# Patient Record
Sex: MALE | Race: WHITE | NOT HISPANIC OR LATINO | Employment: OTHER | URBAN - METROPOLITAN AREA
[De-identification: names, ages, dates, MRNs, and addresses within clinical notes are randomized per-mention and may not be internally consistent; named-entity substitution may affect disease eponyms.]

---

## 2017-02-01 ENCOUNTER — APPOINTMENT (OUTPATIENT)
Dept: GENERAL RADIOLOGY | Facility: HOSPITAL | Age: 72
End: 2017-02-01

## 2017-02-01 ENCOUNTER — HOSPITAL ENCOUNTER (EMERGENCY)
Facility: HOSPITAL | Age: 72
Discharge: HOME | End: 2017-02-01
Attending: EMERGENCY MEDICINE | Admitting: EMERGENCY MEDICINE

## 2017-02-01 VITALS
SYSTOLIC BLOOD PRESSURE: 117 MMHG | BODY MASS INDEX: 33.65 KG/M2 | RESPIRATION RATE: 18 BRPM | WEIGHT: 222 LBS | TEMPERATURE: 97.7 F | OXYGEN SATURATION: 94 % | DIASTOLIC BLOOD PRESSURE: 56 MMHG | HEART RATE: 82 BPM | HEIGHT: 68 IN

## 2017-02-01 DIAGNOSIS — R11.2 NAUSEA VOMITING AND DIARRHEA: ICD-10-CM

## 2017-02-01 DIAGNOSIS — R07.89 ATYPICAL CHEST PAIN: ICD-10-CM

## 2017-02-01 DIAGNOSIS — R19.7 NAUSEA VOMITING AND DIARRHEA: ICD-10-CM

## 2017-02-01 DIAGNOSIS — R55 SYNCOPE, UNSPECIFIED SYNCOPE TYPE: Primary | ICD-10-CM

## 2017-02-01 DIAGNOSIS — R19.7 DIARRHEA, UNSPECIFIED TYPE: ICD-10-CM

## 2017-02-01 LAB
ALBUMIN SERPL-MCNC: 4.2 G/DL (ref 3.2–4.8)
ALBUMIN/GLOB SERPL: 1.4 G/DL (ref 1.5–2.5)
ALP SERPL-CCNC: 76 U/L (ref 25–100)
ALT SERPL W P-5'-P-CCNC: 21 U/L (ref 7–40)
ANION GAP SERPL CALCULATED.3IONS-SCNC: 3 MMOL/L (ref 3–11)
AST SERPL-CCNC: 25 U/L (ref 0–33)
BASOPHILS # BLD AUTO: 0.01 10*3/MM3 (ref 0–0.2)
BASOPHILS NFR BLD AUTO: 0.1 % (ref 0–1)
BILIRUB SERPL-MCNC: 0.6 MG/DL (ref 0.3–1.2)
BNP SERPL-MCNC: 37 PG/ML (ref 0–100)
BUN BLD-MCNC: 14 MG/DL (ref 9–23)
BUN/CREAT SERPL: 14 (ref 7–25)
C DIFF TOX GENS STL QL NAA+PROBE: NOT DETECTED
CALCIUM SPEC-SCNC: 9.4 MG/DL (ref 8.7–10.4)
CHLORIDE SERPL-SCNC: 103 MMOL/L (ref 99–109)
CO2 SERPL-SCNC: 33 MMOL/L (ref 20–31)
CREAT BLD-MCNC: 1 MG/DL (ref 0.6–1.3)
DEPRECATED RDW RBC AUTO: 44.6 FL (ref 37–54)
EOSINOPHIL # BLD AUTO: 0.03 10*3/MM3 (ref 0.1–0.3)
EOSINOPHIL NFR BLD AUTO: 0.2 % (ref 0–3)
ERYTHROCYTE [DISTWIDTH] IN BLOOD BY AUTOMATED COUNT: 12.8 % (ref 11.3–14.5)
GFR SERPL CREATININE-BSD FRML MDRD: 73 ML/MIN/1.73
GLOBULIN UR ELPH-MCNC: 3 GM/DL
GLUCOSE BLD-MCNC: 148 MG/DL (ref 70–100)
HCT VFR BLD AUTO: 44.6 % (ref 38.9–50.9)
HGB BLD-MCNC: 15.2 G/DL (ref 13.1–17.5)
HOLD SPECIMEN: NORMAL
HOLD SPECIMEN: NORMAL
IMM GRANULOCYTES # BLD: 0.07 10*3/MM3 (ref 0–0.03)
IMM GRANULOCYTES NFR BLD: 0.5 % (ref 0–0.6)
LIPASE SERPL-CCNC: 29 U/L (ref 6–51)
LYMPHOCYTES # BLD AUTO: 0.75 10*3/MM3 (ref 0.6–4.8)
LYMPHOCYTES NFR BLD AUTO: 5.4 % (ref 24–44)
MCH RBC QN AUTO: 32.6 PG (ref 27–31)
MCHC RBC AUTO-ENTMCNC: 34.1 G/DL (ref 32–36)
MCV RBC AUTO: 95.7 FL (ref 80–99)
MONOCYTES # BLD AUTO: 0.94 10*3/MM3 (ref 0–1)
MONOCYTES NFR BLD AUTO: 6.8 % (ref 0–12)
NEUTROPHILS # BLD AUTO: 12.09 10*3/MM3 (ref 1.5–8.3)
NEUTROPHILS NFR BLD AUTO: 87 % (ref 41–71)
PLATELET # BLD AUTO: 204 10*3/MM3 (ref 150–450)
PMV BLD AUTO: 9.1 FL (ref 6–12)
POTASSIUM BLD-SCNC: 4.2 MMOL/L (ref 3.5–5.5)
PROT SERPL-MCNC: 7.2 G/DL (ref 5.7–8.2)
RBC # BLD AUTO: 4.66 10*6/MM3 (ref 4.2–5.76)
SODIUM BLD-SCNC: 139 MMOL/L (ref 132–146)
T4 FREE SERPL-MCNC: 0.95 NG/DL (ref 0.89–1.76)
TROPONIN I SERPL-MCNC: 0 NG/ML (ref 0–0.07)
TROPONIN I SERPL-MCNC: 0.01 NG/ML (ref 0–0.07)
TSH SERPL DL<=0.05 MIU/L-ACNC: 3.7 MIU/ML (ref 0.35–5.35)
WBC NRBC COR # BLD: 13.89 10*3/MM3 (ref 3.5–10.8)
WHOLE BLOOD HOLD SPECIMEN: NORMAL
WHOLE BLOOD HOLD SPECIMEN: NORMAL

## 2017-02-01 PROCEDURE — 87045 FECES CULTURE AEROBIC BACT: CPT | Performed by: EMERGENCY MEDICINE

## 2017-02-01 PROCEDURE — 87046 STOOL CULTR AEROBIC BACT EA: CPT | Performed by: EMERGENCY MEDICINE

## 2017-02-01 PROCEDURE — 84484 ASSAY OF TROPONIN QUANT: CPT

## 2017-02-01 PROCEDURE — 71010 HC CHEST PA OR AP: CPT

## 2017-02-01 PROCEDURE — 84443 ASSAY THYROID STIM HORMONE: CPT | Performed by: EMERGENCY MEDICINE

## 2017-02-01 PROCEDURE — 36415 COLL VENOUS BLD VENIPUNCTURE: CPT

## 2017-02-01 PROCEDURE — 80053 COMPREHEN METABOLIC PANEL: CPT | Performed by: EMERGENCY MEDICINE

## 2017-02-01 PROCEDURE — 85025 COMPLETE CBC W/AUTO DIFF WBC: CPT | Performed by: EMERGENCY MEDICINE

## 2017-02-01 PROCEDURE — 83690 ASSAY OF LIPASE: CPT | Performed by: EMERGENCY MEDICINE

## 2017-02-01 PROCEDURE — 83880 ASSAY OF NATRIURETIC PEPTIDE: CPT | Performed by: EMERGENCY MEDICINE

## 2017-02-01 PROCEDURE — 96374 THER/PROPH/DIAG INJ IV PUSH: CPT

## 2017-02-01 PROCEDURE — 93005 ELECTROCARDIOGRAM TRACING: CPT

## 2017-02-01 PROCEDURE — 87493 C DIFF AMPLIFIED PROBE: CPT | Performed by: EMERGENCY MEDICINE

## 2017-02-01 PROCEDURE — 25010000002 ONDANSETRON PER 1 MG: Performed by: EMERGENCY MEDICINE

## 2017-02-01 PROCEDURE — 93005 ELECTROCARDIOGRAM TRACING: CPT | Performed by: EMERGENCY MEDICINE

## 2017-02-01 PROCEDURE — 96361 HYDRATE IV INFUSION ADD-ON: CPT

## 2017-02-01 PROCEDURE — 99284 EMERGENCY DEPT VISIT MOD MDM: CPT

## 2017-02-01 PROCEDURE — 84439 ASSAY OF FREE THYROXINE: CPT | Performed by: EMERGENCY MEDICINE

## 2017-02-01 RX ORDER — OMEPRAZOLE 20 MG/1
20 CAPSULE, DELAYED RELEASE ORAL DAILY
COMMUNITY
End: 2022-04-27

## 2017-02-01 RX ORDER — ONDANSETRON 8 MG/1
8 TABLET, ORALLY DISINTEGRATING ORAL EVERY 8 HOURS PRN
Qty: 15 TABLET | Refills: 0 | Status: ON HOLD | OUTPATIENT
Start: 2017-02-01 | End: 2018-09-21

## 2017-02-01 RX ORDER — ASPIRIN 81 MG/1
324 TABLET, CHEWABLE ORAL ONCE
Status: DISCONTINUED | OUTPATIENT
Start: 2017-02-01 | End: 2017-02-01 | Stop reason: HOSPADM

## 2017-02-01 RX ORDER — SODIUM CHLORIDE 0.9 % (FLUSH) 0.9 %
10 SYRINGE (ML) INJECTION AS NEEDED
Status: DISCONTINUED | OUTPATIENT
Start: 2017-02-01 | End: 2017-02-01 | Stop reason: HOSPADM

## 2017-02-01 RX ORDER — SODIUM CHLORIDE 9 MG/ML
125 INJECTION, SOLUTION INTRAVENOUS CONTINUOUS
Status: DISCONTINUED | OUTPATIENT
Start: 2017-02-01 | End: 2017-02-01 | Stop reason: HOSPADM

## 2017-02-01 RX ORDER — ONDANSETRON 2 MG/ML
4 INJECTION INTRAMUSCULAR; INTRAVENOUS ONCE
Status: COMPLETED | OUTPATIENT
Start: 2017-02-01 | End: 2017-02-01

## 2017-02-01 RX ADMIN — SODIUM CHLORIDE 125 ML/HR: 9 INJECTION, SOLUTION INTRAVENOUS at 12:00

## 2017-02-01 RX ADMIN — SODIUM CHLORIDE 500 ML: 9 INJECTION, SOLUTION INTRAVENOUS at 11:59

## 2017-02-01 RX ADMIN — ONDANSETRON 4 MG: 2 INJECTION INTRAMUSCULAR; INTRAVENOUS at 11:59

## 2017-02-01 NOTE — DISCHARGE INSTRUCTIONS
Follow up with Dr. Beth at his next available, call tomorrow for appointment.     Follow upw with Dr. Bishop for colorectal reevaluation within the next week, more promptly if you have any recurrent symptoms, call tomorrow for an appointment.     If you have recurrent episodes of emesis, worsening symptoms, significant chest pain, or any progressive symptoms , return to the ED for re-evaluation    Drink plenty of fluids.     Your Zofran as needed for nausea    No vigorous physical activity, rest, and easy to digest foods for the next several days    Follow-up with your primary care provider for interim reevaluation

## 2017-02-01 NOTE — ED PROVIDER NOTES
Subjective   HPI Comments: Lea Rivers is a 71 y.o.male who presents to the ED with c/o CP and a syncopal episode. This morning he woke up around 0500 with CP/upper abd pain and took an  NTG with no relief. He continued to have pain after taking his grandson to school at 0730 and he took some tums with no relief. He ate a small amount of breakfast and several minutes later began having nausea and vomiting. He began having abdominal cramping and attempted to get to the restroom and had diarrhea before making it there and became diaphoretic and his daughter in law returned to find him passed out on the toilet and called EMS. In the ED he states that he had roughly 5 instances of foul watery stool this AM and current abdominal cramping and he denies any blood in his stool, sore throat, leg pain, SoA, fevers or other acute complaints.    His daughter in law says that he has been more fatigued over the past several months.     Hx of nobobstructive CAD with last cath in  showing 30-40% stenosis of mid LAD and nml EF, HTN, HLD, GERD treated with prilosec.     Patient is a 71 y.o. male presenting with chest pain.   History provided by:  Patient  Chest Pain   Pain location:  Epigastric  Pain radiates to:  Does not radiate  Pain severity:  Mild  Onset quality:  Sudden  Duration: 0530 this AM   Timing:  Constant  Progression:  Worsening  Chronicity:  New  Relieved by:  Nothing  Worsened by:  Nothing  Ineffective treatments: tums, NTG.  Associated symptoms: abdominal pain, diaphoresis, nausea, syncope and vomiting    Associated symptoms: no fever and no shortness of breath        Review of Systems   Constitutional: Positive for diaphoresis. Negative for fever.   Respiratory: Negative for shortness of breath.    Cardiovascular: Positive for chest pain and syncope.   Gastrointestinal: Positive for abdominal pain, diarrhea, nausea and vomiting.   Neurological: Positive for syncope.   All other systems reviewed and  are negative.      Past Medical History   Diagnosis Date   • BPH (benign prostatic hypertrophy)    • Colon cancer 1990     Status post partial colectomy in 1990. No chemotherapy or radiation therapy.   • Coronary artery disease      Nonobstructive coronary artery disease.   • Dyslipidemia    • GERD (gastroesophageal reflux disease)      Hx of.   • H/O cardiac catheterization    • Hypertension    • Hypothyroidism    • Seizure disorder        Allergies   Allergen Reactions   • Sulfa Antibiotics Rash       Past Surgical History   Procedure Laterality Date   • Colectomy partial / total  1990     Partial colectomy   • Cholecystectomy     • Appendectomy     • Other surgical history       Contraction surgery on bilateral hands and wrists.       History reviewed. No pertinent family history.    Social History     Social History   • Marital status:      Spouse name: N/A   • Number of children: N/A   • Years of education: N/A     Social History Main Topics   • Smoking status: Never Smoker   • Smokeless tobacco: None   • Alcohol use No   • Drug use: No   • Sexual activity: Defer     Other Topics Concern   • None     Social History Narrative   • None         Objective   Physical Exam   Constitutional: He is oriented to person, place, and time. He appears well-developed and well-nourished. No distress.   HENT:   Head: Normocephalic and atraumatic.   Eyes: No scleral icterus.   Conjunctival pallor.    Neck: Normal range of motion. Neck supple.   Cardiovascular: Normal rate, regular rhythm and normal heart sounds.    Pulmonary/Chest: Effort normal and breath sounds normal. No respiratory distress.   Abdominal: Soft. There is no tenderness.   Musculoskeletal: Normal range of motion. He exhibits no edema.   Lymphadenopathy:     He has no cervical adenopathy.   Neurological: He is alert and oriented to person, place, and time.   Skin: Skin is warm and dry. No rash noted. He is not diaphoretic.   Psychiatric: He has a normal  mood and affect. His behavior is normal.   Nursing note and vitals reviewed.      Procedures         ED Course  ED Course   Comment By Time   Discussed findings at length with the patient with 2 negative troponins, 2 acute EKGs.  The patient had an episode of emesis and diarrhea in the ED.  Stools obtained which is negative for C. difficile.  A culture is pending.  The patient now reports feeling markedly improved.  His nausea is resolved.  He has no abdominal pain.  His chest pain had resolved previously, and was all in relation to his GI symptoms earlierI discussed the evaluation here in the ED today.  His family reports 2 previous identical episodes of nausea vomiting and diarrhea since his treatment of prostatitis.  This is the third bout.  All resolved spontaneously.  He has not seen urologist and they have requested follow-up with Dr. Daniel Beth.  I discussed urologic follow-up and will be happy to refer him at their requestHe was supposed to have repeat colonoscopy this month by Dr. Bishop.  I think this is quite opportune and review of his recurrent GI symptoms now, as he'll certainly need colorectal evaluation, and should have a colonoscopy this month.  I'll last them to call today or tomorrowWe'll sure that he can keep down fluids well currently and he is taking Gatorade at the current time.  I discussed hydrationDiscussed indications for immediate return to the ED.  Very responsible and pleasant patient and family verbalized understanding and agreement with the plan of care. Jose Golden MD 02/01 4307   Encourage fluids well without further nausea in the ED.  He much better.  I discussed outpatient evaluation with patient and family.  He is asymptomatic now.  Will ambulate in the in the ED prior to discharge.  All are in agreement with the plan and feels the patient is ready for discharge at the current time. Jose Golden MD 02/01 3264     Recent Results (from the past 24 hour(s))   Comprehensive  Metabolic Panel    Collection Time: 02/01/17 11:17 AM   Result Value Ref Range    Glucose 148 (H) 70 - 100 mg/dL    BUN 14 9 - 23 mg/dL    Creatinine 1.00 0.60 - 1.30 mg/dL    Sodium 139 132 - 146 mmol/L    Potassium 4.2 3.5 - 5.5 mmol/L    Chloride 103 99 - 109 mmol/L    CO2 33.0 (H) 20.0 - 31.0 mmol/L    Calcium 9.4 8.7 - 10.4 mg/dL    Total Protein 7.2 5.7 - 8.2 g/dL    Albumin 4.20 3.20 - 4.80 g/dL    ALT (SGPT) 21 7 - 40 U/L    AST (SGOT) 25 0 - 33 U/L    Alkaline Phosphatase 76 25 - 100 U/L    Total Bilirubin 0.6 0.3 - 1.2 mg/dL    eGFR Non African Amer 73 >60 mL/min/1.73    Globulin 3.0 gm/dL    A/G Ratio 1.4 (L) 1.5 - 2.5 g/dL    BUN/Creatinine Ratio 14.0 7.0 - 25.0    Anion Gap 3.0 3.0 - 11.0 mmol/L   Lipase    Collection Time: 02/01/17 11:17 AM   Result Value Ref Range    Lipase 29 6 - 51 U/L   BNP    Collection Time: 02/01/17 11:17 AM   Result Value Ref Range    BNP 37.0 0.0 - 100.0 pg/mL   CBC Auto Differential    Collection Time: 02/01/17 11:17 AM   Result Value Ref Range    WBC 13.89 (H) 3.50 - 10.80 10*3/mm3    RBC 4.66 4.20 - 5.76 10*6/mm3    Hemoglobin 15.2 13.1 - 17.5 g/dL    Hematocrit 44.6 38.9 - 50.9 %    MCV 95.7 80.0 - 99.0 fL    MCH 32.6 (H) 27.0 - 31.0 pg    MCHC 34.1 32.0 - 36.0 g/dL    RDW 12.8 11.3 - 14.5 %    RDW-SD 44.6 37.0 - 54.0 fl    MPV 9.1 6.0 - 12.0 fL    Platelets 204 150 - 450 10*3/mm3    Neutrophil % 87.0 (H) 41.0 - 71.0 %    Lymphocyte % 5.4 (L) 24.0 - 44.0 %    Monocyte % 6.8 0.0 - 12.0 %    Eosinophil % 0.2 0.0 - 3.0 %    Basophil % 0.1 0.0 - 1.0 %    Immature Grans % 0.5 0.0 - 0.6 %    Neutrophils, Absolute 12.09 (H) 1.50 - 8.30 10*3/mm3    Lymphocytes, Absolute 0.75 0.60 - 4.80 10*3/mm3    Monocytes, Absolute 0.94 0.00 - 1.00 10*3/mm3    Eosinophils, Absolute 0.03 (L) 0.10 - 0.30 10*3/mm3    Basophils, Absolute 0.01 0.00 - 0.20 10*3/mm3    Immature Grans, Absolute 0.07 (H) 0.00 - 0.03 10*3/mm3   T4, Free    Collection Time: 02/01/17 11:17 AM   Result Value Ref Range     Free T4 0.95 0.89 - 1.76 ng/dL   TSH    Collection Time: 02/01/17 11:17 AM   Result Value Ref Range    TSH 3.699 0.350 - 5.350 mIU/mL   POC Troponin, Rapid    Collection Time: 02/01/17 11:21 AM   Result Value Ref Range    Troponin I 0.00 0.00 - 0.07 ng/mL   Clostridium Difficile Toxin, PCR    Collection Time: 02/01/17 12:28 PM   Result Value Ref Range    C. Difficile Toxins by PCR Not Detected Negative   POC Troponin, Rapid    Collection Time: 02/01/17  1:23 PM   Result Value Ref Range    Troponin I 0.01 0.00 - 0.07 ng/mL     Note: In addition to lab results from this visit, the labs listed above may include labs taken at another facility or during a different encounter within the last 24 hours. Please correlate lab times with ED admission and discharge times for further clarification of the services performed during this visit.    XR Chest 1 View   Final Result   No active disease.       D:  02/01/2017   E:  02/01/2017       This report was finalized on 2/1/2017 11:52 AM by Dr. Augustin Lewis MD.            Vitals:    02/01/17 1436 02/01/17 1437 02/01/17 1500 02/01/17 1514   BP: 141/78 141/78 117/56    BP Location:       Patient Position:       Pulse: 84 81 82    Resp:       Temp:       TempSrc:       SpO2: 94% 94%  94%   Weight:       Height:         Medications   sodium chloride 0.9 % flush 10 mL (not administered)   aspirin chewable tablet 324 mg (324 mg Oral Not Given 2/1/17 1116)   sodium chloride 0.9 % infusion (125 mL/hr Intravenous Not Given 2/1/17 1442)   sodium chloride 0.9 % bolus 500 mL (0 mL Intravenous Stopped 2/1/17 1233)   ondansetron (ZOFRAN) injection 4 mg (4 mg Intravenous Given 2/1/17 1159)     ECG/EMG Results (last 24 hours)     Procedure Component Value Units Date/Time    ECG 12 Lead [[id number]] Collected:  02/01/17 1054     Updated:  02/01/17 1141    Narrative:       Test Reason : chest pain  Blood Pressure : **/** mmHG  Vent. Rate : 092 BPM     Atrial Rate : 092 BPM     P-R Int : 156 ms           QRS Dur : 084 ms      QT Int : 342 ms       P-R-T Axes : 045 059 049 degrees     QTc Int : 422 ms    Normal sinus rhythm  Nonspecific ST abnormality  Abnormal ECG  When compared with ECG of 13-MAR-2012 15:54,  Vent. rate has increased BY  35 BPM  Non-specific change in ST segment in Anterior leads  Confirmed by KERI GOLDEN MD ([id number]) on 2/1/2017 11:41:48 AM    Referred By:  EDMD           Confirmed By:KERI GOLDEN MD    ECG 12 Lead [[id number]] Collected:  02/01/17 1321     Updated:  02/01/17 1343    Narrative:       Test Reason : chest pain  Blood Pressure : **/** mmHG  Vent. Rate : 086 BPM     Atrial Rate : 086 BPM     P-R Int : 170 ms          QRS Dur : 082 ms      QT Int : 360 ms       P-R-T Axes : 052 060 071 degrees     QTc Int : 430 ms    Normal sinus rhythm  Nonspecific T wave abnormality  Abnormal ECG  When compared with ECG of 01-FEB-2017 10:54,  Nonspecific T wave abnormality now evident in Lateral leads  Confirmed by KERI GOLDEN MD ([id number]) on 2/1/2017 1:43:07 PM    Referred By:  DARNELL           Confirmed By:KERI GOLDEN MD                    [facility]    Final diagnoses:   Diarrhea, unspecified type   Syncope, unspecified syncope type   Atypical chest pain   Nausea vomiting and diarrhea       Documentation assistance provided by brunilda Layne.  Information recorded by the scribe was done at my direction and has been verified and validated by me.     Dangelo Layne  02/01/17 1140       Dangelo Layne  02/01/17 1448       Jose Golden MD  02/01/17 1537

## 2017-02-03 LAB — BACTERIA SPEC AEROBE CULT: NORMAL

## 2017-11-13 ENCOUNTER — OFFICE VISIT (OUTPATIENT)
Dept: ORTHOPEDIC SURGERY | Facility: CLINIC | Age: 72
End: 2017-11-13

## 2017-11-13 VITALS — WEIGHT: 222 LBS | BODY MASS INDEX: 33.65 KG/M2 | HEIGHT: 68 IN

## 2017-11-13 DIAGNOSIS — M17.0 BILATERAL PRIMARY OSTEOARTHRITIS OF KNEE: Primary | ICD-10-CM

## 2017-11-13 PROCEDURE — 99214 OFFICE O/P EST MOD 30 MIN: CPT | Performed by: ORTHOPAEDIC SURGERY

## 2017-11-13 PROCEDURE — 20610 DRAIN/INJ JOINT/BURSA W/O US: CPT | Performed by: ORTHOPAEDIC SURGERY

## 2017-11-13 RX ORDER — DIVALPROEX SODIUM 500 MG/1
TABLET, EXTENDED RELEASE ORAL
COMMUNITY
Start: 2017-10-11 | End: 2017-11-13 | Stop reason: SDUPTHER

## 2017-11-13 RX ORDER — ISOSORBIDE MONONITRATE 30 MG/1
TABLET, EXTENDED RELEASE ORAL
COMMUNITY
Start: 2017-10-11 | End: 2017-11-13 | Stop reason: SDUPTHER

## 2017-11-13 RX ORDER — ZOSTER VACCINE LIVE 19400 [PFU]/.65ML
INJECTION, POWDER, LYOPHILIZED, FOR SUSPENSION SUBCUTANEOUS
Refills: 0 | Status: ON HOLD | COMMUNITY
Start: 2017-10-20 | End: 2018-09-21

## 2017-11-13 RX ORDER — METFORMIN HYDROCHLORIDE 500 MG/1
500 TABLET, EXTENDED RELEASE ORAL 2 TIMES DAILY
COMMUNITY
Start: 2017-10-26

## 2017-11-13 NOTE — PROGRESS NOTES
[facility] Note    Subjective     Chief Complaint   Patient presents with   • Follow-up     6 month follow up: Bilateral knee osteoarthritits        HPI    Lea Rivers is a 72 y.o. male. He presents today for evaluation of bilateral knee pain.  Ms. Jensen known history of osteoarthritis in both knees, with persistent pain, no history of injury, moderate to severe pain, burning and sharp in quality, associated with popping, grinding and stiffness.  It worsens with climbing stairs.  Overall the pain is worsening.  He is not interested in surgical management at current time.  He has responded well to Visco supplementation injections in the past.      Patient Active Problem List   Diagnosis   • Coronary artery disease   • Dyslipidemia   • Hypothyroidism   • GERD (gastroesophageal reflux disease)   • Seizure disorder   • BPH (benign prostatic hypertrophy)   • Hypertension     Past Medical History:   Diagnosis Date   • BPH (benign prostatic hypertrophy)    • Chest pain    • Colon cancer 1990    Status post partial colectomy in 1990. No chemotherapy or radiation therapy.   • Coronary artery disease     Nonobstructive coronary artery disease.   • Diabetes    • Dyslipidemia    • GERD (gastroesophageal reflux disease)     Hx of.   • H/O cardiac catheterization    • Hypertension    • Hypothyroidism    • Left shoulder pain    • Primary osteoarthritis of both knees    • Seizure disorder       Past Surgical History:   Procedure Laterality Date   • APPENDECTOMY     • CARPAL TUNNEL RELEASE Bilateral    • CHOLECYSTECTOMY     • COLECTOMY PARTIAL / TOTAL  1990    Partial colectomy   • OTHER SURGICAL HISTORY      Contraction surgery on bilateral hands and wrists.   • OTHER SURGICAL HISTORY      left and right hands   • SINUS SURGERY     • TONSILLECTOMY     • VASECTOMY        Family History   Problem Relation Age of Onset   • Cancer Mother    • Hypertension Father    • Stroke Father    • Stroke Other    •  Arthritis Other    • Cancer Other      Social History     Social History   • Marital status:      Spouse name: N/A   • Number of children: N/A   • Years of education: N/A     Occupational History   • Not on file.     Social History Main Topics   • Smoking status: Never Smoker   • Smokeless tobacco: Never Used   • Alcohol use No   • Drug use: No   • Sexual activity: Defer     Other Topics Concern   • Not on file     Social History Narrative      Current Outpatient Prescriptions on File Prior to Visit   Medication Sig Dispense Refill   • aspirin 81 MG tablet Take 81 mg by mouth daily.     • coenzyme Q10 100 MG capsule Take 100 mg by mouth daily.     • divalproex (DEPAKOTE) 500 MG DR tablet Take 500 mg by mouth 3 (three) times a day.     • fluticasone (FLONASE) 50 MCG/ACT nasal spray 2 sprays into each nostril as needed for rhinitis. Administer 2 sprays in each nostril for each dose.     • isosorbide dinitrate (ISORDIL) 30 MG tablet Take 30 mg by mouth daily.     • levETIRAcetam (KEPPRA) 500 MG tablet Take 500 mg by mouth 2 (two) times a day.     • levothyroxine (SYNTHROID, LEVOTHROID) 88 MCG tablet Take 88 mcg by mouth daily.     • lisinopril (PRINIVIL,ZESTRIL) 20 MG tablet Take 20 mg by mouth 2 (two) times a day.     • omeprazole (priLOSEC) 20 MG capsule Take 20 mg by mouth Daily.     • simvastatin (ZOCOR) 20 MG tablet Take 20 mg by mouth every night.     • tamsulosin (FLOMAX) 0.4 MG capsule 24 hr capsule Take  by mouth Take As Directed.     • verapamil SR (CALAN-SR) 240 MG CR tablet Take 240 mg by mouth 2 (two) times a day.     • vitamin B-12 (CYANOCOBALAMIN) 100 MCG tablet Take 50 mcg by mouth daily.     • doxazosin (CARDURA) 4 MG tablet Take 4 mg by mouth every night.     • finasteride (PROSCAR) 5 MG tablet Take 5 mg by mouth daily.     • nitroglycerin (NITROSTAT) 0.4 MG SL tablet Place  under the tongue Take As Directed.     • ondansetron ODT (ZOFRAN-ODT) 8 MG disintegrating tablet Take 1 tablet by mouth  Every 8 (Eight) Hours As Needed for nausea or vomiting. 15 tablet 0     No current facility-administered medications on file prior to visit.       Allergies   Allergen Reactions   • Sulfa Antibiotics Rash        Review of Systems   Constitutional: Negative for activity change, appetite change, chills, diaphoresis, fatigue, fever and unexpected weight change.   HENT: Positive for sinus pressure. Negative for congestion, dental problem, drooling, ear discharge, ear pain, facial swelling, hearing loss, mouth sores, nosebleeds, postnasal drip, rhinorrhea, sneezing, sore throat, tinnitus, trouble swallowing and voice change.    Eyes: Negative for photophobia, pain, discharge, redness, itching and visual disturbance.   Respiratory: Negative for apnea, cough, choking, chest tightness, shortness of breath, wheezing and stridor.    Cardiovascular: Negative for chest pain, palpitations and leg swelling.   Gastrointestinal: Negative for abdominal distention, abdominal pain, anal bleeding, blood in stool, constipation, diarrhea, nausea, rectal pain and vomiting.   Endocrine: Negative for cold intolerance, heat intolerance, polydipsia, polyphagia and polyuria.   Genitourinary: Negative for decreased urine volume, difficulty urinating, dysuria, enuresis, flank pain, frequency, genital sores, hematuria and urgency.   Musculoskeletal: Positive for arthralgias. Negative for back pain, gait problem, joint swelling, myalgias, neck pain and neck stiffness.   Skin: Negative for color change, pallor, rash and wound.   Allergic/Immunologic: Negative for environmental allergies, food allergies and immunocompromised state.   Neurological: Positive for seizures. Negative for dizziness, tremors, syncope, facial asymmetry, speech difficulty, weakness, light-headedness, numbness and headaches.   Hematological: Negative for adenopathy. Does not bruise/bleed easily.   Psychiatric/Behavioral: Negative for agitation, behavioral problems, confusion,  "decreased concentration, dysphoric mood, hallucinations, self-injury, sleep disturbance and suicidal ideas. The patient is not nervous/anxious and is not hyperactive.         Objective      Physical Exam  Ht 68\" (172.7 cm)  Wt 222 lb (101 kg)  BMI 33.75 kg/m2    Body mass index is 33.75 kg/(m^2).    General:   Mental Status:  Alert   Appearance: Cooperative, in no acute distress   Build and Nutrition: Well-nourished and well developed male   Orientation: Alert and oriented to person, place and time   Posture: Normal   Gait: Normal    Integument:   Right knee: no skin lesions, no rash, no ecchymosis   Left knee: no skin lesions, no rash, no ecchymosis    Neurologic:   Sensation:    Right foot: intact to light touch on the dorsal and plantar aspect    Left foot: intact to light touch on the dorsal and plantar aspect   Motor:  Right lower extremity: 5/5 quadriceps, hamstrings, ankle dorsiflexors, and ankle plantar flexors  Left lower extremity: 5/5 quadriceps, hamstrings, ankle dorsiflexors, and ankle plantar flexors    Lower Extremities:   Right Knee:    Tenderness:  Medial joint line tenderness    Effusion:  None    Swelling:  None    Crepitus:  Positive    Atrophy:  None    Range of motion:  Extension: 0°       Flexion: 120°  Instability:  No varus laxity, no valgus laxity, negative anterior drawer  Deformities:  Varus   Left Knee:    Tenderness:  Medial joint line tenderness    Effusion:  None    Swelling:  None    Crepitus: Positive    Atrophy: None    Range of motion:  Extension: 0°       Flexion: 120°  Instability:  No varus laxity, no valgus laxity, negative anterior drawer  Deformities:  Varus        Imaging/Studies  Imaging Results (last 24 hours)     Procedure Component Value Units Date/Time    XR Knee 4+ View Bilateral [[id number]] Resulted:  11/13/17 0908     Updated:  11/13/17 0909    Narrative:       RIght Knee Radiographs  Indication: right knee pain  Views: Standing AP's and skiers of both knees, with " lateral and sunrise   views of the right knee    Comparison: no prior studies available    Findings:   Advanced arthritic changes are seen, with varus alignment, and   bone-on-bone contact in medial compartment, with tricompartmental   osteophytes.    Left Knee Radiographs  Indication: left knee pain  Views: Standing AP's and skiers of both knees, with lateral and sunrise   views of the left knee    Comparison: no prior studies available    Findings:   Advanced arthritic changes are seen, with varus alignment, and   bone-on-bone contact in medial compartment, with tricompartmental   osteophytes.            Assessment and Plan     Lea was seen today for follow-up.    Diagnoses and all orders for this visit:    Bilateral primary osteoarthritis of knee  -     XR Knee 4+ View Bilateral  -     Large Joint Arthrocentesis  -     Large Joint Arthrocentesis        I reviewed my findings with patient today.  He has advanced bilateral knee arthritis, and has responded well to Visco supplementation injections in the past.  He would like another round of injections today, and these were started.  Please see my procedure notes for details.    Return in about 1 week (around 11/20/2017) for Injection.      Medical Decision Making  Management Options : prescription/IM medicine  Data/Risk: radiology tests and independent visualization of imaging, lab tests, or EMG/NCV      Louis Malcolm MD  11/13/17  9:29 AM

## 2017-11-13 NOTE — PROGRESS NOTES
Procedure   Large Joint Arthrocentesis  Date/Time: 11/13/2017 9:23 AM  Consent given by: patient  Site marked: site marked  Timeout: Immediately prior to procedure a time out was called to verify the correct patient, procedure, equipment, support staff and site/side marked as required   Supporting Documentation  Indications: pain   Procedure Details  Location: knee - R knee  Preparation: Patient was prepped and draped in the usual sterile fashion  Needle size: 22 G  Approach: anterolateral  Medications administered: 30 mg Hyaluronan 30 MG/2ML  Patient tolerance: patient tolerated the procedure well with no immediate complications    Large Joint Arthrocentesis  Date/Time: 11/13/2017 9:24 AM  Consent given by: patient  Site marked: site marked  Timeout: Immediately prior to procedure a time out was called to verify the correct patient, procedure, equipment, support staff and site/side marked as required   Supporting Documentation  Indications: pain   Procedure Details  Location: knee - L knee  Preparation: Patient was prepped and draped in the usual sterile fashion  Needle size: 22 G  Approach: anterolateral  Medications administered: 30 mg Hyaluronan 30 MG/2ML  Patient tolerance: patient tolerated the procedure well with no immediate complications

## 2017-11-22 ENCOUNTER — CLINICAL SUPPORT (OUTPATIENT)
Dept: ORTHOPEDIC SURGERY | Facility: CLINIC | Age: 72
End: 2017-11-22

## 2017-11-22 DIAGNOSIS — M17.0 PRIMARY OSTEOARTHRITIS OF KNEES, BILATERAL: Primary | ICD-10-CM

## 2017-11-22 PROCEDURE — 20610 DRAIN/INJ JOINT/BURSA W/O US: CPT | Performed by: ORTHOPAEDIC SURGERY

## 2017-11-22 NOTE — PROGRESS NOTES
Procedure   Large Joint Arthrocentesis  Date/Time: 11/22/2017 3:33 PM  Consent given by: patient  Site marked: site marked  Timeout: Immediately prior to procedure a time out was called to verify the correct patient, procedure, equipment, support staff and site/side marked as required   Supporting Documentation  Indications: pain   Procedure Details  Location: knee - L knee  Preparation: Patient was prepped and draped in the usual sterile fashion  Needle size: 22 G  Approach: anterolateral  Medications administered: 30 mg Hyaluronan 30 MG/2ML  Patient tolerance: patient tolerated the procedure well with no immediate complications

## 2017-11-22 NOTE — PROGRESS NOTES
[facility] – [place] Orthopaedic Surgery Clinic Note    Subjective     Chief Complaint   Patient presents with   • Injections     Bilateral knee Orthovisc Injections #2        HPI    Lea Rivers is a 72 y.o. male who presents for the second Orthovisc injection into both knee joints.  No improvement so far with the injections.    Patient Active Problem List   Diagnosis   • Coronary artery disease   • Dyslipidemia   • Hypothyroidism   • GERD (gastroesophageal reflux disease)   • Seizure disorder   • BPH (benign prostatic hypertrophy)   • Hypertension     Past Medical History:   Diagnosis Date   • BPH (benign prostatic hypertrophy)    • Chest pain    • Colon cancer 1990    Status post partial colectomy in 1990. No chemotherapy or radiation therapy.   • Coronary artery disease     Nonobstructive coronary artery disease.   • Diabetes    • Dyslipidemia    • GERD (gastroesophageal reflux disease)     Hx of.   • H/O cardiac catheterization    • Hypertension    • Hypothyroidism    • Left shoulder pain    • Primary osteoarthritis of both knees    • Seizure disorder       Past Surgical History:   Procedure Laterality Date   • APPENDECTOMY     • CARPAL TUNNEL RELEASE Bilateral    • CHOLECYSTECTOMY     • COLECTOMY PARTIAL / TOTAL  1990    Partial colectomy   • OTHER SURGICAL HISTORY      Contraction surgery on bilateral hands and wrists.   • OTHER SURGICAL HISTORY      left and right hands   • SINUS SURGERY     • TONSILLECTOMY     • VASECTOMY        Family History   Problem Relation Age of Onset   • Cancer Mother    • Hypertension Father    • Stroke Father    • Stroke Other    • Arthritis Other    • Cancer Other      Social History     Social History   • Marital status:      Spouse name: N/A   • Number of children: N/A   • Years of education: N/A     Occupational History   • Not on file.     Social History Main Topics   • Smoking status: Never Smoker   • Smokeless tobacco: Never Used   • Alcohol use No   • Drug use: No   •  Sexual activity: Defer     Other Topics Concern   • Not on file     Social History Narrative      Current Outpatient Prescriptions on File Prior to Visit   Medication Sig Dispense Refill   • aspirin 81 MG tablet Take 81 mg by mouth daily.     • coenzyme Q10 100 MG capsule Take 100 mg by mouth daily.     • divalproex (DEPAKOTE) 500 MG DR tablet Take 500 mg by mouth 3 (three) times a day.     • doxazosin (CARDURA) 4 MG tablet Take 4 mg by mouth every night.     • finasteride (PROSCAR) 5 MG tablet Take 5 mg by mouth daily.     • fluticasone (FLONASE) 50 MCG/ACT nasal spray 2 sprays into each nostril as needed for rhinitis. Administer 2 sprays in each nostril for each dose.     • isosorbide dinitrate (ISORDIL) 30 MG tablet Take 30 mg by mouth daily.     • levETIRAcetam (KEPPRA) 500 MG tablet Take 500 mg by mouth 2 (two) times a day.     • levothyroxine (SYNTHROID, LEVOTHROID) 88 MCG tablet Take 88 mcg by mouth daily.     • lisinopril (PRINIVIL,ZESTRIL) 20 MG tablet Take 20 mg by mouth 2 (two) times a day.     • metFORMIN ER (GLUCOPHAGE-XR) 500 MG 24 hr tablet      • nitroglycerin (NITROSTAT) 0.4 MG SL tablet Place  under the tongue Take As Directed.     • omeprazole (priLOSEC) 20 MG capsule Take 20 mg by mouth Daily.     • ondansetron ODT (ZOFRAN-ODT) 8 MG disintegrating tablet Take 1 tablet by mouth Every 8 (Eight) Hours As Needed for nausea or vomiting. 15 tablet 0   • simvastatin (ZOCOR) 20 MG tablet Take 20 mg by mouth every night.     • tamsulosin (FLOMAX) 0.4 MG capsule 24 hr capsule Take  by mouth Take As Directed.     • verapamil SR (CALAN-SR) 240 MG CR tablet Take 240 mg by mouth 2 (two) times a day.     • vitamin B-12 (CYANOCOBALAMIN) 100 MCG tablet Take 50 mcg by mouth daily.     • ZOSTAVAX 99764 UNT/0.65ML reconstituted suspension inject contents of 1 vial subcutaneously  0     No current facility-administered medications on file prior to visit.       Allergies   Allergen Reactions   • Sulfa Antibiotics Rash         Review of Systems     Objective      Physical Exam  There were no vitals taken for this visit.    There is no height or weight on file to calculate BMI.    General:   Mental Status:  Alert   Appearance: Cooperative, in no acute distress   Posture: Normal    Assessment and Plan     Lea was seen today for injections.    Diagnoses and all orders for this visit:    Primary osteoarthritis of knees, bilateral  -     Large Joint Arthrocentesis  -     Large Joint Arthrocentesis        Administration of viscosupplementation today.  Please see my procedure note for details.    Return in about 1 week (around 11/29/2017) for Injection.    Louis Malcolm MD  11/22/17  3:54 PM

## 2017-11-22 NOTE — PROGRESS NOTES
Procedure   Large Joint Arthrocentesis  Date/Time: 11/22/2017 3:32 PM  Consent given by: patient  Site marked: site marked  Timeout: Immediately prior to procedure a time out was called to verify the correct patient, procedure, equipment, support staff and site/side marked as required   Supporting Documentation  Indications: pain   Procedure Details  Location: knee - R knee  Preparation: Patient was prepped and draped in the usual sterile fashion  Needle size: 22 G  Approach: anterolateral  Medications administered: 30 mg Hyaluronan 30 MG/2ML  Patient tolerance: patient tolerated the procedure well with no immediate complications

## 2017-11-29 ENCOUNTER — CLINICAL SUPPORT (OUTPATIENT)
Dept: ORTHOPEDIC SURGERY | Facility: CLINIC | Age: 72
End: 2017-11-29

## 2017-11-29 DIAGNOSIS — M17.0 PRIMARY OSTEOARTHRITIS OF BOTH KNEES: Primary | ICD-10-CM

## 2017-11-29 PROCEDURE — 20610 DRAIN/INJ JOINT/BURSA W/O US: CPT | Performed by: ORTHOPAEDIC SURGERY

## 2017-11-29 NOTE — PROGRESS NOTES
Procedure   Large Joint Arthrocentesis  Date/Time: 11/29/2017 8:51 AM  Consent given by: patient  Site marked: site marked  Timeout: Immediately prior to procedure a time out was called to verify the correct patient, procedure, equipment, support staff and site/side marked as required   Supporting Documentation  Indications: pain   Procedure Details  Location: knee - R knee  Preparation: Patient was prepped and draped in the usual sterile fashion  Needle size: 22 G  Approach: anterolateral  Medications administered: 30 mg Hyaluronan 30 MG/2ML  Patient tolerance: patient tolerated the procedure well with no immediate complications

## 2017-11-29 NOTE — PROGRESS NOTES
[facility] [place] Orthopaedic Surgery Clinic Note    Subjective     Chief Complaint   Patient presents with   • Follow-up     Bilateral knee Orthovisc injection # 3        HPI    Lea Rivers is a 72 y.o. male who presents for the third Orthovisc injections into both knee joints. He has noted some improvement with the injections so far.    Patient Active Problem List   Diagnosis   • Coronary artery disease   • Dyslipidemia   • Hypothyroidism   • GERD (gastroesophageal reflux disease)   • Seizure disorder   • BPH (benign prostatic hypertrophy)   • Hypertension     Past Medical History:   Diagnosis Date   • BPH (benign prostatic hypertrophy)    • Chest pain    • Colon cancer 1990    Status post partial colectomy in 1990. No chemotherapy or radiation therapy.   • Coronary artery disease     Nonobstructive coronary artery disease.   • Diabetes    • Dyslipidemia    • GERD (gastroesophageal reflux disease)     Hx of.   • H/O cardiac catheterization    • Hypertension    • Hypothyroidism    • Left shoulder pain    • Primary osteoarthritis of both knees    • Seizure disorder       Past Surgical History:   Procedure Laterality Date   • APPENDECTOMY     • CARPAL TUNNEL RELEASE Bilateral    • CHOLECYSTECTOMY     • COLECTOMY PARTIAL / TOTAL  1990    Partial colectomy   • OTHER SURGICAL HISTORY      Contraction surgery on bilateral hands and wrists.   • OTHER SURGICAL HISTORY      left and right hands   • SINUS SURGERY     • TONSILLECTOMY     • VASECTOMY        Family History   Problem Relation Age of Onset   • Cancer Mother    • Hypertension Father    • Stroke Father    • Stroke Other    • Arthritis Other    • Cancer Other      Social History     Social History   • Marital status:      Spouse name: N/A   • Number of children: N/A   • Years of education: N/A     Occupational History   • Not on file.     Social History Main Topics   • Smoking status: Never Smoker   • Smokeless tobacco: Never Used   • Alcohol use No   • Drug  use: No   • Sexual activity: Defer     Other Topics Concern   • Not on file     Social History Narrative      Current Outpatient Prescriptions on File Prior to Visit   Medication Sig Dispense Refill   • aspirin 81 MG tablet Take 81 mg by mouth daily.     • coenzyme Q10 100 MG capsule Take 100 mg by mouth daily.     • divalproex (DEPAKOTE) 500 MG DR tablet Take 500 mg by mouth 3 (three) times a day.     • doxazosin (CARDURA) 4 MG tablet Take 4 mg by mouth every night.     • finasteride (PROSCAR) 5 MG tablet Take 5 mg by mouth daily.     • fluticasone (FLONASE) 50 MCG/ACT nasal spray 2 sprays into each nostril as needed for rhinitis. Administer 2 sprays in each nostril for each dose.     • isosorbide dinitrate (ISORDIL) 30 MG tablet Take 30 mg by mouth daily.     • levETIRAcetam (KEPPRA) 500 MG tablet Take 500 mg by mouth 2 (two) times a day.     • levothyroxine (SYNTHROID, LEVOTHROID) 88 MCG tablet Take 88 mcg by mouth daily.     • lisinopril (PRINIVIL,ZESTRIL) 20 MG tablet Take 20 mg by mouth 2 (two) times a day.     • metFORMIN ER (GLUCOPHAGE-XR) 500 MG 24 hr tablet      • nitroglycerin (NITROSTAT) 0.4 MG SL tablet Place  under the tongue Take As Directed.     • omeprazole (priLOSEC) 20 MG capsule Take 20 mg by mouth Daily.     • ondansetron ODT (ZOFRAN-ODT) 8 MG disintegrating tablet Take 1 tablet by mouth Every 8 (Eight) Hours As Needed for nausea or vomiting. 15 tablet 0   • simvastatin (ZOCOR) 20 MG tablet Take 20 mg by mouth every night.     • tamsulosin (FLOMAX) 0.4 MG capsule 24 hr capsule Take  by mouth Take As Directed.     • verapamil SR (CALAN-SR) 240 MG CR tablet Take 240 mg by mouth 2 (two) times a day.     • vitamin B-12 (CYANOCOBALAMIN) 100 MCG tablet Take 50 mcg by mouth daily.     • ZOSTAVAX 55001 UNT/0.65ML reconstituted suspension inject contents of 1 vial subcutaneously  0     No current facility-administered medications on file prior to visit.       Allergies   Allergen Reactions   • Sulfa  Antibiotics Rash        Review of Systems   Constitutional: Negative.    HENT: Negative.    Eyes: Negative.    Respiratory: Negative.    Cardiovascular: Negative.    Gastrointestinal: Negative.    Endocrine: Negative.    Genitourinary: Negative.    Musculoskeletal: Positive for arthralgias.   Skin: Negative.    Allergic/Immunologic: Negative.    Neurological: Negative.    Hematological: Negative.    Psychiatric/Behavioral: Negative.         Objective      Physical Exam  There were no vitals taken for this visit.    There is no height or weight on file to calculate BMI.    General:   Mental Status:  Alert   Appearance: Cooperative, in no acute distress   Posture: Normal    Assessment and Plan     Lea was seen today for follow-up.    Diagnoses and all orders for this visit:    Primary osteoarthritis of both knees  -     Large Joint Arthrocentesis  -     Large Joint Arthrocentesis        Administration of viscosupplementation today.  Please see my procedure note for details.    Return in about 6 months (around 5/29/2018).    Louis Malcolm MD  11/29/17  9:01 AM

## 2017-11-29 NOTE — PROGRESS NOTES
Procedure   Large Joint Arthrocentesis  Date/Time: 11/29/2017 8:52 AM  Consent given by: patient  Site marked: site marked  Timeout: Immediately prior to procedure a time out was called to verify the correct patient, procedure, equipment, support staff and site/side marked as required   Supporting Documentation  Indications: pain   Procedure Details  Location: knee - L knee  Preparation: Patient was prepped and draped in the usual sterile fashion  Needle size: 22 G  Approach: anterolateral  Medications administered: 30 mg Hyaluronan 30 MG/2ML  Patient tolerance: patient tolerated the procedure well with no immediate complications

## 2018-01-03 ENCOUNTER — TELEPHONE (OUTPATIENT)
Dept: ORTHOPEDIC SURGERY | Facility: CLINIC | Age: 73
End: 2018-01-03

## 2018-01-03 DIAGNOSIS — M72.0 DUPUYTREN'S CONTRACTURE OF LEFT HAND: Primary | ICD-10-CM

## 2018-05-30 ENCOUNTER — OFFICE VISIT (OUTPATIENT)
Dept: ORTHOPEDIC SURGERY | Facility: CLINIC | Age: 73
End: 2018-05-30

## 2018-05-30 VITALS — WEIGHT: 218.26 LBS | BODY MASS INDEX: 33.08 KG/M2 | HEART RATE: 85 BPM | OXYGEN SATURATION: 99 % | HEIGHT: 68 IN

## 2018-05-30 DIAGNOSIS — M17.0 PRIMARY OSTEOARTHRITIS OF BOTH KNEES: Primary | ICD-10-CM

## 2018-05-30 PROCEDURE — 99213 OFFICE O/P EST LOW 20 MIN: CPT | Performed by: ORTHOPAEDIC SURGERY

## 2018-05-30 NOTE — PROGRESS NOTES
[facility] – [place] Orthopaedic Surgery Clinic Note    Subjective     Chief Complaint   Patient presents with   • Left Knee - Follow-up     6 month follow up: Primary osteoarthritis of both knees   • Right Knee - Follow-up     6 month follow up: Primary osteoarthritis of both knees        HPI    Lea Rivers is a 72 y.o. male. He presents today for evaluation of bilateral knee pain.  He's had pain now for at least 5 years, following no injury.  The pain is moderate to severe, aching in quality, so she with grinding and stiffness.  It worsens with climbing stairs.  He has responded well to Visco supplementation injections in the past.  He is not interested in surgical intervention at this time.      Patient Active Problem List   Diagnosis   • Coronary artery disease   • Dyslipidemia   • Hypothyroidism   • GERD (gastroesophageal reflux disease)   • Seizure disorder   • BPH (benign prostatic hypertrophy)   • Hypertension   • Primary osteoarthritis of both knees     Past Medical History:   Diagnosis Date   • BPH (benign prostatic hypertrophy)    • Chest pain    • Colon cancer 1990    Status post partial colectomy in 1990. No chemotherapy or radiation therapy.   • Coronary artery disease     Nonobstructive coronary artery disease.   • Diabetes    • Dyslipidemia    • GERD (gastroesophageal reflux disease)     Hx of.   • H/O cardiac catheterization    • Hypertension    • Hypothyroidism    • Left shoulder pain    • Primary osteoarthritis of both knees    • Seizure disorder       Past Surgical History:   Procedure Laterality Date   • APPENDECTOMY     • CARPAL TUNNEL RELEASE Bilateral    • CHOLECYSTECTOMY     • COLECTOMY PARTIAL / TOTAL  1990    Partial colectomy   • OTHER SURGICAL HISTORY      Contraction surgery on bilateral hands and wrists.   • OTHER SURGICAL HISTORY      left and right hands   • SINUS SURGERY     • TONSILLECTOMY     • VASECTOMY        Family History   Problem Relation Age of Onset   • Cancer Mother    •  Hypertension Father    • Stroke Father    • Stroke Other    • Arthritis Other    • Cancer Other      Social History     Social History   • Marital status:      Spouse name: N/A   • Number of children: N/A   • Years of education: N/A     Occupational History   • Not on file.     Social History Main Topics   • Smoking status: Never Smoker   • Smokeless tobacco: Never Used   • Alcohol use No   • Drug use: No   • Sexual activity: Defer     Other Topics Concern   • Not on file     Social History Narrative   • No narrative on file      Current Outpatient Prescriptions on File Prior to Visit   Medication Sig Dispense Refill   • aspirin 81 MG tablet Take 81 mg by mouth daily.     • coenzyme Q10 100 MG capsule Take 100 mg by mouth daily.     • divalproex (DEPAKOTE) 500 MG DR tablet Take 500 mg by mouth 3 (three) times a day.     • doxazosin (CARDURA) 4 MG tablet Take 4 mg by mouth every night.     • finasteride (PROSCAR) 5 MG tablet Take 5 mg by mouth daily.     • fluticasone (FLONASE) 50 MCG/ACT nasal spray 2 sprays into each nostril as needed for rhinitis. Administer 2 sprays in each nostril for each dose.     • isosorbide dinitrate (ISORDIL) 30 MG tablet Take 30 mg by mouth daily.     • levETIRAcetam (KEPPRA) 500 MG tablet Take 500 mg by mouth 2 (two) times a day.     • levothyroxine (SYNTHROID, LEVOTHROID) 88 MCG tablet Take 88 mcg by mouth daily.     • lisinopril (PRINIVIL,ZESTRIL) 20 MG tablet Take 20 mg by mouth 2 (two) times a day.     • metFORMIN ER (GLUCOPHAGE-XR) 500 MG 24 hr tablet      • nitroglycerin (NITROSTAT) 0.4 MG SL tablet Place  under the tongue Take As Directed.     • omeprazole (priLOSEC) 20 MG capsule Take 20 mg by mouth Daily.     • ondansetron ODT (ZOFRAN-ODT) 8 MG disintegrating tablet Take 1 tablet by mouth Every 8 (Eight) Hours As Needed for nausea or vomiting. 15 tablet 0   • simvastatin (ZOCOR) 20 MG tablet Take 20 mg by mouth every night.     • tamsulosin (FLOMAX) 0.4 MG capsule 24 hr  "capsule Take  by mouth Take As Directed.     • verapamil SR (CALAN-SR) 240 MG CR tablet Take 240 mg by mouth 2 (two) times a day.     • vitamin B-12 (CYANOCOBALAMIN) 100 MCG tablet Take 50 mcg by mouth daily.     • ZOSTAVAX 10402 UNT/0.65ML reconstituted suspension inject contents of 1 vial subcutaneously  0     No current facility-administered medications on file prior to visit.       Allergies   Allergen Reactions   • Sulfa Antibiotics Rash        Review of Systems   Constitutional: Positive for activity change.   HENT: Negative.    Eyes: Negative.    Respiratory: Negative.    Cardiovascular: Negative.    Gastrointestinal: Negative.    Endocrine: Negative.    Genitourinary: Negative.    Musculoskeletal: Positive for arthralgias.   Skin: Negative.    Allergic/Immunologic: Negative.    Neurological: Negative.    Hematological: Negative.    Psychiatric/Behavioral: Negative.         Objective      Physical Exam  Pulse 85   Ht 172.7 cm (67.99\")   Wt 99 kg (218 lb 4.1 oz)   SpO2 99%   BMI 33.19 kg/m²     Body mass index is 33.19 kg/m².    General:   Mental Status:  Alert   Appearance: Cooperative, in no acute distress   Build and Nutrition: Overweight male   Orientation: Alert and oriented to person, place and time   Posture: Normal   Gait: Normal    Integument:   Right knee: no skin lesions, no rash, no ecchymosis   Left knee: no skin lesions, no rash, no ecchymosis    Lower Extremities:   Right Knee:    Tenderness:  Medial and lateral joint line tenderness    Effusion:  None    Swelling:  None    Crepitus:  Positive    Atrophy:  None    Range of motion:  Extension: 0°       Flexion: 120°  Instability:  No varus laxity, no valgus laxity, negative anterior drawer  Deformities:  Varus   Left Knee:    Tenderness:  Medial and lateral joint line tenderness    Effusion:  None    Swelling:  None    Crepitus: Positive    Atrophy:  None    Range of motion:  Extension: 0°       Flexion: 120°  Instability:  No varus laxity, " no valgus laxity, negative anterior drawer  Deformities:  Varus      Imaging/Studies  Imaging Results (last 24 hours)     Procedure Component Value Units Date/Time    XR Knee 4+ View Bilateral [[id number]] Resulted:  05/30/18 0945     Updated:  05/30/18 0946    Narrative:       Right Knee Radiographs  Indication: right knee pain  Views: Standing AP's and skiers of both knees, with lateral and sunrise   views of the right knee    Comparison: no prior studies available    Findings:   Advanced arthritic changes are seen, with bone-on-bone contact in medial   and patellofemoral compartments, with varus alignment, and   tricompartmental osteophytes.    Left Knee Radiographs  Indication: left knee pain  Views: Standing AP's and skiers of both knees, with lateral and sunrise   views of the left knee    Comparison: no prior studies available    Findings:   Advanced arthritic changes are seen, with bone-on-bone contact in medial   and patellofemoral compartments, with varus alignment, and   tricompartmental osteophytes.            Assessment and Plan     Lea was seen today for follow-up and follow-up.    Diagnoses and all orders for this visit:    Primary osteoarthritis of both knees  -     XR Knee 4+ View Bilateral  -     Large Joint Arthrocentesis        I reviewed my findings with patient today.  He has bilateral knee arthritis.  He is not interested in surgical intervention at this time.  He has responded well to Visco supplementation injections in the past.  We will submit for Orthovisc approval, and I will see him back once that is ready for administration.    Return for After approval of the visco injection.      Medical Decision Making  Management Options : prescription/IM medicine  Data/Risk: radiology tests and independent visualization of imaging, lab tests, or EMG/NCV      Louis Malcolm MD  05/30/18  9:56 AM

## 2018-07-16 ENCOUNTER — CLINICAL SUPPORT (OUTPATIENT)
Dept: ORTHOPEDIC SURGERY | Facility: CLINIC | Age: 73
End: 2018-07-16

## 2018-07-16 VITALS — HEART RATE: 81 BPM | HEIGHT: 69 IN | WEIGHT: 222.66 LBS | OXYGEN SATURATION: 99 % | BODY MASS INDEX: 32.98 KG/M2

## 2018-07-16 DIAGNOSIS — M17.0 PRIMARY OSTEOARTHRITIS OF BOTH KNEES: Primary | ICD-10-CM

## 2018-07-16 PROCEDURE — 20610 DRAIN/INJ JOINT/BURSA W/O US: CPT | Performed by: ORTHOPAEDIC SURGERY

## 2018-07-16 NOTE — PROGRESS NOTES
[facility] [place] [facility] Clinic Note    Subjective     Chief Complaint   Patient presents with   • Left Knee - Follow-up     Primary osteoarthritis of both knees; Bilateral Knee Orthovisc Injection #1   • Right Knee - Follow-up     Primary osteoarthritis of both knees; Bilateral Knee Orthovisc Injection #1        HPI    Lea Rivers is a 72 y.o. male who presents for the bilateral Orthovisc injections into both knee joints.  These will be his first injections of the series.    Patient Active Problem List   Diagnosis   • Coronary artery disease   • Dyslipidemia   • Hypothyroidism   • GERD (gastroesophageal reflux disease)   • Seizure disorder (CMS/HCC)   • BPH (benign prostatic hypertrophy)   • Hypertension   • Primary osteoarthritis of both knees     Past Medical History:   Diagnosis Date   • BPH (benign prostatic hypertrophy)    • Chest pain    • Colon cancer (CMS/HCC) 1990    Status post partial colectomy in 1990. No chemotherapy or radiation therapy.   • Coronary artery disease     Nonobstructive coronary artery disease.   • Diabetes (CMS/HCC)    • Dyslipidemia    • GERD (gastroesophageal reflux disease)     Hx of.   • H/O cardiac catheterization    • Hypertension    • Hypothyroidism    • Left shoulder pain    • Primary osteoarthritis of both knees    • Seizure disorder (CMS/HCC)       Past Surgical History:   Procedure Laterality Date   • APPENDECTOMY     • CARPAL TUNNEL RELEASE Bilateral    • CHOLECYSTECTOMY     • COLECTOMY PARTIAL / TOTAL  1990    Partial colectomy   • OTHER SURGICAL HISTORY      Contraction surgery on bilateral hands and wrists.   • OTHER SURGICAL HISTORY      left and right hands   • SINUS SURGERY     • TONSILLECTOMY     • VASECTOMY        Family History   Problem Relation Age of Onset   • Cancer Mother    • Hypertension Father    • Stroke Father    • Stroke Other    • Arthritis Other    • Cancer Other      Social History     Social History   • Marital status:      Spouse  name: N/A   • Number of children: N/A   • Years of education: N/A     Occupational History   • Not on file.     Social History Main Topics   • Smoking status: Never Smoker   • Smokeless tobacco: Never Used   • Alcohol use No   • Drug use: No   • Sexual activity: Defer     Other Topics Concern   • Not on file     Social History Narrative   • No narrative on file      Current Outpatient Prescriptions on File Prior to Visit   Medication Sig Dispense Refill   • aspirin 81 MG tablet Take 81 mg by mouth daily.     • coenzyme Q10 100 MG capsule Take 100 mg by mouth daily.     • divalproex (DEPAKOTE) 500 MG DR tablet Take 500 mg by mouth 3 (three) times a day.     • doxazosin (CARDURA) 4 MG tablet Take 4 mg by mouth every night.     • finasteride (PROSCAR) 5 MG tablet Take 5 mg by mouth daily.     • fluticasone (FLONASE) 50 MCG/ACT nasal spray 2 sprays into each nostril as needed for rhinitis. Administer 2 sprays in each nostril for each dose.     • isosorbide dinitrate (ISORDIL) 30 MG tablet Take 30 mg by mouth daily.     • levETIRAcetam (KEPPRA) 500 MG tablet Take 500 mg by mouth 2 (two) times a day.     • levothyroxine (SYNTHROID, LEVOTHROID) 88 MCG tablet Take 88 mcg by mouth daily.     • lisinopril (PRINIVIL,ZESTRIL) 20 MG tablet Take 20 mg by mouth 2 (two) times a day.     • nitroglycerin (NITROSTAT) 0.4 MG SL tablet Place  under the tongue Take As Directed.     • omeprazole (priLOSEC) 20 MG capsule Take 20 mg by mouth Daily.     • ondansetron ODT (ZOFRAN-ODT) 8 MG disintegrating tablet Take 1 tablet by mouth Every 8 (Eight) Hours As Needed for nausea or vomiting. 15 tablet 0   • simvastatin (ZOCOR) 20 MG tablet Take 20 mg by mouth every night.     • verapamil SR (CALAN-SR) 240 MG CR tablet Take 240 mg by mouth 2 (two) times a day.     • vitamin B-12 (CYANOCOBALAMIN) 100 MCG tablet Take 50 mcg by mouth daily.     • ZOSTAVAX 89392 UNT/0.65ML reconstituted suspension inject contents of 1 vial subcutaneously  0   •  metFORMIN ER (GLUCOPHAGE-XR) 500 MG 24 hr tablet      • tamsulosin (FLOMAX) 0.4 MG capsule 24 hr capsule Take  by mouth Take As Directed.       No current facility-administered medications on file prior to visit.       Allergies   Allergen Reactions   • Sulfa Antibiotics Rash        Review of Systems   Constitutional: Negative for activity change, appetite change, chills, diaphoresis, fatigue, fever and unexpected weight change.   HENT: Negative for congestion, dental problem, drooling, ear discharge, ear pain, facial swelling, hearing loss, mouth sores, nosebleeds, postnasal drip, rhinorrhea, sinus pressure, sneezing, sore throat, tinnitus, trouble swallowing and voice change.    Eyes: Negative for photophobia, pain, discharge, redness, itching and visual disturbance.   Respiratory: Negative for apnea, cough, choking, chest tightness, shortness of breath, wheezing and stridor.    Cardiovascular: Negative for chest pain, palpitations and leg swelling.   Gastrointestinal: Negative for abdominal distention, abdominal pain, anal bleeding, blood in stool, constipation, diarrhea, nausea, rectal pain and vomiting.   Endocrine: Negative for cold intolerance, heat intolerance, polydipsia, polyphagia and polyuria.   Genitourinary: Negative for decreased urine volume, difficulty urinating, dysuria, enuresis, flank pain, frequency, genital sores, hematuria and urgency.   Musculoskeletal: Positive for joint swelling. Negative for arthralgias, back pain, gait problem, myalgias, neck pain and neck stiffness.        Joint Pain    Skin: Negative for color change, pallor, rash and wound.   Allergic/Immunologic: Negative for environmental allergies, food allergies and immunocompromised state.   Neurological: Negative for dizziness, tremors, seizures, syncope, facial asymmetry, speech difficulty, weakness, light-headedness, numbness and headaches.   Hematological: Negative for adenopathy. Does not bruise/bleed easily.  "  Psychiatric/Behavioral: Negative for agitation, behavioral problems, confusion, decreased concentration, dysphoric mood, hallucinations, self-injury, sleep disturbance and suicidal ideas. The patient is not nervous/anxious and is not hyperactive.         Objective      Physical Exam  Pulse 81   Ht 174.2 cm (68.58\")   Wt 101 kg (222 lb 10.6 oz)   SpO2 99%   BMI 33.29 kg/m²     Body mass index is 33.29 kg/m².    General:   Mental Status:  Alert   Appearance: Cooperative, in no acute distress   Posture: Normal    Assessment and Plan     Lea was seen today for follow-up and follow-up.    Diagnoses and all orders for this visit:    Primary osteoarthritis of both knees  -     Large Joint Arthrocentesis  -     Large Joint Arthrocentesis        Administration of viscosupplementation today.  Please see my procedure note for details.    Return in about 1 week (around 7/23/2018) for Injection.    Louis Malcolm MD  07/16/18  9:56 AM  "

## 2018-07-23 ENCOUNTER — CLINICAL SUPPORT (OUTPATIENT)
Dept: ORTHOPEDIC SURGERY | Facility: CLINIC | Age: 73
End: 2018-07-23

## 2018-07-23 DIAGNOSIS — M17.0 PRIMARY OSTEOARTHRITIS OF BOTH KNEES: Primary | ICD-10-CM

## 2018-07-23 PROCEDURE — 20610 DRAIN/INJ JOINT/BURSA W/O US: CPT | Performed by: ORTHOPAEDIC SURGERY

## 2018-07-23 NOTE — PROGRESS NOTES
[facility] [place] [facility] Note    Subjective     Chief Complaint   Patient presents with   • Left Knee - Follow-up     Primary Osteoarthritis of Both Knees; Bilateral Knee Orthovisc Injection #2   • Right Knee - Follow-up     Primary Osteoarthritis of Both Knees; Bilateral Knee Orthovisc Injection #2        HPI    Lea Rivers is a 73 y.o. male who presents for the second Orthovisc injections into both knee joints.  Minimal relief up to this point.    Patient Active Problem List   Diagnosis   • Coronary artery disease   • Dyslipidemia   • Hypothyroidism   • GERD (gastroesophageal reflux disease)   • Seizure disorder (CMS/HCC)   • BPH (benign prostatic hypertrophy)   • Hypertension   • Primary osteoarthritis of both knees     Past Medical History:   Diagnosis Date   • BPH (benign prostatic hypertrophy)    • Chest pain    • Colon cancer (CMS/HCC) 1990    Status post partial colectomy in 1990. No chemotherapy or radiation therapy.   • Coronary artery disease     Nonobstructive coronary artery disease.   • Diabetes (CMS/HCC)    • Dyslipidemia    • GERD (gastroesophageal reflux disease)     Hx of.   • H/O cardiac catheterization    • Hypertension    • Hypothyroidism    • Left shoulder pain    • Primary osteoarthritis of both knees    • Seizure disorder (CMS/HCC)       Past Surgical History:   Procedure Laterality Date   • APPENDECTOMY     • CARPAL TUNNEL RELEASE Bilateral    • CHOLECYSTECTOMY     • COLECTOMY PARTIAL / TOTAL  1990    Partial colectomy   • OTHER SURGICAL HISTORY      Contraction surgery on bilateral hands and wrists.   • OTHER SURGICAL HISTORY      left and right hands   • SINUS SURGERY     • TONSILLECTOMY     • VASECTOMY        Family History   Problem Relation Age of Onset   • Cancer Mother    • Hypertension Father    • Stroke Father    • Stroke Other    • Arthritis Other    • Cancer Other      Social History     Social History   • Marital status:      Spouse name: N/A   • Number of  children: N/A   • Years of education: N/A     Occupational History   • Not on file.     Social History Main Topics   • Smoking status: Never Smoker   • Smokeless tobacco: Never Used   • Alcohol use No   • Drug use: No   • Sexual activity: Defer     Other Topics Concern   • Not on file     Social History Narrative   • No narrative on file      Current Outpatient Prescriptions on File Prior to Visit   Medication Sig Dispense Refill   • aspirin 81 MG tablet Take 81 mg by mouth daily.     • coenzyme Q10 100 MG capsule Take 100 mg by mouth daily.     • divalproex (DEPAKOTE) 500 MG DR tablet Take 500 mg by mouth 3 (three) times a day.     • doxazosin (CARDURA) 4 MG tablet Take 4 mg by mouth every night.     • finasteride (PROSCAR) 5 MG tablet Take 5 mg by mouth daily.     • fluticasone (FLONASE) 50 MCG/ACT nasal spray 2 sprays into each nostril as needed for rhinitis. Administer 2 sprays in each nostril for each dose.     • isosorbide dinitrate (ISORDIL) 30 MG tablet Take 30 mg by mouth daily.     • levETIRAcetam (KEPPRA) 500 MG tablet Take 500 mg by mouth 2 (two) times a day.     • levothyroxine (SYNTHROID, LEVOTHROID) 88 MCG tablet Take 88 mcg by mouth daily.     • lisinopril (PRINIVIL,ZESTRIL) 20 MG tablet Take 20 mg by mouth 2 (two) times a day.     • metFORMIN ER (GLUCOPHAGE-XR) 500 MG 24 hr tablet      • nitroglycerin (NITROSTAT) 0.4 MG SL tablet Place  under the tongue Take As Directed.     • omeprazole (priLOSEC) 20 MG capsule Take 20 mg by mouth Daily.     • ondansetron ODT (ZOFRAN-ODT) 8 MG disintegrating tablet Take 1 tablet by mouth Every 8 (Eight) Hours As Needed for nausea or vomiting. 15 tablet 0   • simvastatin (ZOCOR) 20 MG tablet Take 20 mg by mouth every night.     • tamsulosin (FLOMAX) 0.4 MG capsule 24 hr capsule Take  by mouth Take As Directed.     • verapamil SR (CALAN-SR) 240 MG CR tablet Take 240 mg by mouth 2 (two) times a day.     • vitamin B-12 (CYANOCOBALAMIN) 100 MCG tablet Take 50 mcg by  mouth daily.     • ZOSTAVAX 93426 UNT/0.65ML reconstituted suspension inject contents of 1 vial subcutaneously  0     No current facility-administered medications on file prior to visit.       Allergies   Allergen Reactions   • Sulfa Antibiotics Rash        Review of Systems   Constitutional: Negative for activity change, appetite change, chills, diaphoresis, fatigue, fever and unexpected weight change.   HENT: Negative for congestion, dental problem, drooling, ear discharge, ear pain, facial swelling, hearing loss, mouth sores, nosebleeds, postnasal drip, rhinorrhea, sinus pressure, sneezing, sore throat, tinnitus, trouble swallowing and voice change.    Eyes: Negative for photophobia, pain, discharge, redness, itching and visual disturbance.   Respiratory: Negative for apnea, cough, choking, chest tightness, shortness of breath, wheezing and stridor.    Cardiovascular: Negative for chest pain, palpitations and leg swelling.   Gastrointestinal: Negative for abdominal distention, abdominal pain, anal bleeding, blood in stool, constipation, diarrhea, nausea, rectal pain and vomiting.   Endocrine: Negative for cold intolerance, heat intolerance, polydipsia, polyphagia and polyuria.   Genitourinary: Negative for decreased urine volume, difficulty urinating, dysuria, enuresis, flank pain, frequency, genital sores, hematuria and urgency.   Musculoskeletal: Positive for joint swelling. Negative for arthralgias, back pain, gait problem, myalgias, neck pain and neck stiffness.   Skin: Negative for color change, pallor, rash and wound.   Allergic/Immunologic: Negative for environmental allergies, food allergies and immunocompromised state.   Neurological: Negative for dizziness, tremors, seizures, syncope, facial asymmetry, speech difficulty, weakness, light-headedness, numbness and headaches.   Hematological: Negative for adenopathy. Does not bruise/bleed easily.   Psychiatric/Behavioral: Negative for agitation, behavioral  problems, confusion, decreased concentration, dysphoric mood, hallucinations, self-injury, sleep disturbance and suicidal ideas. The patient is not nervous/anxious and is not hyperactive.         Objective      Physical Exam  There were no vitals taken for this visit.    There is no height or weight on file to calculate BMI.    General:   Mental Status:  Alert   Appearance: Cooperative, in no acute distress   Posture: Normal    Assessment and Plan     Lea was seen today for follow-up and follow-up.    Diagnoses and all orders for this visit:    Primary osteoarthritis of both knees  -     Large Joint Arthrocentesis  -     Large Joint Arthrocentesis        Administration of viscosupplementation today.  Please see my procedure note for details.    Return in about 1 week (around 7/30/2018) for Injection.    Louis Malcolm MD  07/23/18  12:44 PM

## 2018-07-23 NOTE — PROGRESS NOTES
Procedure   Large Joint Arthrocentesis  Date/Time: 7/23/2018 9:19 AM  Consent given by: patient  Site marked: site marked  Timeout: Immediately prior to procedure a time out was called to verify the correct patient, procedure, equipment, support staff and site/side marked as required   Supporting Documentation  Indications: pain   Procedure Details  Location: knee - R knee  Preparation: Patient was prepped and draped in the usual sterile fashion  Needle size: 22 G  Approach: anterolateral  Medications administered: 30 mg Hyaluronan 30 MG/2ML  Patient tolerance: patient tolerated the procedure well with no immediate complications    Large Joint Arthrocentesis  Date/Time: 7/23/2018 9:21 AM  Consent given by: patient  Site marked: site marked  Timeout: Immediately prior to procedure a time out was called to verify the correct patient, procedure, equipment, support staff and site/side marked as required   Supporting Documentation  Indications: pain   Procedure Details  Location: knee - L knee  Preparation: Patient was prepped and draped in the usual sterile fashion  Needle size: 22 G  Approach: anterolateral  Medications administered: 30 mg Hyaluronan 30 MG/2ML  Patient tolerance: patient tolerated the procedure well with no immediate complications

## 2018-07-30 ENCOUNTER — CLINICAL SUPPORT (OUTPATIENT)
Dept: ORTHOPEDIC SURGERY | Facility: CLINIC | Age: 73
End: 2018-07-30

## 2018-07-30 DIAGNOSIS — M17.0 PRIMARY OSTEOARTHRITIS OF BOTH KNEES: Primary | ICD-10-CM

## 2018-07-30 PROCEDURE — 20610 DRAIN/INJ JOINT/BURSA W/O US: CPT | Performed by: ORTHOPAEDIC SURGERY

## 2018-07-30 NOTE — PROGRESS NOTES
Procedure   Large Joint Arthrocentesis  Date/Time: 7/30/2018 9:01 AM  Consent given by: patient  Site marked: site marked  Supporting Documentation  Indications: pain   Procedure Details  Location: knee - R knee  Preparation: Patient was prepped and draped in the usual sterile fashion  Needle size: 22 G  Approach: anterolateral  Medications administered: 30 mg Hyaluronan 30 MG/2ML  Patient tolerance: patient tolerated the procedure well with no immediate complications    Large Joint Arthrocentesis  Date/Time: 7/30/2018 9:04 AM  Consent given by: patient  Site marked: site marked  Timeout: Immediately prior to procedure a time out was called to verify the correct patient, procedure, equipment, support staff and site/side marked as required   Supporting Documentation  Indications: pain   Procedure Details  Location: knee - L knee  Preparation: Patient was prepped and draped in the usual sterile fashion  Needle size: 22 G  Approach: anterolateral  Medications administered: 30 mg Hyaluronan 30 MG/2ML  Patient tolerance: patient tolerated the procedure well with no immediate complications

## 2018-07-30 NOTE — PROGRESS NOTES
[facility] [place] Orthopa[facility] Clinic Note    Subjective     Chief Complaint   Patient presents with   • Left Knee - Follow-up     Primary Osteoarthritis of Both Knees; Bilateral Knee Orthovisc Injection #3   • Right Knee - Follow-up     Primary Osteoarthritis of Both Knees; Bilateral Knee Orthovisc Injection #3        HPI    Lea Rivers is a 73 y.o. male who presents for the Orthovisc injections into both knee joints.  Some relief up to this point.    Patient Active Problem List   Diagnosis   • Coronary artery disease   • Dyslipidemia   • Hypothyroidism   • GERD (gastroesophageal reflux disease)   • Seizure disorder (CMS/HCC)   • BPH (benign prostatic hypertrophy)   • Hypertension   • Primary osteoarthritis of both knees     Past Medical History:   Diagnosis Date   • BPH (benign prostatic hypertrophy)    • Chest pain    • Colon cancer (CMS/HCC) 1990    Status post partial colectomy in 1990. No chemotherapy or radiation therapy.   • Coronary artery disease     Nonobstructive coronary artery disease.   • Diabetes (CMS/HCC)    • Dyslipidemia    • GERD (gastroesophageal reflux disease)     Hx of.   • H/O cardiac catheterization    • Hypertension    • Hypothyroidism    • Left shoulder pain    • Primary osteoarthritis of both knees    • Seizure disorder (CMS/HCC)       Past Surgical History:   Procedure Laterality Date   • APPENDECTOMY     • CARPAL TUNNEL RELEASE Bilateral    • CHOLECYSTECTOMY     • COLECTOMY PARTIAL / TOTAL  1990    Partial colectomy   • OTHER SURGICAL HISTORY      Contraction surgery on bilateral hands and wrists.   • OTHER SURGICAL HISTORY      left and right hands   • SINUS SURGERY     • TONSILLECTOMY     • VASECTOMY        Family History   Problem Relation Age of Onset   • Cancer Mother    • Hypertension Father    • Stroke Father    • Stroke Other    • Arthritis Other    • Cancer Other      Social History     Social History   • Marital status:      Spouse name: N/A   • Number of children:  N/A   • Years of education: N/A     Occupational History   • Not on file.     Social History Main Topics   • Smoking status: Never Smoker   • Smokeless tobacco: Never Used   • Alcohol use No   • Drug use: No   • Sexual activity: Defer     Other Topics Concern   • Not on file     Social History Narrative   • No narrative on file      Current Outpatient Prescriptions on File Prior to Visit   Medication Sig Dispense Refill   • aspirin 81 MG tablet Take 81 mg by mouth daily.     • coenzyme Q10 100 MG capsule Take 100 mg by mouth daily.     • divalproex (DEPAKOTE) 500 MG DR tablet Take 500 mg by mouth 3 (three) times a day.     • doxazosin (CARDURA) 4 MG tablet Take 4 mg by mouth every night.     • finasteride (PROSCAR) 5 MG tablet Take 5 mg by mouth daily.     • fluticasone (FLONASE) 50 MCG/ACT nasal spray 2 sprays into each nostril as needed for rhinitis. Administer 2 sprays in each nostril for each dose.     • isosorbide dinitrate (ISORDIL) 30 MG tablet Take 30 mg by mouth daily.     • levETIRAcetam (KEPPRA) 500 MG tablet Take 500 mg by mouth 2 (two) times a day.     • levothyroxine (SYNTHROID, LEVOTHROID) 88 MCG tablet Take 88 mcg by mouth daily.     • lisinopril (PRINIVIL,ZESTRIL) 20 MG tablet Take 20 mg by mouth 2 (two) times a day.     • metFORMIN ER (GLUCOPHAGE-XR) 500 MG 24 hr tablet      • nitroglycerin (NITROSTAT) 0.4 MG SL tablet Place  under the tongue Take As Directed.     • omeprazole (priLOSEC) 20 MG capsule Take 20 mg by mouth Daily.     • ondansetron ODT (ZOFRAN-ODT) 8 MG disintegrating tablet Take 1 tablet by mouth Every 8 (Eight) Hours As Needed for nausea or vomiting. 15 tablet 0   • simvastatin (ZOCOR) 20 MG tablet Take 20 mg by mouth every night.     • tamsulosin (FLOMAX) 0.4 MG capsule 24 hr capsule Take  by mouth Take As Directed.     • verapamil SR (CALAN-SR) 240 MG CR tablet Take 240 mg by mouth 2 (two) times a day.     • vitamin B-12 (CYANOCOBALAMIN) 100 MCG tablet Take 50 mcg by mouth daily.      • ZOSTAVAX 28845 UNT/0.65ML reconstituted suspension inject contents of 1 vial subcutaneously  0     No current facility-administered medications on file prior to visit.       Allergies   Allergen Reactions   • Sulfa Antibiotics Rash        Review of Systems   Constitutional: Negative for activity change, appetite change, chills, diaphoresis, fatigue, fever and unexpected weight change.   HENT: Negative for congestion, dental problem, drooling, ear discharge, ear pain, facial swelling, hearing loss, mouth sores, nosebleeds, postnasal drip, rhinorrhea, sinus pressure, sneezing, sore throat, tinnitus, trouble swallowing and voice change.    Eyes: Negative for photophobia, pain, discharge, redness, itching and visual disturbance.   Respiratory: Negative for apnea, cough, choking, chest tightness, shortness of breath, wheezing and stridor.    Cardiovascular: Negative for chest pain, palpitations and leg swelling.   Gastrointestinal: Negative for abdominal distention, abdominal pain, anal bleeding, blood in stool, constipation, diarrhea, nausea, rectal pain and vomiting.   Endocrine: Negative for cold intolerance, heat intolerance, polydipsia, polyphagia and polyuria.   Genitourinary: Negative for decreased urine volume, difficulty urinating, dysuria, enuresis, flank pain, frequency, genital sores, hematuria and urgency.   Musculoskeletal: Positive for joint swelling. Negative for arthralgias, back pain, gait problem, myalgias, neck pain and neck stiffness.   Skin: Negative for color change, pallor, rash and wound.   Allergic/Immunologic: Negative for environmental allergies, food allergies and immunocompromised state.   Neurological: Negative for dizziness, tremors, seizures, syncope, facial asymmetry, speech difficulty, weakness, light-headedness, numbness and headaches.   Hematological: Negative for adenopathy. Does not bruise/bleed easily.   Psychiatric/Behavioral: Negative for agitation, behavioral problems,  confusion, decreased concentration, dysphoric mood, hallucinations, self-injury, sleep disturbance and suicidal ideas. The patient is not nervous/anxious and is not hyperactive.         Objective      Physical Exam  There were no vitals taken for this visit.    There is no height or weight on file to calculate BMI.    General:   Mental Status:  Alert   Appearance: Cooperative, in no acute distress   Posture: Normal    Assessment and Plan     Lea was seen today for follow-up and follow-up.    Diagnoses and all orders for this visit:    Primary osteoarthritis of both knees  -     Large Joint Arthrocentesis  -     Large Joint Arthrocentesis        Administration of viscosupplementation today.  Please see my procedure note for details.    Return in about 6 months (around 1/30/2019).    Louis Malcolm MD  07/30/18  9:37 AM

## 2018-09-17 ENCOUNTER — APPOINTMENT (OUTPATIENT)
Dept: PREADMISSION TESTING | Facility: HOSPITAL | Age: 73
End: 2018-09-17

## 2018-09-17 VITALS — BODY MASS INDEX: 33.81 KG/M2 | WEIGHT: 223.11 LBS | HEIGHT: 68 IN

## 2018-09-17 DIAGNOSIS — N40.1 BENIGN PROSTATIC HYPERPLASIA WITH LOWER URINARY TRACT SYMPTOMS, SYMPTOM DETAILS UNSPECIFIED: Chronic | ICD-10-CM

## 2018-09-17 DIAGNOSIS — N41.3 PROSTATOCYSTITIS: ICD-10-CM

## 2018-09-17 LAB
ALBUMIN SERPL-MCNC: 4.01 G/DL (ref 3.2–4.8)
ALBUMIN/GLOB SERPL: 1.7 G/DL (ref 1.5–2.5)
ALP SERPL-CCNC: 63 U/L (ref 25–100)
ALT SERPL W P-5'-P-CCNC: 28 U/L (ref 7–40)
ANION GAP SERPL CALCULATED.3IONS-SCNC: 9 MMOL/L (ref 3–11)
AST SERPL-CCNC: 28 U/L (ref 0–33)
BILIRUB SERPL-MCNC: 0.5 MG/DL (ref 0.3–1.2)
BILIRUB UR QL STRIP: NEGATIVE
BUN BLD-MCNC: 11 MG/DL (ref 9–23)
BUN/CREAT SERPL: 13.1 (ref 7–25)
CALCIUM SPEC-SCNC: 8.9 MG/DL (ref 8.7–10.4)
CHLORIDE SERPL-SCNC: 102 MMOL/L (ref 99–109)
CLARITY UR: CLEAR
CO2 SERPL-SCNC: 27 MMOL/L (ref 20–31)
COLOR UR: YELLOW
CREAT BLD-MCNC: 0.84 MG/DL (ref 0.6–1.3)
DEPRECATED RDW RBC AUTO: 45.4 FL (ref 37–54)
ERYTHROCYTE [DISTWIDTH] IN BLOOD BY AUTOMATED COUNT: 13.1 % (ref 11.3–14.5)
GFR SERPL CREATININE-BSD FRML MDRD: 89 ML/MIN/1.73
GLOBULIN UR ELPH-MCNC: 2.4 GM/DL
GLUCOSE BLD-MCNC: 119 MG/DL (ref 70–100)
GLUCOSE UR STRIP-MCNC: NEGATIVE MG/DL
HCT VFR BLD AUTO: 39 % (ref 38.9–50.9)
HGB BLD-MCNC: 13.3 G/DL (ref 13.1–17.5)
HGB UR QL STRIP.AUTO: NEGATIVE
KETONES UR QL STRIP: ABNORMAL
LEUKOCYTE ESTERASE UR QL STRIP.AUTO: NEGATIVE
MCH RBC QN AUTO: 32.4 PG (ref 27–31)
MCHC RBC AUTO-ENTMCNC: 34.1 G/DL (ref 32–36)
MCV RBC AUTO: 94.9 FL (ref 80–99)
NITRITE UR QL STRIP: NEGATIVE
PH UR STRIP.AUTO: 5.5 [PH] (ref 5–8)
PLATELET # BLD AUTO: 169 10*3/MM3 (ref 150–450)
PMV BLD AUTO: 9 FL (ref 6–12)
POTASSIUM BLD-SCNC: 4 MMOL/L (ref 3.5–5.5)
PROT SERPL-MCNC: 6.4 G/DL (ref 5.7–8.2)
PROT UR QL STRIP: NEGATIVE
RBC # BLD AUTO: 4.11 10*6/MM3 (ref 4.2–5.76)
SODIUM BLD-SCNC: 138 MMOL/L (ref 132–146)
SP GR UR STRIP: 1.02 (ref 1–1.03)
UROBILINOGEN UR QL STRIP: ABNORMAL
WBC NRBC COR # BLD: 7.39 10*3/MM3 (ref 3.5–10.8)

## 2018-09-17 PROCEDURE — 81003 URINALYSIS AUTO W/O SCOPE: CPT | Performed by: UROLOGY

## 2018-09-17 PROCEDURE — 85027 COMPLETE CBC AUTOMATED: CPT | Performed by: UROLOGY

## 2018-09-17 PROCEDURE — 36415 COLL VENOUS BLD VENIPUNCTURE: CPT

## 2018-09-17 PROCEDURE — 93010 ELECTROCARDIOGRAM REPORT: CPT | Performed by: INTERNAL MEDICINE

## 2018-09-17 PROCEDURE — 93005 ELECTROCARDIOGRAM TRACING: CPT

## 2018-09-17 PROCEDURE — 80053 COMPREHEN METABOLIC PANEL: CPT | Performed by: UROLOGY

## 2018-09-17 RX ORDER — TERAZOSIN 5 MG/1
5 CAPSULE ORAL NIGHTLY
COMMUNITY
End: 2022-09-22

## 2018-09-17 RX ORDER — OXYBUTYNIN CHLORIDE 10 MG/1
10 TABLET, EXTENDED RELEASE ORAL DAILY
COMMUNITY
End: 2019-12-11 | Stop reason: ALTCHOICE

## 2018-09-17 RX ORDER — ISOSORBIDE MONONITRATE 30 MG/1
30 TABLET, EXTENDED RELEASE ORAL 2 TIMES DAILY
COMMUNITY

## 2018-09-17 NOTE — DISCHARGE INSTRUCTIONS

## 2018-09-21 ENCOUNTER — ANESTHESIA EVENT (OUTPATIENT)
Dept: PERIOP | Facility: HOSPITAL | Age: 73
End: 2018-09-21

## 2018-09-21 ENCOUNTER — HOSPITAL ENCOUNTER (OUTPATIENT)
Facility: HOSPITAL | Age: 73
Setting detail: HOSPITAL OUTPATIENT SURGERY
Discharge: HOME | End: 2018-09-21
Attending: UROLOGY | Admitting: UROLOGY

## 2018-09-21 ENCOUNTER — ANESTHESIA (OUTPATIENT)
Dept: PERIOP | Facility: HOSPITAL | Age: 73
End: 2018-09-21

## 2018-09-21 VITALS
OXYGEN SATURATION: 95 % | TEMPERATURE: 97.5 F | WEIGHT: 223 LBS | SYSTOLIC BLOOD PRESSURE: 156 MMHG | BODY MASS INDEX: 33.8 KG/M2 | HEART RATE: 63 BPM | RESPIRATION RATE: 19 BRPM | HEIGHT: 68 IN | DIASTOLIC BLOOD PRESSURE: 89 MMHG

## 2018-09-21 DIAGNOSIS — N41.3 PROSTATOCYSTITIS: ICD-10-CM

## 2018-09-21 DIAGNOSIS — N40.1 BENIGN PROSTATIC HYPERPLASIA WITH LOWER URINARY TRACT SYMPTOMS, SYMPTOM DETAILS UNSPECIFIED: Primary | Chronic | ICD-10-CM

## 2018-09-21 LAB
GLUCOSE BLDC GLUCOMTR-MCNC: 136 MG/DL (ref 70–130)
GLUCOSE BLDC GLUCOMTR-MCNC: 143 MG/DL (ref 70–130)
POTASSIUM BLDA-SCNC: 4.03 MMOL/L (ref 3.5–5.3)

## 2018-09-21 PROCEDURE — 84132 ASSAY OF SERUM POTASSIUM: CPT | Performed by: ANESTHESIOLOGY

## 2018-09-21 PROCEDURE — 25010000002 LEVOFLOXACIN PER 250 MG: Performed by: UROLOGY

## 2018-09-21 PROCEDURE — 25010000002 HYDRALAZINE PER 20 MG: Performed by: ANESTHESIOLOGY

## 2018-09-21 PROCEDURE — 25010000002 ONDANSETRON PER 1 MG: Performed by: NURSE ANESTHETIST, CERTIFIED REGISTERED

## 2018-09-21 PROCEDURE — 82962 GLUCOSE BLOOD TEST: CPT

## 2018-09-21 PROCEDURE — 25010000002 FENTANYL CITRATE (PF) 100 MCG/2ML SOLUTION: Performed by: NURSE ANESTHETIST, CERTIFIED REGISTERED

## 2018-09-21 PROCEDURE — 25010000002 DEXAMETHASONE PER 1 MG: Performed by: NURSE ANESTHETIST, CERTIFIED REGISTERED

## 2018-09-21 PROCEDURE — 25010000002 PROPOFOL 10 MG/ML EMULSION: Performed by: NURSE ANESTHETIST, CERTIFIED REGISTERED

## 2018-09-21 RX ORDER — FENTANYL CITRATE 50 UG/ML
50 INJECTION, SOLUTION INTRAMUSCULAR; INTRAVENOUS
Status: DISCONTINUED | OUTPATIENT
Start: 2018-09-21 | End: 2018-09-22 | Stop reason: HOSPADM

## 2018-09-21 RX ORDER — PROMETHAZINE HYDROCHLORIDE 25 MG/ML
6.25 INJECTION, SOLUTION INTRAMUSCULAR; INTRAVENOUS ONCE AS NEEDED
Status: DISCONTINUED | OUTPATIENT
Start: 2018-09-21 | End: 2018-09-22 | Stop reason: HOSPADM

## 2018-09-21 RX ORDER — LIDOCAINE HYDROCHLORIDE 10 MG/ML
INJECTION, SOLUTION EPIDURAL; INFILTRATION; INTRACAUDAL; PERINEURAL AS NEEDED
Status: DISCONTINUED | OUTPATIENT
Start: 2018-09-21 | End: 2018-09-21 | Stop reason: SURG

## 2018-09-21 RX ORDER — PROMETHAZINE HYDROCHLORIDE 25 MG/1
25 TABLET ORAL ONCE AS NEEDED
Status: DISCONTINUED | OUTPATIENT
Start: 2018-09-21 | End: 2018-09-22 | Stop reason: HOSPADM

## 2018-09-21 RX ORDER — LIDOCAINE HYDROCHLORIDE 10 MG/ML
0.5 INJECTION, SOLUTION EPIDURAL; INFILTRATION; INTRACAUDAL; PERINEURAL ONCE AS NEEDED
Status: COMPLETED | OUTPATIENT
Start: 2018-09-21 | End: 2018-09-21

## 2018-09-21 RX ORDER — SODIUM CHLORIDE 9 MG/ML
INJECTION, SOLUTION INTRAVENOUS AS NEEDED
Status: DISCONTINUED | OUTPATIENT
Start: 2018-09-21 | End: 2018-09-21 | Stop reason: HOSPADM

## 2018-09-21 RX ORDER — MAGNESIUM HYDROXIDE 1200 MG/15ML
LIQUID ORAL AS NEEDED
Status: DISCONTINUED | OUTPATIENT
Start: 2018-09-21 | End: 2018-09-21 | Stop reason: HOSPADM

## 2018-09-21 RX ORDER — FAMOTIDINE 10 MG/ML
20 INJECTION, SOLUTION INTRAVENOUS ONCE
Status: DISCONTINUED | OUTPATIENT
Start: 2018-09-21 | End: 2018-09-21

## 2018-09-21 RX ORDER — HYDROCODONE BITARTRATE AND ACETAMINOPHEN 7.5; 325 MG/1; MG/1
1-2 TABLET ORAL EVERY 4 HOURS PRN
Qty: 20 TABLET | Refills: 0 | Status: SHIPPED | OUTPATIENT
Start: 2018-09-21 | End: 2019-06-05

## 2018-09-21 RX ORDER — SODIUM CHLORIDE, SODIUM LACTATE, POTASSIUM CHLORIDE, CALCIUM CHLORIDE 600; 310; 30; 20 MG/100ML; MG/100ML; MG/100ML; MG/100ML
9 INJECTION, SOLUTION INTRAVENOUS CONTINUOUS
Status: DISCONTINUED | OUTPATIENT
Start: 2018-09-21 | End: 2018-09-22 | Stop reason: HOSPADM

## 2018-09-21 RX ORDER — HYDRALAZINE HYDROCHLORIDE 20 MG/ML
10 INJECTION INTRAMUSCULAR; INTRAVENOUS ONCE
Status: COMPLETED | OUTPATIENT
Start: 2018-09-21 | End: 2018-09-21

## 2018-09-21 RX ORDER — ONDANSETRON 2 MG/ML
INJECTION INTRAMUSCULAR; INTRAVENOUS AS NEEDED
Status: DISCONTINUED | OUTPATIENT
Start: 2018-09-21 | End: 2018-09-21 | Stop reason: SURG

## 2018-09-21 RX ORDER — PROPOFOL 10 MG/ML
VIAL (ML) INTRAVENOUS AS NEEDED
Status: DISCONTINUED | OUTPATIENT
Start: 2018-09-21 | End: 2018-09-21 | Stop reason: SURG

## 2018-09-21 RX ORDER — FAMOTIDINE 20 MG/1
20 TABLET, FILM COATED ORAL ONCE
Status: COMPLETED | OUTPATIENT
Start: 2018-09-21 | End: 2018-09-21

## 2018-09-21 RX ORDER — ONDANSETRON 2 MG/ML
4 INJECTION INTRAMUSCULAR; INTRAVENOUS ONCE AS NEEDED
Status: DISCONTINUED | OUTPATIENT
Start: 2018-09-21 | End: 2018-09-22 | Stop reason: HOSPADM

## 2018-09-21 RX ORDER — SODIUM CHLORIDE 0.9 % (FLUSH) 0.9 %
1-10 SYRINGE (ML) INJECTION AS NEEDED
Status: DISCONTINUED | OUTPATIENT
Start: 2018-09-21 | End: 2018-09-21 | Stop reason: HOSPADM

## 2018-09-21 RX ORDER — DEXAMETHASONE SODIUM PHOSPHATE 4 MG/ML
INJECTION, SOLUTION INTRA-ARTICULAR; INTRALESIONAL; INTRAMUSCULAR; INTRAVENOUS; SOFT TISSUE AS NEEDED
Status: DISCONTINUED | OUTPATIENT
Start: 2018-09-21 | End: 2018-09-21 | Stop reason: SURG

## 2018-09-21 RX ORDER — FENTANYL CITRATE 50 UG/ML
INJECTION, SOLUTION INTRAMUSCULAR; INTRAVENOUS AS NEEDED
Status: DISCONTINUED | OUTPATIENT
Start: 2018-09-21 | End: 2018-09-21 | Stop reason: SURG

## 2018-09-21 RX ORDER — HYDRALAZINE HYDROCHLORIDE 20 MG/ML
20 INJECTION INTRAMUSCULAR; INTRAVENOUS ONCE
Status: COMPLETED | OUTPATIENT
Start: 2018-09-21 | End: 2018-09-21

## 2018-09-21 RX ORDER — MEPERIDINE HYDROCHLORIDE 25 MG/ML
12.5 INJECTION INTRAMUSCULAR; INTRAVENOUS; SUBCUTANEOUS
Status: DISCONTINUED | OUTPATIENT
Start: 2018-09-21 | End: 2018-09-22 | Stop reason: HOSPADM

## 2018-09-21 RX ORDER — PROMETHAZINE HYDROCHLORIDE 25 MG/1
25 SUPPOSITORY RECTAL ONCE AS NEEDED
Status: DISCONTINUED | OUTPATIENT
Start: 2018-09-21 | End: 2018-09-22 | Stop reason: HOSPADM

## 2018-09-21 RX ORDER — HYDROMORPHONE HYDROCHLORIDE 1 MG/ML
0.5 INJECTION, SOLUTION INTRAMUSCULAR; INTRAVENOUS; SUBCUTANEOUS
Status: DISCONTINUED | OUTPATIENT
Start: 2018-09-21 | End: 2018-09-22 | Stop reason: HOSPADM

## 2018-09-21 RX ORDER — LEVOFLOXACIN 5 MG/ML
500 INJECTION, SOLUTION INTRAVENOUS ONCE
Status: COMPLETED | OUTPATIENT
Start: 2018-09-21 | End: 2018-09-21

## 2018-09-21 RX ADMIN — DEXAMETHASONE SODIUM PHOSPHATE 8 MG: 4 INJECTION, SOLUTION INTRAMUSCULAR; INTRAVENOUS at 13:10

## 2018-09-21 RX ADMIN — HYDRALAZINE HYDROCHLORIDE 20 MG: 20 INJECTION INTRAMUSCULAR; INTRAVENOUS at 15:28

## 2018-09-21 RX ADMIN — FAMOTIDINE 20 MG: 20 TABLET ORAL at 11:15

## 2018-09-21 RX ADMIN — ONDANSETRON 4 MG: 2 INJECTION INTRAMUSCULAR; INTRAVENOUS at 13:51

## 2018-09-21 RX ADMIN — FENTANYL CITRATE 50 MCG: 50 INJECTION, SOLUTION INTRAMUSCULAR; INTRAVENOUS at 13:43

## 2018-09-21 RX ADMIN — LIDOCAINE HYDROCHLORIDE 50 MG: 10 INJECTION, SOLUTION EPIDURAL; INFILTRATION; INTRACAUDAL; PERINEURAL at 13:05

## 2018-09-21 RX ADMIN — FENTANYL CITRATE 50 MCG: 50 INJECTION, SOLUTION INTRAMUSCULAR; INTRAVENOUS at 13:05

## 2018-09-21 RX ADMIN — LEVOFLOXACIN 500 MG: 5 INJECTION, SOLUTION INTRAVENOUS at 13:01

## 2018-09-21 RX ADMIN — SODIUM CHLORIDE, POTASSIUM CHLORIDE, SODIUM LACTATE AND CALCIUM CHLORIDE 9 ML/HR: 600; 310; 30; 20 INJECTION, SOLUTION INTRAVENOUS at 11:15

## 2018-09-21 RX ADMIN — PROPOFOL 150 MG: 10 INJECTION, EMULSION INTRAVENOUS at 13:05

## 2018-09-21 RX ADMIN — LIDOCAINE HYDROCHLORIDE 0.3 ML: 10 INJECTION, SOLUTION EPIDURAL; INFILTRATION; INTRACAUDAL; PERINEURAL at 11:15

## 2018-09-21 RX ADMIN — HYDRALAZINE HYDROCHLORIDE 10 MG: 20 INJECTION INTRAMUSCULAR; INTRAVENOUS at 14:39

## 2018-09-21 NOTE — ANESTHESIA PROCEDURE NOTES

## 2018-09-21 NOTE — ANESTHESIA PREPROCEDURE EVALUATION
Anesthesia Evaluation     Patient summary reviewed and Nursing notes reviewed                Airway   Mallampati: I  TM distance: >3 FB  Neck ROM: full  No difficulty expected  Dental - normal exam     Pulmonary - negative pulmonary ROS and normal exam   Cardiovascular - normal exam    (+) hypertension, CAD,       Neuro/Psych  (+) seizures,     GI/Hepatic/Renal/Endo    (+)  GERD,  diabetes mellitus, hypothyroidism,     Musculoskeletal (-) negative ROS    Abdominal  - normal exam    Bowel sounds: normal.   Substance History - negative use     OB/GYN negative ob/gyn ROS         Other      history of cancer (COLON)                  Anesthesia Plan    ASA 3     general     intravenous induction   Anesthetic plan, all risks, benefits, and alternatives have been provided, discussed and informed consent has been obtained with: patient.    Plan discussed with CRNA.

## 2018-09-21 NOTE — ANESTHESIA POSTPROCEDURE EVALUATION
Patient: Lea Rivers    Procedure Summary     Date:  09/21/18 Room / Location:   [facility]    Anesthesia Start:  1300 Anesthesia Stop:      Procedure:  CYSTOSCOPY TRANSURETHRAL RESECTION OF PROSTATE GREENLIGHT (N/A ) Diagnosis:      Surgeon:  Naseem Clayton MD Provider:  Jerman Lorenz MD    Anesthesia Type:  general ASA Status:  3          Anesthesia Type: general  Last vitals  BP   139/77 (09/21/18 1407)   Temp   97 °F (36.1 °C) (09/21/18 1407)   Pulse   75 (09/21/18 1407)   Resp   18 (09/21/18 1117)     SpO2   92 % (09/21/18 1407)     Post Anesthesia Care and Evaluation    Patient location during evaluation: PACU  Patient participation: complete - patient participated  Level of consciousness: awake and alert  Pain score: 0  Pain management: adequate  Airway patency: patent  Anesthetic complications: No anesthetic complications  PONV Status: none  Cardiovascular status: hemodynamically stable and acceptable  Respiratory status: nonlabored ventilation and acceptable  Hydration status: acceptable

## 2018-10-15 NOTE — OP NOTE
CYSTOSCOPY TRANSURETHRAL RESECTION OF PROSTATE GREENLIGHT  Progress Note    Lea ZENDEJAS Burton  9/21/2018    Pre-op Diagnosis:   BPH/LUTS       Post-Op Diagnosis Codes:   BPH/LUTS    Procedure/CPT® Codes:      Procedure(s):  CYSTOSCOPY TRANSURETHRAL RESECTION OF PROSTATE GREENLIGHT    Surgeon(s):  Naseem Clayton MD    Anesthesia: General    Staff:   Circulator: Daisy Storey RN  Scrub Person: Dulce Nava RN  Vendor Representative: Jennifer Cordero    Estimated Blood Loss: 10 ml    Urine Voided: * No values recorded between 9/21/2018  1:00 PM and 9/21/2018  2:02 PM *    Specimens:                None      Drains:   Urethral Catheter 18 Fr. (Active)       Findings: Open prostatic urethra at conclusion of procedure    Complications:  None    Procedure:  Patient was correctly identified in the preoperative holding area.  Informed consent was obtained.  He was taken to the operating room and positioned in supine position.  Anesthesia induced.  Once all appropriate lines and monitors were placed the patient was then repositioned to dorsal lithotomy position.  All pressure points padded.  Proper timeout procedure completed.  Preoperative antibiotics given.  Genitourinary area prepped and draped in normal sterile fashion.  A rigid cystoscope advanced through the urethra into the urinary bladder.  Pan cystoscopy performed.  No foreign bodies masses lesions were applied.  Prostate is noted to have prior TUR defect with some lateral lobe adenoma remaining.  The cystoscope was removed.  Resectoscope sheath was then passed into the urinary bladder.  Greenlight laser vaporization of the prostate was performed from the bladder neck to the verumontanum circumferentially to the initial setting around the bladder neck and verumontanum was approximately 80 W.  Throughout the procedure the wattage was increased to remove the lateral lobe adenoma.  At conclusion of procedure patient's prostate was open.  Hemostasis  confirmed.  Odell catheter was placed sterilely at the conclusion procedure.  Efflux was light pink.    Disposition:  To PACU, discharge home with catheter and outpatient voiding trial.      Naseem Clayton MD     Date: 10/15/2018  Time: 12:00 PM

## 2019-06-05 ENCOUNTER — OFFICE VISIT (OUTPATIENT)
Dept: ORTHOPEDIC SURGERY | Facility: CLINIC | Age: 74
End: 2019-06-05

## 2019-06-05 VITALS — WEIGHT: 211 LBS | BODY MASS INDEX: 31.98 KG/M2 | OXYGEN SATURATION: 98 % | HEART RATE: 85 BPM | HEIGHT: 68 IN

## 2019-06-05 DIAGNOSIS — M17.0 PRIMARY OSTEOARTHRITIS OF BOTH KNEES: Primary | ICD-10-CM

## 2019-06-05 PROCEDURE — 99213 OFFICE O/P EST LOW 20 MIN: CPT | Performed by: ORTHOPAEDIC SURGERY

## 2019-06-05 RX ORDER — LANCETS 33 GAUGE
EACH MISCELLANEOUS
Refills: 0 | COMMUNITY
Start: 2019-05-31 | End: 2023-01-04

## 2019-06-05 RX ORDER — DIVALPROEX SODIUM 500 MG/1
500 TABLET, EXTENDED RELEASE ORAL 2 TIMES DAILY
COMMUNITY
Start: 2019-04-09

## 2019-06-05 NOTE — PROGRESS NOTES
[facility] [place] [facility] Note    Subjective     Chief Complaint   Patient presents with   • Follow-up     11 month follow up after Visco injections - Primary osteoarthritis of both knees        HPI    Lea Rivers is a 73 y.o. male.  He follows up today for both his knees.  He is had pain for at least 5 years, following a particular injury.  The pain is 6 out of 10, aching and burning in quality, associate with popping.  He has responded well to Visco supplementation injections in the past.  He is not yet interested in surgical intervention.      Patient Active Problem List   Diagnosis   • Coronary artery disease   • Dyslipidemia   • Hypothyroidism   • GERD (gastroesophageal reflux disease)   • Seizure disorder (CMS/HCC)   • BPH (benign prostatic hypertrophy)   • Hypertension   • Primary osteoarthritis of both knees     Past Medical History:   Diagnosis Date   • BPH (benign prostatic hypertrophy)    • Chest pain    • Colon cancer (CMS/HCC) 1990    Status post partial colectomy in 1990. No chemotherapy or radiation therapy.   • Coronary artery disease     Nonobstructive coronary artery disease.   • Diabetes (CMS/HCC)    • Dyslipidemia    • GERD (gastroesophageal reflux disease)     Hx of.   • H/O cardiac catheterization    • Hypertension    • Hypothyroidism    • Left shoulder pain    • Primary osteoarthritis of both knees    • Seizure disorder (CMS/HCC)       Past Surgical History:   Procedure Laterality Date   • APPENDECTOMY     • CARPAL TUNNEL RELEASE Bilateral    • CHOLECYSTECTOMY     • COLECTOMY PARTIAL / TOTAL  1990    Partial colectomy   • COLONOSCOPY     • CYSTOSCOPY TRANSURETHRAL RESECTION OF PROSTATE N/A 9/21/2018    Procedure: CYSTOSCOPY TRANSURETHRAL RESECTION OF PROSTATE GREENLIGHT;  Surgeon: Naseem Clayton MD;  Location: [facility];  Service: Urology   • OTHER SURGICAL HISTORY      Contraction surgery on bilateral hands and wrists.   • OTHER SURGICAL HISTORY      left and right  hands   • SINUS SURGERY     • TONSILLECTOMY     • TURP / TRANSURETHRAL INCISION / DRAINAGE PROSTATE     • VASECTOMY        Family History   Problem Relation Age of Onset   • Cancer Mother    • Hypertension Father    • Stroke Father    • Stroke Other    • Arthritis Other    • Cancer Other      Social History     Socioeconomic History   • Marital status:      Spouse name: Not on file   • Number of children: Not on file   • Years of education: Not on file   • Highest education level: Not on file   Tobacco Use   • Smoking status: Never Smoker   • Smokeless tobacco: Never Used   Substance and Sexual Activity   • Alcohol use: No   • Drug use: No   • Sexual activity: Defer      Current Outpatient Medications on File Prior to Visit   Medication Sig Dispense Refill   • aspirin 81 MG tablet Take 81 mg by mouth daily.     • coenzyme Q10 100 MG capsule Take 100 mg by mouth Every Night.     • divalproex (DEPAKOTE) 500 MG 24 hr tablet      • doxazosin (CARDURA) 4 MG tablet Take 4 mg by mouth every night.     • finasteride (PROSCAR) 5 MG tablet Take 5 mg by mouth daily.     • fluticasone (FLONASE) 50 MCG/ACT nasal spray 2 sprays into each nostril as needed for rhinitis. Administer 2 sprays in each nostril for each dose.     • isosorbide mononitrate (IMDUR) 30 MG 24 hr tablet Take 30 mg by mouth Daily.     • levETIRAcetam (KEPPRA) 500 MG tablet Take 500 mg by mouth 2 (two) times a day.     • levothyroxine (SYNTHROID, LEVOTHROID) 88 MCG tablet Take 88 mcg by mouth daily.     • lisinopril (PRINIVIL,ZESTRIL) 20 MG tablet Take 20 mg by mouth 2 (two) times a day.     • metFORMIN ER (GLUCOPHAGE-XR) 500 MG 24 hr tablet      • nitroglycerin (NITROSTAT) 0.4 MG SL tablet Place  under the tongue Take As Directed.     • omeprazole (priLOSEC) 20 MG capsule Take 20 mg by mouth Daily.     • ONE TOUCH ULTRA TEST test strip Daily. for testing  0   • ONETOUCH DELICA LANCETS 33G misc USE 1 LANCET ONCE A DAY  0   • oxybutynin XL (DITROPAN-XL)  10 MG 24 hr tablet Take 10 mg by mouth Daily.     • simvastatin (ZOCOR) 20 MG tablet Take 20 mg by mouth every night.     • terazosin (HYTRIN) 5 MG capsule Take 5 mg by mouth Every Night.     • verapamil SR (CALAN-SR) 240 MG CR tablet Take 240 mg by mouth 2 (two) times a day.     • vitamin B-12 (CYANOCOBALAMIN) 1000 MCG tablet Take 1,000 mcg by mouth Daily.     • [DISCONTINUED] divalproex (DEPAKOTE) 500 MG DR tablet Take 500 mg by mouth Take As Directed. One tablet in the morning and 2 tablets in the evening     • [DISCONTINUED] HYDROcodone-acetaminophen (NORCO) 7.5-325 MG per tablet Take 1-2 tablets by mouth Every 4 (Four) Hours As Needed for Moderate Pain  (Pain). 20 tablet 0     No current facility-administered medications on file prior to visit.       Allergies   Allergen Reactions   • Sulfa Antibiotics Rash     Childhood reaction         Review of Systems   Constitutional: Positive for activity change. Negative for appetite change, chills, diaphoresis, fatigue, fever and unexpected weight change.   HENT: Negative for congestion, dental problem, drooling, ear discharge, ear pain, facial swelling, hearing loss, mouth sores, nosebleeds, postnasal drip, rhinorrhea, sinus pressure, sneezing, sore throat, tinnitus, trouble swallowing and voice change.    Eyes: Negative for photophobia, pain, discharge, redness, itching and visual disturbance.   Respiratory: Negative for apnea, cough, choking, chest tightness, shortness of breath, wheezing and stridor.    Cardiovascular: Negative for chest pain, palpitations and leg swelling.   Gastrointestinal: Negative for abdominal distention, abdominal pain, anal bleeding, blood in stool, constipation, diarrhea, nausea, rectal pain and vomiting.   Endocrine: Negative for cold intolerance, heat intolerance, polydipsia, polyphagia and polyuria.   Genitourinary: Negative for decreased urine volume, difficulty urinating, dysuria, enuresis, flank pain, frequency, genital sores,  "hematuria and urgency.   Musculoskeletal: Positive for joint swelling. Negative for arthralgias, back pain, gait problem, myalgias, neck pain and neck stiffness.   Skin: Negative for color change, pallor, rash and wound.   Allergic/Immunologic: Negative for environmental allergies, food allergies and immunocompromised state.   Neurological: Positive for seizures. Negative for dizziness, tremors, syncope, facial asymmetry, speech difficulty, weakness, light-headedness, numbness and headaches.   Hematological: Negative for adenopathy. Does not bruise/bleed easily.   Psychiatric/Behavioral: Negative for agitation, behavioral problems, confusion, decreased concentration, dysphoric mood, hallucinations, self-injury, sleep disturbance and suicidal ideas. The patient is not nervous/anxious and is not hyperactive.         Objective      Physical Exam  Pulse 85   Ht 172.7 cm (67.99\")   Wt 95.7 kg (211 lb)   SpO2 98%   BMI 32.09 kg/m²     Body mass index is 32.09 kg/m².    General:   Mental Status:  Alert   Appearance: Cooperative, in no acute distress   Build and Nutrition: Well-nourished well-developed male   Orientation: Alert and oriented to person, place and time   Posture: Normal   Gait: Normal    Integument:   Right knee: no skin lesions, no rash, no ecchymosis   Left knee: no skin lesions, no rash, no ecchymosis    Lower Extremities:   Right Knee:    Tenderness:  Medial and lateral joint line tenderness    Effusion:  None    Swelling:  None    Crepitus:  Positive    Atrophy:  None    Range of motion:  Extension: 0°       Flexion: 120°  Instability:  No varus laxity, no valgus laxity, negative anterior drawer  Deformities:  Varus   Left Knee:    Tenderness:  Medial and lateral joint line tenderness    Effusion:  None    Swelling:  None    Crepitus: Positive    Atrophy:  None    Range of motion:  Extension: 0°       Flexion: 120°  Instability:  No varus laxity, no valgus laxity, negative anterior " drawer  Deformities:  Varus      Imaging/Studies  Imaging Results (last 24 hours)     Procedure Component Value Units Date/Time    XR Knee 4+ View Bilateral [[id number]] Resulted:  06/05/19 0904     Updated:  06/05/19 0906    Narrative:       Right Knee Radiographs  Indication: right knee pain  Views: Standing AP's and skiers of both knees, with lateral and sunrise   views of the right knee    Comparison: 5/30/2018, with minimal advancement of degenerative changes    Findings:   Bone-on-bone contact medial compartment, tricompartmental osteophytes,   varus alignment, and advanced patellofemoral degeneration.    Impression: Advanced right knee arthritis.    Left Knee Radiographs  Indication: left knee pain  Views: Standing AP's and skiers of both knees, with lateral and sunrise   views of the left knee    Comparison: 5/30/2018, with minimal advancement of degenerative changes    Findings:   Bone-on-bone contact medial compartment, tricompartmental osteophytes,   varus alignment, and advanced patellofemoral degeneration.    Impression: Advanced left knee arthritis.            Assessment and Plan     Lea was seen today for follow-up.    Diagnoses and all orders for this visit:    Primary osteoarthritis of both knees  -     XR Knee 4+ View Bilateral  -     Large Joint Arthrocentesis        1. Primary osteoarthritis of both knees        I reviewed my findings with the patient today.  He does have advanced bilateral knee arthritis, and is not yet interested in surgical intervention.  He has responded well to Visco supplementation injections, and would like a repeat series at this point.  We will make arrangements once they have been approved, and I will see him back for administration.    Return for After approval of the visco injection.      Medical Decision Making  Management Options : prescription/IM medicine  Data/Risk: radiology tests and independent visualization of imaging, lab tests, or EMG/NCV      Louis SUN  MD Gold  06/05/19  9:21 AM

## 2019-06-26 ENCOUNTER — CLINICAL SUPPORT (OUTPATIENT)
Dept: ORTHOPEDIC SURGERY | Facility: CLINIC | Age: 74
End: 2019-06-26

## 2019-06-26 DIAGNOSIS — M17.0 PRIMARY OSTEOARTHRITIS OF BOTH KNEES: Primary | ICD-10-CM

## 2019-06-26 PROCEDURE — 20610 DRAIN/INJ JOINT/BURSA W/O US: CPT | Performed by: PHYSICIAN ASSISTANT

## 2019-06-26 RX ORDER — LIDOCAINE HYDROCHLORIDE 10 MG/ML
3 INJECTION, SOLUTION EPIDURAL; INFILTRATION; INTRACAUDAL; PERINEURAL
Status: COMPLETED | OUTPATIENT
Start: 2019-06-26 | End: 2019-06-26

## 2019-06-26 RX ADMIN — LIDOCAINE HYDROCHLORIDE 3 ML: 10 INJECTION, SOLUTION EPIDURAL; INFILTRATION; INTRACAUDAL; PERINEURAL at 08:55

## 2019-06-26 RX ADMIN — LIDOCAINE HYDROCHLORIDE 3 ML: 10 INJECTION, SOLUTION EPIDURAL; INFILTRATION; INTRACAUDAL; PERINEURAL at 08:56

## 2019-06-26 NOTE — PROGRESS NOTES
Subjective     Injections (Primary Osteoarthritis of Both Knees; Bilateral Knee Orthovisc Injection #1)      Lea Rivers is a 73 y.o. male.     History of Present Illness   Patient presents today for the first injection of Orthovisc in bilateral knees.  He has had multiple series in the past with good relief.  Allergies   Allergen Reactions   • Sulfa Antibiotics Rash     Childhood reaction      Current Outpatient Medications on File Prior to Visit   Medication Sig Dispense Refill   • aspirin 81 MG tablet Take 81 mg by mouth daily.     • coenzyme Q10 100 MG capsule Take 100 mg by mouth Every Night.     • divalproex (DEPAKOTE) 500 MG 24 hr tablet      • doxazosin (CARDURA) 4 MG tablet Take 4 mg by mouth every night.     • finasteride (PROSCAR) 5 MG tablet Take 5 mg by mouth daily.     • fluticasone (FLONASE) 50 MCG/ACT nasal spray 2 sprays into each nostril as needed for rhinitis. Administer 2 sprays in each nostril for each dose.     • isosorbide mononitrate (IMDUR) 30 MG 24 hr tablet Take 30 mg by mouth Daily.     • levETIRAcetam (KEPPRA) 500 MG tablet Take 500 mg by mouth 2 (two) times a day.     • levothyroxine (SYNTHROID, LEVOTHROID) 88 MCG tablet Take 88 mcg by mouth daily.     • lisinopril (PRINIVIL,ZESTRIL) 20 MG tablet Take 20 mg by mouth 2 (two) times a day.     • metFORMIN (GLUCOPHAGE) 500 MG tablet      • metFORMIN ER (GLUCOPHAGE-XR) 500 MG 24 hr tablet      • nitroglycerin (NITROSTAT) 0.4 MG SL tablet Place  under the tongue Take As Directed.     • omeprazole (priLOSEC) 20 MG capsule Take 20 mg by mouth Daily.     • ONE TOUCH ULTRA TEST test strip Daily. for testing  0   • ONETOUCH DELICA LANCETS 33G misc USE 1 LANCET ONCE A DAY  0   • oxybutynin XL (DITROPAN-XL) 10 MG 24 hr tablet Take 10 mg by mouth Daily.     • simvastatin (ZOCOR) 20 MG tablet Take 20 mg by mouth every night.     • terazosin (HYTRIN) 5 MG capsule Take 5 mg by mouth Every Night.     • verapamil SR (CALAN-SR) 240 MG CR tablet Take  240 mg by mouth 2 (two) times a day.     • vitamin B-12 (CYANOCOBALAMIN) 1000 MCG tablet Take 1,000 mcg by mouth Daily.       No current facility-administered medications on file prior to visit.      Social History     Socioeconomic History   • Marital status:      Spouse name: Not on file   • Number of children: Not on file   • Years of education: Not on file   • Highest education level: Not on file   Tobacco Use   • Smoking status: Never Smoker   • Smokeless tobacco: Never Used   Substance and Sexual Activity   • Alcohol use: No   • Drug use: No   • Sexual activity: Defer     Past Surgical History:   Procedure Laterality Date   • APPENDECTOMY     • CARPAL TUNNEL RELEASE Bilateral    • CHOLECYSTECTOMY     • COLECTOMY PARTIAL / TOTAL  1990    Partial colectomy   • COLONOSCOPY     • CYSTOSCOPY TRANSURETHRAL RESECTION OF PROSTATE N/A 9/21/2018    Procedure: CYSTOSCOPY TRANSURETHRAL RESECTION OF PROSTATE GREENLIGHT;  Surgeon: Naseem Clayton MD;  Location: [facility];  Service: Urology   • OTHER SURGICAL HISTORY      Contraction surgery on bilateral hands and wrists.   • OTHER SURGICAL HISTORY      left and right hands   • SINUS SURGERY     • TONSILLECTOMY     • TURP / TRANSURETHRAL INCISION / DRAINAGE PROSTATE     • VASECTOMY       Family History   Problem Relation Age of Onset   • Cancer Mother    • Hypertension Father    • Stroke Father    • Stroke Other    • Arthritis Other    • Cancer Other      Past Medical History:   Diagnosis Date   • BPH (benign prostatic hypertrophy)    • Chest pain    • Colon cancer (CMS/HCC) 1990    Status post partial colectomy in 1990. No chemotherapy or radiation therapy.   • Coronary artery disease     Nonobstructive coronary artery disease.   • Diabetes (CMS/HCC)    • Dyslipidemia    • GERD (gastroesophageal reflux disease)     Hx of.   • H/O cardiac catheterization    • Hypertension    • Hypothyroidism    • Left shoulder pain    • Primary osteoarthritis of both knees    •  Seizure disorder (CMS/[facility])          Review of Systems   Constitutional: Negative.    HENT: Negative.    Eyes: Negative.    Respiratory: Negative.    Cardiovascular: Negative.    Gastrointestinal: Negative.    Endocrine: Negative.    Genitourinary: Negative.    Musculoskeletal:        Primary osteoarthritis of both knees f/u   Skin: Negative.    Allergic/Immunologic: Negative.    Neurological: Negative.    Hematological: Negative.    Psychiatric/Behavioral: Negative.        The following portions of the patient's history were reviewed and updated as appropriate: allergies, current medications, past family history, past medical history, past social history, past surgical history and problem list.    Ortho Exam      Medical Decision Making    Assessment and Plan/ Diagnosis/Treatment options:   Bilateral knee arthritis here to begin series of Orthovisc.  Plan is to proceed with first injection in both knees today.  He will return in 1 week for the second injection or sooner if needed.    Using sterile technique, the left knee was sterilely prepped with Hibiclens.  Following time out, using a 22 gauge needle the left knee was aspirated and then injected with 2 ml Orthovisc. Approximately 0.5 mm of straw-colored fluid was obtained.  Patient tolerated the procedure well.  No complications.      Using sterile technique, the right knee was sterilely prepped with Hibiclens.  Following time out, using a 22 gauge needle the right knee was aspirated and then injected with 2 ml Orthovisc. Approximately 0.5 mm of straw-colored fluid was obtained.  Patient tolerated the procedure well.  No complications.

## 2019-07-03 ENCOUNTER — CLINICAL SUPPORT (OUTPATIENT)
Dept: ORTHOPEDIC SURGERY | Facility: CLINIC | Age: 74
End: 2019-07-03

## 2019-07-03 DIAGNOSIS — M17.0 PRIMARY OSTEOARTHRITIS OF BOTH KNEES: Primary | ICD-10-CM

## 2019-07-03 PROCEDURE — 20610 DRAIN/INJ JOINT/BURSA W/O US: CPT | Performed by: PHYSICIAN ASSISTANT

## 2019-07-03 RX ORDER — LIDOCAINE HYDROCHLORIDE 10 MG/ML
3 INJECTION, SOLUTION EPIDURAL; INFILTRATION; INTRACAUDAL; PERINEURAL
Status: COMPLETED | OUTPATIENT
Start: 2019-07-03 | End: 2019-07-03

## 2019-07-03 RX ADMIN — LIDOCAINE HYDROCHLORIDE 3 ML: 10 INJECTION, SOLUTION EPIDURAL; INFILTRATION; INTRACAUDAL; PERINEURAL at 08:27

## 2019-07-03 RX ADMIN — LIDOCAINE HYDROCHLORIDE 3 ML: 10 INJECTION, SOLUTION EPIDURAL; INFILTRATION; INTRACAUDAL; PERINEURAL at 08:28

## 2019-07-03 NOTE — PROGRESS NOTES
Procedure   Large Joint Arthrocentesis: R knee  Date/Time: 7/3/2019 8:27 AM  Consent given by: patient  Site marked: site marked  Timeout: Immediately prior to procedure a time out was called to verify the correct patient, procedure, equipment, support staff and site/side marked as required   Supporting Documentation  Indications: pain   Procedure Details  Location: knee - R knee  Preparation: Patient was prepped and draped in the usual sterile fashion  Needle size: 22 G  Medications administered: 30 mg Hyaluronan 30 MG/2ML; 3 mL lidocaine PF 1% 1 %  Patient tolerance: patient tolerated the procedure well with no immediate complications    Large Joint Arthrocentesis: L knee  Date/Time: 7/3/2019 8:28 AM  Consent given by: patient  Site marked: site marked  Timeout: Immediately prior to procedure a time out was called to verify the correct patient, procedure, equipment, support staff and site/side marked as required   Supporting Documentation  Indications: pain   Procedure Details  Location: knee - L knee  Preparation: Patient was prepped and draped in the usual sterile fashion  Needle size: 22 G  Medications administered: 3 mL lidocaine PF 1% 1 %; 30 mg Hyaluronan 30 MG/2ML  Patient tolerance: patient tolerated the procedure well with no immediate complications

## 2019-07-03 NOTE — PROGRESS NOTES
Subjective     Injections (Bilateral orthovisc injection #2 of series)      Lea Rivers is a 73 y.o. male.     History of Present Illness   Patient presents for the second injection of Orthovisc in bilateral knees.  Is tolerated previous injections well.  Allergies   Allergen Reactions   • Sulfa Antibiotics Rash     Childhood reaction      Current Outpatient Medications on File Prior to Visit   Medication Sig Dispense Refill   • aspirin 81 MG tablet Take 81 mg by mouth daily.     • coenzyme Q10 100 MG capsule Take 100 mg by mouth Every Night.     • divalproex (DEPAKOTE) 500 MG 24 hr tablet      • doxazosin (CARDURA) 4 MG tablet Take 4 mg by mouth every night.     • finasteride (PROSCAR) 5 MG tablet Take 5 mg by mouth daily.     • fluticasone (FLONASE) 50 MCG/ACT nasal spray 2 sprays into each nostril as needed for rhinitis. Administer 2 sprays in each nostril for each dose.     • isosorbide mononitrate (IMDUR) 30 MG 24 hr tablet Take 30 mg by mouth Daily.     • levETIRAcetam (KEPPRA) 500 MG tablet Take 500 mg by mouth 2 (two) times a day.     • levothyroxine (SYNTHROID, LEVOTHROID) 88 MCG tablet Take 88 mcg by mouth daily.     • lisinopril (PRINIVIL,ZESTRIL) 20 MG tablet Take 20 mg by mouth 2 (two) times a day.     • metFORMIN (GLUCOPHAGE) 500 MG tablet      • metFORMIN ER (GLUCOPHAGE-XR) 500 MG 24 hr tablet      • nitroglycerin (NITROSTAT) 0.4 MG SL tablet Place  under the tongue Take As Directed.     • omeprazole (priLOSEC) 20 MG capsule Take 20 mg by mouth Daily.     • ONE TOUCH ULTRA TEST test strip Daily. for testing  0   • ONETOUCH DELICA LANCETS 33G misc USE 1 LANCET ONCE A DAY  0   • oxybutynin XL (DITROPAN-XL) 10 MG 24 hr tablet Take 10 mg by mouth Daily.     • simvastatin (ZOCOR) 20 MG tablet Take 20 mg by mouth every night.     • terazosin (HYTRIN) 5 MG capsule Take 5 mg by mouth Every Night.     • verapamil SR (CALAN-SR) 240 MG CR tablet Take 240 mg by mouth 2 (two) times a day.     • vitamin B-12  (CYANOCOBALAMIN) 1000 MCG tablet Take 1,000 mcg by mouth Daily.       No current facility-administered medications on file prior to visit.      Social History     Socioeconomic History   • Marital status:      Spouse name: Not on file   • Number of children: Not on file   • Years of education: Not on file   • Highest education level: Not on file   Tobacco Use   • Smoking status: Never Smoker   • Smokeless tobacco: Never Used   Substance and Sexual Activity   • Alcohol use: No   • Drug use: No   • Sexual activity: Defer     Past Surgical History:   Procedure Laterality Date   • APPENDECTOMY     • CARPAL TUNNEL RELEASE Bilateral    • CHOLECYSTECTOMY     • COLECTOMY PARTIAL / TOTAL  1990    Partial colectomy   • COLONOSCOPY     • CYSTOSCOPY TRANSURETHRAL RESECTION OF PROSTATE N/A 9/21/2018    Procedure: CYSTOSCOPY TRANSURETHRAL RESECTION OF PROSTATE GREENLIGHT;  Surgeon: Naseem Clayton MD;  Location: On license of [facility];  Service: Urology   • OTHER SURGICAL HISTORY      Contraction surgery on bilateral hands and wrists.   • OTHER SURGICAL HISTORY      left and right hands   • SINUS SURGERY     • TONSILLECTOMY     • TURP / TRANSURETHRAL INCISION / DRAINAGE PROSTATE     • VASECTOMY       Family History   Problem Relation Age of Onset   • Cancer Mother    • Hypertension Father    • Stroke Father    • Stroke Other    • Arthritis Other    • Cancer Other      Past Medical History:   Diagnosis Date   • BPH (benign prostatic hypertrophy)    • Chest pain    • Colon cancer (CMS/HCC) 1990    Status post partial colectomy in 1990. No chemotherapy or radiation therapy.   • Coronary artery disease     Nonobstructive coronary artery disease.   • Diabetes (CMS/HCC)    • Dyslipidemia    • GERD (gastroesophageal reflux disease)     Hx of.   • H/O cardiac catheterization    • Hypertension    • Hypothyroidism    • Left shoulder pain    • Primary osteoarthritis of both knees    • Seizure disorder (CMS/HCC)          Review of  Systems    The following portions of the patient's history were reviewed and updated as appropriate: allergies, current medications, past family history, past medical history, past social history, past surgical history and problem list.    Ortho Exam      Medical Decision Making    Assessment and Plan/ Diagnosis/Treatment options:   Bilateral knee arthritis undergoing an Orthovisc series.  Plan is to proceed with a second injection in both knees today.  He will return in 1 week for the final injection or sooner if needed.    Using sterile technique, the left knee was sterilely prepped with Hibiclens.  Following time out, using a 22 gauge needle the left knee was aspirated and then injected with 2 ml Orthovisc. Approximately 0.5 mm of straw-colored fluid was obtained.  Patient tolerated the procedure well.  No complications.      Using sterile technique, the right knee was sterilely prepped with Hibiclens.  Following time out, using a 22 gauge needle the right knee was aspirated and then injected with 2 ml Orthovisc. Approximately 0.5 mm of straw-colored fluid was obtained.  Patient tolerated the procedure well.  No complications.             No

## 2019-07-10 ENCOUNTER — CLINICAL SUPPORT (OUTPATIENT)
Dept: ORTHOPEDIC SURGERY | Facility: CLINIC | Age: 74
End: 2019-07-10

## 2019-07-10 DIAGNOSIS — M17.0 PRIMARY OSTEOARTHRITIS OF BOTH KNEES: Primary | ICD-10-CM

## 2019-07-10 PROCEDURE — 20610 DRAIN/INJ JOINT/BURSA W/O US: CPT | Performed by: PHYSICIAN ASSISTANT

## 2019-07-10 RX ADMIN — LIDOCAINE HYDROCHLORIDE 3 ML: 10 INJECTION, SOLUTION EPIDURAL; INFILTRATION; INTRACAUDAL; PERINEURAL at 08:46

## 2019-07-10 RX ADMIN — LIDOCAINE HYDROCHLORIDE 3 ML: 10 INJECTION, SOLUTION EPIDURAL; INFILTRATION; INTRACAUDAL; PERINEURAL at 08:47

## 2019-07-10 NOTE — PROGRESS NOTES
Subjective     Follow-up of the Left Knee (Orthovisc Injection #3 ) and Follow-up of the Right Knee (Orthovisc Injection #3 )      Lea Rivers is a 73 y.o. male.     History of Present Illness   Patient presents today for the third injection of Orthovisc in bilateral knees.  He is tolerated previous injections well.  Allergies   Allergen Reactions   • Sulfa Antibiotics Rash     Childhood reaction      Current Outpatient Medications on File Prior to Visit   Medication Sig Dispense Refill   • aspirin 81 MG tablet Take 81 mg by mouth daily.     • coenzyme Q10 100 MG capsule Take 100 mg by mouth Every Night.     • divalproex (DEPAKOTE) 500 MG 24 hr tablet      • doxazosin (CARDURA) 4 MG tablet Take 4 mg by mouth every night.     • finasteride (PROSCAR) 5 MG tablet Take 5 mg by mouth daily.     • fluticasone (FLONASE) 50 MCG/ACT nasal spray 2 sprays into each nostril as needed for rhinitis. Administer 2 sprays in each nostril for each dose.     • isosorbide mononitrate (IMDUR) 30 MG 24 hr tablet Take 30 mg by mouth Daily.     • levETIRAcetam (KEPPRA) 500 MG tablet Take 500 mg by mouth 2 (two) times a day.     • levothyroxine (SYNTHROID, LEVOTHROID) 88 MCG tablet Take 88 mcg by mouth daily.     • lisinopril (PRINIVIL,ZESTRIL) 20 MG tablet Take 20 mg by mouth 2 (two) times a day.     • metFORMIN (GLUCOPHAGE) 500 MG tablet      • metFORMIN ER (GLUCOPHAGE-XR) 500 MG 24 hr tablet      • nitroglycerin (NITROSTAT) 0.4 MG SL tablet Place  under the tongue Take As Directed.     • omeprazole (priLOSEC) 20 MG capsule Take 20 mg by mouth Daily.     • ONE TOUCH ULTRA TEST test strip Daily. for testing  0   • ONETOUCH DELICA LANCETS 33G misc USE 1 LANCET ONCE A DAY  0   • oxybutynin XL (DITROPAN-XL) 10 MG 24 hr tablet Take 10 mg by mouth Daily.     • simvastatin (ZOCOR) 20 MG tablet Take 20 mg by mouth every night.     • terazosin (HYTRIN) 5 MG capsule Take 5 mg by mouth Every Night.     • verapamil SR (CALAN-SR) 240 MG CR  tablet Take 240 mg by mouth 2 (two) times a day.     • vitamin B-12 (CYANOCOBALAMIN) 1000 MCG tablet Take 1,000 mcg by mouth Daily.       No current facility-administered medications on file prior to visit.      Social History     Socioeconomic History   • Marital status:      Spouse name: Not on file   • Number of children: Not on file   • Years of education: Not on file   • Highest education level: Not on file   Tobacco Use   • Smoking status: Never Smoker   • Smokeless tobacco: Never Used   Substance and Sexual Activity   • Alcohol use: No   • Drug use: No   • Sexual activity: Defer     Past Surgical History:   Procedure Laterality Date   • APPENDECTOMY     • CARPAL TUNNEL RELEASE Bilateral    • CHOLECYSTECTOMY     • COLECTOMY PARTIAL / TOTAL  1990    Partial colectomy   • COLONOSCOPY     • CYSTOSCOPY TRANSURETHRAL RESECTION OF PROSTATE N/A 9/21/2018    Procedure: CYSTOSCOPY TRANSURETHRAL RESECTION OF PROSTATE GREENLIGHT;  Surgeon: Naseem Clayton MD;  Location: [facility];  Service: Urology   • OTHER SURGICAL HISTORY      Contraction surgery on bilateral hands and wrists.   • OTHER SURGICAL HISTORY      left and right hands   • SINUS SURGERY     • TONSILLECTOMY     • TURP / TRANSURETHRAL INCISION / DRAINAGE PROSTATE     • VASECTOMY       Family History   Problem Relation Age of Onset   • Cancer Mother    • Hypertension Father    • Stroke Father    • Stroke Other    • Arthritis Other    • Cancer Other      Past Medical History:   Diagnosis Date   • BPH (benign prostatic hypertrophy)    • Chest pain    • Colon cancer (CMS/HCC) 1990    Status post partial colectomy in 1990. No chemotherapy or radiation therapy.   • Coronary artery disease     Nonobstructive coronary artery disease.   • Diabetes (CMS/HCC)    • Dyslipidemia    • GERD (gastroesophageal reflux disease)     Hx of.   • H/O cardiac catheterization    • Hypertension    • Hypothyroidism    • Left shoulder pain    • Primary osteoarthritis of both  knees    • Seizure disorder (CMS/[facility])          Review of Systems    The following portions of the patient's history were reviewed and updated as appropriate: allergies, current medications, past family history, past medical history, past social history, past surgical history and problem list.    Ortho Exam      Medical Decision Making    Assessment and Plan/ Diagnosis/Treatment options:   Bilateral knee arthritis undergoing an Orthovisc series.  Plan is to finish the series in both knees today.  He will return in 5 to 6 months or sooner if needed.    Using sterile technique, the left knee was sterilely prepped with Hibiclens.  Following time out, using a 22 gauge needle the left knee was aspirated and then injected with 2 ml Orthovisc. Approximately 0.5 mm of straw-colored fluid was obtained.  Patient tolerated the procedure well.  No complications.      Using sterile technique, the right knee was sterilely prepped with Hibiclens.  Following time out, using a 22 gauge needle the right knee was aspirated and then injected with 2 ml Orthovisc. Approximately 0.5 mm of straw-colored fluid was obtained.  Patient tolerated the procedure well.  No complications.

## 2019-07-10 NOTE — PROGRESS NOTES
Procedure   Large Joint Arthrocentesis: R knee  Date/Time: 7/10/2019 8:46 AM  Consent given by: patient  Site marked: site marked  Timeout: Immediately prior to procedure a time out was called to verify the correct patient, procedure, equipment, support staff and site/side marked as required   Supporting Documentation  Indications: pain   Procedure Details  Location: knee - R knee  Preparation: Patient was prepped and draped in the usual sterile fashion  Needle size: 22 G  Approach: anterolateral  Medications administered: 30 mg Hyaluronan 30 MG/2ML; 3 mL lidocaine PF 1% 1 %  Patient tolerance: patient tolerated the procedure well with no immediate complications    Large Joint Arthrocentesis: L knee  Date/Time: 7/10/2019 8:47 AM  Consent given by: patient  Site marked: site marked  Timeout: Immediately prior to procedure a time out was called to verify the correct patient, procedure, equipment, support staff and site/side marked as required   Supporting Documentation  Indications: pain   Procedure Details  Location: knee - L knee  Preparation: Patient was prepped and draped in the usual sterile fashion  Needle size: 22 G  Approach: anterolateral  Medications administered: 30 mg Hyaluronan 30 MG/2ML; 3 mL lidocaine PF 1% 1 %  Patient tolerance: patient tolerated the procedure well with no immediate complications

## 2019-07-11 RX ORDER — LIDOCAINE HYDROCHLORIDE 10 MG/ML
3 INJECTION, SOLUTION EPIDURAL; INFILTRATION; INTRACAUDAL; PERINEURAL
Status: COMPLETED | OUTPATIENT
Start: 2019-07-10 | End: 2019-07-10

## 2019-12-11 ENCOUNTER — OFFICE VISIT (OUTPATIENT)
Dept: ORTHOPEDIC SURGERY | Facility: CLINIC | Age: 74
End: 2019-12-11

## 2019-12-11 VITALS — OXYGEN SATURATION: 98 % | BODY MASS INDEX: 32.84 KG/M2 | WEIGHT: 216.71 LBS | HEIGHT: 68 IN | HEART RATE: 84 BPM

## 2019-12-11 DIAGNOSIS — M17.0 PRIMARY OSTEOARTHRITIS OF BOTH KNEES: Primary | ICD-10-CM

## 2019-12-11 PROCEDURE — 20610 DRAIN/INJ JOINT/BURSA W/O US: CPT | Performed by: PHYSICIAN ASSISTANT

## 2019-12-11 NOTE — PROGRESS NOTES
Procedure   Large Joint Arthrocentesis: R knee  Date/Time: 12/11/2019 9:29 AM  Consent given by: patient  Site marked: site marked  Timeout: Immediately prior to procedure a time out was called to verify the correct patient, procedure, equipment, support staff and site/side marked as required   Supporting Documentation  Indications: pain   Procedure Details  Location: knee - R knee  Preparation: Patient was prepped and draped in the usual sterile fashion  Needle size: 22 G  Approach: anterolateral  Medications administered: 30 mg Hyaluronan 30 MG/2ML  Patient tolerance: patient tolerated the procedure well with no immediate complications    Large Joint Arthrocentesis: L knee  Date/Time: 12/11/2019 9:29 AM  Consent given by: patient  Site marked: site marked  Timeout: Immediately prior to procedure a time out was called to verify the correct patient, procedure, equipment, support staff and site/side marked as required   Supporting Documentation  Indications: pain   Procedure Details  Location: knee - L knee  Preparation: Patient was prepped and draped in the usual sterile fashion  Needle size: 22 G  Approach: anterolateral  Medications administered: 30 mg Hyaluronan 30 MG/2ML  Patient tolerance: patient tolerated the procedure well with no immediate complications

## 2019-12-11 NOTE — PROGRESS NOTES
[facility] Note    Subjective     Patient: Lea Rivers  : 1945    Primary Care Provider: Kingsley Soto MD    Requesting Provider: As above    Follow-up (5 month follow up - primary osteoarthrits of both knees - visco injections finished 07/10/19)      History    Chief Complaint: Bilateral knee pain    History of Present Illness: Patient returns today for his increasing bilateral knee pain.  He has been treated in the past with intermittent Orthovisc and is done very well.  He would like repeat Orthovisc today.    Current Outpatient Medications on File Prior to Visit   Medication Sig Dispense Refill   • aspirin 81 MG tablet Take 81 mg by mouth daily.     • coenzyme Q10 100 MG capsule Take 100 mg by mouth Every Night.     • divalproex (DEPAKOTE) 500 MG 24 hr tablet      • doxazosin (CARDURA) 4 MG tablet Take 4 mg by mouth every night.     • fluticasone (FLONASE) 50 MCG/ACT nasal spray 2 sprays into each nostril as needed for rhinitis. Administer 2 sprays in each nostril for each dose.     • isosorbide mononitrate (IMDUR) 30 MG 24 hr tablet Take 30 mg by mouth Daily.     • levETIRAcetam (KEPPRA) 500 MG tablet Take 500 mg by mouth 2 (two) times a day.     • levothyroxine (SYNTHROID, LEVOTHROID) 88 MCG tablet Take 88 mcg by mouth daily.     • lisinopril (PRINIVIL,ZESTRIL) 20 MG tablet Take 20 mg by mouth 2 (two) times a day.     • metFORMIN (GLUCOPHAGE) 500 MG tablet      • metFORMIN ER (GLUCOPHAGE-XR) 500 MG 24 hr tablet      • nitroglycerin (NITROSTAT) 0.4 MG SL tablet Place  under the tongue Take As Directed.     • omeprazole (priLOSEC) 20 MG capsule Take 20 mg by mouth Daily.     • ONE TOUCH ULTRA TEST test strip Daily. for testing  0   • ONETOUCH DELICA LANCETS 33G misc USE 1 LANCET ONCE A DAY  0   • simvastatin (ZOCOR) 20 MG tablet Take 20 mg by mouth every night.     • terazosin (HYTRIN) 5 MG capsule Take 5 mg by mouth Every Night.     • verapamil SR (CALAN-SR) 240 MG  CR tablet Take 240 mg by mouth 2 (two) times a day.     • vitamin B-12 (CYANOCOBALAMIN) 1000 MCG tablet Take 1,000 mcg by mouth Daily.     • finasteride (PROSCAR) 5 MG tablet Take 5 mg by mouth daily.     • [DISCONTINUED] oxybutynin XL (DITROPAN-XL) 10 MG 24 hr tablet Take 10 mg by mouth Daily.       No current facility-administered medications on file prior to visit.       Allergies   Allergen Reactions   • Sulfa Antibiotics Rash     Childhood reaction       Past Medical History:   Diagnosis Date   • BPH (benign prostatic hypertrophy)    • Chest pain    • Colon cancer (CMS/HCC) 1990    Status post partial colectomy in 1990. No chemotherapy or radiation therapy.   • Coronary artery disease     Nonobstructive coronary artery disease.   • Diabetes (CMS/HCC)    • Dyslipidemia    • GERD (gastroesophageal reflux disease)     Hx of.   • H/O cardiac catheterization    • Hypertension    • Hypothyroidism    • Left shoulder pain    • Primary osteoarthritis of both knees    • Seizure disorder (CMS/HCC)      Past Surgical History:   Procedure Laterality Date   • APPENDECTOMY     • CARPAL TUNNEL RELEASE Bilateral    • CHOLECYSTECTOMY     • COLECTOMY PARTIAL / TOTAL  1990    Partial colectomy   • COLONOSCOPY     • CYSTOSCOPY TRANSURETHRAL RESECTION OF PROSTATE N/A 9/21/2018    Procedure: CYSTOSCOPY TRANSURETHRAL RESECTION OF PROSTATE GREENLIGHT;  Surgeon: Naseem Clayton MD;  Location: [facility];  Service: Urology   • OTHER SURGICAL HISTORY      Contraction surgery on bilateral hands and wrists.   • OTHER SURGICAL HISTORY      left and right hands   • SINUS SURGERY     • TONSILLECTOMY     • TURP / TRANSURETHRAL INCISION / DRAINAGE PROSTATE     • VASECTOMY       Family History   Problem Relation Age of Onset   • Cancer Mother    • Hypertension Father    • Stroke Father    • Stroke Other    • Arthritis Other    • Cancer Other       Social History     Socioeconomic History   • Marital status:      Spouse name: Not on  "file   • Number of children: Not on file   • Years of education: Not on file   • Highest education level: Not on file   Tobacco Use   • Smoking status: Never Smoker   • Smokeless tobacco: Never Used   Substance and Sexual Activity   • Alcohol use: No   • Drug use: No   • Sexual activity: Defer        Review of Systems   Constitutional: Negative.    HENT: Negative.    Eyes: Negative.    Respiratory: Negative.    Cardiovascular: Negative.    Gastrointestinal: Negative.    Endocrine: Negative.    Genitourinary: Negative.    Musculoskeletal: Positive for joint swelling.   Skin: Negative.    Allergic/Immunologic: Negative.    Neurological: Negative.    Hematological: Negative.    Psychiatric/Behavioral: Negative.        The following portions of the patient's history were reviewed and updated as appropriate: allergies, current medications, past family history, past medical history, past social history, past surgical history and problem list.      Objective      Physical Exam  Pulse 84   Ht 172.7 cm (67.99\")   Wt 98.3 kg (216 lb 11.4 oz)   SpO2 98%   BMI 32.96 kg/m²     Body mass index is 32.96 kg/m².    Patient is well developed, well nourished and in no acute distress.  Alert and oriented x 3.      Medical Decision Making    Data Review:   none    Assessment:  1. Primary osteoarthritis of both knees        Plan:  Bilateral knee arthritis.  Patient is done well with intermittent Visco supplementation.  He would like repeat injection today.  Plan today is to begin the series in both knees today.  I will see him back in 1 week for the second injection or sooner if needed.    Using sterile technique, the left knee was sterilely prepped with Hibiclens.  Following time out, using a 22 gauge needle the left knee was aspirated and then injected with 2 ml Orthovisc. Approximately 0.5 mm of straw-colored fluid was obtained.  Patient tolerated the procedure well.  No complications.      Using sterile technique, the right knee " was sterilely prepped with Hibiclens.  Following time out, using a 22 gauge needle the right knee was aspirated and then injected with 2 ml Orthovisc. Approximately 0.5 mm of straw-colored fluid was obtained.  Patient tolerated the procedure well.  No complications.            Kiersten Carreon PA-C  12/11/19  3:31 PM

## 2019-12-18 ENCOUNTER — CLINICAL SUPPORT (OUTPATIENT)
Dept: ORTHOPEDIC SURGERY | Facility: CLINIC | Age: 74
End: 2019-12-18

## 2019-12-18 DIAGNOSIS — M17.0 PRIMARY OSTEOARTHRITIS OF BOTH KNEES: Primary | ICD-10-CM

## 2019-12-18 PROCEDURE — 20610 DRAIN/INJ JOINT/BURSA W/O US: CPT | Performed by: PHYSICIAN ASSISTANT

## 2019-12-18 RX ORDER — LIDOCAINE HYDROCHLORIDE 10 MG/ML
3 INJECTION, SOLUTION EPIDURAL; INFILTRATION; INTRACAUDAL; PERINEURAL
Status: COMPLETED | OUTPATIENT
Start: 2019-12-18 | End: 2019-12-18

## 2019-12-18 RX ADMIN — LIDOCAINE HYDROCHLORIDE 3 ML: 10 INJECTION, SOLUTION EPIDURAL; INFILTRATION; INTRACAUDAL; PERINEURAL at 08:36

## 2019-12-18 RX ADMIN — LIDOCAINE HYDROCHLORIDE 3 ML: 10 INJECTION, SOLUTION EPIDURAL; INFILTRATION; INTRACAUDAL; PERINEURAL at 08:35

## 2019-12-18 NOTE — PROGRESS NOTES
Procedure   Large Joint Arthrocentesis: R knee  Date/Time: 12/18/2019 8:35 AM  Consent given by: patient  Site marked: site marked  Timeout: Immediately prior to procedure a time out was called to verify the correct patient, procedure, equipment, support staff and site/side marked as required   Supporting Documentation  Indications: pain   Procedure Details  Location: knee - R knee  Preparation: Patient was prepped and draped in the usual sterile fashion  Needle size: 22 G  Approach: anterolateral  Medications administered: 3 mL lidocaine PF 1% 1 %; 30 mg Hyaluronan 30 MG/2ML  Patient tolerance: patient tolerated the procedure well with no immediate complications    Large Joint Arthrocentesis: L knee  Date/Time: 12/18/2019 8:36 AM  Consent given by: patient  Site marked: site marked  Timeout: Immediately prior to procedure a time out was called to verify the correct patient, procedure, equipment, support staff and site/side marked as required   Supporting Documentation  Indications: pain   Procedure Details  Location: knee - L knee  Preparation: Patient was prepped and draped in the usual sterile fashion  Needle size: 22 G  Approach: anterolateral  Medications administered: 30 mg Hyaluronan 30 MG/2ML; 3 mL lidocaine PF 1% 1 %  Patient tolerance: patient tolerated the procedure well with no immediate complications

## 2019-12-18 NOTE — PROGRESS NOTES
Subjective     Injections (Bilateral Knee Orthovisc injection #2)      Lea Rivers is a 74 y.o. male.     History of Present Illness   Patient presents for the second injection of Orthovisc in bilateral knees.  He has tolerated previous injections well.  Allergies   Allergen Reactions   • Sulfa Antibiotics Rash     Childhood reaction      Current Outpatient Medications on File Prior to Visit   Medication Sig Dispense Refill   • aspirin 81 MG tablet Take 81 mg by mouth daily.     • coenzyme Q10 100 MG capsule Take 100 mg by mouth Every Night.     • divalproex (DEPAKOTE) 500 MG 24 hr tablet      • doxazosin (CARDURA) 4 MG tablet Take 4 mg by mouth every night.     • finasteride (PROSCAR) 5 MG tablet Take 5 mg by mouth daily.     • fluticasone (FLONASE) 50 MCG/ACT nasal spray 2 sprays into each nostril as needed for rhinitis. Administer 2 sprays in each nostril for each dose.     • isosorbide mononitrate (IMDUR) 30 MG 24 hr tablet Take 30 mg by mouth Daily.     • levETIRAcetam (KEPPRA) 500 MG tablet Take 500 mg by mouth 2 (two) times a day.     • levothyroxine (SYNTHROID, LEVOTHROID) 88 MCG tablet Take 88 mcg by mouth daily.     • lisinopril (PRINIVIL,ZESTRIL) 20 MG tablet Take 20 mg by mouth 2 (two) times a day.     • metFORMIN (GLUCOPHAGE) 500 MG tablet      • metFORMIN ER (GLUCOPHAGE-XR) 500 MG 24 hr tablet      • nitroglycerin (NITROSTAT) 0.4 MG SL tablet Place  under the tongue Take As Directed.     • omeprazole (priLOSEC) 20 MG capsule Take 20 mg by mouth Daily.     • ONE TOUCH ULTRA TEST test strip Daily. for testing  0   • ONETOUCH DELICA LANCETS 33G misc USE 1 LANCET ONCE A DAY  0   • simvastatin (ZOCOR) 20 MG tablet Take 20 mg by mouth every night.     • terazosin (HYTRIN) 5 MG capsule Take 5 mg by mouth Every Night.     • verapamil SR (CALAN-SR) 240 MG CR tablet Take 240 mg by mouth 2 (two) times a day.     • vitamin B-12 (CYANOCOBALAMIN) 1000 MCG tablet Take 1,000 mcg by mouth Daily.       No current  facility-administered medications on file prior to visit.      Social History     Socioeconomic History   • Marital status:      Spouse name: Not on file   • Number of children: Not on file   • Years of education: Not on file   • Highest education level: Not on file   Tobacco Use   • Smoking status: Never Smoker   • Smokeless tobacco: Never Used   Substance and Sexual Activity   • Alcohol use: No   • Drug use: No   • Sexual activity: Defer     Past Surgical History:   Procedure Laterality Date   • APPENDECTOMY     • CARPAL TUNNEL RELEASE Bilateral    • CHOLECYSTECTOMY     • COLECTOMY PARTIAL / TOTAL  1990    Partial colectomy   • COLONOSCOPY     • CYSTOSCOPY TRANSURETHRAL RESECTION OF PROSTATE N/A 9/21/2018    Procedure: CYSTOSCOPY TRANSURETHRAL RESECTION OF PROSTATE GREENLIGHT;  Surgeon: Naseem Clayton MD;  Location: [facility];  Service: Urology   • OTHER SURGICAL HISTORY      Contraction surgery on bilateral hands and wrists.   • OTHER SURGICAL HISTORY      left and right hands   • SINUS SURGERY     • TONSILLECTOMY     • TURP / TRANSURETHRAL INCISION / DRAINAGE PROSTATE     • VASECTOMY       Family History   Problem Relation Age of Onset   • Cancer Mother    • Hypertension Father    • Stroke Father    • Stroke Other    • Arthritis Other    • Cancer Other      Past Medical History:   Diagnosis Date   • BPH (benign prostatic hypertrophy)    • Chest pain    • Colon cancer (CMS/HCC) 1990    Status post partial colectomy in 1990. No chemotherapy or radiation therapy.   • Coronary artery disease     Nonobstructive coronary artery disease.   • Diabetes (CMS/HCC)    • Dyslipidemia    • GERD (gastroesophageal reflux disease)     Hx of.   • H/O cardiac catheterization    • Hypertension    • Hypothyroidism    • Left shoulder pain    • Primary osteoarthritis of both knees    • Seizure disorder (CMS/HCC)          Review of Systems    The following portions of the patient's history were reviewed and updated as  appropriate: allergies, current medications, past family history, past medical history, past social history, past surgical history and problem list.    Ortho Exam      Medical Decision Making    Assessment and Plan/ Diagnosis/Treatment options:   Bilateral knee arthritis undergoing Orthovisc series.  We will proceed with the second injection in both knees today.  He will return in 1 week for the final injection or sooner if needed.    Using sterile technique, the left knee was sterilely prepped with Hibiclens.  Following time out, using a 22 gauge needle the left knee was aspirated and then injected with 2 ml Orthovisc. Approximately 0.5 mm of straw-colored fluid was obtained.  Patient tolerated the procedure well.  No complications.      Using sterile technique, the right knee was sterilely prepped with Hibiclens.  Following time out, using a 22 gauge needle the right knee was aspirated and then injected with 2 ml Orthovisc. Approximately 0.5 mm of straw-colored fluid was obtained.  Patient tolerated the procedure well.  No complications.

## 2019-12-24 ENCOUNTER — CLINICAL SUPPORT (OUTPATIENT)
Dept: ORTHOPEDIC SURGERY | Facility: CLINIC | Age: 74
End: 2019-12-24

## 2019-12-24 DIAGNOSIS — M17.0 PRIMARY OSTEOARTHRITIS OF BOTH KNEES: Primary | ICD-10-CM

## 2019-12-24 PROCEDURE — 20610 DRAIN/INJ JOINT/BURSA W/O US: CPT | Performed by: PHYSICIAN ASSISTANT

## 2019-12-24 NOTE — PROGRESS NOTES
Procedure   Large Joint Arthrocentesis: R knee  Date/Time: 12/24/2019 8:15 AM  Consent given by: patient  Site marked: site marked  Timeout: Immediately prior to procedure a time out was called to verify the correct patient, procedure, equipment, support staff and site/side marked as required   Supporting Documentation  Indications: pain   Procedure Details  Location: knee - R knee  Preparation: Patient was prepped and draped in the usual sterile fashion  Needle size: 22 G  Approach: anterolateral  Medications administered: 30 mg Hyaluronan 30 MG/2ML  Patient tolerance: patient tolerated the procedure well with no immediate complications    Large Joint Arthrocentesis: L knee  Date/Time: 12/24/2019 8:17 AM  Consent given by: patient  Site marked: site marked  Timeout: Immediately prior to procedure a time out was called to verify the correct patient, procedure, equipment, support staff and site/side marked as required   Supporting Documentation  Indications: pain   Procedure Details  Location: knee - L knee  Preparation: Patient was prepped and draped in the usual sterile fashion  Needle size: 22 G  Approach: anterolateral  Medications administered: 30 mg Hyaluronan 30 MG/2ML  Patient tolerance: patient tolerated the procedure well with no immediate complications

## 2019-12-27 NOTE — PROGRESS NOTES
Subjective     Follow-up (Bilateral orthovisc #3)      Lea Rivers is a 74 y.o. male.     History of Present Illness   Patient presents for bilateral Orthovisc No. 3.  He has tolerated previous injections well.  Allergies   Allergen Reactions   • Sulfa Antibiotics Rash     Childhood reaction      Current Outpatient Medications on File Prior to Visit   Medication Sig Dispense Refill   • aspirin 81 MG tablet Take 81 mg by mouth daily.     • coenzyme Q10 100 MG capsule Take 100 mg by mouth Every Night.     • divalproex (DEPAKOTE) 500 MG 24 hr tablet      • doxazosin (CARDURA) 4 MG tablet Take 4 mg by mouth every night.     • finasteride (PROSCAR) 5 MG tablet Take 5 mg by mouth daily.     • fluticasone (FLONASE) 50 MCG/ACT nasal spray 2 sprays into each nostril as needed for rhinitis. Administer 2 sprays in each nostril for each dose.     • isosorbide mononitrate (IMDUR) 30 MG 24 hr tablet Take 30 mg by mouth Daily.     • levETIRAcetam (KEPPRA) 500 MG tablet Take 500 mg by mouth 2 (two) times a day.     • levothyroxine (SYNTHROID, LEVOTHROID) 88 MCG tablet Take 88 mcg by mouth daily.     • lisinopril (PRINIVIL,ZESTRIL) 20 MG tablet Take 20 mg by mouth 2 (two) times a day.     • metFORMIN (GLUCOPHAGE) 500 MG tablet      • metFORMIN ER (GLUCOPHAGE-XR) 500 MG 24 hr tablet      • nitroglycerin (NITROSTAT) 0.4 MG SL tablet Place  under the tongue Take As Directed.     • omeprazole (priLOSEC) 20 MG capsule Take 20 mg by mouth Daily.     • ONE TOUCH ULTRA TEST test strip Daily. for testing  0   • ONETOUCH DELICA LANCETS 33G misc USE 1 LANCET ONCE A DAY  0   • simvastatin (ZOCOR) 20 MG tablet Take 20 mg by mouth every night.     • terazosin (HYTRIN) 5 MG capsule Take 5 mg by mouth Every Night.     • verapamil SR (CALAN-SR) 240 MG CR tablet Take 240 mg by mouth 2 (two) times a day.     • vitamin B-12 (CYANOCOBALAMIN) 1000 MCG tablet Take 1,000 mcg by mouth Daily.       No current facility-administered medications on file  prior to visit.      Social History     Socioeconomic History   • Marital status:      Spouse name: Not on file   • Number of children: Not on file   • Years of education: Not on file   • Highest education level: Not on file   Tobacco Use   • Smoking status: Never Smoker   • Smokeless tobacco: Never Used   Substance and Sexual Activity   • Alcohol use: No   • Drug use: No   • Sexual activity: Defer     Past Surgical History:   Procedure Laterality Date   • APPENDECTOMY     • CARPAL TUNNEL RELEASE Bilateral    • CHOLECYSTECTOMY     • COLECTOMY PARTIAL / TOTAL  1990    Partial colectomy   • COLONOSCOPY     • CYSTOSCOPY TRANSURETHRAL RESECTION OF PROSTATE N/A 9/21/2018    Procedure: CYSTOSCOPY TRANSURETHRAL RESECTION OF PROSTATE GREENLIGHT;  Surgeon: Naseem Clayton MD;  Location: [facility];  Service: Urology   • OTHER SURGICAL HISTORY      Contraction surgery on bilateral hands and wrists.   • OTHER SURGICAL HISTORY      left and right hands   • SINUS SURGERY     • TONSILLECTOMY     • TURP / TRANSURETHRAL INCISION / DRAINAGE PROSTATE     • VASECTOMY       Family History   Problem Relation Age of Onset   • Cancer Mother    • Hypertension Father    • Stroke Father    • Stroke Other    • Arthritis Other    • Cancer Other      Past Medical History:   Diagnosis Date   • BPH (benign prostatic hypertrophy)    • Chest pain    • Colon cancer (CMS/HCC) 1990    Status post partial colectomy in 1990. No chemotherapy or radiation therapy.   • Coronary artery disease     Nonobstructive coronary artery disease.   • Diabetes (CMS/HCC)    • Dyslipidemia    • GERD (gastroesophageal reflux disease)     Hx of.   • H/O cardiac catheterization    • Hypertension    • Hypothyroidism    • Left shoulder pain    • Primary osteoarthritis of both knees    • Seizure disorder (CMS/HCC)          Review of Systems    The following portions of the patient's history were reviewed and updated as appropriate: allergies, current medications,  past family history, past medical history, past social history, past surgical history and problem list.    Ortho Exam      Medical Decision Making    Assessment and Plan/ Diagnosis/Treatment options:   Bilateral knee arthritis undergoing Orthovisc series.  Plan is to finish the series in both knees today.  I will see him back in 6 months or sooner if needed.    Using sterile technique, the left knee was sterilely prepped with Hibiclens.  Following time out, using a 22 gauge needle the left knee was aspirated and then injected with 2 ml Orthovisc. Approximately 0.5 mm of straw-colored fluid was obtained.  Patient tolerated the procedure well.  No complications.      Using sterile technique, the right knee was sterilely prepped with Hibiclens.  Following time out, using a 22 gauge needle the right knee was aspirated and then injected with 2 ml Orthovisc. Approximately 0.5 mm of straw-colored fluid was obtained.  Patient tolerated the procedure well.  No complications.

## 2020-06-23 ENCOUNTER — OFFICE VISIT (OUTPATIENT)
Dept: ORTHOPEDIC SURGERY | Facility: CLINIC | Age: 75
End: 2020-06-23

## 2020-06-23 VITALS — HEIGHT: 68 IN | OXYGEN SATURATION: 97 % | BODY MASS INDEX: 31.64 KG/M2 | HEART RATE: 84 BPM | WEIGHT: 208.8 LBS

## 2020-06-23 DIAGNOSIS — M17.0 PRIMARY OSTEOARTHRITIS OF BOTH KNEES: Primary | ICD-10-CM

## 2020-06-23 DIAGNOSIS — E66.01 CLASS 2 SEVERE OBESITY DUE TO EXCESS CALORIES WITH SERIOUS COMORBIDITY IN ADULT, UNSPECIFIED BMI (HCC): ICD-10-CM

## 2020-06-23 PROCEDURE — 20610 DRAIN/INJ JOINT/BURSA W/O US: CPT | Performed by: PHYSICIAN ASSISTANT

## 2020-06-23 PROCEDURE — 99213 OFFICE O/P EST LOW 20 MIN: CPT | Performed by: PHYSICIAN ASSISTANT

## 2020-06-23 NOTE — PROGRESS NOTES
[facility] Orthopaedic Surgery Clinic Note    Subjective     Patient: Lea Rivers  : 1945    Primary Care Provider: Kingsley Soto MD    Requesting Provider: As above    Follow-up (6 month follow up; Primary osteoarthritis of both knees-OrthoVisc series completed 19)      History    Chief Complaint: Bilateral knee pain    History of Present Illness: Patient presents today for his bilateral knee arthritis.  He is well-known to me.  He has had multiple series of Visco supplementation in the past with good relief.  He is not interested in knee replacement and like to start a series of Visco today if possible.    Current Outpatient Medications on File Prior to Visit   Medication Sig Dispense Refill   • aspirin 81 MG tablet Take 81 mg by mouth daily.     • coenzyme Q10 100 MG capsule Take 100 mg by mouth Every Night.     • divalproex (DEPAKOTE) 500 MG 24 hr tablet      • doxazosin (CARDURA) 4 MG tablet Take 4 mg by mouth every night.     • finasteride (PROSCAR) 5 MG tablet Take 5 mg by mouth daily.     • fluticasone (FLONASE) 50 MCG/ACT nasal spray 2 sprays into each nostril as needed for rhinitis. Administer 2 sprays in each nostril for each dose.     • isosorbide mononitrate (IMDUR) 30 MG 24 hr tablet Take 30 mg by mouth Daily.     • levETIRAcetam (KEPPRA) 500 MG tablet Take 500 mg by mouth 2 (two) times a day.     • levothyroxine (SYNTHROID, LEVOTHROID) 88 MCG tablet Take 88 mcg by mouth daily.     • lisinopril (PRINIVIL,ZESTRIL) 20 MG tablet Take 20 mg by mouth 2 (two) times a day.     • metFORMIN (GLUCOPHAGE) 500 MG tablet      • metFORMIN ER (GLUCOPHAGE-XR) 500 MG 24 hr tablet      • nitroglycerin (NITROSTAT) 0.4 MG SL tablet Place  under the tongue Take As Directed.     • omeprazole (priLOSEC) 20 MG capsule Take 20 mg by mouth Daily.     • ONE TOUCH ULTRA TEST test strip Daily. for testing  0   • ONETOUCH DELICA LANCETS 33G misc USE 1 LANCET ONCE A DAY  0   • simvastatin (ZOCOR) 20 MG  tablet Take 20 mg by mouth every night.     • terazosin (HYTRIN) 5 MG capsule Take 5 mg by mouth Every Night.     • verapamil SR (CALAN-SR) 240 MG CR tablet Take 240 mg by mouth 2 (two) times a day.     • vitamin B-12 (CYANOCOBALAMIN) 1000 MCG tablet Take 1,000 mcg by mouth Daily.       No current facility-administered medications on file prior to visit.       Allergies   Allergen Reactions   • Sulfa Antibiotics Rash     Childhood reaction       Past Medical History:   Diagnosis Date   • BPH (benign prostatic hypertrophy)    • Chest pain    • Colon cancer (CMS/HCC) 1990    Status post partial colectomy in 1990. No chemotherapy or radiation therapy.   • Coronary artery disease     Nonobstructive coronary artery disease.   • Diabetes (CMS/HCC)    • Dyslipidemia    • GERD (gastroesophageal reflux disease)     Hx of.   • H/O cardiac catheterization    • Hypertension    • Hypothyroidism    • Left shoulder pain    • Primary osteoarthritis of both knees    • Seizure disorder (CMS/HCC)      Past Surgical History:   Procedure Laterality Date   • APPENDECTOMY     • CARPAL TUNNEL RELEASE Bilateral    • CHOLECYSTECTOMY     • COLECTOMY PARTIAL / TOTAL  1990    Partial colectomy   • COLONOSCOPY     • CYSTOSCOPY TRANSURETHRAL RESECTION OF PROSTATE N/A 9/21/2018    Procedure: CYSTOSCOPY TRANSURETHRAL RESECTION OF PROSTATE GREENLIGHT;  Surgeon: Naseem Clayton MD;  Location: [facility];  Service: Urology   • OTHER SURGICAL HISTORY      Contraction surgery on bilateral hands and wrists.   • OTHER SURGICAL HISTORY      left and right hands   • SINUS SURGERY     • TONSILLECTOMY     • TURP / TRANSURETHRAL INCISION / DRAINAGE PROSTATE     • VASECTOMY       Family History   Problem Relation Age of Onset   • Cancer Mother    • Hypertension Father    • Stroke Father    • Stroke Other    • Arthritis Other    • Cancer Other       Social History     Socioeconomic History   • Marital status:      Spouse name: Not on file   • Number  "of children: Not on file   • Years of education: Not on file   • Highest education level: Not on file   Tobacco Use   • Smoking status: Never Smoker   • Smokeless tobacco: Never Used   Substance and Sexual Activity   • Alcohol use: No   • Drug use: No   • Sexual activity: Defer        Review of Systems   Constitutional: Negative.    HENT: Positive for hearing loss.    Eyes: Negative.    Respiratory: Negative.    Cardiovascular: Negative.    Gastrointestinal: Negative.    Endocrine: Negative.    Genitourinary: Negative.    Musculoskeletal: Positive for arthralgias.   Skin: Negative.    Allergic/Immunologic: Negative.    Neurological: Negative.    Hematological: Negative.    Psychiatric/Behavioral: Negative.        The following portions of the patient's history were reviewed and updated as appropriate: allergies, current medications, past family history, past medical history, past social history, past surgical history and problem list.      Objective      Physical Exam  Pulse 84   Ht 172.7 cm (67.99\")   Wt 94.7 kg (208 lb 12.8 oz)   SpO2 97%   BMI 31.76 kg/m²     Body mass index is 31.76 kg/m².    Patient is well developed, well nourished and in no acute distress.  Alert and oriented x 3.    Ortho Exam    Right Knee Exam  ----------  ALIGNMENT: Right: neutral----------  RANGE OF MOTION:  Right: 0-120  LIGAMENTOUS STABILITY:   Right: stable to valgus and varus stress----------  STRENGTH:  KNEE FLEXION Right 5/5  KNEE EXTENSION Right 5/5 ----------  PAIN WITH PALPATION: Right medial joint line  PAIN WITH KNEE ROM: Right no  PATELLAR CREPITUS: Right yes   ----------    Left Knee Exam  ----------  ALIGNMENT: Left: neutral----------  RANGE OF MOTION:  Left: 0-120  LIGAMENTOUS STABILITY:   Left: stable to valgus and varus stress----------  STRENGTH:  KNEE FLEXION left 5/5  KNEE EXTENSION left 5/5 ----------  PAIN WITH PALPATION: Left medial joint line  PAIN WITH KNEE ROM: Left no  PATELLAR CREPITUS: Left yes         "         Medical Decision Making    Data Review:   ordered and reviewed x-rays today    Assessment:  1. Primary osteoarthritis of both knees    2. Class 2 severe obesity due to excess calories with serious comorbidity in adult, unspecified BMI (CMS/[facility])        Plan:  Bilateral knee arthritis. X-rays today show moderate to severe tricompartmental arthritis with genu varum alignment, periarticular osteophytes visualized in all compartments and No significant changes compared to prior radiographs.  Plan today is to begin a series of Orthovisc in bilateral knees.  RTC 1 week for the 2nd injection or sooner if needed.  Using sterile technique, the left knee was sterilely prepped with Hibiclens.  Following time out, using a 22 gauge needle the left knee was aspirated and then injected with 2 ml Orthovisc. Approximately 0.5 mm of straw-colored fluid was obtained.  Patient tolerated the procedure well.  No complications.      Using sterile technique, the right knee was sterilely prepped with Hibiclens.  Following time out, using a 22 gauge needle the right knee was aspirated and then injected with 2 ml Orthovisc. Approximately 0.5 mm of straw-colored fluid was obtained.  Patient tolerated the procedure well.  No complications.            Kiersten Carreon PA-C  06/24/20  11:03

## 2020-06-23 NOTE — PROGRESS NOTES
Procedure   Large Joint Arthrocentesis: R knee  Date/Time: 6/23/2020 10:02 AM  Consent given by: patient  Site marked: site marked  Timeout: Immediately prior to procedure a time out was called to verify the correct patient, procedure, equipment, support staff and site/side marked as required   Supporting Documentation  Indications: pain   Procedure Details  Location: knee - R knee  Preparation: Patient was prepped and draped in the usual sterile fashion  Needle size: 22 G  Approach: anterolateral  Medications administered: 30 mg Hyaluronan 30 MG/2ML  Patient tolerance: patient tolerated the procedure well with no immediate complications    Large Joint Arthrocentesis: L knee  Date/Time: 6/23/2020 10:02 AM  Consent given by: patient  Site marked: site marked  Timeout: Immediately prior to procedure a time out was called to verify the correct patient, procedure, equipment, support staff and site/side marked as required   Supporting Documentation  Indications: pain   Procedure Details  Location: knee - L knee  Preparation: Patient was prepped and draped in the usual sterile fashion  Needle size: 22 G  Approach: anterolateral  Medications administered: 30 mg Hyaluronan 30 MG/2ML  Patient tolerance: patient tolerated the procedure well with no immediate complications

## 2020-06-30 ENCOUNTER — OFFICE VISIT (OUTPATIENT)
Dept: ORTHOPEDIC SURGERY | Facility: CLINIC | Age: 75
End: 2020-06-30

## 2020-06-30 DIAGNOSIS — M17.0 PRIMARY OSTEOARTHRITIS OF BOTH KNEES: Primary | ICD-10-CM

## 2020-06-30 PROCEDURE — 20610 DRAIN/INJ JOINT/BURSA W/O US: CPT | Performed by: PHYSICIAN ASSISTANT

## 2020-06-30 RX ORDER — OXYBUTYNIN CHLORIDE 15 MG/1
TABLET, EXTENDED RELEASE ORAL
COMMUNITY
End: 2021-01-27

## 2020-06-30 NOTE — PROGRESS NOTES
Procedure   Large Joint Arthrocentesis: R knee  Date/Time: 6/30/2020 11:09 AM  Consent given by: patient  Site marked: site marked  Timeout: Immediately prior to procedure a time out was called to verify the correct patient, procedure, equipment, support staff and site/side marked as required   Supporting Documentation  Indications: pain   Procedure Details  Location: knee - R knee  Preparation: Patient was prepped and draped in the usual sterile fashion  Needle size: 22 G  Approach: anterolateral  Medications administered: 30 mg Hyaluronan 30 MG/2ML  Patient tolerance: patient tolerated the procedure well with no immediate complications    Large Joint Arthrocentesis: L knee  Date/Time: 6/30/2020 11:10 AM  Consent given by: patient  Site marked: site marked  Timeout: Immediately prior to procedure a time out was called to verify the correct patient, procedure, equipment, support staff and site/side marked as required   Supporting Documentation  Indications: pain   Procedure Details  Location: knee - L knee  Preparation: Patient was prepped and draped in the usual sterile fashion  Needle size: 22 G  Approach: anterolateral  Medications administered: 30 mg Hyaluronan 30 MG/2ML  Patient tolerance: patient tolerated the procedure well with no immediate complications

## 2020-06-30 NOTE — PROGRESS NOTES
Subjective     Injections (Bilateral knee orthovisc injection #2)      Lea Rivers is a 74 y.o. male.     History of Present Illness   Patient presents today for the second injection of Orthovisc in bilateral knees.  He is tolerated previous injections well.    Allergies   Allergen Reactions   • Sulfa Antibiotics Rash     Childhood reaction      Current Outpatient Medications on File Prior to Visit   Medication Sig Dispense Refill   • aspirin 81 MG tablet Take 81 mg by mouth daily.     • coenzyme Q10 100 MG capsule Take 100 mg by mouth Every Night.     • divalproex (DEPAKOTE) 500 MG 24 hr tablet      • doxazosin (CARDURA) 4 MG tablet Take 4 mg by mouth every night.     • finasteride (PROSCAR) 5 MG tablet Take 5 mg by mouth daily.     • fluticasone (FLONASE) 50 MCG/ACT nasal spray 2 sprays into each nostril as needed for rhinitis. Administer 2 sprays in each nostril for each dose.     • isosorbide mononitrate (IMDUR) 30 MG 24 hr tablet Take 30 mg by mouth Daily.     • levETIRAcetam (KEPPRA) 500 MG tablet Take 500 mg by mouth 2 (two) times a day.     • levothyroxine (SYNTHROID, LEVOTHROID) 88 MCG tablet Take 88 mcg by mouth daily.     • lisinopril (PRINIVIL,ZESTRIL) 20 MG tablet Take 20 mg by mouth 2 (two) times a day.     • metFORMIN (GLUCOPHAGE) 500 MG tablet      • metFORMIN ER (GLUCOPHAGE-XR) 500 MG 24 hr tablet      • nitroglycerin (NITROSTAT) 0.4 MG SL tablet Place  under the tongue Take As Directed.     • omeprazole (priLOSEC) 20 MG capsule Take 20 mg by mouth Daily.     • ONE TOUCH ULTRA TEST test strip Daily. for testing  0   • ONETOUCH DELICA LANCETS 33G misc USE 1 LANCET ONCE A DAY  0   • oxybutynin XL (DITROPAN XL) 15 MG 24 hr tablet oxybutynin chloride ER 15 mg tablet,extended release 24 hr     • simvastatin (ZOCOR) 20 MG tablet Take 20 mg by mouth every night.     • terazosin (HYTRIN) 5 MG capsule Take 5 mg by mouth Every Night.     • verapamil SR (CALAN-SR) 240 MG CR tablet Take 240 mg by mouth 2  (two) times a day.     • vitamin B-12 (CYANOCOBALAMIN) 1000 MCG tablet Take 1,000 mcg by mouth Daily.     • Zoster Vac Recomb Adjuvanted (Shingrix) 50 MCG/0.5ML reconstituted suspension Shingrix (PF) 50 mcg/0.5 mL intramuscular suspension, kit       No current facility-administered medications on file prior to visit.      Social History     Socioeconomic History   • Marital status:      Spouse name: Not on file   • Number of children: Not on file   • Years of education: Not on file   • Highest education level: Not on file   Tobacco Use   • Smoking status: Never Smoker   • Smokeless tobacco: Never Used   Substance and Sexual Activity   • Alcohol use: No   • Drug use: No   • Sexual activity: Defer     Past Surgical History:   Procedure Laterality Date   • APPENDECTOMY     • CARPAL TUNNEL RELEASE Bilateral    • CHOLECYSTECTOMY     • COLECTOMY PARTIAL / TOTAL  1990    Partial colectomy   • COLONOSCOPY     • CYSTOSCOPY TRANSURETHRAL RESECTION OF PROSTATE N/A 9/21/2018    Procedure: CYSTOSCOPY TRANSURETHRAL RESECTION OF PROSTATE GREENLIGHT;  Surgeon: Naseem Clayton MD;  Location: [facility];  Service: Urology   • OTHER SURGICAL HISTORY      Contraction surgery on bilateral hands and wrists.   • OTHER SURGICAL HISTORY      left and right hands   • SINUS SURGERY     • TONSILLECTOMY     • TURP / TRANSURETHRAL INCISION / DRAINAGE PROSTATE     • VASECTOMY       Family History   Problem Relation Age of Onset   • Cancer Mother    • Hypertension Father    • Stroke Father    • Stroke Other    • Arthritis Other    • Cancer Other      Past Medical History:   Diagnosis Date   • BPH (benign prostatic hypertrophy)    • Chest pain    • Colon cancer (CMS/HCC) 1990    Status post partial colectomy in 1990. No chemotherapy or radiation therapy.   • Coronary artery disease     Nonobstructive coronary artery disease.   • Diabetes (CMS/HCC)    • Dyslipidemia    • GERD (gastroesophageal reflux disease)     Hx of.   • H/O cardiac  catheterization    • Hypertension    • Hypothyroidism    • Left shoulder pain    • Primary osteoarthritis of both knees    • Seizure disorder (CMS/[facility])          Review of Systems    The following portions of the patient's history were reviewed and updated as appropriate: allergies, current medications, past family history, past medical history, past social history, past surgical history and problem list.    Ortho Exam      Medical Decision Making    Assessment and Plan/ Diagnosis/Treatment options:     Bilateral knee arthritis.  Plan is to proceed with a second injection both knees.  I will see him in 1 week for the final injection or sooner if needed.    Using sterile technique, the left knee was sterilely prepped with Hibiclens.  Following time out, using a 22 gauge needle the left knee was aspirated and then injected with 2 ml Orthovisc. Approximately 0.5 mm of straw-colored fluid was obtained.  Patient tolerated the procedure well.  No complications.      Using sterile technique, the right knee was sterilely prepped with Hibiclens.  Following time out, using a 22 gauge needle the right knee was aspirated and then injected with 2 ml Orthovisc. Approximately 0.5 mm of straw-colored fluid was obtained.  Patient tolerated the procedure well.  No complications.

## 2020-07-07 ENCOUNTER — CLINICAL SUPPORT (OUTPATIENT)
Dept: ORTHOPEDIC SURGERY | Facility: CLINIC | Age: 75
End: 2020-07-07

## 2020-07-07 DIAGNOSIS — M17.0 PRIMARY OSTEOARTHRITIS OF BOTH KNEES: Primary | ICD-10-CM

## 2020-07-07 PROCEDURE — 20610 DRAIN/INJ JOINT/BURSA W/O US: CPT | Performed by: PHYSICIAN ASSISTANT

## 2020-07-07 NOTE — PROGRESS NOTES
Subjective     Injections (Bilateral knee orthovisc injections #3)      Lea Rivers is a 74 y.o. male.     History of Present Illness   Patient presents for the final injection of Orthovisc in both knees.  He has tolerated previous injections well.    Allergies   Allergen Reactions   • Sulfa Antibiotics Rash     Childhood reaction      Current Outpatient Medications on File Prior to Visit   Medication Sig Dispense Refill   • aspirin 81 MG tablet Take 81 mg by mouth daily.     • coenzyme Q10 100 MG capsule Take 100 mg by mouth Every Night.     • divalproex (DEPAKOTE) 500 MG 24 hr tablet      • doxazosin (CARDURA) 4 MG tablet Take 4 mg by mouth every night.     • finasteride (PROSCAR) 5 MG tablet Take 5 mg by mouth daily.     • fluticasone (FLONASE) 50 MCG/ACT nasal spray 2 sprays into each nostril as needed for rhinitis. Administer 2 sprays in each nostril for each dose.     • isosorbide mononitrate (IMDUR) 30 MG 24 hr tablet Take 30 mg by mouth Daily.     • levETIRAcetam (KEPPRA) 500 MG tablet Take 500 mg by mouth 2 (two) times a day.     • levothyroxine (SYNTHROID, LEVOTHROID) 88 MCG tablet Take 88 mcg by mouth daily.     • lisinopril (PRINIVIL,ZESTRIL) 20 MG tablet Take 20 mg by mouth 2 (two) times a day.     • metFORMIN (GLUCOPHAGE) 500 MG tablet      • metFORMIN ER (GLUCOPHAGE-XR) 500 MG 24 hr tablet      • nitroglycerin (NITROSTAT) 0.4 MG SL tablet Place  under the tongue Take As Directed.     • omeprazole (priLOSEC) 20 MG capsule Take 20 mg by mouth Daily.     • ONE TOUCH ULTRA TEST test strip Daily. for testing  0   • ONETOUCH DELICA LANCETS 33G misc USE 1 LANCET ONCE A DAY  0   • oxybutynin XL (DITROPAN XL) 15 MG 24 hr tablet oxybutynin chloride ER 15 mg tablet,extended release 24 hr     • simvastatin (ZOCOR) 20 MG tablet Take 20 mg by mouth every night.     • terazosin (HYTRIN) 5 MG capsule Take 5 mg by mouth Every Night.     • verapamil SR (CALAN-SR) 240 MG CR tablet Take 240 mg by mouth 2 (two)  times a day.     • vitamin B-12 (CYANOCOBALAMIN) 1000 MCG tablet Take 1,000 mcg by mouth Daily.     • Zoster Vac Recomb Adjuvanted (Shingrix) 50 MCG/0.5ML reconstituted suspension Shingrix (PF) 50 mcg/0.5 mL intramuscular suspension, kit       No current facility-administered medications on file prior to visit.      Social History     Socioeconomic History   • Marital status:      Spouse name: Not on file   • Number of children: Not on file   • Years of education: Not on file   • Highest education level: Not on file   Tobacco Use   • Smoking status: Never Smoker   • Smokeless tobacco: Never Used   Substance and Sexual Activity   • Alcohol use: No   • Drug use: No   • Sexual activity: Defer     Past Surgical History:   Procedure Laterality Date   • APPENDECTOMY     • CARPAL TUNNEL RELEASE Bilateral    • CHOLECYSTECTOMY     • COLECTOMY PARTIAL / TOTAL  1990    Partial colectomy   • COLONOSCOPY     • CYSTOSCOPY TRANSURETHRAL RESECTION OF PROSTATE N/A 9/21/2018    Procedure: CYSTOSCOPY TRANSURETHRAL RESECTION OF PROSTATE GREENLIGHT;  Surgeon: Naseem Clayton MD;  Location: [facility];  Service: Urology   • OTHER SURGICAL HISTORY      Contraction surgery on bilateral hands and wrists.   • OTHER SURGICAL HISTORY      left and right hands   • SINUS SURGERY     • TONSILLECTOMY     • TURP / TRANSURETHRAL INCISION / DRAINAGE PROSTATE     • VASECTOMY       Family History   Problem Relation Age of Onset   • Cancer Mother    • Hypertension Father    • Stroke Father    • Stroke Other    • Arthritis Other    • Cancer Other      Past Medical History:   Diagnosis Date   • BPH (benign prostatic hypertrophy)    • Chest pain    • Colon cancer (CMS/HCC) 1990    Status post partial colectomy in 1990. No chemotherapy or radiation therapy.   • Coronary artery disease     Nonobstructive coronary artery disease.   • Diabetes (CMS/HCC)    • Dyslipidemia    • GERD (gastroesophageal reflux disease)     Hx of.   • H/O cardiac  catheterization    • Hypertension    • Hypothyroidism    • Left shoulder pain    • Primary osteoarthritis of both knees    • Seizure disorder (CMS/HCC)          Review of Systems   Constitutional: Negative.    HENT: Negative.    Eyes: Negative.    Respiratory: Negative.    Cardiovascular: Negative.    Gastrointestinal: Negative.    Endocrine: Negative.    Genitourinary: Negative.    Musculoskeletal: Positive for arthralgias.   Skin: Negative.    Allergic/Immunologic: Negative.    Neurological: Negative.    Hematological: Negative.    Psychiatric/Behavioral: Negative.        The following portions of the patient's history were reviewed and updated as appropriate: allergies, current medications, past family history, past medical history, past social history, past surgical history and problem list.    Ortho Exam      Medical Decision Making    Assessment and Plan/ Diagnosis/Treatment options:   Bilateral knee arthritis.  Will finish the series in both knees.  I will see him back in 3 months or sooner if needed.    Using sterile technique, the left knee was sterilely prepped with Hibiclens.  Following time out, using a 22 gauge needle the left knee was aspirated and then injected with 2 ml Orthovisc. Approximately 0.5 mm of straw-colored fluid was obtained.  Patient tolerated the procedure well.  No complications.      Using sterile technique, the right knee was sterilely prepped with Hibiclens.  Following time out, using a 22 gauge needle the right knee was aspirated and then injected with 2 ml Orthovisc. Approximately 0.5 mm of straw-colored fluid was obtained.  Patient tolerated the procedure well.  No complications.

## 2020-07-07 NOTE — PROGRESS NOTES
Procedure   Large Joint Arthrocentesis: R knee  Date/Time: 7/7/2020 10:57 AM  Consent given by: patient  Site marked: site marked  Timeout: Immediately prior to procedure a time out was called to verify the correct patient, procedure, equipment, support staff and site/side marked as required   Supporting Documentation  Indications: pain   Procedure Details  Location: knee - R knee  Preparation: Patient was prepped and draped in the usual sterile fashion  Needle size: 22 G  Approach: anterolateral  Medications administered: 30 mg Hyaluronan 30 MG/2ML  Patient tolerance: patient tolerated the procedure well with no immediate complications    Large Joint Arthrocentesis: L knee  Date/Time: 7/7/2020 10:58 AM  Consent given by: patient  Site marked: site marked  Timeout: Immediately prior to procedure a time out was called to verify the correct patient, procedure, equipment, support staff and site/side marked as required   Supporting Documentation  Indications: pain   Procedure Details  Location: knee - L knee  Preparation: Patient was prepped and draped in the usual sterile fashion  Needle size: 22 G  Approach: anterolateral  Medications administered: 30 mg Hyaluronan 30 MG/2ML  Patient tolerance: patient tolerated the procedure well with no immediate complications

## 2020-10-13 ENCOUNTER — OFFICE VISIT (OUTPATIENT)
Dept: ORTHOPEDIC SURGERY | Facility: CLINIC | Age: 75
End: 2020-10-13

## 2020-10-13 VITALS — HEIGHT: 68 IN | WEIGHT: 209 LBS | HEART RATE: 91 BPM | OXYGEN SATURATION: 98 % | BODY MASS INDEX: 31.67 KG/M2

## 2020-10-13 DIAGNOSIS — M17.0 PRIMARY OSTEOARTHRITIS OF BOTH KNEES: Primary | ICD-10-CM

## 2020-10-13 PROCEDURE — 20610 DRAIN/INJ JOINT/BURSA W/O US: CPT | Performed by: PHYSICIAN ASSISTANT

## 2020-10-13 RX ORDER — TRIAMCINOLONE ACETONIDE 40 MG/ML
40 INJECTION, SUSPENSION INTRA-ARTICULAR; INTRAMUSCULAR
Status: COMPLETED | OUTPATIENT
Start: 2020-10-13 | End: 2020-10-13

## 2020-10-13 RX ORDER — LIDOCAINE HYDROCHLORIDE 10 MG/ML
4 INJECTION, SOLUTION EPIDURAL; INFILTRATION; INTRACAUDAL; PERINEURAL
Status: COMPLETED | OUTPATIENT
Start: 2020-10-13 | End: 2020-10-13

## 2020-10-13 RX ADMIN — LIDOCAINE HYDROCHLORIDE 4 ML: 10 INJECTION, SOLUTION EPIDURAL; INFILTRATION; INTRACAUDAL; PERINEURAL at 08:46

## 2020-10-13 RX ADMIN — TRIAMCINOLONE ACETONIDE 40 MG: 40 INJECTION, SUSPENSION INTRA-ARTICULAR; INTRAMUSCULAR at 08:46

## 2020-10-13 NOTE — PROGRESS NOTES
Procedure   Large Joint Arthrocentesis: R knee  Date/Time: 10/13/2020 8:46 AM  Consent given by: patient  Site marked: site marked  Timeout: Immediately prior to procedure a time out was called to verify the correct patient, procedure, equipment, support staff and site/side marked as required   Supporting Documentation  Indications: pain   Procedure Details  Location: knee - R knee  Preparation: Patient was prepped and draped in the usual sterile fashion  Needle size: 22 G  Approach: anterolateral  Medications administered: 4 mL lidocaine PF 1% 1 %; 40 mg triamcinolone acetonide 40 MG/ML  Patient tolerance: patient tolerated the procedure well with no immediate complications    Large Joint Arthrocentesis: L knee  Date/Time: 10/13/2020 8:46 AM  Consent given by: patient  Site marked: site marked  Timeout: Immediately prior to procedure a time out was called to verify the correct patient, procedure, equipment, support staff and site/side marked as required   Supporting Documentation  Indications: pain   Procedure Details  Location: knee - L knee  Preparation: Patient was prepped and draped in the usual sterile fashion  Needle size: 22 G  Approach: anterolateral  Medications administered: 4 mL lidocaine PF 1% 1 %; 40 mg triamcinolone acetonide 40 MG/ML  Patient tolerance: patient tolerated the procedure well with no immediate complications

## 2020-10-13 NOTE — PROGRESS NOTES
[facility] Note    Subjective     Patient: Lea Rivers  : 1945    Primary Care Provider: Kingsley Soto MD    Requesting Provider: As above    Follow-up (3 months- Primary osteoarthritis of both knees)      History    Chief Complaint: Bilateral knee pain    History of Present Illness: Patient returns for his bilateral knee pain.  He reports that the visco no longer improves his pain.  His wife is in hospice now and would like injections to help his current pain.        Current Outpatient Medications on File Prior to Visit   Medication Sig Dispense Refill   • aspirin 81 MG tablet Take 81 mg by mouth daily.     • coenzyme Q10 100 MG capsule Take 100 mg by mouth Every Night.     • divalproex (DEPAKOTE) 500 MG 24 hr tablet      • doxazosin (CARDURA) 4 MG tablet Take 4 mg by mouth every night.     • finasteride (PROSCAR) 5 MG tablet Take 5 mg by mouth daily.     • fluticasone (FLONASE) 50 MCG/ACT nasal spray 2 sprays into each nostril as needed for rhinitis. Administer 2 sprays in each nostril for each dose.     • isosorbide mononitrate (IMDUR) 30 MG 24 hr tablet Take 30 mg by mouth Daily.     • levETIRAcetam (KEPPRA) 500 MG tablet Take 500 mg by mouth 2 (two) times a day.     • levothyroxine (SYNTHROID, LEVOTHROID) 88 MCG tablet Take 88 mcg by mouth daily.     • lisinopril (PRINIVIL,ZESTRIL) 20 MG tablet Take 20 mg by mouth 2 (two) times a day.     • metFORMIN (GLUCOPHAGE) 500 MG tablet      • metFORMIN ER (GLUCOPHAGE-XR) 500 MG 24 hr tablet      • nitroglycerin (NITROSTAT) 0.4 MG SL tablet Place  under the tongue Take As Directed.     • omeprazole (priLOSEC) 20 MG capsule Take 20 mg by mouth Daily.     • ONE TOUCH ULTRA TEST test strip Daily. for testing  0   • ONETOUCH DELICA LANCETS 33G misc USE 1 LANCET ONCE A DAY  0   • oxybutynin XL (DITROPAN XL) 15 MG 24 hr tablet oxybutynin chloride ER 15 mg tablet,extended release 24 hr     • simvastatin (ZOCOR) 20 MG tablet Take 20  mg by mouth every night.     • terazosin (HYTRIN) 5 MG capsule Take 5 mg by mouth Every Night.     • verapamil SR (CALAN-SR) 240 MG CR tablet Take 240 mg by mouth 2 (two) times a day.     • vitamin B-12 (CYANOCOBALAMIN) 1000 MCG tablet Take 1,000 mcg by mouth Daily.     • Zoster Vac Recomb Adjuvanted (Shingrix) 50 MCG/0.5ML reconstituted suspension Shingrix (PF) 50 mcg/0.5 mL intramuscular suspension, kit       No current facility-administered medications on file prior to visit.       Allergies   Allergen Reactions   • Sulfa Antibiotics Rash     Childhood reaction       Past Medical History:   Diagnosis Date   • BPH (benign prostatic hypertrophy)    • Chest pain    • Colon cancer (CMS/HCC) 1990    Status post partial colectomy in 1990. No chemotherapy or radiation therapy.   • Coronary artery disease     Nonobstructive coronary artery disease.   • Diabetes (CMS/HCC)    • Dyslipidemia    • GERD (gastroesophageal reflux disease)     Hx of.   • H/O cardiac catheterization    • Hypertension    • Hypothyroidism    • Left shoulder pain    • Primary osteoarthritis of both knees    • Seizure disorder (CMS/HCC)      Past Surgical History:   Procedure Laterality Date   • APPENDECTOMY     • CARPAL TUNNEL RELEASE Bilateral    • CHOLECYSTECTOMY     • COLECTOMY PARTIAL / TOTAL  1990    Partial colectomy   • COLONOSCOPY     • CYSTOSCOPY TRANSURETHRAL RESECTION OF PROSTATE N/A 9/21/2018    Procedure: CYSTOSCOPY TRANSURETHRAL RESECTION OF PROSTATE GREENLIGHT;  Surgeon: Naseem Clayton MD;  Location: [facility];  Service: Urology   • OTHER SURGICAL HISTORY      Contraction surgery on bilateral hands and wrists.   • OTHER SURGICAL HISTORY      left and right hands   • SINUS SURGERY     • TONSILLECTOMY     • TURP / TRANSURETHRAL INCISION / DRAINAGE PROSTATE     • VASECTOMY       Family History   Problem Relation Age of Onset   • Cancer Mother    • Hypertension Father    • Stroke Father    • Stroke Other    • Arthritis Other    •  "Cancer Other       Social History     Socioeconomic History   • Marital status:      Spouse name: Not on file   • Number of children: Not on file   • Years of education: Not on file   • Highest education level: Not on file   Tobacco Use   • Smoking status: Never Smoker   • Smokeless tobacco: Never Used   Substance and Sexual Activity   • Alcohol use: No   • Drug use: No   • Sexual activity: Defer        Review of Systems   Constitutional: Negative.    HENT: Negative.    Eyes: Negative.    Respiratory: Negative.    Cardiovascular: Negative.    Gastrointestinal: Negative.    Endocrine: Negative.    Genitourinary: Negative.    Musculoskeletal: Positive for arthralgias.   Skin: Negative.    Allergic/Immunologic: Negative.    Neurological: Negative.    Hematological: Negative.    Psychiatric/Behavioral: Negative.        The following portions of the patient's history were reviewed and updated as appropriate: allergies, current medications, past family history, past medical history, past social history, past surgical history and problem list.      Objective      Physical Exam  Pulse 91   Ht 172.7 cm (67.99\")   Wt 94.8 kg (209 lb)   SpO2 98%   BMI 31.79 kg/m²     Body mass index is 31.79 kg/m².    Patient is well developed, well nourished and in no acute distress.  Alert and oriented x 3.    Ortho Exam    Right Knee Exam  ----------  ALIGNMENT: Right: neutral----------  RANGE OF MOTION:  Right: 0-120  LIGAMENTOUS STABILITY:   Right: stable to valgus and varus stress----------  STRENGTH:  KNEE FLEXION Right 5/5  KNEE EXTENSION Right 5/5 ----------  PAIN WITH PALPATION: Right medial joint line  PAIN WITH KNEE ROM: Right no  PATELLAR CREPITUS: Right yes   ----------    Left Knee Exam  ----------  ALIGNMENT: Left: neutral----------  RANGE OF MOTION:  Left: 0-120  LIGAMENTOUS STABILITY:   Left: stable to valgus and varus stress----------  STRENGTH:  KNEE FLEXION left 5/5  KNEE EXTENSION left 5/5 ----------  PAIN WITH " PALPATION: Left medial joint line  PAIN WITH KNEE ROM: Left no  PATELLAR CREPITUS: Left yes         Medical Decision Making    Data Review:   none    Assessment:  1. Primary osteoarthritis of both knees        Plan:  Bilateral knee arthritis.  Patient reports Visco no longer helps his pain.  Plan today is steroid injection into bilateral knees.  I will see him back in 3 to 4 months or sooner if needed.    Using sterile technique, the left knee was sterilely prepped with Hibiclens. Following a time out,  using a 22 gauge needle, the left knee was injected with 1cc (40mg) Kenalog, 4 cc lidocaine.  Patient tolerated the procedure well.  No complications.  Using sterile technique, the right knee was sterilely prepped with Hibiclens.  Following a time out,  using a 22 gauge needle, the right knee was injected with 1cc (40mg) Kenalog, 4 cc lidocaine.  Patient tolerated the procedure well.  No complications.          Kiersten Carreon PA-C  10/13/20  09:12 EDT

## 2021-01-19 ENCOUNTER — TELEPHONE (OUTPATIENT)
Dept: ORTHOPEDIC SURGERY | Facility: CLINIC | Age: 76
End: 2021-01-19

## 2021-01-19 NOTE — TELEPHONE ENCOUNTER
[facility] WILL BE OUT DURING PATIENT'S APPOINTMENT TIME ON 1/27/21. LEFT VOICEMAIL WITH PATIENT TO GET HIS APPOINTMENT MOVED TO LATER IN THE MORNING OR AFTERNOON.

## 2021-01-27 ENCOUNTER — OFFICE VISIT (OUTPATIENT)
Dept: ORTHOPEDIC SURGERY | Facility: CLINIC | Age: 76
End: 2021-01-27

## 2021-01-27 VITALS — OXYGEN SATURATION: 98 % | BODY MASS INDEX: 33.49 KG/M2 | HEIGHT: 68 IN | HEART RATE: 82 BPM | WEIGHT: 221 LBS

## 2021-01-27 DIAGNOSIS — M17.12 PRIMARY OSTEOARTHRITIS OF LEFT KNEE: ICD-10-CM

## 2021-01-27 DIAGNOSIS — M17.11 PRIMARY OSTEOARTHRITIS OF RIGHT KNEE: ICD-10-CM

## 2021-01-27 DIAGNOSIS — M17.0 PRIMARY OSTEOARTHRITIS OF BOTH KNEES: Primary | ICD-10-CM

## 2021-01-27 PROCEDURE — 20610 DRAIN/INJ JOINT/BURSA W/O US: CPT | Performed by: PHYSICIAN ASSISTANT

## 2021-01-27 PROCEDURE — 99213 OFFICE O/P EST LOW 20 MIN: CPT | Performed by: PHYSICIAN ASSISTANT

## 2021-01-27 NOTE — PROGRESS NOTES
Procedure   Large Joint Arthrocentesis: R knee  Date/Time: 1/27/2021 2:25 PM  Consent given by: patient  Site marked: site marked  Timeout: Immediately prior to procedure a time out was called to verify the correct patient, procedure, equipment, support staff and site/side marked as required   Supporting Documentation  Indications: pain   Procedure Details  Location: knee - R knee  Preparation: Patient was prepped and draped in the usual sterile fashion  Needle size: 22 G  Approach: anterolateral  Medications administered: 30 mg Hyaluronan 30 MG/2ML  Patient tolerance: patient tolerated the procedure well with no immediate complications    Large Joint Arthrocentesis: L knee  Date/Time: 1/27/2021 2:26 PM  Consent given by: patient  Site marked: site marked  Timeout: Immediately prior to procedure a time out was called to verify the correct patient, procedure, equipment, support staff and site/side marked as required   Supporting Documentation  Indications: pain   Procedure Details  Location: knee - L knee  Preparation: Patient was prepped and draped in the usual sterile fashion  Needle size: 22 G  Approach: anterolateral  Medications administered: 30 mg Hyaluronan 30 MG/2ML  Patient tolerance: patient tolerated the procedure well with no immediate complications

## 2021-01-27 NOTE — PROGRESS NOTES
[facility] [place]    Subjective     Patient: Lea Rivers  : 1945    Primary Care Provider: Kingsley Soto MD    Requesting Provider: As above    Follow-up (3 month follow up; Primary osteoarthritis of both knees-last cortisone injection given on 10/13/20)      History    Chief Complaint: Bilateral knee arthritis    History of Present Illness: Patient presents for his bilateral knee pain.  He reports visco still gives him good relief with intermittent steroid injection when needed.  He is not in a good place to consider replacement with his wife being ill.  He would like repeat visco    Current Outpatient Medications on File Prior to Visit   Medication Sig Dispense Refill   • aspirin 81 MG tablet Take 81 mg by mouth daily.     • coenzyme Q10 100 MG capsule Take 100 mg by mouth Every Night.     • divalproex (DEPAKOTE) 500 MG 24 hr tablet      • doxazosin (CARDURA) 4 MG tablet Take 4 mg by mouth every night.     • finasteride (PROSCAR) 5 MG tablet Take 5 mg by mouth daily.     • fluticasone (FLONASE) 50 MCG/ACT nasal spray 2 sprays into each nostril as needed for rhinitis. Administer 2 sprays in each nostril for each dose.     • isosorbide mononitrate (IMDUR) 30 MG 24 hr tablet Take 30 mg by mouth Daily.     • levETIRAcetam (KEPPRA) 500 MG tablet Take 500 mg by mouth 2 (two) times a day.     • levothyroxine (SYNTHROID, LEVOTHROID) 88 MCG tablet Take 88 mcg by mouth daily.     • lisinopril (PRINIVIL,ZESTRIL) 20 MG tablet Take 20 mg by mouth 2 (two) times a day.     • metFORMIN (GLUCOPHAGE) 500 MG tablet      • metFORMIN ER (GLUCOPHAGE-XR) 500 MG 24 hr tablet      • Mirabegron ER (Myrbetriq) 50 MG tablet sustained-release 24 hour 24 hr tablet Myrbetriq 50 mg tablet,extended release   Take 1 tablet every day by oral route for 90 days.     • nitroglycerin (NITROSTAT) 0.4 MG SL tablet Place  under the tongue Take As Directed.     • omeprazole (priLOSEC) 20 MG capsule Take 20 mg by  mouth Daily.     • ONE TOUCH ULTRA TEST test strip Daily. for testing  0   • ONETOUCH DELICA LANCETS 33G misc USE 1 LANCET ONCE A DAY  0   • simvastatin (ZOCOR) 20 MG tablet Take 20 mg by mouth every night.     • terazosin (HYTRIN) 5 MG capsule Take 5 mg by mouth Every Night.     • verapamil SR (CALAN-SR) 240 MG CR tablet Take 240 mg by mouth 2 (two) times a day.     • vitamin B-12 (CYANOCOBALAMIN) 1000 MCG tablet Take 1,000 mcg by mouth Daily.     • Zoster Vac Recomb Adjuvanted (Shingrix) 50 MCG/0.5ML reconstituted suspension Shingrix (PF) 50 mcg/0.5 mL intramuscular suspension, kit       No current facility-administered medications on file prior to visit.       Allergies   Allergen Reactions   • Sulfa Antibiotics Rash     Childhood reaction       Past Medical History:   Diagnosis Date   • BPH (benign prostatic hypertrophy)    • Chest pain    • Colon cancer (CMS/HCC) 1990    Status post partial colectomy in 1990. No chemotherapy or radiation therapy.   • Coronary artery disease     Nonobstructive coronary artery disease.   • Diabetes (CMS/HCC)    • Dyslipidemia    • GERD (gastroesophageal reflux disease)     Hx of.   • H/O cardiac catheterization    • Hypertension    • Hypothyroidism    • Left shoulder pain    • Primary osteoarthritis of both knees    • Seizure disorder (CMS/HCC)      Past Surgical History:   Procedure Laterality Date   • APPENDECTOMY     • CARPAL TUNNEL RELEASE Bilateral    • CHOLECYSTECTOMY     • COLECTOMY PARTIAL / TOTAL  1990    Partial colectomy   • COLONOSCOPY     • CYSTOSCOPY TRANSURETHRAL RESECTION OF PROSTATE N/A 9/21/2018    Procedure: CYSTOSCOPY TRANSURETHRAL RESECTION OF PROSTATE GREENLIGHT;  Surgeon: Naseem [facility] MD;  Location: [facility];  Service: Urology   • OTHER SURGICAL HISTORY      Contraction surgery on bilateral hands and wrists.   • OTHER SURGICAL HISTORY      left and right hands   • SINUS SURGERY     • TONSILLECTOMY     • TURP / TRANSURETHRAL INCISION / DRAINAGE  "PROSTATE     • VASECTOMY       Family History   Problem Relation Age of Onset   • Cancer Mother    • Hypertension Father    • Stroke Father    • Stroke Other    • Arthritis Other    • Cancer Other       Social History     Socioeconomic History   • Marital status:      Spouse name: Not on file   • Number of children: Not on file   • Years of education: Not on file   • Highest education level: Not on file   Tobacco Use   • Smoking status: Never Smoker   • Smokeless tobacco: Never Used   Substance and Sexual Activity   • Alcohol use: No   • Drug use: No   • Sexual activity: Defer        Review of Systems   Constitutional: Negative.    HENT: Negative.    Eyes: Negative.    Respiratory: Negative.    Cardiovascular: Negative.    Gastrointestinal: Negative.    Endocrine: Negative.    Genitourinary: Negative.    Musculoskeletal: Positive for arthralgias.   Skin: Negative.    Allergic/Immunologic: Negative.    Neurological: Negative.    Hematological: Negative.    Psychiatric/Behavioral: Negative.        The following portions of the patient's history were reviewed and updated as appropriate: allergies, current medications, past family history, past medical history, past social history, past surgical history and problem list.      Objective      Physical Exam  Pulse 82   Ht 172.7 cm (67.99\")   Wt 100 kg (221 lb)   SpO2 98%   BMI 33.61 kg/m²     Body mass index is 33.61 kg/m².    Patient is well developed, well nourished and in no acute distress.  Alert and oriented x 3.    Ortho Exam    Right Knee Exam  ----------  ALIGNMENT: Right: neutral----------  RANGE OF MOTION:  Right: 0-120  LIGAMENTOUS STABILITY:   Right: stable to valgus and varus stress----------  STRENGTH:  KNEE FLEXION Right 5/5  KNEE EXTENSION Right 5/5 ----------  PAIN WITH PALPATION: Right medial joint line  PAIN WITH KNEE ROM: Right no  PATELLAR CREPITUS: Right yes   ----------    Left Knee Exam  ----------  ALIGNMENT: Left: " neutral----------  RANGE OF MOTION:  Left: 0-120  LIGAMENTOUS STABILITY:   Left: stable to valgus and varus stress----------  STRENGTH:  KNEE FLEXION left 5/5  KNEE EXTENSION left 5/5 ----------  PAIN WITH PALPATION: Left medial joint line  PAIN WITH KNEE ROM: Left no  PATELLAR CREPITUS: Left yes         Medical Decision Making    Data Review:   none    Assessment:  1. Primary osteoarthritis of both knees    2. Primary osteoarthritis of right knee    3. Primary osteoarthritis of left knee        Plan:  Bilateral knee arthritis.  I reviewed clinical findings, past and current treatment with the patient.  He would like repeat visco, he is not interested in steroid injection.  We discussed further treatment including topical nsaids.  Plan today is to begin the series of Orthovisc in bilateral knees.  I have given him a prescription for Voltaren gel.  He will return in one week or sooner if needed..    Using sterile technique, the left knee was sterilely prepped with Hibiclens.  Following time out, using a 22 gauge needle the left knee was aspirated and then injected with 2 ml Orthovisc. Approximately 0.5 mm of straw-colored fluid was obtained.  Patient tolerated the procedure well.  No complications.      Using sterile technique, the right knee was sterilely prepped with Hibiclens.  Following time out, using a 22 gauge needle the right knee was aspirated and then injected with 2 ml Orthovisc. Approximately 0.5 mm of straw-colored fluid was obtained.  Patient tolerated the procedure well.  No complications.          Kiersten Carreon PA-C  01/31/21  10:07 EST

## 2021-01-29 ENCOUNTER — TELEPHONE (OUTPATIENT)
Dept: ORTHOPEDIC SURGERY | Facility: CLINIC | Age: 76
End: 2021-01-29

## 2021-01-29 NOTE — TELEPHONE ENCOUNTER
PATIENT REQUESTING A PRESCRIPTION FOR HIS KNEES. PATIENT UNSURE FOR THE NAME OF MEDICATION. PATIENT WOULD LIKE A CALL BACK [phone]

## 2021-01-29 NOTE — TELEPHONE ENCOUNTER
Kiersten,   Mr. Rivers called and stated that at his last appointment you had said that you would give him a script for something for his knees , he couldn't remember the name of it. I don't see a note for his last visit I guess because he just got an injection. He said no worries he will ask you about it at his next appointment on Wednesday for his second Orthovisc injection.

## 2021-02-03 ENCOUNTER — CLINICAL SUPPORT (OUTPATIENT)
Dept: ORTHOPEDIC SURGERY | Facility: CLINIC | Age: 76
End: 2021-02-03

## 2021-02-03 DIAGNOSIS — M17.0 PRIMARY OSTEOARTHRITIS OF BOTH KNEES: Primary | ICD-10-CM

## 2021-02-03 PROCEDURE — 20610 DRAIN/INJ JOINT/BURSA W/O US: CPT | Performed by: PHYSICIAN ASSISTANT

## 2021-02-03 NOTE — PROGRESS NOTES
Subjective     Injections (Bilateral knee orthovisc injection #2 )      Lea Rivers is a 75 y.o. male.     History of Present Illness   Patient presents for the second injection of visco in the bilateral knee.  Previous injections tolerated well.      Allergies   Allergen Reactions   • Sulfa Antibiotics Rash     Childhood reaction      Current Outpatient Medications on File Prior to Visit   Medication Sig Dispense Refill   • aspirin 81 MG tablet Take 81 mg by mouth daily.     • coenzyme Q10 100 MG capsule Take 100 mg by mouth Every Night.     • Diclofenac Sodium (VOLTAREN) 1 % gel gel Apply 4 g topically to the appropriate area as directed 4 (Four) Times a Day As Needed (prn for knee pain). 100 g 5   • divalproex (DEPAKOTE) 500 MG 24 hr tablet      • doxazosin (CARDURA) 4 MG tablet Take 4 mg by mouth every night.     • finasteride (PROSCAR) 5 MG tablet Take 5 mg by mouth daily.     • fluticasone (FLONASE) 50 MCG/ACT nasal spray 2 sprays into each nostril as needed for rhinitis. Administer 2 sprays in each nostril for each dose.     • isosorbide mononitrate (IMDUR) 30 MG 24 hr tablet Take 30 mg by mouth Daily.     • levETIRAcetam (KEPPRA) 500 MG tablet Take 500 mg by mouth 2 (two) times a day.     • levothyroxine (SYNTHROID, LEVOTHROID) 88 MCG tablet Take 88 mcg by mouth daily.     • lisinopril (PRINIVIL,ZESTRIL) 20 MG tablet Take 20 mg by mouth 2 (two) times a day.     • metFORMIN (GLUCOPHAGE) 500 MG tablet      • metFORMIN ER (GLUCOPHAGE-XR) 500 MG 24 hr tablet      • Mirabegron ER (Myrbetriq) 50 MG tablet sustained-release 24 hour 24 hr tablet Myrbetriq 50 mg tablet,extended release   Take 1 tablet every day by oral route for 90 days.     • nitroglycerin (NITROSTAT) 0.4 MG SL tablet Place  under the tongue Take As Directed.     • omeprazole (priLOSEC) 20 MG capsule Take 20 mg by mouth Daily.     • ONE TOUCH ULTRA TEST test strip Daily. for testing  0   • ONETOUCH DELICA LANCETS 33G misc USE 1 LANCET ONCE A  DAY  0   • simvastatin (ZOCOR) 20 MG tablet Take 20 mg by mouth every night.     • terazosin (HYTRIN) 5 MG capsule Take 5 mg by mouth Every Night.     • verapamil SR (CALAN-SR) 240 MG CR tablet Take 240 mg by mouth 2 (two) times a day.     • vitamin B-12 (CYANOCOBALAMIN) 1000 MCG tablet Take 1,000 mcg by mouth Daily.     • Zoster Vac Recomb Adjuvanted (Shingrix) 50 MCG/0.5ML reconstituted suspension Shingrix (PF) 50 mcg/0.5 mL intramuscular suspension, kit       No current facility-administered medications on file prior to visit.      Social History     Socioeconomic History   • Marital status:      Spouse name: Not on file   • Number of children: Not on file   • Years of education: Not on file   • Highest education level: Not on file   Tobacco Use   • Smoking status: Never Smoker   • Smokeless tobacco: Never Used   Substance and Sexual Activity   • Alcohol use: No   • Drug use: No   • Sexual activity: Defer     Past Surgical History:   Procedure Laterality Date   • APPENDECTOMY     • CARPAL TUNNEL RELEASE Bilateral    • CHOLECYSTECTOMY     • COLECTOMY PARTIAL / TOTAL  1990    Partial colectomy   • COLONOSCOPY     • CYSTOSCOPY TRANSURETHRAL RESECTION OF PROSTATE N/A 9/21/2018    Procedure: CYSTOSCOPY TRANSURETHRAL RESECTION OF PROSTATE GREENLIGHT;  Surgeon: Naseem [facility] MD;  Location: [facility];  Service: Urology   • OTHER SURGICAL HISTORY      Contraction surgery on bilateral hands and wrists.   • OTHER SURGICAL HISTORY      left and right hands   • SINUS SURGERY     • TONSILLECTOMY     • TURP / TRANSURETHRAL INCISION / DRAINAGE PROSTATE     • VASECTOMY       Family History   Problem Relation Age of Onset   • Cancer Mother    • Hypertension Father    • Stroke Father    • Stroke Other    • Arthritis Other    • Cancer Other      Past Medical History:   Diagnosis Date   • BPH (benign prostatic hypertrophy)    • Chest pain    • Colon cancer (CMS/HCC) 1990    Status post partial colectomy in 1990. No  chemotherapy or radiation therapy.   • Coronary artery disease     Nonobstructive coronary artery disease.   • Diabetes (CMS/HCC)    • Dyslipidemia    • GERD (gastroesophageal reflux disease)     Hx of.   • H/O cardiac catheterization    • Hypertension    • Hypothyroidism    • Left shoulder pain    • Primary osteoarthritis of both knees    • Seizure disorder (CMS/HCC)          Review of Systems    The following portions of the patient's history were reviewed and updated as appropriate: allergies, current medications, past family history, past medical history, past social history, past surgical history and problem list.    Ortho Exam      Medical Decision Making    Assessment and Plan/ Diagnosis/Treatment options:   Bilateral knee arthritis.  Proceed with second injection of Orthovisc in bilateral knees today.  Return to clinic in 1 week for the final injection or sooner if needed.    Using sterile technique, the left knee was sterilely prepped with Hibiclens. Following a time out,  using a 22 gauge needle, the left knee was injected with 1cc (40mg) Kenalog, 4 cc lidocaine.  Patient tolerated the procedure well.  No complications.  Using sterile technique, the right knee was sterilely prepped with Hibiclens.  Following a time out,  using a 22 gauge needle, the right knee was injected with 1cc (40mg) Kenalog, 4 cc lidocaine.  Patient tolerated the procedure well.  No complications.

## 2021-02-03 NOTE — PROGRESS NOTES
Procedure   Large Joint Arthrocentesis: R knee  Date/Time: 2/3/2021 3:35 PM  Consent given by: patient  Site marked: site marked  Timeout: Immediately prior to procedure a time out was called to verify the correct patient, procedure, equipment, support staff and site/side marked as required   Supporting Documentation  Indications: pain   Procedure Details  Location: knee - R knee  Preparation: Patient was prepped and draped in the usual sterile fashion  Needle size: 22 G  Approach: anterolateral  Medications administered: 30 mg Hyaluronan 30 MG/2ML  Patient tolerance: patient tolerated the procedure well with no immediate complications    Large Joint Arthrocentesis: L knee  Date/Time: 2/3/2021 3:35 PM  Consent given by: patient  Site marked: site marked  Timeout: Immediately prior to procedure a time out was called to verify the correct patient, procedure, equipment, support staff and site/side marked as required   Supporting Documentation  Indications: pain   Procedure Details  Location: knee - L knee  Preparation: Patient was prepped and draped in the usual sterile fashion  Needle size: 22 G  Approach: anterolateral  Medications administered: 30 mg Hyaluronan 30 MG/2ML  Patient tolerance: patient tolerated the procedure well with no immediate complications

## 2021-02-19 ENCOUNTER — CLINICAL SUPPORT (OUTPATIENT)
Dept: ORTHOPEDIC SURGERY | Facility: CLINIC | Age: 76
End: 2021-02-19

## 2021-02-19 DIAGNOSIS — M17.0 PRIMARY OSTEOARTHRITIS OF BOTH KNEES: Primary | ICD-10-CM

## 2021-02-19 PROCEDURE — 20610 DRAIN/INJ JOINT/BURSA W/O US: CPT | Performed by: PHYSICIAN ASSISTANT

## 2021-02-19 NOTE — PROGRESS NOTES
Subjective     Follow-up (bilateral knees orthovisc #3)      Lea Rivers is a 75 y.o. male.     History of Present Illness   Patient presents today for the third injection of Orthovisc in bilateral knees.  He is tolerated previous injections well.    Allergies   Allergen Reactions   • Sulfa Antibiotics Rash     Childhood reaction      Current Outpatient Medications on File Prior to Visit   Medication Sig Dispense Refill   • aspirin 81 MG tablet Take 81 mg by mouth daily.     • coenzyme Q10 100 MG capsule Take 100 mg by mouth Every Night.     • Diclofenac Sodium (VOLTAREN) 1 % gel gel Apply 4 g topically to the appropriate area as directed 4 (Four) Times a Day As Needed (prn for knee pain). 100 g 5   • divalproex (DEPAKOTE) 500 MG 24 hr tablet      • doxazosin (CARDURA) 4 MG tablet Take 4 mg by mouth every night.     • finasteride (PROSCAR) 5 MG tablet Take 5 mg by mouth daily.     • fluticasone (FLONASE) 50 MCG/ACT nasal spray 2 sprays into each nostril as needed for rhinitis. Administer 2 sprays in each nostril for each dose.     • isosorbide mononitrate (IMDUR) 30 MG 24 hr tablet Take 30 mg by mouth Daily.     • levETIRAcetam (KEPPRA) 500 MG tablet Take 500 mg by mouth 2 (two) times a day.     • levothyroxine (SYNTHROID, LEVOTHROID) 88 MCG tablet Take 88 mcg by mouth daily.     • lisinopril (PRINIVIL,ZESTRIL) 20 MG tablet Take 20 mg by mouth 2 (two) times a day.     • metFORMIN (GLUCOPHAGE) 500 MG tablet      • metFORMIN ER (GLUCOPHAGE-XR) 500 MG 24 hr tablet      • Mirabegron ER (Myrbetriq) 50 MG tablet sustained-release 24 hour 24 hr tablet Myrbetriq 50 mg tablet,extended release   Take 1 tablet every day by oral route for 90 days.     • nitroglycerin (NITROSTAT) 0.4 MG SL tablet Place  under the tongue Take As Directed.     • omeprazole (priLOSEC) 20 MG capsule Take 20 mg by mouth Daily.     • ONE TOUCH ULTRA TEST test strip Daily. for testing  0   • ONETOUCH DELICA LANCETS 33G misc USE 1 LANCET ONCE A  DAY  0   • simvastatin (ZOCOR) 20 MG tablet Take 20 mg by mouth every night.     • terazosin (HYTRIN) 5 MG capsule Take 5 mg by mouth Every Night.     • verapamil SR (CALAN-SR) 240 MG CR tablet Take 240 mg by mouth 2 (two) times a day.     • vitamin B-12 (CYANOCOBALAMIN) 1000 MCG tablet Take 1,000 mcg by mouth Daily.     • Zoster Vac Recomb Adjuvanted (Shingrix) 50 MCG/0.5ML reconstituted suspension Shingrix (PF) 50 mcg/0.5 mL intramuscular suspension, kit       No current facility-administered medications on file prior to visit.      Social History     Socioeconomic History   • Marital status:      Spouse name: Not on file   • Number of children: Not on file   • Years of education: Not on file   • Highest education level: Not on file   Tobacco Use   • Smoking status: Never Smoker   • Smokeless tobacco: Never Used   Substance and Sexual Activity   • Alcohol use: No   • Drug use: No   • Sexual activity: Defer     Past Surgical History:   Procedure Laterality Date   • APPENDECTOMY     • CARPAL TUNNEL RELEASE Bilateral    • CHOLECYSTECTOMY     • COLECTOMY PARTIAL / TOTAL  1990    Partial colectomy   • COLONOSCOPY     • CYSTOSCOPY TRANSURETHRAL RESECTION OF PROSTATE N/A 9/21/2018    Procedure: CYSTOSCOPY TRANSURETHRAL RESECTION OF PROSTATE GREENLIGHT;  Surgeon: Naseem [facility] MD;  Location: [facility];  Service: Urology   • OTHER SURGICAL HISTORY      Contraction surgery on bilateral hands and wrists.   • OTHER SURGICAL HISTORY      left and right hands   • SINUS SURGERY     • TONSILLECTOMY     • TURP / TRANSURETHRAL INCISION / DRAINAGE PROSTATE     • VASECTOMY       Family History   Problem Relation Age of Onset   • Cancer Mother    • Hypertension Father    • Stroke Father    • Stroke Other    • Arthritis Other    • Cancer Other      Past Medical History:   Diagnosis Date   • BPH (benign prostatic hypertrophy)    • Chest pain    • Colon cancer (CMS/HCC) 1990    Status post partial colectomy in 1990. No  chemotherapy or radiation therapy.   • Coronary artery disease     Nonobstructive coronary artery disease.   • Diabetes (CMS/HCC)    • Dyslipidemia    • GERD (gastroesophageal reflux disease)     Hx of.   • H/O cardiac catheterization    • Hypertension    • Hypothyroidism    • Left shoulder pain    • Primary osteoarthritis of both knees    • Seizure disorder (CMS/HCC)          Review of Systems    The following portions of the patient's history were reviewed and updated as appropriate: allergies, current medications, past family history, past medical history, past social history, past surgical history and problem list.    Ortho Exam      Medical Decision Making    Assessment and Plan/ Diagnosis/Treatment options:   Bilateral knee arthritis.  Proceed with his final injection in both knees today.  He will return in 6 months or sooner if needed.    Using sterile technique, the left knee was sterilely prepped with Hibiclens.  Following time out, using a 22 gauge needle the left knee was aspirated and then injected with 2 ml Orthovisc. Approximately 0.5 mm of straw-colored fluid was obtained.  Patient tolerated the procedure well.  No complications.      Using sterile technique, the right knee was sterilely prepped with Hibiclens.  Following time out, using a 22 gauge needle the right knee was aspirated and then injected with 2 ml Orthovisc. Approximately 0.5 mm of straw-colored fluid was obtained.  Patient tolerated the procedure well.  No complications.

## 2021-02-19 NOTE — PROGRESS NOTES
Procedure   Large Joint Arthrocentesis: R knee  Date/Time: 2/19/2021 10:21 AM  Consent given by: patient  Site marked: site marked  Timeout: Immediately prior to procedure a time out was called to verify the correct patient, procedure, equipment, support staff and site/side marked as required   Supporting Documentation  Indications: pain   Procedure Details  Location: knee - R knee  Preparation: Patient was prepped and draped in the usual sterile fashion  Needle size: 22 G  Approach: anterolateral  Medications administered: 30 mg Hyaluronan 30 MG/2ML  Patient tolerance: patient tolerated the procedure well with no immediate complications    Large Joint Arthrocentesis: L knee  Date/Time: 2/19/2021 10:21 AM  Consent given by: patient  Site marked: site marked  Timeout: Immediately prior to procedure a time out was called to verify the correct patient, procedure, equipment, support staff and site/side marked as required   Supporting Documentation  Indications: pain   Procedure Details  Location: knee - L knee  Preparation: Patient was prepped and draped in the usual sterile fashion  Needle size: 22 G  Approach: anterolateral  Medications administered: 30 mg Hyaluronan 30 MG/2ML  Patient tolerance: patient tolerated the procedure well with no immediate complications

## 2021-05-26 ENCOUNTER — HOSPITAL ENCOUNTER (EMERGENCY)
Facility: HOSPITAL | Age: 76
Discharge: HOME | End: 2021-05-26
Attending: EMERGENCY MEDICINE | Admitting: EMERGENCY MEDICINE

## 2021-05-26 ENCOUNTER — APPOINTMENT (OUTPATIENT)
Dept: CT IMAGING | Facility: HOSPITAL | Age: 76
End: 2021-05-26

## 2021-05-26 ENCOUNTER — APPOINTMENT (OUTPATIENT)
Dept: GENERAL RADIOLOGY | Facility: HOSPITAL | Age: 76
End: 2021-05-26

## 2021-05-26 VITALS
BODY MASS INDEX: 33.34 KG/M2 | OXYGEN SATURATION: 97 % | RESPIRATION RATE: 14 BRPM | HEIGHT: 68 IN | TEMPERATURE: 97.8 F | WEIGHT: 220 LBS | HEART RATE: 60 BPM | SYSTOLIC BLOOD PRESSURE: 156 MMHG | DIASTOLIC BLOOD PRESSURE: 76 MMHG

## 2021-05-26 DIAGNOSIS — N28.9 RENAL INSUFFICIENCY: ICD-10-CM

## 2021-05-26 DIAGNOSIS — R55 SYNCOPE, UNSPECIFIED SYNCOPE TYPE: Primary | ICD-10-CM

## 2021-05-26 LAB
ALBUMIN SERPL-MCNC: 3.5 G/DL (ref 3.5–5.2)
ALBUMIN/GLOB SERPL: 1.4 G/DL
ALP SERPL-CCNC: 49 U/L (ref 39–117)
ALT SERPL W P-5'-P-CCNC: 8 U/L (ref 1–41)
ANION GAP SERPL CALCULATED.3IONS-SCNC: 9 MMOL/L (ref 5–15)
AST SERPL-CCNC: 13 U/L (ref 1–40)
BACTERIA UR QL AUTO: NORMAL /HPF
BASOPHILS # BLD AUTO: 0.04 10*3/MM3 (ref 0–0.2)
BASOPHILS NFR BLD AUTO: 0.6 % (ref 0–1.5)
BILIRUB SERPL-MCNC: 0.7 MG/DL (ref 0–1.2)
BILIRUB UR QL STRIP: ABNORMAL
BUN SERPL-MCNC: 13 MG/DL (ref 8–23)
BUN/CREAT SERPL: 9.6 (ref 7–25)
CALCIUM SPEC-SCNC: 8.7 MG/DL (ref 8.6–10.5)
CHLORIDE SERPL-SCNC: 103 MMOL/L (ref 98–107)
CLARITY UR: ABNORMAL
CO2 SERPL-SCNC: 26 MMOL/L (ref 22–29)
COLOR UR: ABNORMAL
CREAT SERPL-MCNC: 1.36 MG/DL (ref 0.76–1.27)
D DIMER PPP FEU-MCNC: 0.35 MCGFEU/ML (ref 0–0.56)
DEPRECATED RDW RBC AUTO: 46.4 FL (ref 37–54)
EOSINOPHIL # BLD AUTO: 0.03 10*3/MM3 (ref 0–0.4)
EOSINOPHIL NFR BLD AUTO: 0.4 % (ref 0.3–6.2)
ERYTHROCYTE [DISTWIDTH] IN BLOOD BY AUTOMATED COUNT: 13.1 % (ref 12.3–15.4)
GFR SERPL CREATININE-BSD FRML MDRD: 51 ML/MIN/1.73
GLOBULIN UR ELPH-MCNC: 2.5 GM/DL
GLUCOSE SERPL-MCNC: 115 MG/DL (ref 65–99)
GLUCOSE UR STRIP-MCNC: ABNORMAL MG/DL
HCT VFR BLD AUTO: 34.7 % (ref 37.5–51)
HGB BLD-MCNC: 11.8 G/DL (ref 13–17.7)
HGB UR QL STRIP.AUTO: NEGATIVE
HYALINE CASTS UR QL AUTO: NORMAL /LPF
IMM GRANULOCYTES # BLD AUTO: 0.06 10*3/MM3 (ref 0–0.05)
IMM GRANULOCYTES NFR BLD AUTO: 0.9 % (ref 0–0.5)
INR PPP: 1.11 (ref 0.85–1.16)
KETONES UR QL STRIP: ABNORMAL
LEUKOCYTE ESTERASE UR QL STRIP.AUTO: NEGATIVE
LYMPHOCYTES # BLD AUTO: 0.83 10*3/MM3 (ref 0.7–3.1)
LYMPHOCYTES NFR BLD AUTO: 12.3 % (ref 19.6–45.3)
MAGNESIUM SERPL-MCNC: 1.7 MG/DL (ref 1.6–2.4)
MCH RBC QN AUTO: 33 PG (ref 26.6–33)
MCHC RBC AUTO-ENTMCNC: 34 G/DL (ref 31.5–35.7)
MCV RBC AUTO: 96.9 FL (ref 79–97)
MONOCYTES # BLD AUTO: 0.68 10*3/MM3 (ref 0.1–0.9)
MONOCYTES NFR BLD AUTO: 10.1 % (ref 5–12)
MUCOUS THREADS URNS QL MICRO: NORMAL /HPF
NEUTROPHILS NFR BLD AUTO: 5.12 10*3/MM3 (ref 1.7–7)
NEUTROPHILS NFR BLD AUTO: 75.7 % (ref 42.7–76)
NITRITE UR QL STRIP: POSITIVE
NRBC BLD AUTO-RTO: 0 /100 WBC (ref 0–0.2)
NT-PROBNP SERPL-MCNC: 436.2 PG/ML (ref 0–1800)
PH UR STRIP.AUTO: <=5 [PH] (ref 5–8)
PLATELET # BLD AUTO: 133 10*3/MM3 (ref 140–450)
PMV BLD AUTO: 10.1 FL (ref 6–12)
POTASSIUM SERPL-SCNC: 4 MMOL/L (ref 3.5–5.2)
PROT SERPL-MCNC: 6 G/DL (ref 6–8.5)
PROT UR QL STRIP: ABNORMAL
PROTHROMBIN TIME: 14 SECONDS (ref 11.4–14.4)
QT INTERVAL: 440 MS
QTC INTERVAL: 442 MS
RBC # BLD AUTO: 3.58 10*6/MM3 (ref 4.14–5.8)
RBC # UR: NORMAL /HPF
REF LAB TEST METHOD: NORMAL
SODIUM SERPL-SCNC: 138 MMOL/L (ref 136–145)
SP GR UR STRIP: >=1.03 (ref 1–1.03)
SQUAMOUS #/AREA URNS HPF: NORMAL /HPF
TROPONIN T SERPL-MCNC: <0.01 NG/ML (ref 0–0.03)
UROBILINOGEN UR QL STRIP: ABNORMAL
VALPROATE SERPL-MCNC: 82.2 MCG/ML (ref 50–125)
WBC # BLD AUTO: 6.76 10*3/MM3 (ref 3.4–10.8)
WBC UR QL AUTO: NORMAL /HPF

## 2021-05-26 PROCEDURE — 81001 URINALYSIS AUTO W/SCOPE: CPT | Performed by: EMERGENCY MEDICINE

## 2021-05-26 PROCEDURE — 93005 ELECTROCARDIOGRAM TRACING: CPT | Performed by: EMERGENCY MEDICINE

## 2021-05-26 PROCEDURE — 70450 CT HEAD/BRAIN W/O DYE: CPT

## 2021-05-26 PROCEDURE — 99283 EMERGENCY DEPT VISIT LOW MDM: CPT

## 2021-05-26 PROCEDURE — 80164 ASSAY DIPROPYLACETIC ACD TOT: CPT | Performed by: EMERGENCY MEDICINE

## 2021-05-26 PROCEDURE — 71045 X-RAY EXAM CHEST 1 VIEW: CPT

## 2021-05-26 PROCEDURE — 85610 PROTHROMBIN TIME: CPT | Performed by: EMERGENCY MEDICINE

## 2021-05-26 PROCEDURE — 84484 ASSAY OF TROPONIN QUANT: CPT | Performed by: EMERGENCY MEDICINE

## 2021-05-26 PROCEDURE — 85379 FIBRIN DEGRADATION QUANT: CPT | Performed by: EMERGENCY MEDICINE

## 2021-05-26 PROCEDURE — 85025 COMPLETE CBC W/AUTO DIFF WBC: CPT | Performed by: EMERGENCY MEDICINE

## 2021-05-26 PROCEDURE — 83880 ASSAY OF NATRIURETIC PEPTIDE: CPT | Performed by: EMERGENCY MEDICINE

## 2021-05-26 PROCEDURE — 83735 ASSAY OF MAGNESIUM: CPT | Performed by: EMERGENCY MEDICINE

## 2021-05-26 PROCEDURE — 80053 COMPREHEN METABOLIC PANEL: CPT | Performed by: EMERGENCY MEDICINE

## 2021-05-26 RX ADMIN — SODIUM CHLORIDE 500 ML: 9 INJECTION, SOLUTION INTRAVENOUS at 11:52

## 2021-05-26 NOTE — ED PROVIDER NOTES
"Subjective   75-year-old male with a history of seizures presents for evaluation following two unprovoked syncopal episodes this morning. He states that this morning he was working on a stove for a client when he got lightheaded and \"passed out.\" His customer called EMS and on their arrival the patient was alert. However, he subsequently had a second syncopal episode and was reportedly disoriented and \"apneic\" following the episode. Glucose was normal. No bowel or bladder incontinence. He did not bite his tongue. There is no prolonged postictal phase. No witnessed seizure activity. No family history of sudden cardiac death. Currently, the patient states that he feels well. He denies any pain. He denies any preceding chest pain, shortness of breath, or palpitations prior to the event today. He denies any personal history of recurrent syncopal episodes.          Review of Systems   Neurological: Positive for syncope.   All other systems reviewed and are negative.      Past Medical History:   Diagnosis Date   • BPH (benign prostatic hypertrophy)    • Chest pain    • Colon cancer (CMS/HCC) 1990    Status post partial colectomy in 1990. No chemotherapy or radiation therapy.   • Coronary artery disease     Nonobstructive coronary artery disease.   • Diabetes (CMS/HCC)    • Dyslipidemia    • GERD (gastroesophageal reflux disease)     Hx of.   • H/O cardiac catheterization    • Hypertension    • Hypothyroidism    • Left shoulder pain    • Primary osteoarthritis of both knees    • Seizure disorder (CMS/HCC)        Allergies   Allergen Reactions   • Sulfa Antibiotics Rash     Childhood reaction        Past Surgical History:   Procedure Laterality Date   • APPENDECTOMY     • CARPAL TUNNEL RELEASE Bilateral    • CHOLECYSTECTOMY     • COLECTOMY PARTIAL / TOTAL  1990    Partial colectomy   • COLONOSCOPY     • CYSTOSCOPY TRANSURETHRAL RESECTION OF PROSTATE N/A 9/21/2018    Procedure: CYSTOSCOPY TRANSURETHRAL RESECTION OF PROSTATE " GREENLIGHT;  Surgeon: Naseem Clayton MD;  Location: [facility];  Service: Urology   • OTHER SURGICAL HISTORY      Contraction surgery on bilateral hands and wrists.   • OTHER SURGICAL HISTORY      left and right hands   • SINUS SURGERY     • TONSILLECTOMY     • TURP / TRANSURETHRAL INCISION / DRAINAGE PROSTATE     • VASECTOMY         Family History   Problem Relation Age of Onset   • Cancer Mother    • Hypertension Father    • Stroke Father    • Stroke Other    • Arthritis Other    • Cancer Other        Social History     Socioeconomic History   • Marital status:      Spouse name: Not on file   • Number of children: Not on file   • Years of education: Not on file   • Highest education level: Not on file   Tobacco Use   • Smoking status: Never Smoker   • Smokeless tobacco: Never Used   Substance and Sexual Activity   • Alcohol use: No   • Drug use: No   • Sexual activity: Defer           Objective   Physical Exam  Vitals and nursing note reviewed.   Constitutional:       General: He is not in acute distress.     Appearance: Normal appearance. He is well-developed. He is not diaphoretic.      Comments: Nontoxic-appearing male   HENT:      Head: Normocephalic and atraumatic.   Eyes:      Pupils: Pupils are equal, round, and reactive to light.   Neck:      Vascular: No JVD.      Comments: No midline cervical spine tenderness noted, no step-off or deformity noted  Cardiovascular:      Rate and Rhythm: Normal rate and regular rhythm.      Heart sounds: Normal heart sounds. No murmur heard.   No friction rub. No gallop.    Pulmonary:      Effort: Pulmonary effort is normal. No respiratory distress.      Breath sounds: Normal breath sounds. No wheezing or rales.   Abdominal:      General: Bowel sounds are normal. There is no distension.      Palpations: Abdomen is soft. There is no mass.      Tenderness: There is no abdominal tenderness. There is no guarding.   Musculoskeletal:         General: Normal range of  "motion.   Skin:     General: Skin is warm and dry.      Coloration: Skin is not pale.      Findings: No erythema or rash.   Neurological:      General: No focal deficit present.      Mental Status: He is alert and oriented to person, place, and time.      Comments: Awake, alert, and oriented x3, clear and fluent speech, no ataxia or dysmetria, neurovascularly intact distally in all fours with bounding distal pulses and normal sensation, 5 out of 5 strength in all fours, no cranial nerve deficits noted with cranial nerves II through XII grossly intact   Psychiatric:         Mood and Affect: Mood normal.         Thought Content: Thought content normal.         Judgment: Judgment normal.         Procedures           ED Course  ED Course as of May 26 1938   Wed May 26, 2021   1116 75-year-old male with a history of seizures presents for evaluation following 2 syncopal episodes this morning.  This morning, he was working on a stove at work when he got lightheaded and \"passed out.\"  His customer called EMS and on their arrival the patient had a second syncopal episode.  He did not bite his tongue.  No urinary incontinence.  He was subsequently brought to the emergency department to be evaluated.  Glucose normal.  On arrival, the patient is nontoxic-appearing.  Benign exam.  NEXUS negative.  No family history of sudden cardiac death.  The patient has no personal history of recurrent syncopal episodes.  We will obtain labs, EKG, and imaging, and we will reassess following initial interventions.    [DD]   1158 Chest x-ray is negative.    [DD]   1257 Low risk Well's and D dimer negaitve.    [DD]   1419 Labs are otherwise unrevealing aside from mild renal insufficiency.    [DD]   1434 Upon reevaluation, the patient feels well. He is currently asymptomatic. We had a long discussion regarding inpatient versus outpatient management, and the patient would prefer the latter if at all possible. I feel that this is a reasonable " approach. Using shared decision making, we have elected to have the patient follow-up with the syncope clinic within the next 72 hours for potential outpatient cardiac monitoring. Encouraged oral fluid intake. He will follow-up as directed. Agreeable with plan and given appropriate strict return precautions.    [DD]      ED Course User Index  [DD] Phoenix West MD                                   Recent Results (from the past 24 hour(s))   Protime-INR    Collection Time: 05/26/21 11:40 AM    Specimen: Blood   Result Value Ref Range    Protime 14.0 11.4 - 14.4 Seconds    INR 1.11 0.85 - 1.16   D-dimer, Quantitative    Collection Time: 05/26/21 11:40 AM    Specimen: Blood   Result Value Ref Range    D-Dimer, Quantitative 0.35 0.00 - 0.56 MCGFEU/mL   CBC Auto Differential    Collection Time: 05/26/21 11:40 AM    Specimen: Blood   Result Value Ref Range    WBC 6.76 3.40 - 10.80 10*3/mm3    RBC 3.58 (L) 4.14 - 5.80 10*6/mm3    Hemoglobin 11.8 (L) 13.0 - 17.7 g/dL    Hematocrit 34.7 (L) 37.5 - 51.0 %    MCV 96.9 79.0 - 97.0 fL    MCH 33.0 26.6 - 33.0 pg    MCHC 34.0 31.5 - 35.7 g/dL    RDW 13.1 12.3 - 15.4 %    RDW-SD 46.4 37.0 - 54.0 fl    MPV 10.1 6.0 - 12.0 fL    Platelets 133 (L) 140 - 450 10*3/mm3    Neutrophil % 75.7 42.7 - 76.0 %    Lymphocyte % 12.3 (L) 19.6 - 45.3 %    Monocyte % 10.1 5.0 - 12.0 %    Eosinophil % 0.4 0.3 - 6.2 %    Basophil % 0.6 0.0 - 1.5 %    Immature Grans % 0.9 (H) 0.0 - 0.5 %    Neutrophils, Absolute 5.12 1.70 - 7.00 10*3/mm3    Lymphocytes, Absolute 0.83 0.70 - 3.10 10*3/mm3    Monocytes, Absolute 0.68 0.10 - 0.90 10*3/mm3    Eosinophils, Absolute 0.03 0.00 - 0.40 10*3/mm3    Basophils, Absolute 0.04 0.00 - 0.20 10*3/mm3    Immature Grans, Absolute 0.06 (H) 0.00 - 0.05 10*3/mm3    nRBC 0.0 0.0 - 0.2 /100 WBC   Urinalysis With Microscopic If Indicated (No Culture) - Urine, Clean Catch    Collection Time: 05/26/21 11:56 AM    Specimen: Urine, Clean Catch   Result Value Ref Range     Color, UA Dark Yellow (A) Yellow, Straw    Appearance, UA Slightly Cloudy (A) Clear    pH, UA <=5.0 5.0 - 8.0    Specific Gravity, UA >=1.030 1.005 - 1.030    Glucose,  mg/dL (Trace) (A) Negative    Ketones, UA 15 mg/dL (1+) (A) Negative    Bilirubin, UA Moderate (2+) (A) Negative    Blood, UA Negative Negative    Protein,  mg/dL (2+) (A) Negative    Leuk Esterase, UA Negative Negative    Nitrite, UA Positive (A) Negative    Urobilinogen, UA 4.0 E.U./dL (A) 0.2 - 1.0 E.U./dL   Urinalysis, Microscopic Only - Urine, Clean Catch    Collection Time: 05/26/21 11:56 AM    Specimen: Urine, Clean Catch   Result Value Ref Range    RBC, UA 0-2 None Seen, 0-2 /HPF    WBC, UA 0-2 None Seen, 0-2 /HPF    Bacteria, UA None Seen None Seen, Trace /HPF    Squamous Epithelial Cells, UA 0-2 None Seen, 0-2 /HPF    Hyaline Casts, UA 7-12 0 - 6 /LPF    Mucus, UA Trace None Seen, Trace /HPF    Methodology Manual Light Microscopy    ECG 12 Lead    Collection Time: 05/26/21 12:08 PM   Result Value Ref Range    QT Interval 440 ms    QTC Interval 442 ms   Comprehensive Metabolic Panel    Collection Time: 05/26/21  1:03 PM    Specimen: Blood   Result Value Ref Range    Glucose 115 (H) 65 - 99 mg/dL    BUN 13 8 - 23 mg/dL    Creatinine 1.36 (H) 0.76 - 1.27 mg/dL    Sodium 138 136 - 145 mmol/L    Potassium 4.0 3.5 - 5.2 mmol/L    Chloride 103 98 - 107 mmol/L    CO2 26.0 22.0 - 29.0 mmol/L    Calcium 8.7 8.6 - 10.5 mg/dL    Total Protein 6.0 6.0 - 8.5 g/dL    Albumin 3.50 3.50 - 5.20 g/dL    ALT (SGPT) 8 1 - 41 U/L    AST (SGOT) 13 1 - 40 U/L    Alkaline Phosphatase 49 39 - 117 U/L    Total Bilirubin 0.7 0.0 - 1.2 mg/dL    eGFR Non African Amer 51 (L) >60 mL/min/1.73    Globulin 2.5 gm/dL    A/G Ratio 1.4 g/dL    BUN/Creatinine Ratio 9.6 7.0 - 25.0    Anion Gap 9.0 5.0 - 15.0 mmol/L   Troponin    Collection Time: 05/26/21  1:03 PM    Specimen: Blood   Result Value Ref Range    Troponin T <0.010 0.000 - 0.030 ng/mL   BNP    Collection  Time: 05/26/21  1:03 PM    Specimen: Blood   Result Value Ref Range    proBNP 436.2 0.0-1,800.0 pg/mL   Magnesium    Collection Time: 05/26/21  1:03 PM    Specimen: Blood   Result Value Ref Range    Magnesium 1.7 1.6 - 2.4 mg/dL   Valproic Acid Level, Total    Collection Time: 05/26/21  1:03 PM    Specimen: Blood   Result Value Ref Range    Valproic Acid 82.2 50.0 - 125.0 mcg/mL     Note: In addition to lab results from this visit, the labs listed above may include labs taken at another facility or during a different encounter within the last 24 hours. Please correlate lab times with ED admission and discharge times for further clarification of the services performed during this visit.    CT Head Without Contrast   Preliminary Result   Mucous retention cyst seen in the left maxillary sinus.   Atrophy and chronic changes seen within the brain with no acute   intracranial abnormality.       D:  05/26/2021   E:  05/26/2021              XR Chest 1 View   Preliminary Result   No acute cardiopulmonary disease.       D:  05/26/2021   E:  05/26/2021                Vitals:    05/26/21 1329 05/26/21 1330 05/26/21 1400 05/26/21 1430   BP:  (!) 141/106 146/70 156/76   Pulse: 59  58 60   Resp:       Temp:       TempSrc:       SpO2: 95%  95% 97%   Weight:       Height:         Medications   sodium chloride 0.9 % bolus 500 mL (0 mL Intravenous Stopped 5/26/21 1319)     ECG/EMG Results (last 24 hours)     Procedure Component Value Units Date/Time    ECG 12 Lead [[id number]] Collected: 05/26/21 1208     Updated: 05/26/21 1702     QT Interval 440 ms      QTC Interval 442 ms     Narrative:      Test Reason : syncope  Blood Pressure :   */*   mmHG  Vent. Rate :  61 BPM     Atrial Rate :  61 BPM     P-R Int : 170 ms          QRS Dur :  90 ms      QT Int : 440 ms       P-R-T Axes :  56  27  49 degrees     QTc Int : 442 ms    ** Poor data quality, interpretation may be adversely affected  Normal sinus rhythm  Normal ECG  When compared with  ECG of 17-SEP-2018 15:20,  No significant change was found  Confirmed by MD West Michael [id number]) on 5/26/2021 5:02:29 PM    Referred By: SHAINA           Confirmed By: Louis West MD        ECG 12 Lead   Final Result   Test Reason : syncope   Blood Pressure :   */*   mmHG   Vent. Rate :  61 BPM     Atrial Rate :  61 BPM      P-R Int : 170 ms          QRS Dur :  90 ms       QT Int : 440 ms       P-R-T Axes :  56  27  49 degrees      QTc Int : 442 ms      ** Poor data quality, interpretation may be adversely affected   Normal sinus rhythm   Normal ECG   When compared with ECG of 17-SEP-2018 15:20,   No significant change was found   Confirmed by MD West Michael [id number]) on 5/26/2021 5:02:29 PM      Referred By: SHAINA           Confirmed By: Louis West MD                  [facility]    Final diagnoses:   Syncope, unspecified syncope type   Renal insufficiency       ED Disposition  ED Disposition     ED Disposition Condition Comment    Discharge Stable           [facility] CARDIOLOGY  [address] [id number] [phone]  In 3 days      Kingsley Soto MD  [address] [place] [address]  [phone]    In 1 week           Medication List      No changes were made to your prescriptions during this visit.          Phoenix West MD  05/26/21 1936

## 2021-06-18 ENCOUNTER — HOSPITAL ENCOUNTER (OUTPATIENT)
Dept: CARDIOLOGY | Facility: HOSPITAL | Age: 76
Discharge: HOME | End: 2021-06-18

## 2021-06-18 ENCOUNTER — OFFICE VISIT (OUTPATIENT)
Dept: CARDIOLOGY | Facility: HOSPITAL | Age: 76
End: 2021-06-18

## 2021-06-18 VITALS
DIASTOLIC BLOOD PRESSURE: 95 MMHG | WEIGHT: 219.38 LBS | HEART RATE: 60 BPM | HEIGHT: 68 IN | SYSTOLIC BLOOD PRESSURE: 150 MMHG | BODY MASS INDEX: 33.25 KG/M2 | OXYGEN SATURATION: 99 % | RESPIRATION RATE: 18 BRPM | TEMPERATURE: 98 F

## 2021-06-18 DIAGNOSIS — R55 SYNCOPE AND COLLAPSE: ICD-10-CM

## 2021-06-18 DIAGNOSIS — E78.5 DYSLIPIDEMIA: ICD-10-CM

## 2021-06-18 DIAGNOSIS — I10 ESSENTIAL HYPERTENSION: Primary | ICD-10-CM

## 2021-06-18 PROCEDURE — 93246 EXT ECG>7D<15D RECORDING: CPT

## 2021-06-18 PROCEDURE — 99204 OFFICE O/P NEW MOD 45 MIN: CPT | Performed by: NURSE PRACTITIONER

## 2021-06-18 NOTE — PROGRESS NOTES
[facility] Heart Monitor Documentation    Lea Rivers  1945  [id number]  06/18/21    KATE Mcnair    [] ZIO XT Patch  Model [id number] Prescribed for N/A Days    · Serial Number: (N + 9 Digits) N   · Apply-By Date on Box:   · USPS Tracking Number:   · USPS Tracking        [] [employer] MINI PLUS Mobile Cardiac Telemetry  Model BGMINIPLUS Prescribed for N/A Days    · Serial Number: (BGM + 7 Digits) BGM  · Shipped-By Date on Box:   · UPS Tracking Number: [id number]  · UPS Tracking      [] [employer] MINI Holter Monitor  Model BGMINIEL Prescribed for 14 Days    · Serial Number: (7 Digits) [id number]  · Shipped-By Date on Box: 296651  · UPS Tracking Number: [id number]  · UPS Tracking        This monitor was applied to the patient's chest and checked for proper functioning.  Mr. Lea Rivers was instructed in the proper use of this monitor.  He was given the opportunity to ask questions and left the office with the device 's instruction manual.    Sabrina Gabriel MA, 16:04 EDT, 06/18/21                  [facility]

## 2021-06-18 NOTE — PROGRESS NOTES
"Chief Complaint  Establish Care and Loss of Consciousness    Subjective    History of Present Illness {CC  Problem List  Visit  Diagnosis   Encounters  Notes  Medications  Labs  Result Review Imaging  Media :23}       History of Present Illness   75-year-old male presents the office today for ongoing evaluation of his syncope.  He presented to [facility] ED on 5/26/2021 with complaints of syncope x2.  He reports he was working on a stove when he suddenly became lightheaded and passed out.  During first syncopal spell patient was alert and oriented however a secondary syncopal episode patient was disoriented and apneic following the episode.  No loss of bowel or bladder.  No witnessed seizure activity.  Patient does have a history of seizures currently on [email or web address].notes bp has been elevated.     Objective     Vital Signs:   Vitals:    06/18/21 1428 06/18/21 1430 06/18/21 1431 06/18/21 1603   BP: (!) 216/103 (!) 207/114 (!) 196/106 150/95   BP Location: Right arm Left arm Left arm Left arm   Patient Position: Sitting Standing Sitting Sitting   Cuff Size: Adult Adult Adult    Pulse: 59 60 60    Resp:   18    Temp:  98 °F (36.7 °C) 98 °F (36.7 °C)    TempSrc:  Temporal Temporal    SpO2: 100% 99% 99% After po clonidine 0.1mg given in office    Weight:   99.5 kg (219 lb 6 oz)    Height:   172.7 cm (68\")      Body mass index is 33.36 kg/m².  Physical Exam  Vitals and nursing note reviewed.   Constitutional:       Appearance: Normal appearance.   HENT:      Head: Normocephalic.   Eyes:      Pupils: Pupils are equal, round, and reactive to light.   Cardiovascular:      Rate and Rhythm: Normal rate and regular rhythm.      Pulses: Normal pulses.      Heart sounds: Normal heart sounds. No murmur heard.     Pulmonary:      Effort: Pulmonary effort is normal.      Breath sounds: Normal breath sounds.   Abdominal:      General: Bowel sounds are normal.      Palpations: Abdomen is soft.   Musculoskeletal:    "      General: Normal range of motion.      Cervical back: Normal range of motion.      Right lower leg: No edema.      Left lower leg: No edema.   Skin:     General: Skin is warm and dry.      Capillary Refill: Capillary refill takes less than 2 seconds.   Neurological:      Mental Status: He is alert and oriented to person, place, and time.   Psychiatric:         Mood and Affect: Mood normal.         Thought Content: Thought content normal.              Result Review  Data Reviewed:{ Labs  Result Review  Imaging  Med Tab  Media :23}         Comprehensive Metabolic Panel (05/26/2021 13:03)  Troponin (05/26/2021 13:03)  BNP (05/26/2021 13:03)  Magnesium (05/26/2021 13:03)  Valproic Acid Level, Total (05/26/2021 13:03)       Assessment and Plan {CC Problem List  Visit Diagnosis  ROS  Review (Popup)  Health Maintenance  Quality  BestPractice  Medications  SmartSets  SnapShot Encounters  Media :23}   1. Essential hypertension  Clonidine 0.1 mg given po in office   - Adult Transthoracic Echo Complete W/ Cont if Necessary Per Protocol; Future    2. Syncope and collapse    - Holter Monitor - 72 Hour Up To 15 Days; Future  - Adult Transthoracic Echo Complete W/ Cont if Necessary Per Protocol; Future    3. Dyslipidemia  Statin therapy           Follow Up {Instructions Charge Capture  Follow-up Communications :23}   Return in about 4 weeks (around 7/16/2021) for Office visit, Monitor results,htn, syncope .    Patient was given instructions and counseling regarding his condition or for health maintenance advice. Please see specific information pulled into the AVS if appropriate.  Patient was instructed to call the [facility] with any questions, concerns, or worsening symptoms.    *Please note that portions of this note were completed with a voice recognition program. Efforts were made to edit the dictations, but occasionally words are mistranscribed.

## 2021-07-12 ENCOUNTER — OFFICE VISIT (OUTPATIENT)
Dept: CARDIOLOGY | Facility: HOSPITAL | Age: 76
End: 2021-07-12

## 2021-07-12 VITALS
WEIGHT: 218.13 LBS | OXYGEN SATURATION: 98 % | HEIGHT: 68 IN | BODY MASS INDEX: 33.06 KG/M2 | TEMPERATURE: 98 F | SYSTOLIC BLOOD PRESSURE: 160 MMHG | RESPIRATION RATE: 18 BRPM | HEART RATE: 66 BPM | DIASTOLIC BLOOD PRESSURE: 78 MMHG

## 2021-07-12 DIAGNOSIS — I10 ESSENTIAL HYPERTENSION: Primary | ICD-10-CM

## 2021-07-12 DIAGNOSIS — E78.5 DYSLIPIDEMIA: ICD-10-CM

## 2021-07-12 DIAGNOSIS — R55 SYNCOPE AND COLLAPSE: ICD-10-CM

## 2021-07-12 DIAGNOSIS — R07.2 PRECORDIAL PAIN: ICD-10-CM

## 2021-07-12 DIAGNOSIS — I25.118 CORONARY ARTERY DISEASE OF NATIVE ARTERY OF NATIVE HEART WITH STABLE ANGINA PECTORIS (HCC): ICD-10-CM

## 2021-07-12 DIAGNOSIS — R06.09 DOE (DYSPNEA ON EXERTION): ICD-10-CM

## 2021-07-12 PROCEDURE — 99214 OFFICE O/P EST MOD 30 MIN: CPT | Performed by: NURSE PRACTITIONER

## 2021-07-12 RX ORDER — OXYBUTYNIN CHLORIDE 15 MG/1
15 TABLET, EXTENDED RELEASE ORAL DAILY
COMMUNITY
Start: 2021-07-02 | End: 2021-09-10

## 2021-07-12 RX ORDER — HYDRALAZINE HYDROCHLORIDE 50 MG/1
50 TABLET, FILM COATED ORAL 2 TIMES DAILY
Qty: 180 TABLET | Refills: 3 | Status: SHIPPED | OUTPATIENT
Start: 2021-07-12 | End: 2021-09-10

## 2021-07-12 NOTE — PROGRESS NOTES
"Chief Complaint  Follow-up (monitor results)    Subjective    History of Present Illness {CC  Problem List  Visit  Diagnosis   Encounters  Notes  Medications  Labs  Result Review Imaging  Media :23}       History of Present Illness     75-year-old male presents the office today for ongoing evaluation of his syncope.  He presented to [facility] ED on 5/26/2021 with complaints of syncope x2.  He reports he was working on a stove when he suddenly became lightheaded and passed out.  During first syncopal spell patient was alert and oriented however a secondary syncopal episode patient was disoriented and apneic following the episode.  No loss of bowel or bladder.  No witnessed seizure activity.  Patient does have a history of seizures currently on Keppra.notes bp has been elevated.  Today with his home blood pressure log showing blood pressures 139/ 7/93.  Patient reports compliance.  Reports no further syncopal spells.  Does report intermittent chest pain that occurs when he overexerts himself.  Notes ongoing dyspnea on exertion with upcoming echo on 7/20/2021.  Objective     Vital Signs:   /109, Heart rate 66, RR 16, Oxygen sat: 98%, Temp 98.0  Weight 218.2 oz  Height 68\"  Body mass index is 33.17 kg/m².     Recheck bp 160/78 ( after clonidine 0.1 mg given po in office )  Physical Exam  Vitals and nursing note reviewed.   Constitutional:       Appearance: Normal appearance.   HENT:      Head: Normocephalic.   Eyes:      Pupils: Pupils are equal, round, and reactive to light.   Cardiovascular:      Rate and Rhythm: Normal rate and regular rhythm.      Pulses: Normal pulses.      Heart sounds: Normal heart sounds. No murmur heard.     Pulmonary:      Effort: Pulmonary effort is normal.      Breath sounds: Normal breath sounds.   Abdominal:      General: Bowel sounds are normal.      Palpations: Abdomen is soft.   Musculoskeletal:         General: Normal range of motion.      Cervical " back: Normal range of motion.      Right lower leg: No edema.      Left lower leg: No edema.   Skin:     General: Skin is warm and dry.      Capillary Refill: Capillary refill takes less than 2 seconds.   Neurological:      Mental Status: He is alert and oriented to person, place, and time.   Psychiatric:         Mood and Affect: Mood normal.         Thought Content: Thought content normal.              Result Review  Data Reviewed:{ Labs  Result Review  Imaging  Med Tab  Media :23}     Extended Holter shows an average heart rate of 64 bpm, PAC burden 0.02%, PVC burden 0.01% with 14 runs of SVT the longest lasting 11 beats.  Patient asymptomatic during those runs of SVT      Comprehensive Metabolic Panel (05/26/2021 13:03)  Troponin (05/26/2021 13:03)  BNP (05/26/2021 13:03)  Magnesium (05/26/2021 13:03)  Valproic Acid Level, Total (05/26/2021 13:03)       Assessment and Plan {CC Problem List  Visit Diagnosis  ROS  Review (Popup)  Health Maintenance  Quality  BestPractice  Medications  SmartSets  SnapShot Encounters  Media :23}   1. Essential hypertension  Clonidine 0.1 mg given p.o. in office today with appropriate reduction in systolic blood pressure  Begin hydralazine 50 mg twice daily  Continue Imdur, lisinopril, Terazosin, verapamil  Patient to reestablish with Dr. Baker  -2. Syncope and collapse  Extended holter showed no SSS, pauses       3. Dyslipidemia  Statin therapy     4. CAD  Stress test in near future   [facility] showed mild nonobstructive disease 30-40% 2012    5. Precordial pain  Stress test in near future     6, SEVILLA   Upcoming echo 7/20  Follow Up {Instructions Charge Capture  Follow-up Communications :23}   Return if symptoms worsen or fail to improve.    Patient was given instructions and counseling regarding his condition or for health maintenance advice. Please see specific information pulled into the AVS if appropriate.  Patient was instructed to call the [facility]  with any questions, concerns, or worsening symptoms.    *Please note that portions of this note were completed with a voice recognition program. Efforts were made to edit the dictations, but occasionally words are mistranscribed.

## 2021-07-12 NOTE — PATIENT INSTRUCTIONS
You will be called with an appt for your stress test   Your echo is 7/20 at 3pm at [facility]   You will be called with an appt for Dr Baker  Begin hydralazine 50mg twice a day  Keep recording your blood pressure

## 2021-07-20 ENCOUNTER — DOCUMENTATION (OUTPATIENT)
Dept: HOSPICE | Facility: HOSPITAL | Age: 76
End: 2021-07-20

## 2021-07-20 ENCOUNTER — HOSPITAL ENCOUNTER (OUTPATIENT)
Dept: CARDIOLOGY | Facility: HOSPITAL | Age: 76
Discharge: HOME | End: 2021-07-20
Admitting: NURSE PRACTITIONER

## 2021-07-20 VITALS — WEIGHT: 218 LBS | HEIGHT: 68 IN | BODY MASS INDEX: 33.04 KG/M2

## 2021-07-20 DIAGNOSIS — R55 SYNCOPE AND COLLAPSE: ICD-10-CM

## 2021-07-20 DIAGNOSIS — I10 ESSENTIAL HYPERTENSION: ICD-10-CM

## 2021-07-20 PROCEDURE — 93306 TTE W/DOPPLER COMPLETE: CPT | Performed by: INTERNAL MEDICINE

## 2021-07-20 PROCEDURE — 93306 TTE W/DOPPLER COMPLETE: CPT

## 2021-07-21 PROCEDURE — 93248 EXT ECG>7D<15D REV&INTERPJ: CPT | Performed by: INTERNAL MEDICINE

## 2021-07-22 ENCOUNTER — HOSPITAL ENCOUNTER (OUTPATIENT)
Dept: CARDIOLOGY | Facility: HOSPITAL | Age: 76
Discharge: HOME | End: 2021-07-22
Admitting: NURSE PRACTITIONER

## 2021-07-22 VITALS — BODY MASS INDEX: 32.89 KG/M2 | HEIGHT: 68 IN | WEIGHT: 217 LBS

## 2021-07-22 DIAGNOSIS — R55 SYNCOPE AND COLLAPSE: ICD-10-CM

## 2021-07-22 DIAGNOSIS — R07.2 PRECORDIAL PAIN: ICD-10-CM

## 2021-07-22 DIAGNOSIS — R06.09 DOE (DYSPNEA ON EXERTION): ICD-10-CM

## 2021-07-22 DIAGNOSIS — I10 ESSENTIAL HYPERTENSION: ICD-10-CM

## 2021-07-22 DIAGNOSIS — E78.5 DYSLIPIDEMIA: ICD-10-CM

## 2021-07-22 DIAGNOSIS — I25.118 CORONARY ARTERY DISEASE OF NATIVE ARTERY OF NATIVE HEART WITH STABLE ANGINA PECTORIS (HCC): ICD-10-CM

## 2021-07-22 LAB
ASCENDING AORTA: 3.5 CM
BH CV ECHO MEAS - AO ROOT AREA (BSA CORRECTED): 1.7
BH CV ECHO MEAS - AO ROOT AREA: 9.6 CM^2
BH CV ECHO MEAS - AO ROOT DIAM: 3.5 CM
BH CV ECHO MEAS - ASC AORTA: 3.5 CM
BH CV ECHO MEAS - BSA(HAYCOCK): 2.2 M^2
BH CV ECHO MEAS - BSA: 2.1 M^2
BH CV ECHO MEAS - BZI_BMI: 33.1 KILOGRAMS/M^2
BH CV ECHO MEAS - BZI_METRIC_HEIGHT: 172.7 CM
BH CV ECHO MEAS - BZI_METRIC_WEIGHT: 98.9 KG
BH CV ECHO MEAS - EDV(CUBED): 84 ML
BH CV ECHO MEAS - EDV(MOD-SP2): 78 ML
BH CV ECHO MEAS - EDV(MOD-SP4): 87 ML
BH CV ECHO MEAS - EDV(TEICH): 86.8 ML
BH CV ECHO MEAS - EF(CUBED): 77.1 %
BH CV ECHO MEAS - EF(MOD-BP): 64 %
BH CV ECHO MEAS - EF(MOD-SP2): 60.3 %
BH CV ECHO MEAS - EF(MOD-SP4): 64.4 %
BH CV ECHO MEAS - EF(TEICH): 69.4 %
BH CV ECHO MEAS - ESV(CUBED): 19.2 ML
BH CV ECHO MEAS - ESV(MOD-SP2): 31 ML
BH CV ECHO MEAS - ESV(MOD-SP4): 31 ML
BH CV ECHO MEAS - ESV(TEICH): 26.5 ML
BH CV ECHO MEAS - FS: 38.8 %
BH CV ECHO MEAS - IVS/LVPW: 1.1
BH CV ECHO MEAS - IVSD: 1.3 CM
BH CV ECHO MEAS - LA DIMENSION: 3.9 CM
BH CV ECHO MEAS - LA/AO: 1.1
BH CV ECHO MEAS - LAD MAJOR: 4.5 CM
BH CV ECHO MEAS - LAT PEAK E' VEL: 5.2 CM/SEC
BH CV ECHO MEAS - LATERAL E/E' RATIO: 12.4
BH CV ECHO MEAS - LV DIASTOLIC VOL/BSA (35-75): 41 ML/M^2
BH CV ECHO MEAS - LV MASS(C)D: 198.1 GRAMS
BH CV ECHO MEAS - LV MASS(C)DI: 93.4 GRAMS/M^2
BH CV ECHO MEAS - LV MAX PG: 5.5 MMHG
BH CV ECHO MEAS - LV MEAN PG: 3 MMHG
BH CV ECHO MEAS - LV SYSTOLIC VOL/BSA (12-30): 14.6 ML/M^2
BH CV ECHO MEAS - LV V1 MAX: 117 CM/SEC
BH CV ECHO MEAS - LV V1 MEAN: 73.1 CM/SEC
BH CV ECHO MEAS - LV V1 VTI: 25.3 CM
BH CV ECHO MEAS - LVIDD: 4.4 CM
BH CV ECHO MEAS - LVIDS: 2.7 CM
BH CV ECHO MEAS - LVLD AP2: 7.6 CM
BH CV ECHO MEAS - LVLD AP4: 8.2 CM
BH CV ECHO MEAS - LVLS AP2: 7.1 CM
BH CV ECHO MEAS - LVLS AP4: 7 CM
BH CV ECHO MEAS - LVOT AREA (M): 3.1 CM^2
BH CV ECHO MEAS - LVOT AREA: 3.1 CM^2
BH CV ECHO MEAS - LVOT DIAM: 2 CM
BH CV ECHO MEAS - LVPWD: 1.2 CM
BH CV ECHO MEAS - MED PEAK E' VEL: 4.6 CM/SEC
BH CV ECHO MEAS - MEDIAL E/E' RATIO: 13.8
BH CV ECHO MEAS - MV A MAX VEL: 99.7 CM/SEC
BH CV ECHO MEAS - MV DEC SLOPE: 451 CM/SEC^2
BH CV ECHO MEAS - MV DEC TIME: 0.16 SEC
BH CV ECHO MEAS - MV E MAX VEL: 63.7 CM/SEC
BH CV ECHO MEAS - MV E/A: 0.64
BH CV ECHO MEAS - MV P1/2T MAX VEL: 100 CM/SEC
BH CV ECHO MEAS - MV P1/2T: 64.9 MSEC
BH CV ECHO MEAS - MVA P1/2T LCG: 2.2 CM^2
BH CV ECHO MEAS - MVA(P1/2T): 3.4 CM^2
BH CV ECHO MEAS - PA ACC SLOPE: 734.5 CM/SEC^2
BH CV ECHO MEAS - PA ACC TIME: 0.11 SEC
BH CV ECHO MEAS - PA PR(ACCEL): 27.9 MMHG
BH CV ECHO MEAS - SI(CUBED): 30.6 ML/M^2
BH CV ECHO MEAS - SI(LVOT): 37.5 ML/M^2
BH CV ECHO MEAS - SI(MOD-SP2): 22.2 ML/M^2
BH CV ECHO MEAS - SI(MOD-SP4): 26.4 ML/M^2
BH CV ECHO MEAS - SI(TEICH): 28.4 ML/M^2
BH CV ECHO MEAS - SV(CUBED): 64.8 ML
BH CV ECHO MEAS - SV(LVOT): 79.5 ML
BH CV ECHO MEAS - SV(MOD-SP2): 47 ML
BH CV ECHO MEAS - SV(MOD-SP4): 56 ML
BH CV ECHO MEAS - SV(TEICH): 60.2 ML
BH CV ECHO MEASUREMENTS AVERAGE E/E' RATIO: 13
BH CV REST NUCLEAR ISOTOPE DOSE: 9.9 MCI
BH CV STRESS BP STAGE 2: NORMAL
BH CV STRESS BP STAGE 4: NORMAL
BH CV STRESS COMMENTS STAGE 1: NORMAL
BH CV STRESS DOSE REGADENOSON STAGE 1: 0.4
BH CV STRESS DURATION MIN STAGE 1: 1
BH CV STRESS DURATION MIN STAGE 2: 1
BH CV STRESS DURATION MIN STAGE 3: 1
BH CV STRESS DURATION MIN STAGE 4: 1
BH CV STRESS DURATION SEC STAGE 1: 0
BH CV STRESS DURATION SEC STAGE 2: 0
BH CV STRESS DURATION SEC STAGE 3: 0
BH CV STRESS DURATION SEC STAGE 4: 0
BH CV STRESS HR STAGE 1: 74
BH CV STRESS HR STAGE 2: 101
BH CV STRESS HR STAGE 3: 99
BH CV STRESS HR STAGE 4: 93
BH CV STRESS NUCLEAR ISOTOPE DOSE: 33 MCI
BH CV STRESS PROTOCOL 1: NORMAL
BH CV STRESS RECOVERY BP: NORMAL MMHG
BH CV STRESS RECOVERY HR: 86 BPM
BH CV STRESS STAGE 1: 1
BH CV STRESS STAGE 2: 2
BH CV STRESS STAGE 3: 3
BH CV STRESS STAGE 4: 4
BH CV VAS BP RIGHT ARM: NORMAL MMHG
BH CV XLRA - RV BASE: 3.2 CM
BH CV XLRA - RV LENGTH: 6.4 CM
BH CV XLRA - RV MID: 2.6 CM
BH CV XLRA - TDI S': 11.5 CM/SEC
LV EF 2D ECHO EST: 65 %
LV EF NUC BP: 73 %
MAXIMAL PREDICTED HEART RATE: 144 BPM
MAXIMAL PREDICTED HEART RATE: 144 BPM
PERCENT MAX PREDICTED HR: 71.53 %
STRESS BASELINE BP: NORMAL MMHG
STRESS BASELINE HR: 77 BPM
STRESS PERCENT HR: 84 %
STRESS POST PEAK BP: NORMAL MMHG
STRESS POST PEAK HR: 103 BPM
STRESS TARGET HR: 122 BPM
STRESS TARGET HR: 122 BPM

## 2021-07-22 PROCEDURE — 0 TECHNETIUM SESTAMIBI: Performed by: NURSE PRACTITIONER

## 2021-07-22 PROCEDURE — 93018 CV STRESS TEST I&R ONLY: CPT | Performed by: INTERNAL MEDICINE

## 2021-07-22 PROCEDURE — A9500 TC99M SESTAMIBI: HCPCS | Performed by: NURSE PRACTITIONER

## 2021-07-22 PROCEDURE — 78452 HT MUSCLE IMAGE SPECT MULT: CPT

## 2021-07-22 PROCEDURE — 78452 HT MUSCLE IMAGE SPECT MULT: CPT | Performed by: INTERNAL MEDICINE

## 2021-07-22 PROCEDURE — 93017 CV STRESS TEST TRACING ONLY: CPT

## 2021-07-22 PROCEDURE — 25010000002 REGADENOSON 0.4 MG/5ML SOLUTION: Performed by: NURSE PRACTITIONER

## 2021-07-22 RX ADMIN — TECHNETIUM TC 99M SESTAMIBI 1 DOSE: 1 INJECTION INTRAVENOUS at 15:15

## 2021-07-22 RX ADMIN — TECHNETIUM TC 99M SESTAMIBI 1 DOSE: 1 INJECTION INTRAVENOUS at 13:00

## 2021-07-22 RX ADMIN — REGADENOSON 0.4 MG: 0.08 INJECTION, SOLUTION INTRAVENOUS at 15:14

## 2021-07-23 ENCOUNTER — TELEPHONE (OUTPATIENT)
Dept: CARDIOLOGY | Facility: HOSPITAL | Age: 76
End: 2021-07-23

## 2021-07-23 RX ORDER — CHLORTHALIDONE 25 MG/1
25 TABLET ORAL DAILY
Qty: 90 TABLET | Refills: 3 | Status: SHIPPED | OUTPATIENT
Start: 2021-07-23 | End: 2022-10-03 | Stop reason: HOSPADM

## 2021-07-23 NOTE — TELEPHONE ENCOUNTER
Spoke with patient who notes that diarrhea is significant since starting hydralazine. Stop hydralazine and begin chlorthalidone.

## 2021-07-23 NOTE — TELEPHONE ENCOUNTER
Pt stated that since he started the hydralazine he had loose bowels. It started slow but has increasingly getting worse. He cut the medication in half and is still no better.

## 2021-08-18 ENCOUNTER — TELEPHONE (OUTPATIENT)
Dept: CARDIOLOGY | Facility: HOSPITAL | Age: 76
End: 2021-08-18

## 2021-08-18 NOTE — TELEPHONE ENCOUNTER
Called and advised patient to continue to hold his hydralazine at this time. Explained to patient to keep a BP and HR log and that this nurse will call on Friday 08/20/21 afternoon to check on patients most recent BP readings. Patient verbalized understanding and had no further questions or concerns at this time.

## 2021-08-18 NOTE — TELEPHONE ENCOUNTER
Patients POA called to advise Kiersten LOVE that the patients BP today is 88/51 P: 73. Patients POA stated the patients BP has been running anywhere from 118-150/50-60. Patients POA stated she believes the patient is on way to many BP med's and advised patient not to take the hydralazine anymore. Please advise.

## 2021-08-25 ENCOUNTER — OFFICE VISIT (OUTPATIENT)
Dept: ORTHOPEDIC SURGERY | Facility: CLINIC | Age: 76
End: 2021-08-25

## 2021-08-25 VITALS
WEIGHT: 207.2 LBS | HEART RATE: 68 BPM | BODY MASS INDEX: 31.4 KG/M2 | SYSTOLIC BLOOD PRESSURE: 140 MMHG | HEIGHT: 68 IN | DIASTOLIC BLOOD PRESSURE: 86 MMHG

## 2021-08-25 DIAGNOSIS — M17.0 PRIMARY OSTEOARTHRITIS OF BOTH KNEES: ICD-10-CM

## 2021-08-25 DIAGNOSIS — M25.571 RIGHT ANKLE PAIN, UNSPECIFIED CHRONICITY: Primary | ICD-10-CM

## 2021-08-25 DIAGNOSIS — M19.071 ARTHRITIS OF RIGHT ANKLE: ICD-10-CM

## 2021-08-25 DIAGNOSIS — S93.401A SPRAIN OF RIGHT ANKLE, UNSPECIFIED LIGAMENT, INITIAL ENCOUNTER: ICD-10-CM

## 2021-08-25 PROCEDURE — 99214 OFFICE O/P EST MOD 30 MIN: CPT | Performed by: PHYSICIAN ASSISTANT

## 2021-08-25 PROCEDURE — 20610 DRAIN/INJ JOINT/BURSA W/O US: CPT | Performed by: PHYSICIAN ASSISTANT

## 2021-08-25 NOTE — PROGRESS NOTES
"rig        [facility] [place] [facility] Note        Subjective     CC: Pain of the Right Ankle and Follow-up (6 months follow up for Primary osteoarthritis of both knees)      ENE Rivers is a 76 y.o. male.  Patient returns today with new right ankle pain and for his bilateral knee arthritis.  He reports he fell and hurt his ankle several months ago.  He has had prior ankle injuries in the past.  It still is somewhat stiff and bothers him with standing for long periods of time.  Is not stopping him from doing anything.  He is also here for his bilateral knee arthritis.  He has done well with intermittent viscosupplementation.  Would start like to start more today.        ROS:    Constiutional:Pt denies fever, chills, nausea, or vomiting.  MSK:as above        Objective      Past Medical History  Past Medical History:   Diagnosis Date   • BPH (benign prostatic hypertrophy)    • Chest pain    • Colon cancer (CMS/HCC) 1990    Status post partial colectomy in 1990. No chemotherapy or radiation therapy.   • Coronary artery disease     Nonobstructive coronary artery disease.   • Diabetes (CMS/HCC)    • Dyslipidemia    • GERD (gastroesophageal reflux disease)     Hx of.   • H/O cardiac catheterization    • Hypertension    • Hypothyroidism    • Left shoulder pain    • Primary osteoarthritis of both knees    • Seizure disorder (CMS/HCC)          Physical Exam  /86   Pulse 68   Ht 172.7 cm (67.99\")   Wt 94 kg (207 lb 3.2 oz)   BMI 31.51 kg/m²     Body mass index is 31.51 kg/m².    Patient is well nourished and well developed.        Ortho Exam  V:  Dorsalis Pedis:  Right: 2+;     Posterior Tibial: Right:2+;     Capillary Refill:  Brisk  MSK:      Tibia:  Right:  non tender;    Ankle:  Right: non tender, ROM  normal and motor function  normal;     Foot:  Right:  non tender;       NEURO:     [place] 5.07 monofilament test: not evaluated    Lower extremity sensation: intact     Calf " Atrophy:none    Motor Function: all 5/5              Assessment    Assessment:  1. Right ankle pain, unspecified chronicity    2. Primary osteoarthritis of both knees    3. Arthritis of right ankle    4. Sprain of right ankle, unspecified ligament, initial encounter        Plan:  1. Recommend over the counter anti-inflammatories for pain and/or swelling  2. Right ankle arthritis with ankle sprain.  I reviewed today's x-rays clinical findings past and current treatment the patient.  X-rays show degenerative changes in the ankle joint with no acute bony findings.  I think his pain and functional rotations may likely be from both the arthritis as well as sprain.  I offered him a prescription for physical therapy.  He is not interested.  Return as needed for the ankle.  3. Proceed with first Orthovisc injection in both knees.  Return in 1 week for the second injection or sooner if needed.    Using sterile technique, the right knee was sterilely prepped with Hibiclens.  Following time out, using a 22 gauge needle the right knee was aspirated and then injected with 2 ml Orthovisc. Approximately 0.5 mm of straw-colored fluid was obtained.  Patient tolerated the procedure well.  No complications.      Using sterile technique, the left knee was sterilely prepped with Hibiclens.  Following time out, using a 22 gauge needle the left knee was aspirated and then injected with 2 ml Orthovisc. Approximately 0.5 mm of straw-colored fluid was obtained.  Patient tolerated the procedure well.  No complications.        Kiersten Carreon PA-C  08/26/21  15:25 EDT      Dragon disclaimer:  Much of this encounter note is an electronic transcription/translation of spoken language to printed text. The electronic translation of spoken language may permit erroneous, or at times, nonsensical words or phrases to be inadvertently transcribed; Although I have reviewed the note for such errors, some may still exist.

## 2021-08-25 NOTE — PROGRESS NOTES
Procedure   Large Joint Arthrocentesis: R knee  Date/Time: 8/25/2021 10:25 AM  Consent given by: patient  Site marked: site marked  Timeout: Immediately prior to procedure a time out was called to verify the correct patient, procedure, equipment, support staff and site/side marked as required   Supporting Documentation  Indications: pain   Procedure Details  Location: knee - R knee  Preparation: Patient was prepped and draped in the usual sterile fashion  Needle size: 23 G  Approach: anterolateral  Medications administered: 30 mg Hyaluronan 30 MG/2ML  Patient tolerance: patient tolerated the procedure well with no immediate complications    Large Joint Arthrocentesis: L knee  Date/Time: 8/25/2021 10:26 AM  Consent given by: patient  Site marked: site marked  Timeout: Immediately prior to procedure a time out was called to verify the correct patient, procedure, equipment, support staff and site/side marked as required   Supporting Documentation  Indications: pain   Procedure Details  Location: knee - L knee  Preparation: Patient was prepped and draped in the usual sterile fashion  Needle size: 23 G  Approach: anterolateral  Medications administered: 30 mg Hyaluronan 30 MG/2ML  Patient tolerance: patient tolerated the procedure well with no immediate complications

## 2021-09-01 ENCOUNTER — CLINICAL SUPPORT (OUTPATIENT)
Dept: ORTHOPEDIC SURGERY | Facility: CLINIC | Age: 76
End: 2021-09-01

## 2021-09-01 DIAGNOSIS — M17.0 PRIMARY OSTEOARTHRITIS OF BOTH KNEES: Primary | ICD-10-CM

## 2021-09-01 PROCEDURE — 20610 DRAIN/INJ JOINT/BURSA W/O US: CPT | Performed by: PHYSICIAN ASSISTANT

## 2021-09-01 NOTE — PROGRESS NOTES
Patient presents for the 2nd injection of Orthovisc in both knees    Using sterile technique, the left knee was sterilely prepped with Hibiclens.  Following time out, using a 22 gauge needle the left knee was aspirated and then injected with 2 ml Orthovisc. Approximately 0.5 mm of straw-colored fluid was obtained.  Patient tolerated the procedure well.  No complications.      Using sterile technique, the right knee was sterilely prepped with Hibiclens.  Following time out, using a 22 gauge needle the right knee was aspirated and then injected with 2 ml Orthovisc. Approximately 0.5 mm of straw-colored fluid was obtained.  Patient tolerated the procedure well.  No complications.      RTC 1 week

## 2021-09-01 NOTE — PROGRESS NOTES
Procedure   Large Joint Arthrocentesis: R knee  Date/Time: 9/1/2021 12:52 PM  Consent given by: patient  Site marked: site marked  Timeout: Immediately prior to procedure a time out was called to verify the correct patient, procedure, equipment, support staff and site/side marked as required   Supporting Documentation  Indications: pain   Procedure Details  Location: knee - R knee  Preparation: Patient was prepped and draped in the usual sterile fashion  Needle size: 23 G  Approach: anterolateral  Medications administered: 30 mg Hyaluronan 30 MG/2ML  Patient tolerance: patient tolerated the procedure well with no immediate complications    Large Joint Arthrocentesis: L knee  Date/Time: 9/1/2021 12:52 PM  Consent given by: patient  Site marked: site marked  Timeout: Immediately prior to procedure a time out was called to verify the correct patient, procedure, equipment, support staff and site/side marked as required   Supporting Documentation  Indications: pain   Procedure Details  Location: knee - L knee  Preparation: Patient was prepped and draped in the usual sterile fashion  Needle size: 23 G  Approach: anterolateral  Medications administered: 30 mg Hyaluronan 30 MG/2ML  Patient tolerance: patient tolerated the procedure well with no immediate complications

## 2021-09-08 ENCOUNTER — CLINICAL SUPPORT (OUTPATIENT)
Dept: ORTHOPEDIC SURGERY | Facility: CLINIC | Age: 76
End: 2021-09-08

## 2021-09-08 DIAGNOSIS — M17.0 PRIMARY OSTEOARTHRITIS OF BOTH KNEES: Primary | ICD-10-CM

## 2021-09-08 PROBLEM — R07.2 PRECORDIAL PAIN: Status: ACTIVE | Noted: 2021-09-08

## 2021-09-08 PROBLEM — R55 SYNCOPE AND COLLAPSE: Status: ACTIVE | Noted: 2021-09-08

## 2021-09-08 PROCEDURE — 20610 DRAIN/INJ JOINT/BURSA W/O US: CPT | Performed by: PHYSICIAN ASSISTANT

## 2021-09-08 NOTE — PROGRESS NOTES
Patient presents for the final injection of Orthovisc in bilateral knees.      Using sterile technique, the left knee was sterilely prepped with Hibiclens.  Following time out, using a 22 gauge needle the left knee was aspirated and then injected with 2 ml Orthovisc. Approximately 0.5 mm of straw-colored fluid was obtained.  Patient tolerated the procedure well.  No complications.      Using sterile technique, the right knee was sterilely prepped with Hibiclens.  Following time out, using a 22 gauge needle the right knee was aspirated and then injected with 2 ml Orthovisc. Approximately 0.5 mm of straw-colored fluid was obtained.  Patient tolerated the procedure well.  No complications.      RTC to see Dr. Malcolm in 2 to 3 months or sooner if needed.

## 2021-09-08 NOTE — PROGRESS NOTES
Procedure   Large Joint Arthrocentesis: R knee  Date/Time: 9/8/2021 1:06 PM  Consent given by: patient  Site marked: site marked  Timeout: Immediately prior to procedure a time out was called to verify the correct patient, procedure, equipment, support staff and site/side marked as required   Supporting Documentation  Indications: pain   Procedure Details  Location: knee - R knee  Preparation: Patient was prepped and draped in the usual sterile fashion  Needle size: 23 G  Approach: anterolateral  Medications administered: 30 mg Hyaluronan 30 MG/2ML  Patient tolerance: patient tolerated the procedure well with no immediate complications    Large Joint Arthrocentesis: L knee  Date/Time: 9/8/2021 1:06 PM  Consent given by: patient  Site marked: site marked  Timeout: Immediately prior to procedure a time out was called to verify the correct patient, procedure, equipment, support staff and site/side marked as required   Supporting Documentation  Indications: pain   Procedure Details  Location: knee - L knee  Preparation: Patient was prepped and draped in the usual sterile fashion  Needle size: 23 G  Approach: anterolateral  Medications administered: 30 mg Hyaluronan 30 MG/2ML  Patient tolerance: patient tolerated the procedure well with no immediate complications

## 2021-09-08 NOTE — PROGRESS NOTES
Subjective   Lea Rivers is a 76 y.o. male.  Primary Care: Kingsley Soto MD  Referring: Kiersten Christy MARIAM, APRN  [address] [facility]  [address]  [place] [phone]      Chief Complaint   Patient presents with   • Coronary Artery Disease       Patient Active Problem List    Diagnosis Date Noted   • Syncope and collapse 09/08/2021     Priority: High     Note Last Updated: 9/8/2021     · 13-day Holter monitor, 18 June 2021: Sinus rhythm.  Rare APC and PVC.  Rare SVT up to 11 beats.  No correlation with patient triggered events   • Precordial pain 09/08/2021     Priority: High   • Coronary artery disease      Priority: High     Note Last Updated: 9/8/2021     · Cardiac catheterization by Dr. Baker showed nonobstructive 30% to 40% stenosis of mid LAD, no other significant disease, normal EF.  · Echo 7-20-21: Left ventricular wall thickness is consistent with mild concentric hypertrophy. Normal left-ventricular systolic function, estimated EF 65%. Left ventricular diastolic function is consistent with (grade I) impaired relaxation. Aortic sclerosis without aortic stenosis. Trace aortic insufficiency.  · MPS 7-22-21: Myocardial perfusion imaging indicates a normal myocardial perfusion study with no evidence of ischemia. Left ventricular ejection fraction is normal. EF 73%.   • Dyslipidemia      Priority: Medium   • Hypertension      Priority: Medium     Note Last Updated: 9/10/2021     a. Renal artery duplex scan, 07/09/2014, was negative for renal artery stenosis.     • Diabetes (CMS/HCC)    • Primary osteoarthritis of both knees 05/30/2018   • Hypothyroidism    • GERD (gastroesophageal reflux disease)    • Seizure disorder (CMS/HCC)    • BPH (benign prostatic hypertrophy)       History of Present Illness   76-year-old hypertensive dyslipidemic diabetic male with known coronary artery disease.  Earlier this year he was brought to the emergency department with naun syncope.  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  He had naun syncope and was taken to the emergency department for volume replacement.  He followed up with the heart and valve clinic who further evaluated with an echocardiogram, myocardial perfusion study and a 14-day Holter monitor.  His echocardiogram showed mild hypertensive changes but no significant valvular disease.  His myocardial perfusion study was negative and his Holter monitor showed rare APCs and PVCs with a single short run of SVT with no significant pauses.  He presents today for cardiac evaluation.  He has had no recurrence of syncope.  He has occasional chest pain which is random and sharp in nature and was brief and not associated with nausea or diaphoresis.  He denies exertional chest pain or dyspnea, denies orthopnea, PND, claudication.  He has some mild lower extremity edema which he associates with osteoarthritis.  He has no awareness of palpitations.  He comes in with several days of blood pressures which seemed to run between 130-170 on average.  He is on statin therapy but states he is not had a lipid panel in the past year.      Past Surgical History:   Procedure Laterality Date   • APPENDECTOMY     • CARPAL TUNNEL RELEASE Bilateral    • CHOLECYSTECTOMY     • COLECTOMY PARTIAL / TOTAL  1990    Partial colectomy   • COLONOSCOPY     • CYSTOSCOPY TRANSURETHRAL RESECTION OF PROSTATE N/A 9/21/2018    Procedure: CYSTOSCOPY TRANSURETHRAL RESECTION OF PROSTATE GREENLIGHT;  Surgeon: Naseem Clayton MD;  Location: [facility];  Service: Urology   • OTHER SURGICAL HISTORY      Contraction surgery on bilateral hands and wrists.   • OTHER SURGICAL HISTORY      left and right hands   • SINUS SURGERY     • TONSILLECTOMY     • TURP / TRANSURETHRAL INCISION / DRAINAGE PROSTATE     • VASECTOMY         The following portions of the patient's history were reviewed and updated as appropriate: allergies, current medications, past family  history, past medical history, past social history, past surgical history and problem list.    Allergies   Allergen Reactions   • Sulfa Antibiotics Rash     Childhood reaction          Current Outpatient Medications   Medication Instructions   • aspirin 81 mg, Oral, Daily   • chlorthalidone (HYGROTON) 25 mg, Oral, Daily   • coenzyme Q10 100 mg, Oral, Daily   • Diclofenac Sodium (VOLTAREN) 4 g, Topical, 4 Times Daily PRN   • divalproex (DEPAKOTE ER) 500 mg, Oral, 2 times daily, 500mg QAM, 1000mg QPM   • fluticasone (FLONASE) 50 MCG/ACT nasal spray 2 sprays, Nasal, As Needed, Administer 2 sprays in each nostril for each dose.   • isosorbide mononitrate (IMDUR) 30 mg, Oral, Daily   • levETIRAcetam (KEPPRA) 500 mg, Oral, 2 Times Daily   • levothyroxine (SYNTHROID, LEVOTHROID) 88 mcg, Oral, Daily   • lisinopril (PRINIVIL,ZESTRIL) 20 mg, Oral, 2 Times Daily   • metFORMIN ER (GLUCOPHAGE-XR) 500 mg, Oral, Daily With Breakfast   • omeprazole (PRILOSEC) 20 mg, Oral, Daily   • ONE TOUCH ULTRA TEST test strip Daily, for testing   • ONETOUCH DELICA LANCETS 33G mis USE 1 LANCET ONCE A DAY   • simvastatin (ZOCOR) 20 mg, Oral, Nightly   • terazosin (HYTRIN) 5 mg, Oral, Nightly   • verapamil SR (CALAN-SR) 240 mg, Oral, 2 Times Daily   • vitamin B-12 (CYANOCOBALAMIN) 1,000 mcg, Oral, Daily         Social History     Socioeconomic History   • Marital status:      Spouse name: Not on file   • Number of children: Not on file   • Years of education: Not on file   • Highest education level: Not on file   Tobacco Use   • Smoking status: Never Smoker   • Smokeless tobacco: Never Used   Vaping Use   • Vaping Use: Never used   Substance and Sexual Activity   • Alcohol use: No   • Drug use: No   • Sexual activity: Defer       Family History   Problem Relation Age of Onset   • Cancer Mother         brain   • Hypertension Father    • Stroke Father    • Stroke Other    • Arthritis Other    • Cancer Other    • No Known Problems Sister    •  "Stroke Maternal Grandfather    • No Known Problems Paternal Grandmother    • No Known Problems Paternal Grandfather    • Stroke Sister        Objective      /72 (BP Location: Right arm, Patient Position: Sitting, Cuff Size: Adult)   Pulse 80   Ht 172.7 cm (68\")   Wt 98.9 kg (218 lb)   SpO2 97%   BMI 33.15 kg/m²     Constitutional:       Appearance: Well-developed.   Pulmonary:      Effort: Pulmonary effort is normal. No respiratory distress.      Breath sounds: Normal breath sounds. No wheezing. No rales.      Comments: Bases clear  Chest:      Chest wall: Not tender to palpatation.   Cardiovascular:      Normal rate. Regular rhythm.      Murmurs: There is no murmur.      No gallop. No click. No rub.   Pulses:     Intact distal pulses.   Edema:     Peripheral edema absent.   Musculoskeletal: Normal range of motion.         ECG 12 Lead    Date/Time: 9/8/2021 4:24 PM  Performed by: Maryanne Baker MD  Authorized by: Maryanne Baker MD   Previous ECG: no previous ECG available  Rhythm: sinus rhythm  Rate: normal  Conduction: conduction normal  ST Segments: ST segments normal  T Waves: T waves normal  QRS axis: normal  Other: no other findings    Clinical impression: normal ECG            Lab Review:   Lab Results   Component Value Date    GLUCOSE 115 (H) 05/26/2021    BUN 13 05/26/2021    CREATININE 1.36 (H) 05/26/2021    EGFRIFNONA 51 (L) 05/26/2021    BCR 9.6 05/26/2021    CO2 26.0 05/26/2021    CALCIUM 8.7 05/26/2021    ALBUMIN 3.50 05/26/2021    AST 13 05/26/2021    ALT 8 05/26/2021      Lab Results   Component Value Date    K 4.0 05/26/2021           Lab Results   Component Value Date    WBC 6.76 05/26/2021    HGB 11.8 (L) 05/26/2021    HCT 34.7 (L) 05/26/2021    MCV 96.9 05/26/2021     (L) 05/26/2021         Lab Results   Component Value Date    TSH 3.699 02/01/2017         CARDIAC LABS:      Result Review:    [x]  Laboratory  [x]  Radiology  [x]  EKG/Telemetry   []  Pathology  [x]  Old records  []  " Other:    Assessment:   Diagnosis Plan   1. Coronary artery disease involving native coronary artery of native heart without angina pectoris  stable, no current angina or CHF type symptoms, continue risk factor management.   2. Essential hypertension  well-controlled, continue current treatment.   3. Syncope and collapse subsequent to working in very hot temperatures. Subsequent work-up including echocardiogram, Cardiolite stress test and cardiac monitor was unremarkable. No further investigations are needed at this time. Patient encouraged to avoid working long duration and hot temperatures, maintain good hydration and take a break to cool down if he were to experience onset of dizziness.   4. Dyslipidemia  continue simvastatin, check lipid panel and CMP       Plan:  Assessment and recommendations as above.  Findings of negative work-up discussed and he was reassured.  Heart healthy diet, regular exercise and maintenance of good hydration recommended.  Continue current medications.   Follow-up in 6 months, sooner as needed.  Thank you for allowing us to participate in the care of your patient.     Scribed for Maryanne Baker MD by Electronically signed by Electronically signed by ALMAZ Gannon, 09/10/21, 11:01 AM EDT.    I, Maryanne Baker MD, personally performed the services described in this documentation as scribed by the above named individual in my presence, and it is both accurate and complete.  9/10/2021  16:55 EDT

## 2021-09-10 ENCOUNTER — LAB (OUTPATIENT)
Dept: LAB | Facility: HOSPITAL | Age: 76
End: 2021-09-10

## 2021-09-10 ENCOUNTER — OFFICE VISIT (OUTPATIENT)
Dept: CARDIOLOGY | Facility: CLINIC | Age: 76
End: 2021-09-10

## 2021-09-10 VITALS
SYSTOLIC BLOOD PRESSURE: 118 MMHG | HEART RATE: 80 BPM | HEIGHT: 68 IN | BODY MASS INDEX: 33.04 KG/M2 | DIASTOLIC BLOOD PRESSURE: 72 MMHG | WEIGHT: 218 LBS | OXYGEN SATURATION: 97 %

## 2021-09-10 DIAGNOSIS — I25.10 CORONARY ARTERY DISEASE INVOLVING NATIVE CORONARY ARTERY OF NATIVE HEART WITHOUT ANGINA PECTORIS: Primary | Chronic | ICD-10-CM

## 2021-09-10 DIAGNOSIS — R55 SYNCOPE AND COLLAPSE: ICD-10-CM

## 2021-09-10 DIAGNOSIS — E78.5 DYSLIPIDEMIA: Chronic | ICD-10-CM

## 2021-09-10 DIAGNOSIS — I10 ESSENTIAL HYPERTENSION: Chronic | ICD-10-CM

## 2021-09-10 LAB
ALBUMIN SERPL-MCNC: 4.3 G/DL (ref 3.5–5.2)
ALBUMIN/GLOB SERPL: 1.7 G/DL
ALP SERPL-CCNC: 54 U/L (ref 39–117)
ALT SERPL W P-5'-P-CCNC: 7 U/L (ref 1–41)
ANION GAP SERPL CALCULATED.3IONS-SCNC: 10.7 MMOL/L (ref 5–15)
AST SERPL-CCNC: 9 U/L (ref 1–40)
BILIRUB SERPL-MCNC: 0.5 MG/DL (ref 0–1.2)
BUN SERPL-MCNC: 10 MG/DL (ref 8–23)
BUN/CREAT SERPL: 9 (ref 7–25)
CALCIUM SPEC-SCNC: 9 MG/DL (ref 8.6–10.5)
CHLORIDE SERPL-SCNC: 99 MMOL/L (ref 98–107)
CHOLEST SERPL-MCNC: 127 MG/DL (ref 0–200)
CO2 SERPL-SCNC: 26.3 MMOL/L (ref 22–29)
CREAT SERPL-MCNC: 1.11 MG/DL (ref 0.76–1.27)
GFR SERPL CREATININE-BSD FRML MDRD: 64 ML/MIN/1.73
GLOBULIN UR ELPH-MCNC: 2.6 GM/DL
GLUCOSE SERPL-MCNC: 96 MG/DL (ref 65–99)
HDLC SERPL-MCNC: 28 MG/DL (ref 40–60)
LDLC SERPL CALC-MCNC: 74 MG/DL (ref 0–100)
LDLC/HDLC SERPL: 2.53 {RATIO}
POTASSIUM SERPL-SCNC: 4 MMOL/L (ref 3.5–5.2)
PROT SERPL-MCNC: 6.9 G/DL (ref 6–8.5)
SODIUM SERPL-SCNC: 136 MMOL/L (ref 136–145)
TRIGL SERPL-MCNC: 141 MG/DL (ref 0–150)
VLDLC SERPL-MCNC: 25 MG/DL (ref 5–40)

## 2021-09-10 PROCEDURE — 93000 ELECTROCARDIOGRAM COMPLETE: CPT | Performed by: INTERNAL MEDICINE

## 2021-09-10 PROCEDURE — 80061 LIPID PANEL: CPT | Performed by: PHYSICIAN ASSISTANT

## 2021-09-10 PROCEDURE — 36415 COLL VENOUS BLD VENIPUNCTURE: CPT | Performed by: INTERNAL MEDICINE

## 2021-09-10 PROCEDURE — 99204 OFFICE O/P NEW MOD 45 MIN: CPT | Performed by: INTERNAL MEDICINE

## 2021-09-10 PROCEDURE — 80053 COMPREHEN METABOLIC PANEL: CPT | Performed by: PHYSICIAN ASSISTANT

## 2021-09-10 RX ORDER — ASPIRIN 81 MG/1
81 TABLET ORAL DAILY
Status: ON HOLD | COMMUNITY
End: 2022-04-12 | Stop reason: SDUPTHER

## 2021-09-10 RX ORDER — UBIDECARENONE 100 MG
100 CAPSULE ORAL DAILY
COMMUNITY
End: 2022-09-22

## 2021-12-08 ENCOUNTER — OFFICE VISIT (OUTPATIENT)
Dept: ORTHOPEDIC SURGERY | Facility: CLINIC | Age: 76
End: 2021-12-08

## 2021-12-08 VITALS
BODY MASS INDEX: 32.67 KG/M2 | WEIGHT: 215.6 LBS | HEIGHT: 68 IN | DIASTOLIC BLOOD PRESSURE: 97 MMHG | HEART RATE: 71 BPM | SYSTOLIC BLOOD PRESSURE: 148 MMHG

## 2021-12-08 DIAGNOSIS — M17.0 PRIMARY OSTEOARTHRITIS OF BOTH KNEES: Primary | ICD-10-CM

## 2021-12-08 PROCEDURE — 99214 OFFICE O/P EST MOD 30 MIN: CPT | Performed by: ORTHOPAEDIC SURGERY

## 2021-12-08 RX ORDER — OXYBUTYNIN CHLORIDE 15 MG/1
15 TABLET, EXTENDED RELEASE ORAL DAILY
COMMUNITY
End: 2022-04-27

## 2021-12-08 RX ORDER — ACETAMINOPHEN 325 MG/1
1000 TABLET ORAL ONCE
Status: CANCELLED | OUTPATIENT
Start: 2021-12-08 | End: 2021-12-08

## 2021-12-08 RX ORDER — PREGABALIN 150 MG/1
150 CAPSULE ORAL ONCE
Status: CANCELLED | OUTPATIENT
Start: 2021-12-08 | End: 2021-12-08

## 2021-12-08 RX ORDER — MELOXICAM 7.5 MG/1
15 TABLET ORAL ONCE
Status: CANCELLED | OUTPATIENT
Start: 2021-12-08 | End: 2021-12-08

## 2021-12-08 NOTE — PROGRESS NOTES
[facility] Note    Subjective     Chief Complaint   Patient presents with   • Follow-up     3 month follow up; Primary osteoarthritis of both knees-[facility] series completed on 9/8/21        HPI    It has been 3  month(s) since Mr. Rivers's last visit. He returns to clinic today for follow-up of bilateral knee arthritis. The issue has been ongoing for 8 year(s). He rates his pain a 9/10 on the pain scale. Previous/current treatments: viscosupplementation (last injection 09/08/2021) and steroid injection (last injection 10/13/2020). Current symptoms: pain. The pain is worse with walking, standing, sitting, working and rising from seated position; sitting and elevating the extremity improve the pain. Overall, he is doing worse.  Right knee bothers him more than the left.  He would like to proceed with knee replacement surgery at this point.  He has exhausted conservative treatment options.  No history of clots or clotting disorders.  He is on no blood thinners.  He has some family and a neighbor that can help him out postoperatively.    I have reviewed the following portions of the patient's history and agree with: History of Present Illness and Review of Systems    Patient Active Problem List   Diagnosis   • Coronary artery disease   • Dyslipidemia   • Hypothyroidism   • GERD (gastroesophageal reflux disease)   • Seizure disorder (HCC)   • BPH (benign prostatic hypertrophy)   • Hypertension   • Primary osteoarthritis of both knees   • Syncope and collapse   • Precordial pain   • Diabetes (HCC)     Past Medical History:   Diagnosis Date   • Colon cancer (HCC) 1990    Status post partial colectomy in 1990. No chemotherapy or radiation therapy.   • Coronary artery disease     Nonobstructive coronary artery disease.   • Diabetes (HCC)    • Dyslipidemia    • GERD (gastroesophageal reflux disease)     Hx of.   • Hypertension    • Hypothyroidism    • Left shoulder pain    • Primary osteoarthritis  of both knees    • Seizure disorder (HCC)       Past Surgical History:   Procedure Laterality Date   • APPENDECTOMY     • CARPAL TUNNEL RELEASE Bilateral    • CHOLECYSTECTOMY     • COLECTOMY PARTIAL / TOTAL  1990    Partial colectomy   • COLONOSCOPY     • CYSTOSCOPY TRANSURETHRAL RESECTION OF PROSTATE N/A 9/21/2018    Procedure: CYSTOSCOPY TRANSURETHRAL RESECTION OF PROSTATE GREENLIGHT;  Surgeon: Naseem Clayton MD;  Location: [facility];  Service: Urology   • OTHER SURGICAL HISTORY      Contraction surgery on bilateral hands and wrists.   • OTHER SURGICAL HISTORY      left and right hands   • SINUS SURGERY     • TONSILLECTOMY     • TURP / TRANSURETHRAL INCISION / DRAINAGE PROSTATE     • VASECTOMY        Family History   Problem Relation Age of Onset   • Cancer Mother         brain   • Hypertension Father    • Stroke Father    • Stroke Other    • Arthritis Other    • Cancer Other    • No Known Problems Sister    • Stroke Maternal Grandfather    • No Known Problems Paternal Grandmother    • No Known Problems Paternal Grandfather    • Stroke Sister      Social History     Socioeconomic History   • Marital status:    Tobacco Use   • Smoking status: Never Smoker   • Smokeless tobacco: Never Used   Vaping Use   • Vaping Use: Never used   Substance and Sexual Activity   • Alcohol use: No   • Drug use: No   • Sexual activity: Defer      Current Outpatient Medications on File Prior to Visit   Medication Sig Dispense Refill   • aspirin 81 MG EC tablet Take 81 mg by mouth Daily.     • chlorthalidone (HYGROTON) 25 MG tablet Take 1 tablet by mouth Daily. 90 tablet 3   • coenzyme Q10 100 MG capsule Take 100 mg by mouth Daily.     • Diclofenac Sodium (VOLTAREN) 1 % gel gel Apply 4 g topically to the appropriate area as directed 4 (Four) Times a Day As Needed (prn for knee pain). 100 g 5   • divalproex (DEPAKOTE) 500 MG 24 hr tablet Take 500 mg by mouth 2 (two) times a day. 500mg QAM, 1000mg QPM     • fluticasone  (FLONASE) 50 MCG/ACT nasal spray 2 sprays into each nostril as needed for rhinitis. Administer 2 sprays in each nostril for each dose.     • isosorbide mononitrate (IMDUR) 30 MG 24 hr tablet Take 30 mg by mouth Daily.     • levETIRAcetam (KEPPRA) 500 MG tablet Take 500 mg by mouth 2 (two) times a day.     • levothyroxine (SYNTHROID, LEVOTHROID) 88 MCG tablet Take 88 mcg by mouth daily.     • lisinopril (PRINIVIL,ZESTRIL) 20 MG tablet Take 20 mg by mouth 2 (two) times a day.     • metFORMIN ER (GLUCOPHAGE-XR) 500 MG 24 hr tablet Take 500 mg by mouth Daily With Breakfast.     • omeprazole (priLOSEC) 20 MG capsule Take 20 mg by mouth Daily.     • ONE TOUCH ULTRA TEST test strip Daily. for testing  0   • ONETOUCH DELICA LANCETS 33G misc USE 1 LANCET ONCE A DAY  0   • oxybutynin XL (DITROPAN XL) 15 MG 24 hr tablet oxybutynin chloride ER 15 mg tablet,extended release 24 hr   TAKE 1 TABLET BY MOUTH  DAILY     • simvastatin (ZOCOR) 20 MG tablet Take 20 mg by mouth every night.     • terazosin (HYTRIN) 5 MG capsule Take 5 mg by mouth Every Night.     • verapamil SR (CALAN-SR) 240 MG CR tablet Take 240 mg by mouth 2 (two) times a day.     • vitamin B-12 (CYANOCOBALAMIN) 1000 MCG tablet Take 1,000 mcg by mouth Daily.       No current facility-administered medications on file prior to visit.      Allergies   Allergen Reactions   • Sulfa Antibiotics Rash     Childhood reaction         Review of Systems   Constitutional: Negative for activity change, appetite change, chills, diaphoresis, fatigue, fever and unexpected weight change.   HENT: Negative for congestion, dental problem, drooling, ear discharge, ear pain, facial swelling, hearing loss, mouth sores, nosebleeds, postnasal drip, rhinorrhea, sinus pressure, sneezing, sore throat, tinnitus, trouble swallowing and voice change.    Eyes: Negative for photophobia, pain, discharge, redness, itching and visual disturbance.   Respiratory: Negative for apnea, cough, choking, chest  "tightness, shortness of breath, wheezing and stridor.    Cardiovascular: Negative for chest pain, palpitations and leg swelling.   Gastrointestinal: Negative for abdominal distention, abdominal pain, anal bleeding, blood in stool, constipation, diarrhea, nausea, rectal pain and vomiting.   Endocrine: Negative for cold intolerance, heat intolerance, polydipsia, polyphagia and polyuria.   Genitourinary: Negative for decreased urine volume, difficulty urinating, dysuria, enuresis, flank pain, frequency, genital sores, hematuria and urgency.   Musculoskeletal: Positive for arthralgias. Negative for back pain, gait problem, joint swelling, myalgias, neck pain and neck stiffness.   Skin: Negative for color change, pallor, rash and wound.   Allergic/Immunologic: Negative for environmental allergies, food allergies and immunocompromised state.   Neurological: Negative for dizziness, tremors, seizures, syncope, facial asymmetry, speech difficulty, weakness, light-headedness, numbness and headaches.   Hematological: Negative for adenopathy. Does not bruise/bleed easily.   Psychiatric/Behavioral: Negative for agitation, behavioral problems, confusion, decreased concentration, dysphoric mood, hallucinations, self-injury, sleep disturbance and suicidal ideas. The patient is not nervous/anxious and is not hyperactive.         Objective      Physical Exam  /97   Pulse 71   Ht 172.7 cm (67.99\")   Wt 97.8 kg (215 lb 9.6 oz)   BMI 32.79 kg/m²     Body mass index is 32.79 kg/m².    General:   Mental Status:  Alert   Appearance: Cooperative, in no acute distress   Build and Nutrition: Well-nourished well-developed male   Orientation: Alert and oriented to person, place and time   Posture: Normal   Gait: Nonantalgic    Integument:   Right knee: no skin lesions, no rash, no ecchymosis   Left knee: no skin lesions, no rash, no ecchymosis    Lower Extremities:   Right Knee:    Tenderness:  Medial joint line " tenderness    Effusion:  1-2+    Swelling:  None    Crepitus:  Positive    Atrophy:  None    Range of motion:  Extension: 5°       Flexion: 120°  Instability:  No varus laxity, no valgus laxity, negative anterior drawer  Deformities:  Varus   Left Knee:    Tenderness:  Medial joint line tenderness    Effusion:  1+    Swelling:  None    Crepitus: Positive    Atrophy:  None    Range of motion:  Extension: 5°       Flexion: 120°  Instability:  No varus laxity, no valgus laxity, negative anterior drawer  Deformities:  Varus      Imaging/Studies  Imaging Results (Last 24 Hours)     Procedure Component Value Units Date/Time    XR Knee 4+ View Bilateral [[id number]] Resulted: 12/08/21 1319     Updated: 12/08/21 1320    Narrative:      Right Knee Radiographs  Indication: right knee pain  Views: Standing AP's and skiers of both knees, with lateral and sunrise   views of the right knee    Comparison: 6/23/2020    Findings:   Advanced arthritic changes, with bone-on-bone contact medial compartment,   tricompartmental osteophytes and degeneration, with no acute bony   abnormalities.  No unusual bony features.  Mild worsening compared to the   previous imaging.    Left Knee Radiographs  Indication: left knee pain  Views: Standing AP's and skiers of both knees, with lateral and sunrise   views of the left knee    Comparison: 6/23/2020    Findings:   Advanced arthritic changes, with bone-on-bone contact medial compartment,   tricompartmental osteophytes and degeneration, with no acute bony   abnormalities.  No unusual bony features.  Mild worsening compared to the   previous imaging.            Assessment and Plan     Diagnoses and all orders for this visit:    1. Primary osteoarthritis of both knees (Primary)  -     XR Knee 4+ View Bilateral  -     Case Request; Standing  -     COVID PRE-OP / PRE-PROCEDURE SCREENING ORDER (NO ISOLATION) - Swab, Nasopharynx; Future  -     Instructions on coughing, deep breathing, and incentive  spirometry.; Future  -     CBC and Differential; Future  -     Basic metabolic panel; Future  -     Protime-INR; Future  -     APTT; Future  -     Hemoglobin A1c; Future  -     Sedimentation rate; Future  -     C-reactive protein; Future  -     Case Request    Other orders  -     Follow Anesthesia Guidelines / Standing Orders; Future  -     Obtain informed consent  -     Provide instructions to patient regarding NPO status  -     Chlorhexidine Skin Prep - Educate and Review With Patient; Future  -     Provide Patient With ERAS Hydration Instructions  -     Provide Patient With Enhanced Recovery Booklet(s) or Handout  -     Provide Instructions/Handout For Benzoyl Peroxide 5% Wash If Having Shoulder/Arm Surgery (If Prescribed)  -     Provide Instructions/Handout For Bactroban And Chlorhexidine Shower (If Prescribed)  -     Perform A Memory Screening On All Hip/Knee Replacement Patients >Or Equal To 65 Years Or Older  -     Complete A PROMIS And HOOS Or KOOS Survey If Having Hip Or Knee Replacement  -     Provide Patient With Carbo Loading Instructions  -     Provide Patient With ERAS Booklet(s)/Handout  -     mupirocin (Bactroban Nasal) 2 % nasal ointment; into the nostril(s) as directed by provider 2 (Two) Times a Day.  Dispense: 22 g; Refill: 0  -     chlorhexidine (HIBICLENS) 4 % external liquid; Apply  topically to the appropriate area as directed Daily. Shower with hibiclens solution as directed for 5 days prior to surgery  Dispense: 236 mL; Refill: 0        1. Primary osteoarthritis of both knees        I reviewed my findings with the patient today.  He has advanced bilateral knee arthritis, and symptoms are worse on the right compared to left.  He would like to proceed with right total knee arthroplasty surgery.  He has exhausted conservative treatment options.  Please see my counseling note for details.    Surgical Counseling     I have informed the patient of the diagnosis and the prognosis.  Exhaustive  conservative treatment modalities have not resulted in long term pain relief.  The symptoms have progressed to the point of daily pain and inability to perform activities of daily living without significant pain. The patient has reached the point of desiring to proceed with total knee arthroplasty after discussing the risks, benefits and alternatives to the procedure.  The surgical procedure itself was discussed in detail. Risks of the procedure were discussed, which included but are not limited to, bleeding, infection, damage to blood vessels and nerves, incomplete pain relief, loosening of the prosthesis (early or late), deep infection (early or late), need for further surgery, loss of limb, deep venous thrombosis, pulmonary embolus, death, heart attack, stroke, kidney failure, liver failure, and anesthetic complications.  In addition, the potential for deep infection developing in the future was discussed, which could require further surgery.  The knee would have to be re-opened, debrided, and potentially remove the prosthesis, which may or may not be replaced in the future.  Also, the possibility for loosening of the prosthesis has been mentioned.  If the prosthesis loosened, a revision arthroplasty could be performed, with results that are not as predictable compared to the original procedure.  The typical rehabilitative course has also been discussed, and full recovery may take up to a year to see the maximum benefit.  The importance of patient cooperation in the rehabilitative efforts has also been discussed.  No guarantees were given.  The patient understands the potential risks versus the benefits and desires to proceed with total knee arthroplasty at a mutually convenient time.    Return for surgery.      Louis Malcolm MD  12/08/21  13:35 EST

## 2022-02-08 ENCOUNTER — HOSPITAL ENCOUNTER (OUTPATIENT)
Dept: GENERAL RADIOLOGY | Facility: HOSPITAL | Age: 77
Discharge: HOME | End: 2022-02-08
Admitting: INTERNAL MEDICINE

## 2022-02-08 ENCOUNTER — TRANSCRIBE ORDERS (OUTPATIENT)
Dept: ADMINISTRATIVE | Facility: HOSPITAL | Age: 77
End: 2022-02-08

## 2022-02-08 DIAGNOSIS — R05.9 COUGH: ICD-10-CM

## 2022-02-08 DIAGNOSIS — R05.9 COUGH: Primary | ICD-10-CM

## 2022-02-08 PROCEDURE — 71046 X-RAY EXAM CHEST 2 VIEWS: CPT

## 2022-03-01 ENCOUNTER — ANESTHESIA EVENT (OUTPATIENT)
Dept: PERIOP | Facility: HOSPITAL | Age: 77
End: 2022-03-01

## 2022-03-17 NOTE — PROGRESS NOTES
[facility]    Encounter Date: 2022    Patient ID: Lea Rivers is a 76 y.o. male.  : 1945     PCP: Mannie Melvin MD       Chief Complaint: Coronary Artery Disease      PROBLEM LIST:  1. Coronary artery disease  a. [facility] by Dr. Baker: Normal EF. Nobstructive 30% to 40% stenosis of mid LAD, no other significant disease.   b. Echo 2021: EF 65%. Mild concentric LVH. Grade I diastolic dysfunction. Aortic sclerosis without AS. Trace aortic insufficiency.  c. MPS 2021: EF 73%. No evidence of ischemia.   2. Syncope and collapse   a. 13-day Holter monitor, 2021: SR. Rare APC and PVC. Rare SVT up to 11 beats. No correlation with patient triggered events.  3. Hypertension   4. Dyslipidemia   5. DM   6. Osteoarthritis   7. Hypothyroidism   8. GERD  9. BPH    History of Present Illness  Patient presents today for a 6 month follow-up with a history of coronary artery disease, syncope, and cardiac risk factors. Since last visit, Patient has had no interim ER visits, hospitalizations, serious illnesses, or injuries. Patient denies chest pain, shortness of breath, palpitations, orthopnea, edema, or syncope/near-syncope. He states his BP has been running 120-130s systolic at home.     Allergies   Allergen Reactions   • Sulfa Antibiotics Rash     Childhood reaction          Current Outpatient Medications:   •  albuterol sulfate  (90 Base) MCG/ACT inhaler, As Needed., Disp: , Rfl:   •  aspirin 81 MG EC tablet, Take 81 mg by mouth Daily., Disp: , Rfl:   •  Chlorhexidine Gluconate (Antiseptic Skin Cleanser) 4 % solution, Shower with solution as directed for 5 days prior to surgery, Disp: 237 mL, Rfl: 0  •  chlorthalidone (HYGROTON) 25 MG tablet, Take 1 tablet by mouth Daily., Disp: 90 tablet, Rfl: 3  •  coenzyme Q10 100 MG capsule, Take 100 mg by mouth Daily., Disp: , Rfl:   •  Diclofenac Sodium (VOLTAREN) 1 % gel gel, Apply 4 g topically to the  appropriate area as directed 4 (Four) Times a Day As Needed (prn for knee pain)., Disp: 100 g, Rfl: 5  •  divalproex (DEPAKOTE) 500 MG 24 hr tablet, Take 500 mg by mouth 2 (two) times a day. 500mg QAM, 1000mg QPM, Disp: , Rfl:   •  fluticasone (FLONASE) 50 MCG/ACT nasal spray, 2 sprays into the nostril(s) as directed by provider As Needed for Rhinitis. Administer 2 sprays in each nostril for each dose., Disp: , Rfl:   •  isosorbide mononitrate (IMDUR) 30 MG 24 hr tablet, Take 30 mg by mouth Daily., Disp: , Rfl:   •  levETIRAcetam (KEPPRA) 500 MG tablet, Take 500 mg by mouth 2 (two) times a day., Disp: , Rfl:   •  levothyroxine (SYNTHROID, LEVOTHROID) 88 MCG tablet, Take 88 mcg by mouth daily., Disp: , Rfl:   •  lisinopril (PRINIVIL,ZESTRIL) 20 MG tablet, Take 20 mg by mouth 2 (two) times a day., Disp: , Rfl:   •  metFORMIN ER (GLUCOPHAGE-XR) 500 MG 24 hr tablet, Take 500 mg by mouth Daily With Breakfast., Disp: , Rfl:   •  mupirocin (BACTROBAN) 2 % ointment, Place into the nostril(s) as directed by provider 2 (Two) Times a Day., Disp: 22 g, Rfl: 0  •  omeprazole (priLOSEC) 20 MG capsule, Take 20 mg by mouth Daily., Disp: , Rfl:   •  ONE TOUCH ULTRA TEST test strip, Daily. for testing, Disp: , Rfl: 0  •  ONETOUCH DELICA LANCETS 33G misc, USE 1 LANCET ONCE A DAY, Disp: , Rfl: 0  •  oxybutynin XL (DITROPAN XL) 15 MG 24 hr tablet, oxybutynin chloride ER 15 mg tablet,extended release 24 hr  TAKE 1 TABLET BY MOUTH  DAILY, Disp: , Rfl:   •  simvastatin (ZOCOR) 20 MG tablet, Take 20 mg by mouth every night., Disp: , Rfl:   •  terazosin (HYTRIN) 5 MG capsule, Take 5 mg by mouth Every Night., Disp: , Rfl:   •  verapamil SR (CALAN-SR) 240 MG CR tablet, Take 240 mg by mouth 2 (two) times a day., Disp: , Rfl:   •  vitamin B-12 (CYANOCOBALAMIN) 1000 MCG tablet, Take 1,000 mcg by mouth Daily., Disp: , Rfl:     The following portions of the patient's history were reviewed and updated as appropriate: allergies, current medications,  "past family history, past medical history, past social history, past surgical history and problem list.    ROS  Review of Systems   Constitution: Negative for chills, fever, fatigue, generalized weakness.   Cardiovascular: Negative for chest pain, dyspnea on exertion, leg swelling, palpitations, orthopnea, and syncope.   Respiratory: Negative for cough, shortness of breath, and wheezing.  HENT: Negative for ear pain, nosebleeds, and tinnitus.  Gastrointestinal: Negative for abdominal pain, constipation, diarrhea, nausea and vomiting.   Genitourinary: No urinary symptoms.  Musculoskeletal: Negative for muscle cramps.  Neurological: Negative for dizziness, headaches, loss of balance, numbness, and symptoms of stroke.  Psychiatric: Normal mental status.     All other systems reviewed and are negative.        Objective:     /86 (BP Location: Right arm, Patient Position: Sitting)   Pulse 78   Ht 174 cm (68.5\")   Wt 98.4 kg (217 lb)   SpO2 97%   BMI 32.51 kg/m²    BP recheck 148/84    Physical Exam  Constitutional: Patient appears well-developed and well-nourished.   HENT: HEENT exam unremarkable.   Neck: No JVD present.   Cardiovascular: Normal rate, regular rhythm and normal heart sounds. No murmur heard.   2+ symmetric pulses.   Pulmonary/Chest: Breath sounds normal. Does not exhibit tenderness.   Musculoskeletal: Does not exhibit edema.   Neurological: Neurological exam unremarkable.   Vitals reviewed.    Data Review:   Lab Results   Component Value Date    GLUCOSE 96 09/10/2021    BUN 10 09/10/2021    CREATININE 1.11 09/10/2021    EGFRIFNONA 64 09/10/2021    BCR 9.0 09/10/2021    K 4.0 09/10/2021    CO2 26.3 09/10/2021    CALCIUM 9.0 09/10/2021    ALBUMIN 4.30 09/10/2021    AST 9 09/10/2021    ALT 7 09/10/2021     Lab Results   Component Value Date    CHOL 127 09/10/2021    TRIG 141 09/10/2021    HDL 28 (L) 09/10/2021    LDL 74 09/10/2021      Lab Results   Component Value Date    WBC 6.76 05/26/2021    " RBC 3.58 (L) 05/26/2021    HGB 11.8 (L) 05/26/2021    HCT 34.7 (L) 05/26/2021    MCV 96.9 05/26/2021     (L) 05/26/2021          Assessment:      Diagnosis Plan   1. Coronary artery disease involving native coronary artery of native heart without angina pectoris  Stable with no current angina. Continue current medication and risk factor management,    2. Dyslipidemia  Well controlled, continue statin therapy   3. Primary hypertension  Mild hypertension, patient to keep BP log at home and report if systolic average >140. Will adjust medications if necessary.      Plan:   Stable cardiac status.   Patient to keep BP log at home and call if BP is >140 systolc  Continue current medications.   FU in 6 MO, sooner as needed.  Thank you for allowing us to participate in the care of your patient.     Irma Ovalles PA-C    Part of this note may be an electronic transcription/translation of spoken language to printed text using the Dragon Dictation System.

## 2022-03-18 ENCOUNTER — OFFICE VISIT (OUTPATIENT)
Dept: CARDIOLOGY | Facility: CLINIC | Age: 77
End: 2022-03-18

## 2022-03-18 VITALS
OXYGEN SATURATION: 97 % | DIASTOLIC BLOOD PRESSURE: 86 MMHG | HEART RATE: 78 BPM | SYSTOLIC BLOOD PRESSURE: 152 MMHG | HEIGHT: 69 IN | WEIGHT: 217 LBS | BODY MASS INDEX: 32.14 KG/M2

## 2022-03-18 DIAGNOSIS — I10 PRIMARY HYPERTENSION: Chronic | ICD-10-CM

## 2022-03-18 DIAGNOSIS — I25.10 CORONARY ARTERY DISEASE INVOLVING NATIVE CORONARY ARTERY OF NATIVE HEART WITHOUT ANGINA PECTORIS: Primary | Chronic | ICD-10-CM

## 2022-03-18 DIAGNOSIS — E78.5 DYSLIPIDEMIA: Chronic | ICD-10-CM

## 2022-03-18 PROCEDURE — 99214 OFFICE O/P EST MOD 30 MIN: CPT

## 2022-03-18 RX ORDER — ALBUTEROL SULFATE 90 UG/1
AEROSOL, METERED RESPIRATORY (INHALATION) AS NEEDED
COMMUNITY
Start: 2022-02-07 | End: 2022-04-16

## 2022-03-22 ENCOUNTER — TRANSCRIBE ORDERS (OUTPATIENT)
Dept: ADMINISTRATIVE | Facility: HOSPITAL | Age: 77
End: 2022-03-22

## 2022-03-24 ENCOUNTER — ANESTHESIA (OUTPATIENT)
Dept: PREOP | Facility: HOSPITAL | Age: 77
End: 2022-03-24

## 2022-03-24 ENCOUNTER — HOSPITAL ENCOUNTER (OUTPATIENT)
Dept: PREOP | Facility: HOSPITAL | Age: 77
Setting detail: HOSPITAL OUTPATIENT SURGERY
Discharge: HOME | End: 2022-03-24

## 2022-03-24 ENCOUNTER — ANESTHESIA EVENT CONVERTED (OUTPATIENT)
Dept: ANESTHESIOLOGY | Facility: HOSPITAL | Age: 77
End: 2022-03-24

## 2022-03-24 ENCOUNTER — PRE-ADMISSION TESTING (OUTPATIENT)
Dept: PREADMISSION TESTING | Facility: HOSPITAL | Age: 77
End: 2022-03-24

## 2022-03-24 ENCOUNTER — ANESTHESIA EVENT (OUTPATIENT)
Dept: PREOP | Facility: HOSPITAL | Age: 77
End: 2022-03-24

## 2022-03-24 VITALS — BODY MASS INDEX: 32.44 KG/M2 | WEIGHT: 214.07 LBS | HEIGHT: 68 IN

## 2022-03-24 DIAGNOSIS — M17.0 PRIMARY OSTEOARTHRITIS OF BOTH KNEES: ICD-10-CM

## 2022-03-24 LAB
ANION GAP SERPL CALCULATED.3IONS-SCNC: 11 MMOL/L (ref 5–15)
APTT PPP: 30.6 SECONDS (ref 22–39)
BASOPHILS # BLD AUTO: 0.07 10*3/MM3 (ref 0–0.2)
BASOPHILS NFR BLD AUTO: 0.9 % (ref 0–1.5)
BUN SERPL-MCNC: 15 MG/DL (ref 8–23)
BUN/CREAT SERPL: 14 (ref 7–25)
CALCIUM SPEC-SCNC: 9.4 MG/DL (ref 8.6–10.5)
CHLORIDE SERPL-SCNC: 101 MMOL/L (ref 98–107)
CO2 SERPL-SCNC: 25 MMOL/L (ref 22–29)
CREAT SERPL-MCNC: 1.07 MG/DL (ref 0.76–1.27)
CRP SERPL-MCNC: <0.3 MG/DL (ref 0–0.5)
DEPRECATED RDW RBC AUTO: 45.2 FL (ref 37–54)
EGFRCR SERPLBLD CKD-EPI 2021: 71.5 ML/MIN/1.73
EOSINOPHIL # BLD AUTO: 0.06 10*3/MM3 (ref 0–0.4)
EOSINOPHIL NFR BLD AUTO: 0.8 % (ref 0.3–6.2)
ERYTHROCYTE [DISTWIDTH] IN BLOOD BY AUTOMATED COUNT: 13.2 % (ref 12.3–15.4)
ERYTHROCYTE [SEDIMENTATION RATE] IN BLOOD: 1 MM/HR (ref 0–20)
GLUCOSE SERPL-MCNC: 84 MG/DL (ref 65–99)
HBA1C MFR BLD: 6.1 % (ref 4.8–5.6)
HCT VFR BLD AUTO: 34.9 % (ref 37.5–51)
HGB BLD-MCNC: 11 G/DL (ref 13–17.7)
IMM GRANULOCYTES # BLD AUTO: 0.05 10*3/MM3 (ref 0–0.05)
IMM GRANULOCYTES NFR BLD AUTO: 0.7 % (ref 0–0.5)
INR PPP: 1.08 (ref 0.85–1.16)
LYMPHOCYTES # BLD AUTO: 1.81 10*3/MM3 (ref 0.7–3.1)
LYMPHOCYTES NFR BLD AUTO: 23.6 % (ref 19.6–45.3)
MCH RBC QN AUTO: 29.4 PG (ref 26.6–33)
MCHC RBC AUTO-ENTMCNC: 31.5 G/DL (ref 31.5–35.7)
MCV RBC AUTO: 93.3 FL (ref 79–97)
MONOCYTES # BLD AUTO: 0.63 10*3/MM3 (ref 0.1–0.9)
MONOCYTES NFR BLD AUTO: 8.2 % (ref 5–12)
NEUTROPHILS NFR BLD AUTO: 5.05 10*3/MM3 (ref 1.7–7)
NEUTROPHILS NFR BLD AUTO: 65.8 % (ref 42.7–76)
NRBC BLD AUTO-RTO: 0 /100 WBC (ref 0–0.2)
PLATELET # BLD AUTO: 227 10*3/MM3 (ref 140–450)
PMV BLD AUTO: 9.2 FL (ref 6–12)
POTASSIUM SERPL-SCNC: 4.5 MMOL/L (ref 3.5–5.2)
PROTHROMBIN TIME: 13.7 SECONDS (ref 11.4–14.4)
QT INTERVAL: 422 MS
QTC INTERVAL: 431 MS
RBC # BLD AUTO: 3.74 10*6/MM3 (ref 4.14–5.8)
SODIUM SERPL-SCNC: 137 MMOL/L (ref 136–145)
WBC NRBC COR # BLD: 7.67 10*3/MM3 (ref 3.4–10.8)

## 2022-03-24 PROCEDURE — 85025 COMPLETE CBC W/AUTO DIFF WBC: CPT

## 2022-03-24 PROCEDURE — 93010 ELECTROCARDIOGRAM REPORT: CPT | Performed by: INTERNAL MEDICINE

## 2022-03-24 PROCEDURE — 36415 COLL VENOUS BLD VENIPUNCTURE: CPT

## 2022-03-24 PROCEDURE — 93005 ELECTROCARDIOGRAM TRACING: CPT

## 2022-03-24 PROCEDURE — 85610 PROTHROMBIN TIME: CPT

## 2022-03-24 PROCEDURE — 85652 RBC SED RATE AUTOMATED: CPT

## 2022-03-24 PROCEDURE — 85730 THROMBOPLASTIN TIME PARTIAL: CPT

## 2022-03-24 PROCEDURE — 80048 BASIC METABOLIC PNL TOTAL CA: CPT

## 2022-03-24 PROCEDURE — 83036 HEMOGLOBIN GLYCOSYLATED A1C: CPT

## 2022-03-24 PROCEDURE — 86140 C-REACTIVE PROTEIN: CPT

## 2022-03-24 RX ORDER — LIDOCAINE HYDROCHLORIDE 20 MG/ML
INJECTION, SOLUTION EPIDURAL; INFILTRATION; INTRACAUDAL; PERINEURAL
Status: COMPLETED | OUTPATIENT
Start: 2022-03-24 | End: 2022-03-24

## 2022-03-24 RX ADMIN — LIDOCAINE HYDROCHLORIDE 20 ML: 20 INJECTION, SOLUTION EPIDURAL; INFILTRATION; INTRACAUDAL; PERINEURAL at 15:01

## 2022-03-24 ASSESSMENT — KOOS JR
KOOS JR SCORE: 8.291
KOOS JR SCORE: 27

## 2022-03-24 NOTE — ANESTHESIA PROCEDURE NOTES
Cryoneurolysis      Patient reassessed immediately prior to procedure    Patient location during procedure: pre-op  Reason for block: procedure for pain and at surgeon's request  Diagnosis: Knee pain  Performed by  Anesthesiologist: Miguel Barber MD  CRNA: Kirt Coffey, CRNA  Assisted by: Yessy Slater RN  Preanesthetic Checklist  Completed: patient identified, IV checked, site marked, risks and benefits discussed, surgical consent, monitors and equipment checked, pre-op evaluation and timeout performed  Prep:  Pt Position: supine  Leg Position: wedge  Sterile barriers:cap, gloves, gown, mask and washed/disinfected hands  Prep: ChloraPrep  Procedure    Pre procedure Pain Level: 10/10  Sedation: no    Guidance:ultrasound guided and landmark technique  Images:still images obtained, printed/placed on chart    Laterality:right  Location: Knee  Nerves: AFCN (Anterior femoral cutaneous nerve and ISN (Infrapatellar Saphenous Nerve)  Needle Type:Iovera 190      Medications Used: lidocaine PF 2% (XYLOCAINE) injection, 20 mL  Med administered at 3/24/2022 3:01 PM  Treatment:  After the treatment sites were identified using ultrasound, 2% lidocaine was used to create a skin wheel and infiltrated subcutaneously. A 20ga Ultraplex 360 needle was then passed to the treatment site under ultrasound. 1-3 ml of Lidocaine 2% was infiltrated along the path planned for treatment. Ultrasound was then utilized to visualize the Iovera needle placement. The treatment was started and ice ball formation was noted under ultrasound at the treatment site. This was repeated for each treatment site and still images were obtained.  Number of nerves treated: 5  Medications  Comment:The AFCN was treated mid-thigh at 17 cm from the center of the patella.    Branches of the AFCN treated were Anterior: Two (2) branches were treated, nerve size was less than 2.5 mm, nerve was treated above  Intermediate branch was treated, nerve size was less  than 2.5 mm, nerve was treated above  Medial: Two (2) branches were treated, nerve size was less than 2.5 mm, nerve was treated above.    One (1) branch of the ISN was treated medially near the joint line of the knee.  The ISN was treated above and below the nerve branch.        Post Assessment  Injection Assessment: Shadowing of ice ball formation noted  Patient Tolerance:comfortable throughout block and no complaints  Post procedure care: Patient's skin was cleansed and the treatment site was covered with a bandage. The patient was instructed to stand and mobilize the knee joint. Post treatment assessment performed and discharge instructions given to the patient.  Post procedure pain level: 3/10  Numbness achieved above the joint line in: Zone 1, Zone 2 and Zone 3  Numbness achieved below the joint line in: Zone 4 and Zone 5  Complications:no

## 2022-04-05 ENCOUNTER — CLINICAL SUPPORT NO REQUIREMENTS (OUTPATIENT)
Dept: PREADMISSION TESTING | Facility: HOSPITAL | Age: 77
End: 2022-04-05

## 2022-04-05 DIAGNOSIS — M17.0 PRIMARY OSTEOARTHRITIS OF BOTH KNEES: ICD-10-CM

## 2022-04-05 LAB — SARS-COV-2 RNA PNL SPEC NAA+PROBE: NOT DETECTED

## 2022-04-05 PROCEDURE — U0004 COV-19 TEST NON-CDC HGH THRU: HCPCS

## 2022-04-05 PROCEDURE — C9803 HOPD COVID-19 SPEC COLLECT: HCPCS

## 2022-04-05 PROCEDURE — U0005 INFEC AGEN DETEC AMPLI PROBE: HCPCS

## 2022-04-07 ENCOUNTER — ANESTHESIA (OUTPATIENT)
Dept: PERIOP | Facility: HOSPITAL | Age: 77
End: 2022-04-07

## 2022-04-07 ENCOUNTER — APPOINTMENT (OUTPATIENT)
Dept: GENERAL RADIOLOGY | Facility: HOSPITAL | Age: 77
End: 2022-04-07

## 2022-04-07 ENCOUNTER — ANESTHESIA EVENT CONVERTED (OUTPATIENT)
Dept: ANESTHESIOLOGY | Facility: HOSPITAL | Age: 77
End: 2022-04-07

## 2022-04-07 ENCOUNTER — HOSPITAL ENCOUNTER (INPATIENT)
Facility: HOSPITAL | Age: 77
LOS: 4 days | Discharge: NURSING, REHAB OR LONG-TERM CARE | End: 2022-04-12
Attending: ORTHOPAEDIC SURGERY | Admitting: ORTHOPAEDIC SURGERY

## 2022-04-07 DIAGNOSIS — M17.0 PRIMARY OSTEOARTHRITIS OF BOTH KNEES: ICD-10-CM

## 2022-04-07 DIAGNOSIS — Z96.651 STATUS POST TOTAL RIGHT KNEE REPLACEMENT: Primary | ICD-10-CM

## 2022-04-07 LAB
GLUCOSE BLDC GLUCOMTR-MCNC: 107 MG/DL (ref 70–130)
GLUCOSE BLDC GLUCOMTR-MCNC: 121 MG/DL (ref 70–130)

## 2022-04-07 PROCEDURE — 0SRC0J9 REPLACEMENT OF RIGHT KNEE JOINT WITH SYNTHETIC SUBSTITUTE, CEMENTED, OPEN APPROACH: ICD-10-PCS | Performed by: ORTHOPAEDIC SURGERY

## 2022-04-07 PROCEDURE — 25010000002 CEFAZOLIN IN DEXTROSE 2-4 GM/100ML-% SOLUTION: Performed by: ORTHOPAEDIC SURGERY

## 2022-04-07 PROCEDURE — 63710000001 MELOXICAM 15 MG TABLET: Performed by: ORTHOPAEDIC SURGERY

## 2022-04-07 PROCEDURE — C1713 ANCHOR/SCREW BN/BN,TIS/BN: HCPCS | Performed by: ORTHOPAEDIC SURGERY

## 2022-04-07 PROCEDURE — 25010000002 ROPIVACAINE PER 1 MG: Performed by: NURSE ANESTHETIST, CERTIFIED REGISTERED

## 2022-04-07 PROCEDURE — A9270 NON-COVERED ITEM OR SERVICE: HCPCS | Performed by: NURSE ANESTHETIST, CERTIFIED REGISTERED

## 2022-04-07 PROCEDURE — 63710000001 DIVALPROEX 500 MG TABLET SUSTAINED-RELEASE 24 HOUR

## 2022-04-07 PROCEDURE — A9270 NON-COVERED ITEM OR SERVICE: HCPCS

## 2022-04-07 PROCEDURE — 63710000001 LEVETIRACETAM 500 MG TABLET

## 2022-04-07 PROCEDURE — 97116 GAIT TRAINING THERAPY: CPT

## 2022-04-07 PROCEDURE — 8E0Y0CZ ROBOTIC ASSISTED PROCEDURE OF LOWER EXTREMITY, OPEN APPROACH: ICD-10-PCS | Performed by: ORTHOPAEDIC SURGERY

## 2022-04-07 PROCEDURE — 25010000002 PROPOFOL 10 MG/ML EMULSION: Performed by: NURSE ANESTHETIST, CERTIFIED REGISTERED

## 2022-04-07 PROCEDURE — 97161 PT EVAL LOW COMPLEX 20 MIN: CPT

## 2022-04-07 PROCEDURE — A9270 NON-COVERED ITEM OR SERVICE: HCPCS | Performed by: ORTHOPAEDIC SURGERY

## 2022-04-07 PROCEDURE — A9270 NON-COVERED ITEM OR SERVICE: HCPCS | Performed by: ANESTHESIOLOGY

## 2022-04-07 PROCEDURE — 25010000002 DEXAMETHASONE PER 1 MG: Performed by: NURSE ANESTHETIST, CERTIFIED REGISTERED

## 2022-04-07 PROCEDURE — 63710000001 PREGABALIN 150 MG CAPSULE: Performed by: ORTHOPAEDIC SURGERY

## 2022-04-07 PROCEDURE — 63710000001 OXYCODONE-ACETAMINOPHEN 5-325 MG TABLET: Performed by: NURSE ANESTHETIST, CERTIFIED REGISTERED

## 2022-04-07 PROCEDURE — 63710000001 OXYCODONE 5 MG TABLET: Performed by: ORTHOPAEDIC SURGERY

## 2022-04-07 PROCEDURE — 63710000001 TERAZOSIN 5 MG CAPSULE

## 2022-04-07 PROCEDURE — C1755 CATHETER, INTRASPINAL: HCPCS | Performed by: ORTHOPAEDIC SURGERY

## 2022-04-07 PROCEDURE — 63710000001 LISINOPRIL 20 MG TABLET

## 2022-04-07 PROCEDURE — 25010000002 HYDROMORPHONE PER 4 MG: Performed by: ANESTHESIOLOGY

## 2022-04-07 PROCEDURE — 63710000001 FAMOTIDINE 20 MG TABLET: Performed by: ANESTHESIOLOGY

## 2022-04-07 PROCEDURE — 25010000002 ROPIVACAINE PER 1 MG: Performed by: ORTHOPAEDIC SURGERY

## 2022-04-07 PROCEDURE — 25010000002 FENTANYL CITRATE (PF) 50 MCG/ML SOLUTION: Performed by: ANESTHESIOLOGY

## 2022-04-07 PROCEDURE — C1889 IMPLANT/INSERT DEVICE, NOC: HCPCS | Performed by: ORTHOPAEDIC SURGERY

## 2022-04-07 PROCEDURE — 25010000002 ONDANSETRON PER 1 MG: Performed by: NURSE ANESTHETIST, CERTIFIED REGISTERED

## 2022-04-07 PROCEDURE — 63710000001 MUPIROCIN 2 % OINTMENT 1 G TUBE: Performed by: ORTHOPAEDIC SURGERY

## 2022-04-07 PROCEDURE — 63710000001 ATORVASTATIN 10 MG TABLET

## 2022-04-07 PROCEDURE — 27447 TOTAL KNEE ARTHROPLASTY: CPT | Performed by: PHYSICIAN ASSISTANT

## 2022-04-07 PROCEDURE — C1776 JOINT DEVICE (IMPLANTABLE): HCPCS | Performed by: ORTHOPAEDIC SURGERY

## 2022-04-07 PROCEDURE — 82962 GLUCOSE BLOOD TEST: CPT

## 2022-04-07 PROCEDURE — 63710000001 ACETAMINOPHEN 500 MG TABLET: Performed by: ORTHOPAEDIC SURGERY

## 2022-04-07 PROCEDURE — 63710000001 VERAPAMIL SR 240 MG TABLET CONTROLLED-RELEASE

## 2022-04-07 PROCEDURE — 73560 X-RAY EXAM OF KNEE 1 OR 2: CPT

## 2022-04-07 PROCEDURE — 27447 TOTAL KNEE ARTHROPLASTY: CPT | Performed by: ORTHOPAEDIC SURGERY

## 2022-04-07 DEVICE — LEGION CRUCIATE RETAINING OXINIUM                                    FEMORAL SIZE 6 RIGHT
Type: IMPLANTABLE DEVICE | Site: KNEE | Status: FUNCTIONAL
Brand: LEGION

## 2022-04-07 DEVICE — DEV CONTRL TISS STRATAFIX SYMM PDS PLUS VIL CT-1 45CM: Type: IMPLANTABLE DEVICE | Site: KNEE | Status: FUNCTIONAL

## 2022-04-07 DEVICE — IMPLANTABLE DEVICE: Type: IMPLANTABLE DEVICE | Site: KNEE | Status: FUNCTIONAL

## 2022-04-07 DEVICE — LEGION CRUCIATE RETAINING HIGH                                    FLEX HIGHLY CROSS LINKED                                    POLYETHYLENE SIZE 5-6 11MM
Type: IMPLANTABLE DEVICE | Site: KNEE | Status: FUNCTIONAL
Brand: LEGION

## 2022-04-07 DEVICE — CMT BONE PALACOS R HI/VISC 1X40: Type: IMPLANTABLE DEVICE | Site: KNEE | Status: FUNCTIONAL

## 2022-04-07 DEVICE — GENESIS II NON-POROUS TIBIAL                                    BASEPLATE SIZE 5 RIGHT
Type: IMPLANTABLE DEVICE | Site: KNEE | Status: FUNCTIONAL
Brand: GENESIS II

## 2022-04-07 DEVICE — GEN II 7.5MM RESUR PAT 35MM
Type: IMPLANTABLE DEVICE | Site: KNEE | Status: FUNCTIONAL
Brand: GENESIS II

## 2022-04-07 DEVICE — DEV CONTRL TISS STRATAFIX SPIRAL MNCRYL UD 3/0 PLS 60CM: Type: IMPLANTABLE DEVICE | Site: KNEE | Status: FUNCTIONAL

## 2022-04-07 RX ORDER — ONDANSETRON 2 MG/ML
4 INJECTION INTRAMUSCULAR; INTRAVENOUS EVERY 6 HOURS PRN
Status: DISCONTINUED | OUTPATIENT
Start: 2022-04-07 | End: 2022-04-12 | Stop reason: HOSPADM

## 2022-04-07 RX ORDER — FENTANYL CITRATE 50 UG/ML
50 INJECTION, SOLUTION INTRAMUSCULAR; INTRAVENOUS
Status: DISCONTINUED | OUTPATIENT
Start: 2022-04-07 | End: 2022-04-07 | Stop reason: HOSPADM

## 2022-04-07 RX ORDER — ATORVASTATIN CALCIUM 10 MG/1
10 TABLET, FILM COATED ORAL NIGHTLY
Status: DISCONTINUED | OUTPATIENT
Start: 2022-04-07 | End: 2022-04-12 | Stop reason: HOSPADM

## 2022-04-07 RX ORDER — TRANEXAMIC ACID 10 MG/ML
1000 INJECTION, SOLUTION INTRAVENOUS ONCE
Status: COMPLETED | OUTPATIENT
Start: 2022-04-07 | End: 2022-04-07

## 2022-04-07 RX ORDER — HYDROMORPHONE HYDROCHLORIDE 1 MG/ML
0.5 INJECTION, SOLUTION INTRAMUSCULAR; INTRAVENOUS; SUBCUTANEOUS
Status: DISCONTINUED | OUTPATIENT
Start: 2022-04-07 | End: 2022-04-07 | Stop reason: HOSPADM

## 2022-04-07 RX ORDER — PANTOPRAZOLE SODIUM 40 MG/1
40 TABLET, DELAYED RELEASE ORAL DAILY
Status: DISCONTINUED | OUTPATIENT
Start: 2022-04-08 | End: 2022-04-12 | Stop reason: HOSPADM

## 2022-04-07 RX ORDER — ONDANSETRON 2 MG/ML
INJECTION INTRAMUSCULAR; INTRAVENOUS AS NEEDED
Status: DISCONTINUED | OUTPATIENT
Start: 2022-04-07 | End: 2022-04-07 | Stop reason: SURG

## 2022-04-07 RX ORDER — OXYCODONE HYDROCHLORIDE AND ACETAMINOPHEN 5; 325 MG/1; MG/1
1 TABLET ORAL ONCE AS NEEDED
Status: COMPLETED | OUTPATIENT
Start: 2022-04-07 | End: 2022-04-07

## 2022-04-07 RX ORDER — VERAPAMIL HYDROCHLORIDE 240 MG/1
240 TABLET, FILM COATED, EXTENDED RELEASE ORAL EVERY 12 HOURS SCHEDULED
Status: DISCONTINUED | OUTPATIENT
Start: 2022-04-07 | End: 2022-04-12 | Stop reason: HOSPADM

## 2022-04-07 RX ORDER — TERAZOSIN 5 MG/1
5 CAPSULE ORAL NIGHTLY
Status: DISCONTINUED | OUTPATIENT
Start: 2022-04-07 | End: 2022-04-12 | Stop reason: HOSPADM

## 2022-04-07 RX ORDER — FENTANYL CITRATE 50 UG/ML
INJECTION, SOLUTION INTRAMUSCULAR; INTRAVENOUS
Status: DISPENSED
Start: 2022-04-07 | End: 2022-04-07

## 2022-04-07 RX ORDER — ASPIRIN 325 MG
325 TABLET, DELAYED RELEASE (ENTERIC COATED) ORAL DAILY
Status: DISCONTINUED | OUTPATIENT
Start: 2022-04-08 | End: 2022-04-12 | Stop reason: HOSPADM

## 2022-04-07 RX ORDER — SODIUM CHLORIDE 9 MG/ML
120 INJECTION, SOLUTION INTRAVENOUS CONTINUOUS
Status: DISCONTINUED | OUTPATIENT
Start: 2022-04-07 | End: 2022-04-12 | Stop reason: HOSPADM

## 2022-04-07 RX ORDER — MELOXICAM 7.5 MG/1
15 TABLET ORAL DAILY
Status: DISCONTINUED | OUTPATIENT
Start: 2022-04-08 | End: 2022-04-12 | Stop reason: HOSPADM

## 2022-04-07 RX ORDER — SODIUM CHLORIDE 0.9 % (FLUSH) 0.9 %
10 SYRINGE (ML) INJECTION EVERY 12 HOURS SCHEDULED
Status: DISCONTINUED | OUTPATIENT
Start: 2022-04-07 | End: 2022-04-07 | Stop reason: HOSPADM

## 2022-04-07 RX ORDER — NICOTINE POLACRILEX 4 MG
15 LOZENGE BUCCAL
Status: DISCONTINUED | OUTPATIENT
Start: 2022-04-07 | End: 2022-04-12 | Stop reason: HOSPADM

## 2022-04-07 RX ORDER — ACETAMINOPHEN 500 MG
1000 TABLET ORAL ONCE
Status: COMPLETED | OUTPATIENT
Start: 2022-04-07 | End: 2022-04-07

## 2022-04-07 RX ORDER — ROPIVACAINE HYDROCHLORIDE 5 MG/ML
INJECTION, SOLUTION EPIDURAL; INFILTRATION; PERINEURAL AS NEEDED
Status: DISCONTINUED | OUTPATIENT
Start: 2022-04-07 | End: 2022-04-07 | Stop reason: HOSPADM

## 2022-04-07 RX ORDER — PROPOFOL 10 MG/ML
VIAL (ML) INTRAVENOUS AS NEEDED
Status: DISCONTINUED | OUTPATIENT
Start: 2022-04-07 | End: 2022-04-07 | Stop reason: SURG

## 2022-04-07 RX ORDER — LIDOCAINE HYDROCHLORIDE 10 MG/ML
0.5 INJECTION, SOLUTION EPIDURAL; INFILTRATION; INTRACAUDAL; PERINEURAL ONCE AS NEEDED
Status: DISCONTINUED | OUTPATIENT
Start: 2022-04-07 | End: 2022-04-07 | Stop reason: HOSPADM

## 2022-04-07 RX ORDER — LIDOCAINE HYDROCHLORIDE 10 MG/ML
0.5 INJECTION, SOLUTION INFILTRATION; PERINEURAL AS NEEDED
Status: COMPLETED | OUTPATIENT
Start: 2022-04-07 | End: 2022-04-07

## 2022-04-07 RX ORDER — SODIUM CHLORIDE 0.9 % (FLUSH) 0.9 %
1-10 SYRINGE (ML) INJECTION AS NEEDED
Status: DISCONTINUED | OUTPATIENT
Start: 2022-04-07 | End: 2022-04-12 | Stop reason: HOSPADM

## 2022-04-07 RX ORDER — FAMOTIDINE 20 MG/1
20 TABLET, FILM COATED ORAL ONCE
Status: DISCONTINUED | OUTPATIENT
Start: 2022-04-07 | End: 2022-04-07 | Stop reason: HOSPADM

## 2022-04-07 RX ORDER — ONDANSETRON 2 MG/ML
4 INJECTION INTRAMUSCULAR; INTRAVENOUS EVERY 6 HOURS PRN
Status: DISCONTINUED | OUTPATIENT
Start: 2022-04-07 | End: 2022-04-09 | Stop reason: SDUPTHER

## 2022-04-07 RX ORDER — SODIUM CHLORIDE 0.9 % (FLUSH) 0.9 %
10 SYRINGE (ML) INJECTION AS NEEDED
Status: DISCONTINUED | OUTPATIENT
Start: 2022-04-07 | End: 2022-04-07 | Stop reason: HOSPADM

## 2022-04-07 RX ORDER — FAMOTIDINE 10 MG/ML
20 INJECTION, SOLUTION INTRAVENOUS ONCE
Status: DISCONTINUED | OUTPATIENT
Start: 2022-04-07 | End: 2022-04-07 | Stop reason: HOSPADM

## 2022-04-07 RX ORDER — BUPIVACAINE HYDROCHLORIDE 5 MG/ML
INJECTION, SOLUTION PERINEURAL
Status: COMPLETED | OUTPATIENT
Start: 2022-04-07 | End: 2022-04-07

## 2022-04-07 RX ORDER — LEVOTHYROXINE SODIUM 88 UG/1
88 TABLET ORAL
Status: DISCONTINUED | OUTPATIENT
Start: 2022-04-08 | End: 2022-04-12 | Stop reason: HOSPADM

## 2022-04-07 RX ORDER — SODIUM CHLORIDE, SODIUM LACTATE, POTASSIUM CHLORIDE, CALCIUM CHLORIDE 600; 310; 30; 20 MG/100ML; MG/100ML; MG/100ML; MG/100ML
9 INJECTION, SOLUTION INTRAVENOUS CONTINUOUS
Status: DISCONTINUED | OUTPATIENT
Start: 2022-04-07 | End: 2022-04-07

## 2022-04-07 RX ORDER — LEVETIRACETAM 500 MG/1
500 TABLET ORAL EVERY 12 HOURS SCHEDULED
Status: DISCONTINUED | OUTPATIENT
Start: 2022-04-07 | End: 2022-04-12 | Stop reason: HOSPADM

## 2022-04-07 RX ORDER — DEXAMETHASONE SODIUM PHOSPHATE 4 MG/ML
INJECTION, SOLUTION INTRA-ARTICULAR; INTRALESIONAL; INTRAMUSCULAR; INTRAVENOUS; SOFT TISSUE AS NEEDED
Status: DISCONTINUED | OUTPATIENT
Start: 2022-04-07 | End: 2022-04-07 | Stop reason: SURG

## 2022-04-07 RX ORDER — MAGNESIUM HYDROXIDE 1200 MG/15ML
LIQUID ORAL AS NEEDED
Status: DISCONTINUED | OUTPATIENT
Start: 2022-04-07 | End: 2022-04-07 | Stop reason: HOSPADM

## 2022-04-07 RX ORDER — FAMOTIDINE 20 MG/1
20 TABLET, FILM COATED ORAL ONCE
Status: COMPLETED | OUTPATIENT
Start: 2022-04-07 | End: 2022-04-07

## 2022-04-07 RX ORDER — OXYCODONE HYDROCHLORIDE 5 MG/1
5 TABLET ORAL EVERY 4 HOURS PRN
Status: DISCONTINUED | OUTPATIENT
Start: 2022-04-07 | End: 2022-04-09

## 2022-04-07 RX ORDER — LISINOPRIL 20 MG/1
20 TABLET ORAL EVERY 12 HOURS SCHEDULED
Status: DISCONTINUED | OUTPATIENT
Start: 2022-04-07 | End: 2022-04-12 | Stop reason: HOSPADM

## 2022-04-07 RX ORDER — NALOXONE HCL 0.4 MG/ML
0.4 VIAL (ML) INJECTION
Status: DISCONTINUED | OUTPATIENT
Start: 2022-04-07 | End: 2022-04-12 | Stop reason: HOSPADM

## 2022-04-07 RX ORDER — HYDROMORPHONE HYDROCHLORIDE 1 MG/ML
0.5 INJECTION, SOLUTION INTRAMUSCULAR; INTRAVENOUS; SUBCUTANEOUS
Status: DISCONTINUED | OUTPATIENT
Start: 2022-04-07 | End: 2022-04-12 | Stop reason: HOSPADM

## 2022-04-07 RX ORDER — LIDOCAINE HYDROCHLORIDE 10 MG/ML
INJECTION, SOLUTION EPIDURAL; INFILTRATION; INTRACAUDAL; PERINEURAL AS NEEDED
Status: DISCONTINUED | OUTPATIENT
Start: 2022-04-07 | End: 2022-04-07 | Stop reason: SURG

## 2022-04-07 RX ORDER — MORPHINE SULFATE 4 MG/ML
4 INJECTION, SOLUTION INTRAMUSCULAR; INTRAVENOUS
Status: DISCONTINUED | OUTPATIENT
Start: 2022-04-07 | End: 2022-04-09

## 2022-04-07 RX ORDER — NALOXONE HCL 0.4 MG/ML
0.1 VIAL (ML) INJECTION
Status: DISCONTINUED | OUTPATIENT
Start: 2022-04-07 | End: 2022-04-12 | Stop reason: HOSPADM

## 2022-04-07 RX ORDER — OXYCODONE HYDROCHLORIDE AND ACETAMINOPHEN 5; 325 MG/1; MG/1
TABLET ORAL
Status: DISPENSED
Start: 2022-04-07 | End: 2022-04-07

## 2022-04-07 RX ORDER — DIVALPROEX SODIUM 500 MG/1
500 TABLET, EXTENDED RELEASE ORAL DAILY
Status: DISCONTINUED | OUTPATIENT
Start: 2022-04-08 | End: 2022-04-12 | Stop reason: HOSPADM

## 2022-04-07 RX ORDER — GLYCOPYRROLATE 0.2 MG/ML
INJECTION INTRAMUSCULAR; INTRAVENOUS AS NEEDED
Status: DISCONTINUED | OUTPATIENT
Start: 2022-04-07 | End: 2022-04-07 | Stop reason: SURG

## 2022-04-07 RX ORDER — FLUTICASONE PROPIONATE 50 MCG
2 SPRAY, SUSPENSION (ML) NASAL AS NEEDED
Status: DISCONTINUED | OUTPATIENT
Start: 2022-04-07 | End: 2022-04-12 | Stop reason: HOSPADM

## 2022-04-07 RX ORDER — ROPIVACAINE HYDROCHLORIDE 2 MG/ML
INJECTION, SOLUTION EPIDURAL; INFILTRATION; PERINEURAL
Status: DISPENSED
Start: 2022-04-07 | End: 2022-04-08

## 2022-04-07 RX ORDER — ONDANSETRON 4 MG/1
4 TABLET, FILM COATED ORAL EVERY 6 HOURS PRN
Status: DISCONTINUED | OUTPATIENT
Start: 2022-04-07 | End: 2022-04-12 | Stop reason: HOSPADM

## 2022-04-07 RX ORDER — DEXTROSE MONOHYDRATE 25 G/50ML
25 INJECTION, SOLUTION INTRAVENOUS
Status: DISCONTINUED | OUTPATIENT
Start: 2022-04-07 | End: 2022-04-12 | Stop reason: HOSPADM

## 2022-04-07 RX ORDER — LABETALOL HYDROCHLORIDE 5 MG/ML
10 INJECTION, SOLUTION INTRAVENOUS EVERY 4 HOURS PRN
Status: DISCONTINUED | OUTPATIENT
Start: 2022-04-07 | End: 2022-04-12 | Stop reason: HOSPADM

## 2022-04-07 RX ORDER — DIVALPROEX SODIUM 500 MG/1
1000 TABLET, EXTENDED RELEASE ORAL NIGHTLY
Status: DISCONTINUED | OUTPATIENT
Start: 2022-04-07 | End: 2022-04-12 | Stop reason: HOSPADM

## 2022-04-07 RX ORDER — ISOSORBIDE MONONITRATE 30 MG/1
30 TABLET, EXTENDED RELEASE ORAL DAILY
Status: DISCONTINUED | OUTPATIENT
Start: 2022-04-08 | End: 2022-04-12 | Stop reason: HOSPADM

## 2022-04-07 RX ORDER — MIDAZOLAM HYDROCHLORIDE 1 MG/ML
0.5 INJECTION INTRAMUSCULAR; INTRAVENOUS
Status: DISCONTINUED | OUTPATIENT
Start: 2022-04-07 | End: 2022-04-07 | Stop reason: HOSPADM

## 2022-04-07 RX ORDER — PREGABALIN 150 MG/1
150 CAPSULE ORAL ONCE
Status: COMPLETED | OUTPATIENT
Start: 2022-04-07 | End: 2022-04-07

## 2022-04-07 RX ORDER — MELOXICAM 15 MG/1
15 TABLET ORAL ONCE
Status: COMPLETED | OUTPATIENT
Start: 2022-04-07 | End: 2022-04-07

## 2022-04-07 RX ORDER — SODIUM CHLORIDE 0.9 % (FLUSH) 0.9 %
10 SYRINGE (ML) INJECTION EVERY 12 HOURS SCHEDULED
Status: DISCONTINUED | OUTPATIENT
Start: 2022-04-07 | End: 2022-04-12 | Stop reason: HOSPADM

## 2022-04-07 RX ORDER — BUPIVACAINE HYDROCHLORIDE 2.5 MG/ML
INJECTION, SOLUTION EPIDURAL; INFILTRATION; INTRACAUDAL
Status: COMPLETED | OUTPATIENT
Start: 2022-04-07 | End: 2022-04-07

## 2022-04-07 RX ORDER — CEFAZOLIN SODIUM 2 G/100ML
2 INJECTION, SOLUTION INTRAVENOUS EVERY 8 HOURS
Status: COMPLETED | OUTPATIENT
Start: 2022-04-07 | End: 2022-04-08

## 2022-04-07 RX ORDER — CEFAZOLIN SODIUM 2 G/100ML
2 INJECTION, SOLUTION INTRAVENOUS ONCE
Status: COMPLETED | OUTPATIENT
Start: 2022-04-07 | End: 2022-04-07

## 2022-04-07 RX ADMIN — VERAPAMIL HYDROCHLORIDE 240 MG: 240 TABLET, FILM COATED, EXTENDED RELEASE ORAL at 19:47

## 2022-04-07 RX ADMIN — GLYCOPYRROLATE 0.1 MG: 0.2 INJECTION INTRAMUSCULAR; INTRAVENOUS at 08:03

## 2022-04-07 RX ADMIN — SODIUM CHLORIDE, POTASSIUM CHLORIDE, SODIUM LACTATE AND CALCIUM CHLORIDE: 600; 310; 30; 20 INJECTION, SOLUTION INTRAVENOUS at 09:08

## 2022-04-07 RX ADMIN — TRANEXAMIC ACID 1000 MG: 10 INJECTION, SOLUTION INTRAVENOUS at 07:40

## 2022-04-07 RX ADMIN — FENTANYL CITRATE 50 MCG: 50 INJECTION, SOLUTION INTRAMUSCULAR; INTRAVENOUS at 11:23

## 2022-04-07 RX ADMIN — CEFAZOLIN SODIUM 2 G: 2 INJECTION, SOLUTION INTRAVENOUS at 23:34

## 2022-04-07 RX ADMIN — LABETALOL 20 MG/4 ML (5 MG/ML) INTRAVENOUS SYRINGE 10 MG: at 23:26

## 2022-04-07 RX ADMIN — BUPIVACAINE HYDROCHLORIDE 30 ML: 2.5 INJECTION, SOLUTION EPIDURAL; INFILTRATION; INTRACAUDAL; PERINEURAL at 09:57

## 2022-04-07 RX ADMIN — MUPIROCIN 1 APPLICATION: 20 OINTMENT TOPICAL at 06:57

## 2022-04-07 RX ADMIN — ATORVASTATIN CALCIUM 10 MG: 10 TABLET, FILM COATED ORAL at 19:48

## 2022-04-07 RX ADMIN — FAMOTIDINE 20 MG: 20 TABLET ORAL at 06:59

## 2022-04-07 RX ADMIN — OXYCODONE 5 MG: 5 TABLET ORAL at 14:17

## 2022-04-07 RX ADMIN — SODIUM CHLORIDE, POTASSIUM CHLORIDE, SODIUM LACTATE AND CALCIUM CHLORIDE 9 ML/HR: 600; 310; 30; 20 INJECTION, SOLUTION INTRAVENOUS at 06:59

## 2022-04-07 RX ADMIN — PREGABALIN 150 MG: 150 CAPSULE ORAL at 06:57

## 2022-04-07 RX ADMIN — PROPOFOL 80 MCG/KG/MIN: 10 INJECTION, EMULSION INTRAVENOUS at 07:32

## 2022-04-07 RX ADMIN — DEXAMETHASONE SODIUM PHOSPHATE 8 MG: 4 INJECTION, SOLUTION INTRA-ARTICULAR; INTRALESIONAL; INTRAMUSCULAR; INTRAVENOUS; SOFT TISSUE at 07:32

## 2022-04-07 RX ADMIN — ROPIVACAINE HYDROCHLORIDE 10 ML/HR: 5 INJECTION, SOLUTION EPIDURAL; INFILTRATION; PERINEURAL at 09:57

## 2022-04-07 RX ADMIN — TERAZOSIN HYDROCHLORIDE 5 MG: 5 CAPSULE ORAL at 19:47

## 2022-04-07 RX ADMIN — PROPOFOL 50 MG: 10 INJECTION, EMULSION INTRAVENOUS at 07:28

## 2022-04-07 RX ADMIN — LIDOCAINE HYDROCHLORIDE 50 MG: 10 INJECTION, SOLUTION EPIDURAL; INFILTRATION; INTRACAUDAL; PERINEURAL at 07:28

## 2022-04-07 RX ADMIN — CEFAZOLIN SODIUM 2 G: 2 INJECTION, SOLUTION INTRAVENOUS at 07:32

## 2022-04-07 RX ADMIN — CEFAZOLIN SODIUM 2 G: 2 INJECTION, SOLUTION INTRAVENOUS at 17:24

## 2022-04-07 RX ADMIN — OXYCODONE HYDROCHLORIDE AND ACETAMINOPHEN 1 TABLET: 5; 325 TABLET ORAL at 10:56

## 2022-04-07 RX ADMIN — HYDROMORPHONE HYDROCHLORIDE 0.5 MG: 1 INJECTION, SOLUTION INTRAMUSCULAR; INTRAVENOUS; SUBCUTANEOUS at 12:22

## 2022-04-07 RX ADMIN — ACETAMINOPHEN 1000 MG: 500 TABLET ORAL at 06:57

## 2022-04-07 RX ADMIN — FENTANYL CITRATE 50 MCG: 50 INJECTION, SOLUTION INTRAMUSCULAR; INTRAVENOUS at 10:54

## 2022-04-07 RX ADMIN — LISINOPRIL 20 MG: 20 TABLET ORAL at 19:47

## 2022-04-07 RX ADMIN — TRANEXAMIC ACID 1000 MG: 10 INJECTION, SOLUTION INTRAVENOUS at 08:58

## 2022-04-07 RX ADMIN — LEVETIRACETAM 500 MG: 500 TABLET, FILM COATED ORAL at 19:48

## 2022-04-07 RX ADMIN — BUPIVACAINE HYDROCHLORIDE 2 ML: 5 INJECTION, SOLUTION PERINEURAL at 07:30

## 2022-04-07 RX ADMIN — LIDOCAINE HYDROCHLORIDE 0.5 ML: 10 INJECTION, SOLUTION EPIDURAL; INFILTRATION; INTRACAUDAL; PERINEURAL at 06:59

## 2022-04-07 RX ADMIN — SODIUM CHLORIDE 120 ML/HR: 9 INJECTION, SOLUTION INTRAVENOUS at 14:19

## 2022-04-07 RX ADMIN — GLYCOPYRROLATE 0.1 MG: 0.2 INJECTION INTRAMUSCULAR; INTRAVENOUS at 08:08

## 2022-04-07 RX ADMIN — DIVALPROEX SODIUM 1000 MG: 500 TABLET, EXTENDED RELEASE ORAL at 19:46

## 2022-04-07 RX ADMIN — MELOXICAM 15 MG: 15 TABLET ORAL at 06:57

## 2022-04-07 RX ADMIN — ONDANSETRON 4 MG: 2 INJECTION INTRAMUSCULAR; INTRAVENOUS at 09:34

## 2022-04-07 NOTE — INTERVAL H&P NOTE
H&P reviewed. The patient was examined and there are no changes to the H&P.  Continues to have right knee pain.  (Hx seizures none in 18 years)  T 96.7  HR 69  133/81  97% 02sat  Lungs clear  heart regular rhythm without murmur   abdomen soft   small eschar right medial knee, no erythema  Allergies   Sulfa antibiotics  No allergy to latex or contrast dye  Immunizations   pneumo ?  Flu 2021 tetanus up to date COVID x3  Tobacco neg   ETOH neg  Adelita Lombardo PA-C  4/7/22  7:05    Agree with above - plan for right TKA    Louis Malcolm MD  04/07/22  07:20 EDT

## 2022-04-07 NOTE — OP NOTE
DATE OF PROCEDURE: 04/07/22    PREOPERATIVE DIAGNOSIS: right knee arthritis      POSTOPERATIVE DIAGNOSIS:  right knee arthritis    PROCEDURES PERFORMED:   right total knee arthroplasty with [employer] components (#6 femur, #5 tibia, 11 mm polyethylene, with 35 three peg patella), with CORI robotic system    SURGEON: Louis Malcolm MD    ASSISTANT: Adelita Lombardo PA-C  (Adelita Lombardo PA-C was present and necessary for positioning, draping, retraction, instrumentation and closure.)    SPECIMENS: None    IMPLANTS:   Implant Name Type Inv. Item Serial No.  Lot No. LRB No. Used Action   CMT BONE PALACOS R HI/VISC 1X40 - [id number] Implant CMT BONE PALACOS R HI/VISC 1X40  [facility] [id number] Right 2 Implanted   DEV CONTRL TISS STRATAFIX SPIRAL MNCRYL UD 3/0 PLS 60CM - [id number] Implant DEV CONTRL TISS STRATAFIX SPIRAL MNCRYL UD 3/0 PLS 60CM  [employer]  Right 1 Implanted   DEV CONTRL TISS STRATAFIX SYMM PDS PLUS ESSIE CT-1 45CM - [id number] Implant DEV CONTRL TISS STRATAFIX SYMM PDS PLUS ESSIE CT-1 45CM  [employer]  Right 1 Implanted   BASE TIB/KN GEN2 NONPOR TI SZ5 RT - [id number] Implant BASE TIB/KN GEN2 NONPOR TI SZ5 RT  [employer] [id number] Right 1 Implanted   COMP FEM LEGION OXINIUM CR SZ6 RT - [id number] Implant COMP FEM LEGION OXINIUM CR SZ6 RT  [employer] [id number] Right 1 Implanted   PATELLA RESRF GEN2 7.5X35MM - [id number] Implant PATELLA RESRF GEN2 7.5X35MM  [employer] [id number] Right 1 Implanted   INSRT ART LEGION CR HF XLPE SZ5TO6 11MM - [id number] Implant INSRT ART LEGION CR HF XLPE SZ5TO6 11MM  [employer] [id number] Right 1 Implanted         ANESTHESIA:  Spinal    STAFF:  Circulator: Manny Ybarra RN  Scrub Person: Bob Sweet  Vendor Representative: Jayy Mcdowell  Nursing Assistant: Elba Bond PCT  Assistant: Adelita Lombardo PA-C  Other: Spore, Yaquelin L, RN    TOURNIQUET TIME: 16  minutes    ESTIMATED BLOOD LOSS: 100ml     COMPLICATIONS: None    PREOPERATIVE ANTIBIOTICS: Ancef 2 g    INDICATIONS: The patient is a 76 y.o. male with debilitating right knee pain secondary to osteoarthritis that failed to improve in spite of conservative treatment .  Options have been discussed at length with the patient and the patient has had an extended course of conservative treatment without long-term benefit. The patient has reached the point where the patient desires total knee arthroplasty surgery and understands the risks, benefits, and alternatives. Consent was obtained. Please see my office notes for details with regard to preoperative counseling and operative rationale.     DESCRIPTION OF PROCEDURE: The patient was positively identified in the preoperative holding area and brought to the operating suite and placed in a supine position. After adequate spinal anesthetic had been achieved, the right lower extremity was prepped and draped in the usual sterile fashion.  After application of a tourniquet to the right upper thigh, which was used during the procedure for a total 16 minutes during the cementation process only. Landmarks of the knee were identified and timeout procedure was performed to confirm the operative site, as well as other parameters. Following the sterile prep and drape, a skin incision was made just off the medial aspect of midline for a medial parapatellar approach. Following a sharp skin incision, dissection was carried down to the level of the extensor mechanism and a medial parapatellar arthrotomy was made and the patella was tucked into the lateral gutter.  Anterior horns of the medial and lateral menisci were removed, and ACL transected.  Osteophytes were also removed.  Description of arthritis: Bone-on-bone contact in the medial compartment, tricompartmental osteophytes, varus alignment.      TalentagI robotic system was then employed to assist with preparation of the femoral and  tibial bone surfaces.  Femoral and tibial arrays were placed, as well as markers in both the femur and tibia.  System was registered in a systematic fashion, and 3D models created of both the femoral and tibial aspects.  Range of motion and gap assessments were also performed, and implants were selected and appropriately sized and oriented both the femur and tibial aspects starting with the flexion gap, and then progressing to the extension gap.  Planning was made for a 6 femoral component and a 5 tibial component with a 11 insert.  Once balancing had been achieved, distal femoral surface was then prepared with the CORI ervin, followed by preparation for the 4-in-1 cutting block.  Proximal tibial cut was then performed in a guided fashion, posterior condyles were freed of osteophytes, and trial implants placed, namely a 6 femur with a 5 tibia and a 11 trial insert.  Range of motion and gap assessments were again performed, and excellent balancing was noted.  The patella was then prepared for a 35 three peg patella which had excellent tracking.      Therefore the trial components were removed, and preparations made for final placement, and final components were cemented in place with namely a #6 tibia, #5 femur, and a 35 three peg patella with a trial 12 insert for cement compression. All the excess cement was removed from the bone implant interface and allowed to harden. Tourniquet was deflated. Hemostasis was obtained with electrocautery. There was no brisk bleeding noted in the popliteal fossa in particular. Therefore, the knee was copiously irrigated as it was between major steps, and the final 11 insert was placed as this was deemed appropriate for the patient's anatomy with full flexion and extension and no instability and attention was then directed towards closure. The medial parapatellar arthrotomy was closed with #1 Vicryl in an interrupted figure-of-eight fashion in 4 strategic locations followed by  oversewing this from proximal to distal with a #1 StrataFix symmetric, which nicely sealed the joint, followed by closure of the deep fascial layer with #1 Vicryl in a buried interrupted fashion, followed by closure of the subcutaneous layer with 2-0 Vicryl and the skin with 3-0 Stratafix in a running subcuticular fashion.  Adhesive wound closure dressing was applied followed by a sterile dressing with 4 x 4's, abdominal pad, soft roll and Ace wrap. The patient tolerated the procedure well and was brought to the recovery room in good condition.     PLAN:  1.  The patient will begin early range of motion and weight-bearing per the post total knee arthroplasty protocol.   2.  I anticipate brief hospitalization for initial rehabilitation and pain control followed by continued rehabilitation at a rehab facility secondary to his home situation.  Follow-up with me in 3 weeks as planned.  3.  Postoperative medical management with Dr. Cole.  4.  Aspirin will be utilized for DVT prophylaxis.       Louis Malcolm MD  04/07/22  10:29 EDT

## 2022-04-07 NOTE — ANESTHESIA POSTPROCEDURE EVALUATION
Patient: Lea Rivers    Procedure Summary     Date: 04/07/22 Room / Location:  [facility]    Anesthesia Start: 0724 Anesthesia Stop:     Procedure: RIGHT TOTAL KNEE ARTHROPLASTY WITH ORTHO ROBOT (Right Knee) Diagnosis:       Primary osteoarthritis of both knees      (Primary osteoarthritis of both knees [M17.0])    Surgeons: Louis Malcolm MD Provider: Jerman Lorenz MD    Anesthesia Type: spinal ASA Status: 3          Anesthesia Type: spinal    Vitals  Vitals Value Taken Time   /76 04/07/22 0955   Temp 96.7 °F (35.9 °C) 04/07/22 0724   Pulse 69 04/07/22 0957   Resp     SpO2 97 % 04/07/22 0957   Vitals shown include unvalidated device data.        Post Anesthesia Care and Evaluation    Patient location during evaluation: PACU  Patient participation: complete - patient participated  Level of consciousness: awake and alert  Pain management: adequate  Airway patency: patent  Anesthetic complications: No anesthetic complications  PONV Status: none  Cardiovascular status: hemodynamically stable and acceptable  Respiratory status: nonlabored ventilation, acceptable and nasal cannula  Hydration status: acceptable

## 2022-04-07 NOTE — ANESTHESIA PROCEDURE NOTES
RIGHT adductor canal cath      Patient reassessed immediately prior to procedure    Patient location during procedure: post-op  Reason for block: at surgeon's request and post-op pain management  Performed by  CRNA: Kirt Coffey, CRNA  Assisted by: Yessy Slater RN  Preanesthetic Checklist  Completed: patient identified, IV checked, site marked, risks and benefits discussed, surgical consent, monitors and equipment checked, pre-op evaluation and timeout performed  Prep:  Pt Position: supine  Sterile barriers:cap, gloves, mask and sterile barriers  Prep: ChloraPrep  Patient monitoring: blood pressure monitoring, continuous pulse oximetry and EKG  Procedure    Sedation: no  Performed under: spinal  Guidance:ultrasound guided  Images:still images obtained, printed/placed on chart    Laterality:right  Block Type:adductor canal block  Injection Technique:catheter  Needle Type:Tuohy and echogenic  Needle Gauge:18 G  Resistance on Injection: none  Catheter Size:20 G (20g)  Cath Depth at skin: 10 cm    Medications Used: bupivacaine PF (MARCAINE) 0.25 % injection, 30 mL      Post Assessment  Injection Assessment: negative aspiration for heme, incremental injection and no paresthesia on injection  Patient Tolerance:comfortable throughout block  Complications:no  Additional Notes  Procedure:             The pt was placed in the Supine position.  The Insertion site was  prepped and Draped in sterile fashion.  The pt was anesthetized with  IV Sedation( see meds).  Skin and cutaneous tissue was infiltrated and anesthetized with 1% Lidocaine 3 mls via a 25g needle.  A BBraun 4 inch 18g echogenic needle was then  inserted approximately midline, mid-thigh and advanced In-plane with Ultrasound guidance.  Normal Saline PSF was utilized for hydrodissection of tissue.  The Vastus medialis and Sartorius muscle where visualized and the needle tip was placed in the adductor canal,  lateral to the femoral artery.  LA injection spread  was visualized, injection was incremental 1-5ml, injection pressure was normal or little, no intraneural injection, no vascular injection.  LA dose was injected thru the needle(see dose above).  A BBraun 20g wire stylet catheter was placed via the needle with ultrasound visualization and confirmation with NS fluid bolus. The catheter insertion site was sealed with exofin tissue adhesive. The labeled catheter was then coiled and secured to skin with benzoin,  steristrips and CHG transparent dressing.  Appropriate labels were applied.  Thank you.

## 2022-04-07 NOTE — THERAPY EVALUATION
Patient Name: Lea Rivers  : 1945    MRN: [id number]                              Today's Date: 2022       Admit Date: 2022    Visit Dx:     ICD-10-CM ICD-9-CM   1. Primary osteoarthritis of both knees  M17.0 715.16     Patient Active Problem List   Diagnosis   • Coronary artery disease   • Dyslipidemia   • Hypothyroidism   • GERD (gastroesophageal reflux disease)   • Seizure disorder (HCC)   • BPH (benign prostatic hypertrophy)   • Hypertension   • Primary osteoarthritis of both knees   • Syncope and collapse   • Precordial pain   • Diabetes (HCC)   • Status post total right knee replacement     Past Medical History:   Diagnosis Date   • Colon cancer (HCC)     Status post partial colectomy in . No chemotherapy or radiation therapy.   • Coronary artery disease     Nonobstructive coronary artery disease.   • Diabetes (HCC)    • Dyslipidemia    • GERD (gastroesophageal reflux disease)     Hx of.   • Hearing aid worn    • Hyperlipidemia    • Hypertension    • Hypothyroidism    • Left shoulder pain    • Primary osteoarthritis of both knees    • Seizure disorder (HCC)    • Wears glasses      Past Surgical History:   Procedure Laterality Date   • APPENDECTOMY     • CARPAL TUNNEL RELEASE Bilateral    • CHOLECYSTECTOMY     • COLECTOMY PARTIAL / TOTAL      Partial colectomy   • COLONOSCOPY     • CYSTOSCOPY TRANSURETHRAL RESECTION OF PROSTATE N/A 2018    Procedure: CYSTOSCOPY TRANSURETHRAL RESECTION OF PROSTATE GREENLIGHT;  Surgeon: Naseem Clayton MD;  Location: [facility];  Service: Urology   • OTHER SURGICAL HISTORY      Contraction surgery on bilateral hands and wrists.   • OTHER SURGICAL HISTORY      left and right hands   • SINUS SURGERY     • SKIN BIOPSY     • TEETH EXTRACTION     • TONSILLECTOMY     • TURP / TRANSURETHRAL INCISION / DRAINAGE PROSTATE     • VASECTOMY        General Information     Row Name 22 1300          Physical Therapy Time and Intention     Document Type evaluation  -OLIVER     Mode of Treatment individual therapy;physical therapy  -OLIVER     Row Name 04/07/22 1300          General Information    Patient Profile Reviewed yes  -OLIVER     Prior Level of Function min assist:;all household mobility;transfer;bed mobility;ADL's  -OLIVER     Existing Precautions/Restrictions fall;other (see comments)  R adductor nerve cath  -OLIVER     Barriers to Rehab previous functional deficit  -OLIVER     Row Name 04/07/22 1300          Living Environment    People in Home alone  -OLIVER     Row Name 04/07/22 1300          Home Main Entrance    Number of Stairs, Main Entrance one  -OLIVER     Stair Railings, Main Entrance none  -OLIVER     Row Name 04/07/22 1300          Stairs Within Home, Primary    Stairs, Within Home, Primary 0  -OLIVER     Number of Stairs, Within Home, Primary none  -OLIVER     Row Name 04/07/22 1300          Cognition    Orientation Status (Cognition) oriented x 4  -OLIVER     Row Name 04/07/22 1300          Safety Issues, Functional Mobility    Safety Issues Affecting Function (Mobility) safety precaution awareness;safety precautions follow-through/compliance  -OLIVER     Impairments Affecting Function (Mobility) endurance/activity tolerance;strength;pain;range of motion (ROM);balance  -OLIVER           User Key  (r) = Recorded By, (t) = Taken By, (c) = Cosigned By    Initials Name Provider Type    OLIVER Rolando Mobley PT Physical Therapist               Mobility     Row Name 04/07/22 1300          Bed Mobility    Bed Mobility scooting/bridging;supine-sit;sit-supine  -OLIVER     Scooting/Bridging [place] (Bed Mobility) verbal cues;minimum assist (75% patient effort)  -OLIVER     Supine-Sit [place] (Bed Mobility) verbal cues;minimum assist (75% patient effort)  -OLIVER     Sit-Supine [place] (Bed Mobility) verbal cues;minimum assist (75% patient effort)  -OLIVER     Assistive Device (Bed Mobility) bed rails;head of bed elevated  -OLIVER     Comment, (Bed Mobility) Min A for LE management off of/onto EOB and  trunk control into sitting/supine  -     Row Name 04/07/22 1300          Sit-Stand Transfer    Sit-Stand [place] (Transfers) verbal cues;contact guard;2 person assist  -OLIVER     Assistive Device (Sit-Stand Transfers) walker, front-wheeled  -OLIVER     Comment, (Sit-Stand Transfer) Verbal cues for safe hand placement during standing/sitting and moving R LE out for comfort prior to sitting  -     Row Name 04/07/22 1300          Gait/Stairs (Locomotion)    [place] Level (Gait) verbal cues;minimum assist (75% patient effort);2 person assist  -OLIVER     Assistive Device (Gait) walker, front-wheeled  -OLIVER     Distance in Feet (Gait) 100  -OLIVER     Deviations/Abnormal Patterns (Gait) bilateral deviations;bisi decreased;gait speed decreased;stride length decreased  -     Bilateral Gait Deviations forward flexed posture  -OLIVER     Right Sided Gait Deviations heel strike decreased;weight shift ability decreased  -     [place] Level (Stairs) not tested  -     Comment, (Gait/Stairs) Pt ambulated with step through pattern and decreased speed. Verbal cues for maintaining upright posture, body within walker, increase step length, and WB through LEs. Min Ax2 for AD management and balance. Gait training limited by fatigue and weakness. No knee buckling noted.  -     Row Name 04/07/22 1300          Mobility    Extremity Weight-bearing Status right lower extremity  -     Right Lower Extremity (Weight-bearing Status) weight-bearing as tolerated (WBAT)  -           User Key  (r) = Recorded By, (t) = Taken By, (c) = Cosigned By    Initials Name Provider Type    OLIVER Rolando Mobley, PT Physical Therapist               Obj/Interventions     Row Name 04/07/22 1300          Range of Motion Comprehensive    General Range of Motion lower extremity range of motion deficits identified  -     Comment, General Range of Motion R knee AROM 9-85 degrees; L LE AROM WFL; able to actively DF/PF  -     Row Name 04/07/22 1300           Strength Comprehensive (MMT)    General Manual Muscle Testing (MMT) Assessment lower extremity strength deficits identified  -     Comment, General Manual Muscle Testing (MMT) Assessment R LE functionally 4-/5; L LE functionally 4+/5  -     Row Name 04/07/22 1300          Motor Skills    Therapeutic Exercise hip;knee;ankle;other (see comments)  heel raises 3x  -     Row Name 04/07/22 1300          Hip (Therapeutic Exercise)    Hip (Therapeutic Exercise) isometric exercises  -     Hip Isometrics (Therapeutic Exercise) gluteal sets;10 repetitions  -     Row Name 04/07/22 1300          Knee (Therapeutic Exercise)    Knee (Therapeutic Exercise) isometric exercises;strengthening exercise  -     Knee Isometrics (Therapeutic Exercise) quad sets;10 repetitions  -     Knee Strengthening (Therapeutic Exercise) right;heel slides;SLR (straight leg raise);SAQ (short arc quad);LAQ (long arc quad);3 repetitions  -[facility] Name 04/07/22 1300          Ankle (Therapeutic Exercise)    Ankle (Therapeutic Exercise) AROM (active range of motion)  -     Ankle AROM (Therapeutic Exercise) bilateral;dorsiflexion;plantarflexion;10 repetitions  -[facility] Name 04/07/22 1300          Sensory Assessment (Somatosensory)    Sensory Assessment (Somatosensory) LE sensation intact  -           User Key  (r) = Recorded By, (t) = Taken By, (c) = Cosigned By    Initials Name Provider Type    Rolando Apple, PT Physical Therapist               Goals/Plan     Row Name 04/07/22 1300          Bed Mobility Goal 1 (PT)    Activity/Assistive Device (Bed Mobility Goal 1, PT) sit to supine/supine to sit  -     [place] Level/Cues Needed (Bed Mobility Goal 1, PT) contact guard required  -     Time Frame (Bed Mobility Goal 1, PT) long term goal (LTG);3 days  -     Row Name 04/07/22 1300          Transfer Goal 1 (PT)    Activity/Assistive Device (Transfer Goal 1, PT) sit-to-stand/stand-to-sit;walker, rolling  -     [place]  Level/Cues Needed (Transfer Goal 1, PT) modified independence  -OLIVER     Time Frame (Transfer Goal 1, PT) long term goal (LTG);3 days  -OLIVER     Row Name 04/07/22 1300          Gait Training Goal 1 (PT)    Activity/Assistive Device (Gait Training Goal 1, PT) gait (walking locomotion);walker, rolling  -OLIVER     [place] Level (Gait Training Goal 1, PT) contact guard required  -OLIVER     Distance (Gait Training Goal 1, PT) 300 feet  -OLIVER     Time Frame (Gait Training Goal 1, PT) long term goal (LTG);3 days  -OLIVER     Row Name 04/07/22 1300          ROM Goal 1 (PT)    ROM Goal 1 (PT) R knee AROM 0-90 degrees  -OLIVER     Time Frame (ROM Goal 1, PT) long-term goal (LTG);3 days  -OLIVER           User Key  (r) = Recorded By, (t) = Taken By, (c) = Cosigned By    Initials Name Provider Type    Rolando Apple, BRISSA Physical Therapist               Clinical Impression     Row Name 04/07/22 1300          Pain    Pretreatment Pain Rating 0/10 - no pain  -OLIVER     Posttreatment Pain Rating 2/10  -OLIVER     Pain Location - Side/Orientation Right  -OLIVER     Pain Location generalized  -OLIVER     Pain Location - knee  -OLIVER     Pain Intervention(s) Ambulation/increased activity;Repositioned;Cold applied  -OLIVER     Row Name 04/07/22 1300          Therapy Assessment/Plan (PT)    Patient/Family Therapy Goals Statement (PT) To return home  -OLIVER     Rehab Potential (PT) good, to achieve stated therapy goals  -OLIVER     Criteria for Skilled Interventions Met (PT) yes;meets criteria;skilled treatment is necessary  -     Row Name 04/07/22 1300          Positioning and Restraints    Pre-Treatment Position in bed  -OLIVER     Post Treatment Position bed  -OLIVER     In Bed notified nsg;fowlers;call light within reach;encouraged to call for assist;exit alarm on;with nsg  -OLIVER           User Key  (r) = Recorded By, (t) = Taken By, (c) = Cosigned By    Initials Name Provider Type    Rolando Apple, BRISSA Physical Therapist               Outcome Measures     Row Name 04/07/22 1300           How much help from another person do you currently need...    Turning from your back to your side while in flat bed without using bedrails? 2  -OLIVER     Moving from lying on back to sitting on the side of a flat bed without bedrails? 2  -OLIVER     Moving to and from a bed to a chair (including a wheelchair)? 3  -OLIVER     Standing up from a chair using your arms (e.g., wheelchair, bedside chair)? 3  -OLIVER     Climbing 3-5 steps with a railing? 2  -OLIVER     To walk in hospital room? 3  -OLIVER     AM-PAC 6 Clicks Score (PT) 15  -OLIVER     Row Name 04/07/22 1300          PADD    Diagnosis 1  -OLIVER     Gender 2  -OLIVER     Age Group 1  -OLIVER     Gait Distance 0  -OLIVER     Assist Level 0  -OLIVER     Home Support 0  -OLIVER     PADD Score 3  -OLIVER     Patient Preference extended rehabilitation  -OLIVER     Prediction by PADD Score extended rehabilitation  -OLIVER     Row Name 04/07/22 1300          Functional Assessment    Outcome Measure Options AM-PAC 6 Clicks Basic Mobility (PT);PADD  -OLIVER           User Key  (r) = Recorded By, (t) = Taken By, (c) = Cosigned By    Initials Name Provider Type    Rolando Apple, PT Physical Therapist                             Physical Therapy Education                 Title: PT OT SLP Therapies (Done)     Topic: Physical Therapy (Done)     Point: Mobility training (Done)     Learning Progress Summary           Patient Acceptance, E,D, VU by OLIVER at 4/7/2022 1300    Comment: Educated on safe sequencing with bed mobility, ambulatory transfers, and gait. Reviewed HEP and knee precautions.                   Point: Home exercise program (Done)     Learning Progress Summary           Patient Acceptance, E,D, VU by OLIVER at 4/7/2022 1300    Comment: Educated on safe sequencing with bed mobility, ambulatory transfers, and gait. Reviewed HEP and knee precautions.                   Point: Body mechanics (Done)     Learning Progress Summary           Patient Acceptance, E,D, VU by OLIVER at 4/7/2022 1300    Comment: Educated on safe sequencing with  bed mobility, ambulatory transfers, and gait. Reviewed HEP and knee precautions.                   Point: Precautions (Done)     Learning Progress Summary           Patient Acceptance, E,D, VU by OLIVER at 4/7/2022 1300    Comment: Educated on safe sequencing with bed mobility, ambulatory transfers, and gait. Reviewed HEP and knee precautions.                               User Key     Initials Effective Dates Name Provider Type Discipline    OLIVER 06/16/21 -  Rolando Mobley, PT Physical Therapist PT              PT Recommendation and Plan  Planned Therapy Interventions (PT): balance training, bed mobility training, gait training, home exercise program, patient/family education, transfer training, strengthening  Plan of Care Reviewed With: patient  Progress: improving  Outcome Evaluation: PT eval complete. Pt ambulated 100 feet using RW and min Ax2 for balance and AD management. Gait limited by fatigue and weakness. Bed mobility performed with min A and STS with CGA x2. No knee buckling noted. Pt IND with SLR. R knee AROM 9-85 degrees. Reviewed HEP and knee precautions. PADD score = 3. Recommend pt d/c IPR when appropriate.     Time Calculation:    PT Charges     Row Name 04/07/22 1300             Time Calculation    Start Time 1300  -OLIVER      PT Received On 04/07/22  -OLIVER      PT Goal Re-Cert Due Date 04/17/22  -OLIVER              Time Calculation- PT    Total Timed Code Minutes- PT 16 minute(s)  -OLIVER              Timed Charges    37086 - PT Therapeutic Exercise Minutes 6  -OLIVER      58979 - Gait Training Minutes  10  -OLIVER              Untimed Charges    PT Eval/Re-eval Minutes 34  -OLIVER              Total Minutes    Timed Charges Total Minutes 16  -OLIVER      Untimed Charges Total Minutes 34  -OLIVER       Total Minutes 50  -OLIVER            User Key  (r) = Recorded By, (t) = Taken By, (c) = Cosigned By    Initials Name Provider Type    Rolando Apple, PT Physical Therapist              Therapy Charges for Today     Code Description Service Date  Service Provider Modifiers Qty    74603606284 HC GAIT TRAINING EA 15 MIN 4/7/2022 Rolando Mobley, PT GP 1    74274253412 HC PT EVAL LOW COMPLEXITY 3 4/7/2022 Rolando Mobley, PT GP 1          PT G-Codes  Outcome Measure Options: AM-PAC 6 Clicks Basic Mobility (PT), PADD  AM-PAC 6 Clicks Score (PT): 15    Rolando Mobley, PT  4/7/2022

## 2022-04-07 NOTE — ANESTHESIA PROCEDURE NOTES
Spinal Block      Patient reassessed immediately prior to procedure    Patient location during procedure: OR  Indication:at surgeon's request  Performed By  CRNA: Miguel Gaxiola CRNA  Preanesthetic Checklist  Completed: patient identified, IV checked, site marked, risks and benefits discussed, surgical consent, monitors and equipment checked, pre-op evaluation and timeout performed  Spinal Block Prep:  Patient Position:sitting  Sterile Tech:cap, gloves, sterile barriers and mask  Prep:Chloraprep  Patient Monitoring:blood pressure monitoring, continuous pulse oximetry and EKG  Spinal Block Procedure  Approach:midline  Guidance:landmark technique and palpation technique  Location:L4-L5  Needle Type:Sprotte  Needle Gauge:25 G  Placement of Spinal needle event:cerebrospinal fluid aspirated  Paresthesia: no  Fluid Appearance:clear  Medications: bupivacaine (MARCAINE) 0.5 % injection, 2 mL   Post Assessment  Patient Tolerance:patient tolerated the procedure well with no apparent complications  Complications no  Additional Notes  Procedure:  Pt assisted to sitting position, with legs in position of comfort over side of bed.  Pt. instructed in optimal spine presentation, the spine was prepped/ Draped and the skin at insertion site was anesthetized with 1% Lidocaine 2 ml.  The spinal needle was then advanced until CSF flow was obtained and LA was injected:

## 2022-04-07 NOTE — H&P
Patient Name: Lea Rivers  MRN: [id number]  : 1945  DOS: 2022    Attending: Louis Malcolm MD    Primary Care Provider: Mannie Melvin MD      Chief complaint: Right knee Pain.    Subjective   Patient is a pleasant 76 y.o. male presented for scheduled surgery by Dr. Malcolm.  He underwent right total knee arthroplasty under spinal anesthesia.     (Per Dr. Malcolm's note:The patient is a 76 y.o. male with debilitating right knee pain secondary to osteoarthritis that failed to improve in spite of conservative treatment .  Options have been discussed at length with the patient and the patient has had an extended course of conservative treatment without long-term benefit. The patient has reached the point where the patient desires total knee arthroplasty surgery and understands the risks, benefits, and alternatives. Consent was obtained. Please see my office notes for details with regard to preoperative counseling and operative rationale. )     He tolerated surgery well and was admitted for medical management.  His knee has been painful for 8 years.  He has tried conservative measures without lasting benefit.  He denies use of walker or cane.  He denies recent falls.  He denies history of DVT or PE.    When seen postop, patient is doing well.  His pain is mild in his anterior right knee.  He denies nausea, shortness of breath or chest pain.  He is hoping for rehab placement at discharge as he lives alone.      Allergies   Allergen Reactions   • Sulfa Antibiotics Rash     Childhood reaction          Medications Prior to Admission   Medication Sig Dispense Refill Last Dose   • chlorthalidone (HYGROTON) 25 MG tablet Take 1 tablet by mouth Daily. 90 tablet 3 2022 at 0500   • coenzyme Q10 100 MG capsule Take 100 mg by mouth Daily.   2022 at Unknown time   • divalproex (DEPAKOTE) 500 MG 24 hr tablet Take 500 mg by mouth 2 (two) times a day. 500mg QAM, 1000mg QPM   2022 at 0500   •  isosorbide mononitrate (IMDUR) 30 MG 24 hr tablet Take 30 mg by mouth Daily.   4/7/2022 at 0500   • levETIRAcetam (KEPPRA) 500 MG tablet Take 500 mg by mouth 2 (two) times a day.   4/7/2022 at 0500   • levothyroxine (SYNTHROID, LEVOTHROID) 88 MCG tablet Take 88 mcg by mouth daily.   4/7/2022 at 0500   • metFORMIN ER (GLUCOPHAGE-XR) 500 MG 24 hr tablet Take 500 mg by mouth Daily With Breakfast.   4/6/2022 at Unknown time   • ONE TOUCH ULTRA TEST test strip Daily. for testing  0 4/7/2022 at 0500   • oxybutynin XL (DITROPAN XL) 15 MG 24 hr tablet Take 15 mg by mouth Daily.   4/6/2022 at Unknown time   • simvastatin (ZOCOR) 20 MG tablet Take 20 mg by mouth every night.   4/6/2022 at Unknown time   • terazosin (HYTRIN) 5 MG capsule Take 5 mg by mouth Every Night.   4/6/2022 at Unknown time   • verapamil SR (CALAN-SR) 240 MG CR tablet Take 240 mg by mouth 2 (two) times a day.   4/7/2022 at Unknown time   • albuterol sulfate  (90 Base) MCG/ACT inhaler As Needed.      • aspirin 81 MG EC tablet Take 81 mg by mouth Daily.      • Chlorhexidine Gluconate (Antiseptic Skin Cleanser) 4 % solution Shower with solution as directed for 5 days prior to surgery 237 mL 0    • Diclofenac Sodium (VOLTAREN) 1 % gel gel Apply 4 g topically to the appropriate area as directed 4 (Four) Times a Day As Needed (prn for knee pain). 100 g 5    • fluticasone (FLONASE) 50 MCG/ACT nasal spray 2 sprays into the nostril(s) as directed by provider As Needed for Rhinitis. Administer 2 sprays in each nostril for each dose.      • lisinopril (PRINIVIL,ZESTRIL) 20 MG tablet Take 20 mg by mouth 2 (two) times a day.      • omeprazole (priLOSEC) 20 MG capsule Take 20 mg by mouth Daily.      • ONETOUCH DELICA LANCETS 33G misc USE 1 LANCET ONCE A DAY  0    • vitamin B-12 (CYANOCOBALAMIN) 1000 MCG tablet Take 1,000 mcg by mouth Daily.            Past Medical History:   Diagnosis Date   • Colon cancer (HCC) 1990    Status post partial colectomy in 1990. No  "chemotherapy or radiation therapy.   • Coronary artery disease     Nonobstructive coronary artery disease.   • Diabetes (HCC)    • Dyslipidemia    • GERD (gastroesophageal reflux disease)     Hx of.   • Hearing aid worn    • Hyperlipidemia    • Hypertension    • Hypothyroidism    • Left shoulder pain    • Primary osteoarthritis of both knees    • Seizure disorder (HCC)    • Wears glasses      Past Surgical History:   Procedure Laterality Date   • APPENDECTOMY     • CARPAL TUNNEL RELEASE Bilateral    • CHOLECYSTECTOMY     • COLECTOMY PARTIAL / TOTAL  1990    Partial colectomy   • COLONOSCOPY     • CYSTOSCOPY TRANSURETHRAL RESECTION OF PROSTATE N/A 09/21/2018    Procedure: CYSTOSCOPY TRANSURETHRAL RESECTION OF PROSTATE GREENLIGHT;  Surgeon: Naseem Clayton MD;  Location: [facility];  Service: Urology   • OTHER SURGICAL HISTORY      Contraction surgery on bilateral hands and wrists.   • OTHER SURGICAL HISTORY      left and right hands   • SINUS SURGERY     • SKIN BIOPSY     • TEETH EXTRACTION     • TONSILLECTOMY     • TURP / TRANSURETHRAL INCISION / DRAINAGE PROSTATE     • VASECTOMY       Family History   Problem Relation Age of Onset   • Cancer Mother         brain   • Hypertension Father    • Stroke Father    • Stroke Other    • Arthritis Other    • Cancer Other    • No Known Problems Sister    • Stroke Maternal Grandfather    • No Known Problems Paternal Grandmother    • No Known Problems Paternal Grandfather    • Stroke Sister      Social History     Tobacco Use   • Smoking status: Never Smoker   • Smokeless tobacco: Never Used   Vaping Use   • Vaping Use: Never used   Substance Use Topics   • Alcohol use: No   • Drug use: No   .  Retired.    Review of Systems  Pertinent items are noted in HPI, all other systems reviewed and negative    Vital Signs  /93   Pulse 57   Temp 98.4 °F (36.9 °C) (Temporal)   Resp 20   Ht 172.7 cm (68\")   Wt 97.1 kg (214 lb 1.1 oz)   SpO2 93%   BMI 32.55 kg/m² "     Physical Exam:    General Appearance:    Alert, cooperative, in no acute distress   Head:    Normocephalic, without obvious abnormality, atraumatic   Eyes:            Lids and lashes normal, conjunctivae and sclerae normal, no   icterus, no pallor, corneas clear    Ears:    Ears appear intact with no abnormalities noted   Throat:   No oral lesions, no thrush, oral mucosa moist   Neck:   No adenopathy, supple, trachea midline, no thyromegaly         Lungs:     Clear to auscultation,respirations regular, even and                   unlabored    Heart:    Regular rhythm and normal rate, normal S1 and S2, no       murmur, no gallop   Abdomen:     Normal bowel sounds, no masses, no organomegaly, soft        non-tender, non-distended, no guarding, no rebound                 tenderness   Genitalia:    Deferred   Extremities:  RLE, CDI ace wrap dressing on knee, PNB cath present.    Pulses:   Pulses palpable and equal bilaterally.   Skin:   No bleeding, bruising or rash   Neurologic:   Cranial nerves 2 - 12 grossly intact, flexion dorsiflexion intact bilateral feet      I reviewed the patient's new clinical results.             Invalid input(s): NEUTOPHILPCT,  EOSPCT        Invalid input(s): LABALBU, PROT  Lab Results   Component Value Date    HGBA1C 6.10 (H) 03/24/2022      Latest Reference Range & Units 03/24/22 13:16   Glucose 65 - 99 mg/dL 84   Sodium 136 - 145 mmol/L 137   Potassium 3.5 - 5.2 mmol/L 4.5   CO2 22.0 - 29.0 mmol/L 25.0   Chloride 98 - 107 mmol/L 101   Anion Gap 5.0 - 15.0 mmol/L 11.0   Creatinine 0.76 - 1.27 mg/dL 1.07   BUN 8 - 23 mg/dL 15   BUN/Creatinine Ratio 7.0 - 25.0  14.0   Calcium 8.6 - 10.5 mg/dL 9.4   EGFR >60.0 mL/min/1.73 71.5 [1]   Hemoglobin A1C 4.80 - 5.60 % 6.10 (H)   C-Reactive Protein 0.00 - 0.50 mg/dL <0.30   Protime 11.4 - 14.4 Seconds 13.7   INR 0.85 - 1.16  1.08   PTT 22.0 - 39.0 seconds 30.6   WBC 3.40 - 10.80 10*3/mm3 7.67   RBC 4.14 - 5.80 10*6/mm3 3.74 (L)   Hemoglobin 13.0 -  17.7 g/dL 11.0 (L)   Hematocrit 37.5 - 51.0 % 34.9 (L)   RDW 12.3 - 15.4 % 13.2   MCV 79.0 - 97.0 fL 93.3   MCH 26.6 - 33.0 pg 29.4   MCHC 31.5 - 35.7 g/dL 31.5   MPV 6.0 - 12.0 fL 9.2   Platelets 140 - 450 10*3/mm3 227   RDW-SD 37.0 - 54.0 fl 45.2   (H): Data is abnormally high  (L): Data is abnormally low  [1] National Kidney Foundation and American Society of Nephrology (ASN) Task Force recommended calculation based on the Chronic Kidney Disease Epidemiology Collaboration (CKD-EPI) equation refit without adjustment for race.      Assessment and Plan:       Status post total right knee replacement    Coronary artery disease    Dyslipidemia    Hypothyroidism    GERD (gastroesophageal reflux disease)    Seizure disorder (HCC)    BPH (benign prostatic hypertrophy)    Hypertension    Primary osteoarthritis of both knees    Diabetes (HCC)      Plan  1. PT/OT, RLE early range of motion and weight-bearing per the post total knee arthroplasty protocol  2. Pain control-prns, ACB cath with ropivacaine infusion.  3. IS-encourage  4. DVT proph- Mechanicals and aspirin  5. Bowel regimen  6. Resume home medications as appropriate  7. Monitor post-op labs  8. DC planning for rehab, case management consult    HTN, dyslipidemia, CAD  - Continue home lisinopril, verapamil, Imdur, statin  - Hold chlorthalidone  - Monitor BP   - Holding parameters for BP meds  - Labetalol PRN for SBP>170    Hypothyroidism  -Synthroid    GERD  -Protonix    Seizure disorder  -Continue Keppra, Depakote    BPH  -Continue Terazosin  Monitor for spontaneous voiding.  Check postvoid residual if needed/wy    DM2  - hgb A1c on 3/24/2022 6.1  -Hold Metformin  - Accu-Chek AC and HS with low dose SSI      Dragon disclaimer:  Part of this encounter note is an electronic transcription/translation of spoken language to printed text. The electronic translation of spoken language may permit erroneous, or at times, nonsensical words or phrases to be inadvertently  transcribed; Although I have reviewed the note for such errors, some may still exist.    KATE Mancuso  04/07/22  12:16 EDT    I have personally performed the evaluation on this patient. My history is consistent  with HPI obtained. My exam findings are listed above. I have personally reviewed and discussed the above formulated treatment plan with pt and KATE Goodman.wy

## 2022-04-07 NOTE — PLAN OF CARE
Problem: Adult Inpatient Plan of Care  Goal: Plan of Care Review  Flowsheets (Taken 4/7/2022 1300)  Progress: improving  Plan of Care Reviewed With: patient  Outcome Evaluation: PT eval complete. Pt ambulated 100 feet using RW and min Ax2 for balance and AD management. Gait limited by fatigue and weakness. Bed mobility performed with min A and STS with CGA x2. No knee buckling noted. Pt IND with SLR. R knee AROM 9-85 degrees. Reviewed HEP and knee precautions. PADD score = 3. Recommend pt d/c IPR when appropriate.   Goal Outcome Evaluation:  Plan of Care Reviewed With: patient        Progress: improving  Outcome Evaluation: PT eval complete. Pt ambulated 100 feet using RW and min Ax2 for balance and AD management. Gait limited by fatigue and weakness. Bed mobility performed with min A and STS with CGA x2. No knee buckling noted. Pt IND with SLR. R knee AROM 9-85 degrees. Reviewed HEP and knee precautions. PADD score = 3. Recommend pt d/c IPR when appropriate.

## 2022-04-07 NOTE — ANESTHESIA PREPROCEDURE EVALUATION
Anesthesia Evaluation     Patient summary reviewed and Nursing notes reviewed                Airway   Mallampati: I  TM distance: >3 FB  Neck ROM: full  No difficulty expected  Dental - normal exam     Pulmonary - negative pulmonary ROS and normal exam   Cardiovascular - normal exam    (+) hypertension, CAD, hyperlipidemia,       Neuro/Psych  (+) seizures, syncope,    GI/Hepatic/Renal/Endo    (+)  GERD,  diabetes mellitus, thyroid problem hypothyroidism    Musculoskeletal     Abdominal  - normal exam    Bowel sounds: normal.   Substance History - negative use     OB/GYN negative ob/gyn ROS         Other   arthritis,    history of cancer (COLON)                    Anesthesia Plan    ASA 3     spinal   (ADDUCTOR CANAL CATHETER FOR POSTOP PAIN)  intravenous induction     Anesthetic plan, all risks, benefits, and alternatives have been provided, discussed and informed consent has been obtained with: patient.    Plan discussed with CRNA.        CODE STATUS:

## 2022-04-08 LAB
ANION GAP SERPL CALCULATED.3IONS-SCNC: 10 MMOL/L (ref 5–15)
BUN SERPL-MCNC: 14 MG/DL (ref 8–23)
BUN/CREAT SERPL: 14 (ref 7–25)
CALCIUM SPEC-SCNC: 8.5 MG/DL (ref 8.6–10.5)
CHLORIDE SERPL-SCNC: 101 MMOL/L (ref 98–107)
CO2 SERPL-SCNC: 23 MMOL/L (ref 22–29)
CREAT SERPL-MCNC: 1 MG/DL (ref 0.76–1.27)
DEPRECATED RDW RBC AUTO: 41.7 FL (ref 37–54)
EGFRCR SERPLBLD CKD-EPI 2021: 77.5 ML/MIN/1.73
ERYTHROCYTE [DISTWIDTH] IN BLOOD BY AUTOMATED COUNT: 13.5 % (ref 12.3–15.4)
GLUCOSE BLDC GLUCOMTR-MCNC: 118 MG/DL (ref 70–130)
GLUCOSE BLDC GLUCOMTR-MCNC: 125 MG/DL (ref 70–130)
GLUCOSE BLDC GLUCOMTR-MCNC: 162 MG/DL (ref 70–130)
GLUCOSE SERPL-MCNC: 133 MG/DL (ref 65–99)
HCT VFR BLD AUTO: 23.6 % (ref 37.5–51)
HGB BLD-MCNC: 8 G/DL (ref 13–17.7)
MCH RBC QN AUTO: 29.2 PG (ref 26.6–33)
MCHC RBC AUTO-ENTMCNC: 33.9 G/DL (ref 31.5–35.7)
MCV RBC AUTO: 86.1 FL (ref 79–97)
PLATELET # BLD AUTO: 158 10*3/MM3 (ref 140–450)
PMV BLD AUTO: 10 FL (ref 6–12)
POTASSIUM SERPL-SCNC: 4.1 MMOL/L (ref 3.5–5.2)
RBC # BLD AUTO: 2.74 10*6/MM3 (ref 4.14–5.8)
SODIUM SERPL-SCNC: 134 MMOL/L (ref 136–145)
WBC NRBC COR # BLD: 11.37 10*3/MM3 (ref 3.4–10.8)

## 2022-04-08 PROCEDURE — A9270 NON-COVERED ITEM OR SERVICE: HCPCS

## 2022-04-08 PROCEDURE — A9270 NON-COVERED ITEM OR SERVICE: HCPCS | Performed by: ORTHOPAEDIC SURGERY

## 2022-04-08 PROCEDURE — 97116 GAIT TRAINING THERAPY: CPT

## 2022-04-08 PROCEDURE — 63710000001 MELOXICAM 7.5 MG TABLET: Performed by: ORTHOPAEDIC SURGERY

## 2022-04-08 PROCEDURE — 63710000001 ISOSORBIDE MONONITRATE 30 MG TABLET SUSTAINED-RELEASE 24 HOUR

## 2022-04-08 PROCEDURE — 63710000001 ASPIRIN EC 325 MG TABLET DELAYED-RELEASE: Performed by: ORTHOPAEDIC SURGERY

## 2022-04-08 PROCEDURE — 63710000001 PANTOPRAZOLE 40 MG TABLET DELAYED-RELEASE

## 2022-04-08 PROCEDURE — 63710000001 DIVALPROEX 500 MG TABLET SUSTAINED-RELEASE 24 HOUR

## 2022-04-08 PROCEDURE — P9612 CATHETERIZE FOR URINE SPEC: HCPCS

## 2022-04-08 PROCEDURE — 63710000001 LEVETIRACETAM 500 MG TABLET

## 2022-04-08 PROCEDURE — 63710000001 LEVOTHYROXINE 88 MCG TABLET

## 2022-04-08 PROCEDURE — 82962 GLUCOSE BLOOD TEST: CPT

## 2022-04-08 PROCEDURE — 97165 OT EVAL LOW COMPLEX 30 MIN: CPT

## 2022-04-08 PROCEDURE — 99024 POSTOP FOLLOW-UP VISIT: CPT | Performed by: ORTHOPAEDIC SURGERY

## 2022-04-08 PROCEDURE — 63710000001 LISINOPRIL 20 MG TABLET

## 2022-04-08 PROCEDURE — 97110 THERAPEUTIC EXERCISES: CPT

## 2022-04-08 PROCEDURE — 63710000001 VERAPAMIL SR 240 MG TABLET CONTROLLED-RELEASE

## 2022-04-08 PROCEDURE — 63710000001 OXYCODONE 5 MG TABLET: Performed by: ORTHOPAEDIC SURGERY

## 2022-04-08 PROCEDURE — 80048 BASIC METABOLIC PNL TOTAL CA: CPT

## 2022-04-08 PROCEDURE — 85027 COMPLETE CBC AUTOMATED: CPT | Performed by: ORTHOPAEDIC SURGERY

## 2022-04-08 RX ADMIN — LISINOPRIL 20 MG: 20 TABLET ORAL at 19:38

## 2022-04-08 RX ADMIN — OXYCODONE 5 MG: 5 TABLET ORAL at 19:38

## 2022-04-08 RX ADMIN — DIVALPROEX SODIUM 1000 MG: 500 TABLET, EXTENDED RELEASE ORAL at 19:38

## 2022-04-08 RX ADMIN — ISOSORBIDE MONONITRATE 30 MG: 30 TABLET, EXTENDED RELEASE ORAL at 09:45

## 2022-04-08 RX ADMIN — ASPIRIN 325 MG: 325 TABLET, COATED ORAL at 09:45

## 2022-04-08 RX ADMIN — VERAPAMIL HYDROCHLORIDE 240 MG: 240 TABLET, FILM COATED, EXTENDED RELEASE ORAL at 09:45

## 2022-04-08 RX ADMIN — PANTOPRAZOLE SODIUM 40 MG: 40 TABLET, DELAYED RELEASE ORAL at 09:45

## 2022-04-08 RX ADMIN — LISINOPRIL 20 MG: 20 TABLET ORAL at 09:45

## 2022-04-08 RX ADMIN — LEVOTHYROXINE SODIUM 88 MCG: 88 TABLET ORAL at 04:52

## 2022-04-08 RX ADMIN — LEVETIRACETAM 500 MG: 500 TABLET, FILM COATED ORAL at 09:45

## 2022-04-08 RX ADMIN — LEVETIRACETAM 500 MG: 500 TABLET, FILM COATED ORAL at 19:37

## 2022-04-08 RX ADMIN — VERAPAMIL HYDROCHLORIDE 240 MG: 240 TABLET, FILM COATED, EXTENDED RELEASE ORAL at 19:38

## 2022-04-08 RX ADMIN — TERAZOSIN HYDROCHLORIDE 5 MG: 5 CAPSULE ORAL at 19:37

## 2022-04-08 RX ADMIN — DIVALPROEX SODIUM 500 MG: 500 TABLET, EXTENDED RELEASE ORAL at 09:51

## 2022-04-08 RX ADMIN — OXYCODONE 5 MG: 5 TABLET ORAL at 12:02

## 2022-04-08 RX ADMIN — Medication 10 ML: at 19:39

## 2022-04-08 RX ADMIN — MELOXICAM 15 MG: 7.5 TABLET ORAL at 09:45

## 2022-04-08 RX ADMIN — ATORVASTATIN CALCIUM 10 MG: 10 TABLET, FILM COATED ORAL at 19:39

## 2022-04-08 NOTE — PROGRESS NOTES
[facility]    Acute pain service Inpatient Progress Note    Patient Name: Lea Rivers  :  1945  MRN:  [id number]        Acute Pain  Service Inpatient Progress Note:    Analgesia:Excellent  Pain Score:0/10  LOC: alert and awake  Resp Status: room air  Cardiac: VS stable  Side Effects:None  Catheter Site:clean, dressing intact and dry  Catheter type: Peripheral nerve cath with Arrow pump.  Infusion rate: 10ml/hr  Catheter Plan:Catheter to remain Insitu and Continue catheter infusion rate unchanged  Comments: Pt seems confused. Pt is restless and trying to pull out IV. RN reinforced the IV tubing with extra tape.

## 2022-04-08 NOTE — PLAN OF CARE
Goal Outcome Evaluation:  Plan of Care Reviewed With: patient        Progress: improving  Outcome Evaluation: Patient slightly confused, however demonstrated overall improvement in mobility increasing his ambulation to 300 feet. He does require safety cues .

## 2022-04-08 NOTE — PROGRESS NOTES
"IM progress note      Lea Barrett Gibson  [id number]  1945     LOS: 0 days     Attending: Louis Malcolm MD    Primary Care Provider: Mannie Melvin MD      Chief Complaint/Reason for visit:  No chief complaint on file.      Subjective   Doing okay overall.  Confusion overnight and today.  No nausea, vomiting, or shortness of breath.  Gait is noted to be unsteady with PT and patient requires extra cues for safety.    Objective        Visit Vitals  /85   Pulse 74   Temp 98.2 °F (36.8 °C) (Oral)   Resp 14   Ht 172.7 cm (68\")   Wt 97.1 kg (214 lb 1.1 oz)   SpO2 96%   BMI 32.55 kg/m²     Temp (24hrs), Av.9 °F (36.6 °C), Min:97.3 °F (36.3 °C), Max:98.4 °F (36.9 °C)      Intake/Output:    Intake/Output Summary (Last 24 hours) at 2022 1050  Last data filed at 2022 0500  Gross per 24 hour   Intake --   Output 2300 ml   Net -2300 ml        Physical Therapy:Progress: no change  Outcome Evaluation: Patient continues to be confused requiring extra cues for safety to stay close to walker and stay with walker untill seated. He ambulated 300 feet with unsteady gait, some tremors noted    Physical Exam:     General Appearance:    Alert, cooperative, in no acute distress   Head:    Normocephalic, without obvious abnormality, atraumatic    Lungs:     Normal effort, symmetric chest rise,  clear to      auscultation bilaterally              Heart:    Regular rhythm and normal rate, normal S1 and S2    Abdomen:     Normal bowel sounds, no masses, no organomegaly, soft        non-tender, non-distended, no guarding, no rebound                tenderness   Extremities:  Clean dry and intact dressing over right knee.  Peripheral nerve block catheter present.  Intact flexion dorsiflexion bilateral feet.  No clubbing, cyanosis or edema.  No deformities.    Pulses:   Pulses palpable and equal bilaterally   Skin:   No bleeding, bruising or rash          Results Review:     I reviewed the patient's new clinical " results.         Invalid input(s): NEUTOPHILPCT,  EOSPCT        Invalid input(s): MADISON RAYO  I reviewed the patient's new imaging including images and reports.    All medications reviewed.   aspirin, 325 mg, Oral, Daily  atorvastatin, 10 mg, Oral, Nightly  divalproex, 1,000 mg, Oral, Nightly  divalproex, 500 mg, Oral, Daily  insulin lispro, 0-7 Units, Subcutaneous, TID With Meals  isosorbide mononitrate, 30 mg, Oral, Daily  levETIRAcetam, 500 mg, Oral, Q12H  levothyroxine, 88 mcg, Oral, Q AM  lisinopril, 20 mg, Oral, Q12H  meloxicam, 15 mg, Oral, Daily  pantoprazole, 40 mg, Oral, Daily  sodium chloride, 10 mL, Intravenous, Q12H  terazosin, 5 mg, Oral, Nightly  verapamil SR, 240 mg, Oral, Q12H      dextrose, 25 g, Q15 Min PRN  dextrose, 15 g, Q15 Min PRN  fluticasone, 2 spray, PRN  glucagon (human recombinant), 1 mg, Q15 Min PRN  HYDROmorphone, 0.5 mg, Q2H PRN   And  naloxone, 0.1 mg, Q5 Min PRN  labetalol, 10 mg, Q4H PRN  Morphine, 4 mg, Q2H PRN   And  naloxone, 0.4 mg, Q5 Min PRN  ondansetron, 4 mg, Q6H PRN   Or  ondansetron, 4 mg, Q6H PRN  ondansetron, 4 mg, Q6H PRN  oxyCODONE, 5 mg, Q4H PRN  sodium chloride, 500 mL, TID PRN  sodium chloride, 1-10 mL, PRN        Assessment/Plan       Status post total right knee replacement    Coronary artery disease    Dyslipidemia    Hypothyroidism    GERD (gastroesophageal reflux disease)    Seizure disorder (HCC)    BPH (benign prostatic hypertrophy)    Hypertension    Primary osteoarthritis of both knees    Diabetes (HCC)    Plan     1. PT/OT, RLE early range of motion and weight-bearing per the post total knee arthroplasty protocol  2. Pain control-prns, ACB cath with ropivacaine infusion.  3. IS-encourage  4. DVT proph- Mechanicals and aspirin  5. Bowel regimen  6. Resume home medications as appropriate  7. Monitor post-op labs  8. DC planning for rehab, case management consult     HTN, dyslipidemia, CAD  - Continue home lisinopril, verapamil, Imdur, statin  - Hold  chlorthalidone  - Monitor BP   - Holding parameters for BP meds  - Labetalol PRN for SBP>170     Hypothyroidism  -Synthroid     GERD  -Protonix     Seizure disorder  -Continue Keppra, Depakote     BPH  -Continue Terazosin  Monitor for spontaneous voiding.  Check postvoid residual if needed/wy     DM2  - hgb A1c on 3/24/2022 6.1  -Hold Metformin  - Accu-Chek AC and HS with low dose SSI      I believe this patient meets INPATIENT status due to the need for care which can only be reasonably provided in an hospital setting such as aggressive/expedited ancillary services and/or consultation services, the necessity for IV medications, close physician monitoring and/or the possible need for procedures.      In such, I feel patient’s risk for adverse outcomes and need for care warrant INPATIENT evaluation and predict the patient’s care encounter to likely last beyond 2 midnights.    Joe Cole MD  04/08/22  10:50 EDT

## 2022-04-08 NOTE — PLAN OF CARE
Problem: Adult Inpatient Plan of Care  Goal: Plan of Care Review  Outcome: Ongoing, Progressing   Goal Outcome Evaluation:  Pt remains confused and impulsive. C/o pain controlled with prn meds. VSS. Up x1 assist. BM today. Voiding.

## 2022-04-08 NOTE — PLAN OF CARE
Goal Outcome Evaluation:  Plan of Care Reviewed With: patient        Progress: no change  Outcome Evaluation: Patient continues to be confused requiring extra cues for safety to stay close to walker and stay with walker untill seated. He ambulated 300 feet with unsteady gait, some tremors noted

## 2022-04-08 NOTE — PLAN OF CARE
Oriented only to self, patient is easily redirected, follows all commands. SBP significantly elevated at 175 at this time PRN Labetalol administered. Elastic bandage to the RLE CDI. Patient is currently resting in bed w/o sx/si  of pain/acute distress.BP closely monitored. Call light in reach.

## 2022-04-08 NOTE — THERAPY TREATMENT NOTE
Patient Name: Lea Rivers  : 1945    MRN: [id number]                              Today's Date: 2022       Admit Date: 2022    Visit Dx:     ICD-10-CM ICD-9-CM   1. Primary osteoarthritis of both knees  M17.0 715.16     Patient Active Problem List   Diagnosis   • Coronary artery disease   • Dyslipidemia   • Hypothyroidism   • GERD (gastroesophageal reflux disease)   • Seizure disorder (HCC)   • BPH (benign prostatic hypertrophy)   • Hypertension   • Primary osteoarthritis of both knees   • Syncope and collapse   • Precordial pain   • Diabetes (HCC)   • Status post total right knee replacement     Past Medical History:   Diagnosis Date   • Colon cancer (HCC)     Status post partial colectomy in . No chemotherapy or radiation therapy.   • Coronary artery disease     Nonobstructive coronary artery disease.   • Diabetes (HCC)    • Dyslipidemia    • GERD (gastroesophageal reflux disease)     Hx of.   • Hearing aid worn    • Hyperlipidemia    • Hypertension    • Hypothyroidism    • Left shoulder pain    • Primary osteoarthritis of both knees    • Seizure disorder (HCC)    • Wears glasses      Past Surgical History:   Procedure Laterality Date   • APPENDECTOMY     • CARPAL TUNNEL RELEASE Bilateral    • CHOLECYSTECTOMY     • COLECTOMY PARTIAL / TOTAL      Partial colectomy   • COLONOSCOPY     • CYSTOSCOPY TRANSURETHRAL RESECTION OF PROSTATE N/A 2018    Procedure: CYSTOSCOPY TRANSURETHRAL RESECTION OF PROSTATE GREENLIGHT;  Surgeon: Naseem Clayton MD;  Location: [facility];  Service: Urology   • OTHER SURGICAL HISTORY      Contraction surgery on bilateral hands and wrists.   • OTHER SURGICAL HISTORY      left and right hands   • SINUS SURGERY     • SKIN BIOPSY     • TEETH EXTRACTION     • TONSILLECTOMY     • TURP / TRANSURETHRAL INCISION / DRAINAGE PROSTATE     • VASECTOMY        General Information     Row Name 22 0935          Physical Therapy Time and Intention     Document Type therapy note (daily note)  -SC     Mode of Treatment physical therapy  -SC     Row Name 04/08/22 0935          General Information    Patient Profile Reviewed yes  -SC     Existing Precautions/Restrictions fall;other (see comments)  nerve cath  -SC     Row Name 04/08/22 0935          Cognition    Orientation Status (Cognition) oriented to;person;verbal cues/prompts needed for orientation;place  -SC     Row Name 04/08/22 0935          Safety Issues, Functional Mobility    Impairments Affecting Function (Mobility) endurance/activity tolerance;strength;pain;range of motion (ROM);balance  -SC     Comment, Safety Issues/Impairments (Mobility) alert, following directions, confused, [place]  -SC           User Key  (r) = Recorded By, (t) = Taken By, (c) = Cosigned By    Initials Name Provider Type    SC Hernesto Carias PT Physical Therapist               Mobility     Row Name 04/08/22 0937          Bed Mobility    Comment, (Bed Mobility) uic  [facility] Name 04/08/22 0937          Transfers    Comment, (Transfers) cues for hand placement and safety to touch chair before sitting down  -[facility] Name 04/08/22 0937          Sit-Stand Transfer    Sit-Stand [place] (Transfers) 1 person assist;1 person to manage equipment;contact guard  -SC     Assistive Device (Sit-Stand Transfers) walker, front-wheeled  -[facility] Name 04/08/22 0937          Gait/Stairs (Locomotion)    [place] Level (Gait) verbal cues;1 person assist;1 person to manage equipment  -SC     Assistive Device (Gait) walker, front-wheeled  -SC     Distance in Feet (Gait) 300  -SC     Deviations/Abnormal Patterns (Gait) bisi decreased;gait speed decreased;stride length decreased;antalgic;right sided deviations  -SC     Right Sided Gait Deviations heel strike decreased;weight shift ability decreased  -SC     Comment, (Gait/Stairs) Gt training focused on controling walker straight and on turns. Cues to stay closer to it. Able to get a good  step through gait pattern. No Buckling noted  -SC     Row Name 04/08/22 0937          Mobility    Extremity Weight-bearing Status right lower extremity  -SC     Right Lower Extremity (Weight-bearing Status) weight-bearing as tolerated (WBAT)  -SC           User Key  (r) = Recorded By, (t) = Taken By, (c) = Cosigned By    Initials Name Provider Type    SC Hernesto Carias, PT Physical Therapist               Obj/Interventions     Row Name 04/08/22 0940          Range of Motion Comprehensive    Comment, General Range of Motion R knee 10-90  deg  -SC     Row Name 04/08/22 0940          Motor Skills    Therapeutic Exercise knee;ankle  -SC     Row Name 04/08/22 0940          Knee (Therapeutic Exercise)    Knee Isometrics (Therapeutic Exercise) right;quad sets;10 repetitions  -SC     Knee Strengthening (Therapeutic Exercise) right;SLR (straight leg raise);SAQ (short arc quad);hamstring curls;heel slides;sitting;10 repetitions  -SC     Row Name 04/08/22 0940          Ankle (Therapeutic Exercise)    Ankle (Therapeutic Exercise) AROM (active range of motion)  -SC     Ankle AROM (Therapeutic Exercise) bilateral;dorsiflexion;plantarflexion;10 repetitions  -SC     Row Name 04/08/22 0940          Balance    Dynamic Standing Balance 1-person assist;contact guard  -SC     Position/Device Used, Standing Balance supported  -SC     Comment, Balance no buckling today  -SC           User Key  (r) = Recorded By, (t) = Taken By, (c) = Cosigned By    Initials Name Provider Type    SC Hernesto Carias, PT Physical Therapist               Goals/Plan    No documentation.                Clinical Impression     Row Name 04/08/22 0941          Pain    Additional Documentation Pain Scale: FACES Pre/Post-Treatment (Group)  -SC     Row Name 04/08/22 0941          Pain Scale: FACES Pre/Post-Treatment    Pain: FACES Scale, Pretreatment 2-->hurts little bit  -SC     Posttreatment Pain Rating 6-->hurts even more  -SC     Pain Location - Side/Orientation  Right  -SC     Pain Location - knee  -[facility] Name 04/08/22 0941          Plan of Care Review    Plan of Care Reviewed With patient  -SC     Progress improving  -SC     Outcome Evaluation Patient slightly confused, however demonstrated overall improvement in mobility increasing his ambulation to 300 feet. He does require safety cues .  -[facility] Name 04/08/22 0941          Therapy Assessment/Plan (PT)    Patient/Family Therapy Goals Statement (PT) go home  -SC     Rehab Potential (PT) good, to achieve stated therapy goals  -SC     Criteria for Skilled Interventions Met (PT) yes;meets criteria;skilled treatment is necessary  -[facility] Name 04/08/22 0941          Positioning and Restraints    Pre-Treatment Position sitting in chair/recliner  -SC     Post Treatment Position chair  -SC     In Chair notified nsg;reclined;encouraged to call for assist;exit alarm on;call light within reach;sitting  -SC           User Key  (r) = Recorded By, (t) = Taken By, (c) = Cosigned By    Initials Name Provider Type    SC Hernesto Carias, PT Physical Therapist               Outcome Measures     [facility] Name 04/08/22 0944          How much help from another person do you currently need...    Turning from your back to your side while in flat bed without using bedrails? 3  -SC     Moving from lying on back to sitting on the side of a flat bed without bedrails? 3  -SC     Moving to and from a bed to a chair (including a wheelchair)? 3  -SC     Standing up from a chair using your arms (e.g., wheelchair, bedside chair)? 3  -SC     Climbing 3-5 steps with a railing? 3  -SC     To walk in hospital room? 3  -SC     AM-PAC 6 Clicks Score (PT) 18  -[facility] Name 04/08/22 0944 04/08/22 0845       Functional Assessment    Outcome Measure Options AM-PAC 6 Clicks Basic Mobility (PT)  -SC AM-PAC 6 Clicks Daily Activity (OT)  -          User Key  (r) = Recorded By, (t) = Taken By, (c) = Cosigned By    Initials Name Provider Type    SC Aretha  Hernesto LUGO, PT Physical Therapist    Thea Cruz, OT Occupational Therapist                             Physical Therapy Education                 Title: PT OT SLP Therapies (In Progress)     Topic: Physical Therapy (Done)     Point: Mobility training (Done)     Learning Progress Summary           Patient Eager, E, VU by SC at 4/8/2022 0950    Comment: reviewed HEP and safety with mobility    Acceptance, E,D, VU by  at 4/7/2022 1300    Comment: Educated on safe sequencing with bed mobility, ambulatory transfers, and gait. Reviewed HEP and knee precautions.                   Point: Home exercise program (Done)     Learning Progress Summary           Patient Eager, E, VU by SC at 4/8/2022 0950    Comment: reviewed HEP and safety with mobility    Acceptance, E,D, VU by  at 4/7/2022 1300    Comment: Educated on safe sequencing with bed mobility, ambulatory transfers, and gait. Reviewed HEP and knee precautions.                   Point: Body mechanics (Done)     Learning Progress Summary           Patient Eager, E, VU by SC at 4/8/2022 0950    Comment: reviewed HEP and safety with mobility    Acceptance, E,D, VU by  at 4/7/2022 1300    Comment: Educated on safe sequencing with bed mobility, ambulatory transfers, and gait. Reviewed HEP and knee precautions.                   Point: Precautions (Done)     Learning Progress Summary           Patient Eager, E, VU by SC at 4/8/2022 0950    Comment: reviewed HEP and safety with mobility    Acceptance, E,D, VU by  at 4/7/2022 1300    Comment: Educated on safe sequencing with bed mobility, ambulatory transfers, and gait. Reviewed HEP and knee precautions.                               User Key     Initials Effective Dates Name Provider Type Discipline    SC 06/16/21 -  Hernesto Carias, PT Physical Therapist PT    OLIVER 06/16/21 -  Rolando Mobley PT Physical Therapist PT              PT Recommendation and Plan     Plan of Care Reviewed With: patient  Progress:  improving  Outcome Evaluation: Patient slightly confused, however demonstrated overall improvement in mobility increasing his ambulation to 300 feet. He does require safety cues .     Time Calculation:    PT Charges     Row Name 04/08/22 0830             Time Calculation    Start Time 0830  -SC      PT Received On 04/08/22  -SC      PT Goal Re-Cert Due Date 04/17/22  -SC              Time Calculation- PT    Total Timed Code Minutes- PT 20 minute(s)  -SC              Timed Charges    85989 - PT Therapeutic Exercise Minutes 5  -SC      40784 - Gait Training Minutes  15  -SC              Total Minutes    Timed Charges Total Minutes 20  -SC       Total Minutes 20  -SC            User Key  (r) = Recorded By, (t) = Taken By, (c) = Cosigned By    Initials Name Provider Type    SC Hernesto Carias, PT Physical Therapist              Therapy Charges for Today     Code Description Service Date Service Provider Modifiers Qty    93634195809 HC GAIT TRAINING EA 15 MIN 4/8/2022 Hernesto Carias, PT GP 1    14695972735 HC PT THER SUPP EA 15 MIN 4/8/2022 Hernesto Carias, PT GP 2          PT G-Codes  Outcome Measure Options: AM-PAC 6 Clicks Basic Mobility (PT)  AM-PAC 6 Clicks Score (PT): 18  AM-PAC 6 Clicks Score (OT): 16    Hernesto Carias PT  4/8/2022

## 2022-04-08 NOTE — PROGRESS NOTES
"      [facility] Progress Note    Subjective      LOS: 0 days   Patient Care Team:  Mannie Melvin MD as PCP - General (Family Medicine)    CC: Right knee pain    Interval History:   Patient seen earlier this afternoon, resting comfortably in bed.  Daughter at bedside, and states that he is intermittently confused.    Objective      Vital Signs  Temp (24hrs), Av.9 °F (36.6 °C), Min:97.6 °F (36.4 °C), Max:98.4 °F (36.9 °C)      /80 (BP Location: Left arm, Patient Position: Lying)   Pulse 78   Temp 97.6 °F (36.4 °C) (Axillary)   Resp 16   Ht 172.7 cm (68\")   Wt 97.1 kg (214 lb 1.1 oz)   SpO2 93%   BMI 32.55 kg/m²     Examination:   Examination of the right knee: The wound is clean, dry, and intact.  Ankle dorsiflexion, ankle plantar flexion, and EHL are intact.  Sensation intact in the foot to light touch.  2+ dorsalis pedis pulse.  Straight leg raise is intact.      Labs:  Results from last 7 days   Lab Units 22  1301   WBC 10*3/mm3 11.37*   HEMOGLOBIN g/dL 8.0*   HEMATOCRIT % 23.6*   MCV fL 86.1   PLATELETS 10*3/mm3 158       Radiology:  Imaging Results (Last 24 Hours)     ** No results found for the last 24 hours. **          PT:  Physical Therapy - Plan of Care Review - Outcome Summary:  Outcome Evaluation: Patient continues to be confused requiring extra cues for safety to stay close to walker and stay with walker untill seated. He ambulated 300 feet with unsteady gait, some tremors noted ([id number])]       Results Review:     I reviewed the patient's new clinical results.    Assessment and Plan     Assessment:   Status post right total knee arthroplasty    Postoperative confusion  Acute blood loss anemia-repeat CBC in the morning      Status post total right knee replacement    Coronary artery disease    Dyslipidemia    Hypothyroidism    GERD (gastroesophageal reflux disease)    Seizure disorder (HCC)    BPH (benign prostatic hypertrophy)    Hypertension    " Primary osteoarthritis of both knees    Diabetes (HCC)      Plan for disposition: Plan for rehab facility when bed available.  Follow-up in 3 weeks in the office as planned.      Future Appointments   Date Time Provider Department Center   4/27/2022  2:20 PM Kiersten Carreon PA-C [facility]   9/23/2022 12:15 PM Maryanne Baker MD [facility]           Louis Malcolm MD  04/08/22  16:36 EDT

## 2022-04-08 NOTE — THERAPY TREATMENT NOTE
Patient Name: Lea Rivers  : 1945    MRN: [id number]                              Today's Date: 2022       Admit Date: 2022    Visit Dx:     ICD-10-CM ICD-9-CM   1. Primary osteoarthritis of both knees  M17.0 715.16     Patient Active Problem List   Diagnosis   • Coronary artery disease   • Dyslipidemia   • Hypothyroidism   • GERD (gastroesophageal reflux disease)   • Seizure disorder (HCC)   • BPH (benign prostatic hypertrophy)   • Hypertension   • Primary osteoarthritis of both knees   • Syncope and collapse   • Precordial pain   • Diabetes (HCC)   • Status post total right knee replacement     Past Medical History:   Diagnosis Date   • Colon cancer (HCC)     Status post partial colectomy in . No chemotherapy or radiation therapy.   • Coronary artery disease     Nonobstructive coronary artery disease.   • Diabetes (HCC)    • Dyslipidemia    • GERD (gastroesophageal reflux disease)     Hx of.   • Hearing aid worn    • Hyperlipidemia    • Hypertension    • Hypothyroidism    • Left shoulder pain    • Primary osteoarthritis of both knees    • Seizure disorder (HCC)    • Wears glasses      Past Surgical History:   Procedure Laterality Date   • APPENDECTOMY     • CARPAL TUNNEL RELEASE Bilateral    • CHOLECYSTECTOMY     • COLECTOMY PARTIAL / TOTAL      Partial colectomy   • COLONOSCOPY     • CYSTOSCOPY TRANSURETHRAL RESECTION OF PROSTATE N/A 2018    Procedure: CYSTOSCOPY TRANSURETHRAL RESECTION OF PROSTATE GREENLIGHT;  Surgeon: Naseem Clayton MD;  Location:  [facility];  Service: Urology   • OTHER SURGICAL HISTORY      Contraction surgery on bilateral hands and wrists.   • OTHER SURGICAL HISTORY      left and right hands   • SINUS SURGERY     • SKIN BIOPSY     • TEETH EXTRACTION     • TONSILLECTOMY     • TOTAL KNEE ARTHROPLASTY Right 2022    Procedure: TOTAL KNEE ARTHROPLASTY WITH ORTHO ROBOT RIGHT;  Surgeon: Louis Malcolm MD;  Location:  [facility];  Service: Robotics  - Ortho;  Laterality: Right;   • TURP / TRANSURETHRAL INCISION / DRAINAGE PROSTATE     • VASECTOMY        General Information     Row Name 04/08/22 1446 04/08/22 0935       Physical Therapy Time and Intention    Document Type therapy note (daily note)  -SC therapy note (daily note)  -SC    Mode of Treatment physical therapy  -SC physical therapy  -SC    Row Name 04/08/22 1446 04/08/22 0935       General Information    Patient Profile Reviewed yes  -SC yes  -SC    Existing Precautions/Restrictions fall;other (see comments)  nerve cath, confusion  -SC fall;other (see comments)  nerve cath  -SC    Row Name 04/08/22 1446 04/08/22 0935       Cognition    Orientation Status (Cognition) oriented to;person  -SC oriented to;person;verbal cues/prompts needed for orientation;place  -SC    Row Name 04/08/22 1446 04/08/22 0935       Safety Issues, Functional Mobility    Impairments Affecting Function (Mobility) endurance/activity tolerance;strength;pain;range of motion (ROM);balance  -SC endurance/activity tolerance;strength;pain;range of motion (ROM);balance  -SC    Comment, Safety Issues/Impairments (Mobility) alert, following some commands- hard of hearing, confused, impulsive, cues for safety needed  -SC alert, following directions, confused, [place]  -SC          User Key  (r) = Recorded By, (t) = Taken By, (c) = Cosigned By    Initials Name Provider Type    SC Hernesto Carias PT Physical Therapist               Mobility     Row Name 04/08/22 1447 04/08/22 0937       Bed Mobility    Bed Mobility supine-sit;scooting/bridging  -SC --    Scooting/Bridging [place] (Bed Mobility) moderate assist (50% patient effort);verbal cues  -SC --    Supine-Sit [place] (Bed Mobility) contact guard;nonverbal cues (demo/gesture);verbal cues  -SC --    Assistive Device (Bed Mobility) bed rails;head of bed elevated  -SC --    Comment, (Bed Mobility) extra cues for sequencing getting up to edge of bed. Patient confused and not able to get  up without extra assist at trunk  -[facility]  -SC    Row Name 04/08/22 1447 04/08/22 0937       Transfers    Comment, (Transfers) Extra cues needed for safety staying with the walker before sitting on commode or recliner. Patient demonstrated unsafe mobility by leaving walker before all the way back to chair  -SC cues for hand placement and safety to touch chair before sitting down  -SC    Row Name 04/08/22 1447 04/08/22 0937       Sit-Stand Transfer    Sit-Stand [place] (Transfers) 1 person assist;1 person to manage equipment;contact guard  -SC 1 person assist;1 person to manage equipment;contact guard  -SC    Assistive Device (Sit-Stand Transfers) walker, front-wheeled  -SC walker, front-wheeled  -SC    Comment, (Sit-Stand Transfer) cues for sequencing and safety.  Demonstrated unsafe mobiity.  Demonstrated some tremors in standing  -SC --    Row Name 04/08/22 1447 04/08/22 0937       Gait/Stairs (Locomotion)    Bell Level (Gait) verbal cues;1 person assist;1 person to manage equipment  -SC verbal cues;1 person assist;1 person to manage equipment  -SC    Assistive Device (Gait) walker, front-wheeled  -SC walker, front-wheeled  -SC    Distance in Feet (Gait) 300  -  -SC    Deviations/Abnormal Patterns (Gait) bisi decreased;gait speed decreased;stride length decreased;antalgic;right sided deviations  -SC bisi decreased;gait speed decreased;stride length decreased;antalgic;right sided deviations  -SC    Bilateral Gait Deviations forward flexed posture  -SC --    Right Sided Gait Deviations heel strike decreased;weight shift ability decreased  -SC heel strike decreased;weight shift ability decreased  -SC    Comment, (Gait/Stairs) Gt training focused on controling walker and achieving step through gait. Took extra time to get walking pattern right. Unsteadiness noted with some tremors. Safety cues to saty closer to walker  -SC Gt training focused on controling walker straight and on turns. Cues to  stay closer to it. Able to get a good step through gait pattern. No Buckling noted  -SC    Row Name 04/08/22 1447 04/08/22 0937       Mobility    Extremity Weight-bearing Status right lower extremity  -SC right lower extremity  -SC    Right Lower Extremity (Weight-bearing Status) weight-bearing as tolerated (WBAT)  -SC weight-bearing as tolerated (WBAT)  -SC          User Key  (r) = Recorded By, (t) = Taken By, (c) = Cosigned By    Initials Name Provider Type    SC Hernesto Carias, PT Physical Therapist               Obj/Interventions     [facility] Name 04/08/22 0940          Range of Motion Comprehensive    Comment, General Range of Motion R knee 10-90  deg  -[facility] Name 04/08/22 1453 04/08/22 0940       Motor Skills    Therapeutic Exercise knee  -SC knee;ankle  -[facility] Name 04/08/22 1453 04/08/22 0940       Knee (Therapeutic Exercise)    Knee (Therapeutic Exercise) AROM (active range of motion)  -SC --    Knee Isometrics (Therapeutic Exercise) right;quad sets;10 repetitions  -SC right;quad sets;10 repetitions  -SC    Knee Strengthening (Therapeutic Exercise) right;flexion;extension;sitting;heel slides;10 repetitions;SLR (straight leg raise);SAQ (short arc quad)  -SC right;SLR (straight leg raise);SAQ (short arc quad);hamstring curls;heel slides;sitting;10 repetitions  -[facility] Name 04/08/22 0940          Ankle (Therapeutic Exercise)    Ankle (Therapeutic Exercise) AROM (active range of motion)  -SC     Ankle AROM (Therapeutic Exercise) bilateral;dorsiflexion;plantarflexion;10 repetitions  -[facility] Name 04/08/22 1453 04/08/22 0940       Balance    Dynamic Standing Balance 1-person assist;contact guard;minimal assist  -SC 1-person assist;contact guard  -SC    Position/Device Used, Standing Balance supported  -SC supported  -SC    Comment, Balance -- no buckling today  -SC          User Key  (r) = Recorded By, (t) = Taken By, (c) = Cosigned By    Initials Name Provider Type    SC Hernesto Carias, PT Physical  Therapist               Goals/Plan    No documentation.                Clinical Impression     Row Name 04/08/22 0941          Pain    Additional Documentation Pain Scale: FACES Pre/Post-Treatment (Group)  -SC     Row Name 04/08/22 1454 04/08/22 0941       Pain Scale: FACES Pre/Post-Treatment    Pain: FACES Scale, Pretreatment 8-->hurts whole lot  -SC 2-->hurts little bit  -SC    Posttreatment Pain Rating 8-->hurts whole lot  -SC 6-->hurts even more  -SC    Pain Location - Side/Orientation Right  -SC Right  -SC    Pain Location - knee  -SC knee  -SC    Row Name 04/08/22 1454 04/08/22 0941       Plan of Care Review    Plan of Care Reviewed With patient  -SC patient  -SC    Progress no change  -SC improving  -SC    Outcome Evaluation Patient continues to be confused requiring extra cues for safety to stay close to walker and stay with walker untill seated. He ambulated 300 feet with unsteady gait, some tremors noted  -SC Patient slightly confused, however demonstrated overall improvement in mobility increasing his ambulation to 300 feet. He does require safety cues .  -SC    Row Name 04/08/22 1454 04/08/22 0941       Therapy Assessment/Plan (PT)    Patient/Family Therapy Goals Statement (PT) -- go home  -SC    Rehab Potential (PT) good, to achieve stated therapy goals  -SC good, to achieve stated therapy goals  -SC    Criteria for Skilled Interventions Met (PT) yes;meets criteria;skilled treatment is necessary  -SC yes;meets criteria;skilled treatment is necessary  -[facility] Name 04/08/22 1454 04/08/22 0941       Positioning and Restraints    Pre-Treatment Position in bed  -SC sitting in chair/recliner  -SC    Post Treatment Position chair  -SC chair  -SC    In Chair notified nsg;reclined;sitting;encouraged to call for assist;exit alarm on;with family/caregiver  -SC notified nsg;reclined;encouraged to call for assist;exit alarm on;call light within reach;sitting  -SC          User Key  (r) = Recorded By, (t) = Taken  By, (c) = Cosigned By    Initials Name Provider Type    Hernesto Ma, PT Physical Therapist               Outcome Measures     Row Name 04/08/22 1456 04/08/22 0944       How much help from another person do you currently need...    Turning from your back to your side while in flat bed without using bedrails? 3  -SC 3  -SC    Moving from lying on back to sitting on the side of a flat bed without bedrails? 2  -SC 3  -SC    Moving to and from a bed to a chair (including a wheelchair)? 3  -SC 3  -SC    Standing up from a chair using your arms (e.g., wheelchair, bedside chair)? 3  -SC 3  -SC    Climbing 3-5 steps with a railing? 2  -SC 3  -SC    To walk in hospital room? 3  -SC 3  -SC    AM-PAC 6 Clicks Score (PT) 16  -SC 18  -SC    Row Name 04/08/22 0800          How much help from another person do you currently need...    Turning from your back to your side while in flat bed without using bedrails? 3  -CB     Moving from lying on back to sitting on the side of a flat bed without bedrails? 3  -CB     Moving to and from a bed to a chair (including a wheelchair)? 3  -CB     Standing up from a chair using your arms (e.g., wheelchair, bedside chair)? 3  -CB     Climbing 3-5 steps with a railing? 3  -CB     To walk in hospital room? 3  -CB     AM-PAC 6 Clicks Score (PT) 18  -CB     Row Name 04/08/22 1456 04/08/22 0944       Functional Assessment    Outcome Measure Options AM-PAC 6 Clicks Basic Mobility (PT)  -SC AM-PAC 6 Clicks Basic Mobility (PT)  -SC    Row Name 04/08/22 0845          Functional Assessment    Outcome Measure Options AM-PAC 6 Clicks Daily Activity (OT)  -MR           User Key  (r) = Recorded By, (t) = Taken By, (c) = Cosigned By    Initials Name Provider Type    Hernesto Ma, PT Physical Therapist    Benita Castillo RN Registered Nurse    Thea Cruz, OT Occupational Therapist                             Physical Therapy Education                 Title: PT OT SLP Therapies (In  Progress)     Topic: Physical Therapy (Done)     Point: Mobility training (Done)     Learning Progress Summary           Patient Eager, E, VU,NR by SC at 4/8/2022 1456    Comment: reviewed safety wtih mobility    Eager, E, VU by SC at 4/8/2022 0950    Comment: reviewed HEP and safety with mobility    Acceptance, E,D, VU by OLIVER at 4/7/2022 1300    Comment: Educated on safe sequencing with bed mobility, ambulatory transfers, and gait. Reviewed HEP and knee precautions.                   Point: Home exercise program (Done)     Learning Progress Summary           Patient Eager, E, VU,NR by SC at 4/8/2022 1456    Comment: reviewed safety wtih mobility    Eager, E, VU by SC at 4/8/2022 0950    Comment: reviewed HEP and safety with mobility    Acceptance, E,D, VU by OLIVER at 4/7/2022 1300    Comment: Educated on safe sequencing with bed mobility, ambulatory transfers, and gait. Reviewed HEP and knee precautions.                   Point: Body mechanics (Done)     Learning Progress Summary           Patient Eager, E, VU,NR by SC at 4/8/2022 1456    Comment: reviewed safety wtih mobility    Eager, E, VU by SC at 4/8/2022 0950    Comment: reviewed HEP and safety with mobility    Acceptance, E,D, VU by OLIVER at 4/7/2022 1300    Comment: Educated on safe sequencing with bed mobility, ambulatory transfers, and gait. Reviewed HEP and knee precautions.                   Point: Precautions (Done)     Learning Progress Summary           Patient Eager, E, VU,NR by SC at 4/8/2022 1456    Comment: reviewed safety wtih mobility    Eager, E, VU by SC at 4/8/2022 0950    Comment: reviewed HEP and safety with mobility    Acceptance, E,D, VU by OLIVER at 4/7/2022 1300    Comment: Educated on safe sequencing with bed mobility, ambulatory transfers, and gait. Reviewed HEP and knee precautions.                               User Key     Initials Effective Dates Name Provider Type Discipline    SC 06/16/21 -  Hernesto Carias PT Physical Therapist PT    OLIVER  06/16/21 -  Rolando Mobley, PT Physical Therapist PT              PT Recommendation and Plan     Plan of Care Reviewed With: patient  Progress: no change  Outcome Evaluation: Patient continues to be confused requiring extra cues for safety to stay close to walker and stay with walker untill seated. He ambulated 300 feet with unsteady gait, some tremors noted     Time Calculation:    PT Charges     Row Name 04/08/22 1330 04/08/22 0830          Time Calculation    Start Time 1330  -SC 0830  -SC     PT Received On 04/08/22  -SC 04/08/22  -SC     PT Goal Re-Cert Due Date 04/17/22  -SC 04/17/22  -SC            Time Calculation- PT    Total Timed Code Minutes- PT 25 minute(s)  -SC 20 minute(s)  -SC            Timed Charges    41025 - PT Therapeutic Exercise Minutes 5  -SC 5  -SC     33640 - Gait Training Minutes  15  -SC 15  -SC     52117 - PT Therapeutic Activity Minutes 5  -SC --            Total Minutes    Timed Charges Total Minutes 25  -SC 20  -SC      Total Minutes 25  -SC 20  -SC           User Key  (r) = Recorded By, (t) = Taken By, (c) = Cosigned By    Initials Name Provider Type    SC Hernesto Carias, PT Physical Therapist              Therapy Charges for Today     Code Description Service Date Service Provider Modifiers Qty    08543897894 HC GAIT TRAINING EA 15 MIN 4/8/2022 ArethaPool greenon A, PT GP 1    58253064783 HC PT THER SUPP EA 15 MIN 4/8/2022 Aretha, Shearon A, PT GP 2    1944796 HC PT THER PROC EA 15 MIN 4/8/2022 ArethaPool greenon A, PT GP 1    95217853586 HC GAIT TRAINING EA 15 MIN 4/8/2022 Pool Cariason A, PT GP 1          PT G-Codes  Outcome Measure Options: AM-PAC 6 Clicks Basic Mobility (PT)  AM-PAC 6 Clicks Score (PT): 16  AM-PAC 6 Clicks Score (OT): 16    Hernesto Carias PT  4/8/2022

## 2022-04-08 NOTE — THERAPY EVALUATION
Patient Name: Lea Rivers  : 1945    MRN: [id number]                              Today's Date: 2022       Admit Date: 2022    Visit Dx:     ICD-10-CM ICD-9-CM   1. Primary osteoarthritis of both knees  M17.0 715.16     Patient Active Problem List   Diagnosis   • Coronary artery disease   • Dyslipidemia   • Hypothyroidism   • GERD (gastroesophageal reflux disease)   • Seizure disorder (HCC)   • BPH (benign prostatic hypertrophy)   • Hypertension   • Primary osteoarthritis of both knees   • Syncope and collapse   • Precordial pain   • Diabetes (HCC)   • Status post total right knee replacement     Past Medical History:   Diagnosis Date   • Colon cancer (HCC)     Status post partial colectomy in . No chemotherapy or radiation therapy.   • Coronary artery disease     Nonobstructive coronary artery disease.   • Diabetes (HCC)    • Dyslipidemia    • GERD (gastroesophageal reflux disease)     Hx of.   • Hearing aid worn    • Hyperlipidemia    • Hypertension    • Hypothyroidism    • Left shoulder pain    • Primary osteoarthritis of both knees    • Seizure disorder (HCC)    • Wears glasses      Past Surgical History:   Procedure Laterality Date   • APPENDECTOMY     • CARPAL TUNNEL RELEASE Bilateral    • CHOLECYSTECTOMY     • COLECTOMY PARTIAL / TOTAL      Partial colectomy   • COLONOSCOPY     • CYSTOSCOPY TRANSURETHRAL RESECTION OF PROSTATE N/A 2018    Procedure: CYSTOSCOPY TRANSURETHRAL RESECTION OF PROSTATE GREENLIGHT;  Surgeon: Naseem Clayton MD;  [facility];  Service: Urology   • OTHER SURGICAL HISTORY      Contraction surgery on bilateral hands and wrists.   • OTHER SURGICAL HISTORY      left and right hands   • SINUS SURGERY     • SKIN BIOPSY     • TEETH EXTRACTION     • TONSILLECTOMY     • TURP / TRANSURETHRAL INCISION / DRAINAGE PROSTATE     • VASECTOMY        General Information     Row Name 22 0835          OT Time and Intention    Document Type  evaluation  -MR     Mode of Treatment individual therapy;occupational therapy  -MR     Row Name 04/08/22 0835          General Information    Patient Profile Reviewed yes  -MR     Prior Level of Function independent:;all household mobility;community mobility;gait;transfer;bed mobility;ADL's;driving  PTA pt was living alone, I w/ ADLs, denied use of Ad for functional mobility. Still driving, has a standard tub, navigating steps.  -MR     Existing Precautions/Restrictions fall;other (see comments)  R adductor nerve cath  -MR     Barriers to Rehab previous functional deficit;hearing deficit;cognitive status  -MR     Row Name 04/08/22 0835          Living Environment    People in Home alone  -MR     Row Name 04/08/22 0835          Home Main Entrance    Number of Stairs, Main Entrance three  2 steps onto porch, 1 small threshhold into house  -MR     Stair Railings, Main Entrance none  -MR     Row Name 04/08/22 0835          Stairs Within Home, Primary    Number of Stairs, Within Home, Primary none  -MR     Row Name 04/08/22 0835          Cognition    Orientation Status (Cognition) oriented x 4  -MR     Row Name 04/08/22 0835          Safety Issues, Functional Mobility    Safety Issues Affecting Function (Mobility) safety precaution awareness;safety precautions follow-through/compliance;awareness of need for assistance;impulsivity  -MR     Impairments Affecting Function (Mobility) endurance/activity tolerance;strength;pain;range of motion (ROM);balance  -MR     Comment, Safety Issues/Impairments (Mobility) Confused; Impulsive  -MR           User Key  (r) = Recorded By, (t) = Taken By, (c) = Cosigned By    Initials Name Provider Type    MR Thea Park, OT Occupational Therapist                 Mobility/ADL's     Row Name 04/08/22 0837          Bed Mobility    Bed Mobility supine-sit  -MR     Supine-Sit [place] (Bed Mobility) contact guard;nonverbal cues (demo/gesture);verbal cues  -MR     Bed Mobility, Safety  Issues cognitive deficits limit understanding;decreased use of arms for pushing/pulling;decreased use of legs for bridging/pushing;impaired trunk control for bed mobility  -MR     Assistive Device (Bed Mobility) bed rails;head of bed elevated  -MR     Comment, (Bed Mobility) Pt requiring CGA and constant v/c to advance to EOB, pt distracted w/ telling stories.  -MR     Row Name 04/08/22 0837          Transfers    Transfers sit-stand transfer;bed-chair transfer  -MR     Bed-Chair [place] (Transfers) contact guard;2 person assist;verbal cues  -MR     Assistive Device (Bed-Chair Transfers) other (see comments)  HHA - Unilateral  -MR     Sit-Stand [place] (Transfers) verbal cues;contact guard;2 person assist  -MR     Row Name 04/08/22 0837          Sit-Stand Transfer    Assistive Device (Sit-Stand Transfers) other (see comments)  Unilateral HHA  -MR     Comment, (Sit-Stand Transfer) v/c for sequencing, hand placement and safety.  -MR     Row Name 04/08/22 0837          Activities of Daily Living    BADL Assessment/Intervention lower body dressing;upper body dressing  -MR     Row Name 04/08/22 0837          Mobility    Extremity Weight-bearing Status right lower extremity  -MR     Right Lower Extremity (Weight-bearing Status) weight-bearing as tolerated (WBAT)  -MR     Row Name 04/08/22 0837          Lower Body Dressing Assessment/Training    [place] Level (Lower Body Dressing) other (see comments);maximum assist (25% patient effort);socks  MaxA for adjusting socks  -MR     Position (Lower Body Dressing) supine  -MR     Row Name 04/08/22 0837          Upper Body Dressing Assessment/Training    [place] Level (Upper Body Dressing) don;front opening garment;moderate assist (50% patient effort)  -MR     Position (Upper Body Dressing) edge of bed sitting  -MR     Row Name 04/08/22 0837          Grooming Assessment/Training    [place] Level (Grooming) wash face, hands;set up  -MR     Position  (Grooming) sitting up in bed  -MR           User Key  (r) = Recorded By, (t) = Taken By, (c) = Cosigned By    Initials Name Provider Type     Thea Park OT Occupational Therapist               Obj/Interventions     Row Name 04/08/22 0839          Sensory Assessment (Somatosensory)    Sensory Assessment (Somatosensory) UE sensation intact  -MR     Row Name 04/08/22 0839          Vision Assessment/Intervention    Visual Impairment/Limitations corrective lenses full-time;WFL  -MR     Row Name 04/08/22 0839          Range of Motion Comprehensive    General Range of Motion bilateral upper extremity ROM WFL  -MR     Row Name 04/08/22 0839          Strength Comprehensive (MMT)    Comment, General Manual Muscle Testing (MMT) Assessment BUE grossly 4/5 in all functional planes  -MR     Row Name 04/08/22 0839          Balance    Balance Assessment sitting static balance;sitting dynamic balance;standing static balance;standing dynamic balance  -MR     Static Sitting Balance supervision  -MR     Dynamic Sitting Balance supervision  -MR     Position, Sitting Balance unsupported;sitting edge of bed  -MR     Static Standing Balance contact guard;verbal cues;non-verbal cues (demo/gesture)  -MR     Dynamic Standing Balance minimal assist;verbal cues;non-verbal cues (demo/gesture)  -MR     Position/Device Used, Standing Balance supported;other (see comments)  unilateral HHA  -MR     Balance Interventions sitting;standing;sit to stand;supported;static;dynamic;minimal challenge;occupation based/functional task  -MR     Comment, Balance R knee buckling w/ initial stand from EOB to upright and SPT from EOB to recliner - [facility] for safety.  -MR           User Key  (r) = Recorded By, (t) = Taken By, (c) = Cosigned By    Initials Name Provider Type     Thea Park OT Occupational Therapist               Goals/Plan     Row Name 04/08/22 0844          Transfer Goal 1 (OT)    Activity/Assistive Device (Transfer Goal 1, OT)  sit-to-stand/stand-to-sit;toilet  -MR     [place] Level/Cues Needed (Transfer Goal 1, OT) contact guard required  -MR     Time Frame (Transfer Goal 1, OT) long term goal (LTG);by discharge  -MR     Row Name 04/08/22 0844          Dressing Goal 1 (OT)    Activity/Device (Dressing Goal 1, OT) lower body dressing  -MR     [place]/Cues Needed (Dressing Goal 1, OT) moderate assist (50-74% patient effort)  -MR     Time Frame (Dressing Goal 1, OT) long term goal (LTG);by discharge  -MR     Row Name 04/08/22 0844          Grooming Goal 1 (OT)    Activity/Device (Grooming Goal 1, OT) grooming skills, all  -MR     [place] (Grooming Goal 1, OT) set-up required  -MR     Time Frame (Grooming Goal 1, OT) long term goal (LTG);by discharge  -MR     Row Name 04/08/22 0844          Therapy Assessment/Plan (OT)    Planned Therapy Interventions (OT) activity tolerance training;adaptive equipment training;BADL retraining;functional balance retraining;IADL retraining;occupation/activity based interventions;patient/caregiver education/training;transfer/mobility retraining;strengthening exercise;ROM/therapeutic exercise  -MR           User Key  (r) = Recorded By, (t) = Taken By, (c) = Cosigned By    Initials Name Provider Type    MR ParkThea, OT Occupational Therapist               Clinical Impression     Row Name 04/08/22 0841          Pain Assessment    Pretreatment Pain Rating 8/10  -MR     Posttreatment Pain Rating 7/10  -MR     Pain Location - Side/Orientation Right  -MR     Pain Location generalized  -MR     Pain Location - knee  -MR     Pre/Posttreatment Pain Comment Pt reporting pain ddecreased once getting out of bed and moving/completing transfer.  -MR     Pain Intervention(s) Ambulation/increased activity;Repositioned  -MR     Row Name 04/08/22 0841          Plan of Care Review    Plan of Care Reviewed With patient;daughter  -MR     Progress no change  Initial Eval  -MR     Outcome Evaluation OT eval  complete. Pt presenting w/ R knee pain, generalized weakness, impaired ROM/transfers/mobility, increased confusion and increased need for assist w/ self-care tasks. CGA w/ v/c for supine to sit, CGA w/ unilateral HHA for STS and SPT from EOB to recliner. Pt requiring ffrequent v/c for redirection to task. MaxA for LB dressing and ModA for UB dressing. IP OT warranted, POC initiated. Recommendation upon d/c for IPR.  -MR     Row Name 04/08/22 0841          Therapy Assessment/Plan (OT)    Patient/Family Therapy Goal Statement (OT) Return to PLOF  -MR     Rehab Potential (OT) good, to achieve stated therapy goals  -MR     Criteria for Skilled Therapeutic Interventions Met (OT) yes;meets criteria;skilled treatment is necessary  -MR     Therapy Frequency (OT) daily  -MR     Row Name 04/08/22 0841          Therapy Plan Review/Discharge Plan (OT)    Anticipated Discharge Disposition (OT) inpatient rehabilitation facility  -MR     Row Name 04/08/22 0841          Vital Signs    Pre Systolic BP Rehab 152  -MR     Pre Treatment Diastolic BP 89  -MR     Pretreatment Heart Rate (beats/min) 74  -MR     Posttreatment Heart Rate (beats/min) 82  -MR     Pre SpO2 (%) 96  -MR     O2 Delivery Pre Treatment room air  -MR     O2 Delivery Intra Treatment room air  -MR     Post SpO2 (%) 95  -MR     O2 Delivery Post Treatment room air  -MR     Pre Patient Position Supine  -MR     Intra Patient Position Standing  -MR     Post Patient Position Sitting  -MR     Row Name 04/08/22 0841          Positioning and Restraints    Pre-Treatment Position in bed  -MR     Post Treatment Position chair  -MR     In Chair notified nsg;sitting;with family/caregiver;with PT;with other staff  -           User Key  (r) = Recorded By, (t) = Taken By, (c) = Cosigned By    Initials Name Provider Type    MR Thea Park, OT Occupational Therapist               Outcome Measures     Row Name 04/08/22 0845          How much help from another is currently needed...     Putting on and taking off regular lower body clothing? 2  -MR     Bathing (including washing, rinsing, and drying) 2  -MR     Toileting (which includes using toilet bed pan or urinal) 2  -MR     Putting on and taking off regular upper body clothing 3  -MR     Taking care of personal grooming (such as brushing teeth) 3  -MR     Eating meals 4  -MR     AM-PAC 6 Clicks Score (OT) 16  -MR     Row Name 04/08/22 0845          Functional Assessment    Outcome Measure Options AM-PAC 6 Clicks Daily Activity (OT)  -MR           User Key  (r) = Recorded By, (t) = Taken By, (c) = Cosigned By    Initials Name Provider Type     Thea Park, OT Occupational Therapist                Occupational Therapy Education                 Title: PT OT SLP Therapies (In Progress)     Topic: Occupational Therapy (In Progress)     Point: ADL training (In Progress)     Description:   Instruct learner(s) on proper safety adaptation and remediation techniques during self care or transfers.   Instruct in proper use of assistive devices.              Learning Progress Summary           Patient Acceptance, E, NR by MR at 4/8/2022 0846   Family Acceptance, E, NR by MR at 4/8/2022 0846                   Point: Home exercise program (In Progress)     Description:   Instruct learner(s) on appropriate technique for monitoring, assisting and/or progressing therapeutic exercises/activities.              Learning Progress Summary           Patient Acceptance, E, NR by MR at 4/8/2022 0846   Family Acceptance, E, NR by MR at 4/8/2022 0846                   Point: Precautions (In Progress)     Description:   Instruct learner(s) on prescribed precautions during self-care and functional transfers.              Learning Progress Summary           Patient Acceptance, E, NR by MR at 4/8/2022 0846   Family Acceptance, E, NR by MR at 4/8/2022 0846                   Point: Body mechanics (In Progress)     Description:   Instruct learner(s) on proper positioning  and spine alignment during self-care, functional mobility activities and/or exercises.              Learning Progress Summary           Patient Acceptance, E, NR by MR at 4/8/2022 0846   Family Acceptance, E, NR by MR at 4/8/2022 0846                               User Key     Initials Effective Dates Name Provider Type Discipline    MR 10/06/21 -  Thea Park OT Occupational Therapist OT              OT Recommendation and Plan  Planned Therapy Interventions (OT): activity tolerance training, adaptive equipment training, BADL retraining, functional balance retraining, IADL retraining, occupation/activity based interventions, patient/caregiver education/training, transfer/mobility retraining, strengthening exercise, ROM/therapeutic exercise  Therapy Frequency (OT): daily  Plan of Care Review  Plan of Care Reviewed With: patient, daughter  Progress: no change (Initial Eval)  Outcome Evaluation: OT eval complete. Pt presenting w/ R knee pain, generalized weakness, impaired ROM/transfers/mobility, increased confusion and increased need for assist w/ self-care tasks. CGA w/ v/c for supine to sit, CGA w/ unilateral HHA for STS and SPT from EOB to recliner. Pt requiring ffrequent v/c for redirection to task. MaxA for LB dressing and ModA for UB dressing. IP OT warranted, POC initiated. Recommendation upon d/c for IPR.     Time Calculation:    Time Calculation- OT     Row Name 04/08/22 0854             Time Calculation- OT    OT Start Time 0810  -MR      OT Received On 04/08/22  -MR      OT Goal Re-Cert Due Date 04/18/22  -MR              Untimed Charges    OT Eval/Re-eval Minutes 46  -MR              Total Minutes    Untimed Charges Total Minutes 46  -MR       Total Minutes 46  -MR            User Key  (r) = Recorded By, (t) = Taken By, (c) = Cosigned By    Initials Name Provider Type    MR Thea Park, OT Occupational Therapist              Therapy Charges for Today     Code Description Service Date Service  Provider Modifiers Qty    62586832911 HC OT EVAL LOW COMPLEXITY 4 4/8/2022 Una Park OT GO 1    81346117547 HC OT THER SUPP EA 15 MIN 4/8/2022 Una Pakr OT GO 2               UNA PARK OT  4/8/2022

## 2022-04-08 NOTE — PLAN OF CARE
Goal Outcome Evaluation:  Plan of Care Reviewed With: patient, daughter        Progress: no change (Initial Eval)  Outcome Evaluation: OT eval complete. Pt presenting w/ R knee pain, generalized weakness, impaired ROM/transfers/mobility, increased confusion and increased need for assist w/ self-care tasks. CGA w/ v/c for supine to sit, CGA w/ unilateral HHA for STS and SPT from EOB to recliner. Pt requiring ffrequent v/c for redirection to task. MaxA for LB dressing and ModA for UB dressing. IP OT warranted, POC initiated. Recommendation upon d/c for IPR.

## 2022-04-08 NOTE — CASE MANAGEMENT/SOCIAL WORK
Discharge Planning Assessment  [facility]     Patient Name: Lea Rivers  MRN: [id number]  Today's Date: 4/8/2022    Admit Date: 4/7/2022     Discharge Needs Assessment     Row Name 04/08/22 1339       Living Environment    People in Home alone    Current Living Arrangements home    Primary Care Provided by self    Provides Primary Care For no one    Family Caregiver if Needed child(amalia), adult    Able to Return to Prior Arrangements yes       Resource/Environmental Concerns    Resource/Environmental Concerns none       Transition Planning    Patient/Family Anticipates Transition to inpatient rehabilitation facility    Patient/Family Anticipated Services at Transition     Transportation Anticipated family or friend will provide       Discharge Needs Assessment    Readmission Within the Last 30 Days no previous admission in last 30 days    Equipment Currently Used at Home none    Concerns to be Addressed discharge planning    Equipment Needed After Discharge walker, rolling    Outpatient/Agency/Support Group Needs inpatient rehabilitation facility    Discharge Facility/Level of Care Needs rehabilitation facility               Discharge Plan     Row Name 04/08/22 0652       Plan    Plan SNF    Patient/Family in Agreement with Plan yes    Plan Comments Met & spoke with Mr Rivers and his daughter, Luis Enrique, at the bedside. He lives alone in [facility]. At baseline, is ind with ADL's/mobility. Denies DME/HH/Rehab/O2. He is very [place]. Luis Enrique is the POA. Has a living will. Is Covid vaccinated/boostered. Vaccine card copied and placed in chartlet. Rehab referral called to [facility] and they declined due to not meeting criteria. Referrals called to [facility]. Daughter requested [place] location, but no beds available this weekend. She will look at the other campuses and call back. Luis Enrique specifically asked that there be no referrals sent to [facility] or [facility]. Luis Enrique asked for  to arrange  transportation to the facility.    Final Discharge Disposition Code 30 - still a patient    Row Name 04/08/22 1117       Plan    Plan SNF    Patient/Family in Agreement with Plan yes    Plan Comments Called a referral to violet, April, with [facility] and they have declined due to not meeting criteria for skilled unit and is obs status. In addition, too high level of care since ambulating 300 ft. PT/OT rec rehab due to weakness, safety cues, and increased need for self-care tasks.    Final Discharge Disposition Code 30 - still a patient              Continued Care and Services - Admitted Since 4/7/2022     Destination     Service Provider Request Status Selected Services Address Phone Fax Patient Preferred    [facility]  Pending - No Request Sent N/A [address] [place] [id number] [phone] --       Internal Comment last updated by Anabelle Pozo, RN 4/8/2022 1339    4/8 referral called                 [facility]  Pending - No Request Sent N/A [address] [place] [id number] [phone] --       Internal Comment last updated by Anabelle Pozo, RN 4/8/2022 1351    4/8 referral called to [facility]  Declined  Not eligible, doesn't meet criteria for skilled or acute  N/A [address] [place] [id number] [phone] --       Internal Comment last updated by Anabelle Pozo, RN 4/8/2022 1116    4/8 referral called                              Demographic Summary     Row Name 04/08/22 1122       General Information    Admission Type other (see comments)  outpatient    General Information Comments Verified PCP is Dr Melvin. Primary insurance is Medicare. Secondary is [employer]. Has drug coverage.               Functional Status     Row Name 04/08/22 1339       Functional Status    Usual Activity Tolerance good    Current Activity Tolerance good       Functional  Status, IADL    Medications independent    Meal Preparation independent    Housekeeping independent    Laundry independent    Shopping independent               Psychosocial    No documentation.                Abuse/Neglect    No documentation.                Legal    No documentation.                Substance Abuse    No documentation.                Patient Forms    No documentation.                   Anabelle Pozo RN

## 2022-04-09 LAB
DEPRECATED RDW RBC AUTO: 44.4 FL (ref 37–54)
ERYTHROCYTE [DISTWIDTH] IN BLOOD BY AUTOMATED COUNT: 13.7 % (ref 12.3–15.4)
GLUCOSE BLDC GLUCOMTR-MCNC: 105 MG/DL (ref 70–130)
GLUCOSE BLDC GLUCOMTR-MCNC: 120 MG/DL (ref 70–130)
GLUCOSE BLDC GLUCOMTR-MCNC: 99 MG/DL (ref 70–130)
HCT VFR BLD AUTO: 25.4 % (ref 37.5–51)
HGB BLD-MCNC: 8.1 G/DL (ref 13–17.7)
MCH RBC QN AUTO: 28.4 PG (ref 26.6–33)
MCHC RBC AUTO-ENTMCNC: 31.9 G/DL (ref 31.5–35.7)
MCV RBC AUTO: 89.1 FL (ref 79–97)
PLATELET # BLD AUTO: 165 10*3/MM3 (ref 140–450)
PMV BLD AUTO: 9.7 FL (ref 6–12)
RBC # BLD AUTO: 2.85 10*6/MM3 (ref 4.14–5.8)
WBC NRBC COR # BLD: 11.29 10*3/MM3 (ref 3.4–10.8)

## 2022-04-09 PROCEDURE — 63710000001 ONDANSETRON PER 8 MG: Performed by: ORTHOPAEDIC SURGERY

## 2022-04-09 PROCEDURE — 97110 THERAPEUTIC EXERCISES: CPT

## 2022-04-09 PROCEDURE — 99024 POSTOP FOLLOW-UP VISIT: CPT | Performed by: ORTHOPAEDIC SURGERY

## 2022-04-09 PROCEDURE — 85027 COMPLETE CBC AUTOMATED: CPT | Performed by: ORTHOPAEDIC SURGERY

## 2022-04-09 PROCEDURE — 82962 GLUCOSE BLOOD TEST: CPT

## 2022-04-09 PROCEDURE — 97116 GAIT TRAINING THERAPY: CPT

## 2022-04-09 PROCEDURE — P9612 CATHETERIZE FOR URINE SPEC: HCPCS

## 2022-04-09 RX ORDER — ACETAMINOPHEN 500 MG
1000 TABLET ORAL EVERY 8 HOURS PRN
Status: DISCONTINUED | OUTPATIENT
Start: 2022-04-09 | End: 2022-04-12 | Stop reason: HOSPADM

## 2022-04-09 RX ORDER — TRAMADOL HYDROCHLORIDE 50 MG/1
50 TABLET ORAL EVERY 6 HOURS PRN
Status: DISCONTINUED | OUTPATIENT
Start: 2022-04-09 | End: 2022-04-12 | Stop reason: HOSPADM

## 2022-04-09 RX ADMIN — ASPIRIN 325 MG: 325 TABLET, COATED ORAL at 08:46

## 2022-04-09 RX ADMIN — LEVETIRACETAM 500 MG: 500 TABLET, FILM COATED ORAL at 08:46

## 2022-04-09 RX ADMIN — VERAPAMIL HYDROCHLORIDE 240 MG: 240 TABLET, FILM COATED, EXTENDED RELEASE ORAL at 21:10

## 2022-04-09 RX ADMIN — VERAPAMIL HYDROCHLORIDE 240 MG: 240 TABLET, FILM COATED, EXTENDED RELEASE ORAL at 08:46

## 2022-04-09 RX ADMIN — TERAZOSIN HYDROCHLORIDE 5 MG: 5 CAPSULE ORAL at 21:11

## 2022-04-09 RX ADMIN — LISINOPRIL 20 MG: 20 TABLET ORAL at 21:10

## 2022-04-09 RX ADMIN — ONDANSETRON HYDROCHLORIDE 4 MG: 4 TABLET, FILM COATED ORAL at 08:46

## 2022-04-09 RX ADMIN — ATORVASTATIN CALCIUM 10 MG: 10 TABLET, FILM COATED ORAL at 21:10

## 2022-04-09 RX ADMIN — MELOXICAM 15 MG: 7.5 TABLET ORAL at 08:46

## 2022-04-09 RX ADMIN — OXYCODONE 5 MG: 5 TABLET ORAL at 02:53

## 2022-04-09 RX ADMIN — LEVOTHYROXINE SODIUM 88 MCG: 88 TABLET ORAL at 04:10

## 2022-04-09 RX ADMIN — DIVALPROEX SODIUM 1000 MG: 500 TABLET, EXTENDED RELEASE ORAL at 21:10

## 2022-04-09 RX ADMIN — ISOSORBIDE MONONITRATE 30 MG: 30 TABLET, EXTENDED RELEASE ORAL at 08:46

## 2022-04-09 RX ADMIN — DIVALPROEX SODIUM 500 MG: 500 TABLET, EXTENDED RELEASE ORAL at 08:46

## 2022-04-09 RX ADMIN — LISINOPRIL 20 MG: 20 TABLET ORAL at 08:46

## 2022-04-09 RX ADMIN — PANTOPRAZOLE SODIUM 40 MG: 40 TABLET, DELAYED RELEASE ORAL at 08:46

## 2022-04-09 RX ADMIN — Medication 10 ML: at 08:46

## 2022-04-09 RX ADMIN — LEVETIRACETAM 500 MG: 500 TABLET, FILM COATED ORAL at 21:11

## 2022-04-09 NOTE — THERAPY TREATMENT NOTE
Patient Name: Lea Rivers  : 1945    MRN: [id number]                              Today's Date: 2022       Admit Date: 2022    Visit Dx:     ICD-10-CM ICD-9-CM   1. Primary osteoarthritis of both knees  M17.0 715.16     Patient Active Problem List   Diagnosis   • Coronary artery disease   • Dyslipidemia   • Hypothyroidism   • GERD (gastroesophageal reflux disease)   • Seizure disorder (HCC)   • BPH (benign prostatic hypertrophy)   • Hypertension   • Primary osteoarthritis of both knees   • Syncope and collapse   • Precordial pain   • Diabetes (HCC)   • Status post total right knee replacement     Past Medical History:   Diagnosis Date   • Colon cancer (HCC)     Status post partial colectomy in . No chemotherapy or radiation therapy.   • Coronary artery disease     Nonobstructive coronary artery disease.   • Diabetes (HCC)    • Dyslipidemia    • GERD (gastroesophageal reflux disease)     Hx of.   • Hearing aid worn    • Hyperlipidemia    • Hypertension    • Hypothyroidism    • Left shoulder pain    • Primary osteoarthritis of both knees    • Seizure disorder (HCC)    • Wears glasses      Past Surgical History:   Procedure Laterality Date   • APPENDECTOMY     • CARPAL TUNNEL RELEASE Bilateral    • CHOLECYSTECTOMY     • COLECTOMY PARTIAL / TOTAL      Partial colectomy   • COLONOSCOPY     • CYSTOSCOPY TRANSURETHRAL RESECTION OF PROSTATE N/A 2018    Procedure: CYSTOSCOPY TRANSURETHRAL RESECTION OF PROSTATE GREENLIGHT;  Surgeon: Naseem Clayton MD;  Location:  [facility];  Service: Urology   • OTHER SURGICAL HISTORY      Contraction surgery on bilateral hands and wrists.   • OTHER SURGICAL HISTORY      left and right hands   • SINUS SURGERY     • SKIN BIOPSY     • TEETH EXTRACTION     • TONSILLECTOMY     • TOTAL KNEE ARTHROPLASTY Right 2022    Procedure: TOTAL KNEE ARTHROPLASTY WITH ORTHO ROBOT RIGHT;  Surgeon: Louis Malcolm MD;  Location:  [facility];  Service: Robotics  - Ortho;  Laterality: Right;   • TURP / TRANSURETHRAL INCISION / DRAINAGE PROSTATE     • VASECTOMY        General Information     Row Name 04/09/22 0839          Physical Therapy Time and Intention    Document Type therapy note (daily note)  -LR     Mode of Treatment physical therapy;individual therapy  -LR     Row Name 04/09/22 0839          General Information    Patient Profile Reviewed yes  -LR     Existing Precautions/Restrictions fall;other (see comments)  R adductor canal nerve catheter, [place], confusion, impulsivity  -LR     Barriers to Rehab previous functional deficit;cognitive status;hearing deficit  -LR     Row Name 04/09/22 0839          Cognition    Orientation Status (Cognition) oriented to;person  -LR     Row Name 04/09/22 0839          Safety Issues, Functional Mobility    Safety Issues Affecting Function (Mobility) ability to follow commands;at risk behavior observed;awareness of need for assistance;positioning of assistive device;judgment;insight into deficits/self-awareness;safety precautions follow-through/compliance;sequencing abilities;safety precaution awareness;impulsivity  -LR     Impairments Affecting Function (Mobility) balance;strength;pain;endurance/activity tolerance;range of motion (ROM);motor planning;cognition  -LR           User Key  (r) = Recorded By, (t) = Taken By, (c) = Cosigned By    Initials Name Provider Type    LR Judith James, PT Physical Therapist               Mobility     Row Name 04/09/22 0839          Bed Mobility    Bed Mobility supine-sit  -LR     Supine-Sit [place] (Bed Mobility) verbal cues;moderate assist (50% patient effort);nonverbal cues (demo/gesture)  -LR     Sit-Supine [place] (Bed Mobility) not tested  -LR     Assistive Device (Bed Mobility) head of bed elevated;bed rails;draw sheet  -LR     Comment, (Bed Mobility) Verbal cues to move LEs towards EOB and to push up from bed to raise trunk into sitting. Required repeated cues and increased  time to perform. Mod assist required to scoot hips out to get feet on floor and to raise trunk into sitting. Denied dizziness upon sitting up.  -LR     Row Name 04/09/22 0839          Transfers    Comment, (Transfers) Required repeated verbal cues for correct hand placement with t/f and to keep R foot out from under him prior to t/f. Assisted to and from bathroom, repeated cues to use grab bar for support.  -LR     Row Name 04/09/22 0839          Bed-Chair Transfer    Bed-Chair [place] (Transfers) not tested  -LR     Row Name 04/09/22 0839          Sit-Stand Transfer    Sit-Stand [place] (Transfers) verbal cues;minimum assist (75% patient effort)  -LR     Assistive Device (Sit-Stand Transfers) walker, front-wheeled  -LR     Row Name 04/09/22 0839          Gait/Stairs (Locomotion)    [place] Level (Gait) verbal cues;contact guard;1 person to manage equipment  -LR     Assistive Device (Gait) walker, front-wheeled  -LR     Distance in Feet (Gait) 135  -LR     Deviations/Abnormal Patterns (Gait) bilateral deviations;bisi decreased;gait speed decreased;stride length decreased;right sided deviations;antalgic  -LR     Bilateral Gait Deviations forward flexed posture  -LR     Right Sided Gait Deviations weight shift ability decreased  -LR     [place] Level (Stairs) not tested  -LR     Comment, (Gait/Stairs) Patient ambulated with step to gait pattern initially. Progressed to step through gait pattern with repeated cues for increased step length, increased R LE weight bearing/stance phase, decreased UE weight bearing, and upright posture. Cues for correct sequencing of steps when turning. Follow with chair for safety. Gait limited by fatigue and pain.  -LR     Row Name 04/09/22 0839          Mobility    Extremity Weight-bearing Status right lower extremity  -LR     Right Lower Extremity (Weight-bearing Status) weight-bearing as tolerated (WBAT)  -LR           User Key  (r) = Recorded By, (t) = Taken  By, (c) = Cosigned By    Initials Name Provider Type    Judith Valiente, PT Physical Therapist               Obj/Interventions     Row Name 04/09/22 0839          Motor Skills    Therapeutic Exercise ankle;knee;other (see comments)  cues for technique; min assist for all; patient had difficulty following cues and comprehending how to perform quad sets  -LR     Row Name 04/09/22 0839          Knee (Therapeutic Exercise)    Knee (Therapeutic Exercise) AROM (active range of motion);strengthening exercise;isometric exercises  -LR     Knee AROM (Therapeutic Exercise) right;heel slides;sitting;supine;15 repititions  -LR     Knee Isometrics (Therapeutic Exercise) right;quad sets;supine;10 repetitions;5 repetitions  -LR     Knee Strengthening (Therapeutic Exercise) right;SLR (straight leg raise);SAQ (short arc quad);LAQ (long arc quad);sitting;15 repititions  -LR     Row Name 04/09/22 0839          Ankle (Therapeutic Exercise)    Ankle (Therapeutic Exercise) AROM (active range of motion)  -LR     Ankle AROM (Therapeutic Exercise) bilateral;dorsiflexion;plantarflexion;supine;15 repititions  -LR     Row Name 04/09/22 0839          Balance    Balance Assessment sitting static balance;sitting dynamic balance;standing static balance;standing dynamic balance  -LR     Static Sitting Balance contact guard  -LR     Dynamic Sitting Balance contact guard  -LR     Position, Sitting Balance supported;sitting edge of bed  -LR     Static Standing Balance contact guard  -LR     Dynamic Standing Balance contact guard  -LR     Position/Device Used, Standing Balance supported;walker, rolling  -LR           User Key  (r) = Recorded By, (t) = Taken By, (c) = Cosigned By    Initials Name Provider Type    Judith Valiente, PT Physical Therapist               Goals/Plan     Row Name 04/09/22 0839          Bed Mobility Goal 1 (PT)    Activity/Assistive Device (Bed Mobility Goal 1, PT) sit to supine/supine to sit  -LR      [place] Level/Cues Needed (Bed Mobility Goal 1, PT) contact guard required  -LR     Time Frame (Bed Mobility Goal 1, PT) long term goal (LTG);3 days  -LR     Progress/Outcomes (Bed Mobility Goal 1, PT) goal ongoing;progress slower than expected  -LR     Row Name 04/09/22 0839          Transfer Goal 1 (PT)    Activity/Assistive Device (Transfer Goal 1, PT) sit-to-stand/stand-to-sit;walker, rolling  -LR     [place] Level/Cues Needed (Transfer Goal 1, PT) modified independence  -LR     Time Frame (Transfer Goal 1, PT) long term goal (LTG);3 days  -LR     Progress/Outcome (Transfer Goal 1, PT) goal ongoing;progress slower than expected  -LR     Row Name 04/09/22 0839          Gait Training Goal 1 (PT)    Activity/Assistive Device (Gait Training Goal 1, PT) gait (walking locomotion);walker, rolling  -LR     [place] Level (Gait Training Goal 1, PT) contact guard required  -LR     Distance (Gait Training Goal 1, PT) 300 feet  -LR     Time Frame (Gait Training Goal 1, PT) long term goal (LTG);3 days  -LR     Progress/Outcome (Gait Training Goal 1, PT) goal ongoing;continuing progress toward goal  -LR     Row Name 04/09/22 0839          ROM Goal 1 (PT)    ROM Goal 1 (PT) R knee AROM 0-90 degrees  -LR     Time Frame (ROM Goal 1, PT) long-term goal (LTG);3 days  -LR     Progress/Outcome (ROM Goal 1, PT) goal ongoing;continuing progress toward goal  -LR           User Key  (r) = Recorded By, (t) = Taken By, (c) = Cosigned By    Initials Name Provider Type    LR Judith James, PT Physical Therapist               Clinical Impression     Row Name 04/09/22 0839          Pain    Pain Location - Side/Orientation Right  -LR     Pain Location - knee  -LR     Pain Intervention(s) Ambulation/increased activity;Repositioned;Medication (See MAR)  -LR     Row Name 04/09/22 0839          Pain Scale: FACES Pre/Post-Treatment    Pain: FACES Scale, Pretreatment 6-->hurts even more  -LR     Posttreatment Pain Rating  6-->hurts even more  -LR     Pain Location - Side/Orientation Right  -LR     Pain Location - knee  -LR     Row Name 04/09/22 0839          Plan of Care Review    Plan of Care Reviewed With patient  -LR     Progress no change  -LR     Outcome Evaluation Patient ambulated 135 feet with RW, CGA, progressed to step through gait pattern, limited by fatigue and pain. Min assist required to rise into standing. Continues to be confused which impacts his safety awareness with mobility. Will continue to progress as able.  -LR     Row Name 04/09/22 0839          Therapy Assessment/Plan (PT)    Rehab Potential (PT) good, to achieve stated therapy goals  -LR     Criteria for Skilled Interventions Met (PT) yes;meets criteria;skilled treatment is necessary  -LR     Row Name 04/09/22 0839          Positioning and Restraints    Pre-Treatment Position in bed  -LR     Post Treatment Position chair  -LR     In Chair notified nsg;reclined;sitting;call light within reach;encouraged to call for assist;exit alarm on;with family/caregiver;legs elevated;compression device;waffle cushion  -LR           User Key  (r) = Recorded By, (t) = Taken By, (c) = Cosigned By    Initials Name Provider Type    LR Judith James, PT Physical Therapist               Outcome Measures     Row Name 04/09/22 0839          How much help from another person do you currently need...    Turning from your back to your side while in flat bed without using bedrails? 2  -LR     Moving from lying on back to sitting on the side of a flat bed without bedrails? 2  -LR     Moving to and from a bed to a chair (including a wheelchair)? 3  -LR     Standing up from a chair using your arms (e.g., wheelchair, bedside chair)? 3  -LR     Climbing 3-5 steps with a railing? 2  -LR     To walk in hospital room? 3  -LR     AM-PAC 6 Clicks Score (PT) 15  -LR     Row Name 04/09/22 0839          Functional Assessment    Outcome Measure Options AM-PAC 6 Clicks Basic Mobility (PT)   -LR           User Key  (r) = Recorded By, (t) = Taken By, (c) = Cosigned By    Initials Name Provider Type    Judith Valiente, PT Physical Therapist                             Physical Therapy Education                 Title: PT OT SLP Therapies (In Progress)     Topic: Physical Therapy (Done)     Point: Mobility training (Done)     Learning Progress Summary           Patient Acceptance, E,D, VU,NR by LR at 4/9/2022 0839    Comment: Educated on precautions, weight bearing status, correct supine to sit t/f technique, correct sit<->stand t/f technique, correct gait mechanics, LE HEP, and progression of POC.    Eager, E, VU,NR by SC at 4/8/2022 1456    Comment: reviewed safety wtih mobility    Eager, E, VU by SC at 4/8/2022 0950    Comment: reviewed HEP and safety with mobility    Acceptance, E,D, VU by OLIVER at 4/7/2022 1300    Comment: Educated on safe sequencing with bed mobility, ambulatory transfers, and gait. Reviewed HEP and knee precautions.                   Point: Home exercise program (Done)     Learning Progress Summary           Patient Acceptance, E,D, VU,NR by LR at 4/9/2022 0839    Comment: Educated on precautions, weight bearing status, correct supine to sit t/f technique, correct sit<->stand t/f technique, correct gait mechanics, LE HEP, and progression of POC.    Eager, E, VU,NR by SC at 4/8/2022 1456    Comment: reviewed safety wtih mobility    Eager, E, VU by SC at 4/8/2022 0950    Comment: reviewed HEP and safety with mobility    Acceptance, E,D, VU by OLIVER at 4/7/2022 1300    Comment: Educated on safe sequencing with bed mobility, ambulatory transfers, and gait. Reviewed HEP and knee precautions.                   Point: Body mechanics (Done)     Learning Progress Summary           Patient Acceptance, E,D, VU,NR by LR at 4/9/2022 0839    Comment: Educated on precautions, weight bearing status, correct supine to sit t/f technique, correct sit<->stand t/f technique, correct gait mechanics,  LE HEP, and progression of POC.    Eager, E, VU,NR by SC at 4/8/2022 1456    Comment: reviewed safety wtih mobility    Eager E, VU by SC at 4/8/2022 0950    Comment: reviewed HEP and safety with mobility    Acceptance, E,D, VU by  at 4/7/2022 1300    Comment: Educated on safe sequencing with bed mobility, ambulatory transfers, and gait. Reviewed HEP and knee precautions.                   Point: Precautions (Done)     Learning Progress Summary           Patient Acceptance, E,D, VU,NR by  at 4/9/2022 0839    Comment: Educated on precautions, weight bearing status, correct supine to sit t/f technique, correct sit<->stand t/f technique, correct gait mechanics, LE HEP, and progression of POC.    Eager, E, VU,NR by SC at 4/8/2022 1456    Comment: reviewed safety wtih mobility    JEREMIAS Payne, VU by SC at 4/8/2022 0950    Comment: reviewed HEP and safety with mobility    Acceptance, E,D, VU by  at 4/7/2022 1300    Comment: Educated on safe sequencing with bed mobility, ambulatory transfers, and gait. Reviewed HEP and knee precautions.                               User Key     Initials Effective Dates Name Provider Type Discipline    SC 06/16/21 -  Hernesto Carias, PT Physical Therapist PT     06/16/21 -  Judith James, PT Physical Therapist PT     06/16/21 -  Rolando Mobley, PT Physical Therapist PT              PT Recommendation and Plan     Plan of Care Reviewed With: patient  Progress: no change  Outcome Evaluation: Patient ambulated 135 feet with RW, CGA, progressed to step through gait pattern, limited by fatigue and pain. Min assist required to rise into standing. Continues to be confused which impacts his safety awareness with mobility. Will continue to progress as able.     Time Calculation:    PT Charges     Row Name 04/09/22 0839             Time Calculation    Start Time 0839  -LR      PT Received On 04/09/22  -      PT Goal Re-Cert Due Date 04/17/22  -LR              Timed Charges    16319 - PT  Therapeutic Exercise Minutes 12  -LR      97397 - Gait Training Minutes  10  -LR      51742 - PT Therapeutic Activity Minutes 9  -LR              Total Minutes    Timed Charges Total Minutes 31  -LR       Total Minutes 31  -LR            User Key  (r) = Recorded By, (t) = Taken By, (c) = Cosigned By    Initials Name Provider Type    LR Judith James, PT Physical Therapist              Therapy Charges for Today     Code Description Service Date Service Provider Modifiers Qty    11149778275 HC PT THER PROC EA 15 MIN 4/9/2022 Judith James, PT GP 1    08935106106 HC GAIT TRAINING EA 15 MIN 4/9/2022 Judith James, PT GP 1          PT G-Codes  Outcome Measure Options: AM-PAC 6 Clicks Basic Mobility (PT)  AM-PAC 6 Clicks Score (PT): 15  AM-PAC 6 Clicks Score (OT): 16    Judith James, BRISSA  4/9/2022

## 2022-04-09 NOTE — PROGRESS NOTES
"IM progress note      Lea Barrett [place]  [id number]  1945     LOS: 1 day     Attending: Louis Malcolm MD    Primary Care Provider: Mannie Melvin MD      Chief Complaint/Reason for visit:  No chief complaint on file.      Subjective   Doing okay overall.  There was not feeling too well this morning.   Confusion still present but appears to be improving..  No shortness of breath.   Participating with PT but confusion is impacting his safety awareness    Objective        Visit Vitals  BP (!) 183/92   Pulse 88   Temp 98.2 °F (36.8 °C) (Oral)   Resp 16   Ht 172.7 cm (68\")   Wt 97.1 kg (214 lb 1.1 oz)   SpO2 94%   BMI 32.55 kg/m²     Temp (24hrs), Av.9 °F (36.6 °C), Min:97.6 °F (36.4 °C), Max:98.2 °F (36.8 °C)      Intake/Output:    Intake/Output Summary (Last 24 hours) at 2022 1218  Last data filed at 2022 0900  Gross per 24 hour   Intake 480 ml   Output 1800 ml   Net -1320 ml        Physical Therapy:Progress: no change  Outcome Evaluation: Patient continues to be confused requiring extra cues for safety to stay close to walker and stay with walker untill seated. He ambulated 300 feet with unsteady gait, some tremors noted    Physical Exam:     General Appearance:    Alert, cooperative, in no acute distress   Head:    Normocephalic, without obvious abnormality, atraumatic    Lungs:     Normal effort, symmetric chest rise,  clear to      auscultation bilaterally              Heart:    Regular rhythm and normal rate, normal S1 and S2    Abdomen:     Normal bowel sounds, no masses, no organomegaly, soft        non-tender, non-distended, no guarding, no rebound                tenderness   Extremities:  Clean dry and intact dressing over right knee.  Peripheral nerve block catheter present.   intact flexion dorsiflexion bilateral feet.  No clubbing, cyanosis or edema.  No deformities.    Pulses:   Pulses palpable and equal bilaterally   Skin:   No bleeding, bruising or rash          Results " Review:     I reviewed the patient's new clinical results.   Results from last 7 days   Lab Units 04/09/22  0933 04/08/22  1301   WBC 10*3/mm3 11.29* 11.37*   HEMOGLOBIN g/dL 8.1* 8.0*   HEMATOCRIT % 25.4* 23.6*   PLATELETS 10*3/mm3 165 158     Results from last 7 days   Lab Units 04/08/22  1302   SODIUM mmol/L 134*   POTASSIUM mmol/L 4.1   CHLORIDE mmol/L 101   CO2 mmol/L 23.0   BUN mg/dL 14   CREATININE mg/dL 1.00   CALCIUM mg/dL 8.5*   GLUCOSE mg/dL 133*     I reviewed the patient's new imaging including images and reports.    All medications reviewed.   aspirin, 325 mg, Oral, Daily  atorvastatin, 10 mg, Oral, Nightly  divalproex, 1,000 mg, Oral, Nightly  divalproex, 500 mg, Oral, Daily  insulin lispro, 0-7 Units, Subcutaneous, TID With Meals  isosorbide mononitrate, 30 mg, Oral, Daily  levETIRAcetam, 500 mg, Oral, Q12H  levothyroxine, 88 mcg, Oral, Q AM  lisinopril, 20 mg, Oral, Q12H  meloxicam, 15 mg, Oral, Daily  pantoprazole, 40 mg, Oral, Daily  sodium chloride, 10 mL, Intravenous, Q12H  terazosin, 5 mg, Oral, Nightly  verapamil SR, 240 mg, Oral, Q12H      acetaminophen, 1,000 mg, Q8H PRN  dextrose, 25 g, Q15 Min PRN  dextrose, 15 g, Q15 Min PRN  fluticasone, 2 spray, PRN  glucagon (human recombinant), 1 mg, Q15 Min PRN  HYDROmorphone, 0.5 mg, Q2H PRN   And  naloxone, 0.1 mg, Q5 Min PRN  labetalol, 10 mg, Q4H PRN  naloxone, 0.4 mg, Q5 Min PRN  ondansetron, 4 mg, Q6H PRN   Or  ondansetron, 4 mg, Q6H PRN  sodium chloride, 500 mL, TID PRN  sodium chloride, 1-10 mL, PRN  traMADol, 50 mg, Q6H PRN        Assessment/Plan       Status post total right knee replacement    Coronary artery disease    Dyslipidemia    Hypothyroidism    GERD (gastroesophageal reflux disease)    Seizure disorder (HCC)    BPH (benign prostatic hypertrophy)    Hypertension    Primary osteoarthritis of both knees    Diabetes (HCC)  Postop confusion, likely multifactorial.    Plan     1. PT/OT, RLE early range of motion and weight-bearing per  the post total knee arthroplasty protocol  2. Pain control-prns, ACB cath with ropivacaine infusion.  I discontinued as needed oxycodone and morphine and made tramadol available.  Schedule Tylenol and continue meloxicam  3. IS-encouraged, instructed  4. DVT proph- Mechanicals and aspirin  5. Bowel regimen  6. Resume home medications as appropriate  7. Monitor post-op labs  8. DC planning for rehab, case management consult     HTN, dyslipidemia, CAD  - Continue home lisinopril, verapamil, Imdur, statin  - Hold chlorthalidone  - Monitor BP   - Holding parameters for BP meds  - Labetalol PRN for SBP>170     Hypothyroidism  -Synthroid     GERD  -Protonix     Seizure disorder  -Continue Keppra, Depakote     BPH  -Continue Terazosin  Monitor for spontaneous voiding.  Check postvoid residual if needed/wy     DM2  - hgb A1c on 3/24/2022 6.1  -Hold Metformin  - Accu-Chek AC and HS with low dose SSI      I believe this patient meets INPATIENT status due to the need for care which can only be reasonably provided in an hospital setting such as aggressive/expedited ancillary services and/or consultation services, the necessity for IV medications, close physician monitoring and/or the possible need for procedures.      In such, I feel patient’s risk for adverse outcomes and need for care warrant INPATIENT evaluation and predict the patient’s care encounter to likely last beyond 2 midnights.    Joe Cole MD  04/09/22  12:18 EDT

## 2022-04-09 NOTE — PROGRESS NOTES
"      [facility] Progress Note    Subjective      LOS: 1 day   Patient Care Team:  Mannie Melvin MD as PCP - General (Family Medicine)    CC: Right knee pain    Interval History:   Patient sitting comfortably in a chair.  Pain controlled.  Alert and oriented x3.  However, his daughter says that he continues to be confused intermittently.    Objective      Vital Signs  Temp (24hrs), Av.1 °F (36.7 °C), Min:97.6 °F (36.4 °C), Max:98.4 °F (36.9 °C)      BP (!) 183/92   Pulse 88   Temp 98.2 °F (36.8 °C) (Oral)   Resp 16   Ht 172.7 cm (68\")   Wt 97.1 kg (214 lb 1.1 oz)   SpO2 94%   BMI 32.55 kg/m²     Examination:   Examination of the right knee: The wound is clean, dry, and intact.  Ankle dorsiflexion, ankle plantar flexion, and EHL are intact.  Sensation intact in the foot to light touch.  2+ posterior tibialis pulse.  Straight leg raise weak but intact.      Labs:  Results from last 7 days   Lab Units 2233 22  1301   WBC 10*3/mm3 11.29* 11.37*   HEMOGLOBIN g/dL 8.1* 8.0*   HEMATOCRIT % 25.4* 23.6*   MCV fL 89.1 86.1   PLATELETS 10*3/mm3 165 158       Radiology:  Imaging Results (Last 24 Hours)     ** No results found for the last 24 hours. **          PT:  Physical Therapy - Plan of Care Review - Outcome Summary:  Outcome Evaluation: Patient ambulated 135 feet with RW, CGA, progressed to step through gait pattern, limited by fatigue and pain. Min assist required to rise into standing. Continues to be confused which impacts his safety awareness with mobility. Will continue to progress as able. (22 0839)]       Results Review:     I reviewed the patient's new clinical results.    Assessment and Plan     Assessment:   Status post right total knee arthroplasty    Postoperative confusion-improved  Acute blood loss anemia-hemoglobin stable at 8.1      Status post total right knee replacement    Coronary artery disease    Dyslipidemia    Hypothyroidism    GERD " (gastroesophageal reflux disease)    Seizure disorder (HCC)    BPH (benign prostatic hypertrophy)    Hypertension    Primary osteoarthritis of both knees    Diabetes (HCC)      Plan for disposition: Plan for rehab facility when bed available.  Follow-up in 3 weeks in the office as planned.      Future Appointments   Date Time Provider Department Center   4/27/2022  2:20 PM Kiersten Carreon PA-C [facility]   9/23/2022 12:15 PM Maryanne Baker MD [facility]           Louis Malcolm MD  04/09/22  10:26 EDT

## 2022-04-09 NOTE — PLAN OF CARE
Problem: Adult Inpatient Plan of Care  Goal: Plan of Care Review  Recent Flowsheet Documentation  Taken 4/9/2022 1447 by Judith James, PT  Progress: improving  Plan of Care Reviewed With: patient  Outcome Evaluation: Patient increased gait distance to 300 feet with RW, step through gait pattern, CGAx2, limited by fatigue and pain. R knee flexion AROM progressing well, 93 degrees in sitting. R knee extension AROM quite limited, lacking 20 degrees. Increased assist required to stand this PM. Continues to demonstrate confusion in conversation, reports he hurt his knee instead of having an elective TKA. Continue to recommend rehab at d/c. Will continue to progress as able.     Goal Outcome Evaluation:  Plan of Care Reviewed With: patient        Progress: improving  Outcome Evaluation: Patient increased gait distance to 300 feet with RW, step through gait pattern, CGAx2, limited by fatigue and pain. R knee flexion AROM progressing well, 93 degrees in sitting. R knee extension AROM quite limited, lacking 20 degrees. Increased assist required to stand this PM. Continues to demonstrate confusion in conversation, reports he hurt his knee instead of having an elective TKA. Continue to recommend rehab at d/c. Will continue to progress as able.

## 2022-04-09 NOTE — PLAN OF CARE
Problem: Adult Inpatient Plan of Care  Goal: Plan of Care Review  Recent Flowsheet Documentation  Taken 4/9/2022 0839 by Judith James, PT  Progress: no change  Plan of Care Reviewed With: patient  Outcome Evaluation: Patient ambulated 135 feet with RW, CGA, progressed to step through gait pattern, limited by fatigue and pain. Min assist required to rise into standing. Continues to be confused which impacts his safety awareness with mobility. Will continue to progress as able.   Goal Outcome Evaluation:  Plan of Care Reviewed With: patient        Progress: no change  Outcome Evaluation: Patient ambulated 135 feet with RW, CGA, progressed to step through gait pattern, limited by fatigue and pain. Min assist required to rise into standing. Continues to be confused which impacts his safety awareness with mobility. Will continue to progress as able.

## 2022-04-09 NOTE — THERAPY TREATMENT NOTE
Patient Name: Lea Rivers  : 1945    MRN: [id number]                              Today's Date: 2022       Admit Date: 2022    Visit Dx:     ICD-10-CM ICD-9-CM   1. Primary osteoarthritis of both knees  M17.0 715.16     Patient Active Problem List   Diagnosis   • Coronary artery disease   • Dyslipidemia   • Hypothyroidism   • GERD (gastroesophageal reflux disease)   • Seizure disorder (HCC)   • BPH (benign prostatic hypertrophy)   • Hypertension   • Primary osteoarthritis of both knees   • Syncope and collapse   • Precordial pain   • Diabetes (HCC)   • Status post total right knee replacement     Past Medical History:   Diagnosis Date   • Colon cancer (HCC)     Status post partial colectomy in . No chemotherapy or radiation therapy.   • Coronary artery disease     Nonobstructive coronary artery disease.   • Diabetes (HCC)    • Dyslipidemia    • GERD (gastroesophageal reflux disease)     Hx of.   • Hearing aid worn    • Hyperlipidemia    • Hypertension    • Hypothyroidism    • Left shoulder pain    • Primary osteoarthritis of both knees    • Seizure disorder (HCC)    • Wears glasses      Past Surgical History:   Procedure Laterality Date   • APPENDECTOMY     • CARPAL TUNNEL RELEASE Bilateral    • CHOLECYSTECTOMY     • COLECTOMY PARTIAL / TOTAL      Partial colectomy   • COLONOSCOPY     • CYSTOSCOPY TRANSURETHRAL RESECTION OF PROSTATE N/A 2018    Procedure: CYSTOSCOPY TRANSURETHRAL RESECTION OF PROSTATE GREENLIGHT;  Surgeon: Naseem Clayton MD;  Location:  [facility];  Service: Urology   • OTHER SURGICAL HISTORY      Contraction surgery on bilateral hands and wrists.   • OTHER SURGICAL HISTORY      left and right hands   • SINUS SURGERY     • SKIN BIOPSY     • TEETH EXTRACTION     • TONSILLECTOMY     • TOTAL KNEE ARTHROPLASTY Right 2022    Procedure: TOTAL KNEE ARTHROPLASTY WITH ORTHO ROBOT RIGHT;  Surgeon: Louis Malcolm MD;  Location:  [facility];  Service: Robotics  - Ortho;  Laterality: Right;   • TURP / TRANSURETHRAL INCISION / DRAINAGE PROSTATE     • VASECTOMY        General Information     Row Name 04/09/22 1447 04/09/22 0839       Physical Therapy Time and Intention    Document Type therapy note (daily note)  -LR therapy note (daily note)  -LR    Mode of Treatment physical therapy;individual therapy  -LR physical therapy;individual therapy  -LR    Row Name 04/09/22 1447 04/09/22 0839       General Information    Patient Profile Reviewed yes  -LR yes  -LR    Existing Precautions/Restrictions fall;other (see comments)  R adductor canal nerve catheter, Koi, confusion, impulsivity  -LR fall;other (see comments)  R adductor canal nerve catheter, Koi, confusion, impulsivity  -LR    Barriers to Rehab previous functional deficit;cognitive status;hearing deficit  -LR previous functional deficit;cognitive status;hearing deficit  -LR    Row Name 04/09/22 1447 04/09/22 0839       Cognition    Orientation Status (Cognition) oriented x 3;other (see comments)  confused with most conversation  -LR oriented to;person  -LR    Row Name 04/09/22 1447 04/09/22 0839       Safety Issues, Functional Mobility    Safety Issues Affecting Function (Mobility) ability to follow commands;at risk behavior observed;awareness of need for assistance;insight into deficits/self-awareness;positioning of assistive device;safety precautions follow-through/compliance;sequencing abilities;safety precaution awareness  -LR ability to follow commands;at risk behavior observed;awareness of need for assistance;positioning of assistive device;judgment;insight into deficits/self-awareness;safety precautions follow-through/compliance;sequencing abilities;safety precaution awareness;impulsivity  -LR    Impairments Affecting Function (Mobility) strength;balance;pain;endurance/activity tolerance;range of motion (ROM);postural/trunk control;cognition;motor planning  -LR balance;strength;pain;endurance/activity tolerance;range  of motion (ROM);motor planning;cognition  -LR          User Key  (r) = Recorded By, (t) = Taken By, (c) = Cosigned By    Initials Name Provider Type    LR Judith James, PT Physical Therapist               Mobility     Row Name 04/09/22 1447 04/09/22 0839       Bed Mobility    Bed Mobility supine-sit  -LR supine-sit  -LR    Supine-Sit [place] (Bed Mobility) verbal cues;nonverbal cues (demo/gesture);moderate assist (50% patient effort);2 person assist  -LR verbal cues;moderate assist (50% patient effort);nonverbal cues (demo/gesture)  -LR    Sit-Supine [place] (Bed Mobility) not tested  -LR not tested  -LR    Assistive Device (Bed Mobility) head of bed elevated;bed rails;draw sheet  -LR head of bed elevated;bed rails;draw sheet  -LR    Comment, (Bed Mobility) Verbal cues for correct sequencing to sit on EOB. Continues to have difficulty with raising trunk into sitting and to scoot hips out to get feet on floor. Denied dizziness upon sitting up.  -LR Verbal cues to move LEs towards EOB and to push up from bed to raise trunk into sitting. Required repeated cues and increased time to perform. Mod assist required to scoot hips out to get feet on floor and to raise trunk into sitting. Denied dizziness upon sitting up.  -LR    Row Name 04/09/22 1447 04/09/22 0839       Transfers    Comment, (Transfers) Verbal cues for correct hand placement with t/f and continues to require repeated cues and assist to step R LE out before t/f. Assisted to and from bathroom. Required increased assist to stand this afternoon.  -LR Required repeated verbal cues for correct hand placement with t/f and to keep R foot out from under him prior to t/f. Assisted to and from bathroom, repeated cues to use grab bar for support.  -LR    Row Name 04/09/22 1447 04/09/22 0839       Bed-Chair Transfer    Bed-Chair [place] (Transfers) not tested  -LR not tested  -LR    Row Name 04/09/22 1447 04/09/22 0839       Sit-Stand Transfer     Sit-Stand [place] (Transfers) verbal cues;minimum assist (75% patient effort);2 person assist  -LR verbal cues;minimum assist (75% patient effort)  -LR    Assistive Device (Sit-Stand Transfers) walker, front-wheeled  -LR walker, front-wheeled  -LR    Row Name 04/09/22 1447 04/09/22 0839       Gait/Stairs (Locomotion)    [place] Level (Gait) verbal cues;contact guard;2 person assist  -LR verbal cues;contact guard;1 person to manage equipment  -LR    Assistive Device (Gait) walker, front-wheeled  -LR walker, front-wheeled  -LR    Distance in Feet (Gait) 300  -  -LR    Deviations/Abnormal Patterns (Gait) bilateral deviations;bisi decreased;gait speed decreased;stride length decreased;right sided deviations;antalgic  -LR bilateral deviations;bisi decreased;gait speed decreased;stride length decreased;right sided deviations;antalgic  -LR    Bilateral Gait Deviations forward flexed posture  -LR forward flexed posture  -LR    Right Sided Gait Deviations weight shift ability decreased  -LR weight shift ability decreased  -LR    [place] Level (Stairs) not tested  -LR not tested  -LR    Comment, (Gait/Stairs) Patient ambulated with step through gait pattern at slow pace with forward flexed posture. Verbal cues for upright posture, increased LE weight bearing, decreased UE weight bearing, equal stance phase. Improved with cues for correction. Gait limited by fatigue.  -LR Patient ambulated with step to gait pattern initially. Progressed to step through gait pattern with repeated cues for increased step length, increased R LE weight bearing/stance phase, decreased UE weight bearing, and upright posture. Cues for correct sequencing of steps when turning. Follow with chair for safety. Gait limited by fatigue and pain.  -LR    Row Name 04/09/22 1447 04/09/22 0839       Mobility    Extremity Weight-bearing Status right lower extremity  -LR right lower extremity  -LR    Right Lower Extremity  (Weight-bearing Status) weight-bearing as tolerated (WBAT)  -LR weight-bearing as tolerated (WBAT)  -LR          User Key  (r) = Recorded By, (t) = Taken By, (c) = Cosigned By    Initials Name Provider Type    Judith Valiente, PT Physical Therapist               Obj/Interventions     Row Name 04/09/22 1447 04/09/22 0839       Motor Skills    Therapeutic Exercise ankle;knee;hip;other (see comments)  cues for technique; min assist SLR, heel slides, SAQ, LAQ, continues to have difficulty with comprehending quad sets  -LR ankle;knee;other (see comments)  cues for technique; min assist for all; patient had difficulty following cues and comprehending how to perform quad sets  -LR    Row Name 04/09/22 1447 04/09/22 0839       Knee (Therapeutic Exercise)    Knee (Therapeutic Exercise) AROM (active range of motion);strengthening exercise;isometric exercises  -LR AROM (active range of motion);strengthening exercise;isometric exercises  -LR    Knee AROM (Therapeutic Exercise) right;heel slides;sitting;15 repititions  and reclined  -LR right;heel slides;sitting;supine;15 repititions  -LR    Knee Isometrics (Therapeutic Exercise) right;quad sets;sitting;10 repetitions;5 repetitions  -LR right;quad sets;supine;10 repetitions;5 repetitions  -LR    Knee Strengthening (Therapeutic Exercise) right;SLR (straight leg raise);SAQ (short arc quad);LAQ (long arc quad);sitting;15 repititions  -LR right;SLR (straight leg raise);SAQ (short arc quad);LAQ (long arc quad);sitting;15 repititions  -LR    Row Name 04/09/22 1447 04/09/22 0839       Ankle (Therapeutic Exercise)    Ankle (Therapeutic Exercise) AROM (active range of motion)  -LR AROM (active range of motion)  -LR    Ankle AROM (Therapeutic Exercise) bilateral;dorsiflexion;plantarflexion;sitting;15 repititions  -LR bilateral;dorsiflexion;plantarflexion;supine;15 repititions  -LR    Row Name 04/09/22 1447 04/09/22 0839       Balance    Balance Assessment sitting static  balance;sitting dynamic balance;standing static balance;standing dynamic balance  -LR sitting static balance;sitting dynamic balance;standing static balance;standing dynamic balance  -LR    Static Sitting Balance contact guard  -LR contact guard  -LR    Dynamic Sitting Balance contact guard  -LR contact guard  -LR    Position, Sitting Balance unsupported;sitting edge of bed  -LR supported;sitting edge of bed  -LR    Static Standing Balance contact guard;2-person assist  -LR contact guard  -LR    Dynamic Standing Balance contact guard;2-person assist  -LR contact guard  -LR    Position/Device Used, Standing Balance walker, front-wheeled;supported  -LR supported;walker, rolling  -LR    Row Name 04/09/22 1447          General Lower Extremity Assessment (Range of Motion)    Comment: Lower Extremity ROM 20-93 degrees; lacking 20 degrees extension AROM, 93 degrees flexion AAROM in sitting.  -LR           User Key  (r) = Recorded By, (t) = Taken By, (c) = Cosigned By    Initials Name Provider Type    Judith Valiente, PT Physical Therapist               Goals/Plan     Row Name 04/09/22 1447 04/09/22 0839       Bed Mobility Goal 1 (PT)    Activity/Assistive Device (Bed Mobility Goal 1, PT) sit to supine/supine to sit  -LR sit to supine/supine to sit  -LR    [place] Level/Cues Needed (Bed Mobility Goal 1, PT) contact guard required  -LR contact guard required  -LR    Time Frame (Bed Mobility Goal 1, PT) long term goal (LTG);3 days  -LR long term goal (LTG);3 days  -LR    Progress/Outcomes (Bed Mobility Goal 1, PT) goal ongoing;continuing progress toward goal  -LR goal ongoing;progress slower than expected  -LR    Row Name 04/09/22 1447 04/09/22 0839       Transfer Goal 1 (PT)    Activity/Assistive Device (Transfer Goal 1, PT) sit-to-stand/stand-to-sit;walker, rolling  -LR sit-to-stand/stand-to-sit;walker, rolling  -LR    [place] Level/Cues Needed (Transfer Goal 1, PT) modified independence  -LR modified  independence  -LR    Time Frame (Transfer Goal 1, PT) long term goal (LTG);3 days  -LR long term goal (LTG);3 days  -LR    Progress/Outcome (Transfer Goal 1, PT) continuing progress toward goal;goal ongoing  -LR goal ongoing;progress slower than expected  -LR    Row Name 04/09/22 1447 04/09/22 0839       Gait Training Goal 1 (PT)    Activity/Assistive Device (Gait Training Goal 1, PT) gait (walking locomotion);walker, rolling  -LR gait (walking locomotion);walker, rolling  -LR    [place] Level (Gait Training Goal 1, PT) contact guard required  -LR contact guard required  -LR    Distance (Gait Training Goal 1, PT) 500 feet  - feet  -LR    Time Frame (Gait Training Goal 1, PT) long term goal (LTG);3 days  -LR long term goal (LTG);3 days  -LR    Progress/Outcome (Gait Training Goal 1, PT) goal ongoing;continuing progress toward goal  -LR goal ongoing;continuing progress toward goal  -LR    Row Name 04/09/22 1447 04/09/22 0839       ROM Goal 1 (PT)    ROM Goal 1 (PT) R knee AROM 0-100 degrees  -LR R knee AROM 0-90 degrees  -LR    Time Frame (ROM Goal 1, PT) long-term goal (LTG);3 days  -LR long-term goal (LTG);3 days  -LR    Progress/Outcome (ROM Goal 1, PT) goal ongoing;good progress toward goal;goal partially met;goal revised this date  -LR goal ongoing;continuing progress toward goal  -LR          User Key  (r) = Recorded By, (t) = Taken By, (c) = Cosigned By    Initials Name Provider Type    LR Judith James, PT Physical Therapist               Clinical Impression     Row Name 04/09/22 1447 04/09/22 0839       Pain    Pain Location - Side/Orientation -- Right  -LR    Pain Location - -- knee  -LR    Pain Intervention(s) Ambulation/increased activity;Repositioned  -LR Ambulation/increased activity;Repositioned;Medication (See MAR)  -LR    Additional Documentation Pain Scale: FACES Pre/Post-Treatment (Group)  -LR --    Row Name 04/09/22 1447 04/09/22 0839       Pain Scale: FACES Pre/Post-Treatment     Pain: FACES Scale, Pretreatment 2-->hurts little bit  -LR 6-->hurts even more  -LR    Posttreatment Pain Rating 6-->hurts even more  -LR 6-->hurts even more  -LR    Pain Location - Side/Orientation Right  -LR Right  -LR    Pain Location - -- knee  -LR    Row Name 04/09/22 1447 04/09/22 0839       Plan of Care Review    Plan of Care Reviewed With patient  -LR patient  -LR    Progress improving  -LR no change  -LR    Outcome Evaluation Patient increased gait distance to 300 feet with RW, step through gait pattern, CGAx2, limited by fatigue and pain. R knee flexion AROM progressing well, 93 degrees in sitting. R knee extension AROM quite limited, lacking 20 degrees. Increased assist required to stand this PM. Continues to demonstrate confusion in conversation, reports he hurt his knee instead of having an elective TKA. Continue to recommend rehab at d/c. Will continue to progress as able.  -LR Patient ambulated 135 feet with RW, CGA, progressed to step through gait pattern, limited by fatigue and pain. Min assist required to rise into standing. Continues to be confused which impacts his safety awareness with mobility. Will continue to progress as able.  -LR    Row Name 04/09/22 1447 04/09/22 0839       Therapy Assessment/Plan (PT)    Rehab Potential (PT) good, to achieve stated therapy goals  -LR good, to achieve stated therapy goals  -LR    Criteria for Skilled Interventions Met (PT) yes;meets criteria;skilled treatment is necessary  -LR yes;meets criteria;skilled treatment is necessary  -LR    Row Name 04/09/22 1447 04/09/22 0839       Positioning and Restraints    Pre-Treatment Position in bed  -LR in bed  -LR    Post Treatment Position chair  -LR chair  -LR    In Chair notified nsg;reclined;call light within reach;encouraged to call for assist;sitting;exit alarm on;with family/caregiver;legs elevated;compression device  -LR notified nsg;reclined;sitting;call light within reach;encouraged to call for assist;exit  alarm on;with family/caregiver;legs elevated;compression device;waffle cushion  -LR          User Key  (r) = Recorded By, (t) = Taken By, (c) = Cosigned By    Initials Name Provider Type    Judith Valiente, PT Physical Therapist               Outcome Measures     Row Name 04/09/22 1447 04/09/22 0846       How much help from another person do you currently need...    Turning from your back to your side while in flat bed without using bedrails? 2  -LR 2  -GJ    Moving from lying on back to sitting on the side of a flat bed without bedrails? 2  -LR 2  -GJ    Moving to and from a bed to a chair (including a wheelchair)? 3  -LR 3  -GJ    Standing up from a chair using your arms (e.g., wheelchair, bedside chair)? 2  -LR 3  -GJ    Climbing 3-5 steps with a railing? 2  -LR 2  -GJ    To walk in hospital room? 3  -LR 3  -GJ    AM-PAC 6 Clicks Score (PT) 14  -LR 15  -GJ    Row Name 04/09/22 0839          How much help from another person do you currently need...    Turning from your back to your side while in flat bed without using bedrails? 2  -LR     Moving from lying on back to sitting on the side of a flat bed without bedrails? 2  -LR     Moving to and from a bed to a chair (including a wheelchair)? 3  -LR     Standing up from a chair using your arms (e.g., wheelchair, bedside chair)? 3  -LR     Climbing 3-5 steps with a railing? 2  -LR     To walk in hospital room? 3  -LR     AM-PAC 6 Clicks Score (PT) 15  -LR     Row Name 04/09/22 1447 04/09/22 0839       Functional Assessment    Outcome Measure Options AM-PAC 6 Clicks Basic Mobility (PT)  -LR AM-PAC 6 Clicks Basic Mobility (PT)  -LR          User Key  (r) = Recorded By, (t) = Taken By, (c) = Cosigned By    Initials Name Provider Type    Judith Valiente, PT Physical Therapist    Ana M Mukherjee RN Registered Nurse                             Physical Therapy Education                 Title: PT OT SLP Therapies (In Progress)     Topic: Physical  Therapy (Done)     Point: Mobility training (Done)     Learning Progress Summary           Patient Acceptance, E,D, VU,NR by LR at 4/9/2022 1447    Comment: Educated on precautions, weight bearing status, correct supine to sit t/f technique, correct sit<->stand t/f technique, correct gait mechanics, HEP, and progression of POC.    Acceptance, E,D, VU,NR by LR at 4/9/2022 0839    Comment: Educated on precautions, weight bearing status, correct supine to sit t/f technique, correct sit<->stand t/f technique, correct gait mechanics, LE HEP, and progression of POC.    Eager, E, VU,NR by SC at 4/8/2022 1456    Comment: reviewed safety wtih mobility    Eager, E, VU by SC at 4/8/2022 0950    Comment: reviewed HEP and safety with mobility    Acceptance, E,D, VU by OLIVER at 4/7/2022 1300    Comment: Educated on safe sequencing with bed mobility, ambulatory transfers, and gait. Reviewed HEP and knee precautions.   Family Acceptance, E,D, VU,NR by LR at 4/9/2022 1447    Comment: Educated on precautions, weight bearing status, correct supine to sit t/f technique, correct sit<->stand t/f technique, correct gait mechanics, HEP, and progression of POC.                   Point: Home exercise program (Done)     Learning Progress Summary           Patient Acceptance, E,D, VU,NR by LR at 4/9/2022 1447    Comment: Educated on precautions, weight bearing status, correct supine to sit t/f technique, correct sit<->stand t/f technique, correct gait mechanics, HEP, and progression of POC.    Acceptance, E,D, VU,NR by LR at 4/9/2022 0839    Comment: Educated on precautions, weight bearing status, correct supine to sit t/f technique, correct sit<->stand t/f technique, correct gait mechanics, LE HEP, and progression of POC.    Eager, E, VU,NR by SC at 4/8/2022 1456    Comment: reviewed safety wtih mobility    Eager, E, VU by SC at 4/8/2022 0950    Comment: reviewed HEP and safety with mobility    Acceptance, E,D, VU by OLIVER at 4/7/2022 1300     Comment: Educated on safe sequencing with bed mobility, ambulatory transfers, and gait. Reviewed HEP and knee precautions.   Family Acceptance, E,D, VU,NR by LR at 4/9/2022 1447    Comment: Educated on precautions, weight bearing status, correct supine to sit t/f technique, correct sit<->stand t/f technique, correct gait mechanics, HEP, and progression of POC.                   Point: Body mechanics (Done)     Learning Progress Summary           Patient Acceptance, E,D, VU,NR by LR at 4/9/2022 1447    Comment: Educated on precautions, weight bearing status, correct supine to sit t/f technique, correct sit<->stand t/f technique, correct gait mechanics, HEP, and progression of POC.    Acceptance, E,D, VU,NR by LR at 4/9/2022 0839    Comment: Educated on precautions, weight bearing status, correct supine to sit t/f technique, correct sit<->stand t/f technique, correct gait mechanics, LE HEP, and progression of POC.    JEREMIAS Payne, VU,NR by SC at 4/8/2022 1456    Comment: reviewed safety wtih mobility    JEREMIAS Payne, VU by SC at 4/8/2022 0950    Comment: reviewed HEP and safety with mobility    Acceptance, E,D, VU by OLIVER at 4/7/2022 1300    Comment: Educated on safe sequencing with bed mobility, ambulatory transfers, and gait. Reviewed HEP and knee precautions.   Family Acceptance, E,D, VU,NR by LR at 4/9/2022 1447    Comment: Educated on precautions, weight bearing status, correct supine to sit t/f technique, correct sit<->stand t/f technique, correct gait mechanics, HEP, and progression of POC.                   Point: Precautions (Done)     Learning Progress Summary           Patient Acceptance, E,D, VU,NR by LR at 4/9/2022 1447    Comment: Educated on precautions, weight bearing status, correct supine to sit t/f technique, correct sit<->stand t/f technique, correct gait mechanics, HEP, and progression of POC.    Acceptance, E,D, VU,NR by LR at 4/9/2022 0839    Comment: Educated on precautions, weight bearing status,  correct supine to sit t/f technique, correct sit<->stand t/f technique, correct gait mechanics, LE HEP, and progression of POC.    JEREMIAS Payne VU,NR by SC at 4/8/2022 1456    Comment: reviewed safety wtih mobility    JEREMIAS Payne VU by SC at 4/8/2022 0950    Comment: reviewed HEP and safety with mobility    Acceptance, CRISTIANA MCDOWELL, VU by OLIVER at 4/7/2022 1300    Comment: Educated on safe sequencing with bed mobility, ambulatory transfers, and gait. Reviewed HEP and knee precautions.   Family Acceptance, CRISTIANA MCDOWELL VU,NR by LR at 4/9/2022 1447    Comment: Educated on precautions, weight bearing status, correct supine to sit t/f technique, correct sit<->stand t/f technique, correct gait mechanics, HEP, and progression of POC.                               User Key     Initials Effective Dates Name Provider Type Discipline    SC 06/16/21 -  Hernesto Carias, PT Physical Therapist PT    LR 06/16/21 -  Judith James, PT Physical Therapist PT    OLIVER 06/16/21 -  Rolando Mobley, PT Physical Therapist PT              PT Recommendation and Plan     Plan of Care Reviewed With: patient  Progress: improving  Outcome Evaluation: Patient increased gait distance to 300 feet with RW, step through gait pattern, CGAx2, limited by fatigue and pain. R knee flexion AROM progressing well, 93 degrees in sitting. R knee extension AROM quite limited, lacking 20 degrees. Increased assist required to stand this PM. Continues to demonstrate confusion in conversation, reports he hurt his knee instead of having an elective TKA. Continue to recommend rehab at d/c. Will continue to progress as able.     Time Calculation:    PT Charges     Row Name 04/09/22 1447 04/09/22 0839          Time Calculation    Start Time 1447  -LR 0839  -LR     PT Received On 04/09/22  -LR 04/09/22  -LR     PT Goal Re-Cert Due Date 04/17/22  -LR 04/17/22  -LR            Timed Charges    67729 - PT Therapeutic Exercise Minutes 10  -LR 12  -LR     74317 - Gait Training Minutes  14  -LR 10   -LR     58132 - PT Therapeutic Activity Minutes 6  -LR 9  -LR            Total Minutes    Timed Charges Total Minutes 30  -LR 31  -LR      Total Minutes 30  -LR 31  -LR           User Key  (r) = Recorded By, (t) = Taken By, (c) = Cosigned By    Initials Name Provider Type    Judith aVliente, PT Physical Therapist              Therapy Charges for Today     Code Description Service Date Service Provider Modifiers Qty    86561650674 HC PT THER PROC EA 15 MIN 4/9/2022 Judith James, PT GP 1    54802887959 HC GAIT TRAINING EA 15 MIN 4/9/2022 Judith James, PT GP 1    16007934241 HC PT THER PROC EA 15 MIN 4/9/2022 Judith James, PT GP 1    25223089055 HC GAIT TRAINING EA 15 MIN 4/9/2022 Judith James, PT GP 1    [id number] HC PT THER SUPP EA 15 MIN 4/9/2022 Judith James, PT GP 2          PT G-Codes  Outcome Measure Options: AM-PAC 6 Clicks Basic Mobility (PT)  AM-PAC 6 Clicks Score (PT): 14  AM-PAC 6 Clicks Score (OT): 16    Judith James PT  4/9/2022

## 2022-04-09 NOTE — PLAN OF CARE
Alert & oriented only to self, patient is impulsive/restless while awake. SBP remains elevated. Liquid adhesives in place to knee incision, covered with transparent film, CDI, free of drainage/redness, ambulates to the bathroom with one to two person assist. Bladder volume periodically assessed. Will cont to mx. Call light in reach.

## 2022-04-09 NOTE — PLAN OF CARE
Problem: Adult Inpatient Plan of Care  Goal: Plan of Care Review  Outcome: Ongoing, Progressing  Flowsheets  Taken 4/9/2022 1812 by Ana M Clayton RN  Progress: improving  Taken 4/9/2022 1447 by Judith James PT  Plan of Care Reviewed With: patient  Goal: Patient-Specific Goal (Individualized)  Outcome: Ongoing, Progressing  Goal: Absence of Hospital-Acquired Illness or Injury  Outcome: Ongoing, Progressing  Intervention: Identify and Manage Fall Risk  Recent Flowsheet Documentation  Taken 4/9/2022 1800 by Ana M Clayton RN  Safety Promotion/Fall Prevention:   activity supervised   clutter free environment maintained   assistive device/personal items within reach   room organization consistent   safety round/check completed   toileting scheduled  Taken 4/9/2022 1600 by Ana M Clayton RN  Safety Promotion/Fall Prevention:   activity supervised   assistive device/personal items within reach   clutter free environment maintained   room organization consistent   safety round/check completed   toileting scheduled  Taken 4/9/2022 1400 by Ana M Clayton RN  Safety Promotion/Fall Prevention:   activity supervised   assistive device/personal items within reach   clutter free environment maintained   room organization consistent   safety round/check completed   toileting scheduled  Taken 4/9/2022 1200 by Ana M Clayton RN  Safety Promotion/Fall Prevention:   activity supervised   assistive device/personal items within reach   clutter free environment maintained   room organization consistent   safety round/check completed   toileting scheduled  Taken 4/9/2022 1000 by Ana M Clayton RN  Safety Promotion/Fall Prevention:   activity supervised   assistive device/personal items within reach   clutter free environment maintained   room organization consistent   safety round/check completed   toileting scheduled  Taken 4/9/2022 0846 by Ana M Clayton RN  Safety Promotion/Fall Prevention:   activity  supervised   assistive device/personal items within reach   clutter free environment maintained   room organization consistent   safety round/check completed   toileting scheduled  Intervention: Prevent Skin Injury  Recent Flowsheet Documentation  Taken 4/9/2022 1800 by Ana M Clayton RN  Body Position: legs elevated  Taken 4/9/2022 1600 by Ana M Clayton RN  Body Position: sitting up in bed  Taken 4/9/2022 1400 by Ana M Clayton RN  Body Position: sitting up in bed  Taken 4/9/2022 1200 by Ana M Clayton RN  Body Position: legs elevated  Taken 4/9/2022 1000 by Ana M Clayton RN  Body Position: legs elevated  Taken 4/9/2022 0846 by Ana M Clayton RN  Body Position: position changed independently  Intervention: Prevent and Manage VTE (Venous Thromboembolism) Risk  Recent Flowsheet Documentation  Taken 4/9/2022 1800 by Ana M Clayton RN  Activity Management: up in chair  VTE Prevention/Management:   bilateral   foot pump device on  Taken 4/9/2022 1600 by Ana M Clayton RN  Activity Management:   activity adjusted per tolerance   activity encouraged  VTE Prevention/Management:   bilateral   foot pump device on  Taken 4/9/2022 1400 by Ana M Clayton RN  Activity Management:   activity adjusted per tolerance   activity encouraged  VTE Prevention/Management:   bilateral   foot pump device on  Taken 4/9/2022 1200 by Ana M Clayton RN  Activity Management: up in chair  VTE Prevention/Management:   bilateral   foot pump device on  Taken 4/9/2022 1000 by Ana M Clayton RN  Activity Management: up in chair  VTE Prevention/Management:   bilateral   foot pump device on  Taken 4/9/2022 0846 by Ana M Clayton RN  Activity Management:   activity adjusted per tolerance   activity encouraged  VTE Prevention/Management:   bilateral   foot pump device on  Intervention: Prevent Infection  Recent Flowsheet Documentation  Taken 4/9/2022 1800 by Ana M Clayton RN  Infection Prevention:   environmental  surveillance performed   rest/sleep promoted  Taken 4/9/2022 1600 by Ana M Clayton RN  Infection Prevention:   environmental surveillance performed   rest/sleep promoted  Taken 4/9/2022 1400 by Ana M Clayton RN  Infection Prevention:   environmental surveillance performed   rest/sleep promoted  Taken 4/9/2022 1200 by Ana M Clayton RN  Infection Prevention:   environmental surveillance performed   rest/sleep promoted  Taken 4/9/2022 1000 by Ana M Clayton RN  Infection Prevention:   environmental surveillance performed   rest/sleep promoted  Taken 4/9/2022 0846 by Ana M Clayton RN  Infection Prevention:   environmental surveillance performed   rest/sleep promoted  Goal: Optimal Comfort and Wellbeing  Outcome: Ongoing, Progressing  Intervention: Monitor Pain and Promote Comfort  Recent Flowsheet Documentation  Taken 4/9/2022 0846 by Ana M Clayton RN  Pain Management Interventions: see MAR  Intervention: Provide Person-Centered Care  Recent Flowsheet Documentation  Taken 4/9/2022 1800 by Ana M Clayton RN  Trust Relationship/Rapport: care explained  Taken 4/9/2022 1200 by Ana M Clayton RN  Trust Relationship/Rapport: care explained  Taken 4/9/2022 0846 by Ana M Clayton RN  Trust Relationship/Rapport: care explained  Goal: Readiness for Transition of Care  Outcome: Ongoing, Progressing     Problem: Fall Injury Risk  Goal: Absence of Fall and Fall-Related Injury  Outcome: Ongoing, Progressing  Intervention: Identify and Manage Contributors  Recent Flowsheet Documentation  Taken 4/9/2022 1800 by Ana M Clayton RN  Medication Review/Management: medications reviewed  Taken 4/9/2022 1600 by Ana M Clayton RN  Medication Review/Management: medications reviewed  Taken 4/9/2022 1400 by Ana M Clayton RN  Medication Review/Management: medications reviewed  Taken 4/9/2022 1200 by Ana M Clayton RN  Medication Review/Management: medications reviewed  Taken 4/9/2022 1000 by Forrest  Ana M UREÑA RN  Medication Review/Management: medications reviewed  Taken 4/9/2022 0846 by Ana M Clayton RN  Medication Review/Management: medications reviewed  Intervention: Promote Injury-Free Environment  Recent Flowsheet Documentation  Taken 4/9/2022 1800 by Ana M Clayton RN  Safety Promotion/Fall Prevention:   activity supervised   clutter free environment maintained   assistive device/personal items within reach   room organization consistent   safety round/check completed   toileting scheduled  Taken 4/9/2022 1600 by Ana M Clayton RN  Safety Promotion/Fall Prevention:   activity supervised   assistive device/personal items within reach   clutter free environment maintained   room organization consistent   safety round/check completed   toileting scheduled  Taken 4/9/2022 1400 by Ana M Clayton RN  Safety Promotion/Fall Prevention:   activity supervised   assistive device/personal items within reach   clutter free environment maintained   room organization consistent   safety round/check completed   toileting scheduled  Taken 4/9/2022 1200 by Ana M Clayton RN  Safety Promotion/Fall Prevention:   activity supervised   assistive device/personal items within reach   clutter free environment maintained   room organization consistent   safety round/check completed   toileting scheduled  Taken 4/9/2022 1000 by Ana M Clayton RN  Safety Promotion/Fall Prevention:   activity supervised   assistive device/personal items within reach   clutter free environment maintained   room organization consistent   safety round/check completed   toileting scheduled  Taken 4/9/2022 0846 by Ana M Clayton RN  Safety Promotion/Fall Prevention:   activity supervised   assistive device/personal items within reach   clutter free environment maintained   room organization consistent   safety round/check completed   toileting scheduled     Problem: Adjustment to Surgery (Knee Arthroplasty)  Goal: Optimal  Coping  Outcome: Ongoing, Progressing     Problem: Bleeding (Knee Arthroplasty)  Goal: Absence of Bleeding  Outcome: Ongoing, Progressing     Problem: Bowel Motility Impaired (Knee Arthroplasty)  Goal: Effective Bowel Elimination  Outcome: Ongoing, Progressing     Problem: Fluid and Electrolyte Imbalance (Knee Arthroplasty)  Goal: Fluid and Electrolyte Balance  Outcome: Ongoing, Progressing     Problem: Functional Ability Impaired (Knee Arthroplasty)  Goal: Optimal Functional Ability  Outcome: Ongoing, Progressing  Intervention: Promote Optimal Functional Status  Recent Flowsheet Documentation  Taken 4/9/2022 1800 by Ana M Clayton RN  Activity Management: up in chair  Taken 4/9/2022 1600 by Ana M Clayton RN  Activity Management:   activity adjusted per tolerance   activity encouraged  Taken 4/9/2022 1400 by Ana M Clayton RN  Activity Management:   activity adjusted per tolerance   activity encouraged  Taken 4/9/2022 1200 by Ana M Clayton RN  Activity Management: up in chair  Taken 4/9/2022 1000 by Ana M Clayton RN  Activity Management: up in chair  Taken 4/9/2022 0846 by Ana M Clayton RN  Activity Management:   activity adjusted per tolerance   activity encouraged     Problem: Infection (Knee Arthroplasty)  Goal: Absence of Infection Signs and Symptoms  Outcome: Ongoing, Progressing  Intervention: Prevent or Manage Infection  Recent Flowsheet Documentation  Taken 4/9/2022 1800 by Ana M Clayton RN  Infection Prevention:   environmental surveillance performed   rest/sleep promoted  Taken 4/9/2022 1600 by Ana M Clayton RN  Infection Prevention:   environmental surveillance performed   rest/sleep promoted  Taken 4/9/2022 1400 by Ana M Clayton RN  Infection Prevention:   environmental surveillance performed   rest/sleep promoted  Taken 4/9/2022 1200 by Ana M Clayton RN  Infection Prevention:   environmental surveillance performed   rest/sleep promoted  Taken 4/9/2022 1000 by  Ana M Clayton RN  Infection Prevention:   environmental surveillance performed   rest/sleep promoted  Taken 4/9/2022 0846 by Ana M Clayton RN  Infection Prevention:   environmental surveillance performed   rest/sleep promoted     Problem: Neurovascular Compromise (Knee Arthroplasty)  Goal: Intact Neurovascular Status  Outcome: Ongoing, Progressing     Problem: Ongoing Anesthesia Effects (Knee Arthroplasty)  Goal: Anesthesia/Sedation Recovery  Outcome: Ongoing, Progressing  Intervention: Optimize Anesthesia Recovery  Recent Flowsheet Documentation  Taken 4/9/2022 1800 by Ana M Clayton RN  Safety Promotion/Fall Prevention:   activity supervised   clutter free environment maintained   assistive device/personal items within reach   room organization consistent   safety round/check completed   toileting scheduled  Taken 4/9/2022 1600 by Ana M Clayton RN  Safety Promotion/Fall Prevention:   activity supervised   assistive device/personal items within reach   clutter free environment maintained   room organization consistent   safety round/check completed   toileting scheduled  Taken 4/9/2022 1400 by Ana M Clayton RN  Safety Promotion/Fall Prevention:   activity supervised   assistive device/personal items within reach   clutter free environment maintained   room organization consistent   safety round/check completed   toileting scheduled  Taken 4/9/2022 1200 by Ana M Clayton RN  Safety Promotion/Fall Prevention:   activity supervised   assistive device/personal items within reach   clutter free environment maintained   room organization consistent   safety round/check completed   toileting scheduled  Taken 4/9/2022 1000 by Ana M Clayton RN  Safety Promotion/Fall Prevention:   activity supervised   assistive device/personal items within reach   clutter free environment maintained   room organization consistent   safety round/check completed   toileting scheduled  Taken 4/9/2022 0846 by Forrest  Ana M UREÑA RN  Safety Promotion/Fall Prevention:   activity supervised   assistive device/personal items within reach   clutter free environment maintained   room organization consistent   safety round/check completed   toileting scheduled  Administration (IS):   instruction provided, initial   self-administered     Problem: Pain (Knee Arthroplasty)  Goal: Acceptable Pain Control  Outcome: Ongoing, Progressing  Intervention: Prevent or Manage Pain  Recent Flowsheet Documentation  Taken 4/9/2022 0846 by Ana M Clayton RN  Pain Management Interventions: see MAR     Problem: Postoperative Nausea and Vomiting (Knee Arthroplasty)  Goal: Nausea and Vomiting Relief  Outcome: Ongoing, Progressing     Problem: Postoperative Urinary Retention (Knee Arthroplasty)  Goal: Effective Urinary Elimination  Outcome: Ongoing, Progressing     Problem: Respiratory Compromise (Knee Arthroplasty)  Goal: Effective Oxygenation and Ventilation  Outcome: Ongoing, Progressing  Intervention: Optimize Oxygenation and Ventilation  Recent Flowsheet Documentation  Taken 4/9/2022 1600 by Ana M Clayton RN  Head of Bed (HOB) Positioning: HOB at 30-45 degrees  Taken 4/9/2022 1400 by Ana M Clayton RN  Head of Bed (HOB) Positioning: HOB at 30-45 degrees  Taken 4/9/2022 0846 by Ana M Clayton RN  Administration (IS):   instruction provided, initial   self-administered  Head of Bed (HOB) Positioning: HOB at 30-45 degrees     Problem: Pain Acute  Goal: Acceptable Pain Control and Functional Ability  Outcome: Ongoing, Progressing  Intervention: Prevent or Manage Pain  Recent Flowsheet Documentation  Taken 4/9/2022 1800 by Ana M Clayton RN  Medication Review/Management: medications reviewed  Taken 4/9/2022 1600 by Ana M Clayton RN  Medication Review/Management: medications reviewed  Taken 4/9/2022 1400 by Ana M Clayton RN  Medication Review/Management: medications reviewed  Taken 4/9/2022 1200 by Ana M Clayton RN  Medication  Review/Management: medications reviewed  Taken 4/9/2022 1000 by Ana M Clayton, RN  Medication Review/Management: medications reviewed  Taken 4/9/2022 0846 by Ana M Clayton, RN  Medication Review/Management: medications reviewed  Intervention: Develop Pain Management Plan  Recent Flowsheet Documentation  Taken 4/9/2022 0846 by Ana M Clayton, RN  Pain Management Interventions: see MAR   Goal Outcome Evaluation:           Progress: improving     Pt alert and oriented to self and place. VSS. Pain controlled with PRN pain medication. Arrow infusing @ 10ml/hr. Foot pumps in place. No complaints of numbness or tingling

## 2022-04-10 LAB
BACTERIA UR QL AUTO: NORMAL /HPF
BILIRUB UR QL STRIP: ABNORMAL
CLARITY UR: CLEAR
COLOR UR: ABNORMAL
DEPRECATED RDW RBC AUTO: 44.7 FL (ref 37–54)
ERYTHROCYTE [DISTWIDTH] IN BLOOD BY AUTOMATED COUNT: 13.8 % (ref 12.3–15.4)
GLUCOSE BLDC GLUCOMTR-MCNC: 131 MG/DL (ref 70–130)
GLUCOSE BLDC GLUCOMTR-MCNC: 84 MG/DL (ref 70–130)
GLUCOSE BLDC GLUCOMTR-MCNC: 97 MG/DL (ref 70–130)
GLUCOSE UR STRIP-MCNC: NEGATIVE MG/DL
HCT VFR BLD AUTO: 22.3 % (ref 37.5–51)
HGB BLD-MCNC: 7.6 G/DL (ref 13–17.7)
HGB UR QL STRIP.AUTO: NEGATIVE
HYALINE CASTS UR QL AUTO: NORMAL /LPF
KETONES UR QL STRIP: ABNORMAL
LEUKOCYTE ESTERASE UR QL STRIP.AUTO: ABNORMAL
MCH RBC QN AUTO: 30.4 PG (ref 26.6–33)
MCHC RBC AUTO-ENTMCNC: 34.1 G/DL (ref 31.5–35.7)
MCV RBC AUTO: 89.2 FL (ref 79–97)
NITRITE UR QL STRIP: NEGATIVE
PH UR STRIP.AUTO: 7 [PH] (ref 5–8)
PLATELET # BLD AUTO: 151 10*3/MM3 (ref 140–450)
PMV BLD AUTO: 9.5 FL (ref 6–12)
PROT UR QL STRIP: ABNORMAL
RBC # BLD AUTO: 2.5 10*6/MM3 (ref 4.14–5.8)
RBC # UR STRIP: NORMAL /HPF
REF LAB TEST METHOD: NORMAL
SP GR UR STRIP: 1.02 (ref 1–1.03)
SQUAMOUS #/AREA URNS HPF: NORMAL /HPF
UROBILINOGEN UR QL STRIP: ABNORMAL
WBC # UR STRIP: NORMAL /HPF
WBC NRBC COR # BLD: 8.73 10*3/MM3 (ref 3.4–10.8)

## 2022-04-10 PROCEDURE — 85027 COMPLETE CBC AUTOMATED: CPT | Performed by: ORTHOPAEDIC SURGERY

## 2022-04-10 PROCEDURE — 97116 GAIT TRAINING THERAPY: CPT

## 2022-04-10 PROCEDURE — 82962 GLUCOSE BLOOD TEST: CPT

## 2022-04-10 PROCEDURE — 81001 URINALYSIS AUTO W/SCOPE: CPT | Performed by: INTERNAL MEDICINE

## 2022-04-10 PROCEDURE — 97110 THERAPEUTIC EXERCISES: CPT

## 2022-04-10 RX ADMIN — VERAPAMIL HYDROCHLORIDE 240 MG: 240 TABLET, FILM COATED, EXTENDED RELEASE ORAL at 20:14

## 2022-04-10 RX ADMIN — ATORVASTATIN CALCIUM 10 MG: 10 TABLET, FILM COATED ORAL at 20:15

## 2022-04-10 RX ADMIN — ISOSORBIDE MONONITRATE 30 MG: 30 TABLET, EXTENDED RELEASE ORAL at 09:01

## 2022-04-10 RX ADMIN — LEVOTHYROXINE SODIUM 88 MCG: 88 TABLET ORAL at 05:50

## 2022-04-10 RX ADMIN — LEVETIRACETAM 500 MG: 500 TABLET, FILM COATED ORAL at 20:15

## 2022-04-10 RX ADMIN — LISINOPRIL 20 MG: 20 TABLET ORAL at 20:15

## 2022-04-10 RX ADMIN — DIVALPROEX SODIUM 1000 MG: 500 TABLET, EXTENDED RELEASE ORAL at 20:14

## 2022-04-10 RX ADMIN — DIVALPROEX SODIUM 500 MG: 500 TABLET, EXTENDED RELEASE ORAL at 09:00

## 2022-04-10 RX ADMIN — LEVETIRACETAM 500 MG: 500 TABLET, FILM COATED ORAL at 09:01

## 2022-04-10 RX ADMIN — VERAPAMIL HYDROCHLORIDE 240 MG: 240 TABLET, FILM COATED, EXTENDED RELEASE ORAL at 09:00

## 2022-04-10 RX ADMIN — ASPIRIN 325 MG: 325 TABLET, COATED ORAL at 09:00

## 2022-04-10 RX ADMIN — MELOXICAM 15 MG: 7.5 TABLET ORAL at 09:01

## 2022-04-10 RX ADMIN — TRAMADOL HYDROCHLORIDE 50 MG: 50 TABLET, COATED ORAL at 03:53

## 2022-04-10 RX ADMIN — PANTOPRAZOLE SODIUM 40 MG: 40 TABLET, DELAYED RELEASE ORAL at 09:00

## 2022-04-10 RX ADMIN — LISINOPRIL 20 MG: 20 TABLET ORAL at 09:01

## 2022-04-10 RX ADMIN — TERAZOSIN HYDROCHLORIDE 5 MG: 5 CAPSULE ORAL at 20:14

## 2022-04-10 NOTE — PLAN OF CARE
Patient alert, disoriented to time and situation, reoriented patient. Prineo dressing to right knee with compresso , c/d/I. Foot pumps to ble. Patient was unable to void this shift, bladder scanned and emptied 650 from bladder and sent down UA.

## 2022-04-10 NOTE — THERAPY TREATMENT NOTE
Patient Name: Lea Rivers  : 1945    MRN: [id number]                              Today's Date: 4/10/2022       Admit Date: 2022    Visit Dx:     ICD-10-CM ICD-9-CM   1. Primary osteoarthritis of both knees  M17.0 715.16     Patient Active Problem List   Diagnosis   • Coronary artery disease   • Dyslipidemia   • Hypothyroidism   • GERD (gastroesophageal reflux disease)   • Seizure disorder (HCC)   • BPH (benign prostatic hypertrophy)   • Hypertension   • Primary osteoarthritis of both knees   • Syncope and collapse   • Precordial pain   • Diabetes (HCC)   • Status post total right knee replacement     Past Medical History:   Diagnosis Date   • Colon cancer (HCC)     Status post partial colectomy in . No chemotherapy or radiation therapy.   • Coronary artery disease     Nonobstructive coronary artery disease.   • Diabetes (HCC)    • Dyslipidemia    • GERD (gastroesophageal reflux disease)     Hx of.   • Hearing aid worn    • Hyperlipidemia    • Hypertension    • Hypothyroidism    • Left shoulder pain    • Primary osteoarthritis of both knees    • Seizure disorder (HCC)    • Wears glasses      Past Surgical History:   Procedure Laterality Date   • APPENDECTOMY     • CARPAL TUNNEL RELEASE Bilateral    • CHOLECYSTECTOMY     • COLECTOMY PARTIAL / TOTAL      Partial colectomy   • COLONOSCOPY     • CYSTOSCOPY TRANSURETHRAL RESECTION OF PROSTATE N/A 2018    Procedure: CYSTOSCOPY TRANSURETHRAL RESECTION OF PROSTATE GREENLIGHT;  Surgeon: Naseem Clayton MD;  Location:  [facility] OR;  Service: Urology   • OTHER SURGICAL HISTORY      Contraction surgery on bilateral hands and wrists.   • OTHER SURGICAL HISTORY      left and right hands   • SINUS SURGERY     • SKIN BIOPSY     • TEETH EXTRACTION     • TONSILLECTOMY     • TOTAL KNEE ARTHROPLASTY Right 2022    Procedure: TOTAL KNEE ARTHROPLASTY WITH ORTHO ROBOT RIGHT;  Surgeon: Louis Malcolm MD;  Location:  [facility] OR;  Service:  Robotics - Ortho;  Laterality: Right;   • TURP / TRANSURETHRAL INCISION / DRAINAGE PROSTATE     • VASECTOMY        General Information     Row Name 04/10/22 1024          Physical Therapy Time and Intention    Document Type therapy note (daily note)  -LR     Mode of Treatment physical therapy;individual therapy  -LR     Row Name 04/10/22 1024          General Information    Patient Profile Reviewed yes  -LR     Existing Precautions/Restrictions fall;other (see comments)  R adductor canal nerve catheter, [place], confusion, impulsivity  -LR     Barriers to Rehab medically complex;previous functional deficit;hearing deficit  -LR     Row Name 04/10/22 1024          Cognition    Orientation Status (Cognition) oriented x 3;other (see comments)  continues to be confused in conversation  -LR     Row Name 04/10/22 1024          Safety Issues, Functional Mobility    Safety Issues Affecting Function (Mobility) ability to follow commands;at risk behavior observed;awareness of need for assistance;insight into deficits/self-awareness;judgment;positioning of assistive device;sequencing abilities;safety precautions follow-through/compliance;safety precaution awareness;problem-solving;impulsivity  -LR     Impairments Affecting Function (Mobility) strength;balance;cognition;endurance/activity tolerance;postural/trunk control;pain;range of motion (ROM);motor planning  -LR           User Key  (r) = Recorded By, (t) = Taken By, (c) = Cosigned By    Initials Name Provider Type    LR Judith James, PT Physical Therapist               Mobility     Row Name 04/10/22 1024          Bed Mobility    Bed Mobility supine-sit  -LR     Supine-Sit [place] (Bed Mobility) verbal cues;nonverbal cues (demo/gesture);moderate assist (50% patient effort);2 person assist  -LR     Sit-Supine [place] (Bed Mobility) not tested  -LR     Assistive Device (Bed Mobility) head of bed elevated;bed rails;draw sheet  -LR     Comment, (Bed Mobility)  Continues to have significant difficulty comprehending how to sequence to get to EOB despite repeated cues for sequencing to t/f to EOB. Mod assist required to at trunk and with hips.  -LR     Row Name 04/10/22 1024          Transfers    Comment, (Transfers) Verbal cues for correct hand placement with t/f and to step R LE out before t/f, improved command following with this today.  -LR     Row Name 04/10/22 1024          Bed-Chair Transfer    Bed-[place]air [place] (Transfers) not tested  -LR     Row Name 04/10/22 1024          Sit-Stand Transfer    Sit-Stand [place] (Transfers) verbal cues;minimum assist (75% patient effort);2 person assist  -LR     Assistive Device (Sit-Stand Transfers) walker, front-wheeled  -LR     Row Name 04/10/22 1024          Gait/Stairs (Locomotion)    Institute Level (Gait) verbal cues;contact guard;2 person assist  -LR     Assistive Device (Gait) walker, front-wheeled  -LR     Distance in Feet (Gait) 225  -LR     Deviations/Abnormal Patterns (Gait) bilateral deviations;bisi decreased;gait speed decreased;stride length decreased;right sided deviations;antalgic  -LR     Bilateral Gait Deviations forward flexed posture  -LR     Right Sided Gait Deviations weight shift ability decreased  -LR     [place] Level (Stairs) not tested  -LR     Comment, (Gait/Stairs) Patient initiated gait with step to gait pattern at slow pace. Cues for increased R LE weight bearing/stance phase, decreased UE weight bearing, and increased and equal step length. Gait limited by fatigue and pain.  -LR     Row Name 04/10/22 1024          Mobility    Extremity Weight-bearing Status right lower extremity  -LR     Right Lower Extremity (Weight-bearing Status) weight-bearing as tolerated (WBAT)  -LR           User Key  (r) = Recorded By, (t) = Taken By, (c) = Cosigned By    Initials Name Provider Type    LR Judith James, PT Physical Therapist               Obj/Interventions     Row Name 04/10/22  1024          Motor Skills    Therapeutic Exercise ankle;knee;other (see comments)  cues for technique; min assist R SLR, R LAQ, mod assist R SAQ, R heel slides, resisting with heel slides today; improved comprehension of quad sets today  -LR     Row Name 04/10/22 1024          Knee (Therapeutic Exercise)    Knee (Therapeutic Exercise) AROM (active range of motion);strengthening exercise;isometric exercises  -LR     Knee AROM (Therapeutic Exercise) right;heel slides;sitting;supine;15 repititions  -LR     Knee Isometrics (Therapeutic Exercise) right;quad sets;supine;10 repetitions;5 repetitions  -LR     Knee Strengthening (Therapeutic Exercise) right;SLR (straight leg raise);SAQ (short arc quad);LAQ (long arc quad);sitting;15 repititions  -LR     Row Name 04/10/22 1024          Ankle (Therapeutic Exercise)    Ankle (Therapeutic Exercise) AROM (active range of motion)  -LR     Ankle AROM (Therapeutic Exercise) bilateral;dorsiflexion;plantarflexion;supine;15 repititions  -LR     Row Name 04/10/22 1024          Balance    Balance Assessment sitting static balance;standing static balance;standing dynamic balance;sitting dynamic balance  -LR     Static Sitting Balance contact guard  -LR     Dynamic Sitting Balance contact guard  -LR     Position, Sitting Balance unsupported;sitting edge of bed  -LR     Static Standing Balance contact guard;2-person assist  -LR     Dynamic Standing Balance contact guard;2-person assist  -LR     Position/Device Used, Standing Balance supported;walker, rolling  -LR           User Key  (r) = Recorded By, (t) = Taken By, (c) = Cosigned By    Initials Name Provider Type    LR Judith James, PT Physical Therapist               Goals/Plan     Row Name 04/10/22 1024          Bed Mobility Goal 1 (PT)    Activity/Assistive Device (Bed Mobility Goal 1, PT) sit to supine/supine to sit  -LR     [place] Level/Cues Needed (Bed Mobility Goal 1, PT) contact guard required  -LR     Time Frame  (Bed Mobility Goal 1, PT) long term goal (LTG);3 days  -LR     Progress/Outcomes (Bed Mobility Goal 1, PT) goal ongoing;progress slower than expected  -LR     Row Name 04/10/22 1024          Transfer Goal 1 (PT)    Activity/Assistive Device (Transfer Goal 1, PT) sit-to-stand/stand-to-sit;walker, rolling  -LR     [place] Level/Cues Needed (Transfer Goal 1, PT) modified independence  -LR     Time Frame (Transfer Goal 1, PT) long term goal (LTG);3 days  -LR     Progress/Outcome (Transfer Goal 1, PT) progress slower than expected;goal ongoing  -LR     Row Name 04/10/22 1024          Gait Training Goal 1 (PT)    Activity/Assistive Device (Gait Training Goal 1, PT) gait (walking locomotion);walker, rolling  -LR     [place] Level (Gait Training Goal 1, PT) contact guard required  -LR     Distance (Gait Training Goal 1, PT) 500 feet  -LR     Time Frame (Gait Training Goal 1, PT) long term goal (LTG);3 days  -LR     Progress/Outcome (Gait Training Goal 1, PT) continuing progress toward goal;goal ongoing  -LR     Row Name 04/10/22 1024          ROM Goal 1 (PT)    ROM Goal 1 (PT) R knee AROM 0-100 degrees  -LR     Time Frame (ROM Goal 1, PT) long-term goal (LTG);3 days  -LR     Progress/Outcome (ROM Goal 1, PT) continuing progress toward goal;goal ongoing  -LR           User Key  (r) = Recorded By, (t) = Taken By, (c) = Cosigned By    Initials Name Provider Type    LR Judith James, PT Physical Therapist               Clinical Impression     Row Name 04/10/22 1024          Pain    Pain Intervention(s) Ambulation/increased activity;Repositioned  -LR     Additional Documentation Pain Scale: FACES Pre/Post-Treatment (Group)  -LR     Row Name 04/10/22 1024          Pain Scale: FACES Pre/Post-Treatment    Pain: FACES Scale, Pretreatment 0-->no hurt  -LR     Posttreatment Pain Rating 4-->hurts little more  -LR     Pain Location - Side/Orientation Right  -LR     Pain Location - knee  -LR     Row Name 04/10/22 1024           Plan of Care Review    Plan of Care Reviewed With patient;daughter  -LR     Progress improving  -LR     Outcome Evaluation Patient ambulated 225 feet with RW, CGAx2, progressed to step through gait pattern, limited by fatigue and pain. Resisting R knee flexion with ther ex today, but improved comprehension of quad sets. Continues to demonstrate confusion in regards to current medical situation/s/p R TKA. Continue to recommend rehab at d/c.  -LR     Row Name 04/10/22 1024          Therapy Assessment/Plan (PT)    Rehab Potential (PT) good, to achieve stated therapy goals  -LR     Criteria for Skilled Interventions Met (PT) yes;meets criteria;skilled treatment is necessary  -LR     Row Name 04/10/22 1024          Positioning and Restraints    Pre-Treatment Position in bed  -LR     Post Treatment Position chair  -LR     In Chair notified nsg;reclined;sitting;call light within reach;encouraged to call for assist;exit alarm on;with family/caregiver;legs elevated;compression device;waffle cushion  -LR           User Key  (r) = Recorded By, (t) = Taken By, (c) = Cosigned By    Initials Name Provider Type    LR Judith James, PT Physical Therapist               Outcome Measures     Row Name 04/10/22 1024          How much help from another person do you currently need...    Turning from your back to your side while in flat bed without using bedrails? 2  -LR     Moving from lying on back to sitting on the side of a flat bed without bedrails? 2  -LR     Moving to and from a bed to a chair (including a wheelchair)? 3  -LR     Standing up from a chair using your arms (e.g., wheelchair, bedside chair)? 2  -LR     Climbing 3-5 steps with a railing? 2  -LR     To walk in hospital room? 3  -LR     AM-PAC 6 Clicks Score (PT) 14  -LR     Row Name 04/10/22 1024          Functional Assessment    Outcome Measure Options AM-PAC 6 Clicks Basic Mobility (PT)  -LR           User Key  (r) = Recorded By, (t) = Taken By, (c) =  Cosigned By    Initials Name Provider Type    Judith Valiente, PT Physical Therapist                             Physical Therapy Education                 Title: PT OT SLP Therapies (In Progress)     Topic: Physical Therapy (Done)     Point: Mobility training (Done)     Learning Progress Summary           Patient Acceptance, D,E, VU,NR by LR at 4/10/2022 1024    Comment: Educated on precautions, weight bearing status, safety with mobility, benefits of mobility, HEP, correct supine to sit t/f technique, correct sit<->stand t/f technique, correct gait mechanics, and progression of POC.    Acceptance, E,D, VU,NR by LR at 4/9/2022 1447    Comment: Educated on precautions, weight bearing status, correct supine to sit t/f technique, correct sit<->stand t/f technique, correct gait mechanics, HEP, and progression of POC.    Acceptance, E,D, VU,NR by LR at 4/9/2022 0839    Comment: Educated on precautions, weight bearing status, correct supine to sit t/f technique, correct sit<->stand t/f technique, correct gait mechanics, LE HEP, and progression of POC.    JEREMIAS Payne, VU,NR by SC at 4/8/2022 1456    Comment: reviewed safety wtih mobility    JEREMIAS Payne, VU by SC at 4/8/2022 0950    Comment: reviewed HEP and safety with mobility    Acceptance, E,D, VU by OLIVER at 4/7/2022 1300    Comment: Educated on safe sequencing with bed mobility, ambulatory transfers, and gait. Reviewed HEP and knee precautions.   Family Acceptance, D,E, VU,NR by LR at 4/10/2022 1024    Comment: Educated on precautions, weight bearing status, safety with mobility, benefits of mobility, HEP, correct supine to sit t/f technique, correct sit<->stand t/f technique, correct gait mechanics, and progression of POC.    Acceptance, E,D, VU,NR by LR at 4/9/2022 1447    Comment: Educated on precautions, weight bearing status, correct supine to sit t/f technique, correct sit<->stand t/f technique, correct gait mechanics, HEP, and progression of POC.                    Point: Home exercise program (Done)     Learning Progress Summary           Patient Acceptance, D,E, VU,NR by LR at 4/10/2022 1024    Comment: Educated on precautions, weight bearing status, safety with mobility, benefits of mobility, HEP, correct supine to sit t/f technique, correct sit<->stand t/f technique, correct gait mechanics, and progression of POC.    Acceptance, E,D, VU,NR by LR at 4/9/2022 1447    Comment: Educated on precautions, weight bearing status, correct supine to sit t/f technique, correct sit<->stand t/f technique, correct gait mechanics, HEP, and progression of POC.    Acceptance, E,D, VU,NR by LR at 4/9/2022 0839    Comment: Educated on precautions, weight bearing status, correct supine to sit t/f technique, correct sit<->stand t/f technique, correct gait mechanics, LE HEP, and progression of POC.    JEREMIAS Payne, VU,NR by SC at 4/8/2022 1456    Comment: reviewed safety wtih mobility    JEREMIAS Payne, VU by SC at 4/8/2022 0950    Comment: reviewed HEP and safety with mobility    Acceptance, E,D, VU by OLIVER at 4/7/2022 1300    Comment: Educated on safe sequencing with bed mobility, ambulatory transfers, and gait. Reviewed HEP and knee precautions.   Family Acceptance, D,E, VU,NR by LR at 4/10/2022 1024    Comment: Educated on precautions, weight bearing status, safety with mobility, benefits of mobility, HEP, correct supine to sit t/f technique, correct sit<->stand t/f technique, correct gait mechanics, and progression of POC.    Acceptance, E,D, VU,NR by LR at 4/9/2022 1447    Comment: Educated on precautions, weight bearing status, correct supine to sit t/f technique, correct sit<->stand t/f technique, correct gait mechanics, HEP, and progression of POC.                   Point: Body mechanics (Done)     Learning Progress Summary           Patient Acceptance, D,E, VU,NR by LR at 4/10/2022 1024    Comment: Educated on precautions, weight bearing status, safety with mobility, benefits of mobility,  HEP, correct supine to sit t/f technique, correct sit<->stand t/f technique, correct gait mechanics, and progression of POC.    Acceptance, E,D, VU,NR by LR at 4/9/2022 1447    Comment: Educated on precautions, weight bearing status, correct supine to sit t/f technique, correct sit<->stand t/f technique, correct gait mechanics, HEP, and progression of POC.    Acceptance, E,D, VU,NR by LR at 4/9/2022 0839    Comment: Educated on precautions, weight bearing status, correct supine to sit t/f technique, correct sit<->stand t/f technique, correct gait mechanics, LE HEP, and progression of POC.    Elmer, JEREMIAS, VU,NR by SC at 4/8/2022 1456    Comment: reviewed safety wtih mobility    JEREMIAS Payne, VU by SC at 4/8/2022 0950    Comment: reviewed HEP and safety with mobility    Acceptance, E,D, VU by OLIVER at 4/7/2022 1300    Comment: Educated on safe sequencing with bed mobility, ambulatory transfers, and gait. Reviewed HEP and knee precautions.   Family Acceptance, D,E, VU,NR by LR at 4/10/2022 1024    Comment: Educated on precautions, weight bearing status, safety with mobility, benefits of mobility, HEP, correct supine to sit t/f technique, correct sit<->stand t/f technique, correct gait mechanics, and progression of POC.    Acceptance, E,D, VU,NR by LR at 4/9/2022 1447    Comment: Educated on precautions, weight bearing status, correct supine to sit t/f technique, correct sit<->stand t/f technique, correct gait mechanics, HEP, and progression of POC.                   Point: Precautions (Done)     Learning Progress Summary           Patient Acceptance, D,E, VU,NR by LR at 4/10/2022 1024    Comment: Educated on precautions, weight bearing status, safety with mobility, benefits of mobility, HEP, correct supine to sit t/f technique, correct sit<->stand t/f technique, correct gait mechanics, and progression of POC.    Acceptance, E,D, VU,NR by LR at 4/9/2022 1447    Comment: Educated on precautions, weight bearing status, correct  supine to sit t/f technique, correct sit<->stand t/f technique, correct gait mechanics, HEP, and progression of POC.    Acceptance, E,D, VU,NR by LR at 4/9/2022 0839    Comment: Educated on precautions, weight bearing status, correct supine to sit t/f technique, correct sit<->stand t/f technique, correct gait mechanics, LE HEP, and progression of POC.    [place], JEREMIAS, VU,NR by SC at 4/8/2022 1456    Comment: reviewed safety wtih mobility    JEREMIAS Payne, VU by SC at 4/8/2022 0950    Comment: reviewed HEP and safety with mobility    Acceptance, E,D, VU by OLIVER at 4/7/2022 1300    Comment: Educated on safe sequencing with bed mobility, ambulatory transfers, and gait. Reviewed HEP and knee precautions.   Family Acceptance, D,E, VU,NR by LR at 4/10/2022 1024    Comment: Educated on precautions, weight bearing status, safety with mobility, benefits of mobility, HEP, correct supine to sit t/f technique, correct sit<->stand t/f technique, correct gait mechanics, and progression of POC.    Acceptance, E,D, VU,NR by LR at 4/9/2022 1447    Comment: Educated on precautions, weight bearing status, correct supine to sit t/f technique, correct sit<->stand t/f technique, correct gait mechanics, HEP, and progression of POC.                               User Key     Initials Effective Dates Name Provider Type Discipline    SC 06/16/21 -  Hernesto Carias, PT Physical Therapist PT    LR 06/16/21 -  Judith James, PT Physical Therapist PT    OLIVER 06/16/21 -  oRlando Mobley, PT Physical Therapist PT              PT Recommendation and Plan     Plan of Care Reviewed With: patient, daughter  Progress: improving  Outcome Evaluation: Patient ambulated 225 feet with RW, CGAx2, progressed to step through gait pattern, limited by fatigue and pain. Resisting R knee flexion with ther ex today, but improved comprehension of quad sets. Continues to demonstrate confusion in regards to current medical situation/s/p R TKA. Continue to recommend rehab at  d/c.     Time Calculation:    PT Charges     Row Name 04/10/22 1024             Time Calculation    Start Time 1024  -LR      PT Received On 04/10/22  -LR      PT Goal Re-Cert Due Date 04/17/22  -LR              Timed Charges    75012 - PT Therapeutic Exercise Minutes 10  -LR      11559 - Gait Training Minutes  13  -LR              Total Minutes    Timed Charges Total Minutes 23  -LR       Total Minutes 23  -LR            User Key  (r) = Recorded By, (t) = Taken By, (c) = Cosigned By    Initials Name Provider Type    LR Judith James, PT Physical Therapist              Therapy Charges for Today     Code Description Service Date Service Provider Modifiers Qty    06377872390 HC PT THER PROC EA 15 MIN 4/9/2022 Judith James, PT GP 1    93848938508 HC GAIT TRAINING EA 15 MIN 4/9/2022 Judith James, PT GP 1    52284966612 HC PT THER PROC EA 15 MIN 4/9/2022 Judith James, PT GP 1    51743098302 HC GAIT TRAINING EA 15 MIN 4/9/2022 Judith James, PT GP 1    10690577638 HC PT THER SUPP EA 15 MIN 4/9/2022 Judith James, PT GP 2    63613805563 HC PT THER PROC EA 15 MIN 4/10/2022 Judith James, PT GP 1    28863431505 HC GAIT TRAINING EA 15 MIN 4/10/2022 Judith James, PT GP 1    51872575038 HC PT THER SUPP EA 15 MIN 4/10/2022 Judith James, PT GP 3          PT G-Codes  Outcome Measure Options: AM-PAC 6 Clicks Basic Mobility (PT)  AM-PAC 6 Clicks Score (PT): 14  AM-PAC 6 Clicks Score (OT): 16    Judith James, PT  4/10/2022

## 2022-04-10 NOTE — PLAN OF CARE
Problem: Adult Inpatient Plan of Care  Goal: Plan of Care Review  Recent Flowsheet Documentation  Taken 4/10/2022 1551 by Judith James, PT  Progress: improving  Plan of Care Reviewed With:   patient   daughter  Outcome Evaluation: Patient increased gait distance to 350 feet with RW, CGA, step through gait pattern, limited by fatigue and pain. Smoother gait mechanics this PM and improved ability to sequence t/f and correctly follow cues. R knee flexion AAROM limited today by pain/stiffness, 84 degrees in sitting. R knee extension improved, lacking 17 degrees AROM. Improved R quad set this PM. Will continue to progress as able. Rehab at d/c.     Goal Outcome Evaluation:  Plan of Care Reviewed With: patient, daughter        Progress: improving  Outcome Evaluation: Patient increased gait distance to 350 feet with RW, CGA, step through gait pattern, limited by fatigue and pain. Smoother gait mechanics this PM and improved ability to sequence t/f and correctly follow cues. R knee flexion AAROM limited today by pain/stiffness, 84 degrees in sitting. R knee extension improved, lacking 17 degrees AROM. Improved R quad set this PM. Will continue to progress as able. Rehab at d/c.

## 2022-04-10 NOTE — PLAN OF CARE
Goal Outcome Evaluation:           Progress: no change   VSS, on 2L NC. Voiding well. A few incontinent episodes, slept well. Dressing is CDI. Pain controlled with PO meds.

## 2022-04-10 NOTE — PLAN OF CARE
Problem: Adult Inpatient Plan of Care  Goal: Plan of Care Review  Recent Flowsheet Documentation  Taken 4/10/2022 1024 by Judith James, BRISSA  Progress: improving  Plan of Care Reviewed With:   patient   daughter  Outcome Evaluation: Patient ambulated 225 feet with RW, CGAx2, progressed to step through gait pattern, limited by fatigue and pain. Resisting R knee flexion with ther ex today, but improved comprehension of quad sets. Continues to demonstrate confusion in regards to current medical situation/s/p R TKA. Continue to recommend rehab at d/c.   Goal Outcome Evaluation:  Plan of Care Reviewed With: patient, daughter        Progress: improving  Outcome Evaluation: Patient ambulated 225 feet with RW, CGAx2, progressed to step through gait pattern, limited by fatigue and pain. Resisting R knee flexion with ther ex today, but improved comprehension of quad sets. Continues to demonstrate confusion in regards to current medical situation/s/p R TKA. Continue to recommend rehab at d/c.

## 2022-04-10 NOTE — PROGRESS NOTES
Lea Barrett [place]       LOS: 2 days   Patient Care Team:  Mannie Melvin MD as PCP - General (Family Medicine)    Chief Complaint: Status post right total knee arthroplasty    Subjective     Interval History:     His confusion has resolved.  He has been ambulating with physical therapy with minimal pain.    Review of Systems:     No fever or constitutional symptoms.    Objective     Vital Signs  Vital Signs (last 24 hours)       04/09 0700  04/10 0659 04/10 0700  04/10 1102   Most Recent      Temp (°F) 98.2 -  99.5      98.7     98.7 (37.1) 04/10 0712    Heart Rate 82 -  88    77 -  80     80 04/10 0900    Resp 14 -  18      18     18 04/10 0712    /90 -  183/92    163/91 -  163/97     163/97 04/10 0900    SpO2 (%)   96       96 04/09 1853            Physical Exam:    Alert, oriented.  No acute distress.  Motor intact EHL, FHL, tibialis anterior, gastroc/soleus.  Able to perform straight leg raise.  Sensation intact DP/SP/tibial/sural/saphenous nerve distributions.  Palpable DP/PT pulse.  No calf tenderness.  Negative Homans.     Results Review:     I reviewed the patient's new clinical results.    Medication Review:   Hospital Medications (active)       Dose Frequency Start End    acetaminophen (TYLENOL) tablet 1,000 mg 1,000 mg Every 8 Hours PRN 4/9/2022     Admin Instructions: Do not exceed 4 grams of acetaminophen in a 24 hr period. Max dose of 2gm for AST/ALT greater than 120 units/L    If given for fever, use fever parameter: fever greater than 100.4 °F.    If given for pain, use the following pain scale:   Mild Pain = Pain Score of 1-3, CPOT 1-2  Moderate Pain = Pain Score of 4-6, CPOT 3-4  Severe Pain = Pain Score of 7-10, CPOT 5-8    Route: Oral    aspirin EC tablet 325 mg 325 mg Daily 4/8/2022     Admin Instructions: Herbal/drug interaction: Avoid use with ginkgo biloba. Do not crush or chew.  Do not exceed 4 grams of aspirin in a 24 hr period.    If given for pain, use the following pain  "scale:   Mild Pain = Pain Score of 1-3, CPOT 1-2  Moderate Pain = Pain Score of 4-6, CPOT 3-4  Severe Pain = Pain Score of 7-10, CPOT 5-8    Route: Oral    atorvastatin (LIPITOR) tablet 10 mg 10 mg Nightly 4/7/2022     Admin Instructions: Avoid grapefruit juice.    Route: Oral    dextrose (D50W) (25 g/50 mL) IV injection 25 g 25 g Every 15 Minutes PRN 4/7/2022     Admin Instructions: Blood sugar less than 70; patient has IV access - Unresponsive, NPO or Unable To Safely Swallow    Route: Intravenous    dextrose (GLUTOSE) oral gel 15 g 15 g Every 15 Minutes PRN 4/7/2022     Admin Instructions: BS<70, Patient Alert, Is not NPO, Can safely swallow.    Route: Oral    divalproex (DEPAKOTE ER) 24 hr tablet 1,000 mg 1,000 mg Nightly 4/7/2022     Admin Instructions: Swallow tablets whole. Do not crush, split or chew.    Route: Oral    divalproex (DEPAKOTE ER) 24 hr tablet 500 mg 500 mg Daily 4/8/2022     Admin Instructions: Swallow tablets whole. Do not crush, split or chew.    Route: Oral    fluticasone (FLONASE) 50 MCG/ACT nasal spray 2 spray 2 spray As Needed 4/7/2022     Route: Nasal    glucagon (human recombinant) (GLUCAGEN DIAGNOSTIC) injection 1 mg 1 mg Every 15 Minutes PRN 4/7/2022     Admin Instructions: Blood Glucose Less Than 70 - Patient Without IV Access - Unresponsive, NPO or Unable To Safely Swallow    Route: Intramuscular    HYDROmorphone (DILAUDID) injection 0.5 mg 0.5 mg Every 2 Hours PRN 4/7/2022 4/17/2022    Admin Instructions: If given for pain, use the following pain scale:  Mild Pain = Pain Score of 1-3, CPOT 1-2  Moderate Pain = Pain Score of 4-6, CPOT 3-4  Severe Pain = Pain Score of 7-10, CPOT 5-8    Route: Intravenous    Linked Group 1: \"And\" Linked Group Details        insulin lispro (humaLOG) injection 0-7 Units 0-7 Units 3 Times Daily With Meals 4/7/2022     Admin Instructions: Correction - Low Dose.  Less than 40 units/day total insulin dose or lean, elderly, renal patients    Blood glucose " "150-199 mg/dL - 2 units  Blood glucose 200-249 mg/dL - 3 units  Blood glucose 250-299 mg/dL - 4 units  Blood glucose 300-349 mg/dL - 5 units  Blood glucose 350-400 mg/dL - 6 units  Blood glucose greater than 400 mg/dL - 7 units and call provider   Caution: Look alike/sound alike drug alert    Route: Subcutaneous    isosorbide mononitrate (IMDUR) 24 hr tablet 30 mg 30 mg Daily 4/8/2022     Admin Instructions: Do not crush, or chew.    Route: Oral    labetalol (NORMODYNE,TRANDATE) injection 10 mg 10 mg Every 4 Hours PRN 4/7/2022     Admin Instructions: As needed for SBP greater than 170 or DBP greater than 105  Give by slow IV Push each 20mg (or less) over 2 minutes    Route: Intravenous    levETIRAcetam (KEPPRA) tablet 500 mg 500 mg Every 12 Hours Scheduled 4/7/2022     Admin Instructions: Caution: Look alike/sound alike drug alert. Swallow whole; do not crush, chew, or split tablet.    Route: Oral    levothyroxine (SYNTHROID, LEVOTHROID) tablet 88 mcg 88 mcg Every Early Morning 4/8/2022     Admin Instructions: Take on empty stomach.    Route: Oral    lisinopril (PRINIVIL,ZESTRIL) tablet 20 mg 20 mg Every 12 Hours Scheduled 4/7/2022     Route: Oral    meloxicam (MOBIC) tablet 15 mg 15 mg Daily 4/8/2022     Admin Instructions: Take with food.  If given for pain, use the following pain scale:  Mild Pain = Pain Score of 1-3, CPOT 1-2  Moderate Pain = Pain Score of 4-6, CPOT 3-4  Severe Pain = Pain Score of 7-10, CPOT 5-8    Route: Oral    naloxone (NARCAN) injection 0.1 mg 0.1 mg Every 5 Minutes PRN 4/7/2022     Admin Instructions: If respiratory rate is less than 8 breaths/minute or patient is difficult to arouse stop any narcotics and contact physician. Administer slow IV push. Repeat as ordered until patient's respiratory rate is greater than 12 breaths/minute.    Route: Intravenous    Linked Group 1: \"And\" Linked Group Details        naloxone (NARCAN) injection 0.4 mg 0.4 mg Every 5 Minutes PRN 4/7/2022     Admin " "Instructions: If respiratory rate is less than 8 breaths/minute or patient is difficult to arouse stop any narcotics and contact physician.   Administer slow IV push. Repeat as ordered until patient's respiratory rate is greater than 12 breaths/minute.    Route: Intravenous    Linked Group 2: \"And\" Linked Group Details        ondansetron (ZOFRAN) injection 4 mg 4 mg Every 6 Hours PRN 4/7/2022     Admin Instructions: If BOTH ondansetron (ZOFRAN) and promethazine (PHENERGAN) are ordered use ondansetron first and THEN promethazine IF ondansetron is ineffective.    Route: Intravenous    Linked Group 3: \"Or\" Linked Group Details        ondansetron (ZOFRAN) tablet 4 mg 4 mg Every 6 Hours PRN 4/7/2022     Admin Instructions: If BOTH ondansetron (ZOFRAN) and promethazine (PHENERGAN) are ordered use ondansetron first and THEN promethazine IF ondansetron is ineffective.    Route: Oral    Linked Group 3: \"Or\" Linked Group Details        pantoprazole (PROTONIX) EC tablet 40 mg 40 mg Daily 4/8/2022     Admin Instructions: Swallow whole; do not crush, split, or chew.    Route: Oral    ropivacaine (Naropin) 0.2% in NS infusion (ARROW Pump) 10 mL/hr Continuous 4/7/2022     Admin Instructions: For pain scale 5 or greater, increase rate to 14 mL/hr for 2 hours then return to original rate.  Can be repeated every 12 hours.  If no improvement, call the Acute Pain Service at [phone], if no response call the Operating Rm desk.  Thank you    Route: Peripheral Nerve    sodium chloride 0.9 % bolus 500 mL 500 mL 3 Times Daily PRN 4/7/2022 4/10/2022    Route: Intravenous    sodium chloride 0.9 % flush 1-10 mL 1-10 mL As Needed 4/7/2022     Route: Intravenous    sodium chloride 0.9 % flush 10 mL 10 mL Every 12 Hours Scheduled 4/7/2022     Route: Intravenous    sodium chloride 0.9 % infusion 120 mL/hr Continuous 4/7/2022     Route: Intravenous    terazosin (HYTRIN) capsule 5 mg 5 mg Nightly 4/7/2022     Route: Oral    traMADol (ULTRAM) tablet 50 " mg 50 mg Every 6 Hours PRN 4/9/2022 4/19/2022    Admin Instructions:     Caution: Look alike/sound alike drug alert    If given for pain, use the following pain scale:  Mild Pain = Pain Score of 1-3, CPOT 1-2  Moderate Pain = Pain Score of 4-6, CPOT 3-4  Severe Pain = Pain Score of 7-10, CPOT 5-8    Route: Oral    verapamil SR (CALAN-SR) CR tablet 240 mg 240 mg Every 12 Hours Scheduled 4/7/2022     Admin Instructions: Hold for SBP <110  Do not crush, split, or chew. Avoid grapefruit juice.    Route: Oral            Assessment/Plan     76-year-old male status post right total knee arthroplasty.      Status post total right knee replacement    Coronary artery disease    Dyslipidemia    Hypothyroidism    GERD (gastroesophageal reflux disease)    Seizure disorder (HCC)    BPH (benign prostatic hypertrophy)    Hypertension    Primary osteoarthritis of both knees    Diabetes (HCC)      Weightbearing as tolerated right lower extremity.  Discharge pending placement.  Plan to follow-up 3 weeks with Dr. Malcolm.        Phoenix Yost Jr, MD  04/10/22  11:02 EDT

## 2022-04-10 NOTE — THERAPY TREATMENT NOTE
Patient Name: Lea Rivers  : 1945    MRN: [id number]                              Today's Date: 4/10/2022       Admit Date: 2022    Visit Dx:     ICD-10-CM ICD-9-CM   1. Primary osteoarthritis of both knees  M17.0 715.16     Patient Active Problem List   Diagnosis   • Coronary artery disease   • Dyslipidemia   • Hypothyroidism   • GERD (gastroesophageal reflux disease)   • Seizure disorder (HCC)   • BPH (benign prostatic hypertrophy)   • Hypertension   • Primary osteoarthritis of both knees   • Syncope and collapse   • Precordial pain   • Diabetes (HCC)   • Status post total right knee replacement     Past Medical History:   Diagnosis Date   • Colon cancer (HCC)     Status post partial colectomy in . No chemotherapy or radiation therapy.   • Coronary artery disease     Nonobstructive coronary artery disease.   • Diabetes (HCC)    • Dyslipidemia    • GERD (gastroesophageal reflux disease)     Hx of.   • Hearing aid worn    • Hyperlipidemia    • Hypertension    • Hypothyroidism    • Left shoulder pain    • Primary osteoarthritis of both knees    • Seizure disorder (HCC)    • Wears glasses      Past Surgical History:   Procedure Laterality Date   • APPENDECTOMY     • CARPAL TUNNEL RELEASE Bilateral    • CHOLECYSTECTOMY     • COLECTOMY PARTIAL / TOTAL      Partial colectomy   • COLONOSCOPY     • CYSTOSCOPY TRANSURETHRAL RESECTION OF PROSTATE N/A 2018    Procedure: CYSTOSCOPY TRANSURETHRAL RESECTION OF PROSTATE GREENLIGHT;  Surgeon: Naseem Clayton MD;  Location:  [facility];  Service: Urology   • OTHER SURGICAL HISTORY      Contraction surgery on bilateral hands and wrists.   • OTHER SURGICAL HISTORY      left and right hands   • SINUS SURGERY     • SKIN BIOPSY     • TEETH EXTRACTION     • TONSILLECTOMY     • TOTAL KNEE ARTHROPLASTY Right 2022    Procedure: TOTAL KNEE ARTHROPLASTY WITH ORTHO ROBOT RIGHT;  Surgeon: Louis Malcolm MD;  Location:  [facility];  Service:  Robotics - Ortho;  Laterality: Right;   • TURP / TRANSURETHRAL INCISION / DRAINAGE PROSTATE     • VASECTOMY        General Information     Row Name 04/10/22 1551 04/10/22 1024       Physical Therapy Time and Intention    Document Type therapy note (daily note)  -LR therapy note (daily note)  -LR    Mode of Treatment physical therapy;individual therapy  -LR physical therapy;individual therapy  -LR    Row Name 04/10/22 1551 04/10/22 1024       General Information    Patient Profile Reviewed yes  -LR yes  -LR    Existing Precautions/Restrictions fall;other (see comments)  [place], confusion, impulsivity  -LR fall;other (see comments)  [place], confusion, impulsivity  -LR    Barriers to Rehab medically complex;previous functional deficit;hearing deficit  -LR medically complex;previous functional deficit;hearing deficit  -LR    Row Name 04/10/22 1551 04/10/22 1024       Cognition    Orientation Status (Cognition) oriented x 3;other (see comments)  continues to be confused in conversation but improved ability to follow cues correctly  -LR oriented x 3;other (see comments)  continues to be confused in conversation  -LR    Row Name 04/10/22 1551 04/10/22 1024       Safety Issues, Functional Mobility    Safety Issues Affecting Function (Mobility) ability to follow commands;at risk behavior observed;awareness of need for assistance;insight into deficits/self-awareness;judgment;positioning of assistive device;sequencing abilities;safety precautions follow-through/compliance;safety precaution awareness;problem-solving;impulsivity  -LR ability to follow commands;at risk behavior observed;awareness of need for assistance;insight into deficits/self-awareness;judgment;positioning of assistive device;sequencing abilities;safety precautions follow-through/compliance;safety precaution awareness;problem-solving;impulsivity  -LR    Impairments Affecting Function (Mobility) strength;balance;cognition;endurance/activity tolerance;postural/trunk  control;pain;range of motion (ROM);motor planning  -LR strength;balance;cognition;endurance/activity tolerance;postural/trunk control;pain;range of motion (ROM);motor planning  -LR          User Key  (r) = Recorded By, (t) = Taken By, (c) = Cosigned By    Initials Name Provider Type    LR Judith James, PT Physical Therapist               Mobility     Row Name 04/10/22 1551 04/10/22 1024       Bed Mobility    Bed Mobility sit-supine  -LR supine-sit  -LR    Supine-Sit [place] (Bed Mobility) not tested  -LR verbal cues;nonverbal cues (demo/gesture);moderate assist (50% patient effort);2 person assist  -LR    Sit-Supine [place] (Bed Mobility) verbal cues;moderate assist (50% patient effort)  -LR not tested  -LR    Assistive Device (Bed Mobility) head of bed elevated;bed rails  -LR head of bed elevated;bed rails;draw sheet  -LR    Comment, (Bed Mobility) Verbal cues to push from bed to scoot hips back and up into bed and to lift LEs into bed. Mod assist required to lift LEs into bed.  -LR Continues to have significant difficulty comprehending how to sequence to get to EOB despite repeated cues for sequencing to t/f to EOB. Mod assist required to at trunk and with hips.  -LR    Row Name 04/10/22 1551 04/10/22 1024       Transfers    Comment, (Transfers) Verbal cues for correct hand placement with t/f and to step R LE out before t/f. Improved response to cues for correct sequencing with t/f.  -LR Verbal cues for correct hand placement with t/f and to step R LE out before t/f, improved command following with this today.  -LR    Row Name 04/10/22 1551 04/10/22 1024       Bed-Chair Transfer    Bed-Chair [place] (Transfers) not tested  -LR not tested  -LR    Row Name 04/10/22 1551 04/10/22 1024       Sit-Stand Transfer    Sit-Stand [place] (Transfers) verbal cues;minimum assist (75% patient effort)  -LR verbal cues;minimum assist (75% patient effort);2 person assist  -LR    Assistive Device  (Sit-Stand Transfers) walker, front-wheeled  -LR walker, front-wheeled  -LR    Row Name 04/10/22 1551 04/10/22 1024       Gait/Stairs (Locomotion)    [place] Level (Gait) verbal cues;contact guard  -LR verbal cues;contact guard;2 person assist  -LR    Assistive Device (Gait) walker, front-wheeled  -LR walker, front-wheeled  -LR    Distance in Feet (Gait) 350  -  -LR    Deviations/Abnormal Patterns (Gait) bilateral deviations;bisi decreased;gait speed decreased;stride length decreased;right sided deviations;antalgic  -LR bilateral deviations;bisi decreased;gait speed decreased;stride length decreased;right sided deviations;antalgic  -LR    Bilateral Gait Deviations forward flexed posture  -LR forward flexed posture  -LR    Right Sided Gait Deviations weight shift ability decreased  -LR weight shift ability decreased  -LR    [place] Level (Stairs) not tested  -LR not tested  -LR    Comment, (Gait/Stairs) Patient again initiated gait with step to gait but able to progess to smooth step through gait pattern with cues for increased R LE weight bearing, decreased UE weight bearing, increased step length, and continuous forward progression of RW. Gait limited by fatigue and pain.  -LR Patient initiated gait with step to gait pattern at slow pace. Cues for increased R LE weight bearing/stance phase, decreased UE weight bearing, and increased and equal step length. Gait limited by fatigue and pain.  -LR    Row Name 04/10/22 1551 04/10/22 1024       Mobility    Extremity Weight-bearing Status right lower extremity  -LR right lower extremity  -LR    Right Lower Extremity (Weight-bearing Status) weight-bearing as tolerated (WBAT)  -LR weight-bearing as tolerated (WBAT)  -LR          User Key  (r) = Recorded By, (t) = Taken By, (c) = Cosigned By    Initials Name Provider Type    LR Judith James, PT Physical Therapist               Obj/Interventions     Row Name 04/10/22 1551 04/10/22 1024        Motor Skills    Therapeutic Exercise ankle;knee;other (see comments)  cues for technique; mod assist R SLR, min assist R SAQ, R LAQ  -LR ankle;knee;other (see comments)  cues for technique; min assist R SLR, R LAQ, mod assist R SAQ, R heel slides, resisting with heel slides today; improved comprehension of quad sets today  -LR    Row Name 04/10/22 1551 04/10/22 1024       Knee (Therapeutic Exercise)    Knee (Therapeutic Exercise) AROM (active range of motion);strengthening exercise;isometric exercises  -LR AROM (active range of motion);strengthening exercise;isometric exercises  -LR    Knee AROM (Therapeutic Exercise) right;heel slides;sitting;15 repititions;other (see comments)  and reclined  -LR right;heel slides;sitting;supine;15 repititions  -LR    Knee Isometrics (Therapeutic Exercise) right;quad sets;sitting;10 repetitions;5 repetitions  -LR right;quad sets;supine;10 repetitions;5 repetitions  -LR    Knee Strengthening (Therapeutic Exercise) right;SLR (straight leg raise);SAQ (short arc quad);LAQ (long arc quad);sitting;15 repititions  -LR right;SLR (straight leg raise);SAQ (short arc quad);LAQ (long arc quad);sitting;15 repititions  -LR    Row Name 04/10/22 1551 04/10/22 1024       Ankle (Therapeutic Exercise)    Ankle (Therapeutic Exercise) AROM (active range of motion)  -LR AROM (active range of motion)  -LR    Ankle AROM (Therapeutic Exercise) bilateral;dorsiflexion;plantarflexion;sitting;15 repititions  -LR bilateral;dorsiflexion;plantarflexion;supine;15 repititions  -LR    Row Name 04/10/22 1551 04/10/22 1024       Balance    Balance Assessment sitting static balance;sitting dynamic balance;standing static balance;standing dynamic balance  -LR sitting static balance;standing static balance;standing dynamic balance;sitting dynamic balance  -LR    Static Sitting Balance contact guard  -LR contact guard  -LR    Dynamic Sitting Balance contact guard  -LR contact guard  -LR    Position, Sitting Balance  unsupported;sitting edge of bed;sitting in chair  -LR unsupported;sitting edge of bed  -LR    Static Standing Balance contact guard  -LR contact guard;2-person assist  -LR    Dynamic Standing Balance contact guard  -LR contact guard;2-person assist  -LR    Position/Device Used, Standing Balance walker, front-wheeled  -LR supported;walker, rolling  -LR    Row Name 04/10/22 1551          General Lower Extremity Assessment (Range of Motion)    Comment: Lower Extremity ROM 17-84 degrees; lacking 17 degrees extension AROM; demonstrated 84 degrees flexion AAROM in sitting.  -LR           User Key  (r) = Recorded By, (t) = Taken By, (c) = Cosigned By    Initials Name Provider Type    LR Judith James, PT Physical Therapist               Goals/Plan     Row Name 04/10/22 1551 04/10/22 1024       Bed Mobility Goal 1 (PT)    Activity/Assistive Device (Bed Mobility Goal 1, PT) sit to supine/supine to sit  -LR sit to supine/supine to sit  -LR    [place] Level/Cues Needed (Bed Mobility Goal 1, PT) contact guard required  -LR contact guard required  -LR    Time Frame (Bed Mobility Goal 1, PT) long term goal (LTG);3 days  -LR long term goal (LTG);3 days  -LR    Progress/Outcomes (Bed Mobility Goal 1, PT) goal ongoing;progress slower than expected  -LR goal ongoing;progress slower than expected  -LR    Row Name 04/10/22 1551 04/10/22 1024       Transfer Goal 1 (PT)    Activity/Assistive Device (Transfer Goal 1, PT) sit-to-stand/stand-to-sit;walker, rolling  -LR sit-to-stand/stand-to-sit;walker, rolling  -LR    [place] Level/Cues Needed (Transfer Goal 1, PT) modified independence  -LR modified independence  -LR    Time Frame (Transfer Goal 1, PT) long term goal (LTG);3 days  -LR long term goal (LTG);3 days  -LR    Progress/Outcome (Transfer Goal 1, PT) continuing progress toward goal;goal ongoing  -LR progress slower than expected;goal ongoing  -LR    Row Name 04/10/22 1551 04/10/22 1024       Gait Training Goal 1  (PT)    Activity/Assistive Device (Gait Training Goal 1, PT) gait (walking locomotion);walker, rolling  -LR gait (walking locomotion);walker, rolling  -LR    [place] Level (Gait Training Goal 1, PT) contact guard required  -LR contact guard required  -LR    Distance (Gait Training Goal 1, PT) -- 500 feet  -LR    Time Frame (Gait Training Goal 1, PT) long term goal (LTG);3 days  -LR long term goal (LTG);3 days  -LR    Progress/Outcome (Gait Training Goal 1, PT) goal ongoing;goal partially met;good progress toward goal  -LR continuing progress toward goal;goal ongoing  -LR    Row Name 04/10/22 1551 04/10/22 1024       ROM Goal 1 (PT)    ROM Goal 1 (PT) R knee AROM 0-100 degrees  -LR R knee AROM 0-100 degrees  -LR    Time Frame (ROM Goal 1, PT) long-term goal (LTG);3 days  -LR long-term goal (LTG);3 days  -LR    Progress/Outcome (ROM Goal 1, PT) continuing progress toward goal;goal ongoing  -LR continuing progress toward goal;goal ongoing  -LR          User Key  (r) = Recorded By, (t) = Taken By, (c) = Cosigned By    Initials Name Provider Type    LR Judith James, PT Physical Therapist               Clinical Impression     Row Name 04/10/22 1551 04/10/22 1024       Pain    Pain Location - Side/Orientation Right  -LR --    Pain Location - knee  -LR --    Pain Intervention(s) Ambulation/increased activity;Repositioned  -LR Ambulation/increased activity;Repositioned  -LR    Additional Documentation Pain Scale: FACES Pre/Post-Treatment (Group)  -LR Pain Scale: FACES Pre/Post-Treatment (Group)  -LR    Row Name 04/10/22 1551 04/10/22 1024       Pain Scale: FACES Pre/Post-Treatment    Pain: FACES Scale, Pretreatment 0-->no hurt  -LR 0-->no hurt  -LR    Posttreatment Pain Rating 2-->hurts little bit  -LR 4-->hurts little more  -LR    Pain Location - Side/Orientation Right  -LR Right  -LR    Pain Location - knee  -LR knee  -LR    Row Name 04/10/22 1551 04/10/22 1024       Plan of Care Review    Plan of Care  Reviewed With patient;daughter  -LR patient;daughter  -LR    Progress improving  -LR improving  -LR    Outcome Evaluation Patient increased gait distance to 350 feet with RW, CGA, step through gait pattern, limited by fatigue and pain. Smoother gait mechanics this PM and improved ability to sequence t/f and correctly follow cues. R knee flexion AAROM limited today by pain/stiffness, 84 degrees in sitting. R knee extension improved, lacking 17 degrees AROM. Improved R quad set this PM. Will continue to progress as able. Rehab at d/c.  -LR Patient ambulated 225 feet with RW, CGAx2, progressed to step through gait pattern, limited by fatigue and pain. Resisting R knee flexion with ther ex today, but improved comprehension of quad sets. Continues to demonstrate confusion in regards to current medical situation/s/p R TKA. Continue to recommend rehab at d/c.  -LR    Row Name 04/10/22 1551 04/10/22 1024       Therapy Assessment/Plan (PT)    Rehab Potential (PT) good, to achieve stated therapy goals  -LR good, to achieve stated therapy goals  -LR    Criteria for Skilled Interventions Met (PT) yes;meets criteria;skilled treatment is necessary  -LR yes;meets criteria;skilled treatment is necessary  -LR    Row Name 04/10/22 1551 04/10/22 1024       Positioning and Restraints    Pre-Treatment Position sitting in chair/recliner  -LR in bed  -LR    Post Treatment Position bed  -LR chair  -LR    In Bed notified nsg;supine;call light within reach;encouraged to call for assist;exit alarm on;side rails up x2;with family/caregiver;SCD pump applied  -LR --    In Chair -- notified nsg;reclined;sitting;call light within reach;encouraged to call for assist;exit alarm on;with family/caregiver;legs elevated;compression device;waffle cushion  -LR          User Key  (r) = Recorded By, (t) = Taken By, (c) = Cosigned By    Initials Name Provider Type    Judith Valiente, PT Physical Therapist               Outcome Measures     Row Name  04/10/22 1551 04/10/22 1024       How much help from another person do you currently need...    Turning from your back to your side while in flat bed without using bedrails? 2  -LR 2  -LR    Moving from lying on back to sitting on the side of a flat bed without bedrails? 2  -LR 2  -LR    Moving to and from a bed to a chair (including a wheelchair)? 3  -LR 3  -LR    Standing up from a chair using your arms (e.g., wheelchair, bedside chair)? 3  -LR 2  -LR    Climbing 3-5 steps with a railing? 2  -LR 2  -LR    To walk in hospital room? 3  -LR 3  -LR    AM-PAC 6 Clicks Score (PT) 15  -LR 14  -LR    Row Name 04/10/22 1551 04/10/22 1024       Functional Assessment    Outcome Measure Options AM-PAC 6 Clicks Basic Mobility (PT)  -LR AM-PAC 6 Clicks Basic Mobility (PT)  -LR          User Key  (r) = Recorded By, (t) = Taken By, (c) = Cosigned By    Initials Name Provider Type    Judith Valiente, PT Physical Therapist                             Physical Therapy Education                 Title: PT OT SLP Therapies (In Progress)     Topic: Physical Therapy (Done)     Point: Mobility training (Done)     Learning Progress Summary           Patient Acceptance, E, VU,NR by LR at 4/10/2022 1551    Comment: Educated on precautions, weight bearing status, correct sit to supine t/f technique, correct sit<->stand t/f technique, correct gait mechanics, HEP, safety with mobility, and progression of POC.    Acceptance, D,E, VU,NR by LR at 4/10/2022 1024    Comment: Educated on precautions, weight bearing status, safety with mobility, benefits of mobility, HEP, correct supine to sit t/f technique, correct sit<->stand t/f technique, correct gait mechanics, and progression of POC.    Acceptance, E,D, VU,NR by LR at 4/9/2022 1447    Comment: Educated on precautions, weight bearing status, correct supine to sit t/f technique, correct sit<->stand t/f technique, correct gait mechanics, HEP, and progression of POC.    Acceptance, E,D,  VU,NR by LR at 4/9/2022 0839    Comment: Educated on precautions, weight bearing status, correct supine to sit t/f technique, correct sit<->stand t/f technique, correct gait mechanics, LE HEP, and progression of POC.    JEREMIAS Payne, VU,NR by SC at 4/8/2022 1456    Comment: reviewed safety wtih mobility    JEREMIAS Payne, VU by SC at 4/8/2022 0950    Comment: reviewed HEP and safety with mobility    Acceptance, E,[place], VU by OLIVER at 4/7/2022 1300    Comment: Educated on safe sequencing with bed mobility, ambulatory transfers, and gait. Reviewed HEP and knee precautions.   Family Acceptance, E, VU,NR by LR at 4/10/2022 1551    Comment: Educated on precautions, weight bearing status, correct sit to supine t/f technique, correct sit<->stand t/f technique, correct gait mechanics, HEP, safety with mobility, and progression of POC.    Acceptance, D,E, VU,NR by LR at 4/10/2022 1024    Comment: Educated on precautions, weight bearing status, safety with mobility, benefits of mobility, HEP, correct supine to sit t/f technique, correct sit<->stand t/f technique, correct gait mechanics, and progression of POC.    Acceptance, E,D, VU,NR by LR at 4/9/2022 1447    Comment: Educated on precautions, weight bearing status, correct supine to sit t/f technique, correct sit<->stand t/f technique, correct gait mechanics, HEP, and progression of POC.                   Point: Home exercise program (Done)     Learning Progress Summary           Patient Acceptance, E, VU,NR by LR at 4/10/2022 1551    Comment: Educated on precautions, weight bearing status, correct sit to supine t/f technique, correct sit<->stand t/f technique, correct gait mechanics, HEP, safety with mobility, and progression of POC.    Acceptance, D,E, VU,NR by LR at 4/10/2022 1024    Comment: Educated on precautions, weight bearing status, safety with mobility, benefits of mobility, HEP, correct supine to sit t/f technique, correct sit<->stand t/f technique, correct gait mechanics, and  progression of POC.    Acceptance, E,D, VU,NR by LR at 4/9/2022 1447    Comment: Educated on precautions, weight bearing status, correct supine to sit t/f technique, correct sit<->stand t/f technique, correct gait mechanics, HEP, and progression of POC.    Acceptance, E,D, VU,NR by LR at 4/9/2022 0839    Comment: Educated on precautions, weight bearing status, correct supine to sit t/f technique, correct sit<->stand t/f technique, correct gait mechanics, LE HEP, and progression of POC.    Eagjannet, JEREMIAS, VU,NR by SC at 4/8/2022 1456    Comment: reviewed safety wtih mobility    JEREMIAS Payne, VU by SC at 4/8/2022 0950    Comment: reviewed HEP and safety with mobility    Acceptance, E,CRIST[place]NA, VU by OLIVER at 4/7/2022 1300    Comment: Educated on safe sequencing with bed mobility, ambulatory transfers, and gait. Reviewed HEP and knee precautions.   Family Acceptance, E, VU,NR by LR at 4/10/2022 1551    Comment: Educated on precautions, weight bearing status, correct sit to supine t/f technique, correct sit<->stand t/f technique, correct gait mechanics, HEP, safety with mobility, and progression of POC.    Acceptance, D,E, VU,NR by LR at 4/10/2022 1024    Comment: Educated on precautions, weight bearing status, safety with mobility, benefits of mobility, HEP, correct supine to sit t/f technique, correct sit<->stand t/f technique, correct gait mechanics, and progression of POC.    Acceptance, E,D, VU,NR by LR at 4/9/2022 1447    Comment: Educated on precautions, weight bearing status, correct supine to sit t/f technique, correct sit<->stand t/f technique, correct gait mechanics, HEP, and progression of POC.                   Point: Body mechanics (Done)     Learning Progress Summary           Patient Acceptance, E, VU,NR by LR at 4/10/2022 1551    Comment: Educated on precautions, weight bearing status, correct sit to supine t/f technique, correct sit<->stand t/f technique, correct gait mechanics, HEP, safety with mobility, and progression  of POC.    Acceptance, D,E, VU,NR by LR at 4/10/2022 1024    Comment: Educated on precautions, weight bearing status, safety with mobility, benefits of mobility, HEP, correct supine to sit t/f technique, correct sit<->stand t/f technique, correct gait mechanics, and progression of POC.    Acceptance, E,D, VU,NR by LR at 4/9/2022 1447    Comment: Educated on precautions, weight bearing status, correct supine to sit t/f technique, correct sit<->stand t/f technique, correct gait mechanics, HEP, and progression of POC.    Acceptance, E,D, VU,NR by LR at 4/9/2022 0839    Comment: Educated on precautions, weight bearing status, correct supine to sit t/f technique, correct sit<->stand t/f technique, correct gait mechanics, LE HEP, and progression of POC.    JEREMIAS Payne, VU,NR by SC at 4/8/2022 1456    Comment: reviewed safety wtih mobility    JEREMIAS Payne, VU by SC at 4/8/2022 0950    Comment: reviewed HEP and safety with mobility    Acceptance, E,D, VU by OLIVER at 4/7/2022 1300    Comment: Educated on safe sequencing with bed mobility, ambulatory transfers, and gait. Reviewed HEP and knee precautions.   Family Acceptance, E, VU,NR by LR at 4/10/2022 1551    Comment: Educated on precautions, weight bearing status, correct sit to supine t/f technique, correct sit<->stand t/f technique, correct gait mechanics, HEP, safety with mobility, and progression of POC.    Acceptance, D,E, VU,NR by LR at 4/10/2022 1024    Comment: Educated on precautions, weight bearing status, safety with mobility, benefits of mobility, HEP, correct supine to sit t/f technique, correct sit<->stand t/f technique, correct gait mechanics, and progression of POC.    Acceptance, E,D, VU,NR by LR at 4/9/2022 1447    Comment: Educated on precautions, weight bearing status, correct supine to sit t/f technique, correct sit<->stand t/f technique, correct gait mechanics, HEP, and progression of POC.                   Point: Precautions (Done)     Learning Progress  Summary           Patient Acceptance, E, VU,NR by LR at 4/10/2022 1551    Comment: Educated on precautions, weight bearing status, correct sit to supine t/f technique, correct sit<->stand t/f technique, correct gait mechanics, HEP, safety with mobility, and progression of POC.    Acceptance, D,E, VU,NR by LR at 4/10/2022 1024    Comment: Educated on precautions, weight bearing status, safety with mobility, benefits of mobility, HEP, correct supine to sit t/f technique, correct sit<->stand t/f technique, correct gait mechanics, and progression of POC.    Acceptance, E,D, VU,NR by LR at 4/9/2022 1447    Comment: Educated on precautions, weight bearing status, correct supine to sit t/f technique, correct sit<->stand t/f technique, correct gait mechanics, HEP, and progression of POC.    Acceptance, E,D, VU,NR by LR at 4/9/2022 0839    Comment: Educated on precautions, weight bearing status, correct supine to sit t/f technique, correct sit<->stand t/f technique, correct gait mechanics, LE HEP, and progression of POC.    JEREMIAS Payne, VU,NR by SC at 4/8/2022 1456    Comment: reviewed safety wtih mobility    JEREMIAS Payne VU by SC at 4/8/2022 0950    Comment: reviewed HEP and safety with mobility    Acceptance, E,D, VU by OLIVER at 4/7/2022 1300    Comment: Educated on safe sequencing with bed mobility, ambulatory transfers, and gait. Reviewed HEP and knee precautions.   Family Acceptance, E, VU,NR by LR at 4/10/2022 1551    Comment: Educated on precautions, weight bearing status, correct sit to supine t/f technique, correct sit<->stand t/f technique, correct gait mechanics, HEP, safety with mobility, and progression of POC.    Acceptance, D,E, VU,NR by LR at 4/10/2022 1024    Comment: Educated on precautions, weight bearing status, safety with mobility, benefits of mobility, HEP, correct supine to sit t/f technique, correct sit<->stand t/f technique, correct gait mechanics, and progression of POC.    Acceptance, E,D, VU,NR by LR at  4/9/2022 7737    Comment: Educated on precautions, weight bearing status, correct supine to sit t/f technique, correct sit<->stand t/f technique, correct gait mechanics, HEP, and progression of POC.                               User Key     Initials Effective Dates Name Provider Type Discipline    SC 06/16/21 -  Hernesto Carias PT Physical Therapist PT    LR 06/16/21 -  Judith James, PT Physical Therapist PT    OLIVER 06/16/21 -  Rolando Mobley, PT Physical Therapist PT              PT Recommendation and Plan     Plan of Care Reviewed With: patient, daughter  Progress: improving  Outcome Evaluation: Patient increased gait distance to 350 feet with RW, CGA, step through gait pattern, limited by fatigue and pain. Smoother gait mechanics this PM and improved ability to sequence t/f and correctly follow cues. R knee flexion AAROM limited today by pain/stiffness, 84 degrees in sitting. R knee extension improved, lacking 17 degrees AROM. Improved R quad set this PM. Will continue to progress as able. Rehab at d/c.     Time Calculation:    PT Charges     Row Name 04/10/22 1551 04/10/22 1024          Time Calculation    Start Time 1551  -LR 1024  -LR     PT Received On 04/10/22  -LR 04/10/22  -LR     PT Goal Re-Cert Due Date 04/17/22  -LR 04/17/22  -LR            Timed Charges    75040 - PT Therapeutic Exercise Minutes 10  -LR 10  -LR     77905 - Gait Training Minutes  13  -LR 13  -LR            Total Minutes    Timed Charges Total Minutes 23  -LR 23  -LR      Total Minutes 23  -LR 23  -LR           User Key  (r) = Recorded By, (t) = Taken By, (c) = Cosigned By    Initials Name Provider Type    LR Judith James, PT Physical Therapist              Therapy Charges for Today     Code Description Service Date Service Provider Modifiers Qty    43126355481 HC PT THER PROC EA 15 MIN 4/9/2022 Judith James, PT GP 1    99765100687 HC GAIT TRAINING EA 15 MIN 4/9/2022 Judith James, PT GP 1     82943081543 HC PT THER PROC EA 15 MIN 4/9/2022 JamesJudith, PT GP 1    35141019727 HC GAIT TRAINING EA 15 MIN 4/9/2022 JamesJudith, PT GP 1    99889353412 HC PT THER SUPP EA 15 MIN 4/9/2022 JamesJudith, PT GP 2    94286825142 HC PT THER PROC EA 15 MIN 4/10/2022 JamesJudith, PT GP 1    82666622297 HC GAIT TRAINING EA 15 MIN 4/10/2022 JamesJudith, PT GP 1    58392511655 HC PT THER SUPP EA 15 MIN 4/10/2022 JamesJudith, PT GP 3    21988993011 HC PT THER PROC EA 15 MIN 4/10/2022 JamesJudith, PT GP 1    15375902415 HC GAIT TRAINING EA 15 MIN 4/10/2022 Judith James, PT GP 1          PT G-Codes  Outcome Measure Options: AM-PAC 6 Clicks Basic Mobility (PT)  AM-PAC 6 Clicks Score (PT): 15  AM-PAC 6 Clicks Score (OT): 16    Judith James, PT  4/10/2022

## 2022-04-10 NOTE — PROGRESS NOTES
" progress note      Lea [place]  [id number]  1945     LOS: 2 days     Attending: Louis Malcolm MD    Primary Care Provider: Mannie Melvin MD      Chief Complaint/Reason for visit:  No chief complaint on file.      Subjective   Doing okay overall.  No nausea, vomiting, or shortness of breath.  Taking some p.o.  Noted incontinence episodes overnight.    Objective        Visit Vitals  /97   Pulse 80   Temp 98.7 °F (37.1 °C) (Oral)   Resp 18   Ht 172.7 cm (68\")   Wt 97.1 kg (214 lb 1.1 oz)   SpO2 96%   BMI 32.55 kg/m²     Temp (24hrs), Av.8 °F (37.1 °C), Min:98.2 °F (36.8 °C), Max:99.5 °F (37.5 °C)      Intake/Output:    Intake/Output Summary (Last 24 hours) at 4/10/2022 1006  Last data filed at 4/10/2022 0900  Gross per 24 hour   Intake 240 ml   Output 500 ml   Net -260 ml        Physical Therapy:Progress: no change  Outcome Evaluation: Patient continues to be confused requiring extra cues for safety to stay close to walker and stay with walker untill seated. He ambulated 300 feet with unsteady gait, some tremors noted    Physical Exam:     General Appearance:    Alert, cooperative, in no acute distress   Head:    Normocephalic, without obvious abnormality, atraumatic    Lungs:     Normal effort, symmetric chest rise,  clear to      auscultation bilaterally              Heart:    Regular rhythm and normal rate, normal S1 and S2    Abdomen:     Normal bowel sounds, no masses, no organomegaly, soft        non-tender, non-distended, no guarding, no rebound                tenderness   Extremities:  Clean dry and intact dressing over right knee.  Peripheral nerve block catheter present.   intact flexion dorsiflexion bilateral feet.  No clubbing, cyanosis or edema.  No deformities.    Pulses:   Pulses palpable and equal bilaterally   Skin:   No bleeding, bruising or rash          Results Review:     I reviewed the patient's new clinical results.   Results from last 7 days   Lab Units " 04/10/22  0503 04/09/22  0933 04/08/22  1301   WBC 10*3/mm3 8.73 11.29* 11.37*   HEMOGLOBIN g/dL 7.6* 8.1* 8.0*   HEMATOCRIT % 22.3* 25.4* 23.6*   PLATELETS 10*3/mm3 151 165 158     Results from last 7 days   Lab Units 04/08/22  1302   SODIUM mmol/L 134*   POTASSIUM mmol/L 4.1   CHLORIDE mmol/L 101   CO2 mmol/L 23.0   BUN mg/dL 14   CREATININE mg/dL 1.00   CALCIUM mg/dL 8.5*   GLUCOSE mg/dL 133*     I reviewed the patient's new imaging including images and reports.    All medications reviewed.   aspirin, 325 mg, Oral, Daily  atorvastatin, 10 mg, Oral, Nightly  divalproex, 1,000 mg, Oral, Nightly  divalproex, 500 mg, Oral, Daily  insulin lispro, 0-7 Units, Subcutaneous, TID With Meals  isosorbide mononitrate, 30 mg, Oral, Daily  levETIRAcetam, 500 mg, Oral, Q12H  levothyroxine, 88 mcg, Oral, Q AM  lisinopril, 20 mg, Oral, Q12H  meloxicam, 15 mg, Oral, Daily  pantoprazole, 40 mg, Oral, Daily  sodium chloride, 10 mL, Intravenous, Q12H  terazosin, 5 mg, Oral, Nightly  verapamil SR, 240 mg, Oral, Q12H      acetaminophen, 1,000 mg, Q8H PRN  dextrose, 25 g, Q15 Min PRN  dextrose, 15 g, Q15 Min PRN  fluticasone, 2 spray, PRN  glucagon (human recombinant), 1 mg, Q15 Min PRN  HYDROmorphone, 0.5 mg, Q2H PRN   And  naloxone, 0.1 mg, Q5 Min PRN  labetalol, 10 mg, Q4H PRN  naloxone, 0.4 mg, Q5 Min PRN  ondansetron, 4 mg, Q6H PRN   Or  ondansetron, 4 mg, Q6H PRN  sodium chloride, 500 mL, TID PRN  sodium chloride, 1-10 mL, PRN  traMADol, 50 mg, Q6H PRN        Assessment/Plan       Status post total right knee replacement    Coronary artery disease    Dyslipidemia    Hypothyroidism    GERD (gastroesophageal reflux disease)    Seizure disorder (HCC)    BPH (benign prostatic hypertrophy)    Hypertension    Primary osteoarthritis of both knees    Diabetes (HCC)  Postop confusion, likely multifactorial.    Plan     1. PT/OT, RLE early range of motion and weight-bearing per the post total knee arthroplasty protocol  2. Pain control-prns,  ACB cath with ropivacaine infusion.  I discontinued as needed oxycodone and morphine and made tramadol available.  Schedule Tylenol and continue meloxicam  3. IS-encouraged, instructed  4. DVT proph- Mechanicals and aspirin  5. Bowel regimen  6. Resume home medications as appropriate  7. Monitor post-op labs  8. DC planning for rehab, case management consult     HTN, dyslipidemia, CAD  - Continue home lisinopril, verapamil, Imdur, statin  - Hold chlorthalidone  - Monitor BP   - Holding parameters for BP meds  - Labetalol PRN for SBP>170     Hypothyroidism  -Synthroid     GERD  -Protonix.     Seizure disorder, no new.  -Continue Keppra, Depakote     BPH  -Continue Terazosin  Monitor for spontaneous voiding.  Check postvoid residual if needed/wy     DM2  - hgb A1c on 3/24/2022 6.1  -Hold Metformin  - Accu-Chek AC and HS with low dose SSI    Monitor for resolution of postop confusion.  Check UA.    Discussed with pt and daughter.     I believe this patient meets INPATIENT status due to the need for care which can only be reasonably provided in an hospital setting such as aggressive/expedited ancillary services and/or consultation services, the necessity for IV medications, close physician monitoring and/or the possible need for procedures.      In such, I feel patient’s risk for adverse outcomes and need for care warrant INPATIENT evaluation and predict the patient’s care encounter to likely last beyond 2 midnights.    Joe Cole MD  04/10/22  10:06 EDT

## 2022-04-11 ENCOUNTER — APPOINTMENT (OUTPATIENT)
Dept: CT IMAGING | Facility: HOSPITAL | Age: 77
End: 2022-04-11

## 2022-04-11 LAB
FLUAV RNA RESP QL NAA+PROBE: NOT DETECTED
FLUBV RNA RESP QL NAA+PROBE: NOT DETECTED
GLUCOSE BLDC GLUCOMTR-MCNC: 108 MG/DL (ref 70–130)
GLUCOSE BLDC GLUCOMTR-MCNC: 109 MG/DL (ref 70–130)
GLUCOSE BLDC GLUCOMTR-MCNC: 90 MG/DL (ref 70–130)
GLUCOSE BLDC GLUCOMTR-MCNC: 96 MG/DL (ref 70–130)
RSV RNA NPH QL NAA+NON-PROBE: NOT DETECTED
SARS-COV-2 RNA RESP QL NAA+PROBE: NOT DETECTED
TSH SERPL DL<=0.05 MIU/L-ACNC: 1.5 UIU/ML (ref 0.27–4.2)
VIT B12 BLD-MCNC: 1295 PG/ML (ref 211–946)

## 2022-04-11 PROCEDURE — 97530 THERAPEUTIC ACTIVITIES: CPT

## 2022-04-11 PROCEDURE — 82607 VITAMIN B-12: CPT | Performed by: INTERNAL MEDICINE

## 2022-04-11 PROCEDURE — 97110 THERAPEUTIC EXERCISES: CPT

## 2022-04-11 PROCEDURE — P9612 CATHETERIZE FOR URINE SPEC: HCPCS

## 2022-04-11 PROCEDURE — 97116 GAIT TRAINING THERAPY: CPT

## 2022-04-11 PROCEDURE — 87637 SARSCOV2&INF A&B&RSV AMP PRB: CPT | Performed by: INTERNAL MEDICINE

## 2022-04-11 PROCEDURE — 70450 CT HEAD/BRAIN W/O DYE: CPT

## 2022-04-11 PROCEDURE — 82962 GLUCOSE BLOOD TEST: CPT

## 2022-04-11 PROCEDURE — 99024 POSTOP FOLLOW-UP VISIT: CPT | Performed by: ORTHOPAEDIC SURGERY

## 2022-04-11 PROCEDURE — 84443 ASSAY THYROID STIM HORMONE: CPT | Performed by: INTERNAL MEDICINE

## 2022-04-11 RX ADMIN — ISOSORBIDE MONONITRATE 30 MG: 30 TABLET, EXTENDED RELEASE ORAL at 08:29

## 2022-04-11 RX ADMIN — TERAZOSIN HYDROCHLORIDE 5 MG: 5 CAPSULE ORAL at 21:00

## 2022-04-11 RX ADMIN — LEVETIRACETAM 500 MG: 500 TABLET, FILM COATED ORAL at 08:29

## 2022-04-11 RX ADMIN — LEVETIRACETAM 500 MG: 500 TABLET, FILM COATED ORAL at 21:00

## 2022-04-11 RX ADMIN — PANTOPRAZOLE SODIUM 40 MG: 40 TABLET, DELAYED RELEASE ORAL at 08:29

## 2022-04-11 RX ADMIN — LISINOPRIL 20 MG: 20 TABLET ORAL at 21:00

## 2022-04-11 RX ADMIN — VERAPAMIL HYDROCHLORIDE 240 MG: 240 TABLET, FILM COATED, EXTENDED RELEASE ORAL at 20:59

## 2022-04-11 RX ADMIN — ATORVASTATIN CALCIUM 10 MG: 10 TABLET, FILM COATED ORAL at 21:00

## 2022-04-11 RX ADMIN — VERAPAMIL HYDROCHLORIDE 240 MG: 240 TABLET, FILM COATED, EXTENDED RELEASE ORAL at 08:29

## 2022-04-11 RX ADMIN — DIVALPROEX SODIUM 500 MG: 500 TABLET, EXTENDED RELEASE ORAL at 08:29

## 2022-04-11 RX ADMIN — MELOXICAM 15 MG: 7.5 TABLET ORAL at 08:29

## 2022-04-11 RX ADMIN — DIVALPROEX SODIUM 1000 MG: 500 TABLET, EXTENDED RELEASE ORAL at 20:59

## 2022-04-11 RX ADMIN — LABETALOL 20 MG/4 ML (5 MG/ML) INTRAVENOUS SYRINGE 10 MG: at 23:35

## 2022-04-11 RX ADMIN — ASPIRIN 325 MG: 325 TABLET, COATED ORAL at 08:29

## 2022-04-11 RX ADMIN — LEVOTHYROXINE SODIUM 88 MCG: 88 TABLET ORAL at 05:06

## 2022-04-11 RX ADMIN — LISINOPRIL 20 MG: 20 TABLET ORAL at 08:29

## 2022-04-11 NOTE — PLAN OF CARE
Goal Outcome Evaluation:  Plan of Care Reviewed With: patient        Progress: no change  Outcome Evaluation: Pt still with sigificant confusion, difficulty sequencing, poor safety awareness requiring extra time and effort for mobility.  Pt able to ambulate 280', CGA, RW with chair follow, tendancy to look at floor and poor safety awareness, cues for increasing WB RLE, decreasing dependence on UEs.  R knee ROM 8-90 degrees

## 2022-04-11 NOTE — THERAPY TREATMENT NOTE
Patient Name: Lea Rivers  : 1945    MRN: [id number]                              Today's Date: 2022       Admit Date: 2022    Visit Dx:     ICD-10-CM ICD-9-CM   1. Primary osteoarthritis of both knees  M17.0 715.16     Patient Active Problem List   Diagnosis   • Coronary artery disease   • Dyslipidemia   • Hypothyroidism   • GERD (gastroesophageal reflux disease)   • Seizure disorder (HCC)   • BPH (benign prostatic hypertrophy)   • Hypertension   • Primary osteoarthritis of both knees   • Syncope and collapse   • Precordial pain   • Diabetes (HCC)   • Status post total right knee replacement     Past Medical History:   Diagnosis Date   • Colon cancer (HCC)     Status post partial colectomy in . No chemotherapy or radiation therapy.   • Coronary artery disease     Nonobstructive coronary artery disease.   • Diabetes (HCC)    • Dyslipidemia    • GERD (gastroesophageal reflux disease)     Hx of.   • Hearing aid worn    • Hyperlipidemia    • Hypertension    • Hypothyroidism    • Left shoulder pain    • Primary osteoarthritis of both knees    • Seizure disorder (HCC)    • Wears glasses      Past Surgical History:   Procedure Laterality Date   • APPENDECTOMY     • CARPAL TUNNEL RELEASE Bilateral    • CHOLECYSTECTOMY     • COLECTOMY PARTIAL / TOTAL      Partial colectomy   • COLONOSCOPY     • CYSTOSCOPY TRANSURETHRAL RESECTION OF PROSTATE N/A 2018    Procedure: CYSTOSCOPY TRANSURETHRAL RESECTION OF PROSTATE GREENLIGHT;  Surgeon: Naseem Clayton MD;  Location:  [facility];  Service: Urology   • OTHER SURGICAL HISTORY      Contraction surgery on bilateral hands and wrists.   • OTHER SURGICAL HISTORY      left and right hands   • SINUS SURGERY     • SKIN BIOPSY     • TEETH EXTRACTION     • TONSILLECTOMY     • TOTAL KNEE ARTHROPLASTY Right 2022    Procedure: TOTAL KNEE ARTHROPLASTY WITH ORTHO ROBOT RIGHT;  Surgeon: Louis Malcolm MD;  Location:  [facility];  Service:  Robotics - Ortho;  Laterality: Right;   • TURP / TRANSURETHRAL INCISION / DRAINAGE PROSTATE     • VASECTOMY        General Information     Row Name 04/11/22 1617 04/11/22 0931       Physical Therapy Time and Intention    Document Type therapy note (daily note)  -KG therapy note (daily note)  -KG    Mode of Treatment physical therapy;individual therapy  -KG physical therapy;individual therapy  -KG    Row Name 04/11/22 1617 04/11/22 0931       General Information    Patient Profile Reviewed yes  -KG yes  -KG    Existing Precautions/Restrictions fall;other (see comments)  [place], confusion  -KG fall;other (see comments)  [place], confusion, impulsive  -KG    Row Name 04/11/22 1617 04/11/22 0931       Cognition    Orientation Status (Cognition) oriented to;person;verbal cues/prompts needed for orientation;place;situation;time  -KG oriented to;person;verbal cues/prompts needed for orientation;place;situation;time  -KG    Row Name 04/11/22 1617 04/11/22 0931       Safety Issues, Functional Mobility    Impairments Affecting Function (Mobility) strength;balance;cognition;endurance/activity tolerance;postural/trunk control;pain;range of motion (ROM);motor planning  -KG strength;balance;cognition;endurance/activity tolerance;postural/trunk control;pain;range of motion (ROM);motor planning  -KG          User Key  (r) = Recorded By, (t) = Taken By, (c) = Cosigned By    Initials Name Provider Type    KG Rachelle Mcdermott Physical Therapist               Mobility     Row Name 04/11/22 1618 04/11/22 0932       Bed Mobility    Bed Mobility -- sit-supine  -KG    Scooting/Bridging [place] (Bed Mobility) -- moderate assist (50% patient effort);verbal cues  -KG    Supine-Sit [place] (Bed Mobility) -- moderate assist (50% patient effort)  -KG    Sit-Supine [place] (Bed Mobility) minimum assist (75% patient effort)  -KG --    Assistive Device (Bed Mobility) head of bed elevated;bed rails  -KG head of bed elevated;bed rails  -KG     Comment, (Bed Mobility) Improved to min A for sit to supine  -KG Pt with significant confusion with sequencing, would fluctuate between advancing to EOB, then would lay back down, required max sequencing cues  -KG    Row Name 04/11/22 1618 04/11/22 0932       Transfers    Comment, (Transfers) cues to slide RLE out prior to transfers, sequencing remains a challenge for patient  -KG VCs safe HP, slide RLE out prior to transfers, min-A and elevated bed height  -KG    Row Name 04/11/22 1618          Sit-Stand Transfer    Sit-Stand [place] (Transfers) verbal cues;minimum assist (75% patient effort)  -KG     Assistive Device (Sit-Stand Transfers) walker, front-wheeled  -KG     Row Name 04/11/22 1618 04/11/22 0932       Gait/Stairs (Locomotion)    [place] Level (Gait) verbal cues;contact guard  -KG verbal cues;contact guard  -KG    Assistive Device (Gait) walker, front-wheeled  -KG walker, front-wheeled  -KG    Distance in Feet (Gait) 300  -  -KG    Deviations/Abnormal Patterns (Gait) bilateral deviations;bisi decreased;gait speed decreased;stride length decreased;right sided deviations;antalgic  -KG bilateral deviations;bisi decreased;gait speed decreased;stride length decreased;right sided deviations;antalgic  -KG    Bilateral Gait Deviations forward flexed posture  -KG forward flexed posture  -KG    Right Sided Gait Deviations weight shift ability decreased  -KG weight shift ability decreased  -KG    Comment, (Gait/Stairs) Pt able to ambulate 300', RW, CGA with cues to look up, posture and decreasing UE dependence  -KG Pt able to ambulate 280', CGA, RW with chair follow, tendancy to look at floor and poor safety awareness, cues for increasing WB RLE, decreasing dependence on UEs.  -KG    Row Name 04/11/22 1618 04/11/22 0932       Mobility    Extremity Weight-bearing Status right lower extremity  -KG right lower extremity  -KG    Right Lower Extremity (Weight-bearing Status) weight-bearing as  tolerated (WBAT)  -KG weight-bearing as tolerated (WBAT)  -KG          User Key  (r) = Recorded By, (t) = Taken By, (c) = Cosigned By    Initials Name Provider Type    ERMELINDA Rachelle Mcdermott Physical Therapist               Obj/Interventions     Row Name 04/11/22 0937          Range of Motion Comprehensive    Comment, General Range of Motion 8-90 degrees  -KG     Row Name 04/11/22 1620 04/11/22 0937       Motor Skills    Therapeutic Exercise knee;ankle  -KG knee;ankle  -KG    Row Name 04/11/22 0937          Hip (Therapeutic Exercise)    Hip Isometrics (Therapeutic Exercise) bilateral;gluteal sets;10 repetitions  -KG     Row Name 04/11/22 1620 04/11/22 0937       Knee (Therapeutic Exercise)    Knee (Therapeutic Exercise) -- isometric exercises  -KG    Knee AROM (Therapeutic Exercise) bilateral;LAQ (long arc quad);SLR (straight leg raise);heel slides;15 repititions  -KG --    Knee Isometrics (Therapeutic Exercise) bilateral;quad sets;10 repetitions  -KG bilateral;quad sets;10 repetitions;2 sets  -KG    Knee Strengthening (Therapeutic Exercise) -- right;LAQ (long arc quad);marching while standing;heel slides;15 repititions  -KG    Row Name 04/11/22 1620 04/11/22 0937       Ankle (Therapeutic Exercise)    Ankle AROM (Therapeutic Exercise) bilateral;dorsiflexion;plantarflexion;10 repetitions  -KG bilateral;dorsiflexion;plantarflexion;20 repititions  -KG    Row Name 04/11/22 1620 04/11/22 0937       Balance    Dynamic Standing Balance minimal assist  -KG minimal assist  -KG    Position/Device Used, Standing Balance supported  -KG supported;walker, rolling  -KG    Balance Interventions standing;sit to stand;weight shifting activity  -KG standing;sit to stand;weight shifting activity  -KG          User Key  (r) = Recorded By, (t) = Taken By, (c) = Cosigned By    Initials Name Provider Type    ERMELINDA Rachelle Mcdermott Physical Therapist               Goals/Plan    No documentation.                Clinical Impression     Row Name  04/11/22 1621          Pain    Pretreatment Pain Rating 5/10  -KG     Posttreatment Pain Rating 5/10  -KG     Pain Location - Side/Orientation Right  -KG     Pain Location generalized  -KG     Pain Location - knee  -KG     Pain Intervention(s) Repositioned;Ambulation/increased activity  -KG     Row Name 04/11/22 0941          Pain Scale: FACES Pre/Post-Treatment    Pain: FACES Scale, Pretreatment 2-->hurts little bit  -KG     Posttreatment Pain Rating 2-->hurts little bit  -KG     Pain Location - Side/Orientation Right  -KG     Pain Location incisional  -KG     Pain Location - knee  -KG     Row Name 04/11/22 1621 04/11/22 0941       Plan of Care Review    Plan of Care Reviewed With patient  -KG patient  -KG    Progress no change  -KG no change  -KG    Outcome Evaluation Pt able to ambulate 300', RW, CGA with cues to look up, posture and decreasing UE dependence.  7-90 degrees ROM R knee.  Less confusion vs morning session, needs extra time for sequencing, delayed processing  -KG Pt still with sigificant confusion, difficulty sequencing, poor safety awareness requiring extra time and effort for mobility.  Pt able to ambulate 280', CGA, RW with chair follow, tendancy to look at floor and poor safety awareness, cues for increasing WB RLE, decreasing dependence on UEs.  R knee ROM 8-90 degrees  -KG    Row Name 04/11/22 1621 04/11/22 0941       Positioning and Restraints    Pre-Treatment Position sitting in chair/recliner  -KG --    Post Treatment Position bed  -KG --    In Bed notified nsg;fowlers;call light within reach;encouraged to call for assist;exit alarm on;with family/caregiver  -KG --    In Chair -- notified nsg;call light within reach;encouraged to call for assist;exit alarm on;with family/caregiver;legs elevated  -KG          User Key  (r) = Recorded By, (t) = Taken By, (c) = Cosigned By    Initials Name Provider Type    KG Rachelle Mcdermott Physical Therapist               Outcome Measures     Row Name  04/11/22 1623 04/11/22 0944       How much help from another person do you currently need...    Turning from your back to your side while in flat bed without using bedrails? 3  -KG 2  -KG    Moving from lying on back to sitting on the side of a flat bed without bedrails? 3  -KG 2  -KG    Moving to and from a bed to a chair (including a wheelchair)? 3  -KG 3  -KG    Standing up from a chair using your arms (e.g., wheelchair, bedside chair)? 3  -KG 3  -KG    Climbing 3-5 steps with a railing? 2  -KG 2  -KG    To walk in hospital room? 3  -KG 3  -KG    AM-PAC 6 Clicks Score (PT) 17  -KG 15  -KG    Row Name 04/11/22 0829          How much help from another person do you currently need...    Turning from your back to your side while in flat bed without using bedrails? 2  -GJ     Moving from lying on back to sitting on the side of a flat bed without bedrails? 2  -GJ     Moving to and from a bed to a chair (including a wheelchair)? 3  -GJ     Standing up from a chair using your arms (e.g., wheelchair, bedside chair)? 3  -GJ     Climbing 3-5 steps with a railing? 2  -GJ     To walk in hospital room? 3  -GJ     AM-PAC 6 Clicks Score (PT) 15  -GJ     Row Name 04/11/22 1623 04/11/22 0944       Functional Assessment    Outcome Measure Options AM-PAC 6 Clicks Basic Mobility (PT)  -KG AM-PAC 6 Clicks Basic Mobility (PT)  -KG          User Key  (r) = Recorded By, (t) = Taken By, (c) = Cosigned By    Initials Name Provider Type     Ana M Clayton, RN Registered Nurse    Rachelle Smith Physical Therapist                             Physical Therapy Education                 Title: PT OT SLP Therapies (In Progress)     Topic: Physical Therapy (In Progress)     Point: Mobility training (In Progress)     Learning Progress Summary           Patient Acceptance, E, NR by KG at 4/11/2022 1624    Acceptance, E, NR by KG at 4/11/2022 0944    Acceptance, E, VU,NR by LR at 4/10/2022 1551    Comment: Educated on precautions, weight  bearing status, correct sit to supine t/f technique, correct sit<->stand t/f technique, correct gait mechanics, HEP, safety with mobility, and progression of POC.    Acceptance, D,E, VU,NR by LR at 4/10/2022 1024    Comment: Educated on precautions, weight bearing status, safety with mobility, benefits of mobility, HEP, correct supine to sit t/f technique, correct sit<->stand t/f technique, correct gait mechanics, and progression of POC.    Acceptance, E,D, VU,NR by LR at 4/9/2022 1447    Comment: Educated on precautions, weight bearing status, correct supine to sit t/f technique, correct sit<->stand t/f technique, correct gait mechanics, HEP, and progression of POC.    Acceptance, E,D, VU,NR by LR at 4/9/2022 0839    Comment: Educated on precautions, weight bearing status, correct supine to sit t/f technique, correct sit<->stand t/f technique, correct gait mechanics, LE HEP, and progression of POC.    JEREMIAS Payne, VU,NR by SC at 4/8/2022 1456    Comment: reviewed safety wtih mobility    JEREMIAS Payne, VU by SC at 4/8/2022 0950    Comment: reviewed HEP and safety with mobility    Acceptance, E,CRISTIANA, VU by OLIVER at 4/7/2022 1300    Comment: Educated on safe sequencing with bed mobility, ambulatory transfers, and gait. Reviewed HEP and knee precautions.   Family Acceptance, E, VU,NR by LR at 4/10/2022 1551    Comment: Educated on precautions, weight bearing status, correct sit to supine t/f technique, correct sit<->stand t/f technique, correct gait mechanics, HEP, safety with mobility, and progression of POC.    Acceptance, D,E, VU,NR by LR at 4/10/2022 1024    Comment: Educated on precautions, weight bearing status, safety with mobility, benefits of mobility, HEP, correct supine to sit t/f technique, correct sit<->stand t/f technique, correct gait mechanics, and progression of POC.    Acceptance, E,D, VU,NR by LR at 4/9/2022 1447    Comment: Educated on precautions, weight bearing status, correct supine to sit t/f technique,  correct sit<->stand t/f technique, correct gait mechanics, HEP, and progression of POC.                   Point: Home exercise program (In Progress)     Learning Progress Summary           Patient Acceptance, E, NR by KG at 4/11/2022 1624    Acceptance, E, NR by KG at 4/11/2022 0944    Acceptance, E, VU,NR by LR at 4/10/2022 1551    Comment: Educated on precautions, weight bearing status, correct sit to supine t/f technique, correct sit<->stand t/f technique, correct gait mechanics, HEP, safety with mobility, and progression of POC.    Acceptance, D,E, VU,NR by LR at 4/10/2022 1024    Comment: Educated on precautions, weight bearing status, safety with mobility, benefits of mobility, HEP, correct supine to sit t/f technique, correct sit<->stand t/f technique, correct gait mechanics, and progression of POC.    Acceptance, E,D, VU,NR by LR at 4/9/2022 1447    Comment: Educated on precautions, weight bearing status, correct supine to sit t/f technique, correct sit<->stand t/f technique, correct gait mechanics, HEP, and progression of POC.    Acceptance, E,D, VU,NR by LR at 4/9/2022 0839    Comment: Educated on precautions, weight bearing status, correct supine to sit t/f technique, correct sit<->stand t/f technique, correct gait mechanics, LE HEP, and progression of POC.    JEREMIAS Payne, VU,NR by SC at 4/8/2022 1456    Comment: reviewed safety wtih mobility    JEREMIAS Payne, VU by SC at 4/8/2022 0950    Comment: reviewed HEP and safety with mobility    Acceptance, E,D, VU by OLIVER at 4/7/2022 1300    Comment: Educated on safe sequencing with bed mobility, ambulatory transfers, and gait. Reviewed HEP and knee precautions.   Family Acceptance, E, VU,NR by LR at 4/10/2022 1551    Comment: Educated on precautions, weight bearing status, correct sit to supine t/f technique, correct sit<->stand t/f technique, correct gait mechanics, HEP, safety with mobility, and progression of POC.    Acceptance, D,E, VU,NR by LR at 4/10/2022 1024     Comment: Educated on precautions, weight bearing status, safety with mobility, benefits of mobility, HEP, correct supine to sit t/f technique, correct sit<->stand t/f technique, correct gait mechanics, and progression of POC.    Acceptance, E,D, VU,NR by LR at 4/9/2022 1447    Comment: Educated on precautions, weight bearing status, correct supine to sit t/f technique, correct sit<->stand t/f technique, correct gait mechanics, HEP, and progression of POC.                   Point: Body mechanics (In Progress)     Learning Progress Summary           Patient Acceptance, E, NR by KG at 4/11/2022 1624    Acceptance, E, NR by KG at 4/11/2022 0944    Acceptance, E, VU,NR by LR at 4/10/2022 1551    Comment: Educated on precautions, weight bearing status, correct sit to supine t/f technique, correct sit<->stand t/f technique, correct gait mechanics, HEP, safety with mobility, and progression of POC.    Acceptance, D,E, VU,NR by LR at 4/10/2022 1024    Comment: Educated on precautions, weight bearing status, safety with mobility, benefits of mobility, HEP, correct supine to sit t/f technique, correct sit<->stand t/f technique, correct gait mechanics, and progression of POC.    Acceptance, E,D, VU,NR by LR at 4/9/2022 1447    Comment: Educated on precautions, weight bearing status, correct supine to sit t/f technique, correct sit<->stand t/f technique, correct gait mechanics, HEP, and progression of POC.    Acceptance, E,D, VU,NR by LR at 4/9/2022 0839    Comment: Educated on precautions, weight bearing status, correct supine to sit t/f technique, correct sit<->stand t/f technique, correct gait mechanics, LE HEP, and progression of POC.    JEREMIAS Payne, VU,NR by SC at 4/8/2022 1456    Comment: reviewed safety wtih mobility    JEREMIAS Payne, VU by SC at 4/8/2022 0950    Comment: reviewed HEP and safety with mobility    Acceptance, E,D, VU by OLIVER at 4/7/2022 1300    Comment: Educated on safe sequencing with bed mobility, ambulatory  transfers, and gait. Reviewed HEP and knee precautions.   Family Acceptance, E, VU,NR by LR at 4/10/2022 1551    Comment: Educated on precautions, weight bearing status, correct sit to supine t/f technique, correct sit<->stand t/f technique, correct gait mechanics, HEP, safety with mobility, and progression of POC.    Acceptance, D,E, VU,NR by LR at 4/10/2022 1024    Comment: Educated on precautions, weight bearing status, safety with mobility, benefits of mobility, HEP, correct supine to sit t/f technique, correct sit<->stand t/f technique, correct gait mechanics, and progression of POC.    Acceptance, E,D, VU,NR by LR at 4/9/2022 1447    Comment: Educated on precautions, weight bearing status, correct supine to sit t/f technique, correct sit<->stand t/f technique, correct gait mechanics, HEP, and progression of POC.                   Point: Precautions (In Progress)     Learning Progress Summary           Patient Acceptance, E, NR by KG at 4/11/2022 1624    Acceptance, E, NR by KG at 4/11/2022 0944    Acceptance, E, VU,NR by LR at 4/10/2022 1551    Comment: Educated on precautions, weight bearing status, correct sit to supine t/f technique, correct sit<->stand t/f technique, correct gait mechanics, HEP, safety with mobility, and progression of POC.    Acceptance, D,E, VU,NR by LR at 4/10/2022 1024    Comment: Educated on precautions, weight bearing status, safety with mobility, benefits of mobility, HEP, correct supine to sit t/f technique, correct sit<->stand t/f technique, correct gait mechanics, and progression of POC.    Acceptance, E,D, VU,NR by LR at 4/9/2022 1447    Comment: Educated on precautions, weight bearing status, correct supine to sit t/f technique, correct sit<->stand t/f technique, correct gait mechanics, HEP, and progression of POC.    Acceptance, E,D, VU,NR by LR at 4/9/2022 0839    Comment: Educated on precautions, weight bearing status, correct supine to sit t/f technique, correct sit<->stand  t/f technique, correct gait mechanics, LE HEP, and progression of POC.    Elmer, JEREMIAS, VU,NR by SC at 4/8/2022 1456    Comment: reviewed safety wtih mobility    Elmer, JEREMIAS, VU by SC at 4/8/2022 0950    Comment: reviewed HEP and safety with mobility    Acceptance, E,D, VU by OLIVER at 4/7/2022 1300    Comment: Educated on safe sequencing with bed mobility, ambulatory transfers, and gait. Reviewed HEP and knee precautions.   Family Acceptance, E, VU,NR by LR at 4/10/2022 1551    Comment: Educated on precautions, weight bearing status, correct sit to supine t/f technique, correct sit<->stand t/f technique, correct gait mechanics, HEP, safety with mobility, and progression of POC.    Acceptance, D,E, VU,NR by LR at 4/10/2022 1024    Comment: Educated on precautions, weight bearing status, safety with mobility, benefits of mobility, HEP, correct supine to sit t/f technique, correct sit<->stand t/f technique, correct gait mechanics, and progression of POC.    Acceptance, E,D, VU,NR by LR at 4/9/2022 1447    Comment: Educated on precautions, weight bearing status, correct supine to sit t/f technique, correct sit<->stand t/f technique, correct gait mechanics, HEP, and progression of POC.                               User Key     Initials Effective Dates Name Provider Type Discipline    SC 06/16/21 -  Hernesto Carias, PT Physical Therapist PT    LR 06/16/21 -  Judith James, PT Physical Therapist PT    KG 06/16/21 -  Rachelle Mcdermott Physical Therapist PT    OLIVER 06/16/21 -  Rolando Mobley, PT Physical Therapist PT              PT Recommendation and Plan     Plan of Care Reviewed With: patient  Progress: no change  Outcome Evaluation: Pt able to ambulate 300', RW, CGA with cues to look up, posture and decreasing UE dependence.  7-90 degrees ROM R knee.  Less confusion vs morning session, needs extra time for sequencing, delayed processing     Time Calculation:    PT Charges     Row Name 04/11/22 1624 04/11/22 0945          Time  Calculation    Start Time 1520  -KG 0830  -KG     PT Received On 04/11/22  -KG 04/11/22  -KG     PT Goal Re-Cert Due Date 04/17/22  -KG 04/17/22  -KG            Time Calculation- PT    Total Timed Code Minutes- PT 38 minute(s)  -KG 53 minute(s)  -KG            Timed Charges    35297 - PT Therapeutic Exercise Minutes 15  -KG 23  -KG     37190 - Gait Training Minutes  15  -KG 15  -KG     78732 - PT Therapeutic Activity Minutes 8  -KG 15  -KG            Total Minutes    Timed Charges Total Minutes 38  -KG 53  -KG      Total Minutes 38  -KG 53  -KG           User Key  (r) = Recorded By, (t) = Taken By, (c) = Cosigned By    Initials Name Provider Type    KG Rachelle Mcdermott Physical Therapist              Therapy Charges for Today     Code Description Service Date Service Provider Modifiers Qty    98422097250 HC PT THER PROC EA 15 MIN 4/11/2022 Rachelle Mcdermott GP 2    00658539529 HC GAIT TRAINING EA 15 MIN 4/11/2022 Rachelle Mcdermott GP 1    69499112380 HC PT THERAPEUTIC ACT EA 15 MIN 4/11/2022 Rachelle Mcdermott GP 1    45177078889 HC PT THER PROC EA 15 MIN 4/11/2022 Rachelle Mcdermott GP 1    42796799096 HC GAIT TRAINING EA 15 MIN 4/11/2022 Rachelle Mcdermott GP 1    61097172355 HC PT THERAPEUTIC ACT EA 15 MIN 4/11/2022 Rachelle Mcdermott GP 1          PT G-Codes  Outcome Measure Options: AM-PAC 6 Clicks Basic Mobility (PT)  AM-PAC 6 Clicks Score (PT): 17  AM-PAC 6 Clicks Score (OT): 16    Rachelle Mcdermott  4/11/2022

## 2022-04-11 NOTE — THERAPY TREATMENT NOTE
Patient Name: Lea Rivers  : 1945    MRN: [id number]                              Today's Date: 2022       Admit Date: 2022    Visit Dx:     ICD-10-CM ICD-9-CM   1. Primary osteoarthritis of both knees  M17.0 715.16     Patient Active Problem List   Diagnosis   • Coronary artery disease   • Dyslipidemia   • Hypothyroidism   • GERD (gastroesophageal reflux disease)   • Seizure disorder (HCC)   • BPH (benign prostatic hypertrophy)   • Hypertension   • Primary osteoarthritis of both knees   • Syncope and collapse   • Precordial pain   • Diabetes (HCC)   • Status post total right knee replacement     Past Medical History:   Diagnosis Date   • Colon cancer (HCC)     Status post partial colectomy in . No chemotherapy or radiation therapy.   • Coronary artery disease     Nonobstructive coronary artery disease.   • Diabetes (HCC)    • Dyslipidemia    • GERD (gastroesophageal reflux disease)     Hx of.   • Hearing aid worn    • Hyperlipidemia    • Hypertension    • Hypothyroidism    • Left shoulder pain    • Primary osteoarthritis of both knees    • Seizure disorder (HCC)    • Wears glasses      Past Surgical History:   Procedure Laterality Date   • APPENDECTOMY     • CARPAL TUNNEL RELEASE Bilateral    • CHOLECYSTECTOMY     • COLECTOMY PARTIAL / TOTAL      Partial colectomy   • COLONOSCOPY     • CYSTOSCOPY TRANSURETHRAL RESECTION OF PROSTATE N/A 2018    Procedure: CYSTOSCOPY TRANSURETHRAL RESECTION OF PROSTATE GREENLIGHT;  Surgeon: Naseem Clayton MD;  Location:  [facility] OR;  Service: Urology   • OTHER SURGICAL HISTORY      Contraction surgery on bilateral hands and wrists.   • OTHER SURGICAL HISTORY      left and right hands   • SINUS SURGERY     • SKIN BIOPSY     • TEETH EXTRACTION     • TONSILLECTOMY     • TOTAL KNEE ARTHROPLASTY Right 2022    Procedure: TOTAL KNEE ARTHROPLASTY WITH ORTHO ROBOT RIGHT;  Surgeon: Louis Malcolm MD;  Location:  [facility] OR;  Service:  Robotics - Ortho;  Laterality: Right;   • TURP / TRANSURETHRAL INCISION / DRAINAGE PROSTATE     • VASECTOMY        General Information     Row Name 04/11/22 0931          Physical Therapy Time and Intention    Document Type therapy note (daily note)  -KG     Mode of Treatment physical therapy;individual therapy  -KG     Row Name 04/11/22 0931          General Information    Patient Profile Reviewed yes  -KG     Existing Precautions/Restrictions fall;other (see comments)  [place], confusion, impulsive  -KG     Row Name 04/11/22 0931          Cognition    Orientation Status (Cognition) oriented to;person;verbal cues/prompts needed for orientation;place;situation;time  -KG     Row Name 04/11/22 0931          Safety Issues, Functional Mobility    Impairments Affecting Function (Mobility) strength;balance;cognition;endurance/activity tolerance;postural/trunk control;pain;range of motion (ROM);motor planning  -KG           User Key  (r) = Recorded By, (t) = Taken By, (c) = Cosigned By    Initials Name Provider Type    KG Rachelle Mcdermott Physical Therapist               Mobility     Row Name 04/11/22 0932          Bed Mobility    Bed Mobility sit-supine  -KG     Scooting/Bridging [place] (Bed Mobility) moderate assist (50% patient effort);verbal cues  -KG     Supine-Sit [place] (Bed Mobility) moderate assist (50% patient effort)  -KG     Assistive Device (Bed Mobility) head of bed elevated;bed rails  -KG     Comment, (Bed Mobility) Pt with significant confusion with sequencing, would fluctuate between advancing to EOB, then would lay back down, required max sequencing cues  -KG     Row Name 04/11/22 0932          Transfers    Comment, (Transfers) VCs safe HP, slide RLE out prior to transfers, min-A and elevated bed height  -KG     Row Name 04/11/22 0932          Gait/Stairs (Locomotion)    [place] Level (Gait) verbal cues;contact guard  -KG     Assistive Device (Gait) walker, front-wheeled  -KG     Distance in  Feet (Gait) 250  -KG     Deviations/Abnormal Patterns (Gait) bilateral deviations;bisi decreased;gait speed decreased;stride length decreased;right sided deviations;antalgic  -KG     Bilateral Gait Deviations forward flexed posture  -KG     Right Sided Gait Deviations weight shift ability decreased  -KG     Comment, (Gait/Stairs) Pt able to ambulate 280', CGA, RW with chair follow, tendancy to look at floor and poor safety awareness, cues for increasing WB RLE, decreasing dependence on UEs.  -KG     Row Name 04/11/22 0932          Mobility    Extremity Weight-bearing Status right lower extremity  -KG     Right Lower Extremity (Weight-bearing Status) weight-bearing as tolerated (WBAT)  -KG           User Key  (r) = Recorded By, (t) = Taken By, (c) = Cosigned By    Initials Name Provider Type    KG Rachelle Mcdermott Physical Therapist               Obj/Interventions     Row Name 04/11/22 0937          Range of Motion Comprehensive    Comment, General Range of Motion 8-90 degrees  -KG     [facility] Name 04/11/22 0937          Motor Skills    Therapeutic Exercise knee;ankle  -KG     [facility] Name 04/11/22 0937          Hip (Therapeutic Exercise)    Hip Isometrics (Therapeutic Exercise) bilateral;gluteal sets;10 repetitions  -KG     [facility] Name 04/11/22 0937          Knee (Therapeutic Exercise)    Knee (Therapeutic Exercise) isometric exercises  -KG     Knee Isometrics (Therapeutic Exercise) bilateral;quad sets;10 repetitions;2 sets  -KG     Knee Strengthening (Therapeutic Exercise) right;LAQ (long arc quad);marching while standing;heel slides;15 repititions  -KG     [facility] Name 04/11/22 0937          Ankle (Therapeutic Exercise)    Ankle AROM (Therapeutic Exercise) bilateral;dorsiflexion;plantarflexion;20 repititions  -KG     [facility] Name 04/11/22 0937          Balance    Dynamic Standing Balance minimal assist  -KG     Position/Device Used, Standing Balance supported;walker, rolling  -KG     Balance Interventions standing;sit to  stand;weight shifting activity  -KG           User Key  (r) = Recorded By, (t) = Taken By, (c) = Cosigned By    Initials Name Provider Type    Rachelle Smith Physical Therapist               Goals/Plan    No documentation.                Clinical Impression     Row Name 04/11/22 0941          Pain Scale: FACES Pre/Post-Treatment    Pain: FACES Scale, Pretreatment 2-->hurts little bit  -KG     Posttreatment Pain Rating 2-->hurts little bit  -KG     Pain Location - Side/Orientation Right  -KG     Pain Location incisional  -KG     Pain Location - knee  -KG     Row Name 04/11/22 0941          Plan of Care Review    Plan of Care Reviewed With patient  -KG     Progress no change  -KG     Outcome Evaluation Pt still with sigificant confusion, difficulty sequencing, poor safety awareness requiring extra time and effort for mobility.  Pt able to ambulate 280', CGA, RW with chair follow, tendancy to look at floor and poor safety awareness, cues for increasing WB RLE, decreasing dependence on UEs.  R knee ROM 8-90 degrees  -KG     Row Name 04/11/22 0941          Positioning and Restraints    In Chair notified nsg;call light within reach;encouraged to call for assist;exit alarm on;with family/caregiver;legs elevated  -KG           User Key  (r) = Recorded By, (t) = Taken By, (c) = Cosigned By    Initials Name Provider Type    Rachelle Smith Physical Therapist               Outcome Measures     Row Name 04/11/22 0944 04/11/22 0829       How much help from another person do you currently need...    Turning from your back to your side while in flat bed without using bedrails? 2  -KG 2  -GJ    Moving from lying on back to sitting on the side of a flat bed without bedrails? 2  -KG 2  -GJ    Moving to and from a bed to a chair (including a wheelchair)? 3  -KG 3  -GJ    Standing up from a chair using your arms (e.g., wheelchair, bedside chair)? 3  -KG 3  -GJ    Climbing 3-5 steps with a railing? 2  -KG 2  -GJ    To walk in  hospital room? 3  -KG 3  -GJ    AM-PAC 6 Clicks Score (PT) 15  -KG 15  -GJ    Row Name 04/11/22 0944          Functional Assessment    Outcome Measure Options AM-PAC 6 Clicks Basic Mobility (PT)  -KG           User Key  (r) = Recorded By, (t) = Taken By, (c) = Cosigned By    Initials Name Provider Type    GJ Ana M Clayton RN Registered Nurse    Rachelle Smith Physical Therapist                             Physical Therapy Education                 Title: PT OT SLP Therapies (In Progress)     Topic: Physical Therapy (In Progress)     Point: Mobility training (In Progress)     Learning Progress Summary           Patient Acceptance, E, NR by KG at 4/11/2022 0944    Acceptance, E, VU,NR by LR at 4/10/2022 1551    Comment: Educated on precautions, weight bearing status, correct sit to supine t/f technique, correct sit<->stand t/f technique, correct gait mechanics, HEP, safety with mobility, and progression of POC.    Acceptance, D,E, VU,NR by LR at 4/10/2022 1024    Comment: Educated on precautions, weight bearing status, safety with mobility, benefits of mobility, HEP, correct supine to sit t/f technique, correct sit<->stand t/f technique, correct gait mechanics, and progression of POC.    Acceptance, E,D, VU,NR by LR at 4/9/2022 1447    Comment: Educated on precautions, weight bearing status, correct supine to sit t/f technique, correct sit<->stand t/f technique, correct gait mechanics, HEP, and progression of POC.    Acceptance, E,D, VU,NR by LR at 4/9/2022 0839    Comment: Educated on precautions, weight bearing status, correct supine to sit t/f technique, correct sit<->stand t/f technique, correct gait mechanics, LE HEP, and progression of POC.    Elmer, JEREMIAS, VU,NR by SC at 4/8/2022 1456    Comment: reviewed safety wtih mobility    JEREMIAS Payne, VU by SC at 4/8/2022 0950    Comment: reviewed HEP and safety with mobility    Acceptance, E,D, VU by OLIVER at 4/7/2022 1300    Comment: Educated on safe sequencing with  bed mobility, ambulatory transfers, and gait. Reviewed HEP and knee precautions.   Family Acceptance, E, VU,NR by LR at 4/10/2022 1551    Comment: Educated on precautions, weight bearing status, correct sit to supine t/f technique, correct sit<->stand t/f technique, correct gait mechanics, HEP, safety with mobility, and progression of POC.    Acceptance, D,E, VU,NR by LR at 4/10/2022 1024    Comment: Educated on precautions, weight bearing status, safety with mobility, benefits of mobility, HEP, correct supine to sit t/f technique, correct sit<->stand t/f technique, correct gait mechanics, and progression of POC.    Acceptance, E,D, VU,NR by LR at 4/9/2022 1447    Comment: Educated on precautions, weight bearing status, correct supine to sit t/f technique, correct sit<->stand t/f technique, correct gait mechanics, HEP, and progression of POC.                   Point: Home exercise program (In Progress)     Learning Progress Summary           Patient Acceptance, E, NR by KG at 4/11/2022 0944    Acceptance, E, VU,NR by LR at 4/10/2022 1551    Comment: Educated on precautions, weight bearing status, correct sit to supine t/f technique, correct sit<->stand t/f technique, correct gait mechanics, HEP, safety with mobility, and progression of POC.    Acceptance, D,E, VU,NR by LR at 4/10/2022 1024    Comment: Educated on precautions, weight bearing status, safety with mobility, benefits of mobility, HEP, correct supine to sit t/f technique, correct sit<->stand t/f technique, correct gait mechanics, and progression of POC.    Acceptance, E,D, VU,NR by LR at 4/9/2022 1447    Comment: Educated on precautions, weight bearing status, correct supine to sit t/f technique, correct sit<->stand t/f technique, correct gait mechanics, HEP, and progression of POC.    Acceptance, E,D, VU,NR by LR at 4/9/2022 0839    Comment: Educated on precautions, weight bearing status, correct supine to sit t/f technique, correct sit<->stand t/f  technique, correct gait mechanics, LE HEP, and progression of POC.    Eager, E, VU,NR by SC at 4/8/2022 1456    Comment: reviewed safety wtih mobility    Elmer, E, VU by SC at 4/8/2022 0950    Comment: reviewed HEP and safety with mobility    Acceptance, E,D, VU by OLIVER at 4/7/2022 1300    Comment: Educated on safe sequencing with bed mobility, ambulatory transfers, and gait. Reviewed HEP and knee precautions.   Family Acceptance, E, VU,NR by LR at 4/10/2022 1551    Comment: Educated on precautions, weight bearing status, correct sit to supine t/f technique, correct sit<->stand t/f technique, correct gait mechanics, HEP, safety with mobility, and progression of POC.    Acceptance, D,E, VU,NR by LR at 4/10/2022 1024    Comment: Educated on precautions, weight bearing status, safety with mobility, benefits of mobility, HEP, correct supine to sit t/f technique, correct sit<->stand t/f technique, correct gait mechanics, and progression of POC.    Acceptance, E,D, VU,NR by LR at 4/9/2022 1447    Comment: Educated on precautions, weight bearing status, correct supine to sit t/f technique, correct sit<->stand t/f technique, correct gait mechanics, HEP, and progression of POC.                   Point: Body mechanics (In Progress)     Learning Progress Summary           Patient Acceptance, E, NR by KG at 4/11/2022 0944    Acceptance, E, VU,NR by LR at 4/10/2022 1551    Comment: Educated on precautions, weight bearing status, correct sit to supine t/f technique, correct sit<->stand t/f technique, correct gait mechanics, HEP, safety with mobility, and progression of POC.    Acceptance, D,E, VU,NR by LR at 4/10/2022 1024    Comment: Educated on precautions, weight bearing status, safety with mobility, benefits of mobility, HEP, correct supine to sit t/f technique, correct sit<->stand t/f technique, correct gait mechanics, and progression of POC.    Acceptance, E,D, VU,NR by LR at 4/9/2022 1447    Comment: Educated on precautions,  weight bearing status, correct supine to sit t/f technique, correct sit<->stand t/f technique, correct gait mechanics, HEP, and progression of POC.    Acceptance, E,D, VU,NR by LR at 4/9/2022 0839    Comment: Educated on precautions, weight bearing status, correct supine to sit t/f technique, correct sit<->stand t/f technique, correct gait mechanics, LE HEP, and progression of POC.    Eager, E, VU,NR by SC at 4/8/2022 1456    Comment: reviewed safety wtih mobility    Eager, E, VU by SC at 4/8/2022 0950    Comment: reviewed HEP and safety with mobility    Acceptance, E,D, VU by OLIVER at 4/7/2022 1300    Comment: Educated on safe sequencing with bed mobility, ambulatory transfers, and gait. Reviewed HEP and knee precautions.   Family Acceptance, E, VU,NR by LR at 4/10/2022 1551    Comment: Educated on precautions, weight bearing status, correct sit to supine t/f technique, correct sit<->stand t/f technique, correct gait mechanics, HEP, safety with mobility, and progression of POC.    Acceptance, D,E, VU,NR by LR at 4/10/2022 1024    Comment: Educated on precautions, weight bearing status, safety with mobility, benefits of mobility, HEP, correct supine to sit t/f technique, correct sit<->stand t/f technique, correct gait mechanics, and progression of POC.    Acceptance, E,D, VU,NR by LR at 4/9/2022 1447    Comment: Educated on precautions, weight bearing status, correct supine to sit t/f technique, correct sit<->stand t/f technique, correct gait mechanics, HEP, and progression of POC.                   Point: Precautions (In Progress)     Learning Progress Summary           Patient Acceptance, E, NR by KG at 4/11/2022 0944    Acceptance, E, VU,NR by LR at 4/10/2022 1551    Comment: Educated on precautions, weight bearing status, correct sit to supine t/f technique, correct sit<->stand t/f technique, correct gait mechanics, HEP, safety with mobility, and progression of POC.    Acceptance, D,E, VU,NR by LR at 4/10/2022 1024     Comment: Educated on precautions, weight bearing status, safety with mobility, benefits of mobility, HEP, correct supine to sit t/f technique, correct sit<->stand t/f technique, correct gait mechanics, and progression of POC.    Acceptance, E,D, VU,NR by LR at 4/9/2022 1447    Comment: Educated on precautions, weight bearing status, correct supine to sit t/f technique, correct sit<->stand t/f technique, correct gait mechanics, HEP, and progression of POC.    Acceptance, E,D, VU,NR by LR at 4/9/2022 0839    Comment: Educated on precautions, weight bearing status, correct supine to sit t/f technique, correct sit<->stand t/f technique, correct gait mechanics, LE HEP, and progression of POC.    Elmer, JEREMIAS, VU,NR by SC at 4/8/2022 1456    Comment: reviewed safety wtih mobility    JEREMIAS Payne, VU by SC at 4/8/2022 0950    Comment: reviewed HEP and safety with mobility    Acceptance, E,D, VU by OLIVER at 4/7/2022 1300    Comment: Educated on safe sequencing with bed mobility, ambulatory transfers, and gait. Reviewed HEP and knee precautions.   Family Acceptance, E, VU,NR by LR at 4/10/2022 1551    Comment: Educated on precautions, weight bearing status, correct sit to supine t/f technique, correct sit<->stand t/f technique, correct gait mechanics, HEP, safety with mobility, and progression of POC.    Acceptance, D,E, VU,NR by LR at 4/10/2022 1024    Comment: Educated on precautions, weight bearing status, safety with mobility, benefits of mobility, HEP, correct supine to sit t/f technique, correct sit<->stand t/f technique, correct gait mechanics, and progression of POC.    Acceptance, E,D, VU,NR by LR at 4/9/2022 1447    Comment: Educated on precautions, weight bearing status, correct supine to sit t/f technique, correct sit<->stand t/f technique, correct gait mechanics, HEP, and progression of POC.                               User Key     Initials Effective Dates Name Provider Type Discipline    SC 06/16/21 -  Hernesto Carias  PARISH, PT Physical Therapist PT    LR 06/16/21 -  Judith James, PT Physical Therapist PT    KG 06/16/21 -  Rachelle Mcdermott Physical Therapist PT    OLIVER 06/16/21 -  Rolando Mobley, PT Physical Therapist PT              PT Recommendation and Plan     Plan of Care Reviewed With: patient  Progress: no change  Outcome Evaluation: Pt still with sigificant confusion, difficulty sequencing, poor safety awareness requiring extra time and effort for mobility.  Pt able to ambulate 280', CGA, RW with chair follow, tendancy to look at floor and poor safety awareness, cues for increasing WB RLE, decreasing dependence on UEs.  R knee ROM 8-90 degrees     Time Calculation:    PT Charges     Row Name 04/11/22 0945             Time Calculation    Start Time 0830  -KG      PT Received On 04/11/22  -KG      PT Goal Re-Cert Due Date 04/17/22  -KG              Time Calculation- PT    Total Timed Code Minutes- PT 53 minute(s)  -KG              Timed Charges    69538 - PT Therapeutic Exercise Minutes 23  -KG      96943 - Gait Training Minutes  15  -KG      75732 - PT Therapeutic Activity Minutes 15  -KG              Total Minutes    Timed Charges Total Minutes 53  -KG       Total Minutes 53  -KG            User Key  (r) = Recorded By, (t) = Taken By, (c) = Cosigned By    Initials Name Provider Type    KG Rachelle Mcdermott Physical Therapist              Therapy Charges for Today     Code Description Service Date Service Provider Modifiers Qty    09607968346 HC PT THER PROC EA 15 MIN 4/11/2022 Rachelle Mcdermott GP 2    34483732216 HC GAIT TRAINING EA 15 MIN 4/11/2022 Rachelle Mcdermott GP 1    99163021600 HC PT THERAPEUTIC ACT EA 15 MIN 4/11/2022 Rachelle Mcdermott GP 1          PT G-Codes  Outcome Measure Options: AM-PAC 6 Clicks Basic Mobility (PT)  AM-PAC 6 Clicks Score (PT): 15  AM-PAC 6 Clicks Score (OT): 16    Rachelle Mcdermott  4/11/2022

## 2022-04-11 NOTE — PLAN OF CARE
Problem: Adult Inpatient Plan of Care  Goal: Plan of Care Review  Outcome: Ongoing, Progressing  Flowsheets  Taken 4/11/2022 1645 by Ana M Clayton RN  Progress: no change  Taken 4/11/2022 1621 by Rachelle Mcdermott  Plan of Care Reviewed With: patient  Goal: Patient-Specific Goal (Individualized)  Outcome: Ongoing, Progressing  Goal: Absence of Hospital-Acquired Illness or Injury  Outcome: Ongoing, Progressing  Intervention: Identify and Manage Fall Risk  Recent Flowsheet Documentation  Taken 4/11/2022 1600 by Ana M Clayton RN  Safety Promotion/Fall Prevention:   activity supervised   assistive device/personal items within reach   clutter free environment maintained   room organization consistent   safety round/check completed   toileting scheduled  Taken 4/11/2022 1400 by Ana M Clayton RN  Safety Promotion/Fall Prevention:   activity supervised   assistive device/personal items within reach   clutter free environment maintained   room organization consistent   safety round/check completed   toileting scheduled  Taken 4/11/2022 1200 by Ana M Clayton RN  Safety Promotion/Fall Prevention:   activity supervised   assistive device/personal items within reach   clutter free environment maintained   room organization consistent   safety round/check completed   toileting scheduled  Taken 4/11/2022 1000 by Ana M Clayton RN  Safety Promotion/Fall Prevention:   activity supervised   assistive device/personal items within reach   clutter free environment maintained   room organization consistent   safety round/check completed   toileting scheduled  Taken 4/11/2022 0829 by Ana M Clayton RN  Safety Promotion/Fall Prevention:   activity supervised   assistive device/personal items within reach   clutter free environment maintained   room organization consistent   safety round/check completed   toileting scheduled  Intervention: Prevent Skin Injury  Recent Flowsheet Documentation  Taken 4/11/2022 1600 by  Ana M Clayton RN  Body Position: sitting up in bed  Taken 4/11/2022 1400 by Ana M Clayton RN  Body Position: legs elevated  Taken 4/11/2022 1200 by Ana M Clayton RN  Body Position: legs elevated  Taken 4/11/2022 1000 by Ana M Clayton RN  Body Position: legs elevated  Taken 4/11/2022 0829 by Ana M Clayton RN  Body Position: position changed independently  Intervention: Prevent and Manage VTE (Venous Thromboembolism) Risk  Recent Flowsheet Documentation  Taken 4/11/2022 1600 by Ana M Clayton RN  Activity Management:   activity adjusted per tolerance   activity encouraged  VTE Prevention/Management:   bilateral   sequential compression devices off   patient refused intervention  Taken 4/11/2022 1400 by Ana M Clayton RN  Activity Management: up in chair  VTE Prevention/Management:   bilateral   sequential compression devices off   patient refused intervention  Taken 4/11/2022 1200 by Ana M Clayton RN  Activity Management: up in chair  VTE Prevention/Management:   bilateral   foot pump device off  Taken 4/11/2022 1000 by Ana M Clayton RN  Activity Management: up in chair  VTE Prevention/Management:   bilateral   foot pump device off  Taken 4/11/2022 0829 by Ana M Clayton RN  Activity Management:   activity adjusted per tolerance   activity encouraged  VTE Prevention/Management:   bilateral   foot pump device off  Intervention: Prevent Infection  Recent Flowsheet Documentation  Taken 4/11/2022 1600 by Ana M Clayton RN  Infection Prevention:   environmental surveillance performed   rest/sleep promoted  Taken 4/11/2022 1400 by Ana M Clayton RN  Infection Prevention:   environmental surveillance performed   rest/sleep promoted  Taken 4/11/2022 1200 by Ana M Clayton RN  Infection Prevention:   environmental surveillance performed   rest/sleep promoted  Taken 4/11/2022 1000 by Ana M Clayton RN  Infection Prevention:   environmental surveillance performed   rest/sleep  promoted  Taken 4/11/2022 0829 by Ana M Clayton RN  Infection Prevention:   environmental surveillance performed   rest/sleep promoted  Goal: Optimal Comfort and Wellbeing  Outcome: Ongoing, Progressing  Intervention: Provide Person-Centered Care  Recent Flowsheet Documentation  Taken 4/11/2022 1600 by Ana M Clayton RN  Trust Relationship/Rapport: care explained  Taken 4/11/2022 1200 by Ana M Clayton RN  Trust Relationship/Rapport: care explained  Taken 4/11/2022 0829 by Ana M Clayton RN  Trust Relationship/Rapport: care explained  Goal: Readiness for Transition of Care  Outcome: Ongoing, Progressing     Problem: Fall Injury Risk  Goal: Absence of Fall and Fall-Related Injury  Outcome: Ongoing, Progressing  Intervention: Identify and Manage Contributors  Recent Flowsheet Documentation  Taken 4/11/2022 1600 by Ana M Clayton RN  Medication Review/Management: medications reviewed  Taken 4/11/2022 1400 by Ana M Clayton RN  Medication Review/Management: medications reviewed  Taken 4/11/2022 1200 by Ana M Clayton RN  Medication Review/Management: medications reviewed  Taken 4/11/2022 1000 by Ana M Clayton RN  Medication Review/Management: medications reviewed  Taken 4/11/2022 0829 by Ana M Clayton RN  Medication Review/Management: medications reviewed  Intervention: Promote Injury-Free Environment  Recent Flowsheet Documentation  Taken 4/11/2022 1600 by Ana M Clayton RN  Safety Promotion/Fall Prevention:   activity supervised   assistive device/personal items within reach   clutter free environment maintained   room organization consistent   safety round/check completed   toileting scheduled  Taken 4/11/2022 1400 by Ana M Clayton RN  Safety Promotion/Fall Prevention:   activity supervised   assistive device/personal items within reach   clutter free environment maintained   room organization consistent   safety round/check completed   toileting scheduled  Taken 4/11/2022 1200 by  Forrest, Ana M K, RN  Safety Promotion/Fall Prevention:   activity supervised   assistive device/personal items within reach   clutter free environment maintained   room organization consistent   safety round/check completed   toileting scheduled  Taken 4/11/2022 1000 by Ana M Clayton RN  Safety Promotion/Fall Prevention:   activity supervised   assistive device/personal items within reach   clutter free environment maintained   room organization consistent   safety round/check completed   toileting scheduled  Taken 4/11/2022 0829 by Ana M Clayton RN  Safety Promotion/Fall Prevention:   activity supervised   assistive device/personal items within reach   clutter free environment maintained   room organization consistent   safety round/check completed   toileting scheduled     Problem: Adjustment to Surgery (Knee Arthroplasty)  Goal: Optimal Coping  Outcome: Ongoing, Progressing     Problem: Bleeding (Knee Arthroplasty)  Goal: Absence of Bleeding  Outcome: Ongoing, Progressing     Problem: Bowel Motility Impaired (Knee Arthroplasty)  Goal: Effective Bowel Elimination  Outcome: Ongoing, Progressing     Problem: Fluid and Electrolyte Imbalance (Knee Arthroplasty)  Goal: Fluid and Electrolyte Balance  Outcome: Ongoing, Progressing     Problem: Functional Ability Impaired (Knee Arthroplasty)  Goal: Optimal Functional Ability  Outcome: Ongoing, Progressing  Intervention: Promote Optimal Functional Status  Recent Flowsheet Documentation  Taken 4/11/2022 1600 by Ana M Clayton RN  Activity Management:   activity adjusted per tolerance   activity encouraged  Taken 4/11/2022 1400 by Ana M Clayton RN  Activity Management: up in chair  Taken 4/11/2022 1200 by Ana M Clayton RN  Activity Management: up in chair  Taken 4/11/2022 1000 by Ana M Clayton RN  Activity Management: up in chair  Taken 4/11/2022 0829 by Ana M Clayton RN  Activity Management:   activity adjusted per tolerance   activity  encouraged     Problem: Infection (Knee Arthroplasty)  Goal: Absence of Infection Signs and Symptoms  Outcome: Ongoing, Progressing  Intervention: Prevent or Manage Infection  Recent Flowsheet Documentation  Taken 4/11/2022 1600 by Ana M Clayton RN  Infection Prevention:   environmental surveillance performed   rest/sleep promoted  Taken 4/11/2022 1400 by Ana M Clayton RN  Infection Prevention:   environmental surveillance performed   rest/sleep promoted  Taken 4/11/2022 1200 by Ana M Clayton RN  Infection Prevention:   environmental surveillance performed   rest/sleep promoted  Taken 4/11/2022 1000 by Ana M Clayton RN  Infection Prevention:   environmental surveillance performed   rest/sleep promoted  Taken 4/11/2022 0829 by Ana M Clayton RN  Infection Prevention:   environmental surveillance performed   rest/sleep promoted     Problem: Neurovascular Compromise (Knee Arthroplasty)  Goal: Intact Neurovascular Status  Outcome: Ongoing, Progressing     Problem: Ongoing Anesthesia Effects (Knee Arthroplasty)  Goal: Anesthesia/Sedation Recovery  Outcome: Ongoing, Progressing  Intervention: Optimize Anesthesia Recovery  Recent Flowsheet Documentation  Taken 4/11/2022 1600 by Ana M Clayton RN  Safety Promotion/Fall Prevention:   activity supervised   assistive device/personal items within reach   clutter free environment maintained   room organization consistent   safety round/check completed   toileting scheduled  Taken 4/11/2022 1400 by Ana M Clayton RN  Safety Promotion/Fall Prevention:   activity supervised   assistive device/personal items within reach   clutter free environment maintained   room organization consistent   safety round/check completed   toileting scheduled  Taken 4/11/2022 1200 by Ana M Clayton RN  Safety Promotion/Fall Prevention:   activity supervised   assistive device/personal items within reach   clutter free environment maintained   room organization consistent    safety round/check completed   toileting scheduled  Administration (IS):   instruction provided, follow-up   self-administered  Taken 4/11/2022 1000 by Ana M Clayton RN  Safety Promotion/Fall Prevention:   activity supervised   assistive device/personal items within reach   clutter free environment maintained   room organization consistent   safety round/check completed   toileting scheduled  Taken 4/11/2022 0829 by Ana M Clayton RN  Safety Promotion/Fall Prevention:   activity supervised   assistive device/personal items within reach   clutter free environment maintained   room organization consistent   safety round/check completed   toileting scheduled  Administration (IS):   instruction provided, follow-up   self-administered     Problem: Pain (Knee Arthroplasty)  Goal: Acceptable Pain Control  Outcome: Ongoing, Progressing     Problem: Postoperative Nausea and Vomiting (Knee Arthroplasty)  Goal: Nausea and Vomiting Relief  Outcome: Ongoing, Progressing     Problem: Postoperative Urinary Retention (Knee Arthroplasty)  Goal: Effective Urinary Elimination  Outcome: Ongoing, Progressing     Problem: Respiratory Compromise (Knee Arthroplasty)  Goal: Effective Oxygenation and Ventilation  Outcome: Ongoing, Progressing  Intervention: Optimize Oxygenation and Ventilation  Recent Flowsheet Documentation  Taken 4/11/2022 1600 by Ana M Clayton RN  Head of Bed (HOB) Positioning: HOB at 30-45 degrees  Taken 4/11/2022 1200 by Ana M Clayton RN  Administration (IS):   instruction provided, follow-up   self-administered  Taken 4/11/2022 0829 by Ana M Clayton RN  Administration (IS):   instruction provided, follow-up   self-administered  Head of Bed (HOB) Positioning: HOB at 30-45 degrees     Problem: Pain Acute  Goal: Acceptable Pain Control and Functional Ability  Outcome: Ongoing, Progressing  Intervention: Prevent or Manage Pain  Recent Flowsheet Documentation  Taken 4/11/2022 1600 by Ana M Clayton  RN  Medication Review/Management: medications reviewed  Taken 4/11/2022 1400 by Ana M Clayton RN  Medication Review/Management: medications reviewed  Taken 4/11/2022 1200 by Ana M Clayton RN  Medication Review/Management: medications reviewed  Taken 4/11/2022 1000 by Ana M Clayton, RN  Medication Review/Management: medications reviewed  Taken 4/11/2022 0829 by Ana M Clayton, RN  Medication Review/Management: medications reviewed   Goal Outcome Evaluation:           Progress: no change     Patient alert and disoriented to situation. RA to 2L O2 per NC. WBAT. Bladder scanned @1600 showing 394ml. Encouraged patient to drink more fluids and attempt to void. Discharge tomorrow to the [place] via wheelchair van. Covid complete and negative.

## 2022-04-11 NOTE — PLAN OF CARE
Goal Outcome Evaluation:  Plan of Care Reviewed With: patient        Progress: no change  Outcome Evaluation: Pt able to ambulate 300', RW, CGA with cues to look up, posture and decreasing UE dependence.  7-90 degrees ROM R knee.  Less confusion vs morning session, needs extra time for sequencing, delayed processing

## 2022-04-11 NOTE — PROGRESS NOTES
"      [facility] Orthopaedic Surgery Progress Note    Subjective      LOS: 3 days   Patient Care Team:  Mannie Melvin MD as PCP - General (Family Medicine)    CC: Right knee pain    Interval History:   Patient making improvements.  Less confusion.  Resting comfortably in bed when seen earlier this morning.    Objective      Vital Signs  Temp (24hrs), Av.7 °F (37.1 °C), Min:98.2 °F (36.8 °C), Max:99 °F (37.2 °C)      /78   Pulse 76   Temp 98.8 °F (37.1 °C) (Oral)   Resp 17   Ht 172.7 cm (68\")   Wt 97.1 kg (214 lb 1.1 oz)   SpO2 96%   BMI 32.55 kg/m²     Examination:   Examination of the right knee: The wound is clean, dry, and intact.  Ankle dorsiflexion, ankle plantar flexion, and EHL are intact.  Sensation intact in the foot to light touch.  2+ posterior tibialis pulse.  Straight leg raise weak but intact.      Labs:  Results from last 7 days   Lab Units 04/10/22  0503 22  0933 22  1301   WBC 10*3/mm3 8.73 11.29* 11.37*   HEMOGLOBIN g/dL 7.6* 8.1* 8.0*   HEMATOCRIT % 22.3* 25.4* 23.6*   MCV fL 89.2 89.1 86.1   PLATELETS 10*3/mm3 151 165 158       Radiology:  Imaging Results (Last 24 Hours)     ** No results found for the last 24 hours. **          PT:  Physical Therapy - Plan of Care Review - Outcome Summary:  Outcome Evaluation: Pt still with sigificant confusion, difficulty sequencing, poor safety awareness requiring extra time and effort for mobility.  Pt able to ambulate 280', CGA, RW with chair follow, tendancy to look at floor and poor safety awareness, cues for increasing WB RLE, decreasing dependence on UEs.  R knee ROM 8-90 degrees (22 0941)]       Results Review:     I reviewed the patient's new clinical results.    Assessment and Plan     Assessment:   Status post right total knee arthroplasty    Postoperative confusion-improved  Acute blood loss anemia-hemoglobin stable      Status post total right knee replacement    Coronary artery disease    Dyslipidemia    " Hypothyroidism    GERD (gastroesophageal reflux disease)    Seizure disorder (HCC)    BPH (benign prostatic hypertrophy)    Hypertension    Primary osteoarthritis of both knees    Diabetes (HCC)      Plan for disposition: Plan for rehab facility when bed available.  Follow-up in 3 weeks in the office as planned.      Future Appointments   Date Time Provider Department Center   4/27/2022  2:20 PM Kiersten Carreon PA-C [facility]   9/23/2022 12:15 PM Maryanne Baker MD [facility]           Louis Malcolm MD  04/11/22  11:32 EDT

## 2022-04-11 NOTE — PLAN OF CARE
"Goal Outcome Evaluation:  Plan of Care Reviewed With: patient        Progress: no change   VSS, on 2L NC. Ambulated to the bathroom, pt rolled back to the bed by chair due to feeling \"drunk\". Slept well.   "

## 2022-04-11 NOTE — CASE MANAGEMENT/SOCIAL WORK
Case Management Discharge Note      Final Note: Pt has been approved for transfer to [facility] tomorrow at 1530 via [facility]. He will need to be in front of the 17[address] building by 1515. Report can be called to [phone]. Pt and family in agreement with plan.         Selected Continued Care - Admitted Since 4/7/2022     Destination Coordination complete.    Service Provider Selected Services Address Phone Fax Patient Preferred    [facility] [address], [facility] [address] [phone] --          Durable Medical Equipment    No services have been selected for the patient.              Dialysis/Infusion    No services have been selected for the patient.              Home Medical Care    No services have been selected for the patient.              Therapy    No services have been selected for the patient.              Community Resources    No services have been selected for the patient.              Community & DME    No services have been selected for the patient.                  Transportation Services  Ambulance: [facility]    Final Discharge Disposition Code: 03 - skilled nursing facility (SNF)

## 2022-04-11 NOTE — PROGRESS NOTES
" progress note      Lea Barrett Anita  [id number]  1945     LOS: 3 days     Attending: Louis Malcolm MD    Primary Care Provider: Mannie Melvin MD      Chief Complaint/Reason for visit:  No chief complaint on file.      Subjective   Doing okay overall.  Intermittently more confused.  Pleasant nonetheless.  No nausea, vomiting, or shortness of breath.    Objective        Visit Vitals  /78   Pulse 76   Temp 98.8 °F (37.1 °C) (Oral)   Resp 17   Ht 172.7 cm (68\")   Wt 97.1 kg (214 lb 1.1 oz)   SpO2 96%   BMI 32.55 kg/m²     Temp (24hrs), Av.8 °F (37.1 °C), Min:98.2 °F (36.8 °C), Max:99 °F (37.2 °C)      Intake/Output:    Intake/Output Summary (Last 24 hours) at 2022 0947  Last data filed at 2022 0930  Gross per 24 hour   Intake 295 ml   Output 650 ml   Net -355 ml        Physical Therapy:   Progress: no change  Outcome Evaluation: Pt able to ambulate 300', RW, CGA with cues to look up, posture and decreasing UE dependence.  7-90 degrees ROM R knee.  Less confusion vs morning session, needs extra time for sequencing, delayed processing  Physical Exam:     General Appearance:    Alert, cooperative, in no acute distress   Head:    Normocephalic, without obvious abnormality, atraumatic    Lungs:     Normal effort, symmetric chest rise,  clear to      auscultation bilaterally              Heart:    Regular rhythm and normal rate, normal S1 and S2    Abdomen:     Normal bowel sounds, no masses, no organomegaly, soft        non-tender, non-distended, no guarding, no rebound                tenderness   Extremities:  Clean dry and intact dressing over right knee.     intact flexion dorsiflexion bilateral feet.  No clubbing, cyanosis or edema.  No deformities.    Pulses:   Pulses palpable and equal bilaterally   Skin:   No bleeding, bruising or rash          Results Review:     I reviewed the patient's new clinical results.   Results from last 7 days   Lab Units 04/10/22  0503 " 04/09/22  0933 04/08/22  1301   WBC 10*3/mm3 8.73 11.29* 11.37*   HEMOGLOBIN g/dL 7.6* 8.1* 8.0*   HEMATOCRIT % 22.3* 25.4* 23.6*   PLATELETS 10*3/mm3 151 165 158     Results from last 7 days   Lab Units 04/08/22  1302   SODIUM mmol/L 134*   POTASSIUM mmol/L 4.1   CHLORIDE mmol/L 101   CO2 mmol/L 23.0   BUN mg/dL 14   CREATININE mg/dL 1.00   CALCIUM mg/dL 8.5*   GLUCOSE mg/dL 133*      Latest Reference Range & Units 04/10/22 15:24   Color, UA Yellow, Straw  Dark Yellow !   Appearance, UA Clear  Clear   Specific Gravity, UA 1.001 - 1.030  1.016   PH, UA 5.0 - 8.0  7.0   Glucose Negative  Negative   Ketones, UA Negative  15 mg/dL (1+) !   Blood, UA Negative  Negative   Nitrite, UA Negative  Negative   Leukocytes, UA Negative  Trace !   Protein, UA Negative  Trace !   Bilirubin, UA Negative  Small (1+) !   Urobilinogen, UA 0.2 - 1.0 E.U./dL  4.0 E.U./dL !   RBC, UA None Seen, 0-2 /HPF 0-2   WBC, UA None Seen, 0-2 /HPF 0-2   Bacteria, UA None Seen, Trace /HPF None Seen   Squamous Epithelial Cells, UA None Seen, 0-2 /HPF 0-2   Hyaline Casts, UA 0 - 6 /LPF None Seen   Methodology:  Automated Microscopy   !: Data is abnormal    I reviewed the patient's new imaging including images and reports.    All medications reviewed.   aspirin, 325 mg, Oral, Daily  atorvastatin, 10 mg, Oral, Nightly  divalproex, 1,000 mg, Oral, Nightly  divalproex, 500 mg, Oral, Daily  insulin lispro, 0-7 Units, Subcutaneous, TID With Meals  isosorbide mononitrate, 30 mg, Oral, Daily  levETIRAcetam, 500 mg, Oral, Q12H  levothyroxine, 88 mcg, Oral, Q AM  lisinopril, 20 mg, Oral, Q12H  meloxicam, 15 mg, Oral, Daily  pantoprazole, 40 mg, Oral, Daily  sodium chloride, 10 mL, Intravenous, Q12H  terazosin, 5 mg, Oral, Nightly  verapamil SR, 240 mg, Oral, Q12H      acetaminophen, 1,000 mg, Q8H PRN  dextrose, 25 g, Q15 Min PRN  dextrose, 15 g, Q15 Min PRN  fluticasone, 2 spray, PRN  glucagon (human recombinant), 1 mg, Q15 Min PRN  HYDROmorphone, 0.5 mg, Q2H  PRN   And  naloxone, 0.1 mg, Q5 Min PRN  labetalol, 10 mg, Q4H PRN  naloxone, 0.4 mg, Q5 Min PRN  ondansetron, 4 mg, Q6H PRN   Or  ondansetron, 4 mg, Q6H PRN  sodium chloride, 1-10 mL, PRN  traMADol, 50 mg, Q6H PRN        Assessment/Plan       Status post total right knee replacement    Coronary artery disease    Dyslipidemia    Hypothyroidism    GERD (gastroesophageal reflux disease)    Seizure disorder (HCC)    BPH (benign prostatic hypertrophy)    Hypertension    Primary osteoarthritis of both knees    Diabetes (HCC)  Postop confusion, likely multifactorial.    Plan     1. PT/OT, RLE early range of motion and weight-bearing per the post total knee arthroplasty protocol  2. Pain control-prns, ACB cath with ropivacaine infusion.  I discontinued as needed oxycodone and morphine and made tramadol available.  Schedule Tylenol and continue meloxicam  3. IS-encouraged, instructed  4. DVT proph- Mechanicals and aspirin  5. Bowel regimen  6. Resume home medications as appropriate  7. Monitor post-op labs  8. DC planning for rehab, case management following. [facility] tomorrow.      HTN, dyslipidemia, CAD  - Continue home lisinopril, verapamil, Imdur, statin  - Hold chlorthalidone  - Monitor BP   - Holding parameters for BP meds  - Labetalol PRN for SBP>170     Hypothyroidism  -Synthroid     GERD  -Protonix.     Seizure disorder, no new.  -Continue Keppra, Depakote     BPH  -Continue Terazosin  Monitor for spontaneous voiding.  Check postvoid residual if needed/wy     DM2  - hgb A1c on 3/24/2022 6.1  -Hold Metformin  - Accu-Chek AC and HS with low dose SSI    Monitor for resolution of postop confusion.  Check UA.    Further discussed with pt and daughter.  Patient has overtime exhibited some difficulties with short-term memory.  This has worsened at the time had his seizure diagnosis a few years ago.  Will obtain CT scan of the brain, TSH, B12 level as part of initial work-up.  He will need further evaluation as an  outpatient with neurology.  This is communicated with his daughter.  His hearing deficit may be contributing to overall confusional state.       I believe this patient meets INPATIENT status due to the need for care which can only be reasonably provided in an hospital setting such as aggressive/expedited ancillary services and/or consultation services, the necessity for IV medications, close physician monitoring and/or the possible need for procedures.      In such, I feel patient’s risk for adverse outcomes and need for care warrant INPATIENT evaluation and predict the patient’s care encounter to likely last beyond 2 midnights.    Joe Cole MD  04/11/22  09:47 EDT

## 2022-04-12 VITALS
TEMPERATURE: 99.4 F | SYSTOLIC BLOOD PRESSURE: 176 MMHG | RESPIRATION RATE: 18 BRPM | WEIGHT: 214.07 LBS | DIASTOLIC BLOOD PRESSURE: 83 MMHG | BODY MASS INDEX: 32.44 KG/M2 | HEIGHT: 68 IN | OXYGEN SATURATION: 96 % | HEART RATE: 71 BPM

## 2022-04-12 PROBLEM — D62 ACUTE BLOOD LOSS ANEMIA: Status: ACTIVE | Noted: 2022-04-12

## 2022-04-12 PROBLEM — R33.8 POSTOPERATIVE URINARY RETENTION: Status: ACTIVE | Noted: 2022-04-12

## 2022-04-12 PROBLEM — R41.0 CONFUSION, POSTOPERATIVE: Status: ACTIVE | Noted: 2022-04-12

## 2022-04-12 PROBLEM — N99.89 POSTOPERATIVE URINARY RETENTION: Status: ACTIVE | Noted: 2022-04-12

## 2022-04-12 LAB
GLUCOSE BLDC GLUCOMTR-MCNC: 109 MG/DL (ref 70–130)
GLUCOSE BLDC GLUCOMTR-MCNC: 95 MG/DL (ref 70–130)

## 2022-04-12 PROCEDURE — 97110 THERAPEUTIC EXERCISES: CPT

## 2022-04-12 PROCEDURE — 97535 SELF CARE MNGMENT TRAINING: CPT

## 2022-04-12 PROCEDURE — 99024 POSTOP FOLLOW-UP VISIT: CPT | Performed by: ORTHOPAEDIC SURGERY

## 2022-04-12 PROCEDURE — P9612 CATHETERIZE FOR URINE SPEC: HCPCS

## 2022-04-12 PROCEDURE — 97116 GAIT TRAINING THERAPY: CPT

## 2022-04-12 PROCEDURE — 82962 GLUCOSE BLOOD TEST: CPT

## 2022-04-12 PROCEDURE — 97530 THERAPEUTIC ACTIVITIES: CPT

## 2022-04-12 RX ORDER — TAMSULOSIN HYDROCHLORIDE 0.4 MG/1
0.4 CAPSULE ORAL DAILY
Status: DISCONTINUED | OUTPATIENT
Start: 2022-04-12 | End: 2022-04-12 | Stop reason: HOSPADM

## 2022-04-12 RX ORDER — TAMSULOSIN HYDROCHLORIDE 0.4 MG/1
0.4 CAPSULE ORAL DAILY
Qty: 7 CAPSULE | Refills: 0 | OUTPATIENT
Start: 2022-04-12 | End: 2022-04-16

## 2022-04-12 RX ORDER — POLYETHYLENE GLYCOL 3350 17 G/17G
17 POWDER, FOR SOLUTION ORAL DAILY
Qty: 15 PACKET | Refills: 0
Start: 2022-04-12 | End: 2022-04-27

## 2022-04-12 RX ORDER — ACETAMINOPHEN 500 MG
1000 TABLET ORAL EVERY 8 HOURS PRN
Start: 2022-04-12

## 2022-04-12 RX ORDER — TRAMADOL HYDROCHLORIDE 50 MG/1
50 TABLET ORAL EVERY 6 HOURS PRN
Qty: 20 TABLET | Refills: 0 | Status: SHIPPED | OUTPATIENT
Start: 2022-04-12 | End: 2022-04-12 | Stop reason: SDUPTHER

## 2022-04-12 RX ORDER — ASPIRIN 325 MG
325 TABLET, DELAYED RELEASE (ENTERIC COATED) ORAL DAILY
Qty: 25 TABLET | Refills: 0
Start: 2022-04-12 | End: 2022-05-07

## 2022-04-12 RX ORDER — TRAMADOL HYDROCHLORIDE 50 MG/1
50 TABLET ORAL EVERY 6 HOURS PRN
Qty: 20 TABLET | Refills: 0 | OUTPATIENT
Start: 2022-04-12 | End: 2022-04-16

## 2022-04-12 RX ORDER — MELOXICAM 15 MG/1
15 TABLET ORAL DAILY
Qty: 7 TABLET | Refills: 0
Start: 2022-04-12 | End: 2022-04-19

## 2022-04-12 RX ORDER — ASPIRIN 81 MG/1
81 TABLET ORAL DAILY
Start: 2022-05-08 | End: 2022-09-22

## 2022-04-12 RX ADMIN — ISOSORBIDE MONONITRATE 30 MG: 30 TABLET, EXTENDED RELEASE ORAL at 08:10

## 2022-04-12 RX ADMIN — LEVOTHYROXINE SODIUM 88 MCG: 88 TABLET ORAL at 05:43

## 2022-04-12 RX ADMIN — LEVETIRACETAM 500 MG: 500 TABLET, FILM COATED ORAL at 08:10

## 2022-04-12 RX ADMIN — ASPIRIN 325 MG: 325 TABLET, COATED ORAL at 08:10

## 2022-04-12 RX ADMIN — PANTOPRAZOLE SODIUM 40 MG: 40 TABLET, DELAYED RELEASE ORAL at 08:10

## 2022-04-12 RX ADMIN — DIVALPROEX SODIUM 500 MG: 500 TABLET, EXTENDED RELEASE ORAL at 08:10

## 2022-04-12 RX ADMIN — TAMSULOSIN HYDROCHLORIDE 0.4 MG: 0.4 CAPSULE ORAL at 08:11

## 2022-04-12 RX ADMIN — VERAPAMIL HYDROCHLORIDE 240 MG: 240 TABLET, FILM COATED, EXTENDED RELEASE ORAL at 08:10

## 2022-04-12 RX ADMIN — LISINOPRIL 20 MG: 20 TABLET ORAL at 08:11

## 2022-04-12 RX ADMIN — MELOXICAM 15 MG: 7.5 TABLET ORAL at 08:10

## 2022-04-12 NOTE — THERAPY TREATMENT NOTE
Patient Name: Lea Rivers  : 1945    MRN: [id number]                              Today's Date: 2022       Admit Date: 2022    Visit Dx:     ICD-10-CM ICD-9-CM   1. Status post total right knee replacement  Z96.651 V43.65   2. Primary osteoarthritis of both knees  M17.0 715.16     Patient Active Problem List   Diagnosis   • Coronary artery disease   • Dyslipidemia   • Hypothyroidism   • GERD (gastroesophageal reflux disease)   • Seizure disorder (HCC)   • BPH (benign prostatic hypertrophy)   • Hypertension   • Primary osteoarthritis of both knees   • Syncope and collapse   • Precordial pain   • Diabetes (HCC)   • Status post total right knee replacement   • Postoperative urinary retention   • Confusion, postoperative   • Acute blood loss anemia, asymptomatic, no transfusion required     Past Medical History:   Diagnosis Date   • Colon cancer (HCC)     Status post partial colectomy in . No chemotherapy or radiation therapy.   • Coronary artery disease     Nonobstructive coronary artery disease.   • Diabetes (HCC)    • Dyslipidemia    • GERD (gastroesophageal reflux disease)     Hx of.   • Hearing aid worn    • Hyperlipidemia    • Hypertension    • Hypothyroidism    • Left shoulder pain    • Primary osteoarthritis of both knees    • Seizure disorder (HCC)    • Wears glasses      Past Surgical History:   Procedure Laterality Date   • APPENDECTOMY     • CARPAL TUNNEL RELEASE Bilateral    • CHOLECYSTECTOMY     • COLECTOMY PARTIAL / TOTAL      Partial colectomy   • COLONOSCOPY     • CYSTOSCOPY TRANSURETHRAL RESECTION OF PROSTATE N/A 2018    Procedure: CYSTOSCOPY TRANSURETHRAL RESECTION OF PROSTATE GREENLIGHT;  Surgeon: Naseem Clayton MD;  Location: [facility];  Service: Urology   • OTHER SURGICAL HISTORY      Contraction surgery on bilateral hands and wrists.   • OTHER SURGICAL HISTORY      left and right hands   • SINUS SURGERY     • SKIN BIOPSY     • TEETH EXTRACTION      • TONSILLECTOMY     • TOTAL KNEE ARTHROPLASTY Right 4/7/2022    Procedure: TOTAL KNEE ARTHROPLASTY WITH ORTHO ROBOT RIGHT;  Surgeon: Louis Malcolm MD;  [facility];  Service: Robotics - Ortho;  Laterality: Right;   • TURP / TRANSURETHRAL INCISION / DRAINAGE PROSTATE     • VASECTOMY        General Information     Row Name 04/12/22 1005          Physical Therapy Time and Intention    Document Type therapy note (daily note)  -KG     Mode of Treatment physical therapy  -KG     Row Name 04/12/22 1005          General Information    Patient Profile Reviewed yes  -KG     Existing Precautions/Restrictions fall;other (see comments)  [place], confusion  -KG     Row Name 04/12/22 1005          Cognition    Orientation Status (Cognition) oriented to;person;situation;verbal cues/prompts needed for orientation  -KG     Row Name 04/12/22 1005          Safety Issues, Functional Mobility    Impairments Affecting Function (Mobility) cognition;balance;endurance/activity tolerance;pain;strength;range of motion (ROM);postural/trunk control  -KG           User Key  (r) = Recorded By, (t) = Taken By, (c) = Cosigned By    Initials Name Provider Type    KG Rachelle Mcdermott Physical Therapist               Mobility     Row Name 04/12/22 1006          Bed Mobility    Comment, (Bed Mobility) UIC  -KG     Row Name 04/12/22 1006          Transfers    Comment, (Transfers) cues for HP, sequencing  -KG     Row Name 04/12/22 1006          Sit-Stand Transfer    Sit-Stand [place] (Transfers) minimum assist (75% patient effort);1 person assist;verbal cues  -KG     Assistive Device (Sit-Stand Transfers) walker, front-wheeled  -KG     Row Name 04/12/22 1006          Gait/Stairs (Locomotion)    [place] Level (Gait) verbal cues;contact guard  -KG     Assistive Device (Gait) walker, front-wheeled  -KG     Distance in Feet (Gait) 380  -KG     Deviations/Abnormal Patterns (Gait) bilateral deviations;bisi decreased;gait speed  decreased;stride length decreased;right sided deviations;antalgic  -KG     Bilateral Gait Deviations forward flexed posture  -KG     Comment, (Gait/Stairs) Increased distance to 380', CGA, RW, cues to decrease dependence on walker, continued high distractibility, safety concerns  -KG     Row Name 04/12/22 1006          Mobility    Extremity Weight-bearing Status right lower extremity  -KG     Right Lower Extremity (Weight-bearing Status) weight-bearing as tolerated (WBAT)  -KG           User Key  (r) = Recorded By, (t) = Taken By, (c) = Cosigned By    Initials Name Provider Type    Rachelle Smith Physical Therapist               Obj/Interventions     Row Name 04/12/22 1010          Knee (Therapeutic Exercise)    Knee AROM (Therapeutic Exercise) right;LAQ (long arc quad);SLR (straight leg raise);heel slides;20 repititions  -KG     Knee Isometrics (Therapeutic Exercise) bilateral;quad sets;2 sets;10 repetitions  -KG     [facility] Name 04/12/22 1010          Ankle (Therapeutic Exercise)    Ankle AROM (Therapeutic Exercise) bilateral;dorsiflexion;plantarflexion;15 repititions  -KG     [facility] Name 04/12/22 1010          Balance    Dynamic Standing Balance minimal assist  -KG     Position/Device Used, Standing Balance supported;walker, rolling  -KG           User Key  (r) = Recorded By, (t) = Taken By, (c) = Cosigned By    Initials Name Provider Type    Rachelle Smith Physical Therapist               Goals/Plan    No documentation.                Clinical Impression     [facility] Name 04/12/22 1011          Pain    Pretreatment Pain Rating 3/10  -KG     Posttreatment Pain Rating 3/10  -KG     Pain Location - Side/Orientation Right  -KG     Pain Location generalized  -KG     Pain Location - knee  -KG     Pain Intervention(s) Ambulation/increased activity;Cold applied  -KG     Row Name 04/12/22 1011          Plan of Care Review    Plan of Care Reviewed With patient  -KG     Progress no change  -KG     Outcome Evaluation  Increased distance to 380', CGA, RW, cues to decrease dependence on walker, continued high distractibility, safety concerns.  Knee more stiff today, 7-80 degrees  -KG     Row Name 04/12/22 1011          Positioning and Restraints    Pre-Treatment Position sitting in chair/recliner  -KG     Post Treatment Position chair  -KG     In Chair notified nsg;call light within reach;encouraged to call for assist;exit alarm on;legs elevated  -KG           User Key  (r) = Recorded By, (t) = Taken By, (c) = Cosigned By    Initials Name Provider Type    Rachelle Smith Physical Therapist               Outcome Measures     Row Name 04/12/22 1012 04/12/22 0810       How much help from another person do you currently need...    Turning from your back to your side while in flat bed without using bedrails? 3  -KG 3  -GJ    Moving from lying on back to sitting on the side of a flat bed without bedrails? 3  -KG 3  -GJ    Moving to and from a bed to a chair (including a wheelchair)? 3  -KG 3  -GJ    Standing up from a chair using your arms (e.g., wheelchair, bedside chair)? 3  -KG 3  -GJ    Climbing 3-5 steps with a railing? 2  -KG 2  -GJ    To walk in hospital room? 3  -KG 3  -GJ    AM-PAC 6 Clicks Score (PT) 17  -KG 17  -GJ    Row Name 04/12/22 1012 04/12/22 0843       Functional Assessment    Outcome Measure Options AM-PAC 6 Clicks Basic Mobility (PT)  -KG AM-PAC 6 Clicks Daily Activity (OT)  -TB          User Key  (r) = Recorded By, (t) = Taken By, (c) = Cosigned By    Initials Name Provider Type    TB Marley Sabillon OT Occupational Therapist    Ana M Mukherjee RN Registered Nurse    Rachelle Smith Physical Therapist                             Physical Therapy Education                 Title: PT OT SLP Therapies (In Progress)     Topic: Physical Therapy (In Progress)     Point: Mobility training (In Progress)     Learning Progress Summary           Patient Acceptance, E,TB, NR by ERMELINDA at 4/12/2022 1013     Acceptance, E, NR by KG at 4/11/2022 1624    Acceptance, E, NR by KG at 4/11/2022 0944    Acceptance, E, VU,NR by LR at 4/10/2022 1551    Comment: Educated on precautions, weight bearing status, correct sit to supine t/f technique, correct sit<->stand t/f technique, correct gait mechanics, HEP, safety with mobility, and progression of POC.    Acceptance, D,E, VU,NR by LR at 4/10/2022 1024    Comment: Educated on precautions, weight bearing status, safety with mobility, benefits of mobility, HEP, correct supine to sit t/f technique, correct sit<->stand t/f technique, correct gait mechanics, and progression of POC.    Acceptance, E,D, VU,NR by LR at 4/9/2022 1447    Comment: Educated on precautions, weight bearing status, correct supine to sit t/f technique, correct sit<->stand t/f technique, correct gait mechanics, HEP, and progression of POC.    Acceptance, E,D, VU,NR by LR at 4/9/2022 0839    Comment: Educated on precautions, weight bearing status, correct supine to sit t/f technique, correct sit<->stand t/f technique, correct gait mechanics, LE HEP, and progression of POC.    JEREMIAS Payne, VU,NR by SC at 4/8/2022 1456    Comment: reviewed safety wtih mobility    JEREMIAS Payne, VU by SC at 4/8/2022 0950    Comment: reviewed HEP and safety with mobility    Acceptance, E,D, VU by OLIVER at 4/7/2022 1300    Comment: Educated on safe sequencing with bed mobility, ambulatory transfers, and gait. Reviewed HEP and knee precautions.   Family Acceptance, E, VU,NR by LR at 4/10/2022 1551    Comment: Educated on precautions, weight bearing status, correct sit to supine t/f technique, correct sit<->stand t/f technique, correct gait mechanics, HEP, safety with mobility, and progression of POC.    Acceptance, D,E, VU,NR by LR at 4/10/2022 1024    Comment: Educated on precautions, weight bearing status, safety with mobility, benefits of mobility, HEP, correct supine to sit t/f technique, correct sit<->stand t/f technique, correct gait  mechanics, and progression of POC.    Acceptance, E,D, VU,NR by LR at 4/9/2022 1447    Comment: Educated on precautions, weight bearing status, correct supine to sit t/f technique, correct sit<->stand t/f technique, correct gait mechanics, HEP, and progression of POC.                   Point: Home exercise program (In Progress)     Learning Progress Summary           Patient Acceptance, E,TB, NR by KG at 4/12/2022 1013    Acceptance, E, NR by KG at 4/11/2022 1624    Acceptance, E, NR by KG at 4/11/2022 0944    Acceptance, E, VU,NR by LR at 4/10/2022 1551    Comment: Educated on precautions, weight bearing status, correct sit to supine t/f technique, correct sit<->stand t/f technique, correct gait mechanics, HEP, safety with mobility, and progression of POC.    Acceptance, D,E, VU,NR by LR at 4/10/2022 1024    Comment: Educated on precautions, weight bearing status, safety with mobility, benefits of mobility, HEP, correct supine to sit t/f technique, correct sit<->stand t/f technique, correct gait mechanics, and progression of POC.    Acceptance, E,D, VU,NR by LR at 4/9/2022 1447    Comment: Educated on precautions, weight bearing status, correct supine to sit t/f technique, correct sit<->stand t/f technique, correct gait mechanics, HEP, and progression of POC.    Acceptance, E,D, VU,NR by LR at 4/9/2022 0839    Comment: Educated on precautions, weight bearing status, correct supine to sit t/f technique, correct sit<->stand t/f technique, correct gait mechanics, LE HEP, and progression of POC.    Eager, E, VU,NR by SC at 4/8/2022 1456    Comment: reviewed safety wtih mobility    Eager, E, VU by SC at 4/8/2022 0950    Comment: reviewed HEP and safety with mobility    Acceptance, E,D, VU by OLIVER at 4/7/2022 1300    Comment: Educated on safe sequencing with bed mobility, ambulatory transfers, and gait. Reviewed HEP and knee precautions.   Family Acceptance, E, VU,NR by LR at 4/10/2022 1551    Comment: Educated on  precautions, weight bearing status, correct sit to supine t/f technique, correct sit<->stand t/f technique, correct gait mechanics, HEP, safety with mobility, and progression of POC.    Acceptance, D,E, VU,NR by LR at 4/10/2022 1024    Comment: Educated on precautions, weight bearing status, safety with mobility, benefits of mobility, HEP, correct supine to sit t/f technique, correct sit<->stand t/f technique, correct gait mechanics, and progression of POC.    Acceptance, E,D, VU,NR by LR at 4/9/2022 1447    Comment: Educated on precautions, weight bearing status, correct supine to sit t/f technique, correct sit<->stand t/f technique, correct gait mechanics, HEP, and progression of POC.                   Point: Body mechanics (In Progress)     Learning Progress Summary           Patient Acceptance, E,TB, NR by KG at 4/12/2022 1013    Acceptance, E, NR by KG at 4/11/2022 1624    Acceptance, E, NR by KG at 4/11/2022 0944    Acceptance, E, VU,NR by LR at 4/10/2022 1551    Comment: Educated on precautions, weight bearing status, correct sit to supine t/f technique, correct sit<->stand t/f technique, correct gait mechanics, HEP, safety with mobility, and progression of POC.    Acceptance, D,E, VU,NR by LR at 4/10/2022 1024    Comment: Educated on precautions, weight bearing status, safety with mobility, benefits of mobility, HEP, correct supine to sit t/f technique, correct sit<->stand t/f technique, correct gait mechanics, and progression of POC.    Acceptance, E,D, VU,NR by LR at 4/9/2022 1447    Comment: Educated on precautions, weight bearing status, correct supine to sit t/f technique, correct sit<->stand t/f technique, correct gait mechanics, HEP, and progression of POC.    Acceptance, E,D, VU,NR by LR at 4/9/2022 0839    Comment: Educated on precautions, weight bearing status, correct supine to sit t/f technique, correct sit<->stand t/f technique, correct gait mechanics, LE HEP, and progression of POC.    Eager,  E, VU,NR by SC at 4/8/2022 1456    Comment: reviewed safety wtih mobility    Eager, E, VU by SC at 4/8/2022 0950    Comment: reviewed HEP and safety with mobility    Acceptance, E,D, VU by OLIVER at 4/7/2022 1300    Comment: Educated on safe sequencing with bed mobility, ambulatory transfers, and gait. Reviewed HEP and knee precautions.   Family Acceptance, E, VU,NR by LR at 4/10/2022 1551    Comment: Educated on precautions, weight bearing status, correct sit to supine t/f technique, correct sit<->stand t/f technique, correct gait mechanics, HEP, safety with mobility, and progression of POC.    Acceptance, D,E, VU,NR by LR at 4/10/2022 1024    Comment: Educated on precautions, weight bearing status, safety with mobility, benefits of mobility, HEP, correct supine to sit t/f technique, correct sit<->stand t/f technique, correct gait mechanics, and progression of POC.    Acceptance, E,D, VU,NR by LR at 4/9/2022 1447    Comment: Educated on precautions, weight bearing status, correct supine to sit t/f technique, correct sit<->stand t/f technique, correct gait mechanics, HEP, and progression of POC.                   Point: Precautions (In Progress)     Learning Progress Summary           Patient Acceptance, E,TB, NR by KG at 4/12/2022 1013    Acceptance, E, NR by KG at 4/11/2022 1624    Acceptance, E, NR by KG at 4/11/2022 0944    Acceptance, E, VU,NR by LR at 4/10/2022 1551    Comment: Educated on precautions, weight bearing status, correct sit to supine t/f technique, correct sit<->stand t/f technique, correct gait mechanics, HEP, safety with mobility, and progression of POC.    Acceptance, D,E, VU,NR by LR at 4/10/2022 1024    Comment: Educated on precautions, weight bearing status, safety with mobility, benefits of mobility, HEP, correct supine to sit t/f technique, correct sit<->stand t/f technique, correct gait mechanics, and progression of POC.    Acceptance, E,D, VU,NR by LR at 4/9/2022 1447    Comment: Educated on  precautions, weight bearing status, correct supine to sit t/f technique, correct sit<->stand t/f technique, correct gait mechanics, HEP, and progression of POC.    Acceptance, E,D, VU,NR by LR at 4/9/2022 0839    Comment: Educated on precautions, weight bearing status, correct supine to sit t/f technique, correct sit<->stand t/f technique, correct gait mechanics, LE HEP, and progression of POC.    Eager, E, VU,NR by SC at 4/8/2022 1456    Comment: reviewed safety wtih mobility    Eagjannet, E, VU by SC at 4/8/2022 0950    Comment: reviewed HEP and safety with mobility    Acceptance, E,D, VU by OLIVER at 4/7/2022 1300    Comment: Educated on safe sequencing with bed mobility, ambulatory transfers, and gait. Reviewed HEP and knee precautions.   Family Acceptance, E, VU,NR by LR at 4/10/2022 1551    Comment: Educated on precautions, weight bearing status, correct sit to supine t/f technique, correct sit<->stand t/f technique, correct gait mechanics, HEP, safety with mobility, and progression of POC.    Acceptance, D,E, VU,NR by LR at 4/10/2022 1024    Comment: Educated on precautions, weight bearing status, safety with mobility, benefits of mobility, HEP, correct supine to sit t/f technique, correct sit<->stand t/f technique, correct gait mechanics, and progression of POC.    Acceptance, E,D, VU,NR by LR at 4/9/2022 1447    Comment: Educated on precautions, weight bearing status, correct supine to sit t/f technique, correct sit<->stand t/f technique, correct gait mechanics, HEP, and progression of POC.                               User Key     Initials Effective Dates Name Provider Type Discipline    SC 06/16/21 -  Hernesto Carias, PT Physical Therapist PT    LR 06/16/21 -  Judith James, PT Physical Therapist PT    KG 06/16/21 -  Rachelle Mcdermott Physical Therapist PT    OLIVER 06/16/21 -  Rolando Mobley, PT Physical Therapist PT              PT Recommendation and Plan     Plan of Care Reviewed With: patient  Progress: no  change  Outcome Evaluation: Increased distance to 380', CGA, RW, cues to decrease dependence on walker, continued high distractibility, safety concerns.  Knee more stiff today, 7-80 degrees     Time Calculation:    PT Charges     Row Name 04/12/22 1014             Time Calculation    Start Time 0925  -KG      PT Received On 04/12/22  -KG      PT Goal Re-Cert Due Date 04/17/22  -KG              Time Calculation- PT    Total Timed Code Minutes- PT 38 minute(s)  -KG              Timed Charges    94303 - PT Therapeutic Exercise Minutes 10  -KG      63271 - Gait Training Minutes  18  -KG      04924 - PT Therapeutic Activity Minutes 10  -KG              Total Minutes    Timed Charges Total Minutes 38  -KG       Total Minutes 38  -KG            User Key  (r) = Recorded By, (t) = Taken By, (c) = Cosigned By    Initials Name Provider Type    ERMELINDA Rachelle Mcdermott Physical Therapist              Therapy Charges for Today     Code Description Service Date Service Provider Modifiers Qty    60401534154 HC PT THER PROC EA 15 MIN 4/11/2022 Rachelle Mcdermott GP 2    97234172690 HC GAIT TRAINING EA 15 MIN 4/11/2022 Rachelle Mcdermott GP 1    51114981668 HC PT THERAPEUTIC ACT EA 15 MIN 4/11/2022 Rachelle Mcdermott GP 1    73509388535 HC PT THER PROC EA 15 MIN 4/11/2022 Rachelle Mcdermott GP 1    69856668894 HC GAIT TRAINING EA 15 MIN 4/11/2022 Rachelle Mcdermott GP 1    76735374570 HC PT THERAPEUTIC ACT EA 15 MIN 4/11/2022 Rachelle Mcdermott GP 1    46115265248 HC PT THER PROC EA 15 MIN 4/12/2022 Rachelle Mcdermott GP 1    93819794276 HC GAIT TRAINING EA 15 MIN 4/12/2022 Rachelle Mcdermott GP 1    64850424355 HC PT THERAPEUTIC ACT EA 15 MIN 4/12/2022 Rachelle Mcdermott GP 1          PT G-Codes  Outcome Measure Options: AM-PAC 6 Clicks Basic Mobility (PT)  AM-PAC 6 Clicks Score (PT): 17  AM-PAC 6 Clicks Score (OT): 13    Rachelle Mcdermott  4/12/2022

## 2022-04-12 NOTE — DISCHARGE SUMMARY
Patient Name: Lea Rivers  MRN: [id number]  : 1945  DOS: 2022    Attending: Louis Malcolm MD    Primary Care Provider: Mannie Melvin MD    Date of Admission:.2022  5:22 AM    Date of Discharge:  2022    Discharge Diagnosis:     Status post total right knee replacement    Coronary artery disease    Dyslipidemia    Hypothyroidism    GERD (gastroesophageal reflux disease)    Seizure disorder (HCC)    BPH (benign prostatic hypertrophy)    Hypertension    Primary osteoarthritis of both knees    Diabetes (HCC)    Postoperative urinary retention    Confusion, postoperative    Acute blood loss anemia, asymptomatic, no transfusion required      Hospital Course    At admit:  Patient is a pleasant 76 y.o. male presented for scheduled surgery by Dr. Malcolm.  He underwent right total knee arthroplasty under spinal anesthesia.      (Per Dr. Malcolm's note:The patient is a 76 y.o. male with debilitating right knee pain secondary to osteoarthritis that failed to improve in spite of conservative treatment .  Options have been discussed at length with the patient and the patient has had an extended course of conservative treatment without long-term benefit. The patient has reached the point where the patient desires total knee arthroplasty surgery and understands the risks, benefits, and alternatives. Consent was obtained. Please see my office notes for details with regard to preoperative counseling and operative rationale. )      He tolerated surgery well and was admitted for medical management.  His knee has been painful for 8 years.  He has tried conservative measures without lasting benefit.  He denies use of walker or cane.  He denies recent falls.  He denies history of DVT or PE.     When seen postop, patient is doing well.  His pain is mild in his anterior right knee.  He denies nausea, shortness of breath or chest pain.  He is hoping for rehab placement at discharge as he lives alone.      After admit:    Patient was provided pain medications as needed for pain control, along with adductor canal nerve block infusion of Ropivacaine.     Adjustments were made to pain medications to optimize postop pain management. Risks and benefits of opiate medications discussed with patient. ZOILA report was reviewed.    With confusion after surgery, narcotics were discontinued, and we kept him on Tylenol, meloxicam, and as needed tramadol.    He was seen by PT and OT and has progressed slowly over his stay.  Noted difficulties related to safety awareness requiring many cues for posture and sequencing.    He was noted to have confusion throughout the majority of his stay.  Work-up included CT scan of the head, unremarkable except for changes appropriate for age, B12 level and TSH both unremarkable, urinalysis showing no infection signs.    Infusion is felt to be multifactorial with some baseline short term memory deficit and possible cognitive deficit combined with hospitalization, anesthesia, pain medication, and the fact that patient is very hard of hearing, hearing aid was reported missing early in his hospitalization.    He used an IS for atelectasis prophylaxis and was placed on aspirin 325 mg daily along with mechanicals for DVT prophylaxis.    Home medications were resumed as appropriate, and labs were monitored and remained fairly stable.  A drop in H&H was asymptomatic, no transfusion indicated.    He had difficulty with voiding postop, required intermittent in and out catheterization.    With this having not resolved yet I am adding Flomax, if he requires further catheterization we will anchor Odell catheter for 3 to 4 days with a voiding trial to follow.    He will need a follow-up with urology following his rehab stay.    With the progress he has made, Mr. Rivers is ready for DC discharge today.      Discussed with patient   regarding plan and he shows understanding and agreement.        Procedures  Performed  Procedure(s):  TOTAL KNEE ARTHROPLASTY WITH ORTHO ROBOT RIGHT       Pertinent Test Results:    I reviewed the patient's new clinical results.   Results from last 7 days   Lab Units 04/10/22  0503 04/09/22  0933 04/08/22  1301   WBC 10*3/mm3 8.73 11.29* 11.37*   HEMOGLOBIN g/dL 7.6* 8.1* 8.0*   HEMATOCRIT % 22.3* 25.4* 23.6*   PLATELETS 10*3/mm3 151 165 158     Results from last 7 days   Lab Units 04/08/22  1302   SODIUM mmol/L 134*   POTASSIUM mmol/L 4.1   CHLORIDE mmol/L 101   CO2 mmol/L 23.0   BUN mg/dL 14   CREATININE mg/dL 1.00   CALCIUM mg/dL 8.5*   GLUCOSE mg/dL 133*     I reviewed the patient's new imaging including images and reports.   Latest Reference Range & Units 04/11/22 11:16   TSH Baseline 0.270 - 4.200 uIU/mL 1.500   Vitamin B-12 211 - 946 pg/mL 1,295 (H)   (H): Data is abnormally high    Narrative & Impression   DATE OF EXAM: 4/11/2022 12:47 PM     PROCEDURE: CT HEAD WO CONTRAST-     INDICATIONS: Delirium; M17.0-Bilateral primary osteoarthritis of knee     COMPARISON: 5/26/2021     TECHNIQUE: Routine transaxial and coronal reconstruction images were  obtained through the head without the administration of contrast.  Automated exposure control and iterative reconstruction methods were  used.     The radiation dose reduction device was turned on for each scan per the  ALARA (As Low as Reasonably Achievable) protocol.     FINDINGS: Gray-white differentiation is maintained and there is no  evidence of intracranial hemorrhage, mass or mass effect. Age-related  changes of the brain are present including volume loss and typical  periventricular sequela of chronic small vessel ischemia. There is  otherwise no evidence of intracranial hemorrhage, mass or mass effect.  The ventricles are normal in size and configuration accounting for  surrounding volume loss. The orbits are normal and the paranasal sinuses  demonstrate a stable mucous retention cyst in the left maxillary sinus.  The calvarium is  "intact.     IMPRESSION:  Age-related changes of the brain as above, otherwise without  evidence of acute intracranial abnormality.        This report was finalized on 2022 3:49 PM by Vini Rea.     Physical therapy  Goal Outcome Evaluation:  Plan of Care Reviewed With: patient  Progress: no change  Outcome Evaluation: Pt able to ambulate 300', RW, CGA with cues to look up, posture and decreasing UE dependence.  7-90 degrees ROM R knee.  Less confusion vs morning session, needs extra time for sequencing, delayed processing  Discharge Assessment:       Visit Vitals  /83   Pulse 71   Temp 99.4 °F (37.4 °C) (Oral)   Resp 18   Ht 172.7 cm (68\")   Wt 97.1 kg (214 lb 1.1 oz)   SpO2 96%   BMI 32.55 kg/m²     Temp (24hrs), Av.7 °F (37.1 °C), Min:98 °F (36.7 °C), Max:99.4 °F (37.4 °C)      General Appearance:    Alert, cooperative, in no acute distress   Lungs:     Clear to auscultation,respirations regular, even and                   unlabored    Heart:    Regular rhythm and normal rate, normal S1 and S2   Abdomen:     Normal bowel sounds, no masses, no organomegaly, soft        non-tender, non-distended, no guarding, no rebound                 tenderness   Extremities:   CDI dressing surgical knee . Right leg edema. Stable.    Pulses:   Pulses palpable and equal bilaterally   Skin:   No bleeding, bruising or rash   Neurologic:   Cranial nerves 2 - 12 grossly intact, sensation intact, Flexion and dorsiflexion intact bilateral feet.         Discharge Disposition: [facility]          Discharge Medications      New Medications      Instructions Start Date   acetaminophen 500 MG tablet  Commonly known as: TYLENOL   1,000 mg, Oral, Every 8 Hours PRN      meloxicam 15 MG tablet  Commonly known as: MOBIC   15 mg, Oral, Daily      polyethylene glycol 17 g packet  Commonly known as: MIRALAX   17 g, Oral, Daily      tamsulosin 0.4 MG capsule 24 hr capsule  Commonly known as: FLOMAX   0.4 " mg, Oral, Daily      traMADol 50 MG tablet  Commonly known as: ULTRAM   50 mg, Oral, Every 6 Hours PRN         Changes to Medications      Instructions Start Date   aspirin  MG tablet  What changed: You were already taking a medication with the same name, and this prescription was added. Make sure you understand how and when to take each.   325 mg, Oral, Daily      aspirin 81 MG EC tablet  What changed: These instructions start on May 8, 2022. If you are unsure what to do until then, ask your doctor or other care provider.   81 mg, Oral, Daily   Start Date: May 8, 2022        Continue These Medications      Instructions Start Date   albuterol sulfate  (90 Base) MCG/ACT inhaler  Commonly known as: PROVENTIL HFA;VENTOLIN HFA;PROAIR HFA   As Needed      chlorthalidone 25 MG tablet  Commonly known as: HYGROTON   25 mg, Oral, Daily      coenzyme Q10 100 MG capsule   100 mg, Oral, Daily      divalproex 500 MG 24 hr tablet  Commonly known as: DEPAKOTE ER   500 mg, Oral, 2 times daily, 500mg QAM, 1000mg QPM      fluticasone 50 MCG/ACT nasal spray  Commonly known as: FLONASE   2 sprays, Nasal, As Needed, Administer 2 sprays in each nostril for each dose.       isosorbide mononitrate 30 MG 24 hr tablet  Commonly known as: IMDUR   30 mg, Oral, Daily      levETIRAcetam 500 MG tablet  Commonly known as: KEPPRA   500 mg, Oral, 2 Times Daily      levothyroxine 88 MCG tablet  Commonly known as: SYNTHROID, LEVOTHROID   88 mcg, Oral, Daily      lisinopril 20 MG tablet  Commonly known as: PRINIVIL,ZESTRIL   20 mg, Oral, 2 Times Daily      metFORMIN  MG 24 hr tablet  Commonly known as: GLUCOPHAGE-XR   500 mg, Oral, Daily With Breakfast      omeprazole 20 MG capsule  Commonly known as: priLOSEC   20 mg, Oral, Daily      OneTouch Delica Lancets 33G misc   USE 1 LANCET ONCE A DAY      oxybutynin XL 15 MG 24 hr tablet  Commonly known as: DITROPAN XL   15 mg, Oral, Daily      simvastatin 20 MG tablet  Commonly known as:  ZOCOR   20 mg, Oral, Nightly      terazosin 5 MG capsule  Commonly known as: HYTRIN   5 mg, Oral, Nightly      verapamil  MG CR tablet  Commonly known as: CALAN-SR   240 mg, Oral, 2 Times Daily      vitamin B-12 1000 MCG tablet  Commonly known as: CYANOCOBALAMIN   1,000 mcg, Oral, Daily         Stop These Medications    Antiseptic Skin Cleanser 4 % solution  Generic drug: Chlorhexidine Gluconate     Diclofenac Sodium 1 % gel gel  Commonly known as: VOLTAREN     ONE TOUCH ULTRA TEST test strip  Generic drug: glucose blood            Discharge Diet: Regular    Activity at Discharge: Weightbearing as tolerated right lower extremity    Follow-up Appointments  Orthopedic surgery per Dr. Malcolm's order    Discharge took over 30 min      Dragon disclaimer:  Part of this encounter note is an electronic transcription/translation of spoken language to printed text. The electronic translation of spoken language may permit erroneous, or at times, nonsensical words or phrases to be inadvertently transcribed; Although I have reviewed the note for such errors, some may still exist.       Joe Cole MD  04/12/22  12:25 EDT

## 2022-04-12 NOTE — PLAN OF CARE
Problem: Adult Inpatient Plan of Care  Goal: Plan of Care Review  Recent Flowsheet Documentation  Taken 4/12/2022 0836 by Marley Sabillon OT  Plan of Care Reviewed With: patient  Outcome Evaluation: Pt is alert and cooperative with therapy. Remains confused, Ox2-3 with cues. Max A supine to sit. Min A STS and step to chair transfer using RW. Requires cues and assist for RW positioning and safety. Confusion limits ADL retraining. Dependent LB dressing/toileting. Set-up UB dressing, grooming, and self-feeding. OT will continue to follow IP. D/C plan is SNF.

## 2022-04-12 NOTE — PLAN OF CARE
Goal Outcome Evaluation:  Plan of Care Reviewed With: patient        Progress: no change  Outcome Evaluation: Increased distance to 380', CGA, RW, cues to decrease dependence on walker, continued high distractibility, safety concerns.  Knee more stiff today, 7-80 degrees

## 2022-04-12 NOTE — PLAN OF CARE
Goal Outcome Evaluation:  Plan of Care Reviewed With: patient        Progress: declining   BP elevated during shift. PRN labetalol given. No c/o pain. In/out cathed X2. Pt remains confused to situation. Surgical leg swollen, reji and warm to touch, ice packs applied. Pulse dopplered in R leg. Foot scuds in place.

## 2022-04-12 NOTE — THERAPY TREATMENT NOTE
Patient Name: Lea Rivers  : 1945    MRN: [id number]                              Today's Date: 2022       Admit Date: 2022    Visit Dx:     ICD-10-CM ICD-9-CM   1. Status post total right knee replacement  Z96.651 V43.65   2. Primary osteoarthritis of both knees  M17.0 715.16     Patient Active Problem List   Diagnosis   • Coronary artery disease   • Dyslipidemia   • Hypothyroidism   • GERD (gastroesophageal reflux disease)   • Seizure disorder (HCC)   • BPH (benign prostatic hypertrophy)   • Hypertension   • Primary osteoarthritis of both knees   • Syncope and collapse   • Precordial pain   • Diabetes (HCC)   • Status post total right knee replacement   • Postoperative urinary retention   • Confusion, postoperative   • Acute blood loss anemia, asymptomatic, no transfusion required     Past Medical History:   Diagnosis Date   • Colon cancer (HCC)     Status post partial colectomy in . No chemotherapy or radiation therapy.   • Coronary artery disease     Nonobstructive coronary artery disease.   • Diabetes (HCC)    • Dyslipidemia    • GERD (gastroesophageal reflux disease)     Hx of.   • Hearing aid worn    • Hyperlipidemia    • Hypertension    • Hypothyroidism    • Left shoulder pain    • Primary osteoarthritis of both knees    • Seizure disorder (HCC)    • Wears glasses      Past Surgical History:   Procedure Laterality Date   • APPENDECTOMY     • CARPAL TUNNEL RELEASE Bilateral    • CHOLECYSTECTOMY     • COLECTOMY PARTIAL / TOTAL      Partial colectomy   • COLONOSCOPY     • CYSTOSCOPY TRANSURETHRAL RESECTION OF PROSTATE N/A 2018    Procedure: CYSTOSCOPY TRANSURETHRAL RESECTION OF PROSTATE GREENLIGHT;  Surgeon: Naseem Clayton MD;  Location: [facility];  Service: Urology   • OTHER SURGICAL HISTORY      Contraction surgery on bilateral hands and wrists.   • OTHER SURGICAL HISTORY      left and right hands   • SINUS SURGERY     • SKIN BIOPSY     • TEETH EXTRACTION      • TONSILLECTOMY     • TOTAL KNEE ARTHROPLASTY Right 4/7/2022    Procedure: TOTAL KNEE ARTHROPLASTY WITH ORTHO ROBOT RIGHT;  Surgeon: Louis Malcolm MD;  Location: [facility];  Service: Robotics - Ortho;  Laterality: Right;   • TURP / TRANSURETHRAL INCISION / DRAINAGE PROSTATE     • VASECTOMY        General Information     Row Name 04/12/22 0826          OT Time and Intention    Document Type therapy note (daily note)  -TB     Mode of Treatment occupational therapy;individual therapy  -TB     Row Name 04/12/22 0826          General Information    Patient Profile Reviewed yes  -TB     Existing Precautions/Restrictions fall;other (see comments)  [place], confusion  -TB     Barriers to Rehab medically complex;previous functional deficit;hearing deficit;cognitive status  -TB     Row Name 04/12/22 0826          Occupational Profile    Reason for Services/Referral (Occupational Profile) Occupational decline  -TB     Row Name 04/12/22 0826          Cognition    Orientation Status (Cognition) oriented to;person;situation;verbal cues/prompts needed for orientation  -TB     Row Name 04/12/22 0826          Safety Issues, Functional Mobility    Safety Issues Affecting Function (Mobility) ability to follow commands;awareness of need for assistance;insight into deficits/self-awareness;problem-solving;safety precaution awareness;safety precautions follow-through/compliance;sequencing abilities;impulsivity;judgment  -TB     Impairments Affecting Function (Mobility) cognition;balance;endurance/activity tolerance;pain;strength;range of motion (ROM);postural/trunk control  -TB     Cognitive Impairments, Mobility Safety/Performance awareness, need for assistance;impulsivity;insight into deficits/self-awareness;problem-solving/reasoning;safety precaution awareness;safety precaution follow-through;sequencing abilities;judgment  -TB     Comment, Safety Issues/Impairments (Mobility) Max A supine to sit. Min A STS and step to chair transfer  using RW. Requires cues and assist for RW positioning and safety.  -TB           User Key  (r) = Recorded By, (t) = Taken By, (c) = Cosigned By    Initials Name Provider Type    TB Marley Sabillon, OT Occupational Therapist                 Mobility/ADL's     Row Name 04/12/22 0829          Bed Mobility    Bed Mobility supine-sit;scooting/bridging  -TB     Scooting/Bridging [place] (Bed Mobility) maximum assist (25% patient effort);1 person assist;verbal cues  -TB     Supine-Sit [place] (Bed Mobility) maximum assist (25% patient effort);1 person assist;verbal cues  -TB     Bed Mobility, Safety Issues cognitive deficits limit understanding;decreased use of arms for pushing/pulling;decreased use of legs for bridging/pushing;impaired trunk control for bed mobility  -TB     Assistive Device (Bed Mobility) head of bed elevated;draw sheet;bed rails  -TB     Comment, (Bed Mobility) Difficulty following commands. Cues and assist to sequence transition. Requires increased time.  -TB     Row Name 04/12/22 0829          Transfers    Transfers sit-stand transfer;bed-chair transfer  -TB     Comment, (Transfers) Cues and assist for hand placement, sequencing, and safety.  -TB     Bed-Chair [place] (Transfers) minimum assist (75% patient effort);1 person assist;verbal cues  -TB     Assistive Device (Bed-Chair Transfers) walker, front-wheeled  -TB     Sit-Stand [place] (Transfers) minimum assist (75% patient effort);1 person assist;verbal cues  -TB     Row Name 04/12/22 0829          Sit-Stand Transfer    Assistive Device (Sit-Stand Transfers) walker, front-wheeled  -TB     Comment, (Sit-Stand Transfer) From elevated bed surface  -TB     Row Name 04/12/22 0829          Functional Mobility    Functional Mobility- Comment Defer to PT  -TB     Row Name 04/12/22 0829          Activities of Daily Living    BADL Assessment/Intervention upper body dressing;lower body dressing;grooming;feeding;toileting  -TB      Row Name 04/12/22 0829          Mobility    Extremity Weight-bearing Status right lower extremity  -TB     Right Lower Extremity (Weight-bearing Status) weight-bearing as tolerated (WBAT)  -TB     Row Name 04/12/22 0829          Lower Body Dressing Assessment/Training    [place] Level (Lower Body Dressing) don;socks;dependent (less than 25% patient effort)  -TB     Position (Lower Body Dressing) edge of bed sitting  -TB     Comment, (Lower Body Dressing) Confusion limits opportunity for ADL retraining.  -TB     Row Name 04/12/22 0829          Upper Body Dressing Assessment/Training    [place] Level (Upper Body Dressing) don;pajama/robe;set up;verbal cues  -TB     Position (Upper Body Dressing) edge of bed sitting  -TB     Row Name 04/12/22 0829          Grooming Assessment/Training    [place] Level (Grooming) set up;wash face, hands  -TB     Position (Grooming) supported sitting  -TB     Row Name 04/12/22 0829          Self-Feeding Assessment/Training    [place] Level (Feeding) set up;prepare tray/open items;independent;liquids to mouth;finger foods  -TB     Position (Self-Feeding) supported sitting  -TB     Row Name 04/12/22 0829          Toileting Assessment/Training    [place] Level (Toileting) dependent (less than 25% patient effort)  -TB     Position (Toileting) supported standing  -TB     Comment, (Toileting) Episode of bowel incontinence requiring total care  -TB           User Key  (r) = Recorded By, (t) = Taken By, (c) = Cosigned By    Initials Name Provider Type    TB Marley Sabillon OT Occupational Therapist               Obj/Interventions     Row Name 04/12/22 0834          Balance    Balance Assessment sitting dynamic balance;sit to stand dynamic balance;standing dynamic balance  -TB     Dynamic Sitting Balance contact guard;1-person assist;verbal cues  -TB     Position, Sitting Balance sitting in chair;sitting edge of bed  -TB     Sit to Stand Dynamic Balance minimal  assist;1-person assist;verbal cues  -TB     Dynamic Standing Balance minimal assist;1-person assist;verbal cues  -TB     Position/Device Used, Standing Balance supported;walker, front-wheeled  -TB     Balance Interventions sitting;standing;sit to stand;supported;dynamic;occupation based/functional task;UE activity with balance activity  -TB           User Key  (r) = Recorded By, (t) = Taken By, (c) = Cosigned By    Initials Name Provider Type    TB Marley Sabillon, OT Occupational Therapist               Goals/Plan    No documentation.                Clinical Impression     Row Name 04/12/22 0836          Pain Assessment    Pain Intervention(s) Ambulation/increased activity;Repositioned;Cold applied  -TB     Additional Documentation Pain Scale: FACES Pre/Post-Treatment (Group)  -TB     [facility] Name 04/12/22 0836          Pain Scale: FACES Pre/Post-Treatment    Pain: FACES Scale, Pretreatment 2-->hurts little bit  -TB     Posttreatment Pain Rating 4-->hurts little more  -TB     Pain Location - Side/Orientation Right  -TB     Pain Location generalized  -TB     Pain Location - knee  -TB     Pre/Posttreatment Pain Comment Pt tolerates EOB/OOB activity  -TB     Row Name 04/12/22 0836          Plan of Care Review    Plan of Care Reviewed With patient  -TB     Outcome Evaluation Pt is alert and cooperative with therapy. Remains confused, Ox2-3 with cues. Max A supine to sit. Min A STS and step to chair transfer using RW. Requires cues and assist for RW positioning and safety. Confusion limits ADL retraining. Dependent LB dressing/toileting. Set-up UB dressing, grooming, and self-feeding. OT will continue to follow IP. D/C plan is SNF.  -TB     Row Name 04/12/22 0836          Therapy Plan Review/Discharge Plan (OT)    Anticipated Discharge Disposition (OT) skilled nursing facility  -TB     Row Name 04/12/22 0836          Vital Signs    Pre Systolic BP Rehab --  RN cleared OT  -TB     O2 Delivery Pre Treatment room air   -TB     O2 Delivery Intra Treatment room air  -TB     O2 Delivery Post Treatment room air  -TB     Pre Patient Position Supine  -TB     Intra Patient Position Standing  -TB     Post Patient Position Sitting  -TB     Row Name 04/12/22 0836          Positioning and Restraints    Pre-Treatment Position in bed  -TB     Post Treatment Position chair  -TB     In Chair notified nsg;reclined;call light within reach;encouraged to call for assist;exit alarm on;waffle cushion;legs elevated  -TB           User Key  (r) = Recorded By, (t) = Taken By, (c) = Cosigned By    Initials Name Provider Type    Marley Boyd OT Occupational Therapist               Outcome Measures     Row Name 04/12/22 0843          How much help from another is currently needed...    Putting on and taking off regular lower body clothing? 1  -TB     Bathing (including washing, rinsing, and drying) 2  -TB     Toileting (which includes using toilet bed pan or urinal) 1  -TB     Putting on and taking off regular upper body clothing 3  -TB     Taking care of personal grooming (such as brushing teeth) 3  -TB     Eating meals 3  -TB     AM-PAC 6 Clicks Score (OT) 13  -TB     Row Name 04/12/22 0843          Functional Assessment    Outcome Measure Options AM-PAC 6 Clicks Daily Activity (OT)  -TB           User Key  (r) = Recorded By, (t) = Taken By, (c) = Cosigned By    Initials Name Provider Type    Marley Boyd OT Occupational Therapist                Occupational Therapy Education                 Title: PT OT SLP Therapies (In Progress)     Topic: Occupational Therapy (In Progress)     Point: ADL training (In Progress)     Description:   Instruct learner(s) on proper safety adaptation and remediation techniques during self care or transfers.   Instruct in proper use of assistive devices.              Learning Progress Summary           Patient Acceptance, E,D, VU,NR by TB at 4/12/2022 0843    Acceptance, E, NR by MR at 4/8/2022 0846    Family Acceptance, E, NR by MR at 4/8/2022 0846                   Point: Home exercise program (In Progress)     Description:   Instruct learner(s) on appropriate technique for monitoring, assisting and/or progressing therapeutic exercises/activities.              Learning Progress Summary           Patient Acceptance, E, NR by MR at 4/8/2022 0846   Family Acceptance, E, NR by MR at 4/8/2022 0846                   Point: Precautions (In Progress)     Description:   Instruct learner(s) on prescribed precautions during self-care and functional transfers.              Learning Progress Summary           Patient Acceptance, E, NR by MR at 4/8/2022 0846   Family Acceptance, E, NR by MR at 4/8/2022 0846                   Point: Body mechanics (In Progress)     Description:   Instruct learner(s) on proper positioning and spine alignment during self-care, functional mobility activities and/or exercises.              Learning Progress Summary           Patient Acceptance, E, NR by MR at 4/8/2022 0846   Family Acceptance, E, NR by MR at 4/8/2022 0846                               User Key     Initials Effective Dates Name Provider Type Discipline     06/16/21 -  Marley Sabillon, OT Occupational Therapist OT     10/06/21 -  Thea [place] OT Occupational Therapist OT              OT Recommendation and Plan     Plan of Care Review  Plan of Care Reviewed With: patient  Outcome Evaluation: Pt is alert and cooperative with therapy. Remains confused, Ox2-3 with cues. Max A supine to sit. Min A STS and step to chair transfer using RW. Requires cues and assist for RW positioning and safety. Confusion limits ADL retraining. Dependent LB dressing/toileting. Set-up UB dressing, grooming, and self-feeding. OT will continue to follow IP. D/C plan is SNF.     Time Calculation:    Time Calculation- OT     Row Name 04/12/22 0738             Time Calculation- OT    OT Start Time 0738  -TB      OT Received On 04/12/22  -       OT Goal Re-Cert Due Date 04/18/22  -TB              Timed Charges    75621 - OT Self Care/Mgmt Minutes 39  -TB              Total Minutes    Timed Charges Total Minutes 39  -TB       Total Minutes 39  -TB            User Key  (r) = Recorded By, (t) = Taken By, (c) = Cosigned By    Initials Name Provider Type    TB Marley Sabillon, OT Occupational Therapist              Therapy Charges for Today     Code Description Service Date Service Provider Modifiers Qty    [id number] HC OT SELF CARE/MGMT/TRAIN EA 15 MIN 4/12/2022 Marley Sabillon OT GO 3               Marley Sabillon OT  4/12/2022

## 2022-04-12 NOTE — PROGRESS NOTES
"      [facility] [facility] [place]    Subjective      LOS: 4 days   Patient Care Team:  Mannie Melvin MD as PCP - General (Family Medicine)    CC: Right knee pain    Interval History:   Patient sitting comfortably in a chair.  Pain controlled.  Going to the [place] today.    Objective      Vital Signs  Temp (24hrs), Av.7 °F (37.1 °C), Min:98 °F (36.7 °C), Max:99.4 °F (37.4 °C)      /83   Pulse 71   Temp 99.4 °F (37.4 °C) (Oral)   Resp 18   Ht 172.7 cm (68\")   Wt 97.1 kg (214 lb 1.1 oz)   SpO2 96%   BMI 32.55 kg/m²     Examination:   Examination of the right knee: The wound is clean, dry, and intact.  Ankle dorsiflexion, ankle plantar flexion, and EHL are intact.  Sensation intact in the foot to light touch.  2+ posterior tibialis pulse.  Straight leg raise weak but intact.      Labs:  Results from last 7 days   Lab Units 04/10/22  0503 22  0933 22  1301   WBC 10*3/mm3 8.73 11.29* 11.37*   HEMOGLOBIN g/dL 7.6* 8.1* 8.0*   HEMATOCRIT % 22.3* 25.4* 23.6*   MCV fL 89.2 89.1 86.1   PLATELETS 10*3/mm3 151 165 158       Radiology:  Imaging Results (Last 24 Hours)     Procedure Component Value Units Date/Time    CT Head Without Contrast [[id number]] Collected: 22 1544     Updated: 22 1552    Narrative:      DATE OF EXAM: 2022 12:47 PM     PROCEDURE: CT HEAD WO CONTRAST-     INDICATIONS: Delirium; M17.0-Bilateral primary osteoarthritis of knee     COMPARISON: 2021     TECHNIQUE: Routine transaxial and coronal reconstruction images were  obtained through the head without the administration of contrast.  Automated exposure control and iterative reconstruction methods were  used.     The radiation dose reduction device was turned on for each scan per the  ALARA (As Low as Reasonably Achievable) protocol.     FINDINGS: Gray-white differentiation is maintained and there is no  evidence of intracranial hemorrhage, mass or mass effect. Age-related  changes of the " brain are present including volume loss and typical  periventricular sequela of chronic small vessel ischemia. There is  otherwise no evidence of intracranial hemorrhage, mass or mass effect.  The ventricles are normal in size and configuration accounting for  surrounding volume loss. The orbits are normal and the paranasal sinuses  demonstrate a stable mucous retention cyst in the left maxillary sinus.  The calvarium is intact.       Impression:      Age-related changes of the brain as above, otherwise without  evidence of acute intracranial abnormality.        This report was finalized on 4/11/2022 3:49 PM by Vini Rea.             PT:  Physical Therapy - Plan of Care Review - Outcome Summary:  Outcome Evaluation: Increased distance to 380', CGA, RW, cues to decrease dependence on walker, continued high distractibility, safety concerns.  Knee more stiff today, 7-80 degrees (04/12/22 1011)]       Results Review:     I reviewed the patient's new clinical results.    Assessment and Plan     Assessment:   Status post right total knee arthroplasty    Postoperative confusion-improved  Acute blood loss anemia-hemoglobin stable      Status post total right knee replacement    Coronary artery disease    Dyslipidemia    Hypothyroidism    GERD (gastroesophageal reflux disease)    Seizure disorder (HCC)    BPH (benign prostatic hypertrophy)    Hypertension    Primary osteoarthritis of both knees    Diabetes (HCC)    Postoperative urinary retention    Confusion, postoperative    Acute blood loss anemia, asymptomatic, no transfusion required      Plan for disposition: Plan for the [place] today.  Follow-up in 3 weeks in the office as planned.      Future Appointments   Date Time Provider Department Center   4/27/2022  2:20 PM Kiersten Carreon PA-C [facility]   9/23/2022 12:15 PM Maryanne Baker MD [facility]           Louis Malcolm MD  04/12/22  10:39 EDT

## 2022-04-15 ENCOUNTER — TELEPHONE (OUTPATIENT)
Dept: ORTHOPEDIC SURGERY | Facility: CLINIC | Age: 77
End: 2022-04-15

## 2022-04-15 NOTE — TELEPHONE ENCOUNTER
See earlier telephone encounter today..The patient left the office before the visit was finished. waleska Webster, nurse at [facility], [phone] that per our clinical manager, no other testing is recommended at this time, proceeding with an xray is appropriate.

## 2022-04-15 NOTE — TELEPHONE ENCOUNTER
Eleanor, nurse at [facility], called regarding patient. She stated patient had dark redness under skin on surgical leg and into groin. No complaints of pain, no suspicious infectious drainage, no fevers or chills. Doppler was negative for DVT. They will proceed with Xray 4V Knee to check prosthesis. Do you have any other recommendations?

## 2022-04-16 ENCOUNTER — APPOINTMENT (OUTPATIENT)
Dept: CT IMAGING | Facility: HOSPITAL | Age: 77
End: 2022-04-16

## 2022-04-16 ENCOUNTER — HOSPITAL ENCOUNTER (EMERGENCY)
Facility: HOSPITAL | Age: 77
Discharge: HOME | End: 2022-04-16
Attending: EMERGENCY MEDICINE | Admitting: EMERGENCY MEDICINE

## 2022-04-16 VITALS
HEART RATE: 64 BPM | HEIGHT: 68 IN | OXYGEN SATURATION: 100 % | BODY MASS INDEX: 32.13 KG/M2 | SYSTOLIC BLOOD PRESSURE: 168 MMHG | WEIGHT: 212 LBS | TEMPERATURE: 98.1 F | DIASTOLIC BLOOD PRESSURE: 80 MMHG | RESPIRATION RATE: 16 BRPM

## 2022-04-16 DIAGNOSIS — R55 VASOVAGAL SYNCOPE: Primary | ICD-10-CM

## 2022-04-16 DIAGNOSIS — R91.1 NODULE OF RIGHT LUNG: ICD-10-CM

## 2022-04-16 LAB
ALBUMIN SERPL-MCNC: 3.6 G/DL (ref 3.5–5.2)
ALBUMIN/GLOB SERPL: 1.2 G/DL
ALP SERPL-CCNC: 67 U/L (ref 39–117)
ALT SERPL W P-5'-P-CCNC: 10 U/L (ref 1–41)
ANION GAP SERPL CALCULATED.3IONS-SCNC: 12 MMOL/L (ref 5–15)
AST SERPL-CCNC: 23 U/L (ref 1–40)
BASOPHILS # BLD AUTO: 0.09 10*3/MM3 (ref 0–0.2)
BASOPHILS NFR BLD AUTO: 0.7 % (ref 0–1.5)
BILIRUB SERPL-MCNC: 1 MG/DL (ref 0–1.2)
BUN SERPL-MCNC: 16 MG/DL (ref 8–23)
BUN/CREAT SERPL: 15.8 (ref 7–25)
CALCIUM SPEC-SCNC: 8.4 MG/DL (ref 8.6–10.5)
CHLORIDE SERPL-SCNC: 97 MMOL/L (ref 98–107)
CO2 SERPL-SCNC: 25 MMOL/L (ref 22–29)
CREAT SERPL-MCNC: 1.01 MG/DL (ref 0.76–1.27)
D DIMER PPP FEU-MCNC: 2.31 MCGFEU/ML (ref 0.01–0.5)
DEPRECATED RDW RBC AUTO: 47.4 FL (ref 37–54)
EGFRCR SERPLBLD CKD-EPI 2021: 76.6 ML/MIN/1.73
EOSINOPHIL # BLD AUTO: 0.87 10*3/MM3 (ref 0–0.4)
EOSINOPHIL NFR BLD AUTO: 6.6 % (ref 0.3–6.2)
ERYTHROCYTE [DISTWIDTH] IN BLOOD BY AUTOMATED COUNT: 15.3 % (ref 12.3–15.4)
GLOBULIN UR ELPH-MCNC: 3 GM/DL
GLUCOSE SERPL-MCNC: 93 MG/DL (ref 65–99)
HCT VFR BLD AUTO: 27.5 % (ref 37.5–51)
HGB BLD-MCNC: 8.9 G/DL (ref 13–17.7)
HOLD SPECIMEN: NORMAL
IMM GRANULOCYTES # BLD AUTO: 0.82 10*3/MM3 (ref 0–0.05)
IMM GRANULOCYTES NFR BLD AUTO: 6.2 % (ref 0–0.5)
LYMPHOCYTES # BLD AUTO: 1.57 10*3/MM3 (ref 0.7–3.1)
LYMPHOCYTES NFR BLD AUTO: 11.9 % (ref 19.6–45.3)
MAGNESIUM SERPL-MCNC: 2 MG/DL (ref 1.6–2.4)
MCH RBC QN AUTO: 29.5 PG (ref 26.6–33)
MCHC RBC AUTO-ENTMCNC: 32.4 G/DL (ref 31.5–35.7)
MCV RBC AUTO: 91.1 FL (ref 79–97)
MONOCYTES # BLD AUTO: 1.2 10*3/MM3 (ref 0.1–0.9)
MONOCYTES NFR BLD AUTO: 9.1 % (ref 5–12)
NEUTROPHILS NFR BLD AUTO: 65.5 % (ref 42.7–76)
NEUTROPHILS NFR BLD AUTO: 8.64 10*3/MM3 (ref 1.7–7)
NRBC BLD AUTO-RTO: 0.2 /100 WBC (ref 0–0.2)
PLAT MORPH BLD: NORMAL
PLATELET # BLD AUTO: 411 10*3/MM3 (ref 140–450)
PMV BLD AUTO: 8.4 FL (ref 6–12)
POTASSIUM SERPL-SCNC: 3.3 MMOL/L (ref 3.5–5.2)
PROT SERPL-MCNC: 6.6 G/DL (ref 6–8.5)
RBC # BLD AUTO: 3.02 10*6/MM3 (ref 4.14–5.8)
RBC MORPH BLD: NORMAL
SODIUM SERPL-SCNC: 134 MMOL/L (ref 136–145)
TROPONIN T SERPL-MCNC: <0.01 NG/ML (ref 0–0.03)
WBC MORPH BLD: NORMAL
WBC NRBC COR # BLD: 13.19 10*3/MM3 (ref 3.4–10.8)
WHOLE BLOOD HOLD SPECIMEN: NORMAL
WHOLE BLOOD HOLD SPECIMEN: NORMAL

## 2022-04-16 PROCEDURE — 93005 ELECTROCARDIOGRAM TRACING: CPT | Performed by: EMERGENCY MEDICINE

## 2022-04-16 PROCEDURE — 85379 FIBRIN DEGRADATION QUANT: CPT | Performed by: PHYSICIAN ASSISTANT

## 2022-04-16 PROCEDURE — 71275 CT ANGIOGRAPHY CHEST: CPT

## 2022-04-16 PROCEDURE — 99284 EMERGENCY DEPT VISIT MOD MDM: CPT

## 2022-04-16 PROCEDURE — 80053 COMPREHEN METABOLIC PANEL: CPT | Performed by: EMERGENCY MEDICINE

## 2022-04-16 PROCEDURE — 85025 COMPLETE CBC W/AUTO DIFF WBC: CPT | Performed by: EMERGENCY MEDICINE

## 2022-04-16 PROCEDURE — 0 IOPAMIDOL PER 1 ML: Performed by: EMERGENCY MEDICINE

## 2022-04-16 PROCEDURE — 85007 BL SMEAR W/DIFF WBC COUNT: CPT | Performed by: EMERGENCY MEDICINE

## 2022-04-16 PROCEDURE — 83735 ASSAY OF MAGNESIUM: CPT | Performed by: EMERGENCY MEDICINE

## 2022-04-16 PROCEDURE — 96360 HYDRATION IV INFUSION INIT: CPT

## 2022-04-16 PROCEDURE — 84484 ASSAY OF TROPONIN QUANT: CPT | Performed by: EMERGENCY MEDICINE

## 2022-04-16 RX ORDER — POTASSIUM CHLORIDE 750 MG/1
30 CAPSULE, EXTENDED RELEASE ORAL ONCE
Status: COMPLETED | OUTPATIENT
Start: 2022-04-16 | End: 2022-04-16

## 2022-04-16 RX ORDER — FERROUS SULFATE 325(65) MG
325 TABLET ORAL
COMMUNITY
End: 2022-09-22

## 2022-04-16 RX ORDER — SODIUM CHLORIDE 0.9 % (FLUSH) 0.9 %
10 SYRINGE (ML) INJECTION AS NEEDED
Status: DISCONTINUED | OUTPATIENT
Start: 2022-04-16 | End: 2022-04-16 | Stop reason: HOSPADM

## 2022-04-16 RX ADMIN — POTASSIUM CHLORIDE 30 MEQ: 750 CAPSULE, EXTENDED RELEASE ORAL at 20:15

## 2022-04-16 RX ADMIN — IOPAMIDOL 85 ML: 755 INJECTION, SOLUTION INTRAVENOUS at 19:11

## 2022-04-16 RX ADMIN — SODIUM CHLORIDE 1000 ML: 9 INJECTION, SOLUTION INTRAVENOUS at 18:04

## 2022-04-16 NOTE — ED PROVIDER NOTES
Subjective   Mr. Rivers is a 76-year-old male who presents to the emergency department after a syncopal episode while having a bowel movement at the [facility] facility today.  The patient states that he underwent right knee replacement (Dr. Malcolm) on April 7 and was discharged for rehab at the [facility].  The patient had a syncopal episode 2 days ago while doing physical therapy.  He was found to be quite orthostatic at the time and was given fluids and recovered and was not sent for further evaluation.  Today, he was at the facility and states that he was trying to have a bowel movement when he passed out again.  He did not injure himself during today's episode.  The patient denies any pain.  He states that he has had poor appetite but has been drinking some milk shakes that they provide him each morning.  He has had some mild diarrhea.  He denies any chest pain or shortness of breath.  No palpitations.  No abdominal pain, nausea or vomiting.  The patient has a history of non-insulin-dependent diabetes, hypertension, hyperlipidemia, GERD and has had previous absence seizure's.  No history of DVT or PE.  The daughter states that the patient has been somewhat confused since the surgery and states that he had a CT scan of the head a few days ago that showed nothing acute.  He has also had recent labs that were reassuring.          Review of Systems   Constitutional: Positive for appetite change. Negative for chills, fatigue and fever.   HENT: Negative for sore throat.    Respiratory: Negative for cough and shortness of breath.    Cardiovascular: Negative for chest pain and palpitations.   Gastrointestinal: Positive for diarrhea. Negative for abdominal pain, blood in stool, constipation, nausea and vomiting.   Genitourinary: Negative for decreased urine volume and dysuria.   Musculoskeletal: Negative for back pain and neck pain.   Skin: Negative for wound.   Neurological: Positive for syncope and light-headedness.  Negative for seizures and headaches.   Hematological: Negative.    Psychiatric/Behavioral: Negative.        Past Medical History:   Diagnosis Date   • Colon cancer (HCC) 1990    Status post partial colectomy in 1990. No chemotherapy or radiation therapy.   • Coronary artery disease     Nonobstructive coronary artery disease.   • Diabetes (HCC)    • Dyslipidemia    • GERD (gastroesophageal reflux disease)     Hx of.   • Hearing aid worn    • Hyperlipidemia    • Hypertension    • Hypothyroidism    • Left shoulder pain    • Primary osteoarthritis of both knees    • Seizure disorder (HCC)    • Wears glasses        Allergies   Allergen Reactions   • Sulfa Antibiotics Rash     Childhood reaction        Past Surgical History:   Procedure Laterality Date   • APPENDECTOMY     • CARPAL TUNNEL RELEASE Bilateral    • CHOLECYSTECTOMY     • COLECTOMY PARTIAL / TOTAL  1990    Partial colectomy   • COLONOSCOPY     • CYSTOSCOPY TRANSURETHRAL RESECTION OF PROSTATE N/A 09/21/2018    Procedure: CYSTOSCOPY TRANSURETHRAL RESECTION OF PROSTATE GREENLIGHT;  Surgeon: Naseem Clayton MD;  Location:  [facility];  Service: Urology   • OTHER SURGICAL HISTORY      Contraction surgery on bilateral hands and wrists.   • OTHER SURGICAL HISTORY      left and right hands   • SINUS SURGERY     • SKIN BIOPSY     • TEETH EXTRACTION     • TONSILLECTOMY     • TOTAL KNEE ARTHROPLASTY Right 4/7/2022    Procedure: TOTAL KNEE ARTHROPLASTY WITH ORTHO ROBOT RIGHT;  Surgeon: Louis Malcolm MD;  Location:  [facility];  Service: Robotics - Ortho;  Laterality: Right;   • TURP / TRANSURETHRAL INCISION / DRAINAGE PROSTATE     • VASECTOMY         Family History   Problem Relation Age of Onset   • Cancer Mother         brain   • Hypertension Father    • Stroke Father    • Stroke Other    • Arthritis Other    • Cancer Other    • No Known Problems Sister    • Stroke Maternal Grandfather    • No Known Problems Paternal Grandmother    • No Known Problems Paternal  Grandfather    • Stroke Sister        Social History     Socioeconomic History   • Marital status:    Tobacco Use   • Smoking status: Never Smoker   • Smokeless tobacco: Never Used   Vaping Use   • Vaping Use: Never used   Substance and Sexual Activity   • Alcohol use: No   • Drug use: No   • Sexual activity: Defer           Objective   Physical Exam  Constitutional:       General: He is not in acute distress.     Appearance: Normal appearance. He is not toxic-appearing or diaphoretic.   HENT:      Head: Normocephalic.      Nose: Nose normal.      Mouth/Throat:      Mouth: Mucous membranes are moist.   Eyes:      General: No scleral icterus.     Conjunctiva/sclera: Conjunctivae normal.      Pupils: Pupils are equal, round, and reactive to light.   Cardiovascular:      Rate and Rhythm: Normal rate and regular rhythm.      Pulses: Normal pulses.      Heart sounds: Normal heart sounds.   Pulmonary:      Effort: Pulmonary effort is normal.      Breath sounds: Normal breath sounds.   Abdominal:      General: Bowel sounds are normal.      Tenderness: There is no abdominal tenderness. There is no guarding.   Musculoskeletal:         General: Normal range of motion.      Cervical back: Normal range of motion and neck supple.      Comments: Patient has compression hose on both legs.  The right knee is dressed.  No apparent swelling noted.  No soiling of the bandages.   Skin:     General: Skin is warm and dry.      Coloration: Skin is pale.   Neurological:      General: No focal deficit present.      Mental Status: He is alert and oriented to person, place, and time.   Psychiatric:         Mood and Affect: Mood normal.         Procedures       Differential diagnosis includes syncope secondary to orthostasis, vasovagal syncope, electrolyte disorder, arrhythmia, PE, etc.    ED Course  ED Course as of 04/16/22 2020   Sat Apr 16, 2022   2007 Albumin: 3.60 [HB]      ED Course User Index  [HB] Collins Dixon, PA      Labs  and EKG were ordered.  The patient was gently hydrated.    Labs show a white count of 13.19.  His H&H is 8.9/27.5 (chronic).  Chemistries show a mild hypokalemia at 3.3.  No other concerning chemistries.  D-dimer was elevated at 2.31.  The patient was sent for CT angiogram chest to rule out PE.    CTA chest:  1.  No evidence of pulmonary embolus or other acute process within the  chest.  2.  10 mm subpleural noncalcified nodule within the right lower lobe.   PET CT scan would be recommended for further evaluation when clinically feasible.    The patient is currently asymptomatic.  His blood pressure has for the most part been in the 140s systolic.  His EKG was read by me independently and shows a sinus bradycardia with a rate of 56.    On review of his meds, he is on an alpha blocker (Terazosin) which could contribute to orthostasis.  I will have him hold that for the weekend and f/u on Monday with the [facility]'s physician.  I suspect part of his symptoms were from vasovagal stimulation while trying to have a bowel movement.      I spoke with the pt about his workup.  He was advised to f/u on Monday and to make sure he drinks adequately and to go slow when going from sitting to standing.                                                              MDM    Final diagnoses:   Vasovagal syncope       ED Disposition  ED Disposition     ED Disposition   Discharge    Condition   Stable    Comment   --             Mannie Melvin MD  [address] [address] [phone]      call for follow up      Follow up with the physician at the [facility] on Monday for recheck.             Medication List      Stop    albuterol sulfate  (90 Base) MCG/ACT inhaler  Commonly known as: PROVENTIL HFA;VENTOLIN HFA;PROAIR HFA     tamsulosin 0.4 MG capsule 24 hr capsule  Commonly known as: FLOMAX     traMADol 50 MG tablet  Commonly known as: ULTRAM     vitamin B-12 1000 MCG tablet  Commonly known as:  CYANOCOBALAMIN             Collins Dixon, PA  04/16/22 2020

## 2022-04-17 NOTE — DISCHARGE INSTRUCTIONS
Rest.  Stop your Terazosin for now.  Adequate fluids.  Go slow when going from sitting to standing.  Follow up with the [facility]'s physician on Monday.  Return to the ER if you have recurrence.    Your CT scan showed an incidental nodule in the right lung that should have follow PET scan or CT scan in the near future, to be arranged by your PCP.

## 2022-04-18 ENCOUNTER — TELEPHONE (OUTPATIENT)
Dept: CARDIOLOGY | Facility: CLINIC | Age: 77
End: 2022-04-18

## 2022-04-18 NOTE — TELEPHONE ENCOUNTER
Received call from pt's assisted living facility stating pt was seen over the weekend in our ED for syncopal episode. Upon review of ED note, patient apparently has syncopal episode during PT session 2 days ago due to orthostasis. This was improved after fluids. Over the weekend pt noted an additional syncopal episode while attempting to have a bowel movement. Cardiac workup during ED visit was overall unremarkable. SBP slightly elevated in 140s, HR mildly bradycardic in 50s. Pt is taking Terazosin 5 mg QD, however ED MD encouraged pt to hold this med over the rest of the weekend and follow up w us Monday. Pt overall reports feeling better. Pt last seen by us a month ago w stable cardiac status. Please advise w further recommendations.

## 2022-04-23 LAB
QT INTERVAL: 474 MS
QTC INTERVAL: 457 MS

## 2022-04-27 ENCOUNTER — OFFICE VISIT (OUTPATIENT)
Dept: ORTHOPEDIC SURGERY | Facility: CLINIC | Age: 77
End: 2022-04-27

## 2022-04-27 VITALS — TEMPERATURE: 96.8 F

## 2022-04-27 DIAGNOSIS — Z96.651 STATUS POST TOTAL RIGHT KNEE REPLACEMENT: Primary | ICD-10-CM

## 2022-04-27 PROCEDURE — 99024 POSTOP FOLLOW-UP VISIT: CPT | Performed by: PHYSICIAN ASSISTANT

## 2022-04-27 RX ORDER — TAMSULOSIN HYDROCHLORIDE 0.4 MG/1
1 CAPSULE ORAL DAILY
COMMUNITY
End: 2022-06-15

## 2022-04-27 RX ORDER — PANTOPRAZOLE SODIUM 40 MG/1
40 TABLET, DELAYED RELEASE ORAL DAILY
COMMUNITY
End: 2022-06-08

## 2022-04-27 RX ORDER — LANOLIN ALCOHOL/MO/W.PET/CERES
1000 CREAM (GRAM) TOPICAL DAILY
COMMUNITY
End: 2022-09-22

## 2022-04-27 RX ORDER — HYDRALAZINE HYDROCHLORIDE 25 MG/1
25 TABLET, FILM COATED ORAL 3 TIMES DAILY
COMMUNITY
End: 2022-09-22

## 2022-04-27 RX ORDER — ALBUTEROL SULFATE 90 UG/1
2 AEROSOL, METERED RESPIRATORY (INHALATION) EVERY 4 HOURS PRN
COMMUNITY
End: 2022-09-22

## 2022-04-27 RX ORDER — ATORVASTATIN CALCIUM 10 MG/1
10 TABLET, FILM COATED ORAL DAILY
COMMUNITY
End: 2022-09-22

## 2022-04-27 RX ORDER — TRAMADOL HYDROCHLORIDE 50 MG/1
50 TABLET ORAL EVERY 6 HOURS PRN
COMMUNITY
End: 2022-09-22

## 2022-04-27 NOTE — PROGRESS NOTES
[facility] Orthopaedic Surgery Clinic Note      Subjective     CC: Post-op (3 weeks s/p TOTAL KNEE ARTHROPLASTY WITH ORTHO ROBOT RIGHT 4/7/22)      HPI    Lea Rivers is a 76 y.o. male.  Patient presents a 3-week status post right total knee arthroplasty with Dr. Malcolm.  He reports pain has improved.  He is staying at the [facility] right now and doing PT there.  He has been very happy with his progress.  Not taking any pain medication for pain.  Using a walker for ambulation    Overall, patient's symptoms are improved    ROS:    Constiutional:Pt denies fever, chills, nausea, or vomiting.  MSK:as above        Objective      Past Medical History  Past Medical History:   Diagnosis Date   • Colon cancer (HCC) 1990    Status post partial colectomy in 1990. No chemotherapy or radiation therapy.   • Coronary artery disease     Nonobstructive coronary artery disease.   • Diabetes (HCC)    • Dyslipidemia    • GERD (gastroesophageal reflux disease)     Hx of.   • Hearing aid worn    • Hyperlipidemia    • Hypertension    • Hypothyroidism    • Left shoulder pain    • Primary osteoarthritis of both knees    • Seizure disorder (HCC)    • Wears glasses          Physical Exam  Temp 96.8 °F (36 °C)     There is no height or weight on file to calculate BMI.    Patient is well nourished and well developed.        Ortho Exam  Right knee exam: Anterior knee incision is healing well.  Range of motion 0-1 10 ligament stable to valgus and varus stress neurovascular intact distally.  Ambulating with walker    Imaging/Labs/EMG Reviewed:  Imaging Results (Last 24 Hours)     Procedure Component Value Units Date/Time    XR Knee 3+ View With [place] Right [[id number]] Resulted: 04/27/22 1431     Updated: 04/27/22 1431    Narrative:      Right Knee Radiographs  Indication: status-post right total knee arthroplasty  Views: AP, lateral, and sunrise views of the right knee    Comparison: no change compared to prior study,  4/7/2022    Findings:   The components are well aligned, with no signs of loosening or failure.            Assessment    Assessment:  1. Status post total right knee replacement        Plan:  1. Recommend over the counter anti-inflammatories for pain and/or swelling  2. Doing well status post right total knee arthroplasty with robotic with Dr. Malcolm on 4/7/2022.  I reassured the patient and his daughter that he is doing very well.  It is up to the [facility] and family when he will be discharged home and when it is safe for the him to do so.  He had some confusion postoperatively and he has returned to baseline.  His daughter says that he has some mild memory issues from seizures years ago.  Plan is he continue with his PT.  He will return to see Dr. Malcolm in 6 weeks or sooner if needed.    Patient history, diagnosis and treatment plan discussed with Dr. Malcolm.        Kiersten Carreon PA-C  04/27/22  15:37 CADET      Dragon disclaimer:  Much of this encounter note is an electronic transcription/translation of spoken language to printed text. The electronic translation of spoken language may permit erroneous, or at times, nonsensical words or phrases to be inadvertently transcribed; Although I have reviewed the note for such errors, some may still exist.

## 2022-04-27 NOTE — TELEPHONE ENCOUNTER
"PER KIMBERLI BEST PA-C: \"If patient is still having episodes of dizziness, discontinue terazosin and add doxazosin. If symptoms persist after that, patient to return to office. \"      Attempted to reach pt x2. No answer. No VM set up. Will await return call.    "

## 2022-06-08 ENCOUNTER — OFFICE VISIT (OUTPATIENT)
Dept: ORTHOPEDIC SURGERY | Facility: CLINIC | Age: 77
End: 2022-06-08

## 2022-06-08 DIAGNOSIS — Z47.89 ORTHOPEDIC AFTERCARE: ICD-10-CM

## 2022-06-08 DIAGNOSIS — Z96.651 STATUS POST TOTAL RIGHT KNEE REPLACEMENT: Primary | ICD-10-CM

## 2022-06-08 PROCEDURE — 99024 POSTOP FOLLOW-UP VISIT: CPT | Performed by: ORTHOPAEDIC SURGERY

## 2022-06-08 RX ORDER — OMEPRAZOLE 20 MG/1
20 CAPSULE, DELAYED RELEASE ORAL DAILY
COMMUNITY

## 2022-06-08 NOTE — PROGRESS NOTES
[facility] Orthopaedic Surgery Clinic Note    Subjective     Chief Complaint   Patient presents with   • Follow-up     6 week follow up; 9 weeks s/p TOTAL KNEE ARTHROPLASTY WITH ORTHO ROBOT RIGHT 4/7/22        HPI    It has been 6  week(s) since Mr. Rivers's last visit. He returns to clinic today for postoperative follow-up of right knee arthroplasty. The issue has been ongoing for 9 week(s). He rates his pain a 2/10 on the pain scale. Previous/current treatments: physical therapy. Current symptoms: pain. Overall, he is doing better.  99% improvement compared to his preoperative symptoms.    I have reviewed the following portions of the patient's history and agree with: History of Present Illness and Review of Systems    Patient Active Problem List   Diagnosis   • Coronary artery disease   • Dyslipidemia   • Hypothyroidism   • GERD (gastroesophageal reflux disease)   • Seizure disorder (HCC)   • BPH (benign prostatic hypertrophy)   • Hypertension   • Primary osteoarthritis of both knees   • Syncope and collapse   • Precordial pain   • Diabetes (HCC)   • Status post total right knee replacement   • Postoperative urinary retention   • Confusion, postoperative   • Acute blood loss anemia, asymptomatic, no transfusion required     Past Medical History:   Diagnosis Date   • Colon cancer (HCC) 1990    Status post partial colectomy in 1990. No chemotherapy or radiation therapy.   • Coronary artery disease     Nonobstructive coronary artery disease.   • Diabetes (HCC)    • Dyslipidemia    • GERD (gastroesophageal reflux disease)     Hx of.   • Hearing aid worn    • Hyperlipidemia    • Hypertension    • Hypothyroidism    • Left shoulder pain    • Primary osteoarthritis of both knees    • Seizure disorder (HCC)    • Wears glasses       Past Surgical History:   Procedure Laterality Date   • APPENDECTOMY     • CARPAL TUNNEL RELEASE Bilateral    • CHOLECYSTECTOMY     • COLECTOMY PARTIAL / TOTAL  1990    Partial colectomy   •  COLONOSCOPY     • CYSTOSCOPY TRANSURETHRAL RESECTION OF PROSTATE N/A 09/21/2018    Procedure: CYSTOSCOPY TRANSURETHRAL RESECTION OF PROSTATE GREENLIGHT;  Surgeon: Naseem Clayton MD;  Location: [facility] OR;  Service: Urology   • OTHER SURGICAL HISTORY      Contraction surgery on bilateral hands and wrists.   • OTHER SURGICAL HISTORY      left and right hands   • SINUS SURGERY     • SKIN BIOPSY     • TEETH EXTRACTION     • TONSILLECTOMY     • TOTAL KNEE ARTHROPLASTY Right 4/7/2022    Procedure: TOTAL KNEE ARTHROPLASTY WITH ORTHO ROBOT RIGHT;  Surgeon: Louis Malcolm MD;  Location:  [facility];  Service: Robotics - Ortho;  Laterality: Right;   • TURP / TRANSURETHRAL INCISION / DRAINAGE PROSTATE     • VASECTOMY        Family History   Problem Relation Age of Onset   • Cancer Mother         brain   • Hypertension Father    • Stroke Father    • Stroke Other    • Arthritis Other    • Cancer Other    • No Known Problems Sister    • Stroke Maternal Grandfather    • No Known Problems Paternal Grandmother    • No Known Problems Paternal Grandfather    • Stroke Sister      Social History     Socioeconomic History   • Marital status:    Tobacco Use   • Smoking status: Never Smoker   • Smokeless tobacco: Never Used   Vaping Use   • Vaping Use: Never used   Substance and Sexual Activity   • Alcohol use: No   • Drug use: No   • Sexual activity: Defer      Current Outpatient Medications on File Prior to Visit   Medication Sig Dispense Refill   • acetaminophen (TYLENOL) 500 MG tablet Take 2 tablets by mouth Every 8 (Eight) Hours As Needed for Mild Pain .     • albuterol sulfate  (90 Base) MCG/ACT inhaler Inhale 2 puffs Every 4 (Four) Hours As Needed for Wheezing.     • aspirin 81 MG EC tablet Take 1 tablet by mouth Daily.     • atorvastatin (LIPITOR) 10 MG tablet Take 10 mg by mouth Daily.     • chlorthalidone (HYGROTON) 25 MG tablet Take 1 tablet by mouth Daily. 90 tablet 3   • coenzyme Q10 100 MG capsule Take 100  mg by mouth Daily.     • divalproex (DEPAKOTE) 500 MG 24 hr tablet Take 500 mg by mouth 2 (two) times a day. 500mg QAM, 1000mg QPM     • Ergocalciferol (VITAMIN D2 PO) Take 50,000 Units by mouth 1 (One) Time Per Week.     • ferrous sulfate 325 (65 FE) MG tablet Take 325 mg by mouth Daily With Breakfast.     • fluticasone (FLONASE) 50 MCG/ACT nasal spray 2 sprays into the nostril(s) as directed by provider As Needed for Rhinitis. Administer 2 sprays in each nostril for each dose.     • hydrALAZINE (APRESOLINE) 25 MG tablet Take 25 mg by mouth 3 (Three) Times a Day.     • isosorbide mononitrate (IMDUR) 30 MG 24 hr tablet Take 30 mg by mouth Daily.     • levETIRAcetam (KEPPRA) 500 MG tablet Take 500 mg by mouth 2 (two) times a day.     • levothyroxine (SYNTHROID, LEVOTHROID) 88 MCG tablet Take 88 mcg by mouth daily.     • lisinopril (PRINIVIL,ZESTRIL) 20 MG tablet Take 20 mg by mouth 2 (two) times a day.     • metFORMIN ER (GLUCOPHAGE-XR) 500 MG 24 hr tablet Take 500 mg by mouth Daily With Breakfast.     • omeprazole (priLOSEC) 20 MG capsule Take 20 mg by mouth Daily.     • ONETOUCH DELICA LANCETS 33G misc USE 1 LANCET ONCE A DAY  0   • simvastatin (ZOCOR) 20 MG tablet Take 20 mg by mouth every night.     • tamsulosin (FLOMAX) 0.4 MG capsule 24 hr capsule Take 1 capsule by mouth Daily.     • terazosin (HYTRIN) 5 MG capsule Take 5 mg by mouth Every Night.     • traMADol (ULTRAM) 50 MG tablet Take 50 mg by mouth Every 6 (Six) Hours As Needed for Moderate Pain .     • verapamil SR (CALAN-SR) 240 MG CR tablet Take 240 mg by mouth 2 (two) times a day.     • vitamin B-12 (CYANOCOBALAMIN) 1000 MCG tablet Take 1,000 mcg by mouth Daily.     • [DISCONTINUED] pantoprazole (PROTONIX) 40 MG EC tablet Take 40 mg by mouth Daily.       No current facility-administered medications on file prior to visit.      Allergies   Allergen Reactions   • Sulfa Antibiotics Rash     Childhood reaction         Review of Systems   Constitutional:  Negative for activity change, appetite change, chills, diaphoresis, fatigue, fever and unexpected weight change.   HENT: Negative for congestion, dental problem, drooling, ear discharge, ear pain, facial swelling, hearing loss, mouth sores, nosebleeds, postnasal drip, rhinorrhea, sinus pressure, sneezing, sore throat, tinnitus, trouble swallowing and voice change.    Eyes: Negative for photophobia, pain, discharge, redness, itching and visual disturbance.   Respiratory: Negative for apnea, cough, choking, chest tightness, shortness of breath, wheezing and stridor.    Cardiovascular: Negative for chest pain, palpitations and leg swelling.   Gastrointestinal: Negative for abdominal distention, abdominal pain, anal bleeding, blood in stool, constipation, diarrhea, nausea, rectal pain and vomiting.   Endocrine: Negative for cold intolerance, heat intolerance, polydipsia, polyphagia and polyuria.   Genitourinary: Negative for decreased urine volume, difficulty urinating, dysuria, enuresis, flank pain, frequency, genital sores, hematuria and urgency.   Musculoskeletal: Positive for arthralgias. Negative for back pain, gait problem, joint swelling, myalgias, neck pain and neck stiffness.   Skin: Negative for color change, pallor, rash and wound.   Allergic/Immunologic: Negative for environmental allergies, food allergies and immunocompromised state.   Neurological: Negative for dizziness, tremors, seizures, syncope, facial asymmetry, speech difficulty, weakness, light-headedness, numbness and headaches.   Hematological: Negative for adenopathy. Does not bruise/bleed easily.   Psychiatric/Behavioral: Negative for agitation, behavioral problems, confusion, decreased concentration, dysphoric mood, hallucinations, self-injury, sleep disturbance and suicidal ideas. The patient is not nervous/anxious and is not hyperactive.         Objective      Physical Exam  There were no vitals taken for this visit.    There is no height or  weight on file to calculate BMI.    General:   Mental Status:  Alert   Appearance: Cooperative, in no acute distress   Build and Nutrition: Well-nourished well-developed male   Orientation: Alert and oriented to person, place and time   Posture: Normal   Gait: Nonantalgic    Integument:   Right knee: Wound is well-healed with no signs of infection    Lower Extremities:   Right Knee:    Tenderness:  None    Effusion:  1+    Swelling: None    Crepitus:  None    Range of motion:  Extension: 0°       Flexion: 125°  Instability:  No varus laxity, no valgus laxity, negative anterior drawer  Deformities:  None      Imaging/Studies  Imaging Results (Last 24 Hours)     ** No results found for the last 24 hours. **            Assessment and Plan     Diagnoses and all orders for this visit:    1. Status post total right knee replacement (Primary)    2. Orthopedic aftercare        1. Status post total right knee replacement    2. Orthopedic aftercare        I reviewed my findings with the patient.  His right total knee arthroplasty is functioning well, is pleased with results so far.  I will see him back in 10 months, which will be a 1 year checkup.  I will see him back sooner for any problems.    Return in about 10 months (around 4/8/2023) for Recheck with X-Rays.      Louis Malcolm MD  06/08/22  10:12 EDT

## 2022-06-15 ENCOUNTER — OFFICE VISIT (OUTPATIENT)
Dept: UROLOGY | Facility: CLINIC | Age: 77
End: 2022-06-15

## 2022-06-15 VITALS
HEIGHT: 68 IN | BODY MASS INDEX: 31.07 KG/M2 | SYSTOLIC BLOOD PRESSURE: 138 MMHG | DIASTOLIC BLOOD PRESSURE: 78 MMHG | WEIGHT: 205 LBS | OXYGEN SATURATION: 96 % | HEART RATE: 67 BPM

## 2022-06-15 DIAGNOSIS — R97.20 ELEVATED PROSTATE SPECIFIC ANTIGEN (PSA): ICD-10-CM

## 2022-06-15 DIAGNOSIS — N40.1 BENIGN PROSTATIC HYPERPLASIA WITH INCOMPLETE BLADDER EMPTYING: Primary | ICD-10-CM

## 2022-06-15 DIAGNOSIS — R39.14 BENIGN PROSTATIC HYPERPLASIA WITH INCOMPLETE BLADDER EMPTYING: Primary | ICD-10-CM

## 2022-06-15 LAB
BILIRUB BLD-MCNC: NEGATIVE MG/DL
CLARITY, POC: CLEAR
COLOR UR: YELLOW
EXPIRATION DATE: NORMAL
GLUCOSE UR STRIP-MCNC: NEGATIVE MG/DL
KETONES UR QL: NEGATIVE
LEUKOCYTE EST, POC: NEGATIVE
Lab: NORMAL
NITRITE UR-MCNC: NEGATIVE MG/ML
PH UR: 7.5 [PH] (ref 5–8)
PROT UR STRIP-MCNC: NEGATIVE MG/DL
RBC # UR STRIP: NEGATIVE /UL
SP GR UR: 1.01 (ref 1–1.03)
UROBILINOGEN UR QL: NORMAL

## 2022-06-15 PROCEDURE — 99204 OFFICE O/P NEW MOD 45 MIN: CPT | Performed by: STUDENT IN AN ORGANIZED HEALTH CARE EDUCATION/TRAINING PROGRAM

## 2022-06-15 PROCEDURE — 51798 US URINE CAPACITY MEASURE: CPT | Performed by: STUDENT IN AN ORGANIZED HEALTH CARE EDUCATION/TRAINING PROGRAM

## 2022-06-15 PROCEDURE — 81003 URINALYSIS AUTO W/O SCOPE: CPT | Performed by: STUDENT IN AN ORGANIZED HEALTH CARE EDUCATION/TRAINING PROGRAM

## 2022-06-15 RX ORDER — TAMSULOSIN HYDROCHLORIDE 0.4 MG/1
1 CAPSULE ORAL DAILY
Qty: 90 CAPSULE | Refills: 3 | Status: SHIPPED | OUTPATIENT
Start: 2022-06-15

## 2022-06-15 NOTE — PROGRESS NOTES
Elevated PSA Office Visit      Patient Name: Lea Rivers  : 1945   MRN: [id number]     Chief Complaint:  Elevated PSA.    Chief Complaint   Patient presents with   • Elevated PSA       Referring Provider: No ref. provider found    History of Present Illness: Lea Rivers is a 76 y.o. male with history of colon cancer s/p hemicolectomy  (no chemo or radiation),HLD, HTN, type II DM, GERD, hypothyroidism who presents today with history of elevated PSA.  Patient denies a family history of prostate cancer that he is aware of.    [facility] ([facility]) Risk Calculator:       Patient has previously followed with Naseem Clayton MD of [facility] urology group.  There are no outside urologic records to review unfortunately.  There is no obvious PSA trend for records available.  Patient has a history of BPH, in 2018 underwent a greenlight photo vaporization of the prostate for BPH with lower urinary tract symptoms.  Patient states this procedure did not help him much.    Has a PSA 10 or so in / per the patient's daughter. He has had a known PSA elevation for a few years but for some reason has never undergone a prostate biopsy. He has not followed up with Dr Clayton presumably since 2019. He has never undergone a prostate biopsy. Unclear if this was recommended.     PSA last checked per his PCP at [facility], checked in May 2022 - .32.    IPSS score 21, incomplete emptying, urgency, frequency, nocturia 2-3x nightly.  He has been on nearly every medication imaginable from urologic standpoint including Flomax, finasteride, oxybutynin 10 and 50 mg extended release, trospium, 50 mg Myrbetriq, terazosin, doxazosin etc.  At this time he is not on any medications.    Previously on Oxybutynin, discontinued this recently.  Apparently some concern about tensional impact on renal function?    Patient has significant evidence of incomplete bladder emptying, PVR is 167 mL.   Reports his stream is fairly strong but continues to have urgency, frequency and sensation of incomplete emptying.    Unclear when he was last on alpha-blocker treatment.       Subjective      Review of System: Review of Systems   Constitutional: Negative for chills, fatigue, fever and unexpected weight change.   HENT: Negative for sore throat.    Eyes: Negative for visual disturbance.   Respiratory: Negative for cough, chest tightness and shortness of breath.    Cardiovascular: Negative for chest pain and leg swelling.   Gastrointestinal: Negative for blood in stool, constipation, diarrhea, nausea, rectal pain and vomiting.   Genitourinary: Positive for frequency and urgency. Negative for decreased urine volume, difficulty urinating, dysuria, enuresis, flank pain, genital sores and hematuria.   Musculoskeletal: Negative for back pain and joint swelling.   Skin: Negative for rash and wound.   Neurological: Negative for seizures, speech difficulty, weakness and headaches.   Psychiatric/Behavioral: Negative for confusion, sleep disturbance and suicidal ideas. The patient is not nervous/anxious.       I have reviewed the ROS documented by my clinical staff, I have updated appropriately and I agree. Naseem Noland MD    Past Medical History:   Past Medical History:   Diagnosis Date   • Colon cancer (HCC) 1990    Status post partial colectomy in 1990. No chemotherapy or radiation therapy.   • Coronary artery disease     Nonobstructive coronary artery disease.   • Diabetes (HCC)    • Dyslipidemia    • GERD (gastroesophageal reflux disease)     Hx of.   • Hearing aid worn    • Hyperlipidemia    • Hypertension    • Hypothyroidism    • Left shoulder pain    • Primary osteoarthritis of both knees    • Seizure disorder (HCC)    • Wears glasses        Past Surgical History:   Past Surgical History:   Procedure Laterality Date   • APPENDECTOMY     • CARPAL TUNNEL RELEASE Bilateral    • CHOLECYSTECTOMY     • COLECTOMY  PARTIAL / TOTAL  1990    Partial colectomy   • COLONOSCOPY     • CYSTOSCOPY TRANSURETHRAL RESECTION OF PROSTATE N/A 09/21/2018    Procedure: CYSTOSCOPY TRANSURETHRAL RESECTION OF PROSTATE GREENLIGHT;  Surgeon: Naseem Clayton MD;  Location: [facility] OR;  Service: Urology   • OTHER SURGICAL HISTORY      Contraction surgery on bilateral hands and wrists.   • OTHER SURGICAL HISTORY      left and right hands   • SINUS SURGERY     • SKIN BIOPSY     • TEETH EXTRACTION     • TONSILLECTOMY     • TOTAL KNEE ARTHROPLASTY Right 4/7/2022    Procedure: TOTAL KNEE ARTHROPLASTY WITH ORTHO ROBOT RIGHT;  Surgeon: Louis Malcolm MD;  Location:  [facility] OR;  Service: Robotics - Ortho;  Laterality: Right;   • TURP / TRANSURETHRAL INCISION / DRAINAGE PROSTATE     • VASECTOMY         Family History:   Family History   Problem Relation Age of Onset   • Cancer Mother         brain   • Hypertension Father    • Stroke Father    • Stroke Other    • Arthritis Other    • Cancer Other    • No Known Problems Sister    • Stroke Maternal Grandfather    • No Known Problems Paternal Grandmother    • No Known Problems Paternal Grandfather    • Stroke Sister        Social History:   Social History     Socioeconomic History   • Marital status:    Tobacco Use   • Smoking status: Never Smoker   • Smokeless tobacco: Never Used   Vaping Use   • Vaping Use: Never used   Substance and Sexual Activity   • Alcohol use: No   • Drug use: No   • Sexual activity: Not Currently       Medications:     Current Outpatient Medications:   •  acetaminophen (TYLENOL) 500 MG tablet, Take 2 tablets by mouth Every 8 (Eight) Hours As Needed for Mild Pain ., Disp: , Rfl:   •  albuterol sulfate  (90 Base) MCG/ACT inhaler, Inhale 2 puffs Every 4 (Four) Hours As Needed for Wheezing., Disp: , Rfl:   •  aspirin 81 MG EC tablet, Take 1 tablet by mouth Daily., Disp: , Rfl:   •  atorvastatin (LIPITOR) 10 MG tablet, Take 10 mg by mouth Daily., Disp: , Rfl:   •   chlorthalidone (HYGROTON) 25 MG tablet, Take 1 tablet by mouth Daily., Disp: 90 tablet, Rfl: 3  •  coenzyme Q10 100 MG capsule, Take 100 mg by mouth Daily., Disp: , Rfl:   •  divalproex (DEPAKOTE) 500 MG 24 hr tablet, Take 500 mg by mouth 2 (two) times a day. 500mg QAM, 1000mg QPM, Disp: , Rfl:   •  Ergocalciferol (VITAMIN D2 PO), Take 50,000 Units by mouth 1 (One) Time Per Week., Disp: , Rfl:   •  ferrous sulfate 325 (65 FE) MG tablet, Take 325 mg by mouth Daily With Breakfast., Disp: , Rfl:   •  fluticasone (FLONASE) 50 MCG/ACT nasal spray, 2 sprays into the nostril(s) as directed by provider As Needed for Rhinitis. Administer 2 sprays in each nostril for each dose., Disp: , Rfl:   •  hydrALAZINE (APRESOLINE) 25 MG tablet, Take 25 mg by mouth 3 (Three) Times a Day., Disp: , Rfl:   •  isosorbide mononitrate (IMDUR) 30 MG 24 hr tablet, Take 30 mg by mouth Daily., Disp: , Rfl:   •  levETIRAcetam (KEPPRA) 500 MG tablet, Take 500 mg by mouth 2 (two) times a day., Disp: , Rfl:   •  levothyroxine (SYNTHROID, LEVOTHROID) 88 MCG tablet, Take 88 mcg by mouth daily., Disp: , Rfl:   •  lisinopril (PRINIVIL,ZESTRIL) 20 MG tablet, Take 20 mg by mouth 2 (two) times a day., Disp: , Rfl:   •  metFORMIN ER (GLUCOPHAGE-XR) 500 MG 24 hr tablet, Take 500 mg by mouth Daily With Breakfast., Disp: , Rfl:   •  omeprazole (priLOSEC) 20 MG capsule, Take 20 mg by mouth Daily., Disp: , Rfl:   •  ONETOUCH DELICA LANCETS 33G mis, USE 1 LANCET ONCE A DAY, Disp: , Rfl: 0  •  simvastatin (ZOCOR) 20 MG tablet, Take 20 mg by mouth every night., Disp: , Rfl:   •  terazosin (HYTRIN) 5 MG capsule, Take 5 mg by mouth Every Night., Disp: , Rfl:   •  traMADol (ULTRAM) 50 MG tablet, Take 50 mg by mouth Every 6 (Six) Hours As Needed for Moderate Pain ., Disp: , Rfl:   •  verapamil SR (CALAN-SR) 240 MG CR tablet, Take 240 mg by mouth 2 (two) times a day., Disp: , Rfl:   •  vitamin B-12 (CYANOCOBALAMIN) 1000 MCG tablet, Take 1,000 mcg by mouth Daily., Disp:  , Rfl:   •  tamsulosin (FLOMAX) 0.4 MG capsule 24 hr capsule, Take 1 capsule by mouth Daily., Disp: 90 capsule, Rfl: 3    Allergies:   Allergies   Allergen Reactions   • Sulfa Antibiotics Rash     Childhood reaction        IPSS Questionnaire (AUA-7):  Over the past month…    1)  Incomplete Emptying:       How often have you had a sensation of not emptying you had the sensation of not emptying your bladder completely after you finished urinating?  0 - Not at all   2)  Frequency:       How often have you had the urinate again less than two hours after you finished urinating?  5 - Almost always   3)  Intermittency:       How often have you found you stopped and started again several times when you urinated?   1 - Less than 1 time in 5   4) Urgency:      How often have you found it difficult to postpone urination?  5 - Almost always   5) Weak Stream:      How often have you had a weak urinary stream?  0 - Not at all   6) Straining:       How often have you had to push or strain to begin urination?  0 - Not at all   7) Nocturia:      How many times did you most typically get up to urinate from the time you went to bed at night until the time you got up in the morning?  5 - 5+ times   Total Score:  21   The International Prostate Symptom Score (IPSS) is used to screen, diagnose, track symptoms of benign prostatic hyperplasia (BPH).   0-7 (Mild Symptoms) 8-19 (Moderate) 20-35 (Severe)   Quality of Life (QoL):  If you were to spend the rest of your life with your urinary condition just the way it is now, how would you feel about that? 5-Unhappy   Urine Leakage (Incontinence) 3-Moderate (3 or more pads/day)       Sexual Health Inventory for Men (LAUREN)   Over the past 6 months:     1. How do you rate your confidence that you could get and keep an erection?  0 - No sexual activity    2. When you had erections with sexual    stimulation, how often were your erections hard enough for penetration (entering your partner)?  0 - No  "Sexual Activity    3. During sexual intercourse, how often were you able to maintain your erection after you had penetrated (entered) your partner?  0 - Did not attempt intercourse   4. During sexual intercourse, how difficult was it to maintain your erection to completion of intercourse?  0 - Did not attempt intercourse   5. When you attempted sexual intercourse, how often was it satisfactory for you?  0 - Did not attempt intercourse    Total Score: 0   The Sexual Health Inventory for Men further classifies ED severity with the following breakpoints:   1-7 (Severe ED) 8-11 (Moderate ED) 12-16 (Mild to Moderate ED) 17-21 (Mild ED)      Post void residual bladder scan:   167 mL       Objective     Physical Exam:   Vital Signs:   Vitals:    06/15/22 1501   BP: 138/78   Pulse: 67   SpO2: 96%   Weight: 93 kg (205 lb)   Height: 172.7 cm (68\")     Body mass index is 31.17 kg/m².     Physical Exam  Vitals and nursing note reviewed.   Constitutional:       Appearance: Normal appearance.   HENT:      Head: Normocephalic and atraumatic.      Mouth/Throat:      Mouth: Mucous membranes are moist.      Pharynx: Oropharynx is clear.   Eyes:      Extraocular Movements: Extraocular movements intact.      Conjunctiva/sclera: Conjunctivae normal.   Cardiovascular:      Rate and Rhythm: Normal rate and regular rhythm.   Pulmonary:      Effort: Pulmonary effort is normal. No respiratory distress.   Abdominal:      Palpations: Abdomen is soft.      Tenderness: There is no abdominal tenderness. There is no right CVA tenderness or left CVA tenderness.   Genitourinary:     Comments: Uncircumcised phallus, orthotopic meatus, bilaterally descended testicles without masses, or lesions.     ALEJANDRO: Patient did not tolerate rectal examination, unable to palpate prostate as patient elected to discontinue the examination due to pain and discomfort.  Musculoskeletal:         General: Normal range of motion.      Cervical back: Normal range of motion. "   Skin:     General: Skin is warm and dry.   Neurological:      General: No focal deficit present.      Mental Status: He is alert and oriented to person, place, and time.   Psychiatric:         Mood and Affect: Mood normal.         Behavior: Behavior normal.         Labs:   Hematocrit (%)   Date Value   04/16/2022 27.5 (L)   04/10/2022 22.3 (L)   04/09/2022 25.4 (L)   04/08/2022 23.6 (L)   03/24/2022 34.9 (L)   05/26/2021 34.7 (L)       No results found for: PSA    Images:   XR Knee 1 or 2 View Right    Result Date: 4/7/2022  1.  Postoperative changes from knee arthroplasty.  This report was finalized on 4/7/2022 10:22 AM by Neymar Colindres MD.      CT Head Without Contrast    Result Date: 4/11/2022  Age-related changes of the brain as above, otherwise without evidence of acute intracranial abnormality.   This report was finalized on 4/11/2022 3:49 PM by Vini Rea.      CT Angiogram Chest    Result Date: 4/16/2022   1.  No evidence of pulmonary embolus or other acute process within the chest. 2.  10 mm subpleural noncalcified nodule within the right lower lobe. PET CT scan would be recommended for further evaluation when clinically feasible.  This report was finalized on 4/16/2022 7:26 PM by Ganesh Hanna MD.        Measures:   Tobacco:   Lea Rivers  reports that he has never smoked. He has never used smokeless tobacco.              Assessment / Plan      Assessment/Plan:   Mr. Rivers is a 76 y.o. male with elevated PSA.  Patient has a history of chronic BPH, previously on numerous urologic medications following with Naseem Clayton of [facility] urology group, no outside records are available and these will need to be obtained and reviewed.  Patient reportedly has a history of PSA around 10 a few years ago, his PSA now is 11.3.  Per our risk calculator, he has a high risk of undiagnosed malignancy.  He did not tolerate digital rectal examination today due to pain.  My recommendation is to  proceed with upfront MRI prostate for further risk characterization, we discussed men his age could have a very enlarged prostate which may be driving his PSA, he does have a previous history of urologic intervention for enlarged prostate and therefore he could have significant prostatic regrowth or severely enlarged prostate volume driving his PSA.  Pending results of MRI, we will determine if patient needs a biopsy.    From a BPH and lower urinary tract standpoint, he has incomplete bladder emptying.  He is amenable to restarting tamsulosin, it is very unclear what has worked for the patient and what has not, or what medications he has been on, how long and when these were discontinued.      Diagnoses and all orders for this visit:    1. Benign prostatic hyperplasia with incomplete bladder emptying (Primary)  -     tamsulosin (FLOMAX) 0.4 MG capsule 24 hr capsule; Take 1 capsule by mouth Daily.  Dispense: 90 capsule; Refill: 3    2. Elevated prostate specific antigen (PSA)    -MRI prostate, I will call with results, we will discuss prostate volume and potential need for prior prostate biopsy    -     POC Urinalysis Dipstick, Automated  -     MRI Prostate With & Without Contrast; Future    * Need all of his outside [facility] urology records, these have been requested.    Follow Up:   No follow-ups on file.    I spent approximately 45 minutes providing clinical care for this patient; including review of patient's chart and provider documentation, face to face time spent with patient in examination room (obtaining history, performing physical exam, discussing diagnosis and management options), placing orders, and completing patient documentation.     Naseem Noland MD  [facility] [place]

## 2022-06-16 ENCOUNTER — TELEPHONE (OUTPATIENT)
Dept: UROLOGY | Facility: CLINIC | Age: 77
End: 2022-06-16

## 2022-06-16 NOTE — TELEPHONE ENCOUNTER
----- Message from Naseem Noland MD sent at 6/15/2022  3:48 PM EDT -----  Regarding: need outside records  Need all outside [facility] urology records, I currently have none of them.     Naseem Clayton, prior urologist.     Thanks    Naseem Noland MD

## 2022-06-16 NOTE — TELEPHONE ENCOUNTER
Faxed request for Urology records to [facility] Records:       Fax:  [phone]  Phone:  [phone]

## 2022-07-23 ENCOUNTER — HOSPITAL ENCOUNTER (OUTPATIENT)
Dept: MRI IMAGING | Facility: HOSPITAL | Age: 77
Discharge: HOME | End: 2022-07-23
Admitting: STUDENT IN AN ORGANIZED HEALTH CARE EDUCATION/TRAINING PROGRAM

## 2022-07-23 DIAGNOSIS — R97.20 ELEVATED PROSTATE SPECIFIC ANTIGEN (PSA): ICD-10-CM

## 2022-07-23 PROCEDURE — 0 GADOBENATE DIMEGLUMINE 529 MG/ML SOLUTION: Performed by: STUDENT IN AN ORGANIZED HEALTH CARE EDUCATION/TRAINING PROGRAM

## 2022-07-23 PROCEDURE — 72197 MRI PELVIS W/O & W/DYE: CPT

## 2022-07-23 PROCEDURE — A9577 INJ MULTIHANCE: HCPCS | Performed by: STUDENT IN AN ORGANIZED HEALTH CARE EDUCATION/TRAINING PROGRAM

## 2022-07-23 RX ADMIN — GADOBENATE DIMEGLUMINE 20 ML: 529 INJECTION, SOLUTION INTRAVENOUS at 12:17

## 2022-08-01 ENCOUNTER — TELEPHONE (OUTPATIENT)
Dept: UROLOGY | Facility: CLINIC | Age: 77
End: 2022-08-01

## 2022-08-01 DIAGNOSIS — R97.20 ELEVATED PROSTATE SPECIFIC ANTIGEN (PSA): Primary | ICD-10-CM

## 2022-08-01 RX ORDER — MAGNESIUM HYDROXIDE 1200 MG/15ML
1 LIQUID ORAL ONCE
Qty: 1 ENEMA | Refills: 0 | Status: SHIPPED | OUTPATIENT
Start: 2022-08-01 | End: 2022-08-01

## 2022-08-01 RX ORDER — CIPROFLOXACIN 500 MG/1
500 TABLET, FILM COATED ORAL 2 TIMES DAILY
Qty: 6 TABLET | Refills: 0 | Status: SHIPPED | OUTPATIENT
Start: 2022-08-15 | End: 2022-08-18

## 2022-08-01 NOTE — TELEPHONE ENCOUNTER
I called patient back with results of his MRI prostate, this demonstrates PI-RADS 4 lesions throughout the peripheral zone, he has a very small amount of prostate remaining after greenlight photo vaporization of the prostate with outside urologist.  He does have an elevated PSA at 10 and his PSA density is greater than 1 which is concerning for undiagnosed malignancy.  He also has a 1.5 cm lesion in the sacrum which is possibly concerning for osseous metastatic lesion.  We discussed the first order of business is to diagnose the prostate cancer if it is present with a prostate biopsy which will be performed in my clinic on 8-.  Discussed risk of sepsis in 2 to 4% of men, urinary bleeding, blood in stool, blood in ejaculate.  We will prescribe prebiopsy ciprofloxacin and enema to be performed morning of his biopsy.    Patient reports understanding.    Plan  - Transrectal ultrasound-guided prostate biopsy in my clinic 8- (Tuesday)  -Prebiopsy Cipro and enema order to his pharmacy  -Please call patient to arrange his appointment    Naseem Noland MD

## 2022-08-16 ENCOUNTER — PROCEDURE VISIT (OUTPATIENT)
Dept: UROLOGY | Facility: CLINIC | Age: 77
End: 2022-08-16

## 2022-08-16 VITALS — HEIGHT: 68 IN | BODY MASS INDEX: 31.83 KG/M2 | WEIGHT: 210 LBS

## 2022-08-16 DIAGNOSIS — R97.20 ELEVATED PROSTATE SPECIFIC ANTIGEN (PSA): Primary | ICD-10-CM

## 2022-08-16 LAB
BILIRUB BLD-MCNC: NEGATIVE MG/DL
CLARITY, POC: CLEAR
COLOR UR: YELLOW
EXPIRATION DATE: NORMAL
GLUCOSE UR STRIP-MCNC: NEGATIVE MG/DL
KETONES UR QL: NEGATIVE
LEUKOCYTE EST, POC: NEGATIVE
Lab: NORMAL
NITRITE UR-MCNC: NEGATIVE MG/ML
PH UR: 8 [PH] (ref 5–8)
PROT UR STRIP-MCNC: NEGATIVE MG/DL
RBC # UR STRIP: NEGATIVE /UL
SP GR UR: 1.01 (ref 1–1.03)
UROBILINOGEN UR QL: NORMAL

## 2022-08-16 PROCEDURE — 81003 URINALYSIS AUTO W/O SCOPE: CPT | Performed by: STUDENT IN AN ORGANIZED HEALTH CARE EDUCATION/TRAINING PROGRAM

## 2022-08-16 PROCEDURE — 99211 OFF/OP EST MAY X REQ PHY/QHP: CPT | Performed by: STUDENT IN AN ORGANIZED HEALTH CARE EDUCATION/TRAINING PROGRAM

## 2022-08-16 NOTE — PROGRESS NOTES
Preprocedure diagnosis  Elevated PSA    Postprocedure diagnosis  Elevated PSA    Procedure  Transrectal ultrasound-guided prostate biopsy    Attending surgeon  Naseem Noland MD    Anesthesia  1% Lidocaine prostate block    Complications  None    Indications  77 y.o. malewho has a history of elevated PSA who presents today for transrectal ultrasound-guided prostate biopsy after discussion of risks, benefits, alternatives.  Informed consent was obtained.  Patient has taken his ciprofloxacin pre-procedurally and completed an enema the night before his biopsy.    Findings  -Digital rectal examination = unable to palpate secondary to patient movement  -Unable to advance transrectal ultrasound given patient movement, unable to tolerate procedure, procedure abandoned, despite multiple attempts    Procedure   The patient was positioned and prepped in a left lateral position with lower extremities flexed.       A digital rectal exam was attempted however patient continued to move on the table and would not relax.    I attempted to advance the transrectal ultrasound after patient calmed down, slowly advancing the ultrasound into the rectum which patient initially tolerated however he began moving wildly on the table and demanding I stop the procedure.     Disposition/Follow Up:       Patient is unable to tolerate transrectal ultrasound prostate biopsy in clinic, he will need to be sedated for this procedure.  We will plan for my earliest available operative date which is unfortunately 9-.  Discussed risk benefits alternatives.    Naseem Noland MD  [facility] [place] [facility]

## 2022-08-17 ENCOUNTER — OFFICE VISIT (OUTPATIENT)
Dept: ORTHOPEDIC SURGERY | Facility: CLINIC | Age: 77
End: 2022-08-17

## 2022-08-17 VITALS
BODY MASS INDEX: 31.83 KG/M2 | DIASTOLIC BLOOD PRESSURE: 82 MMHG | HEIGHT: 68 IN | SYSTOLIC BLOOD PRESSURE: 131 MMHG | WEIGHT: 210 LBS

## 2022-08-17 DIAGNOSIS — M79.89 PALPABLE MASS OF SOFT TISSUE OF FOREARM: Primary | ICD-10-CM

## 2022-08-17 PROCEDURE — 99213 OFFICE O/P EST LOW 20 MIN: CPT | Performed by: ORTHOPAEDIC SURGERY

## 2022-08-17 RX ORDER — ATORVASTATIN CALCIUM 10 MG/1
TABLET, FILM COATED ORAL
COMMUNITY
End: 2022-09-22

## 2022-08-17 RX ORDER — OMEPRAZOLE 20 MG/1
1 CAPSULE, DELAYED RELEASE ORAL
COMMUNITY
Start: 2022-03-15 | End: 2022-09-22

## 2022-08-17 RX ORDER — LISINOPRIL 20 MG/1
20 TABLET ORAL 2 TIMES DAILY
COMMUNITY
Start: 2022-06-28 | End: 2023-01-17 | Stop reason: SDUPTHER

## 2022-08-17 RX ORDER — ACETAMINOPHEN 500 MG
TABLET ORAL 3 TIMES DAILY
COMMUNITY
End: 2022-09-22

## 2022-08-17 NOTE — PROGRESS NOTES
[facility] Note    Subjective     Chief Complaint   Patient presents with   • Right Arm - Initial Evaluation, Pain     New Problem-Forearm Prominence x 2 months        HPI    Lea Rivers is a 77 y.o. male who presents with new problem of: right forearm/elbow pain.  Onset: atraumatic and gradual in nature. The issue has been ongoing for 2 month(s). Pain is a 0/10 on the pain scale without movement. Pain is described as burning. Associated symptoms include pain. The pain is worse with any movement of the joint and weight bearing; resting improve the pain. Previous treatments have included: nothing.  He noticed a soft tissue mass on the posterior aspect of his forearm a couple of months ago and one starting on the medial aspect of his forearm proximally.  They are nontender.    I have reviewed the following portions of the patient's history and agree with: History of Present Illness and Review of Systems    Patient Active Problem List   Diagnosis   • Coronary artery disease   • Dyslipidemia   • Hypothyroidism   • GERD (gastroesophageal reflux disease)   • Seizure disorder (HCC)   • BPH (benign prostatic hypertrophy)   • Hypertension   • Primary osteoarthritis of both knees   • Syncope and collapse   • Precordial pain   • Diabetes (HCC)   • Status post total right knee replacement   • Postoperative urinary retention   • Confusion, postoperative   • Acute blood loss anemia, asymptomatic, no transfusion required   • Elevated prostate specific antigen (PSA)     Past Medical History:   Diagnosis Date   • Colon cancer (HCC) 1990    Status post partial colectomy in 1990. No chemotherapy or radiation therapy.   • Coronary artery disease     Nonobstructive coronary artery disease.   • Diabetes (HCC)    • Dyslipidemia    • GERD (gastroesophageal reflux disease)     Hx of.   • Hearing aid worn    • Hyperlipidemia    • Hypertension    • Hypothyroidism    • Left shoulder pain    • Primary  osteoarthritis of both knees    • Seizure disorder (HCC)    • Wears glasses       Past Surgical History:   Procedure Laterality Date   • APPENDECTOMY     • CARPAL TUNNEL RELEASE Bilateral    • CHOLECYSTECTOMY     • COLECTOMY PARTIAL / TOTAL  1990    Partial colectomy   • COLONOSCOPY     • CYSTOSCOPY TRANSURETHRAL RESECTION OF PROSTATE N/A 09/21/2018    Procedure: CYSTOSCOPY TRANSURETHRAL RESECTION OF PROSTATE GREENLIGHT;  Surgeon: Naseem Clayton MD;  Location:  [facility];  Service: Urology   • OTHER SURGICAL HISTORY      Contraction surgery on bilateral hands and wrists.   • OTHER SURGICAL HISTORY      left and right hands   • SINUS SURGERY     • SKIN BIOPSY     • TEETH EXTRACTION     • TONSILLECTOMY     • TOTAL KNEE ARTHROPLASTY Right 4/7/2022    Procedure: TOTAL KNEE ARTHROPLASTY WITH ORTHO ROBOT RIGHT;  Surgeon: Louis Malcolm MD;  Location:  [facility];  Service: Robotics - Ortho;  Laterality: Right;   • TURP / TRANSURETHRAL INCISION / DRAINAGE PROSTATE     • VASECTOMY        Family History   Problem Relation Age of Onset   • Cancer Mother         brain   • Hypertension Father    • Stroke Father    • Stroke Other    • Arthritis Other    • Cancer Other    • No Known Problems Sister    • Stroke Maternal Grandfather    • No Known Problems Paternal Grandmother    • No Known Problems Paternal Grandfather    • Stroke Sister      Social History     Socioeconomic History   • Marital status:    Tobacco Use   • Smoking status: Never Smoker   • Smokeless tobacco: Never Used   Vaping Use   • Vaping Use: Never used   Substance and Sexual Activity   • Alcohol use: No   • Drug use: No   • Sexual activity: Not Currently      Current Outpatient Medications on File Prior to Visit   Medication Sig Dispense Refill   • acetaminophen (TYLENOL) 500 MG tablet Take 2 tablets by mouth Every 8 (Eight) Hours As Needed for Mild Pain .     • acetaminophen (TYLENOL) 500 MG tablet Take  by mouth 3 (Three) Times a Day.     •  albuterol sulfate  (90 Base) MCG/ACT inhaler Inhale 2 puffs Every 4 (Four) Hours As Needed for Wheezing.     • aspirin 81 MG EC tablet Take 1 tablet by mouth Daily.     • atorvastatin (LIPITOR) 10 MG tablet Take 10 mg by mouth Daily.     • atorvastatin (LIPITOR) 10 MG tablet Take  by mouth.     • chlorthalidone (HYGROTON) 25 MG tablet Take 1 tablet by mouth Daily. 90 tablet 3   • ciprofloxacin (Cipro) 500 MG tablet Take 1 tablet by mouth 2 (Two) Times a Day for 3 days. Start taking the day prior to your prostate biopsy. 6 tablet 0   • coenzyme Q10 100 MG capsule Take 100 mg by mouth Daily.     • divalproex (DEPAKOTE) 500 MG 24 hr tablet Take 500 mg by mouth 2 (two) times a day. 500mg QAM, 1000mg QPM     • Ergocalciferol (VITAMIN D2 PO) Take 50,000 Units by mouth 1 (One) Time Per Week.     • ferrous sulfate 325 (65 FE) MG tablet Take 325 mg by mouth Daily With Breakfast.     • fluticasone (FLONASE) 50 MCG/ACT nasal spray 2 sprays into the nostril(s) as directed by provider As Needed for Rhinitis. Administer 2 sprays in each nostril for each dose.     • glucose blood test strip 1 each Daily.     • hydrALAZINE (APRESOLINE) 25 MG tablet Take 25 mg by mouth 3 (Three) Times a Day.     • isosorbide mononitrate (IMDUR) 30 MG 24 hr tablet Take 30 mg by mouth Daily.     • levETIRAcetam (KEPPRA) 500 MG tablet Take 500 mg by mouth 2 (two) times a day.     • levothyroxine (SYNTHROID, LEVOTHROID) 88 MCG tablet Take 88 mcg by mouth daily.     • lisinopril (PRINIVIL,ZESTRIL) 40 MG tablet Take  by mouth.     • metFORMIN ER (GLUCOPHAGE-XR) 500 MG 24 hr tablet Take 500 mg by mouth Daily With Breakfast.     • omeprazole (priLOSEC) 20 MG capsule Take 20 mg by mouth Daily.     • omeprazole (priLOSEC) 20 MG capsule Take 1 capsule by mouth.     • ONETOUCH DELICA LANCETS 33G mis USE 1 LANCET ONCE A DAY  0   • simvastatin (ZOCOR) 20 MG tablet Take 20 mg by mouth every night.     • tamsulosin (FLOMAX) 0.4 MG capsule 24 hr capsule Take  1 capsule by mouth Daily. 90 capsule 3   • terazosin (HYTRIN) 5 MG capsule Take 5 mg by mouth Every Night.     • traMADol (ULTRAM) 50 MG tablet Take 50 mg by mouth Every 6 (Six) Hours As Needed for Moderate Pain .     • verapamil SR (CALAN-SR) 240 MG CR tablet Take 240 mg by mouth 2 (two) times a day.     • vitamin B-12 (CYANOCOBALAMIN) 1000 MCG tablet Take 1,000 mcg by mouth Daily.     • [DISCONTINUED] lisinopril (PRINIVIL,ZESTRIL) 20 MG tablet Take 20 mg by mouth 2 (two) times a day.       No current facility-administered medications on file prior to visit.      Allergies   Allergen Reactions   • Sulfa Antibiotics Rash     Childhood reaction         Review of Systems   Constitutional: Negative for activity change, appetite change, chills, diaphoresis, fatigue, fever and unexpected weight change.   HENT: Negative for congestion, dental problem, drooling, ear discharge, ear pain, facial swelling, hearing loss, mouth sores, nosebleeds, postnasal drip, rhinorrhea, sinus pressure, sneezing, sore throat, tinnitus, trouble swallowing and voice change.    Eyes: Negative for photophobia, pain, discharge, redness, itching and visual disturbance.   Respiratory: Negative for apnea, cough, choking, chest tightness, shortness of breath, wheezing and stridor.    Cardiovascular: Negative for chest pain, palpitations and leg swelling.   Gastrointestinal: Negative for abdominal distention, abdominal pain, anal bleeding, blood in stool, constipation, diarrhea, nausea, rectal pain and vomiting.   Endocrine: Negative for cold intolerance, heat intolerance, polydipsia, polyphagia and polyuria.   Genitourinary: Negative for decreased urine volume, difficulty urinating, dysuria, enuresis, flank pain, frequency, genital sores, hematuria and urgency.   Musculoskeletal: Positive for arthralgias. Negative for back pain, gait problem, joint swelling, myalgias, neck pain and neck stiffness.   Skin: Negative for color change, pallor, rash and  "wound.   Allergic/Immunologic: Negative for environmental allergies, food allergies and immunocompromised state.   Neurological: Negative for dizziness, tremors, seizures, syncope, facial asymmetry, speech difficulty, weakness, light-headedness, numbness and headaches.   Hematological: Negative for adenopathy. Does not bruise/bleed easily.   Psychiatric/Behavioral: Negative for agitation, behavioral problems, confusion, decreased concentration, dysphoric mood, hallucinations, self-injury, sleep disturbance and suicidal ideas. The patient is not nervous/anxious and is not hyperactive.         Objective      Physical Exam  /82   Ht 172.7 cm (67.99\")   Wt 95.3 kg (210 lb)   BMI 31.94 kg/m²     Body mass index is 31.94 kg/m².    General:   Mental Status:  Alert   Appearance: Cooperative, in no acute distress   Build and Nutrition: Well-nourished well-developed male   Orientation: Alert and oriented to person, place and time   Posture: Normal    Examination of the right upper extremity: Sensation and motor intact in the upper extremity, with good radial pulse.  Palpable soft tissue mass on the posterior aspect of the proximal arm just distal to the elbow overlying the ulna, well-defined, mobile, no skin changes.  Palpable soft tissue mass also on the medial aspect of the proximal forearm, with no overlying skin changes.    Imaging/Studies      Imaging Results (Last 24 Hours)     Procedure Component Value Units Date/Time    XR Forearm 2 View Right [[id number]] Resulted: 08/17/22 0915     Updated: 08/17/22 0916    Narrative:      Right Forearm Radiographs  Indication: pain  Views: AP and lateral views of the right forearm    Comparison: no prior studies available for review    Findings:  No acute bony abnormalities seen, with normal alignment.  Small olecranon   spur.  Vascular calcifications.          Assessment and Plan     Diagnoses and all orders for this visit:    1. Palpable mass of soft tissue of forearm " (Primary)  -     XR Forearm 2 View Right  -     MRI Radius Ulna Right With & Without Contrast; Future        1. Palpable mass of soft tissue of forearm        I reviewed my findings with the patient.  He has a soft tissue mass on his forearm both posteriorly and medially, and I recommended an MRI.  I will see him back after the MRI for review, but sooner for any problems.  Most likely a lipoma, but I have recommended further imaging to further clarify.    Return for After Imaging Study.      Louis Malcolm MD  08/17/22  09:16 EDT

## 2022-09-22 ENCOUNTER — PRE-ADMISSION TESTING (OUTPATIENT)
Dept: PREADMISSION TESTING | Facility: HOSPITAL | Age: 77
End: 2022-09-22

## 2022-09-22 ENCOUNTER — HOSPITAL ENCOUNTER (OUTPATIENT)
Dept: MRI IMAGING | Facility: HOSPITAL | Age: 77
Discharge: HOME | End: 2022-09-22

## 2022-09-22 VITALS — HEIGHT: 68 IN | BODY MASS INDEX: 33.91 KG/M2 | WEIGHT: 223.77 LBS

## 2022-09-22 DIAGNOSIS — M79.89 PALPABLE MASS OF SOFT TISSUE OF FOREARM: ICD-10-CM

## 2022-09-22 DIAGNOSIS — R97.20 ELEVATED PROSTATE SPECIFIC ANTIGEN (PSA): ICD-10-CM

## 2022-09-22 LAB
ANION GAP SERPL CALCULATED.3IONS-SCNC: 13 MMOL/L (ref 5–15)
BUN SERPL-MCNC: 11 MG/DL (ref 8–23)
BUN/CREAT SERPL: 11.5 (ref 7–25)
CALCIUM SPEC-SCNC: 9.4 MG/DL (ref 8.6–10.5)
CHLORIDE SERPL-SCNC: 87 MMOL/L (ref 98–107)
CO2 SERPL-SCNC: 25 MMOL/L (ref 22–29)
CREAT SERPL-MCNC: 0.96 MG/DL (ref 0.76–1.27)
DEPRECATED RDW RBC AUTO: 42.4 FL (ref 37–54)
EGFRCR SERPLBLD CKD-EPI 2021: 80.9 ML/MIN/1.73
ERYTHROCYTE [DISTWIDTH] IN BLOOD BY AUTOMATED COUNT: 12.9 % (ref 12.3–15.4)
GLUCOSE SERPL-MCNC: 128 MG/DL (ref 65–99)
HBA1C MFR BLD: 6.2 % (ref 4.8–5.6)
HCT VFR BLD AUTO: 37.3 % (ref 37.5–51)
HGB BLD-MCNC: 13.2 G/DL (ref 13–17.7)
MCH RBC QN AUTO: 32.2 PG (ref 26.6–33)
MCHC RBC AUTO-ENTMCNC: 35.4 G/DL (ref 31.5–35.7)
MCV RBC AUTO: 91 FL (ref 79–97)
PLATELET # BLD AUTO: 204 10*3/MM3 (ref 140–450)
PMV BLD AUTO: 8.5 FL (ref 6–12)
POTASSIUM SERPL-SCNC: 3.7 MMOL/L (ref 3.5–5.2)
RBC # BLD AUTO: 4.1 10*6/MM3 (ref 4.14–5.8)
SODIUM SERPL-SCNC: 125 MMOL/L (ref 136–145)
WBC NRBC COR # BLD: 8.02 10*3/MM3 (ref 3.4–10.8)

## 2022-09-22 PROCEDURE — 80048 BASIC METABOLIC PNL TOTAL CA: CPT

## 2022-09-22 PROCEDURE — 85027 COMPLETE CBC AUTOMATED: CPT

## 2022-09-22 PROCEDURE — 83036 HEMOGLOBIN GLYCOSYLATED A1C: CPT

## 2022-09-22 PROCEDURE — 36415 COLL VENOUS BLD VENIPUNCTURE: CPT

## 2022-09-22 NOTE — PROGRESS NOTES
Patient's sodium level is 125, asked patient to contact his PCP to notify. Unclear etiology. Discussed needs to fluid restrict and monitor for neurologic symptoms.  Discussed we will need to repeat BMP day of surgery.  We discussed that anesthesia may cancel his case if his sodium level is significantly abnormal despite fluid restriction.  Discussed the importance of involving his primary care doctor prior to surgery 9/30/2022.

## 2022-09-22 NOTE — PAT
An arrival time for procedure was not provided during PAT visit. If patient had any questions or concerns about their arrival time, they were instructed to contact their surgeon/physician.  Additionally, if the patient referred to an arrival time that was acquired from their my chart account, patient was encouraged to verify that time with their surgeon/physician. Arrival times are NOT provided in Pre Admission Testing Department.    Patient instructed to drink 20 ounces of Gatorade and it needs to be completed 1 hour (for Main OR patients) or 2 hours (scheduled  section & [facility] patients) before given arrival time for procedure (NO RED Gatorade)    Patient verbalized understanding.    Patient denies any current skin issues.     EKG ON CHART FROM 22. PT DENIES CHEST PAIN/SOA.

## 2022-09-23 ENCOUNTER — TELEPHONE (OUTPATIENT)
Dept: ORTHOPEDIC SURGERY | Facility: CLINIC | Age: 77
End: 2022-09-23

## 2022-09-23 NOTE — TELEPHONE ENCOUNTER
Caller: ANILA     Relationship to patient: DAUGHTER     Best call back number:     Patient is needing: THEY COULDN'T GET MRI DONE AND NEED IT RESCHEDULED FOR [facility] IN OPEN MRI

## 2022-09-29 ENCOUNTER — ANESTHESIA EVENT (OUTPATIENT)
Dept: PERIOP | Facility: HOSPITAL | Age: 77
End: 2022-09-29

## 2022-09-29 ENCOUNTER — APPOINTMENT (OUTPATIENT)
Dept: MRI IMAGING | Facility: HOSPITAL | Age: 77
End: 2022-09-29

## 2022-09-29 RX ORDER — FAMOTIDINE 10 MG/ML
20 INJECTION, SOLUTION INTRAVENOUS ONCE
Status: CANCELLED | OUTPATIENT
Start: 2022-09-29 | End: 2022-09-29

## 2022-09-29 RX ORDER — SODIUM CHLORIDE 0.9 % (FLUSH) 0.9 %
10 SYRINGE (ML) INJECTION EVERY 12 HOURS SCHEDULED
Status: CANCELLED | OUTPATIENT
Start: 2022-09-29

## 2022-09-30 ENCOUNTER — ANESTHESIA (OUTPATIENT)
Dept: PERIOP | Facility: HOSPITAL | Age: 77
End: 2022-09-30

## 2022-09-30 ENCOUNTER — HOSPITAL ENCOUNTER (OUTPATIENT)
Facility: HOSPITAL | Age: 77
LOS: 1 days | Discharge: HOME | End: 2022-10-03
Attending: STUDENT IN AN ORGANIZED HEALTH CARE EDUCATION/TRAINING PROGRAM | Admitting: HOSPITALIST

## 2022-09-30 DIAGNOSIS — R97.20 ELEVATED PROSTATE SPECIFIC ANTIGEN (PSA): ICD-10-CM

## 2022-09-30 PROBLEM — E87.1 HYPONATREMIA: Status: ACTIVE | Noted: 2022-09-30

## 2022-09-30 LAB
ANION GAP SERPL CALCULATED.3IONS-SCNC: 13 MMOL/L (ref 5–15)
BUN SERPL-MCNC: 7 MG/DL (ref 8–23)
BUN/CREAT SERPL: 8.6 (ref 7–25)
CALCIUM SPEC-SCNC: 9.1 MG/DL (ref 8.6–10.5)
CHLORIDE SERPL-SCNC: 85 MMOL/L (ref 98–107)
CO2 SERPL-SCNC: 25 MMOL/L (ref 22–29)
CREAT SERPL-MCNC: 0.81 MG/DL (ref 0.76–1.27)
DEPRECATED RDW RBC AUTO: 42.7 FL (ref 37–54)
EGFRCR SERPLBLD CKD-EPI 2021: 90.2 ML/MIN/1.73
ERYTHROCYTE [DISTWIDTH] IN BLOOD BY AUTOMATED COUNT: 13.2 % (ref 12.3–15.4)
FLUAV SUBTYP SPEC NAA+PROBE: NOT DETECTED
FLUBV RNA ISLT QL NAA+PROBE: NOT DETECTED
GLUCOSE BLDC GLUCOMTR-MCNC: 100 MG/DL (ref 70–130)
GLUCOSE BLDC GLUCOMTR-MCNC: 101 MG/DL (ref 70–130)
GLUCOSE BLDC GLUCOMTR-MCNC: 128 MG/DL (ref 70–130)
GLUCOSE SERPL-MCNC: 99 MG/DL (ref 65–99)
HCT VFR BLD AUTO: 34.2 % (ref 37.5–51)
HGB BLD-MCNC: 12.4 G/DL (ref 13–17.7)
MAGNESIUM SERPL-MCNC: 1.7 MG/DL (ref 1.6–2.4)
MCH RBC QN AUTO: 32.5 PG (ref 26.6–33)
MCHC RBC AUTO-ENTMCNC: 36.3 G/DL (ref 31.5–35.7)
MCV RBC AUTO: 89.5 FL (ref 79–97)
OSMOLALITY UR: 245 MOSM/KG (ref 300–1100)
PHOSPHATE SERPL-MCNC: 2.8 MG/DL (ref 2.5–4.5)
PLATELET # BLD AUTO: 197 10*3/MM3 (ref 140–450)
PMV BLD AUTO: 8.7 FL (ref 6–12)
POTASSIUM SERPL-SCNC: 3.7 MMOL/L (ref 3.5–5.2)
RBC # BLD AUTO: 3.82 10*6/MM3 (ref 4.14–5.8)
SARS-COV-2 RNA PNL SPEC NAA+PROBE: NOT DETECTED
SODIUM SERPL-SCNC: 123 MMOL/L (ref 136–145)
SODIUM SERPL-SCNC: 124 MMOL/L (ref 136–145)
SODIUM UR-SCNC: 30 MMOL/L
TSH SERPL DL<=0.05 MIU/L-ACNC: 2.16 UIU/ML (ref 0.27–4.2)
WBC NRBC COR # BLD: 7.13 10*3/MM3 (ref 3.4–10.8)

## 2022-09-30 PROCEDURE — 82962 GLUCOSE BLOOD TEST: CPT

## 2022-09-30 PROCEDURE — 84295 ASSAY OF SERUM SODIUM: CPT | Performed by: INTERNAL MEDICINE

## 2022-09-30 PROCEDURE — 25010000002 HEPARIN (PORCINE) PER 1000 UNITS: Performed by: INTERNAL MEDICINE

## 2022-09-30 PROCEDURE — 80048 BASIC METABOLIC PNL TOTAL CA: CPT | Performed by: ANESTHESIOLOGY

## 2022-09-30 PROCEDURE — 84443 ASSAY THYROID STIM HORMONE: CPT | Performed by: INTERNAL MEDICINE

## 2022-09-30 PROCEDURE — 99220 PR INITIAL OBSERVATION CARE/DAY 70 MINUTES: CPT | Performed by: INTERNAL MEDICINE

## 2022-09-30 PROCEDURE — 84300 ASSAY OF URINE SODIUM: CPT | Performed by: INTERNAL MEDICINE

## 2022-09-30 PROCEDURE — 83935 ASSAY OF URINE OSMOLALITY: CPT | Performed by: INTERNAL MEDICINE

## 2022-09-30 PROCEDURE — 84100 ASSAY OF PHOSPHORUS: CPT | Performed by: INTERNAL MEDICINE

## 2022-09-30 PROCEDURE — 85027 COMPLETE CBC AUTOMATED: CPT | Performed by: STUDENT IN AN ORGANIZED HEALTH CARE EDUCATION/TRAINING PROGRAM

## 2022-09-30 PROCEDURE — 87636 SARSCOV2 & INF A&B AMP PRB: CPT | Performed by: INTERNAL MEDICINE

## 2022-09-30 PROCEDURE — 83735 ASSAY OF MAGNESIUM: CPT | Performed by: INTERNAL MEDICINE

## 2022-09-30 PROCEDURE — C9803 HOPD COVID-19 SPEC COLLECT: HCPCS

## 2022-09-30 PROCEDURE — 96372 THER/PROPH/DIAG INJ SC/IM: CPT

## 2022-09-30 RX ORDER — LISINOPRIL 20 MG/1
20 TABLET ORAL 2 TIMES DAILY
Status: DISCONTINUED | OUTPATIENT
Start: 2022-09-30 | End: 2022-10-03 | Stop reason: HOSPADM

## 2022-09-30 RX ORDER — DIVALPROEX SODIUM 500 MG/1
1000 TABLET, EXTENDED RELEASE ORAL NIGHTLY
Status: DISCONTINUED | OUTPATIENT
Start: 2022-09-30 | End: 2022-10-03 | Stop reason: HOSPADM

## 2022-09-30 RX ORDER — SODIUM CHLORIDE 0.9 % (FLUSH) 0.9 %
10 SYRINGE (ML) INJECTION AS NEEDED
Status: DISCONTINUED | OUTPATIENT
Start: 2022-09-30 | End: 2022-10-03 | Stop reason: HOSPADM

## 2022-09-30 RX ORDER — FLUTICASONE PROPIONATE 50 MCG
2 SPRAY, SUSPENSION (ML) NASAL AS NEEDED
Status: DISCONTINUED | OUTPATIENT
Start: 2022-09-30 | End: 2022-10-03 | Stop reason: HOSPADM

## 2022-09-30 RX ORDER — LIDOCAINE HYDROCHLORIDE 10 MG/ML
0.5 INJECTION, SOLUTION EPIDURAL; INFILTRATION; INTRACAUDAL; PERINEURAL ONCE AS NEEDED
Status: COMPLETED | OUTPATIENT
Start: 2022-09-30 | End: 2022-09-30

## 2022-09-30 RX ORDER — ACETAMINOPHEN 160 MG/5ML
650 SOLUTION ORAL EVERY 4 HOURS PRN
Status: DISCONTINUED | OUTPATIENT
Start: 2022-09-30 | End: 2022-10-03 | Stop reason: HOSPADM

## 2022-09-30 RX ORDER — ONDANSETRON 4 MG/1
4 TABLET, FILM COATED ORAL EVERY 6 HOURS PRN
Status: DISCONTINUED | OUTPATIENT
Start: 2022-09-30 | End: 2022-10-03 | Stop reason: HOSPADM

## 2022-09-30 RX ORDER — CEFAZOLIN SODIUM 2 G/100ML
2 INJECTION, SOLUTION INTRAVENOUS ONCE
Status: DISCONTINUED | OUTPATIENT
Start: 2022-09-30 | End: 2022-09-30 | Stop reason: HOSPADM

## 2022-09-30 RX ORDER — ACETAMINOPHEN 650 MG/1
650 SUPPOSITORY RECTAL EVERY 4 HOURS PRN
Status: DISCONTINUED | OUTPATIENT
Start: 2022-09-30 | End: 2022-10-03 | Stop reason: HOSPADM

## 2022-09-30 RX ORDER — SODIUM CHLORIDE, SODIUM LACTATE, POTASSIUM CHLORIDE, CALCIUM CHLORIDE 600; 310; 30; 20 MG/100ML; MG/100ML; MG/100ML; MG/100ML
9 INJECTION, SOLUTION INTRAVENOUS CONTINUOUS
Status: DISCONTINUED | OUTPATIENT
Start: 2022-09-30 | End: 2022-10-01

## 2022-09-30 RX ORDER — ISOSORBIDE MONONITRATE 30 MG/1
30 TABLET, EXTENDED RELEASE ORAL DAILY
Status: DISCONTINUED | OUTPATIENT
Start: 2022-09-30 | End: 2022-10-03 | Stop reason: HOSPADM

## 2022-09-30 RX ORDER — NICOTINE POLACRILEX 4 MG
15 LOZENGE BUCCAL
Status: DISCONTINUED | OUTPATIENT
Start: 2022-09-30 | End: 2022-10-03 | Stop reason: HOSPADM

## 2022-09-30 RX ORDER — ACETAMINOPHEN 325 MG/1
650 TABLET ORAL EVERY 4 HOURS PRN
Status: DISCONTINUED | OUTPATIENT
Start: 2022-09-30 | End: 2022-10-03 | Stop reason: HOSPADM

## 2022-09-30 RX ORDER — HEPARIN SODIUM 5000 [USP'U]/ML
5000 INJECTION, SOLUTION INTRAVENOUS; SUBCUTANEOUS EVERY 12 HOURS SCHEDULED
Status: DISCONTINUED | OUTPATIENT
Start: 2022-09-30 | End: 2022-10-03 | Stop reason: HOSPADM

## 2022-09-30 RX ORDER — SODIUM CHLORIDE 9 MG/ML
100 INJECTION, SOLUTION INTRAVENOUS CONTINUOUS
Status: DISCONTINUED | OUTPATIENT
Start: 2022-09-30 | End: 2022-10-01

## 2022-09-30 RX ORDER — MIDAZOLAM HYDROCHLORIDE 1 MG/ML
0.5 INJECTION INTRAMUSCULAR; INTRAVENOUS
Status: DISCONTINUED | OUTPATIENT
Start: 2022-09-30 | End: 2022-09-30 | Stop reason: HOSPADM

## 2022-09-30 RX ORDER — TRAMADOL HYDROCHLORIDE 50 MG/1
50 TABLET ORAL EVERY 6 HOURS PRN
Status: DISCONTINUED | OUTPATIENT
Start: 2022-09-30 | End: 2022-10-03 | Stop reason: HOSPADM

## 2022-09-30 RX ORDER — INSULIN LISPRO 100 [IU]/ML
0-7 INJECTION, SOLUTION INTRAVENOUS; SUBCUTANEOUS
Status: DISCONTINUED | OUTPATIENT
Start: 2022-09-30 | End: 2022-10-03 | Stop reason: HOSPADM

## 2022-09-30 RX ORDER — LEVOTHYROXINE SODIUM 88 UG/1
88 TABLET ORAL
Status: DISCONTINUED | OUTPATIENT
Start: 2022-10-01 | End: 2022-10-03 | Stop reason: HOSPADM

## 2022-09-30 RX ORDER — CALCIUM CARBONATE 200(500)MG
1 TABLET,CHEWABLE ORAL 3 TIMES DAILY PRN
Status: DISCONTINUED | OUTPATIENT
Start: 2022-09-30 | End: 2022-10-03 | Stop reason: HOSPADM

## 2022-09-30 RX ORDER — SODIUM CHLORIDE 0.9 % (FLUSH) 0.9 %
10 SYRINGE (ML) INJECTION AS NEEDED
Status: DISCONTINUED | OUTPATIENT
Start: 2022-09-30 | End: 2022-09-30 | Stop reason: HOSPADM

## 2022-09-30 RX ORDER — DIVALPROEX SODIUM 500 MG/1
500 TABLET, EXTENDED RELEASE ORAL EVERY MORNING
Status: DISCONTINUED | OUTPATIENT
Start: 2022-10-01 | End: 2022-10-03 | Stop reason: HOSPADM

## 2022-09-30 RX ORDER — TAMSULOSIN HYDROCHLORIDE 0.4 MG/1
0.4 CAPSULE ORAL DAILY
Status: DISCONTINUED | OUTPATIENT
Start: 2022-09-30 | End: 2022-10-03 | Stop reason: HOSPADM

## 2022-09-30 RX ORDER — DEXTROSE MONOHYDRATE 25 G/50ML
25 INJECTION, SOLUTION INTRAVENOUS
Status: DISCONTINUED | OUTPATIENT
Start: 2022-09-30 | End: 2022-10-03 | Stop reason: HOSPADM

## 2022-09-30 RX ORDER — ONDANSETRON 2 MG/ML
4 INJECTION INTRAMUSCULAR; INTRAVENOUS EVERY 6 HOURS PRN
Status: DISCONTINUED | OUTPATIENT
Start: 2022-09-30 | End: 2022-10-03 | Stop reason: HOSPADM

## 2022-09-30 RX ORDER — LEVETIRACETAM 500 MG/1
500 TABLET ORAL EVERY 12 HOURS SCHEDULED
Status: DISCONTINUED | OUTPATIENT
Start: 2022-09-30 | End: 2022-10-03 | Stop reason: HOSPADM

## 2022-09-30 RX ORDER — FAMOTIDINE 20 MG/1
20 TABLET, FILM COATED ORAL ONCE
Status: COMPLETED | OUTPATIENT
Start: 2022-09-30 | End: 2022-09-30

## 2022-09-30 RX ORDER — SODIUM CHLORIDE 0.9 % (FLUSH) 0.9 %
10 SYRINGE (ML) INJECTION EVERY 12 HOURS SCHEDULED
Status: DISCONTINUED | OUTPATIENT
Start: 2022-09-30 | End: 2022-10-03 | Stop reason: HOSPADM

## 2022-09-30 RX ORDER — VERAPAMIL HYDROCHLORIDE 240 MG/1
240 TABLET, FILM COATED, EXTENDED RELEASE ORAL DAILY
Status: DISCONTINUED | OUTPATIENT
Start: 2022-09-30 | End: 2022-10-03 | Stop reason: HOSPADM

## 2022-09-30 RX ADMIN — LEVETIRACETAM 500 MG: 500 TABLET, FILM COATED ORAL at 23:23

## 2022-09-30 RX ADMIN — LEVETIRACETAM 500 MG: 500 TABLET, FILM COATED ORAL at 13:54

## 2022-09-30 RX ADMIN — HEPARIN SODIUM 5000 UNITS: 5000 INJECTION INTRAVENOUS; SUBCUTANEOUS at 13:54

## 2022-09-30 RX ADMIN — TAMSULOSIN HYDROCHLORIDE 0.4 MG: 0.4 CAPSULE ORAL at 13:55

## 2022-09-30 RX ADMIN — SODIUM CHLORIDE, POTASSIUM CHLORIDE, SODIUM LACTATE AND CALCIUM CHLORIDE 9 ML/HR: 600; 310; 30; 20 INJECTION, SOLUTION INTRAVENOUS at 08:26

## 2022-09-30 RX ADMIN — LIDOCAINE HYDROCHLORIDE 0.5 ML: 10 INJECTION, SOLUTION EPIDURAL; INFILTRATION; INTRACAUDAL; PERINEURAL at 08:26

## 2022-09-30 RX ADMIN — Medication 10 ML: at 23:23

## 2022-09-30 RX ADMIN — HEPARIN SODIUM 5000 UNITS: 5000 INJECTION INTRAVENOUS; SUBCUTANEOUS at 23:23

## 2022-09-30 RX ADMIN — DIVALPROEX SODIUM 1000 MG: 500 TABLET, EXTENDED RELEASE ORAL at 17:38

## 2022-09-30 RX ADMIN — FAMOTIDINE 20 MG: 20 TABLET ORAL at 08:26

## 2022-09-30 RX ADMIN — SODIUM CHLORIDE 75 ML/HR: 9 INJECTION, SOLUTION INTRAVENOUS at 13:54

## 2022-09-30 RX ADMIN — LISINOPRIL 20 MG: 20 TABLET ORAL at 23:23

## 2022-10-01 LAB
CREAT UR-MCNC: 94.9 MG/DL
GLUCOSE BLDC GLUCOMTR-MCNC: 100 MG/DL (ref 70–130)
GLUCOSE BLDC GLUCOMTR-MCNC: 123 MG/DL (ref 70–130)
GLUCOSE BLDC GLUCOMTR-MCNC: 83 MG/DL (ref 70–130)
OSMOLALITY UR: 258 MOSM/KG (ref 300–1100)
PROT ?TM UR-MCNC: 8 MG/DL
SODIUM SERPL-SCNC: 123 MMOL/L (ref 136–145)
SODIUM SERPL-SCNC: 126 MMOL/L (ref 136–145)
SODIUM SERPL-SCNC: 127 MMOL/L (ref 136–145)
SODIUM SERPL-SCNC: 128 MMOL/L (ref 136–145)
SODIUM UR-SCNC: 42 MMOL/L

## 2022-10-01 PROCEDURE — A9270 NON-COVERED ITEM OR SERVICE: HCPCS | Performed by: INTERNAL MEDICINE

## 2022-10-01 PROCEDURE — 84295 ASSAY OF SERUM SODIUM: CPT | Performed by: INTERNAL MEDICINE

## 2022-10-01 PROCEDURE — 82962 GLUCOSE BLOOD TEST: CPT

## 2022-10-01 PROCEDURE — 96372 THER/PROPH/DIAG INJ SC/IM: CPT

## 2022-10-01 PROCEDURE — 83935 ASSAY OF URINE OSMOLALITY: CPT | Performed by: INTERNAL MEDICINE

## 2022-10-01 PROCEDURE — 63710000001 LISINOPRIL 20 MG TABLET: Performed by: INTERNAL MEDICINE

## 2022-10-01 PROCEDURE — 99226 PR SBSQ OBSERVATION CARE/DAY 35 MINUTES: CPT | Performed by: HOSPITALIST

## 2022-10-01 PROCEDURE — 63710000001 LEVETIRACETAM 500 MG TABLET: Performed by: INTERNAL MEDICINE

## 2022-10-01 PROCEDURE — 63710000001 DIVALPROEX 500 MG TABLET SUSTAINED-RELEASE 24 HOUR: Performed by: INTERNAL MEDICINE

## 2022-10-01 PROCEDURE — 25010000002 HEPARIN (PORCINE) PER 1000 UNITS: Performed by: INTERNAL MEDICINE

## 2022-10-01 PROCEDURE — 63710000001 VERAPAMIL SR 240 MG TABLET CONTROLLED-RELEASE: Performed by: INTERNAL MEDICINE

## 2022-10-01 PROCEDURE — 63710000001 TAMSULOSIN 0.4 MG CAPSULE: Performed by: INTERNAL MEDICINE

## 2022-10-01 PROCEDURE — 84156 ASSAY OF PROTEIN URINE: CPT | Performed by: INTERNAL MEDICINE

## 2022-10-01 PROCEDURE — 63710000001 ISOSORBIDE MONONITRATE 30 MG TABLET SUSTAINED-RELEASE 24 HOUR: Performed by: INTERNAL MEDICINE

## 2022-10-01 PROCEDURE — 84300 ASSAY OF URINE SODIUM: CPT | Performed by: INTERNAL MEDICINE

## 2022-10-01 PROCEDURE — 82570 ASSAY OF URINE CREATININE: CPT | Performed by: INTERNAL MEDICINE

## 2022-10-01 RX ADMIN — LEVETIRACETAM 500 MG: 500 TABLET, FILM COATED ORAL at 08:24

## 2022-10-01 RX ADMIN — LISINOPRIL 20 MG: 20 TABLET ORAL at 08:24

## 2022-10-01 RX ADMIN — LEVETIRACETAM 500 MG: 500 TABLET, FILM COATED ORAL at 20:33

## 2022-10-01 RX ADMIN — TAMSULOSIN HYDROCHLORIDE 0.4 MG: 0.4 CAPSULE ORAL at 08:24

## 2022-10-01 RX ADMIN — DIVALPROEX SODIUM 500 MG: 500 TABLET, EXTENDED RELEASE ORAL at 06:00

## 2022-10-01 RX ADMIN — VERAPAMIL HYDROCHLORIDE 240 MG: 240 TABLET, FILM COATED, EXTENDED RELEASE ORAL at 08:24

## 2022-10-01 RX ADMIN — HEPARIN SODIUM 5000 UNITS: 5000 INJECTION INTRAVENOUS; SUBCUTANEOUS at 08:23

## 2022-10-01 RX ADMIN — LEVOTHYROXINE SODIUM 88 MCG: 88 TABLET ORAL at 06:00

## 2022-10-01 RX ADMIN — Medication 10 ML: at 20:33

## 2022-10-01 RX ADMIN — ISOSORBIDE MONONITRATE 30 MG: 30 TABLET, EXTENDED RELEASE ORAL at 08:24

## 2022-10-01 RX ADMIN — LISINOPRIL 20 MG: 20 TABLET ORAL at 20:33

## 2022-10-01 RX ADMIN — DIVALPROEX SODIUM 1000 MG: 500 TABLET, EXTENDED RELEASE ORAL at 20:32

## 2022-10-01 RX ADMIN — HEPARIN SODIUM 5000 UNITS: 5000 INJECTION INTRAVENOUS; SUBCUTANEOUS at 20:33

## 2022-10-01 NOTE — CONSULTS
Referring Provider: Billy Mac  Reason for Consultation: Hyponatremia    Subjective     Chief complaint weakness, hyponatremia    History of present illness:  77-year-old with history of hypothyroidism, type 2 diabetes, hypertension, hyperlipidemia, and seizure disorder, memory deficit, GERD, who was brought to the hospital with generalized weakness, found to have a low sodium of 123 at that time.  According to formation patient drink a lot of carbonated drinks, he was previously admitted in April for right knee arthroplasty.  Patient was brought into the hospital for prostate biopsy by urology, preop showed sodium 123, patient was admitted and the biopsy was postponed.  Serum sodium since admission has remained low 123-127 range.  Renal has been consulted at this time.  Patient has recently been started on chlorthalidone few months back.  He had denied any diarrhea nausea vomiting, epistaxis hemoptysis hematemesis gross hematuria no use of NSAIDs.  No previous history of hyponatremia    History  Past Medical History:   Diagnosis Date   • Colon cancer (HCC) 1990    Status post partial colectomy in 1990. No chemotherapy or radiation therapy.   • Coronary artery disease     Nonobstructive coronary artery disease.   • Diabetes (HCC)    • Dyslipidemia    • GERD (gastroesophageal reflux disease)     Hx of.   • Hearing aid worn    • Hyperlipidemia    • Hypertension    • Hypothyroidism    • Left shoulder pain    • Memory deficit     FROM ANESTHESIA   • Osteoarthritis    • Seizure disorder (HCC)    • Wears glasses    ,   Past Surgical History:   Procedure Laterality Date   • APPENDECTOMY     • CARDIAC CATHETERIZATION      NO INTERVENTION; EARLY 2000'S   • CARPAL TUNNEL RELEASE Bilateral    • CHOLECYSTECTOMY     • COLECTOMY PARTIAL / TOTAL  1990    Partial colectomy   • COLONOSCOPY     • CYSTOSCOPY TRANSURETHRAL RESECTION OF PROSTATE N/A 09/21/2018    Procedure: CYSTOSCOPY TRANSURETHRAL RESECTION OF PROSTATE GREENLIGHT;   Surgeon: Naseem Clayton MD;  Location:  [facility];  Service: Urology   • OTHER SURGICAL HISTORY      Contraction surgery on bilateral hands and wrists.   • SINUS SURGERY     • SKIN BIOPSY     • TEETH EXTRACTION     • TONSILLECTOMY     • TOTAL KNEE ARTHROPLASTY Right 04/07/2022    Procedure: TOTAL KNEE ARTHROPLASTY WITH ORTHO ROBOT RIGHT;  Surgeon: Louis Malcolm MD;  Location:  [facility];  Service: Robotics - Ortho;  Laterality: Right;   • TURP / TRANSURETHRAL INCISION / DRAINAGE PROSTATE     • VASECTOMY     ,   Family History   Problem Relation Age of Onset   • Cancer Mother         brain   • Hypertension Father    • Stroke Father    • Stroke Other    • Arthritis Other    • Cancer Other    • No Known Problems Sister    • Stroke Maternal Grandfather    • No Known Problems Paternal Grandmother    • No Known Problems Paternal Grandfather    • Stroke Sister    ,   Social History     Socioeconomic History   • Marital status:    Tobacco Use   • Smoking status: Never Smoker   • Smokeless tobacco: Never Used   Vaping Use   • Vaping Use: Never used   Substance and Sexual Activity   • Alcohol use: No   • Drug use: No   • Sexual activity: Not Currently     E-cigarette/Vaping   • E-cigarette/Vaping Use Never User      E-cigarette/Vaping Substances     E-cigarette/Vaping Devices       ,   Medications Prior to Admission   Medication Sig Dispense Refill Last Dose   • divalproex (DEPAKOTE) 500 MG 24 hr tablet Take  by mouth 2 (two) times a day. 500mg QAM, 1000mg QPM   9/29/2022 at Unknown time   • fluticasone (FLONASE) 50 MCG/ACT nasal spray 2 sprays into the nostril(s) as directed by provider As Needed for Rhinitis. Administer 2 sprays in each nostril for each dose.   Past Week at Unknown time   • glucose blood test strip 1 each Daily.   9/30/2022 at Unknown time   • isosorbide mononitrate (IMDUR) 30 MG 24 hr tablet Take 30 mg by mouth Daily.   9/30/2022 at Unknown time   • levETIRAcetam (KEPPRA) 500 MG tablet Take  500 mg by mouth 2 (two) times a day.   9/29/2022 at Unknown time   • levothyroxine (SYNTHROID, LEVOTHROID) 88 MCG tablet Take 88 mcg by mouth daily.   9/30/2022 at Unknown time   • lisinopril (PRINIVIL,ZESTRIL) 40 MG tablet Take 20 mg by mouth 2 (Two) Times a Day.   9/29/2022 at Unknown time   • metFORMIN ER (GLUCOPHAGE-XR) 500 MG 24 hr tablet Take 500 mg by mouth 2 (Two) Times a Day.   9/29/2022 at Unknown time   • omeprazole (priLOSEC) 20 MG capsule Take 20 mg by mouth Daily.   9/29/2022 at Unknown time   • tamsulosin (FLOMAX) 0.4 MG capsule 24 hr capsule Take 1 capsule by mouth Daily. 90 capsule 3 9/29/2022 at Unknown time   • verapamil SR (CALAN-SR) 240 MG CR tablet Take 240 mg by mouth Daily.   9/30/2022 at Unknown time   • acetaminophen (TYLENOL) 500 MG tablet Take 2 tablets by mouth Every 8 (Eight) Hours As Needed for Mild Pain .   More than a month at Unknown time   • chlorthalidone (HYGROTON) 25 MG tablet Take 1 tablet by mouth Daily. 90 tablet 3    • ONETOUCH DELICA LANCETS 33G misc USE 1 LANCET ONCE A DAY  0    , Scheduled Meds:  divalproex, 1,000 mg, Oral, Nightly  divalproex, 500 mg, Oral, QAM  heparin (porcine), 5,000 Units, Subcutaneous, Q12H  insulin lispro, 0-7 Units, Subcutaneous, TID AC  isosorbide mononitrate, 30 mg, Oral, Daily  levETIRAcetam, 500 mg, Oral, Q12H  levothyroxine, 88 mcg, Oral, Q AM  lisinopril, 20 mg, Oral, BID  sodium chloride, 10 mL, Intravenous, Q12H  tamsulosin, 0.4 mg, Oral, Daily  verapamil SR, 240 mg, Oral, Daily    , Continuous Infusions:  sodium chloride, 100 mL/hr, Last Rate: 100 mL/hr (10/01/22 0952)    , PRN Meds:  •  acetaminophen **OR** acetaminophen **OR** acetaminophen  •  calcium carbonate  •  dextrose  •  dextrose  •  fluticasone  •  glucagon (human recombinant)  •  ondansetron **OR** ondansetron  •  sodium chloride  •  traMADol and Allergies:  Chlorhexidine and Sulfa antibiotics    Review of Systems  Pertinent items are noted in HPI, all other systems reviewed  and negative    Objective     Vital Signs  Temp:  [97.8 °F (36.6 °C)-98.3 °F (36.8 °C)] 98.1 °F (36.7 °C)  Heart Rate:  [56-86] 66  Resp:  [16-18] 18  BP: (126-157)/(71-83) 126/73    No intake/output data recorded.  I/O last 3 completed shifts:  In: 1627 [P.O.:470; I.V.:1157]  Out: 550 [Urine:550]    Physical Exam:  General Appearance: Alert, oriented, no obvious distress.   male laying comfortably in bed seizure precautions have been taken.  HEENT: Atraumatic normocephalic head, eyes pupil reactive, extraocular muscle intact, nose no bleed, oropharynx clear, tongue is moist, neck is supple, no JVD, no lymph node enlargement, trachea midline..  Lungs: Clear auscultation, no rales rhonchi's, equal chest movement, nonlabored.  Heart: No gallop, murmur, rub, RRR.  Abdomen: Soft, nontender, positive bowel sounds, no organomegaly.  Extremities: No edema, no cyanosis.  Neuro: No focal deficit, moving all extremities, alert oriented X 3  Psych: Mood and affect are normal appropriate.   no suprapubic fullness or tenderness.    Results Review:   I reviewed the patient's new clinical results.  I reviewed the patient's new imaging results and agree with the interpretation.    WBC WBC   Date Value Ref Range Status   09/30/2022 7.13 3.40 - 10.80 10*3/mm3 Final      HGB Hemoglobin   Date Value Ref Range Status   09/30/2022 12.4 (L) 13.0 - 17.7 g/dL Final      HCT Hematocrit   Date Value Ref Range Status   09/30/2022 34.2 (L) 37.5 - 51.0 % Final      Platlets No results found for: LABPLAT   MCV MCV   Date Value Ref Range Status   09/30/2022 89.5 79.0 - 97.0 fL Final          Sodium Sodium   Date Value Ref Range Status   10/01/2022 126 (L) 136 - 145 mmol/L Final   10/01/2022 123 (L) 136 - 145 mmol/L Final   09/30/2022 124 (L) 136 - 145 mmol/L Final   09/30/2022 123 (L) 136 - 145 mmol/L Final      Potassium Potassium   Date Value Ref Range Status   09/30/2022 3.7 3.5 - 5.2 mmol/L Final      Chloride Chloride   Date Value  Ref Range Status   09/30/2022 85 (L) 98 - 107 mmol/L Final      CO2 CO2   Date Value Ref Range Status   09/30/2022 25.0 22.0 - 29.0 mmol/L Final      BUN BUN   Date Value Ref Range Status   09/30/2022 7 (L) 8 - 23 mg/dL Final      Creatinine Creatinine   Date Value Ref Range Status   09/30/2022 0.81 0.76 - 1.27 mg/dL Final      Calcium Calcium   Date Value Ref Range Status   09/30/2022 9.1 8.6 - 10.5 mg/dL Final      PO4 No results found for: CAPO4   Albumin No results found for: ALBUMIN   Magnesium Magnesium   Date Value Ref Range Status   09/30/2022 1.7 1.6 - 2.4 mg/dL Final      Uric Acid No results found for: URICACID         divalproex, 1,000 mg, Oral, Nightly  divalproex, 500 mg, Oral, QAM  heparin (porcine), 5,000 Units, Subcutaneous, Q12H  insulin lispro, 0-7 Units, Subcutaneous, TID AC  isosorbide mononitrate, 30 mg, Oral, Daily  levETIRAcetam, 500 mg, Oral, Q12H  levothyroxine, 88 mcg, Oral, Q AM  lisinopril, 20 mg, Oral, BID  sodium chloride, 10 mL, Intravenous, Q12H  tamsulosin, 0.4 mg, Oral, Daily  verapamil SR, 240 mg, Oral, Daily      sodium chloride, 100 mL/hr, Last Rate: 100 mL/hr (10/01/22 0952)        Assessment & Plan       Elevated prostate specific antigen (PSA)    Hyponatremia  1.  Hyponatremia: Prior sodium has been normal in April 2022, patient since then has been started on chlorthalidone, and has been on Keppra.  Admits sodium was low.  Patient has been taken off the chlorthalidone.  Sodium remains 126 at this time.  Magnesium 1.7, phosphorus 2.8, TSH 2.16, hemoglobin A1c 6.2, creatinine 0.8.  Patient has denied any headache blurring of vision sore.  No altered mental status.  2.  High PSA: Followed by urology.  3.  History of hypertension  4.  Seizure disorder.  Plan:  Urine sodium, urine creatinine, urine osmolality, serum osmolality will be ordered.  Check renin aldosterone level  Continue to hold chlorthalidone for now.  According to the daughter patient's diet has been poor  recently.  Will DC the IV fluids, he is not volume depleted.  Sodium level will be checked regularly.  Further treatment will be done according to the further information from recent labs.  I discussed the patients findings and my recommendations with patient and family    Chapo Christiansen MD  10/01/22  @[facility]

## 2022-10-01 NOTE — PROGRESS NOTES
Urology Update     Chart reviewed.  Patient remains admitted with what appears to be refractory hyponatremia.  I see that nephrology has been consulted as well.    From urologic standpoint, nothing additional to offer.  We will have the patient follow-up in my clinic in a few weeks with repeat BMP prior to discuss possible repeat prostate biopsy.     MRI prostate 7/23/2022 demonstrates multiple PI-RADS4 lesions in the prostate peripheral zone after remote greenlight photo vaporization of prostate by an outside urologist.  Concern for cancer of the prostate remains.  However currently unsafe to consider prostate biopsy under anesthesia given hyponatremia.    Naseem Noland MD

## 2022-10-01 NOTE — PROGRESS NOTES
[facility] Medicine Services  PROGRESS NOTE    Patient Name: Lea Rivers  : 1945  MRN: [id number]    Date of Admission: 2022  Primary Care Physician: Mannie Melvin MD    Subjective   Subjective     CC:  Hyponatremia    HPI:  Patient resting in bed eating breakfast, no acute complaints. States he hasn't slept much.     ROS:  Gen- No fevers, chills  CV- No chest pain, palpitations  Resp- No cough, dyspnea  GI- No N/V/D, abd pain    Objective   Objective     Vital Signs:   Temp:  [97.6 °F (36.4 °C)-98.3 °F (36.8 °C)] 97.8 °F (36.6 °C)  Heart Rate:  [55-86] 57  Resp:  [16-18] 18  BP: (136-157)/(71-83) 136/71     Physical Exam:  Constitutional: No acute distress, awake, alert  HENT: NCAT, mucous membranes moist  Respiratory: Clear to auscultation bilaterally, respiratory effort normal   Cardiovascular: RRR, no murmurs, rubs, or gallops  Gastrointestinal: Positive bowel sounds, soft, nontender, nondistended  Musculoskeletal: No bilateral ankle edema  Psychiatric: Appropriate affect, cooperative  Neurologic: Oriented x 3, strength symmetric in all extremities, Cranial Nerves grossly intact to confrontation, speech clear  Skin: No rashes    Results Reviewed:  LAB RESULTS:      Lab 22  0817   WBC 7.13   HEMOGLOBIN 12.4*   HEMATOCRIT 34.2*   PLATELETS 197   MCV 89.5         Lab 10/01/22  0725 10/01/22  0024 22  1848 22  0817   SODIUM 126* 123* 124* 123*   POTASSIUM  --   --   --  3.7   CHLORIDE  --   --   --  85*   CO2  --   --   --  25.0   ANION GAP  --   --   --  13.0   BUN  --   --   --  7*   CREATININE  --   --   --  0.81   EGFR  --   --   --  90.2   GLUCOSE  --   --   --  99   CALCIUM  --   --   --  9.1   MAGNESIUM  --   --   --  1.7   PHOSPHORUS  --   --   --  2.8   TSH  --   --   --  2.160                         Brief Urine Lab Results  (Last result in the past 365 days)      Color   Clarity   Blood   Leuk Est   Nitrite   Protein   CREAT    Urine HCG        08/16/22 1521 Yellow   Clear   Negative   Negative   Negative   Negative                 Microbiology Results Abnormal     Procedure Component Value - Date/Time    COVID PRE-OP / PRE-PROCEDURE SCREENING ORDER (NO ISOLATION) - Swab, Nasopharynx [[id number]]  (Normal) Collected: 09/30/22 1413    Lab Status: Final result Specimen: Swab from Nasopharynx Updated: 09/30/22 1530    Narrative:      The following orders were created for panel order COVID PRE-OP / PRE-PROCEDURE SCREENING ORDER (NO ISOLATION) - Swab, Nasopharynx.  Procedure                               Abnormality         Status                     ---------                               -----------         ------                     COVID-19 and FLU A/B PCR...[[id number]]  Normal              Final result                 Please view results for these tests on the individual orders.    COVID-19 and FLU A/B PCR - Swab, Nasopharynx [[id number]]  (Normal) Collected: 09/30/22 1413    Lab Status: Final result Specimen: Swab from Nasopharynx Updated: 09/30/22 1530     COVID19 Not Detected     Influenza A PCR Not Detected     Influenza B PCR Not Detected    Narrative:      Fact sheet for providers: https://www.fda.gov/media/043361/download    Fact sheet for patients: https://www.fda.gov/media/367421/download    Test performed by PCR.          No radiology results from the last 24 hrs    Results for orders placed during the hospital encounter of 07/20/21    Adult Transthoracic Echo Complete W/ Cont if Necessary Per Protocol    Interpretation Summary  · Left ventricular wall thickness is consistent with mild concentric hypertrophy.  · Normal left-ventricular systolic function, estimated EF 65%.  · Left ventricular diastolic function is consistent with (grade I) impaired relaxation.  · Aortic sclerosis without aortic stenosis. Trace aortic insufficiency.  · Trace mitral regurgitation, trace tricuspid regurgitation.      I have reviewed the  medications:  Scheduled Meds:divalproex, 1,000 mg, Oral, Nightly  divalproex, 500 mg, Oral, QAM  heparin (porcine), 5,000 Units, Subcutaneous, Q12H  insulin lispro, 0-7 Units, Subcutaneous, TID AC  isosorbide mononitrate, 30 mg, Oral, Daily  levETIRAcetam, 500 mg, Oral, Q12H  levothyroxine, 88 mcg, Oral, Q AM  lisinopril, 20 mg, Oral, BID  sodium chloride, 10 mL, Intravenous, Q12H  tamsulosin, 0.4 mg, Oral, Daily  verapamil SR, 240 mg, Oral, Daily      Continuous Infusions:lactated ringers, 9 mL/hr, Last Rate: 9 mL/hr (09/30/22 0826)  sodium chloride, 75 mL/hr, Last Rate: 75 mL/hr (09/30/22 1354)      PRN Meds:.•  acetaminophen **OR** acetaminophen **OR** acetaminophen  •  calcium carbonate  •  dextrose  •  dextrose  •  fluticasone  •  glucagon (human recombinant)  •  ondansetron **OR** ondansetron  •  sodium chloride  •  traMADol    Assessment & Plan   Assessment & Plan     Active Hospital Problems    Diagnosis  POA   • **Elevated prostate specific antigen (PSA) [R97.20]  Unknown   • Hyponatremia [E87.1]  Yes      Resolved Hospital Problems   No resolved problems to display.        Brief Hospital Course to date:  Lea Rivers is a 77-year-old  male with past medical history significant for diabetes mellitus, hypertension, hyperlipidemia, seizure disorder, some memory deficits.  Patient has had generalized weakness over the past week or so.  Last week on 9/22/2022 PCP ordered labs and sodium was 125.  Patient usually drinks a lot of soft drinks of PCP ordered restriction of fluid intake.  According to the daughter, patient has cut back on soda intake.  Today patient was brought in by urology for prostate biopsy.  Lab work-up showed sodium of 123.  Patient is hemodynamically quite stable and does not have any specific complaint.    This patient's problems and plans were partially entered by my partner and updated as appropriate by me 10/01/22.    All problems are new to me  today.     Hyponatremia  -Restrict oral fluid intake to 1200 cc per 24 hours.    - continue NS @ 100cc/hr  -Check serum sodium every 6 hours to make sure gradual correction of insulin  -Check urine sodium and osmolality  - Na of 123 this am and not much improved- consult nephrology    Elevated PSA  - prostate biopsy planned for 9/30 postponed per Dr. Noland due to hyponatremia.  - biopsy will be rescheduled outpatient    DM2  - hold home Metformin  - SSI  - consistent carb diet    HTN  -Continue home medication for blood pressure and monitor    Epilepsy  -Continue Depakote home dose for seizure disorder    GERD  -protonix    Expected Discharge Location and Transportation: home  Expected Discharge Date: 10/3/22    DVT prophylaxis:  Medical DVT prophylaxis orders are present.     AM-PAC 6 Clicks Score (PT): 23 (10/01/22 0800)    CODE STATUS:   Code Status and Medical Interventions:   Ordered at: 09/30/22 1231     Medical Intervention Limits:    NO intubation (DNI)     Code Status (Patient has no pulse and is not breathing):    No CPR (Do Not Attempt to Resuscitate)     Medical Interventions (Patient has pulse or is breathing):    Limited Support       Caitlyn Cervantes DO  10/01/22

## 2022-10-01 NOTE — PLAN OF CARE
Goal Outcome Evaluation:         VSS on RA, no complaints per patient, seizure and fall precautions maintained, will continue to monitor

## 2022-10-02 PROBLEM — E87.1 HYPONATREMIA: Status: RESOLVED | Noted: 2022-09-30 | Resolved: 2022-10-02

## 2022-10-02 LAB
ALBUMIN SERPL-MCNC: 4.1 G/DL (ref 3.5–5.2)
ALBUMIN/GLOB SERPL: 1.7 G/DL
ALP SERPL-CCNC: 54 U/L (ref 39–117)
ALT SERPL W P-5'-P-CCNC: 6 U/L (ref 1–41)
ANION GAP SERPL CALCULATED.3IONS-SCNC: 9 MMOL/L (ref 5–15)
AST SERPL-CCNC: 9 U/L (ref 1–40)
BILIRUB SERPL-MCNC: 0.5 MG/DL (ref 0–1.2)
BUN SERPL-MCNC: 5 MG/DL (ref 8–23)
BUN/CREAT SERPL: 6.4 (ref 7–25)
CALCIUM SPEC-SCNC: 9.5 MG/DL (ref 8.6–10.5)
CHLORIDE SERPL-SCNC: 98 MMOL/L (ref 98–107)
CO2 SERPL-SCNC: 27 MMOL/L (ref 22–29)
CREAT SERPL-MCNC: 0.78 MG/DL (ref 0.76–1.27)
DEPRECATED RDW RBC AUTO: 45.2 FL (ref 37–54)
EGFRCR SERPLBLD CKD-EPI 2021: 91.3 ML/MIN/1.73
ERYTHROCYTE [DISTWIDTH] IN BLOOD BY AUTOMATED COUNT: 13.5 % (ref 12.3–15.4)
GLOBULIN UR ELPH-MCNC: 2.4 GM/DL
GLUCOSE BLDC GLUCOMTR-MCNC: 111 MG/DL (ref 70–130)
GLUCOSE BLDC GLUCOMTR-MCNC: 85 MG/DL (ref 70–130)
GLUCOSE BLDC GLUCOMTR-MCNC: 86 MG/DL (ref 70–130)
GLUCOSE SERPL-MCNC: 91 MG/DL (ref 65–99)
HCT VFR BLD AUTO: 36.6 % (ref 37.5–51)
HGB BLD-MCNC: 12.8 G/DL (ref 13–17.7)
MCH RBC QN AUTO: 32.3 PG (ref 26.6–33)
MCHC RBC AUTO-ENTMCNC: 35 G/DL (ref 31.5–35.7)
MCV RBC AUTO: 92.4 FL (ref 79–97)
PLATELET # BLD AUTO: 192 10*3/MM3 (ref 140–450)
PMV BLD AUTO: 8.6 FL (ref 6–12)
POTASSIUM SERPL-SCNC: 4.2 MMOL/L (ref 3.5–5.2)
PROT SERPL-MCNC: 6.5 G/DL (ref 6–8.5)
RBC # BLD AUTO: 3.96 10*6/MM3 (ref 4.14–5.8)
SODIUM SERPL-SCNC: 131 MMOL/L (ref 136–145)
SODIUM SERPL-SCNC: 132 MMOL/L (ref 136–145)
SODIUM SERPL-SCNC: 133 MMOL/L (ref 136–145)
SODIUM SERPL-SCNC: 134 MMOL/L (ref 136–145)
WBC NRBC COR # BLD: 5.21 10*3/MM3 (ref 3.4–10.8)

## 2022-10-02 PROCEDURE — A9270 NON-COVERED ITEM OR SERVICE: HCPCS | Performed by: INTERNAL MEDICINE

## 2022-10-02 PROCEDURE — 63710000001 VERAPAMIL SR 240 MG TABLET CONTROLLED-RELEASE: Performed by: INTERNAL MEDICINE

## 2022-10-02 PROCEDURE — 63710000001 LEVETIRACETAM 500 MG TABLET: Performed by: INTERNAL MEDICINE

## 2022-10-02 PROCEDURE — 63710000001 LEVOTHYROXINE 88 MCG TABLET: Performed by: INTERNAL MEDICINE

## 2022-10-02 PROCEDURE — 82088 ASSAY OF ALDOSTERONE: CPT | Performed by: INTERNAL MEDICINE

## 2022-10-02 PROCEDURE — 99225 PR SBSQ OBSERVATION CARE/DAY 25 MINUTES: CPT | Performed by: HOSPITALIST

## 2022-10-02 PROCEDURE — 63710000001 DIVALPROEX 500 MG TABLET SUSTAINED-RELEASE 24 HOUR: Performed by: INTERNAL MEDICINE

## 2022-10-02 PROCEDURE — 84295 ASSAY OF SERUM SODIUM: CPT | Performed by: INTERNAL MEDICINE

## 2022-10-02 PROCEDURE — 63710000001 ISOSORBIDE MONONITRATE 30 MG TABLET SUSTAINED-RELEASE 24 HOUR: Performed by: INTERNAL MEDICINE

## 2022-10-02 PROCEDURE — 85027 COMPLETE CBC AUTOMATED: CPT | Performed by: HOSPITALIST

## 2022-10-02 PROCEDURE — 63710000001 TAMSULOSIN 0.4 MG CAPSULE: Performed by: INTERNAL MEDICINE

## 2022-10-02 PROCEDURE — 82962 GLUCOSE BLOOD TEST: CPT

## 2022-10-02 PROCEDURE — 80053 COMPREHEN METABOLIC PANEL: CPT | Performed by: HOSPITALIST

## 2022-10-02 PROCEDURE — 25010000002 HEPARIN (PORCINE) PER 1000 UNITS: Performed by: INTERNAL MEDICINE

## 2022-10-02 PROCEDURE — 63710000001 LISINOPRIL 20 MG TABLET: Performed by: INTERNAL MEDICINE

## 2022-10-02 PROCEDURE — 96372 THER/PROPH/DIAG INJ SC/IM: CPT

## 2022-10-02 PROCEDURE — 84244 ASSAY OF RENIN: CPT | Performed by: INTERNAL MEDICINE

## 2022-10-02 RX ADMIN — HEPARIN SODIUM 5000 UNITS: 5000 INJECTION INTRAVENOUS; SUBCUTANEOUS at 08:55

## 2022-10-02 RX ADMIN — LISINOPRIL 20 MG: 20 TABLET ORAL at 06:20

## 2022-10-02 RX ADMIN — LEVETIRACETAM 500 MG: 500 TABLET, FILM COATED ORAL at 20:51

## 2022-10-02 RX ADMIN — VERAPAMIL HYDROCHLORIDE 240 MG: 240 TABLET, FILM COATED, EXTENDED RELEASE ORAL at 08:53

## 2022-10-02 RX ADMIN — LEVOTHYROXINE SODIUM 88 MCG: 88 TABLET ORAL at 06:20

## 2022-10-02 RX ADMIN — DIVALPROEX SODIUM 500 MG: 500 TABLET, EXTENDED RELEASE ORAL at 06:20

## 2022-10-02 RX ADMIN — DIVALPROEX SODIUM 1000 MG: 500 TABLET, EXTENDED RELEASE ORAL at 20:51

## 2022-10-02 RX ADMIN — HEPARIN SODIUM 5000 UNITS: 5000 INJECTION INTRAVENOUS; SUBCUTANEOUS at 20:51

## 2022-10-02 RX ADMIN — ISOSORBIDE MONONITRATE 30 MG: 30 TABLET, EXTENDED RELEASE ORAL at 08:55

## 2022-10-02 RX ADMIN — LEVETIRACETAM 500 MG: 500 TABLET, FILM COATED ORAL at 08:53

## 2022-10-02 RX ADMIN — LISINOPRIL 20 MG: 20 TABLET ORAL at 20:51

## 2022-10-02 RX ADMIN — TAMSULOSIN HYDROCHLORIDE 0.4 MG: 0.4 CAPSULE ORAL at 08:53

## 2022-10-02 NOTE — DISCHARGE SUMMARY
[facility] Medicine Services  DISCHARGE SUMMARY    Patient Name: Lea Rivers  : 1945  MRN: [id number]    Date of Admission: 2022  7:27 AM  Date of Discharge:  10/2/2022  Primary Care Physician: Mannie Melvin MD    Consults     Date and Time Order Name Status Description    10/1/2022  7:46 AM Inpatient Nephrology Consult Completed           Hospital Course     Presenting Problem:   Elevated prostate specific antigen (PSA) [R97.20]  Hyponatremia [E87.1]    Active Hospital Problems    Diagnosis  POA   • **Elevated prostate specific antigen (PSA) [R97.20]  Yes      Resolved Hospital Problems    Diagnosis Date Resolved POA   • Hyponatremia [E87.1] 10/02/2022 Yes          Hospital Course:  Lea Rivers is a 77-year-old  male with past medical history significant for diabetes mellitus, hypertension, hyperlipidemia, seizure disorder, some memory deficits.  Patient has had generalized weakness over the past week or so.  Last week on 2022 PCP ordered labs and sodium was 125.  Patient usually drinks a lot of soft drinks of PCP ordered restriction of fluid intake.  According to the daughter, patient has cut back on soda intake.  Today patient was brought in by urology for prostate biopsy.  Lab work-up showed sodium of 123.  Patient is hemodynamically quite stable and does not have any specific complaint.     This patient's problems and plans were partially entered by my partner and updated as appropriate by me 10/02/22.     Hyponatremia  - Na of 134 this am- ok to d/c home per nephrology  - Serum sodium 134 after DC IV fluids and holding chlorthalidone.  - Urine creatinine 95 urine, urine osmolality 254, urine protein 8.0, urine sodium 42.    - Pending renin aldosterone level to be followed outpatient per nephrology  - Avoid thiazide diuretics in future  - Stop Chlorthalidone      Elevated PSA  - prostate biopsy planned for  postponed per   Ludin due to hyponatremia.  - biopsy will be rescheduled outpatient- f/u with Dr. Noland     DM2  - continue home meds     HTN  -Continue home medication for blood pressure and monitor     Seizure Disorder  -Continue Depakote home dose for seizure disorder    Discharge Follow Up Recommendations for outpatient labs/diagnostics:  - f/u with Dr. Christiansen, nephrology, in 1 month outpatient  - avoid thiazide diuretics in future  - stop Chlorthalidone  - f/u with PCP in 7 days  - f/u with Dr. Noland to reschedule prostate biopsy    Day of Discharge     HPI:   Patient resting in bed, no complaints.    Review of Systems  Gen- No fevers, chills  CV- No chest pain, palpitations  Resp- No cough, dyspnea  GI- No N/V/D, abd pain    Vital Signs:   Temp:  [97.7 °F (36.5 °C)-98.1 °F (36.7 °C)] 97.8 °F (36.6 °C)  Heart Rate:  [49-80] 57  Resp:  [16-18] 18  BP: (126-187)/(70-97) 158/82      Physical Exam:  Constitutional: No acute distress, awake, alert  HENT: NCAT, mucous membranes moist  Respiratory: Clear to auscultation bilaterally, respiratory effort normal   Cardiovascular: RRR, no murmurs, rubs, or gallops  Gastrointestinal: Positive bowel sounds, soft, nontender, nondistended  Musculoskeletal: No bilateral ankle edema  Psychiatric: Appropriate affect, cooperative  Neurologic: Oriented x 3, strength symmetric in all extremities, Cranial Nerves grossly intact to confrontation, speech clear  Skin: No rashes    Pertinent  and/or Most Recent Results     LAB RESULTS:      Lab 10/02/22  0628 09/30/22  0817   WBC 5.21 7.13   HEMOGLOBIN 12.8* 12.4*   HEMATOCRIT 36.6* 34.2*   PLATELETS 192 197   MCV 92.4 89.5         Lab 10/02/22  0628 10/02/22  0029 10/01/22  1824 10/01/22  1232 10/01/22  0725 09/30/22  1848 09/30/22  0817   SODIUM 134* 133* 128* 127* 126*   < > 123*   POTASSIUM 4.2  --   --   --   --   --  3.7   CHLORIDE 98  --   --   --   --   --  85*   CO2 27.0  --   --   --   --   --  25.0   ANION GAP 9.0  --   --   --    --   --  13.0   BUN 5*  --   --   --   --   --  7*   CREATININE 0.78  --   --   --   --   --  0.81   EGFR 91.3  --   --   --   --   --  90.2   GLUCOSE 91  --   --   --   --   --  99   CALCIUM 9.5  --   --   --   --   --  9.1   MAGNESIUM  --   --   --   --   --   --  1.7   PHOSPHORUS  --   --   --   --   --   --  2.8   TSH  --   --   --   --   --   --  2.160    < > = values in this interval not displayed.         Lab 10/02/22  0628   TOTAL PROTEIN 6.5   ALBUMIN 4.10   GLOBULIN 2.4   ALT (SGPT) 6   AST (SGOT) 9   BILIRUBIN 0.5   ALK PHOS 54                     Brief Urine Lab Results  (Last result in the past 365 days)      Color   Clarity   Blood   Leuk Est   Nitrite   Protein   CREAT   Urine HCG        10/01/22 1504             94.9             Microbiology Results (last 10 days)     Procedure Component Value - Date/Time    COVID PRE-OP / PRE-PROCEDURE SCREENING ORDER (NO ISOLATION) - Swab, Nasopharynx [[id number]]  (Normal) Collected: 09/30/22 1413    Lab Status: Final result Specimen: Swab from Nasopharynx Updated: 09/30/22 1530    Narrative:      The following orders were created for panel order COVID PRE-OP / PRE-PROCEDURE SCREENING ORDER (NO ISOLATION) - Swab, Nasopharynx.  Procedure                               Abnormality         Status                     ---------                               -----------         ------                     COVID-19 and FLU A/B PCR...[[id number]]  Normal              Final result                 Please view results for these tests on the individual orders.    COVID-19 and FLU A/B PCR - Swab, Nasopharynx [[id number]]  (Normal) Collected: 09/30/22 1413    Lab Status: Final result Specimen: Swab from Nasopharynx Updated: 09/30/22 1530     COVID19 Not Detected     Influenza A PCR Not Detected     Influenza B PCR Not Detected    Narrative:      Fact sheet for providers: https://www.fda.gov/media/803524/download    Fact sheet for patients:  https://www.fda.gov/media/914890/download    Test performed by PCR.          No radiology results for the last 10 days            Results for orders placed during the hospital encounter of 07/20/21    Adult Transthoracic Echo Complete W/ Cont if Necessary Per Protocol    Interpretation Summary  · Left ventricular wall thickness is consistent with mild concentric hypertrophy.  · Normal left-ventricular systolic function, estimated EF 65%.  · Left ventricular diastolic function is consistent with (grade I) impaired relaxation.  · Aortic sclerosis without aortic stenosis. Trace aortic insufficiency.  · Trace mitral regurgitation, trace tricuspid regurgitation.      Plan for Follow-up of Pending Labs/Results: f/u per nephrology in 1 month  Pending Labs     Order Current Status    Aldosterone In process    Renin Direct Assay In process        Discharge Details        Discharge Medications      Continue These Medications      Instructions Start Date   acetaminophen 500 MG tablet  Commonly known as: TYLENOL   1,000 mg, Oral, Every 8 Hours PRN      divalproex 500 MG 24 hr tablet  Commonly known as: DEPAKOTE ER   Oral, 2 times daily, 500mg QAM, 1000mg QPM      fluticasone 50 MCG/ACT nasal spray  Commonly known as: FLONASE   2 sprays, Nasal, As Needed, Administer 2 sprays in each nostril for each dose.       glucose blood test strip   1 each, Daily      isosorbide mononitrate 30 MG 24 hr tablet  Commonly known as: IMDUR   30 mg, Oral, Daily      levETIRAcetam 500 MG tablet  Commonly known as: KEPPRA   500 mg, Oral, 2 Times Daily      levothyroxine 88 MCG tablet  Commonly known as: SYNTHROID, LEVOTHROID   88 mcg, Oral, Daily      lisinopril 40 MG tablet  Commonly known as: PRINIVIL,ZESTRIL   20 mg, Oral, 2 Times Daily      metFORMIN  MG 24 hr tablet  Commonly known as: GLUCOPHAGE-XR   500 mg, Oral, 2 Times Daily      omeprazole 20 MG capsule  Commonly known as: priLOSEC   20 mg, Oral, Daily      OneTouch Delica Lancets  33G misc   USE 1 LANCET ONCE A DAY      tamsulosin 0.4 MG capsule 24 hr capsule  Commonly known as: FLOMAX   0.4 mg, Oral, Daily      verapamil  MG CR tablet  Commonly known as: CALAN-SR   240 mg, Oral, Daily         Stop These Medications    chlorthalidone 25 MG tablet  Commonly known as: HYGROTON            Allergies   Allergen Reactions   • Chlorhexidine Rash   • Sulfa Antibiotics Rash     Childhood reaction          Discharge Disposition:  Home or Self Care    Diet:  Hospital:  Diet Order   Procedures   • Diet Regular; Cardiac, Consistent Carbohydrate       Activity: as tolerated      Restrictions or Other Recommendations:  Avoid thiazide diuretics in future       CODE STATUS:    Code Status and Medical Interventions:   Ordered at: 09/30/22 1231     Medical Intervention Limits:    NO intubation (DNI)     Code Status (Patient has no pulse and is not breathing):    No CPR (Do Not Attempt to Resuscitate)     Medical Interventions (Patient has pulse or is breathing):    Limited Support       Future Appointments   Date Time Provider Department Center   10/23/2022  5:15 PM [facility]   11/7/2022  4:00 PM Louis Malcolm MD [facility]   4/10/2023  8:00 AM Louis Malcolm MD [facility]       Additional Instructions for the Follow-ups that You Need to Schedule     Discharge Follow-up with Specified Provider: Dr. Alvarez- nephrology- in 1 month. Please ensure this is arranged; 1 Month   As directed      To: Dr. Alvarez- nephrology- in 1 month. Please ensure this is arranged    Follow Up: 1 Month         Discharge Follow-up with Specified Provider: F/u with Dr. Noland for prostate biopsy as scheduled per him   As directed      To: F/u with Dr. Noland for prostate biopsy as scheduled per him                     Caitlyn Cervantes DO  10/02/22      Time Spent on Discharge:  I spent  35  minutes on this discharge activity which included: face-to-face encounter with the patient, reviewing the  data in the system, coordination of the care with the nursing staff as well as consultants, documentation, and entering orders.

## 2022-10-02 NOTE — PLAN OF CARE
Goal Outcome Evaluation:  Plan of Care Reviewed With: patient           Outcome Evaluation: patient alert and oriented eager to go home but waiting on more test result and nephrology consult tomorrow, tolerating diet well, pain controlled elevated BP down with medication family at bediside.  Fluid restriction in place 1300 per day 700 in for today so far

## 2022-10-02 NOTE — PROGRESS NOTES
[facility] Medicine Services  PROGRESS NOTE    Patient Name: Lea Rivers  : 1945  MRN: [id number]    Date of Admission: 2022  Primary Care Physician: Mannie Melvin MD    Subjective   Subjective     CC:  Hyponatremia    HPI:  Patient resting in bed. He states he slept very well last night. No new complaints this morning.    ROS:  Gen- No fevers, chills  CV- No chest pain, palpitations  Resp- No cough, dyspnea  GI- No N/V/D, abd pain    Objective   Objective     Vital Signs:   Temp:  [97.7 °F (36.5 °C)-97.9 °F (36.6 °C)] 97.7 °F (36.5 °C)  Heart Rate:  [49-80] 63  Resp:  [16-18] 18  BP: (109-187)/(70-97) 109/72     Physical Exam:  Constitutional: No acute distress, awake, alert  HENT: NCAT, mucous membranes moist  Respiratory: Clear to auscultation bilaterally, respiratory effort normal   Cardiovascular: RRR, no murmurs, rubs, or gallops  Gastrointestinal: Positive bowel sounds, soft, nontender, nondistended  Musculoskeletal: No bilateral ankle edema  Psychiatric: Appropriate affect, cooperative  Neurologic: Oriented x 3, strength symmetric in all extremities, Cranial Nerves grossly intact to confrontation, speech clear  Skin: No rashes    Results Reviewed:  LAB RESULTS:      Lab 10/02/22  0628 22  0817   WBC 5.21 7.13   HEMOGLOBIN 12.8* 12.4*   HEMATOCRIT 36.6* 34.2*   PLATELETS 192 197   MCV 92.4 89.5         Lab 10/02/22  0628 10/02/22  0029 10/01/22  1824 10/01/22  1232 10/01/22  0725 22  1848 22  0817   SODIUM 134* 133* 128* 127* 126*   < > 123*   POTASSIUM 4.2  --   --   --   --   --  3.7   CHLORIDE 98  --   --   --   --   --  85*   CO2 27.0  --   --   --   --   --  25.0   ANION GAP 9.0  --   --   --   --   --  13.0   BUN 5*  --   --   --   --   --  7*   CREATININE 0.78  --   --   --   --   --  0.81   EGFR 91.3  --   --   --   --   --  90.2   GLUCOSE 91  --   --   --   --   --  99   CALCIUM 9.5  --   --   --   --   --  9.1   MAGNESIUM   --   --   --   --   --   --  1.7   PHOSPHORUS  --   --   --   --   --   --  2.8   TSH  --   --   --   --   --   --  2.160    < > = values in this interval not displayed.         Lab 10/02/22  0628   TOTAL PROTEIN 6.5   ALBUMIN 4.10   GLOBULIN 2.4   ALT (SGPT) 6   AST (SGOT) 9   BILIRUBIN 0.5   ALK PHOS 54                     Brief Urine Lab Results  (Last result in the past 365 days)      Color   Clarity   Blood   Leuk Est   Nitrite   Protein   CREAT   Urine HCG        10/01/22 1504             94.9               Microbiology Results Abnormal     Procedure Component Value - Date/Time    COVID PRE-OP / PRE-PROCEDURE SCREENING ORDER (NO ISOLATION) - Swab, Nasopharynx [[id number]]  (Normal) Collected: 09/30/22 1413    Lab Status: Final result Specimen: Swab from Nasopharynx Updated: 09/30/22 1530    Narrative:      The following orders were created for panel order COVID PRE-OP / PRE-PROCEDURE SCREENING ORDER (NO ISOLATION) - Swab, Nasopharynx.  Procedure                               Abnormality         Status                     ---------                               -----------         ------                     COVID-19 and FLU A/B PCR...[[id number]]  Normal              Final result                 Please view results for these tests on the individual orders.    COVID-19 and FLU A/B PCR - Swab, Nasopharynx [[id number]]  (Normal) Collected: 09/30/22 1413    Lab Status: Final result Specimen: Swab from Nasopharynx Updated: 09/30/22 1530     COVID19 Not Detected     Influenza A PCR Not Detected     Influenza B PCR Not Detected    Narrative:      Fact sheet for providers: https://www.fda.gov/media/633222/download    Fact sheet for patients: https://www.fda.gov/media/008813/download    Test performed by PCR.          No radiology results from the last 24 hrs    Results for orders placed during the hospital encounter of 07/20/21    Adult Transthoracic Echo Complete W/ Cont if Necessary Per Protocol    Interpretation  Summary  · Left ventricular wall thickness is consistent with mild concentric hypertrophy.  · Normal left-ventricular systolic function, estimated EF 65%.  · Left ventricular diastolic function is consistent with (grade I) impaired relaxation.  · Aortic sclerosis without aortic stenosis. Trace aortic insufficiency.  · Trace mitral regurgitation, trace tricuspid regurgitation.      I have reviewed the medications:  Scheduled Meds:divalproex, 1,000 mg, Oral, Nightly  divalproex, 500 mg, Oral, QAM  heparin (porcine), 5,000 Units, Subcutaneous, Q12H  insulin lispro, 0-7 Units, Subcutaneous, TID AC  isosorbide mononitrate, 30 mg, Oral, Daily  levETIRAcetam, 500 mg, Oral, Q12H  levothyroxine, 88 mcg, Oral, Q AM  lisinopril, 20 mg, Oral, BID  sodium chloride, 10 mL, Intravenous, Q12H  tamsulosin, 0.4 mg, Oral, Daily  verapamil SR, 240 mg, Oral, Daily      Continuous Infusions:   PRN Meds:.•  acetaminophen **OR** acetaminophen **OR** acetaminophen  •  calcium carbonate  •  dextrose  •  dextrose  •  fluticasone  •  glucagon (human recombinant)  •  ondansetron **OR** ondansetron  •  sodium chloride  •  traMADol    Assessment & Plan   Assessment & Plan     Active Hospital Problems    Diagnosis  POA   • **Elevated prostate specific antigen (PSA) [R97.20]  Yes      Resolved Hospital Problems    Diagnosis Date Resolved POA   • Hyponatremia [E87.1] 10/02/2022 Yes        Brief Hospital Course to date:  Lea Rivers is a 77-year-old  male with past medical history significant for diabetes mellitus, hypertension, hyperlipidemia, seizure disorder, some memory deficits.  Patient has had generalized weakness over the past week or so.  Last week on 9/22/2022 PCP ordered labs and sodium was 125.  Patient usually drinks a lot of soft drinks of PCP ordered restriction of fluid intake.  According to the daughter, patient has cut back on soda intake.  Today patient was brought in by urology for prostate biopsy.  Lab work-up  showed sodium of 123.  Patient is hemodynamically quite stable and does not have any specific complaint.    This patient's problems and plans were partially entered by my partner and updated as appropriate by me 10/02/22.     Hyponatremia  - Na of 134 this am- ok to d/c home per nephrology  - Serum sodium 134 after DC IV fluids and holding chlorthalidone.  - Urine creatinine 95 urine, urine osmolality 254, urine protein 8.0, urine sodium 42.    - Pending renin aldosterone level   - Avoid thiazide diuretics in future  - Stop Chlorthalidone- per patient's family he is not taking this and has not been for months.    Elevated PSA  - prostate biopsy planned for 9/30 postponed per Dr. Noland due to hyponatremia.  - biopsy will be rescheduled outpatient    DM2  - hold home Metformin  - SSI  - consistent carb diet    HTN  -Continue home medication for blood pressure and monitor    Epilepsy  -Continue Depakote home dose for seizure disorder    GERD  -protonix    Expected Discharge Location and Transportation: home  Expected Discharge Date: 10/3/22    DVT prophylaxis:  Medical DVT prophylaxis orders are present.     AM-PAC 6 Clicks Score (PT): 23 (10/02/22 1000)    CODE STATUS:   Code Status and Medical Interventions:   Ordered at: 09/30/22 1231     Medical Intervention Limits:    NO intubation (DNI)     Code Status (Patient has no pulse and is not breathing):    No CPR (Do Not Attempt to Resuscitate)     Medical Interventions (Patient has pulse or is breathing):    Limited Support       Caitlyn Cervantes DO  10/02/22

## 2022-10-02 NOTE — PROGRESS NOTES
"   LOS: 1 day    Patient Care Team:  Mannie Melvin MD as PCP - General (Family Medicine)    Chief Complaint:   Hyponatremia  77-year-old with history of hypothyroidism, diabetes type 2, hypertension, hyperlipidemia, seizure disorder, memory deficit, GERD admitted with generalized weakness found to have low sodium 123.  No previous history of hyponatremia  Subjective     Interval History:   Serum sodium 134.  Patient asymptomatic no new events no acute distress    Review of Systems:   Patient denies shortness of breath, chest pain, dysuria, hematuria, nausea, vomiting.      Objective     Vital Sign Min/Max for last 24 hours  Temp  Min: 97.7 °F (36.5 °C)  Max: 98.1 °F (36.7 °C)   BP  Min: 126/73  Max: 187/96   Pulse  Min: 49  Max: 80   Resp  Min: 16  Max: 18   SpO2  Min: 97 %  Max: 99 %   No data recorded   No data recorded     Flowsheet Rows    Flowsheet Row First Filed Value   Admission Height 172.7 cm (67.99\") Documented at 09/30/2022 0817   Admission Weight 101 kg (222 lb 10.6 oz) Documented at 09/30/2022 0817          No intake/output data recorded.  I/O last 3 completed shifts:  In: 1157 [I.V.:1157]  Out: 900 [Urine:900]    Physical Exam:  General Appearance: Alert, oriented, no obvious distress.   male  Eyes: PER, EOMI.  Neck: Supple no JVD.  Lungs: Clear auscultation, no rales rhonchi's, equal chest movement, nonlabored.  Heart: No gallop, murmur, rub, RRR.  Abdomen: Soft, nontender, positive bowel sounds, no organomegaly.  Extremities: No edema, no cyanosis.  Neuro: No focal deficit, moving all extremities, alert  Skin: Warm and dry.   no suprapubic fullness or tenderness    WBC WBC   Date Value Ref Range Status   10/02/2022 5.21 3.40 - 10.80 10*3/mm3 Final   09/30/2022 7.13 3.40 - 10.80 10*3/mm3 Final      HGB Hemoglobin   Date Value Ref Range Status   10/02/2022 12.8 (L) 13.0 - 17.7 g/dL Final   09/30/2022 12.4 (L) 13.0 - 17.7 g/dL Final      HCT Hematocrit   Date Value Ref Range Status "   10/02/2022 36.6 (L) 37.5 - 51.0 % Final   09/30/2022 34.2 (L) 37.5 - 51.0 % Final      Platlets No results found for: LABPLAT   MCV MCV   Date Value Ref Range Status   10/02/2022 92.4 79.0 - 97.0 fL Final   09/30/2022 89.5 79.0 - 97.0 fL Final          Sodium Sodium   Date Value Ref Range Status   10/02/2022 134 (L) 136 - 145 mmol/L Final   10/02/2022 133 (L) 136 - 145 mmol/L Final   10/01/2022 128 (L) 136 - 145 mmol/L Final   10/01/2022 127 (L) 136 - 145 mmol/L Final   10/01/2022 126 (L) 136 - 145 mmol/L Final   10/01/2022 123 (L) 136 - 145 mmol/L Final   09/30/2022 124 (L) 136 - 145 mmol/L Final   09/30/2022 123 (L) 136 - 145 mmol/L Final      Potassium Potassium   Date Value Ref Range Status   10/02/2022 4.2 3.5 - 5.2 mmol/L Final   09/30/2022 3.7 3.5 - 5.2 mmol/L Final      Chloride Chloride   Date Value Ref Range Status   10/02/2022 98 98 - 107 mmol/L Final   09/30/2022 85 (L) 98 - 107 mmol/L Final      CO2 CO2   Date Value Ref Range Status   10/02/2022 27.0 22.0 - 29.0 mmol/L Final   09/30/2022 25.0 22.0 - 29.0 mmol/L Final      BUN BUN   Date Value Ref Range Status   10/02/2022 5 (L) 8 - 23 mg/dL Final   09/30/2022 7 (L) 8 - 23 mg/dL Final      Creatinine Creatinine   Date Value Ref Range Status   10/02/2022 0.78 0.76 - 1.27 mg/dL Final   09/30/2022 0.81 0.76 - 1.27 mg/dL Final      Calcium Calcium   Date Value Ref Range Status   10/02/2022 9.5 8.6 - 10.5 mg/dL Final   09/30/2022 9.1 8.6 - 10.5 mg/dL Final      PO4 No results found for: CAPO4   Albumin Albumin   Date Value Ref Range Status   10/02/2022 4.10 3.50 - 5.20 g/dL Final      Magnesium Magnesium   Date Value Ref Range Status   09/30/2022 1.7 1.6 - 2.4 mg/dL Final      Uric Acid No results found for: URICACID        Results Review:     I reviewed the patient's new clinical results.    divalproex, 1,000 mg, Oral, Nightly  divalproex, 500 mg, Oral, QAM  heparin (porcine), 5,000 Units, Subcutaneous, Q12H  insulin lispro, 0-7 Units, Subcutaneous, TID  AC  isosorbide mononitrate, 30 mg, Oral, Daily  levETIRAcetam, 500 mg, Oral, Q12H  levothyroxine, 88 mcg, Oral, Q AM  lisinopril, 20 mg, Oral, BID  sodium chloride, 10 mL, Intravenous, Q12H  tamsulosin, 0.4 mg, Oral, Daily  verapamil SR, 240 mg, Oral, Daily           Medication Review: Reviewed    Assessment & Plan       Elevated prostate specific antigen (PSA)    Hyponatremia  1.  Hyponatremia: Prior sodium has been normal in April 2022, patient since then has been started on chlorthalidone, and has been on Keppra.  Admits sodium was low.  Patient has been taken off the chlorthalidone.  Sodium remains 126 at this time.  Magnesium 1.7, phosphorus 2.8, TSH 2.16, hemoglobin A1c 6.2, creatinine 0.8.  Patient has denied any headache blurring of vision sore.  No altered mental status.  2.  High PSA: Followed by urology.  3.  History of hypertension  4.  Seizure disorder.  Plan:  Serum sodium 134 after DC IV fluids and holding chlorthalidone.  Urine creatinine 95 urine, urine osmolality 254, urine protein 8.0, urine sodium 42.    Pending renin aldosterone level can be followed outpatient.  Avoid thiazide diuretics in future  According to the daughter patient's diet has been poor recently  Okay to discharge follow-up in 1 month in our office.  [phone]    Chapo Christiansen MD  10/02/22  09:54 EDT

## 2022-10-02 NOTE — PLAN OF CARE
Problem: Adult Inpatient Plan of Care  Goal: Plan of Care Review  Outcome: Ongoing, Progressing  Flowsheets (Taken 10/2/2022 0439)  Progress: improving  Plan of Care Reviewed With: patient  Outcome Evaluation: Patient currently improving hyponatremia status. Patient currently at 132 improving from 128. Patient sleeping on and off throughout the night. Patient been getting up 1 assist to the restroom every 2 hours to urinate. Patient on 1200ml fluid restriction. Will continue to monitor for any changes.  Goal: Patient-Specific Goal (Individualized)  Outcome: Ongoing, Progressing  Goal: Absence of Hospital-Acquired Illness or Injury  Outcome: Ongoing, Progressing  Intervention: Identify and Manage Fall Risk  Recent Flowsheet Documentation  Taken 10/2/2022 0400 by Bud Durham, RN  Safety Promotion/Fall Prevention:   safety round/check completed   toileting scheduled   room organization consistent   activity supervised   assistive device/personal items within reach   clutter free environment maintained   fall prevention program maintained   lighting adjusted   nonskid shoes/slippers when out of bed  Taken 10/2/2022 0200 by Bud Durham, RN  Safety Promotion/Fall Prevention:   activity supervised   assistive device/personal items within reach   clutter free environment maintained   toileting scheduled   safety round/check completed   room organization consistent   nonskid shoes/slippers when out of bed   lighting adjusted   fall prevention program maintained  Taken 10/2/2022 0000 by Bud Durham, RN  Safety Promotion/Fall Prevention:   activity supervised   assistive device/personal items within reach   clutter free environment maintained   toileting scheduled   room organization consistent   safety round/check completed   nonskid shoes/slippers when out of bed   fall prevention program maintained   lighting adjusted  Taken 10/1/2022 2200 by Bud Durham, RN  Safety Promotion/Fall Prevention:   activity  supervised   assistive device/personal items within reach   clutter free environment maintained   toileting scheduled   safety round/check completed   room organization consistent   nonskid shoes/slippers when out of bed   lighting adjusted   fall prevention program maintained  Taken 10/1/2022 2000 by Bud Durham RN  Safety Promotion/Fall Prevention:   activity supervised   assistive device/personal items within reach   clutter free environment maintained   toileting scheduled   room organization consistent   safety round/check completed   nonskid shoes/slippers when out of bed   lighting adjusted   gait belt   fall prevention program maintained  Intervention: Prevent Skin Injury  Recent Flowsheet Documentation  Taken 10/2/2022 0400 by Bud Durham RN  Body Position: position changed independently  Taken 10/2/2022 0200 by Bud Durham RN  Body Position: position changed independently  Taken 10/2/2022 0000 by Bud Durham RN  Body Position: position changed independently  Taken 10/1/2022 2200 by Bud Durham RN  Body Position: position changed independently  Taken 10/1/2022 2000 by Bud Durham RN  Body Position: position changed independently  Intervention: Prevent and Manage VTE (Venous Thromboembolism) Risk  Recent Flowsheet Documentation  Taken 10/2/2022 0400 by Bud Durham RN  Activity Management: activity adjusted per tolerance  Taken 10/2/2022 0200 by Bud Durham RN  Activity Management: activity adjusted per tolerance  Taken 10/2/2022 0000 by Bud Durham RN  Activity Management: activity adjusted per tolerance  Taken 10/1/2022 2200 by Bud Durham RN  Activity Management: activity adjusted per tolerance  Taken 10/1/2022 2000 by Bud Durham RN  Activity Management: activity adjusted per tolerance  VTE Prevention/Management:   bilateral   sequential compression devices off  Range of Motion: ROM (range of motion) performed  Intervention: Prevent Infection  Recent Flowsheet  Documentation  Taken 10/2/2022 0400 by Bud Durham RN  Infection Prevention:   single patient room provided   visitors restricted/screened   rest/sleep promoted   personal protective equipment utilized   hand hygiene promoted   equipment surfaces disinfected  Taken 10/2/2022 0200 by Bud Durham RN  Infection Prevention:   visitors restricted/screened   single patient room provided   rest/sleep promoted   personal protective equipment utilized   hand hygiene promoted   equipment surfaces disinfected  Taken 10/2/2022 0000 by Bud Durham RN  Infection Prevention:   visitors restricted/screened   single patient room provided   rest/sleep promoted   personal protective equipment utilized   hand hygiene promoted   equipment surfaces disinfected  Taken 10/1/2022 2200 by Bud Durham RN  Infection Prevention:   visitors restricted/screened   single patient room provided   rest/sleep promoted   personal protective equipment utilized   hand hygiene promoted   equipment surfaces disinfected  Taken 10/1/2022 2000 by Bud Durham RN  Infection Prevention:   visitors restricted/screened   single patient room provided   rest/sleep promoted   personal protective equipment utilized   hand hygiene promoted   equipment surfaces disinfected  Goal: Optimal Comfort and Wellbeing  Outcome: Ongoing, Progressing  Intervention: Provide Person-Centered Care  Recent Flowsheet Documentation  Taken 10/1/2022 2000 by Bud Durham RN  Trust Relationship/Rapport:   care explained   questions answered  Goal: Readiness for Transition of Care  Outcome: Ongoing, Progressing     Problem: Pain Acute  Goal: Acceptable Pain Control and Functional Ability  Outcome: Ongoing, Progressing  Intervention: Prevent or Manage Pain  Recent Flowsheet Documentation  Taken 10/2/2022 0400 by Bud Durham RN  Medication Review/Management: medications reviewed  Taken 10/2/2022 0200 by Bud Durham RN  Medication Review/Management: medications  reviewed  Taken 10/2/2022 0000 by Bud Durham RN  Medication Review/Management: medications reviewed  Taken 10/1/2022 2200 by Bud Durham RN  Medication Review/Management: medications reviewed  Taken 10/1/2022 2000 by Bud Durham RN  Medication Review/Management: medications reviewed  Intervention: Optimize Psychosocial Wellbeing  Recent Flowsheet Documentation  Taken 10/1/2022 2000 by Bud Durham RN  Diversional Activities: television     Problem: Fall Injury Risk  Goal: Absence of Fall and Fall-Related Injury  Outcome: Ongoing, Progressing  Intervention: Identify and Manage Contributors  Recent Flowsheet Documentation  Taken 10/2/2022 0400 by Bud Durham RN  Medication Review/Management: medications reviewed  Taken 10/2/2022 0200 by Bud Durham RN  Medication Review/Management: medications reviewed  Taken 10/2/2022 0000 by Bud Durham RN  Medication Review/Management: medications reviewed  Taken 10/1/2022 2200 by Bud Durham RN  Medication Review/Management: medications reviewed  Taken 10/1/2022 2000 by Bud Durham RN  Medication Review/Management: medications reviewed  Intervention: Promote Injury-Free Environment  Recent Flowsheet Documentation  Taken 10/2/2022 0400 by Bud Durham RN  Safety Promotion/Fall Prevention:   safety round/check completed   toileting scheduled   room organization consistent   activity supervised   assistive device/personal items within reach   clutter free environment maintained   fall prevention program maintained   lighting adjusted   nonskid shoes/slippers when out of bed  Taken 10/2/2022 0200 by Bud Durham RN  Safety Promotion/Fall Prevention:   activity supervised   assistive device/personal items within reach   clutter free environment maintained   toileting scheduled   safety round/check completed   room organization consistent   nonskid shoes/slippers when out of bed   lighting adjusted   fall prevention program maintained  Taken  10/2/2022 0000 by Bud Durham RN  Safety Promotion/Fall Prevention:   activity supervised   assistive device/personal items within reach   clutter free environment maintained   toileting scheduled   room organization consistent   safety round/check completed   nonskid shoes/slippers when out of bed   fall prevention program maintained   lighting adjusted  Taken 10/1/2022 2200 by Bud Durham RN  Safety Promotion/Fall Prevention:   activity supervised   assistive device/personal items within reach   clutter free environment maintained   toileting scheduled   safety round/check completed   room organization consistent   nonskid shoes/slippers when out of bed   lighting adjusted   fall prevention program maintained  Taken 10/1/2022 2000 by Bud Durham RN  Safety Promotion/Fall Prevention:   activity supervised   assistive device/personal items within reach   clutter free environment maintained   toileting scheduled   room organization consistent   safety round/check completed   nonskid shoes/slippers when out of bed   lighting adjusted   gait belt   fall prevention program maintained   Goal Outcome Evaluation:  Plan of Care Reviewed With: patient        Progress: improving  Outcome Evaluation: Patient currently improving hyponatremia status. Patient currently at 132 improving from 128. Patient sleeping on and off throughout the night. Patient been getting up 1 assist to the restroom every 2 hours to urinate. Patient on 1200ml fluid restriction. Will continue to monitor for any changes.

## 2022-10-03 VITALS
WEIGHT: 222.66 LBS | HEIGHT: 68 IN | SYSTOLIC BLOOD PRESSURE: 137 MMHG | RESPIRATION RATE: 18 BRPM | HEART RATE: 74 BPM | TEMPERATURE: 97.7 F | OXYGEN SATURATION: 94 % | DIASTOLIC BLOOD PRESSURE: 86 MMHG | BODY MASS INDEX: 33.75 KG/M2

## 2022-10-03 LAB
ALBUMIN SERPL-MCNC: 3.7 G/DL (ref 3.5–5.2)
ALBUMIN/GLOB SERPL: 1.5 G/DL
ALP SERPL-CCNC: 52 U/L (ref 39–117)
ALT SERPL W P-5'-P-CCNC: 6 U/L (ref 1–41)
ANION GAP SERPL CALCULATED.3IONS-SCNC: 10 MMOL/L (ref 5–15)
AST SERPL-CCNC: 9 U/L (ref 1–40)
BILIRUB SERPL-MCNC: 0.5 MG/DL (ref 0–1.2)
BUN SERPL-MCNC: 7 MG/DL (ref 8–23)
BUN/CREAT SERPL: 8.3 (ref 7–25)
CALCIUM SPEC-SCNC: 9.1 MG/DL (ref 8.6–10.5)
CHLORIDE SERPL-SCNC: 97 MMOL/L (ref 98–107)
CO2 SERPL-SCNC: 26 MMOL/L (ref 22–29)
CREAT SERPL-MCNC: 0.84 MG/DL (ref 0.76–1.27)
DEPRECATED RDW RBC AUTO: 46.1 FL (ref 37–54)
EGFRCR SERPLBLD CKD-EPI 2021: 89.3 ML/MIN/1.73
ERYTHROCYTE [DISTWIDTH] IN BLOOD BY AUTOMATED COUNT: 13.6 % (ref 12.3–15.4)
GLOBULIN UR ELPH-MCNC: 2.5 GM/DL
GLUCOSE BLDC GLUCOMTR-MCNC: 144 MG/DL (ref 70–130)
GLUCOSE BLDC GLUCOMTR-MCNC: 78 MG/DL (ref 70–130)
GLUCOSE SERPL-MCNC: 89 MG/DL (ref 65–99)
HCT VFR BLD AUTO: 33.9 % (ref 37.5–51)
HGB BLD-MCNC: 11.8 G/DL (ref 13–17.7)
MCH RBC QN AUTO: 32.4 PG (ref 26.6–33)
MCHC RBC AUTO-ENTMCNC: 34.8 G/DL (ref 31.5–35.7)
MCV RBC AUTO: 93.1 FL (ref 79–97)
PLATELET # BLD AUTO: 175 10*3/MM3 (ref 140–450)
PMV BLD AUTO: 8.8 FL (ref 6–12)
POTASSIUM SERPL-SCNC: 3.9 MMOL/L (ref 3.5–5.2)
PROT SERPL-MCNC: 6.2 G/DL (ref 6–8.5)
RBC # BLD AUTO: 3.64 10*6/MM3 (ref 4.14–5.8)
SODIUM SERPL-SCNC: 132 MMOL/L (ref 136–145)
SODIUM SERPL-SCNC: 133 MMOL/L (ref 136–145)
WBC NRBC COR # BLD: 6.63 10*3/MM3 (ref 3.4–10.8)

## 2022-10-03 PROCEDURE — 85027 COMPLETE CBC AUTOMATED: CPT | Performed by: HOSPITALIST

## 2022-10-03 PROCEDURE — A9270 NON-COVERED ITEM OR SERVICE: HCPCS | Performed by: INTERNAL MEDICINE

## 2022-10-03 PROCEDURE — 63710000001 LEVOTHYROXINE 88 MCG TABLET: Performed by: INTERNAL MEDICINE

## 2022-10-03 PROCEDURE — 99217 PR OBSERVATION CARE DISCHARGE MANAGEMENT: CPT | Performed by: HOSPITALIST

## 2022-10-03 PROCEDURE — 84295 ASSAY OF SERUM SODIUM: CPT | Performed by: INTERNAL MEDICINE

## 2022-10-03 PROCEDURE — 80053 COMPREHEN METABOLIC PANEL: CPT | Performed by: HOSPITALIST

## 2022-10-03 PROCEDURE — 63710000001 LISINOPRIL 20 MG TABLET: Performed by: INTERNAL MEDICINE

## 2022-10-03 PROCEDURE — 25010000002 HEPARIN (PORCINE) PER 1000 UNITS: Performed by: INTERNAL MEDICINE

## 2022-10-03 PROCEDURE — 63710000001 LEVETIRACETAM 500 MG TABLET: Performed by: INTERNAL MEDICINE

## 2022-10-03 PROCEDURE — 63710000001 VERAPAMIL SR 240 MG TABLET CONTROLLED-RELEASE: Performed by: INTERNAL MEDICINE

## 2022-10-03 PROCEDURE — 63710000001 ISOSORBIDE MONONITRATE 30 MG TABLET SUSTAINED-RELEASE 24 HOUR: Performed by: INTERNAL MEDICINE

## 2022-10-03 PROCEDURE — 63710000001 TAMSULOSIN 0.4 MG CAPSULE: Performed by: INTERNAL MEDICINE

## 2022-10-03 PROCEDURE — 96372 THER/PROPH/DIAG INJ SC/IM: CPT

## 2022-10-03 PROCEDURE — 82962 GLUCOSE BLOOD TEST: CPT

## 2022-10-03 PROCEDURE — 63710000001 DIVALPROEX 500 MG TABLET SUSTAINED-RELEASE 24 HOUR: Performed by: INTERNAL MEDICINE

## 2022-10-03 RX ORDER — SODIUM CHLORIDE 1000 MG
2 TABLET, SOLUBLE MISCELLANEOUS DAILY
Qty: 60 TABLET | Refills: 0 | Status: SHIPPED | OUTPATIENT
Start: 2022-10-03 | End: 2022-11-02

## 2022-10-03 RX ADMIN — LISINOPRIL 20 MG: 20 TABLET ORAL at 08:24

## 2022-10-03 RX ADMIN — LEVETIRACETAM 500 MG: 500 TABLET, FILM COATED ORAL at 08:24

## 2022-10-03 RX ADMIN — VERAPAMIL HYDROCHLORIDE 240 MG: 240 TABLET, FILM COATED, EXTENDED RELEASE ORAL at 08:24

## 2022-10-03 RX ADMIN — TAMSULOSIN HYDROCHLORIDE 0.4 MG: 0.4 CAPSULE ORAL at 08:24

## 2022-10-03 RX ADMIN — ISOSORBIDE MONONITRATE 30 MG: 30 TABLET, EXTENDED RELEASE ORAL at 08:24

## 2022-10-03 RX ADMIN — DIVALPROEX SODIUM 500 MG: 500 TABLET, EXTENDED RELEASE ORAL at 06:58

## 2022-10-03 RX ADMIN — HEPARIN SODIUM 5000 UNITS: 5000 INJECTION INTRAVENOUS; SUBCUTANEOUS at 08:24

## 2022-10-03 RX ADMIN — LEVOTHYROXINE SODIUM 88 MCG: 88 TABLET ORAL at 06:58

## 2022-10-03 NOTE — PROGRESS NOTES
Notes:  Received a phone call from my office, per family request they would not like to seek my services for patient medical management.  I accompanied by RN, discussed with the daughter from door of the patient room.  Offered my services, patient daughter has declined, would like to have another nephrologist taking care of the patient.  I have explained there is only one nephrologist here until Tuesday and that will be me.  My partner will take over on Wednesday, she has agreed to follow-up with my partner.  I have also offered her to find another nephrologist in town as our group of nephrologist at [facility] do not have any assigned patients.  We would not like to create a problem on her next visit to our office, I offered her to give her a few names in the city if she wants.  Please contact me if you have any questions.  I have also inform Dr. Caitlyn Cervantes.  I will not be following this patient this visit.  Please also consider nutritionist for diet management.

## 2022-10-03 NOTE — DISCHARGE SUMMARY
[facility] Medicine Services  DISCHARGE SUMMARY    Patient Name: Lea Rivers  : 1945  MRN: [id number]    Date of Admission: 2022  7:27 AM  Date of Discharge:  10/3/2022  Primary Care Physician: Mannie Melvin MD    Consults     Date and Time Order Name Status Description    10/1/2022  7:46 AM Inpatient Nephrology Consult Completed           Hospital Course     Presenting Problem:   Elevated prostate specific antigen (PSA) [R97.20]  Hyponatremia [E87.1]    Active Hospital Problems    Diagnosis  POA   • **Elevated prostate specific antigen (PSA) [R97.20]  Yes      Resolved Hospital Problems    Diagnosis Date Resolved POA   • Hyponatremia [E87.1] 10/02/2022 Yes          Hospital Course:  Lea Rivers is a 77-year-old  male with past medical history significant for diabetes mellitus, hypertension, hyperlipidemia, seizure disorder, some memory deficits.  Patient has had generalized weakness over the past week or so.  Last week on 2022 PCP ordered labs and sodium was 125.  Patient usually drinks a lot of soft drinks of PCP ordered restriction of fluid intake.  According to the daughter, patient has cut back on soda intake.  Today patient was brought in by urology for prostate biopsy.  Lab work-up showed sodium of 123.  Patient is hemodynamically quite stable and does not have any specific complaint.     This patient's problems and plans were partially entered by my partner and updated as appropriate by me 10/02/22.     Hyponatremia  - Na of 133 this am  - Urine creatinine 95 urine, urine osmolality 254, urine protein 8.0, urine sodium 42.    - Hyponatremia likely related to poor PO intake.  - labs do not support SIADH or metastatic causes of hyponatremia per Dr. Live  - Avoid thiazide diuretics in future  - take 2gm Sodium chloride daily   - f/u with Dr. Live on 10/11/22 at 1pm in [place] office  - f/u for prostate biopsy with   Ludin as scheduled on 10/26/22  - Drink protein shakes three times/day  - fluid restriction of 1200 cc/day     Elevated PSA  - prostate biopsy planned for 9/30 postponed per Dr. Noland due to hyponatremia.  - f/u for prostate biopsy with Dr. Noland as scheduled on 10/26/22     DM2  - continue home meds     HTN  -Continue home medication for blood pressure and monitor     Seizure Disorder  -Continue Depakote home dose for seizure disorder    Discharge Follow Up Recommendations for outpatient labs/diagnostics:  - take 2gm Sodium chloride daily   - f/u with Dr. Live on 10/11/22 at 1pm in [place] office  - f/u for prostate biopsy with Dr. Noland as scheduled on 10/26/22  - Drink protein shakes three times/day  - fluid restriction of 1200 cc/day    Day of Discharge     HPI:   Patient resting in bedside chair with daughter at bedside. We discussed the plan going forward and all agree. Patient ok to be discharged home.    Review of Systems  Gen- No fevers, chills  CV- No chest pain, palpitations  Resp- No cough, dyspnea  GI- No N/V/D, abd pain    Vital Signs:   Temp:  [97.1 °F (36.2 °C)-98.5 °F (36.9 °C)] 97.7 °F (36.5 °C)  Heart Rate:  [55-72] 68  Resp:  [18] 18  BP: (137-180)/(86-96) 137/86      Physical Exam:  Constitutional: No acute distress, awake, alert  HENT: NCAT, mucous membranes moist  Respiratory: Clear to auscultation bilaterally, respiratory effort normal   Cardiovascular: RRR, no murmurs, rubs, or gallops  Gastrointestinal: Positive bowel sounds, soft, nontender, nondistended  Musculoskeletal: No bilateral ankle edema  Psychiatric: Appropriate affect, cooperative  Neurologic: Oriented x 3, strength symmetric in all extremities, Cranial Nerves grossly intact to confrontation, speech clear  Skin: No rashes    Pertinent  and/or Most Recent Results     LAB RESULTS:      Lab 10/03/22  0621 10/02/22  0628 09/30/22  0817   WBC 6.63 5.21 7.13   HEMOGLOBIN 11.8* 12.8* 12.4*   HEMATOCRIT 33.9* 36.6*  34.2*   PLATELETS 175 192 197   MCV 93.1 92.4 89.5         Lab 10/03/22  0621 10/03/22  0043 10/02/22  1810 10/02/22  1428 10/02/22  0628 09/30/22  1848 09/30/22  0817   SODIUM 133* 132* 131* 132* 134*   < > 123*   POTASSIUM 3.9  --   --   --  4.2  --  3.7   CHLORIDE 97*  --   --   --  98  --  85*   CO2 26.0  --   --   --  27.0  --  25.0   ANION GAP 10.0  --   --   --  9.0  --  13.0   BUN 7*  --   --   --  5*  --  7*   CREATININE 0.84  --   --   --  0.78  --  0.81   EGFR 89.3  --   --   --  91.3  --  90.2   GLUCOSE 89  --   --   --  91  --  99   CALCIUM 9.1  --   --   --  9.5  --  9.1   MAGNESIUM  --   --   --   --   --   --  1.7   PHOSPHORUS  --   --   --   --   --   --  2.8   TSH  --   --   --   --   --   --  2.160    < > = values in this interval not displayed.         Lab 10/03/22  0621 10/02/22  0628   TOTAL PROTEIN 6.2 6.5   ALBUMIN 3.70 4.10   GLOBULIN 2.5 2.4   ALT (SGPT) 6 6   AST (SGOT) 9 9   BILIRUBIN 0.5 0.5   ALK PHOS 52 54                     Brief Urine Lab Results  (Last result in the past 365 days)      Color   Clarity   Blood   Leuk Est   Nitrite   Protein   CREAT   Urine HCG        10/01/22 1504             94.9             Microbiology Results (last 10 days)     Procedure Component Value - Date/Time    COVID PRE-OP / PRE-PROCEDURE SCREENING ORDER (NO ISOLATION) - Swab, Nasopharynx [[id number]]  (Normal) Collected: 09/30/22 1413    Lab Status: Final result Specimen: Swab from Nasopharynx Updated: 09/30/22 1530    Narrative:      The following orders were created for panel order COVID PRE-OP / PRE-PROCEDURE SCREENING ORDER (NO ISOLATION) - Swab, Nasopharynx.  Procedure                               Abnormality         Status                     ---------                               -----------         ------                     COVID-19 and FLU A/B PCR...[[id number]]  Normal              Final result                 Please view results for these tests on the individual orders.    COVID-19 and FLU  A/B PCR - Swab, Nasopharynx [[id number]]  (Normal) Collected: 09/30/22 1413    Lab Status: Final result Specimen: Swab from Nasopharynx Updated: 09/30/22 1530     COVID19 Not Detected     Influenza A PCR Not Detected     Influenza B PCR Not Detected    Narrative:      Fact sheet for providers: https://www.fda.gov/media/338396/download    Fact sheet for patients: https://www.fda.gov/media/000106/download    Test performed by PCR.          No radiology results for the last 10 days            Results for orders placed during the hospital encounter of 07/20/21    Adult Transthoracic Echo Complete W/ Cont if Necessary Per Protocol    Interpretation Summary  · Left ventricular wall thickness is consistent with mild concentric hypertrophy.  · Normal left-ventricular systolic function, estimated EF 65%.  · Left ventricular diastolic function is consistent with (grade I) impaired relaxation.  · Aortic sclerosis without aortic stenosis. Trace aortic insufficiency.  · Trace mitral regurgitation, trace tricuspid regurgitation.      Plan for Follow-up of Pending Labs/Results: f/u per nephrology in 1 month  Pending Labs     Order Current Status    Aldosterone In process    Renin Direct Assay In process        Discharge Details        Discharge Medications      New Medications      Instructions Start Date   sodium chloride 1 g tablet   2 g, Oral, Daily         Continue These Medications      Instructions Start Date   acetaminophen 500 MG tablet  Commonly known as: TYLENOL   1,000 mg, Oral, Every 8 Hours PRN      divalproex 500 MG 24 hr tablet  Commonly known as: DEPAKOTE ER   Oral, 2 times daily, 500mg QAM, 1000mg QPM      fluticasone 50 MCG/ACT nasal spray  Commonly known as: FLONASE   2 sprays, Nasal, As Needed, Administer 2 sprays in each nostril for each dose.       glucose blood test strip   1 each, Daily      isosorbide mononitrate 30 MG 24 hr tablet  Commonly known as: IMDUR   30 mg, Oral, Daily      levETIRAcetam 500 MG  tablet  Commonly known as: KEPPRA   500 mg, Oral, 2 Times Daily      levothyroxine 88 MCG tablet  Commonly known as: SYNTHROID, LEVOTHROID   88 mcg, Oral, Daily      lisinopril 40 MG tablet  Commonly known as: PRINIVIL,ZESTRIL   20 mg, Oral, 2 Times Daily      metFORMIN  MG 24 hr tablet  Commonly known as: GLUCOPHAGE-XR   500 mg, Oral, 2 Times Daily      omeprazole 20 MG capsule  Commonly known as: priLOSEC   20 mg, Oral, Daily      OneTouch Delica Lancets 33G misc   USE 1 LANCET ONCE A DAY      tamsulosin 0.4 MG capsule 24 hr capsule  Commonly known as: FLOMAX   0.4 mg, Oral, Daily      verapamil  MG CR tablet  Commonly known as: CALAN-SR   240 mg, Oral, Daily         Stop These Medications    chlorthalidone 25 MG tablet  Commonly known as: HYGROTON            Allergies   Allergen Reactions   • Chlorhexidine Rash   • Sulfa Antibiotics Rash     Childhood reaction          Discharge Disposition:  Home or Self Care    Diet:  Hospital:  Diet Order   Procedures   • Diet Regular; Cardiac, Consistent Carbohydrate       Activity: as tolerated      Restrictions or Other Recommendations:  Avoid thiazide diuretics in future       CODE STATUS:    Code Status and Medical Interventions:   Ordered at: 09/30/22 1231     Medical Intervention Limits:    NO intubation (DNI)     Code Status (Patient has no pulse and is not breathing):    No CPR (Do Not Attempt to Resuscitate)     Medical Interventions (Patient has pulse or is breathing):    Limited Support       Future Appointments   Date Time Provider Department Center   10/23/2022  5:15 PM [facility]   10/26/2022  2:00 PM Naseem Noland MD [facility]   11/7/2022  4:00 PM Louis Malcolm MD [facility]   4/10/2023  8:00 AM oLuis Malcolm MD [facility]       Additional Instructions for the Follow-ups that You Need to Schedule     Discharge Follow-up with Specified Provider: Dr. Live on October 13, 2022 at 1pm at [facility] [place] [facility]   As  directed      To: Dr. Live on October 13, 2022 at 1pm at [facility]         Discharge Follow-up with Specified Provider: Dr. Alvarez- nephrology- in 1 month. Please ensure this is arranged; 1 Month   As directed      To: Dr. Alvarez- nephrology- in 1 month. Please ensure this is arranged    Follow Up: 1 Month         Discharge Follow-up with Specified Provider: F/u with Dr. Noland for prostate biopsy as scheduled per him   As directed      To: F/u with Dr. Noland for prostate biopsy as scheduled per him         Discharge Follow-up with Specified Provider: Prostate biopsy on 10/26/22 per Dr. Noland office   As directed      To: Prostate biopsy on 10/26/22 per Dr. Noland office                     Caitlyn Cervantes DO  10/03/22      Time Spent on Discharge:  I spent  35  minutes on this discharge activity which included: face-to-face encounter with the patient, reviewing the data in the system, coordination of the care with the nursing staff as well as consultants, documentation, and entering orders.

## 2022-10-03 NOTE — DISCHARGE INSTRUCTIONS
- take 2gm Sodium chloride daily   - f/u with Dr. Live on 10/11/22 at 1pm in [place] office  - f/u for prostate biopsy with Dr. Noland as scheduled on 10/26/22  - Drink protein shakes three times/day  - fluid restriction of 1200 cc/day

## 2022-10-05 LAB — ALDOST SERPL-MCNC: 4.9 NG/DL (ref 0–30)

## 2022-10-07 LAB — RENIN PLAS-CCNC: 0.22 NG/ML/HR (ref 0.17–5.38)

## 2022-10-23 ENCOUNTER — HOSPITAL ENCOUNTER (OUTPATIENT)
Dept: MRI IMAGING | Facility: HOSPITAL | Age: 77
Discharge: HOME | End: 2022-10-23
Admitting: ORTHOPAEDIC SURGERY

## 2022-10-23 DIAGNOSIS — M79.89 PALPABLE MASS OF SOFT TISSUE OF FOREARM: ICD-10-CM

## 2022-10-23 PROCEDURE — 73218 MRI UPPER EXTREMITY W/O DYE: CPT

## 2022-10-26 ENCOUNTER — OFFICE VISIT (OUTPATIENT)
Dept: UROLOGY | Facility: CLINIC | Age: 77
End: 2022-10-26

## 2022-10-26 VITALS — HEIGHT: 68 IN | BODY MASS INDEX: 33.34 KG/M2 | WEIGHT: 220 LBS

## 2022-10-26 DIAGNOSIS — R97.20 ELEVATED PROSTATE SPECIFIC ANTIGEN (PSA): ICD-10-CM

## 2022-10-26 LAB
BILIRUB BLD-MCNC: NEGATIVE MG/DL
CLARITY, POC: CLEAR
COLOR UR: YELLOW
EXPIRATION DATE: ABNORMAL
GLUCOSE UR STRIP-MCNC: NEGATIVE MG/DL
KETONES UR QL: ABNORMAL
LEUKOCYTE EST, POC: NEGATIVE
Lab: ABNORMAL
NITRITE UR-MCNC: NEGATIVE MG/ML
PH UR: 6.5 [PH] (ref 5–8)
PROT UR STRIP-MCNC: ABNORMAL MG/DL
RBC # UR STRIP: NEGATIVE /UL
SP GR UR: 1.02 (ref 1–1.03)
UROBILINOGEN UR QL: NORMAL

## 2022-10-26 PROCEDURE — 99213 OFFICE O/P EST LOW 20 MIN: CPT | Performed by: STUDENT IN AN ORGANIZED HEALTH CARE EDUCATION/TRAINING PROGRAM

## 2022-10-26 PROCEDURE — 81003 URINALYSIS AUTO W/O SCOPE: CPT | Performed by: STUDENT IN AN ORGANIZED HEALTH CARE EDUCATION/TRAINING PROGRAM

## 2022-10-26 PROCEDURE — 51798 US URINE CAPACITY MEASURE: CPT | Performed by: STUDENT IN AN ORGANIZED HEALTH CARE EDUCATION/TRAINING PROGRAM

## 2022-10-26 RX ORDER — CIPROFLOXACIN 500 MG/1
500 TABLET, FILM COATED ORAL 2 TIMES DAILY
Qty: 6 TABLET | Refills: 0 | Status: SHIPPED | OUTPATIENT
Start: 2022-11-10 | End: 2022-11-07

## 2022-10-26 NOTE — PROGRESS NOTES
Follow Up Office Visit      Patient Name: Lea Rivers  : 1945   MRN: [id number]     Chief Complaint:    Chief Complaint   Patient presents with   • Discuss Care of Plan   • Elevated PSA   • Benign Prostatic Hypertrophy     With incomplete bladder emptying.       Referring Provider: No ref. provider found    History of Present Illness: Lea Rivers is a 77 y.o. male who presents today for follow up regarding probable prostate cancer.  The patient has a history of BPH and underwent a greenlight photo vaporization of the prostate in .  His PSA has continued to rise after his procedure, most recently checked in May was 11.32.  He underwent MRI prostate for evaluation which demonstrates a very small remaining amount of prostate tissue roughly 10 cc, MRI prostate also demonstrated 1.5 cm lesion in the sacrum concerning for possible osseous metastatic disease.  He was scheduled for prostate biopsy my clinic on  but the patient did not tolerate in clinic examination and this was aborted.    The patient was then scheduled for prostate biopsy under anesthesia in the operating room.  He presented for his procedure 2022, his sodium at that point was 123.  He was admitted for hyponatremia and this was evaluated by internal medicine physicians.  His sodium has been managed with medication changes and sodium supplementation and his currently 139 per his family who presents with him in the room today.    The patient's care has been somewhat delayed secondary to his hyponatremia and he needs a prostate biopsy secondary to concerning PI-RADS 4 lesions throughout the peripheral zone of the remaining proximal and sacral lesion.  We discussed the likelihood of undiagnosed prostate cancer and the likely necessity of performing a stat bone scan after his prostate biopsy.  First time I can get him in for a prostate biopsy is 2022.  He will be prescribed ciprofloxacin 5 mg twice daily  leading up to the procedure to prevent sepsis.  We will repeat BMP prior to his biopsy.     MRI PROSTATE 7/23/22     IMPRESSION:  1. Exam limited due to motion on axial T2 images despite multiple  attempts at imaging as described above.  2. Postsurgical changes of Greenlight laser PVP with complete absence of  the transitional zone.  3. Small remaining peripheral zone prostate tissue with volume of 10.7  cc and significantly elevated PSAD of 1.05.  4. Abnormal signal throughout the remaining peripheral zone most  pronounced at the left posterior medial mid gland and right posterior  medial mid gland concerning for PIRADS 4 suspicious lesions, recommend  biopsy. Complete PIRADS assessment limited due to technical factors and  motion.  5. Abnormal 15 mm lesion in sacrum is nonspecific but suspicious for  possible osseous metastasis and could be further assessed with bone  scan.         Subjective      Review of System: Review of Systems   Constitutional: Negative for chills, fatigue, fever and unexpected weight change.   HENT: Negative for sore throat.    Eyes: Negative for visual disturbance.   Respiratory: Negative for cough, chest tightness and shortness of breath.    Cardiovascular: Negative for chest pain and leg swelling.   Gastrointestinal: Negative for blood in stool, constipation, diarrhea, nausea, rectal pain and vomiting.   Genitourinary: Negative for decreased urine volume, difficulty urinating, dysuria, enuresis, flank pain, frequency, genital sores, hematuria and urgency.   Musculoskeletal: Negative for back pain and joint swelling.   Skin: Negative for rash and wound.   Neurological: Negative for seizures, speech difficulty, weakness and headaches.   Psychiatric/Behavioral: Negative for confusion, sleep disturbance and suicidal ideas. The patient is not nervous/anxious.       I have reviewed the ROS documented by my clinical staff, I have updated appropriately and I agree. Naseem Noland MD    I  have reviewed and the following portions of the patient's history were updated as appropriate: past family history, past medical history, past social history, past surgical history and problem list.    Medications:     Current Outpatient Medications:   •  acetaminophen (TYLENOL) 500 MG tablet, Take 2 tablets by mouth Every 8 (Eight) Hours As Needed for Mild Pain ., Disp: , Rfl:   •  divalproex (DEPAKOTE) 500 MG 24 hr tablet, Take  by mouth 2 (two) times a day. 500mg QAM, 1000mg QPM, Disp: , Rfl:   •  fluticasone (FLONASE) 50 MCG/ACT nasal spray, 2 sprays into the nostril(s) as directed by provider As Needed for Rhinitis. Administer 2 sprays in each nostril for each dose., Disp: , Rfl:   •  glucose blood test strip, 1 each Daily., Disp: , Rfl:   •  isosorbide mononitrate (IMDUR) 30 MG 24 hr tablet, Take 30 mg by mouth Daily., Disp: , Rfl:   •  levETIRAcetam (KEPPRA) 500 MG tablet, Take 500 mg by mouth 2 (two) times a day., Disp: , Rfl:   •  levothyroxine (SYNTHROID, LEVOTHROID) 88 MCG tablet, Take 88 mcg by mouth daily., Disp: , Rfl:   •  lisinopril (PRINIVIL,ZESTRIL) 40 MG tablet, Take 20 mg by mouth 2 (Two) Times a Day., Disp: , Rfl:   •  metFORMIN ER (GLUCOPHAGE-XR) 500 MG 24 hr tablet, Take 500 mg by mouth 2 (Two) Times a Day., Disp: , Rfl:   •  omeprazole (priLOSEC) 20 MG capsule, Take 20 mg by mouth Daily., Disp: , Rfl:   •  ONETOUCH DELICA LANCETS 33G misc, USE 1 LANCET ONCE A DAY, Disp: , Rfl: 0  •  sodium chloride 1 g tablet, Take 2 tablets by mouth Daily for 30 days., Disp: 60 tablet, Rfl: 0  •  tamsulosin (FLOMAX) 0.4 MG capsule 24 hr capsule, Take 1 capsule by mouth Daily., Disp: 90 capsule, Rfl: 3  •  verapamil SR (CALAN-SR) 240 MG CR tablet, Take 240 mg by mouth Daily., Disp: , Rfl:   •  [START ON 11/10/2022] ciprofloxacin (Cipro) 500 MG tablet, Take 1 tablet by mouth 2 (Two) Times a Day for 3 days., Disp: 6 tablet, Rfl: 0    Allergies:   Allergies   Allergen Reactions   • Chlorhexidine Rash   • Sulfa  Antibiotics Rash     Childhood reaction        IPSS Questionnaire (AUA-7):  Over the past month…    1)  Incomplete Emptying:       How often have you had a sensation of not emptying you had the sensation of not emptying your bladder completely after you finished urinating?  1 - Less than 1 time in 5   2)  Frequency:       How often have you had the urinate again less than two hours after you finished urinating?  2 - Less than half the time   3)  Intermittency:       How often have you found you stopped and started again several times when you urinated?   2 - Less than half the time   4) Urgency:      How often have you found it difficult to postpone urination?  0 - Not at all   5) Weak Stream:      How often have you had a weak urinary stream?  5 - Almost always   6) Straining:       How often have you had to push or strain to begin urination?  3 - About half the time   7) Nocturia:      How many times did you most typically get up to urinate from the time you went to bed at night until the time you got up in the morning?  5 - 5+ times   Total Score:  18   The International Prostate Symptom Score (IPSS) is used to screen, diagnose, track symptoms of benign prostatic hyperplasia (BPH).   0-7 (Mild Symptoms) 8-19 (Moderate) 20-35 (Severe)   Quality of Life (QoL):  If you were to spend the rest of your life with your urinary condition just the way it is now, how would you feel about that? 5-Unhappy   Urine Leakage (Incontinence) 1-Mild (A few drops a day, no pad use)     Sexual Health Inventory for Men (LAUREN)   Over the past 6 months:     1. How do you rate your confidence that you could get and keep an erection?  0 - No sexual activity    2. When you had erections with sexual  stimulation, how often were your erections hard enough for penetration (entering your partner)?  0 - No Sexual Activity    3. During sexual intercourse, how often were you able to maintain your erection after you had penetrated (entered) your  "partner?  0 - Did not attempt intercourse   4. During sexual intercourse, how difficult was it to maintain your erection to completion of intercourse?  0 - Did not attempt intercourse   5. When you attempted sexual intercourse, how often was it satisfactory for you?  0 - Did not attempt intercourse    Total Score: 0   The Sexual Health Inventory for Men further classifies ED severity with the following breakpoints:   1-7 (Severe ED) 8-11 (Moderate ED) 12-16 (Mild to Moderate ED) 17-21 (Mild ED)      Post void residual bladder scan:   30 mL     Objective     Physical Exam:   Vital Signs:   Vitals:    10/26/22 1415   Weight: 99.8 kg (220 lb)   Height: 172.7 cm (68\")     Body mass index is 33.45 kg/m².     Physical Exam  Constitutional:       Appearance: Normal appearance.   HENT:      Head: Normocephalic and atraumatic.      Nose: Nose normal.   Eyes:      Extraocular Movements: Extraocular movements intact.      Conjunctiva/sclera: Conjunctivae normal.      Pupils: Pupils are equal, round, and reactive to light.   Musculoskeletal:         General: Normal range of motion.      Cervical back: Normal range of motion and neck supple.   Skin:     General: Skin is warm and dry.      Findings: No lesion or rash.   Neurological:      General: No focal deficit present.      Mental Status: He is alert and oriented to person, place, and time. Mental status is at baseline.   Psychiatric:         Mood and Affect: Mood normal.         Behavior: Behavior normal.         Labs:   Brief Urine Lab Results  (Last result in the past 365 days)      Color   Clarity   Blood   Leuk Est   Nitrite   Protein   CREAT   Urine HCG        10/26/22 1428 Yellow   Clear   Negative   Negative   Negative   Trace                      Lab Results   Component Value Date    GLUCOSE 89 10/03/2022    CALCIUM 9.1 10/03/2022     (L) 10/03/2022    K 3.9 10/03/2022    CO2 26.0 10/03/2022    CL 97 (L) 10/03/2022    BUN 7 (L) 10/03/2022    CREATININE 0.84 " 10/03/2022    EGFRIFNONA 64 09/10/2021    BCR 8.3 10/03/2022    ANIONGAP 10.0 10/03/2022       Lab Results   Component Value Date    WBC 6.63 10/03/2022    HGB 11.8 (L) 10/03/2022    HCT 33.9 (L) 10/03/2022    MCV 93.1 10/03/2022     10/03/2022       Images:   MRI Radius Ulna Right Without Contrast    Result Date: 10/24/2022  Limited exam with early termination prior to administration of IV contrast. Lack of IV contrast limits characterization of soft tissue findings. Consider repeat exam with IV contrast for complete assessment.  There is a small, thin 3.2 x 0.4 cm STIR hyperintense collection/signal with hypointense rim in the subcutaneous tissues of the posterior proximal forearm which is nonspecific and suboptimally characterized in the absence of IV contrast. Considerations include resolving hematoma and correlate for any recent trauma. Small adventitial bursa may have similar appearance. Elsewhere there is somewhat protuberant though otherwise unremarkable appearing subcutaneous fat along the posterior elbow posterior proximal forearm. There may be a trace amount of fluid in the olecranon bursa.  This report was finalized on 10/24/2022 11:33 AM by Milad Noland MD.      MRI Prostate With & Without Contrast    Result Date: 7/27/2022  1. Exam limited due to motion on axial T2 images despite multiple attempts at imaging as described above. 2. Postsurgical changes of Greenlight laser PVP with complete absence of the transitional zone. 3. Small remaining peripheral zone prostate tissue with volume of 10.7 cc and significantly elevated PSAD of 1.05. 4. Abnormal signal throughout the remaining peripheral zone most pronounced at the left posterior medial mid gland and right posterior medial mid gland concerning for PIRADS 4 suspicious lesions, recommend biopsy. Complete PIRADS assessment limited due to technical factors and motion. 5. Abnormal 15 mm lesion in sacrum is nonspecific but suspicious for possible  osseous metastasis and could be further assessed with bone scan.       This report was finalized on 7/27/2022 9:35 AM by Abraham Otto MD.        Measures:   Tobacco:   Lea Arnold [place]  reports that he has never smoked. He has never used smokeless tobacco.        Assessment / Plan      Assessment/Plan:   77 y.o. male who presented today for follow up of elevated PSA and multiple PI-RADS 4 lesions throughout the prostate on MRI as well as 1.5 cm sacral lesion concerning for possible osseous metastatic disease.  In clinic biopsy was aborted secondary to patient discomfort, he presented in September for planned prostate biopsy under anesthesia however he had severe hyponatremia and his case was canceled.  He follows up with me today and his hyponatremia has been resolved by his primary care provider and sodium supplementation.  We will repeat BMP in PAT clinic and he will be taken to the operating room 11- for TRUS prostate biopsy.  Discussed the likely necessity of bone scan shortly after depending on pathology.  Given his prior prostate surgery and very small remaining prostate tissue remaining, if he does have considerable prostate cancer that requires treatment he is best served with CyberKnife radiation therapy and he will be referred to radiation oncology after staging imaging is complete.  First order of business is to obtain pathology and to rule out osseous metastatic disease with bone scan shortly after.  Risk of prostate biopsy been previously discussed at length.    Diagnoses and all orders for this visit:    1. Elevated prostate specific antigen (PSA)  -     POC Urinalysis Dipstick, Automated  -     Case Request; Standing  -     CBC (No Diff); Future  -     Basic Metabolic Panel; Future  -     ceFAZolin (ANCEF) 2 g in sodium chloride 0.9 % 100 mL IVPB  -     Case Request  -     ciprofloxacin (Cipro) 500 MG tablet; Take 1 tablet by mouth 2 (Two) Times a Day for 3 days.  Dispense: 6 tablet;  Refill: 0    Other orders  -     Follow Anesthesia Guidelines / Protocol; Future  -     Obtain Informed Consent; Future  -     Provide NPO Instructions to Patient; Future  -     Provide Hydration Instructions to Patient; Future  -     Provide Chlorhexidine Skin Prep Wipes and Instructions; Future  -     Nursing to Order Blood Glucose on all Inpatients >18 years Old with not BMP Ordered; Future  -     Nursing to Place Order for HgbA1c on Adult Patients >18 Years Old (HgbA1c Within One Month of Admission Acceptable); Future  -     Follow Anesthesia Guidelines / Protocol; Standing  -     Verify NPO Status; Standing  -     Verify the Time Patient Completed [place] / G2; Standing  -     CBC (No Diff); Standing  -     Basic Metabolic Panel; Standing           Follow Up:   No follow-ups on file.    I spent approximately 20 minutes providing clinical care for this patient; including review of patient's chart and provider documentation, face to face time spent with patient in examination room (obtaining history, performing physical exam, discussing diagnosis and management options), placing orders, and completing patient documentation.     Naseem Noland MD  [facility] [place]

## 2022-11-07 ENCOUNTER — PRE-ADMISSION TESTING (OUTPATIENT)
Dept: PREADMISSION TESTING | Facility: HOSPITAL | Age: 77
End: 2022-11-07

## 2022-11-07 ENCOUNTER — OFFICE VISIT (OUTPATIENT)
Dept: ORTHOPEDIC SURGERY | Facility: CLINIC | Age: 77
End: 2022-11-07

## 2022-11-07 VITALS
WEIGHT: 210.54 LBS | DIASTOLIC BLOOD PRESSURE: 84 MMHG | SYSTOLIC BLOOD PRESSURE: 122 MMHG | HEIGHT: 68 IN | BODY MASS INDEX: 31.91 KG/M2

## 2022-11-07 VITALS — HEIGHT: 68 IN | WEIGHT: 210.54 LBS | BODY MASS INDEX: 31.91 KG/M2

## 2022-11-07 DIAGNOSIS — R97.20 ELEVATED PROSTATE SPECIFIC ANTIGEN (PSA): ICD-10-CM

## 2022-11-07 DIAGNOSIS — M79.89 PALPABLE MASS OF SOFT TISSUE OF FOREARM: Primary | ICD-10-CM

## 2022-11-07 LAB
ANION GAP SERPL CALCULATED.3IONS-SCNC: 10 MMOL/L (ref 5–15)
BUN SERPL-MCNC: 19 MG/DL (ref 8–23)
BUN/CREAT SERPL: 23.5 (ref 7–25)
CALCIUM SPEC-SCNC: 9.3 MG/DL (ref 8.6–10.5)
CHLORIDE SERPL-SCNC: 97 MMOL/L (ref 98–107)
CO2 SERPL-SCNC: 26 MMOL/L (ref 22–29)
CREAT SERPL-MCNC: 0.81 MG/DL (ref 0.76–1.27)
DEPRECATED RDW RBC AUTO: 47.9 FL (ref 37–54)
EGFRCR SERPLBLD CKD-EPI 2021: 90.2 ML/MIN/1.73
ERYTHROCYTE [DISTWIDTH] IN BLOOD BY AUTOMATED COUNT: 13.6 % (ref 12.3–15.4)
GLUCOSE SERPL-MCNC: 111 MG/DL (ref 65–99)
HBA1C MFR BLD: 5.5 % (ref 4.8–5.6)
HCT VFR BLD AUTO: 37 % (ref 37.5–51)
HGB BLD-MCNC: 12.1 G/DL (ref 13–17.7)
MCH RBC QN AUTO: 31.4 PG (ref 26.6–33)
MCHC RBC AUTO-ENTMCNC: 32.7 G/DL (ref 31.5–35.7)
MCV RBC AUTO: 96.1 FL (ref 79–97)
PLATELET # BLD AUTO: 227 10*3/MM3 (ref 140–450)
PMV BLD AUTO: 9.1 FL (ref 6–12)
POTASSIUM SERPL-SCNC: 3.7 MMOL/L (ref 3.5–5.2)
QT INTERVAL: 386 MS
QTC INTERVAL: 425 MS
RBC # BLD AUTO: 3.85 10*6/MM3 (ref 4.14–5.8)
SODIUM SERPL-SCNC: 133 MMOL/L (ref 136–145)
WBC NRBC COR # BLD: 8.22 10*3/MM3 (ref 3.4–10.8)

## 2022-11-07 PROCEDURE — 93010 ELECTROCARDIOGRAM REPORT: CPT | Performed by: INTERNAL MEDICINE

## 2022-11-07 PROCEDURE — 36415 COLL VENOUS BLD VENIPUNCTURE: CPT

## 2022-11-07 PROCEDURE — 85027 COMPLETE CBC AUTOMATED: CPT

## 2022-11-07 PROCEDURE — 93005 ELECTROCARDIOGRAM TRACING: CPT

## 2022-11-07 PROCEDURE — 83036 HEMOGLOBIN GLYCOSYLATED A1C: CPT

## 2022-11-07 PROCEDURE — 80048 BASIC METABOLIC PNL TOTAL CA: CPT

## 2022-11-07 RX ORDER — SODIUM CHLORIDE 1000 MG
1 TABLET, SOLUBLE MISCELLANEOUS DAILY
COMMUNITY
End: 2022-12-01

## 2022-11-07 NOTE — PAT
An arrival time for procedure was not provided during PAT visit. If patient had any questions or concerns about their arrival time, they were instructed to contact their surgeon/physician.  Additionally, if the patient referred to an arrival time that was acquired from their my chart account, patient was encouraged to verify that time with their surgeon/physician. Arrival times are NOT provided in Pre Admission Testing Department.    Patient denies any current skin issues.

## 2022-11-07 NOTE — PROGRESS NOTES
[facility] [place] [facility] Note    Subjective     Chief Complaint   Patient presents with   • Follow-up     Palpable mass of soft tissue of forearm after MRI Radius/Ulna Right WO 10/23/22        HPI    It has been 2  month(s) since Mr. Rivers's last visit. He returns to clinic today for follow-up of right forearm palpable mass . The issue has been ongoing for 4 month(s). He rates his pain a 5/10 on the pain scale. Previous/current treatments: nothing. Current symptoms: same as prior visit. The pain is worse with weight bearing; resting improve the pain. Overall, he is doing the same.  Here today to review the MRI results.    I have reviewed the following portions of the patient's history and agree with: History of Present Illness and Review of Systems    Patient Active Problem List   Diagnosis   • Coronary artery disease   • Dyslipidemia   • Hypothyroidism   • GERD (gastroesophageal reflux disease)   • Seizure disorder (HCC)   • BPH (benign prostatic hypertrophy)   • Hypertension   • Primary osteoarthritis of both knees   • Syncope and collapse   • Precordial pain   • Diabetes (HCC)   • Status post total right knee replacement   • Postoperative urinary retention   • Confusion, postoperative   • Acute blood loss anemia, asymptomatic, no transfusion required   • Elevated prostate specific antigen (PSA)     Past Medical History:   Diagnosis Date   • Anemia    • Colon cancer (HCC) 1990    Status post partial colectomy in 1990. No chemotherapy or radiation therapy.   • Coronary artery disease     Nonobstructive coronary artery disease.   • Diabetes (HCC)    • Dyslipidemia    • GERD (gastroesophageal reflux disease)     Hx of.   • Hearing aid worn    • Hyperlipidemia    • Hypertension    • Hyponatremia    • Hypothyroidism    • Left shoulder pain    • Memory deficit     FROM ANESTHESIA   • Osteoarthritis    • Seizure disorder (HCC)    • Wears glasses       Past Surgical History:   Procedure Laterality Date   •  APPENDECTOMY     • CARDIAC CATHETERIZATION      NO INTERVENTION; EARLY 2000'S   • CARPAL TUNNEL RELEASE Bilateral    • CHOLECYSTECTOMY     • COLECTOMY PARTIAL / TOTAL  1990    Partial colectomy   • COLONOSCOPY     • CYSTOSCOPY TRANSURETHRAL RESECTION OF PROSTATE N/A 09/21/2018    Procedure: CYSTOSCOPY TRANSURETHRAL RESECTION OF PROSTATE GREENLIGHT;  Surgeon: Naseem Clayton MD;  Location:  [facility];  Service: Urology   • OTHER SURGICAL HISTORY      Contraction surgery on bilateral hands and wrists.   • SINUS SURGERY     • SKIN BIOPSY     • TEETH EXTRACTION     • TONSILLECTOMY     • TOTAL KNEE ARTHROPLASTY Right 04/07/2022    Procedure: TOTAL KNEE ARTHROPLASTY WITH ORTHO ROBOT RIGHT;  Surgeon: Louis Malcolm MD;  Location:  [facility];  Service: Robotics - Ortho;  Laterality: Right;   • TURP / TRANSURETHRAL INCISION / DRAINAGE PROSTATE     • VASECTOMY        Family History   Problem Relation Age of Onset   • Cancer Mother         brain   • Hypertension Father    • Stroke Father    • Stroke Other    • Arthritis Other    • Cancer Other    • No Known Problems Sister    • Stroke Maternal Grandfather    • No Known Problems Paternal Grandmother    • No Known Problems Paternal Grandfather    • Stroke Sister      Social History     Socioeconomic History   • Marital status:    Tobacco Use   • Smoking status: Never   • Smokeless tobacco: Never   Vaping Use   • Vaping Use: Never used   Substance and Sexual Activity   • Alcohol use: No   • Drug use: No   • Sexual activity: Not Currently      Current Outpatient Medications on File Prior to Visit   Medication Sig Dispense Refill   • acetaminophen (TYLENOL) 500 MG tablet Take 2 tablets by mouth Every 8 (Eight) Hours As Needed for Mild Pain .     • divalproex (DEPAKOTE) 500 MG 24 hr tablet Take  by mouth 2 (two) times a day. 500mg QAM, 1000mg QPM     • fluticasone (FLONASE) 50 MCG/ACT nasal spray 2 sprays into the nostril(s) as directed by provider As Needed for  Rhinitis. Administer 2 sprays in each nostril for each dose.     • glucose blood test strip 1 each Daily.     • isosorbide mononitrate (IMDUR) 30 MG 24 hr tablet Take 1 tablet by mouth 2 (Two) Times a Day.     • levETIRAcetam (KEPPRA) 500 MG tablet Take 500 mg by mouth 2 (two) times a day.     • levothyroxine (SYNTHROID, LEVOTHROID) 88 MCG tablet Take 88 mcg by mouth daily.     • lisinopril (PRINIVIL,ZESTRIL) 40 MG tablet Take 20 mg by mouth 2 (Two) Times a Day.     • metFORMIN ER (GLUCOPHAGE-XR) 500 MG 24 hr tablet Take 500 mg by mouth 2 (Two) Times a Day.     • omeprazole (priLOSEC) 20 MG capsule Take 20 mg by mouth Daily.     • ONETOUCH DELICA LANCETS 33G misc USE 1 LANCET ONCE A DAY  0   • tamsulosin (FLOMAX) 0.4 MG capsule 24 hr capsule Take 1 capsule by mouth Daily. 90 capsule 3   • verapamil SR (CALAN-SR) 240 MG CR tablet Take 240 mg by mouth Daily.     • [DISCONTINUED] ciprofloxacin (Cipro) 500 MG tablet Take 1 tablet by mouth 2 (Two) Times a Day for 3 days. 6 tablet 0     No current facility-administered medications on file prior to visit.      Allergies   Allergen Reactions   • Chlorhexidine Rash   • Sulfa Antibiotics Rash     Childhood reaction         Review of Systems   Constitutional: Negative for activity change, appetite change, chills, diaphoresis, fatigue, fever and unexpected weight change.   HENT: Negative for congestion, dental problem, drooling, ear discharge, ear pain, facial swelling, hearing loss, mouth sores, nosebleeds, postnasal drip, rhinorrhea, sinus pressure, sneezing, sore throat, tinnitus, trouble swallowing and voice change.    Eyes: Negative for photophobia, pain, discharge, redness, itching and visual disturbance.   Respiratory: Negative for apnea, cough, choking, chest tightness, shortness of breath, wheezing and stridor.    Cardiovascular: Negative for chest pain, palpitations and leg swelling.   Gastrointestinal: Negative for abdominal distention, abdominal pain, anal bleeding,  "blood in stool, constipation, diarrhea, nausea, rectal pain and vomiting.   Endocrine: Negative for cold intolerance, heat intolerance, polydipsia, polyphagia and polyuria.   Genitourinary: Negative for decreased urine volume, difficulty urinating, dysuria, enuresis, flank pain, frequency, genital sores, hematuria and urgency.   Musculoskeletal: Positive for arthralgias. Negative for back pain, gait problem, joint swelling, myalgias, neck pain and neck stiffness.   Skin: Negative for color change, pallor, rash and wound.   Allergic/Immunologic: Negative for environmental allergies, food allergies and immunocompromised state.   Neurological: Negative for dizziness, tremors, seizures, syncope, facial asymmetry, speech difficulty, weakness, light-headedness, numbness and headaches.   Hematological: Negative for adenopathy. Does not bruise/bleed easily.   Psychiatric/Behavioral: Negative for agitation, behavioral problems, confusion, decreased concentration, dysphoric mood, hallucinations, self-injury, sleep disturbance and suicidal ideas. The patient is not nervous/anxious and is not hyperactive.         Objective      Physical Exam  /84   Ht 172.7 cm (67.99\")   Wt 95.5 kg (210 lb 8.6 oz)   BMI 32.02 kg/m²     Body mass index is 32.02 kg/m².    General:   Mental Status:  Alert   Appearance: Cooperative, in no acute distress   Build and Nutrition: Well-nourished well-developed male   Orientation: Alert and oriented to person, place and time   Posture: Normal   Gait: Nonantalgic    Examination of the right upper extremity: Sensation and motor intact in the upper extremity.  Palpable soft tissue mass on the posterior aspect of the proximal arm just distal to the elbow overlying the ulna, well-defined, mobile, no skin changes.  Palpable soft tissue mass also on the medial aspect of the proximal forearm, with no overlying skin changes.    Imaging/Studies  Imaging Results (Last 24 Hours)     ** No results found for " the last 24 hours. **        DATE OF EXAM: 10/23/2022 5:10 PM     PROCEDURE: MRI RADIUS ULNA RIGHT WO CONTRAST-     INDICATIONS: Right forearm mass, posterior forearm proximally, and  medial aspect proximally; M79.89-Other specified soft tissue disorders     COMPARISON: Right forearm radiographs 8/17/2022     TECHNIQUE: Multiplanar, multisequence MR imaging of the right forearm  without IV contrast.     FINDINGS:   The exam is limited owing to technical factors as well as early  termination of the exam prior to administration of IV contrast. The  proximal mid forearm is only incompletely imaged on the sagittal  sequences, with artifact and imaging field cut off on the coronal and  axial sequences.     There are 2 skin markers along the posteromedial forearm, one at the  level of the proximal ulnar shaft approximately 3.3 cm in the elbow  joint line, and the other at the medial mid forearm approximately 9.0 cm  from the elbow joint line. Immediately superior to the more superior  skin marker there is evidence of mild skin thickening and intermediate  T1 and T2 signal in the subcutaneous fat of the distal posterior elbow  (series 17 image 10). There is some linear T1 intermediate, T2  hyperintense signal within the subcutaneous fat of the radial-side of  the posterior proximal forearm measuring 3.2 cm in craniocaudal length  and 0.4 cm in thickness, with suggestion of a surrounding hypointense  rim (series 6 image 10, series 16 image 14). There is somewhat  protuberant otherwise unremarkable appearing subcutaneous fat adjacent  to this region along the posterior distal elbow/proximal forearm. There  is a tiny amount of linear STIR signal within the subcutaneous tissues  immediately posterior to the olecranon which could reflect trace fluid  in the olecranon bursa (series 16 image 10). Otherwise unremarkable  appearance of the subcutaneous tissues and musculature.     Bone marrow signal intensity appears within normal  limits without focal  or suspicious bony lesion, stress or traumatic fracture, or bone marrow  edema. Normal alignment of the elbow joint without evidence of  high-grade articular chondral loss. No evidence of elbow joint effusion.  The tendons and ligaments of the elbow appear grossly intact on this  nondedicated protocol. Redemonstration of a posterior olecranon  enthesophyte with some tendinosis of the distal triceps tendon.      IMPRESSION:  Limited exam with early termination prior to administration of IV  contrast. Lack of IV contrast limits characterization of soft tissue  findings. Consider repeat exam with IV contrast for complete assessment.     There is a small, thin 3.2 x 0.4 cm STIR hyperintense collection/signal  with hypointense rim in the subcutaneous tissues of the posterior  proximal forearm which is nonspecific and suboptimally characterized in  the absence of IV contrast. Considerations include resolving hematoma  and correlate for any recent trauma. Small adventitial bursa may have  similar appearance. Elsewhere there is somewhat protuberant though  otherwise unremarkable appearing subcutaneous fat along the posterior  elbow posterior proximal forearm. There may be a trace amount of fluid  in the olecranon bursa.     This report was finalized on 10/24/2022 11:33 AM by Milad Noland MD.     Unfortunately, the MRI was incomplete, and recommendation for repeat imaging.      Assessment and Plan     Diagnoses and all orders for this visit:    1. Palpable mass of soft tissue of forearm (Primary)        1. Palpable mass of soft tissue of forearm        I reviewed my findings with the patient.  Unfortunately, the MRI was incomplete.  I recommend repeating the MRI and follow-up afterwards.  He says that the forearm mass has decreased in size compared to his last visit, and his daughter agrees.    Return for After Imaging Study.      Louis Malcolm MD  11/07/22  16:50 EST

## 2022-11-10 ENCOUNTER — ANESTHESIA EVENT (OUTPATIENT)
Dept: PERIOP | Facility: HOSPITAL | Age: 77
End: 2022-11-10

## 2022-11-10 DIAGNOSIS — M79.89 PALPABLE MASS OF SOFT TISSUE OF FOREARM: Primary | ICD-10-CM

## 2022-11-10 RX ORDER — FAMOTIDINE 10 MG/ML
20 INJECTION, SOLUTION INTRAVENOUS ONCE
Status: CANCELLED | OUTPATIENT
Start: 2022-11-10 | End: 2022-11-10

## 2022-11-11 ENCOUNTER — ANESTHESIA (OUTPATIENT)
Dept: PERIOP | Facility: HOSPITAL | Age: 77
End: 2022-11-11

## 2022-11-11 ENCOUNTER — HOSPITAL ENCOUNTER (OUTPATIENT)
Facility: HOSPITAL | Age: 77
Setting detail: HOSPITAL OUTPATIENT SURGERY
Discharge: HOME | End: 2022-11-11
Attending: STUDENT IN AN ORGANIZED HEALTH CARE EDUCATION/TRAINING PROGRAM | Admitting: STUDENT IN AN ORGANIZED HEALTH CARE EDUCATION/TRAINING PROGRAM

## 2022-11-11 VITALS
SYSTOLIC BLOOD PRESSURE: 195 MMHG | BODY MASS INDEX: 31.91 KG/M2 | HEIGHT: 68 IN | WEIGHT: 210.54 LBS | HEART RATE: 72 BPM | DIASTOLIC BLOOD PRESSURE: 95 MMHG | RESPIRATION RATE: 16 BRPM | TEMPERATURE: 97.5 F | OXYGEN SATURATION: 97 %

## 2022-11-11 DIAGNOSIS — R97.20 ELEVATED PROSTATE SPECIFIC ANTIGEN (PSA): ICD-10-CM

## 2022-11-11 LAB
GLUCOSE BLDC GLUCOMTR-MCNC: 106 MG/DL (ref 70–130)
GLUCOSE BLDC GLUCOMTR-MCNC: 98 MG/DL (ref 70–130)

## 2022-11-11 PROCEDURE — 76942 ECHO GUIDE FOR BIOPSY: CPT | Performed by: STUDENT IN AN ORGANIZED HEALTH CARE EDUCATION/TRAINING PROGRAM

## 2022-11-11 PROCEDURE — 25010000002 CEFTRIAXONE PER 250 MG: Performed by: STUDENT IN AN ORGANIZED HEALTH CARE EDUCATION/TRAINING PROGRAM

## 2022-11-11 PROCEDURE — 55700 PR PROSTATE NEEDLE BIOPSY ANY APPROACH: CPT | Performed by: STUDENT IN AN ORGANIZED HEALTH CARE EDUCATION/TRAINING PROGRAM

## 2022-11-11 PROCEDURE — 25010000002 HYDRALAZINE PER 20 MG

## 2022-11-11 PROCEDURE — 25010000002 PROPOFOL 10 MG/ML EMULSION: Performed by: ANESTHESIOLOGY

## 2022-11-11 PROCEDURE — 82962 GLUCOSE BLOOD TEST: CPT

## 2022-11-11 PROCEDURE — 0 LIDOCAINE 1 % SOLUTION: Performed by: STUDENT IN AN ORGANIZED HEALTH CARE EDUCATION/TRAINING PROGRAM

## 2022-11-11 PROCEDURE — G0416 PROSTATE BIOPSY, ANY MTHD: HCPCS | Performed by: STUDENT IN AN ORGANIZED HEALTH CARE EDUCATION/TRAINING PROGRAM

## 2022-11-11 RX ORDER — CEFAZOLIN SODIUM 2 G/100ML
2 INJECTION, SOLUTION INTRAVENOUS ONCE
Status: DISCONTINUED | OUTPATIENT
Start: 2022-11-11 | End: 2022-11-11

## 2022-11-11 RX ORDER — PROPOFOL 10 MG/ML
VIAL (ML) INTRAVENOUS AS NEEDED
Status: DISCONTINUED | OUTPATIENT
Start: 2022-11-11 | End: 2022-11-11 | Stop reason: SURG

## 2022-11-11 RX ORDER — LIDOCAINE HYDROCHLORIDE 10 MG/ML
0.5 INJECTION, SOLUTION EPIDURAL; INFILTRATION; INTRACAUDAL; PERINEURAL ONCE AS NEEDED
Status: COMPLETED | OUTPATIENT
Start: 2022-11-11 | End: 2022-11-11

## 2022-11-11 RX ORDER — MAGNESIUM HYDROXIDE 1200 MG/15ML
LIQUID ORAL AS NEEDED
Status: DISCONTINUED | OUTPATIENT
Start: 2022-11-11 | End: 2022-11-11 | Stop reason: HOSPADM

## 2022-11-11 RX ORDER — HYDRALAZINE HYDROCHLORIDE 20 MG/ML
10 INJECTION INTRAMUSCULAR; INTRAVENOUS EVERY 6 HOURS PRN
Status: DISCONTINUED | OUTPATIENT
Start: 2022-11-11 | End: 2022-11-11 | Stop reason: HOSPADM

## 2022-11-11 RX ORDER — SODIUM CHLORIDE 0.9 % (FLUSH) 0.9 %
10 SYRINGE (ML) INJECTION AS NEEDED
Status: DISCONTINUED | OUTPATIENT
Start: 2022-11-11 | End: 2022-11-11 | Stop reason: HOSPADM

## 2022-11-11 RX ORDER — ONDANSETRON 2 MG/ML
4 INJECTION INTRAMUSCULAR; INTRAVENOUS ONCE AS NEEDED
Status: DISCONTINUED | OUTPATIENT
Start: 2022-11-11 | End: 2022-11-11 | Stop reason: HOSPADM

## 2022-11-11 RX ORDER — MIDAZOLAM HYDROCHLORIDE 1 MG/ML
0.5 INJECTION INTRAMUSCULAR; INTRAVENOUS
Status: DISCONTINUED | OUTPATIENT
Start: 2022-11-11 | End: 2022-11-11 | Stop reason: HOSPADM

## 2022-11-11 RX ORDER — SODIUM CHLORIDE 0.9 % (FLUSH) 0.9 %
10 SYRINGE (ML) INJECTION EVERY 12 HOURS SCHEDULED
Status: DISCONTINUED | OUTPATIENT
Start: 2022-11-11 | End: 2022-11-11 | Stop reason: HOSPADM

## 2022-11-11 RX ORDER — HYDRALAZINE HYDROCHLORIDE 20 MG/ML
INJECTION INTRAMUSCULAR; INTRAVENOUS
Status: COMPLETED
Start: 2022-11-11 | End: 2022-11-11

## 2022-11-11 RX ORDER — SODIUM CHLORIDE, SODIUM LACTATE, POTASSIUM CHLORIDE, CALCIUM CHLORIDE 600; 310; 30; 20 MG/100ML; MG/100ML; MG/100ML; MG/100ML
INJECTION, SOLUTION INTRAVENOUS CONTINUOUS PRN
Status: DISCONTINUED | OUTPATIENT
Start: 2022-11-11 | End: 2022-11-11 | Stop reason: SURG

## 2022-11-11 RX ORDER — CEFTRIAXONE 1 G/1
1 INJECTION, POWDER, FOR SOLUTION INTRAMUSCULAR; INTRAVENOUS EVERY 24 HOURS
Status: DISCONTINUED | OUTPATIENT
Start: 2022-11-11 | End: 2022-11-11

## 2022-11-11 RX ORDER — FAMOTIDINE 20 MG/1
20 TABLET, FILM COATED ORAL ONCE
Status: COMPLETED | OUTPATIENT
Start: 2022-11-11 | End: 2022-11-11

## 2022-11-11 RX ORDER — SODIUM CHLORIDE, SODIUM LACTATE, POTASSIUM CHLORIDE, CALCIUM CHLORIDE 600; 310; 30; 20 MG/100ML; MG/100ML; MG/100ML; MG/100ML
9 INJECTION, SOLUTION INTRAVENOUS CONTINUOUS
Status: DISCONTINUED | OUTPATIENT
Start: 2022-11-11 | End: 2022-11-11 | Stop reason: HOSPADM

## 2022-11-11 RX ORDER — LIDOCAINE HYDROCHLORIDE 10 MG/ML
INJECTION, SOLUTION INFILTRATION; PERINEURAL AS NEEDED
Status: DISCONTINUED | OUTPATIENT
Start: 2022-11-11 | End: 2022-11-11 | Stop reason: HOSPADM

## 2022-11-11 RX ORDER — DROPERIDOL 2.5 MG/ML
0.62 INJECTION, SOLUTION INTRAMUSCULAR; INTRAVENOUS ONCE AS NEEDED
Status: DISCONTINUED | OUTPATIENT
Start: 2022-11-11 | End: 2022-11-11 | Stop reason: HOSPADM

## 2022-11-11 RX ORDER — FENTANYL CITRATE 50 UG/ML
50 INJECTION, SOLUTION INTRAMUSCULAR; INTRAVENOUS
Status: DISCONTINUED | OUTPATIENT
Start: 2022-11-11 | End: 2022-11-11 | Stop reason: HOSPADM

## 2022-11-11 RX ADMIN — HYDRALAZINE HYDROCHLORIDE 10 MG: 20 INJECTION INTRAMUSCULAR; INTRAVENOUS at 11:41

## 2022-11-11 RX ADMIN — FAMOTIDINE 20 MG: 20 TABLET, FILM COATED ORAL at 09:44

## 2022-11-11 RX ADMIN — SODIUM CHLORIDE, POTASSIUM CHLORIDE, SODIUM LACTATE AND CALCIUM CHLORIDE: 600; 310; 30; 20 INJECTION, SOLUTION INTRAVENOUS at 09:54

## 2022-11-11 RX ADMIN — CEFTRIAXONE SODIUM 1 G: 1 INJECTION, POWDER, FOR SOLUTION INTRAMUSCULAR; INTRAVENOUS at 10:04

## 2022-11-11 RX ADMIN — PROPOFOL 30 MG: 10 INJECTION, EMULSION INTRAVENOUS at 10:26

## 2022-11-11 RX ADMIN — LIDOCAINE HYDROCHLORIDE 0.5 ML: 10 INJECTION, SOLUTION EPIDURAL; INFILTRATION; INTRACAUDAL; PERINEURAL at 09:43

## 2022-11-11 RX ADMIN — PROPOFOL 50 MG: 10 INJECTION, EMULSION INTRAVENOUS at 10:02

## 2022-11-11 RX ADMIN — PROPOFOL 100 MG: 10 INJECTION, EMULSION INTRAVENOUS at 10:13

## 2022-11-11 RX ADMIN — SODIUM CHLORIDE, POTASSIUM CHLORIDE, SODIUM LACTATE AND CALCIUM CHLORIDE 9 ML/HR: 600; 310; 30; 20 INJECTION, SOLUTION INTRAVENOUS at 09:25

## 2022-11-11 NOTE — ANESTHESIA POSTPROCEDURE EVALUATION
Patient: Lea Rivers    Procedure Summary     Date: 11/11/22 Room / [facility]:  [facility]    Anesthesia Start: 0954 Anesthesia Stop: 1032    Procedure: TRANSRECTAL ULTRASOUND GUIDED BIOPSY OF PROSTATE (Perineum) Diagnosis:       Elevated prostate specific antigen (PSA)      (Elevated prostate specific antigen (PSA) [R97.20])    Surgeons: Naseem Noland MD Provider: Kervin Jones MD    Anesthesia Type: general ASA Status: 3          Anesthesia Type: general    Vitals  Vitals Value Taken Time   BP     Temp     Pulse 67 11/11/22 1031   Resp     SpO2 96 % 11/11/22 1031   Vitals shown include unvalidated device data.        Post Anesthesia Care and Evaluation    Patient location during evaluation: PACU  Patient participation: complete - patient participated  Level of consciousness: awake and alert  Pain management: adequate    Airway patency: patent  Anesthetic complications: No anesthetic complications  PONV Status: none  Cardiovascular status: hemodynamically stable and acceptable  Respiratory status: nonlabored ventilation, acceptable and nasal cannula  Hydration status: acceptable

## 2022-11-11 NOTE — INTERVAL H&P NOTE
[facility] Pre-op    Full history and physical note from office is attached.    VS: /104  HR 80  RR 16  T 97.3  Sat 97%RA    Immunizations:  Influenza:  2022  Pneumococcal:  UTD  Tetanus:  UTD  Covid x3: 2021      LAB Results:  Lab Results   Component Value Date    WBC 8.22 11/07/2022    HGB 12.1 (L) 11/07/2022    HCT 37.0 (L) 11/07/2022    MCV 96.1 11/07/2022     11/07/2022    NEUTROABS 8.64 (H) 04/16/2022    GLUCOSE 111 (H) 11/07/2022    BUN 19 11/07/2022    CREATININE 0.81 11/07/2022    EGFRIFNONA 64 09/10/2021     (L) 11/07/2022    K 3.7 11/07/2022    CL 97 (L) 11/07/2022    CO2 26.0 11/07/2022    MG 1.7 09/30/2022    PHOS 2.8 09/30/2022    CALCIUM 9.3 11/07/2022    ALBUMIN 3.70 10/03/2022    AST 9 10/03/2022    ALT 6 10/03/2022    BILITOT 0.5 10/03/2022    PTT 30.6 03/24/2022    INR 1.08 03/24/2022       Cancer Staging (if applicable)  Cancer Patient: __ yes __no __unknown__N/A; If yes, clinical stage T:__ N:__M:__, stage group or __N/A      Impression: elevated prostate specific antigen       Plan: TRANSRECTAL ULTRASOUND GUIDED BIOPSY OF PROSTATE      KATE Das   11/11/2022   08:56 EST

## 2022-11-11 NOTE — DISCHARGE INSTRUCTIONS
Prostate Biopsy Post-Procedure Care/Expectations     Follow these guidelines after your procedure in order to assist with your recovery.    Activity  - Limit yourself to light activity only for 2-3 days after your procedure to prevent rectal and urinary bleeding  - You may return to work in 24 hours     Bleeding  - It is common to notice bleeding in the urine, in the semen, and in the stool after your prostate biopsy   - Urinary bleeding usually stops within a week and is typically light  - Rectal bleeding or blood in the stool can persist for up to 1 week; if you experience very heavy rectal bleeding with large blood clots, or become light headed, present to the nearest emergency department and call your urologist  - Blood in the semen (red discoloration or “marcie” discoloration) can persist for up to 6 weeks and this is not inherently harmful to you or your partner     Urination  - You may experience a few days of urinary urgency and frequency after your biopsy which is expected  - Drink plenty of fluid to flush out your bladder and urethra   - If you are unable to urinate for more than 8 hours after your procedure, you may be experiencing urinary retention related to prostatic swelling, and should contact your urologist or present to the nearest emergency department for evaluation and possible urethral catheter placement     Bathing/Showering  - You may resume normal showering and bathing after your procedure     Pain Control  - You will likely have some rectal or pelvic discomfort after your procedure, but this is not typically severe, and very rarely requires the use of narcotics for pain control   - If you experience rectal or pelvic discomfort post-procedure, you should use over the counter Tylenol (Acetaminophen) or Advil/Motrin (Ibuprofen)     Sexual Activity  - Refrain from sexual activity and ejaculation for at least 1 week post-procedure to prevent bleeding and possibly pain    When to call your doctor:      - ANY fever after prostate biopsy is ALWAYS considered significant and should be reported to your urologist right away as this can indicate the possibility of sepsis (bacteria in blood stream) which can be life threatening; the risk of post-prostate biopsy sepsis is less than 4%     - If you experience any of the following symptoms while at home, call your urologist and make plans to present to the nearest emergency department:     - Fever >100 F, shaking chills, nausea or vomiting, profuse sweating   - If you are unable to urinate for more than 8 hours after your biopsy, call your urologist and present to the nearest emergency department for evaluation

## 2022-11-11 NOTE — ANESTHESIA PREPROCEDURE EVALUATION
Anesthesia Evaluation     Patient summary reviewed and Nursing notes reviewed                Airway   Mallampati: II  TM distance: >3 FB  Neck ROM: full  No difficulty expected  Dental - normal exam     Pulmonary - negative pulmonary ROS and normal exam   Cardiovascular - normal exam    (+) hypertension, CAD, hyperlipidemia,     ROS comment:   Stress/echo 7/21: normal EF, no significant valve disease; no ischemia/low risk study    Neuro/Psych  (+) seizures well controlled,    GI/Hepatic/Renal/Endo    (+)  GERD,  diabetes mellitus, thyroid problem hypothyroidism    Musculoskeletal (-) negative ROS    Abdominal  - normal exam    Bowel sounds: normal.   Substance History - negative use     OB/GYN negative ob/gyn ROS         Other                        Anesthesia Plan    ASA 3     general     intravenous induction     Anesthetic plan, risks, benefits, and alternatives have been provided, discussed and informed consent has been obtained with: patient.    Plan discussed with CRNA.        CODE STATUS:

## 2022-11-11 NOTE — OP NOTE
PROSTATE BIOPSY  Procedure Report    Patient Name:  Lea Rivers  YOB: 1945    Date of Surgery:  11/11/2022     Indications: 77-year-old male with elevated PSA, prior TURP with small remaining prostate, MRI prostate demonstrates multiple PI-RADS 4 lesions throughout the remaining peripheral zone, prostate volume is very small.  Patient presents today for TRUS prostate biopsy.    Pre-op Diagnosis:   Elevated prostate specific antigen (PSA) [R97.20]       Post-Op Diagnosis Codes:     * Elevated prostate specific antigen (PSA) [R97.20]    Procedure/CPT® Codes:40159, 63710      Procedure(s):  TRANSRECTAL ULTRASOUND GUIDED BIOPSY OF PROSTATE    Staff:  Surgeon(s):  Naseem Noland MD         Anesthesia: General    Estimated Blood Loss: minimal    Implants:    Nothing was implanted during the procedure    Specimen:          Specimens     ID Source Type Tests Collected By Collected At Frozen?    A Prostate Tissue · TISSUE PATHOLOGY EXAM   Naseem Noland MD 11/11/22 0711 No    Description: PROSTATE: LEFT BASE FOR PERMANENT    Comment: PROSTATE: LEFT BASE FOR PERMANENT    B Prostate Tissue · TISSUE PATHOLOGY EXAM   Naseem Noland MD 11/11/22 0712 No    Description: PROSTATE: LEFT MID FOR PERMANENT    Comment: PROSTATE: LEFT MID FOR PERMANENT    C Prostate Tissue · TISSUE PATHOLOGY EXAM   Naseem Noland MD 11/11/22 0712     Description: PROSTATE: LEFT APEX FOR PERMANENT    Comment: PROSTATE: LEFT APEX FOR PERMANENT    D Prostate Tissue · TISSUE PATHOLOGY EXAM   Naseem Noland MD 11/11/22 0712     Description: PROSTATE: RIGHT MID FOR PERMANENT    Comment: PROSTATE: RIGHT MID FOR PERMANENT    E Prostate Tissue · TISSUE PATHOLOGY EXAM   Naseem Noland MD 11/11/22 0712     Description: PROSTATE: RIGHT APEX FOR PERMANENT    Comment: PROSTATE: RIGHT APEX FOR PERMANENT    F Prostate Tissue · TISSUE PATHOLOGY EXAM   Naseem Noland MD 11/11/22 0712     Description:  PROSTATE: RIGHT BASE FOR PERMANENT    Comment: PROSTATE: RIGHT BASE FOR PERMANENT                Findings: Uncomplicated 12 core biopsy, very small prostate 7 mL estimated based on ultrasound measurements, globally firm prostate concerning for prostate malignancy.    Complications: None    Description of Procedure:    The patient was positioned and prepped in a left lateral position with lower extremities flexed.  Patient underwent smooth monitored anesthesia care per anesthesia.      A digital rectal exam was performed identifying a benign feeling prostate..The rectal ultrasound probe was slowly introduced into the rectum without difficulty.  The prostate and seminal vesicles were inspected systematically using axial and sagittal views with the ultrasound.  The dimensions of the prostate were measured, for a calculated volume of 7 mL, PSA Density 0.64.      Next 5 cc of 1% lidocaine was injected at the right and left neurovascular bundles at the junction of the seminal vesicles bilaterally.  Next using a true cut 14 Fr biopsy needle, 12 prostate cores were collected. The specific locations were the following: left lateral base, left lateral mid, left lateral apex, left medial base, left medial mid, and left medial apex, right lateral base, right lateral mid, right lateral apex, right medial base, right medial mid, right medial apex, and right and left transition zones. The rectal ultrasound probe was removed.  A ALEJANDRO was again performed revealing little blood in the rectum.  The patient tolerated the procedure well.           Disposition/Follow Up:      1.  The patient was instructed to drink plenty of fluids and warned about possible complications and side effects including, but not limited to, blood in the urine, stool and semen as well as bloodstream infection.  He was instructed to call the office if there are any issues, especially fevers or flu-like symptoms.    2.  Continue antibiotic for a total of 3  days.  3.  Will call the patient to discuss pathology results and next steps            Naseem Noland MD     Date: 11/11/2022  Time: 10:29 EST

## 2022-11-14 LAB
CYTO UR: NORMAL
LAB AP CASE REPORT: NORMAL
LAB AP CLINICAL INFORMATION: NORMAL
PATH REPORT.FINAL DX SPEC: NORMAL
PATH REPORT.GROSS SPEC: NORMAL

## 2022-11-15 ENCOUNTER — TELEPHONE (OUTPATIENT)
Dept: RADIATION ONCOLOGY | Facility: HOSPITAL | Age: 77
End: 2022-11-15

## 2022-11-15 ENCOUNTER — TELEPHONE (OUTPATIENT)
Dept: UROLOGY | Facility: CLINIC | Age: 77
End: 2022-11-15

## 2022-11-15 DIAGNOSIS — C61 PROSTATE CANCER: Primary | ICD-10-CM

## 2022-11-15 NOTE — TELEPHONE ENCOUNTER
I called the patient and his daughter with results of prostate biopsy, this demonstrates high-volume grade group 3 prostate cancer involving the entirety of the patient's remaining prostate tissue after greenlight photo vaporization of the prostate, he did have a very small 7 to 10 cc remaining prostate.  The patient also had firm prostate on digital rectal examination and MRI prostate prior to biopsy demonstrates 1.5 cm lesion within the sacrum concerning for a metastatic deposit.  At this time we will need to perform stat bone scan for complete staging and consider referral to radiation oncology for definitive treatment and oncology possibly depending on bone scan if this demonstrates metastasis.    PLAN  - please schedule STAT bone scan and CT AP  - anticipate androgen deprivation therapy or additional therapies pending bone scan   - will call patient with results of bone scan and CT  - refer to radiation oncology     Naseem Noland MD

## 2022-11-16 ENCOUNTER — TELEPHONE (OUTPATIENT)
Dept: RADIATION ONCOLOGY | Facility: HOSPITAL | Age: 77
End: 2022-11-16

## 2022-11-16 NOTE — TELEPHONE ENCOUNTER
Called and spoke with Luis Enrique, pt's POA, and explained that Dr. Luu would like to see the pt after the bone scan and CT scan have been completed. Gave Luis Enrique appt on Dec 1 @ 0830.  She verbalized understanding.

## 2022-11-17 ENCOUNTER — HOSPITAL ENCOUNTER (OUTPATIENT)
Dept: RADIATION ONCOLOGY | Facility: HOSPITAL | Age: 77
Setting detail: RADIATION/ONCOLOGY SERIES
Discharge: HOME | End: 2022-11-17

## 2022-11-30 ENCOUNTER — HOSPITAL ENCOUNTER (OUTPATIENT)
Dept: NUCLEAR MEDICINE | Facility: HOSPITAL | Age: 77
Discharge: HOME | End: 2022-11-30

## 2022-11-30 ENCOUNTER — HOSPITAL ENCOUNTER (OUTPATIENT)
Dept: CT IMAGING | Facility: HOSPITAL | Age: 77
Discharge: HOME | End: 2022-11-30
Admitting: STUDENT IN AN ORGANIZED HEALTH CARE EDUCATION/TRAINING PROGRAM

## 2022-11-30 DIAGNOSIS — C61 PROSTATE CANCER: ICD-10-CM

## 2022-11-30 PROCEDURE — 74177 CT ABD & PELVIS W/CONTRAST: CPT

## 2022-11-30 PROCEDURE — 78306 BONE IMAGING WHOLE BODY: CPT

## 2022-11-30 PROCEDURE — A9503 TC99M MEDRONATE: HCPCS | Performed by: STUDENT IN AN ORGANIZED HEALTH CARE EDUCATION/TRAINING PROGRAM

## 2022-11-30 PROCEDURE — 0 TECHNETIUM MEDRONATE KIT: Performed by: STUDENT IN AN ORGANIZED HEALTH CARE EDUCATION/TRAINING PROGRAM

## 2022-11-30 PROCEDURE — 25010000002 IOPAMIDOL 61 % SOLUTION: Performed by: STUDENT IN AN ORGANIZED HEALTH CARE EDUCATION/TRAINING PROGRAM

## 2022-11-30 RX ORDER — TC 99M MEDRONATE 20 MG/10ML
26.1 INJECTION, POWDER, LYOPHILIZED, FOR SOLUTION INTRAVENOUS
Status: COMPLETED | OUTPATIENT
Start: 2022-11-30 | End: 2022-11-30

## 2022-11-30 RX ADMIN — Medication 26.1 MILLICURIE: at 11:15

## 2022-11-30 RX ADMIN — IOPAMIDOL 100 ML: 612 INJECTION, SOLUTION INTRAVENOUS at 12:02

## 2022-12-01 ENCOUNTER — OFFICE VISIT (OUTPATIENT)
Dept: RADIATION ONCOLOGY | Facility: HOSPITAL | Age: 77
End: 2022-12-01

## 2022-12-01 ENCOUNTER — HOSPITAL ENCOUNTER (OUTPATIENT)
Dept: RADIATION ONCOLOGY | Facility: HOSPITAL | Age: 77
Setting detail: RADIATION/ONCOLOGY SERIES
Discharge: HOME | End: 2022-12-01
Payer: MEDICARE

## 2022-12-01 VITALS
HEART RATE: 90 BPM | RESPIRATION RATE: 18 BRPM | OXYGEN SATURATION: 97 % | BODY MASS INDEX: 32.02 KG/M2 | HEIGHT: 68 IN | SYSTOLIC BLOOD PRESSURE: 179 MMHG | WEIGHT: 211.3 LBS | TEMPERATURE: 97.5 F | DIASTOLIC BLOOD PRESSURE: 90 MMHG

## 2022-12-01 DIAGNOSIS — C61 PROSTATE CANCER: Primary | ICD-10-CM

## 2022-12-01 PROCEDURE — G0463 HOSPITAL OUTPT CLINIC VISIT: HCPCS

## 2022-12-01 NOTE — PROGRESS NOTES
CONSULTATION NOTE      :                                                          1945  DATE OF CONSULTATION:                       2022   REQUESTING PHYSICIAN:                   Naseem Noland MD  REASON FOR CONSULTATION:           Prostate cancer (HCC)  - Stage IIC (cT2c, cN0, cM0, PSA: 11.3, Grade Group: 3)       BRIEF HISTORY:  The patient is a very pleasant 77 y.o. male  with recent diagnosis of prostate cancer.  He has a history of BPH status post greenlight laser procedure approximately 2017 and more extensive TURP procedure 2018.  He has 7 cc to 10 cc residual prostate with no severe residual bladder outlet obstructive symptoms.  He has mild incontinence with heavy lifting and work but no significant problems with bladder control on a regular daily basis.  He was noted to have an elevated PSA value 11.32 ng/ml in May 2022.  MRI pelvis showed PI-RADS 4 suspicious change diffusely in the peripheral zone within the residual prostate tissue.  MRI showed indeterminate lesion of the sacrum.  Biopsy performed 2022 showed prostatic adenocarcinoma, Sarles's 4+3=7 and 3+4=7 diffusely involving both lobes.  5 out of 6 cores in the left side contain neoplasm involving 90% of submitted tissue.  There was presence of perineural invasion.  6 out of 6 cores in the right lobe contained neoplasm involving 30% to 70% of submitted tissue.  Medical medicine bone scan showed mild degenerative change but no evidence of bony metastatic disease.  CT abdomen pelvis showed a small irregular prostate gland but no evidence of extraprostatic metastasis.  No evidence of bony metastasis.  No pathologic lymphadenopathy.    He has a history of colon cancer status post primary resection  and in disease remission status as far as he knows.  He is due for screening colonoscopy in 2023.    Allergies   Allergen Reactions   • Chlorhexidine Rash   • Sulfa Antibiotics Rash     Childhood reaction        Social  History     Socioeconomic History   • Marital status:    Tobacco Use   • Smoking status: Never   • Smokeless tobacco: Never   Vaping Use   • Vaping Use: Never used   Substance and Sexual Activity   • Alcohol use: No   • Drug use: No   • Sexual activity: Not Currently       Past Medical History:   Diagnosis Date   • Anemia    • Colon cancer (HCC) 1990    Status post partial colectomy in 1990. No chemotherapy or radiation therapy.   • Coronary artery disease     Nonobstructive coronary artery disease.   • Diabetes (HCC)    • Dyslipidemia    • GERD (gastroesophageal reflux disease)     Hx of.   • Hearing aid worn    • Hyperlipidemia    • Hypertension    • Hyponatremia    • Hypothyroidism    • Left shoulder pain    • Memory deficit     FROM ANESTHESIA   • Osteoarthritis    • Seizure disorder (HCC)    • Skin cancer    • Wears glasses        family history includes Arthritis in an other family member; Cancer in his mother and another family member; Esophageal cancer in his brother; Hypertension in his father; No Known Problems in his paternal grandfather, paternal grandmother, and sister; Stroke in his father, maternal grandfather, sister, and another family member.     Past Surgical History:   Procedure Laterality Date   • APPENDECTOMY     • CARDIAC CATHETERIZATION      NO INTERVENTION; EARLY 2000'S   • CARPAL TUNNEL RELEASE Bilateral    • CHOLECYSTECTOMY     • COLECTOMY PARTIAL / TOTAL  1990    Partial colectomy   • COLONOSCOPY     • CYSTOSCOPY TRANSURETHRAL RESECTION OF PROSTATE N/A 09/21/2018    Procedure: CYSTOSCOPY TRANSURETHRAL RESECTION OF PROSTATE GREENLIGHT;  Surgeon: Naseem Clayton MD;  Location:  [facility];  Service: Urology   • OTHER SURGICAL HISTORY      Contraction surgery on bilateral hands and wrists.   • PROSTATE BIOPSY N/A 11/11/2022    Procedure: TRANSRECTAL ULTRASOUND GUIDED BIOPSY OF PROSTATE;  Surgeon: Naseem Noland MD;  Location:  [facility];  Service: Urology;  Laterality: N/A;  "  • SINUS SURGERY     • SKIN BIOPSY     • TEETH EXTRACTION     • TONSILLECTOMY     • TOTAL KNEE ARTHROPLASTY Right 04/07/2022    Procedure: TOTAL KNEE ARTHROPLASTY WITH ORTHO ROBOT RIGHT;  Surgeon: Louis Malcolm MD;  Location: [facility];  Service: Robotics - Ortho;  Laterality: Right;   • TURP / TRANSURETHRAL INCISION / DRAINAGE PROSTATE     • VASECTOMY          Review of Systems   Constitutional: Positive for appetite change and unexpected weight change.   Genitourinary: Positive for nocturia.    Musculoskeletal: Positive for back pain.           Objective   VITAL SIGNS:   Vitals:    12/01/22 0841   BP: 179/90   Pulse: 90   Resp: 18   Temp: 97.5 °F (36.4 °C)   SpO2: 97%  Comment: RA   Weight: 95.8 kg (211 lb 4.8 oz)   Height: 172.7 cm (68\")   PainSc: 0-No pain      IPSS Questionnaire (AUA-7):  Over the past month…    1)  Incomplete Emptying  How often have you had a sensation of not emptying your bladder?  5 - Almost always   2)  Frequency  How often have you had to urinate less than every two hours? 3 - About half the time   3)  Intermittency  How often have you found you stopped and started again several times when you urinated?  2 - Less than half the time   4) Urgency  How often have you found it difficult to postpone urination?  5 - Almost always   5) Weak Stream  How often have you had a weak urinary stream?  1 - Less than 1 time in 5   6) Straining  How often have you had to push or strain to begin urination?  0 - Not at all   7) Nocturia  How many times did you typically get up at night to urinate?  1 - 1 time   Total Score:  17       Quality of life due to urinary symptoms:  If you were to spend the rest of your life with your urinary condition the way it is now, how would you feel about that? 6-Terrible   Urine Leakage (Incontinence) 0-No Leakage     Sexual Health Inventory  Current Status    1)  How do you rate your confidence that you could achieve and keep an erection? 1-Very Low   2) When you had " erections with sexual stimulation, how often were your erections hard enough for penetration (entering your partner)? 0-No sexual activity   3)  During sexual intercourse, how often were you able to maintain your erection after you had penetrated (entered) into your partner? 0-Did not attempt intercourse   4) During sexual intercourse, how difficult was it to maintain your erection to completion of intercourse? 0-Did not attempt intercourse   5) When you attempted sexual intercourse, how often was it satisfactory to you? 0-No sexual activity   Total Score: 1       Bowel Health Inventory  Current Status: 0-No problems, no rectal bleeding, no discharge, less then 5 bowel movements a day                 Karnofsky score: 90        Physical Exam  Vitals and nursing note reviewed.   Constitutional:       Appearance: He is well-developed.   HENT:      Head: Normocephalic and atraumatic.   Cardiovascular:      Rate and Rhythm: Normal rate and regular rhythm.      Heart sounds: Normal heart sounds. No murmur heard.  Pulmonary:      Effort: Pulmonary effort is normal.      Breath sounds: Normal breath sounds. No wheezing or rales.   Abdominal:      General: Bowel sounds are normal. There is no distension.      Palpations: Abdomen is soft.      Tenderness: There is no abdominal tenderness.   Genitourinary:     Prostate: Not enlarged ( Relatively small, less than 20 cc estimated volume but with bilateral firm nodularity.  This does not appear to extend beyond the borders of the prostate gland.  Seminal vesicles are nonpalpable.), not tender and no nodules present.      Rectum: No mass, tenderness, external hemorrhoid or internal hemorrhoid. Normal anal tone.   Musculoskeletal:         General: No tenderness. Normal range of motion.      Cervical back: Normal range of motion and neck supple.   Lymphadenopathy:      Cervical: No cervical adenopathy.      Upper Body:      Right upper body: No supraclavicular adenopathy.      Left  upper body: No supraclavicular adenopathy.   Skin:     General: Skin is warm and dry.   Neurological:      Mental Status: He is alert and oriented to person, place, and time.      Sensory: No sensory deficit.   Psychiatric:         Behavior: Behavior normal.         Thought Content: Thought content normal.         Judgment: Judgment normal.              The following portions of the patient's history were reviewed and updated as appropriate: allergies, current medications, past family history, past medical history, past social history, past surgical history and problem list.    Assessment        Prostate cancer, Bianca's 4+3=7, clinical stage IIC (T2c, N0, M0), PSA 11.32 ng/ml.  He has intermediate risk disease diffusely involving a relatively small residual prostate gland after 2 prior TURP procedures.  Definitive treatment is indicated.  He would not be a candidate for brachytherapy.  He is not interested in prostatectomy or prolonged course of radiotherapy.  He is a candidate for stereotactic body radiotherapy.  We reviewed the CyberKnife treatment procedure in detail.  Informed consent was obtained.    RECOMMENDATIONS: He will return to Dr. Noland for placement of gold seed fiducials.  He is due for screening colonoscopy surveillance of his prior colon cancer.  This should occur prior to initiation of treatment for his prostate cancer.  He will subsequently return here for treatment planning CT and MRI pelvis.  The prostate gland and proximal seminal vesicles will receive 5 fractions of 7 Gray each, delivered with CyberKnife, on an every other day treatment schedule.    Return in about 18 days (around 12/19/2022) for Office Visit, Simulation.  Diagnoses and all orders for this visit:    1. Prostate cancer (HCC) (Primary)         Jerman Luu MD     Approximately 55 minutes was spent with patient and daughter.

## 2022-12-05 DIAGNOSIS — C61 PROSTATE CANCER: Primary | ICD-10-CM

## 2022-12-06 ENCOUNTER — TELEPHONE (OUTPATIENT)
Dept: UROLOGY | Facility: CLINIC | Age: 77
End: 2022-12-06

## 2022-12-06 ENCOUNTER — PREP FOR SURGERY (OUTPATIENT)
Dept: OTHER | Facility: HOSPITAL | Age: 77
End: 2022-12-06

## 2022-12-06 DIAGNOSIS — C61 PROSTATE CANCER: Primary | ICD-10-CM

## 2022-12-06 RX ORDER — LEVOFLOXACIN 500 MG/1
500 TABLET, FILM COATED ORAL DAILY
Qty: 3 TABLET | Refills: 0 | Status: SHIPPED | OUTPATIENT
Start: 2023-01-05 | End: 2023-01-04

## 2022-12-06 RX ORDER — CEFAZOLIN SODIUM 2 G/100ML
2 INJECTION, SOLUTION INTRAVENOUS ONCE
Status: CANCELLED | OUTPATIENT
Start: 2022-12-06 | End: 2022-12-06

## 2022-12-06 NOTE — TELEPHONE ENCOUNTER
Canceled 1/10 appt and scheduled 12/13 appt for lupron injection. Spoke with patients daughter, Luis Enrique, and informed her of appt time. Swapnil will give patient a call with surgery/ PAT info.     Yazmin - can we please make sure lupron is covered through patients insurance? Thanks!

## 2022-12-06 NOTE — TELEPHONE ENCOUNTER
Patient has met with radiation oncology regarding CyberKnife radiation plan.  He has high-volume unfavorable intermediate risk prostate cancer.  He has a very small residual amount of prostate tissue after multiple prior prostate procedures.  I called and discussed situation with the patient and his daughter.  Given his significantly elevated PSA density and high-volume disease the patient is best served with androgen deprivation therapy at least for 6 months, we will plan for Lupron injection in clinic 12/13/2022.  Patient will not tolerate and is not amenable to in clinic fiducial marker placement therefore we will cancel his clinic date and set him up for another operative day 1/6/2022 for transrectal ultrasound fiducial marker placement.  We will send Levaquin prior to his pharmacy.    PLAN  -Please Cancel previously established clinic date for fiducial marker placement 1/10/2022  -Case request will be placed for TRUS prostate fiducial marker placement 1/6/2022 [facility]  -PAT prior to procedure in OR, recheck BMP/sodium given his history of hyponatremia  -Patient needs to see me back next week Tuesday 12/13/2022, 10 AM for 6-month Lupron injection    Naseem Noland MD

## 2022-12-07 ENCOUNTER — PRIOR AUTHORIZATION (OUTPATIENT)
Dept: UROLOGY | Facility: CLINIC | Age: 77
End: 2022-12-07

## 2022-12-09 NOTE — TELEPHONE ENCOUNTER
PA for Lupron 6 month Kit was APPROVED.    Approval letter from [email or web address] scanned to chart.

## 2022-12-12 RX ORDER — SODIUM, POTASSIUM,MAG SULFATES 17.5-3.13G
1 SOLUTION, RECONSTITUTED, ORAL ORAL TAKE AS DIRECTED
Qty: 354 ML | Refills: 0 | Status: SHIPPED | OUTPATIENT
Start: 2022-12-12 | End: 2023-01-04

## 2022-12-13 ENCOUNTER — OFFICE VISIT (OUTPATIENT)
Dept: UROLOGY | Facility: CLINIC | Age: 77
End: 2022-12-13

## 2022-12-13 ENCOUNTER — TELEPHONE (OUTPATIENT)
Dept: RADIATION ONCOLOGY | Facility: HOSPITAL | Age: 77
End: 2022-12-13

## 2022-12-13 VITALS — BODY MASS INDEX: 31.98 KG/M2 | WEIGHT: 211 LBS | HEIGHT: 68 IN

## 2022-12-13 DIAGNOSIS — C61 PROSTATE CANCER: Primary | ICD-10-CM

## 2022-12-13 DIAGNOSIS — R39.9 LOWER URINARY TRACT SYMPTOMS (LUTS): ICD-10-CM

## 2022-12-13 PROCEDURE — 99213 OFFICE O/P EST LOW 20 MIN: CPT | Performed by: STUDENT IN AN ORGANIZED HEALTH CARE EDUCATION/TRAINING PROGRAM

## 2022-12-13 PROCEDURE — 96402 CHEMO HORMON ANTINEOPL SQ/IM: CPT | Performed by: STUDENT IN AN ORGANIZED HEALTH CARE EDUCATION/TRAINING PROGRAM

## 2022-12-13 NOTE — TELEPHONE ENCOUNTER
Discussed in detail diet, prep and upcoming appointments with daughter, Luis Enrique.  She verbalized understanding of all instructions, dates and times.

## 2022-12-13 NOTE — PROGRESS NOTES
Follow Up Office Visit      Patient Name: Lea Rivers  : 1945   MRN: [id number]     Chief Complaint:    Chief Complaint   Patient presents with   • Lupron Injection       Referring Provider: No ref. provider found    History of Present Illness: Lea Rivers is a 77 y.o. male who presents today for follow up of prostate cancer.  Patient has a history of elevated PSA despite very small remaining prostate tissue after multiple previous prostate procedures.  He underwent MRI for evaluation demonstrating 10 cc remaining prostate volume, 1.5 cm lesion in the sacrum concerning for possible osseous metastatic disease and PI-RADS 4 lesions involving the entirety of the remaining peripheral zone of the prostate.  Patient underwent prostate 2022, prostate pathology resulted with high-volume unfavorable intermediate risk prostate cancer:    1. PROSTATE, LEFT BASE, NEEDLE CORE BIOPSY:  Prostatic adenocarcinoma, Bianca score 4+3 equal 7, involving 2 of 2 core biopsies, representing approximately 90% of total core volume  Positive for perineural invasion  2.   PROSTATE, LEFT MID, NEEDLE CORE BIOPSY:  Prostatic adenocarcinoma, Hayward score 3+4 equal 7, involving 2 of 2 core biopsies, representing approximately 90% of total core volume  Positive for perineural invasion  3.   PROSTATE, LEFT APEX, NEEDLE CORE BIOPSY:  Prostatic adenocarcinoma, Hayward score 4+3 equal 7, involving 1 of 1 core biopsy, representing approximately 90% of total core volume  No perineural invasion identified  4.   PROSTATE, RIGHT MID, NEEDLE CORE BIOPSY:  Prostatic adenocarcinoma, Bianca score 3+4 equal 7, involving 2 of 2 core biopsies, representing approximately 70% of total core volume  No perineural invasion identified  5.   PROSTATE, RIGHT APEX, NEEDLE CORE BIOPSY:  Prostatic adenocarcinoma, Bianca score 4+3 equal 7, involving 2 of 2 core biopsies, representing approximately 30% of total core volume  No perineural  invasion identified  6.   PROSTATE, RIGHT BASE, NEEDLE CORE BIOPSY:  Prostatic adenocarcinoma, O[place]wein score 4+3 equal 7, involving 2 of 2 core biopsies, representing approximately 50% of total core volume  No perineural invasion identified      The patient completed CT and bone scan for staging which were negative for obvious metastatic disease.  Previously identified sacral lesion on MRI was not demonstrated on bone scan or CT.    The patient was referred to radiation oncology, he is planning to undergo CyberKnife radiation therapy.  I had a discussion with the patient and recommended 6-month Lupron injection given the high volume potentially aggressive nature of his disease.  His PSA density is fairly extreme.  The patient presents today for Lupron injection and follow-up.  He will be meeting with radiation oncology in February to initiate radiation.  He is scheduled for fiducial marker placement in the operating room (patient is unable to tolerate this procedure in clinic).  He is prescribed Levaquin to start the day before his fiducial marker placement to prevent infection.    Patient does have a history of continued lower urinary tract symptoms despite minimal prostate volume remaining, he has some evidence of incomplete bladder emptying, he is on tamsulosin for this.  He states he has significant nocturnal polyuria but overall denies incontinence and reports a fairly strong stream.    Subjective      Review of System: Review of Systems   Genitourinary: Positive for frequency and urgency. Negative for decreased urine volume, difficulty urinating, dysuria, enuresis, flank pain and hematuria.      I have reviewed the ROS documented by my clinical staff, I have updated appropriately and I agree. Naseem Noland MD    I have reviewed and the following portions of the patient's history were updated as appropriate: past family history, past medical history, past social history, past surgical history and problem  list.    Medications:     Current Outpatient Medications:   •  acetaminophen (TYLENOL) 500 MG tablet, Take 2 tablets by mouth Every 8 (Eight) Hours As Needed for Mild Pain ., Disp: , Rfl:   •  divalproex (DEPAKOTE) 500 MG 24 hr tablet, Take  by mouth 2 (two) times a day. 500mg QAM, 1000mg QPM, Disp: , Rfl:   •  fluticasone (FLONASE) 50 MCG/ACT nasal spray, 2 sprays into the nostril(s) as directed by provider As Needed for Rhinitis. Administer 2 sprays in each nostril for each dose., Disp: , Rfl:   •  glucose blood test strip, 1 each Daily., Disp: , Rfl:   •  isosorbide mononitrate (IMDUR) 30 MG 24 hr tablet, Take 1 tablet by mouth 2 (Two) Times a Day., Disp: , Rfl:   •  levETIRAcetam (KEPPRA) 500 MG tablet, Take 500 mg by mouth 2 (two) times a day., Disp: , Rfl:   •  [START ON 1/5/2023] levoFLOXacin (Levaquin) 500 MG tablet, Take 1 tablet by mouth Daily for 3 days. Start taking 1 day prior to prostate marker placement, Disp: 3 tablet, Rfl: 0  •  levothyroxine (SYNTHROID, LEVOTHROID) 88 MCG tablet, Take 88 mcg by mouth daily., Disp: , Rfl:   •  lisinopril (PRINIVIL,ZESTRIL) 40 MG tablet, Take 20 mg by mouth 2 (Two) Times a Day., Disp: , Rfl:   •  metFORMIN ER (GLUCOPHAGE-XR) 500 MG 24 hr tablet, Take 500 mg by mouth 2 (Two) Times a Day., Disp: , Rfl:   •  omeprazole (priLOSEC) 20 MG capsule, Take 20 mg by mouth Daily., Disp: , Rfl:   •  ONETOUCH DELICA LANCETS 33G misc, USE 1 LANCET ONCE A DAY, Disp: , Rfl: 0  •  Prenatal Vit-Fe Fumarate-FA (PRENATAL VITAMINS PO), Take  by mouth., Disp: , Rfl:   •  sodium-potassium-magnesium sulfates (Suprep Bowel Prep Kit) 17.5-3.13-1.6 GM/177ML solution oral solution, Take 1 bottle by mouth Take As Directed. Follow instructions that were mailed to your home. If you didn't receive these call [phone]., Disp: 354 mL, Rfl: 0  •  tamsulosin (FLOMAX) 0.4 MG capsule 24 hr capsule, Take 1 capsule by mouth Daily., Disp: 90 capsule, Rfl: 3  •  verapamil SR (CALAN-SR) 240 MG CR tablet,  "Take 240 mg by mouth Daily., Disp: , Rfl:     Allergies:   Allergies   Allergen Reactions   • Chlorhexidine Rash   • Sulfa Antibiotics Rash     Childhood reaction         Objective     Physical Exam:   Vital Signs:   Vitals:    12/13/22 0941   Weight: 95.7 kg (211 lb)   Height: 172.7 cm (68\")     Body mass index is 32.08 kg/m².     Physical Exam  Constitutional:       Appearance: Normal appearance.   HENT:      Head: Normocephalic and atraumatic.      Nose: Nose normal.   Eyes:      Extraocular Movements: Extraocular movements intact.      Conjunctiva/sclera: Conjunctivae normal.      Pupils: Pupils are equal, round, and reactive to light.   Musculoskeletal:         General: Normal range of motion.      Cervical back: Normal range of motion and neck supple.   Skin:     General: Skin is warm and dry.      Findings: No lesion or rash.   Neurological:      General: No focal deficit present.      Mental Status: He is alert and oriented to person, place, and time. Mental status is at baseline.   Psychiatric:         Mood and Affect: Mood normal.         Behavior: Behavior normal.         Labs:   Brief Urine Lab Results  (Last result in the past 365 days)      Color   Clarity   Blood   Leuk Est   Nitrite   Protein   CREAT   Urine HCG        10/26/22 1428 Yellow   Clear   Negative   Negative   Negative   Trace                      Lab Results   Component Value Date    GLUCOSE 111 (H) 11/07/2022    CALCIUM 9.3 11/07/2022     (L) 11/07/2022    K 3.7 11/07/2022    CO2 26.0 11/07/2022    CL 97 (L) 11/07/2022    BUN 19 11/07/2022    CREATININE 0.81 11/07/2022    EGFRIFNONA 64 09/10/2021    BCR 23.5 11/07/2022    ANIONGAP 10.0 11/07/2022       Lab Results   Component Value Date    WBC 8.22 11/07/2022    HGB 12.1 (L) 11/07/2022    HCT 37.0 (L) 11/07/2022    MCV 96.1 11/07/2022     11/07/2022       Images:   NM Bone Scan Whole Body    Result Date: 11/30/2022  Low-level changes typical of DJD as described. No findings " suspicious for metastatic disease are identified.  This report was finalized on 11/30/2022 4:14 PM by Dr. Fausto Johns MD.      CT Abdomen Pelvis With Contrast    Result Date: 11/30/2022   1. Irregular nodular enhancement of the prostate, consistent with patient's reported history of prostate cancer. No definite findings to suggest metastatic disease within the abdomen or pelvis. 3. Additional findings as given above.    This report was finalized on 11/30/2022 1:42 PM by Blake Grayson MD.      MRI Radius Ulna Right Without Contrast    Result Date: 10/24/2022  Limited exam with early termination prior to administration of IV contrast. Lack of IV contrast limits characterization of soft tissue findings. Consider repeat exam with IV contrast for complete assessment.  There is a small, thin 3.2 x 0.4 cm STIR hyperintense collection/signal with hypointense rim in the subcutaneous tissues of the posterior proximal forearm which is nonspecific and suboptimally characterized in the absence of IV contrast. Considerations include resolving hematoma and correlate for any recent trauma. Small adventitial bursa may have similar appearance. Elsewhere there is somewhat protuberant though otherwise unremarkable appearing subcutaneous fat along the posterior elbow posterior proximal forearm. There may be a trace amount of fluid in the olecranon bursa.  This report was finalized on 10/24/2022 11:33 AM by Milad Noland MD.        Measures:   Tobacco:   Lea Arnold Evans  reports that he has never smoked. He has never used smokeless tobacco.     Lupron injection    Informed consent was reviewed and signed.  A timeout was performed.  The right deltoid was swabbed with alcohol swabs.  45 mg of Lupron was injected into the right deltoid muscle via a 22-gauge needle.  The needle was removed.  Pressure was held and a Band-Aid was applied.       Assessment / Plan      Assessment/Plan:   77 y.o. male who presented today for follow up of  high-volume unfavorable intermediate risk prostate cancer, negative CT and bone staging imaging.  Planning to meet with radiation oncology for CyberKnife therapy.  Due for fiducial marker placement 1/6/2022.  Discussed the importance of Levaquin preoperatively to prevent infection.  Discussed risk of sepsis including 2 to 4% of men, blood in urine, stool, ejaculate.    Today patient underwent Lupron injection, 6-month dosing, 45 mg.  Discussed the potential side effects of this including worsening hyperglycemia given his history of diabetes, depression, metabolic changes, cardiac risks etc.  He and his daughter will monitor for symptoms.  Discussed the importance of routine glucose checks at home.      Diagnoses and all orders for this visit:    1. Prostate cancer (HCC) (Primary)  -45 mg, 6-month depot injection of Lupron today  -Proceed to CyberKnife radiation therapy after fiducial marker placement 1/6/2022 in [facility]    2. Lower urinary tract symptoms (LUTS)  -Continue tamsulosin, minimal remaining prostate tissue, likely a component of bladder dysfunction given his age and chronic BPH status post remote greenlight PVP  -Discussed with patient and his daughter I am happy to perform a flexible cystoscopy on him if he has worsening urinary symptoms during or after radiation       Follow Up:   Return in about 10 weeks (around 2/21/2023).    I spent approximately 30 minutes providing clinical care for this patient; including review of patient's chart and provider documentation, face to face time spent with patient in examination room (obtaining history, performing physical exam, discussing diagnosis and management options), placing orders, and completing patient documentation.     Naseem Noland MD  [facility] [place]

## 2022-12-13 NOTE — H&P (VIEW-ONLY)
Follow Up Office Visit      Patient Name: Lea Rivers  : 1945   MRN: [id number]     Chief Complaint:    Chief Complaint   Patient presents with   • Lupron Injection       Referring Provider: No ref. provider found    History of Present Illness: Lea Rivers is a 77 y.o. male who presents today for follow up of prostate cancer.  Patient has a history of elevated PSA despite very small remaining prostate tissue after multiple previous prostate procedures.  He underwent MRI for evaluation demonstrating 10 cc remaining prostate volume, 1.5 cm lesion in the sacrum concerning for possible osseous metastatic disease and PI-RADS 4 lesions involving the entirety of the remaining peripheral zone of the prostate.  Patient underwent prostate 2022, prostate pathology resulted with high-volume unfavorable intermediate risk prostate cancer:    1. PROSTATE, LEFT BASE, NEEDLE CORE BIOPSY:  Prostatic adenocarcinoma, Bianca score 4+3 equal 7, involving 2 of 2 core biopsies, representing approximately 90% of total core volume  Positive for perineural invasion  2.   PROSTATE, LEFT MID, NEEDLE CORE BIOPSY:  Prostatic adenocarcinoma, [place] score 3+4 equal 7, involving 2 of 2 core biopsies, representing approximately 90% of total core volume  Positive for perineural invasion  3.   PROSTATE, LEFT APEX, NEEDLE CORE BIOPSY:  Prostatic adenocarcinoma, [place] score 4+3 equal 7, involving 1 of 1 core biopsy, representing approximately 90% of total core volume  No perineural invasion identified  4.   PROSTATE, RIGHT MID, NEEDLE CORE BIOPSY:  Prostatic adenocarcinoma, Bianca score 3+4 equal 7, involving 2 of 2 core biopsies, representing approximately 70% of total core volume  No perineural invasion identified  5.   PROSTATE, RIGHT APEX, NEEDLE CORE BIOPSY:  Prostatic adenocarcinoma, Bianca score 4+3 equal 7, involving 2 of 2 core biopsies, representing approximately 30% of total core volume  No perineural  invasion identified  6.   PROSTATE, RIGHT BASE, NEEDLE CORE BIOPSY:  Prostatic adenocarcinoma, La[place] score 4+3 equal 7, involving 2 of 2 core biopsies, representing approximately 50% of total core volume  No perineural invasion identified      The patient completed CT and bone scan for staging which were negative for obvious metastatic disease.  Previously identified sacral lesion on MRI was not demonstrated on bone scan or CT.    The patient was referred to radiation oncology, he is planning to undergo CyberKnife radiation therapy.  I had a discussion with the patient and recommended 6-month Lupron injection given the high volume potentially aggressive nature of his disease.  His PSA density is fairly extreme.  The patient presents today for Lupron injection and follow-up.  He will be meeting with radiation oncology in February to initiate radiation.  He is scheduled for fiducial marker placement in the operating room (patient is unable to tolerate this procedure in clinic).  He is prescribed Levaquin to start the day before his fiducial marker placement to prevent infection.    Patient does have a history of continued lower urinary tract symptoms despite minimal prostate volume remaining, he has some evidence of incomplete bladder emptying, he is on tamsulosin for this.  He states he has significant nocturnal polyuria but overall denies incontinence and reports a fairly strong stream.    Subjective      Review of System: Review of Systems   Genitourinary: Positive for frequency and urgency. Negative for decreased urine volume, difficulty urinating, dysuria, enuresis, flank pain and hematuria.      I have reviewed the ROS documented by my clinical staff, I have updated appropriately and I agree. Naseem Noland MD    I have reviewed and the following portions of the patient's history were updated as appropriate: past family history, past medical history, past social history, past surgical history and problem  list.    Medications:     Current Outpatient Medications:   •  acetaminophen (TYLENOL) 500 MG tablet, Take 2 tablets by mouth Every 8 (Eight) Hours As Needed for Mild Pain ., Disp: , Rfl:   •  divalproex (DEPAKOTE) 500 MG 24 hr tablet, Take  by mouth 2 (two) times a day. 500mg QAM, 1000mg QPM, Disp: , Rfl:   •  fluticasone (FLONASE) 50 MCG/ACT nasal spray, 2 sprays into the nostril(s) as directed by provider As Needed for Rhinitis. Administer 2 sprays in each nostril for each dose., Disp: , Rfl:   •  glucose blood test strip, 1 each Daily., Disp: , Rfl:   •  isosorbide mononitrate (IMDUR) 30 MG 24 hr tablet, Take 1 tablet by mouth 2 (Two) Times a Day., Disp: , Rfl:   •  levETIRAcetam (KEPPRA) 500 MG tablet, Take 500 mg by mouth 2 (two) times a day., Disp: , Rfl:   •  [START ON 1/5/2023] levoFLOXacin (Levaquin) 500 MG tablet, Take 1 tablet by mouth Daily for 3 days. Start taking 1 day prior to prostate marker placement, Disp: 3 tablet, Rfl: 0  •  levothyroxine (SYNTHROID, LEVOTHROID) 88 MCG tablet, Take 88 mcg by mouth daily., Disp: , Rfl:   •  lisinopril (PRINIVIL,ZESTRIL) 40 MG tablet, Take 20 mg by mouth 2 (Two) Times a Day., Disp: , Rfl:   •  metFORMIN ER (GLUCOPHAGE-XR) 500 MG 24 hr tablet, Take 500 mg by mouth 2 (Two) Times a Day., Disp: , Rfl:   •  omeprazole (priLOSEC) 20 MG capsule, Take 20 mg by mouth Daily., Disp: , Rfl:   •  ONETOUCH DELICA LANCETS 33G misc, USE 1 LANCET ONCE A DAY, Disp: , Rfl: 0  •  Prenatal Vit-Fe Fumarate-FA (PRENATAL VITAMINS PO), Take  by mouth., Disp: , Rfl:   •  sodium-potassium-magnesium sulfates (Suprep Bowel Prep Kit) 17.5-3.13-1.6 GM/177ML solution oral solution, Take 1 bottle by mouth Take As Directed. Follow instructions that were mailed to your home. If you didn't receive these call [phone]., Disp: 354 mL, Rfl: 0  •  tamsulosin (FLOMAX) 0.4 MG capsule 24 hr capsule, Take 1 capsule by mouth Daily., Disp: 90 capsule, Rfl: 3  •  verapamil SR (CALAN-SR) 240 MG CR tablet,  Take 240 mg by mouth Daily., Disp: , Rfl:     Allergies:   Allergies   Allergen Reactions   • Chlorhexidine Rash   • Sulfa Antibiotics Rash     Childhood reaction         Objective     Physical Exam:   Vital Signs:   Vitals:    12/13/22 0941   Weight: 95.7 kg (211 lb)   Height: 172.7 cm (68\")     Body mass index is 32.08 kg/m².     Physical Exam  Constitutional:       Appearance: Normal appearance.   HENT:      Head: Normocephalic and atraumatic.      Nose: Nose normal.   Eyes:      Extraocular Movements: Extraocular movements intact.      Conjunctiva/sclera: Conjunctivae normal.      Pupils: Pupils are equal, round, and reactive to light.   Musculoskeletal:         General: Normal range of motion.      Cervical back: Normal range of motion and neck supple.   Skin:     General: Skin is warm and dry.      Findings: No lesion or rash.   Neurological:      General: No focal deficit present.      Mental Status: He is alert and oriented to person, place, and time. Mental status is at baseline.   Psychiatric:         Mood and Affect: Mood normal.         Behavior: Behavior normal.         Labs:   Brief Urine Lab Results  (Last result in the past 365 days)      Color   Clarity   Blood   Leuk Est   Nitrite   Protein   CREAT   Urine HCG        10/26/22 1428 Yellow   Clear   Negative   Negative   Negative   Trace                      Lab Results   Component Value Date    GLUCOSE 111 (H) 11/07/2022    CALCIUM 9.3 11/07/2022     (L) 11/07/2022    K 3.7 11/07/2022    CO2 26.0 11/07/2022    CL 97 (L) 11/07/2022    BUN 19 11/07/2022    CREATININE 0.81 11/07/2022    EGFRIFNONA 64 09/10/2021    BCR 23.5 11/07/2022    ANIONGAP 10.0 11/07/2022       Lab Results   Component Value Date    WBC 8.22 11/07/2022    HGB 12.1 (L) 11/07/2022    HCT 37.0 (L) 11/07/2022    MCV 96.1 11/07/2022     11/07/2022       Images:   NM Bone Scan Whole Body    Result Date: 11/30/2022  Low-level changes typical of DJD as described. No findings  suspicious for metastatic disease are identified.  This report was finalized on 11/30/2022 4:14 PM by Dr. Fausto Johns MD.      CT Abdomen Pelvis With Contrast    Result Date: 11/30/2022   1. Irregular nodular enhancement of the prostate, consistent with patient's reported history of prostate cancer. No definite findings to suggest metastatic disease within the abdomen or pelvis. 3. Additional findings as given above.    This report was finalized on 11/30/2022 1:42 PM by Blake Grayson MD.      MRI Radius Ulna Right Without Contrast    Result Date: 10/24/2022  Limited exam with early termination prior to administration of IV contrast. Lack of IV contrast limits characterization of soft tissue findings. Consider repeat exam with IV contrast for complete assessment.  There is a small, thin 3.2 x 0.4 cm STIR hyperintense collection/signal with hypointense rim in the subcutaneous tissues of the posterior proximal forearm which is nonspecific and suboptimally characterized in the absence of IV contrast. Considerations include resolving hematoma and correlate for any recent trauma. Small adventitial bursa may have similar appearance. Elsewhere there is somewhat protuberant though otherwise unremarkable appearing subcutaneous fat along the posterior elbow posterior proximal forearm. There may be a trace amount of fluid in the olecranon bursa.  This report was finalized on 10/24/2022 11:33 AM by Milad Noland MD.        Measures:   Tobacco:   Lea Arnold [place]  reports that he has never smoked. He has never used smokeless tobacco.     Lupron injection    Informed consent was reviewed and signed.  A timeout was performed.  The right deltoid was swabbed with alcohol swabs.  45 mg of Lupron was injected into the right deltoid muscle via a 22-gauge needle.  The needle was removed.  Pressure was held and a Band-Aid was applied.       Assessment / Plan      Assessment/Plan:   77 y.o. male who presented today for follow up of  high-volume unfavorable intermediate risk prostate cancer, negative CT and bone staging imaging.  Planning to meet with radiation oncology for CyberKnife therapy.  Due for fiducial marker placement 1/6/2022.  Discussed the importance of Levaquin preoperatively to prevent infection.  Discussed risk of sepsis including 2 to 4% of men, blood in urine, stool, ejaculate.    Today patient underwent Lupron injection, 6-month dosing, 45 mg.  Discussed the potential side effects of this including worsening hyperglycemia given his history of diabetes, depression, metabolic changes, cardiac risks etc.  He and his daughter will monitor for symptoms.  Discussed the importance of routine glucose checks at home.      Diagnoses and all orders for this visit:    1. Prostate cancer (HCC) (Primary)  -45 mg, 6-month depot injection of Lupron today  -Proceed to CyberKnife radiation therapy after fiducial marker placement 1/6/2022 in [facility]    2. Lower urinary tract symptoms (LUTS)  -Continue tamsulosin, minimal remaining prostate tissue, likely a component of bladder dysfunction given his age and chronic BPH status post remote greenlight PVP  -Discussed with patient and his daughter I am happy to perform a flexible cystoscopy on him if he has worsening urinary symptoms during or after radiation       Follow Up:   Return in about 10 weeks (around 2/21/2023).    I spent approximately 30 minutes providing clinical care for this patient; including review of patient's chart and provider documentation, face to face time spent with patient in examination room (obtaining history, performing physical exam, discussing diagnosis and management options), placing orders, and completing patient documentation.     Naseem Noland MD  [facility] [place]

## 2022-12-27 ENCOUNTER — OUTSIDE FACILITY SERVICE (OUTPATIENT)
Dept: GASTROENTEROLOGY | Facility: CLINIC | Age: 77
End: 2022-12-27
Payer: MEDICARE

## 2022-12-27 PROCEDURE — G0105 COLORECTAL SCRN; HI RISK IND: HCPCS | Performed by: INTERNAL MEDICINE

## 2023-01-04 ENCOUNTER — PRE-ADMISSION TESTING (OUTPATIENT)
Dept: PREADMISSION TESTING | Facility: HOSPITAL | Age: 78
End: 2023-01-04
Payer: MEDICARE

## 2023-01-04 ENCOUNTER — TELEPHONE (OUTPATIENT)
Dept: UROLOGY | Facility: CLINIC | Age: 78
End: 2023-01-04
Payer: MEDICARE

## 2023-01-04 VITALS — BODY MASS INDEX: 30.57 KG/M2 | WEIGHT: 201.72 LBS | HEIGHT: 68 IN

## 2023-01-04 DIAGNOSIS — C61 PROSTATE CANCER: Primary | ICD-10-CM

## 2023-01-04 DIAGNOSIS — C61 PROSTATE CANCER: ICD-10-CM

## 2023-01-04 LAB
ANION GAP SERPL CALCULATED.3IONS-SCNC: 10 MMOL/L (ref 5–15)
BILIRUB UR QL STRIP: NEGATIVE
BUN SERPL-MCNC: 16 MG/DL (ref 8–23)
BUN/CREAT SERPL: 22.5 (ref 7–25)
CALCIUM SPEC-SCNC: 9 MG/DL (ref 8.6–10.5)
CHLORIDE SERPL-SCNC: 102 MMOL/L (ref 98–107)
CLARITY UR: CLEAR
CO2 SERPL-SCNC: 27 MMOL/L (ref 22–29)
COLOR UR: YELLOW
CREAT SERPL-MCNC: 0.71 MG/DL (ref 0.76–1.27)
DEPRECATED RDW RBC AUTO: 43.3 FL (ref 37–54)
EGFRCR SERPLBLD CKD-EPI 2021: 93.9 ML/MIN/1.73
ERYTHROCYTE [DISTWIDTH] IN BLOOD BY AUTOMATED COUNT: 12.8 % (ref 12.3–15.4)
GLUCOSE SERPL-MCNC: 95 MG/DL (ref 65–99)
GLUCOSE UR STRIP-MCNC: NEGATIVE MG/DL
HBA1C MFR BLD: 5 % (ref 4.8–5.6)
HCT VFR BLD AUTO: 36.5 % (ref 37.5–51)
HGB BLD-MCNC: 12.2 G/DL (ref 13–17.7)
HGB UR QL STRIP.AUTO: NEGATIVE
KETONES UR QL STRIP: ABNORMAL
LEUKOCYTE ESTERASE UR QL STRIP.AUTO: NEGATIVE
MCH RBC QN AUTO: 30.8 PG (ref 26.6–33)
MCHC RBC AUTO-ENTMCNC: 33.4 G/DL (ref 31.5–35.7)
MCV RBC AUTO: 92.2 FL (ref 79–97)
NITRITE UR QL STRIP: NEGATIVE
PH UR STRIP.AUTO: 5.5 [PH] (ref 5–8)
PLATELET # BLD AUTO: 253 10*3/MM3 (ref 140–450)
PMV BLD AUTO: 9.3 FL (ref 6–12)
POTASSIUM SERPL-SCNC: 3.9 MMOL/L (ref 3.5–5.2)
PROT UR QL STRIP: NEGATIVE
RBC # BLD AUTO: 3.96 10*6/MM3 (ref 4.14–5.8)
SODIUM SERPL-SCNC: 139 MMOL/L (ref 136–145)
SP GR UR STRIP: 1.02 (ref 1–1.03)
UROBILINOGEN UR QL STRIP: ABNORMAL
WBC NRBC COR # BLD: 6.25 10*3/MM3 (ref 3.4–10.8)

## 2023-01-04 PROCEDURE — 81003 URINALYSIS AUTO W/O SCOPE: CPT

## 2023-01-04 PROCEDURE — 36415 COLL VENOUS BLD VENIPUNCTURE: CPT

## 2023-01-04 PROCEDURE — 83036 HEMOGLOBIN GLYCOSYLATED A1C: CPT

## 2023-01-04 PROCEDURE — 80048 BASIC METABOLIC PNL TOTAL CA: CPT

## 2023-01-04 PROCEDURE — 85027 COMPLETE CBC AUTOMATED: CPT

## 2023-01-04 RX ORDER — LEVOFLOXACIN 500 MG/1
500 TABLET, FILM COATED ORAL DAILY
Qty: 3 TABLET | Refills: 0 | Status: SHIPPED | OUTPATIENT
Start: 2023-01-04 | End: 2023-01-07

## 2023-01-04 RX ORDER — SODIUM CHLORIDE 1000 MG
2 TABLET, SOLUBLE MISCELLANEOUS DAILY
COMMUNITY
Start: 2022-12-29

## 2023-01-04 NOTE — TELEPHONE ENCOUNTER
Patient's daughter called and stated that he was suppose to start an antibiotic today for his upcoming gold marker placement but the pharmacy says that they have not received it.  Asked if you could send it in to the [facility] pharmacy and they will pick it up when they come to do pre admission testing?

## 2023-01-04 NOTE — DISCHARGE INSTRUCTIONS
The following information and instructions were given:    Do not eat, drink, smoke or chew gum after midnight the night before surgery. This includes no mints.  Take all routine, prescribed medications including heart and blood pressure medicines with a sip of water unless otherwise instructed by your physician.   Do NOT take diabetic medication unless instructed by your physician.    DO NOT shave for two days before your procedure.  Do not wear makeup.      DO NOT wear fingernail polish (gel/regular) and/or acrylic/artificial nails on the day of surgery. If you had a recent manicure and would rather not remove polish or artificial nails, the minimum requirement is that the polish/artificial nails must be removed from the middle finger on each hand.      If you are having surgery/procedure on an upper extremity, fingernail polish/artificial fingernails must be removed for surgery.  NO EXCEPTIONS.      If you are having surgery/procedure on a lower extremity, toenail polish on both extremities must be removed for surgery.  NO EXCEPTIONS.    Remove all jewelry (advise to go to jeweler if unable to remove).  Jewelry, especially rings, can no longer be taped for surgery.    Leave anything you consider valuable at home.    Leave your suitcase in the car until after your surgery if you are staying overnight.    Bring the following with you the day of your procedure (when applicable):       -Picture ID and insurance cards       -Co-pay/deductible required by insurance       -Medications in the original bottles (not a list) including all over-the-counter medications if not brought to PAT       -Copy of advance directive, living will or power of  documents if not brought to PAT       -CPAP or BIPAP mask and tubing (do not bring machine)       -Skin prep instruction(s) sheet       -PAT Pass    Educational handout or binder (joint replacements) related to procedure given to patient.  Educational handout also includes  general information related to the recovery that mentions signs and symptoms of infection and when to call the doctor.    When applicable, an ERAS handout was given to patient.    Respirex use and pneumonia prevention education provided in Pre Admission Testing general education video.    Information related to infection and hand hygiene mentioned in Pre Admission Testing general education video. Patient instructed to call their doctor if any of the following symptoms are noted during recovery:  Fever of 100.4 F or higher, incision that is warm or has increasing bleeding, redness or drainage.    DVT Prevention instructions given in general education video presentation during Pre Admission Testing appointment that stress the importance of ambulation to improve blood circulation.  Also encouraged patient to perform foot exercises when in bed and application of a sequential device may be applied to lower extremities to improve circulation.      Patient to use Ready Bath Luxe wipes for surgical skin prep due to CHG reaction

## 2023-01-04 NOTE — PAT
An arrival time for procedure was not provided during PAT visit. If patient had any questions or concerns about their arrival time, they were instructed to contact their surgeon/physician.  Additionally, if the patient referred to an arrival time that was acquired from their my chart account, patient was encouraged to verify that time with their surgeon/physician. Arrival times are NOT provided in Pre Admission Testing Department.    Patient instructed to drink 20 ounces of Gatorade and it needs to be completed 1 hour (for [facility] OR patients) or 2 hours (scheduled  section & [facility] patients) before given arrival time for procedure (NO RED Gatorade)    Patient verbalized understanding.    EKG 22    Patient to use Bath Luxe Surgical Prep wipes due to CHG allergy.

## 2023-01-06 ENCOUNTER — HOSPITAL ENCOUNTER (OUTPATIENT)
Facility: HOSPITAL | Age: 78
Setting detail: HOSPITAL OUTPATIENT SURGERY
Discharge: HOME | End: 2023-01-06
Attending: STUDENT IN AN ORGANIZED HEALTH CARE EDUCATION/TRAINING PROGRAM | Admitting: STUDENT IN AN ORGANIZED HEALTH CARE EDUCATION/TRAINING PROGRAM
Payer: MEDICARE

## 2023-01-06 ENCOUNTER — ANESTHESIA (OUTPATIENT)
Dept: PERIOP | Facility: HOSPITAL | Age: 78
End: 2023-01-06
Payer: MEDICARE

## 2023-01-06 ENCOUNTER — ANESTHESIA EVENT (OUTPATIENT)
Dept: PERIOP | Facility: HOSPITAL | Age: 78
End: 2023-01-06
Payer: MEDICARE

## 2023-01-06 VITALS
HEART RATE: 60 BPM | WEIGHT: 201 LBS | HEIGHT: 68 IN | DIASTOLIC BLOOD PRESSURE: 102 MMHG | SYSTOLIC BLOOD PRESSURE: 196 MMHG | TEMPERATURE: 97.6 F | BODY MASS INDEX: 30.46 KG/M2 | OXYGEN SATURATION: 100 % | RESPIRATION RATE: 12 BRPM

## 2023-01-06 DIAGNOSIS — C61 PROSTATE CANCER: ICD-10-CM

## 2023-01-06 LAB
ANION GAP SERPL CALCULATED.3IONS-SCNC: 14 MMOL/L (ref 5–15)
BUN SERPL-MCNC: 14 MG/DL (ref 8–23)
BUN/CREAT SERPL: 16.9 (ref 7–25)
CALCIUM SPEC-SCNC: 9.6 MG/DL (ref 8.6–10.5)
CHLORIDE SERPL-SCNC: 98 MMOL/L (ref 98–107)
CO2 SERPL-SCNC: 24 MMOL/L (ref 22–29)
CREAT SERPL-MCNC: 0.83 MG/DL (ref 0.76–1.27)
EGFRCR SERPLBLD CKD-EPI 2021: 89.6 ML/MIN/1.73
GLUCOSE BLDC GLUCOMTR-MCNC: 79 MG/DL (ref 70–130)
GLUCOSE BLDC GLUCOMTR-MCNC: 88 MG/DL (ref 70–130)
GLUCOSE SERPL-MCNC: 98 MG/DL (ref 65–99)
POTASSIUM SERPL-SCNC: 3.5 MMOL/L (ref 3.5–5.2)
SODIUM SERPL-SCNC: 136 MMOL/L (ref 136–145)

## 2023-01-06 PROCEDURE — 0 LIDOCAINE 1 % SOLUTION: Performed by: STUDENT IN AN ORGANIZED HEALTH CARE EDUCATION/TRAINING PROGRAM

## 2023-01-06 PROCEDURE — 55876 PLACE RT DEVICE/MARKER PROS: CPT | Performed by: STUDENT IN AN ORGANIZED HEALTH CARE EDUCATION/TRAINING PROGRAM

## 2023-01-06 PROCEDURE — 80048 BASIC METABOLIC PNL TOTAL CA: CPT | Performed by: STUDENT IN AN ORGANIZED HEALTH CARE EDUCATION/TRAINING PROGRAM

## 2023-01-06 PROCEDURE — 82962 GLUCOSE BLOOD TEST: CPT

## 2023-01-06 PROCEDURE — 25010000002 CEFAZOLIN IN DEXTROSE 2-4 GM/100ML-% SOLUTION: Performed by: STUDENT IN AN ORGANIZED HEALTH CARE EDUCATION/TRAINING PROGRAM

## 2023-01-06 PROCEDURE — A4648 IMPLANTABLE TISSUE MARKER: HCPCS | Performed by: STUDENT IN AN ORGANIZED HEALTH CARE EDUCATION/TRAINING PROGRAM

## 2023-01-06 PROCEDURE — 25010000002 PROPOFOL 10 MG/ML EMULSION: Performed by: NURSE ANESTHETIST, CERTIFIED REGISTERED

## 2023-01-06 DEVICE — STERILE PLACEMENT NEEDLES (18GA ETW X 20CM) WITH BONE WAX AND (0.9 X 3MM) SOFT TISSUE GOLD MARKER [3]
Type: IMPLANTABLE DEVICE | Site: RECTUM | Status: FUNCTIONAL
Brand: FIDUCIAL MARKER KIT

## 2023-01-06 RX ORDER — HYDROCODONE BITARTRATE AND ACETAMINOPHEN 5; 325 MG/1; MG/1
1 TABLET ORAL ONCE AS NEEDED
Status: DISCONTINUED | OUTPATIENT
Start: 2023-01-06 | End: 2023-01-06 | Stop reason: HOSPADM

## 2023-01-06 RX ORDER — LIDOCAINE HYDROCHLORIDE 10 MG/ML
0.5 INJECTION, SOLUTION EPIDURAL; INFILTRATION; INTRACAUDAL; PERINEURAL ONCE AS NEEDED
Status: COMPLETED | OUTPATIENT
Start: 2023-01-06 | End: 2023-01-06

## 2023-01-06 RX ORDER — MEPERIDINE HYDROCHLORIDE 25 MG/ML
12.5 INJECTION INTRAMUSCULAR; INTRAVENOUS; SUBCUTANEOUS
Status: DISCONTINUED | OUTPATIENT
Start: 2023-01-06 | End: 2023-01-06 | Stop reason: HOSPADM

## 2023-01-06 RX ORDER — PROPOFOL 10 MG/ML
VIAL (ML) INTRAVENOUS AS NEEDED
Status: DISCONTINUED | OUTPATIENT
Start: 2023-01-06 | End: 2023-01-06 | Stop reason: SURG

## 2023-01-06 RX ORDER — SODIUM CHLORIDE, SODIUM LACTATE, POTASSIUM CHLORIDE, CALCIUM CHLORIDE 600; 310; 30; 20 MG/100ML; MG/100ML; MG/100ML; MG/100ML
9 INJECTION, SOLUTION INTRAVENOUS CONTINUOUS PRN
Status: DISCONTINUED | OUTPATIENT
Start: 2023-01-06 | End: 2023-01-06 | Stop reason: HOSPADM

## 2023-01-06 RX ORDER — PROMETHAZINE HYDROCHLORIDE 25 MG/1
25 SUPPOSITORY RECTAL ONCE AS NEEDED
Status: DISCONTINUED | OUTPATIENT
Start: 2023-01-06 | End: 2023-01-06 | Stop reason: HOSPADM

## 2023-01-06 RX ORDER — MIDAZOLAM HYDROCHLORIDE 1 MG/ML
0.5 INJECTION INTRAMUSCULAR; INTRAVENOUS
Status: DISCONTINUED | OUTPATIENT
Start: 2023-01-06 | End: 2023-01-06 | Stop reason: HOSPADM

## 2023-01-06 RX ORDER — SODIUM CHLORIDE 0.9 % (FLUSH) 0.9 %
3 SYRINGE (ML) INJECTION EVERY 12 HOURS SCHEDULED
Status: DISCONTINUED | OUTPATIENT
Start: 2023-01-06 | End: 2023-01-06 | Stop reason: HOSPADM

## 2023-01-06 RX ORDER — LIDOCAINE HYDROCHLORIDE 10 MG/ML
INJECTION, SOLUTION EPIDURAL; INFILTRATION; INTRACAUDAL; PERINEURAL AS NEEDED
Status: DISCONTINUED | OUTPATIENT
Start: 2023-01-06 | End: 2023-01-06 | Stop reason: SURG

## 2023-01-06 RX ORDER — SODIUM CHLORIDE 9 MG/ML
40 INJECTION, SOLUTION INTRAVENOUS AS NEEDED
Status: CANCELLED | OUTPATIENT
Start: 2023-01-06

## 2023-01-06 RX ORDER — SODIUM CHLORIDE 0.9 % (FLUSH) 0.9 %
10 SYRINGE (ML) INJECTION AS NEEDED
Status: CANCELLED | OUTPATIENT
Start: 2023-01-06

## 2023-01-06 RX ORDER — HYDRALAZINE HYDROCHLORIDE 20 MG/ML
5 INJECTION INTRAMUSCULAR; INTRAVENOUS
Status: DISCONTINUED | OUTPATIENT
Start: 2023-01-06 | End: 2023-01-06 | Stop reason: HOSPADM

## 2023-01-06 RX ORDER — DROPERIDOL 2.5 MG/ML
0.62 INJECTION, SOLUTION INTRAMUSCULAR; INTRAVENOUS
Status: DISCONTINUED | OUTPATIENT
Start: 2023-01-06 | End: 2023-01-06 | Stop reason: HOSPADM

## 2023-01-06 RX ORDER — FAMOTIDINE 20 MG/1
20 TABLET, FILM COATED ORAL
Status: COMPLETED | OUTPATIENT
Start: 2023-01-06 | End: 2023-01-06

## 2023-01-06 RX ORDER — HYDROMORPHONE HYDROCHLORIDE 1 MG/ML
0.5 INJECTION, SOLUTION INTRAMUSCULAR; INTRAVENOUS; SUBCUTANEOUS
Status: DISCONTINUED | OUTPATIENT
Start: 2023-01-06 | End: 2023-01-06 | Stop reason: HOSPADM

## 2023-01-06 RX ORDER — PROMETHAZINE HYDROCHLORIDE 25 MG/1
25 TABLET ORAL ONCE AS NEEDED
Status: DISCONTINUED | OUTPATIENT
Start: 2023-01-06 | End: 2023-01-06 | Stop reason: HOSPADM

## 2023-01-06 RX ORDER — NALOXONE HCL 0.4 MG/ML
0.4 VIAL (ML) INJECTION AS NEEDED
Status: DISCONTINUED | OUTPATIENT
Start: 2023-01-06 | End: 2023-01-06 | Stop reason: HOSPADM

## 2023-01-06 RX ORDER — CEFAZOLIN SODIUM 2 G/100ML
2 INJECTION, SOLUTION INTRAVENOUS ONCE
Status: COMPLETED | OUTPATIENT
Start: 2023-01-06 | End: 2023-01-06

## 2023-01-06 RX ORDER — LIDOCAINE HYDROCHLORIDE 10 MG/ML
INJECTION, SOLUTION INFILTRATION; PERINEURAL AS NEEDED
Status: DISCONTINUED | OUTPATIENT
Start: 2023-01-06 | End: 2023-01-06 | Stop reason: HOSPADM

## 2023-01-06 RX ORDER — DROPERIDOL 2.5 MG/ML
0.62 INJECTION, SOLUTION INTRAMUSCULAR; INTRAVENOUS ONCE AS NEEDED
Status: DISCONTINUED | OUTPATIENT
Start: 2023-01-06 | End: 2023-01-06 | Stop reason: HOSPADM

## 2023-01-06 RX ORDER — SODIUM CHLORIDE 0.9 % (FLUSH) 0.9 %
10 SYRINGE (ML) INJECTION EVERY 12 HOURS SCHEDULED
Status: CANCELLED | OUTPATIENT
Start: 2023-01-06

## 2023-01-06 RX ORDER — IPRATROPIUM BROMIDE AND ALBUTEROL SULFATE 2.5; .5 MG/3ML; MG/3ML
3 SOLUTION RESPIRATORY (INHALATION) ONCE AS NEEDED
Status: DISCONTINUED | OUTPATIENT
Start: 2023-01-06 | End: 2023-01-06 | Stop reason: HOSPADM

## 2023-01-06 RX ORDER — LABETALOL HYDROCHLORIDE 5 MG/ML
INJECTION, SOLUTION INTRAVENOUS AS NEEDED
Status: DISCONTINUED | OUTPATIENT
Start: 2023-01-06 | End: 2023-01-06 | Stop reason: SURG

## 2023-01-06 RX ORDER — SODIUM CHLORIDE 0.9 % (FLUSH) 0.9 %
3-10 SYRINGE (ML) INJECTION AS NEEDED
Status: DISCONTINUED | OUTPATIENT
Start: 2023-01-06 | End: 2023-01-06 | Stop reason: HOSPADM

## 2023-01-06 RX ORDER — ONDANSETRON 2 MG/ML
4 INJECTION INTRAMUSCULAR; INTRAVENOUS ONCE AS NEEDED
Status: DISCONTINUED | OUTPATIENT
Start: 2023-01-06 | End: 2023-01-06 | Stop reason: HOSPADM

## 2023-01-06 RX ORDER — LABETALOL HYDROCHLORIDE 5 MG/ML
5 INJECTION, SOLUTION INTRAVENOUS
Status: DISCONTINUED | OUTPATIENT
Start: 2023-01-06 | End: 2023-01-06 | Stop reason: HOSPADM

## 2023-01-06 RX ORDER — FENTANYL CITRATE 50 UG/ML
50 INJECTION, SOLUTION INTRAMUSCULAR; INTRAVENOUS
Status: DISCONTINUED | OUTPATIENT
Start: 2023-01-06 | End: 2023-01-06 | Stop reason: HOSPADM

## 2023-01-06 RX ORDER — SODIUM CHLORIDE 9 MG/ML
40 INJECTION, SOLUTION INTRAVENOUS AS NEEDED
Status: DISCONTINUED | OUTPATIENT
Start: 2023-01-06 | End: 2023-01-06 | Stop reason: HOSPADM

## 2023-01-06 RX ADMIN — LIDOCAINE HYDROCHLORIDE 0.2 ML: 10 INJECTION, SOLUTION EPIDURAL; INFILTRATION; INTRACAUDAL; PERINEURAL at 12:55

## 2023-01-06 RX ADMIN — FAMOTIDINE 20 MG: 20 TABLET, FILM COATED ORAL at 13:16

## 2023-01-06 RX ADMIN — PROPOFOL 80 MG: 10 INJECTION, EMULSION INTRAVENOUS at 14:02

## 2023-01-06 RX ADMIN — SODIUM CHLORIDE, POTASSIUM CHLORIDE, SODIUM LACTATE AND CALCIUM CHLORIDE 9 ML/HR: 600; 310; 30; 20 INJECTION, SOLUTION INTRAVENOUS at 12:55

## 2023-01-06 RX ADMIN — PROPOFOL 80 MG: 10 INJECTION, EMULSION INTRAVENOUS at 13:55

## 2023-01-06 RX ADMIN — LABETALOL HYDROCHLORIDE 5 MG: 5 INJECTION, SOLUTION INTRAVENOUS at 14:04

## 2023-01-06 RX ADMIN — CEFAZOLIN SODIUM 2 G: 2 INJECTION, SOLUTION INTRAVENOUS at 13:46

## 2023-01-06 RX ADMIN — LIDOCAINE HYDROCHLORIDE 50 MG: 10 INJECTION, SOLUTION EPIDURAL; INFILTRATION; INTRACAUDAL; PERINEURAL at 13:55

## 2023-01-06 RX ADMIN — PROPOFOL 70 MCG/KG/MIN: 10 INJECTION, EMULSION INTRAVENOUS at 13:56

## 2023-01-06 NOTE — ANESTHESIA PREPROCEDURE EVALUATION
Anesthesia Evaluation     Patient summary reviewed and Nursing notes reviewed                Airway   Mallampati: II  TM distance: >3 FB  Neck ROM: full  No difficulty expected  Dental - normal exam     Pulmonary - negative pulmonary ROS and normal exam   Cardiovascular - normal exam    (+) hypertension, CAD, hyperlipidemia,       Neuro/Psych  (+) seizures, syncope, psychiatric history,      ROS Comment: SHORT TERM MEMORY LOSS  H/O POSTOP CONFUSION  GI/Hepatic/Renal/Endo    (+)  GERD,  diabetes mellitus, thyroid problem hypothyroidism    Musculoskeletal     Abdominal  - normal exam    Bowel sounds: normal.   Substance History - negative use     OB/GYN negative ob/gyn ROS         Other   arthritis,    history of cancer (PROSTATE/COLON)                    Anesthesia Plan    ASA 3     general     intravenous induction     Anesthetic plan, risks, benefits, and alternatives have been provided, discussed and informed consent has been obtained with: patient.    Plan discussed with CRNA.        CODE STATUS:

## 2023-01-06 NOTE — ANESTHESIA POSTPROCEDURE EVALUATION
Patient: Lea Rivers    Procedure Summary     Date: 01/06/23 Room / Location:  [facility]    Anesthesia Start: 1346 Anesthesia Stop: 1423    Procedure: TRANSRECTAL ULTRASOUND GUIDED PROSTATE FIDUCIAL MARKER PLACEMENT (Bilateral: Rectum) Diagnosis:       Prostate cancer (HCC)      (Prostate cancer (HCC) [C61])    Surgeons: Naseem Noland MD Provider: Konstantin Coronado MD    Anesthesia Type: general ASA Status: 3          Anesthesia Type: general    Vitals  Vitals Value Taken Time   /93 01/06/23 1423   Temp 97 °F (36.1 °C) 01/06/23 1423   Pulse 65 01/06/23 1424   Resp 16 01/06/23 1423   SpO2 99 % 01/06/23 1424   Vitals shown include unvalidated device data.        Post Anesthesia Care and Evaluation    Patient location during evaluation: PACU  Patient participation: complete - patient participated  Level of consciousness: sleepy but conscious  Pain score: 0    Airway patency: patent  Anesthetic complications: No anesthetic complications  PONV Status: none  Cardiovascular status: stable  Respiratory status: spontaneous ventilation and nasal cannula  Hydration status: acceptable  No anesthesia care post op

## 2023-01-06 NOTE — INTERVAL H&P NOTE
[facility] Pre-op    Full history and physical note from office is attached.    BP (!) 175/110 (BP Location: Right arm, Patient Position: Sitting)   Pulse 81   Temp 97.8 °F (36.6 °C) (Temporal)   Resp 18   Ht 172.7 cm (68\")   Wt 91.2 kg (201 lb)   SpO2 98%   BMI 30.56 kg/m²       LAB Results:  Lab Results   Component Value Date    WBC 6.25 01/04/2023    HGB 12.2 (L) 01/04/2023    HCT 36.5 (L) 01/04/2023    MCV 92.2 01/04/2023     01/04/2023    NEUTROABS 8.64 (H) 04/16/2022    GLUCOSE 95 01/04/2023    BUN 16 01/04/2023    CREATININE 0.71 (L) 01/04/2023    EGFRIFNONA 64 09/10/2021     01/04/2023    K 3.9 01/04/2023     01/04/2023    CO2 27.0 01/04/2023    MG 1.7 09/30/2022    PHOS 2.8 09/30/2022    CALCIUM 9.0 01/04/2023    ALBUMIN 3.70 10/03/2022    AST 9 10/03/2022    ALT 6 10/03/2022    BILITOT 0.5 10/03/2022    PTT 30.6 03/24/2022    INR 1.08 03/24/2022       Cancer Staging (if applicable)  Cancer Patient: __ yes __no __unknown__N/A; If yes, clinical stage T:__ N:__M:__, stage group or __N/A      Impression: Prostate cancer      Plan: TRANSRECTAL ULTRASOUND GUIDED PROSTATE FIDUCIAL MARKER PLACEMENT      KATE Das   1/6/2023   13:33 EST

## 2023-01-06 NOTE — DISCHARGE INSTRUCTIONS
PROSTATE FIDUCIAL MARKER PLACEMENT    Care/Expectations     Follow these guidelines after your procedure in order to assist with your recovery.    Activity  - Limit yourself to light activity only for 2-3 days after your procedure to prevent rectal and urinary bleeding  - You may return to work in 24 hours     Bleeding  - It is common to notice bleeding in the urine, in the semen, and in the stool after your prostate biopsy   - Urinary bleeding usually stops within a week and is typically light  - Rectal bleeding or blood in the stool can persist for up to 1 week; if you experience very heavy rectal bleeding with large blood clots, or become light headed, present to the nearest emergency department and call your urologist  - Blood in the semen (red discoloration or “marcie” discoloration) can persist for up to 6 weeks and this is not inherently harmful to you or your partner     Urination  - You may experience a few days of urinary urgency and frequency after your biopsy which is expected  - Drink plenty of fluid to flush out your bladder and urethra   - If you are unable to urinate for more than 8 hours after your procedure, you may be experiencing urinary retention related to prostatic swelling, and should contact your urologist or present to the nearest emergency department for evaluation and possible urethral catheter placement     Bathing/Showering  - You may resume normal showering and bathing after your procedure     Pain Control  - You will likely have some rectal or pelvic discomfort after your procedure, but this is not typically severe, and very rarely requires the use of narcotics for pain control   - If you experience rectal or pelvic discomfort post-procedure, you should use over the counter Tylenol (Acetaminophen) or Advil/Motrin (Ibuprofen)     Sexual Activity  - Refrain from sexual activity and ejaculation for at least 1 week post-procedure to prevent bleeding and possibly pain    When to call your  doctor:     - ANY fever after prostate fiducial marker placement is ALWAYS considered significant and should be reported to your urologist right away as this can indicate the possibility of sepsis (bacteria in blood stream) which can be life threatening; the risk of post-prostate biopsy sepsis is less than 4%     - If you experience any of the following symptoms while at home, call your urologist and make plans to present to the nearest emergency department:     - Fever >100 F, shaking chills, nausea or vomiting, profuse sweating   - If you are unable to urinate for more than 8 hours after your biopsy, call your urologist and present to the nearest emergency department for evaluation

## 2023-01-06 NOTE — BRIEF OP NOTE
TRANSRECTAL INCISION PROSTATE WITH GOLD SEED  Progress Note    Lea Arnold [place]  1/6/2023    Pre-op Diagnosis:   Prostate cancer (HCC) [C61]       Post-Op Diagnosis Codes:     * Prostate cancer (HCC) [C61]      Procedure(s):  TRANSRECTAL ULTRASOUND GUIDED PROSTATE FIDUCIAL MARKER PLACEMENT        Surgeon(s):  Naseem Noland MD    Anesthesia: Choice    Staff:   Circulator: Sandra Dee RN  Scrub Person: Nella Iglesias Dragica  Nursing Assistant: Thea Raines PCT         Estimated Blood Loss: 0 mL    Urine Voided: * No values recorded between 1/6/2023  1:46 PM and 1/6/2023  2:18 PM *    Specimens:                None          Drains: * No LDAs found *    Findings: 5x prostate fiducial marker placement        Complications: None          Naseem Noland MD     Date: 1/6/2023  Time: 14:20 EST

## 2023-01-07 NOTE — OP NOTE
TRANSRECTAL INCISION PROSTATE WITH GOLD SEED  Procedure Report    Patient Name:  Lea Rivers  YOB: 1945    Date of Surgery:  1/6/2023     Indications: 77-year-old male with history of high risk prostate cancer, he presents today for fiducial marker placement prior to planned radiation therapy.    Pre-op Diagnosis:   Prostate cancer (HCC) [C61]       Post-Op Diagnosis Codes:     * Prostate cancer (HCC) [C61]      Procedure(s):  TRANSRECTAL ULTRASOUND GUIDED PROSTATE FIDUCIAL MARKER PLACEMENT (x5)     Staff:  Surgeon(s):  Naseem Noland MD         Anesthesia: Choice    Estimated Blood Loss: 0 mL    Implants:    Implant Name Type Inv. Item Serial No.  Lot No. LRB No. Used Action   MARKR TISS SOFT/1 [id number] GLD - [id number] Implant MARKR TISS SOFT/1 [id number] .9 GLD  [employer]IV[employer] RADIOTHERAP[employer] [id number] N/A 6 Implanted       Specimen:          None        Findings:   1.  5X gold fiducial markers placed at the bilateral prostate    Complications: None    Description of Procedure:    The patient was identified, informed consent was reviewed and signed.  I confirmed with the patient that he has been taking his preprocedure antibiotics appropriately.  He was brought back to the operating room, he was placed in the left lateral position.  He was anesthetized with monitored anesthesia care.  He was then placed in the left lateral position, with knees to chest.  The ultrasound probe was inserted into the patient's rectum.  The prostate was identified.  5 mL of 1% lidocaine was injected along the neurovascular bundle in the left side.  I then performed the same anesthetic injection on the left side.  Next starting at the base of the prostate on the patient's left side, I placed one gold marker in a lateral position.  I then moved to the left apex and placed another gold marker in a lateral position.  I then switched to the right side and performed the identical procedure, placing a gold  marker on the right lateral position, and then at the apex in a right lateral position.  I moved to the mid prostate and placed a medial marker at the right mid prostate.  A total of 5 gold markers were used.  No significant bleeding was encountered.  The rectal probe was held in place for 3 minutes to confirm hemostasis.  The rectal probe was removed and there was no significant blood per rectum noted.  The patient tolerated the procedure well denies significant pain or discomfort.    The patient was awoken from general anesthesia and transported to the PACU in stable condition.    PLAN  -Follow-up with radiation oncology for therapy planning, see me back in 5 to 6 months for repeat PSA            Naseem Noland MD     Date: 1/6/2023  Time: 19:12 EST

## 2023-01-12 ENCOUNTER — DOCUMENTATION (OUTPATIENT)
Dept: NUTRITION | Facility: HOSPITAL | Age: 78
End: 2023-01-12
Payer: MEDICARE

## 2023-01-12 NOTE — PROGRESS NOTES
ONC Nutrition    Phone consult with patient's daughter regarding the Gas Elimination Diet and instructions in preparation for the imaging scans and CK treatment for prostate cancer.  Reviewed the rationale for the diet modification, gas forming foods, schedule for following, Gas X, enemas, NPO status x 6 hours prior to MRI and appointment times. Daughter verbalized excellent comprehension of diet; all questions were addressed.

## 2023-01-17 ENCOUNTER — DOCUMENTATION (OUTPATIENT)
Dept: RADIATION ONCOLOGY | Facility: HOSPITAL | Age: 78
End: 2023-01-17
Payer: MEDICARE

## 2023-01-17 ENCOUNTER — OFFICE VISIT (OUTPATIENT)
Dept: RADIATION ONCOLOGY | Facility: HOSPITAL | Age: 78
End: 2023-01-17
Payer: MEDICARE

## 2023-01-17 ENCOUNTER — HOSPITAL ENCOUNTER (OUTPATIENT)
Dept: MRI IMAGING | Facility: HOSPITAL | Age: 78
Discharge: HOME | End: 2023-01-17
Payer: MEDICARE

## 2023-01-17 ENCOUNTER — HOSPITAL ENCOUNTER (OUTPATIENT)
Dept: RADIATION ONCOLOGY | Facility: HOSPITAL | Age: 78
Setting detail: RADIATION/ONCOLOGY SERIES
Discharge: HOME | End: 2023-01-17
Payer: MEDICARE

## 2023-01-17 ENCOUNTER — HOSPITAL ENCOUNTER (OUTPATIENT)
Dept: RADIATION ONCOLOGY | Facility: HOSPITAL | Age: 78
Discharge: HOME | End: 2023-01-17

## 2023-01-17 VITALS
BODY MASS INDEX: 29.25 KG/M2 | RESPIRATION RATE: 20 BRPM | OXYGEN SATURATION: 93 % | DIASTOLIC BLOOD PRESSURE: 102 MMHG | WEIGHT: 192.4 LBS | TEMPERATURE: 97.2 F | SYSTOLIC BLOOD PRESSURE: 193 MMHG | HEART RATE: 81 BPM

## 2023-01-17 DIAGNOSIS — C61 PROSTATE CANCER: Primary | ICD-10-CM

## 2023-01-17 DIAGNOSIS — C61 PROSTATE CANCER: ICD-10-CM

## 2023-01-17 PROCEDURE — 72195 MRI PELVIS W/O DYE: CPT

## 2023-01-17 PROCEDURE — 77399 UNLISTED PX MED RADJ PHYSICS: CPT | Performed by: RADIOLOGY

## 2023-01-17 PROCEDURE — G0463 HOSPITAL OUTPT CLINIC VISIT: HCPCS

## 2023-01-17 RX ORDER — LISINOPRIL 40 MG/1
TABLET ORAL
COMMUNITY
Start: 2023-01-12

## 2023-01-17 NOTE — PROGRESS NOTES
RADIATION ONCOLOGY PROGRESS NOTE  01/17/23    Pt and son in law given appt for repeat colonoscopy due to poor prep on last one in Dec.   Jacob 24, 23 @ [id number].  Explained he will use different prep this time and instructions and Rx are being mailed to him per Dr. Kauffman's office.  Pt and son in law verbalized understanding.

## 2023-01-17 NOTE — LETTER
2023     Naseem Noland MD  [address]  [address]  [facility] [address]    Patient: Lea Rivers   YOB: 1945   Date of Visit: 2023       Dear Naseem Noland MD    Lea Rivers was in my office today. Below is a copy of my note.    If you have questions, please do not hesitate to call me. I look forward to following Lea along with you.         Sincerely,        Jerman Luu MD        CC: Mannie Melvin MD    RE-EVALUATION    PATIENT:                                                      Lea Rivers  :                                                          1945  DATE:                          2023   DIAGNOSIS:     Prostate cancer (HCC)  - Stage IIC (cT2c, cN0, cM0, PSA: 11.3, Grade Group: 3)        BRIEF HISTORY:  The patient is a very pleasant 77 y.o. male  with intermediate risk prostate cancer.  He is status post prior greenlight laser procedure and TURP procedure.  He reports receiving androgen ablation injection and is tolerating that well.  He chose to undergo definitive treatment with stereotactic body radiotherapy.  He tolerated placement of gold seed fiducials without difficulty.  No change in voiding.  He has a history of colorectal cancer and was overdue for screening colonoscopy.  This has been tested since he was last here but prep was inadequate.  He has been rescheduled for colonoscopy 2023.  He returns today for simulation in preparation for CyberKnife treatment.  He has had no other change in health since a car consent was obtained 2023.  He remains a candidate for SBRT.  He has been following all recommended dietary restrictions and prep.    Allergies   Allergen Reactions   • Chlorhexidine Rash   • Sulfa Antibiotics Rash     Childhood reaction        Review of Systems   All other systems reviewed and are negative.               IPSS Questionnaire (AUA-7):  Over the past month…     1)   Incomplete Emptying  How often have you had a sensation of not emptying your bladder?  5 - Almost always   2)  Frequency  How often have you had to urinate less than every two hours? 3 - About half the time   3)  Intermittency  How often have you found you stopped and started again several times when you urinated?  2 - Less than half the time   4) Urgency  How often have you found it difficult to postpone urination?  5 - Almost always   5) Weak Stream  How often have you had a weak urinary stream?  1 - Less than 1 time in 5   6) Straining  How often have you had to push or strain to begin urination?  0 - Not at all   7) Nocturia  How many times did you typically get up at night to urinate?  1 - 1 time   Total Score:  17         Quality of life due to urinary symptoms:  If you were to spend the rest of your life with your urinary condition the way it is now, how would you feel about that? 6-Terrible   Urine Leakage (Incontinence) 0-No Leakage      Sexual Health Inventory  Current Status     1)  How do you rate your confidence that you could achieve and keep an erection? 1-Very Low   2) When you had erections with sexual stimulation, how often were your erections hard enough for penetration (entering your partner)? 0-No sexual activity   3)  During sexual intercourse, how often were you able to maintain your erection after you had penetrated (entered) into your partner? 0-Did not attempt intercourse   4) During sexual intercourse, how difficult was it to maintain your erection to completion of intercourse? 0-Did not attempt intercourse   5) When you attempted sexual intercourse, how often was it satisfactory to you? 0-No sexual activity   Total Score: 1         Bowel Health Inventory  Current Status: 0-No problems, no rectal bleeding, no discharge, less then 5 bowel movements a day                Objective    VITAL SIGNS:   Vitals:    01/17/23 1017   BP: (!) 193/102   Pulse: 81   Resp: 20   Temp: 97.2 °F (36.2 °C)    TempSrc: Skin   SpO2: 93%   Weight: 87.3 kg (192 lb 6.4 oz)                Physical Exam  Vitals and nursing note reviewed.   Constitutional:       Appearance: He is well-developed.   HENT:      Head: Normocephalic and atraumatic.   Cardiovascular:      Rate and Rhythm: Normal rate and regular rhythm.      Heart sounds: No murmur heard.  Pulmonary:      Effort: Pulmonary effort is normal.      Breath sounds: No wheezing or rales.   Abdominal:      General: There is no distension.      Palpations: Abdomen is soft.      Tenderness: There is no abdominal tenderness.   Musculoskeletal:         General: No tenderness. Normal range of motion.      Cervical back: Normal range of motion and neck supple.   Lymphadenopathy:      Cervical: No cervical adenopathy.      Upper Body:      Right upper body: No supraclavicular adenopathy.      Left upper body: No supraclavicular adenopathy.   Skin:     General: Skin is warm and dry.   Neurological:      Mental Status: He is alert and oriented to person, place, and time.      Sensory: No sensory deficit.   Psychiatric:         Behavior: Behavior normal.         Thought Content: Thought content normal.         Judgment: Judgment normal.             The following portions of the patient's history were reviewed and updated as appropriate: allergies, current medications, past family history, past medical history, past social history, past surgical history and problem list.    Diagnoses and all orders for this visit:    Prostate cancer (HCC)      IMPRESSION:  Prostate cancer, Bianca's 4+3=7, clinical stage IIC (T2c, N0, M0), PSA 11.32 ng/ml.  He has intermediate risk disease diffusely involving a relatively small residual prostate gland after 2 prior TURP procedures    Colon cancer, in remission.  Recent colonoscopy procedure had inadequate bowel prep and will be repeated prior to treatment of his prostate cancer.    Hypertension, persistently elevated blood pressure  value.    RECOMMENDATIONS: He will undergo treatment planning CT and MRI pelvis today.  The prostate gland and proximal seminal vesicles will receive 5 fractions of 7 Gray each, delivered on every other day treatment schedule.    Repeat colonoscopy planned for 11/24/2023.    He will follow-up with his primary physician regarding persistently elevated blood pressure values.        Jerman Luu MD     Approximately 15 minutes was spent during this visit, 10 minutes directly with patient and son reviewing procedure.

## 2023-01-17 NOTE — LETTER
2023     Naseem Noland MD  [address]  [address]  [facility] [address]    Patient: Lea Rivers   YOB: 1945   Date of Visit: 2023       Dear Naseem Noland MD    Lea Rivers was in my office today. Below is a copy of my note.    If you have questions, please do not hesitate to call me. I look forward to following Lea along with you.         Sincerely,        Jerman Luu MD        CC: No Recipients    RE-EVALUATION    PATIENT:                                                      Lea Rivers  :                                                          1945  DATE:                          2023   DIAGNOSIS:     Prostate cancer (HCC)  - Stage IIC (cT2c, cN0, cM0, PSA: 11.3, Grade Group: 3)        BRIEF HISTORY:  The patient is a very pleasant 77 y.o. male  with intermediate risk prostate cancer.  He is status post prior greenlight laser procedure and TURP procedure.  He reports receiving androgen ablation injection and is tolerating that well.  He chose to undergo definitive treatment with stereotactic body radiotherapy.  He tolerated placement of gold seed fiducials without difficulty.  No change in voiding.  He has a history of colorectal cancer and was overdue for screening colonoscopy.  This has been tested since he was last here but prep was inadequate.  He has been rescheduled for colonoscopy 2023.  He returns today for simulation in preparation for CyberKnife treatment.  He has had no other change in health since a car consent was obtained 2023.  He remains a candidate for SBRT.  He has been following all recommended dietary restrictions and prep.    Allergies   Allergen Reactions   • Chlorhexidine Rash   • Sulfa Antibiotics Rash     Childhood reaction        Review of Systems   All other systems reviewed and are negative.               IPSS Questionnaire (AUA-7):  Over the past month…     1)  Incomplete  Emptying  How often have you had a sensation of not emptying your bladder?  5 - Almost always   2)  Frequency  How often have you had to urinate less than every two hours? 3 - About half the time   3)  Intermittency  How often have you found you stopped and started again several times when you urinated?  2 - Less than half the time   4) Urgency  How often have you found it difficult to postpone urination?  5 - Almost always   5) Weak Stream  How often have you had a weak urinary stream?  1 - Less than 1 time in 5   6) Straining  How often have you had to push or strain to begin urination?  0 - Not at all   7) Nocturia  How many times did you typically get up at night to urinate?  1 - 1 time   Total Score:  17         Quality of life due to urinary symptoms:  If you were to spend the rest of your life with your urinary condition the way it is now, how would you feel about that? 6-Terrible   Urine Leakage (Incontinence) 0-No Leakage      Sexual Health Inventory  Current Status     1)  How do you rate your confidence that you could achieve and keep an erection? 1-Very Low   2) When you had erections with sexual stimulation, how often were your erections hard enough for penetration (entering your partner)? 0-No sexual activity   3)  During sexual intercourse, how often were you able to maintain your erection after you had penetrated (entered) into your partner? 0-Did not attempt intercourse   4) During sexual intercourse, how difficult was it to maintain your erection to completion of intercourse? 0-Did not attempt intercourse   5) When you attempted sexual intercourse, how often was it satisfactory to you? 0-No sexual activity   Total Score: 1         Bowel Health Inventory  Current Status: 0-No problems, no rectal bleeding, no discharge, less then 5 bowel movements a day                Objective    VITAL SIGNS:   Vitals:    01/17/23 1017   BP: (!) 193/102   Pulse: 81   Resp: 20   Temp: 97.2 °F (36.2 °C)   TempSrc:  Skin   SpO2: 93%   Weight: 87.3 kg (192 lb 6.4 oz)                Physical Exam  Vitals and nursing note reviewed.   Constitutional:       Appearance: He is well-developed.   HENT:      Head: Normocephalic and atraumatic.   Cardiovascular:      Rate and Rhythm: Normal rate and regular rhythm.      Heart sounds: No murmur heard.  Pulmonary:      Effort: Pulmonary effort is normal.      Breath sounds: No wheezing or rales.   Abdominal:      General: There is no distension.      Palpations: Abdomen is soft.      Tenderness: There is no abdominal tenderness.   Musculoskeletal:         General: No tenderness. Normal range of motion.      Cervical back: Normal range of motion and neck supple.   Lymphadenopathy:      Cervical: No cervical adenopathy.      Upper Body:      Right upper body: No supraclavicular adenopathy.      Left upper body: No supraclavicular adenopathy.   Skin:     General: Skin is warm and dry.   Neurological:      Mental Status: He is alert and oriented to person, place, and time.      Sensory: No sensory deficit.   Psychiatric:         Behavior: Behavior normal.         Thought Content: Thought content normal.         Judgment: Judgment normal.             The following portions of the patient's history were reviewed and updated as appropriate: allergies, current medications, past family history, past medical history, past social history, past surgical history and problem list.    Diagnoses and all orders for this visit:    Prostate cancer (HCC)      IMPRESSION:  Prostate cancer, Bianca's 4+3=7, clinical stage IIC (T2c, N0, M0), PSA 11.32 ng/ml.  He has intermediate risk disease diffusely involving a relatively small residual prostate gland after 2 prior TURP procedures    Colon cancer, in remission.  Recent colonoscopy procedure had inadequate bowel prep and will be repeated prior to treatment of his prostate cancer.    Hypertension, persistently elevated blood pressure value.    RECOMMENDATIONS: He  will undergo treatment planning CT and MRI pelvis today.  The prostate gland and proximal seminal vesicles will receive 5 fractions of 7 Gray each, delivered on every other day treatment schedule.    Repeat colonoscopy planned for 11/24/2023.    He will follow-up with his primary physician regarding persistently elevated blood pressure values.        Jerman Luu MD     Approximately 15 minutes was spent during this visit, 10 minutes directly with patient and son reviewing procedure.

## 2023-01-17 NOTE — PROGRESS NOTES
RE-EVALUATION    PATIENT:                                                      Lea Rivers  :                                                          1945  DATE:                          2023   DIAGNOSIS:     Prostate cancer (HCC)  - Stage IIC (cT2c, cN0, cM0, PSA: 11.3, Grade Group: 3)         BRIEF HISTORY:  The patient is a very pleasant 77 y.o. male  with intermediate risk prostate cancer.  He is status post prior greenlight laser procedure and TURP procedure.  He reports receiving androgen ablation injection and is tolerating that well.  He chose to undergo definitive treatment with stereotactic body radiotherapy.  He tolerated placement of gold seed fiducials without difficulty.  No change in voiding.  He has a history of colorectal cancer and was overdue for screening colonoscopy.  This has been tested since he was last here but prep was inadequate.  He has been rescheduled for colonoscopy 2023.  He returns today for simulation in preparation for CyberKnife treatment.  He has had no other change in health since a car consent was obtained 2023.  He remains a candidate for SBRT.  He has been following all recommended dietary restrictions and prep.    Allergies   Allergen Reactions   • Chlorhexidine Rash   • Sulfa Antibiotics Rash     Childhood reaction        Review of Systems   All other systems reviewed and are negative.                IPSS Questionnaire (AUA-7):  Over the past month…     1)  Incomplete Emptying  How often have you had a sensation of not emptying your bladder?  5 - Almost always   2)  Frequency  How often have you had to urinate less than every two hours? 3 - About half the time   3)  Intermittency  How often have you found you stopped and started again several times when you urinated?  2 - Less than half the time   4) Urgency  How often have you found it difficult to postpone urination?  5 - Almost always   5) Weak Stream  How often have you had a weak  urinary stream?  1 - Less than 1 time in 5   6) Straining  How often have you had to push or strain to begin urination?  0 - Not at all   7) Nocturia  How many times did you typically get up at night to urinate?  1 - 1 time   Total Score:  17         Quality of life due to urinary symptoms:  If you were to spend the rest of your life with your urinary condition the way it is now, how would you feel about that? 6-Terrible   Urine Leakage (Incontinence) 0-No Leakage      Sexual Health Inventory  Current Status     1)  How do you rate your confidence that you could achieve and keep an erection? 1-Very Low   2) When you had erections with sexual stimulation, how often were your erections hard enough for penetration (entering your partner)? 0-No sexual activity   3)  During sexual intercourse, how often were you able to maintain your erection after you had penetrated (entered) into your partner? 0-Did not attempt intercourse   4) During sexual intercourse, how difficult was it to maintain your erection to completion of intercourse? 0-Did not attempt intercourse   5) When you attempted sexual intercourse, how often was it satisfactory to you? 0-No sexual activity   Total Score: 1         Bowel Health Inventory  Current Status: 0-No problems, no rectal bleeding, no discharge, less then 5 bowel movements a day                Objective   VITAL SIGNS:   Vitals:    01/17/23 1017   BP: (!) 193/102   Pulse: 81   Resp: 20   Temp: 97.2 °F (36.2 °C)   TempSrc: Skin   SpO2: 93%   Weight: 87.3 kg (192 lb 6.4 oz)                Physical Exam  Vitals and nursing note reviewed.   Constitutional:       Appearance: He is well-developed.   HENT:      Head: Normocephalic and atraumatic.   Cardiovascular:      Rate and Rhythm: Normal rate and regular rhythm.      Heart sounds: No murmur heard.  Pulmonary:      Effort: Pulmonary effort is normal.      Breath sounds: No wheezing or rales.   Abdominal:      General: There is no distension.       Palpations: Abdomen is soft.      Tenderness: There is no abdominal tenderness.   Musculoskeletal:         General: No tenderness. Normal range of motion.      Cervical back: Normal range of motion and neck supple.   Lymphadenopathy:      Cervical: No cervical adenopathy.      Upper Body:      Right upper body: No supraclavicular adenopathy.      Left upper body: No supraclavicular adenopathy.   Skin:     General: Skin is warm and dry.   Neurological:      Mental Status: He is alert and oriented to person, place, and time.      Sensory: No sensory deficit.   Psychiatric:         Behavior: Behavior normal.         Thought Content: Thought content normal.         Judgment: Judgment normal.              The following portions of the patient's history were reviewed and updated as appropriate: allergies, current medications, past family history, past medical history, past social history, past surgical history and problem list.    Diagnoses and all orders for this visit:    Prostate cancer (HCC)      IMPRESSION:  Prostate cancer, Bianca's 4+3=7, clinical stage IIC (T2c, N0, M0), PSA 11.32 ng/ml.  He has intermediate risk disease diffusely involving a relatively small residual prostate gland after 2 prior TURP procedures    Colon cancer, in remission.  Recent colonoscopy procedure had inadequate bowel prep and will be repeated prior to treatment of his prostate cancer.    Hypertension, persistently elevated blood pressure value.    RECOMMENDATIONS: He will undergo treatment planning CT and MRI pelvis today.  The prostate gland and proximal seminal vesicles will receive 5 fractions of 7 Gray each, delivered on every other day treatment schedule.    Repeat colonoscopy planned for 11/24/2023.    He will follow-up with his primary physician regarding persistently elevated blood pressure values.         Jerman Luu MD     Approximately 15 minutes was spent during this visit, 10 minutes directly with patient and son  reviewing procedure.

## 2023-01-24 ENCOUNTER — OUTSIDE FACILITY SERVICE (OUTPATIENT)
Dept: GASTROENTEROLOGY | Facility: CLINIC | Age: 78
End: 2023-01-24
Payer: MEDICARE

## 2023-01-24 PROCEDURE — 88305 TISSUE EXAM BY PATHOLOGIST: CPT

## 2023-01-24 PROCEDURE — 45385 COLONOSCOPY W/LESION REMOVAL: CPT | Performed by: INTERNAL MEDICINE

## 2023-01-25 ENCOUNTER — LAB REQUISITION (OUTPATIENT)
Dept: LAB | Facility: HOSPITAL | Age: 78
End: 2023-01-25
Payer: MEDICARE

## 2023-01-25 DIAGNOSIS — D12.0 BENIGN NEOPLASM OF CECUM: ICD-10-CM

## 2023-01-25 DIAGNOSIS — Z86.010 PERSONAL HISTORY OF COLONIC POLYPS: ICD-10-CM

## 2023-01-25 DIAGNOSIS — Z80.0 FAMILY HISTORY OF MALIGNANT NEOPLASM OF DIGESTIVE ORGANS: ICD-10-CM

## 2023-01-25 DIAGNOSIS — D12.3 BENIGN NEOPLASM OF TRANSVERSE COLON: ICD-10-CM

## 2023-01-25 DIAGNOSIS — K64.8 OTHER HEMORRHOIDS: ICD-10-CM

## 2023-01-25 DIAGNOSIS — Z12.11 ENCOUNTER FOR SCREENING FOR MALIGNANT NEOPLASM OF COLON: ICD-10-CM

## 2023-01-25 PROCEDURE — 77338 DESIGN MLC DEVICE FOR IMRT: CPT | Performed by: RADIOLOGY

## 2023-01-25 PROCEDURE — 77300 RADIATION THERAPY DOSE PLAN: CPT | Performed by: RADIOLOGY

## 2023-01-25 PROCEDURE — 77301 RADIOTHERAPY DOSE PLAN IMRT: CPT | Performed by: RADIOLOGY

## 2023-01-26 LAB — REF LAB TEST METHOD: NORMAL

## 2023-01-27 ENCOUNTER — TELEPHONE (OUTPATIENT)
Dept: GASTROENTEROLOGY | Facility: CLINIC | Age: 78
End: 2023-01-27
Payer: MEDICARE

## 2023-01-27 NOTE — TELEPHONE ENCOUNTER
----- Message from Benja Kauffmna MD sent at 1/27/2023  2:59 PM EST -----  Let Mr. Rivers's family know there were adenoma and serrated adenoma polyps.  The next examination should be in 1 year given the bowel preparation.  He will need a 2-day bowel preparation for the next examination

## 2023-01-30 ENCOUNTER — HOSPITAL ENCOUNTER (OUTPATIENT)
Dept: RADIATION ONCOLOGY | Facility: HOSPITAL | Age: 78
Discharge: HOME | End: 2023-01-30

## 2023-01-30 PROCEDURE — 77373 STRTCTC BDY RAD THER TX DLVR: CPT | Performed by: RADIOLOGY

## 2023-02-01 ENCOUNTER — HOSPITAL ENCOUNTER (OUTPATIENT)
Dept: RADIATION ONCOLOGY | Facility: HOSPITAL | Age: 78
Discharge: HOME | End: 2023-02-01

## 2023-02-01 ENCOUNTER — HOSPITAL ENCOUNTER (OUTPATIENT)
Dept: RADIATION ONCOLOGY | Facility: HOSPITAL | Age: 78
Setting detail: RADIATION/ONCOLOGY SERIES
Discharge: HOME | End: 2023-02-01
Payer: MEDICARE

## 2023-02-01 PROCEDURE — 77373 STRTCTC BDY RAD THER TX DLVR: CPT | Performed by: RADIOLOGY

## 2023-02-03 ENCOUNTER — HOSPITAL ENCOUNTER (OUTPATIENT)
Dept: RADIATION ONCOLOGY | Facility: HOSPITAL | Age: 78
Discharge: HOME | End: 2023-02-03

## 2023-02-03 PROCEDURE — 77373 STRTCTC BDY RAD THER TX DLVR: CPT | Performed by: RADIOLOGY

## 2023-02-07 ENCOUNTER — HOSPITAL ENCOUNTER (OUTPATIENT)
Dept: RADIATION ONCOLOGY | Facility: HOSPITAL | Age: 78
Discharge: HOME | End: 2023-02-07

## 2023-02-07 PROCEDURE — 77373 STRTCTC BDY RAD THER TX DLVR: CPT | Performed by: RADIOLOGY

## 2023-02-09 ENCOUNTER — HOSPITAL ENCOUNTER (OUTPATIENT)
Dept: RADIATION ONCOLOGY | Facility: HOSPITAL | Age: 78
Discharge: HOME | End: 2023-02-09

## 2023-02-09 PROCEDURE — 77373 STRTCTC BDY RAD THER TX DLVR: CPT | Performed by: RADIOLOGY

## 2023-02-09 PROCEDURE — 77336 RADIATION PHYSICS CONSULT: CPT | Performed by: RADIOLOGY

## 2023-02-22 ENCOUNTER — OFFICE VISIT (OUTPATIENT)
Dept: UROLOGY | Facility: CLINIC | Age: 78
End: 2023-02-22
Payer: MEDICARE

## 2023-02-22 VITALS
WEIGHT: 192 LBS | SYSTOLIC BLOOD PRESSURE: 136 MMHG | OXYGEN SATURATION: 96 % | HEIGHT: 68 IN | BODY MASS INDEX: 29.1 KG/M2 | DIASTOLIC BLOOD PRESSURE: 72 MMHG | HEART RATE: 68 BPM

## 2023-02-22 DIAGNOSIS — C61 PROSTATE CANCER: Primary | ICD-10-CM

## 2023-02-22 DIAGNOSIS — R39.15 URINARY URGENCY: ICD-10-CM

## 2023-02-22 PROCEDURE — 99214 OFFICE O/P EST MOD 30 MIN: CPT | Performed by: NURSE PRACTITIONER

## 2023-02-22 PROCEDURE — 51798 US URINE CAPACITY MEASURE: CPT | Performed by: NURSE PRACTITIONER

## 2023-02-22 NOTE — PROGRESS NOTES
Follow Up Office Visit      Patient Name: Lea Rivers  : 1945   MRN: [id number]     Chief Complaint:    Chief Complaint   Patient presents with   • Prostate Cancer       Referring Provider: No ref. provider found    History of Present Illness: Lea Rivers is a 77 y.o. male who presents today for follow up of prostate cancer.     Patient underwent prostate biopsy in  which resulted in high-volume unfavorable intermediate risk prostate cancer.     PSA prior to biopsy was 11.32 in the setting of a very small 10 cc prostate.  MRI prostate demonstrated large PI-RADS 4 lesion throughout the remaining of the peripheral zone.    He recently completed Cyberknife radiation therapy on 23.  Describes no problems with his radiation.    He continues to report urinary frequency and urinary urgency with leakage.   He feels that he empties his bladder. He has been wearing 2-3 pads per day.   He denies dysuria or hematuria.     Bladder scan  cc.     IPSS 19. He mainly reports storage related symptoms, he is a relatively poor historian so somewhat difficult to assess whether he is having a weak stream or not.  Sometimes states that stream comes out full    History of BPH and underwent greenlight photo vaporization of the prostate in 2018.   He is currently taking Flomax once daily.         Sagittal MRI prostate performed 2023 demonstrates wide open prostatic fossa and bladder neck with minimally distended bladder.      Subjective      Review of System: Review of Systems   Constitutional: Negative.    HENT: Negative.    Eyes: Negative.    Respiratory: Negative.    Cardiovascular: Negative.    Gastrointestinal: Negative.    Endocrine: Negative.    Genitourinary: Positive for frequency and urgency.   Musculoskeletal: Negative.    Skin: Negative.    Allergic/Immunologic: Negative.    Neurological: Negative.    Hematological: Negative.    Psychiatric/Behavioral: Negative.        I have reviewed the ROS documented by my clinical staff, I have updated appropriately and I agree. KATE Barajas    I have reviewed and the following portions of the patient's history were updated as appropriate: past family history, past medical history, past social history, past surgical history and problem list.    Medications:     Current Outpatient Medications:   •  acetaminophen (TYLENOL) 500 MG tablet, Take 2 tablets by mouth Every 8 (Eight) Hours As Needed for Mild Pain ., Disp: , Rfl:   •  divalproex (DEPAKOTE) 500 MG 24 hr tablet, Take 500 mg by mouth 2 (two) times a day. 500mg QAM, 1000mg QPM, Disp: , Rfl:   •  fluticasone (FLONASE) 50 MCG/ACT nasal spray, 2 sprays into the nostril(s) as directed by provider As Needed for Rhinitis. Administer 2 sprays in each nostril for each dose., Disp: , Rfl:   •  isosorbide mononitrate (IMDUR) 30 MG 24 hr tablet, Take 1 tablet by mouth 2 (Two) Times a Day., Disp: , Rfl:   •  levETIRAcetam (KEPPRA) 500 MG tablet, Take 500 mg by mouth 2 (two) times a day., Disp: , Rfl:   •  levothyroxine (SYNTHROID, LEVOTHROID) 88 MCG tablet, Take 88 mcg by mouth daily., Disp: , Rfl:   •  lisinopril (PRINIVIL,ZESTRIL) 40 MG tablet, , Disp: , Rfl:   •  metFORMIN ER (GLUCOPHAGE-XR) 500 MG 24 hr tablet, Take 500 mg by mouth 2 (Two) Times a Day., Disp: , Rfl:   •  omeprazole (priLOSEC) 20 MG capsule, Take 20 mg by mouth Daily., Disp: , Rfl:   •  polyethylene glycol (GoLYTELY) 236 g solution, FOLLOW INSTRUCTIONS RECEIVED IN THE MAIL., Disp: 4000 mL, Rfl: 0  •  sodium chloride 1 g tablet, Take 2 g by mouth Daily., Disp: , Rfl:   •  tamsulosin (FLOMAX) 0.4 MG capsule 24 hr capsule, Take 1 capsule by mouth Daily., Disp: 90 capsule, Rfl: 3  •  verapamil SR (CALAN-SR) 240 MG CR tablet, Take 240 mg by mouth Daily., Disp: , Rfl:   •  Vibegron 75 MG tablet, Take 1 tablet by mouth Daily., Disp: 42 tablet, Rfl: 0    Allergies:   Allergies   Allergen Reactions   • Chlorhexidine Rash   • Sulfa  Antibiotics Rash     Childhood reaction        IPSS Questionnaire (AUA-7):  Over the past month…    1)  Incomplete Emptying:       How often have you had a sensation of not emptying you had the sensation of not emptying your bladder completely after you finished urinating?  4 - More than half the time   2)  Frequency:       How often have you had the urinate again less than two hours after you finished urinating?  4 - More than half the time   3)  Intermittency:       How often have you found you stopped and started again several times when you urinated?   2 - Less than half the time   4) Urgency:      How often have you found it difficult to postpone urination?  3 - About half the time   5) Weak Stream:      How often have you had a weak urinary stream?  1 - Less than 1 time in 5   6) Straining:       How often have you had to push or strain to begin urination?  1 - Less than 1 time in 5   7) Nocturia:      How many times did you most typically get up to urinate from the time you went to bed at night until the time you got up in the morning?  4 - 4 times   Total Score:  19   The International Prostate Symptom Score (IPSS) is used to screen, diagnose, track symptoms of benign prostatic hyperplasia (BPH).   0-7 (Mild Symptoms) 8-19 (Moderate) 20-35 (Severe)   Quality of Life (QoL):  If you were to spend the rest of your life with your urinary condition just the way it is now, how would you feel about that? 3-Mixed   Urine Leakage (Incontinence) 1-Mild (A few drops a day, no pad use)     Sexual Health Inventory for Men (LAUREN)   Over the past 6 months:     1. How do you rate your confidence that you could get and keep an erection?  0 - No sexual activity    2. When you had erections with sexual  stimulation, how often were your erections hard enough for penetration (entering your partner)?  0 - No Sexual Activity    3. During sexual intercourse, how often were you able to maintain your erection after you had penetrated  "(entered) your partner?  0 - Did not attempt intercourse   4. During sexual intercourse, how difficult was it to maintain your erection to completion of intercourse?  0 - Did not attempt intercourse   5. When you attempted sexual intercourse, how often was it satisfactory for you?  0 - Did not attempt intercourse    Total Score: 0   The Sexual Health Inventory for Men further classifies ED severity with the following breakpoints:   1-7 (Severe ED) 8-11 (Moderate ED) 12-16 (Mild to Moderate ED) 17-21 (Mild ED)      Post void residual bladder scan:   120 cc    Objective     Physical Exam:   Vital Signs:   Vitals:    02/22/23 1018   BP: 136/72   Pulse: 68   SpO2: 96%   Weight: 87.1 kg (192 lb)   Height: 172.7 cm (67.99\")   PainSc: 0-No pain     Body mass index is 29.2 kg/m².     Physical Exam  Vitals and nursing note reviewed.   Constitutional:       Appearance: Normal appearance.   HENT:      Head: Normocephalic and atraumatic.      Nose: Nose normal.      Mouth/Throat:      Mouth: Mucous membranes are moist.   Eyes:      Pupils: Pupils are equal, round, and reactive to light.   Pulmonary:      Effort: Pulmonary effort is normal.   Abdominal:      General: Abdomen is flat.      Palpations: Abdomen is soft.   Genitourinary:     Testes: Normal.      Comments: Uncircumcised.  Musculoskeletal:         General: Normal range of motion.      Cervical back: Normal range of motion.   Skin:     General: Skin is warm and dry.      Capillary Refill: Capillary refill takes less than 2 seconds.   Neurological:      General: No focal deficit present.      Mental Status: He is alert.   Psychiatric:         Mood and Affect: Mood normal.         Labs:   Brief Urine Lab Results  (Last result in the past 365 days)      Color   Clarity   Blood   Leuk Est   Nitrite   Protein   CREAT   Urine HCG        01/04/23 1227 Yellow   Clear   Negative   Negative   Negative   Negative                      Lab Results   Component Value Date    GLUCOSE " 98 01/06/2023    CALCIUM 9.6 01/06/2023     01/06/2023    K 3.5 01/06/2023    CO2 24.0 01/06/2023    CL 98 01/06/2023    BUN 14 01/06/2023    CREATININE 0.83 01/06/2023    EGFRIFNONA 64 09/10/2021    BCR 16.9 01/06/2023    ANIONGAP 14.0 01/06/2023       Lab Results   Component Value Date    WBC 6.25 01/04/2023    HGB 12.2 (L) 01/04/2023    HCT 36.5 (L) 01/04/2023    MCV 92.2 01/04/2023     01/04/2023       Images:   NM Bone Scan Whole Body    Result Date: 11/30/2022  Low-level changes typical of DJD as described. No findings suspicious for metastatic disease are identified.  This report was finalized on 11/30/2022 4:14 PM by Dr. Fausto Johns MD.      CT Abdomen Pelvis With Contrast    Result Date: 11/30/2022   1. Irregular nodular enhancement of the prostate, consistent with patient's reported history of prostate cancer. No definite findings to suggest metastatic disease within the abdomen or pelvis. 3. Additional findings as given above.    This report was finalized on 11/30/2022 1:42 PM by Blake Grayson MD.      MRI Cyberknife Pelvis Without Contrast    Result Date: 1/17/2023  Impression: Multiple fiducial markers confirmed in the prostate. Electronically Signed: Abraham Mouser  1/17/2023 5:04 PM EST  Workstation ID: [id number]      Measures:   Tobacco:   Lea [place]  reports that he has never smoked. He has been exposed to tobacco smoke. He has never used smokeless tobacco..       Urine Incontinence: Patient reports that he is currently experiencing symptoms of urinary incontinence.    Assessment / Plan      Assessment/Plan:   77 y.o. male who presented today for follow up of lower urinary tract symptoms and prostate cancer s/p CyberKnife radiation therapy. He is currently managed on Flomax 0.4mg once daily.  He continues to complain of the urinary tract symptoms including urgency, frequency and possibly a reduced stream but he is a poor historian.  His PVR is elevated however given his mild obesity  this could be inaccurate.  He will begin once daily Gemtesa 75mg for his urinary urgency and urinary frequency. Samples provided in office today. He will follow up in one month to assess urinary symptoms. If his urinary urgency and frequency does not improve while taking Gemtesa 75mg once daily, we will plan for Cystoscopy for further assessment to rule out residual prostatic obstruction, urethral stricture or bladder neck contracture, recent MRI obtained for CyberKnife therapy demonstrates a fairly widely patent prostate fossa and bladder neck however difficult to assess the bulbar or membranous urethra.     We will also plan for 6 month follow up with PSA prior. He is understanding and agreeable with plan of care.     Diagnoses and all orders for this visit:    1. Prostate cancer (HCC) (Primary)  -     PSA Diagnostic; Future    2. Urinary urgency  -     Vibegron 75 MG tablet; Take 1 tablet by mouth Daily.  Dispense: 42 tablet; Refill: 0         Follow Up:   Return in about 4 weeks (around 3/22/2023) for Dr. Noland, PSA prior.    I spent approximately 30 minutes providing clinical care for this patient; including review of patient's chart and provider documentation, face to face time spent with patient in examination room (obtaining history, performing physical exam, discussing diagnosis and management options), placing orders, and completing patient documentation.     Naseem Noland MD  [facility] [place]

## 2023-03-10 ENCOUNTER — OFFICE VISIT (OUTPATIENT)
Dept: RADIATION ONCOLOGY | Facility: HOSPITAL | Age: 78
End: 2023-03-10
Payer: MEDICARE

## 2023-03-10 ENCOUNTER — HOSPITAL ENCOUNTER (OUTPATIENT)
Dept: RADIATION ONCOLOGY | Facility: HOSPITAL | Age: 78
Setting detail: RADIATION/ONCOLOGY SERIES
Discharge: HOME | End: 2023-03-10
Payer: MEDICARE

## 2023-03-10 DIAGNOSIS — C61 PROSTATE CANCER: Primary | ICD-10-CM

## 2023-03-10 NOTE — PROGRESS NOTES
TELEMEDICINE FOLLOW UP NOTE    PATIENT:                                                      Lea Rivers  MEDICAL RECORD #:                        [id number]  :                                                          1945  COMPLETION DATE:   2023  DIAGNOSIS:     Prostate cancer (HCC)  - Stage IIC (cT2c, cN0, cM0, PSA: 11.3, Grade Group: 3)    This visit has been converted to a telehealth virtual visit.  Total time of discussion was 15 minutes.  The patient has given verbal consent.    COVID-19 RISK SCREEN     1. Has the patient had close contact or exposure without PPE with a lab confirmed COVID-19 (+) person or a person under investigation (PUI) for COVID-19 infection?  -- No     2. Has the patient had respiratory symptoms, worsened/new cough and/or SOA, unexplained fever, or sudden loss of smell and/or taste in the past week? --  No    3. Has the patient completed COVID vaccination? --  Yes         BRIEF HISTORY:    Initial follow-up visit.  He has unfavorable intermediate risk prostate cancer diffusely involving a relatively small residual prostate gland after prior greenlight laser procedure and more extensive TURP procedure.  Staging nuclear medicine bone scan and CT abdomen pelvis are without evidence of metastatic disease.  He initiated short course ADT with single 6-month Lupron injection on 2022.  He has since completed definitive CyberKnife stereotactic body radiotherapy.  He tolerated treatment well.  He notes chronic fatigue, not acutely exacerbated following treatment.  He reports brief duration of dysuria following treatment has resolved.  He continues to endorse chronic urinary outflow obstructive symptoms, namely urinary frequency and urinary urgency with leakage and 2-3 pads per day.  He continues on nightly Flomax.    He was recently started on trial of Gemtesa, with reported improvement in urgency and strength of stream.  He reports he is also emptying his bladder a bit  better.  Following SBRT, he experienced small-volume, hard stool.  This was self-limited and he reports bowel function has returned to normal.  He denies acute GI complaints.  He denies hot flashes or associated side effects to hormone blockade.  No new or progressive bony pains.  No new weight loss.  Overall, he feels well.      IPSS Questionnaire (AUA-7):  Over the past month…    1)  Incomplete Emptying  How often have you had a sensation of not emptying your bladder?  5 - Almost always   2)  Frequency  How often have you had to urinate less than every two hours? 3 - About half the time   3)  Intermittency  How often have you found you stopped and started again several times when you urinated?  2 - Less than half the time   4) Urgency  How often have you found it difficult to postpone urination?  4 - More than half the time   5) Weak Stream  How often have you had a weak urinary stream?  1 - Less than 1 time in 5   6) Straining  How often have you had to push or strain to begin urination?  0 - Not at all   7) Nocturia  How many times did you typically get up at night to urinate?  1 - 1 time   Total Score:  16       Quality of life due to urinary symptoms:  If you were to spend the rest of your life with your urinary condition the way it is now, how would you feel about that? 5-Unhappy   Urine Leakage (Incontinence) 2-Mild (More than a few drops a day, 1-2 pads/day)     Sexual Health Inventory  Current Status    1)  How do you rate your confidence that you could achieve and keep an erection? 1-Very Low   2) When you had erections with sexual stimulation, how often were your erections hard enough for penetration (entering your partner)? 0-No sexual activity   3)  During sexual intercourse, how often were you able to maintain your erection after you had penetrated (entered) into your partner? 0-Did not attempt intercourse   4) During sexual intercourse, how difficult was it to maintain your erection to completion of  intercourse? 0-Did not attempt intercourse   5) When you attempted sexual intercourse, how often was it satisfactory to you? 0-No sexual activity   Total Score: 1       Bowel Health Inventory  Current Status: 0-No problems, no rectal bleeding, no discharge, less then 5 bowel movements a day           MEDICATIONS: Medication reconciliation for the patient was reviewed and confirmed in the electronic medical record.    Review of Systems   Constitutional: Positive for fatigue.   Genitourinary: Positive for frequency and nocturia.    Musculoskeletal: Positive for arthralgias and back pain.   All other systems reviewed and are negative.          KPS 90%    Physical Exam  Pulmonary:      Respirations even, unlabored. No audible wheezing or cough.  Neurological:      A+Ox4, conversant, answers questions appropriately.  Psychiatric:     Judgement, affect, and decision-making WNL.    Limited physical exam as visit was conducted remotely via telephone.        The following portions of the patient's history were reviewed and updated as appropriate: allergies, current medications, past family history, past medical history, past social history, past surgical history and problem list.         Diagnoses and all orders for this visit:    1. Prostate cancer (HCC) (Primary)         IMPRESSION:  Prostate cancer, Bianca's 4+3=7, clinical stage IIC (T2c, N0, M0), PSA 11.32 ng/ml.  He is tolerating short course androgen ablation without reported issue.  1 month status post CyberKnife SBRT.  He tolerated treatment well.  He developed exacerbation of grade 1 dysuria and grade 1 bowel side effects, both resolved, and no acute radiation-related toxicity otherwise.  Given his chronic lower urinary tract symptoms, he continues use of daily alpha-blocker and recent addition of Gemtesa, managed per urology.    Mr. Rivers and I have reviewed the survivorship care plan in detail.  We discussed diagnosis, follow-up intervals, biannual PSA  monitoring, and expectations for response to treatment.  A copy of the care plan has been mailed to the patient.  A copy has also been sent to his PCP.    Colon cancer, in remission.      RECOMMENDATIONS: Mr. Rivers continues routine urologic surveillance and serial PSA monitoring under the care of Dr. Noland, with next follow-up scheduled 3/23/2023.  We will schedule the patient to return to our clinic in 6 months.        Return in about 6 months (around 9/10/2023) for Office Visit.    KATE Ng spent a total of 30 minutes on today's visit, with more than 15 minutes in virtual communication with the patient via telephone, and the remainder of the time spent in reviewing the relevant history, records, available imaging, and for documentation.

## 2023-03-23 ENCOUNTER — OFFICE VISIT (OUTPATIENT)
Dept: UROLOGY | Facility: CLINIC | Age: 78
End: 2023-03-23
Payer: MEDICARE

## 2023-03-23 VITALS — HEIGHT: 68 IN | WEIGHT: 192 LBS | BODY MASS INDEX: 29.1 KG/M2

## 2023-03-23 DIAGNOSIS — N40.1 BENIGN PROSTATIC HYPERPLASIA WITH LOWER URINARY TRACT SYMPTOMS, SYMPTOM DETAILS UNSPECIFIED: Chronic | ICD-10-CM

## 2023-03-23 DIAGNOSIS — C61 PROSTATE CANCER: Primary | ICD-10-CM

## 2023-03-23 PROCEDURE — 1159F MED LIST DOCD IN RCRD: CPT | Performed by: STUDENT IN AN ORGANIZED HEALTH CARE EDUCATION/TRAINING PROGRAM

## 2023-03-23 PROCEDURE — 99213 OFFICE O/P EST LOW 20 MIN: CPT | Performed by: STUDENT IN AN ORGANIZED HEALTH CARE EDUCATION/TRAINING PROGRAM

## 2023-03-23 PROCEDURE — 51798 US URINE CAPACITY MEASURE: CPT | Performed by: STUDENT IN AN ORGANIZED HEALTH CARE EDUCATION/TRAINING PROGRAM

## 2023-03-23 PROCEDURE — 1160F RVW MEDS BY RX/DR IN RCRD: CPT | Performed by: STUDENT IN AN ORGANIZED HEALTH CARE EDUCATION/TRAINING PROGRAM

## 2023-03-23 NOTE — PROGRESS NOTES
Follow Up Office Visit      Patient Name: Lea Rivers  : 1945   MRN: [id number]     Chief Complaint:    Chief Complaint   Patient presents with   • Prostate Cancer   • Urinary Urgency       Referring Provider: No ref. provider found    History of Present Illness: Lea Rivers is a 77 y.o. male who presents today for follow up of  Prostate cancer and persistent LUTS despite remote Greenlight PVP.  After remote greenlight PVP patient's PSA pradeep to 11.32, he was found to have a large PI-RADS 4 lesion throughout the remaining small amount of prostate tissue.  He was found to have unfavorable intermediate risk prostate cancer throughout the prostate.  He has had elevated bladder volumes.    He completed salvage prostatic radiation  - .     His main complaint is weak stream despite Tamsulosin and prior Greenlight PVP.     His PVR remains consistently elevated, today is 120 mL.  PSS 20, severe symptoms.  Continues to take tamsulosin and vibegron with minimal improvement in symptoms.  He has not undergone previous cystoscopy.    Subjective      Review of System: Review of Systems   Genitourinary: Positive for frequency and urgency.      I have reviewed the ROS documented by my clinical staff, I have updated appropriately and I agree. Naseem Noland MD    I have reviewed and the following portions of the patient's history were updated as appropriate: past family history, past medical history, past social history, past surgical history and problem list.    Medications:     Current Outpatient Medications:   •  acetaminophen (TYLENOL) 500 MG tablet, Take 2 tablets by mouth Every 8 (Eight) Hours As Needed for Mild Pain ., Disp: , Rfl:   •  divalproex (DEPAKOTE) 500 MG 24 hr tablet, Take 1 tablet by mouth 2 (two) times a day. 500mg QAM, 1000mg QPM, Disp: , Rfl:   •  fluticasone (FLONASE) 50 MCG/ACT nasal spray, 2 sprays into the nostril(s) as directed by provider As Needed for Rhinitis.  Administer 2 sprays in each nostril for each dose., Disp: , Rfl:   •  isosorbide mononitrate (IMDUR) 30 MG 24 hr tablet, Take 1 tablet by mouth 2 (Two) Times a Day., Disp: , Rfl:   •  levETIRAcetam (KEPPRA) 500 MG tablet, Take 1 tablet by mouth 2 (Two) Times a Day., Disp: , Rfl:   •  levothyroxine (SYNTHROID, LEVOTHROID) 88 MCG tablet, Take 1 tablet by mouth Daily., Disp: , Rfl:   •  lisinopril (PRINIVIL,ZESTRIL) 40 MG tablet, , Disp: , Rfl:   •  metFORMIN ER (GLUCOPHAGE-XR) 500 MG 24 hr tablet, Take 1 tablet by mouth 2 (Two) Times a Day., Disp: , Rfl:   •  omeprazole (priLOSEC) 20 MG capsule, Take 1 capsule by mouth Daily., Disp: , Rfl:   •  polyethylene glycol (GoLYTELY) 236 g solution, FOLLOW INSTRUCTIONS RECEIVED IN THE MAIL., Disp: 4000 mL, Rfl: 0  •  sodium chloride 1 g tablet, Take 2 tablets by mouth Daily., Disp: , Rfl:   •  tamsulosin (FLOMAX) 0.4 MG capsule 24 hr capsule, Take 1 capsule by mouth Daily., Disp: 90 capsule, Rfl: 3  •  verapamil SR (CALAN-SR) 240 MG CR tablet, Take 1 tablet by mouth Daily., Disp: , Rfl:   •  Vibegron 75 MG tablet, Take 1 tablet by mouth Daily., Disp: 42 tablet, Rfl: 0    Allergies:   Allergies   Allergen Reactions   • Chlorhexidine Rash   • Sulfa Antibiotics Rash     Childhood reaction        IPSS Questionnaire (AUA-7):  Over the past month…    1)  Incomplete Emptying:       How often have you had a sensation of not emptying you had the sensation of not emptying your bladder completely after you finished urinating?  2 - Less than half the time   2)  Frequency:       How often have you had the urinate again less than two hours after you finished urinating?  2 - Less than half the time   3)  Intermittency:       How often have you found you stopped and started again several times when you urinated?   3 - About half the time   4) Urgency:      How often have you found it difficult to postpone urination?  3 - About half the time   5) Weak Stream:      How often have you had a weak  "urinary stream?  2 - Less than half the time   6) Straining:       How often have you had to push or strain to begin urination?  3 - About half the time   7) Nocturia:      How many times did you most typically get up to urinate from the time you went to bed at night until the time you got up in the morning?  5 - 5+ times   Total Score:  20   The International Prostate Symptom Score (IPSS) is used to screen, diagnose, track symptoms of benign prostatic hyperplasia (BPH).   0-7 (Mild Symptoms) 8-19 (Moderate) 20-35 (Severe)   Quality of Life (QoL):  If you were to spend the rest of your life with your urinary condition just the way it is now, how would you feel about that? 3-Mixed   Urine Leakage (Incontinence) 0-No Leakage     Sexual Health Inventory for Men (LAUREN)   Over the past 6 months:     1. How do you rate your confidence that you could get and keep an erection?  0 - No sexual activity    2. When you had erections with sexual  stimulation, how often were your erections hard enough for penetration (entering your partner)?  0 - No Sexual Activity    3. During sexual intercourse, how often were you able to maintain your erection after you had penetrated (entered) your partner?  0 - Did not attempt intercourse   4. During sexual intercourse, how difficult was it to maintain your erection to completion of intercourse?  0 - Did not attempt intercourse   5. When you attempted sexual intercourse, how often was it satisfactory for you?  0 - Did not attempt intercourse    Total Score: 0   The Sexual Health Inventory for Men further classifies ED severity with the following breakpoints:   1-7 (Severe ED) 8-11 (Moderate ED) 12-16 (Mild to Moderate ED) 17-21 (Mild ED)      Post void residual bladder scan:   120 mL     Objective     Physical Exam:   Vital Signs:   Vitals:    03/23/23 1033   Weight: 87.1 kg (192 lb)   Height: 172.7 cm (67.99\")     Body mass index is 29.2 kg/m².     Physical Exam  Constitutional:       " Appearance: Normal appearance.   HENT:      Head: Normocephalic and atraumatic.      Nose: Nose normal.   Eyes:      Extraocular Movements: Extraocular movements intact.      Conjunctiva/sclera: Conjunctivae normal.      Pupils: Pupils are equal, round, and reactive to light.   Musculoskeletal:         General: Normal range of motion.      Cervical back: Normal range of motion and neck supple.   Skin:     General: Skin is warm and dry.      Findings: No lesion or rash.   Neurological:      General: No focal deficit present.      Mental Status: He is alert and oriented to person, place, and time. Mental status is at baseline.   Psychiatric:         Mood and Affect: Mood normal.         Behavior: Behavior normal.         Labs:   Brief Urine Lab Results  (Last result in the past 365 days)      Color   Clarity   Blood   Leuk Est   Nitrite   Protein   CREAT   Urine HCG        01/04/23 1227 Yellow   Clear   Negative   Negative   Negative   Negative                      Lab Results   Component Value Date    GLUCOSE 98 01/06/2023    CALCIUM 9.6 01/06/2023     01/06/2023    K 3.5 01/06/2023    CO2 24.0 01/06/2023    CL 98 01/06/2023    BUN 14 01/06/2023    CREATININE 0.83 01/06/2023    EGFRIFNONA 64 09/10/2021    BCR 16.9 01/06/2023    ANIONGAP 14.0 01/06/2023       Lab Results   Component Value Date    WBC 6.25 01/04/2023    HGB 12.2 (L) 01/04/2023    HCT 36.5 (L) 01/04/2023    MCV 92.2 01/04/2023     01/04/2023       Images:   NM Bone Scan Whole Body    Result Date: 11/30/2022  Low-level changes typical of DJD as described. No findings suspicious for metastatic disease are identified.  This report was finalized on 11/30/2022 4:14 PM by Dr. Fausto Johns MD.      CT Abdomen Pelvis With Contrast    Result Date: 11/30/2022   1. Irregular nodular enhancement of the prostate, consistent with patient's reported history of prostate cancer. No definite findings to suggest metastatic disease within the abdomen or pelvis.  3. Additional findings as given above.    This report was finalized on 11/30/2022 1:42 PM by Blake Grayson MD.      MRI Cyberknife Pelvis Without Contrast    Result Date: 1/17/2023  Impression: Multiple fiducial markers confirmed in the prostate. Electronically Signed: Abraham Otto  1/17/2023 5:04 PM EST  Workstation ID: [id number]      Measures:   Tobacco:   Lea Arnold [place]  reports that he has never smoked. He has been exposed to tobacco smoke. He has never used smokeless tobacco.      Assessment / Plan      Assessment/Plan:   77 y.o. male who presented today for follow up of high-volume unfavorable intermediate risk prostate cancer status post CyberKnife radiation.  Staging imaging has been negative.  Persistent lower urinary tract symptoms and elevated PVR despite greenlight PVP with apparent minimal remaining prostatic tissue on previous MRIs.  His prostatic fossa appears patent on MRI but this needs to be assessed on flexible cystoscopy to rule out urethral stricture or advanced prostatic malignancy potentially involving the bladder neck.  Patient reports understanding.  We will proceed with flexible cystoscopy next available.  Due for PSA check within the next 3 to 6 months.  Risks of cystoscopy include bleeding, dysuria, infection.    Diagnoses and all orders for this visit:    1. Prostate cancer (HCC) (Primary)  -     PSA Diagnostic; Future    2. Benign prostatic hyperplasia with lower urinary tract symptoms, symptom details unspecified           Follow Up:   Return in about 12 days (around 4/4/2023) for 4/4/23 flexible cystoscopy, uroflow, PVR.    I spent approximately 20 minutes providing clinical care for this patient; including review of patient's chart and provider documentation, face to face time spent with patient in examination room (obtaining history, performing physical exam, discussing diagnosis and management options), placing orders, and completing patient documentation.     Naseem CASTILLO  MD [place]  [facility] [place]

## 2023-04-04 ENCOUNTER — PROCEDURE VISIT (OUTPATIENT)
Dept: UROLOGY | Facility: CLINIC | Age: 78
End: 2023-04-04
Payer: MEDICARE

## 2023-04-04 DIAGNOSIS — C61 PROSTATE CANCER: ICD-10-CM

## 2023-04-04 DIAGNOSIS — R39.9 LOWER URINARY TRACT SYMPTOMS (LUTS): Primary | ICD-10-CM

## 2023-04-04 LAB
BILIRUB BLD-MCNC: ABNORMAL MG/DL
CLARITY, POC: CLEAR
COLOR UR: YELLOW
EXPIRATION DATE: ABNORMAL
GLUCOSE UR STRIP-MCNC: NEGATIVE MG/DL
KETONES UR QL: ABNORMAL
LEUKOCYTE EST, POC: ABNORMAL
Lab: ABNORMAL
NITRITE UR-MCNC: NEGATIVE MG/ML
PH UR: 5.5 [PH] (ref 5–8)
PROT UR STRIP-MCNC: ABNORMAL MG/DL
RBC # UR STRIP: ABNORMAL /UL
SP GR UR: 1.03 (ref 1–1.03)
UROBILINOGEN UR QL: NORMAL

## 2023-04-04 PROCEDURE — 51798 US URINE CAPACITY MEASURE: CPT | Performed by: STUDENT IN AN ORGANIZED HEALTH CARE EDUCATION/TRAINING PROGRAM

## 2023-04-04 PROCEDURE — 51741 ELECTRO-UROFLOWMETRY FIRST: CPT | Performed by: STUDENT IN AN ORGANIZED HEALTH CARE EDUCATION/TRAINING PROGRAM

## 2023-04-04 PROCEDURE — 52000 CYSTOURETHROSCOPY: CPT | Performed by: STUDENT IN AN ORGANIZED HEALTH CARE EDUCATION/TRAINING PROGRAM

## 2023-04-04 PROCEDURE — 81003 URINALYSIS AUTO W/O SCOPE: CPT | Performed by: STUDENT IN AN ORGANIZED HEALTH CARE EDUCATION/TRAINING PROGRAM

## 2023-04-04 PROCEDURE — 99213 OFFICE O/P EST LOW 20 MIN: CPT | Performed by: STUDENT IN AN ORGANIZED HEALTH CARE EDUCATION/TRAINING PROGRAM

## 2023-04-04 RX ORDER — NITROFURANTOIN 25; 75 MG/1; MG/1
100 CAPSULE ORAL 2 TIMES DAILY
Qty: 2 CAPSULE | Refills: 0 | COMMUNITY
Start: 2023-04-04

## 2023-04-04 NOTE — PROGRESS NOTES
Follow Up Office Visit      Patient Name: Lea Rivers  : 1945   MRN: [id number]     Chief Complaint:    Chief Complaint   Patient presents with   • Prostate Cancer       Referring Provider: No ref. provider found    History of Present Illness: Lea Rivers is a 77 y.o. male who presents today for follow up of persistent LUTS.  He has a history of high-volume unfavorable intermediate risk prostate cancer status post recent radiation.  Recent urologic history as below.    -Patient underwent prostate biopsy in  which resulted in high-volume unfavorable intermediate risk prostate cancer.   -prior to biopsy was 11.32 in the setting of a very small 10 cc prostate.  MRI prostate demonstrated large PI-RADS 4 lesion throughout the remaining of the peripheral zone.   -He recently completed Cyberknife radiation therapy on 23.  Describes no problems with his radiation.   -He continues to report urinary frequency and urinary urgency with leakage.  - He feels that he empties his bladder. He has been wearing 2-3 pads per day for urgency leakage.      - last Bladder scan  cc in 2022.      - last IPSS 19. He mainly reports storage related symptoms      History of BPH and underwent greenlight photo vaporization of the prostate in 2018.     MRI prostate demonstrates widely patent prostatic fossa and bladder neck.    Given his concerning persistent lower urinary tract symptoms and incomplete bladder emptying he presents for cystoscopy and uroflow rate measurement today.       Subjective      Review of System: Review of Systems   Genitourinary: Positive for decreased urine volume, difficulty urinating, frequency and urgency.      I have reviewed the ROS documented by my clinical staff, I have updated appropriately and I agree. Naseem Noland MD    I have reviewed and the following portions of the patient's history were updated as appropriate: past family history, past  medical history, past social history, past surgical history and problem list.    Medications:     Current Outpatient Medications:   •  acetaminophen (TYLENOL) 500 MG tablet, Take 2 tablets by mouth Every 8 (Eight) Hours As Needed for Mild Pain ., Disp: , Rfl:   •  divalproex (DEPAKOTE) 500 MG 24 hr tablet, Take 1 tablet by mouth 2 (two) times a day. 500mg QAM, 1000mg QPM, Disp: , Rfl:   •  fluticasone (FLONASE) 50 MCG/ACT nasal spray, 2 sprays into the nostril(s) as directed by provider As Needed for Rhinitis. Administer 2 sprays in each nostril for each dose., Disp: , Rfl:   •  isosorbide mononitrate (IMDUR) 30 MG 24 hr tablet, Take 1 tablet by mouth 2 (Two) Times a Day., Disp: , Rfl:   •  levETIRAcetam (KEPPRA) 500 MG tablet, Take 1 tablet by mouth 2 (Two) Times a Day., Disp: , Rfl:   •  levothyroxine (SYNTHROID, LEVOTHROID) 88 MCG tablet, Take 1 tablet by mouth Daily., Disp: , Rfl:   •  lisinopril (PRINIVIL,ZESTRIL) 40 MG tablet, , Disp: , Rfl:   •  metFORMIN ER (GLUCOPHAGE-XR) 500 MG 24 hr tablet, Take 1 tablet by mouth 2 (Two) Times a Day., Disp: , Rfl:   •  omeprazole (priLOSEC) 20 MG capsule, Take 1 capsule by mouth Daily., Disp: , Rfl:   •  polyethylene glycol (GoLYTELY) 236 g solution, FOLLOW INSTRUCTIONS RECEIVED IN THE MAIL., Disp: 4000 mL, Rfl: 0  •  sodium chloride 1 g tablet, Take 2 tablets by mouth Daily., Disp: , Rfl:   •  tamsulosin (FLOMAX) 0.4 MG capsule 24 hr capsule, Take 1 capsule by mouth Daily., Disp: 90 capsule, Rfl: 3  •  verapamil SR (CALAN-SR) 240 MG CR tablet, Take 1 tablet by mouth Daily., Disp: , Rfl:   •  Vibegron 75 MG tablet, Take 1 tablet by mouth Daily., Disp: 42 tablet, Rfl: 0  •  nitrofurantoin, macrocrystal-monohydrate, (Macrobid) 100 MG capsule, Take 1 capsule by mouth 2 (Two) Times a Day., Disp: 2 capsule, Rfl: 0    Allergies:   Allergies   Allergen Reactions   • Chlorhexidine Rash   • Sulfa Antibiotics Rash     Childhood reaction           Post void residual bladder scan:    3 mL     Objective     Physical Exam:   Vital Signs: There were no vitals filed for this visit.  There is no height or weight on file to calculate BMI.     Physical Exam  Vitals and nursing note reviewed.   Constitutional:       Appearance: Normal appearance.   HENT:      Head: Normocephalic and atraumatic.      Nose: Nose normal.      Mouth/Throat:      Mouth: Mucous membranes are moist.      Pharynx: Oropharynx is clear.   Eyes:      Extraocular Movements: Extraocular movements intact.      Conjunctiva/sclera: Conjunctivae normal.      Pupils: Pupils are equal, round, and reactive to light.   Cardiovascular:      Rate and Rhythm: Normal rate and regular rhythm.   Pulmonary:      Effort: Pulmonary effort is normal. No respiratory distress.   Abdominal:      Palpations: Abdomen is soft.      Tenderness: There is no abdominal tenderness. There is no right CVA tenderness or left CVA tenderness.   Genitourinary:     Comments: Uncircumcised phallus, orthotopic meatus, bilaterally descended testicles without masses, or lesions.        Musculoskeletal:         General: Normal range of motion.      Cervical back: Normal range of motion and neck supple.   Skin:     General: Skin is warm and dry.      Findings: No lesion or rash.   Neurological:      General: No focal deficit present.      Mental Status: He is alert and oriented to person, place, and time. Mental status is at baseline.   Psychiatric:         Mood and Affect: Mood normal.         Behavior: Behavior normal.         Labs:   Brief Urine Lab Results  (Last result in the past 365 days)      Color   Clarity   Blood   Leuk Est   Nitrite   Protein   CREAT   Urine HCG        04/04/23 1306 Yellow   Clear   3+   Trace   Negative   2+                      Lab Results   Component Value Date    GLUCOSE 98 01/06/2023    CALCIUM 9.6 01/06/2023     01/06/2023    K 3.5 01/06/2023    CO2 24.0 01/06/2023    CL 98 01/06/2023    BUN 14 01/06/2023    CREATININE 0.83  01/06/2023    EGFRIFNONA 64 09/10/2021    BCR 16.9 01/06/2023    ANIONGAP 14.0 01/06/2023       Lab Results   Component Value Date    WBC 6.25 01/04/2023    HGB 12.2 (L) 01/04/2023    HCT 36.5 (L) 01/04/2023    MCV 92.2 01/04/2023     01/04/2023       Images:   MRI Cyberknife Pelvis Without Contrast    Result Date: 1/17/2023  Impression: Multiple fiducial markers confirmed in the prostate. Electronically Signed: Abraham Mouser  1/17/2023 5:04 PM EST  Workstation ID: [id number]      Measures:   Tobacco:   Lea Arnold [place]  reports that he has never smoked. He has been exposed to tobacco smoke. He has never used smokeless tobacco.       Preprocedure diagnosis  LUTS  Hx of remote greenlight PVP for BPH  Prostate cancer s/p radiation    Postprocedure diagnosis  LUTS  Hx of remote greenlight PVP for BPH  Prostate cancer s/p radiation      Procedure  Flexible Cystourethroscopy    Attending surgeon  Naseem Noland MD    Anesthesia  2% lidocaine jelly intraurethrally    Complications  None    Indications  77 y.o. male undergoing a flexible cystoscopy for the above mentioned indications.  Informed consent was obtained.      Findings  The urethral urothelium was within normal limits with no visible strictures.      The prostatic urethra revealed NO lateral lobe coaptation, with NO median lobe.     Prostate and bladder neck are widely patent to the level of the verumontanum.     Bladder findings included bilateral ureters in orthotopic position, normal bladder mucosa without tumors, lesions, or stones.     Procedure  The patient was placed in supine position and prepped and draped in sterile fashion with lidocaine jelly instill per the urethra for anesthesia 5 minutes prior to procedure start.  A brief timeout was performed including available nursing staff and the patient.      The 16 Fr digital flexible cystoscope was lubricated and gently advanced into the urethral meatus.     The penile and bulbar urethra  appeared widely patent without visible lesion or stricture.     The prostatic urethra was notable for NO lateral lobe coaptation, with NO median lobe component.      The scope was then advanced into the bladder. The bladder was completely visualized starting with the trigone.     There were bilateral orthotopic ureteral orifices.     The posterior wall, lateral walls, anterior wall, and dome were completely visualized WITHOUT obvious mucosal lesions, tumors, stones, or trabeculations.      The cystoscope was retroflexed and the bladder neck and prostate were further visualized and appeared normal.      The cystoscope was then gently withdrawn while visualizing the urethra and the procedure was then terminated.  The patient tolerated the procedure well.      After procedure the patient voided for uroflow measurement.    Uroflow     Avg flow: 11  ml/sec  Max flow: 21.9 ml/sec (normal)   Time to max flow: 9 sec  Voided volume: 232 mL     PVR: 03 mL     Assessment / Plan      Assessment/Plan:   77 y.o. male who presented today for follow up of LUTS depsite remote greenlight PVP and prostate cancer s/p Cyberknife XRT.     Patient has a widely patent prostate and bladder neck on cystoscopy today, patient likely has some bladder dysfunction which may explain his weak stream and mild incomplete bladder emptying.  He has no obvious obstruction or urethral strictures.  Therefore his remaining options include continued monitoring or consider intermittent catheterization for complete emptying if this is a bothersome quality-of-life issue for the patient.    He elects for continued monitoring.      Was prescribed Vibegron 2/22/23 for urgency complaints. Not sure if this has improved his symptoms, we will d/c at this time.    Given his age he is not a candidate for anticholinergic therapy.  For bothersome urgency complaints his remaining options would include sacral neuromodulation trial or intravesical Botox (botox less  preferred given baseline incomplete bladder emptying which may worsen with Botox).    Diagnoses and all orders for this visit:    1. Lower urinary tract symptoms (LUTS) (Primary)    - elects for monitoring  -Patient is emptying his bladder today, there is no concern for obstruction or stricture, reassurance provided  -Discussed the importance of timed and double voiding to ensure emptying    -     nitrofurantoin, macrocrystal-monohydrate, (Macrobid) 100 MG capsule; Take 1 capsule by mouth 2 (Two) Times a Day.  Dispense: 2 capsule; Refill: 0    2. Prostate cancer    F/u in Sept/Oct with repeat PSA    -     Uroflowmetry; Future  -     POC Urinalysis Dipstick, Automated           Follow Up:   Return in about 6 months (around 10/4/2023).    I spent approximately 20 minutes providing clinical care for this patient; including review of patient's chart and provider documentation, face to face time spent with patient in examination room (obtaining history, performing physical exam, discussing diagnosis and management options), placing orders, and completing patient documentation.     Naseem Noland MD  [facility] [place]

## 2023-04-10 ENCOUNTER — OFFICE VISIT (OUTPATIENT)
Dept: ORTHOPEDIC SURGERY | Facility: CLINIC | Age: 78
End: 2023-04-10
Payer: MEDICARE

## 2023-04-10 VITALS
BODY MASS INDEX: 29.1 KG/M2 | WEIGHT: 192 LBS | DIASTOLIC BLOOD PRESSURE: 78 MMHG | SYSTOLIC BLOOD PRESSURE: 150 MMHG | HEIGHT: 68 IN

## 2023-04-10 DIAGNOSIS — Z09 POSTOPERATIVE EXAMINATION: ICD-10-CM

## 2023-04-10 DIAGNOSIS — Z96.651 STATUS POST TOTAL RIGHT KNEE REPLACEMENT: Primary | ICD-10-CM

## 2023-04-10 PROCEDURE — 1160F RVW MEDS BY RX/DR IN RCRD: CPT | Performed by: ORTHOPAEDIC SURGERY

## 2023-04-10 PROCEDURE — 3078F DIAST BP <80 MM HG: CPT | Performed by: ORTHOPAEDIC SURGERY

## 2023-04-10 PROCEDURE — 99212 OFFICE O/P EST SF 10 MIN: CPT | Performed by: ORTHOPAEDIC SURGERY

## 2023-04-10 PROCEDURE — 1159F MED LIST DOCD IN RCRD: CPT | Performed by: ORTHOPAEDIC SURGERY

## 2023-04-10 PROCEDURE — 3077F SYST BP >= 140 MM HG: CPT | Performed by: ORTHOPAEDIC SURGERY

## 2023-04-10 ASSESSMENT — KOOS JR
KOOS JR SCORE: 8
KOOS JR SCORE: 65.994

## 2023-04-10 NOTE — PROGRESS NOTES
[facility] Clinic Note    Subjective     Chief Complaint   Patient presents with   • Follow-up     5 month f/u-- 1 year s/p TOTAL KNEE ARTHROPLASTY WITH ORTHO ROBOT RIGHT 4/7/22        HPI    It has been 5  month(s) since Mr. Rivers's last visit. He returns to clinic today for follow-up of right knee arthroplasty. The issue has been ongoing for 1 year(s). He rates his pain a 3/10 on the pain scale. Previous/current treatments: NSAIDS. Current symptoms: pain and popping. The pain is worse with standing and working; voltern improve the pain. Overall, he is doing better.  95% improvement compared to his preoperative symptoms.  Fully ambulatory without external aids.    I have reviewed the following portions of the patient's history and agree with: History of Present Illness and Review of Systems    Patient Active Problem List   Diagnosis   • Coronary artery disease   • Dyslipidemia   • Hypothyroidism   • GERD (gastroesophageal reflux disease)   • Seizure disorder (HCC)   • BPH (benign prostatic hypertrophy)   • Hypertension   • Primary osteoarthritis of both knees   • Syncope and collapse   • Precordial pain   • Diabetes (HCC)   • Status post total right knee replacement   • Postoperative urinary retention   • Confusion, postoperative   • Acute blood loss anemia, asymptomatic, no transfusion required   • Elevated prostate specific antigen (PSA)   • Prostate cancer     Past Medical History:   Diagnosis Date   • Anemia    • Colon cancer 1990    Status post partial colectomy in 1990. No chemotherapy or radiation therapy.   • Coronary artery disease     Nonobstructive coronary artery disease.   • Diabetes    • Dyslipidemia    • GERD (gastroesophageal reflux disease)     Hx of.   • Hyperlipidemia    • Hypertension    • Hyponatremia    • Hypothyroidism    • Left shoulder pain    • Low sodium levels 2022    recent issue; saw nephrologist who ruled out kidney issues; took Sodium Chloride po to bring levels  up   • Memory deficit     FROM ANESTHESIA   • Osteoarthritis    • Prostate cancer 07/23/2022   • Seizure disorder     silent seziure-diagnosed years ago   • Skin cancer       Past Surgical History:   Procedure Laterality Date   • APPENDECTOMY     • CARDIAC CATHETERIZATION      NO INTERVENTION; EARLY 2000'S   • CARPAL TUNNEL RELEASE Bilateral    • CHOLECYSTECTOMY     • COLECTOMY PARTIAL / TOTAL  1990    Partial colectomy   • COLONOSCOPY     • CYBERKNIFE  02/09/2023    Prostate/SV   • CYSTOSCOPY TRANSURETHRAL RESECTION OF PROSTATE N/A 09/21/2018    Procedure: CYSTOSCOPY TRANSURETHRAL RESECTION OF PROSTATE GREENLIGHT;  Surgeon: Naseem Clayton MD;  Location:  [facility];  Service: Urology   • OTHER SURGICAL HISTORY      Contraction surgery on bilateral hands and wrists.   • PROSTATE BIOPSY N/A 11/11/2022    Procedure: TRANSRECTAL ULTRASOUND GUIDED BIOPSY OF PROSTATE;  Surgeon: Naseem Noland MD;  Location:  [facility];  Service: Urology;  Laterality: N/A;   • SINUS SURGERY     • SKIN BIOPSY     • TEETH EXTRACTION     • TONSILLECTOMY     • TOTAL KNEE ARTHROPLASTY Right 04/07/2022    Procedure: TOTAL KNEE ARTHROPLASTY WITH ORTHO ROBOT RIGHT;  Surgeon: Louis Malcolm MD;  Location:  [facility];  Service: Robotics - Ortho;  Laterality: Right;   • TRANSRECTAL INCISION PROSTATE WITH GOLD SEED Bilateral 01/06/2023    Procedure: TRANSRECTAL ULTRASOUND GUIDED PROSTATE FIDUCIAL MARKER PLACEMENT;  Surgeon: Naseem Noland MD;  Location:  [facility];  Service: Urology;  Laterality: Bilateral;   • TURP / TRANSURETHRAL INCISION / DRAINAGE PROSTATE     • VASECTOMY        Family History   Problem Relation Age of Onset   • Cancer Mother         brain   • Hypertension Father    • Stroke Father    • No Known Problems Sister    • Stroke Sister    • Esophageal cancer Brother    • Stroke Maternal Grandfather    • No Known Problems Paternal Grandmother    • No Known Problems Paternal Grandfather    • Stroke Other    • Arthritis  Other    • Cancer Other      Social History     Socioeconomic History   • Marital status:    Tobacco Use   • Smoking status: Never     Passive exposure: Past   • Smokeless tobacco: Never   Vaping Use   • Vaping Use: Never used   Substance and Sexual Activity   • Alcohol use: No   • Drug use: No   • Sexual activity: Defer      Current Outpatient Medications on File Prior to Visit   Medication Sig Dispense Refill   • acetaminophen (TYLENOL) 500 MG tablet Take 2 tablets by mouth Every 8 (Eight) Hours As Needed for Mild Pain .     • divalproex (DEPAKOTE) 500 MG 24 hr tablet Take 1 tablet by mouth 2 (two) times a day. 500mg QAM, 1000mg QPM     • fluticasone (FLONASE) 50 MCG/ACT nasal spray 2 sprays into the nostril(s) as directed by provider As Needed for Rhinitis. Administer 2 sprays in each nostril for each dose.     • isosorbide mononitrate (IMDUR) 30 MG 24 hr tablet Take 1 tablet by mouth 2 (Two) Times a Day.     • levETIRAcetam (KEPPRA) 500 MG tablet Take 1 tablet by mouth 2 (Two) Times a Day.     • levothyroxine (SYNTHROID, LEVOTHROID) 88 MCG tablet Take 1 tablet by mouth Daily.     • lisinopril (PRINIVIL,ZESTRIL) 40 MG tablet      • metFORMIN ER (GLUCOPHAGE-XR) 500 MG 24 hr tablet Take 1 tablet by mouth 2 (Two) Times a Day.     • nitrofurantoin, macrocrystal-monohydrate, (Macrobid) 100 MG capsule Take 1 capsule by mouth 2 (Two) Times a Day. 2 capsule 0   • omeprazole (priLOSEC) 20 MG capsule Take 1 capsule by mouth Daily.     • polyethylene glycol (GoLYTELY) 236 g solution FOLLOW INSTRUCTIONS RECEIVED IN THE MAIL. 4000 mL 0   • sodium chloride 1 g tablet Take 2 tablets by mouth Daily.     • tamsulosin (FLOMAX) 0.4 MG capsule 24 hr capsule Take 1 capsule by mouth Daily. 90 capsule 3   • verapamil SR (CALAN-SR) 240 MG CR tablet Take 1 tablet by mouth Daily.     • Vibegron 75 MG tablet Take 1 tablet by mouth Daily. 42 tablet 0     No current facility-administered medications on file prior to visit.       Allergies   Allergen Reactions   • Chlorhexidine Rash   • Sulfa Antibiotics Rash     Childhood reaction         Review of Systems   Constitutional: Negative for activity change, appetite change, chills, diaphoresis, fatigue, fever and unexpected weight change.   HENT: Negative for congestion, dental problem, drooling, ear discharge, ear pain, facial swelling, hearing loss, mouth sores, nosebleeds, postnasal drip, rhinorrhea, sinus pressure, sneezing, sore throat, tinnitus, trouble swallowing and voice change.    Eyes: Negative for photophobia, pain, discharge, redness, itching and visual disturbance.   Respiratory: Negative for apnea, cough, choking, chest tightness, shortness of breath, wheezing and stridor.    Cardiovascular: Negative for chest pain, palpitations and leg swelling.   Gastrointestinal: Negative for abdominal distention, abdominal pain, anal bleeding, blood in stool, constipation, diarrhea, nausea, rectal pain and vomiting.   Endocrine: Negative for cold intolerance, heat intolerance, polydipsia, polyphagia and polyuria.   Genitourinary: Negative for decreased urine volume, difficulty urinating, dysuria, enuresis, flank pain, frequency, genital sores, hematuria and urgency.   Musculoskeletal: Positive for arthralgias. Negative for back pain, gait problem, joint swelling, myalgias, neck pain and neck stiffness.   Skin: Negative for color change, pallor, rash and wound.   Allergic/Immunologic: Negative for environmental allergies, food allergies and immunocompromised state.   Neurological: Negative for dizziness, tremors, seizures, syncope, facial asymmetry, speech difficulty, weakness, light-headedness, numbness and headaches.   Hematological: Negative for adenopathy. Does not bruise/bleed easily.   Psychiatric/Behavioral: Negative for agitation, behavioral problems, confusion, decreased concentration, dysphoric mood, hallucinations, self-injury, sleep disturbance and suicidal ideas. The patient is  "not nervous/anxious and is not hyperactive.         Objective      Physical Exam  /78   Ht 172.7 cm (67.99\")   Wt 87.1 kg (192 lb)   BMI 29.20 kg/m²     Body mass index is 29.2 kg/m².    General:   Mental Status:  Alert   Appearance: Cooperative, in no acute distress   Build and Nutrition: Well-nourished well-developed male   Orientation: Alert and oriented to person, place and time   Posture: Normal   Gait: Nonantalgic    Integument:   Right knee: Wound is well-healed with no signs of infection    Lower Extremities:   Right Knee:    Tenderness:  None    Effusion:  Trace    Swelling: None    Crepitus:  None    Range of motion:  Extension: 0°       Flexion: 125°  Instability:  No varus laxity, no valgus laxity, negative anterior drawer  Deformities:  None      Imaging/Studies  Imaging Results (Last 24 Hours)     Procedure Component Value Units Date/Time    XR Knee 3+ View With Nibbe Right [[id number]] Resulted: 04/10/23 0807     Updated: 04/10/23 0808    Narrative:      Right Knee Radiographs  Indication: status-post right total knee arthroplasty  Views: AP, lateral, and sunrise views of the right knee    Comparison: no change compared to prior study, 4/27/2022    Findings:   The components are well aligned, with no signs of loosening or failure.            Assessment and Plan     Diagnoses and all orders for this visit:    1. Status post total right knee replacement (Primary)  -     XR Knee 3+ View With Nibbe Right    2. Postoperative examination    Other orders  -     Diclofenac Sodium (VOLTAREN) 1 % gel gel; Apply 4 g topically to the appropriate area as directed 2 (Two) Times a Day.  Dispense: 350 g; Refill: 2        1. Status post total right knee replacement    2. Postoperative examination        I reviewed my findings with the patient.  His right total knee arthroplasty appears to be functioning well, and he is pleased with the results overall.  I will see him back in 4 years, which will be a " 5-year checkup with x-rays.  I will see him back sooner for any problems.  I did refill some diclofenac gel that he uses for his left knee, but going forward I recommended refills if needed with his primary care physician.    Return in about 4 years (around 4/10/2027) for Recheck with X-Rays.      Louis Malcolm MD  04/10/23  08:39 EDT

## 2023-04-27 PROBLEM — E11.9 DM2 (DIABETES MELLITUS, TYPE 2): Status: ACTIVE | Noted: 2023-04-27

## 2023-04-27 PROBLEM — S46.911A: Status: ACTIVE | Noted: 2022-06-28

## 2023-04-27 PROBLEM — Z23 NEED FOR VACCINATION AGAINST STREPTOCOCCUS PNEUMONIAE: Status: ACTIVE | Noted: 2023-04-27

## 2023-04-27 PROBLEM — G40.109 LOCALIZATION-RELATED FOCAL EPILEPSY WITH SIMPLE PARTIAL SEIZURES: Status: ACTIVE | Noted: 2023-04-27

## 2023-04-27 PROBLEM — D64.9 ANEMIA: Status: ACTIVE | Noted: 2023-04-27

## 2023-04-27 PROBLEM — E78.5 HYPERLIPIDEMIA, ACQUIRED: Status: RESOLVED | Noted: 2023-04-27 | Resolved: 2023-04-27

## 2023-04-27 PROBLEM — N40.0 BENIGN PROSTATIC HYPERPLASIA: Status: ACTIVE | Noted: 2017-10-26

## 2023-04-27 PROBLEM — E55.9 VITAMIN D DEFICIENCY: Status: ACTIVE | Noted: 2023-04-27

## 2023-04-27 PROBLEM — I10 HYPERTENSION, ESSENTIAL: Status: ACTIVE | Noted: 2023-04-27

## 2023-04-27 PROBLEM — I10 HYPERTENSION, ESSENTIAL: Status: RESOLVED | Noted: 2023-04-27 | Resolved: 2023-04-27

## 2023-04-27 PROBLEM — R97.20 PSA ELEVATION: Status: ACTIVE | Noted: 2023-04-27

## 2023-04-27 PROBLEM — N40.0 BENIGN PROSTATIC HYPERPLASIA: Status: RESOLVED | Noted: 2017-10-26 | Resolved: 2023-04-27

## 2023-04-27 PROBLEM — E78.5 HYPERLIPIDEMIA, ACQUIRED: Status: ACTIVE | Noted: 2023-04-27

## 2023-04-27 PROBLEM — E03.9 HYPOTHYROIDISM (ACQUIRED): Status: ACTIVE | Noted: 2023-04-27

## 2023-04-28 ENCOUNTER — OFFICE VISIT (OUTPATIENT)
Dept: FAMILY MEDICINE CLINIC | Facility: CLINIC | Age: 78
End: 2023-04-28
Payer: MEDICARE

## 2023-04-28 VITALS
BODY MASS INDEX: 31.61 KG/M2 | HEART RATE: 85 BPM | DIASTOLIC BLOOD PRESSURE: 89 MMHG | TEMPERATURE: 97.6 F | SYSTOLIC BLOOD PRESSURE: 142 MMHG | WEIGHT: 208.6 LBS | OXYGEN SATURATION: 98 % | HEIGHT: 68 IN

## 2023-04-28 DIAGNOSIS — K63.5 POLYP OF COLON, UNSPECIFIED PART OF COLON, UNSPECIFIED TYPE: ICD-10-CM

## 2023-04-28 DIAGNOSIS — C61 PROSTATE CANCER: ICD-10-CM

## 2023-04-28 DIAGNOSIS — G40.109 LOCALIZATION-RELATED FOCAL EPILEPSY WITH SIMPLE PARTIAL SEIZURES: ICD-10-CM

## 2023-04-28 DIAGNOSIS — E11.9 TYPE 2 DIABETES MELLITUS WITHOUT COMPLICATION, WITHOUT LONG-TERM CURRENT USE OF INSULIN: ICD-10-CM

## 2023-04-28 DIAGNOSIS — Z11.59 NEED FOR HEPATITIS C SCREENING TEST: ICD-10-CM

## 2023-04-28 DIAGNOSIS — E55.9 VITAMIN D DEFICIENCY: ICD-10-CM

## 2023-04-28 DIAGNOSIS — K21.9 GASTROESOPHAGEAL REFLUX DISEASE, UNSPECIFIED WHETHER ESOPHAGITIS PRESENT: Chronic | ICD-10-CM

## 2023-04-28 DIAGNOSIS — Z23 NEED FOR PROPHYLACTIC VACCINATION AGAINST STREPTOCOCCUS PNEUMONIAE (PNEUMOCOCCUS): ICD-10-CM

## 2023-04-28 DIAGNOSIS — N32.81 OVERACTIVE BLADDER: ICD-10-CM

## 2023-04-28 DIAGNOSIS — I10 PRIMARY HYPERTENSION: Chronic | ICD-10-CM

## 2023-04-28 DIAGNOSIS — I25.10 CORONARY ARTERY DISEASE INVOLVING NATIVE CORONARY ARTERY OF NATIVE HEART WITHOUT ANGINA PECTORIS: Primary | Chronic | ICD-10-CM

## 2023-04-28 DIAGNOSIS — E03.9 ACQUIRED HYPOTHYROIDISM: Chronic | ICD-10-CM

## 2023-04-28 DIAGNOSIS — D64.9 ANEMIA, UNSPECIFIED TYPE: ICD-10-CM

## 2023-04-28 DIAGNOSIS — E78.5 DYSLIPIDEMIA: Chronic | ICD-10-CM

## 2023-04-28 RX ORDER — SIMVASTATIN 20 MG
TABLET ORAL
COMMUNITY
Start: 2023-04-18

## 2023-04-28 NOTE — PROGRESS NOTES
Follow Up Office Visit      Date: 2023   Patient Name: Lea Rivers  : 1945   MRN: [id number]     Chief Complaint:    Chief Complaint   Patient presents with   • Establish Care       History of Present Illness: Lea Rivers is a 77 y.o. male who is here today here to establish as a patient with an introductory office visit, no apparent acute complaints today, having history of noncritical coronary artery disease per [facility] from  revealing 30 to 40% LAD stenosis, having had Lexiscan stress test in 2021 with normal findings, EF 73%, echo on 2021 revealing EF 65% with trace MR and trace AI, no chest pains palpitations dyspnea or edema.  Patient also is a type II diabetic with no obvious secondary side effects on metformin  mg twice daily with blood sugars averaging 70-1 20 check most days, hemoglobin A1c 5.0% 2023.  History of hypertension taking lisinopril 40 mg daily and verapamil  mg daily with blood pressures ranging 120-130/80-90 checked daily, having had negative renal duplex arterial study in .  Patient also has history of per his account a single focal seizure many years ago, currently on Depakote and Keppra with no recurrent symptoms, previous patient of Dr. Gant.  He also has hypothyroidism taking levothyroxine 88 mcg daily and also has GERD on Prilosec 20 mg daily with no related problems.  He has a history of BPH and urgency and was diagnosed with prostate cancer in 2022 status post CyberKnife therapy and recently had a flexible cystoscopy that was unremarkable.  He does take Flomax with fair stream though still has some urgency and failed a trial of samples of Vibegron.  Also OA status post right TKA by Dr. Malcolm in 2022, has left knee OA, using Voltaren gel with minimal pain..    Subjective      Review of Systems:   Review of Systems    I have reviewed the patients family history, social history, past medical history, past surgical history  and have updated it as appropriate.     Medications:     Current Outpatient Medications:   •  Diclofenac Sodium (VOLTAREN) 1 % gel gel, Apply 4 g topically to the appropriate area as directed 2 (Two) Times a Day., Disp: 350 g, Rfl: 2  •  divalproex (DEPAKOTE) 500 MG 24 hr tablet, Take 1 tablet by mouth 2 (two) times a day. 500mg QAM, 1000mg QPM, Disp: , Rfl:   •  fluticasone (FLONASE) 50 MCG/ACT nasal spray, 2 sprays into the nostril(s) as directed by provider As Needed for Rhinitis. Administer 2 sprays in each nostril for each dose., Disp: , Rfl:   •  isosorbide mononitrate (IMDUR) 30 MG 24 hr tablet, Take 1 tablet by mouth 2 (Two) Times a Day., Disp: , Rfl:   •  levETIRAcetam (KEPPRA) 500 MG tablet, Take 1 tablet by mouth 2 (Two) Times a Day., Disp: , Rfl:   •  levothyroxine (SYNTHROID, LEVOTHROID) 88 MCG tablet, Take 1 tablet by mouth Daily., Disp: , Rfl:   •  lisinopril (PRINIVIL,ZESTRIL) 40 MG tablet, , Disp: , Rfl:   •  metFORMIN ER (GLUCOPHAGE-XR) 500 MG 24 hr tablet, Take 1 tablet by mouth 2 (Two) Times a Day., Disp: , Rfl:   •  omeprazole (priLOSEC) 20 MG capsule, Take 1 capsule by mouth Daily., Disp: , Rfl:   •  simvastatin (ZOCOR) 20 MG tablet, , Disp: , Rfl:   •  sodium chloride 1 g tablet, Take 2 tablets by mouth Daily., Disp: , Rfl:   •  tamsulosin (FLOMAX) 0.4 MG capsule 24 hr capsule, Take 1 capsule by mouth Daily., Disp: 90 capsule, Rfl: 3  •  verapamil SR (CALAN-SR) 240 MG CR tablet, Take 1 tablet by mouth Daily., Disp: , Rfl:   •  glucose blood test strip, 1 each by Other route Daily. Use to test blood sugar once daily. E11.9, Disp: 100 each, Rfl: 2    Allergies:   Allergies   Allergen Reactions   • Chlorhexidine Rash   • Sulfa Antibiotics Rash     Childhood reaction        Objective     Physical Exam: Please see above  Vital Signs:   Vitals:    04/28/23 1025   BP: 142/89   BP Location: Left arm   Patient Position: Sitting   Cuff Size: Adult   Pulse: 85   Temp: 97.6 °F (36.4 °C)   TempSrc:  "Temporal   SpO2: 98%   Weight: 94.6 kg (208 lb 9.6 oz)   Height: 172.7 cm (68\")     Body mass index is 31.72 kg/m².  BMI is >= 30 and <35. (Class 1 Obesity). The following options were offered after discussion;: exercise counseling/recommendations and nutrition counseling/recommendations       Physical Exam  General: Very pleasant alert and oriented 77-year-old white male who is in no acute distress, BMI of 31.7.  Neck with no masses or tenderness, slight decreased range of motion  Lungs are clear with no wheeze tachypnea or cough  Cardiac regular rate rhythm with no murmurs gallops or rubs, no dependent edema  Bipedal exam with 1-2+ DP and 1-2+ PT pulses bilaterally, no edema, normal sensation in both feet to fine touch vibration and pinprick, no skin breakdown, motor exam normal  Diabetic Foot Exam Performed and Monofilament Test Performed     Procedures    Results:   Labs:   Hemoglobin A1C   Date Value Ref Range Status   01/04/2023 5.00 4.80 - 5.60 % Final     TSH   Date Value Ref Range Status   09/30/2022 2.160 0.270 - 4.200 uIU/mL Final        POCT Results (if applicable):   Results for orders placed or performed in visit on 04/04/23   POC Urinalysis Dipstick, Automated    Specimen: Urine   Result Value Ref Range    Color Yellow Yellow, Straw, Dark Yellow, [place]    Clarity, UA Clear Clear    Specific Gravity  1.030 1.005 - 1.030    pH, Urine 5.5 5.0 - 8.0    Leukocytes Trace (A) Negative    Nitrite, UA Negative Negative    Protein, POC 2+ (A) Negative mg/dL    Glucose, UA Negative Negative mg/dL    Ketones, UA Trace (A) Negative    Urobilinogen, UA Normal Normal, 0.2 E.U./dL    Bilirubin Small (1+) (A) Negative    Blood, UA 3+ (A) Negative    Lot Number [id number]     Expiration Date 07/14/2024        Imaging:   No valid procedures specified.       Assessment / Plan      Assessment/Plan:   Diagnoses and all orders for this visit:    1. Coronary artery disease involving native coronary artery of native heart " without angina pectoris (Primary)  Noncritical single-vessel LAD 30 to 40% lesion noted on [facility] from approximately 2012 as I can best determine on old records.  Asymptomatic.  Continue risk factor modification.  2. Primary hypertension  -     Comprehensive Metabolic Panel; Future  -     Urinalysis With Culture If Indicated -; Future  -     Urinalysis With Culture If Indicated - Urine, Clean Catch  -     Comprehensive Metabolic Panel  Good blood pressure control acutely as well as chronically taking lisinopril 40 mg daily and verapamil  mg daily.  Continue current regimen with monitoring, ideal parameters discussed  3. Dyslipidemia  -     Lipid Panel; Future  -     Lipid Panel  On simvastatin 20 mg nightly.  Update lipid profile.  4. Acquired hypothyroidism  -     TSH; Future  -     T4, Free; Future  -     T4, Free  -     TSH  Taking levothyroxine 88 mcg daily.  Update thyroid function testing.  5. Type 2 diabetes mellitus without complication, without long-term current use of insulin  -     Comprehensive Metabolic Panel; Future  -     Hemoglobin A1c; Future  -     MicroAlbumin, Urine, Random - Urine, Clean Catch; Future  -     MicroAlbumin, Urine, Random - Urine, Clean Catch  -     Hemoglobin A1c  -     Comprehensive Metabolic Panel  Taking metformin  mg twice daily with a hemoglobin A1c of 5.0% in 1/2023, on ACE inhibitor with no obvious secondary sequelae.  We will repeat testing.  6. Gastroesophageal reflux disease, unspecified whether esophagitis present  Good control taking omeprazole 20 mg daily  7. Localization-related focal epilepsy with simple partial seizures  Patient reports a single remote focal seizure, currently on Depakote  mg, taking 1 in the morning and 2 in the evening, and Keppra 500 mg twice daily.  We will readdress at an upcoming complete physical, considering referral back to neurology  8. Anemia, unspecified type  -     CBC & Differential; Future  -     Vitamin B12; Future  -      Folate; Future  -     Iron Profile; Future  -     Ferritin; Future  -     Ferritin  -     Iron Profile  -     Folate  -     Vitamin B12  -     CBC & Differential  Most recent hemoglobin 12.2 with Justice crit 36.5 in 1/2023, indicating colonoscopy from 1/24/2023 revealed 2 polyps.  We will repeat testing associated iron studies B12 and folic acid  9. Body mass index (BMI) of 32.0-32.9 in adult  Emphasize healthy lifestyle with diet and exercise.  10. Overactive bladder  Devious failed of Vibegron prescribed by urology, continues on Flomax 0.4 mg daily.  11. Prostate cancer  Status post CyberKnife treatment following diagnosis in 11/2022, followed by Dr. Noland of urology.  12. Polyp of colon, unspecified part of colon, unspecified type  Status post colonoscopy in 1/24/2023 by Dr. Kauffman, having 2 polyps, anticipate 5-year follow-up depending on patient's health.  13. Vitamin D deficiency  -     Vitamin D,25-Hydroxy; Future  -     Vitamin D,25-Hydroxy  Check level.  Not currently on supplement.  14. Need for hepatitis C screening test  -     Hepatitis C Antibody; Future  -     Hepatitis C Antibody    15. Need for prophylactic vaccination against Streptococcus pneumoniae (pneumococcus)  -     Pneumococcal Conjugate Vaccine 20-Valent All    60 minutes spent with patient reviewing his extensive medical records, updating current history, performing physical exam, formulation of treatment plan and documentation in EMR.    Vaccine Counseling:  “Discussed risks/benefits to vaccination, reviewed components of the vaccine, discussed VIS, discussed informed consent, informed consent obtained. Patient/Parent was allowed to accept or refuse vaccine. Questions answered to satisfactory state of patient/Parent. We reviewed typical age appropriate and seasonally appropriate vaccinations. Reviewed immunization history and updated state vaccination form as needed. Patient was counseled on Prevnar 20      Follow Up:   Return in  about 3 months (around 7/28/2023) for Medicare Subsq., Labs today.      At [facility], we believe that sharing information builds trust and better relationships. You are receiving this note because you recently visited [facility]. It is possible you will see health information before a provider has talked with you about it. This kind of information can be easy to misunderstand. To help you fully understand what it means for your health, we urge you to discuss this note with your provider.    Wesly Galarza MD  [facility] [place]

## 2023-04-29 LAB
25(OH)D3+25(OH)D2 SERPL-MCNC: 31.5 NG/ML (ref 30–100)
ALBUMIN SERPL-MCNC: 4.3 G/DL (ref 3.7–4.7)
ALBUMIN/GLOB SERPL: 1.7 {RATIO} (ref 1.2–2.2)
ALP SERPL-CCNC: 59 IU/L (ref 44–121)
ALT SERPL-CCNC: 6 IU/L (ref 0–44)
AST SERPL-CCNC: 14 IU/L (ref 0–40)
BASOPHILS # BLD AUTO: 0 X10E3/UL (ref 0–0.2)
BASOPHILS NFR BLD AUTO: 1 %
BILIRUB SERPL-MCNC: 0.5 MG/DL (ref 0–1.2)
BUN SERPL-MCNC: 13 MG/DL (ref 8–27)
BUN/CREAT SERPL: 15 (ref 10–24)
CALCIUM SERPL-MCNC: 9.3 MG/DL (ref 8.6–10.2)
CHLORIDE SERPL-SCNC: 98 MMOL/L (ref 96–106)
CHOLEST SERPL-MCNC: 150 MG/DL (ref 100–199)
CO2 SERPL-SCNC: 23 MMOL/L (ref 20–29)
CREAT SERPL-MCNC: 0.89 MG/DL (ref 0.76–1.27)
EGFRCR SERPLBLD CKD-EPI 2021: 88 ML/MIN/1.73
EOSINOPHIL # BLD AUTO: 0.1 X10E3/UL (ref 0–0.4)
EOSINOPHIL NFR BLD AUTO: 1 %
ERYTHROCYTE [DISTWIDTH] IN BLOOD BY AUTOMATED COUNT: 15.3 % (ref 11.6–15.4)
FERRITIN SERPL-MCNC: 22 NG/ML (ref 30–400)
FOLATE SERPL-MCNC: 4.1 NG/ML
GLOBULIN SER CALC-MCNC: 2.6 G/DL (ref 1.5–4.5)
GLUCOSE SERPL-MCNC: 81 MG/DL (ref 70–99)
HBA1C MFR BLD: 5.5 % (ref 4.8–5.6)
HCT VFR BLD AUTO: 37.3 % (ref 37.5–51)
HCV IGG SERPL QL IA: NON REACTIVE
HDLC SERPL-MCNC: 41 MG/DL
HGB BLD-MCNC: 13.2 G/DL (ref 13–17.7)
IMM GRANULOCYTES # BLD AUTO: 0.1 X10E3/UL (ref 0–0.1)
IMM GRANULOCYTES NFR BLD AUTO: 2 %
IRON SATN MFR SERPL: 24 % (ref 15–55)
IRON SERPL-MCNC: 97 UG/DL (ref 38–169)
LDLC SERPL CALC-MCNC: 79 MG/DL (ref 0–99)
LYMPHOCYTES # BLD AUTO: 1.4 X10E3/UL (ref 0.7–3.1)
LYMPHOCYTES NFR BLD AUTO: 17 %
MCH RBC QN AUTO: 33.3 PG (ref 26.6–33)
MCHC RBC AUTO-ENTMCNC: 35.4 G/DL (ref 31.5–35.7)
MCV RBC AUTO: 94 FL (ref 79–97)
MONOCYTES # BLD AUTO: 0.7 X10E3/UL (ref 0.1–0.9)
MONOCYTES NFR BLD AUTO: 9 %
NEUTROPHILS # BLD AUTO: 5.8 X10E3/UL (ref 1.4–7)
NEUTROPHILS NFR BLD AUTO: 70 %
PLATELET # BLD AUTO: 216 X10E3/UL (ref 150–450)
POTASSIUM SERPL-SCNC: 4.1 MMOL/L (ref 3.5–5.2)
PROT SERPL-MCNC: 6.9 G/DL (ref 6–8.5)
RBC # BLD AUTO: 3.96 X10E6/UL (ref 4.14–5.8)
SODIUM SERPL-SCNC: 139 MMOL/L (ref 134–144)
T4 FREE SERPL-MCNC: 1.46 NG/DL (ref 0.82–1.77)
TIBC SERPL-MCNC: 401 UG/DL (ref 250–450)
TRIGL SERPL-MCNC: 176 MG/DL (ref 0–149)
TSH SERPL DL<=0.005 MIU/L-ACNC: 2.01 UIU/ML (ref 0.45–4.5)
UIBC SERPL-MCNC: 304 UG/DL (ref 111–343)
VIT B12 SERPL-MCNC: 191 PG/ML (ref 232–1245)
VLDLC SERPL CALC-MCNC: 30 MG/DL (ref 5–40)
WBC # BLD AUTO: 8.2 X10E3/UL (ref 3.4–10.8)

## 2023-05-02 LAB
APPEARANCE UR: CLEAR
BACTERIA #/AREA URNS HPF: ABNORMAL /[HPF]
BACTERIA UR CULT: ABNORMAL
BACTERIA UR CULT: ABNORMAL
BILIRUB UR QL STRIP: NEGATIVE
CASTS URNS QL MICRO: ABNORMAL /LPF
COLOR UR: YELLOW
EPI CELLS #/AREA URNS HPF: ABNORMAL /HPF (ref 0–10)
GLUCOSE UR QL STRIP: NEGATIVE
HGB UR QL STRIP: ABNORMAL
KETONES UR QL STRIP: ABNORMAL
LEUKOCYTE ESTERASE UR QL STRIP: ABNORMAL
MICRO URNS: ABNORMAL
MICROALBUMIN UR-MCNC: 196.4 UG/ML
NITRITE UR QL STRIP: NEGATIVE
OTHER ANTIBIOTIC SUSC ISLT: ABNORMAL
PH UR STRIP: 6 [PH] (ref 5–7.5)
PROT UR QL STRIP: ABNORMAL
RBC #/AREA URNS HPF: >30 /HPF (ref 0–2)
SP GR UR STRIP: 1.01 (ref 1–1.03)
URINALYSIS REFLEX: ABNORMAL
UROBILINOGEN UR STRIP-MCNC: 1 MG/DL (ref 0.2–1)
WBC #/AREA URNS HPF: ABNORMAL /HPF (ref 0–5)

## 2023-05-03 ENCOUNTER — TELEPHONE (OUTPATIENT)
Dept: FAMILY MEDICINE CLINIC | Facility: CLINIC | Age: 78
End: 2023-05-03
Payer: MEDICARE

## 2023-05-03 DIAGNOSIS — E53.8 B12 DEFICIENCY: ICD-10-CM

## 2023-05-03 DIAGNOSIS — N30.01 ACUTE CYSTITIS WITH HEMATURIA: Primary | ICD-10-CM

## 2023-05-03 RX ORDER — LEVOFLOXACIN 250 MG/1
250 TABLET ORAL DAILY
Qty: 5 TABLET | Refills: 0 | Status: SHIPPED | OUTPATIENT
Start: 2023-05-03 | End: 2023-05-08

## 2023-05-03 RX ORDER — LANCETS 33 GAUGE
1 EACH MISCELLANEOUS DAILY
COMMUNITY
End: 2023-05-03 | Stop reason: SDUPTHER

## 2023-05-03 RX ORDER — LANOLIN ALCOHOL/MO/W.PET/CERES
1000 CREAM (GRAM) TOPICAL DAILY
Qty: 90 TABLET | Refills: 3 | Status: SHIPPED | OUTPATIENT
Start: 2023-05-03

## 2023-05-03 RX ORDER — LANCETS 33 GAUGE
1 EACH MISCELLANEOUS DAILY
Qty: 100 EACH | Refills: 2 | Status: SHIPPED | OUTPATIENT
Start: 2023-05-03

## 2023-05-03 NOTE — TELEPHONE ENCOUNTER
Phone conversation with patient regarding results of testing from 4/28/2023 as follows:    Urinalysis with 1+ leukocytes, 1+ protein, 2+ blood, negative nitrites, with microscopic revealing 11-30 WBCs, greater than 30 RBCs, few bacteria, culture growing greater than 100,000 colonies of Pseudomonas aeruginosa sensitive to quinolones as well as various IV antibiotic options as only tested options  Urine microalbumin elevated at 196  Iron studies including iron, TIBC, iron percent saturation normal with ferritin mildly low at 22  B12 low at 191  Folic acid normal at 4.1  Vitamin D 31.5, normal  Hemoglobin A1c 5.5% versus prior value of 5.0% 3 months prior  Total cholesterol 150, triglyceride 176, HDL 41, LDL 79  CMP normal including creatinine 0.89, GFR 88, glucose 81  White count 8.2, H&H 13.2 and 37.3 with MCV 94, platelets 216,000  TSH 2.01, free T4 1.46, both normal    Assessment/plan:  1.  Pseudomonas aeruginosa UTI.  Asymptomatic.  Likely acute cystitis.  Given limited treatment options we will use Levaquin 250 mg p.o. daily x5 days, patient being aware of potential adverse side effects of the quinolone class including tendinopathy.  2.  Diabetes mellitus type 2 with excellent control.  Continue current regimen.  3.  Dyslipidemia, compensated on Zocor 20 mg daily.  4.  B12 deficiency.  Reinitiate previous prescription of B12 1000 mcg daily orally, plan to repeat B12 level in several months.  5.  Iron deficiency anemia, generally satisfactory test results.  6.  Hypothyroidism, compensated on levothyroxine 88 mcg daily  7.  Remainder of lab testing satisfactory.

## 2023-05-05 NOTE — TELEPHONE ENCOUNTER
He has came to the office asking if we would fill his topical gel for him. I have sent this to his mail order pharmacy. TF

## 2023-05-06 PROCEDURE — 99283 EMERGENCY DEPT VISIT LOW MDM: CPT

## 2023-05-06 RX ORDER — OXYCODONE HYDROCHLORIDE AND ACETAMINOPHEN 5; 325 MG/1; MG/1
1 TABLET ORAL ONCE
Status: COMPLETED | OUTPATIENT
Start: 2023-05-06 | End: 2023-05-07

## 2023-05-07 ENCOUNTER — APPOINTMENT (OUTPATIENT)
Dept: GENERAL RADIOLOGY | Facility: HOSPITAL | Age: 78
End: 2023-05-07
Payer: MEDICARE

## 2023-05-07 ENCOUNTER — HOSPITAL ENCOUNTER (EMERGENCY)
Facility: HOSPITAL | Age: 78
Discharge: HOME | End: 2023-05-07
Attending: EMERGENCY MEDICINE | Admitting: EMERGENCY MEDICINE
Payer: MEDICARE

## 2023-05-07 VITALS
BODY MASS INDEX: 30.62 KG/M2 | HEART RATE: 83 BPM | WEIGHT: 202 LBS | RESPIRATION RATE: 18 BRPM | DIASTOLIC BLOOD PRESSURE: 105 MMHG | SYSTOLIC BLOOD PRESSURE: 222 MMHG | TEMPERATURE: 97.4 F | OXYGEN SATURATION: 97 % | HEIGHT: 68 IN

## 2023-05-07 DIAGNOSIS — S63.502A LEFT WRIST SPRAIN, INITIAL ENCOUNTER: Primary | ICD-10-CM

## 2023-05-07 DIAGNOSIS — I10 ESSENTIAL HYPERTENSION: ICD-10-CM

## 2023-05-07 PROCEDURE — 73110 X-RAY EXAM OF WRIST: CPT

## 2023-05-07 RX ORDER — OXYCODONE HYDROCHLORIDE AND ACETAMINOPHEN 5; 325 MG/1; MG/1
1 TABLET ORAL EVERY 6 HOURS PRN
Qty: 12 TABLET | Refills: 0 | Status: SHIPPED | OUTPATIENT
Start: 2023-05-07

## 2023-05-07 RX ADMIN — OXYCODONE HYDROCHLORIDE AND ACETAMINOPHEN 1 TABLET: 5; 325 TABLET ORAL at 00:16

## 2023-05-07 NOTE — DISCHARGE INSTRUCTIONS
Apply ice to the left wrist.    Take Tylenol or ibuprofen as needed for pain.    Take Percocet as needed for more severe pain.

## 2023-05-07 NOTE — Clinical Note
[facility] EMERGENCY DEPARTMENT  [address] [address]  Phone: [phone]    Lea Rivers was seen and treated in our emergency department on 5/6/2023.  He may return to work on 05/10/2023.         Thank you for choosing [facility].    Helder Diamond MD

## 2023-05-07 NOTE — ED PROVIDER NOTES
Subjective   History of Present Illness  77-year-old male who presents for evaluation of left wrist pain after a fall.  The patient reports that he lost his balance and fell forward off of a porch onto the ground.  He reports that the fall was less than the foot tall but he fell down on all fours.  He now presents with a complaint of left wrist pain.  He denies any other area of pain or injury.  No neck or midline back pain.  He did not hit his head or lose consciousness.  He does not take anticoagulation.  No reported chest pain or abdominal pain.  No reported hip or bilateral lower extremity pain.  He was able to ambulate from the lobby into a triage area for evaluation.  He exhibits normal baseline mentation.  No other acute complaints.        Review of Systems   Constitutional: Negative for chills, fatigue and fever.   HENT: Negative for congestion, ear pain, postnasal drip, sinus pressure and sore throat.    Eyes: Negative for pain, redness and visual disturbance.   Respiratory: Negative for cough, chest tightness and shortness of breath.    Cardiovascular: Negative for chest pain, palpitations and leg swelling.   Gastrointestinal: Negative for abdominal pain, anal bleeding, blood in stool, diarrhea, nausea and vomiting.   Endocrine: Negative for polydipsia and polyuria.   Genitourinary: Negative for difficulty urinating, dysuria, frequency and urgency.   Musculoskeletal: Positive for arthralgias. Negative for back pain and neck pain.   Skin: Negative for pallor and rash.   Allergic/Immunologic: Negative for environmental allergies and immunocompromised state.   Neurological: Negative for dizziness, weakness and headaches.   Hematological: Negative for adenopathy.   Psychiatric/Behavioral: Negative for confusion, self-injury and suicidal ideas. The patient is not nervous/anxious.    All other systems reviewed and are negative.      Past Medical History:   Diagnosis Date   • Anemia    • Colon cancer 1990     Status post partial colectomy in 1990. No chemotherapy or radiation therapy.   • Coronary artery disease     Nonobstructive coronary artery disease.   • Diabetes    • Dyslipidemia    • GERD (gastroesophageal reflux disease)     Hx of.   • Hyperlipidemia    • Hypertension    • Hyponatremia    • Hypothyroidism    • Left shoulder pain    • Low sodium levels 2022    recent issue; saw nephrologist who ruled out kidney issues; took Sodium Chloride po to bring levels up   • Memory deficit     FROM ANESTHESIA   • Osteoarthritis    • Prostate cancer 07/23/2022   • Seizure disorder     silent seziure-diagnosed years ago   • Skin cancer        Allergies   Allergen Reactions   • Chlorhexidine Rash   • Sulfa Antibiotics Rash     Childhood reaction        Past Surgical History:   Procedure Laterality Date   • APPENDECTOMY     • CARDIAC CATHETERIZATION  2012    30 to 40% LAD   • CARPAL TUNNEL RELEASE Bilateral    • CHOLECYSTECTOMY     • COLECTOMY PARTIAL / TOTAL  1990    Partial colectomy   • COLONOSCOPY     • CYBERKNIFE  02/09/2023    Prostate/SV   • CYSTOSCOPY TRANSURETHRAL RESECTION OF PROSTATE N/A 09/21/2018    Procedure: CYSTOSCOPY TRANSURETHRAL RESECTION OF PROSTATE GREENLIGHT;  Surgeon: Naseem Clayton MD;  Location:  [facility];  Service: Urology   • OTHER SURGICAL HISTORY      Contraction surgery on bilateral hands and wrists.   • PROSTATE BIOPSY N/A 11/11/2022    Procedure: TRANSRECTAL ULTRASOUND GUIDED BIOPSY OF PROSTATE;  Surgeon: Naseem Noland MD;  Location:  [facility];  Service: Urology;  Laterality: N/A;   • SINUS SURGERY     • SKIN BIOPSY     • TEETH EXTRACTION     • TONSILLECTOMY     • TOTAL KNEE ARTHROPLASTY Right 04/07/2022    Procedure: TOTAL KNEE ARTHROPLASTY WITH ORTHO ROBOT RIGHT;  Surgeon: Louis Malcolm MD;  Location:  [facility];  Service: Robotics - Ortho;  Laterality: Right;   • TRANSRECTAL INCISION PROSTATE WITH GOLD SEED Bilateral 01/06/2023    Procedure: TRANSRECTAL ULTRASOUND GUIDED  PROSTATE FIDUCIAL MARKER PLACEMENT;  Surgeon: Naseem Noland MD;  Location: [facility];  Service: Urology;  Laterality: Bilateral;   • TURP / TRANSURETHRAL INCISION / DRAINAGE PROSTATE     • VASECTOMY         Family History   Problem Relation Age of Onset   • Cancer Mother         brain   • Hypertension Father    • Stroke Father    • No Known Problems Sister    • Stroke Sister    • Esophageal cancer Brother    • Stroke Maternal Grandfather    • No Known Problems Paternal Grandmother    • No Known Problems Paternal Grandfather    • Stroke Other    • Arthritis Other    • Cancer Other        Social History     Socioeconomic History   • Marital status:    Tobacco Use   • Smoking status: Never     Passive exposure: Past   • Smokeless tobacco: Never   Vaping Use   • Vaping Use: Never used   Substance and Sexual Activity   • Alcohol use: No   • Drug use: No   • Sexual activity: Defer           Objective   Physical Exam  Vitals and nursing note reviewed.   Constitutional:       General: He is not in acute distress.     Appearance: Normal appearance. He is well-developed. He is not toxic-appearing or diaphoretic.   HENT:      Head: Normocephalic and atraumatic.      Right Ear: External ear normal.      Left Ear: External ear normal.      Nose: Nose normal.   Eyes:      General: Lids are normal.      Pupils: Pupils are equal, round, and reactive to light.   Neck:      Trachea: No tracheal deviation.   Cardiovascular:      Rate and Rhythm: Normal rate and regular rhythm.      Pulses: No decreased pulses.      Heart sounds: Normal heart sounds. No murmur heard.    No friction rub. No gallop.   Pulmonary:      Effort: Pulmonary effort is normal. No respiratory distress.      Breath sounds: Normal breath sounds. No decreased breath sounds, wheezing, rhonchi or rales.   Abdominal:      General: Bowel sounds are normal.      Palpations: Abdomen is soft.      Tenderness: There is no abdominal tenderness. There is no  guarding or rebound.   Musculoskeletal:         General: No deformity. Normal range of motion.      Left wrist: Tenderness and bony tenderness present.      Cervical back: Normal range of motion and neck supple.   Lymphadenopathy:      Cervical: No cervical adenopathy.   Skin:     General: Skin is warm and dry.      Findings: No rash.   Neurological:      Mental Status: He is alert and oriented to person, place, and time.      Cranial Nerves: No cranial nerve deficit.      Sensory: No sensory deficit.   Psychiatric:         Speech: Speech normal.         Behavior: Behavior normal.         Thought Content: Thought content normal.         Judgment: Judgment normal.         Procedures           ED Course                                   EFREM reviewed by Helder Diamond MD       Medical Decision Making  77-year-old male who presents for evaluation of left wrist pain after fall.    Differential diagnosis includes wrist fracture, dislocation, wrist sprain.    X-ray of the left wrist is negative for any acute injury.    The patient has underlying hypertension and has been taking his medication as prescribed.  He reports that over the last several days his blood pressure has been running greater than 177 systolic often running greater than 200 systolic.    His blood pressure was noted to be significantly elevated here in the ER.  I discussed further evaluation including labs, EKG, and potential admission.  He reports that he feels well, and desires to go home.  He does not have any neurological deficits and exhibits normal baseline mentation.    He will be advised to monitor blood pressure at home she will these readings to the primary care physician to determine if medication adjustment is warranted.    Essential hypertension: acute illness or injury  Left wrist sprain, initial encounter: acute illness or injury  Amount and/or Complexity of Data Reviewed  Independent Historian: friend  External Data Reviewed:  radiology.  Radiology: ordered and independent interpretation performed. Decision-making details documented in ED Course.      Risk  Prescription drug management.          Final diagnoses:   Left wrist sprain, initial encounter   Essential hypertension       ED Disposition  ED Disposition     ED Disposition   Discharge    Condition   Stable    Comment   --             Wesly Galarza MD  [address] [place] [address] [id number] [phone]    In 1 week           Medication List      New Prescriptions    oxyCODONE-acetaminophen 5-325 MG per tablet  Commonly known as: PERCOCET  Take 1 tablet by mouth Every 6 (Six) Hours As Needed for Moderate Pain or Severe Pain for up to 12 doses.           Where to Get Your Medications      These medications were sent to [facility] [address] [place] [address] - [facility] - [phone]  - [phone]   [address] [facility] [address]    Phone: [phone]   · oxyCODONE-acetaminophen 5-325 MG per tablet          Helder Diamond MD  05/07/23 0155

## 2023-05-16 ENCOUNTER — OFFICE VISIT (OUTPATIENT)
Dept: FAMILY MEDICINE CLINIC | Facility: CLINIC | Age: 78
End: 2023-05-16
Payer: MEDICARE

## 2023-05-16 VITALS
WEIGHT: 200 LBS | OXYGEN SATURATION: 97 % | DIASTOLIC BLOOD PRESSURE: 88 MMHG | TEMPERATURE: 97.6 F | HEIGHT: 68 IN | SYSTOLIC BLOOD PRESSURE: 134 MMHG | BODY MASS INDEX: 30.31 KG/M2 | HEART RATE: 86 BPM

## 2023-05-16 DIAGNOSIS — E53.8 B12 DEFICIENCY: ICD-10-CM

## 2023-05-16 DIAGNOSIS — R31.0 GROSS HEMATURIA: ICD-10-CM

## 2023-05-16 DIAGNOSIS — I10 PRIMARY HYPERTENSION: Chronic | ICD-10-CM

## 2023-05-16 DIAGNOSIS — K64.8 INTERNAL HEMORRHOIDS: ICD-10-CM

## 2023-05-16 DIAGNOSIS — S63.502A SPRAIN OF LEFT WRIST, INITIAL ENCOUNTER: Primary | ICD-10-CM

## 2023-05-16 DIAGNOSIS — Z86.010 HISTORY OF COLON POLYPS: ICD-10-CM

## 2023-05-16 DIAGNOSIS — I25.10 CORONARY ARTERY DISEASE INVOLVING NATIVE CORONARY ARTERY OF NATIVE HEART WITHOUT ANGINA PECTORIS: Chronic | ICD-10-CM

## 2023-05-16 DIAGNOSIS — K92.1 HEMATOCHEZIA: ICD-10-CM

## 2023-05-16 DIAGNOSIS — Z85.038 HISTORY OF COLON CANCER: ICD-10-CM

## 2023-05-16 LAB
BILIRUB BLD-MCNC: ABNORMAL MG/DL
CLARITY, POC: ABNORMAL
COLOR UR: ABNORMAL
EXPIRATION DATE: ABNORMAL
GLUCOSE UR STRIP-MCNC: NEGATIVE MG/DL
HGB BLDA-MCNC: 11.4 G/DL (ref 12–17)
KETONES UR QL: ABNORMAL
LEUKOCYTE EST, POC: ABNORMAL
Lab: ABNORMAL
NITRITE UR-MCNC: NEGATIVE MG/ML
PH UR: 5 [PH] (ref 5–8)
PROT UR STRIP-MCNC: ABNORMAL MG/DL
RBC # UR STRIP: ABNORMAL /UL
SP GR UR: 1.03 (ref 1–1.03)
UROBILINOGEN UR QL: ABNORMAL

## 2023-05-16 RX ORDER — HYDROCORTISONE 25 MG/G
CREAM TOPICAL
Qty: 30 G | Refills: 0 | Status: SHIPPED | OUTPATIENT
Start: 2023-05-16

## 2023-05-16 RX ORDER — CEFDINIR 300 MG/1
300 CAPSULE ORAL 2 TIMES DAILY
Qty: 10 CAPSULE | Refills: 0 | Status: SHIPPED | OUTPATIENT
Start: 2023-05-16 | End: 2023-05-21

## 2023-05-16 NOTE — PROGRESS NOTES
Follow Up Office Visit      Date: 2023   Patient Name: Lea Rivers  : 1945   MRN: [id number]     Chief Complaint:    Chief Complaint   Patient presents with   • ER follow up   • Sore Throat     Blood in stool from lifting something heavy he thinks       History of Present Illness: Lea Rivers is a 77 y.o. male who is here today for follow-up of an ER visit on 2023 at [facility] with ER records reviewed by me, having presented after he had fallen less than 1 foot while trying to help an individual left a chair onto a trailer, landing on all fours, complaining of left wrist pain, seen at [facility] ER where x-ray was negative for fracture and he was diagnosed with a wrist sprain and placed in a splint.  He also had noted elevated blood pressure in the ER of 222/105, noting he checks his blood pressure sporadically and they are variable, taking lisinopril 40 mg daily, verapamil  mg daily and Imdur 30 mg daily.  Denies current chest pains palpitations dyspnea or dizziness.  He does have a history of colon cancer from , indicating record of  incorrect, status post partial colectomy, most recently having had a colonoscopy with Dr. Kauffman in 2022 which revealed a poor prep with a repeat colonoscopy in 2023 again revealing poor prep with 3 polyps removed and internal hemorrhoids noted.  There is no indication of when follow-up colonoscopy was recommended on the report, though the patient indicates he was told 1 year.  He does note in the last day he has had some looser stools with some bright blood noted after bowel movements into the toilet.  He also notes intermittently for the last week having some specks of gross blood in his urine with no dysuria hematuria frequency or urgency no unusual abdominal pain and no unusual back pain.  No fevers but has had some sweats and chills..    Subjective      Review of Systems:   Review of  Systems    I have reviewed the patients family history, social history, past medical history, past surgical history and have updated it as appropriate.     Medications:     Current Outpatient Medications:   •  Diclofenac Sodium (VOLTAREN) 1 % gel gel, Apply 4 g topically to the appropriate area as directed 2 (Two) Times a Day., Disp: 350 g, Rfl: 2  •  divalproex (DEPAKOTE) 500 MG 24 hr tablet, Take 1 tablet by mouth 2 (two) times a day. 500mg QAM, 1000mg QPM, Disp: , Rfl:   •  fluticasone (FLONASE) 50 MCG/ACT nasal spray, 2 sprays into the nostril(s) as directed by provider As Needed for Rhinitis. Administer 2 sprays in each nostril for each dose., Disp: , Rfl:   •  glucose blood test strip, 1 each by Other route Daily. Use to test blood sugar once daily. E11.9, Disp: 100 each, Rfl: 2  •  isosorbide mononitrate (IMDUR) 30 MG 24 hr tablet, Take 1 tablet by mouth 2 (Two) Times a Day., Disp: , Rfl:   •  levETIRAcetam (KEPPRA) 500 MG tablet, Take 1 tablet by mouth 2 (Two) Times a Day., Disp: , Rfl:   •  levothyroxine (SYNTHROID, LEVOTHROID) 88 MCG tablet, Take 1 tablet by mouth Daily., Disp: , Rfl:   •  lisinopril (PRINIVIL,ZESTRIL) 40 MG tablet, , Disp: , Rfl:   •  metFORMIN ER (GLUCOPHAGE-XR) 500 MG 24 hr tablet, Take 1 tablet by mouth 2 (Two) Times a Day., Disp: , Rfl:   •  omeprazole (priLOSEC) 20 MG capsule, Take 1 capsule by mouth Daily., Disp: , Rfl:   •  OneTouch Delica Lancets 33G misc, 1 each Daily. Use once daily to check blood sugar levels. E11.9, Disp: 100 each, Rfl: 2  •  oxyCODONE-acetaminophen (PERCOCET) 5-325 MG per tablet, Take 1 tablet by mouth Every 6 (Six) Hours As Needed for Moderate Pain or Severe Pain for up to 12 doses., Disp: 12 tablet, Rfl: 0  •  simvastatin (ZOCOR) 20 MG tablet, , Disp: , Rfl:   •  sodium chloride 1 g tablet, Take 2 tablets by mouth Daily., Disp: , Rfl:   •  tamsulosin (FLOMAX) 0.4 MG capsule 24 hr capsule, Take 1 capsule by mouth Daily., Disp: 90 capsule, Rfl: 3  •   "verapamil SR (CALAN-SR) 240 MG CR tablet, Take 1 tablet by mouth Daily., Disp: , Rfl:   •  vitamin B-12 (CYANOCOBALAMIN) 1000 MCG tablet, Take 1 tablet by mouth Daily., Disp: 90 tablet, Rfl: 3  •  cefdinir (OMNICEF) 300 MG capsule, Take 1 capsule by mouth 2 (Two) Times a Day for 5 days., Disp: 10 capsule, Rfl: 0  •  Hydrocortisone, Perianal, (ANUSOL-HC) 2.5 % rectal cream, Insert 1 g rectally twice daily for 15 days, Disp: 30 g, Rfl: 0    Allergies:   Allergies   Allergen Reactions   • Chlorhexidine Rash   • Sulfa Antibiotics Rash     Childhood reaction        Objective     Physical Exam: Please see above  Vital Signs:   Vitals:    05/16/23 0901   BP: 134/88   BP Location: Left arm   Patient Position: Sitting   Cuff Size: Adult   Pulse: 86   Temp: 97.6 °F (36.4 °C)   TempSrc: Temporal   SpO2: 97%   Weight: 90.7 kg (200 lb)   Height: 172.7 cm (68\")     Body mass index is 30.41 kg/m².    Physical Exam  General: Pleasant 77-year-old white male, appearing to have some fatigue but not acutely ill, alert and oriented, always very chatty  Lungs clear with no wheeze tachypnea or cough  Cardiac regular rate rhythm with no murmurs gallops or rubs  Abdomen with mild centripetal obesity and soft throughout with some very mild discomfort to palpation right lower quadrant with no rebound or guard, no organomegaly or masses, normal bowel sounds  Left forearm in a brace, after removal mild bruising noted on the distal ulnar aspect just proximal to the wrist, with some mild generalized discomfort to palpation over his distal forearm and wrist but no obvious deformities, able to extend and flex his wrist against resistance with no difficulties despite some complaints of discomfort, does have mild Dupuytren's contracture of the fourth flexor tendon with previous related surgery    Endoscopy, Anus    Date/Time: 5/16/2023 9:35 AM  Performed by: Wesly Galarza MD  Authorized by: Wesly Galarza MD   Preparation: Patient was prepped " and draped in the usual sterile fashion.  Local anesthesia used: no    Anesthesia:  Local anesthesia used: no    Sedation:  Patient sedated: no    Patient tolerance: patient tolerated the procedure well with no immediate complications  Comments: Digital exam performed with some discomfort, no obvious masses palpated, with brown stool noted, subsequent anoscopy inserted again with some discomfort unable to insert more than a couple inches, having some brown soft but not diarrheal stool noted, no active bleeding, with moderately inflamed internal hemorrhoids, tolerated well with some only mild localized discomfort as noted          Results:   Labs:   Hemoglobin A1C   Date Value Ref Range Status   04/28/2023 5.5 4.8 - 5.6 % Final     Comment:              Prediabetes: 5.7 - 6.4           Diabetes: >6.4           Glycemic control for adults with diabetes: <7.0     01/04/2023 5.00 4.80 - 5.60 % Final     TSH   Date Value Ref Range Status   04/28/2023 2.010 0.450 - 4.500 uIU/mL Final   09/30/2022 2.160 0.270 - 4.200 uIU/mL Final        POCT Results (if applicable):   Results for orders placed or performed in visit on 05/16/23   POC Urinalysis Dipstick    Specimen: Urine   Result Value Ref Range    Color Red (A) Yellow, Straw, Dark Yellow, [place]    Clarity, UA Cloudy (A) Clear    Glucose, UA Negative Negative mg/dL    Bilirubin Small (1+) (A) Negative    Ketones, UA 2+ (A) Negative    Specific Gravity  1.030 1.005 - 1.030    Blood, UA 3+ (A) Negative    pH, Urine 5.0 5.0 - 8.0    Protein, POC 2+ (A) Negative mg/dL    Urobilinogen, UA 1 E.U./dL (A) Normal, 0.2 E.U./dL    Leukocytes Small (1+) (A) Negative    Nitrite, UA Negative Negative   POC Hemoglobin    Specimen: Blood   Result Value Ref Range    Hemoglobin 11.4 (A) 12.0 - 17.0 g/dL    Lot Number [id number]     Expiration Date 08/01/2023        Imaging:   No valid procedures specified.       Assessment / Plan      Assessment/Plan:   Diagnoses and all orders for this  visit:    1. Sprain of left wrist, initial encounter (Primary)  Status post fall a week ago having been seen in the ER on 5/7/2023 with negative x-ray, placed in a splint, advised he may remove the splint once his wrist is feeling better.  Appears to be very clinically stable  2. Primary hypertension  Noted to have accelerated hypertension in the ER visit in the context of having had a fall and a wrist sprain diagnosed, with blood pressure 222/105 at the time, subsequent blood pressure today very satisfactory taking his lisinopril 40 mg daily, verapamil  mg daily, and Imdur 30 mg daily.  Advised to start monitoring blood pressures at least a couple times weekly with ideal parameters discussed.  3. Coronary artery disease involving native coronary artery of native heart without angina pectoris  Currently asymptomatic.  Continue his Imdur 30 mg daily and Zocor 40 mg daily.  Not taking antiplatelet drug at this time for unknown reason.  Keep cardiology follow-up.  4. B12 deficiency  Started on B12 1000 mcg p.o. daily based upon recent lab testing last month that showed some deficiency.  We will update in several months.  5. History of colon cancer  Status post partial colectomy per patient account in 1999 not 1990 as listed in his record, most recently having had a colonoscopy by Dr. Kauffman in 12/2022 with a poor prep, repeat colonoscopy in 1/2023 again with a poor prep with 3 polyps removed, internal hemorrhoids noted, patient indicating he was advised to have a follow-up study in 1 year.  6. History of colon polyps  See above.  7. Gross hematuria  -     POC Urinalysis Dipstick  -     POC Hemoglobin  -     Urinalysis With Culture If Indicated -; Future  -     cefdinir (OMNICEF) 300 MG capsule; Take 1 capsule by mouth 2 (Two) Times a Day for 5 days.  Dispense: 10 capsule; Refill: 0  -     Urinalysis With Culture If Indicated - Urine, Random Void  Hemoglobin satisfactory at 11.4, slightly decreased from  hemoglobin of 13.2 now 2 weeks prior.  Hemodynamically stable.  Gross hematuria noted on urinalysis with some leukocytes.  He did recently have a Klebsiella cystitis noted per testing last month.  Will empirically treat with cefdinir as prescribed pending obtaining a formal microscopic urinalysis with culture as indicated per protocol.  If he does truly have another UTI suggestive of a hemorrhagic cystitis I will then repeat another urinalysis after he completes antibiotics to ensure resolves.  If he does not have clinical evidence of a hemorrhagic cystitis then we will need to pursue a formal work-up for gross hematuria including imaging of his urological system and cystoscopy with urology consult.  Will notify patient of urine test results.  8. Hematochezia  -     Endoscopy, Anus  -     POC Hemoglobin  -     Hydrocortisone, Perianal, (ANUSOL-HC) 2.5 % rectal cream; Insert 1 g rectally twice daily for 15 days  Dispense: 30 g; Refill: 0  Hemoglobin satisfactory at 11.4, slightly decreased from hemoglobin of 13.2 now 2 weeks prior.  Hemodynamically stable.  Does have evidence of inflamed though not actively bleeding internal hemorrhoids on anoscopy today, having had colonoscopy most recently in 1/2023 as noted above with 3 polyps removed and a suboptimal bowel prep with remote history of colon cancer treated with a partial colectomy.  Dr. Kauffman apparently wanted to repeat another colonoscopy in 1 year.  We will treat acutely with Anusol cream intrarectally twice daily x15 days as prescribed.  Advise if symptoms not resolving.  9. Internal hemorrhoids  -     Endoscopy, Anus  -     POC Hemoglobin  -     Hydrocortisone, Perianal, (ANUSOL-HC) 2.5 % rectal cream; Insert 1 g rectally twice daily for 15 days  Dispense: 30 g; Refill: 0  Treating as above.    40 minutes spent with patient reviewing his ER records, updating current history, performing physical exam including anoscopy, formulation of treatment plan, and  documentation in EMR      Follow Up:   Return in about 2 months (around 7/16/2023) for Medicare Subsq..      At [facility], we believe that sharing information builds trust and better relationships. You are receiving this note because you recently visited [facility]. It is possible you will see health information before a provider has talked with you about it. This kind of information can be easy to misunderstand. To help you fully understand what it means for your health, we urge you to discuss this note with your provider.    Wesly Galarza MD  [facility] [place]

## 2023-05-17 LAB
GLUCOSE UR QL STRIP: NORMAL
KETONES UR QL STRIP: NORMAL
PH UR STRIP: NORMAL [PH]
PROT UR QL STRIP: NORMAL
SP GR UR STRIP: NORMAL
SPECIMEN STATUS: NORMAL

## 2023-05-18 ENCOUNTER — CLINICAL SUPPORT (OUTPATIENT)
Dept: FAMILY MEDICINE CLINIC | Facility: CLINIC | Age: 78
End: 2023-05-18
Payer: MEDICARE

## 2023-05-18 DIAGNOSIS — R31.0 GROSS HEMATURIA: ICD-10-CM

## 2023-05-18 DIAGNOSIS — E11.9 TYPE 2 DIABETES MELLITUS WITHOUT COMPLICATION, WITHOUT LONG-TERM CURRENT USE OF INSULIN: ICD-10-CM

## 2023-05-18 DIAGNOSIS — E11.9 TYPE 2 DIABETES MELLITUS WITHOUT COMPLICATION, WITHOUT LONG-TERM CURRENT USE OF INSULIN: Primary | ICD-10-CM

## 2023-05-22 LAB
APPEARANCE UR: ABNORMAL
BACTERIA #/AREA URNS HPF: ABNORMAL /[HPF]
BACTERIA UR CULT: NORMAL
BACTERIA UR CULT: NORMAL
BILIRUB UR QL STRIP: NEGATIVE
CASTS URNS MICRO: ABNORMAL
CASTS URNS QL MICRO: PRESENT /LPF
COLOR UR: YELLOW
EPI CELLS #/AREA URNS HPF: ABNORMAL /HPF (ref 0–10)
GLUCOSE UR QL STRIP: NEGATIVE
HGB UR QL STRIP: ABNORMAL
KETONES UR QL STRIP: ABNORMAL
LEUKOCYTE ESTERASE UR QL STRIP: ABNORMAL
MICRO URNS: ABNORMAL
MUCOUS THREADS URNS QL MICRO: PRESENT
NITRITE UR QL STRIP: NEGATIVE
PH UR STRIP: 5.5 [PH] (ref 5–7.5)
PROT UR QL STRIP: ABNORMAL
RBC #/AREA URNS HPF: >30 /HPF (ref 0–2)
SP GR UR STRIP: 1.03 (ref 1–1.03)
URINALYSIS REFLEX: ABNORMAL
UROBILINOGEN UR STRIP-MCNC: 1 MG/DL (ref 0.2–1)
WBC #/AREA URNS HPF: ABNORMAL /HPF (ref 0–5)

## 2023-05-23 ENCOUNTER — TELEPHONE (OUTPATIENT)
Dept: FAMILY MEDICINE CLINIC | Facility: CLINIC | Age: 78
End: 2023-05-23
Payer: MEDICARE

## 2023-05-23 NOTE — TELEPHONE ENCOUNTER
Phone conversation with patient regarding results of laboratory testing from 5/18/2023 as noted:    Clean-catch urinalysis with turbid appearance, 1+ leukocytes, 3+ protein, 1+ ketones, 3+ blood, microscopic revealing 11-30 WBCs, greater than 30 RBCs, with casts present, no bacteria, culture no growth at 48 hours    Assessment/plan:  1.  Microscopic hematuria with history of intermittent gross hematuria.  Patient is already established with Dr. Naseem Noland of urology for previous history of prostate cancer, having undergone a CT of his abdomen pelvis in 11/2022 which did not show any major concerning lesions, MRI of the prostate in 1/2023 with stable findings, and a cystoscopy in early 4/2023, just last month that did not show any concerning acute lesions.  Certainly this would rule out the likelihood of any concerning lesions at this time causing these findings, likely related to irritation from his previous prostate cancer, nonspecifically.  He has a recommended follow up with Dr. Noland by history in approximately 6 months by history, and also follows up with radiation oncologist in 9/2023.  He follows up with me on 7/28/2023

## 2023-06-12 ENCOUNTER — TELEPHONE (OUTPATIENT)
Dept: FAMILY MEDICINE CLINIC | Facility: CLINIC | Age: 78
End: 2023-06-12
Payer: MEDICARE

## 2023-06-12 NOTE — TELEPHONE ENCOUNTER
Phone conversation with initially patient's daughter as listed phone number, who indicates unsolicited that patient has been having some intermittent gross hematuria and possibly some rectal bleeding.  I subsequently contacted patient who confirms these findings, patient having contacted our office earlier today given he thought he had seen a missed message on his phone.  I last had a conversation with him regarding some test results 3 weeks ago but no recent attempt to contact him.  He is scheduled to see his urologist, Dr. Noland next week regarding his findings.

## 2023-08-09 ENCOUNTER — TELEPHONE (OUTPATIENT)
Dept: UROLOGY | Facility: CLINIC | Age: 78
End: 2023-08-09
Payer: MEDICARE

## 2023-08-09 ENCOUNTER — LAB (OUTPATIENT)
Dept: LAB | Facility: HOSPITAL | Age: 78
End: 2023-08-09
Payer: MEDICARE

## 2023-08-09 DIAGNOSIS — C61 PROSTATE CANCER: ICD-10-CM

## 2023-08-09 LAB — PSA SERPL-MCNC: 0.03 NG/ML (ref 0–4)

## 2023-08-09 PROCEDURE — 36415 COLL VENOUS BLD VENIPUNCTURE: CPT

## 2023-08-09 PROCEDURE — 84153 ASSAY OF PSA TOTAL: CPT

## 2023-08-09 NOTE — TELEPHONE ENCOUNTER
PSA test is not done as of today.  Call pt, no answer.  Voicemail not setup, unable to leave message for pt.      Will try to contact pt later today.    Follow up on 08/14/23    Forwarding message to Dr. Noland.

## 2023-08-14 ENCOUNTER — OFFICE VISIT (OUTPATIENT)
Dept: UROLOGY | Facility: CLINIC | Age: 78
End: 2023-08-14
Payer: MEDICARE

## 2023-08-14 VITALS
OXYGEN SATURATION: 92 % | HEART RATE: 67 BPM | DIASTOLIC BLOOD PRESSURE: 76 MMHG | HEIGHT: 68 IN | WEIGHT: 206 LBS | SYSTOLIC BLOOD PRESSURE: 138 MMHG | BODY MASS INDEX: 31.22 KG/M2

## 2023-08-14 DIAGNOSIS — R31.0 GROSS HEMATURIA: Primary | ICD-10-CM

## 2023-08-14 DIAGNOSIS — R82.81 PYURIA: ICD-10-CM

## 2023-08-14 DIAGNOSIS — C61 PROSTATE CANCER: ICD-10-CM

## 2023-08-14 DIAGNOSIS — N40.1 BENIGN PROSTATIC HYPERPLASIA WITH LOWER URINARY TRACT SYMPTOMS, SYMPTOM DETAILS UNSPECIFIED: ICD-10-CM

## 2023-08-14 LAB
BILIRUB BLD-MCNC: ABNORMAL MG/DL
CLARITY, POC: ABNORMAL
COLOR UR: ABNORMAL
EXPIRATION DATE: ABNORMAL
GLUCOSE UR STRIP-MCNC: NEGATIVE MG/DL
KETONES UR QL: NEGATIVE
LEUKOCYTE EST, POC: ABNORMAL
Lab: ABNORMAL
NITRITE UR-MCNC: NEGATIVE MG/ML
PH UR: 5.5 [PH] (ref 5–8)
PROT UR STRIP-MCNC: ABNORMAL MG/DL
RBC # UR STRIP: ABNORMAL /UL
SP GR UR: 1.03 (ref 1–1.03)
UROBILINOGEN UR QL: NORMAL

## 2023-08-14 PROCEDURE — 1160F RVW MEDS BY RX/DR IN RCRD: CPT | Performed by: STUDENT IN AN ORGANIZED HEALTH CARE EDUCATION/TRAINING PROGRAM

## 2023-08-14 PROCEDURE — 99213 OFFICE O/P EST LOW 20 MIN: CPT | Performed by: STUDENT IN AN ORGANIZED HEALTH CARE EDUCATION/TRAINING PROGRAM

## 2023-08-14 PROCEDURE — 1159F MED LIST DOCD IN RCRD: CPT | Performed by: STUDENT IN AN ORGANIZED HEALTH CARE EDUCATION/TRAINING PROGRAM

## 2023-08-14 PROCEDURE — 51798 US URINE CAPACITY MEASURE: CPT | Performed by: STUDENT IN AN ORGANIZED HEALTH CARE EDUCATION/TRAINING PROGRAM

## 2023-08-14 PROCEDURE — 3078F DIAST BP <80 MM HG: CPT | Performed by: STUDENT IN AN ORGANIZED HEALTH CARE EDUCATION/TRAINING PROGRAM

## 2023-08-14 PROCEDURE — 81003 URINALYSIS AUTO W/O SCOPE: CPT | Performed by: STUDENT IN AN ORGANIZED HEALTH CARE EDUCATION/TRAINING PROGRAM

## 2023-08-14 PROCEDURE — 87086 URINE CULTURE/COLONY COUNT: CPT | Performed by: STUDENT IN AN ORGANIZED HEALTH CARE EDUCATION/TRAINING PROGRAM

## 2023-08-14 PROCEDURE — 3075F SYST BP GE 130 - 139MM HG: CPT | Performed by: STUDENT IN AN ORGANIZED HEALTH CARE EDUCATION/TRAINING PROGRAM

## 2023-08-14 NOTE — PROGRESS NOTES
Follow Up Office Visit      Patient Name: Lea Rivers  : 1945   MRN: [id number]     Chief Complaint:    Chief Complaint   Patient presents with    Gross Hematuria    Benign Prostatic Hypertrophy     LUTS      Prostate Cancer       Referring Provider: No ref. provider found    History of Present Illness: Lea Rivers is a 78 y.o. male who presents today for follow up of prostate cancer.  He has a complex medical history including multiple previous prostate surgeries, elevated PSA, pradeep to 11.32 last year, underwent prostate biopsy demonstrating a very small amount of remaining prostate tissue, likely 10 cc prostate.  Was found to have PI-RADS 4 lesion throughout the peripheral zone.  Completed CyberKnife therapy in 2023.  He underwent a 6-month Lupron injection around that time as well.  Returns today in follow-up.  He is 6 months out from his radiation.  His PSA is nearly undetectable.  He has minimal urinary complaints today.  He was treated with antibiotics for a Pseudomonas urinary tract infection in February.  He had some gross hematuria around that time.  Gross hematuria has ceased.  He is only drinking 3 glasses of water per day and his urine is nadia, I personally evaluated the urine today.  Urine is positive for blood and trace leukocytes.  I will send another culture today.  IPSS 21, he does report a stable strong stream typically, PVR 0 mL emptying well.    Lab Results   Component Value Date    PSA 0.025 2023         Subjective      Review of System: Review of Systems   Genitourinary:  Positive for frequency and hematuria.    I have reviewed the ROS documented by my clinical staff, I have updated appropriately and I agree. Naseem Noland MD    I have reviewed and the following portions of the patient's history were updated as appropriate: past family history, past medical history, past social history, past surgical history and problem list.    Medications:      Current Outpatient Medications:     Diclofenac Sodium (VOLTAREN) 1 % gel gel, Apply 4 g topically to the appropriate area as directed 2 (Two) Times a Day., Disp: 350 g, Rfl: 2    divalproex (DEPAKOTE) 500 MG 24 hr tablet, Take 1 tablet by mouth 2 (two) times a day. 500mg QAM, 1000mg QPM, Disp: , Rfl:     fluticasone (FLONASE) 50 MCG/ACT nasal spray, 2 sprays into the nostril(s) as directed by provider As Needed for Rhinitis. Administer 2 sprays in each nostril for each dose., Disp: , Rfl:     glucose blood test strip, 1 each by Other route Daily. Use to test blood sugar once daily. E11.9, Disp: 100 each, Rfl: 2    Hydrocortisone, Perianal, (ANUSOL-HC) 2.5 % rectal cream, Insert 1 g rectally twice daily for 15 days, Disp: 30 g, Rfl: 0    isosorbide mononitrate (IMDUR) 30 MG 24 hr tablet, Take 1 tablet by mouth 2 (Two) Times a Day., Disp: , Rfl:     levETIRAcetam (KEPPRA) 500 MG tablet, Take 1 tablet by mouth 2 (Two) Times a Day., Disp: , Rfl:     levothyroxine (SYNTHROID, LEVOTHROID) 88 MCG tablet, Take 1 tablet by mouth Daily., Disp: , Rfl:     lisinopril (PRINIVIL,ZESTRIL) 40 MG tablet, , Disp: , Rfl:     metFORMIN ER (GLUCOPHAGE-XR) 500 MG 24 hr tablet, Take 1 tablet by mouth 2 (Two) Times a Day., Disp: , Rfl:     omeprazole (priLOSEC) 20 MG capsule, Take 1 capsule by mouth Daily., Disp: , Rfl:     OneTouch Delica Lancets 33G misc, 1 each Daily. Use once daily to check blood sugar levels. E11.9, Disp: 100 each, Rfl: 2    oxyCODONE-acetaminophen (PERCOCET) 5-325 MG per tablet, Take 1 tablet by mouth Every 6 (Six) Hours As Needed for Moderate Pain or Severe Pain for up to 12 doses., Disp: 12 tablet, Rfl: 0    simvastatin (ZOCOR) 20 MG tablet, , Disp: , Rfl:     sodium chloride 1 g tablet, Take 2 tablets by mouth Daily., Disp: , Rfl:     tamsulosin (FLOMAX) 0.4 MG capsule 24 hr capsule, Take 1 capsule by mouth Daily., Disp: 90 capsule, Rfl: 3    verapamil SR (CALAN-SR) 240 MG CR tablet, Take 1 tablet by mouth  Daily., Disp: , Rfl:     vitamin B-12 (CYANOCOBALAMIN) 1000 MCG tablet, Take 1 tablet by mouth Daily., Disp: 90 tablet, Rfl: 3    Allergies:   Allergies   Allergen Reactions    Chlorhexidine Rash    Sulfa Antibiotics Rash     Childhood reaction        IPSS Questionnaire (AUA-7):  Over the past month.    1)  Incomplete Emptying:       How often have you had a sensation of not emptying you had the sensation of not emptying your bladder completely after you finished urinating?  3 - About half the time   2)  Frequency:       How often have you had the urinate again less than two hours after you finished urinating?  3 - About half the time   3)  Intermittency:       How often have you found you stopped and started again several times when you urinated?   3 - About half the time   4) Urgency:      How often have you found it difficult to postpone urination?  3 - About half the time   5) Weak Stream:      How often have you had a weak urinary stream?  3 - About half the time   6) Straining:       How often have you had to push or strain to begin urination?  3 - about half the time   7) Nocturia:      How many times did you most typically get up to urinate from the time you went to bed at night until the time you got up in the morning?  3 - 3 times   Total Score:  21   The International Prostate Symptom Score (IPSS) is used to screen, diagnose, track symptoms of benign prostatic hyperplasia (BPH).   0-7 (Mild Symptoms) 8-19 (Moderate) 20-35 (Severe)   Quality of Life (QoL):  If you were to spend the rest of your life with your urinary condition just the way it is now, how would you feel about that? 2-Mostly Satisfied   Urine Leakage (Incontinence) 0-No Leakage     Sexual Health Inventory for Men (LAUREN)   Over the past 6 months:     1. How do you rate your confidence that you could get and keep an erection?  1 - Very low   2. When you had erections with sexual  stimulation, how often were your erections hard enough for  "penetration (entering your partner)?  0 - No Sexual Activity    3. During sexual intercourse, how often were you able to maintain your erection after you had penetrated (entered) your partner?  0 - Did not attempt intercourse   4. During sexual intercourse, how difficult was it to maintain your erection to completion of intercourse?  0 - Did not attempt intercourse   5. When you attempted sexual intercourse, how often was it satisfactory for you?  0 - Did not attempt intercourse    Total Score: 1   The Sexual Health Inventory for Men further classifies ED severity with the following breakpoints:   1-7 (Severe ED) 8-11 (Moderate ED) 12-16 (Mild to Moderate ED) 17-21 (Mild ED)      Post void residual bladder scan:   18 mL     Objective     Physical Exam:   Vital Signs:   Vitals:    08/14/23 1157   BP: 138/76   Pulse: 67   SpO2: 92%   Weight: 93.4 kg (206 lb)   Height: 172.7 cm (68\")     Body mass index is 31.32 kg/mý.     Physical Exam  Constitutional:       Appearance: Normal appearance.   HENT:      Head: Normocephalic and atraumatic.      Nose: Nose normal.   Eyes:      Extraocular Movements: Extraocular movements intact.      Conjunctiva/sclera: Conjunctivae normal.      Pupils: Pupils are equal, round, and reactive to light.   Musculoskeletal:         General: Normal range of motion.      Cervical back: Normal range of motion and neck supple.   Skin:     General: Skin is warm and dry.      Findings: No lesion or rash.   Neurological:      General: No focal deficit present.      Mental Status: He is alert and oriented to person, place, and time. Mental status is at baseline.   Psychiatric:         Mood and Affect: Mood normal.         Behavior: Behavior normal.       Labs:   Brief Urine Lab Results  (Last result in the past 365 days)        Color   Clarity   Blood   Leuk Est   Nitrite   Protein   CREAT   Urine HCG        08/14/23 1216 Other  Comment: dark brown   Cloudy   3+   Trace   Negative   1+             "       Urine Culture          4/28/2023    11:43 5/18/2023    15:56 6/20/2023    10:40   Urine Culture   Urine Culture Final report  Final report  <10,000 CFU/mL Pseudomonas aeruginosa  C      Details         C Corrected result                Lab Results   Component Value Date    GLUCOSE 81 04/28/2023    CALCIUM 9.3 04/28/2023     04/28/2023    K 4.1 04/28/2023    CO2 23 04/28/2023    CL 98 04/28/2023    BUN 13 04/28/2023    CREATININE 0.89 04/28/2023    EGFRIFNONA 64 09/10/2021    BCR 15 04/28/2023    ANIONGAP 14.0 01/06/2023       Lab Results   Component Value Date    WBC 8.2 04/28/2023    HGB 11.4 (A) 05/16/2023    HCT 37.3 (L) 04/28/2023    MCV 94 04/28/2023     04/28/2023       Images:   XR Wrist 3+ View Left    Result Date: 5/7/2023  Normal exam. Electronically signed by:  Bob Yi M.D.  5/6/2023 11:15 PM Mountain Time      Measures:   Tobacco:   Lea Arnold [place]  reports that he has never smoked. He has been exposed to tobacco smoke. He has never used smokeless tobacco.    Assessment / Plan      Assessment/Plan:   78 y.o. male who presented today for follow up of unfavorable intermediate risk prostate cancer status post CyberKnife radiation therapy, multiple previous prostate outlet procedures and stable strong stream but moderately elevated IPSS.  Overall doing fairly well.  I will send another urine culture given Pseudomonas infection identified in June.  Urine is nadia, he is likely dehydrated, we discussed the importance of timed and double voiding and increasing his water intake.  Follow back up with radiation oncology and myself, within 6 months with repeat PSA.    Diagnoses and all orders for this visit:    1. Gross hematuria (Primary)  -     POC Urinalysis Dipstick, Automated    2. Prostate cancer  -     POC Urinalysis Dipstick, Automated  -     PSA Diagnostic; Future    3. Benign prostatic hyperplasia with lower urinary tract symptoms, symptom details unspecified  -     POC  Urinalysis Dipstick, Automated    4. Pyuria  -     Urine Culture - Urine, Urine, Clean Catch           Follow Up:   Return in about 6 months (around 2/14/2024) for Follow Up after Labs.    I spent approximately 20 minutes providing clinical care for this patient; including review of patient's chart and provider documentation, face to face time spent with patient in examination room (obtaining history, performing physical exam, discussing diagnosis and management options), placing orders, and completing patient documentation.     Naseem Noland MD  [facility] [place] [facility] [place]

## 2023-08-16 LAB — BACTERIA SPEC AEROBE CULT: NO GROWTH

## 2023-09-14 ENCOUNTER — HOSPITAL ENCOUNTER (OUTPATIENT)
Dept: RADIATION ONCOLOGY | Facility: HOSPITAL | Age: 78
Setting detail: RADIATION/ONCOLOGY SERIES
Discharge: HOME | End: 2023-09-14
Payer: MEDICARE

## 2023-09-14 ENCOUNTER — OFFICE VISIT (OUTPATIENT)
Dept: RADIATION ONCOLOGY | Facility: HOSPITAL | Age: 78
End: 2023-09-14
Payer: MEDICARE

## 2023-09-14 VITALS
BODY MASS INDEX: 30.97 KG/M2 | HEART RATE: 79 BPM | SYSTOLIC BLOOD PRESSURE: 149 MMHG | DIASTOLIC BLOOD PRESSURE: 86 MMHG | OXYGEN SATURATION: 94 % | RESPIRATION RATE: 20 BRPM | WEIGHT: 203.7 LBS | TEMPERATURE: 97.3 F

## 2023-09-14 DIAGNOSIS — K64.9 HEMORRHOIDS, UNSPECIFIED HEMORRHOID TYPE: ICD-10-CM

## 2023-09-14 DIAGNOSIS — C61 PROSTATE CANCER: Primary | ICD-10-CM

## 2023-09-14 PROCEDURE — G0463 HOSPITAL OUTPT CLINIC VISIT: HCPCS

## 2023-09-14 RX ORDER — TERAZOSIN 5 MG/1
CAPSULE ORAL
COMMUNITY

## 2023-09-14 RX ORDER — LISINOPRIL 20 MG/1
TABLET ORAL
COMMUNITY

## 2023-09-14 RX ORDER — OXYBUTYNIN CHLORIDE 10 MG/1
TABLET, EXTENDED RELEASE ORAL
COMMUNITY

## 2023-09-14 RX ORDER — FINASTERIDE 5 MG/1
TABLET, FILM COATED ORAL
COMMUNITY

## 2023-09-14 RX ORDER — HYDROCORTISONE ACETATE 25 MG/1
25 SUPPOSITORY RECTAL 2 TIMES DAILY PRN
Qty: 12 EACH | Refills: 0 | Status: CANCELLED | OUTPATIENT
Start: 2023-09-14

## 2023-09-14 RX ORDER — PRAMOXINE HYDROCHLORIDE 10 MG/G
AEROSOL, FOAM TOPICAL
Status: CANCELLED | OUTPATIENT
Start: 2023-09-14

## 2023-09-14 RX ORDER — HYDROCORTISONE 10 MG/G
CREAM TOPICAL 3 TIMES DAILY PRN
Qty: 28.4 G | Refills: 0 | Status: SHIPPED | OUTPATIENT
Start: 2023-09-14

## 2023-09-14 NOTE — PROGRESS NOTES
FOLLOW UP NOTE    PATIENT:                                                      Lea Rivers  MEDICAL RECORD #:                        [id number]  :                                                          1945  COMPLETION DATE:   2023  DIAGNOSIS:     Prostate cancer  - Stage IIC (cT2c, cN0, cM0, PSA: 11.3, Grade Group: 3)        BRIEF HISTORY:    Routine follow-up visit for unfavorable intermediate risk prostate cancer diffusely involving a relatively small residual prostate gland after prior greenlight laser procedure and more extensive TURP procedure.  Staging nuclear medicine bone scan and CT abdomen pelvis were without evidence of metastatic disease.  He initiated short course ADT with single 6-month Lupron injection on 2022.  He has since completed definitive CyberKnife stereotactic body radiotherapy.  He tolerated treatment well.  Since we last saw the patient, he has been evaluated in the ED and with urology on several occasions for increased lower urinary tract symptoms, gross hematuria, and recurrent culture-positive UTIs.  Cystoscopy on 2023 showed no obvious obstruction or urethral strictures.    Most recent UA collected 2023 was without evidence of UTI and no growth on culture but did have presence of blood.  The patient was recommended to increase p.o. fluid intake and do timed voiding.  Since that time, he denies worsening lower urinary tract symptoms.  He reports most prominent symptoms of urinary frequency and urgency with leakage requiring 3 pads per day.  He continues daily Flomax but has since stopped Gemtesa which he did not feel was effective.  He denies fever, chills, dysuria, or flank pain.  He reports history of hemorrhoids as diagnosed by his PCP, but is not using any medication or preparation for this.  He does report intermittent bleeding noted in his pad, but is unsure if source is urinary or from the rectum.  He does report stools that are firm but  typically pass daily without straining.  No unexplained weight loss.  No focal bone pains that are new.  He unfortunately suffered a fall yesterday, tripping over a sidewalk at [employer].  He also recently had skin cancer removal right temporal scalp.  He denies additional acute concerns today.  Posttreatment PSA on 8/9/2023 declined to a value of 0.025 ng/ml.           MEDICATIONS: Medication reconciliation for the patient was reviewed and confirmed in the electronic medical record.    Review of Systems:   Review of Systems   Constitutional:  Positive for fatigue.   HENT:   Positive for hearing loss.    Gastrointestinal:  Positive for blood in stool.   Genitourinary:  Positive for bladder incontinence and difficulty urinating.    Musculoskeletal:  Positive for arthralgias and gait problem.   Neurological:  Positive for gait problem.       KPS 80%        IPSS Questionnaire (AUA-7):  Over the past month…     1)  Incomplete Emptying  How often have you had a sensation of not emptying your bladder?  1 -not at all   2)  Frequency  How often have you had to urinate less than every two hours? 3 - About half the time   3)  Intermittency  How often have you found you stopped and started again several times when you urinated?  2 - Less than half the time   4) Urgency  How often have you found it difficult to postpone urination?  4 - More than half the time   5) Weak Stream  How often have you had a weak urinary stream?  1 - Less than 1 time in 5   6) Straining  How often have you had to push or strain to begin urination?  0 - Not at all   7) Nocturia  How many times did you typically get up at night to urinate?  2 -2 time   Total Score:  13         Quality of life due to urinary symptoms:  If you were to spend the rest of your life with your urinary condition the way it is now, how would you feel about that? 3 -mixed   Urine Leakage (Incontinence) 2-Mild (More than a few drops a day, 1-2 pads/day)      Sexual Health  Inventory  Current Status     1)  How do you rate your confidence that you could achieve and keep an erection? 1-Very Low   2) When you had erections with sexual stimulation, how often were your erections hard enough for penetration (entering your partner)? 0-No sexual activity   3)  During sexual intercourse, how often were you able to maintain your erection after you had penetrated (entered) into your partner? 0-Did not attempt intercourse   4) During sexual intercourse, how difficult was it to maintain your erection to completion of intercourse? 0-Did not attempt intercourse   5) When you attempted sexual intercourse, how often was it satisfactory to you? 0-No sexual activity   Total Score: 1         Bowel Health Inventory  Current Status: 0-No problems, no rectal bleeding, no discharge, less then 5 bowel movements a day           Physical Exam:   Physical Exam  Vitals and nursing note reviewed.   Constitutional:       General: He is not in acute distress.     Appearance: He is well-developed.   HENT:      Head: Normocephalic and atraumatic.   Eyes:      Conjunctiva/sclera: Conjunctivae normal.      Pupils: Pupils are equal, round, and reactive to light.   Cardiovascular:      Rate and Rhythm: Normal rate and regular rhythm.   Pulmonary:      Effort: Pulmonary effort is normal.      Breath sounds: Normal breath sounds.   Abdominal:      Tenderness: There is no right CVA tenderness or left CVA tenderness.   Genitourinary:     Rectum: External hemorrhoid and internal hemorrhoid present.      Comments: Nonthrombosed external hemorrhoids present.  Attempted ALEJANDRO to evaluate prostate revealed a diffusely tender internal hemorrhoid at 6:00, though gloved finger was only able to be inserted about 2 cm before patient unable to tolerate exam any further due to pain.  Scant blood was noted on gloved finger upon withdrawal.  Musculoskeletal:         General: Normal range of motion.   Skin:     General: Skin is warm and dry.    Neurological:      Mental Status: He is alert and oriented to person, place, and time.   Psychiatric:         Behavior: Behavior normal.         Thought Content: Thought content normal.         Judgment: Judgment normal.       VITAL SIGNS:   Vitals:    09/14/23 1120   BP: 149/86   Pulse: 79   Resp: 20   Temp: 97.3 °F (36.3 °C)   TempSrc: Skin   SpO2: 94%   Weight: 92.4 kg (203 lb 11.2 oz)   PainSc: 0-No pain             The following portions of the patient's history were reviewed and updated as appropriate: allergies, current medications, past family history, past medical history, past social history, past surgical history and problem list.            IMPRESSION:   Prostate cancer, [place]'s 4+3=7, clinical stage IIC (T2c, N0, M0), PSA 11.32 ng/ml.  7 months status post CyberKnife SBRT which was given along with short course androgen ablation therapy.  He tolerated treatment well.    He continues longstanding use of Flomax for chronic lower urinary tract symptoms, stable now despite several prior UTIs.  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  Trial of Proctocort has been sent to the patient's pharmacy.  Recommend increasing daily water intake as well as soluble fiber.  Recommend OTC bowel regimen such as daily stool softener to facilitate easy passage of stool.  The patient has had an excellent biochemical response to treatment, with PSA carlos of 0.025 ng/ml on 8/9/2023.  The patient I discussed ongoing surveillance with biannual PSA monitoring as long he is he remains in disease remission status.  We again reviewed follow-up intervals, PSA monitoring, continued expectations for response to treatment and possible late radiation-related toxicities.      RECOMMENDATIONS: Mr. Rivers continues routine urologic  surveillance under the care of Dr. Noland, with follow-up and repeat PSA scheduled 2/15/2024.  We will schedule the patient to return to our clinic in 1 year, or certainly sooner as clinically indicated and/or if PSA should rise on 2 consecutive occasions exceeding a value of 2 ng/ml.           KATE Ng spent a total of 30 minutes on today's visit, with more than 15 minutes in direct face to face communication, and the remainder of the time spent in reviewing the relevant history, records, available imaging, and for documentation.      Errors in dictation may reflect use of voice recognition software and not all errors in transcription may have been detected prior to signing.

## 2023-12-04 ENCOUNTER — OFFICE VISIT (OUTPATIENT)
Dept: UROLOGY | Facility: CLINIC | Age: 78
End: 2023-12-04
Payer: MEDICARE

## 2023-12-04 VITALS — HEART RATE: 67 BPM | BODY MASS INDEX: 30.77 KG/M2 | WEIGHT: 203 LBS | HEIGHT: 68 IN | OXYGEN SATURATION: 97 %

## 2023-12-04 DIAGNOSIS — R31.0 GROSS HEMATURIA: ICD-10-CM

## 2023-12-04 DIAGNOSIS — N40.1 BENIGN PROSTATIC HYPERPLASIA WITH INCOMPLETE BLADDER EMPTYING: ICD-10-CM

## 2023-12-04 DIAGNOSIS — N40.1 BENIGN PROSTATIC HYPERPLASIA WITH LOWER URINARY TRACT SYMPTOMS, SYMPTOM DETAILS UNSPECIFIED: ICD-10-CM

## 2023-12-04 DIAGNOSIS — R82.81 PYURIA: ICD-10-CM

## 2023-12-04 DIAGNOSIS — N30.00 ACUTE CYSTITIS WITHOUT HEMATURIA: ICD-10-CM

## 2023-12-04 DIAGNOSIS — R39.14 BENIGN PROSTATIC HYPERPLASIA WITH INCOMPLETE BLADDER EMPTYING: ICD-10-CM

## 2023-12-04 DIAGNOSIS — C61 PROSTATE CANCER: Primary | ICD-10-CM

## 2023-12-04 LAB
BILIRUB BLD-MCNC: ABNORMAL MG/DL
CLARITY, POC: ABNORMAL
COLOR UR: ABNORMAL
EXPIRATION DATE: ABNORMAL
GLUCOSE UR STRIP-MCNC: NEGATIVE MG/DL
KETONES UR QL: ABNORMAL
LEUKOCYTE EST, POC: ABNORMAL
Lab: ABNORMAL
NITRITE UR-MCNC: POSITIVE MG/ML
PH UR: 5.5 [PH] (ref 5–8)
PROT UR STRIP-MCNC: ABNORMAL MG/DL
RBC # UR STRIP: ABNORMAL /UL
SP GR UR: 1.03 (ref 1–1.03)
UROBILINOGEN UR QL: NORMAL

## 2023-12-04 PROCEDURE — 99213 OFFICE O/P EST LOW 20 MIN: CPT | Performed by: STUDENT IN AN ORGANIZED HEALTH CARE EDUCATION/TRAINING PROGRAM

## 2023-12-04 PROCEDURE — 51798 US URINE CAPACITY MEASURE: CPT | Performed by: STUDENT IN AN ORGANIZED HEALTH CARE EDUCATION/TRAINING PROGRAM

## 2023-12-04 PROCEDURE — 87186 SC STD MICRODIL/AGAR DIL: CPT | Performed by: STUDENT IN AN ORGANIZED HEALTH CARE EDUCATION/TRAINING PROGRAM

## 2023-12-04 PROCEDURE — 87086 URINE CULTURE/COLONY COUNT: CPT | Performed by: STUDENT IN AN ORGANIZED HEALTH CARE EDUCATION/TRAINING PROGRAM

## 2023-12-04 PROCEDURE — 1159F MED LIST DOCD IN RCRD: CPT | Performed by: STUDENT IN AN ORGANIZED HEALTH CARE EDUCATION/TRAINING PROGRAM

## 2023-12-04 PROCEDURE — 87077 CULTURE AEROBIC IDENTIFY: CPT | Performed by: STUDENT IN AN ORGANIZED HEALTH CARE EDUCATION/TRAINING PROGRAM

## 2023-12-04 PROCEDURE — 1160F RVW MEDS BY RX/DR IN RCRD: CPT | Performed by: STUDENT IN AN ORGANIZED HEALTH CARE EDUCATION/TRAINING PROGRAM

## 2023-12-04 PROCEDURE — 81003 URINALYSIS AUTO W/O SCOPE: CPT | Performed by: STUDENT IN AN ORGANIZED HEALTH CARE EDUCATION/TRAINING PROGRAM

## 2023-12-04 RX ORDER — TAMSULOSIN HYDROCHLORIDE 0.4 MG/1
1 CAPSULE ORAL DAILY
Qty: 90 CAPSULE | Refills: 3 | Status: SHIPPED | OUTPATIENT
Start: 2023-12-04

## 2023-12-04 RX ORDER — CEFUROXIME AXETIL 500 MG/1
500 TABLET ORAL 2 TIMES DAILY
Qty: 10 TABLET | Refills: 0 | Status: SHIPPED | OUTPATIENT
Start: 2023-12-04

## 2023-12-04 NOTE — PROGRESS NOTES
Follow Up Office Visit      Patient Name: Lea Rivers  : 1945   MRN: [id number]     Chief Complaint:    Chief Complaint   Patient presents with    Gross Hematuria    Prostate Cancer    Benign Prostatic Hypertrophy     LUTS      Pyuria       Referring Provider: No ref. provider found    History of Present Illness: Lea Rivers is a 78 y.o. male who presents today for follow up of prostate cancer s/p XRT. He has a complex urologic history:    Patient underwent prostate biopsy in  which resulted in high-volume unfavorable intermediate risk prostate cancer.   -prior to biopsy was 11.32 in the setting of a very small 10 cc prostate.  MRI prostate demonstrated large PI-RADS 4 lesion throughout the remaining of the peripheral zone.   -He recently completed Cyberknife radiation therapy on 23.  Describes no problems with his radiation.   -He continues to report urinary frequency and urinary urgency with leakage.     History of BPH and underwent greenlight photo vaporization of the prostate in .     Today he returns in follow up.   Having mild intermittent hematuria, reports a strong stream.   His PVR is 0 mL.   He has a UTI today, nitrite positive.   Reports mild burning today.     Lab Results   Component Value Date    PSA 0.025 2023     PSA trend is stable.       Subjective      Review of System: Review of Systems   Genitourinary:  Positive for frequency, hematuria and urgency.      I have reviewed the ROS documented by my clinical staff, I have updated appropriately and I agree. Naseem Noland MD    I have reviewed and the following portions of the patient's history were updated as appropriate: past family history, past medical history, past social history, past surgical history and problem list.    Medications:     Current Outpatient Medications:     Diclofenac Sodium (VOLTAREN) 1 % gel gel, Apply 4 g topically to the appropriate area as directed 2 (Two)  Times a Day., Disp: 350 g, Rfl: 2    divalproex (DEPAKOTE) 500 MG 24 hr tablet, Take 1 tablet by mouth 2 (two) times a day. 500mg QAM, 1000mg QPM, Disp: , Rfl:     fluticasone (FLONASE) 50 MCG/ACT nasal spray, 2 sprays into the nostril(s) as directed by provider As Needed for Rhinitis. Administer 2 sprays in each nostril for each dose., Disp: , Rfl:     glucose blood test strip, 1 each by Other route Daily. Use to test blood sugar once daily. E11.9, Disp: 100 each, Rfl: 2    Hydrocortisone, Perianal, (PROCTOCORT) 1 % cream rectal cream, Insert  into the rectum 3 (Three) Times a Day As Needed (External hemorrhoids)., Disp: 28.4 g, Rfl: 0    isosorbide mononitrate (IMDUR) 30 MG 24 hr tablet, Take 1 tablet by mouth 2 (Two) Times a Day., Disp: , Rfl:     levETIRAcetam (KEPPRA) 500 MG tablet, Take 1 tablet by mouth 2 (Two) Times a Day., Disp: , Rfl:     levothyroxine (SYNTHROID, LEVOTHROID) 88 MCG tablet, Take 1 tablet by mouth Daily., Disp: , Rfl:     lisinopril (PRINIVIL,ZESTRIL) 20 MG tablet, , Disp: , Rfl:     metFORMIN (GLUCOPHAGE) 500 MG tablet, , Disp: , Rfl:     omeprazole (priLOSEC) 20 MG capsule, Take 1 capsule by mouth Daily., Disp: , Rfl:     OneTouch Delica Lancets 33G misc, 1 each Daily. Use once daily to check blood sugar levels. E11.9, Disp: 100 each, Rfl: 2    oxyCODONE-acetaminophen (PERCOCET) 5-325 MG per tablet, Take 1 tablet by mouth Every 6 (Six) Hours As Needed for Moderate Pain or Severe Pain for up to 12 doses., Disp: 12 tablet, Rfl: 0    simvastatin (ZOCOR) 20 MG tablet, , Disp: , Rfl:     sodium chloride 1 g tablet, Take 2 tablets by mouth Daily., Disp: , Rfl:     tamsulosin (FLOMAX) 0.4 MG capsule 24 hr capsule, Take 1 capsule by mouth Daily., Disp: 90 capsule, Rfl: 3    verapamil SR (CALAN-SR) 240 MG CR tablet, Take 1 tablet by mouth Daily., Disp: , Rfl:     vitamin B-12 (CYANOCOBALAMIN) 1000 MCG tablet, Take 1 tablet by mouth Daily., Disp: 90 tablet, Rfl: 3    cefuroxime (CEFTIN) 500 MG  tablet, Take 1 tablet by mouth 2 (Two) Times a Day., Disp: 10 tablet, Rfl: 0    Allergies:   Allergies   Allergen Reactions    Chlorhexidine Rash    Sulfa Antibiotics Rash     Childhood reaction        IPSS Questionnaire (AUA-7):  Over the past month…    1)  Incomplete Emptying:       How often have you had a sensation of not emptying you had the sensation of not emptying your bladder completely after you finished urinating?  3 - About half the time   2)  Frequency:       How often have you had the urinate again less than two hours after you finished urinating?  5 - Almost always   3)  Intermittency:       How often have you found you stopped and started again several times when you urinated?   3 - About half the time   4) Urgency:      How often have you found it difficult to postpone urination?  2 - Less than half the time   5) Weak Stream:      How often have you had a weak urinary stream?  3 - About half the time   6) Straining:       How often have you had to push or strain to begin urination?  3 - About half the time   7) Nocturia:      How many times did you most typically get up to urinate from the time you went to bed at night until the time you got up in the morning?  4 - 4 times   Total Score:  23   The International Prostate Symptom Score (IPSS) is used to screen, diagnose, track symptoms of benign prostatic hyperplasia (BPH).   0-7 (Mild Symptoms) 8-19 (Moderate) 20-35 (Severe)   Quality of Life (QoL):  If you were to spend the rest of your life with your urinary condition just the way it is now, how would you feel about that? 5-Unhappy   Urine Leakage (Incontinence) 0-No Leakage     Sexual Health Inventory for Men (LAUREN)   Over the past 6 months:     1. How do you rate your confidence that you could get and keep an erection?  0 - No sexual activity    2. When you had erections with sexual  stimulation, how often were your erections hard enough for penetration (entering your partner)?  0 - No Sexual  "Activity    3. During sexual intercourse, how often were you able to maintain your erection after you had penetrated (entered) your partner?  0 - Did not attempt intercourse   4. During sexual intercourse, how difficult was it to maintain your erection to completion of intercourse?  0 - Did not attempt intercourse   5. When you attempted sexual intercourse, how often was it satisfactory for you?  0 - Did not attempt intercourse    Total Score: 0   The Sexual Health Inventory for Men further classifies ED severity with the following breakpoints:   1-7 (Severe ED) 8-11 (Moderate ED) 12-16 (Mild to Moderate ED) 17-21 (Mild ED)      Post void residual bladder scan:   0 mL     Objective     Physical Exam:   Vital Signs:   Vitals:    12/04/23 0845   Pulse: 67   SpO2: 97%   Weight: 92.1 kg (203 lb)   Height: 172.7 cm (68\")   PainSc: 0-No pain     Body mass index is 30.87 kg/m².     Physical Exam  Constitutional:       Appearance: Normal appearance.   HENT:      Head: Normocephalic and atraumatic.      Nose: Nose normal.   Eyes:      Extraocular Movements: Extraocular movements intact.      Conjunctiva/sclera: Conjunctivae normal.      Pupils: Pupils are equal, round, and reactive to light.   Musculoskeletal:         General: Normal range of motion.      Cervical back: Normal range of motion and neck supple.   Skin:     General: Skin is warm and dry.      Findings: No lesion or rash.   Neurological:      General: No focal deficit present.      Mental Status: He is alert and oriented to person, place, and time. Mental status is at baseline.   Psychiatric:         Mood and Affect: Mood normal.         Behavior: Behavior normal.         Labs:   Brief Urine Lab Results  (Last result in the past 365 days)        Color   Clarity   Blood   Leuk Est   Nitrite   Protein   CREAT   Urine HCG        12/04/23 0857 Red   Cloudy   3+   Moderate (2+)   Positive   3+                   Urine Culture          5/18/2023    15:56 6/20/2023    " 10:40 8/14/2023    11:10   Urine Culture   Urine Culture Final report  <10,000 CFU/mL Pseudomonas aeruginosa  C No growth       Details         C Corrected result                Lab Results   Component Value Date    GLUCOSE 81 04/28/2023    CALCIUM 9.3 04/28/2023     04/28/2023    K 4.1 04/28/2023    CO2 23 04/28/2023    CL 98 04/28/2023    BUN 13 04/28/2023    CREATININE 0.89 04/28/2023    EGFRIFNONA 64 09/10/2021    BCR 15 04/28/2023    ANIONGAP 14.0 01/06/2023       Lab Results   Component Value Date    WBC 8.2 04/28/2023    HGB 11.4 (A) 05/16/2023    HCT 37.3 (L) 04/28/2023    MCV 94 04/28/2023     04/28/2023       Images:   No Images in the past 120 days found..    Measures:   Tobacco:   Lea [place]  reports that he has never smoked. He has been exposed to tobacco smoke. He has never used smokeless tobacco.     Assessment / Plan      Assessment/Plan:   78 y.o. male who presented today for follow up of prostate cancer status post XRT.  He has been complaining of intermittent gross hematuria for a number of months.  He has previously completed a cystoscopy which demonstrated no significant abnormalities.  Patient has UTI today.  We will treat with cefuroxime.  We will continue to monitor the gross materia.  We will perform a repeat cystoscopy or CT if this continues to persist.  I believe this is likely related to radiation and infections.  He will continue tamsulosin for weak urinary stream despite widely patent prostatic fossa on last cystoscopy.  I will see him back in February as scheduled a PSA prior for follow-up of prostate cancer.    Diagnoses and all orders for this visit:    1. Prostate cancer (Primary)  -     POC Urinalysis Dipstick, Automated    2. Gross hematuria  -     POC Urinalysis Dipstick, Automated  -     Urine Culture - Urine, Urine, Clean Catch    3. Benign prostatic hyperplasia with lower urinary tract symptoms, symptom details unspecified  -     POC Urinalysis  Dipstick, Automated    4. Pyuria  -     POC Urinalysis Dipstick, Automated  -     Urine Culture - Urine, Urine, Clean Catch    5. Acute cystitis without hematuria  -     cefuroxime (CEFTIN) 500 MG tablet; Take 1 tablet by mouth 2 (Two) Times a Day.  Dispense: 10 tablet; Refill: 0    6. Benign prostatic hyperplasia with incomplete bladder emptying  -     tamsulosin (FLOMAX) 0.4 MG capsule 24 hr capsule; Take 1 capsule by mouth Daily.  Dispense: 90 capsule; Refill: 3           Follow Up:   No follow-ups on file.    I spent approximately 20 minutes providing clinical care for this patient; including review of patient's chart and provider documentation, face to face time spent with patient in examination room (obtaining history, performing physical exam, discussing diagnosis and management options), placing orders, and completing patient documentation.     Naseem Noland MD  [facility]

## 2023-12-05 NOTE — PROGRESS NOTES
Please let patient know he has a urinary tract infection and to continue the cefuroxime that I prescribed him previously.  I will follow-up susceptibilities and sensitivities and call him if he needs an antibiotic change.  Thank you

## 2023-12-06 LAB — BACTERIA SPEC AEROBE CULT: ABNORMAL

## 2023-12-06 NOTE — PROGRESS NOTES
Procedure   Large Joint Arthrocentesis  Date/Time: 7/16/2018 9:26 AM  Consent given by: patient  Site marked: site marked  Timeout: Immediately prior to procedure a time out was called to verify the correct patient, procedure, equipment, support staff and site/side marked as required   Supporting Documentation  Indications: pain   Procedure Details  Location: knee - R knee  Preparation: Patient was prepped and draped in the usual sterile fashion  Needle size: 22 G  Approach: anterolateral  Medications administered: 30 mg Hyaluronan 30 MG/2ML  Patient tolerance: patient tolerated the procedure well with no immediate complications    Large Joint Arthrocentesis  Date/Time: 7/16/2018 9:36 AM  Consent given by: patient  Site marked: site marked  Timeout: Immediately prior to procedure a time out was called to verify the correct patient, procedure, equipment, support staff and site/side marked as required   Supporting Documentation  Indications: pain   Procedure Details  Location: knee - L knee  Preparation: Patient was prepped and draped in the usual sterile fashion  Needle size: 22 G  Approach: anterolateral  Medications administered: 30 mg Hyaluronan 30 MG/2ML  Patient tolerance: patient tolerated the procedure well with no immediate complications            Schedule for 0800 12/20 but tell him to arrive 740 so MA/LPN can do all testing and he is ready for me to see at 0800.

## 2024-01-01 ENCOUNTER — HOSPITAL ENCOUNTER (INPATIENT)
Facility: HOSPITAL | Age: 79
LOS: 12 days | End: 2024-04-12
Attending: INTERNAL MEDICINE | Admitting: FAMILY MEDICINE
Payer: COMMERCIAL

## 2024-01-01 PROCEDURE — 25010000002 FUROSEMIDE PER 20 MG: Performed by: FAMILY MEDICINE

## 2024-01-01 PROCEDURE — 25010000002 MIDAZOLAM PER 1 MG: Performed by: FAMILY MEDICINE

## 2024-01-01 PROCEDURE — 25010000002 LEVETRIRACETAM PER 10 MG: Performed by: FAMILY MEDICINE

## 2024-01-01 PROCEDURE — 25010000002 HYDROMORPHONE PER 4 MG: Performed by: NURSE PRACTITIONER

## 2024-01-01 PROCEDURE — 25010000002 HYDROMORPHONE PER 4 MG: Performed by: FAMILY MEDICINE

## 2024-01-01 PROCEDURE — 25010000002 KETOROLAC TROMETHAMINE PER 15 MG: Performed by: NURSE PRACTITIONER

## 2024-01-01 PROCEDURE — 25010000002 GLYCOPYRROLATE 0.2 MG/ML SOLUTION: Performed by: FAMILY MEDICINE

## 2024-01-01 PROCEDURE — 25010000002 HALOPERIDOL LACTATE PER 5 MG: Performed by: FAMILY MEDICINE

## 2024-01-01 PROCEDURE — 25010000002 KETOROLAC TROMETHAMINE PER 15 MG: Performed by: FAMILY MEDICINE

## 2024-01-01 RX ORDER — LEVETIRACETAM 500 MG/5ML
500 INJECTION, SOLUTION, CONCENTRATE INTRAVENOUS EVERY 12 HOURS SCHEDULED
Status: DISCONTINUED | OUTPATIENT
Start: 2024-01-01 | End: 2024-01-01 | Stop reason: HOSPADM

## 2024-01-01 RX ORDER — HYDROMORPHONE HYDROCHLORIDE 1 MG/ML
0.2 INJECTION, SOLUTION INTRAMUSCULAR; INTRAVENOUS; SUBCUTANEOUS
Status: DISCONTINUED | OUTPATIENT
Start: 2024-01-01 | End: 2024-01-01

## 2024-01-01 RX ORDER — HYDROMORPHONE HYDROCHLORIDE 1 MG/ML
0.2 INJECTION, SOLUTION INTRAMUSCULAR; INTRAVENOUS; SUBCUTANEOUS EVERY 6 HOURS SCHEDULED
Status: DISCONTINUED | OUTPATIENT
Start: 2024-01-01 | End: 2024-01-01

## 2024-01-01 RX ORDER — KETOROLAC TROMETHAMINE 30 MG/ML
15 INJECTION, SOLUTION INTRAMUSCULAR; INTRAVENOUS EVERY 6 HOURS PRN
Status: DISPENSED | OUTPATIENT
Start: 2024-01-01 | End: 2024-01-01

## 2024-01-01 RX ORDER — MIDAZOLAM HYDROCHLORIDE 1 MG/ML
0.5 INJECTION INTRAMUSCULAR; INTRAVENOUS
Status: DISCONTINUED | OUTPATIENT
Start: 2024-01-01 | End: 2024-01-01 | Stop reason: HOSPADM

## 2024-01-01 RX ORDER — PANTOPRAZOLE SODIUM 40 MG/10ML
40 INJECTION, POWDER, LYOPHILIZED, FOR SOLUTION INTRAVENOUS
Status: DISCONTINUED | OUTPATIENT
Start: 2024-01-01 | End: 2024-01-01 | Stop reason: HOSPADM

## 2024-01-01 RX ORDER — LIDOCAINE 4 G/G
2 PATCH TOPICAL
Status: DISCONTINUED | OUTPATIENT
Start: 2024-01-01 | End: 2024-01-01 | Stop reason: HOSPADM

## 2024-01-01 RX ORDER — LIDOCAINE 4 G/G
2 PATCH TOPICAL
Status: DISCONTINUED | OUTPATIENT
Start: 2024-01-01 | End: 2024-01-01

## 2024-01-01 RX ORDER — GLYCOPYRROLATE 0.2 MG/ML
0.4 INJECTION INTRAMUSCULAR; INTRAVENOUS EVERY 4 HOURS PRN
Status: DISCONTINUED | OUTPATIENT
Start: 2024-01-01 | End: 2024-01-01 | Stop reason: HOSPADM

## 2024-01-01 RX ORDER — MIDAZOLAM HYDROCHLORIDE 1 MG/ML
5 INJECTION INTRAMUSCULAR; INTRAVENOUS
Status: DISCONTINUED | OUTPATIENT
Start: 2024-01-01 | End: 2024-01-01 | Stop reason: HOSPADM

## 2024-01-01 RX ORDER — HYDROMORPHONE HYDROCHLORIDE 1 MG/ML
0.5 INJECTION, SOLUTION INTRAMUSCULAR; INTRAVENOUS; SUBCUTANEOUS
Status: DISCONTINUED | OUTPATIENT
Start: 2024-01-01 | End: 2024-01-01 | Stop reason: HOSPADM

## 2024-01-01 RX ORDER — HALOPERIDOL 5 MG/ML
0.5 INJECTION INTRAMUSCULAR
Status: DISCONTINUED | OUTPATIENT
Start: 2024-01-01 | End: 2024-01-01 | Stop reason: HOSPADM

## 2024-01-01 RX ORDER — FUROSEMIDE 10 MG/ML
10 INJECTION INTRAMUSCULAR; INTRAVENOUS EVERY 8 HOURS PRN
Status: DISCONTINUED | OUTPATIENT
Start: 2024-01-01 | End: 2024-01-01 | Stop reason: HOSPADM

## 2024-01-01 RX ORDER — SCOLOPAMINE TRANSDERMAL SYSTEM 1 MG/1
1 PATCH, EXTENDED RELEASE TRANSDERMAL
Status: DISCONTINUED | OUTPATIENT
Start: 2024-01-01 | End: 2024-01-01 | Stop reason: HOSPADM

## 2024-01-01 RX ORDER — HYDROMORPHONE HYDROCHLORIDE 1 MG/ML
0.5 INJECTION, SOLUTION INTRAMUSCULAR; INTRAVENOUS; SUBCUTANEOUS EVERY 6 HOURS SCHEDULED
Status: DISCONTINUED | OUTPATIENT
Start: 2024-01-01 | End: 2024-01-01

## 2024-01-01 RX ORDER — KETOROLAC TROMETHAMINE 15 MG/ML
15 INJECTION, SOLUTION INTRAMUSCULAR; INTRAVENOUS EVERY 8 HOURS
Status: COMPLETED | OUTPATIENT
Start: 2024-01-01 | End: 2024-01-01

## 2024-01-01 RX ORDER — SODIUM CHLORIDE 0.9 % (FLUSH) 0.9 %
10 SYRINGE (ML) INJECTION AS NEEDED
Status: DISCONTINUED | OUTPATIENT
Start: 2024-01-01 | End: 2024-01-01 | Stop reason: HOSPADM

## 2024-01-01 RX ORDER — HYDROMORPHONE HYDROCHLORIDE 1 MG/ML
0.5 INJECTION, SOLUTION INTRAMUSCULAR; INTRAVENOUS; SUBCUTANEOUS EVERY 4 HOURS
Status: DISCONTINUED | OUTPATIENT
Start: 2024-01-01 | End: 2024-01-01 | Stop reason: HOSPADM

## 2024-01-01 RX ADMIN — HYDROMORPHONE HYDROCHLORIDE 0.5 MG: 1 INJECTION, SOLUTION INTRAMUSCULAR; INTRAVENOUS; SUBCUTANEOUS at 00:49

## 2024-01-01 RX ADMIN — LIDOCAINE 2 PATCH: 4 PATCH TOPICAL at 21:17

## 2024-01-01 RX ADMIN — MINERAL OIL: 1000 LIQUID ORAL at 05:14

## 2024-01-01 RX ADMIN — MINERAL OIL: 1000 LIQUID ORAL at 05:28

## 2024-01-01 RX ADMIN — HYDROMORPHONE HYDROCHLORIDE 0.5 MG: 1 INJECTION, SOLUTION INTRAMUSCULAR; INTRAVENOUS; SUBCUTANEOUS at 23:33

## 2024-01-01 RX ADMIN — KETOROLAC TROMETHAMINE 15 MG: 15 INJECTION, SOLUTION INTRAMUSCULAR; INTRAVENOUS at 14:20

## 2024-01-01 RX ADMIN — FUROSEMIDE 10 MG: 10 INJECTION, SOLUTION INTRAMUSCULAR; INTRAVENOUS at 17:53

## 2024-01-01 RX ADMIN — HYDROMORPHONE HYDROCHLORIDE 0.2 MG: 1 INJECTION, SOLUTION INTRAMUSCULAR; INTRAVENOUS; SUBCUTANEOUS at 20:47

## 2024-01-01 RX ADMIN — HYDROMORPHONE HYDROCHLORIDE 0.5 MG: 1 INJECTION, SOLUTION INTRAMUSCULAR; INTRAVENOUS; SUBCUTANEOUS at 15:22

## 2024-01-01 RX ADMIN — HYDROMORPHONE HYDROCHLORIDE 0.5 MG: 1 INJECTION, SOLUTION INTRAMUSCULAR; INTRAVENOUS; SUBCUTANEOUS at 21:17

## 2024-01-01 RX ADMIN — MINERAL OIL: 1000 LIQUID ORAL at 00:30

## 2024-01-01 RX ADMIN — MINERAL OIL: 1000 LIQUID ORAL at 17:58

## 2024-01-01 RX ADMIN — HALOPERIDOL LACTATE 0.5 MG: 5 INJECTION, SOLUTION INTRAMUSCULAR at 08:49

## 2024-01-01 RX ADMIN — HYDROMORPHONE HYDROCHLORIDE 0.2 MG: 1 INJECTION, SOLUTION INTRAMUSCULAR; INTRAVENOUS; SUBCUTANEOUS at 13:02

## 2024-01-01 RX ADMIN — HYDROMORPHONE HYDROCHLORIDE 0.5 MG: 1 INJECTION, SOLUTION INTRAMUSCULAR; INTRAVENOUS; SUBCUTANEOUS at 17:53

## 2024-01-01 RX ADMIN — HYDROMORPHONE HYDROCHLORIDE 0.5 MG: 1 INJECTION, SOLUTION INTRAMUSCULAR; INTRAVENOUS; SUBCUTANEOUS at 12:15

## 2024-01-01 RX ADMIN — GLYCOPYRROLATE 0.4 MG: 0.2 INJECTION INTRAMUSCULAR; INTRAVENOUS at 15:21

## 2024-01-01 RX ADMIN — HYDROMORPHONE HYDROCHLORIDE 0.5 MG: 1 INJECTION, SOLUTION INTRAMUSCULAR; INTRAVENOUS; SUBCUTANEOUS at 17:25

## 2024-01-01 RX ADMIN — HYDROMORPHONE HYDROCHLORIDE 0.5 MG: 1 INJECTION, SOLUTION INTRAMUSCULAR; INTRAVENOUS; SUBCUTANEOUS at 02:01

## 2024-01-01 RX ADMIN — HYDROMORPHONE HYDROCHLORIDE 0.5 MG: 1 INJECTION, SOLUTION INTRAMUSCULAR; INTRAVENOUS; SUBCUTANEOUS at 21:44

## 2024-01-01 RX ADMIN — MINERAL OIL: 1000 LIQUID ORAL at 12:27

## 2024-01-01 RX ADMIN — MINERAL OIL: 1000 LIQUID ORAL at 13:58

## 2024-01-01 RX ADMIN — MIDAZOLAM HYDROCHLORIDE 0.5 MG: 1 INJECTION, SOLUTION INTRAMUSCULAR; INTRAVENOUS at 00:48

## 2024-01-01 RX ADMIN — LEVETIRACETAM 500 MG: 100 INJECTION, SOLUTION INTRAVENOUS at 10:36

## 2024-01-01 RX ADMIN — LEVETIRACETAM 500 MG: 100 INJECTION, SOLUTION INTRAVENOUS at 10:26

## 2024-01-01 RX ADMIN — LIDOCAINE 2 PATCH: 4 PATCH TOPICAL at 21:45

## 2024-01-01 RX ADMIN — PANTOPRAZOLE SODIUM 40 MG: 40 INJECTION, POWDER, LYOPHILIZED, FOR SOLUTION INTRAVENOUS at 06:04

## 2024-01-01 RX ADMIN — KETOROLAC TROMETHAMINE 15 MG: 15 INJECTION, SOLUTION INTRAMUSCULAR; INTRAVENOUS at 05:15

## 2024-01-01 RX ADMIN — HYDROMORPHONE HYDROCHLORIDE 0.5 MG: 1 INJECTION, SOLUTION INTRAMUSCULAR; INTRAVENOUS; SUBCUTANEOUS at 14:45

## 2024-01-01 RX ADMIN — LEVETIRACETAM 500 MG: 100 INJECTION, SOLUTION INTRAVENOUS at 09:17

## 2024-01-01 RX ADMIN — MINERAL OIL: 1000 LIQUID ORAL at 23:34

## 2024-01-01 RX ADMIN — PANTOPRAZOLE SODIUM 40 MG: 40 INJECTION, POWDER, LYOPHILIZED, FOR SOLUTION INTRAVENOUS at 05:13

## 2024-01-01 RX ADMIN — HYDROMORPHONE HYDROCHLORIDE 0.2 MG: 1 INJECTION, SOLUTION INTRAMUSCULAR; INTRAVENOUS; SUBCUTANEOUS at 09:56

## 2024-01-01 RX ADMIN — HYDROMORPHONE HYDROCHLORIDE 0.5 MG: 1 INJECTION, SOLUTION INTRAMUSCULAR; INTRAVENOUS; SUBCUTANEOUS at 05:15

## 2024-01-01 RX ADMIN — HYDROMORPHONE HYDROCHLORIDE 0.2 MG: 1 INJECTION, SOLUTION INTRAMUSCULAR; INTRAVENOUS; SUBCUTANEOUS at 20:34

## 2024-01-01 RX ADMIN — HYDROMORPHONE HYDROCHLORIDE 0.5 MG: 1 INJECTION, SOLUTION INTRAMUSCULAR; INTRAVENOUS; SUBCUTANEOUS at 17:46

## 2024-01-01 RX ADMIN — HYDROMORPHONE HYDROCHLORIDE 0.5 MG: 1 INJECTION, SOLUTION INTRAMUSCULAR; INTRAVENOUS; SUBCUTANEOUS at 11:00

## 2024-01-01 RX ADMIN — KETOROLAC TROMETHAMINE 15 MG: 15 INJECTION, SOLUTION INTRAMUSCULAR; INTRAVENOUS at 21:35

## 2024-01-01 RX ADMIN — LIDOCAINE 2 PATCH: 4 PATCH TOPICAL at 20:28

## 2024-01-01 RX ADMIN — HYDROMORPHONE HYDROCHLORIDE 0.5 MG: 1 INJECTION, SOLUTION INTRAMUSCULAR; INTRAVENOUS; SUBCUTANEOUS at 08:49

## 2024-01-01 RX ADMIN — HYDROMORPHONE HYDROCHLORIDE 0.5 MG: 1 INJECTION, SOLUTION INTRAMUSCULAR; INTRAVENOUS; SUBCUTANEOUS at 17:55

## 2024-01-01 RX ADMIN — MINERAL OIL: 1000 LIQUID ORAL at 09:05

## 2024-01-01 RX ADMIN — PANTOPRAZOLE SODIUM 40 MG: 40 INJECTION, POWDER, LYOPHILIZED, FOR SOLUTION INTRAVENOUS at 05:28

## 2024-01-01 RX ADMIN — MINERAL OIL: 1000 LIQUID ORAL at 12:15

## 2024-01-01 RX ADMIN — GLYCOPYRROLATE 0.4 MG: 0.2 INJECTION INTRAMUSCULAR; INTRAVENOUS at 13:58

## 2024-01-01 RX ADMIN — HYDROMORPHONE HYDROCHLORIDE 0.5 MG: 1 INJECTION, SOLUTION INTRAMUSCULAR; INTRAVENOUS; SUBCUTANEOUS at 10:22

## 2024-01-01 RX ADMIN — MINERAL OIL: 1000 LIQUID ORAL at 18:55

## 2024-01-01 RX ADMIN — Medication 10 ML: at 00:49

## 2024-01-01 RX ADMIN — HYDROMORPHONE HYDROCHLORIDE 0.2 MG: 1 INJECTION, SOLUTION INTRAMUSCULAR; INTRAVENOUS; SUBCUTANEOUS at 17:33

## 2024-01-01 RX ADMIN — KETOROLAC TROMETHAMINE 15 MG: 30 INJECTION, SOLUTION INTRAMUSCULAR; INTRAVENOUS at 10:38

## 2024-01-01 RX ADMIN — HYDROMORPHONE HYDROCHLORIDE 0.5 MG: 1 INJECTION, SOLUTION INTRAMUSCULAR; INTRAVENOUS; SUBCUTANEOUS at 14:05

## 2024-01-01 RX ADMIN — LIDOCAINE 2 PATCH: 4 PATCH TOPICAL at 21:40

## 2024-01-01 RX ADMIN — HYDROMORPHONE HYDROCHLORIDE 0.2 MG: 1 INJECTION, SOLUTION INTRAMUSCULAR; INTRAVENOUS; SUBCUTANEOUS at 18:56

## 2024-01-01 RX ADMIN — HYDROMORPHONE HYDROCHLORIDE 0.5 MG: 1 INJECTION, SOLUTION INTRAMUSCULAR; INTRAVENOUS; SUBCUTANEOUS at 16:35

## 2024-01-01 RX ADMIN — HYDROMORPHONE HYDROCHLORIDE 0.5 MG: 1 INJECTION, SOLUTION INTRAMUSCULAR; INTRAVENOUS; SUBCUTANEOUS at 21:24

## 2024-01-01 RX ADMIN — LIDOCAINE 2 PATCH: 4 PATCH TOPICAL at 20:04

## 2024-01-01 RX ADMIN — PANTOPRAZOLE SODIUM 40 MG: 40 INJECTION, POWDER, LYOPHILIZED, FOR SOLUTION INTRAVENOUS at 06:45

## 2024-01-01 RX ADMIN — Medication 10 ML: at 21:41

## 2024-01-01 RX ADMIN — MINERAL OIL: 1000 LIQUID ORAL at 12:05

## 2024-01-01 RX ADMIN — MINERAL OIL: 1000 LIQUID ORAL at 18:14

## 2024-01-01 RX ADMIN — PANTOPRAZOLE SODIUM 40 MG: 40 INJECTION, POWDER, LYOPHILIZED, FOR SOLUTION INTRAVENOUS at 05:52

## 2024-01-01 RX ADMIN — HYDROMORPHONE HYDROCHLORIDE 0.2 MG: 1 INJECTION, SOLUTION INTRAMUSCULAR; INTRAVENOUS; SUBCUTANEOUS at 05:28

## 2024-01-01 RX ADMIN — LEVETIRACETAM 500 MG: 100 INJECTION, SOLUTION INTRAVENOUS at 21:24

## 2024-01-01 RX ADMIN — HYDROMORPHONE HYDROCHLORIDE 0.5 MG: 1 INJECTION, SOLUTION INTRAMUSCULAR; INTRAVENOUS; SUBCUTANEOUS at 18:05

## 2024-01-01 RX ADMIN — HYDROMORPHONE HYDROCHLORIDE 0.5 MG: 1 INJECTION, SOLUTION INTRAMUSCULAR; INTRAVENOUS; SUBCUTANEOUS at 21:03

## 2024-01-01 RX ADMIN — HYDROMORPHONE HYDROCHLORIDE 0.5 MG: 1 INJECTION, SOLUTION INTRAMUSCULAR; INTRAVENOUS; SUBCUTANEOUS at 11:23

## 2024-01-01 RX ADMIN — MIDAZOLAM HYDROCHLORIDE 0.5 MG: 1 INJECTION, SOLUTION INTRAMUSCULAR; INTRAVENOUS at 17:46

## 2024-01-01 RX ADMIN — MINERAL OIL: 1000 LIQUID ORAL at 23:58

## 2024-01-01 RX ADMIN — LEVETIRACETAM 500 MG: 100 INJECTION, SOLUTION INTRAVENOUS at 21:43

## 2024-01-01 RX ADMIN — KETOROLAC TROMETHAMINE 15 MG: 15 INJECTION, SOLUTION INTRAMUSCULAR; INTRAVENOUS at 21:18

## 2024-01-01 RX ADMIN — HYDROMORPHONE HYDROCHLORIDE 0.2 MG: 1 INJECTION, SOLUTION INTRAMUSCULAR; INTRAVENOUS; SUBCUTANEOUS at 23:16

## 2024-01-01 RX ADMIN — MINERAL OIL: 1000 LIQUID ORAL at 02:04

## 2024-01-01 RX ADMIN — LEVETIRACETAM 500 MG: 100 INJECTION, SOLUTION INTRAVENOUS at 08:59

## 2024-01-01 RX ADMIN — KETOROLAC TROMETHAMINE 15 MG: 15 INJECTION, SOLUTION INTRAMUSCULAR; INTRAVENOUS at 21:43

## 2024-01-01 RX ADMIN — HYDROMORPHONE HYDROCHLORIDE 0.5 MG: 1 INJECTION, SOLUTION INTRAMUSCULAR; INTRAVENOUS; SUBCUTANEOUS at 17:27

## 2024-01-01 RX ADMIN — HYDROMORPHONE HYDROCHLORIDE 0.5 MG: 1 INJECTION, SOLUTION INTRAMUSCULAR; INTRAVENOUS; SUBCUTANEOUS at 23:38

## 2024-01-01 RX ADMIN — Medication 10 ML: at 05:41

## 2024-01-01 RX ADMIN — LEVETIRACETAM 500 MG: 100 INJECTION, SOLUTION INTRAVENOUS at 20:34

## 2024-01-01 RX ADMIN — HALOPERIDOL LACTATE 0.5 MG: 5 INJECTION, SOLUTION INTRAMUSCULAR at 15:22

## 2024-01-01 RX ADMIN — MINERAL OIL: 1000 LIQUID ORAL at 00:26

## 2024-01-01 RX ADMIN — HYDROMORPHONE HYDROCHLORIDE 0.5 MG: 1 INJECTION, SOLUTION INTRAMUSCULAR; INTRAVENOUS; SUBCUTANEOUS at 16:13

## 2024-01-01 RX ADMIN — MINERAL OIL: 1000 LIQUID ORAL at 05:13

## 2024-01-01 RX ADMIN — LEVETIRACETAM 500 MG: 100 INJECTION, SOLUTION INTRAVENOUS at 20:47

## 2024-01-01 RX ADMIN — HYDROMORPHONE HYDROCHLORIDE 0.2 MG: 1 INJECTION, SOLUTION INTRAMUSCULAR; INTRAVENOUS; SUBCUTANEOUS at 02:27

## 2024-01-01 RX ADMIN — LEVETIRACETAM 500 MG: 100 INJECTION, SOLUTION INTRAVENOUS at 20:09

## 2024-01-01 RX ADMIN — MINERAL OIL: 1000 LIQUID ORAL at 08:42

## 2024-01-01 RX ADMIN — Medication 10 ML: at 05:47

## 2024-01-01 RX ADMIN — MINERAL OIL: 1000 LIQUID ORAL at 12:08

## 2024-01-01 RX ADMIN — Medication 10 ML: at 21:45

## 2024-01-01 RX ADMIN — HYDROMORPHONE HYDROCHLORIDE 0.5 MG: 1 INJECTION, SOLUTION INTRAMUSCULAR; INTRAVENOUS; SUBCUTANEOUS at 21:40

## 2024-01-01 RX ADMIN — KETOROLAC TROMETHAMINE 15 MG: 15 INJECTION, SOLUTION INTRAMUSCULAR; INTRAVENOUS at 14:15

## 2024-01-01 RX ADMIN — HYDROMORPHONE HYDROCHLORIDE 0.5 MG: 1 INJECTION, SOLUTION INTRAMUSCULAR; INTRAVENOUS; SUBCUTANEOUS at 00:05

## 2024-01-01 RX ADMIN — HYDROMORPHONE HYDROCHLORIDE 0.2 MG: 1 INJECTION, SOLUTION INTRAMUSCULAR; INTRAVENOUS; SUBCUTANEOUS at 05:52

## 2024-01-01 RX ADMIN — HYDROMORPHONE HYDROCHLORIDE 0.5 MG: 1 INJECTION, SOLUTION INTRAMUSCULAR; INTRAVENOUS; SUBCUTANEOUS at 16:22

## 2024-01-01 RX ADMIN — MIDAZOLAM HYDROCHLORIDE 0.5 MG: 1 INJECTION, SOLUTION INTRAMUSCULAR; INTRAVENOUS at 10:22

## 2024-01-01 RX ADMIN — MINERAL OIL: 1000 LIQUID ORAL at 23:53

## 2024-01-01 RX ADMIN — MINERAL OIL: 1000 LIQUID ORAL at 06:24

## 2024-01-01 RX ADMIN — HYDROMORPHONE HYDROCHLORIDE 0.5 MG: 1 INJECTION, SOLUTION INTRAMUSCULAR; INTRAVENOUS; SUBCUTANEOUS at 14:22

## 2024-01-01 RX ADMIN — MINERAL OIL: 1000 LIQUID ORAL at 05:43

## 2024-01-01 RX ADMIN — MINERAL OIL: 1000 LIQUID ORAL at 17:46

## 2024-01-01 RX ADMIN — HYDROMORPHONE HYDROCHLORIDE 0.5 MG: 1 INJECTION, SOLUTION INTRAMUSCULAR; INTRAVENOUS; SUBCUTANEOUS at 02:27

## 2024-01-01 RX ADMIN — PANTOPRAZOLE SODIUM 40 MG: 40 INJECTION, POWDER, LYOPHILIZED, FOR SOLUTION INTRAVENOUS at 05:34

## 2024-01-01 RX ADMIN — HYDROMORPHONE HYDROCHLORIDE 0.5 MG: 1 INJECTION, SOLUTION INTRAMUSCULAR; INTRAVENOUS; SUBCUTANEOUS at 05:34

## 2024-01-01 RX ADMIN — HYDROMORPHONE HYDROCHLORIDE 0.5 MG: 1 INJECTION, SOLUTION INTRAMUSCULAR; INTRAVENOUS; SUBCUTANEOUS at 10:36

## 2024-01-01 RX ADMIN — HYDROMORPHONE HYDROCHLORIDE 0.5 MG: 1 INJECTION, SOLUTION INTRAMUSCULAR; INTRAVENOUS; SUBCUTANEOUS at 17:38

## 2024-01-01 RX ADMIN — MINERAL OIL: 1000 LIQUID ORAL at 17:25

## 2024-01-01 RX ADMIN — MINERAL OIL: 1000 LIQUID ORAL at 23:32

## 2024-01-01 RX ADMIN — MINERAL OIL: 1000 LIQUID ORAL at 11:27

## 2024-01-01 RX ADMIN — PANTOPRAZOLE SODIUM 40 MG: 40 INJECTION, POWDER, LYOPHILIZED, FOR SOLUTION INTRAVENOUS at 05:39

## 2024-01-01 RX ADMIN — LEVETIRACETAM 500 MG: 100 INJECTION, SOLUTION INTRAVENOUS at 09:37

## 2024-01-01 RX ADMIN — KETOROLAC TROMETHAMINE 15 MG: 15 INJECTION, SOLUTION INTRAMUSCULAR; INTRAVENOUS at 21:04

## 2024-01-01 RX ADMIN — Medication 10 ML: at 21:25

## 2024-01-01 RX ADMIN — MINERAL OIL: 1000 LIQUID ORAL at 17:53

## 2024-01-01 RX ADMIN — MINERAL OIL: 1000 LIQUID ORAL at 13:06

## 2024-01-01 RX ADMIN — PANTOPRAZOLE SODIUM 40 MG: 40 INJECTION, POWDER, LYOPHILIZED, FOR SOLUTION INTRAVENOUS at 05:47

## 2024-01-01 RX ADMIN — KETOROLAC TROMETHAMINE 15 MG: 15 INJECTION, SOLUTION INTRAMUSCULAR; INTRAVENOUS at 14:21

## 2024-01-01 RX ADMIN — Medication 10 ML: at 02:01

## 2024-01-01 RX ADMIN — HYDROMORPHONE HYDROCHLORIDE 0.2 MG: 1 INJECTION, SOLUTION INTRAMUSCULAR; INTRAVENOUS; SUBCUTANEOUS at 10:27

## 2024-01-01 RX ADMIN — LIDOCAINE 2 PATCH: 4 PATCH TOPICAL at 21:24

## 2024-01-01 RX ADMIN — MINERAL OIL: 1000 LIQUID ORAL at 05:41

## 2024-01-01 RX ADMIN — KETOROLAC TROMETHAMINE 15 MG: 15 INJECTION, SOLUTION INTRAMUSCULAR; INTRAVENOUS at 21:03

## 2024-01-01 RX ADMIN — HYDROMORPHONE HYDROCHLORIDE 0.2 MG: 1 INJECTION, SOLUTION INTRAMUSCULAR; INTRAVENOUS; SUBCUTANEOUS at 13:28

## 2024-01-01 RX ADMIN — MINERAL OIL: 1000 LIQUID ORAL at 23:38

## 2024-01-01 RX ADMIN — LIDOCAINE 2 PATCH: 4 PATCH TOPICAL at 21:43

## 2024-01-01 RX ADMIN — MINERAL OIL: 1000 LIQUID ORAL at 05:25

## 2024-01-01 RX ADMIN — MINERAL OIL: 1000 LIQUID ORAL at 05:38

## 2024-01-01 RX ADMIN — LIDOCAINE 2 PATCH: 4 PATCH TOPICAL at 13:28

## 2024-01-01 RX ADMIN — PANTOPRAZOLE SODIUM 40 MG: 40 INJECTION, POWDER, LYOPHILIZED, FOR SOLUTION INTRAVENOUS at 06:24

## 2024-01-01 RX ADMIN — HYDROMORPHONE HYDROCHLORIDE 0.5 MG: 1 INJECTION, SOLUTION INTRAMUSCULAR; INTRAVENOUS; SUBCUTANEOUS at 06:24

## 2024-01-01 RX ADMIN — HYDROMORPHONE HYDROCHLORIDE 0.5 MG: 1 INJECTION, SOLUTION INTRAMUSCULAR; INTRAVENOUS; SUBCUTANEOUS at 09:05

## 2024-01-01 RX ADMIN — HYDROMORPHONE HYDROCHLORIDE 0.5 MG: 1 INJECTION, SOLUTION INTRAMUSCULAR; INTRAVENOUS; SUBCUTANEOUS at 13:49

## 2024-01-01 RX ADMIN — MINERAL OIL: 1000 LIQUID ORAL at 23:33

## 2024-01-01 RX ADMIN — LIDOCAINE 2 PATCH: 4 PATCH TOPICAL at 21:03

## 2024-01-01 RX ADMIN — HYDROMORPHONE HYDROCHLORIDE 0.5 MG: 1 INJECTION, SOLUTION INTRAMUSCULAR; INTRAVENOUS; SUBCUTANEOUS at 13:57

## 2024-01-01 RX ADMIN — HYDROMORPHONE HYDROCHLORIDE 0.5 MG: 1 INJECTION, SOLUTION INTRAMUSCULAR; INTRAVENOUS; SUBCUTANEOUS at 09:17

## 2024-01-01 RX ADMIN — KETOROLAC TROMETHAMINE 15 MG: 15 INJECTION, SOLUTION INTRAMUSCULAR; INTRAVENOUS at 05:25

## 2024-01-01 RX ADMIN — MINERAL OIL: 1000 LIQUID ORAL at 14:45

## 2024-01-01 RX ADMIN — MINERAL OIL: 1000 LIQUID ORAL at 17:33

## 2024-01-01 RX ADMIN — HYDROMORPHONE HYDROCHLORIDE 0.5 MG: 1 INJECTION, SOLUTION INTRAMUSCULAR; INTRAVENOUS; SUBCUTANEOUS at 14:20

## 2024-01-01 RX ADMIN — LEVETIRACETAM 500 MG: 100 INJECTION, SOLUTION INTRAVENOUS at 09:48

## 2024-01-01 RX ADMIN — LIDOCAINE 2 PATCH: 4 PATCH TOPICAL at 10:36

## 2024-01-01 RX ADMIN — LEVETIRACETAM 500 MG: 100 INJECTION, SOLUTION INTRAVENOUS at 08:49

## 2024-01-01 RX ADMIN — HYDROMORPHONE HYDROCHLORIDE 0.5 MG: 1 INJECTION, SOLUTION INTRAMUSCULAR; INTRAVENOUS; SUBCUTANEOUS at 15:07

## 2024-01-01 RX ADMIN — LEVETIRACETAM 500 MG: 100 INJECTION, SOLUTION INTRAVENOUS at 21:03

## 2024-01-01 RX ADMIN — HYDROMORPHONE HYDROCHLORIDE 0.5 MG: 1 INJECTION, SOLUTION INTRAMUSCULAR; INTRAVENOUS; SUBCUTANEOUS at 12:27

## 2024-01-01 RX ADMIN — KETOROLAC TROMETHAMINE 15 MG: 15 INJECTION, SOLUTION INTRAMUSCULAR; INTRAVENOUS at 06:24

## 2024-01-01 RX ADMIN — LEVETIRACETAM 500 MG: 100 INJECTION, SOLUTION INTRAVENOUS at 21:39

## 2024-01-01 RX ADMIN — HYDROMORPHONE HYDROCHLORIDE 0.2 MG: 1 INJECTION, SOLUTION INTRAMUSCULAR; INTRAVENOUS; SUBCUTANEOUS at 23:34

## 2024-01-01 RX ADMIN — LEVETIRACETAM 500 MG: 100 INJECTION, SOLUTION INTRAVENOUS at 10:16

## 2024-01-01 RX ADMIN — HALOPERIDOL LACTATE 0.5 MG: 5 INJECTION, SOLUTION INTRAMUSCULAR at 00:49

## 2024-01-01 RX ADMIN — GLYCOPYRROLATE 0.4 MG: 0.2 INJECTION INTRAMUSCULAR; INTRAVENOUS at 09:10

## 2024-01-01 RX ADMIN — LEVETIRACETAM 500 MG: 100 INJECTION, SOLUTION INTRAVENOUS at 20:31

## 2024-01-01 RX ADMIN — HYDROMORPHONE HYDROCHLORIDE 0.5 MG: 1 INJECTION, SOLUTION INTRAMUSCULAR; INTRAVENOUS; SUBCUTANEOUS at 12:14

## 2024-01-01 RX ADMIN — MINERAL OIL: 1000 LIQUID ORAL at 23:16

## 2024-01-01 RX ADMIN — MINERAL OIL: 1000 LIQUID ORAL at 00:05

## 2024-01-01 RX ADMIN — LEVETIRACETAM 500 MG: 100 INJECTION, SOLUTION INTRAVENOUS at 08:41

## 2024-01-01 RX ADMIN — KETOROLAC TROMETHAMINE 15 MG: 15 INJECTION, SOLUTION INTRAMUSCULAR; INTRAVENOUS at 14:53

## 2024-01-01 RX ADMIN — HYDROMORPHONE HYDROCHLORIDE 0.2 MG: 1 INJECTION, SOLUTION INTRAMUSCULAR; INTRAVENOUS; SUBCUTANEOUS at 13:37

## 2024-01-01 RX ADMIN — LEVETIRACETAM 500 MG: 100 INJECTION, SOLUTION INTRAVENOUS at 20:04

## 2024-01-01 RX ADMIN — KETOROLAC TROMETHAMINE 15 MG: 15 INJECTION, SOLUTION INTRAMUSCULAR; INTRAVENOUS at 05:13

## 2024-01-01 RX ADMIN — HYDROMORPHONE HYDROCHLORIDE 0.5 MG: 1 INJECTION, SOLUTION INTRAMUSCULAR; INTRAVENOUS; SUBCUTANEOUS at 03:42

## 2024-01-01 RX ADMIN — HYDROMORPHONE HYDROCHLORIDE 0.5 MG: 1 INJECTION, SOLUTION INTRAMUSCULAR; INTRAVENOUS; SUBCUTANEOUS at 05:38

## 2024-01-01 RX ADMIN — LEVETIRACETAM 500 MG: 100 INJECTION, SOLUTION INTRAVENOUS at 20:33

## 2024-01-01 RX ADMIN — MINERAL OIL: 1000 LIQUID ORAL at 05:51

## 2024-01-01 RX ADMIN — HYDROMORPHONE HYDROCHLORIDE 0.5 MG: 1 INJECTION, SOLUTION INTRAMUSCULAR; INTRAVENOUS; SUBCUTANEOUS at 05:25

## 2024-01-01 RX ADMIN — KETOROLAC TROMETHAMINE 15 MG: 15 INJECTION, SOLUTION INTRAMUSCULAR; INTRAVENOUS at 13:49

## 2024-01-01 RX ADMIN — LEVETIRACETAM 500 MG: 100 INJECTION, SOLUTION INTRAVENOUS at 09:57

## 2024-01-01 RX ADMIN — HYDROMORPHONE HYDROCHLORIDE 0.5 MG: 1 INJECTION, SOLUTION INTRAMUSCULAR; INTRAVENOUS; SUBCUTANEOUS at 23:32

## 2024-01-01 RX ADMIN — MINERAL OIL: 1000 LIQUID ORAL at 17:41

## 2024-01-01 RX ADMIN — HYDROMORPHONE HYDROCHLORIDE 0.2 MG: 1 INJECTION, SOLUTION INTRAMUSCULAR; INTRAVENOUS; SUBCUTANEOUS at 05:41

## 2024-01-01 RX ADMIN — KETOROLAC TROMETHAMINE 15 MG: 15 INJECTION, SOLUTION INTRAMUSCULAR; INTRAVENOUS at 05:34

## 2024-01-01 RX ADMIN — MINERAL OIL: 1000 LIQUID ORAL at 00:01

## 2024-01-01 RX ADMIN — PANTOPRAZOLE SODIUM 40 MG: 40 INJECTION, POWDER, LYOPHILIZED, FOR SOLUTION INTRAVENOUS at 05:25

## 2024-01-01 RX ADMIN — LEVETIRACETAM 500 MG: 100 INJECTION, SOLUTION INTRAVENOUS at 21:17

## 2024-01-01 RX ADMIN — HYDROMORPHONE HYDROCHLORIDE 0.5 MG: 1 INJECTION, SOLUTION INTRAMUSCULAR; INTRAVENOUS; SUBCUTANEOUS at 18:13

## 2024-01-01 RX ADMIN — HYDROMORPHONE HYDROCHLORIDE 0.5 MG: 1 INJECTION, SOLUTION INTRAMUSCULAR; INTRAVENOUS; SUBCUTANEOUS at 00:25

## 2024-01-01 RX ADMIN — MIDAZOLAM HYDROCHLORIDE 0.5 MG: 1 INJECTION, SOLUTION INTRAMUSCULAR; INTRAVENOUS at 20:34

## 2024-01-01 RX ADMIN — LEVETIRACETAM 500 MG: 100 INJECTION, SOLUTION INTRAVENOUS at 08:45

## 2024-01-01 RX ADMIN — FUROSEMIDE 10 MG: 10 INJECTION, SOLUTION INTRAMUSCULAR; INTRAVENOUS at 09:49

## 2024-01-01 RX ADMIN — PANTOPRAZOLE SODIUM 40 MG: 40 INJECTION, POWDER, LYOPHILIZED, FOR SOLUTION INTRAVENOUS at 05:15

## 2024-01-01 RX ADMIN — MINERAL OIL: 1000 LIQUID ORAL at 18:05

## 2024-01-01 RX ADMIN — HYDROMORPHONE HYDROCHLORIDE 0.5 MG: 1 INJECTION, SOLUTION INTRAMUSCULAR; INTRAVENOUS; SUBCUTANEOUS at 05:47

## 2024-01-01 RX ADMIN — MIDAZOLAM HYDROCHLORIDE 0.5 MG: 1 INJECTION, SOLUTION INTRAMUSCULAR; INTRAVENOUS at 16:44

## 2024-01-05 ENCOUNTER — APPOINTMENT (OUTPATIENT)
Dept: GENERAL RADIOLOGY | Facility: HOSPITAL | Age: 79
End: 2024-01-05
Payer: MEDICARE

## 2024-01-05 ENCOUNTER — APPOINTMENT (OUTPATIENT)
Dept: CT IMAGING | Facility: HOSPITAL | Age: 79
End: 2024-01-05
Payer: MEDICARE

## 2024-01-05 ENCOUNTER — HOSPITAL ENCOUNTER (EMERGENCY)
Facility: HOSPITAL | Age: 79
Discharge: HOME | End: 2024-01-05
Attending: EMERGENCY MEDICINE
Payer: MEDICARE

## 2024-01-05 VITALS
SYSTOLIC BLOOD PRESSURE: 171 MMHG | HEART RATE: 79 BPM | DIASTOLIC BLOOD PRESSURE: 94 MMHG | WEIGHT: 174 LBS | HEIGHT: 68 IN | TEMPERATURE: 97.7 F | RESPIRATION RATE: 16 BRPM | OXYGEN SATURATION: 99 % | BODY MASS INDEX: 26.37 KG/M2
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
## 2024-01-05 DIAGNOSIS — S16.1XXA STRAIN OF NECK MUSCLE, INITIAL ENCOUNTER: ICD-10-CM

## 2024-01-05 DIAGNOSIS — S60.312A ABRASION OF LEFT THUMB, INITIAL ENCOUNTER: ICD-10-CM

## 2024-01-05 DIAGNOSIS — S00.83XA CONTUSION OF FACE, INITIAL ENCOUNTER: Primary | ICD-10-CM

## 2024-01-05 PROCEDURE — 70486 CT MAXILLOFACIAL W/O DYE: CPT

## 2024-01-05 PROCEDURE — 99284 EMERGENCY DEPT VISIT MOD MDM: CPT

## 2024-01-05 PROCEDURE — 72125 CT NECK SPINE W/O DYE: CPT

## 2024-01-05 PROCEDURE — 73140 X-RAY EXAM OF FINGER(S): CPT

## 2024-01-05 PROCEDURE — 70450 CT HEAD/BRAIN W/O DYE: CPT

## 2024-01-05 NOTE — ED PROVIDER NOTES
"Subjective   History of Present Illness  Mr. Rivers is a 78-year-old male who presents to the emergency department with complaints of forehead and nasal abrasions and contusion after a trip and fall while bringing in his garbage can at home today.  The patient struck his face on concrete.  There was positive loss of consciousness.  He complains of a mild headache.  He also has some \"soreness\" in the neck and an abrasion to the tip of his left thumb with some pain in the left thumb.  He is without other complaint.  No chest pain or palpitations.  No abdominal pain, nausea, vomiting or diarrhea.  He is not on any blood thinners.    Past medical history includes hypertension, hyperlipidemia, CAD, remote history of prostate cancer, remote colon cancer, diabetes and seizure disorder.      Review of Systems   Constitutional:  Negative for chills and fever.   HENT:  Negative for nosebleeds.         Forehead and nasal contusion   Eyes:  Negative for visual disturbance.   Respiratory:  Negative for cough and shortness of breath.    Cardiovascular:  Negative for chest pain and palpitations.   Gastrointestinal:  Negative for abdominal pain, nausea and vomiting.   Genitourinary:  Negative for dysuria.   Musculoskeletal:  Positive for arthralgias (Left thumb pain) and neck pain.   Skin:  Positive for wound (Left thumb abrasion, forehead abrasions).   Allergic/Immunologic: Negative for immunocompromised state.   Neurological:  Positive for headaches (Mild). Negative for seizures, syncope, weakness and numbness.   Hematological:  Does not bruise/bleed easily.   Psychiatric/Behavioral:  Negative for confusion.        Past Medical History:   Diagnosis Date    Anemia     Colon cancer 1990    Status post partial colectomy in 1990. No chemotherapy or radiation therapy.    Coronary artery disease     Nonobstructive coronary artery disease.    Diabetes     Dyslipidemia     GERD (gastroesophageal reflux disease)     Hx of.    " Hyperlipidemia     Hypertension     Hyponatremia     Hypothyroidism     Left shoulder pain     Low sodium levels 2022    recent issue; saw nephrologist who ruled out kidney issues; took Sodium Chloride po to bring levels up    Memory deficit     FROM ANESTHESIA    Osteoarthritis     Prostate cancer 07/23/2022    Seizure disorder     silent seziure-diagnosed years ago    Skin cancer        Allergies   Allergen Reactions    Chlorhexidine Rash    Sulfa Antibiotics Rash     Childhood reaction        Past Surgical History:   Procedure Laterality Date    APPENDECTOMY      CARDIAC CATHETERIZATION  2012    30 to 40% LAD    CARPAL TUNNEL RELEASE Bilateral     CHOLECYSTECTOMY      COLECTOMY PARTIAL / TOTAL  1990    Partial colectomy    COLONOSCOPY      CYBERKNIFE  02/09/2023    Prostate/SV    CYSTOSCOPY TRANSURETHRAL RESECTION OF PROSTATE N/A 09/21/2018    Procedure: CYSTOSCOPY TRANSURETHRAL RESECTION OF PROSTATE GREENLIGHT;  Surgeon: Naseem Clayton MD;  Location:  [facility];  Service: Urology    OTHER SURGICAL HISTORY      Contraction surgery on bilateral hands and wrists.    PROSTATE BIOPSY N/A 11/11/2022    Procedure: TRANSRECTAL ULTRASOUND GUIDED BIOPSY OF PROSTATE;  Surgeon: Naseem Noland MD;  Location:  [facility];  Service: Urology;  Laterality: N/A;    SINUS SURGERY      SKIN BIOPSY      TEETH EXTRACTION      TONSILLECTOMY      TOTAL KNEE ARTHROPLASTY Right 04/07/2022    Procedure: TOTAL KNEE ARTHROPLASTY WITH ORTHO ROBOT RIGHT;  Surgeon: Louis Malcolm MD;  Location:  [facility];  Service: Robotics - Ortho;  Laterality: Right;    TRANSRECTAL INCISION PROSTATE WITH GOLD SEED Bilateral 01/06/2023    Procedure: TRANSRECTAL ULTRASOUND GUIDED PROSTATE FIDUCIAL MARKER PLACEMENT;  Surgeon: Naseem Noland MD;  Location:  [facility];  Service: Urology;  Laterality: Bilateral;    TURP / TRANSURETHRAL INCISION / DRAINAGE PROSTATE      VASECTOMY         Family History   Problem Relation Age of Onset    Cancer  Mother         brain    Hypertension Father     Stroke Father     No Known Problems Sister     Stroke Sister     Esophageal cancer Brother     Stroke Maternal Grandfather     No Known Problems Paternal Grandmother     No Known Problems Paternal Grandfather     Stroke Other     Arthritis Other     Cancer Other        Social History     Socioeconomic History    Marital status:    Tobacco Use    Smoking status: Never     Passive exposure: Past    Smokeless tobacco: Never   Vaping Use    Vaping Use: Never used   Substance and Sexual Activity    Alcohol use: No    Drug use: No    Sexual activity: Defer           Objective   Physical Exam  Constitutional:       General: He is not in acute distress.  HENT:      Head:      Comments: Minor abrasions to the forehead and to the bridge of the nose.  No deformity.     Right Ear: External ear normal.      Left Ear: External ear normal.      Nose:      Comments: Nasal abrasions     Mouth/Throat:      Mouth: Mucous membranes are moist.      Pharynx: No oropharyngeal exudate.   Eyes:      Extraocular Movements: Extraocular movements intact.      Conjunctiva/sclera: Conjunctivae normal.      Pupils: Pupils are equal, round, and reactive to light.   Neck:      Comments: Mild tenderness on palpation of the posterior cervical spine.  No point tenderness.  Cardiovascular:      Rate and Rhythm: Normal rate and regular rhythm.      Pulses: Normal pulses.      Heart sounds: Normal heart sounds.   Pulmonary:      Effort: Pulmonary effort is normal.      Breath sounds: Normal breath sounds.   Chest:      Chest wall: No tenderness.   Abdominal:      Tenderness: There is no abdominal tenderness. There is no guarding.   Musculoskeletal:      Cervical back: Neck supple.      Comments: Minor abrasion to the tip of the left thumb.  Normal range of motion.    Patient has chronic contractures of the fingers of the left hand.   Skin:     General: Skin is warm and dry.      Comments: Minor  "abrasions to the forehead and bridge of the nose.   Neurological:      General: No focal deficit present.      Mental Status: He is alert and oriented to person, place, and time.   Psychiatric:         Mood and Affect: Mood normal.         Procedures           ED Course      In summary, 78-year-old male presents for evaluation after a trip and fall at home this morning.  The patient states that he is bringing in his garbage can and tripped and fell face first into concrete.  There was positive loss of consciousness.  He complains of abrasions to the forehead and nose as well as an abrasion to the tip of the left thumb.  He has a mild headache.  He has some mild \"soreness\" to the neck.  No chest pain or shortness of breath.  No abdominal pain, nausea, vomiting or diarrhea.  He is not on any blood thinners.    MDM: Differential includes facial contusions and abrasions, skull fracture, intracranial bleed, left thumb abrasion versus thumb fracture, etc.    Patient sent for CT head, facial bones and cervical spine.  X-rays of the left thumb obtained.    CT head, face, cervical spine shows no evidence of fractures or acute intracranial process.    Xray of the left thumb shows no fracture.     Discussed results with pt.  Will have RN cleanse and dress abrasions and d/c home to take Tylenol as needed for pain.  Pt to return if any acute concerns.                                             Medical Decision Making  Problems Addressed:  Abrasion of left thumb, initial encounter: complicated acute illness or injury  Contusion of face, initial encounter: complicated acute illness or injury  Fall, initial encounter: complicated acute illness or injury  Strain of neck muscle, initial encounter: complicated acute illness or injury    Amount and/or Complexity of Data Reviewed  Radiology: ordered.        Final diagnoses:   Contusion of face, initial encounter   Abrasion of left thumb, initial encounter   Strain of neck muscle, " initial encounter   Fall, initial encounter       ED Disposition  ED Disposition       ED Disposition   Discharge    Condition   Stable    Comment   --               Mannie Melvin MD  [address]  [place] [id number]  [phone]      As needed    [facility] EMERGENCY DEPARTMENT  [address] [address] [phone]    If symptoms worsen         Medication List      No changes were made to your prescriptions during this visit.            Collins Dixon, PA  01/05/24 7830

## 2024-01-05 NOTE — DISCHARGE INSTRUCTIONS
Rest.  Keep wounds clean.  Tylenol as directed for pain.  Follow up with your PCP as needed.  Return to the ER if you have new or worsening pain, vomiting, dizziness, or other concerns.

## 2024-02-07 DIAGNOSIS — R39.14 BENIGN PROSTATIC HYPERPLASIA WITH INCOMPLETE BLADDER EMPTYING: ICD-10-CM

## 2024-02-07 DIAGNOSIS — N40.1 BENIGN PROSTATIC HYPERPLASIA WITH INCOMPLETE BLADDER EMPTYING: ICD-10-CM

## 2024-02-07 RX ORDER — TAMSULOSIN HYDROCHLORIDE 0.4 MG/1
1 CAPSULE ORAL DAILY
Qty: 90 CAPSULE | Refills: 3 | Status: SHIPPED | OUTPATIENT
Start: 2024-02-07

## 2024-02-07 NOTE — TELEPHONE ENCOUNTER
Caller: Lea Rivers    Relationship: Self    Best call back number: [phone]    Requested Prescriptions:   Requested Prescriptions      No prescriptions requested or ordered in this encounter    TAMULOSIN    Pharmacy where request should be sent:  [facility]    Last office visit with prescribing clinician: 12/4/2023   Last telemedicine visit with prescribing clinician: Visit date not found   Next office visit with prescribing clinician: 2/15/2024     Additional details provided by patient: PT HAS BEEN OUT OF MEDICATION FOR 2 WEEKS    Does the patient have less than a 3 day supply:  [x] Yes  [] No    Would you like a call back once the refill request has been completed: [x] Yes [] No    If the office needs to give you a call back, can they leave a voicemail: [x] Yes [] No    Jairon Case   02/07/24 12:02 EST

## 2024-02-15 ENCOUNTER — TELEPHONE (OUTPATIENT)
Dept: UROLOGY | Facility: CLINIC | Age: 79
End: 2024-02-15

## 2024-02-15 ENCOUNTER — OFFICE VISIT (OUTPATIENT)
Dept: UROLOGY | Facility: CLINIC | Age: 79
End: 2024-02-15
Payer: MEDICARE

## 2024-02-15 VITALS
HEART RATE: 78 BPM | HEIGHT: 68 IN | OXYGEN SATURATION: 98 % | DIASTOLIC BLOOD PRESSURE: 90 MMHG | WEIGHT: 203 LBS | SYSTOLIC BLOOD PRESSURE: 138 MMHG | BODY MASS INDEX: 30.77 KG/M2

## 2024-02-15 DIAGNOSIS — R31.0 GROSS HEMATURIA: ICD-10-CM

## 2024-02-15 DIAGNOSIS — C61 PROSTATE CANCER: Primary | ICD-10-CM

## 2024-02-15 DIAGNOSIS — N30.00 ACUTE CYSTITIS WITHOUT HEMATURIA: ICD-10-CM

## 2024-02-15 LAB
BILIRUB BLD-MCNC: ABNORMAL MG/DL
CLARITY, POC: CLEAR
COLOR UR: YELLOW
EXPIRATION DATE: ABNORMAL
GLUCOSE UR STRIP-MCNC: NEGATIVE MG/DL
KETONES UR QL: ABNORMAL
LEUKOCYTE EST, POC: ABNORMAL
Lab: ABNORMAL
NITRITE UR-MCNC: POSITIVE MG/ML
PH UR: 6 [PH] (ref 5–8)
PROT UR STRIP-MCNC: ABNORMAL MG/DL
RBC # UR STRIP: ABNORMAL /UL
SP GR UR: 1.03 (ref 1–1.03)
UROBILINOGEN UR QL: ABNORMAL

## 2024-02-15 RX ORDER — NITROFURANTOIN 25; 75 MG/1; MG/1
100 CAPSULE ORAL 2 TIMES DAILY
Qty: 14 CAPSULE | Refills: 0 | Status: SHIPPED | OUTPATIENT
Start: 2024-02-15 | End: 2024-02-16 | Stop reason: SDUPTHER

## 2024-02-16 DIAGNOSIS — N30.00 ACUTE CYSTITIS WITHOUT HEMATURIA: ICD-10-CM

## 2024-02-16 RX ORDER — NITROFURANTOIN 25; 75 MG/1; MG/1
100 CAPSULE ORAL 2 TIMES DAILY
Qty: 14 CAPSULE | Refills: 0 | Status: SHIPPED | OUTPATIENT
Start: 2024-02-16

## 2024-02-21 ENCOUNTER — LAB (OUTPATIENT)
Dept: LAB | Facility: HOSPITAL | Age: 79
End: 2024-02-21
Payer: MEDICARE

## 2024-02-21 DIAGNOSIS — C61 PROSTATE CANCER: ICD-10-CM

## 2024-02-21 PROCEDURE — 87086 URINE CULTURE/COLONY COUNT: CPT | Performed by: STUDENT IN AN ORGANIZED HEALTH CARE EDUCATION/TRAINING PROGRAM

## 2024-02-21 PROCEDURE — 36415 COLL VENOUS BLD VENIPUNCTURE: CPT

## 2024-02-21 PROCEDURE — 84153 ASSAY OF PSA TOTAL: CPT

## 2024-02-22 LAB
BACTERIA SPEC AEROBE CULT: NORMAL
PSA SERPL-MCNC: 0.02 NG/ML (ref 0–4)

## 2024-02-27 ENCOUNTER — PROCEDURE VISIT (OUTPATIENT)
Dept: UROLOGY | Facility: CLINIC | Age: 79
End: 2024-02-27
Payer: MEDICARE

## 2024-02-27 VITALS
DIASTOLIC BLOOD PRESSURE: 68 MMHG | HEART RATE: 85 BPM | WEIGHT: 196.4 LBS | HEIGHT: 68 IN | SYSTOLIC BLOOD PRESSURE: 138 MMHG | BODY MASS INDEX: 29.77 KG/M2 | OXYGEN SATURATION: 96 %

## 2024-02-27 DIAGNOSIS — R31.0 GROSS HEMATURIA: ICD-10-CM

## 2024-02-27 DIAGNOSIS — N39.0 RECURRENT UTI: ICD-10-CM

## 2024-02-27 DIAGNOSIS — N30.00 ACUTE CYSTITIS WITHOUT HEMATURIA: Primary | ICD-10-CM

## 2024-02-27 LAB
BILIRUB BLD-MCNC: NEGATIVE MG/DL
CLARITY, POC: CLEAR
COLOR UR: YELLOW
EXPIRATION DATE: ABNORMAL
GLUCOSE UR STRIP-MCNC: NEGATIVE MG/DL
KETONES UR QL: ABNORMAL
LEUKOCYTE EST, POC: ABNORMAL
Lab: ABNORMAL
NITRITE UR-MCNC: NEGATIVE MG/ML
PH UR: 9 [PH] (ref 5–8)
PROT UR STRIP-MCNC: ABNORMAL MG/DL
RBC # UR STRIP: ABNORMAL /UL
SP GR UR: 1 (ref 1–1.03)
UROBILINOGEN UR QL: NORMAL

## 2024-02-27 NOTE — PROGRESS NOTES
Preprocedure diagnosis  Persistent gross hematuria  Recurrent UTI    Postprocedure diagnosis  Persistent gross hematuria  Recurrent UTI  Radiation prostatitis     Procedure  Flexible Cystourethroscopy    Attending surgeon  Naseem Noland MD    Anesthesia  2% lidocaine jelly intraurethrally    Complications  None    Indications  78 y.o. male undergoing a flexible cystoscopy for the above mentioned indications.  Informed consent was obtained.      Findings  The urethral urothelium was within normal limits with no visible strictures.      The prostatic urethra is widely patent, and revealed no lateral lobe coaptation, with no median lobe.     There are hypervascular glomerulations along the prostatitic urethra commonly seen in radiation cystitis. There is friable appearing left lateral prostatitic tissue with delayed healing and mild oozing.     Bladder findings included bilateral ureters in orthotopic position, normal bladder mucosa without tumors, lesions, or stones.     Procedure  The patient was placed in supine position and prepped and draped in sterile fashion with lidocaine jelly instill per the urethra for anesthesia 5 minutes prior to procedure start.  A brief timeout was performed including available nursing staff and the patient.      The 16 Fr digital flexible cystoscope was lubricated and gently advanced into the urethral meatus.     The penile and bulbar urethra appeared widely patent without visible lesion or stricture.     The prostatic urethra is widely patent, and revealed no lateral lobe coaptation, with no median lobe.     There are hypervascular glomerulations along the prostatitic urethra commonly seen in radiation cystitis. There is friable appearing left lateral prostatitic tissue with delayed healing and mild bleeding/oozing.      The scope was then advanced into the bladder. The bladder was completely visualized starting with the trigone.     There were bilateral orthotopic ureteral  orifices.     The posterior wall, lateral walls, anterior wall, and dome were completely visualized WITHOUT obvious mucosal lesions, tumors, stones, or trabeculations.      The cystoscope was retroflexed and the bladder neck and prostate were further visualized and appeared normal.      The cystoscope was then gently withdrawn while visualizing the urethra and the procedure was then terminated.  The patient tolerated the procedure well.      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 There are no overt malignancy concerns. Patient voided after cystoscopy and his urine is clear today.  I do not recommend any active intervention for this.    He has a history of recurrent UTI and we will continue to monitor for symptoms. If persistent positive cultures will consider a nightly antibiotic for ppx.     Start D-mannose vitamin supplementation for UTI ppx in interim.   Discussed importance of continued high fluid intake and consistent voiding to reduce UTI risk.     F/u in 3 months with PA team for repeat UA and culture. Return precautions for infection discussed. He is still completing course of Macrobid for 100,000 colony mixed sharon urine culture from last visit.    Naseem Noland MD

## 2024-02-27 NOTE — PATIENT INSTRUCTIONS
Tips to Reduce Bladder Symptoms (Urgency, Frequency, Incontinence, Infections)     Here are some basic recommendations for reducing urinary symptoms:    1) Urinate (or empty bladder if intermittent catheter dependent) every 3 hours while awake. After urinating please stay and attempt to urinate a 2nd time to confirm you are empty (this is called double voiding). This should be your practice EVERY time you attempt to urinate.    2) Constipation can severely exacerbate baseline overactive bladder symptoms. You should have a soft bowel management DAILY. You will likely find success with daily stool softeners (Colace or Docusate) plus the use of a stool softener, Miralax.    3) Reduce caffeinated beverage intake to no more than one normal size glass per day.    4) Cut down fluid intake after dinner and at least a few hours prior to sleeping to prevent night-time waking to urinate.      Options for Treating Recurrent Urinary Tract Infections     Your urologist may have recommended one of the following therapy strategies if you are experiencing recurrent urinary tract infections:     1) Self Start Antibiotic Therapy  In patients who have infrequent urinary tract infection, possibly related to intercourse, a sizable quantity of antibiotics may have been prescribed to you.  Whenever you notice the symptoms of urinary tract infection starting (urgency, frequency, bladder pain, blood in the urine, suprapubic pain) it is important that you drop off a urine sample at a local [facility] lab for culture.  After you have dropped off a urine sample, you can start a 3-day course of antibiotics (typically nitrofurantoin AKA Macrobid) or Trimethoprim/Sulfamethoxazole (Bactrim)).      2) Post-Coital Antibiotic Therapy (Post-[place])   If your urinary tract infections are usually patterned around intercourse, your urologist may have prescribed you a sizable quantity of antibiotics.  To prevent infections associated with intercourse,  your urologist may have instructed you to take 1 tablet of either Macrobid or Bactrim either just before or just after intercourse to prevent urinary tract infection.  Wiping front to back, urinating after intercourse, and keeping good fluid intake may also help to prevent your infections.    3) Daily Urinary Sterilization (Non-Antibiotic Prevention Medication)  If you have frequent urinary tract infections, your urologist may have prescribed you a medication called methenamine hippurate (Hipprex).  This medication is not an antibiotic, but it does help to cause urinary sterilization and prevent bacterial growth.  This should be taken twice a day as instructed.  If you are taking this medication is important to drink plenty of fluid and urinate frequently.    4) Daily Antibiotic for UTI Prevention   If you have significant or worsening frequency of urinary tract infections, your urologist may have prescribed you a daily tablet of antibiotic, either Macrodantin or Trimethoprim as an effective way to prevent urinary tract infections long-term.  It is important that you take this medication at the same time every day, this is usually recommended to take prior to bedtime.  Daily antibiotic for urinary tract infection prevention is usually prescribed for 3 to 6 months at a time with reassessment of effectiveness at interval follow-ups.    5) Vaginal Estrogen Cream Therapy  Vaginal estrogen cream is typically prescribed for women with recurrent urinary tract infections or significant lower urinary tract symptoms (urgency, frequency, incontinence, vaginal dryness, pain with intercourse, etc.) who are postmenopausal.  After menopause, there is typically reduced blood flow to the vagina and area around the urethra and bladder as a result of estrogen decline, this can cause pH changes to the tissue, loss of normal bacterial sharon, and loss of tissue integrity which can increase risk of infection.  Restoring vaginal estrogen  is an effective way to promote healthy bacterial growth to prevent growth of bad bacteria, restore vaginal lubrication, and prevent urinary symptoms.

## 2024-03-02 ENCOUNTER — APPOINTMENT (OUTPATIENT)
Dept: GENERAL RADIOLOGY | Facility: HOSPITAL | Age: 79
DRG: 329 | End: 2024-03-02
Payer: MEDICARE

## 2024-03-02 ENCOUNTER — APPOINTMENT (OUTPATIENT)
Dept: CT IMAGING | Facility: HOSPITAL | Age: 79
DRG: 329 | End: 2024-03-02
Payer: MEDICARE

## 2024-03-02 ENCOUNTER — HOSPITAL ENCOUNTER (INPATIENT)
Facility: HOSPITAL | Age: 79
LOS: 24 days | DRG: 329 | End: 2024-03-31
Attending: STUDENT IN AN ORGANIZED HEALTH CARE EDUCATION/TRAINING PROGRAM | Admitting: INTERNAL MEDICINE
Payer: MEDICARE

## 2024-03-02 DIAGNOSIS — R29.6 FALLS FREQUENTLY: ICD-10-CM

## 2024-03-02 DIAGNOSIS — N39.0 ACUTE UTI (URINARY TRACT INFECTION): ICD-10-CM
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
## 2024-03-02 DIAGNOSIS — S42.032A CLOSED DISPLACED FRACTURE OF ACROMIAL END OF LEFT CLAVICLE, INITIAL ENCOUNTER: ICD-10-CM

## 2024-03-02 DIAGNOSIS — K63.89 COLON DISTENTION: ICD-10-CM

## 2024-03-02 DIAGNOSIS — K56.0: ICD-10-CM

## 2024-03-02 DIAGNOSIS — R18.8: ICD-10-CM

## 2024-03-02 DIAGNOSIS — R13.12 OROPHARYNGEAL DYSPHAGIA: ICD-10-CM

## 2024-03-02 DIAGNOSIS — S22.42XA CLOSED FRACTURE OF MULTIPLE RIBS OF LEFT SIDE, INITIAL ENCOUNTER: ICD-10-CM

## 2024-03-02 LAB
ALBUMIN SERPL-MCNC: 4.1 G/DL (ref 3.5–5.2)
ALBUMIN/GLOB SERPL: 1.2 G/DL
ALP SERPL-CCNC: 58 U/L (ref 39–117)
ALT SERPL W P-5'-P-CCNC: 9 U/L (ref 1–41)
AMPHET+METHAMPHET UR QL: NEGATIVE
AMPHETAMINES UR QL: NEGATIVE
ANION GAP SERPL CALCULATED.3IONS-SCNC: 11 MMOL/L (ref 5–15)
AST SERPL-CCNC: 16 U/L (ref 1–40)
BACTERIA UR QL AUTO: ABNORMAL /HPF
BARBITURATES UR QL SCN: NEGATIVE
BASOPHILS # BLD AUTO: 0.02 10*3/MM3 (ref 0–0.2)
BASOPHILS NFR BLD AUTO: 0.2 % (ref 0–1.5)
BENZODIAZ UR QL SCN: NEGATIVE
BILIRUB SERPL-MCNC: 0.5 MG/DL (ref 0–1.2)
BILIRUB UR QL STRIP: NEGATIVE
BUN SERPL-MCNC: 14 MG/DL (ref 8–23)
BUN/CREAT SERPL: 16.5 (ref 7–25)
BUPRENORPHINE SERPL-MCNC: NEGATIVE NG/ML
CALCIUM SPEC-SCNC: 9.6 MG/DL (ref 8.6–10.5)
CANNABINOIDS SERPL QL: NEGATIVE
CHLORIDE SERPL-SCNC: 96 MMOL/L (ref 98–107)
CLARITY UR: CLEAR
CO2 SERPL-SCNC: 25 MMOL/L (ref 22–29)
COCAINE UR QL: NEGATIVE
COLOR UR: YELLOW
CREAT SERPL-MCNC: 0.85 MG/DL (ref 0.76–1.27)
CRP SERPL-MCNC: 0.68 MG/DL (ref 0–0.5)
D-LACTATE SERPL-SCNC: 2.3 MMOL/L (ref 0.5–2)
D-LACTATE SERPL-SCNC: 2.6 MMOL/L (ref 0.5–2)
D-LACTATE SERPL-SCNC: 2.8 MMOL/L (ref 0.5–2)
D-LACTATE SERPL-SCNC: 2.8 MMOL/L (ref 0.5–2)
DEPRECATED RDW RBC AUTO: 47.6 FL (ref 37–54)
EGFRCR SERPLBLD CKD-EPI 2021: 88.4 ML/MIN/1.73
EOSINOPHIL # BLD AUTO: 0 10*3/MM3 (ref 0–0.4)
EOSINOPHIL NFR BLD AUTO: 0 % (ref 0.3–6.2)
ERYTHROCYTE [DISTWIDTH] IN BLOOD BY AUTOMATED COUNT: 14 % (ref 12.3–15.4)
FENTANYL UR-MCNC: NEGATIVE NG/ML
FLUAV RNA RESP QL NAA+PROBE: NOT DETECTED
FLUBV RNA RESP QL NAA+PROBE: NOT DETECTED
GLOBULIN UR ELPH-MCNC: 3.4 GM/DL
GLUCOSE BLDC GLUCOMTR-MCNC: 102 MG/DL (ref 70–130)
GLUCOSE BLDC GLUCOMTR-MCNC: 127 MG/DL (ref 70–130)
GLUCOSE SERPL-MCNC: 116 MG/DL (ref 65–99)
GLUCOSE UR STRIP-MCNC: NEGATIVE MG/DL
HBA1C MFR BLD: 5.5 % (ref 4.8–5.6)
HCT VFR BLD AUTO: 38.6 % (ref 37.5–51)
HGB BLD-MCNC: 12.7 G/DL (ref 13–17.7)
HGB UR QL STRIP.AUTO: ABNORMAL
HOLD SPECIMEN: NORMAL
HYALINE CASTS UR QL AUTO: ABNORMAL /LPF
IMM GRANULOCYTES # BLD AUTO: 0.06 10*3/MM3 (ref 0–0.05)
IMM GRANULOCYTES NFR BLD AUTO: 0.6 % (ref 0–0.5)
KETONES UR QL STRIP: ABNORMAL
LEUKOCYTE ESTERASE UR QL STRIP.AUTO: ABNORMAL
LYMPHOCYTES # BLD AUTO: 1.02 10*3/MM3 (ref 0.7–3.1)
LYMPHOCYTES NFR BLD AUTO: 10.5 % (ref 19.6–45.3)
MAGNESIUM SERPL-MCNC: 2.2 MG/DL (ref 1.6–2.4)
MCH RBC QN AUTO: 30.6 PG (ref 26.6–33)
MCHC RBC AUTO-ENTMCNC: 32.9 G/DL (ref 31.5–35.7)
MCV RBC AUTO: 93 FL (ref 79–97)
METHADONE UR QL SCN: NEGATIVE
MONOCYTES # BLD AUTO: 0.85 10*3/MM3 (ref 0.1–0.9)
MONOCYTES NFR BLD AUTO: 8.7 % (ref 5–12)
NEUTROPHILS NFR BLD AUTO: 7.77 10*3/MM3 (ref 1.7–7)
NEUTROPHILS NFR BLD AUTO: 80 % (ref 42.7–76)
NITRITE UR QL STRIP: NEGATIVE
NRBC BLD AUTO-RTO: 0 /100 WBC (ref 0–0.2)
NT-PROBNP SERPL-MCNC: 601.2 PG/ML (ref 0–1800)
OPIATES UR QL: NEGATIVE
OXYCODONE UR QL SCN: NEGATIVE
PCP UR QL SCN: NEGATIVE
PH UR STRIP.AUTO: 5.5 [PH] (ref 5–8)
PHOSPHATE SERPL-MCNC: 2.8 MG/DL (ref 2.5–4.5)
PLATELET # BLD AUTO: 177 10*3/MM3 (ref 140–450)
PMV BLD AUTO: 8.9 FL (ref 6–12)
POTASSIUM SERPL-SCNC: 4.1 MMOL/L (ref 3.5–5.2)
PROT SERPL-MCNC: 7.5 G/DL (ref 6–8.5)
PROT UR QL STRIP: ABNORMAL
QT INTERVAL: 388 MS
QTC INTERVAL: 439 MS
RBC # BLD AUTO: 4.15 10*6/MM3 (ref 4.14–5.8)
RBC # UR STRIP: ABNORMAL /HPF
REF LAB TEST METHOD: ABNORMAL
RSV RNA RESP QL NAA+PROBE: NOT DETECTED
SARS-COV-2 RNA RESP QL NAA+PROBE: NOT DETECTED
SODIUM SERPL-SCNC: 132 MMOL/L (ref 136–145)
SP GR UR STRIP: 1.02 (ref 1–1.03)
SQUAMOUS #/AREA URNS HPF: ABNORMAL /HPF
T4 FREE SERPL-MCNC: 1.29 NG/DL (ref 0.93–1.7)
TRICYCLICS UR QL SCN: NEGATIVE
TROPONIN T SERPL HS-MCNC: 16 NG/L
TSH SERPL DL<=0.05 MIU/L-ACNC: 2.28 UIU/ML (ref 0.27–4.2)
UROBILINOGEN UR QL STRIP: ABNORMAL
VALPROATE SERPL-MCNC: 76.9 MCG/ML (ref 50–125)
WBC # UR STRIP: ABNORMAL /HPF
WBC NRBC COR # BLD AUTO: 9.72 10*3/MM3 (ref 3.4–10.8)
WHOLE BLOOD HOLD COAG: NORMAL
WHOLE BLOOD HOLD SPECIMEN: NORMAL

## 2024-03-02 PROCEDURE — G0378 HOSPITAL OBSERVATION PER HR: HCPCS

## 2024-03-02 PROCEDURE — 84484 ASSAY OF TROPONIN QUANT: CPT | Performed by: STUDENT IN AN ORGANIZED HEALTH CARE EDUCATION/TRAINING PROGRAM

## 2024-03-02 PROCEDURE — 84443 ASSAY THYROID STIM HORMONE: CPT | Performed by: STUDENT IN AN ORGANIZED HEALTH CARE EDUCATION/TRAINING PROGRAM

## 2024-03-02 PROCEDURE — 25010000002 HEPARIN (PORCINE) PER 1000 UNITS: Performed by: INTERNAL MEDICINE

## 2024-03-02 PROCEDURE — 80053 COMPREHEN METABOLIC PANEL: CPT | Performed by: STUDENT IN AN ORGANIZED HEALTH CARE EDUCATION/TRAINING PROGRAM

## 2024-03-02 PROCEDURE — 93005 ELECTROCARDIOGRAM TRACING: CPT

## 2024-03-02 PROCEDURE — 83605 ASSAY OF LACTIC ACID: CPT | Performed by: STUDENT IN AN ORGANIZED HEALTH CARE EDUCATION/TRAINING PROGRAM

## 2024-03-02 PROCEDURE — 25810000003 LACTATED RINGERS PER 1000 ML: Performed by: STUDENT IN AN ORGANIZED HEALTH CARE EDUCATION/TRAINING PROGRAM

## 2024-03-02 PROCEDURE — 83735 ASSAY OF MAGNESIUM: CPT | Performed by: STUDENT IN AN ORGANIZED HEALTH CARE EDUCATION/TRAINING PROGRAM

## 2024-03-02 PROCEDURE — 85025 COMPLETE CBC W/AUTO DIFF WBC: CPT | Performed by: STUDENT IN AN ORGANIZED HEALTH CARE EDUCATION/TRAINING PROGRAM

## 2024-03-02 PROCEDURE — 93005 ELECTROCARDIOGRAM TRACING: CPT | Performed by: STUDENT IN AN ORGANIZED HEALTH CARE EDUCATION/TRAINING PROGRAM

## 2024-03-02 PROCEDURE — 86140 C-REACTIVE PROTEIN: CPT | Performed by: STUDENT IN AN ORGANIZED HEALTH CARE EDUCATION/TRAINING PROGRAM

## 2024-03-02 PROCEDURE — 83880 ASSAY OF NATRIURETIC PEPTIDE: CPT | Performed by: STUDENT IN AN ORGANIZED HEALTH CARE EDUCATION/TRAINING PROGRAM

## 2024-03-02 PROCEDURE — 73560 X-RAY EXAM OF KNEE 1 OR 2: CPT

## 2024-03-02 PROCEDURE — 71045 X-RAY EXAM CHEST 1 VIEW: CPT

## 2024-03-02 PROCEDURE — 81001 URINALYSIS AUTO W/SCOPE: CPT

## 2024-03-02 PROCEDURE — 25810000003 LACTATED RINGERS PER 1000 ML: Performed by: INTERNAL MEDICINE

## 2024-03-02 PROCEDURE — 87637 SARSCOV2&INF A&B&RSV AMP PRB: CPT | Performed by: STUDENT IN AN ORGANIZED HEALTH CARE EDUCATION/TRAINING PROGRAM

## 2024-03-02 PROCEDURE — 25810000003 SODIUM CHLORIDE 0.9 % SOLUTION: Performed by: STUDENT IN AN ORGANIZED HEALTH CARE EDUCATION/TRAINING PROGRAM

## 2024-03-02 PROCEDURE — 84100 ASSAY OF PHOSPHORUS: CPT | Performed by: STUDENT IN AN ORGANIZED HEALTH CARE EDUCATION/TRAINING PROGRAM

## 2024-03-02 PROCEDURE — 83036 HEMOGLOBIN GLYCOSYLATED A1C: CPT | Performed by: INTERNAL MEDICINE

## 2024-03-02 PROCEDURE — 70450 CT HEAD/BRAIN W/O DYE: CPT

## 2024-03-02 PROCEDURE — 25010000002 KETOROLAC TROMETHAMINE PER 15 MG: Performed by: STUDENT IN AN ORGANIZED HEALTH CARE EDUCATION/TRAINING PROGRAM

## 2024-03-02 PROCEDURE — 72125 CT NECK SPINE W/O DYE: CPT

## 2024-03-02 PROCEDURE — 82948 REAGENT STRIP/BLOOD GLUCOSE: CPT

## 2024-03-02 PROCEDURE — 80164 ASSAY DIPROPYLACETIC ACD TOT: CPT | Performed by: STUDENT IN AN ORGANIZED HEALTH CARE EDUCATION/TRAINING PROGRAM

## 2024-03-02 PROCEDURE — 87040 BLOOD CULTURE FOR BACTERIA: CPT | Performed by: STUDENT IN AN ORGANIZED HEALTH CARE EDUCATION/TRAINING PROGRAM

## 2024-03-02 PROCEDURE — 25010000002 PIPERACILLIN SOD-TAZOBACTAM PER 1 G: Performed by: STUDENT IN AN ORGANIZED HEALTH CARE EDUCATION/TRAINING PROGRAM

## 2024-03-02 PROCEDURE — 71250 CT THORAX DX C-: CPT

## 2024-03-02 PROCEDURE — 87086 URINE CULTURE/COLONY COUNT: CPT | Performed by: STUDENT IN AN ORGANIZED HEALTH CARE EDUCATION/TRAINING PROGRAM

## 2024-03-02 PROCEDURE — 84439 ASSAY OF FREE THYROXINE: CPT | Performed by: STUDENT IN AN ORGANIZED HEALTH CARE EDUCATION/TRAINING PROGRAM

## 2024-03-02 PROCEDURE — P9612 CATHETERIZE FOR URINE SPEC: HCPCS

## 2024-03-02 PROCEDURE — 80307 DRUG TEST PRSMV CHEM ANLYZR: CPT | Performed by: STUDENT IN AN ORGANIZED HEALTH CARE EDUCATION/TRAINING PROGRAM

## 2024-03-02 PROCEDURE — 36415 COLL VENOUS BLD VENIPUNCTURE: CPT

## 2024-03-02 PROCEDURE — 99285 EMERGENCY DEPT VISIT HI MDM: CPT

## 2024-03-02 PROCEDURE — 99223 1ST HOSP IP/OBS HIGH 75: CPT | Performed by: INTERNAL MEDICINE

## 2024-03-02 RX ORDER — METHOCARBAMOL 750 MG/1
750 TABLET, FILM COATED ORAL ONCE
Status: COMPLETED | OUTPATIENT
Start: 2024-03-02 | End: 2024-03-02

## 2024-03-02 RX ORDER — ATORVASTATIN CALCIUM 10 MG/1
10 TABLET, FILM COATED ORAL DAILY
Status: DISCONTINUED | OUTPATIENT
Start: 2024-03-02 | End: 2024-03-10

## 2024-03-02 RX ORDER — PANTOPRAZOLE SODIUM 40 MG/1
40 TABLET, DELAYED RELEASE ORAL
Status: DISCONTINUED | OUTPATIENT
Start: 2024-03-03 | End: 2024-03-07

## 2024-03-02 RX ORDER — CALCIUM CARBONATE 500 MG/1
1 TABLET, CHEWABLE ORAL 3 TIMES DAILY PRN
Status: DISCONTINUED | OUTPATIENT
Start: 2024-03-02 | End: 2024-03-31 | Stop reason: HOSPADM

## 2024-03-02 RX ORDER — LISINOPRIL 20 MG/1
20 TABLET ORAL
Status: DISCONTINUED | OUTPATIENT
Start: 2024-03-03 | End: 2024-03-03

## 2024-03-02 RX ORDER — ISOSORBIDE MONONITRATE 30 MG/1
30 TABLET, EXTENDED RELEASE ORAL
Status: DISCONTINUED | OUTPATIENT
Start: 2024-03-02 | End: 2024-03-04

## 2024-03-02 RX ORDER — METFORMIN HYDROCHLORIDE 500 MG/1
1000 TABLET, EXTENDED RELEASE ORAL
Status: ON HOLD | COMMUNITY

## 2024-03-02 RX ORDER — SODIUM CHLORIDE 0.9 % (FLUSH) 0.9 %
10 SYRINGE (ML) INJECTION AS NEEDED
Status: DISCONTINUED | OUTPATIENT
Start: 2024-03-02 | End: 2024-03-20 | Stop reason: SDUPTHER

## 2024-03-02 RX ORDER — FLUTICASONE PROPIONATE 50 MCG
2 SPRAY, SUSPENSION (ML) NASAL DAILY PRN
Status: DISCONTINUED | OUTPATIENT
Start: 2024-03-02 | End: 2024-03-31 | Stop reason: HOSPADM

## 2024-03-02 RX ORDER — ACETAMINOPHEN 650 MG/1
650 SUPPOSITORY RECTAL EVERY 4 HOURS PRN
Status: DISCONTINUED | OUTPATIENT
Start: 2024-03-02 | End: 2024-03-31 | Stop reason: HOSPADM

## 2024-03-02 RX ORDER — INSULIN LISPRO 100 [IU]/ML
2-7 INJECTION, SOLUTION INTRAVENOUS; SUBCUTANEOUS
Status: DISCONTINUED | OUTPATIENT
Start: 2024-03-02 | End: 2024-03-08

## 2024-03-02 RX ORDER — KETOROLAC TROMETHAMINE 15 MG/ML
15 INJECTION, SOLUTION INTRAMUSCULAR; INTRAVENOUS ONCE
Status: COMPLETED | OUTPATIENT
Start: 2024-03-02 | End: 2024-03-02

## 2024-03-02 RX ORDER — LEVOTHYROXINE SODIUM 88 UG/1
88 TABLET ORAL
Status: DISCONTINUED | OUTPATIENT
Start: 2024-03-02 | End: 2024-03-09

## 2024-03-02 RX ORDER — DIVALPROEX SODIUM 250 MG/1
500 TABLET, EXTENDED RELEASE ORAL DAILY
Status: DISCONTINUED | OUTPATIENT
Start: 2024-03-03 | End: 2024-03-07

## 2024-03-02 RX ORDER — NICOTINE POLACRILEX 4 MG
15 LOZENGE BUCCAL
Status: DISCONTINUED | OUTPATIENT
Start: 2024-03-02 | End: 2024-03-31 | Stop reason: HOSPADM

## 2024-03-02 RX ORDER — SODIUM CHLORIDE, SODIUM LACTATE, POTASSIUM CHLORIDE, CALCIUM CHLORIDE 600; 310; 30; 20 MG/100ML; MG/100ML; MG/100ML; MG/100ML
125 INJECTION, SOLUTION INTRAVENOUS CONTINUOUS
Status: DISCONTINUED | OUTPATIENT
Start: 2024-03-02 | End: 2024-03-02

## 2024-03-02 RX ORDER — GABAPENTIN 300 MG/1
300 CAPSULE ORAL ONCE
Status: COMPLETED | OUTPATIENT
Start: 2024-03-02 | End: 2024-03-02

## 2024-03-02 RX ORDER — TAMSULOSIN HYDROCHLORIDE 0.4 MG/1
0.4 CAPSULE ORAL DAILY
Status: DISCONTINUED | OUTPATIENT
Start: 2024-03-03 | End: 2024-03-10

## 2024-03-02 RX ORDER — LANOLIN ALCOHOL/MO/W.PET/CERES
1000 CREAM (GRAM) TOPICAL DAILY
Status: DISCONTINUED | OUTPATIENT
Start: 2024-03-03 | End: 2024-03-10

## 2024-03-02 RX ORDER — TRAMADOL HYDROCHLORIDE 50 MG/1
50 TABLET ORAL EVERY 6 HOURS PRN
Status: DISCONTINUED | OUTPATIENT
Start: 2024-03-02 | End: 2024-03-06

## 2024-03-02 RX ORDER — ONDANSETRON 2 MG/ML
4 INJECTION INTRAMUSCULAR; INTRAVENOUS EVERY 6 HOURS PRN
Status: DISCONTINUED | OUTPATIENT
Start: 2024-03-02 | End: 2024-03-31 | Stop reason: HOSPADM

## 2024-03-02 RX ORDER — SODIUM CHLORIDE 9 MG/ML
40 INJECTION, SOLUTION INTRAVENOUS AS NEEDED
Status: DISCONTINUED | OUTPATIENT
Start: 2024-03-02 | End: 2024-03-31 | Stop reason: HOSPADM

## 2024-03-02 RX ORDER — ONDANSETRON 4 MG/1
4 TABLET, ORALLY DISINTEGRATING ORAL EVERY 6 HOURS PRN
Status: DISCONTINUED | OUTPATIENT
Start: 2024-03-02 | End: 2024-03-31 | Stop reason: HOSPADM

## 2024-03-02 RX ORDER — ACETAMINOPHEN 325 MG/1
650 TABLET ORAL EVERY 4 HOURS PRN
Status: DISCONTINUED | OUTPATIENT
Start: 2024-03-02 | End: 2024-03-31 | Stop reason: HOSPADM

## 2024-03-02 RX ORDER — VERAPAMIL HYDROCHLORIDE 240 MG/1
240 TABLET, FILM COATED, EXTENDED RELEASE ORAL DAILY
Status: DISCONTINUED | OUTPATIENT
Start: 2024-03-03 | End: 2024-03-04

## 2024-03-02 RX ORDER — SODIUM CHLORIDE 0.9 % (FLUSH) 0.9 %
10 SYRINGE (ML) INJECTION EVERY 12 HOURS SCHEDULED
Status: DISCONTINUED | OUTPATIENT
Start: 2024-03-02 | End: 2024-03-14 | Stop reason: SDUPTHER

## 2024-03-02 RX ORDER — IBUPROFEN 600 MG/1
1 TABLET ORAL
Status: DISCONTINUED | OUTPATIENT
Start: 2024-03-02 | End: 2024-03-31 | Stop reason: HOSPADM

## 2024-03-02 RX ORDER — SODIUM CHLORIDE, SODIUM LACTATE, POTASSIUM CHLORIDE, CALCIUM CHLORIDE 600; 310; 30; 20 MG/100ML; MG/100ML; MG/100ML; MG/100ML
125 INJECTION, SOLUTION INTRAVENOUS CONTINUOUS
Status: ACTIVE | OUTPATIENT
Start: 2024-03-02 | End: 2024-03-03

## 2024-03-02 RX ORDER — ACETAMINOPHEN 160 MG/5ML
650 SOLUTION ORAL EVERY 4 HOURS PRN
Status: DISCONTINUED | OUTPATIENT
Start: 2024-03-02 | End: 2024-03-31 | Stop reason: HOSPADM

## 2024-03-02 RX ORDER — DEXTROSE MONOHYDRATE 25 G/50ML
25 INJECTION, SOLUTION INTRAVENOUS
Status: DISCONTINUED | OUTPATIENT
Start: 2024-03-02 | End: 2024-03-31 | Stop reason: HOSPADM

## 2024-03-02 RX ORDER — LEVETIRACETAM 500 MG/1
500 TABLET ORAL 2 TIMES DAILY
Status: DISCONTINUED | OUTPATIENT
Start: 2024-03-02 | End: 2024-03-09

## 2024-03-02 RX ORDER — HEPARIN SODIUM 5000 [USP'U]/ML
5000 INJECTION, SOLUTION INTRAVENOUS; SUBCUTANEOUS EVERY 12 HOURS SCHEDULED
Status: DISCONTINUED | OUTPATIENT
Start: 2024-03-02 | End: 2024-03-12

## 2024-03-02 RX ADMIN — LEVETIRACETAM 500 MG: 500 TABLET, FILM COATED ORAL at 17:10

## 2024-03-02 RX ADMIN — ATORVASTATIN CALCIUM 10 MG: 10 TABLET, FILM COATED ORAL at 21:27

## 2024-03-02 RX ADMIN — HEPARIN SODIUM 5000 UNITS: 5000 INJECTION INTRAVENOUS; SUBCUTANEOUS at 21:28

## 2024-03-02 RX ADMIN — SODIUM CHLORIDE 500 ML: 9 INJECTION, SOLUTION INTRAVENOUS at 11:48

## 2024-03-02 RX ADMIN — ISOSORBIDE MONONITRATE 30 MG: 30 TABLET, EXTENDED RELEASE ORAL at 17:10

## 2024-03-02 RX ADMIN — SODIUM CHLORIDE, POTASSIUM CHLORIDE, SODIUM LACTATE AND CALCIUM CHLORIDE 125 ML/HR: 600; 310; 30; 20 INJECTION, SOLUTION INTRAVENOUS at 17:06

## 2024-03-02 RX ADMIN — METHOCARBAMOL 750 MG: 750 TABLET ORAL at 11:51

## 2024-03-02 RX ADMIN — KETOROLAC TROMETHAMINE 15 MG: 15 INJECTION, SOLUTION INTRAMUSCULAR; INTRAVENOUS at 11:50

## 2024-03-02 RX ADMIN — SODIUM CHLORIDE, POTASSIUM CHLORIDE, SODIUM LACTATE AND CALCIUM CHLORIDE 125 ML/HR: 600; 310; 30; 20 INJECTION, SOLUTION INTRAVENOUS at 12:29

## 2024-03-02 RX ADMIN — GABAPENTIN 300 MG: 300 CAPSULE ORAL at 11:51

## 2024-03-02 RX ADMIN — TRAMADOL HYDROCHLORIDE 50 MG: 50 TABLET ORAL at 21:27

## 2024-03-02 RX ADMIN — PIPERACILLIN AND TAZOBACTAM 3.38 G: 3; .375 INJECTION, POWDER, FOR SOLUTION INTRAVENOUS at 11:54

## 2024-03-02 NOTE — H&P
[facility] Medicine Services  HISTORY AND PHYSICAL    Patient Name: Lea Rivers  : 1945  MRN: [id number]  Primary Care Physician: Mannie Melvin MD  Date of admission: 3/2/2024      Subjective   Subjective     Chief Complaint:  Fall and rib fracture    HPI:  Lea Rivers is a 78 y.o. male with past medical history significant for hypertension, hyperlipidemia, hypothyroidism, seizure disorder, history of colon cancer, history of prostate cancer s/p radiation therapy, diabetes mellitus, dementia.  Patient still lives alone and family checks up on him frequently.  I come to the patient and also family who is present at the bedside patient has had frequent falls and the frequency has increased lately.  Evidently patient had a fall today and then was complaining of chest and shoulder pain and was brought to the emergency room.  Evaluation here at the emergency room reveals multiple rib fracture and also clavicular fracture.  Patient tells me that prior to this fall he was doing okay except having frequent falls.  Because of these falls is not clear at this time but patient tells me that sometimes he get foggy in the head.  Family also reports that patient has difficulty with balance.  No fever or chills or any respiratory symptoms during the past several days.  No nausea vomiting or diarrhea or abdominal pain during the same period.      Personal History     Past Medical History:   Diagnosis Date    Anemia     Colon cancer     Status post partial colectomy in . No chemotherapy or radiation therapy.    Coronary artery disease     Nonobstructive coronary artery disease.    Diabetes     Dyslipidemia     GERD (gastroesophageal reflux disease)     Hx of.    Hyperlipidemia     Hypertension     Hyponatremia     Hypothyroidism     Left shoulder pain     Low sodium levels     recent issue; saw nephrologist who ruled out kidney issues; took Sodium Chloride po  to bring levels up    Memory deficit     FROM ANESTHESIA    Osteoarthritis     Prostate cancer 07/23/2022    Seizure disorder     silent seziure-diagnosed years ago    Skin cancer          Oncology Problem List:  Prostate cancer (12/01/2022; Status: Active)  Oncology/Hematology History   Prostate cancer   11/11/2022 Cancer Staged    Staging form: Prostate, AJCC 8th Edition  - Clinical stage from 11/11/2022: Stage IIC (cT2c, cN0, cM0, PSA: 11.3, Grade Group: 3) - Signed by Jerman Luu MD on 12/1/2022 12/1/2022 Initial Diagnosis    Prostate cancer (HCC)     1/30/2023 - 2/9/2023 Radiation    Radiation OncologyTreatment Course:  Lea Rivers received 3500 cGy in 5 fractions to prostate and seminal vesicles via Stereotactic Radiation Therapy - SRT.       Past Surgical History:   Procedure Laterality Date    APPENDECTOMY      CARDIAC CATHETERIZATION  2012    30 to 40% LAD    CARPAL TUNNEL RELEASE Bilateral     CHOLECYSTECTOMY      COLECTOMY PARTIAL / TOTAL  1990    Partial colectomy    COLONOSCOPY      CYBERKNIFE  02/09/2023    Prostate/SV    CYSTOSCOPY TRANSURETHRAL RESECTION OF PROSTATE N/A 09/21/2018    Procedure: CYSTOSCOPY TRANSURETHRAL RESECTION OF PROSTATE GREENLIGHT;  Surgeon: Naseem Clayton MD;  Location: [facility] OR;  Service: Urology    OTHER SURGICAL HISTORY      Contraction surgery on bilateral hands and wrists.    PROSTATE BIOPSY N/A 11/11/2022    Procedure: TRANSRECTAL ULTRASOUND GUIDED BIOPSY OF PROSTATE;  Surgeon: Naseem Noland MD;  Location:  [facility] OR;  Service: Urology;  Laterality: N/A;    SINUS SURGERY      SKIN BIOPSY      TEETH EXTRACTION      TONSILLECTOMY      TOTAL KNEE ARTHROPLASTY Right 04/07/2022    Procedure: TOTAL KNEE ARTHROPLASTY WITH ORTHO ROBOT RIGHT;  Surgeon: Louis Malcolm MD;  Location:  [facility] OR;  Service: Robotics - Ortho;  Laterality: Right;    TRANSRECTAL INCISION PROSTATE WITH GOLD SEED Bilateral 01/06/2023    Procedure: TRANSRECTAL ULTRASOUND  GUIDED PROSTATE FIDUCIAL MARKER PLACEMENT;  Surgeon: Naseem Noland MD;  Location: [facility];  Service: Urology;  Laterality: Bilateral;    TURP / TRANSURETHRAL INCISION / DRAINAGE PROSTATE      VASECTOMY         Family History: family history includes Arthritis in an other family member; Cancer in his mother and another family member; Esophageal cancer in his brother; Hypertension in his father; No Known Problems in his paternal grandfather, paternal grandmother, and sister; Stroke in his father, maternal grandfather, sister, and another family member.     Social History:  reports that he has never smoked. He has been exposed to tobacco smoke. He has never used smokeless tobacco. He reports that he does not drink alcohol and does not use drugs.  Social History     Social History Narrative    Caffeine: None       Medications:  Available home medication information reviewed.  D-Mannose, Diclofenac Sodium, Hydrocortisone (Perianal), OneTouch Delica Lancets 33G, divalproex, fluticasone, glucose blood, isosorbide mononitrate, levETIRAcetam, levothyroxine, lisinopril, metFORMIN, nitrofurantoin (macrocrystal-monohydrate), omeprazole, simvastatin, sodium chloride, tamsulosin, verapamil SR, and vitamin B-12    Allergies   Allergen Reactions    Chlorhexidine Rash    Sulfa Antibiotics Rash     Childhood reaction        Objective   Objective     Vital Signs:   Temp:  [97.7 °F (36.5 °C)] 97.7 °F (36.5 °C)  Heart Rate:  [60-77] 60  Resp:  [16] 16  BP: (136-166)/() 137/76       Physical Exam                             Constitutional: No acute distress, looks comfortable  HENT: NCAT, mucous membranes moist  Respiratory: Clear to auscultation bilaterally, respiratory effort normal   Cardiovascular: RRR, no murmurs, rubs, or gallops  Gastrointestinal: Positive bowel sounds, soft, nontender, nondistended  Musculoskeletal: trace bilateral ankle edema, left arm is in sling, painful range of motion of left  shoulder.  Psychiatric: Appropriate affect, cooperative  Neurologic: Awake, alert, oriented x 3, no focality appreciated, difficulty with memory, speech clear  Skin: No rashes          Result Review:  I have personally reviewed the results from the time of this admission to 3/2/2024 15:04 EST and agree with these findings:  [x]  Laboratory list / accordion  []  Microbiology  [x]  Radiology  []  EKG/Telemetry   []  Cardiology/Vascular   []  Pathology  [x]  Old records  []  Other:  Most notable findings include: Chest x-ray shows mildly displaced fracture of the distal left clavicle.  This study also shows minimally displaced left lateral third rib fracture.  CT scan of the chest however shows minimally displaced fractures of the left first through fourth ribs.  There is no visible pneumothorax.      LAB RESULTS:      Lab 03/02/24  1348 03/02/24  0947   WBC  --  9.72   HEMOGLOBIN  --  12.7*   HEMATOCRIT  --  38.6   PLATELETS  --  177   NEUTROS ABS  --  7.77*   IMMATURE GRANS (ABS)  --  0.06*   LYMPHS ABS  --  1.02   MONOS ABS  --  0.85   EOS ABS  --  0.00   MCV  --  93.0   CRP  --  0.68*   LACTATE 2.3* 2.6*         Lab 03/02/24  0947   SODIUM 132*   POTASSIUM 4.1   CHLORIDE 96*   CO2 25.0   ANION GAP 11.0   BUN 14   CREATININE 0.85   EGFR 88.4   GLUCOSE 116*   CALCIUM 9.6   MAGNESIUM 2.2   PHOSPHORUS 2.8   TSH 2.280         Lab 03/02/24  0947   TOTAL PROTEIN 7.5   ALBUMIN 4.1   GLOBULIN 3.4   ALT (SGPT) 9   AST (SGOT) 16   BILIRUBIN 0.5   ALK PHOS 58         Lab 03/02/24  0947   PROBNP 601.2   HSTROP T 16                 UA          2/15/2024    12:04 2/27/2024    09:53 3/2/2024    09:23   Urinalysis   Squamous Epithelial Cells, UA   0-2    Specific Gravity, UA   1.022    Ketones, UA Trace  1+  15 mg/dL (1+)    Blood, UA   Large (3+)    Leukocytes, UA Moderate (2+)  Trace  Trace    Nitrite, UA   Negative    RBC, UA   11-20    WBC, UA   3-5    Bacteria, UA   None Seen        Microbiology Results (last 10 days)        Procedure Component Value - Date/Time    COVID PRE-OP / PRE-PROCEDURE SCREENING ORDER (NO ISOLATION) - Swab, Nasopharynx [[id number]]  (Normal) Collected: 03/02/24 1133    Lab Status: Final result Specimen: Swab from Nasopharynx Updated: 03/02/24 1227    Narrative:      The following orders were created for panel order COVID PRE-OP / PRE-PROCEDURE SCREENING ORDER (NO ISOLATION) - Swab, Nasopharynx.  Procedure                               Abnormality         Status                     ---------                               -----------         ------                     COVID-19, FLU A/B, RSV P...[[id number]]  Normal              Final result                 Please view results for these tests on the individual orders.    COVID-19, FLU A/B, RSV PCR 1 HR TAT - Swab, Nasopharynx [[id number]]  (Normal) Collected: 03/02/24 1133    Lab Status: Final result Specimen: Swab from Nasopharynx Updated: 03/02/24 1227     COVID19 Not Detected     Influenza A PCR Not Detected     Influenza B PCR Not Detected     RSV, PCR Not Detected    Narrative:      Fact sheet for providers: https://www.fda.gov/media/313253/download    Fact sheet for patients: https://www.fda.gov/media/481857/download    Test performed by PCR.            XR Knee 1 or 2 View Bilateral    Result Date: 3/2/2024  XR KNEE 1 OR 2 VW BILATERAL Date of Exam: 3/2/2024 11:58 AM EST Indication: Pain after fall, dementia Comparison: Right knee radiographs dated 4/10/2023 Findings: Right knee: There is no acute fracture or dislocation. The right total knee prosthesis appears intact without evidence of periprosthetic fracture or loosening. There is a trace suprapatellar joint effusion. Bone mineralization is normal. Left knee: There is no acute fracture or dislocation. There is no joint effusion. There is tricompartmental degenerative joint disease. There is enthesopathy at the tibial tuberosity which appears similar to the prior examination.     Impression: Impression: 1.  Right total knee prosthesis without evidence of hardware complication. 2. Tricompartmental degenerative joint disease of the left knee. 3. No evidence of acute fracture Electronically Signed: Johnytonny Poon  3/2/2024 12:39 PM EST  Workstation ID: [id number]    CT Head Without Contrast    Result Date: 3/2/2024  CT HEAD WO CONTRAST, CT CHEST WO CONTRAST DIAGNOSTIC, CT CERVICAL SPINE WO CONTRAST Date of Exam: 3/2/2024 10:43 AM EST Indication: Fall with worsening confusion. Comparison: 1/5/2024. Technique: Axial CT images were obtained of the head, cervical spine and chest without contrast administration.  Automated exposure control and iterative construction methods were used. FINDINGS: CT HEAD: Gray-white differentiation is maintained and there is no evidence of intracranial hemorrhage, mass or mass effect. Age-related changes of the brain are present including volume loss and typical periventricular sequela of chronic small vessel ischemia. There is otherwise no evidence of intracranial hemorrhage, mass or mass effect. The ventricles are normal in size and configuration accounting for surrounding volume loss. The orbits are normal and the paranasal sinuses are grossly clear. CT cervical spine: Cortical margins are intact, without evidence of acute fracture. Alignment is anatomic without listhesis or subluxation. Multilevel spondylosis changes are present, with areas of disc osteophyte complex formation and facet arthropathy,  appearing most advanced at C5-6. The prevertebral soft tissues are normal. The dens is intact. CT CHEST: There is no pathologic axillary adenopathy or other worrisome body wall soft tissue finding in the chest. No acute findings are present in the partially characterized upper abdomen. There is no pleural or pericardial effusion. Mildly atherosclerotic, nonaneurysmal thoracic aorta. Evaluation of the lung fields demonstrates no evidence of acute infectious process or distinct suspicious focal  pulmonary nodularity. Evaluation of the osseous structures demonstrates mildly displaced left clavicle fracture as noted on chest radiograph. There there is a nondisplaced acute fracture of the left first rib near the costovertebral junction as well as mildly displaced fractures of the left lateral second through fourth ribs laterally. No thoracic spinal fracture is evident, with multilevel spondylosis changes present.     Impression: Age-related changes of the brain as above, otherwise without evidence of acute intracranial abnormality. No acute fracture or traumatic malalignment of the cervical spine. Acute mildly displaced fracture involving the distal left clavicle, without acromioclavicular joint involvement, in addition to nondisplaced or minimally displaced fractures of the left first through fourth ribs. There is no associated pneumothorax or other acute traumatic finding in the chest. Electronically Signed: Mega Rea MD  3/2/2024 11:12 AM EST  Workstation ID: [id number]    CT Cervical Spine Without Contrast    Result Date: 3/2/2024  CT HEAD WO CONTRAST, CT CHEST WO CONTRAST DIAGNOSTIC, CT CERVICAL SPINE WO CONTRAST Date of Exam: 3/2/2024 10:43 AM EST Indication: Fall with worsening confusion. Comparison: 1/5/2024. Technique: Axial CT images were obtained of the head, cervical spine and chest without contrast administration.  Automated exposure control and iterative construction methods were used. FINDINGS: CT HEAD: Gray-white differentiation is maintained and there is no evidence of intracranial hemorrhage, mass or mass effect. Age-related changes of the brain are present including volume loss and typical periventricular sequela of chronic small vessel ischemia. There is otherwise no evidence of intracranial hemorrhage, mass or mass effect. The ventricles are normal in size and configuration accounting for surrounding volume loss. The orbits are normal and the paranasal sinuses are grossly clear. CT  cervical spine: Cortical margins are intact, without evidence of acute fracture. Alignment is anatomic without listhesis or subluxation. Multilevel spondylosis changes are present, with areas of disc osteophyte complex formation and facet arthropathy,  appearing most advanced at C5-6. The prevertebral soft tissues are normal. The dens is intact. CT CHEST: There is no pathologic axillary adenopathy or other worrisome body wall soft tissue finding in the chest. No acute findings are present in the partially characterized upper abdomen. There is no pleural or pericardial effusion. Mildly atherosclerotic, nonaneurysmal thoracic aorta. Evaluation of the lung fields demonstrates no evidence of acute infectious process or distinct suspicious focal pulmonary nodularity. Evaluation of the osseous structures demonstrates mildly displaced left clavicle fracture as noted on chest radiograph. There there is a nondisplaced acute fracture of the left first rib near the costovertebral junction as well as mildly displaced fractures of the left lateral second through fourth ribs laterally. No thoracic spinal fracture is evident, with multilevel spondylosis changes present.     Impression: Age-related changes of the brain as above, otherwise without evidence of acute intracranial abnormality. No acute fracture or traumatic malalignment of the cervical spine. Acute mildly displaced fracture involving the distal left clavicle, without acromioclavicular joint involvement, in addition to nondisplaced or minimally displaced fractures of the left first through fourth ribs. There is no associated pneumothorax or other acute traumatic finding in the chest. Electronically Signed: Mega Rea MD  3/2/2024 11:12 AM EST  Workstation ID: [id number]    CT Chest Without Contrast Diagnostic    Result Date: 3/2/2024  CT HEAD WO CONTRAST, CT CHEST WO CONTRAST DIAGNOSTIC, CT CERVICAL SPINE WO CONTRAST Date of Exam: 3/2/2024 10:43 AM EST Indication:  Fall with worsening confusion. Comparison: 1/5/2024. Technique: Axial CT images were obtained of the head, cervical spine and chest without contrast administration.  Automated exposure control and iterative construction methods were used. FINDINGS: CT HEAD: Gray-white differentiation is maintained and there is no evidence of intracranial hemorrhage, mass or mass effect. Age-related changes of the brain are present including volume loss and typical periventricular sequela of chronic small vessel ischemia. There is otherwise no evidence of intracranial hemorrhage, mass or mass effect. The ventricles are normal in size and configuration accounting for surrounding volume loss. The orbits are normal and the paranasal sinuses are grossly clear. CT cervical spine: Cortical margins are intact, without evidence of acute fracture. Alignment is anatomic without listhesis or subluxation. Multilevel spondylosis changes are present, with areas of disc osteophyte complex formation and facet arthropathy,  appearing most advanced at C5-6. The prevertebral soft tissues are normal. The dens is intact. CT CHEST: There is no pathologic axillary adenopathy or other worrisome body wall soft tissue finding in the chest. No acute findings are present in the partially characterized upper abdomen. There is no pleural or pericardial effusion. Mildly atherosclerotic, nonaneurysmal thoracic aorta. Evaluation of the lung fields demonstrates no evidence of acute infectious process or distinct suspicious focal pulmonary nodularity. Evaluation of the osseous structures demonstrates mildly displaced left clavicle fracture as noted on chest radiograph. There there is a nondisplaced acute fracture of the left first rib near the costovertebral junction as well as mildly displaced fractures of the left lateral second through fourth ribs laterally. No thoracic spinal fracture is evident, with multilevel spondylosis changes present.     Impression:  Age-related changes of the brain as above, otherwise without evidence of acute intracranial abnormality. No acute fracture or traumatic malalignment of the cervical spine. Acute mildly displaced fracture involving the distal left clavicle, without acromioclavicular joint involvement, in addition to nondisplaced or minimally displaced fractures of the left first through fourth ribs. There is no associated pneumothorax or other acute traumatic finding in the chest. Electronically Signed: Mega Rea MD  3/2/2024 11:12 AM EST  Workstation ID: [id number]    XR Chest 1 View    Result Date: 3/2/2024  XR CHEST 1 VW Date of Exam: 3/2/2024 9:34 AM EST Indication: Weak/Dizzy/AMS triage protocol Comparison: 4/16/2022 Findings: Unremarkable cardiomediastinal silhouette. No focal airspace opacity. No pleural effusion or pneumothorax. Mildly displaced fracture of the distal left clavicle. Normal sternoclavicular and acromioclavicular joint alignment. Mild overlying soft tissue edema of the left shoulder. There is also a minimally displaced left lateral third rib fracture.     Impression: Impression: Mildly displaced fracture of the distal left clavicle. Minimally displaced left lateral third rib fracture. No visible pneumothorax. No acute cardiopulmonary abnormality.. Electronically Signed: Tirso Rankin MD  3/2/2024 9:57 AM EST  Workstation ID: [id number]     Results for orders placed during the hospital encounter of 07/20/21    Adult Transthoracic Echo Complete W/ Cont if Necessary Per Protocol    Interpretation Summary  · Left ventricular wall thickness is consistent with mild concentric hypertrophy.  · Normal left-ventricular systolic function, estimated EF 65%.  · Left ventricular diastolic function is consistent with (grade I) impaired relaxation.  · Aortic sclerosis without aortic stenosis. Trace aortic insufficiency.  · Trace mitral regurgitation, trace tricuspid regurgitation.      Assessment & Plan   Assessment & Plan        Falls    Patient is a 78-year-old male with past medical history of hypertension, hyperlipidemia, hypothyroidism, seizure disorder.  Patient is being admitted for a fall and nondisplaced first through fourth left rib fractures.  Patient also has left distal clavicle fracture.    Multiple falls with frequency of falls increasing  -Etiology not quite clear but family tells me that patient has difficulty with balance  -CT of head shows age-related changes which are chronic but no acute process  -Will ask neurology to see the patient for further evaluation    Multiple rib fractures and also left clavicle fracture  -With no displacement or mildly displaced.  Orthopedic surgery has already seen the patient.  No surgical intervention is planned.  -Left upper limb is already in sling  -Pain control    Prostate cancer  -Follows with Dr. Mayo  -S/p radiation therapy.  -Continue family patient has had hematuria and has had cystoscopy by Dr. Mayo and they were told that probably because of the prostate cancer and the therapy patient will have hematuria for a while.  We will monitor if necessary will ask urology to see patient    Dementia and increasing memory problem  -Patient still lives alone and also drives  -Mentioned above patient has had multiple falls and the frequency of falls are increasing  -Will ask neurology to see the patient for both falls and also worsening memory problems  -I have already talked to the family and have called her attention to the fact that most likely the living arrangement should be changed    Stable problems include:  Hypertension  Hyperlipidemia  Seizure disorder  Hypothyroidism  -Will continue home medication for the above        DVT prophylaxis: Heparin  Medical DVT prophylaxis orders are present.          CODE STATUS:    Code Status and Medical Interventions:   Ordered at: 03/02/24 1456     Medical Intervention Limits:    NO intubation (DNI)     Code Status (Patient has no  pulse and is not breathing):    No CPR (Do Not Attempt to Resuscitate)     Medical Interventions (Patient has pulse or is breathing):    Limited Support       Expected Discharge   Expected discharge date/ time has not been documented.  To be determined    Dawson Samayoa MD  03/02/24

## 2024-03-02 NOTE — ED NOTES
Lea Rivers    Nursing Report ED to Floor:  Mental status: confused but is not his baseline  Ambulatory status: Normally walks at home independently but is now weak and needs assistance  Oxygen Therapy:  RA  Cardiac Rhythm: SR  Admitted from: Home  Safety Concerns:  increased confusion and risk of falling. Will he need rehab post admit  Social Issues: Rehab post admission?  ED Room #:  4    ED Nurse Phone Extension - [phone] or may call [phone].      HPI:   Chief Complaint   Patient presents with    Fall       Past Medical History:  Past Medical History:   Diagnosis Date    Anemia     Colon cancer 1990    Status post partial colectomy in 1990. No chemotherapy or radiation therapy.    Coronary artery disease     Nonobstructive coronary artery disease.    Diabetes     Dyslipidemia     GERD (gastroesophageal reflux disease)     Hx of.    Hyperlipidemia     Hypertension     Hyponatremia     Hypothyroidism     Left shoulder pain     Low sodium levels 2022    recent issue; saw nephrologist who ruled out kidney issues; took Sodium Chloride po to bring levels up    Memory deficit     FROM ANESTHESIA    Osteoarthritis     Prostate cancer 07/23/2022    Seizure disorder     silent seziure-diagnosed years ago    Skin cancer         Past Surgical History:  Past Surgical History:   Procedure Laterality Date    APPENDECTOMY      CARDIAC CATHETERIZATION  2012    30 to 40% LAD    CARPAL TUNNEL RELEASE Bilateral     CHOLECYSTECTOMY      COLECTOMY PARTIAL / TOTAL  1990    Partial colectomy    COLONOSCOPY      CYBERKNIFE  02/09/2023    Prostate/SV    CYSTOSCOPY TRANSURETHRAL RESECTION OF PROSTATE N/A 09/21/2018    Procedure: CYSTOSCOPY TRANSURETHRAL RESECTION OF PROSTATE GREENLIGHT;  Surgeon: Naseem Clayton MD;  Location: [facility];  Service: Urology    OTHER SURGICAL HISTORY      Contraction surgery on bilateral hands and wrists.    PROSTATE BIOPSY N/A 11/11/2022    Procedure: TRANSRECTAL ULTRASOUND GUIDED BIOPSY OF  PROSTATE;  Surgeon: Naseem Noland MD;  Location:  [facility];  Service: Urology;  Laterality: N/A;    SINUS SURGERY      SKIN BIOPSY      TEETH EXTRACTION      TONSILLECTOMY      TOTAL KNEE ARTHROPLASTY Right 04/07/2022    Procedure: TOTAL KNEE ARTHROPLASTY WITH ORTHO ROBOT RIGHT;  Surgeon: Louis Malcolm MD;  Location:  [facility];  Service: Robotics - Ortho;  Laterality: Right;    TRANSRECTAL INCISION PROSTATE WITH GOLD SEED Bilateral 01/06/2023    Procedure: TRANSRECTAL ULTRASOUND GUIDED PROSTATE FIDUCIAL MARKER PLACEMENT;  Surgeon: Naseem Noland MD;  Location:  [facility];  Service: Urology;  Laterality: Bilateral;    TURP / TRANSURETHRAL INCISION / DRAINAGE PROSTATE      VASECTOMY          Admitting Doctor:   Dawson Samayoa MD    Consulting Provider(s):  Consults       No orders found from 2/2/2024 to 3/3/2024.             Admitting Diagnosis:   There were no encounter diagnoses.    Most Recent Vitals:   Vitals:    03/02/24 1145 03/02/24 1200 03/02/24 1230 03/02/24 1300   BP:  (!) 166/104 158/91 136/92   Pulse: 68 64 65 68   Resp:       Temp:       TempSrc:       SpO2: 99% 91% 98% 97%   Weight:       Height:           Active LDAs/IV Access:   Lines, Drains & Airways       Active LDAs       Name Placement date Placement time Site Days    Peripheral IV 03/02/24 0909 Right Antecubital 03/02/24  0909  Antecubital  less than 1                    Labs (abnormal labs have a star):   Labs Reviewed   COMPREHENSIVE METABOLIC PANEL - Abnormal; Notable for the following components:       Result Value    Glucose 116 (*)     Sodium 132 (*)     Chloride 96 (*)     All other components within normal limits    Narrative:     GFR Normal >60  Chronic Kidney Disease <60  Kidney Failure <15    The GFR formula is only valid for adults with stable renal function between ages 18 and 70.   URINALYSIS W/ MICROSCOPIC IF INDICATED (NO CULTURE) - Abnormal; Notable for the following components:    Ketones, UA 15 mg/dL (1+) (*)      Blood, UA Large (3+) (*)     Protein, UA 30 mg/dL (1+) (*)     Leuk Esterase, UA Trace (*)     All other components within normal limits   CBC WITH AUTO DIFFERENTIAL - Abnormal; Notable for the following components:    Hemoglobin 12.7 (*)     Neutrophil % 80.0 (*)     Lymphocyte % 10.5 (*)     Eosinophil % 0.0 (*)     Immature Grans % 0.6 (*)     Neutrophils, Absolute 7.77 (*)     Immature Grans, Absolute 0.06 (*)     All other components within normal limits   URINALYSIS, MICROSCOPIC ONLY - Abnormal; Notable for the following components:    RBC, UA 11-20 (*)     WBC, UA 3-5 (*)     All other components within normal limits   LACTIC ACID, PLASMA - Abnormal; Notable for the following components:    Lactate 2.6 (*)     All other components within normal limits   C-REACTIVE PROTEIN - Abnormal; Notable for the following components:    C-Reactive Protein 0.68 (*)     All other components within normal limits   COVID-19/FLUA&B/RSV, NP SWAB IN TRANSPORT MEDIA 1 HR TAT - Normal    Narrative:     Fact sheet for providers: https://www.fda.gov/media/606496/download    Fact sheet for patients: https://www.fda.gov/media/774811/download    Test performed by PCR.   SINGLE HSTROPONIN T - Normal    Narrative:     High Sensitive Troponin T Reference Range:  <14.0 ng/L- Negative Female for AMI  <22.0 ng/L- Negative Male for AMI  >=14 - Abnormal Female indicating possible myocardial injury.  >=22 - Abnormal Male indicating possible myocardial injury.   Clinicians would have to utilize clinical acumen, EKG, Troponin, and serial changes to determine if it is an Acute Myocardial Infarction or myocardial injury due to an underlying chronic condition.        MAGNESIUM - Normal   VALPROIC ACID LEVEL, TOTAL - Normal    Narrative:     Therapeutic Ranges for Valproic Acid    Epilepsy:       mcg/ml  Bipolar/Katrina  up to 125 mcg/ml     BNP (IN-HOUSE) - Normal    Narrative:     This assay is used as an aid in the diagnosis of individuals  suspected of having heart failure. It can be used as an aid in the diagnosis of acute decompensated heart failure (ADHF) in patients presenting with signs and symptoms of ADHF to the emergency department (ED). In addition, NT-proBNP of <300 pg/mL indicates ADHF is not likely.    Age Range Result Interpretation  NT-proBNP Concentration (pg/mL:      <50             Positive            >450                   Gray                 300-450                    Negative             <300    50-75           Positive            >900                  Gray                300-900                  Negative            <300      >75             Positive            >1800                  Gray                300-1800                  Negative            <300   PHOSPHORUS - Normal   TSH - Normal   T4, FREE - Normal    Narrative:     Results may be falsely increased if patient taking Biotin.     URINE DRUG SCREEN - Normal    Narrative:     Cutoff For Drugs Screened:    Amphetamines               500 ng/ml  Barbiturates               200 ng/ml  Benzodiazepines            150 ng/ml  Cocaine                    150 ng/ml  Methadone                  200 ng/ml  Opiates                    100 ng/ml  Phencyclidine               25 ng/ml  THC                         50 ng/ml  Methamphetamine            500 ng/ml  Tricyclic Antidepressants  300 ng/ml  Oxycodone                  100 ng/ml  Buprenorphine               10 ng/ml    The normal value for all drugs tested is negative. This report includes unconfirmed screening results, with the cutoff values listed, to be used for medical treatment purposes only.  Unconfirmed results must not be used for non-medical purposes such as employment or legal testing.  Clinical consideration should be applied to any drug of abuse test, particularly when unconfirmed results are used.     FENTANYL, URINE - Normal    Narrative:     Negative Threshold:      Fentanyl 5 ng/mL     The normal value for the drug tested  is negative. This report includes final unconfirmed screening results to be used for medical treatment purposes only. Unconfirmed results must not be used for non-medical purposes such as employment or legal testing. Clinical consideration should be applied to any drug of abuse test, particularly when unconfirmed results are used.          COVID PRE-OP / PRE-PROCEDURE SCREENING ORDER (NO ISOLATION)    Narrative:     The following orders were created for panel order COVID PRE-OP / PRE-PROCEDURE SCREENING ORDER (NO ISOLATION) - Swab, Nasopharynx.  Procedure                               Abnormality         Status                     ---------                               -----------         ------                     COVID-19, FLU A/B, RSV P...[[id number]]  Normal              Final result                 Please view results for these tests on the individual orders.   BLOOD CULTURE   BLOOD CULTURE   URINE CULTURE   RAINBOW DRAW    Narrative:     The following orders were created for panel order [place] Draw.  Procedure                               Abnormality         Status                     ---------                               -----------         ------                     Green Top (Gel)[[id number]]                                  Final result               Lavender Top[[id number]]                                     Final result               Gold Top - SST[[id number]]                                   Final result               Gray Top[[id number]]                                         In process                 Light Blue Top[[id number]]                                   Final result                 Please view results for these tests on the individual orders.   LACTIC ACID, REFLEX   POCT GLUCOSE FINGERSTICK   CBC AND DIFFERENTIAL    Narrative:     The following orders were created for panel order CBC & Differential.  Procedure                               Abnormality         Status                      ---------                               -----------         ------                     CBC Auto Differential[[id number]]        Abnormal            Final result                 Please view results for these tests on the individual orders.   GREEN TOP   LAVENDER TOP   GOLD TOP - SST   LIGHT BLUE TOP   GRAY TOP       Meds Given in ED:   Medications   sodium chloride 0.9 % flush 10 mL (has no administration in time range)   lactated ringers infusion (125 mL/hr Intravenous New Bag 3/2/24 1229)   ketorolac (TORADOL) injection 15 mg (15 mg Intravenous Given 3/2/24 1150)   gabapentin (NEURONTIN) capsule 300 mg (300 mg Oral Given 3/2/24 1151)   methocarbamol (ROBAXIN) tablet 750 mg (750 mg Oral Given 3/2/24 1151)   sodium chloride 0.9 % bolus 500 mL (0 mL Intravenous Stopped 3/2/24 1228)   piperacillin-tazobactam (ZOSYN) 3.375 g IVPB in 100 mL NS MBP (CD) (0 g Intravenous Stopped 3/2/24 1228)     lactated ringers, 125 mL/hr, Last Rate: 125 mL/hr (03/02/24 1229)

## 2024-03-02 NOTE — ED PROVIDER NOTES
EMERGENCY DEPARTMENT ENCOUNTER    Pt Name: Lea Rivers  MRN: [id number]  Pt :   1945  Room Number:  S572/1  Date of encounter:  3/2/2024  PCP: Mannie Melvin MD  ED Provider: Jef Chavez MD    Historian: Patient, daughters      HPI:  Chief Complaint: Fall, chest and shoulder pain        Context: Lea Rivers is a 78-year-old man who presents the emergency department with his daughters for a fall.  They report he has been having increasing falls lately at least 1 a week.  Yesterday he says he fell backwards out of a stool while eating cereal he does not know how it happened he hit his back.  He is unclear whether he hit his head or lost consciousness.  Has pain in his left shoulder.  His daughters found him to be more confused than usual he does have dementia but they say he is normally more oriented than this of brought him here for evaluation.  No other complaints at this time.      PAST MEDICAL HISTORY  Past Medical History:   Diagnosis Date    Anemia     Colon cancer     Status post partial colectomy in . No chemotherapy or radiation therapy.    Coronary artery disease     Nonobstructive coronary artery disease.    Diabetes     Dyslipidemia     GERD (gastroesophageal reflux disease)     Hx of.    Hyperlipidemia     Hypertension     Hyponatremia     Hypothyroidism     Left shoulder pain     Low sodium levels     recent issue; saw nephrologist who ruled out kidney issues; took Sodium Chloride po to bring levels up    Memory deficit     FROM ANESTHESIA    Osteoarthritis     Prostate cancer 2022    Seizure disorder     silent seziure-diagnosed years ago    Skin cancer          PAST SURGICAL HISTORY  Past Surgical History:   Procedure Laterality Date    APPENDECTOMY      CARDIAC CATHETERIZATION      30 to 40% LAD    CARPAL TUNNEL RELEASE Bilateral     CHOLECYSTECTOMY      COLECTOMY PARTIAL / TOTAL      Partial colectomy    COLONOSCOPY       CYBERKNIFE  02/09/2023    Prostate/SV    CYSTOSCOPY TRANSURETHRAL RESECTION OF PROSTATE N/A 09/21/2018    Procedure: CYSTOSCOPY TRANSURETHRAL RESECTION OF PROSTATE GREENLIGHT;  Surgeon: Naseem Clayton MD;  Location:  [facility];  Service: Urology    OTHER SURGICAL HISTORY      Contraction surgery on bilateral hands and wrists.    PROSTATE BIOPSY N/A 11/11/2022    Procedure: TRANSRECTAL ULTRASOUND GUIDED BIOPSY OF PROSTATE;  Surgeon: Naseem Noland MD;  Location:  [facility];  Service: Urology;  Laterality: N/A;    SINUS SURGERY      SKIN BIOPSY      TEETH EXTRACTION      TONSILLECTOMY      TOTAL KNEE ARTHROPLASTY Right 04/07/2022    Procedure: TOTAL KNEE ARTHROPLASTY WITH ORTHO ROBOT RIGHT;  Surgeon: Louis Malcolm MD;  Location:  [facility];  Service: Robotics - Ortho;  Laterality: Right;    TRANSRECTAL INCISION PROSTATE WITH GOLD SEED Bilateral 01/06/2023    Procedure: TRANSRECTAL ULTRASOUND GUIDED PROSTATE FIDUCIAL MARKER PLACEMENT;  Surgeon: Naseem Noland MD;  Location:  [facility];  Service: Urology;  Laterality: Bilateral;    TURP / TRANSURETHRAL INCISION / DRAINAGE PROSTATE      VASECTOMY           FAMILY HISTORY  Family History   Problem Relation Age of Onset    Cancer Mother         brain    Hypertension Father     Stroke Father     No Known Problems Sister     Stroke Sister     Esophageal cancer Brother     Stroke Maternal Grandfather     No Known Problems Paternal Grandmother     No Known Problems Paternal Grandfather     Stroke Other     Arthritis Other     Cancer Other          SOCIAL HISTORY  Social History     Socioeconomic History    Marital status:    Tobacco Use    Smoking status: Never     Passive exposure: Past    Smokeless tobacco: Never   Vaping Use    Vaping status: Never Used   Substance and Sexual Activity    Alcohol use: No    Drug use: No    Sexual activity: Not Currently         ALLERGIES  Chlorhexidine and Sulfa antibiotics        REVIEW OF SYSTEMS  Review of Systems        All systems reviewed and negative except for those discussed in HPI.       PHYSICAL EXAM    I have reviewed the triage vital signs and nursing notes.    ED Triage Vitals   Temp Heart Rate Resp BP SpO2   03/02/24 0909 03/02/24 0909 03/02/24 0909 03/02/24 0921 03/02/24 0909   97.7 °F (36.5 °C) 77 16 157/98 99 %      Temp src Heart Rate Source Patient Position BP Location FiO2 (%)   03/02/24 0909 03/02/24 0909 03/02/24 0909 03/02/24 0909 --   Oral Monitor Sitting Right arm        Physical Exam  GENERAL:   Appears pale, chronically ill-appearing  HENT: Nares patent.  Atraumatic  EYES: No scleral icterus.  Pupils equal and reactive  RESPIRATORY: Normal effort.  No audible wheezes, rales or rhonchi.  ABDOMEN: Soft, nontender  MUSCULOSKELETAL: No deformities.  Tenderness along the left upper chest  NEURO: Alert, moves all extremities, follows commands.  SKIN: Warm, dry, no rash visualized.      LAB RESULTS  Recent Results (from the past 24 hour(s))   Urinalysis With Microscopic If Indicated (No Culture) - Straight Cath    Collection Time: 03/02/24  9:23 AM    Specimen: Straight Cath; Urine   Result Value Ref Range    Color, UA Yellow Yellow, Straw    Appearance, UA Clear Clear    pH, UA 5.5 5.0 - 8.0    Specific Gravity, UA 1.022 1.001 - 1.030    Glucose, UA Negative Negative    Ketones, UA 15 mg/dL (1+) (A) Negative    Bilirubin, UA Negative Negative    Blood, UA Large (3+) (A) Negative    Protein, UA 30 mg/dL (1+) (A) Negative    Leuk Esterase, UA Trace (A) Negative    Nitrite, UA Negative Negative    Urobilinogen, UA 1.0 E.U./dL 0.2 - 1.0 E.U./dL   Urinalysis, Microscopic Only - Straight Cath    Collection Time: 03/02/24  9:23 AM    Specimen: Straight Cath; Urine   Result Value Ref Range    RBC, UA 11-20 (A) None Seen, 0-2 /HPF    WBC, UA 3-5 (A) None Seen, 0-2 /HPF    Bacteria, UA None Seen None Seen, Trace /HPF    Squamous Epithelial Cells, UA 0-2 None Seen, 0-2 /HPF    Hyaline Casts, UA 0-6 0 - 6 /LPF     Methodology Automated Microscopy    Urine Drug Screen - Straight Cath    Collection Time: 03/02/24  9:23 AM    Specimen: Straight Cath; Urine   Result Value Ref Range    THC, Screen, Urine Negative Negative    Phencyclidine (PCP), Urine Negative Negative    Cocaine Screen, Urine Negative Negative    Methamphetamine, Ur Negative Negative    Opiate Screen Negative Negative    Amphetamine Screen, Urine Negative Negative    Benzodiazepine Screen, Urine Negative Negative    Tricyclic Antidepressants Screen Negative Negative    Methadone Screen, Urine Negative Negative    Barbiturates Screen, Urine Negative Negative    Oxycodone Screen, Urine Negative Negative    Buprenorphine, Screen, Urine Negative Negative   Fentanyl, Urine - Straight Cath    Collection Time: 03/02/24  9:23 AM    Specimen: Straight Cath; Urine   Result Value Ref Range    Fentanyl, Urine Negative Negative   ECG 12 Lead ED Triage Standing Order; Weak / Dizzy / AMS    Collection Time: 03/02/24  9:27 AM   Result Value Ref Range    QT Interval 388 ms    QTC Interval 439 ms   Comprehensive Metabolic Panel    Collection Time: 03/02/24  9:47 AM    Specimen: Blood   Result Value Ref Range    Glucose 116 (H) 65 - 99 mg/dL    BUN 14 8 - 23 mg/dL    Creatinine 0.85 0.76 - 1.27 mg/dL    Sodium 132 (L) 136 - 145 mmol/L    Potassium 4.1 3.5 - 5.2 mmol/L    Chloride 96 (L) 98 - 107 mmol/L    CO2 25.0 22.0 - 29.0 mmol/L    Calcium 9.6 8.6 - 10.5 mg/dL    Total Protein 7.5 6.0 - 8.5 g/dL    Albumin 4.1 3.5 - 5.2 g/dL    ALT (SGPT) 9 1 - 41 U/L    AST (SGOT) 16 1 - 40 U/L    Alkaline Phosphatase 58 39 - 117 U/L    Total Bilirubin 0.5 0.0 - 1.2 mg/dL    Globulin 3.4 gm/dL    A/G Ratio 1.2 g/dL    BUN/Creatinine Ratio 16.5 7.0 - 25.0    Anion Gap 11.0 5.0 - 15.0 mmol/L    eGFR 88.4 >60.0 mL/min/1.73   Single High Sensitivity Troponin T    Collection Time: 03/02/24  9:47 AM    Specimen: Blood   Result Value Ref Range    HS Troponin T 16 <22 ng/L   Magnesium    Collection  Time: 03/02/24  9:47 AM    Specimen: Blood   Result Value Ref Range    Magnesium 2.2 1.6 - 2.4 mg/dL   Green Top (Gel)    Collection Time: 03/02/24  9:47 AM   Result Value Ref Range    Extra Tube Hold for add-ons.    Lavender Top    Collection Time: 03/02/24  9:47 AM   Result Value Ref Range    Extra Tube hold for add-on    Gold Top - SST    Collection Time: 03/02/24  9:47 AM   Result Value Ref Range    Extra Tube Hold for add-ons.    Gray Top    Collection Time: 03/02/24  9:47 AM   Result Value Ref Range    Extra Tube Hold for add-ons.    Light Blue Top    Collection Time: 03/02/24  9:47 AM   Result Value Ref Range    Extra Tube Hold for add-ons.    CBC Auto Differential    Collection Time: 03/02/24  9:47 AM    Specimen: Blood   Result Value Ref Range    WBC 9.72 3.40 - 10.80 10*3/mm3    RBC 4.15 4.14 - 5.80 10*6/mm3    Hemoglobin 12.7 (L) 13.0 - 17.7 g/dL    Hematocrit 38.6 37.5 - 51.0 %    MCV 93.0 79.0 - 97.0 fL    MCH 30.6 26.6 - 33.0 pg    MCHC 32.9 31.5 - 35.7 g/dL    RDW 14.0 12.3 - 15.4 %    RDW-SD 47.6 37.0 - 54.0 fl    MPV 8.9 6.0 - 12.0 fL    Platelets 177 140 - 450 10*3/mm3    Neutrophil % 80.0 (H) 42.7 - 76.0 %    Lymphocyte % 10.5 (L) 19.6 - 45.3 %    Monocyte % 8.7 5.0 - 12.0 %    Eosinophil % 0.0 (L) 0.3 - 6.2 %    Basophil % 0.2 0.0 - 1.5 %    Immature Grans % 0.6 (H) 0.0 - 0.5 %    Neutrophils, Absolute 7.77 (H) 1.70 - 7.00 10*3/mm3    Lymphocytes, Absolute 1.02 0.70 - 3.10 10*3/mm3    Monocytes, Absolute 0.85 0.10 - 0.90 10*3/mm3    Eosinophils, Absolute 0.00 0.00 - 0.40 10*3/mm3    Basophils, Absolute 0.02 0.00 - 0.20 10*3/mm3    Immature Grans, Absolute 0.06 (H) 0.00 - 0.05 10*3/mm3    nRBC 0.0 0.0 - 0.2 /100 WBC   Valproic Acid Level, Total    Collection Time: 03/02/24  9:47 AM    Specimen: Blood   Result Value Ref Range    Valproic Acid 76.9 50.0 - 125.0 mcg/mL   BNP    Collection Time: 03/02/24  9:47 AM    Specimen: Blood   Result Value Ref Range    proBNP 601.2 0.0 - 1,800.0 pg/mL   Lactic  Acid, Plasma    Collection Time: 03/02/24  9:47 AM    Specimen: Blood   Result Value Ref Range    Lactate 2.6 (C) 0.5 - 2.0 mmol/L   C-reactive Protein    Collection Time: 03/02/24  9:47 AM    Specimen: Blood   Result Value Ref Range    C-Reactive Protein 0.68 (H) 0.00 - 0.50 mg/dL   Phosphorus    Collection Time: 03/02/24  9:47 AM    Specimen: Blood   Result Value Ref Range    Phosphorus 2.8 2.5 - 4.5 mg/dL   TSH    Collection Time: 03/02/24  9:47 AM    Specimen: Blood   Result Value Ref Range    TSH 2.280 0.270 - 4.200 uIU/mL   T4, Free    Collection Time: 03/02/24  9:47 AM    Specimen: Blood   Result Value Ref Range    Free T4 1.29 0.93 - 1.70 ng/dL   COVID-19, FLU A/B, RSV PCR 1 HR TAT - Swab, Nasopharynx    Collection Time: 03/02/24 11:33 AM    Specimen: Nasopharynx; Swab   Result Value Ref Range    COVID19 Not Detected Not Detected - Ref. Range    Influenza A PCR Not Detected Not Detected    Influenza B PCR Not Detected Not Detected    RSV, PCR Not Detected Not Detected   STAT Lactic Acid, Reflex    Collection Time: 03/02/24  1:48 PM    Specimen: Blood   Result Value Ref Range    Lactate 2.3 (C) 0.5 - 2.0 mmol/L       If labs were ordered, I independently reviewed the results and considered them in treating the patient.        RADIOLOGY  XR Knee 1 or 2 View Bilateral    Result Date: 3/2/2024  XR KNEE 1 OR 2 VW BILATERAL Date of Exam: 3/2/2024 11:58 AM EST Indication: Pain after fall, dementia Comparison: Right knee radiographs dated 4/10/2023 Findings: Right knee: There is no acute fracture or dislocation. The right total knee prosthesis appears intact without evidence of periprosthetic fracture or loosening. There is a trace suprapatellar joint effusion. Bone mineralization is normal. Left knee: There is no acute fracture or dislocation. There is no joint effusion. There is tricompartmental degenerative joint disease. There is enthesopathy at the tibial tuberosity which appears similar to the prior  examination.     Impression: 1. Right total knee prosthesis without evidence of hardware complication. 2. Tricompartmental degenerative joint disease of the left knee. 3. No evidence of acute fracture Electronically Signed: Johny Chowdhuryelor  3/2/2024 12:39 PM EST  Workstation ID: [id number]    CT Head Without Contrast    Result Date: 3/2/2024  CT HEAD WO CONTRAST, CT CHEST WO CONTRAST DIAGNOSTIC, CT CERVICAL SPINE WO CONTRAST Date of Exam: 3/2/2024 10:43 AM EST Indication: Fall with worsening confusion. Comparison: 1/5/2024. Technique: Axial CT images were obtained of the head, cervical spine and chest without contrast administration.  Automated exposure control and iterative construction methods were used. FINDINGS: CT HEAD: Gray-white differentiation is maintained and there is no evidence of intracranial hemorrhage, mass or mass effect. Age-related changes of the brain are present including volume loss and typical periventricular sequela of chronic small vessel ischemia. There is otherwise no evidence of intracranial hemorrhage, mass or mass effect. The ventricles are normal in size and configuration accounting for surrounding volume loss. The orbits are normal and the paranasal sinuses are grossly clear. CT cervical spine: Cortical margins are intact, without evidence of acute fracture. Alignment is anatomic without listhesis or subluxation. Multilevel spondylosis changes are present, with areas of disc osteophyte complex formation and facet arthropathy,  appearing most advanced at C5-6. The prevertebral soft tissues are normal. The dens is intact. CT CHEST: There is no pathologic axillary adenopathy or other worrisome body wall soft tissue finding in the chest. No acute findings are present in the partially characterized upper abdomen. There is no pleural or pericardial effusion. Mildly atherosclerotic, nonaneurysmal thoracic aorta. Evaluation of the lung fields demonstrates no evidence of acute infectious process  or distinct suspicious focal pulmonary nodularity. Evaluation of the osseous structures demonstrates mildly displaced left clavicle fracture as noted on chest radiograph. There there is a nondisplaced acute fracture of the left first rib near the costovertebral junction as well as mildly displaced fractures of the left lateral second through fourth ribs laterally. No thoracic spinal fracture is evident, with multilevel spondylosis changes present.     Age-related changes of the brain as above, otherwise without evidence of acute intracranial abnormality. No acute fracture or traumatic malalignment of the cervical spine. Acute mildly displaced fracture involving the distal left clavicle, without acromioclavicular joint involvement, in addition to nondisplaced or minimally displaced fractures of the left first through fourth ribs. There is no associated pneumothorax or other acute traumatic finding in the chest. Electronically Signed: Mega Rea MD  3/2/2024 11:12 AM EST  Workstation ID: [id number]    CT Cervical Spine Without Contrast    Result Date: 3/2/2024  CT HEAD WO CONTRAST, CT CHEST WO CONTRAST DIAGNOSTIC, CT CERVICAL SPINE WO CONTRAST Date of Exam: 3/2/2024 10:43 AM EST Indication: Fall with worsening confusion. Comparison: 1/5/2024. Technique: Axial CT images were obtained of the head, cervical spine and chest without contrast administration.  Automated exposure control and iterative construction methods were used. FINDINGS: CT HEAD: Gray-white differentiation is maintained and there is no evidence of intracranial hemorrhage, mass or mass effect. Age-related changes of the brain are present including volume loss and typical periventricular sequela of chronic small vessel ischemia. There is otherwise no evidence of intracranial hemorrhage, mass or mass effect. The ventricles are normal in size and configuration accounting for surrounding volume loss. The orbits are normal and the paranasal sinuses are  grossly clear. CT cervical spine: Cortical margins are intact, without evidence of acute fracture. Alignment is anatomic without listhesis or subluxation. Multilevel spondylosis changes are present, with areas of disc osteophyte complex formation and facet arthropathy,  appearing most advanced at C5-6. The prevertebral soft tissues are normal. The dens is intact. CT CHEST: There is no pathologic axillary adenopathy or other worrisome body wall soft tissue finding in the chest. No acute findings are present in the partially characterized upper abdomen. There is no pleural or pericardial effusion. Mildly atherosclerotic, nonaneurysmal thoracic aorta. Evaluation of the lung fields demonstrates no evidence of acute infectious process or distinct suspicious focal pulmonary nodularity. Evaluation of the osseous structures demonstrates mildly displaced left clavicle fracture as noted on chest radiograph. There there is a nondisplaced acute fracture of the left first rib near the costovertebral junction as well as mildly displaced fractures of the left lateral second through fourth ribs laterally. No thoracic spinal fracture is evident, with multilevel spondylosis changes present.     Age-related changes of the brain as above, otherwise without evidence of acute intracranial abnormality. No acute fracture or traumatic malalignment of the cervical spine. Acute mildly displaced fracture involving the distal left clavicle, without acromioclavicular joint involvement, in addition to nondisplaced or minimally displaced fractures of the left first through fourth ribs. There is no associated pneumothorax or other acute traumatic finding in the chest. Electronically Signed: Mega Rea MD  3/2/2024 11:12 AM EST  Workstation ID: [id number]    CT Chest Without Contrast Diagnostic    Result Date: 3/2/2024  CT HEAD WO CONTRAST, CT CHEST WO CONTRAST DIAGNOSTIC, CT CERVICAL SPINE WO CONTRAST Date of Exam: 3/2/2024 10:43 AM EST  Indication: Fall with worsening confusion. Comparison: 1/5/2024. Technique: Axial CT images were obtained of the head, cervical spine and chest without contrast administration.  Automated exposure control and iterative construction methods were used. FINDINGS: CT HEAD: Gray-white differentiation is maintained and there is no evidence of intracranial hemorrhage, mass or mass effect. Age-related changes of the brain are present including volume loss and typical periventricular sequela of chronic small vessel ischemia. There is otherwise no evidence of intracranial hemorrhage, mass or mass effect. The ventricles are normal in size and configuration accounting for surrounding volume loss. The orbits are normal and the paranasal sinuses are grossly clear. CT cervical spine: Cortical margins are intact, without evidence of acute fracture. Alignment is anatomic without listhesis or subluxation. Multilevel spondylosis changes are present, with areas of disc osteophyte complex formation and facet arthropathy,  appearing most advanced at C5-6. The prevertebral soft tissues are normal. The dens is intact. CT CHEST: There is no pathologic axillary adenopathy or other worrisome body wall soft tissue finding in the chest. No acute findings are present in the partially characterized upper abdomen. There is no pleural or pericardial effusion. Mildly atherosclerotic, nonaneurysmal thoracic aorta. Evaluation of the lung fields demonstrates no evidence of acute infectious process or distinct suspicious focal pulmonary nodularity. Evaluation of the osseous structures demonstrates mildly displaced left clavicle fracture as noted on chest radiograph. There there is a nondisplaced acute fracture of the left first rib near the costovertebral junction as well as mildly displaced fractures of the left lateral second through fourth ribs laterally. No thoracic spinal fracture is evident, with multilevel spondylosis changes present.      Age-related changes of the brain as above, otherwise without evidence of acute intracranial abnormality. No acute fracture or traumatic malalignment of the cervical spine. Acute mildly displaced fracture involving the distal left clavicle, without acromioclavicular joint involvement, in addition to nondisplaced or minimally displaced fractures of the left first through fourth ribs. There is no associated pneumothorax or other acute traumatic finding in the chest. Electronically Signed: Mega Rea MD  3/2/2024 11:12 AM EST  Workstation ID: [id number]    XR Chest 1 View    Result Date: 3/2/2024  XR CHEST 1 VW Date of Exam: 3/2/2024 9:34 AM EST Indication: Weak/Dizzy/AMS triage protocol Comparison: 4/16/2022 Findings: Unremarkable cardiomediastinal silhouette. No focal airspace opacity. No pleural effusion or pneumothorax. Mildly displaced fracture of the distal left clavicle. Normal sternoclavicular and acromioclavicular joint alignment. Mild overlying soft tissue edema of the left shoulder. There is also a minimally displaced left lateral third rib fracture.     Impression: Mildly displaced fracture of the distal left clavicle. Minimally displaced left lateral third rib fracture. No visible pneumothorax. No acute cardiopulmonary abnormality.. Electronically Signed: Tirso Rankin MD  3/2/2024 9:57 AM EST  Workstation ID: [id number]     I ordered and independently reviewed the above noted radiographic studies.      I viewed images of chest x-ray which showed distal left clavicle fracture per my evaluation.  Formal overread also notes nondisplaced rib fracture.  CT scan of the chest which shows clavicle fracture in better detail as well as left-sided first through fourth rib fractures.  CT scan of the head which does not show any acute bleeds masses or other abnormalities that I can appreciate.  X-rays the bilateral knees which does not show any acute fracture or dislocation.  See radiologist's dictation for official  interpretation.        PROCEDURES    Procedures    ECG 12 Lead ED Triage Standing Order; Weak / Dizzy / AMS   Preliminary Result   Test Reason : ED Triage Standing Order~   Blood Pressure :   */*   mmHG   Vent. Rate :  77 BPM     Atrial Rate :  77 BPM      P-R Int : 148 ms          QRS Dur :  84 ms       QT Int : 388 ms       P-R-T Axes :  84  43  62 degrees      QTc Int : 439 ms      Normal sinus rhythm   Normal ECG   When compared with ECG of 07-NOV-2022 14:48,   Nonspecific T wave abnormality no longer evident in Lateral leads      Referred By: ED MD           Confirmed By:           MEDICATIONS GIVEN IN ER    Medications   sodium chloride 0.9 % flush 10 mL (has no administration in time range)   lactated ringers infusion (125 mL/hr Intravenous New Bag 3/2/24 1229)   sodium chloride 0.9 % flush 10 mL (has no administration in time range)   sodium chloride 0.9 % flush 10 mL (has no administration in time range)   sodium chloride 0.9 % infusion 40 mL (has no administration in time range)   heparin (porcine) 5000 UNIT/ML injection 5,000 Units (has no administration in time range)   Potassium Replacement - Follow Nurse / BPA Driven Protocol (has no administration in time range)   Magnesium Standard Dose Replacement - Follow Nurse / BPA Driven Protocol (has no administration in time range)   Phosphorus Replacement - Follow Nurse / BPA Driven Protocol (has no administration in time range)   Calcium Replacement - Follow Nurse / BPA Driven Protocol (has no administration in time range)   acetaminophen (TYLENOL) tablet 650 mg (has no administration in time range)     Or   acetaminophen (TYLENOL) 160 MG/5ML oral solution 650 mg (has no administration in time range)     Or   acetaminophen (TYLENOL) suppository 650 mg (has no administration in time range)   traMADol (ULTRAM) tablet 50 mg (has no administration in time range)   ondansetron ODT (ZOFRAN-ODT) disintegrating tablet 4 mg (has no administration in time range)      Or   ondansetron (ZOFRAN) injection 4 mg (has no administration in time range)   calcium carbonate (TUMS) chewable tablet 500 mg (200 mg elemental) (has no administration in time range)   ketorolac (TORADOL) injection 15 mg (15 mg Intravenous Given 3/2/24 1150)   gabapentin (NEURONTIN) capsule 300 mg (300 mg Oral Given 3/2/24 1151)   methocarbamol (ROBAXIN) tablet 750 mg (750 mg Oral Given 3/2/24 1151)   sodium chloride 0.9 % bolus 500 mL (0 mL Intravenous Stopped 3/2/24 1228)   piperacillin-tazobactam (ZOSYN) 3.375 g IVPB in 100 mL NS MBP (CD) (0 g Intravenous Stopped 3/2/24 1228)         MEDICAL DECISION MAKING, PROGRESS, and CONSULTS    All labs, if obtained, have been independently reviewed by me.  All radiology studies, if obtained, have been reviewed by me and the radiologist dictating the report.  All EKG's, if obtained, have been independently viewed and interpreted by me/my attending physician.      Discussion below represents my analysis of pertinent findings related to patient's condition, differential diagnosis, treatment plan and final disposition.                         Differential diagnosis:    Intracranial hemorrhage, skull fracture, spinal injury, chest injury, rib fracture, pneumothorax, extremity injury, stroke, sepsis, anemia, electrolyte abnormality      Additional sources:    - Discussed/ obtained information from independent historians: Daughter's    - External (non-ED) record review:  Chart review shows numerous outpatient notes with Dr. Noland for prostate cancer, also has history of CAD, hypothyroidism, hypertension, dementia, seizures    - Chronic or social conditions impacting care: Dementia, prostate cancer, CAD, hypothyroidism, hypertension, seizures    - Shared decision making: [place] to hospital admission      Orders placed during this visit:  Orders Placed This Encounter   Procedures    COVID PRE-OP / PRE-PROCEDURE SCREENING ORDER (NO ISOLATION) - Swab, Nasopharynx    Blood  Culture - Blood,    Blood Culture - Blood,    COVID-19, FLU A/B, RSV PCR 1 HR TAT - Swab, Nasopharynx    Urine Culture - Urine,    XR Chest 1 View    CT Head Without Contrast    CT Cervical Spine Without Contrast    CT Chest Without Contrast Diagnostic    XR Knee 1 or 2 View Bilateral    [place] Draw    Comprehensive Metabolic Panel    Single High Sensitivity Troponin T    Magnesium    Urinalysis With Microscopic If Indicated (No Culture) - Straight Cath    CBC Auto Differential    Urinalysis, Microscopic Only - Urine, Clean Catch    Valproic Acid Level, Total    BNP    Lactic Acid, Plasma    C-reactive Protein    Phosphorus    TSH    T4, Free    STAT Lactic Acid, Reflex    Urine Drug Screen - Straight Cath    Fentanyl, Urine - Urine, Clean Catch    STAT Lactic Acid, Reflex    Diet: Cardiac; Healthy Heart (2-3 Na+); Fluid Consistency: Thin (IDDSI 0)    Undress & Gown    Continuous Pulse Oximetry    Vital Signs    Obtain & Apply The Following- Upper extremity; Sling    Vital Signs    Intake & Output    Weigh Patient    Oral Care    Saline Lock & Maintain IV Access    Activity - Ad Naomy    Code Status and Medical Interventions:    Oxygen Therapy- Nasal Cannula; Titrate 1-6 LPM Per SpO2; 90 - 95%    Incentive Spirometry    Incentive Spirometry    POC Glucose Once    ECG 12 Lead ED Triage Standing Order; Weak / Dizzy / AMS    Insert Peripheral IV    Insert Peripheral IV    Initiate Observation Status    ED Bed Request    Fall Precautions    CBC & Differential    Green Top (Gel)    Lavender Top    Gold Top - SST    Gray Top    Light Blue Top         Additional orders considered but not ordered:      ED Course:    Consultants: Orthopedic surgery    ED Course as of 03/02/24 1535   Sat Mar 02, 2024   1023 Chart review shows numerous outpatient notes with Dr. Noland for prostate cancer, also has history of CAD, hypothyroidism, hypertension, dementia, seizures [CC]   1026 In summary this is a 78-year-old man who presents  the emergency department with his daughters for a fall.  They report he has been having increasing falls lately at least 1 a week.  Yesterday he says he fell backwards out of a stool while eating cereal he does not know how it happened he hit his back.  He is unclear whether he hit his head or lost consciousness.  Has pain in his left shoulder.  His daughters found him to be more confused than usual he does have dementia but they say he is normally more oriented than this of brought him here for evaluation.  No other complaints at this time. [CC]   1533 He arrived awake and alert but acute on chronically ill-appearing.  Tenderness over the left clavicle and anterior chest.  No focal deficits but he is confused to our conversation daughters reporting this is an exacerbation from his baseline dementia.  Because of this obtain CT scan of the head and C-spine which fortunately did not show any acute pathology.  Chest x-ray shows clavicle fracture and left rib fracture add on CT scan of the chest when getting the head and C-spine and actually shows left-sided first through fourth rib fractures.  Placed in sling for the clavicle fracture discussed with Dr. Maldonado with orthopedic surgery.  Have ordered incentive spirometry and pain medication he will need hospitalization for pain control for his multiple rib fractures.  Urinalysis consistent with urinary tract infection treated with Zosyn.  Mildly elevated lactate.  Mild hyponatremia and hypochloremia but otherwise for the most part labs generally reassuring and nonactionable.  At this point I think he needs hospitalization for pain control and continued respiratory therapy and incentive spirometry for multiple rib fractures as well as workup for confusion and increasing falls will need PT OT evaluation possible placement.  Medicine team consulted for admission. [CC]      ED Course User Index  [CC] Jef Chavez MD              Shared Decision Making:  After my  consideration of clinical presentation and any laboratory/radiology studies obtained, I discussed the findings with the patient/patient representative who is in agreement with the treatment plan and the final disposition.   Risks and benefits of discharge and/or observation/admission were discussed.       AS OF 15:35 EST VITALS:    BP - 137/76  HR - 60  TEMP - 97.7 °F (36.5 °C) (Oral)  O2 SATS - 97%                  DIAGNOSIS  Final diagnoses:   Fall, initial encounter   Closed fracture of multiple ribs of left side, initial encounter   Closed displaced fracture of acromial end of left clavicle, initial encounter   Falls frequently   Acute UTI (urinary tract infection)         DISPOSITION  Admit      Please note that portions of this document were completed with voice recognition software.        Jef Chavez MD  03/02/24 4949

## 2024-03-02 NOTE — Clinical Note
Level of Care: Telemetry [5]   Diagnosis: Falls [[id number]]   Admitting Physician: MOIRA FLORES [[id number]]

## 2024-03-03 LAB
BACTERIA SPEC AEROBE CULT: NO GROWTH
D-LACTATE SERPL-SCNC: 1.6 MMOL/L (ref 0.5–2)
FOLATE SERPL-MCNC: 2.32 NG/ML (ref 4.78–24.2)
GLUCOSE BLDC GLUCOMTR-MCNC: 110 MG/DL (ref 70–130)
GLUCOSE BLDC GLUCOMTR-MCNC: 111 MG/DL (ref 70–130)
GLUCOSE BLDC GLUCOMTR-MCNC: 114 MG/DL (ref 70–130)
GLUCOSE BLDC GLUCOMTR-MCNC: 76 MG/DL (ref 70–130)
VIT B12 BLD-MCNC: 807 PG/ML (ref 211–946)

## 2024-03-03 PROCEDURE — 99223 1ST HOSP IP/OBS HIGH 75: CPT | Performed by: STUDENT IN AN ORGANIZED HEALTH CARE EDUCATION/TRAINING PROGRAM

## 2024-03-03 PROCEDURE — 25810000003 LACTATED RINGERS PER 1000 ML: Performed by: INTERNAL MEDICINE

## 2024-03-03 PROCEDURE — 82607 VITAMIN B-12: CPT | Performed by: STUDENT IN AN ORGANIZED HEALTH CARE EDUCATION/TRAINING PROGRAM

## 2024-03-03 PROCEDURE — 82746 ASSAY OF FOLIC ACID SERUM: CPT | Performed by: STUDENT IN AN ORGANIZED HEALTH CARE EDUCATION/TRAINING PROGRAM

## 2024-03-03 PROCEDURE — 83605 ASSAY OF LACTIC ACID: CPT | Performed by: STUDENT IN AN ORGANIZED HEALTH CARE EDUCATION/TRAINING PROGRAM

## 2024-03-03 PROCEDURE — 99232 SBSQ HOSP IP/OBS MODERATE 35: CPT | Performed by: INTERNAL MEDICINE

## 2024-03-03 PROCEDURE — G0378 HOSPITAL OBSERVATION PER HR: HCPCS

## 2024-03-03 PROCEDURE — 99222 1ST HOSP IP/OBS MODERATE 55: CPT | Performed by: ORTHOPAEDIC SURGERY

## 2024-03-03 PROCEDURE — 25010000002 HEPARIN (PORCINE) PER 1000 UNITS: Performed by: INTERNAL MEDICINE

## 2024-03-03 PROCEDURE — 82948 REAGENT STRIP/BLOOD GLUCOSE: CPT

## 2024-03-03 RX ORDER — LISINOPRIL 20 MG/1
20 TABLET ORAL EVERY 12 HOURS SCHEDULED
Status: DISCONTINUED | OUTPATIENT
Start: 2024-03-03 | End: 2024-03-04

## 2024-03-03 RX ADMIN — LISINOPRIL 20 MG: 20 TABLET ORAL at 08:33

## 2024-03-03 RX ADMIN — ATORVASTATIN CALCIUM 10 MG: 10 TABLET, FILM COATED ORAL at 20:47

## 2024-03-03 RX ADMIN — TRAMADOL HYDROCHLORIDE 50 MG: 50 TABLET ORAL at 05:06

## 2024-03-03 RX ADMIN — TRAMADOL HYDROCHLORIDE 50 MG: 50 TABLET ORAL at 20:46

## 2024-03-03 RX ADMIN — SODIUM CHLORIDE, POTASSIUM CHLORIDE, SODIUM LACTATE AND CALCIUM CHLORIDE 125 ML/HR: 600; 310; 30; 20 INJECTION, SOLUTION INTRAVENOUS at 01:02

## 2024-03-03 RX ADMIN — LISINOPRIL 20 MG: 20 TABLET ORAL at 20:47

## 2024-03-03 RX ADMIN — HEPARIN SODIUM 5000 UNITS: 5000 INJECTION INTRAVENOUS; SUBCUTANEOUS at 20:46

## 2024-03-03 RX ADMIN — PANTOPRAZOLE SODIUM 40 MG: 40 TABLET, DELAYED RELEASE ORAL at 05:06

## 2024-03-03 RX ADMIN — Medication 10 ML: at 08:33

## 2024-03-03 RX ADMIN — Medication 1000 MCG: at 08:32

## 2024-03-03 RX ADMIN — ISOSORBIDE MONONITRATE 30 MG: 30 TABLET, EXTENDED RELEASE ORAL at 08:32

## 2024-03-03 RX ADMIN — LEVETIRACETAM 500 MG: 500 TABLET, FILM COATED ORAL at 08:32

## 2024-03-03 RX ADMIN — SODIUM CHLORIDE, POTASSIUM CHLORIDE, SODIUM LACTATE AND CALCIUM CHLORIDE 125 ML/HR: 600; 310; 30; 20 INJECTION, SOLUTION INTRAVENOUS at 16:39

## 2024-03-03 RX ADMIN — DIVALPROEX SODIUM 500 MG: 250 TABLET, FILM COATED, EXTENDED RELEASE ORAL at 09:11

## 2024-03-03 RX ADMIN — ACETAMINOPHEN 650 MG: 325 TABLET ORAL at 10:14

## 2024-03-03 RX ADMIN — ISOSORBIDE MONONITRATE 30 MG: 30 TABLET, EXTENDED RELEASE ORAL at 18:53

## 2024-03-03 RX ADMIN — LEVOTHYROXINE SODIUM 88 MCG: 0.09 TABLET ORAL at 05:06

## 2024-03-03 RX ADMIN — VERAPAMIL HYDROCHLORIDE 240 MG: 240 TABLET, FILM COATED, EXTENDED RELEASE ORAL at 09:11

## 2024-03-03 RX ADMIN — TAMSULOSIN HYDROCHLORIDE 0.4 MG: 0.4 CAPSULE ORAL at 08:32

## 2024-03-03 RX ADMIN — TRAMADOL HYDROCHLORIDE 50 MG: 50 TABLET ORAL at 13:58

## 2024-03-03 RX ADMIN — LEVETIRACETAM 500 MG: 500 TABLET, FILM COATED ORAL at 20:46

## 2024-03-03 RX ADMIN — SODIUM CHLORIDE, POTASSIUM CHLORIDE, SODIUM LACTATE AND CALCIUM CHLORIDE 125 ML/HR: 600; 310; 30; 20 INJECTION, SOLUTION INTRAVENOUS at 09:11

## 2024-03-03 RX ADMIN — HEPARIN SODIUM 5000 UNITS: 5000 INJECTION INTRAVENOUS; SUBCUTANEOUS at 08:33

## 2024-03-03 RX ADMIN — Medication 10 ML: at 20:47

## 2024-03-03 NOTE — PROGRESS NOTES
"    [facility] Medicine Services  PROGRESS NOTE    Patient Name: Lea Rivers  : 1945  MRN: [id number]    Date of Admission: 3/2/2024  Primary Care Physician: Mannie Melvin MD    Subjective   Subjective     CC:  S/p fall, rib fractures    HPI:  States that he's doing \"pretty fair.\"  C/o that sore in ribs area.  Son at bedside who states that likely needs placement, patient not doing well at home.  States that he's still been driving.      Objective   Objective     Vital Signs:   Temp:  [97.1 °F (36.2 °C)-97.7 °F (36.5 °C)] 97.5 °F (36.4 °C)  Heart Rate:  [60-85] 85  Resp:  [16-18] 18  BP: (135-194)/() 140/104     Physical Exam:  Constitutional: No acute distress, awake, alert  HENT: NCAT, mucous membranes moist  Respiratory: Clear to auscultation bilaterally, respiratory effort normal   Cardiovascular: RRR, no murmurs, rubs, or gallops  Gastrointestinal: Positive bowel sounds, soft, nontender, nondistended  Musculoskeletal: No bilateral ankle edema, LUE in sling  Psychiatric: Appropriate affect, cooperative  Neurologic: Oriented x 3, strength symmetric in all extremities, Cranial Nerves grossly intact to confrontation, speech clear  Skin: No rashes      Results Reviewed:  LAB RESULTS:      Lab 24  0301 24  1602 24  1348 24  0947   WBC  --   --   --   --  9.72   HEMOGLOBIN  --   --   --   --  12.7*   HEMATOCRIT  --   --   --   --  38.6   PLATELETS  --   --   --   --  177   NEUTROS ABS  --   --   --   --  7.77*   IMMATURE GRANS (ABS)  --   --   --   --  0.06*   LYMPHS ABS  --   --   --   --  1.02   MONOS ABS  --   --   --   --  0.85   EOS ABS  --   --   --   --  0.00   MCV  --   --   --   --  93.0   CRP  --   --   --   --  0.68*   LACTATE 1.6 2.8* 2.8* 2.3* 2.6*         Lab 24  0947   SODIUM 132*   POTASSIUM 4.1   CHLORIDE 96*   CO2 25.0   ANION GAP 11.0   BUN 14   CREATININE 0.85   EGFR 88.4   GLUCOSE 116*   CALCIUM " 9.6   MAGNESIUM 2.2   PHOSPHORUS 2.8   HEMOGLOBIN A1C 5.50   TSH 2.280         Lab 03/02/24  0947   TOTAL PROTEIN 7.5   ALBUMIN 4.1   GLOBULIN 3.4   ALT (SGPT) 9   AST (SGOT) 16   BILIRUBIN 0.5   ALK PHOS 58         Lab 03/02/24  0947   PROBNP 601.2   HSTROP T 16                 Brief Urine Lab Results  (Last result in the past 365 days)        Color   Clarity   Blood   Leuk Est   Nitrite   Protein   CREAT   Urine HCG        03/02/24 0923 Yellow   Clear   Large (3+)   Trace   Negative   30 mg/dL (1+)                   Microbiology Results Abnormal       Procedure Component Value - Date/Time    COVID PRE-OP / PRE-PROCEDURE SCREENING ORDER (NO ISOLATION) - Swab, Nasopharynx [[id number]]  (Normal) Collected: 03/02/24 1133    Lab Status: Final result Specimen: Swab from Nasopharynx Updated: 03/02/24 1227    Narrative:      The following orders were created for panel order COVID PRE-OP / PRE-PROCEDURE SCREENING ORDER (NO ISOLATION) - Swab, Nasopharynx.  Procedure                               Abnormality         Status                     ---------                               -----------         ------                     COVID-19, FLU A/B, RSV P...[[id number]]  Normal              Final result                 Please view results for these tests on the individual orders.    COVID-19, FLU A/B, RSV PCR 1 HR TAT - Swab, Nasopharynx [[id number]]  (Normal) Collected: 03/02/24 1133    Lab Status: Final result Specimen: Swab from Nasopharynx Updated: 03/02/24 1227     COVID19 Not Detected     Influenza A PCR Not Detected     Influenza B PCR Not Detected     RSV, PCR Not Detected    Narrative:      Fact sheet for providers: https://www.fda.gov/media/556893/download    Fact sheet for patients: https://www.fda.gov/media/427262/download    Test performed by PCR.            XR Knee 1 or 2 View Bilateral    Result Date: 3/2/2024  XR KNEE 1 OR 2 VW BILATERAL Date of Exam: 3/2/2024 11:58 AM EST Indication: Pain after fall, dementia  Comparison: Right knee radiographs dated 4/10/2023 Findings: Right knee: There is no acute fracture or dislocation. The right total knee prosthesis appears intact without evidence of periprosthetic fracture or loosening. There is a trace suprapatellar joint effusion. Bone mineralization is normal. Left knee: There is no acute fracture or dislocation. There is no joint effusion. There is tricompartmental degenerative joint disease. There is enthesopathy at the tibial tuberosity which appears similar to the prior examination.     Impression: Impression: 1. Right total knee prosthesis without evidence of hardware complication. 2. Tricompartmental degenerative joint disease of the left knee. 3. No evidence of acute fracture Electronically Signed: Johny Chowdhuryelor  3/2/2024 12:39 PM EST  Workstation ID: [id number]    CT Head Without Contrast    Result Date: 3/2/2024  CT HEAD WO CONTRAST, CT CHEST WO CONTRAST DIAGNOSTIC, CT CERVICAL SPINE WO CONTRAST Date of Exam: 3/2/2024 10:43 AM EST Indication: Fall with worsening confusion. Comparison: 1/5/2024. Technique: Axial CT images were obtained of the head, cervical spine and chest without contrast administration.  Automated exposure control and iterative construction methods were used. FINDINGS: CT HEAD: Gray-white differentiation is maintained and there is no evidence of intracranial hemorrhage, mass or mass effect. Age-related changes of the brain are present including volume loss and typical periventricular sequela of chronic small vessel ischemia. There is otherwise no evidence of intracranial hemorrhage, mass or mass effect. The ventricles are normal in size and configuration accounting for surrounding volume loss. The orbits are normal and the paranasal sinuses are grossly clear. CT cervical spine: Cortical margins are intact, without evidence of acute fracture. Alignment is anatomic without listhesis or subluxation. Multilevel spondylosis changes are present, with areas  of disc osteophyte complex formation and facet arthropathy,  appearing most advanced at C5-6. The prevertebral soft tissues are normal. The dens is intact. CT CHEST: There is no pathologic axillary adenopathy or other worrisome body wall soft tissue finding in the chest. No acute findings are present in the partially characterized upper abdomen. There is no pleural or pericardial effusion. Mildly atherosclerotic, nonaneurysmal thoracic aorta. Evaluation of the lung fields demonstrates no evidence of acute infectious process or distinct suspicious focal pulmonary nodularity. Evaluation of the osseous structures demonstrates mildly displaced left clavicle fracture as noted on chest radiograph. There there is a nondisplaced acute fracture of the left first rib near the costovertebral junction as well as mildly displaced fractures of the left lateral second through fourth ribs laterally. No thoracic spinal fracture is evident, with multilevel spondylosis changes present.     Impression: Age-related changes of the brain as above, otherwise without evidence of acute intracranial abnormality. No acute fracture or traumatic malalignment of the cervical spine. Acute mildly displaced fracture involving the distal left clavicle, without acromioclavicular joint involvement, in addition to nondisplaced or minimally displaced fractures of the left first through fourth ribs. There is no associated pneumothorax or other acute traumatic finding in the chest. Electronically Signed: Mega Rea MD  3/2/2024 11:12 AM EST  Workstation ID: [id number]    CT Cervical Spine Without Contrast    Result Date: 3/2/2024  CT HEAD WO CONTRAST, CT CHEST WO CONTRAST DIAGNOSTIC, CT CERVICAL SPINE WO CONTRAST Date of Exam: 3/2/2024 10:43 AM EST Indication: Fall with worsening confusion. Comparison: 1/5/2024. Technique: Axial CT images were obtained of the head, cervical spine and chest without contrast administration.  Automated exposure control and  iterative construction methods were used. FINDINGS: CT HEAD: Gray-white differentiation is maintained and there is no evidence of intracranial hemorrhage, mass or mass effect. Age-related changes of the brain are present including volume loss and typical periventricular sequela of chronic small vessel ischemia. There is otherwise no evidence of intracranial hemorrhage, mass or mass effect. The ventricles are normal in size and configuration accounting for surrounding volume loss. The orbits are normal and the paranasal sinuses are grossly clear. CT cervical spine: Cortical margins are intact, without evidence of acute fracture. Alignment is anatomic without listhesis or subluxation. Multilevel spondylosis changes are present, with areas of disc osteophyte complex formation and facet arthropathy,  appearing most advanced at C5-6. The prevertebral soft tissues are normal. The dens is intact. CT CHEST: There is no pathologic axillary adenopathy or other worrisome body wall soft tissue finding in the chest. No acute findings are present in the partially characterized upper abdomen. There is no pleural or pericardial effusion. Mildly atherosclerotic, nonaneurysmal thoracic aorta. Evaluation of the lung fields demonstrates no evidence of acute infectious process or distinct suspicious focal pulmonary nodularity. Evaluation of the osseous structures demonstrates mildly displaced left clavicle fracture as noted on chest radiograph. There there is a nondisplaced acute fracture of the left first rib near the costovertebral junction as well as mildly displaced fractures of the left lateral second through fourth ribs laterally. No thoracic spinal fracture is evident, with multilevel spondylosis changes present.     Impression: Age-related changes of the brain as above, otherwise without evidence of acute intracranial abnormality. No acute fracture or traumatic malalignment of the cervical spine. Acute mildly displaced fracture  involving the distal left clavicle, without acromioclavicular joint involvement, in addition to nondisplaced or minimally displaced fractures of the left first through fourth ribs. There is no associated pneumothorax or other acute traumatic finding in the chest. Electronically Signed: Mega Rea MD  3/2/2024 11:12 AM EST  Workstation ID: [id number]    CT Chest Without Contrast Diagnostic    Result Date: 3/2/2024  CT HEAD WO CONTRAST, CT CHEST WO CONTRAST DIAGNOSTIC, CT CERVICAL SPINE WO CONTRAST Date of Exam: 3/2/2024 10:43 AM EST Indication: Fall with worsening confusion. Comparison: 1/5/2024. Technique: Axial CT images were obtained of the head, cervical spine and chest without contrast administration.  Automated exposure control and iterative construction methods were used. FINDINGS: CT HEAD: Gray-white differentiation is maintained and there is no evidence of intracranial hemorrhage, mass or mass effect. Age-related changes of the brain are present including volume loss and typical periventricular sequela of chronic small vessel ischemia. There is otherwise no evidence of intracranial hemorrhage, mass or mass effect. The ventricles are normal in size and configuration accounting for surrounding volume loss. The orbits are normal and the paranasal sinuses are grossly clear. CT cervical spine: Cortical margins are intact, without evidence of acute fracture. Alignment is anatomic without listhesis or subluxation. Multilevel spondylosis changes are present, with areas of disc osteophyte complex formation and facet arthropathy,  appearing most advanced at C5-6. The prevertebral soft tissues are normal. The dens is intact. CT CHEST: There is no pathologic axillary adenopathy or other worrisome body wall soft tissue finding in the chest. No acute findings are present in the partially characterized upper abdomen. There is no pleural or pericardial effusion. Mildly atherosclerotic, nonaneurysmal thoracic aorta.  Evaluation of the lung fields demonstrates no evidence of acute infectious process or distinct suspicious focal pulmonary nodularity. Evaluation of the osseous structures demonstrates mildly displaced left clavicle fracture as noted on chest radiograph. There there is a nondisplaced acute fracture of the left first rib near the costovertebral junction as well as mildly displaced fractures of the left lateral second through fourth ribs laterally. No thoracic spinal fracture is evident, with multilevel spondylosis changes present.     Impression: Age-related changes of the brain as above, otherwise without evidence of acute intracranial abnormality. No acute fracture or traumatic malalignment of the cervical spine. Acute mildly displaced fracture involving the distal left clavicle, without acromioclavicular joint involvement, in addition to nondisplaced or minimally displaced fractures of the left first through fourth ribs. There is no associated pneumothorax or other acute traumatic finding in the chest. Electronically Signed: Mega Rea MD  3/2/2024 11:12 AM EST  Workstation ID: [id number]    XR Chest 1 View    Result Date: 3/2/2024  XR CHEST 1 VW Date of Exam: 3/2/2024 9:34 AM EST Indication: Weak/Dizzy/AMS triage protocol Comparison: 4/16/2022 Findings: Unremarkable cardiomediastinal silhouette. No focal airspace opacity. No pleural effusion or pneumothorax. Mildly displaced fracture of the distal left clavicle. Normal sternoclavicular and acromioclavicular joint alignment. Mild overlying soft tissue edema of the left shoulder. There is also a minimally displaced left lateral third rib fracture.     Impression: Impression: Mildly displaced fracture of the distal left clavicle. Minimally displaced left lateral third rib fracture. No visible pneumothorax. No acute cardiopulmonary abnormality.. Electronically Signed: Tirso Rankin MD  3/2/2024 9:57 AM EST  Workstation ID: [id number]     Results for orders placed  during the hospital encounter of 07/20/21    Adult Transthoracic Echo Complete W/ Cont if Necessary Per Protocol    Interpretation Summary  · Left ventricular wall thickness is consistent with mild concentric hypertrophy.  · Normal left-ventricular systolic function, estimated EF 65%.  · Left ventricular diastolic function is consistent with (grade I) impaired relaxation.  · Aortic sclerosis without aortic stenosis. Trace aortic insufficiency.  · Trace mitral regurgitation, trace tricuspid regurgitation.      Current medications:  Scheduled Meds:atorvastatin, 10 mg, Oral, Daily  divalproex, 500 mg, Oral, Daily  heparin (porcine), 5,000 Units, Subcutaneous, Q12H  insulin lispro, 2-7 Units, Subcutaneous, 4x Daily AC & at Bedtime  isosorbide mononitrate, 30 mg, Oral, BID - Nitrates  levETIRAcetam, 500 mg, Oral, BID  levothyroxine, 88 mcg, Oral, Q AM  lisinopril, 20 mg, Oral, Q24H  pantoprazole, 40 mg, Oral, Q AM  sodium chloride, 10 mL, Intravenous, Q12H  tamsulosin, 0.4 mg, Oral, Daily  verapamil SR, 240 mg, Oral, Daily  vitamin B-12, 1,000 mcg, Oral, Daily      Continuous Infusions:lactated ringers, 125 mL/hr, Last Rate: 125 mL/hr (03/03/24 0911)      PRN Meds:.  acetaminophen **OR** acetaminophen **OR** acetaminophen    calcium carbonate    Calcium Replacement - Follow Nurse / BPA Driven Protocol    dextrose    dextrose    fluticasone    glucagon (human recombinant)    Magnesium Standard Dose Replacement - Follow Nurse / BPA Driven Protocol    ondansetron ODT **OR** ondansetron    Phosphorus Replacement - Follow Nurse / BPA Driven Protocol    Potassium Replacement - Follow Nurse / BPA Driven Protocol    sodium chloride    sodium chloride    sodium chloride    traMADol    Assessment & Plan   Assessment & Plan     Active Hospital Problems    Diagnosis  POA    **Falls [W19.XXXA]  Yes      Resolved Hospital Problems   No resolved problems to display.        Brief Hospital Course to date:  Lea Rivers is a 78  y.o. male with past medical history of hypertension, hyperlipidemia, hypothyroidism, seizure disorder.  Patient is being admitted for a fall and nondisplaced first through fourth left rib fractures.  Patient also has left distal clavicle fracture.     Multiple falls with frequency of falls increasing  -difficulty with balance  -CT of head shows age-related changes which are chronic but no acute process  -neuro consulted     Multiple rib fractures and also left clavicle fracture  -With no displacement or mildly displaced.  Orthopedic surgery following  No surgical intervention is planned.  Recs nonweightbearing LUE, may come out of sling in 5-7 days to initiate gentle ROM exercises per ortho.  F/u with ortho/Dr. Maldonado in 2 weeks  -Left upper limb is already in sling  -Pain control     Prostate cancer  -Follows with Dr. Mayo  -S/p radiation therapy.  -Continue family patient has had hematuria and has had cystoscopy by Dr. Mayo and they were told that probably because of the prostate cancer and the therapy patient will have hematuria for a while.  We will monitor if necessary will ask urology to see patient     Dementia and increasing memory problem  -Patient still lives alone and also drives  -Mentioned above patient has had multiple falls and the frequency of falls are increasing  -Will ask neurology to see the patient for both falls and also worsening memory problems  -d/w family that patient should not be driving.  They are interested in rehab/possible placement     Hypertension  Hyperlipidemia  Seizure disorder  Hypothyroidism  - continue home meds    D/w son at bedside on 3/3/24    Expected Discharge Location and Transportation: rehab?, awaiting PT notes  Expected Discharge   Expected Discharge Date: 3/5/2024; Expected Discharge Time:      DVT prophylaxis:  Medical DVT prophylaxis orders are present.         AM-PAC 6 Clicks Score (PT): 17 (03/02/24 4214)    CODE STATUS:   Code Status and Medical  Interventions:   Ordered at: 03/02/24 1456     Medical Intervention Limits:    NO intubation (DNI)     Code Status (Patient has no pulse and is not breathing):    No CPR (Do Not Attempt to Resuscitate)     Medical Interventions (Patient has pulse or is breathing):    Limited Support       Noel Petersen MD  03/03/24

## 2024-03-03 NOTE — CONSULTS
Orthopedic Consult      Patient: Lea Rivers    Date of Admission: 3/2/2024  9:06 AM    YOB: 1945    Medical Record Number: [id number]    Attending Physician: Noel Petersen MD    Consulting Physician: Woodrow Maldonado MD      Chief Complaint(s): Falls [W19.XXXA]      History of Present Illness: 78 y.o. male admitted to [facility] with Falls [W19.XXXA]. I was consulted for further evaluation and treatment of left shoulder pain.  This is a 78-year-old male who brought to the ER after mechanical fall.  He has been having increasing frequency of falls as of late.  He fell backwards out of a stool yesterday while eating cereal and hit his back and shoulder.  He is complaining of pain in the left shoulder.  Per his daughter's report, he appeared to be more confused than usual.  He does have some baseline dementia.  Upon seeing the patient this morning, he is eating breakfast.  Complains of isolated pain in the left shoulder.  He is currently in a sling.  Denies numbness or tingling in the extremity.       Allergies   Allergen Reactions    Chlorhexidine Rash    Sulfa Antibiotics Rash     Childhood reaction         Home Medications:  Medications Prior to Admission   Medication Sig Dispense Refill Last Dose    D-Mannose 500 MG capsule Take 1 capsule by mouth Daily. (Patient taking differently: Take 1 capsule by mouth Daily. OTC) 90 capsule 3 3/1/2024    divalproex (DEPAKOTE) 500 MG 24 hr tablet Take 1 tablet by mouth 2 (two) times a day. 1 tablet 500mg QAM, 2 tablets 1000mg QPM   3/1/2024    fluticasone (FLONASE) 50 MCG/ACT nasal spray 2 sprays into the nostril(s) as directed by provider As Needed for Rhinitis. Administer 2 sprays in each nostril for each dose.   3/1/2024    isosorbide mononitrate (IMDUR) 30 MG 24 hr tablet Take 1 tablet by mouth 2 (Two) Times a Day.   3/1/2024    levETIRAcetam (KEPPRA) 500 MG tablet Take 1 tablet by mouth 2 (Two) Times a Day.   3/1/2024    levothyroxine  (SYNTHROID, LEVOTHROID) 88 MCG tablet Take 1 tablet by mouth Every Morning.   3/1/2024    lisinopril (PRINIVIL,ZESTRIL) 20 MG tablet Take 1 tablet by mouth 2 (Two) Times a Day.   3/1/2024    metFORMIN ER (GLUCOPHAGE-XR) 500 MG 24 hr tablet Take 2 tablets by mouth Daily With Breakfast.   3/1/2024    omeprazole (priLOSEC) 20 MG capsule Take 1 capsule by mouth Daily.   3/1/2024    simvastatin (ZOCOR) 20 MG tablet Take 1 tablet by mouth Every Night.   3/1/2024    sodium chloride 1 g tablet Take 2 tablets by mouth Daily. OTC   3/1/2024    tamsulosin (FLOMAX) 0.4 MG capsule 24 hr capsule Take 1 capsule by mouth Daily. 90 capsule 3 3/1/2024    verapamil SR (CALAN-SR) 240 MG CR tablet Take 1 tablet by mouth Daily.   3/1/2024    vitamin B-12 (CYANOCOBALAMIN) 1000 MCG tablet Take 1 tablet by mouth Daily. 90 tablet 3 3/1/2024       Current Medications:  Scheduled Meds:atorvastatin, 10 mg, Oral, Daily  divalproex, 500 mg, Oral, Daily  heparin (porcine), 5,000 Units, Subcutaneous, Q12H  insulin lispro, 2-7 Units, Subcutaneous, 4x Daily AC & at Bedtime  isosorbide mononitrate, 30 mg, Oral, BID - Nitrates  levETIRAcetam, 500 mg, Oral, BID  levothyroxine, 88 mcg, Oral, Q AM  lisinopril, 20 mg, Oral, Q24H  pantoprazole, 40 mg, Oral, Q AM  sodium chloride, 10 mL, Intravenous, Q12H  tamsulosin, 0.4 mg, Oral, Daily  verapamil SR, 240 mg, Oral, Daily  vitamin B-12, 1,000 mcg, Oral, Daily      Continuous Infusions:lactated ringers, 125 mL/hr, Last Rate: 125 mL/hr (03/03/24 0600)      PRN Meds:.  acetaminophen **OR** acetaminophen **OR** acetaminophen    calcium carbonate    Calcium Replacement - Follow Nurse / BPA Driven Protocol    dextrose    dextrose    fluticasone    glucagon (human recombinant)    Magnesium Standard Dose Replacement - Follow Nurse / BPA Driven Protocol    ondansetron ODT **OR** ondansetron    Phosphorus Replacement - Follow Nurse / BPA Driven Protocol    Potassium Replacement - Follow Nurse / BPA Driven Protocol     sodium chloride    sodium chloride    sodium chloride    traMADol    Past Medical History:   Diagnosis Date    Anemia     Colon cancer 1990    Status post partial colectomy in 1990. No chemotherapy or radiation therapy.    Coronary artery disease     Nonobstructive coronary artery disease.    Diabetes     Dyslipidemia     GERD (gastroesophageal reflux disease)     Hx of.    Hyperlipidemia     Hypertension     Hyponatremia     Hypothyroidism     Left shoulder pain     Low sodium levels 2022    recent issue; saw nephrologist who ruled out kidney issues; took Sodium Chloride po to bring levels up    Memory deficit     FROM ANESTHESIA    Osteoarthritis     Prostate cancer 07/23/2022    Seizure disorder     silent seziure-diagnosed years ago    Skin cancer         Past Surgical History:   Procedure Laterality Date    APPENDECTOMY      CARDIAC CATHETERIZATION  2012    30 to 40% LAD    CARPAL TUNNEL RELEASE Bilateral     CHOLECYSTECTOMY      COLECTOMY PARTIAL / TOTAL  1990    Partial colectomy    COLONOSCOPY      CYBERKNIFE  02/09/2023    Prostate/SV    CYSTOSCOPY TRANSURETHRAL RESECTION OF PROSTATE N/A 09/21/2018    Procedure: CYSTOSCOPY TRANSURETHRAL RESECTION OF PROSTATE GREENLIGHT;  Surgeon: Naseem Clayton MD;  [facility];  Service: Urology    OTHER SURGICAL HISTORY      Contraction surgery on bilateral hands and wrists.    PROSTATE BIOPSY N/A 11/11/2022    Procedure: TRANSRECTAL ULTRASOUND GUIDED BIOPSY OF PROSTATE;  Surgeon: Naseem Noland MD;  Location:  [facility];  Service: Urology;  Laterality: N/A;    SINUS SURGERY      SKIN BIOPSY      TEETH EXTRACTION      TONSILLECTOMY      TOTAL KNEE ARTHROPLASTY Right 04/07/2022    Procedure: TOTAL KNEE ARTHROPLASTY WITH ORTHO ROBOT RIGHT;  Surgeon: Louis Malcolm MD;  Location:  [facility];  Service: Robotics - Ortho;  Laterality: Right;    TRANSRECTAL INCISION PROSTATE WITH GOLD SEED Bilateral 01/06/2023    Procedure: TRANSRECTAL ULTRASOUND GUIDED  PROSTATE FIDUCIAL MARKER PLACEMENT;  Surgeon: Naseem Noland MD;  Location: [facility];  Service: Urology;  Laterality: Bilateral;    TURP / TRANSURETHRAL INCISION / DRAINAGE PROSTATE      VASECTOMY          Social History     Occupational History    Not on file   Tobacco Use    Smoking status: Never     Passive exposure: Past    Smokeless tobacco: Never   Vaping Use    Vaping status: Never Used   Substance and Sexual Activity    Alcohol use: No    Drug use: No    Sexual activity: Not Currently      Social History     Social History Narrative    Caffeine: None        Family History   Problem Relation Age of Onset    Cancer Mother         brain    Hypertension Father     Stroke Father     No Known Problems Sister     Stroke Sister     Esophageal cancer Brother     Stroke Maternal Grandfather     No Known Problems Paternal Grandmother     No Known Problems Paternal Grandfather     Stroke Other     Arthritis Other     Cancer Other        Review of Systems:   General: negative for - chills, fatigue, fever, malaise or night sweats  Psychological: negative for - behavioral disorder, hostility, irritability, mood swings, obsessive thoughts or suicidal ideation  Ophthalmic: negative for - blurry vision, double vision or eye pain  HEENT: negative for - headaches, hearing change, sinus pain, sore throat or visual changes  Hematological and Lymphatic: negative for - bleeding problems, jaundice, night sweats, pallor or swollen lymph nodes  Endocrine: negative for - malaise/lethargy, mood swings, palpitations, polydipsia/polyuria, skin changes or unexpected weight changes  Respiratory: negative for - cough, shortness of breath or wheezing  Cardiovascular: negative for - chest pain, dyspnea on exertion, palpitations or shortness of breath  Gastrointestinal: negative for - abdominal pain, change in bowel habits, change in stools, constipation, gas/bloating or stool incontinence  Genitourinary: negative for - dysuria, genital  discharge, nocturia, pelvic pain or scrotal mass/pain  Musculoskeletal: positive for - pain in shoulder - left  Neurological: negative for - behavioral changes, headaches, speech problems or visual changes  Dermatological: negative for hair changes, lumps, pruritus and skin lesion changes    Physical Exam: 78 y.o. male    GENERAL APPEARANCE: awake, alert & oriented x 3, in no acute distress and well developed, well nourished  Vitals:    03/02/24 1710 03/02/24 2057 03/03/24 0300 03/03/24 0835   BP: 135/78  177/97 (!) 194/103   BP Location:   Right arm Right arm   Patient Position:   Lying Lying   Pulse: 65  63 69   Resp:   16 18   Temp:  97.1 °F (36.2 °C) 97.5 °F (36.4 °C)    TempSrc:  Oral Oral    SpO2:    96%   Weight:       Height:         PSYCH: normal affect  HEAD: Normocephalic, without obvious abnormality, atraumatic  EYES: No icterus, corneas clear, PERRLA  CARDIOVASCULAR: pulses palpable and equal bilaterally. Capillary refill less than 2 seconds  LUNGS:  breathing nonlabored  ABDOMEN: Soft, nontender, nondistended  BACK: No C-T- L spine tenderness  EXTREMITIES: no clubbing, cyanosis  MUSCULOSKELETAL:    Left upper extremity: Full painless range of motion of elbow, wrist, and digits.  Shoulder range of motion is limited secondary to pain.  Passive range of motion is minimally painful.  He is focally tender to palpation about the distal clavicle.  Nontender to palpation throughout the extremity.  Intact EPL, FPL, EDC, FDP, FDS, interosseous, wrist flexion, wrist extension, biceps, triceps, and deltoid. Sensation intact light touch to median, radial, ulnar, and axillary nerves. Palpable radial pulse.  Skin is intact without significant lesions or wounds.    Right upper extremity: Full painless range of motion of shoulder, elbow, wrist, and digits. Nontender to palpation throughout the extremity.  Intact EPL, FPL, EDC, FDP, FDS, interosseous, wrist flexion, wrist extension, biceps, triceps, and deltoid.  Sensation intact light touch to median, radial, ulnar, and axillary nerves. Palpable radial pulse.  Skin is intact without significant lesions or wounds.    Pelvis: Stable to AP and lateral compression.    Bilateral lower extremity: Full, painless range of motion of hip, knee, ankle, toes.  Nontender palpation throughout the extremity.  Negative pain with logroll. Intact EHL, FHL, tibialis anterior, and gastrocsoleus. Sensation intact to light touch to deep peroneal, superficial peroneal, sural, saphenous, tibial nerves. Palpable DP and PT pulses.  Intact skin without lesions. Edema -none.      Diagnostic Tests:    Admission on 03/02/2024   Component Date Value Ref Range Status    QT Interval 03/02/2024 388  ms Final    QTC Interval 03/02/2024 439  ms Final    Glucose 03/02/2024 116 (H)  65 - 99 mg/dL Final    BUN 03/02/2024 14  8 - 23 mg/dL Final    Creatinine 03/02/2024 0.85  0.76 - 1.27 mg/dL Final    Sodium 03/02/2024 132 (L)  136 - 145 mmol/L Final    Potassium 03/02/2024 4.1  3.5 - 5.2 mmol/L Final    Slight hemolysis detected by analyzer. Result may be falsely elevated.    Chloride 03/02/2024 96 (L)  98 - 107 mmol/L Final    CO2 03/02/2024 25.0  22.0 - 29.0 mmol/L Final    Calcium 03/02/2024 9.6  8.6 - 10.5 mg/dL Final    Total Protein 03/02/2024 7.5  6.0 - 8.5 g/dL Final    Albumin 03/02/2024 4.1  3.5 - 5.2 g/dL Final    ALT (SGPT) 03/02/2024 9  1 - 41 U/L Final    AST (SGOT) 03/02/2024 16  1 - 40 U/L Final    Alkaline Phosphatase 03/02/2024 58  39 - 117 U/L Final    Total Bilirubin 03/02/2024 0.5  0.0 - 1.2 mg/dL Final    Globulin 03/02/2024 3.4  gm/dL Final    Calculated Result    A/G Ratio 03/02/2024 1.2  g/dL Final    BUN/Creatinine Ratio 03/02/2024 16.5  7.0 - 25.0 Final    Anion Gap 03/02/2024 11.0  5.0 - 15.0 mmol/L Final    eGFR 03/02/2024 88.4  >60.0 mL/min/1.73 Final    HS Troponin T 03/02/2024 16  <22 ng/L Final    Magnesium 03/02/2024 2.2  1.6 - 2.4 mg/dL Final    Color, UA 03/02/2024 Yellow   Yellow, Straw Final    Appearance, UA 03/02/2024 Clear  Clear Final    pH, UA 03/02/2024 5.5  5.0 - 8.0 Final    Specific Gravity, UA 03/02/2024 1.022  1.001 - 1.030 Final    Glucose, UA 03/02/2024 Negative  Negative Final    Ketones, UA 03/02/2024 15 mg/dL (1+) (A)  Negative Final    Bilirubin, UA 03/02/2024 Negative  Negative Final    Blood, UA 03/02/2024 Large (3+) (A)  Negative Final    Protein, UA 03/02/2024 30 mg/dL (1+) (A)  Negative Final    Leuk Esterase, UA 03/02/2024 Trace (A)  Negative Final    Nitrite, UA 03/02/2024 Negative  Negative Final    Urobilinogen, UA 03/02/2024 1.0 E.U./dL  0.2 - 1.0 E.U./dL Final    Extra Tube 03/02/2024 Hold for add-ons.   Final    Auto resulted.    Extra Tube 03/02/2024 hold for add-on   Final    Auto resulted    Extra Tube 03/02/2024 Hold for add-ons.   Final    Auto resulted.    Extra Tube 03/02/2024 Hold for add-ons.   Final    Auto resulted.    Extra Tube 03/02/2024 Hold for add-ons.   Final    Auto resulted    WBC 03/02/2024 9.72  3.40 - 10.80 10*3/mm3 Final    RBC 03/02/2024 4.15  4.14 - 5.80 10*6/mm3 Final    Hemoglobin 03/02/2024 12.7 (L)  13.0 - 17.7 g/dL Final    Hematocrit 03/02/2024 38.6  37.5 - 51.0 % Final    MCV 03/02/2024 93.0  79.0 - 97.0 fL Final    MCH 03/02/2024 30.6  26.6 - 33.0 pg Final    MCHC 03/02/2024 32.9  31.5 - 35.7 g/dL Final    RDW 03/02/2024 14.0  12.3 - 15.4 % Final    RDW-SD 03/02/2024 47.6  37.0 - 54.0 fl Final    MPV 03/02/2024 8.9  6.0 - 12.0 fL Final    Platelets 03/02/2024 177  140 - 450 10*3/mm3 Final    Neutrophil % 03/02/2024 80.0 (H)  42.7 - 76.0 % Final    Lymphocyte % 03/02/2024 10.5 (L)  19.6 - 45.3 % Final    Monocyte % 03/02/2024 8.7  5.0 - 12.0 % Final    Eosinophil % 03/02/2024 0.0 (L)  0.3 - 6.2 % Final    Basophil % 03/02/2024 0.2  0.0 - 1.5 % Final    Immature Grans % 03/02/2024 0.6 (H)  0.0 - 0.5 % Final    Neutrophils, Absolute 03/02/2024 7.77 (H)  1.70 - 7.00 10*3/mm3 Final    Lymphocytes, Absolute 03/02/2024 1.02   0.70 - 3.10 10*3/mm3 Final    Monocytes, Absolute 03/02/2024 0.85  0.10 - 0.90 10*3/mm3 Final    Eosinophils, Absolute 03/02/2024 0.00  0.00 - 0.40 10*3/mm3 Final    Basophils, Absolute 03/02/2024 0.02  0.00 - 0.20 10*3/mm3 Final    Immature Grans, Absolute 03/02/2024 0.06 (H)  0.00 - 0.05 10*3/mm3 Final    nRBC 03/02/2024 0.0  0.0 - 0.2 /100 WBC Final    RBC, UA 03/02/2024 11-20 (A)  None Seen, 0-2 /HPF Final    WBC, UA 03/02/2024 3-5 (A)  None Seen, 0-2 /HPF Final    Bacteria, UA 03/02/2024 None Seen  None Seen, Trace /HPF Final    Squamous Epithelial Cells, UA 03/02/2024 0-2  None Seen, 0-2 /HPF Final    Hyaline Casts, UA 03/02/2024 0-6  0 - 6 /LPF Final    Methodology 03/02/2024 Automated Microscopy   Final    Valproic Acid 03/02/2024 76.9  50.0 - 125.0 mcg/mL Final    proBNP 03/02/2024 601.2  0.0 - 1,800.0 pg/mL Final    Lactate 03/02/2024 2.6 (C)  0.5 - 2.0 mmol/L Final    Falsely depressed results may occur on samples drawn from patients receiving N-Acetylcysteine (NAC) or Metamizole.    C-Reactive Protein 03/02/2024 0.68 (H)  0.00 - 0.50 mg/dL Final    Phosphorus 03/02/2024 2.8  2.5 - 4.5 mg/dL Final    TSH 03/02/2024 2.280  0.270 - 4.200 uIU/mL Final    Free T4 03/02/2024 1.29  0.93 - 1.70 ng/dL Final    COVID19 03/02/2024 Not Detected  Not Detected - Ref. Range Final    Influenza A PCR 03/02/2024 Not Detected  Not Detected Final    Influenza B PCR 03/02/2024 Not Detected  Not Detected Final    RSV, PCR 03/02/2024 Not Detected  Not Detected Final    Lactate 03/02/2024 2.3 (C)  0.5 - 2.0 mmol/L Final    Falsely depressed results may occur on samples drawn from patients receiving N-Acetylcysteine (NAC) or Metamizole.    THC, Screen, Urine 03/02/2024 Negative  Negative Final    Phencyclidine (PCP), Urine 03/02/2024 Negative  Negative Final    Cocaine Screen, Urine 03/02/2024 Negative  Negative Final    Methamphetamine, Ur 03/02/2024 Negative  Negative Final    Opiate Screen 03/02/2024 Negative  Negative Final     Amphetamine Screen, Urine 03/02/2024 Negative  Negative Final    Benzodiazepine Screen, Urine 03/02/2024 Negative  Negative Final    Tricyclic Antidepressants Screen 03/02/2024 Negative  Negative Final    Methadone Screen, Urine 03/02/2024 Negative  Negative Final    Barbiturates Screen, Urine 03/02/2024 Negative  Negative Final    Oxycodone Screen, Urine 03/02/2024 Negative  Negative Final    Buprenorphine, Screen, Urine 03/02/2024 Negative  Negative Final    Fentanyl, Urine 03/02/2024 Negative  Negative Final    Lactate 03/02/2024 2.8 (C)  0.5 - 2.0 mmol/L Final    Falsely depressed results may occur on samples drawn from patients receiving N-Acetylcysteine (NAC) or Metamizole.    Hemoglobin A1C 03/02/2024 5.50  4.80 - 5.60 % Final    Lactate 03/02/2024 2.8 (C)  0.5 - 2.0 mmol/L Final    Falsely depressed results may occur on samples drawn from patients receiving N-Acetylcysteine (NAC) or Metamizole.    Glucose 03/02/2024 127  70 - 130 mg/dL Final    Glucose 03/02/2024 102  70 - 130 mg/dL Final    Lactate 03/03/2024 1.6  0.5 - 2.0 mmol/L Final    Falsely depressed results may occur on samples drawn from patients receiving N-Acetylcysteine (NAC) or Metamizole.    Glucose 03/03/2024 76  70 - 130 mg/dL Final       XR Knee 1 or 2 View Bilateral    Result Date: 3/2/2024  Narrative: XR KNEE 1 OR 2 VW BILATERAL Date of Exam: 3/2/2024 11:58 AM EST Indication: Pain after fall, dementia Comparison: Right knee radiographs dated 4/10/2023 Findings: Right knee: There is no acute fracture or dislocation. The right total knee prosthesis appears intact without evidence of periprosthetic fracture or loosening. There is a trace suprapatellar joint effusion. Bone mineralization is normal. Left knee: There is no acute fracture or dislocation. There is no joint effusion. There is tricompartmental degenerative joint disease. There is enthesopathy at the tibial tuberosity which appears similar to the prior examination.      Impression: Impression: 1. Right total knee prosthesis without evidence of hardware complication. 2. Tricompartmental degenerative joint disease of the left knee. 3. No evidence of acute fracture Electronically Signed: Johny Poon  3/2/2024 12:39 PM EST  Workstation ID: [id number]    CT Head Without Contrast    Result Date: 3/2/2024  Narrative: CT HEAD WO CONTRAST, CT CHEST WO CONTRAST DIAGNOSTIC, CT CERVICAL SPINE WO CONTRAST Date of Exam: 3/2/2024 10:43 AM EST Indication: Fall with worsening confusion. Comparison: 1/5/2024. Technique: Axial CT images were obtained of the head, cervical spine and chest without contrast administration.  Automated exposure control and iterative construction methods were used. FINDINGS: CT HEAD: Gray-white differentiation is maintained and there is no evidence of intracranial hemorrhage, mass or mass effect. Age-related changes of the brain are present including volume loss and typical periventricular sequela of chronic small vessel ischemia. There is otherwise no evidence of intracranial hemorrhage, mass or mass effect. The ventricles are normal in size and configuration accounting for surrounding volume loss. The orbits are normal and the paranasal sinuses are grossly clear. CT cervical spine: Cortical margins are intact, without evidence of acute fracture. Alignment is anatomic without listhesis or subluxation. Multilevel spondylosis changes are present, with areas of disc osteophyte complex formation and facet arthropathy,  appearing most advanced at C5-6. The prevertebral soft tissues are normal. The dens is intact. CT CHEST: There is no pathologic axillary adenopathy or other worrisome body wall soft tissue finding in the chest. No acute findings are present in the partially characterized upper abdomen. There is no pleural or pericardial effusion. Mildly atherosclerotic, nonaneurysmal thoracic aorta. Evaluation of the lung fields demonstrates no evidence of acute  infectious process or distinct suspicious focal pulmonary nodularity. Evaluation of the osseous structures demonstrates mildly displaced left clavicle fracture as noted on chest radiograph. There there is a nondisplaced acute fracture of the left first rib near the costovertebral junction as well as mildly displaced fractures of the left lateral second through fourth ribs laterally. No thoracic spinal fracture is evident, with multilevel spondylosis changes present.     Impression: Age-related changes of the brain as above, otherwise without evidence of acute intracranial abnormality. No acute fracture or traumatic malalignment of the cervical spine. Acute mildly displaced fracture involving the distal left clavicle, without acromioclavicular joint involvement, in addition to nondisplaced or minimally displaced fractures of the left first through fourth ribs. There is no associated pneumothorax or other acute traumatic finding in the chest. Electronically Signed: Mega Rea MD  3/2/2024 11:12 AM EST  Workstation ID: [id number]    CT Cervical Spine Without Contrast    Result Date: 3/2/2024  Narrative: CT HEAD WO CONTRAST, CT CHEST WO CONTRAST DIAGNOSTIC, CT CERVICAL SPINE WO CONTRAST Date of Exam: 3/2/2024 10:43 AM EST Indication: Fall with worsening confusion. Comparison: 1/5/2024. Technique: Axial CT images were obtained of the head, cervical spine and chest without contrast administration.  Automated exposure control and iterative construction methods were used. FINDINGS: CT HEAD: Gray-white differentiation is maintained and there is no evidence of intracranial hemorrhage, mass or mass effect. Age-related changes of the brain are present including volume loss and typical periventricular sequela of chronic small vessel ischemia. There is otherwise no evidence of intracranial hemorrhage, mass or mass effect. The ventricles are normal in size and configuration accounting for surrounding volume loss. The orbits are  normal and the paranasal sinuses are grossly clear. CT cervical spine: Cortical margins are intact, without evidence of acute fracture. Alignment is anatomic without listhesis or subluxation. Multilevel spondylosis changes are present, with areas of disc osteophyte complex formation and facet arthropathy,  appearing most advanced at C5-6. The prevertebral soft tissues are normal. The dens is intact. CT CHEST: There is no pathologic axillary adenopathy or other worrisome body wall soft tissue finding in the chest. No acute findings are present in the partially characterized upper abdomen. There is no pleural or pericardial effusion. Mildly atherosclerotic, nonaneurysmal thoracic aorta. Evaluation of the lung fields demonstrates no evidence of acute infectious process or distinct suspicious focal pulmonary nodularity. Evaluation of the osseous structures demonstrates mildly displaced left clavicle fracture as noted on chest radiograph. There there is a nondisplaced acute fracture of the left first rib near the costovertebral junction as well as mildly displaced fractures of the left lateral second through fourth ribs laterally. No thoracic spinal fracture is evident, with multilevel spondylosis changes present.     Impression: Age-related changes of the brain as above, otherwise without evidence of acute intracranial abnormality. No acute fracture or traumatic malalignment of the cervical spine. Acute mildly displaced fracture involving the distal left clavicle, without acromioclavicular joint involvement, in addition to nondisplaced or minimally displaced fractures of the left first through fourth ribs. There is no associated pneumothorax or other acute traumatic finding in the chest. Electronically Signed: Mega Rea MD  3/2/2024 11:12 AM EST  Workstation ID: [id number]    CT Chest Without Contrast Diagnostic    Result Date: 3/2/2024  Narrative: CT HEAD WO CONTRAST, CT CHEST WO CONTRAST DIAGNOSTIC, CT CERVICAL  SPINE WO CONTRAST Date of Exam: 3/2/2024 10:43 AM EST Indication: Fall with worsening confusion. Comparison: 1/5/2024. Technique: Axial CT images were obtained of the head, cervical spine and chest without contrast administration.  Automated exposure control and iterative construction methods were used. FINDINGS: CT HEAD: Gray-white differentiation is maintained and there is no evidence of intracranial hemorrhage, mass or mass effect. Age-related changes of the brain are present including volume loss and typical periventricular sequela of chronic small vessel ischemia. There is otherwise no evidence of intracranial hemorrhage, mass or mass effect. The ventricles are normal in size and configuration accounting for surrounding volume loss. The orbits are normal and the paranasal sinuses are grossly clear. CT cervical spine: Cortical margins are intact, without evidence of acute fracture. Alignment is anatomic without listhesis or subluxation. Multilevel spondylosis changes are present, with areas of disc osteophyte complex formation and facet arthropathy,  appearing most advanced at C5-6. The prevertebral soft tissues are normal. The dens is intact. CT CHEST: There is no pathologic axillary adenopathy or other worrisome body wall soft tissue finding in the chest. No acute findings are present in the partially characterized upper abdomen. There is no pleural or pericardial effusion. Mildly atherosclerotic, nonaneurysmal thoracic aorta. Evaluation of the lung fields demonstrates no evidence of acute infectious process or distinct suspicious focal pulmonary nodularity. Evaluation of the osseous structures demonstrates mildly displaced left clavicle fracture as noted on chest radiograph. There there is a nondisplaced acute fracture of the left first rib near the costovertebral junction as well as mildly displaced fractures of the left lateral second through fourth ribs laterally. No thoracic spinal fracture is evident,  with multilevel spondylosis changes present.     Impression: Age-related changes of the brain as above, otherwise without evidence of acute intracranial abnormality. No acute fracture or traumatic malalignment of the cervical spine. Acute mildly displaced fracture involving the distal left clavicle, without acromioclavicular joint involvement, in addition to nondisplaced or minimally displaced fractures of the left first through fourth ribs. There is no associated pneumothorax or other acute traumatic finding in the chest. Electronically Signed: Mega Rea MD  3/2/2024 11:12 AM EST  Workstation ID: [id number]    XR Chest 1 View    Result Date: 3/2/2024  Narrative: XR CHEST 1 VW Date of Exam: 3/2/2024 9:34 AM EST Indication: Weak/Dizzy/AMS triage protocol Comparison: 4/16/2022 Findings: Unremarkable cardiomediastinal silhouette. No focal airspace opacity. No pleural effusion or pneumothorax. Mildly displaced fracture of the distal left clavicle. Normal sternoclavicular and acromioclavicular joint alignment. Mild overlying soft tissue edema of the left shoulder. There is also a minimally displaced left lateral third rib fracture.     Impression: Impression: Mildly displaced fracture of the distal left clavicle. Minimally displaced left lateral third rib fracture. No visible pneumothorax. No acute cardiopulmonary abnormality.. Electronically Signed: Tirso Rankin MD  3/2/2024 9:57 AM EST  Workstation ID: [id number]     Radiographs of the chest reveal a nondisplaced distal left clavicle fracture (type I).    Assessment:    Falls           Plan:  The patient has clinical and radiographic evidence of minimally displaced left clavicle fracture.  I had an extensive discussion with the patient/family regarding treatment options for this injury taking into account the nature of the diagnosis, the patient's prior level of cognitive and physical function, medical comorbidities, and goals for treatment.  Operative and  nonoperative options as well as alternatives were presented and the risks and benefits of each option were discussed.  Based on the current nondisplaced nature of the fracture, it is recommended for nonoperative treatment.  Patient is agreeable to this plan.  Will place him into a sling.  He may come out of the sling after 5 to 7 days to initiate gentle range of motion exercises.  Nonweightbearing through the left upper extremity.  Follow-up in 2 weeks for reevaluation.      Nonop treatment  Sling for comfort  Nonweightbearing left upper extremity  May come out of sling for range of motion as tolerated.  Follow-up orthopedics in 2 weeks.    Date: 3/3/2024    Woodrow Maldonado MD

## 2024-03-03 NOTE — CONSULTS
[facility] Neurology  Consult Note    Patient Name: Lea Rivers  : 1945  MRN: [id number]  Primary Care Physician:  Mannie Melvin MD  Referring Physician: No Known Provider  Date of admission: 3/2/2024    Subjective     Reason for Consult: Falls, seizure disorder    Lea Rivers is a 78 y.o. male with history of seizure disorder, history of colon cancer and prostate cancer, hypertension, hyperlipidemia, diabetes, dementia who is presenting with fall and rib fractures    Patient presented to [facility] ED on 3/2 after a fall.  He fell backwards out of a stool while eating cereal.  His daughters found him to be more confused so they brought him to the ER.  Daughters noted he has had an increase in falls over the last year but particularly in the last week.  He was found to have a clavicle fracture and multiple rib fractures, and a UTI.    Family at bedside reports since his wife is that he has had a significant decline.  Falls have increased at least over the last year.    He does have diabetes, they are unsure about neuropathy.  Denies burning or tingling in his feet.  Denies family history of neuropathy.  Denies alcohol use.    He does have seizures, described as staring off and being unresponsive.  These have been controlled for at least the past 15 years.  Family does not think this is contributing at all to his falls.      Review Of Systems   Constitutional:   Cardiovascular: Negative for chest pain or palpitations.  Respiratory: Negative for shortness of breath or cough.  Gastrointestinal: Negative for nausea and vomiting.  Genitourinary: Negative for bladder incontinence.  Musculoskeletal: Negative for aches and pains in the muscles or joints.  Dermatological: Negative for skin breakdown.   Neurological: Positive for shoulder pain, falls.  Negative for headache, or vision changes.     Personal History     Past Medical History:   Diagnosis Date    Anemia     Colon cancer      Status post partial colectomy in 1990. No chemotherapy or radiation therapy.    Coronary artery disease     Nonobstructive coronary artery disease.    Diabetes     Dyslipidemia     GERD (gastroesophageal reflux disease)     Hx of.    Hyperlipidemia     Hypertension     Hyponatremia     Hypothyroidism     Left shoulder pain     Low sodium levels 2022    recent issue; saw nephrologist who ruled out kidney issues; took Sodium Chloride po to bring levels up    Memory deficit     FROM ANESTHESIA    Osteoarthritis     Prostate cancer 07/23/2022    Seizure disorder     silent seziure-diagnosed years ago    Skin cancer        Past Surgical History:   Procedure Laterality Date    APPENDECTOMY      CARDIAC CATHETERIZATION  2012    30 to 40% LAD    CARPAL TUNNEL RELEASE Bilateral     CHOLECYSTECTOMY      COLECTOMY PARTIAL / TOTAL  1990    Partial colectomy    COLONOSCOPY      CYBERKNIFE  02/09/2023    Prostate/SV    CYSTOSCOPY TRANSURETHRAL RESECTION OF PROSTATE N/A 09/21/2018    Procedure: CYSTOSCOPY TRANSURETHRAL RESECTION OF PROSTATE GREENLIGHT;  Surgeon: Naseem Clayton MD;  Location:  [facility];  Service: Urology    OTHER SURGICAL HISTORY      Contraction surgery on bilateral hands and wrists.    PROSTATE BIOPSY N/A 11/11/2022    Procedure: TRANSRECTAL ULTRASOUND GUIDED BIOPSY OF PROSTATE;  Surgeon: Naseem Noland MD;  Location:  [facility];  Service: Urology;  Laterality: N/A;    SINUS SURGERY      SKIN BIOPSY      TEETH EXTRACTION      TONSILLECTOMY      TOTAL KNEE ARTHROPLASTY Right 04/07/2022    Procedure: TOTAL KNEE ARTHROPLASTY WITH ORTHO ROBOT RIGHT;  Surgeon: Louis Malcolm MD;  Location:  [facility];  Service: Robotics - Ortho;  Laterality: Right;    TRANSRECTAL INCISION PROSTATE WITH GOLD SEED Bilateral 01/06/2023    Procedure: TRANSRECTAL ULTRASOUND GUIDED PROSTATE FIDUCIAL MARKER PLACEMENT;  Surgeon: Naseem Noland MD;  Location:  [facility];  Service: Urology;  Laterality: Bilateral;    TURP /  TRANSURETHRAL INCISION / DRAINAGE PROSTATE      VASECTOMY         Family History: family history includes Arthritis in an other family member; Cancer in his mother and another family member; Esophageal cancer in his brother; Hypertension in his father; No Known Problems in his paternal grandfather, paternal grandmother, and sister; Stroke in his father, maternal grandfather, sister, and another family member. Otherwise pertinent FHx was reviewed and not pertinent to current issue.    Social History:  reports that he has never smoked. He has been exposed to tobacco smoke. He has never used smokeless tobacco. He reports that he does not drink alcohol and does not use drugs.    Home Medications:   D-Mannose, divalproex, fluticasone, isosorbide mononitrate, levETIRAcetam, levothyroxine, lisinopril, metFORMIN ER, omeprazole, simvastatin, sodium chloride, tamsulosin, verapamil SR, and vitamin B-12    Current Medications:     Current Facility-Administered Medications:     acetaminophen (TYLENOL) tablet 650 mg, 650 mg, Oral, Q4H PRN, 650 mg at 03/03/24 1014 **OR** acetaminophen (TYLENOL) 160 MG/5ML oral solution 650 mg, 650 mg, Oral, Q4H PRN **OR** acetaminophen (TYLENOL) suppository 650 mg, 650 mg, Rectal, Q4H PRN, Dawson Samayoa MD    atorvastatin (LIPITOR) tablet 10 mg, 10 mg, Oral, Daily, Dawson Samayoa MD, 10 mg at 03/02/24 2127    calcium carbonate (TUMS) chewable tablet 500 mg (200 mg elemental), 1 tablet, Oral, TID PRN, Dawson Samayoa MD    Calcium Replacement - Follow Nurse / BPA Driven Protocol, , Does not apply, PRN, Dawson Samayoa MD    dextrose (D50W) (25 g/50 mL) IV injection 25 g, 25 g, Intravenous, Q15 Min PRN, Dawson Samayoa MD    dextrose (GLUTOSE) oral gel 15 g, 15 g, Oral, Q15 Min PRN, Dawson Samayoa MD    divalproex (DEPAKOTE ER) 24 hr tablet 500 mg, 500 mg, Oral, Daily, Dawson Samayoa MD, 500 mg at 03/03/24 0911    fluticasone (FLONASE) 50 MCG/ACT nasal spray 2 spray, 2 spray,  Nasal, Daily PRN, Dawson Samayoa MD    glucagon (GLUCAGEN) injection 1 mg, 1 mg, Intramuscular, Q15 Min PRN, Dawson Samayoa MD    heparin (porcine) 5000 UNIT/ML injection 5,000 Units, 5,000 Units, Subcutaneous, Q12H, Dawson Samayoa MD, 5,000 Units at 03/03/24 0833    Insulin Lispro (humaLOG) injection 2-7 Units, 2-7 Units, Subcutaneous, 4x Daily AC & at Bedtime, Dawson Samayoa MD    isosorbide mononitrate (IMDUR) 24 hr tablet 30 mg, 30 mg, Oral, BID - Nitrates, Dawson Samayoa MD, 30 mg at 03/03/24 0832    lactated ringers infusion, 125 mL/hr, Intravenous, Continuous, Dawson Samayoa MD, Last Rate: 125 mL/hr at 03/03/24 0911, 125 mL/hr at 03/03/24 0911    levETIRAcetam (KEPPRA) tablet 500 mg, 500 mg, Oral, BID, Dawson Samayoa MD, 500 mg at 03/03/24 0832    levothyroxine (SYNTHROID, LEVOTHROID) tablet 88 mcg, 88 mcg, Oral, Q AM, Dawson Samayoa MD, 88 mcg at 03/03/24 0506    lisinopril (PRINIVIL,ZESTRIL) tablet 20 mg, 20 mg, Oral, Q24H, Dawson Samayoa MD, 20 mg at 03/03/24 0833    Magnesium Standard Dose Replacement - Follow Nurse / BPA Driven Protocol, , Does not apply, PRN, Dawson Samayoa MD    ondansetron ODT (ZOFRAN-ODT) disintegrating tablet 4 mg, 4 mg, Oral, Q6H PRN **OR** ondansetron (ZOFRAN) injection 4 mg, 4 mg, Intravenous, Q6H PRN, Dawson Samayoa MD    pantoprazole (PROTONIX) EC tablet 40 mg, 40 mg, Oral, Q AM, Dawson Samayoa MD, 40 mg at 03/03/24 0506    Phosphorus Replacement - Follow Nurse / BPA Driven Protocol, , Does not apply, Yao WILEY Masoud, MD    Potassium Replacement - Follow Nurse / BPA Driven Protocol, , Does not apply, Yao WILEY Masoud, MD    sodium chloride 0.9 % flush 10 mL, 10 mL, Intravenous, PRN, Jef Chavez MD    sodium chloride 0.9 % flush 10 mL, 10 mL, Intravenous, Q12H, Dawson Samayoa MD, 10 mL at 03/03/24 0833    sodium chloride 0.9 % flush 10 mL, 10 mL, Intravenous, PRN, Dawson Samayoa MD    sodium chloride 0.9 % infusion  40 mL, 40 mL, Intravenous, PRN, Dawson Samayoa MD    tamsulosin (FLOMAX) 24 hr capsule 0.4 mg, 0.4 mg, Oral, Daily, Dawson Samayoa MD, 0.4 mg at 03/03/24 0832    traMADol (ULTRAM) tablet 50 mg, 50 mg, Oral, Q6H PRN, Dawson Samayoa MD, 50 mg at 03/03/24 0506    verapamil SR (CALAN-SR) CR tablet 240 mg, 240 mg, Oral, Daily, Dawson Samayoa MD, 240 mg at 03/03/24 0911    vitamin B-12 (CYANOCOBALAMIN) tablet 1,000 mcg, 1,000 mcg, Oral, Daily, Dawson Samayoa MD, 1,000 mcg at 03/03/24 0832     Allergies:  Allergies   Allergen Reactions    Chlorhexidine Rash    Sulfa Antibiotics Rash     Childhood reaction        Objective     Vitals:  Temp:  [97.1 °F (36.2 °C)-97.7 °F (36.5 °C)] 97.5 °F (36.4 °C)  Heart Rate:  [60-85] 85  Resp:  [16-18] 18  BP: (135-194)/() 140/104    Neurologic Exam   Mental status/Cognition: alert; oriented to person, year, not to place or month  Speech/language: fluent; comprehension intact    Cranial nerves: Tracks examiner.  Face appears symmetric.  Tongue protrudes midline.  No dysarthria.  Unable to perform shoulder shrug on the left due to clavicle fracture      Motor: Normal bulk. Normal tone.  Unable to perform formal strength testing in the upper extremities due to multiple fractures and left clavicle fracture.  Was able to purposefully and spontaneously use right arm to feed himself and raise antigravity.   Right Left Comments   HF 4+ 5    KE 5 5    KF      DF 4+ 5    PF 5 5      Sensation:   Light Touch Diminished bilateral lower extremities   Pin Prick    Vibration Diminished on the left great toe   Temperature    Proprioception        Reflexes:   Right Left Comments   Biceps      Triceps      Brachioradialis      Patellar 0 0    Achilles 0 0    Amse      Plantar up up    Jaw Jerk         Laboratory Results:   Lab Results   Component Value Date    GLUCOSE 116 (H) 03/02/2024    CALCIUM 9.6 03/02/2024     (L) 03/02/2024    K 4.1 03/02/2024    CO2 25.0 03/02/2024     CL 96 (L) 03/02/2024    BUN 14 03/02/2024    CREATININE 0.85 03/02/2024    EGFRIFNONA 64 09/10/2021    BCR 16.5 03/02/2024    ANIONGAP 11.0 03/02/2024     Lab Results   Component Value Date    WBC 9.72 03/02/2024    HGB 12.7 (L) 03/02/2024    HCT 38.6 03/02/2024    MCV 93.0 03/02/2024     03/02/2024     Lab Results   Component Value Date    CHOL 127 09/10/2021     Lab Results   Component Value Date    HDL 41 04/28/2023    HDL 28 (L) 09/10/2021     Lab Results   Component Value Date    LDL 79 04/28/2023    LDL 74 09/10/2021     Lab Results   Component Value Date    TRIG 176 (H) 04/28/2023    TRIG 141 09/10/2021     Lab Results   Component Value Date    HGBA1C 5.50 03/02/2024     Lab Results   Component Value Date    JERXNGLP67 191 (L) 04/28/2023     Lab Results   Component Value Date    FOLATE 4.1 04/28/2023     Lactate 1.6, down from 2.8    Images/Procedures   CT head 3/2/2024  Age-related changes, no acute intracranial abnormality    Assessment / Plan   Active Hospital Problems:  History of seizures  Falls  Suspected neuropathy  Dementia  History of vitamin B12 deficiency    Brief Patient Summary:  Lea Rivers is a 78 y.o. male with history of seizure disorder, history of colon cancer and prostate cancer, hypertension, hyperlipidemia, diabetes, dementia who is presenting with fall and rib fractures.  On exam, patient is alert and oriented to self and year, has minimal weakness on the right leg, decreased light touch bilaterally and diminished vibratory sensation in the left.  He is also noted to have high arched feet and hammertoes.     Suspect his increasing falls is possibly due to a neuropathy due to his diabetes versus B12 deficiency (in 2023 it was 191).  Unlikely related to seizures, family states this has been controlled for the last 15 years.     Given his progression of dementia, and falls he would benefit from long-term care at a facility.  Family is in agreement.       Plan:   -EMG/NCS  for neuropathy  -PT/OT   -Depakote 500 mg daily, Keppra 500 mg twice daily  -B12 1000 mcg daily    I have discussed the above with the patient, family  Time spent with patient: 45 minutes in face-to-face evaluation and management of the patient.       Erick Garcias, DO

## 2024-03-04 ENCOUNTER — APPOINTMENT (OUTPATIENT)
Dept: NEUROLOGY | Facility: HOSPITAL | Age: 79
DRG: 329 | End: 2024-03-04
Payer: MEDICARE

## 2024-03-04 LAB
ANION GAP SERPL CALCULATED.3IONS-SCNC: 9 MMOL/L (ref 5–15)
BUN SERPL-MCNC: 10 MG/DL (ref 8–23)
BUN/CREAT SERPL: 13 (ref 7–25)
CALCIUM SPEC-SCNC: 8.2 MG/DL (ref 8.6–10.5)
CHLORIDE SERPL-SCNC: 98 MMOL/L (ref 98–107)
CO2 SERPL-SCNC: 24 MMOL/L (ref 22–29)
CREAT SERPL-MCNC: 0.77 MG/DL (ref 0.76–1.27)
DEPRECATED RDW RBC AUTO: 47.8 FL (ref 37–54)
EGFRCR SERPLBLD CKD-EPI 2021: 91.1 ML/MIN/1.73
ERYTHROCYTE [DISTWIDTH] IN BLOOD BY AUTOMATED COUNT: 14.4 % (ref 12.3–15.4)
GLUCOSE BLDC GLUCOMTR-MCNC: 118 MG/DL (ref 70–130)
GLUCOSE BLDC GLUCOMTR-MCNC: 119 MG/DL (ref 70–130)
GLUCOSE BLDC GLUCOMTR-MCNC: 126 MG/DL (ref 70–130)
GLUCOSE BLDC GLUCOMTR-MCNC: 86 MG/DL (ref 70–130)
GLUCOSE SERPL-MCNC: 82 MG/DL (ref 65–99)
HCT VFR BLD AUTO: 30.9 % (ref 37.5–51)
HGB BLD-MCNC: 9.8 G/DL (ref 13–17.7)
MCH RBC QN AUTO: 29 PG (ref 26.6–33)
MCHC RBC AUTO-ENTMCNC: 31.7 G/DL (ref 31.5–35.7)
MCV RBC AUTO: 91.4 FL (ref 79–97)
PLATELET # BLD AUTO: 140 10*3/MM3 (ref 140–450)
PMV BLD AUTO: 8.8 FL (ref 6–12)
POTASSIUM SERPL-SCNC: 4.2 MMOL/L (ref 3.5–5.2)
RBC # BLD AUTO: 3.38 10*6/MM3 (ref 4.14–5.8)
SODIUM SERPL-SCNC: 131 MMOL/L (ref 136–145)
WBC NRBC COR # BLD AUTO: 6.79 10*3/MM3 (ref 3.4–10.8)

## 2024-03-04 PROCEDURE — 95910 NRV CNDJ TEST 7-8 STUDIES: CPT

## 2024-03-04 PROCEDURE — G0378 HOSPITAL OBSERVATION PER HR: HCPCS

## 2024-03-04 PROCEDURE — 95886 MUSC TEST DONE W/N TEST COMP: CPT | Performed by: PSYCHIATRY & NEUROLOGY

## 2024-03-04 PROCEDURE — 97535 SELF CARE MNGMENT TRAINING: CPT

## 2024-03-04 PROCEDURE — 97530 THERAPEUTIC ACTIVITIES: CPT

## 2024-03-04 PROCEDURE — 97162 PT EVAL MOD COMPLEX 30 MIN: CPT

## 2024-03-04 PROCEDURE — 97166 OT EVAL MOD COMPLEX 45 MIN: CPT

## 2024-03-04 PROCEDURE — 95886 MUSC TEST DONE W/N TEST COMP: CPT

## 2024-03-04 PROCEDURE — 99231 SBSQ HOSP IP/OBS SF/LOW 25: CPT | Performed by: ORTHOPAEDIC SURGERY

## 2024-03-04 PROCEDURE — 25810000003 SODIUM CHLORIDE 0.9 % SOLUTION: Performed by: INTERNAL MEDICINE

## 2024-03-04 PROCEDURE — 99232 SBSQ HOSP IP/OBS MODERATE 35: CPT | Performed by: INTERNAL MEDICINE

## 2024-03-04 PROCEDURE — 82948 REAGENT STRIP/BLOOD GLUCOSE: CPT

## 2024-03-04 PROCEDURE — 95910 NRV CNDJ TEST 7-8 STUDIES: CPT | Performed by: PSYCHIATRY & NEUROLOGY

## 2024-03-04 PROCEDURE — 25010000002 LABETALOL 5 MG/ML SOLUTION: Performed by: NURSE PRACTITIONER

## 2024-03-04 PROCEDURE — 99233 SBSQ HOSP IP/OBS HIGH 50: CPT

## 2024-03-04 PROCEDURE — 25010000002 HEPARIN (PORCINE) PER 1000 UNITS: Performed by: INTERNAL MEDICINE

## 2024-03-04 PROCEDURE — 80048 BASIC METABOLIC PNL TOTAL CA: CPT | Performed by: INTERNAL MEDICINE

## 2024-03-04 PROCEDURE — 85027 COMPLETE CBC AUTOMATED: CPT | Performed by: INTERNAL MEDICINE

## 2024-03-04 RX ORDER — LABETALOL HYDROCHLORIDE 5 MG/ML
10 INJECTION, SOLUTION INTRAVENOUS ONCE
Status: COMPLETED | OUTPATIENT
Start: 2024-03-04 | End: 2024-03-04

## 2024-03-04 RX ORDER — LIDOCAINE 4 G/G
2 PATCH TOPICAL
Status: DISCONTINUED | OUTPATIENT
Start: 2024-03-04 | End: 2024-03-31 | Stop reason: HOSPADM

## 2024-03-04 RX ADMIN — DIVALPROEX SODIUM 500 MG: 250 TABLET, FILM COATED, EXTENDED RELEASE ORAL at 09:59

## 2024-03-04 RX ADMIN — VERAPAMIL HYDROCHLORIDE 240 MG: 240 TABLET, FILM COATED, EXTENDED RELEASE ORAL at 08:25

## 2024-03-04 RX ADMIN — HEPARIN SODIUM 5000 UNITS: 5000 INJECTION INTRAVENOUS; SUBCUTANEOUS at 20:58

## 2024-03-04 RX ADMIN — Medication 1000 MCG: at 08:25

## 2024-03-04 RX ADMIN — LIDOCAINE 2 PATCH: 4 PATCH TOPICAL at 13:30

## 2024-03-04 RX ADMIN — SODIUM CHLORIDE 1000 ML: 9 INJECTION, SOLUTION INTRAVENOUS at 17:39

## 2024-03-04 RX ADMIN — LEVOTHYROXINE SODIUM 88 MCG: 0.09 TABLET ORAL at 04:42

## 2024-03-04 RX ADMIN — ATORVASTATIN CALCIUM 10 MG: 10 TABLET, FILM COATED ORAL at 20:58

## 2024-03-04 RX ADMIN — LEVETIRACETAM 500 MG: 500 TABLET, FILM COATED ORAL at 20:58

## 2024-03-04 RX ADMIN — TRAMADOL HYDROCHLORIDE 50 MG: 50 TABLET ORAL at 09:59

## 2024-03-04 RX ADMIN — LABETALOL HYDROCHLORIDE 10 MG: 5 INJECTION, SOLUTION INTRAVENOUS at 03:59

## 2024-03-04 RX ADMIN — TAMSULOSIN HYDROCHLORIDE 0.4 MG: 0.4 CAPSULE ORAL at 08:25

## 2024-03-04 RX ADMIN — LISINOPRIL 20 MG: 20 TABLET ORAL at 08:25

## 2024-03-04 RX ADMIN — HEPARIN SODIUM 5000 UNITS: 5000 INJECTION INTRAVENOUS; SUBCUTANEOUS at 08:25

## 2024-03-04 RX ADMIN — LEVETIRACETAM 500 MG: 500 TABLET, FILM COATED ORAL at 08:25

## 2024-03-04 RX ADMIN — Medication 10 ML: at 20:58

## 2024-03-04 RX ADMIN — PANTOPRAZOLE SODIUM 40 MG: 40 TABLET, DELAYED RELEASE ORAL at 04:42

## 2024-03-04 RX ADMIN — ISOSORBIDE MONONITRATE 30 MG: 30 TABLET, EXTENDED RELEASE ORAL at 08:25

## 2024-03-04 RX ADMIN — TRAMADOL HYDROCHLORIDE 50 MG: 50 TABLET ORAL at 20:57

## 2024-03-04 RX ADMIN — TRAMADOL HYDROCHLORIDE 50 MG: 50 TABLET ORAL at 03:44

## 2024-03-04 RX ADMIN — Medication 10 ML: at 08:26

## 2024-03-04 NOTE — THERAPY EVALUATION
Patient Name: Lea Rivers  : 1945    MRN: [id number]                              Today's Date: 3/4/2024       Admit Date: 3/2/2024    Visit Dx:     ICD-10-CM ICD-9-CM   1. Fall, initial encounter  W19.XXXA E888.9   2. Closed fracture of multiple ribs of left side, initial encounter  S22.42XA 807.09   3. Closed displaced fracture of acromial end of left clavicle, initial encounter  S42.032A 810.03   4. Falls frequently  R29.6 V15.88   5. Acute UTI (urinary tract infection)  N39.0 599.0     Patient Active Problem List   Diagnosis    Coronary artery disease    Dyslipidemia    Hypothyroidism    GERD (gastroesophageal reflux disease)    Seizure disorder    BPH (benign prostatic hypertrophy)    Hypertension    Primary osteoarthritis of both knees    Syncope and collapse    Precordial pain    Diabetes    Status post total right knee replacement    Postoperative urinary retention    Confusion, postoperative    Acute blood loss anemia, asymptomatic, no transfusion required    Elevated prostate specific antigen (PSA)    Prostate cancer    Anemia    Body mass index (BMI) of 32.0-32.9 in adult    Localization-related focal epilepsy with simple partial seizures    Need for vaccination against Streptococcus pneumoniae    Upper extremity tendon tear, right, initial encounter    Vitamin D deficiency    PSA elevation    Polyp of colon    Overactive bladder    Gross hematuria    History of colon polyps    History of colon cancer    B12 deficiency    Falls     Past Medical History:   Diagnosis Date    Anemia     Colon cancer     Status post partial colectomy in . No chemotherapy or radiation therapy.    Coronary artery disease     Nonobstructive coronary artery disease.    Diabetes     Dyslipidemia     GERD (gastroesophageal reflux disease)     Hx of.    Hyperlipidemia     Hypertension     Hyponatremia     Hypothyroidism     Left shoulder pain     Low sodium levels     recent issue; saw nephrologist who  ruled out kidney issues; took Sodium Chloride po to bring levels up    Memory deficit     FROM ANESTHESIA    Osteoarthritis     Prostate cancer 07/23/2022    Seizure disorder     silent seziure-diagnosed years ago    Skin cancer      Past Surgical History:   Procedure Laterality Date    APPENDECTOMY      CARDIAC CATHETERIZATION  2012    30 to 40% LAD    CARPAL TUNNEL RELEASE Bilateral     CHOLECYSTECTOMY      COLECTOMY PARTIAL / TOTAL  1990    Partial colectomy    COLONOSCOPY      CYBERKNIFE  02/09/2023    Prostate/SV    CYSTOSCOPY TRANSURETHRAL RESECTION OF PROSTATE N/A 09/21/2018    Procedure: CYSTOSCOPY TRANSURETHRAL RESECTION OF PROSTATE GREENLIGHT;  Surgeon: Naseem Clayton MD;  Location:  [facility];  Service: Urology    OTHER SURGICAL HISTORY      Contraction surgery on bilateral hands and wrists.    PROSTATE BIOPSY N/A 11/11/2022    Procedure: TRANSRECTAL ULTRASOUND GUIDED BIOPSY OF PROSTATE;  Surgeon: Naseem Noland MD;  Location:  [facility];  Service: Urology;  Laterality: N/A;    SINUS SURGERY      SKIN BIOPSY      TEETH EXTRACTION      TONSILLECTOMY      TOTAL KNEE ARTHROPLASTY Right 04/07/2022    Procedure: TOTAL KNEE ARTHROPLASTY WITH ORTHO ROBOT RIGHT;  Surgeon: Louis Malcolm MD;  Location:  [facility];  Service: Robotics - Ortho;  Laterality: Right;    TRANSRECTAL INCISION PROSTATE WITH GOLD SEED Bilateral 01/06/2023    Procedure: TRANSRECTAL ULTRASOUND GUIDED PROSTATE FIDUCIAL MARKER PLACEMENT;  Surgeon: Naseem Noland MD;  Location:  [facility];  Service: Urology;  Laterality: Bilateral;    TURP / TRANSURETHRAL INCISION / DRAINAGE PROSTATE      VASECTOMY        General Information       Row Name 03/04/24 2036          OT Time and Intention    Document Type evaluation  -SW     Mode of Treatment occupational therapy  -SW       Row Name 03/04/24 135          General Information    Patient Profile Reviewed yes  -SW     Prior Level of Function independent:;all household  mobility;ADL's;home management  -     Existing Precautions/Restrictions fall;other (see comments);left;brace on at all times;non-weight bearing  L clavicular fx, L rib fractures ( NWB LUE) dizzy with decrease in BP immed upon sitting, hx dementia  -     Barriers to Rehab medically complex;previous functional deficit;cognitive status;hearing deficit  -[facility] Name 03/04/24 1350          Living Environment    People in Home alone  -[facility] Name 03/04/24 1350          Stairs Within Home, Primary    Stairs Comment, Within Home, Primary Plans to go to extended care facility - not returning home.  -       Row Name 03/04/24 1350          Cognition    Orientation Status (Cognition) oriented to;person  -[facility] Name 03/04/24 1350          Safety Issues, Functional Mobility    Safety Issues Affecting Function (Mobility) awareness of need for assistance;insight into deficits/self-awareness;judgment;safety precaution awareness;safety precautions follow-through/compliance;problem-solving;sequencing abilities  -     Impairments Affecting Function (Mobility) balance;cognition;endurance/activity tolerance;pain;postural/trunk control;strength  -     Cognitive Impairments, Mobility Safety/Performance awareness, need for assistance;insight into deficits/self-awareness;judgment;problem-solving/reasoning;safety precaution awareness;safety precaution follow-through;sequencing abilities  -               User Key  (r) = Recorded By, (t) = Taken By, (c) = Cosigned By      Initials Name Provider Type     Justina Joyce OT Occupational Therapist                     Mobility/ADL's       Row Name 03/04/24 1350          Bed Mobility    Bed Mobility supine-sit  -     Supine-Sit [place] (Bed Mobility) moderate assist (50% patient effort);2 person assist;verbal cues;nonverbal cues (demo/gesture)  -     Assistive Device (Bed Mobility) bed rails;head of bed elevated;draw sheet  -       Row Name 03/04/24 8065           Transfers    Transfers bed-chair transfer;sit-stand transfer  -       Row Name 03/04/24 1350          Bed-Chair Transfer    Bed-Chair [place] (Transfers) moderate assist (50% patient effort);2 person assist;verbal cues;nonverbal cues (demo/gesture)  -     Assistive Device (Bed-Chair Transfers) other (see comments)  -     Comment, (Bed-Chair Transfer) UE support  -[facility] Name 03/04/24 1350          Sit-Stand Transfer    Sit-Stand [place] (Transfers) moderate assist (50% patient effort);2 person assist;verbal cues;nonverbal cues (demo/gesture)  -     Assistive Device (Sit-Stand Transfers) other (see comments)  -     Comment, (Sit-Stand Transfer) UE support  -[facility] Name 03/04/24 1350          Functional Mobility    Functional Mobility- Ind. Level moderate assist (50% patient effort);2 person assist required  -     Functional Mobility-Distance (Feet) 5  -[facility] Name 03/04/24 1350          Activities of Daily Living    BADL Assessment/Intervention lower body dressing;grooming  -[facility] Name 03/04/24 1350          Mobility    Extremity Weight-bearing Status left upper extremity  -     Left Upper Extremity (Weight-bearing Status) non weight-bearing (NWB)  -[facility] Name 03/04/24 1350          Lower Body Dressing Assessment/Training    [place] Level (Lower Body Dressing) lower body dressing skills;socks;dependent (less than 25% patient effort)  -     Position (Lower Body Dressing) sitting up in bed  -[facility] Name 03/04/24 1350          Grooming Assessment/Training    [place] Level (Grooming) grooming skills;wash face, hands;minimum assist (75% patient effort)  -     Position (Grooming) sitting up in bed  -               User Key  (r) = Recorded By, (t) = Taken By, (c) = Cosigned By      Initials Name Provider Type    Justina Marshall OT Occupational Therapist                   Obj/Interventions       Row Name 03/04/24 1350          Sensory  Assessment (Somatosensory)    Sensory Assessment (Somatosensory) UE sensation intact  -SW       Row Name 03/04/24 1350          Vision Assessment/Intervention    Visual Impairment/Limitations WFL  -SW       Row Name 03/04/24 1350          Range of Motion Comprehensive    General Range of Motion upper extremity range of motion deficits identified  -     Comment, General Range of Motion LUE in sling  -SW       Row Name 03/04/24 1350          Strength Comprehensive (MMT)    General Manual Muscle Testing (MMT) Assessment upper extremity strength deficits identified  -     Comment, General Manual Muscle Testing (MMT) Assessment LUE nwb, RUE 4/5  -SW       Row Name 03/04/24 1350          Balance    Balance Assessment sitting static balance;sitting dynamic balance;sit to stand dynamic balance;standing dynamic balance;standing static balance  -     Static Sitting Balance minimal assist  -     Dynamic Sitting Balance moderate assist  -     Position, Sitting Balance supported;unsupported  -     Sit to Stand Dynamic Balance moderate assist  -     Static Standing Balance moderate assist  -     Dynamic Standing Balance moderate assist;2-person assist  -     Position/Device Used, Standing Balance supported  -     Balance Interventions sitting;standing;sit to stand;supported;static;dynamic;minimal challenge;occupation based/functional task  -               User Key  (r) = Recorded By, (t) = Taken By, (c) = Cosigned By      Initials Name Provider Type    Justina Marshall OT Occupational Therapist                   Goals/Plan       Row Name 03/04/24 1350          Transfer Goal 1 (OT)    Activity/Assistive Device (Transfer Goal 1, OT) toilet  -     [place] Level/Cues Needed (Transfer Goal 1, OT) minimum assist (75% or more patient effort)  -     Time Frame (Transfer Goal 1, OT) long term goal (LTG);by discharge  -     Progress/Outcome (Transfer Goal 1, OT) goal ongoing  -SW       Row Name 03/04/24 1350           Dressing Goal 1 (OT)    Activity/Device (Dressing Goal 1, OT) lower body dressing  -SW     [place]/Cues Needed (Dressing Goal 1, OT) moderate assist (50-74% patient effort)  -SW     Time Frame (Dressing Goal 1, OT) long term goal (LTG);by discharge  -SW     Progress/Outcome (Dressing Goal 1, OT) goal ongoing  -       Row Name 03/04/24 1350          Therapy Assessment/Plan (OT)    Planned Therapy Interventions (OT) activity tolerance training;adaptive equipment training;BADL retraining;functional balance retraining;patient/caregiver education/training;transfer/mobility retraining;strengthening exercise  -               User Key  (r) = Recorded By, (t) = Taken By, (c) = Cosigned By      Initials Name Provider Type    Justina Marshall OT Occupational Therapist                   Clinical Impression       Row Name 03/04/24 1350          Pain Assessment    Pain Location - Side/Orientation Left  -SW     Pain Location upper  -SW     Pain Location - extremity;shoulder  -     Pain Intervention(s) Repositioned  -       Row Name 03/04/24 1350          Pain Scale: FACES Pre/Post-Treatment    Pain: FACES Scale, Pretreatment 2-->hurts little bit  -SW     Posttreatment Pain Rating 4-->hurts little more  -SW       Row Name 03/04/24 1350          Plan of Care Review    Plan of Care Reviewed With patient;son  -SW     Outcome Evaluation OT eval complete. Pt presents with weakness, dizziness when sitting eob with hx of orthostatic hypotension reported by dtr, nwb to LUE, [place], and confusion. Pt mod assist for bed mob, dep for donning socks, min assist for grooming, mod assist for sts and mod assist x 2 for taking steps in room. Recommend iPOT and SNF at d/c.  -       Row Name 03/04/24 5800          Therapy Assessment/Plan (OT)    Rehab Potential (OT) good, to achieve stated therapy goals  -     Criteria for Skilled Therapeutic Interventions Met (OT) yes;meets criteria;skilled treatment is necessary  -      Therapy Frequency (OT) daily  -SW       Row Name 03/04/24 1350          Therapy Plan Review/Discharge Plan (OT)    Anticipated Discharge Disposition (OT) skilled nursing facility  -       Row Name 03/04/24 1350          Vital Signs    Pre Systolic BP Rehab 144  -SW     Pre Treatment Diastolic BP 88  -SW     Intra Systolic BP Rehab 106  -SW     Intra Treatment Diastolic BP 79  -SW     Post Systolic BP Rehab 109  -SW     Post Treatment Diastolic BP 67  -SW     Pretreatment Heart Rate (beats/min) 73  -SW     Pre SpO2 (%) 95  -SW     O2 Delivery Pre Treatment room air  -SW     O2 Delivery Intra Treatment room air  -SW     O2 Delivery Post Treatment room air  -SW     Pre Patient Position Supine  -SW     Intra Patient Position Standing  -SW     Post Patient Position Sitting  -SW       Row Name 03/04/24 1350          Positioning and Restraints    Pre-Treatment Position in bed  -SW     Post Treatment Position chair  -SW     In Chair notified nsg;reclined;call light within reach;sitting;encouraged to call for assist;exit alarm on;with family/caregiver;waffle cushion;legs elevated  -SW               User Key  (r) = Recorded By, (t) = Taken By, (c) = Cosigned By      Initials Name Provider Type    Justina Marshall, SADAF Occupational Therapist                   Outcome Measures       Row Name 03/04/24 1519          How much help from another is currently needed...    Putting on and taking off regular lower body clothing? 1  -SW     Bathing (including washing, rinsing, and drying) 2  -SW     Toileting (which includes using toilet bed pan or urinal) 1  -SW     Putting on and taking off regular upper body clothing 1  -SW     Taking care of personal grooming (such as brushing teeth) 3  -SW     Eating meals 3  -SW     AM-PAC 6 Clicks Score (OT) 11  -SW       Row Name 03/04/24 1446 03/04/24 0800       How much help from another person do you currently need...    Turning from your back to your side while in flat bed without using  bedrails? 2  -LO 4  -DUANE    Moving from lying on back to sitting on the side of a flat bed without bedrails? 2  -LO 4  -DUANE    Moving to and from a bed to a chair (including a wheelchair)? 2  -LO 3  -DUANE    Standing up from a chair using your arms (e.g., wheelchair, bedside chair)? 2  -LO 3  -DUANE    Climbing 3-5 steps with a railing? 2  -LO 3  -DUANE    To walk in hospital room? 2  -LO 3  -DUANE    AM-PAC 6 Clicks Score (PT) 12  -LO 20  -DUANE    Highest Level of Mobility Goal 4 --> Transfer to chair/commode  -LO 6 --> Walk 10 steps or more  -DUANE      Row Name 03/04/24 1519 03/04/24 1446       Functional Assessment    Outcome Measure Options AM-PAC 6 Clicks Daily Activity (OT)  - AM-PAC 6 Clicks Basic Mobility (PT)  -LO              User Key  (r) = Recorded By, (t) = Taken By, (c) = Cosigned By      Initials Name Provider Type    Checo Cole, RN Registered Nurse    Justina Marshall, OT Occupational Therapist    Emily Oliva, PT Physical Therapist                    Occupational Therapy Education       Title: PT OT SLP Therapies (In Progress)       Topic: Occupational Therapy (In Progress)       Point: ADL training (Done)       Description:   Instruct learner(s) on proper safety adaptation and remediation techniques during self care or transfers.   Instruct in proper use of assistive devices.                  Learning Progress Summary             Patient Acceptance, E, VU by LAINE at 3/4/2024 2969                         Point: Home exercise program (Not Started)       Description:   Instruct learner(s) on appropriate technique for monitoring, assisting and/or progressing therapeutic exercises/activities.                  Learner Progress:  Not documented in this visit.              Point: Precautions (Done)       Description:   Instruct learner(s) on prescribed precautions during self-care and functional transfers.                  Learning Progress Summary             Patient Acceptance, E, VU by LAINE at 3/4/2024 2271                          Point: Body mechanics (Not Started)       Description:   Instruct learner(s) on proper positioning and spine alignment during self-care, functional mobility activities and/or exercises.                  Learner Progress:  Not documented in this visit.                              User Key       Initials Effective Dates Name Provider Type Discipline     06/16/21 -  Justina Joyce, SADAF Occupational Therapist OT                  OT Recommendation and Plan  Planned Therapy Interventions (OT): activity tolerance training, adaptive equipment training, BADL retraining, functional balance retraining, patient/caregiver education/training, transfer/mobility retraining, strengthening exercise  Therapy Frequency (OT): daily  Plan of Care Review  Plan of Care Reviewed With: patient, son  Outcome Evaluation: OT eval complete. Pt presents with weakness, dizziness when sitting eob with hx of orthostatic hypotension reported by dtr, nwb to LUE, [place], and confusion. Pt mod assist for bed mob, dep for donning socks, min assist for grooming, mod assist for sts and mod assist x 2 for taking steps in room. Recommend iPOT and SNF at d/c.     Time Calculation:   Evaluation Complexity (OT)  Review Occupational Profile/Medical/Therapy History Complexity: expanded/moderate complexity  Assessment, Occupational Performance/Identification of Deficit Complexity: 5 or more performance deficits  Clinical Decision Making Complexity (OT): detailed assessment/moderate complexity  Overall Complexity of Evaluation (OT): moderate complexity     Time Calculation- OT       Row Name 03/04/24 1350 03/04/24 1340          Time Calculation- OT    OT Start Time 1350  -SW --     OT Received On 03/04/24 -SW --     OT Goal Re-Cert Due Date 03/14/24  -SW --        Timed Charges    49645 - Gait Training Minutes  -- 4  -LO     32543 - OT Self Care/Mgmt Minutes 15  -SW --        Untimed Charges    OT Eval/Re-eval Minutes 40  -SW --        Total  Minutes    Timed Charges Total Minutes 15  -SW 4  -LO     Untimed Charges Total Minutes 40  -SW --      Total Minutes 55  -SW 4  -LO               User Key  (r) = Recorded By, (t) = Taken By, (c) = Cosigned By      Initials Name Provider Type    Justina Marshall OT Occupational Therapist    Emily Oliva, PT Physical Therapist                  Therapy Charges for Today       Code Description Service Date Service Provider Modifiers Qty    [id number]  OT SELF CARE/MGMT/TRAIN EA 15 MIN 3/4/2024 Justina Joyce OT GO 1    [id number]  OT EVAL MOD COMPLEXITY 3 3/4/2024 Justina Joyce OT GO 1                 Justina Joyce OT  3/4/2024

## 2024-03-04 NOTE — PLAN OF CARE
Goal Outcome Evaluation:  Plan of Care Reviewed With: patient        Progress: improving  Outcome Evaluation: Awake and alert. Periods of confusion noted this shift. Able to answer orientation questions. Blood pressure elevated this AM prior to medications. Pain medications per orders. No further complaints. Family brought glasses and hearing aides with batteries in today. They are in room in windowsill in pink bag

## 2024-03-04 NOTE — PROGRESS NOTES
[facility] Neurology    Progress Note    Patient Name: Lea Rivers  : 1945  MRN: [id number]  Primary Care Physician:  Mannie Melvin MD  Date of admission: 3/2/2024    Subjective     Chief Complaint: Dementia with increasing falls    History of Present Illness   Patient was seen resting comfortably in bed.  Son was bedside.  He was able to state his name.  Per family which is bedside he has not had a seizure in quite some time.  They have noticed a progressive decline in his dementia.  Per the report patient still drives and lives alone.  They are unaware if he has excessive daytime sleepiness, excessive nighttime wakefulness or change in his appetite.  Upon examination patient does display some weakness.  His left arm is in sling at this time.  He has no complaints.    Review of Systems   General: Negative for fever, nausea, or vomiting.   Neurological: Negative for headache, pain, or weakness.     Objective     Physical Exam  Vitals and nursing note reviewed.   Constitutional:       General: He is not in acute distress.     Appearance: He is not ill-appearing.   Eyes:      Extraocular Movements: Extraocular movements intact.      Pupils: Pupils are equal, round, and reactive to light.      Comments: No nystagmus or deviated gaze noted   Cardiovascular:      Rate and Rhythm: Normal rate.   Pulmonary:      Effort: Pulmonary effort is normal.   Neurological:      Mental Status: He is alert. He is disoriented.      Cranial Nerves: No cranial nerve deficit or facial asymmetry.      Sensory: No sensory deficit.      Motor: Weakness present. No tremor or seizure activity.      Comments:     Cranial Nerves   CN II: Pupils are equal, round, and reactive to light. Normal visual acuity and visual fields.    CN III IV VI: Extraocular movements are full without nystagmus.  CN V: Normal facial sensation and strength of muscles of mastication.  CN VII: Facial movements are symmetric. No  weakness.  CN VIII:  Auditory acuity is normal.  CN IX & X:  Symmetric palatal movement.  CN XI: Sternocleidomastoid and trapezius are normal.  No weakness.  CN XII: The tongue is midline.  No atrophy or fasciculations.    Motor: Unable to assess left arm due to sling          Vitals:   Temp:  [97.7 °F (36.5 °C)-98.3 °F (36.8 °C)] 98.3 °F (36.8 °C)  Heart Rate:  [59-75] 75  Resp:  [16-18] 18  BP: (113-200)/() 144/88    Current Medications    Current Facility-Administered Medications:     acetaminophen (TYLENOL) tablet 650 mg, 650 mg, Oral, Q4H PRN, 650 mg at 03/03/24 1014 **OR** acetaminophen (TYLENOL) 160 MG/5ML oral solution 650 mg, 650 mg, Oral, Q4H PRN **OR** acetaminophen (TYLENOL) suppository 650 mg, 650 mg, Rectal, Q4H PRN, Dawson Samayoa MD    atorvastatin (LIPITOR) tablet 10 mg, 10 mg, Oral, Daily, Dawson Samayoa MD, 10 mg at 03/03/24 2047    calcium carbonate (TUMS) chewable tablet 500 mg (200 mg elemental), 1 tablet, Oral, TID PRN, Dawson Samayoa MD    Calcium Replacement - Follow Nurse / BPA Driven Protocol, , Does not apply, PRN, Dawson Samayoa MD    dextrose (D50W) (25 g/50 mL) IV injection 25 g, 25 g, Intravenous, Q15 Min PRN, Dawson Samayoa MD    dextrose (GLUTOSE) oral gel 15 g, 15 g, Oral, Q15 Min PRN, Dawson Samayoa MD    divalproex (DEPAKOTE ER) 24 hr tablet 500 mg, 500 mg, Oral, Daily, Dawson Samayoa MD, 500 mg at 03/04/24 0959    fluticasone (FLONASE) 50 MCG/ACT nasal spray 2 spray, 2 spray, Nasal, Daily PRN, Dawson Samayoa MD    glucagon (GLUCAGEN) injection 1 mg, 1 mg, Intramuscular, Q15 Min PRN, Dawson Samayoa MD    heparin (porcine) 5000 UNIT/ML injection 5,000 Units, 5,000 Units, Subcutaneous, Q12H, Dawson Samayoa MD, 5,000 Units at 03/04/24 0825    Insulin Lispro (humaLOG) injection 2-7 Units, 2-7 Units, Subcutaneous, 4x Daily AC & at Bedtime, Dawson Samayoa MD    isosorbide mononitrate (IMDUR) 24 hr tablet 30 mg, 30 mg, Oral, BID - Nitrates,  Dawson Samayoa MD, 30 mg at 03/04/24 0825    levETIRAcetam (KEPPRA) tablet 500 mg, 500 mg, Oral, BID, Dawson Samayoa MD, 500 mg at 03/04/24 0825    levothyroxine (SYNTHROID, LEVOTHROID) tablet 88 mcg, 88 mcg, Oral, Q AM, Dawson Samayoa MD, 88 mcg at 03/04/24 0442    Lidocaine 4 % 2 patch, 2 patch, Transdermal, Q24H, Noel Petersen MD, 2 patch at 03/04/24 1330    lisinopril (PRINIVIL,ZESTRIL) tablet 20 mg, 20 mg, Oral, Q12H, Noel Petersen MD, 20 mg at 03/04/24 0825    Magnesium Standard Dose Replacement - Follow Nurse / BPA Driven Protocol, , Does not apply, Yao WILEY Masoud, MD    ondansetron ODT (ZOFRAN-ODT) disintegrating tablet 4 mg, 4 mg, Oral, Q6H PRN **OR** ondansetron (ZOFRAN) injection 4 mg, 4 mg, Intravenous, Q6H PRN, Dawson Samayoa MD    pantoprazole (PROTONIX) EC tablet 40 mg, 40 mg, Oral, Q AM, Dawson Samayoa MD, 40 mg at 03/04/24 0442    Phosphorus Replacement - Follow Nurse / BPA Driven Protocol, , Does not apply, Yao WILEY Masoud, MD    Potassium Replacement - Follow Nurse / BPA Driven Protocol, , Does not apply, Yao WILEY Masoud, MD    sodium chloride 0.9 % flush 10 mL, 10 mL, Intravenous, PRN, Jef Chavez MD    sodium chloride 0.9 % flush 10 mL, 10 mL, Intravenous, Q12H, Dawson Samayoa MD, 10 mL at 03/04/24 0826    sodium chloride 0.9 % flush 10 mL, 10 mL, Intravenous, PRNYao Masoud, MD    sodium chloride 0.9 % infusion 40 mL, 40 mL, Intravenous, PRN, Dawson Samayoa MD    tamsulosin (FLOMAX) 24 hr capsule 0.4 mg, 0.4 mg, Oral, Daily, Dawson Samayoa MD, 0.4 mg at 03/04/24 0825    traMADol (ULTRAM) tablet 50 mg, 50 mg, Oral, Q6H PRN, Dawson Samayoa MD, 50 mg at 03/04/24 0959    verapamil SR (CALAN-SR) CR tablet 240 mg, 240 mg, Oral, Daily, Dawson Samayoa MD, 240 mg at 03/04/24 0825    vitamin B-12 (CYANOCOBALAMIN) tablet 1,000 mcg, 1,000 mcg, Oral, Daily, Dawson Samayoa MD, 1,000 mcg at 03/04/24 0825    Laboratory Results:   Lab  Results   Component Value Date    GLUCOSE 82 03/04/2024    CALCIUM 8.2 (L) 03/04/2024     (L) 03/04/2024    K 4.2 03/04/2024    CO2 24.0 03/04/2024    CL 98 03/04/2024    BUN 10 03/04/2024    CREATININE 0.77 03/04/2024    EGFRIFNONA 64 09/10/2021    BCR 13.0 03/04/2024    ANIONGAP 9.0 03/04/2024     Lab Results   Component Value Date    WBC 6.79 03/04/2024    HGB 9.8 (L) 03/04/2024    HCT 30.9 (L) 03/04/2024    MCV 91.4 03/04/2024     03/04/2024     Lab Results   Component Value Date    CHOL 127 09/10/2021     Lab Results   Component Value Date    HDL 41 04/28/2023    HDL 28 (L) 09/10/2021     Lab Results   Component Value Date    LDL 79 04/28/2023    LDL 74 09/10/2021     Lab Results   Component Value Date    TRIG 176 (H) 04/28/2023    TRIG 141 09/10/2021     Lab Results   Component Value Date    HGBA1C 5.50 03/02/2024     Lab Results   Component Value Date    INR 1.08 03/24/2022    INR 1.11 05/26/2021    PROTIME 13.7 03/24/2022    PROTIME 14.0 05/26/2021     Lab Results   Component Value Date    FOLATE 2.32 (L) 03/03/2024     Lab Results   Component Value Date    LVCOOVGC02 807 03/03/2024             Assessment / Plan     Active Hospital Problems:    Falls       Brief Patient Summary:  Lea Rivers is a 78 y.o. male who has past medical history of seizure disorder, colon cancer, prostate cancer, HTN, HLD, T2DM, dementia who presented to [facility] ED after experiencing a fall resulting in clavicle and rib fracture.     Plan:   History of seizure disorder  Dementia  S/p falls  History of vitamin B12 deficiency  Neuropathy  EMG/nerve conduction study showed mild axonal sensorimotor neuropathy  CT head with no acute intracranial abnormalities did show age-related volume loss and chronic white matter changes.  Continue vitamin B12 1000 mg daily  Continue Depakote 500 ER  Continue Keppra 500 mg twice daily  Per patient's family he has been stable on this AEM regimen for quite some time.  Patient to  continue work with PT/OT  Given patient's progressive dementia and increasing falls patient likely benefit to long-term care facility.  At this time patient still lives alone and drives.  Family is interested in rehab/possible placement.  Agree with recommendation.  Patient to follow-up with KATE Hernandez  Patient's increasing falls is likely related to his neuropathy/progression of dementia.  General neurology has no further recommendations at this time.  This will reach out the questions.    I have discussed the above with the patient, bedside RN Dr. Petersen  Time spent with patient: 50 minutes in face-to-face evaluation and management of the patient.    Copied text in this note has been reviewed and is accurate as of 03/04/24.     KATE Fernandez

## 2024-03-04 NOTE — THERAPY EVALUATION
Patient Name: Lea Rivers  : 1945    MRN: [id number]                              Today's Date: 3/4/2024       Admit Date: 3/2/2024    Visit Dx:     ICD-10-CM ICD-9-CM   1. Fall, initial encounter  W19.XXXA E888.9   2. Closed fracture of multiple ribs of left side, initial encounter  S22.42XA 807.09   3. Closed displaced fracture of acromial end of left clavicle, initial encounter  S42.032A 810.03   4. Falls frequently  R29.6 V15.88   5. Acute UTI (urinary tract infection)  N39.0 599.0     Patient Active Problem List   Diagnosis    Coronary artery disease    Dyslipidemia    Hypothyroidism    GERD (gastroesophageal reflux disease)    Seizure disorder    BPH (benign prostatic hypertrophy)    Hypertension    Primary osteoarthritis of both knees    Syncope and collapse    Precordial pain    Diabetes    Status post total right knee replacement    Postoperative urinary retention    Confusion, postoperative    Acute blood loss anemia, asymptomatic, no transfusion required    Elevated prostate specific antigen (PSA)    Prostate cancer    Anemia    Body mass index (BMI) of 32.0-32.9 in adult    Localization-related focal epilepsy with simple partial seizures    Need for vaccination against Streptococcus pneumoniae    Upper extremity tendon tear, right, initial encounter    Vitamin D deficiency    PSA elevation    Polyp of colon    Overactive bladder    Gross hematuria    History of colon polyps    History of colon cancer    B12 deficiency    Falls     Past Medical History:   Diagnosis Date    Anemia     Colon cancer     Status post partial colectomy in . No chemotherapy or radiation therapy.    Coronary artery disease     Nonobstructive coronary artery disease.    Diabetes     Dyslipidemia     GERD (gastroesophageal reflux disease)     Hx of.    Hyperlipidemia     Hypertension     Hyponatremia     Hypothyroidism     Left shoulder pain     Low sodium levels     recent issue; saw nephrologist who  ruled out kidney issues; took Sodium Chloride po to bring levels up    Memory deficit     FROM ANESTHESIA    Osteoarthritis     Prostate cancer 07/23/2022    Seizure disorder     silent seziure-diagnosed years ago    Skin cancer      Past Surgical History:   Procedure Laterality Date    APPENDECTOMY      CARDIAC CATHETERIZATION  2012    30 to 40% LAD    CARPAL TUNNEL RELEASE Bilateral     CHOLECYSTECTOMY      COLECTOMY PARTIAL / TOTAL  1990    Partial colectomy    COLONOSCOPY      CYBERKNIFE  02/09/2023    Prostate/SV    CYSTOSCOPY TRANSURETHRAL RESECTION OF PROSTATE N/A 09/21/2018    Procedure: CYSTOSCOPY TRANSURETHRAL RESECTION OF PROSTATE GREENLIGHT;  Surgeon: Naseem Clayton MD;  Location:  [facility] OR;  Service: Urology    OTHER SURGICAL HISTORY      Contraction surgery on bilateral hands and wrists.    PROSTATE BIOPSY N/A 11/11/2022    Procedure: TRANSRECTAL ULTRASOUND GUIDED BIOPSY OF PROSTATE;  Surgeon: Naseem Noland MD;  Location:  [facility] OR;  Service: Urology;  Laterality: N/A;    SINUS SURGERY      SKIN BIOPSY      TEETH EXTRACTION      TONSILLECTOMY      TOTAL KNEE ARTHROPLASTY Right 04/07/2022    Procedure: TOTAL KNEE ARTHROPLASTY WITH ORTHO ROBOT RIGHT;  Surgeon: Louis Malcolm MD;  Location:  [facility] OR;  Service: Robotics - Ortho;  Laterality: Right;    TRANSRECTAL INCISION PROSTATE WITH GOLD SEED Bilateral 01/06/2023    Procedure: TRANSRECTAL ULTRASOUND GUIDED PROSTATE FIDUCIAL MARKER PLACEMENT;  Surgeon: Naseem Noland MD;  Location:  [facility] OR;  Service: Urology;  Laterality: Bilateral;    TURP / TRANSURETHRAL INCISION / DRAINAGE PROSTATE      VASECTOMY        General Information       Row Name 03/04/24 1429          Physical Therapy Time and Intention    Document Type evaluation  -LO     Mode of Treatment physical therapy  -LO       Row Name 03/04/24 1429          General Information    Patient Profile Reviewed yes  -LO     Prior Level of Function independent:;all household  mobility;gait;transfer;bed mobility;feeding;grooming;dressing;bathing;home management  per family, did not use a device to ambulate but had many falls  -LO     Existing Precautions/Restrictions fall;other (see comments);left;brace on at all times;non-weight bearing  L clavicular fx, L rib fractures ( NWB LUE)  dizzy with decrease in BP immed upon sitting, hx dementia  -     Barriers to Rehab medically complex;previous functional deficit;cognitive status;hearing deficit  -       Row Name 03/04/24 1429          Living Environment    People in Home alone  -       Row Name 03/04/24 1429          Cognition    Orientation Status (Cognition) oriented to;person;disoriented to;situation;time  -       Row Name 03/04/24 1429          Safety Issues, Functional Mobility    Safety Issues Affecting Function (Mobility) at risk behavior observed;awareness of need for assistance;impulsivity;insight into deficits/self-awareness;judgment;problem-solving;safety precaution awareness;safety precautions follow-through/compliance;sequencing abilities  -LO     Impairments Affecting Function (Mobility) balance;cognition;endurance/activity tolerance;pain;postural/trunk control;strength  -     Cognitive Impairments, Mobility Safety/Performance attention;awareness, need for assistance;impulsivity;insight into deficits/self-awareness;judgment;problem-solving/reasoning;safety precaution awareness;safety precaution follow-through;sequencing abilities  -LO               User Key  (r) = Recorded By, (t) = Taken By, (c) = Cosigned By      Initials Name Provider Type     Emily Muro PT Physical Therapist                   Mobility       Row Name 03/04/24 1433          Bed Mobility    Bed Mobility supine-sit  -LO     Supine-Sit [place] (Bed Mobility) moderate assist (50% patient effort);2 person assist;verbal cues;nonverbal cues (demo/gesture)  -     Assistive Device (Bed Mobility) bed rails;head of bed elevated;draw sheet  -      Comment, (Bed Mobility) vc for use of HR, physical assist at trunk and to complete progression to EOB  -LO       Row Name 03/04/24 1433          Transfers    Comment, (Transfers) EOB>stand>recliner; vc for HP very unsteady  -LO       Row Name 03/04/24 1433          Bed-Chair Transfer    Bed-Chair [place] (Transfers) moderate assist (50% patient effort);2 person assist;verbal cues;nonverbal cues (demo/gesture)  -LO     Assistive Device (Bed-Chair Transfers) other (see comments)  UE assist  -LO       Row Name 03/04/24 1433          Sit-Stand Transfer    Sit-Stand [place] (Transfers) moderate assist (50% patient effort);2 person assist;verbal cues;nonverbal cues (demo/gesture)  -LO     Assistive Device (Sit-Stand Transfers) other (see comments)  UE support  -LO       Row Name 03/04/24 1433          Gait/Stairs (Locomotion)    [place] Level (Gait) moderate assist (50% patient effort);2 person assist;verbal cues;nonverbal cues (demo/gesture)  -LO     Assistive Device (Gait) other (see comments)  UE assist  -LO     Distance in Feet (Gait) 3  -LO     Deviations/Abnormal Patterns (Gait) bilateral deviations;gait speed decreased;steppage;stride length decreased  -LO     Bilateral Gait Deviations forward flexed posture;heel strike decreased  -LO     Comment, (Gait/Stairs) Stepping to recliner from EOB with UE support, very unsteady  -LO       Row Name 03/04/24 1432          Mobility    Extremity Weight-bearing Status left upper extremity   -LO     Left Upper Extremity (Weight-bearing Status) non weight-bearing (NWB)   -LO               User Key  (r) = Recorded By, (t) = Taken By, (c) = Cosigned By      Initials Name Provider Type    Emily Oliva PT Physical Therapist                   Obj/Interventions       Row Name 03/04/24 1435          Range of Motion Comprehensive    General Range of Motion bilateral lower extremity ROM WNL  -LO       Row Name 03/04/24 1435          Strength Comprehensive (MMT)     General Manual Muscle Testing (MMT) Assessment lower extremity strength deficits identified  -LO     Comment, General Manual Muscle Testing (MMT) Assessment L hip strength 4-/5, L knee strength 4-/5, RLE grossly 4/5  -       Row Name 03/04/24 1435          Motor Skills    Motor Skills functional endurance  -LO     Functional Endurance fair, likely reduced from baseline  -       Row Name 03/04/24 1435          Balance    Balance Assessment sitting static balance;sitting dynamic balance;standing static balance;standing dynamic balance  -LO     Static Sitting Balance minimal assist;verbal cues;non-verbal cues (demo/gesture)  -LO     Dynamic Sitting Balance moderate assist;verbal cues;non-verbal cues (demo/gesture)  -LO     Position, Sitting Balance unsupported;sitting edge of bed  -LO     Static Standing Balance moderate assist;verbal cues;non-verbal cues (demo/gesture)  -LO     Dynamic Standing Balance moderate assist;2-person assist;verbal cues;non-verbal cues (demo/gesture)  -LO     Position/Device Used, Standing Balance supported;other (see comments)  BUE support  -     Comment, Balance initially requiring modA in sitting but progresses to [place] with cuing which may be related to initial dizziness with movement transition, AD and modA x2 for safety in standing  -       Row Name 03/04/24 1435          Sensory Assessment (Somatosensory)    Sensory Assessment (Somatosensory) unable/difficult to assess  -               User Key  (r) = Recorded By, (t) = Taken By, (c) = Cosigned By      Initials Name Provider Type    Emily Oliva, PT Physical Therapist                   Goals/Plan       Row Name 03/04/24 1445          Bed Mobility Goal 1 (PT)    Activity/Assistive Device (Bed Mobility Goal 1, PT) rolling to left;rolling to right;scooting;sit to supine;supine to sit  -     [place] Level/Cues Needed (Bed Mobility Goal 1, PT) minimum assist (75% or more patient effort)  -     Time Frame (Bed Mobility  Goal 1, PT) long term goal (LTG);10 days  -LO       Row Name 03/04/24 1445          Transfer Goal 1 (PT)    Activity/Assistive Device (Transfer Goal 1, PT) sit-to-stand/stand-to-sit;bed-to-chair/chair-to-bed;car transfer  -LO     [place] Level/Cues Needed (Transfer Goal 1, PT) minimum assist (75% or more patient effort)  -LO     Time Frame (Transfer Goal 1, PT) long term goal (LTG);10 days  -LO       Row Name 03/04/24 1445          Gait Training Goal 1 (PT)    Activity/Assistive Device (Gait Training Goal 1, PT) gait (walking locomotion);assistive device use;decrease fall risk;diminish gait deviation;improve balance and speed;increase endurance/gait distance;cane, straight  -LO     [place] Level (Gait Training Goal 1, PT) minimum assist (75% or more patient effort)  -LO     Distance (Gait Training Goal 1, PT) 25  -LO     Time Frame (Gait Training Goal 1, PT) long term goal (LTG);10 days  -[facility] Name 03/04/24 1445          Therapy Assessment/Plan (PT)    Planned Therapy Interventions (PT) balance training;bed mobility training;gait training;home exercise program;neuromuscular re-education;transfer training;strengthening;postural re-education;patient/family education  -               User Key  (r) = Recorded By, (t) = Taken By, (c) = Cosigned By      Initials Name Provider Type    Emily Oliva, PT Physical Therapist                   Clinical Impression       Row Name 03/04/24 1438          Pain    Additional Documentation Pain Scale: FACES Pre/Post-Treatment (Group)  -LO       Row Name 03/04/24 1438          Pain Scale: FACES Pre/Post-Treatment    Pain: FACES Scale, Pretreatment 2-->hurts little bit  -LO     Posttreatment Pain Rating 4-->hurts little more  -LO     Pain Location - Side/Orientation Left  -LO     Pain Location upper  -LO     Pain Location - extremity  -LO     Pre/Posttreatment Pain Comment pain with movement, ease with rest  -       Row Name 03/04/24 1434          Plan of Care  Review    Plan of Care Reviewed With patient  -LO     Outcome Evaluation PT eval completed. Patient alert, oriented to person only. Presenting with deficits in general BLE strength, sitting/standing balance, gait quality, and functional endurance effecting functional mobility below baseline. Of note, patient with decrease in BP and dizziness initially upon sitting but resolves with prolonged time in tranistioned position. Patient will benefit from skilled IP PT services to address impairments for return to [facility]. Recommend SNF at [place].  -LO       Row Name 03/04/24 1438          Therapy Assessment/Plan (PT)    Rehab Potential (PT) good, to achieve stated therapy goals  -LO     Criteria for Skilled Interventions Met (PT) yes;meets criteria;skilled treatment is necessary  -LO     Therapy Frequency (PT) daily  -LO       Row Name 03/04/24 1438          Vital Signs    Pre Systolic BP Rehab 144  -LO     Pre Treatment Diastolic BP 88  -LO     Intra Systolic BP Rehab 111  -LO     Intra Treatment Diastolic BP 74  -LO     Post Systolic BP Rehab 109  -LO     Post Treatment Diastolic BP 67  -LO     Pretreatment Heart Rate (beats/min) 73  -LO     Intratreatment Heart Rate (beats/min) 84  -LO     Posttreatment Heart Rate (beats/min) 74  -LO     Pre SpO2 (%) 94  -LO     O2 Delivery Pre Treatment room air  -LO     O2 Delivery Intra Treatment room air  -LO     O2 Delivery Post Treatment room air  -LO     Pre Patient Position Supine  -LO     Intra Patient Position Standing  -LO     Post Patient Position Sitting  -LO     Rest Breaks  2  -LO       Row Name 03/04/24 1438          Positioning and Restraints    Pre-Treatment Position in bed  -LO     Post Treatment Position chair  -LO     In Chair notified nsg;reclined;call light within reach;encouraged to call for assist;exit alarm on;waffle cushion;with family/caregiver  -LO               User Key  (r) = Recorded By, (t) = Taken By, (c) = Cosigned By      Initials Name Provider Type    LO  Emily Muro, PT Physical Therapist                   Outcome Measures       Row Name 03/04/24 1446 03/04/24 0800       How much help from another person do you currently need...    Turning from your back to your side while in flat bed without using bedrails? 2  -LO 4  -DUANE    Moving from lying on back to sitting on the side of a flat bed without bedrails? 2  -LO 4  -DUANE    Moving to and from a bed to a chair (including a wheelchair)? 2  -LO 3  -DUANE    Standing up from a chair using your arms (e.g., wheelchair, bedside chair)? 2  -LO 3  -DUANE    Climbing 3-5 steps with a railing? 2  -LO 3  -DUANE    To walk in hospital room? 2  -LO 3  -DUANE    AM-PAC 6 Clicks Score (PT) 12  -LO 20  -DUANE    Highest Level of Mobility Goal 4 --> Transfer to chair/commode  -LO 6 --> Walk 10 steps or more  -DUANE      Row Name 03/04/24 1446          Functional Assessment    Outcome Measure Options AM-PAC 6 Clicks Basic Mobility (PT)  -LO               User Key  (r) = Recorded By, (t) = Taken By, (c) = Cosigned By      Initials Name Provider Type    Checo Cole RN Registered Nurse    Emily Oliva, PT Physical Therapist                                 Physical Therapy Education       Title: PT OT SLP Therapies (Done)       Topic: Physical Therapy (Done)       Point: Mobility training (Done)       Learning Progress Summary             Patient Acceptance, E, VU,NR by LO at 3/4/2024 1340    Comment: PT POC   Family Acceptance, E, VU,NR by AARON at 3/4/2024 1340    Comment: PT POC                         Point: Home exercise program (Done)       Learning Progress Summary             Patient Acceptance, E, VU,NR by LO at 3/4/2024 1340    Comment: PT POC   Family Acceptance, E, VU,NR by LO at 3/4/2024 1340    Comment: PT POC                         Point: Body mechanics (Done)       Learning Progress Summary             Patient Acceptance, E, VU,NR by LO at 3/4/2024 1340    Comment: PT POC   Family Acceptance, E, VU,NR by LO at 3/4/2024 1340     Comment: PT POC                         Point: Precautions (Done)       Learning Progress Summary             Patient Acceptance, E, VU,NR by  at 3/4/2024 1340    Comment: PT POC   Family Acceptance, E, VU,NR by  at 3/4/2024 1340    Comment: PT POC                                         User Key       Initials Effective Dates Name Provider Type Discipline     06/16/21 -  Emily Muro, PT Physical Therapist PT                  PT Recommendation and Plan  Planned Therapy Interventions (PT): balance training, bed mobility training, gait training, home exercise program, neuromuscular re-education, transfer training, strengthening, postural re-education, patient/family education  Plan of Care Reviewed With: patient  Outcome Evaluation: PT eval completed. Patient alert, oriented to person only. Presenting with deficits in general BLE strength, sitting/standing balance, gait quality, and functional endurance effecting functional mobility below baseline. Of note, patient with decrease in BP and dizziness initially upon sitting but resolves with prolonged time in tranistioned position. Patient will benefit from skilled IP PT services to address impairments for return to [facility]. Recommend SNF at [place].     Time Calculation:   PT Evaluation Complexity  History, PT Evaluation Complexity: 1-2 personal factors and/or comorbidities  Examination of Body Systems (PT Eval Complexity): total of 3 or more elements  Clinical Presentation (PT Evaluation Complexity): evolving  Clinical Decision Making (PT Evaluation Complexity): moderate complexity  Overall Complexity (PT Evaluation Complexity): moderate complexity     PT Charges       Row Name 03/04/24 1340             Time Calculation    Start Time 1340  -LO      PT Received On 03/04/24  -LO      PT Goal Re-Cert Due Date 03/14/24  -LO         Timed Charges    62697 - Gait Training Minutes  4  -LO      32305 - PT Therapeutic Activity Minutes 10  -LO         Untimed Charges    PT  Eval/Re-eval Minutes 46  -LO         Total Minutes    Timed Charges Total Minutes 14  -LO      Untimed Charges Total Minutes 46  -LO       Total Minutes 60  -LO                User Key  (r) = Recorded By, (t) = Taken By, (c) = Cosigned By      Initials Name Provider Type    Emily Oliva, PT Physical Therapist                  Therapy Charges for Today       Code Description Service Date Service Provider Modifiers Qty    86322051692 HC PT THERAPEUTIC ACT EA 15 MIN 3/4/2024 Emily Muro, PT GP 1    71220816649 HC PT EVAL MOD COMPLEXITY 4 3/4/2024 Emily Muro, PT GP 1            PT G-Codes  Outcome Measure Options: AM-PAC 6 Clicks Basic Mobility (PT)  AM-PAC 6 Clicks Score (PT): 12  PT Discharge Summary  Anticipated Discharge Disposition (PT): skilled nursing facility    Emily Muro, PT  3/4/2024

## 2024-03-04 NOTE — PLAN OF CARE
Problem: Adult Inpatient Plan of Care  Goal: Plan of Care Review  Outcome: Ongoing, Not Progressing  Goal: Patient-Specific Goal (Individualized)  Outcome: Ongoing, Not Progressing  Goal: Absence of Hospital-Acquired Illness or Injury  Outcome: Ongoing, Not Progressing  Intervention: Identify and Manage Fall Risk  Recent Flowsheet Documentation  Taken 3/4/2024 0000 by Mikel Stringer RN  Safety Promotion/Fall Prevention:   assistive device/personal items within reach   clutter free environment maintained   fall prevention program maintained   lighting adjusted   nonskid shoes/slippers when out of bed   room organization consistent   safety round/check completed   toileting scheduled  Taken 3/3/2024 1932 by Mikel Strigner RN  Safety Promotion/Fall Prevention:   assistive device/personal items within reach   clutter free environment maintained   fall prevention program maintained   lighting adjusted   nonskid shoes/slippers when out of bed   room organization consistent   safety round/check completed   toileting scheduled  Intervention: Prevent Skin Injury  Recent Flowsheet Documentation  Taken 3/4/2024 0000 by Mikel Stringer RN  Body Position: weight shifting  Taken 3/3/2024 1932 by Mikel Stringer RN  Body Position: weight shifting  Skin Protection: (WSM)   adhesive use limited   incontinence pads utilized   tubing/devices free from skin contact  Intervention: Prevent and Manage VTE (Venous Thromboembolism) Risk  Recent Flowsheet Documentation  Taken 3/4/2024 0000 by Mikel Stringer RN  Activity Management: activity encouraged  Taken 3/3/2024 1932 by Mikel Stringer RN  Activity Management: activity encouraged  VTE Prevention/Management: (SQ heparin) other (see comments)  Range of Motion: active ROM (range of motion) encouraged  Intervention: Prevent Infection  Recent Flowsheet Documentation  Taken 3/4/2024 0000 by Mikel Stringer RN  Infection  Prevention:   environmental surveillance performed   equipment surfaces disinfected   hand hygiene promoted   rest/sleep promoted   single patient room provided  Taken 3/3/2024 1932 by Mikel Stringer RN  Infection Prevention:   environmental surveillance performed   equipment surfaces disinfected   hand hygiene promoted   rest/sleep promoted   single patient room provided  Goal: Optimal Comfort and Wellbeing  Outcome: Ongoing, Not Progressing  Intervention: Monitor Pain and Promote Comfort  Recent Flowsheet Documentation  Taken 3/3/2024 1932 by Mikel Stringer RN  Pain Management Interventions:   care clustered   pain management plan reviewed with patient/caregiver   pillow support provided   position adjusted   quiet environment facilitated   see MAR  Intervention: Provide Person-Centered Care  Recent Flowsheet Documentation  Taken 3/3/2024 1932 by Mikel Stringer RN  Trust Relationship/Rapport:   care explained   choices provided   emotional support provided   empathic listening provided   questions answered   questions encouraged   reassurance provided   thoughts/feelings acknowledged  Goal: Readiness for Transition of Care  Outcome: Ongoing, Not Progressing     Problem: Skin Injury Risk Increased  Goal: Skin Health and Integrity  Outcome: Ongoing, Not Progressing  Intervention: Optimize Skin Protection  Recent Flowsheet Documentation  Taken 3/4/2024 0000 by Mikel Stringer, RN  Head of Bed (HOB) Positioning: HOB at 30-45 degrees  Taken 3/3/2024 1932 by Mikel Stringer RN  Pressure Reduction Techniques:   frequent weight shift encouraged   heels elevated off bed   weight shift assistance provided  Head of Bed (HOB) Positioning: HOB at 30-45 degrees  Pressure Reduction Devices:   pressure-redistributing mattress utilized   positioning supports utilized  Skin Protection: (WSM)   adhesive use limited   incontinence pads utilized   tubing/devices free from skin contact      Problem: Fall Injury Risk  Goal: Absence of Fall and Fall-Related Injury  Outcome: Ongoing, Not Progressing  Intervention: Identify and Manage Contributors  Recent Flowsheet Documentation  Taken 3/4/2024 0000 by Mikel Stringer RN  Medication Review/Management: medications reviewed  Taken 3/3/2024 1932 by Mikel Stringer RN  Medication Review/Management: medications reviewed  Intervention: Promote Injury-Free Environment  Recent Flowsheet Documentation  Taken 3/4/2024 0000 by Mikel Stringer RN  Safety Promotion/Fall Prevention:   assistive device/personal items within reach   clutter free environment maintained   fall prevention program maintained   lighting adjusted   nonskid shoes/slippers when out of bed   room organization consistent   safety round/check completed   toileting scheduled  Taken 3/3/2024 1932 by Mikel Stringer RN  Safety Promotion/Fall Prevention:   assistive device/personal items within reach   clutter free environment maintained   fall prevention program maintained   lighting adjusted   nonskid shoes/slippers when out of bed   room organization consistent   safety round/check completed   toileting scheduled   Goal Outcome Evaluation:            VSS, RA, pain controlled with medications, patient answers all orientation questions correctly but has conversations that do not always make logical sense, patient has rested well, issues with urinary retention, no other complications stated or assessed.

## 2024-03-04 NOTE — CASE MANAGEMENT/SOCIAL WORK
Discharge Planning Assessment  [facility]     Patient Name: Lea Rivers  MRN: [id number]  Today's Date: 3/4/2024    Admit Date: 3/2/2024        Discharge Needs Assessment       Row Name 03/04/24 1000       Living Environment    People in Home alone    Current Living Arrangements home    Potentially Unsafe Housing Conditions none    In the past 12 months has the electric, gas, oil, or water company threatened to shut off services in your home? No    Primary Care Provided by self    Provides Primary Care For no one    Family Caregiver if Needed none    Quality of Family Relationships involved;supportive    Able to Return to Prior Arrangements no  After multiple falls, family has decided he can't live alone anymore. They are working on finding different options.       Resource/Environmental Concerns    Resource/Environmental Concerns none    Transportation Concerns none  Family will start transporting.       Transportation Needs    In the past 12 months, has lack of transportation kept you from medical appointments or from getting medications? no    In the past 12 months, has lack of transportation kept you from meetings, work, or from getting things needed for daily living? No       Food Insecurity    Within the past 12 months, you worried that your food would run out before you got the money to buy more. Never true    Within the past 12 months, the food you bought just didn't last and you didn't have money to get more. Never true       Transition Planning    Patient/Family Anticipates Transition to inpatient rehabilitation facility    Patient/Family Anticipated Services at Transition none    Transportation Anticipated family or friend will provide       Discharge Needs Assessment    Readmission Within the Last 30 Days no previous admission in last 30 days    Equipment Currently Used at Home shower chair;other (see comments)  raised toilet    Concerns to be Addressed discharge planning    Anticipated  Changes Related to Illness none    Equipment Needed After Discharge none    Discharge Facility/Level of Care Needs nursing facility, skilled                   Discharge Plan    No documentation.                 Continued Care and Services - Admitted Since 3/2/2024    No active coordination exists for this encounter.       Expected Discharge Date and Time       Expected Discharge Date Expected Discharge Time    Mar 5, 2024            Demographic Summary       Row Name 03/04/24 0959       General Information    Admission Type observation    Arrived From home    Referral Source admission list    Reason for Consult discharge planning    Preferred Language English       Contact Information    Permission Granted to Share Info With     Contact Information Obtained for     Contact Information Comments taylor marley [phone]    Luis Enrique Guadalupe Daughter [phone]                   Functional Status       Row Name 03/04/24 1000       Functional Status    Usual Activity Tolerance good    Current Activity Tolerance other (see comments)  randa       Physical Activity    On average, how many days per week do you engage in moderate to strenuous exercise (like a brisk walk)? 0 days    On average, how many minutes do you engage in exercise at this level? 0 min    Number of minutes of exercise per week 0       Assessment of Health Literacy    How often do you have someone help you read hospital materials? Often    How often do you have problems learning about your medical condition because of difficulty understanding written information? Sometimes    How often do you have a problem understanding what is told to you about your medical condition? Sometimes    How confident are you filling out medical forms by yourself? A little bit    Health Literacy Moderate       Functional Status, IADL    Medications assistive person    Meal Preparation independent    Housekeeping independent    Laundry independent     Shopping assistive person       Mental Status    General Appearance WDL WDL       Mental Status Summary    Recent Changes in Mental Status/Cognitive Functioning no changes                   Psychosocial    No documentation.                  Abuse/Neglect    No documentation.                  Legal    No documentation.                  Substance Abuse    No documentation.                  Patient Forms    No documentation.                     Valerie Keith RN

## 2024-03-04 NOTE — PROGRESS NOTES
"  SUBJECTIVE  Patient pleasantly confused this morning.  Demonstrating signs of dementia/delirium.    PHYSICAL THERAPY PROGRESS        OBJECTIVE  Temp (24hrs), Av.9 °F (36.6 °C), Min:97.7 °F (36.5 °C), Max:98.1 °F (36.7 °C)    Blood pressure (!) 178/103, pulse 61, temperature 97.9 °F (36.6 °C), temperature source Oral, resp. rate 16, height 170.2 cm (67\"), weight 88.9 kg (196 lb), SpO2 96%.    Lab Results (last 24 hours)       Procedure Component Value Units Date/Time    CBC (No Diff) [[id number]] Updated: 24 0653    Specimen: Blood     Basic Metabolic Panel [[id number]]  (Abnormal) Collected: 24    Specimen: Blood Updated: 24     Glucose 82 mg/dL      BUN 10 mg/dL      Creatinine 0.77 mg/dL      Sodium 131 mmol/L      Potassium 4.2 mmol/L      Chloride 98 mmol/L      CO2 24.0 mmol/L      Calcium 8.2 mg/dL      BUN/Creatinine Ratio 13.0     Anion Gap 9.0 mmol/L      eGFR 91.1 mL/min/1.73     Narrative:      GFR Normal >60  Chronic Kidney Disease <60  Kidney Failure <15    The GFR formula is only valid for adults with stable renal function between ages 18 and 70.    Vitamin B12 [[id number]]  (Normal) Collected: 24    Specimen: Blood Updated: 24     Vitamin B-12 807 pg/mL     Narrative:      Results may be falsely increased if patient taking Biotin.      Folate [[id number]]  (Abnormal) Collected: 24    Specimen: Blood Updated: 24     Folate 2.32 ng/mL     Narrative:      Results may be falsely increased if patient taking Biotin.      POC Glucose Once [[id number]]  (Normal) Collected: 24    Specimen: Blood Updated: 24     Glucose 111 mg/dL     POC Glucose Once [[id number]]  (Normal) Collected: 24 162    Specimen: Blood Updated: 24 1636     Glucose 114 mg/dL     Urine Culture - Urine, Straight Cath [[id number]]  (Normal) Collected: 24 09    Specimen: Urine from Straight Cath Updated: 24 1601     Urine " Culture No growth    POC Glucose Once [[id number]]  (Normal) Collected: 03/03/24 1256    Specimen: Blood Updated: 03/03/24 1257     Glucose 110 mg/dL     Blood Culture - Blood, Arm, Right [[id number]]  (Normal) Collected: 03/02/24 1132    Specimen: Blood from Arm, Right Updated: 03/03/24 1200     Blood Culture No growth at 24 hours    Narrative:      Less than seven (7) mL's of blood was collected.  Insufficient quantity may yield false negative results.    Blood Culture - Blood, Arm, Right [[id number]]  (Normal) Collected: 03/02/24 1132    Specimen: Blood from Arm, Right Updated: 03/03/24 1200     Blood Culture No growth at 24 hours    Narrative:      Less than seven (7) mL's of blood was collected.  Insufficient quantity may yield false negative results.              PHYSICAL EXAM  Left upper extremity: Sling in place.  Tender to palpation about distal clavicle.  No overlying skin lesions or abnormal contours.  Intact EPL, FPL, EDC, FDP, FDS, interosseous, wrist flexion, wrist extension, biceps, triceps, deltoid. Sensation intact to light touch to median, radial, ulnar, and axillary nerves. 2+ palpable radial pulse.         Falls      PLAN / DISPOSITION:  Left clavicle fracture    Continue nonop treatment.  Sling for comfort  Nonweightbearing left upper extremity  May come out of sling for range of motion as tolerated  Follow-up with orthopedics in 2 weeks    Future Appointments   Date Time Provider Department Center   3/4/2024  8:00 AM  [facility]  [facility]   4/6/2024  2:00 PM [facility] 1  [facility]   5/29/2024  9:15 AM Brad Sheth PA-C [facility]   9/16/2024  9:30 AM Salvador Omer, KATE DR[facility]CO[facility] None       Woodrow Maldonado MD  03/04/24  07:06 EST

## 2024-03-04 NOTE — PLAN OF CARE
Goal Outcome Evaluation:  Plan of Care Reviewed With: patient           Outcome Evaluation: PT eval completed. Patient alert, oriented to person only. Presenting with deficits in general BLE strength, sitting/standing balance, gait quality, and functional endurance effecting functional mobility below baseline. Of note, patient with decrease in BP and dizziness initially upon sitting but resolves with prolonged time in tranistioned position. Patient will benefit from skilled IP PT services to address impairments for return to PLOF. Recommend SNF at [place].      Anticipated Discharge Disposition (PT): skilled nursing facility

## 2024-03-04 NOTE — PLAN OF CARE
Goal Outcome Evaluation:  Plan of Care Reviewed With: patient, son           Outcome Evaluation: OT eval complete. Pt presents with weakness, dizziness when sitting eob with hx of orthostatic hypotension reported by dtr, nwb to LUE, [place], and confusion. Pt mod assist for bed mob, dep for donning socks, min assist for grooming, mod assist for sts and mod assist x 2 for taking steps in room. Recommend iPOT and SNF at d/c.      Anticipated Discharge Disposition (OT): skilled nursing facility

## 2024-03-04 NOTE — PROGRESS NOTES
[facility] Medicine Services  PROGRESS NOTE    Patient Name: Lea Rivers  : 1945  MRN: [id number]    Date of Admission: 3/2/2024  Primary Care Physician: Mannie Melvin MD    Subjective   Subjective     CC:  S/p fall, rib fractures    HPI:  States that he's sore.  No other complaints.        Objective   Objective     Vital Signs:   Temp:  [97.7 °F (36.5 °C)-98.1 °F (36.7 °C)] 97.9 °F (36.6 °C)  Heart Rate:  [59-74] 74  Resp:  [16-18] 16  BP: (113-200)/() 113/78     Physical Exam:  Constitutional: No acute distress, awake, alert, son at bedside  HENT: NCAT, mucous membranes moist  Respiratory: Clear to auscultation bilaterally, respiratory effort normal   Cardiovascular: RRR, no murmurs, rubs, or gallops  Gastrointestinal: Positive bowel sounds, soft, nontender, nondistended  Musculoskeletal: No bilateral ankle edema, LUE in sling  Psychiatric: Appropriate affect, cooperative  Neurologic: Oriented x 3, strength symmetric in all extremities, Cranial Nerves grossly intact to confrontation, speech clear  Skin: No rashes      Results Reviewed:  LAB RESULTS:      Lab 24  0734 24  0301 24  1602 24  1348 24  0947   WBC 6.79  --   --   --   --  9.72   HEMOGLOBIN 9.8*  --   --   --   --  12.7*   HEMATOCRIT 30.9*  --   --   --   --  38.6   PLATELETS 140  --   --   --   --  177   NEUTROS ABS  --   --   --   --   --  7.77*   IMMATURE GRANS (ABS)  --   --   --   --   --  0.06*   LYMPHS ABS  --   --   --   --   --  1.02   MONOS ABS  --   --   --   --   --  0.85   EOS ABS  --   --   --   --   --  0.00   MCV 91.4  --   --   --   --  93.0   CRP  --   --   --   --   --  0.68*   LACTATE  --  1.6 2.8* 2.8* 2.3* 2.6*         Lab 24  0449 24  0947   SODIUM 131* 132*   POTASSIUM 4.2 4.1   CHLORIDE 98 96*   CO2 24.0 25.0   ANION GAP 9.0 11.0   BUN 10 14   CREATININE 0.77 0.85   EGFR 91.1 88.4   GLUCOSE 82 116*   CALCIUM 8.2*  9.6   MAGNESIUM  --  2.2   PHOSPHORUS  --  2.8   HEMOGLOBIN A1C  --  5.50   TSH  --  2.280         Lab 03/02/24  0947   TOTAL PROTEIN 7.5   ALBUMIN 4.1   GLOBULIN 3.4   ALT (SGPT) 9   AST (SGOT) 16   BILIRUBIN 0.5   ALK PHOS 58         Lab 03/02/24  0947   PROBNP 601.2   HSTROP T 16             Lab 03/03/24  1629   FOLATE 2.32*   VITAMIN B 12 807         Brief Urine Lab Results  (Last result in the past 365 days)        Color   Clarity   Blood   Leuk Est   Nitrite   Protein   CREAT   Urine HCG        03/02/24 0923 Yellow   Clear   Large (3+)   Trace   Negative   30 mg/dL (1+)                   Microbiology Results Abnormal       Procedure Component Value - Date/Time    Urine Culture - Urine, Straight Cath [[id number]]  (Normal) Collected: 03/02/24 0923    Lab Status: Final result Specimen: Urine from Straight Cath Updated: 03/03/24 1601     Urine Culture No growth    Blood Culture - Blood, Arm, Right [[id number]]  (Normal) Collected: 03/02/24 1132    Lab Status: Preliminary result Specimen: Blood from Arm, Right Updated: 03/03/24 1200     Blood Culture No growth at 24 hours    Narrative:      Less than seven (7) mL's of blood was collected.  Insufficient quantity may yield false negative results.    Blood Culture - Blood, Arm, Right [[id number]]  (Normal) Collected: 03/02/24 1132    Lab Status: Preliminary result Specimen: Blood from Arm, Right Updated: 03/03/24 1200     Blood Culture No growth at 24 hours    Narrative:      Less than seven (7) mL's of blood was collected.  Insufficient quantity may yield false negative results.    COVID PRE-OP / PRE-PROCEDURE SCREENING ORDER (NO ISOLATION) - Swab, Nasopharynx [[id number]]  (Normal) Collected: 03/02/24 1133    Lab Status: Final result Specimen: Swab from Nasopharynx Updated: 03/02/24 1227    Narrative:      The following orders were created for panel order COVID PRE-OP / PRE-PROCEDURE SCREENING ORDER (NO ISOLATION) - Swab, Nasopharynx.  Procedure                                Abnormality         Status                     ---------                               -----------         ------                     COVID-19, FLU A/B, RSV P...[[id number]]  Normal              Final result                 Please view results for these tests on the individual orders.    COVID-19, FLU A/B, RSV PCR 1 HR TAT - Swab, Nasopharynx [[id number]]  (Normal) Collected: 03/02/24 1133    Lab Status: Final result Specimen: Swab from Nasopharynx Updated: 03/02/24 1227     COVID19 Not Detected     Influenza A PCR Not Detected     Influenza B PCR Not Detected     RSV, PCR Not Detected    Narrative:      Fact sheet for providers: https://www.fda.gov/media/923852/download    Fact sheet for patients: https://www.fda.gov/media/153410/download    Test performed by PCR.            EMG & Nerve Conduction Test    Result Date: 3/4/2024  Table formatting from the original result was not included. Images from the original result were not included. Indication: Falls, peripheral neuropathy Clinical: 78 y.o.male with a history of diabetes and falls, suffering fractures.  He denies numbness in his feet.  He has diminished sensation to light touch and vibration in his feet.  Peripheral neuropathy is a clinical consideration.      NCS/EMG TECHNICAL DATA: All studies are performed at a skin temperature of 34°C or greater. Distal sensory latencies are calculated to waveform peak.  Sensory amplitudes are measured peak to peak Distal motor latencies are calculated to waveform onset.  Motor amplitudes are calculated baseline to peak Nerve Conduction Studies Anti Sensory Summary Table  Stim Site NR Peak (ms) Norm Peak (ms) P-T Amp (µV) Norm P-T Amp Site1 Site2 Delta-P (ms) Dist (cm) Alfredo (m/s) Norm Alfredo (m/s) Left Sural Anti Sensory (Lat Mall) Calf    5.7 <4.0 11.3 >5.0 Calf Lat Mall 5.7 14.0 25 >40 Right Sural Anti Sensory (Lat Mall) Calf    7.7 <4.0 15.4 >5.0 Calf Lat Mall 7.7 14.0 18 >40 Motor Summary Table  Stim Site NR Onset (ms)  Norm Onset (ms) O-P Amp (mV) Norm O-P Amp Site1 Site2 Delta-0 (ms) Dist (cm) Alfredo (m/s) Norm Alfredo (m/s) Left Fibular Motor (Ext Dig Brev) Ankle    3.8 <6.1 2.0 >2.5 B Fib Ankle 8.9 33.0 37 >40 B Fib    12.7  1.3  Poplt B Fib 1.7 10.0 59 >40 Poplt    14.4  1.4        Right Fibular Motor (Ext Dig Brev) Ankle    4.6 <6.1 2.6 >2.5 B Fib Ankle 7.7 33.0 43 >40 B Fib    12.3  1.2  Poplt B Fib 1.6 10.0 63 >40 Poplt    13.9  1.2        Left Tibial Motor (Abd Rosado Brev) Ankle    4.8 <6.1 2.8 >3.0 Knee Ankle 9.6 43.0 45 >40 Knee    14.4  1.2        Right Tibial Motor (Abd Rosado Brev) Ankle    4.1 <6.1 6.3 >3.0 Knee Ankle 9.7 43.0 44 >40 Knee    13.8  1.0        F Wave Studies  NR F-Lat (ms) Lat Norm (ms) L-R F-Lat (ms) L-R Lat Norm M-Lat (ms) FLat-MLat (ms) Left Fibular (Mrkrs) (EDB)    56.83 <60 0.90 <5.1 4.00 52.83 Right Fibular (Mrkrs) (EDB)    57.73 <60 0.90 <5.1 4.00 53.73 Left Tibial (Mrkrs) (Abd Hallucis)    57.41 <61 3.02 <5.7 4.67 52.74 Right Tibial (Mrkrs) (Abd Hallucis)    60.43 <61 3.02 <5.7 4.67 55.76 H Reflex Studies  NR H-Lat (ms) L-R H-Lat (ms) L-R Lat Norm M-Lat (ms) HLat-MLat (ms) Left Tibial (Mrkrs) (Gastroc)    43.30  <2.0 4.67 38.63 Right Tibial (Mrkrs) (Gastroc) NR   <2.0   EMG  Side Muscle Nerve Root Ins Act Fibs Psw Amp Dur Poly Recrt Int Pat Comment Left AntTibialis Dp Br Fibular L4-5 Nml Nml Nml Nml Nml 0 Nml Nml  Left Fibularis Long Sup Br Fibular L5-S1 Nml Nml Nml Incr >12ms 2+ Nml Nml  Left PostTibialis Tibial L5, S1 Nml Nml Nml Nml Nml 0 Nml Nml  Left Gastroc Tibial S1-2 Nml Nml Nml Nml Nml 0 Nml Nml  Left VastusLat Femoral L2-4 Nml Nml Nml Nml Nml 0 Nml Nml  Right AntTibialis Dp Br Fibular L4-5 Nml Nml Nml Nml Nml 0 Nml Nml  Right Fibularis Long Sup Br Fibular L5-S1 Nml Nml Nml Nml Nml 0 Nml Nml  Right PostTibialis Tibial L5, S1 Nml Nml Nml Nml Nml 0 Nml Nml  Right Gastroc Tibial S1-2 Nml Nml Nml Nml Nml 0 Nml Nml  Right VastusLat Femoral L2-4 Nml Nml Nml Nml Nml 0 Nml Nml  Waveforms:                      FINDINGS: Nerve Conduction Studies: Sural sensory latencies on the right and left are prolonged at 7.7 ms and 5.7 ms respectively, and amplitude is reduced on the left Amplitude of the left peroneal motor nerve is mildly reduced and velocity is mildly slowed between knee and ankle at 37 m/s Study of the right peroneal motor nerve is normal Study the right and left posterior tibial motor nerves are normal Right and left peroneal and posterior tibial motor F wave latencies are normal H-reflexes are present on the left and not as clearly seen on the right Electromyogram: Needle examination of multiple muscles in both legs shows changes of chronic denervation in a single distal muscle     Impression: Sensorimotor neuropathy, axonal, mild This report is transcribed using the Dragon dictation system.     XR Knee 1 or 2 View Bilateral    Result Date: 3/2/2024  XR KNEE 1 OR 2 VW BILATERAL Date of Exam: 3/2/2024 11:58 AM EST Indication: Pain after fall, dementia Comparison: Right knee radiographs dated 4/10/2023 Findings: Right knee: There is no acute fracture or dislocation. The right total knee prosthesis appears intact without evidence of periprosthetic fracture or loosening. There is a trace suprapatellar joint effusion. Bone mineralization is normal. Left knee: There is no acute fracture or dislocation. There is no joint effusion. There is tricompartmental degenerative joint disease. There is enthesopathy at the tibial tuberosity which appears similar to the prior examination.     Impression: Impression: 1. Right total knee prosthesis without evidence of hardware complication. 2. Tricompartmental degenerative joint disease of the left knee. 3. No evidence of acute fracture Electronically Signed: Johny Poon  3/2/2024 12:39 PM EST  Workstation ID: [id number]     Results for orders placed during the hospital encounter of 07/20/21    Adult Transthoracic Echo Complete W/ Cont if Necessary Per  Protocol    Interpretation Summary  · Left ventricular wall thickness is consistent with mild concentric hypertrophy.  · Normal left-ventricular systolic function, estimated EF 65%.  · Left ventricular diastolic function is consistent with (grade I) impaired relaxation.  · Aortic sclerosis without aortic stenosis. Trace aortic insufficiency.  · Trace mitral regurgitation, trace tricuspid regurgitation.      Current medications:  Scheduled Meds:atorvastatin, 10 mg, Oral, Daily  divalproex, 500 mg, Oral, Daily  heparin (porcine), 5,000 Units, Subcutaneous, Q12H  insulin lispro, 2-7 Units, Subcutaneous, 4x Daily AC & at Bedtime  isosorbide mononitrate, 30 mg, Oral, BID - Nitrates  levETIRAcetam, 500 mg, Oral, BID  levothyroxine, 88 mcg, Oral, Q AM  lisinopril, 20 mg, Oral, Q12H  pantoprazole, 40 mg, Oral, Q AM  sodium chloride, 10 mL, Intravenous, Q12H  tamsulosin, 0.4 mg, Oral, Daily  verapamil SR, 240 mg, Oral, Daily  vitamin B-12, 1,000 mcg, Oral, Daily      Continuous Infusions:     PRN Meds:.  acetaminophen **OR** acetaminophen **OR** acetaminophen    calcium carbonate    Calcium Replacement - Follow Nurse / BPA Driven Protocol    dextrose    dextrose    fluticasone    glucagon (human recombinant)    Magnesium Standard Dose Replacement - Follow Nurse / BPA Driven Protocol    ondansetron ODT **OR** ondansetron    Phosphorus Replacement - Follow Nurse / BPA Driven Protocol    Potassium Replacement - Follow Nurse / BPA Driven Protocol    sodium chloride    sodium chloride    sodium chloride    traMADol    Assessment & Plan   Assessment & Plan     Active Hospital Problems    Diagnosis  POA    **Falls [W19.XXXA]  Yes      Resolved Hospital Problems   No resolved problems to display.        Brief Hospital Course to date:  Lea Rivers is a 78 y.o. male with past medical history of hypertension, hyperlipidemia, hypothyroidism, seizure disorder.  Patient is being admitted for a fall and nondisplaced first through  fourth left rib fractures.  Patient also has left distal clavicle fracture.     Multiple falls with frequency of falls increasing  -difficulty with balance  -CT of head shows age-related changes which are chronic but no acute process  -neuro consulted       Multiple rib fractures and also left clavicle fracture  -With no displacement or mildly displaced.  Orthopedic surgery following  No surgical intervention is planned.  Recs nonweightbearing LUE, may come out of sling in 5-7 days to initiate gentle ROM exercises per ortho.  F/u with ortho/Dr. Maldonado in 2 weeks  -Left upper limb is already in sling  -Pain control  -add lidocaine patch     Prostate cancer  -Follows with Dr. Mayo  -S/p radiation therapy.  -Continue family patient has had hematuria and has had cystoscopy by Dr. Mayo and they were told that probably because of the prostate cancer and the therapy patient will have hematuria for a while.  We will monitor if necessary will ask urology to see patient     Dementia and increasing memory problem  -Patient still lives alone and also drives  -Mentioned above patient has had multiple falls and the frequency of falls are increasing  -Will ask neurology to see the patient for both falls and also worsening memory problems  -d/w family that patient should not be driving.  They are interested in rehab/possible placement     Hypertension  Hyperlipidemia  Seizure disorder  Hypothyroidism  - continue home meds    D/w son at bedside on 3/4/24    Expected Discharge Location and Transportation: rehab?, awaiting PT notes  Expected Discharge   Expected Discharge Date: 3/5/2024; Expected Discharge Time:      DVT prophylaxis:  Medical DVT prophylaxis orders are present.         AM-PAC 6 Clicks Score (PT): 20 (03/04/24 0800)    CODE STATUS:   Code Status and Medical Interventions:   Ordered at: 03/02/24 9411     Medical Intervention Limits:    NO intubation (DNI)     Code Status (Patient has no pulse and is not  breathing):    No CPR (Do Not Attempt to Resuscitate)     Medical Interventions (Patient has pulse or is breathing):    Limited Support       Noel Petersen MD  03/04/24

## 2024-03-05 LAB
ANION GAP SERPL CALCULATED.3IONS-SCNC: 9 MMOL/L (ref 5–15)
BUN SERPL-MCNC: 10 MG/DL (ref 8–23)
BUN/CREAT SERPL: 13.3 (ref 7–25)
CALCIUM SPEC-SCNC: 8.7 MG/DL (ref 8.6–10.5)
CHLORIDE SERPL-SCNC: 99 MMOL/L (ref 98–107)
CO2 SERPL-SCNC: 24 MMOL/L (ref 22–29)
CREAT SERPL-MCNC: 0.75 MG/DL (ref 0.76–1.27)
DEPRECATED RDW RBC AUTO: 48.8 FL (ref 37–54)
EGFRCR SERPLBLD CKD-EPI 2021: 91.8 ML/MIN/1.73
ERYTHROCYTE [DISTWIDTH] IN BLOOD BY AUTOMATED COUNT: 14.6 % (ref 12.3–15.4)
GLUCOSE BLDC GLUCOMTR-MCNC: 104 MG/DL (ref 70–130)
GLUCOSE BLDC GLUCOMTR-MCNC: 108 MG/DL (ref 70–130)
GLUCOSE BLDC GLUCOMTR-MCNC: 110 MG/DL (ref 70–130)
GLUCOSE BLDC GLUCOMTR-MCNC: 116 MG/DL (ref 70–130)
GLUCOSE SERPL-MCNC: 102 MG/DL (ref 65–99)
HCT VFR BLD AUTO: 30.5 % (ref 37.5–51)
HGB BLD-MCNC: 9.7 G/DL (ref 13–17.7)
MCH RBC QN AUTO: 29.1 PG (ref 26.6–33)
MCHC RBC AUTO-ENTMCNC: 31.8 G/DL (ref 31.5–35.7)
MCV RBC AUTO: 91.6 FL (ref 79–97)
PLATELET # BLD AUTO: 162 10*3/MM3 (ref 140–450)
PMV BLD AUTO: 9.1 FL (ref 6–12)
POTASSIUM SERPL-SCNC: 3.8 MMOL/L (ref 3.5–5.2)
RBC # BLD AUTO: 3.33 10*6/MM3 (ref 4.14–5.8)
SODIUM SERPL-SCNC: 132 MMOL/L (ref 136–145)
WBC NRBC COR # BLD AUTO: 8.14 10*3/MM3 (ref 3.4–10.8)

## 2024-03-05 PROCEDURE — 82948 REAGENT STRIP/BLOOD GLUCOSE: CPT

## 2024-03-05 PROCEDURE — 80048 BASIC METABOLIC PNL TOTAL CA: CPT | Performed by: INTERNAL MEDICINE

## 2024-03-05 PROCEDURE — 85027 COMPLETE CBC AUTOMATED: CPT | Performed by: INTERNAL MEDICINE

## 2024-03-05 PROCEDURE — 99232 SBSQ HOSP IP/OBS MODERATE 35: CPT | Performed by: INTERNAL MEDICINE

## 2024-03-05 PROCEDURE — 25010000002 LABETALOL 5 MG/ML SOLUTION: Performed by: NURSE PRACTITIONER

## 2024-03-05 PROCEDURE — G0378 HOSPITAL OBSERVATION PER HR: HCPCS

## 2024-03-05 PROCEDURE — 25010000002 HEPARIN (PORCINE) PER 1000 UNITS: Performed by: INTERNAL MEDICINE

## 2024-03-05 RX ORDER — BISACODYL 5 MG/1
5 TABLET, DELAYED RELEASE ORAL DAILY PRN
Status: DISCONTINUED | OUTPATIENT
Start: 2024-03-05 | End: 2024-03-09

## 2024-03-05 RX ORDER — AMOXICILLIN 250 MG
2 CAPSULE ORAL 2 TIMES DAILY
Status: DISCONTINUED | OUTPATIENT
Start: 2024-03-05 | End: 2024-03-09

## 2024-03-05 RX ORDER — LABETALOL HYDROCHLORIDE 5 MG/ML
10 INJECTION, SOLUTION INTRAVENOUS ONCE
Status: COMPLETED | OUTPATIENT
Start: 2024-03-05 | End: 2024-03-05

## 2024-03-05 RX ORDER — BISACODYL 10 MG
10 SUPPOSITORY, RECTAL RECTAL DAILY PRN
Status: DISCONTINUED | OUTPATIENT
Start: 2024-03-05 | End: 2024-03-09

## 2024-03-05 RX ORDER — POLYETHYLENE GLYCOL 3350 17 G/17G
17 POWDER, FOR SOLUTION ORAL DAILY PRN
Status: DISCONTINUED | OUTPATIENT
Start: 2024-03-05 | End: 2024-03-09

## 2024-03-05 RX ADMIN — TRAMADOL HYDROCHLORIDE 50 MG: 50 TABLET ORAL at 20:28

## 2024-03-05 RX ADMIN — LEVOTHYROXINE SODIUM 88 MCG: 0.09 TABLET ORAL at 04:22

## 2024-03-05 RX ADMIN — TRAMADOL HYDROCHLORIDE 50 MG: 50 TABLET ORAL at 04:22

## 2024-03-05 RX ADMIN — DOCUSATE SODIUM 50MG AND SENNOSIDES 8.6MG 2 TABLET: 8.6; 5 TABLET, FILM COATED ORAL at 20:28

## 2024-03-05 RX ADMIN — TAMSULOSIN HYDROCHLORIDE 0.4 MG: 0.4 CAPSULE ORAL at 09:04

## 2024-03-05 RX ADMIN — Medication 1000 MCG: at 09:04

## 2024-03-05 RX ADMIN — DOCUSATE SODIUM 50MG AND SENNOSIDES 8.6MG 2 TABLET: 8.6; 5 TABLET, FILM COATED ORAL at 12:45

## 2024-03-05 RX ADMIN — LEVETIRACETAM 500 MG: 500 TABLET, FILM COATED ORAL at 20:28

## 2024-03-05 RX ADMIN — LEVETIRACETAM 500 MG: 500 TABLET, FILM COATED ORAL at 09:04

## 2024-03-05 RX ADMIN — PANTOPRAZOLE SODIUM 40 MG: 40 TABLET, DELAYED RELEASE ORAL at 04:22

## 2024-03-05 RX ADMIN — LABETALOL HYDROCHLORIDE 10 MG: 5 INJECTION, SOLUTION INTRAVENOUS at 22:43

## 2024-03-05 RX ADMIN — HEPARIN SODIUM 5000 UNITS: 5000 INJECTION INTRAVENOUS; SUBCUTANEOUS at 09:04

## 2024-03-05 RX ADMIN — Medication 10 ML: at 09:00

## 2024-03-05 RX ADMIN — LIDOCAINE 2 PATCH: 4 PATCH TOPICAL at 09:05

## 2024-03-05 RX ADMIN — Medication 10 ML: at 20:29

## 2024-03-05 RX ADMIN — DIVALPROEX SODIUM 500 MG: 250 TABLET, FILM COATED, EXTENDED RELEASE ORAL at 09:06

## 2024-03-05 RX ADMIN — HEPARIN SODIUM 5000 UNITS: 5000 INJECTION INTRAVENOUS; SUBCUTANEOUS at 20:28

## 2024-03-05 RX ADMIN — ACETAMINOPHEN 650 MG: 325 TABLET ORAL at 04:22

## 2024-03-05 RX ADMIN — ATORVASTATIN CALCIUM 10 MG: 10 TABLET, FILM COATED ORAL at 20:28

## 2024-03-05 RX ADMIN — ACETAMINOPHEN 650 MG: 325 TABLET ORAL at 20:28

## 2024-03-05 NOTE — CASE MANAGEMENT/SOCIAL WORK
Continued Stay Note   Letitia     Patient Name: Lea Rivers  MRN: [id number]  Today's Date: 3/5/2024    Admit Date: 3/2/2024    Plan: Inpatient rehab   Discharge Plan       Row Name 03/05/24 1428       Plan    Plan Inpatient rehab    Patient/Family in Agreement with Plan yes    Plan Comments Met with Mr. Rivers and his son at the bedside to discuss discharge plan. Therapy is recommending SNF, and family is agreeable. Explained that since patient has Medicare and is observation status that he is not eligible for SNF but is eligible for acute rehab. Also called daughter with this information. She agreed to referral to [facility]. [facility] is interested per Pablo lazo, but they need a discharge plan for rehab. Daughter reported they will take patient home, but she also stated she does not know who will take care of him.  will follow up with patient and family tomorrow to continue working on discharge plan.    Final Discharge Disposition Code 62 - inpatient rehab facility                   Discharge Codes    No documentation.                 Expected Discharge Date and Time       Expected Discharge Date Expected Discharge Time    Mar 5, 2024               Valerie Keith RN

## 2024-03-05 NOTE — PLAN OF CARE
Problem: Adult Inpatient Plan of Care  Goal: Plan of Care Review  Outcome: Ongoing, Progressing  Goal: Patient-Specific Goal (Individualized)  Outcome: Ongoing, Progressing  Goal: Absence of Hospital-Acquired Illness or Injury  Outcome: Ongoing, Progressing  Intervention: Identify and Manage Fall Risk  Recent Flowsheet Documentation  Taken 3/4/2024 1928 by Mikel Stringer RN  Safety Promotion/Fall Prevention:   assistive device/personal items within reach   clutter free environment maintained   fall prevention program maintained   lighting adjusted   nonskid shoes/slippers when out of bed   room organization consistent   safety round/check completed   toileting scheduled  Intervention: Prevent Skin Injury  Recent Flowsheet Documentation  Taken 3/4/2024 1928 by Mikel Stringer RN  Body Position: weight shifting  Skin Protection: (WSM)   adhesive use limited   incontinence pads utilized   tubing/devices free from skin contact  Intervention: Prevent and Manage VTE (Venous Thromboembolism) Risk  Recent Flowsheet Documentation  Taken 3/4/2024 1928 by Mikel Stringer RN  Activity Management: activity encouraged  VTE Prevention/Management: (SQ heparin) other (see comments)  Range of Motion: active ROM (range of motion) encouraged  Intervention: Prevent Infection  Recent Flowsheet Documentation  Taken 3/4/2024 1928 by Mikel Stringer RN  Infection Prevention:   environmental surveillance performed   equipment surfaces disinfected   hand hygiene promoted   rest/sleep promoted   single patient room provided  Goal: Optimal Comfort and Wellbeing  Outcome: Ongoing, Progressing  Intervention: Monitor Pain and Promote Comfort  Recent Flowsheet Documentation  Taken 3/4/2024 1928 by Mikel Stringer RN  Pain Management Interventions:   care clustered   pillow support provided   position adjusted   quiet environment facilitated   see MAR  Intervention: Provide Person-Centered Care  Recent  Flowsheet Documentation  Taken 3/4/2024 1928 by Mikel Stringer RN  Trust Relationship/Rapport:   care explained   questions answered   questions encouraged   thoughts/feelings acknowledged  Goal: Readiness for Transition of Care  Outcome: Ongoing, Progressing     Problem: Skin Injury Risk Increased  Goal: Skin Health and Integrity  Outcome: Ongoing, Progressing  Intervention: Optimize Skin Protection  Recent Flowsheet Documentation  Taken 3/4/2024 1928 by Mikel Stringer RN  Pressure Reduction Techniques:   frequent weight shift encouraged   heels elevated off bed   weight shift assistance provided  Head of Bed (HOB) Positioning: HOB at 30-45 degrees  Pressure Reduction Devices:   positioning supports utilized   pressure-redistributing mattress utilized  Skin Protection: (WSM)   adhesive use limited   incontinence pads utilized   tubing/devices free from skin contact     Problem: Fall Injury Risk  Goal: Absence of Fall and Fall-Related Injury  Outcome: Ongoing, Progressing  Intervention: Identify and Manage Contributors  Recent Flowsheet Documentation  Taken 3/4/2024 1928 by Mikel Stringer RN  Medication Review/Management: medications reviewed  Intervention: Promote Injury-Free Environment  Recent Flowsheet Documentation  Taken 3/4/2024 1928 by Mikel Stringer RN  Safety Promotion/Fall Prevention:   assistive device/personal items within reach   clutter free environment maintained   fall prevention program maintained   lighting adjusted   nonskid shoes/slippers when out of bed   room organization consistent   safety round/check completed   toileting scheduled   Goal Outcome Evaluation:

## 2024-03-05 NOTE — PROGRESS NOTES
"    [facility] Medicine Services  PROGRESS NOTE    Patient Name: Lea Rivers  : 1945  MRN: [id number]    Date of Admission: 3/2/2024  Primary Care Physician: Mannie Melvin MD    Subjective   Subjective     CC:  S/p fall, rib fractures    HPI:  No events.  States that pain is \"fairly good.\"        Objective   Objective     Vital Signs:   Temp:  [97.7 °F (36.5 °C)-98.9 °F (37.2 °C)] 97.7 °F (36.5 °C)  Heart Rate:  [67-91] 91  Resp:  [16-20] 18  BP: ()/() 134/91     Physical Exam:  Constitutional: No acute distress, awake, alert  HENT: NCAT, mucous membranes moist  Respiratory: Clear to auscultation bilaterally, respiratory effort normal   Cardiovascular: RRR, no murmurs, rubs, or gallops  Gastrointestinal: Positive bowel sounds, soft, nontender, nondistended  Musculoskeletal: No bilateral ankle edema, LUE in sling  Psychiatric: Appropriate affect, cooperative  Neurologic: Oriented x 3, strength symmetric in all extremities, Cranial Nerves grossly intact to confrontation, speech clear  Skin: No rashes      Results Reviewed:  LAB RESULTS:      Lab 24  0428 24  0734 24  0301 24  1602 24  1348 24  0947   WBC 8.14 6.79  --   --   --   --  9.72   HEMOGLOBIN 9.7* 9.8*  --   --   --   --  12.7*   HEMATOCRIT 30.5* 30.9*  --   --   --   --  38.6   PLATELETS 162 140  --   --   --   --  177   NEUTROS ABS  --   --   --   --   --   --  7.77*   IMMATURE GRANS (ABS)  --   --   --   --   --   --  0.06*   LYMPHS ABS  --   --   --   --   --   --  1.02   MONOS ABS  --   --   --   --   --   --  0.85   EOS ABS  --   --   --   --   --   --  0.00   MCV 91.6 91.4  --   --   --   --  93.0   CRP  --   --   --   --   --   --  0.68*   LACTATE  --   --  1.6 2.8* 2.8* 2.3* 2.6*         Lab 24  0428 24  0449 24  0947   SODIUM 132* 131* 132*   POTASSIUM 3.8 4.2 4.1   CHLORIDE 99 98 96*   CO2 24.0 24.0 25.0   ANION GAP 9.0 " 9.0 11.0   BUN 10 10 14   CREATININE 0.75* 0.77 0.85   EGFR 91.8 91.1 88.4   GLUCOSE 102* 82 116*   CALCIUM 8.7 8.2* 9.6   MAGNESIUM  --   --  2.2   PHOSPHORUS  --   --  2.8   HEMOGLOBIN A1C  --   --  5.50   TSH  --   --  2.280         Lab 03/02/24  0947   TOTAL PROTEIN 7.5   ALBUMIN 4.1   GLOBULIN 3.4   ALT (SGPT) 9   AST (SGOT) 16   BILIRUBIN 0.5   ALK PHOS 58         Lab 03/02/24  0947   PROBNP 601.2   HSTROP T 16             Lab 03/03/24  1629   FOLATE 2.32*   VITAMIN B 12 807         Brief Urine Lab Results  (Last result in the past 365 days)        Color   Clarity   Blood   Leuk Est   Nitrite   Protein   CREAT   Urine HCG        03/02/24 0923 Yellow   Clear   Large (3+)   Trace   Negative   30 mg/dL (1+)                   Microbiology Results Abnormal       Procedure Component Value - Date/Time    Blood Culture - Blood, Arm, Right [[id number]]  (Normal) Collected: 03/02/24 1132    Lab Status: Preliminary result Specimen: Blood from Arm, Right Updated: 03/05/24 1201     Blood Culture No growth at 3 days    Narrative:      Less than seven (7) mL's of blood was collected.  Insufficient quantity may yield false negative results.    Blood Culture - Blood, Arm, Right [[id number]]  (Normal) Collected: 03/02/24 1132    Lab Status: Preliminary result Specimen: Blood from Arm, Right Updated: 03/05/24 1201     Blood Culture No growth at 3 days    Narrative:      Less than seven (7) mL's of blood was collected.  Insufficient quantity may yield false negative results.    Urine Culture - Urine, Straight Cath [[id number]]  (Normal) Collected: 03/02/24 0923    Lab Status: Final result Specimen: Urine from Straight Cath Updated: 03/03/24 1601     Urine Culture No growth    COVID PRE-OP / PRE-PROCEDURE SCREENING ORDER (NO ISOLATION) - Swab, Nasopharynx [[id number]]  (Normal) Collected: 03/02/24 1133    Lab Status: Final result Specimen: Swab from Nasopharynx Updated: 03/02/24 1227    Narrative:      The following orders were  created for panel order COVID PRE-OP / PRE-PROCEDURE SCREENING ORDER (NO ISOLATION) - Swab, Nasopharynx.  Procedure                               Abnormality         Status                     ---------                               -----------         ------                     COVID-19, FLU A/B, RSV P...[[id number]]  Normal              Final result                 Please view results for these tests on the individual orders.    COVID-19, FLU A/B, RSV PCR 1 HR TAT - Swab, Nasopharynx [[id number]]  (Normal) Collected: 03/02/24 1133    Lab Status: Final result Specimen: Swab from Nasopharynx Updated: 03/02/24 1227     COVID19 Not Detected     Influenza A PCR Not Detected     Influenza B PCR Not Detected     RSV, PCR Not Detected    Narrative:      Fact sheet for providers: https://www.fda.gov/media/908141/download    Fact sheet for patients: https://www.fda.gov/media/893873/download    Test performed by PCR.            EMG & Nerve Conduction Test    Result Date: 3/4/2024  Table formatting from the original result was not included. Images from the original result were not included. Indication: Falls, peripheral neuropathy Clinical: 78 y.o.male with a history of diabetes and falls, suffering fractures.  He denies numbness in his feet.  He has diminished sensation to light touch and vibration in his feet.  Peripheral neuropathy is a clinical consideration.      NCS/EMG TECHNICAL DATA: All studies are performed at a skin temperature of 34°C or greater. Distal sensory latencies are calculated to waveform peak.  Sensory amplitudes are measured peak to peak Distal motor latencies are calculated to waveform onset.  Motor amplitudes are calculated baseline to peak Nerve Conduction Studies Anti Sensory Summary Table  Stim Site NR Peak (ms) Norm Peak (ms) P-T Amp (µV) Norm P-T Amp Site1 Site2 Delta-P (ms) Dist (cm) Alfredo (m/s) Norm Alfredo (m/s) Left Sural Anti Sensory (Lat Mall) Calf    5.7 <4.0 11.3 >5.0 Calf Lat Mall 5.7 14.0 25  >40 Right Sural Anti Sensory (Lat Mall) Calf    7.7 <4.0 15.4 >5.0 Calf Lat Mall 7.7 14.0 18 >40 Motor Summary Table  Stim Site NR Onset (ms) Norm Onset (ms) O-P Amp (mV) Norm O-P Amp Site1 Site2 Delta-0 (ms) Dist (cm) Alfredo (m/s) Norm Alfredo (m/s) Left Fibular Motor (Ext Dig Brev) Ankle    3.8 <6.1 2.0 >2.5 B Fib Ankle 8.9 33.0 37 >40 B Fib    12.7  1.3  Poplt B Fib 1.7 10.0 59 >40 Poplt    14.4  1.4        Right Fibular Motor (Ext Dig Brev) Ankle    4.6 <6.1 2.6 >2.5 B Fib Ankle 7.7 33.0 43 >40 B Fib    12.3  1.2  Poplt B Fib 1.6 10.0 63 >40 Poplt    13.9  1.2        Left Tibial Motor (Abd Rosado Brev) Ankle    4.8 <6.1 2.8 >3.0 Knee Ankle 9.6 43.0 45 >40 Knee    14.4  1.2        Right Tibial Motor (Abd Rosado Brev) Ankle    4.1 <6.1 6.3 >3.0 Knee Ankle 9.7 43.0 44 >40 Knee    13.8  1.0        F Wave Studies  NR F-Lat (ms) Lat Norm (ms) L-R F-Lat (ms) L-R Lat Norm M-Lat (ms) FLat-MLat (ms) Left Fibular (Mrkrs) (EDB)    56.83 <60 0.90 <5.1 4.00 52.83 Right Fibular (Mrkrs) (EDB)    57.73 <60 0.90 <5.1 4.00 53.73 Left Tibial (Mrkrs) (Abd Hallucis)    57.41 <61 3.02 <5.7 4.67 52.74 Right Tibial (Mrkrs) (Abd Hallucis)    60.43 <61 3.02 <5.7 4.67 55.76 H Reflex Studies  NR H-Lat (ms) L-R H-Lat (ms) L-R Lat Norm M-Lat (ms) HLat-MLat (ms) Left Tibial (Mrkrs) (Gastroc)    43.30  <2.0 4.67 38.63 Right Tibial (Mrkrs) (Gastroc) NR   <2.0   EMG  Side Muscle Nerve Root Ins Act Fibs Psw Amp Dur Poly Recrt Int Pat Comment Left AntTibialis Dp Br Fibular L4-5 Nml Nml Nml Nml Nml 0 Nml Nml  Left Fibularis Long Sup Br Fibular L5-S1 Nml Nml Nml Incr >12ms 2+ Nml Nml  Left PostTibialis Tibial L5, S1 Nml Nml Nml Nml Nml 0 Nml Nml  Left Gastroc Tibial S1-2 Nml Nml Nml Nml Nml 0 Nml Nml  Left VastusLat Femoral L2-4 Nml Nml Nml Nml Nml 0 Nml Nml  Right AntTibialis Dp Br Fibular L4-5 Nml Nml Nml Nml Nml 0 Nml Nml  Right Fibularis Long Sup Br Fibular L5-S1 Nml Nml Nml Nml Nml 0 Nml Nml  Right PostTibialis Tibial L5, S1 Nml Nml Nml Nml Nml 0 Nml Nml   Right Gastroc Tibial S1-2 Nml Nml Nml Nml Nml 0 Nml Nml  Right VastusLat Femoral L2-4 Nml Nml Nml Nml Nml 0 Nml Nml  Waveforms:                     FINDINGS: Nerve Conduction Studies: Sural sensory latencies on the right and left are prolonged at 7.7 ms and 5.7 ms respectively, and amplitude is reduced on the left Amplitude of the left peroneal motor nerve is mildly reduced and velocity is mildly slowed between knee and ankle at 37 m/s Study of the right peroneal motor nerve is normal Study the right and left posterior tibial motor nerves are normal Right and left peroneal and posterior tibial motor F wave latencies are normal H-reflexes are present on the left and not as clearly seen on the right Electromyogram: Needle examination of multiple muscles in both legs shows changes of chronic denervation in a single distal muscle     Impression: Sensorimotor neuropathy, axonal, mild This report is transcribed using the Dragon dictation system.      Results for orders placed during the hospital encounter of 07/20/21    Adult Transthoracic Echo Complete W/ Cont if Necessary Per Protocol    Interpretation Summary  · Left ventricular wall thickness is consistent with mild concentric hypertrophy.  · Normal left-ventricular systolic function, estimated EF 65%.  · Left ventricular diastolic function is consistent with (grade I) impaired relaxation.  · Aortic sclerosis without aortic stenosis. Trace aortic insufficiency.  · Trace mitral regurgitation, trace tricuspid regurgitation.      Current medications:  Scheduled Meds:atorvastatin, 10 mg, Oral, Daily  divalproex, 500 mg, Oral, Daily  heparin (porcine), 5,000 Units, Subcutaneous, Q12H  insulin lispro, 2-7 Units, Subcutaneous, 4x Daily AC & at Bedtime  levETIRAcetam, 500 mg, Oral, BID  levothyroxine, 88 mcg, Oral, Q AM  Lidocaine, 2 patch, Transdermal, Q24H  pantoprazole, 40 mg, Oral, Q AM  senna-docusate sodium, 2 tablet, Oral, BID  sodium chloride, 10 mL, Intravenous,  Q12H  tamsulosin, 0.4 mg, Oral, Daily  vitamin B-12, 1,000 mcg, Oral, Daily      Continuous Infusions:     PRN Meds:.  acetaminophen **OR** acetaminophen **OR** acetaminophen    senna-docusate sodium **AND** polyethylene glycol **AND** bisacodyl **AND** bisacodyl    calcium carbonate    Calcium Replacement - Follow Nurse / BPA Driven Protocol    dextrose    dextrose    fluticasone    glucagon (human recombinant)    Magnesium Standard Dose Replacement - Follow Nurse / BPA Driven Protocol    ondansetron ODT **OR** ondansetron    Phosphorus Replacement - Follow Nurse / BPA Driven Protocol    Potassium Replacement - Follow Nurse / BPA Driven Protocol    sodium chloride    sodium chloride    sodium chloride    traMADol    Assessment & Plan   Assessment & Plan     Active Hospital Problems    Diagnosis  POA    **Falls [W19.XXXA]  Yes      Resolved Hospital Problems   No resolved problems to display.        Brief Hospital Course to date:  Lea Rivers is a 78 y.o. male with past medical history of hypertension, hyperlipidemia, hypothyroidism, seizure disorder.  Patient is being admitted for a fall and nondisplaced first through fourth left rib fractures.  Patient also has left distal clavicle fracture.     Multiple falls with frequency of falls increasing  -difficulty with balance  -CT of head shows age-related changes which are chronic but no acute process  -neuro consulted.  D/w KATE Boo who states that patient's falls likely d/t neuropathy (confirmed with EMG) and progression of dementia       Multiple rib fractures and also left clavicle fracture  -With no displacement or mildly displaced.  Orthopedic surgery following  No surgical intervention is planned.  Recs nonweightbearing LUE, may come out of sling in 5-7 days to initiate gentle ROM exercises per ortho.  F/u with ortho/Dr. Maldonado in 2 weeks  -Left upper limb is already in sling  -Pain control  -add lidocaine patch     Prostate cancer  -Follows  with Dr. Mayo  -S/p radiation therapy.  -Continue family patient has had hematuria and has had cystoscopy by Dr. Mayo and they were told that probably because of the prostate cancer and the therapy patient will have hematuria for a while.  We will monitor if necessary will ask urology to see patient     Dementia and increasing memory problem  -Patient still lives alone and also drives  -Mentioned above patient has had multiple falls and the frequency of falls are increasing  -Will ask neurology to see the patient for both falls and also worsening memory problems  -d/w family that patient should not be driving.  They are interested in rehab/possible placement     Hypertension  Hyperlipidemia  Seizure disorder  Hypothyroidism  - continue home meds    D/w son at bedside on 3/4/24    Expected Discharge Location and Transportation: PT recs SNF  Expected Discharge   Expected Discharge Date: 3/5/2024; Expected Discharge Time:      DVT prophylaxis:  Medical DVT prophylaxis orders are present.         AM-PAC 6 Clicks Score (PT): 12 (03/04/24 3257)    CODE STATUS:   Code Status and Medical Interventions:   Ordered at: 03/02/24 7366     Medical Intervention Limits:    NO intubation (DNI)     Code Status (Patient has no pulse and is not breathing):    No CPR (Do Not Attempt to Resuscitate)     Medical Interventions (Patient has pulse or is breathing):    Limited Support       Noel Petersen MD  03/05/24

## 2024-03-05 NOTE — DISCHARGE PLACEMENT REQUEST
"      To: [facility] Admissions  Attn: Rosario  From: Valerie Sagar,   [phone]    Hawk Rivers (78 y.o. Male)       Date of Birth   1945    Social Security Number       Address   [address] [id number]    Home Phone       MRN   [id number]       [facility]    Marital Status                               Admission Date   3/2/24    Admission Type   Emergency    Admitting Provider   Noel Petersen MD    Attending Provider   Noel Petersen MD    Department, Room/Bed   [facility] 5H, S572/1       Discharge Date       Discharge Disposition       Discharge Destination                                 Attending Provider: Noel Petersen MD    Allergies: Chlorhexidine, Sulfa Antibiotics    Isolation: None   Infection: None   Code Status: No CPR    Ht: 170.2 cm (67\")   Wt: 88.9 kg (196 lb)    Admission Cmt: None   Principal Problem: Falls [W19.XXXA]                   Active Insurance as of 3/2/2024       Primary Coverage       Payor Plan Insurance Group Employer/Plan Group    MEDICARE MEDICARE A & B        Payor Plan Address Payor Plan Phone Number Payor Plan Fax Number Effective Dates    [address] [phone]  7/1/2009 - None Entered    [facility] [id number]         Subscriber Name Subscriber Birth Date Member HAWK MACIAS 1945 [id number]               Secondary Coverage       Payor Plan Insurance Group Employer/Plan Group    [employer]  SUP AAR HEALTH CARE OPTIONS PLAN F       Payor Plan Address Payor Plan Phone Number Payor Plan Fax Number Effective Dates    [facility] [phone]  1/1/2016 - None Entered    [address]       [facility] [address]         Subscriber Name Subscriber Birth Date Member HAWK MACIAS 1945 [id number]                     Emergency Contacts        (Rel.) Home Phone Work Phone Mobile Phone    taylor marley (Son) [phone] -- [phone]    " Luis Enrique Guadalupe (Daughter) [phone] -- [phone]                 History & Physical        Dawson Samayoa MD at 24 1503              [facility] Medicine Services  HISTORY AND PHYSICAL    Patient Name: Lea Rivers  : 1945  MRN: [id number]  Primary Care Physician: Mannie Melvin MD  Date of admission: 3/2/2024      Subjective  Subjective     Chief Complaint:  Fall and rib fracture    HPI:  Lea Rivers is a 78 y.o. male with past medical history significant for hypertension, hyperlipidemia, hypothyroidism, seizure disorder, history of colon cancer, history of prostate cancer s/p radiation therapy, diabetes mellitus, dementia.  Patient still lives alone and family checks up on him frequently.  I come to the patient and also family who is present at the bedside patient has had frequent falls and the frequency has increased lately.  Evidently patient had a fall today and then was complaining of chest and shoulder pain and was brought to the emergency room.  Evaluation here at the emergency room reveals multiple rib fracture and also clavicular fracture.  Patient tells me that prior to this fall he was doing okay except having frequent falls.  Because of these falls is not clear at this time but patient tells me that sometimes he get foggy in the head.  Family also reports that patient has difficulty with balance.  No fever or chills or any respiratory symptoms during the past several days.  No nausea vomiting or diarrhea or abdominal pain during the same period.      Personal History     Past Medical History:   Diagnosis Date    Anemia     Colon cancer     Status post partial colectomy in . No chemotherapy or radiation therapy.    Coronary artery disease     Nonobstructive coronary artery disease.    Diabetes     Dyslipidemia     GERD (gastroesophageal reflux disease)     Hx of.    Hyperlipidemia     Hypertension     Hyponatremia      Hypothyroidism     Left shoulder pain     Low sodium levels 2022    recent issue; saw nephrologist who ruled out kidney issues; took Sodium Chloride po to bring levels up    Memory deficit     FROM ANESTHESIA    Osteoarthritis     Prostate cancer 07/23/2022    Seizure disorder     silent seziure-diagnosed years ago    Skin cancer          Oncology Problem List:  Prostate cancer (12/01/2022; Status: Active)  Oncology/Hematology History   Prostate cancer   11/11/2022 Cancer Staged    Staging form: Prostate, AJCC 8th Edition  - Clinical stage from 11/11/2022: Stage IIC (cT2c, cN0, cM0, PSA: 11.3, Grade Group: 3) - Signed by Jerman Luu MD on 12/1/2022 12/1/2022 Initial Diagnosis    Prostate cancer (HCC)     1/30/2023 - 2/9/2023 Radiation    Radiation OncologyTreatment Course:  Lea Rivers received 3500 cGy in 5 fractions to prostate and seminal vesicles via Stereotactic Radiation Therapy - SRT.       Past Surgical History:   Procedure Laterality Date    APPENDECTOMY      CARDIAC CATHETERIZATION  2012    30 to 40% LAD    CARPAL TUNNEL RELEASE Bilateral     CHOLECYSTECTOMY      COLECTOMY PARTIAL / TOTAL  1990    Partial colectomy    COLONOSCOPY      CYBERKNIFE  02/09/2023    Prostate/SV    CYSTOSCOPY TRANSURETHRAL RESECTION OF PROSTATE N/A 09/21/2018    Procedure: CYSTOSCOPY TRANSURETHRAL RESECTION OF PROSTATE GREENLIGHT;  Surgeon: Naseem Clayton MD;  Location: [facility] OR;  Service: Urology    OTHER SURGICAL HISTORY      Contraction surgery on bilateral hands and wrists.    PROSTATE BIOPSY N/A 11/11/2022    Procedure: TRANSRECTAL ULTRASOUND GUIDED BIOPSY OF PROSTATE;  Surgeon: Naseem Noland MD;  Location:  [facility] OR;  Service: Urology;  Laterality: N/A;    SINUS SURGERY      SKIN BIOPSY      TEETH EXTRACTION      TONSILLECTOMY      TOTAL KNEE ARTHROPLASTY Right 04/07/2022    Procedure: TOTAL KNEE ARTHROPLASTY WITH ORTHO ROBOT RIGHT;  Surgeon: Louis Malcolm MD;  Location: [facility] OR;   Service: Robotics - Ortho;  Laterality: Right;    TRANSRECTAL INCISION PROSTATE WITH GOLD SEED Bilateral 01/06/2023    Procedure: TRANSRECTAL ULTRASOUND GUIDED PROSTATE FIDUCIAL MARKER PLACEMENT;  Surgeon: Naseem Noland MD;  Location: [facility];  Service: Urology;  Laterality: Bilateral;    TURP / TRANSURETHRAL INCISION / DRAINAGE PROSTATE      VASECTOMY         Family History: family history includes Arthritis in an other family member; Cancer in his mother and another family member; Esophageal cancer in his brother; Hypertension in his father; No Known Problems in his paternal grandfather, paternal grandmother, and sister; Stroke in his father, maternal grandfather, sister, and another family member.     Social History:  reports that he has never smoked. He has been exposed to tobacco smoke. He has never used smokeless tobacco. He reports that he does not drink alcohol and does not use drugs.  Social History     Social History Narrative    Caffeine: None       Medications:  Available home medication information reviewed.  D-Mannose, Diclofenac Sodium, Hydrocortisone (Perianal), OneTouch Delica Lancets 33G, divalproex, fluticasone, glucose blood, isosorbide mononitrate, levETIRAcetam, levothyroxine, lisinopril, metFORMIN, nitrofurantoin (macrocrystal-monohydrate), omeprazole, simvastatin, sodium chloride, tamsulosin, verapamil SR, and vitamin B-12    Allergies   Allergen Reactions    Chlorhexidine Rash    Sulfa Antibiotics Rash     Childhood reaction        Objective  Objective     Vital Signs:   Temp:  [97.7 °F (36.5 °C)] 97.7 °F (36.5 °C)  Heart Rate:  [60-77] 60  Resp:  [16] 16  BP: (136-166)/() 137/76       Physical Exam                             Constitutional: No acute distress, looks comfortable  HENT: NCAT, mucous membranes moist  Respiratory: Clear to auscultation bilaterally, respiratory effort normal   Cardiovascular: RRR, no murmurs, rubs, or gallops  Gastrointestinal: Positive bowel  sounds, soft, nontender, nondistended  Musculoskeletal: trace bilateral ankle edema, left arm is in sling, painful range of motion of left shoulder.  Psychiatric: Appropriate affect, cooperative  Neurologic: Awake, alert, oriented x 3, no focality appreciated, difficulty with memory, speech clear  Skin: No rashes          Result Review:  I have personally reviewed the results from the time of this admission to 3/2/2024 15:04 EST and agree with these findings:  [x]  Laboratory list / accordion  []  Microbiology  [x]  Radiology  []  EKG/Telemetry   []  Cardiology/Vascular   []  Pathology  [x]  Old records  []  Other:  Most notable findings include: Chest x-ray shows mildly displaced fracture of the distal left clavicle.  This study also shows minimally displaced left lateral third rib fracture.  CT scan of the chest however shows minimally displaced fractures of the left first through fourth ribs.  There is no visible pneumothorax.      LAB RESULTS:      Lab 03/02/24  1348 03/02/24  0947   WBC  --  9.72   HEMOGLOBIN  --  12.7*   HEMATOCRIT  --  38.6   PLATELETS  --  177   NEUTROS ABS  --  7.77*   IMMATURE GRANS (ABS)  --  0.06*   LYMPHS ABS  --  1.02   MONOS ABS  --  0.85   EOS ABS  --  0.00   MCV  --  93.0   CRP  --  0.68*   LACTATE 2.3* 2.6*         Lab 03/02/24  0947   SODIUM 132*   POTASSIUM 4.1   CHLORIDE 96*   CO2 25.0   ANION GAP 11.0   BUN 14   CREATININE 0.85   EGFR 88.4   GLUCOSE 116*   CALCIUM 9.6   MAGNESIUM 2.2   PHOSPHORUS 2.8   TSH 2.280         Lab 03/02/24  0947   TOTAL PROTEIN 7.5   ALBUMIN 4.1   GLOBULIN 3.4   ALT (SGPT) 9   AST (SGOT) 16   BILIRUBIN 0.5   ALK PHOS 58         Lab 03/02/24  0947   PROBNP 601.2   HSTROP T 16                 UA          2/15/2024    12:04 2/27/2024    09:53 3/2/2024    09:23   Urinalysis   Squamous Epithelial Cells, UA   0-2    Specific Gravity, UA   1.022    Ketones, UA Trace  1+  15 mg/dL (1+)    Blood, UA   Large (3+)    Leukocytes, UA Moderate (2+)  Trace  Trace     Nitrite, UA   Negative    RBC, UA   11-20    WBC, UA   3-5    Bacteria, UA   None Seen        Microbiology Results (last 10 days)       Procedure Component Value - Date/Time    COVID PRE-OP / PRE-PROCEDURE SCREENING ORDER (NO ISOLATION) - Swab, Nasopharynx [[id number]]  (Normal) Collected: 03/02/24 1133    Lab Status: Final result Specimen: Swab from Nasopharynx Updated: 03/02/24 1227    Narrative:      The following orders were created for panel order COVID PRE-OP / PRE-PROCEDURE SCREENING ORDER (NO ISOLATION) - Swab, Nasopharynx.  Procedure                               Abnormality         Status                     ---------                               -----------         ------                     COVID-19, FLU A/B, RSV P...[[id number]]  Normal              Final result                 Please view results for these tests on the individual orders.    COVID-19, FLU A/B, RSV PCR 1 HR TAT - Swab, Nasopharynx [[id number]]  (Normal) Collected: 03/02/24 1133    Lab Status: Final result Specimen: Swab from Nasopharynx Updated: 03/02/24 1227     COVID19 Not Detected     Influenza A PCR Not Detected     Influenza B PCR Not Detected     RSV, PCR Not Detected    Narrative:      Fact sheet for providers: https://www.fda.gov/media/438476/download    Fact sheet for patients: https://www.fda.gov/media/099829/download    Test performed by PCR.            XR Knee 1 or 2 View Bilateral    Result Date: 3/2/2024  XR KNEE 1 OR 2 VW BILATERAL Date of Exam: 3/2/2024 11:58 AM EST Indication: Pain after fall, dementia Comparison: Right knee radiographs dated 4/10/2023 Findings: Right knee: There is no acute fracture or dislocation. The right total knee prosthesis appears intact without evidence of periprosthetic fracture or loosening. There is a trace suprapatellar joint effusion. Bone mineralization is normal. Left knee: There is no acute fracture or dislocation. There is no joint effusion. There is tricompartmental degenerative joint  disease. There is enthesopathy at the tibial tuberosity which appears similar to the prior examination.     Impression: Impression: 1. Right total knee prosthesis without evidence of hardware complication. 2. Tricompartmental degenerative joint disease of the left knee. 3. No evidence of acute fracture Electronically Signed: Johny Cleve  3/2/2024 12:39 PM EST  Workstation ID: [id number]    CT Head Without Contrast    Result Date: 3/2/2024  CT HEAD WO CONTRAST, CT CHEST WO CONTRAST DIAGNOSTIC, CT CERVICAL SPINE WO CONTRAST Date of Exam: 3/2/2024 10:43 AM EST Indication: Fall with worsening confusion. Comparison: 1/5/2024. Technique: Axial CT images were obtained of the head, cervical spine and chest without contrast administration.  Automated exposure control and iterative construction methods were used. FINDINGS: CT HEAD: Gray-white differentiation is maintained and there is no evidence of intracranial hemorrhage, mass or mass effect. Age-related changes of the brain are present including volume loss and typical periventricular sequela of chronic small vessel ischemia. There is otherwise no evidence of intracranial hemorrhage, mass or mass effect. The ventricles are normal in size and configuration accounting for surrounding volume loss. The orbits are normal and the paranasal sinuses are grossly clear. CT cervical spine: Cortical margins are intact, without evidence of acute fracture. Alignment is anatomic without listhesis or subluxation. Multilevel spondylosis changes are present, with areas of disc osteophyte complex formation and facet arthropathy,  appearing most advanced at C5-6. The prevertebral soft tissues are normal. The dens is intact. CT CHEST: There is no pathologic axillary adenopathy or other worrisome body wall soft tissue finding in the chest. No acute findings are present in the partially characterized upper abdomen. There is no pleural or pericardial effusion. Mildly atherosclerotic,  nonaneurysmal thoracic aorta. Evaluation of the lung fields demonstrates no evidence of acute infectious process or distinct suspicious focal pulmonary nodularity. Evaluation of the osseous structures demonstrates mildly displaced left clavicle fracture as noted on chest radiograph. There there is a nondisplaced acute fracture of the left first rib near the costovertebral junction as well as mildly displaced fractures of the left lateral second through fourth ribs laterally. No thoracic spinal fracture is evident, with multilevel spondylosis changes present.     Impression: Age-related changes of the brain as above, otherwise without evidence of acute intracranial abnormality. No acute fracture or traumatic malalignment of the cervical spine. Acute mildly displaced fracture involving the distal left clavicle, without acromioclavicular joint involvement, in addition to nondisplaced or minimally displaced fractures of the left first through fourth ribs. There is no associated pneumothorax or other acute traumatic finding in the chest. Electronically Signed: Mega Rea MD  3/2/2024 11:12 AM EST  Workstation ID: [id number]    CT Cervical Spine Without Contrast    Result Date: 3/2/2024  CT HEAD WO CONTRAST, CT CHEST WO CONTRAST DIAGNOSTIC, CT CERVICAL SPINE WO CONTRAST Date of Exam: 3/2/2024 10:43 AM EST Indication: Fall with worsening confusion. Comparison: 1/5/2024. Technique: Axial CT images were obtained of the head, cervical spine and chest without contrast administration.  Automated exposure control and iterative construction methods were used. FINDINGS: CT HEAD: Gray-white differentiation is maintained and there is no evidence of intracranial hemorrhage, mass or mass effect. Age-related changes of the brain are present including volume loss and typical periventricular sequela of chronic small vessel ischemia. There is otherwise no evidence of intracranial hemorrhage, mass or mass effect. The ventricles are  normal in size and configuration accounting for surrounding volume loss. The orbits are normal and the paranasal sinuses are grossly clear. CT cervical spine: Cortical margins are intact, without evidence of acute fracture. Alignment is anatomic without listhesis or subluxation. Multilevel spondylosis changes are present, with areas of disc osteophyte complex formation and facet arthropathy,  appearing most advanced at C5-6. The prevertebral soft tissues are normal. The dens is intact. CT CHEST: There is no pathologic axillary adenopathy or other worrisome body wall soft tissue finding in the chest. No acute findings are present in the partially characterized upper abdomen. There is no pleural or pericardial effusion. Mildly atherosclerotic, nonaneurysmal thoracic aorta. Evaluation of the lung fields demonstrates no evidence of acute infectious process or distinct suspicious focal pulmonary nodularity. Evaluation of the osseous structures demonstrates mildly displaced left clavicle fracture as noted on chest radiograph. There there is a nondisplaced acute fracture of the left first rib near the costovertebral junction as well as mildly displaced fractures of the left lateral second through fourth ribs laterally. No thoracic spinal fracture is evident, with multilevel spondylosis changes present.     Impression: Age-related changes of the brain as above, otherwise without evidence of acute intracranial abnormality. No acute fracture or traumatic malalignment of the cervical spine. Acute mildly displaced fracture involving the distal left clavicle, without acromioclavicular joint involvement, in addition to nondisplaced or minimally displaced fractures of the left first through fourth ribs. There is no associated pneumothorax or other acute traumatic finding in the chest. Electronically Signed: Mega Rea MD  3/2/2024 11:12 AM EST  Workstation ID: [id number]    CT Chest Without Contrast Diagnostic    Result Date:  3/2/2024  CT HEAD WO CONTRAST, CT CHEST WO CONTRAST DIAGNOSTIC, CT CERVICAL SPINE WO CONTRAST Date of Exam: 3/2/2024 10:43 AM EST Indication: Fall with worsening confusion. Comparison: 1/5/2024. Technique: Axial CT images were obtained of the head, cervical spine and chest without contrast administration.  Automated exposure control and iterative construction methods were used. FINDINGS: CT HEAD: Gray-white differentiation is maintained and there is no evidence of intracranial hemorrhage, mass or mass effect. Age-related changes of the brain are present including volume loss and typical periventricular sequela of chronic small vessel ischemia. There is otherwise no evidence of intracranial hemorrhage, mass or mass effect. The ventricles are normal in size and configuration accounting for surrounding volume loss. The orbits are normal and the paranasal sinuses are grossly clear. CT cervical spine: Cortical margins are intact, without evidence of acute fracture. Alignment is anatomic without listhesis or subluxation. Multilevel spondylosis changes are present, with areas of disc osteophyte complex formation and facet arthropathy,  appearing most advanced at C5-6. The prevertebral soft tissues are normal. The dens is intact. CT CHEST: There is no pathologic axillary adenopathy or other worrisome body wall soft tissue finding in the chest. No acute findings are present in the partially characterized upper abdomen. There is no pleural or pericardial effusion. Mildly atherosclerotic, nonaneurysmal thoracic aorta. Evaluation of the lung fields demonstrates no evidence of acute infectious process or distinct suspicious focal pulmonary nodularity. Evaluation of the osseous structures demonstrates mildly displaced left clavicle fracture as noted on chest radiograph. There there is a nondisplaced acute fracture of the left first rib near the costovertebral junction as well as mildly displaced fractures of the left lateral  second through fourth ribs laterally. No thoracic spinal fracture is evident, with multilevel spondylosis changes present.     Impression: Age-related changes of the brain as above, otherwise without evidence of acute intracranial abnormality. No acute fracture or traumatic malalignment of the cervical spine. Acute mildly displaced fracture involving the distal left clavicle, without acromioclavicular joint involvement, in addition to nondisplaced or minimally displaced fractures of the left first through fourth ribs. There is no associated pneumothorax or other acute traumatic finding in the chest. Electronically Signed: Mega Rea MD  3/2/2024 11:12 AM EST  Workstation ID: [id number]    XR Chest 1 View    Result Date: 3/2/2024  XR CHEST 1 VW Date of Exam: 3/2/2024 9:34 AM EST Indication: Weak/Dizzy/AMS triage protocol Comparison: 4/16/2022 Findings: Unremarkable cardiomediastinal silhouette. No focal airspace opacity. No pleural effusion or pneumothorax. Mildly displaced fracture of the distal left clavicle. Normal sternoclavicular and acromioclavicular joint alignment. Mild overlying soft tissue edema of the left shoulder. There is also a minimally displaced left lateral third rib fracture.     Impression: Impression: Mildly displaced fracture of the distal left clavicle. Minimally displaced left lateral third rib fracture. No visible pneumothorax. No acute cardiopulmonary abnormality.. Electronically Signed: Tirso Rankin MD  3/2/2024 9:57 AM EST  Workstation ID: [id number]     Results for orders placed during the hospital encounter of 07/20/21    Adult Transthoracic Echo Complete W/ Cont if Necessary Per Protocol    Interpretation Summary  · Left ventricular wall thickness is consistent with mild concentric hypertrophy.  · Normal left-ventricular systolic function, estimated EF 65%.  · Left ventricular diastolic function is consistent with (grade I) impaired relaxation.  · Aortic sclerosis without aortic  stenosis. Trace aortic insufficiency.  · Trace mitral regurgitation, trace tricuspid regurgitation.      Assessment & Plan  Assessment & Plan       Falls    Patient is a 78-year-old male with past medical history of hypertension, hyperlipidemia, hypothyroidism, seizure disorder.  Patient is being admitted for a fall and nondisplaced first through fourth left rib fractures.  Patient also has left distal clavicle fracture.    Multiple falls with frequency of falls increasing  -Etiology not quite clear but family tells me that patient has difficulty with balance  -CT of head shows age-related changes which are chronic but no acute process  -Will ask neurology to see the patient for further evaluation    Multiple rib fractures and also left clavicle fracture  -With no displacement or mildly displaced.  Orthopedic surgery has already seen the patient.  No surgical intervention is planned.  -Left upper limb is already in sling  -Pain control    Prostate cancer  -Follows with Dr. Mayo  -S/p radiation therapy.  -Continue family patient has had hematuria and has had cystoscopy by Dr. Mayo and they were told that probably because of the prostate cancer and the therapy patient will have hematuria for a while.  We will monitor if necessary will ask urology to see patient    Dementia and increasing memory problem  -Patient still lives alone and also drives  -Mentioned above patient has had multiple falls and the frequency of falls are increasing  -Will ask neurology to see the patient for both falls and also worsening memory problems  -I have already talked to the family and have called her attention to the fact that most likely the living arrangement should be changed    Stable problems include:  Hypertension  Hyperlipidemia  Seizure disorder  Hypothyroidism  -Will continue home medication for the above        DVT prophylaxis: Heparin  Medical DVT prophylaxis orders are present.          CODE STATUS:    Code Status  "and Medical Interventions:   Ordered at: 03/02/24 1456     Medical Intervention Limits:    NO intubation (DNI)     Code Status (Patient has no pulse and is not breathing):    No CPR (Do Not Attempt to Resuscitate)     Medical Interventions (Patient has pulse or is breathing):    Limited Support       Expected Discharge   Expected discharge date/ time has not been documented.  To be determined    Dawson Samayoa MD  03/02/24     Electronically signed by Dawson Samayoa MD at 03/02/24 1521          Physician Progress Notes (most recent note)        Harley Godfrey MD at 03/08/24 0733          Patient Name:  Lea Rivers  YOB: 1945  [id number]    Surgery Progress Note    Date of visit: 3/8/2024    Subjective     Patient currently sleeping and unable to obtain full history due to patient's dementia.  Continues to have multiple small bowel movements.  No fevers or chills overnight.      Objective       BP (!) 179/101 (BP Location: Right arm, Patient Position: Lying)   Pulse 87   Temp 98 °F (36.7 °C) (Oral)   Resp 18   Ht 170.2 cm (67\")   Wt 88.9 kg (196 lb)   SpO2 93%   BMI 30.70 kg/m²   No intake or output data in the 24 hours ending 03/08/24 0733    CV:  Rhythm regular and rate regular  L:  Clear to auscultation bilaterally  Abd:  Bowel sounds positive, soft, slight decrease in distention although significantly distended, nontender  Ext:  No cyanosis, clubbing, edema    Recent labs and imaging that are back at this time have been reviewed.   Potassium 2.6  Creatinine 0.68  WBC 16  Hemoglobin 10.2      Assessment & Plan     Patient with significant colonic distention and possible pneumatosis seen on CT scan. Continue n.p.o. given the patient's significant distention.  Consider PICC line and TPN.  Continue bowel regimen.  Will plan for rectal tube placement today for assistance in colonic decompression.        Harley Godfrey MD  3/8/2024  07:33 EST       Electronically signed " by Harley Godfrey MD at 03/08/24 0736          Consult Notes (most recent note)        Anabelle Fabian PA at 03/07/24 1141        Consult Orders    1. Inpatient Gastroenterology Consult [[id number]] ordered by Harley Godfrey MD at 03/07/24 0639              Attestation signed by Kirt Wofle MD at 03/07/24 1601    I have reviewed this documentation and agree.                  [facility] [place] Gastroenterology Consult    Referring Provider: Harley Godfrey MD     PCP: Mannie Melvin MD    Reason for Consultation: Assess for colonic ileus     Chief complaint: Abdominal distention     History of present illness:    Lea Rivers is a 78 y.o. male who is admitted with recurrent mechanical falls resulting in a minimally displaced left clavicle fracture and multiple rib fractures.  He has been receiving Tramadol as needed for pain.   He was noted to have increasing abdominal distention since admission and KUB showed moderately diffuse gaseous distention of the colon to the level of rectum.  Subsequent CT was obtained last night showed dilated loops of large bowel with air-fluid levels and locules of intramural gas consistent with concerning pneumatosis intestinalis.  Post surgical changes of the sigmoid colon noted with surgical clips.   He was evaluated by surgery overnight and started on IV Zosyn.     His daughter describes remote history of a sigmoid colectomy for colon cancer.   His last colonoscopy was 1/24/23 with Dr. Kauffman.  Preparation of the colon was poor at that time.  He had 3 polyps removed. Pathology consistent with tubular adenomas without high grade dysplasia.   He has had difficulty with fecal and urinary incontinence.      Patient has dementia and lives alone but has had a significant decline since the loss of his wife 3 years ago with a most recent decline in the last six months.       Allergies:  Chlorhexidine and Sulfa antibiotics    Scheduled Meds:  [Held by provider]  atorvastatin, 10 mg, Oral, Daily  heparin (porcine), 5,000 Units, Subcutaneous, Q12H  insulin lispro, 2-7 Units, Subcutaneous, 4x Daily AC & at Bedtime  levETIRAcetam, 500 mg, Intravenous, Q12H  [Held by provider] levETIRAcetam, 500 mg, Oral, BID  [Held by provider] levothyroxine, 88 mcg, Oral, Q AM  Lidocaine, 2 patch, Transdermal, Q24H  [Held by provider] lisinopril, 40 mg, Oral, Q24H  metoprolol tartrate, 5 mg, Intravenous, Q6H  pantoprazole, 40 mg, Intravenous, Q AM  piperacillin-tazobactam, 3.375 g, Intravenous, Q8H  potassium chloride, 10 mEq, Intravenous, Q1H  senna-docusate sodium, 2 tablet, Oral, BID  sodium chloride, 10 mL, Intravenous, Q12H  [Held by provider] tamsulosin, 0.4 mg, Oral, Daily  valproate sodium, 500 mg, Intravenous, Q8H  [Held by provider] vitamin B-12, 1,000 mcg, Oral, Daily         Infusions:  sodium chloride, 125 mL/hr, Last Rate: 125 mL/hr (03/07/24 0836)        PRN Meds:    acetaminophen **OR** acetaminophen **OR** acetaminophen    senna-docusate sodium **AND** polyethylene glycol **AND** bisacodyl **AND** bisacodyl    calcium carbonate    Calcium Replacement - Follow Nurse / BPA Driven Protocol    dextrose    dextrose    fluticasone    glucagon (human recombinant)    hydrALAZINE    Magnesium Standard Dose Replacement - Follow Nurse / BPA Driven Protocol    ondansetron ODT **OR** ondansetron    Phosphorus Replacement - Follow Nurse / BPA Driven Protocol    Potassium Replacement - Follow Nurse / BPA Driven Protocol    sodium chloride    sodium chloride    sodium chloride    Home Meds:  Medications Prior to Admission   Medication Sig Dispense Refill Last Dose    D-Mannose 500 MG capsule Take 1 capsule by mouth Daily. (Patient taking differently: Take 1 capsule by mouth Daily. OTC) 90 capsule 3 3/1/2024    divalproex (DEPAKOTE) 500 MG 24 hr tablet Take 1 tablet by mouth 2 (two) times a day. 1 tablet 500mg QAM, 2 tablets 1000mg QPM   3/1/2024    fluticasone (FLONASE) 50 MCG/ACT nasal spray  2 sprays into the nostril(s) as directed by provider As Needed for Rhinitis. Administer 2 sprays in each nostril for each dose.   3/1/2024    isosorbide mononitrate (IMDUR) 30 MG 24 hr tablet Take 1 tablet by mouth 2 (Two) Times a Day.   3/1/2024    levETIRAcetam (KEPPRA) 500 MG tablet Take 1 tablet by mouth 2 (Two) Times a Day.   3/1/2024    levothyroxine (SYNTHROID, LEVOTHROID) 88 MCG tablet Take 1 tablet by mouth Every Morning.   3/1/2024    lisinopril (PRINIVIL,ZESTRIL) 20 MG tablet Take 1 tablet by mouth 2 (Two) Times a Day.   3/1/2024    metFORMIN ER (GLUCOPHAGE-XR) 500 MG 24 hr tablet Take 2 tablets by mouth Daily With Breakfast.   3/1/2024    omeprazole (priLOSEC) 20 MG capsule Take 1 capsule by mouth Daily.   3/1/2024    simvastatin (ZOCOR) 20 MG tablet Take 1 tablet by mouth Every Night.   3/1/2024    sodium chloride 1 g tablet Take 2 tablets by mouth Daily. OTC   3/1/2024    tamsulosin (FLOMAX) 0.4 MG capsule 24 hr capsule Take 1 capsule by mouth Daily. 90 capsule 3 3/1/2024    verapamil SR (CALAN-SR) 240 MG CR tablet Take 1 tablet by mouth Daily.   3/1/2024    vitamin B-12 (CYANOCOBALAMIN) 1000 MCG tablet Take 1 tablet by mouth Daily. 90 tablet 3 3/1/2024       ROS: Review of Systems   Unable to perform ROS: Dementia       PAST MED HX:  Past Medical History:   Diagnosis Date    Anemia     Colon cancer 1990    Status post partial colectomy in 1990. No chemotherapy or radiation therapy.    Coronary artery disease     Nonobstructive coronary artery disease.    Diabetes     Dyslipidemia     GERD (gastroesophageal reflux disease)     Hx of.    Hyperlipidemia     Hypertension     Hyponatremia     Hypothyroidism     Left shoulder pain     Low sodium levels 2022    recent issue; saw nephrologist who ruled out kidney issues; took Sodium Chloride po to bring levels up    Memory deficit     FROM ANESTHESIA    Osteoarthritis     Prostate cancer 07/23/2022    Seizure disorder     silent seziure-diagnosed years ago     Skin cancer        PAST SURG HX:  Past Surgical History:   Procedure Laterality Date    APPENDECTOMY      CARDIAC CATHETERIZATION  2012    30 to 40% LAD    CARPAL TUNNEL RELEASE Bilateral     CHOLECYSTECTOMY      COLECTOMY PARTIAL / TOTAL  1990    Partial colectomy    COLONOSCOPY      CYBERKNIFE  02/09/2023    Prostate/SV    CYSTOSCOPY TRANSURETHRAL RESECTION OF PROSTATE N/A 09/21/2018    Procedure: CYSTOSCOPY TRANSURETHRAL RESECTION OF PROSTATE GREENLIGHT;  Surgeon: Naseem Clayton MD;  Location:  [facility];  Service: Urology    OTHER SURGICAL HISTORY      Contraction surgery on bilateral hands and wrists.    PROSTATE BIOPSY N/A 11/11/2022    Procedure: TRANSRECTAL ULTRASOUND GUIDED BIOPSY OF PROSTATE;  Surgeon: Naseem Noland MD;  Location:  [facility];  Service: Urology;  Laterality: N/A;    SINUS SURGERY      SKIN BIOPSY      TEETH EXTRACTION      TONSILLECTOMY      TOTAL KNEE ARTHROPLASTY Right 04/07/2022    Procedure: TOTAL KNEE ARTHROPLASTY WITH ORTHO ROBOT RIGHT;  Surgeon: Louis Malcolm MD;  Location:  [facility];  Service: Robotics - Ortho;  Laterality: Right;    TRANSRECTAL INCISION PROSTATE WITH GOLD SEED Bilateral 01/06/2023    Procedure: TRANSRECTAL ULTRASOUND GUIDED PROSTATE FIDUCIAL MARKER PLACEMENT;  Surgeon: Naseem Noland MD;  Location:  [facility];  Service: Urology;  Laterality: Bilateral;    TURP / TRANSURETHRAL INCISION / DRAINAGE PROSTATE      VASECTOMY         FAM HX:  Family History   Problem Relation Age of Onset    Cancer Mother         brain    Hypertension Father     Stroke Father     No Known Problems Sister     Stroke Sister     Esophageal cancer Brother     Stroke Maternal Grandfather     No Known Problems Paternal Grandmother     No Known Problems Paternal Grandfather     Stroke Other     Arthritis Other     Cancer Other        SOC HX:  Social History     Socioeconomic History    Marital status:    Tobacco Use    Smoking status: Never     Passive exposure: Past  "   Smokeless tobacco: Never   Vaping Use    Vaping status: Never Used   Substance and Sexual Activity    Alcohol use: No    Drug use: No    Sexual activity: Not Currently       PHYSICAL EXAM  BP (!) 180/113 (BP Location: Right arm, Patient Position: Lying)   Pulse 87   Temp 98.7 °F (37.1 °C) (Oral)   Resp 18   Ht 170.2 cm (67\")   Wt 88.9 kg (196 lb)   SpO2 96%   BMI 30.70 kg/m²   Wt Readings from Last 3 Encounters:   03/02/24 88.9 kg (196 lb)   02/27/24 89.1 kg (196 lb 6.4 oz)   02/15/24 92.1 kg (203 lb)   ,body mass index is 30.7 kg/m².  Physical Exam  Constitutional:       General: He is not in acute distress.     Appearance: He is ill-appearing.   Cardiovascular:      Rate and Rhythm: Normal rate and regular rhythm.   Pulmonary:      Effort: Pulmonary effort is normal. No respiratory distress.   Abdominal:      General: There is distension.      Tenderness: There is no abdominal tenderness.      Comments: Tympanitic to percussion  High pitched tinkling bowel sounds    Musculoskeletal:      Right lower leg: No edema.      Left lower leg: No edema.   Skin:     General: Skin is warm and dry.   Neurological:      Mental Status: He is alert.      Comments: Alert, oriented to self    Psychiatric:         Cognition and Memory: Memory is impaired.       Results Review:   I reviewed the patient's new clinical results.    Lab Results   Component Value Date    WBC 15.60 (H) 03/07/2024    HGB 10.5 (L) 03/07/2024    HGB 11.5 (L) 03/06/2024    HGB 9.7 (L) 03/05/2024    HCT 32.5 (L) 03/07/2024    MCV 91.5 03/07/2024     03/07/2024     Lab Results   Component Value Date    INR 1.08 03/24/2022    INR 1.11 05/26/2021     Lab Results   Component Value Date    GLUCOSE 124 (H) 03/07/2024    BUN 15 03/07/2024    CREATININE 0.74 (L) 03/07/2024    EGFRIFNONA 64 09/10/2021    BCR 20.3 03/07/2024     (L) 03/07/2024    K 3.1 (L) 03/07/2024    CO2 22.0 03/07/2024    CALCIUM 8.5 (L) 03/07/2024    PROTENTOTREF 6.9 " 04/28/2023    ALBUMIN 3.3 (L) 03/06/2024    ALKPHOS 61 03/06/2024    BILITOT 0.7 03/06/2024    ALT 46 (H) 03/06/2024    AST 81 (H) 03/06/2024      Latest Reference Range & Units 03/02/24 09:47   TSH Baseline 0.270 - 4.200 uIU/mL 2.280      Latest Reference Range & Units 03/02/24 09:47   Free T4 0.93 - 1.70 ng/dL 1.29      Latest Reference Range & Units 03/07/24 08:04   Lactate 0.5 - 2.0 mmol/L 1.4       CT Abdomen/Pelvis (as interpreted by radiologist) 3/6/24  FINDINGS:   Lung bases: Trace bilateral pleural fluid. Overlying mild atelectasis.   Liver:No masses. No intrahepatic biliary ductal dilatation.   Spleen:No masses. No perisplenic hematoma.   Pancreas:No pancreatic masses. No evidence of pancreatitis.   Gallbladder and common bile duct: The gallbladder is not identified and may be surgically absent.   Adrenal glands:No adrenal masses   Kidneys and ureters:No kidney stones. No renal masses.No calculi present within the ureters. Normal caliber ureters.   Urinary bladder:No urinary bladder wall thickening. No bladder masses.   Small bowel:Normal caliber small bowel.   Large bowel: Multiple dilated loops of large bowel with air-fluid levels. Locules of intramural gas noted consistent with concerning for pneumatosis intestinalis. Postsurgical changes on the sigmoid colon with surrounding surgical clips.   Appendix: Not seen however there is no evidence of appendicitis.   GENITOURINARY: Previous prostatectomy   Ascites or pneumoperitoneum: None   Adenopathy:None present   Osseous structures: Degenerative changes of the hips and lumbar spine.   Other findings: None   IMPRESSION:  Dilated loops of large bowel with likely pneumatosis intestinalis. No bowel wall thickening. Inflammation surrounding the distal descending and sigmoid colon. Findings are concerning for possible developing necrotizing enterocolitis given the   presence of pneumatosis intestinalis and inflammation of the region of the sigmoid colon with  air-fluid levels and dilated large bowel.    ASSESSMENTS/PLANS    Colonic ileus with CT concerns of pneumatosis intestinalis   History of remote colon resection for colon cancer  Dementia  Multiple falls   Hypokalemia    >> Agree with holding Tramadol.   Will add SQ Relistor   >> IV Reglan 10 mg QID   >> IV Zosyn  >> Further management per surgery   >> Correct hypokalemia     I discussed the patient's findings and my recommendations with patient, his son whom is at bedside and his daughter via telephone.      ALMAZ Bullock  24  11:41 EST      Electronically signed by Kirt Wolfe MD at 24 1604          Physical Therapy Notes (most recent note)        Tiffanie Carmen, PT at 24 0955  Version 1 of 1         Patient Name: Lea Rivers  : 1945    MRN: [id number]                              Today's Date: 3/6/2024       Admit Date: 3/2/2024    Visit Dx:     ICD-10-CM ICD-9-CM   1. Fall, initial encounter  W19.XXXA E888.9   2. Closed fracture of multiple ribs of left side, initial encounter  S22.42XA 807.09   3. Closed displaced fracture of acromial end of left clavicle, initial encounter  S42.032A 810.03   4. Falls frequently  R29.6 V15.88   5. Acute UTI (urinary tract infection)  N39.0 599.0     Patient Active Problem List   Diagnosis    Coronary artery disease    Dyslipidemia    Hypothyroidism    GERD (gastroesophageal reflux disease)    Seizure disorder    BPH (benign prostatic hypertrophy)    Hypertension    Primary osteoarthritis of both knees    Syncope and collapse    Precordial pain    Diabetes    Status post total right knee replacement    Postoperative urinary retention    Confusion, postoperative    Acute blood loss anemia, asymptomatic, no transfusion required    Elevated prostate specific antigen (PSA)    Prostate cancer    Anemia    Body mass index (BMI) of 32.0-32.9 in adult    Localization-related focal epilepsy with simple partial seizures    Need for vaccination  against Streptococcus pneumoniae    Upper extremity tendon tear, right, initial encounter    Vitamin D deficiency    PSA elevation    Polyp of colon    Overactive bladder    Gross hematuria    History of colon polyps    History of colon cancer    B12 deficiency    Falls     Past Medical History:   Diagnosis Date    Anemia     Colon cancer 1990    Status post partial colectomy in 1990. No chemotherapy or radiation therapy.    Coronary artery disease     Nonobstructive coronary artery disease.    Diabetes     Dyslipidemia     GERD (gastroesophageal reflux disease)     Hx of.    Hyperlipidemia     Hypertension     Hyponatremia     Hypothyroidism     Left shoulder pain     Low sodium levels 2022    recent issue; saw nephrologist who ruled out kidney issues; took Sodium Chloride po to bring levels up    Memory deficit     FROM ANESTHESIA    Osteoarthritis     Prostate cancer 07/23/2022    Seizure disorder     silent seziure-diagnosed years ago    Skin cancer      Past Surgical History:   Procedure Laterality Date    APPENDECTOMY      CARDIAC CATHETERIZATION  2012    30 to 40% LAD    CARPAL TUNNEL RELEASE Bilateral     CHOLECYSTECTOMY      COLECTOMY PARTIAL / TOTAL  1990    Partial colectomy    COLONOSCOPY      CYBERKNIFE  02/09/2023    Prostate/SV    CYSTOSCOPY TRANSURETHRAL RESECTION OF PROSTATE N/A 09/21/2018    Procedure: CYSTOSCOPY TRANSURETHRAL RESECTION OF PROSTATE GREENLIGHT;  Surgeon: Naseem Clayton MD;  Location: [facility];  Service: Urology    OTHER SURGICAL HISTORY      Contraction surgery on bilateral hands and wrists.    PROSTATE BIOPSY N/A 11/11/2022    Procedure: TRANSRECTAL ULTRASOUND GUIDED BIOPSY OF PROSTATE;  Surgeon: Naseem Noland MD;  Location: [facility];  Service: Urology;  Laterality: N/A;    SINUS SURGERY      SKIN BIOPSY      TEETH EXTRACTION      TONSILLECTOMY      TOTAL KNEE ARTHROPLASTY Right 04/07/2022    Procedure: TOTAL KNEE ARTHROPLASTY WITH ORTHO ROBOT RIGHT;  Surgeon: Gold  Louis MCDOWELL MD;  Location: [facility] OR;  Service: Robotics - Ortho;  Laterality: Right;    TRANSRECTAL INCISION PROSTATE WITH GOLD SEED Bilateral 01/06/2023    Procedure: TRANSRECTAL ULTRASOUND GUIDED PROSTATE FIDUCIAL MARKER PLACEMENT;  Surgeon: Naseem Noland MD;  Location: [facility] OR;  Service: Urology;  Laterality: Bilateral;    TURP / TRANSURETHRAL INCISION / DRAINAGE PROSTATE      VASECTOMY        General Information       Row Name 03/06/24 1112          Physical Therapy Time and Intention    Document Type therapy note (daily note)  -CK     Mode of Treatment physical therapy  -CK       Row Name 03/06/24 1112          General Information    Patient Profile Reviewed yes  -CK     Existing Precautions/Restrictions fall;non-weight bearing;left  L rib fxs, LUE NWB, confusion  -CK     Barriers to Rehab medically complex;previous functional deficit;cognitive status;hearing deficit  -CK       Row Name 03/06/24 1112          Cognition    Orientation Status (Cognition) disoriented to;person;place;situation;time  -CK       Row Name 03/06/24 1112          Safety Issues, Functional Mobility    Safety Issues Affecting Function (Mobility) ability to follow commands;awareness of need for assistance;impulsivity;insight into deficits/self-awareness;judgment;problem-solving;safety precaution awareness;safety precautions follow-through/compliance;unable to maintain weight-bearing restrictions  -CK     Impairments Affecting Function (Mobility) balance;cognition;endurance/activity tolerance;pain;postural/trunk control;strength;range of motion (ROM)  -CK               User Key  (r) = Recorded By, (t) = Taken By, (c) = Cosigned By      Initials Name Provider Type    CK Tiffanie Carmen, BRISSA Physical Therapist                   Mobility       Row Name 03/06/24 1114          Bed Mobility    Bed Mobility supine-sit  -CK     Supine-Sit [place] (Bed Mobility) verbal cues;moderate assist (50% patient effort);2 person assist  -CK      Assistive Device (Bed Mobility) draw sheet;head of bed elevated  -CK     Comment, (Bed Mobility) cues for sequencing, patient able to bring BLEs off EOB, but significant assist to lift trunk from HOB and maintain upright posture sitting EOB  -CK       Row Name 03/06/24 1114          Sit-Stand Transfer    Sit-Stand [place] (Transfers) moderate assist (50% patient effort);2 person assist;verbal cues;nonverbal cues (demo/gesture)  -CK     Assistive Device (Sit-Stand Transfers) other (see comments)  RUE HHA  -CK     Comment, (Sit-Stand Transfer) boost to clear hips from bed, cues for upright posture  -CK       Row Name 03/06/24 1114          Gait/Stairs (Locomotion)    [place] Level (Gait) moderate assist (50% patient effort);2 person assist;verbal cues;nonverbal cues (demo/gesture)  -CK     Assistive Device (Gait) other (see comments)  RUE HHA  -CK     Distance in Feet (Gait) 3  -CK     Deviations/Abnormal Patterns (Gait) bilateral deviations;gait speed decreased;stride length decreased;bisi decreased  -CK     Bilateral Gait Deviations forward flexed posture;heel strike decreased  -CK     Right Sided Gait Deviations leans right  -CK     Comment, (Gait/Stairs) Patient took shuffling steps from EOB to chair placed a few steps away from bed. Cues for upright posture and sequencing with steps toward chair. Patient very unsteady with R lean flexed posture throughout  -CK       Row Name 03/06/24 1114          Mobility    Extremity Weight-bearing Status left upper extremity  -CK     Left Upper Extremity (Weight-bearing Status) non weight-bearing (NWB)   -CK               User Key  (r) = Recorded By, (t) = Taken By, (c) = Cosigned By      Initials Name Provider Type    CK Tiffanie Carmen PT Physical Therapist                   Obj/Interventions       Row Name 03/06/24 1117          Motor Skills    Therapeutic Exercise knee;ankle  -CK       Row Name 03/06/24 1117          Knee (Therapeutic Exercise)    Knee  (Therapeutic Exercise) AAROM (active assistive range of motion)  -CK     Knee AAROM (Therapeutic Exercise) bilateral;flexion;extension;10 repetitions  -CK       Row Name 03/06/24 1117          Ankle (Therapeutic Exercise)    Ankle (Therapeutic Exercise) AAROM (active assistive range of motion)  -CK     Ankle AAROM (Therapeutic Exercise) bilateral;dorsiflexion;plantarflexion;10 repetitions  -CK       Row Name 03/06/24 1117          Balance    Balance Assessment sitting static balance;standing static balance;standing dynamic balance  -CK     Static Sitting Balance moderate assist  -CK     Position, Sitting Balance unsupported;sitting edge of bed  -CK     Static Standing Balance moderate assist  -CK     Dynamic Standing Balance moderate assist  -CK     Position/Device Used, Standing Balance supported;other (see comments)  KUMAR OLIVAS  -CK     Comment, Balance R lean while sitting EOB and also in standing, unsteady on feet  -CK               User Key  (r) = Recorded By, (t) = Taken By, (c) = Cosigned By      Initials Name Provider Type    CK Tiffanie Carmen, PT Physical Therapist                   Goals/Plan    No documentation.                  Clinical Impression       Row Name 03/06/24 1118          Pain    Additional Documentation Pain Scale: FACES Pre/Post-Treatment (Group)  -CK       Row Name 03/06/24 1118          Pain Scale: FACES Pre/Post-Treatment    Pain: FACES Scale, Pretreatment 4-->hurts little more  -CK     Posttreatment Pain Rating 4-->hurts little more  -CK     Pain Location - Side/Orientation Left  -CK     Pain Location upper  -CK     Pain Location - extremity  -CK       Row Name 03/06/24 1118          Plan of Care Review    Plan of Care Reviewed With patient  -CK     Progress no change  -CK     Outcome Evaluation Patient continues to be limited by confusion and difficulty following commands today. He required increased assist for sitting balance and was able to take steps 3' with modAx2 and KUMAR COLMENARESA to  chair. IPPT will continue to progress to patient's tolerance. Recommend SNF.  -CK       Row Name 03/06/24 1118          Vital Signs    Pre Systolic BP Rehab 182  -CK     Pre Treatment Diastolic   -CK     Post Systolic BP Rehab 182  -CK     Post Treatment Diastolic   -CK     Pretreatment Heart Rate (beats/min) 91  -CK     Posttreatment Heart Rate (beats/min) 99  -CK     Pre SpO2 (%) 92  -CK     O2 Delivery Pre Treatment room air  -CK     O2 Delivery Intra Treatment room air  -CK     Post SpO2 (%) 91  -CK     O2 Delivery Post Treatment room air  -CK     Pre Patient Position Supine  -CK     Intra Patient Position Standing  -CK     Post Patient Position Sitting  -CK       Row Name 03/06/24 1118          Positioning and Restraints    Pre-Treatment Position in bed  -CK     Post Treatment Position chair  -CK     In Chair reclined;call light within reach;encouraged to call for assist;exit alarm on;with family/caregiver;waffle cushion;on mechanical lift sling;heels elevated;LUE elevated;with brace;notified nsg  -CK               User Key  (r) = Recorded By, (t) = Taken By, (c) = Cosigned By      Initials Name Provider Type    CK Tiffanie Carmen, PT Physical Therapist                   Outcome Measures       Row Name 03/06/24 1120 03/06/24 0855       How much help from another person do you currently need...    Turning from your back to your side while in flat bed without using bedrails? 2  -CK 2  -LV    Moving from lying on back to sitting on the side of a flat bed without bedrails? 2  -CK 2  -LV    Moving to and from a bed to a chair (including a wheelchair)? 2  -CK 2  -LV    Standing up from a chair using your arms (e.g., wheelchair, bedside chair)? 2  -CK 1  -LV    Climbing 3-5 steps with a railing? 1  -CK 1  -LV    To walk in hospital room? 2  -CK 1  -LV    AM-PAC 6 Clicks Score (PT) 11  -CK 9  -LV    Highest Level of Mobility Goal 4 --> Transfer to chair/commode  -CK 3 --> Sit at edge of bed  -LV      Row  Name 03/06/24 1120 03/06/24 1116       Functional Assessment    Outcome Measure Options AM-PAC 6 Clicks Basic Mobility (PT)  -CK AM-PAC 6 Clicks Daily Activity (OT)  -AC              User Key  (r) = Recorded By, (t) = Taken By, (c) = Cosigned By      Initials Name Provider Type    AC Pauly Davey, OT Occupational Therapist    Tiffanie Kramer, PT Physical Therapist    Brittney De Leon, RN Registered Nurse                                 Physical Therapy Education       Title: PT OT SLP Therapies (In Progress)       Topic: Physical Therapy (In Progress)       Point: Mobility training (In Progress)       Learning Progress Summary             Patient Acceptance, E, NR by CK at 3/6/2024 1121    Acceptance, E, VU,NR by LO at 3/4/2024 1340    Comment: PT POC   Family Acceptance, E, NR by CK at 3/6/2024 1121    Acceptance, E, VU,NR by LO at 3/4/2024 1340    Comment: PT POC                         Point: Home exercise program (In Progress)       Learning Progress Summary             Patient Acceptance, E, NR by CK at 3/6/2024 1121    Acceptance, E, VU,NR by LO at 3/4/2024 1340    Comment: PT POC   Family Acceptance, E, NR by CK at 3/6/2024 1121    Acceptance, E, VU,NR by LO at 3/4/2024 1340    Comment: PT POC                         Point: Body mechanics (In Progress)       Learning Progress Summary             Patient Acceptance, E, NR by CK at 3/6/2024 1121    Acceptance, E, VU,NR by LO at 3/4/2024 1340    Comment: PT POC   Family Acceptance, E, NR by CK at 3/6/2024 1121    Acceptance, E, VU,NR by LO at 3/4/2024 1340    Comment: PT POC                         Point: Precautions (In Progress)       Learning Progress Summary             Patient Acceptance, E, NR by CK at 3/6/2024 1121    Acceptance, E, VU,NR by LO at 3/4/2024 1340    Comment: PT POC   Family Acceptance, E, NR by CK at 3/6/2024 1121    Acceptance, E, VU,NR by LO at 3/4/2024 1340    Comment: PT POC                                         User  Key       Initials Effective Dates Name Provider Type Discipline    LO 21 -  Emily Muro, PT Physical Therapist PT    CK 24 -  Tiffanie Carmen PT Physical Therapist PT                  PT Recommendation and Plan     Plan of Care Reviewed With: patient  Progress: no change  Outcome Evaluation: Patient continues to be limited by confusion and difficulty following commands today. He required increased assist for sitting balance and was able to take steps 3' with modAx2 and RUE HHA to chair. IPPT will continue to progress to patient's tolerance. Recommend SNF.     Time Calculation:         PT Charges       Row Name 24 1121             Time Calculation    Start Time 0955  -CK      PT Received On 24  -CK      PT Goal Re-Cert Due Date 24  -CK         Timed Charges    83985 - PT Therapeutic Activity Minutes 13  -CK         Total Minutes    Timed Charges Total Minutes 13  -CK       Total Minutes 13  -CK                User Key  (r) = Recorded By, (t) = Taken By, (c) = Cosigned By      Initials Name Provider Type    CK Tiffanie Carmen, BRISSA Physical Therapist                  Therapy Charges for Today       Code Description Service Date Service Provider Modifiers Qty    68090532723  PT THERAPEUTIC ACT EA 15 MIN 3/6/2024 Tiffanie Carmen, PT GP 1            PT G-Codes  Outcome Measure Options: AM-PAC 6 Clicks Basic Mobility (PT)  AM-PAC 6 Clicks Score (PT): 11  AM-PAC 6 Clicks Score (OT): 9  PT Discharge Summary  Anticipated Discharge Disposition (PT): skilled nursing facility    Tiffanie Carmen PT  3/6/2024      Electronically signed by Tiffanie Carmen PT at 24 1122          Occupational Therapy Notes (most recent note)        Pauly Davey, OT at 24 0956          Patient Name: Lea Rivers  : 1945    MRN: [id number]                              Today's Date: 3/6/2024       Admit Date: 3/2/2024    Visit Dx:     ICD-10-CM ICD-9-CM   1. Fall, initial  encounter  W19.XXXA E888.9   2. Closed fracture of multiple ribs of left side, initial encounter  S22.42XA 807.09   3. Closed displaced fracture of acromial end of left clavicle, initial encounter  S42.032A 810.03   4. Falls frequently  R29.6 V15.88   5. Acute UTI (urinary tract infection)  N39.0 599.0     Patient Active Problem List   Diagnosis    Coronary artery disease    Dyslipidemia    Hypothyroidism    GERD (gastroesophageal reflux disease)    Seizure disorder    BPH (benign prostatic hypertrophy)    Hypertension    Primary osteoarthritis of both knees    Syncope and collapse    Precordial pain    Diabetes    Status post total right knee replacement    Postoperative urinary retention    Confusion, postoperative    Acute blood loss anemia, asymptomatic, no transfusion required    Elevated prostate specific antigen (PSA)    Prostate cancer    Anemia    Body mass index (BMI) of 32.0-32.9 in adult    Localization-related focal epilepsy with simple partial seizures    Need for vaccination against Streptococcus pneumoniae    Upper extremity tendon tear, right, initial encounter    Vitamin D deficiency    PSA elevation    Polyp of colon    Overactive bladder    Gross hematuria    History of colon polyps    History of colon cancer    B12 deficiency    Falls     Past Medical History:   Diagnosis Date    Anemia     Colon cancer 1990    Status post partial colectomy in 1990. No chemotherapy or radiation therapy.    Coronary artery disease     Nonobstructive coronary artery disease.    Diabetes     Dyslipidemia     GERD (gastroesophageal reflux disease)     Hx of.    Hyperlipidemia     Hypertension     Hyponatremia     Hypothyroidism     Left shoulder pain     Low sodium levels 2022    recent issue; saw nephrologist who ruled out kidney issues; took Sodium Chloride po to bring levels up    Memory deficit     FROM ANESTHESIA    Osteoarthritis     Prostate cancer 07/23/2022    Seizure disorder     silent seziure-diagnosed  years ago    Skin cancer      Past Surgical History:   Procedure Laterality Date    APPENDECTOMY      CARDIAC CATHETERIZATION  2012    30 to 40% LAD    CARPAL TUNNEL RELEASE Bilateral     CHOLECYSTECTOMY      COLECTOMY PARTIAL / TOTAL  1990    Partial colectomy    COLONOSCOPY      CYBERKNIFE  02/09/2023    Prostate/SV    CYSTOSCOPY TRANSURETHRAL RESECTION OF PROSTATE N/A 09/21/2018    Procedure: CYSTOSCOPY TRANSURETHRAL RESECTION OF PROSTATE GREENLIGHT;  Surgeon: Naseem Clayton MD;  Location:  [facility];  Service: Urology    OTHER SURGICAL HISTORY      Contraction surgery on bilateral hands and wrists.    PROSTATE BIOPSY N/A 11/11/2022    Procedure: TRANSRECTAL ULTRASOUND GUIDED BIOPSY OF PROSTATE;  Surgeon: Naseem Noland MD;  Location:  [facility];  Service: Urology;  Laterality: N/A;    SINUS SURGERY      SKIN BIOPSY      TEETH EXTRACTION      TONSILLECTOMY      TOTAL KNEE ARTHROPLASTY Right 04/07/2022    Procedure: TOTAL KNEE ARTHROPLASTY WITH ORTHO ROBOT RIGHT;  Surgeon: Louis Malcolm MD;  Location:  [facility];  Service: Robotics - Ortho;  Laterality: Right;    TRANSRECTAL INCISION PROSTATE WITH GOLD SEED Bilateral 01/06/2023    Procedure: TRANSRECTAL ULTRASOUND GUIDED PROSTATE FIDUCIAL MARKER PLACEMENT;  Surgeon: Naseem Noland MD;  Location:  [facility];  Service: Urology;  Laterality: Bilateral;    TURP / TRANSURETHRAL INCISION / DRAINAGE PROSTATE      VASECTOMY        General Information       Row Name 03/06/24 1101 03/06/24 1058       OT Time and Intention    Document Type therapy note (daily note)  -AC therapy note (daily note)  -AC    Mode of Treatment occupational therapy  -AC occupational therapy  -AC      Row Name 03/06/24 1101          General Information    Patient Profile Reviewed yes  -AC     Existing Precautions/Restrictions fall;non-weight bearing;left  dementia, L rib fractures,  L clavicle fracture, NWB LUE,  LUE sling, [facility]  -AC     Barriers to Rehab medically complex;previous  functional deficit;cognitive status;hearing deficit  -       Row Name 03/06/24 1101          Cognition    Orientation Status (Cognition) disoriented to;person;place;situation;time  -       Row Name 03/06/24 1101          Safety Issues, Functional Mobility    Safety Issues Affecting Function (Mobility) ability to follow commands;awareness of need for assistance;insight into deficits/self-awareness;judgment;problem-solving;safety precaution awareness;safety precautions follow-through/compliance;sequencing abilities  -     Impairments Affecting Function (Mobility) balance;cognition;endurance/activity tolerance;pain;postural/trunk control;strength;range of motion (ROM)  -     Cognitive Impairments, Mobility Safety/Performance attention;awareness, need for assistance;insight into deficits/self-awareness;judgment;problem-solving/reasoning;safety precaution awareness;safety precaution follow-through;sequencing abilities  -               User Key  (r) = Recorded By, (t) = Taken By, (c) = Cosigned By      Initials Name Provider Type     Pauly Davey OT Occupational Therapist                     Mobility/ADL's       Row Name 03/06/24 1104          Bed Mobility    Bed Mobility supine-sit  -     Supine-Sit [place] (Bed Mobility) verbal cues;moderate assist (50% patient effort);2 person assist  -     Assistive Device (Bed Mobility) draw sheet;head of bed elevated  -     Comment, (Bed Mobility) VCs to sequence,  pt initiating legs EOB, required assist trunk to midline  -       Row Name 03/06/24 1104          Transfers    Transfers sit-stand transfer;bed-chair transfer  -       Row Name 03/06/24 1104          Bed-Chair Transfer    Bed-Chair [place] (Transfers) moderate assist (50% patient effort);2 person assist;verbal cues;nonverbal cues (demo/gesture)  -     Assistive Device (Bed-Chair Transfers) --  R UE support  -       Row Name 03/06/24 1104          Sit-Stand Transfer    Sit-Stand  [place] (Transfers) moderate assist (50% patient effort);2 person assist;verbal cues;nonverbal cues (demo/gesture)  -     Assistive Device (Sit-Stand Transfers) other (see comments)  RUE support  -       Row Name 03/06/24 1104          Functional Mobility    Functional Mobility- Ind. Level moderate assist (50% patient effort);2 person assist required  -     Functional Mobility-Distance (Feet) 3  -     Functional Mobility- Safety Issues step length decreased;sequencing ability decreased  -       Row Name 03/06/24 1104          Activities of Daily Living    BADL Assessment/Intervention upper body dressing;grooming  -       Row Name 03/06/24 1104          Mobility    Extremity Weight-bearing Status left upper extremity   -     Left Upper Extremity (Weight-bearing Status) non weight-bearing (NWB)   -       Row Name 03/06/24 1104          Grooming Assessment/Training    [place] Level (Grooming) wash face, hands;set up;verbal cues  -     Position (Grooming) supported sitting  -[facility] Name 03/06/24 1104          Upper Body Dressing Assessment/Training    [place] Level (Upper Body Dressing) don;doff;pajama/robe;maximum assist (25% patient effort)  -     Position (Upper Body Dressing) edge of bed sitting  -               User Key  (r) = Recorded By, (t) = Taken By, (c) = Cosigned By      Initials Name Provider Type     Pauly Davey, OT Occupational Therapist                   Obj/Interventions       Row Name 03/06/24 1107          Elbow/Forearm (Therapeutic Exercise)    Elbow/Forearm (Therapeutic Exercise) PROM (passive range of motion)  pt did not initate actively assisting  -     Elbow/Forearm PROM (Therapeutic Exercise) flexion;extension;pronation;supination;10 repetitions;left  -       Row Name 03/06/24 1107          Hand (Therapeutic Exercise)    Hand (Therapeutic Exercise) PROM (passive range of motion)  -     Hand PROM (Therapeutic Exercise) left;finger  extension;finger flexion;10 repetitions  pt would not actively assist  -       Row Name 03/06/24 1107          Motor Skills    Therapeutic Exercise elbow/forearm;hand  -AC               User Key  (r) = Recorded By, (t) = Taken By, (c) = Cosigned By      Initials Name Provider Type    AC Pauly Davey, OT Occupational Therapist                   Goals/Plan    No documentation.                  Clinical Impression       Row Name 03/06/24 1109          Pain Scale: FACES Pre/Post-Treatment    Pain: FACES Scale, Pretreatment 4-->hurts little more  -AC     Posttreatment Pain Rating 4-->hurts little more  -AC     Pain Location - Side/Orientation Left  -AC     Pain Location upper  -AC     Pain Location - extremity  -AC     Pre/Posttreatment Pain Comment pain with movement  -       Row Name 03/06/24 1109          Plan of Care Review    Plan of Care Reviewed With patient  -     Progress no change  -     Outcome Evaluation Pt presents  below baseline with self care/mobiltiy d/t  confusion, minimal command following, pain , weakness, decr balance and activity tolerance.  Pt setup/ VCs to wash face, max A UBD,  mod A x 2 to take 3 steps to chair given RUE support.  Pt tolerated PROM L elbow and below - unable to get pt to actively assist.  Recommend SNF upon d/c.  -       Row Name 03/06/24 1109          Vital Signs    Pre Systolic BP Rehab 182  -AC     Pre Treatment Diastolic   -AC     Post Systolic BP Rehab 182  -AC     Post Treatment Diastolic   -AC     Pretreatment Heart Rate (beats/min) 91  -AC     Posttreatment Heart Rate (beats/min) 99  -AC     Pre SpO2 (%) 92  -AC     O2 Delivery Pre Treatment room air  -AC     O2 Delivery Intra Treatment room air  -AC     Post SpO2 (%) 91  -AC     O2 Delivery Post Treatment room air  -AC     Pre Patient Position Supine  -AC     Post Patient Position Sitting  -       Row Name 03/06/24 1109          Positioning and Restraints    Pre-Treatment Position in bed  -AC      Post Treatment Position chair  -AC     In Chair notified nsg;reclined;call light within reach;encouraged to call for assist;exit alarm on;with family/caregiver;on mechanical lift sling;waffle cushion;heels elevated  LUE sling and pillow under elbow  -AC               User Key  (r) = Recorded By, (t) = Taken By, (c) = Cosigned By      Initials Name Provider Type    Pauly Mayes, OT Occupational Therapist                   Outcome Measures       Row Name 03/06/24 1116          How much help from another is currently needed...    Putting on and taking off regular lower body clothing? 1  -AC     Bathing (including washing, rinsing, and drying) 1  -AC     Toileting (which includes using toilet bed pan or urinal) 1  -AC     Putting on and taking off regular upper body clothing 2  -AC     Taking care of personal grooming (such as brushing teeth) 2  -AC     Eating meals 2  -AC     AM-PAC 6 Clicks Score (OT) 9  -AC       Row Name 03/06/24 0855          How much help from another person do you currently need...    Turning from your back to your side while in flat bed without using bedrails? 2  -LV     Moving from lying on back to sitting on the side of a flat bed without bedrails? 2  -LV     Moving to and from a bed to a chair (including a wheelchair)? 2  -LV     Standing up from a chair using your arms (e.g., wheelchair, bedside chair)? 1  -LV     Climbing 3-5 steps with a railing? 1  -LV     To walk in hospital room? 1  -LV     AM-PAC 6 Clicks Score (PT) 9  -LV     Highest Level of Mobility Goal 3 --> Sit at edge of bed  -LV       Row Name 03/06/24 1116          Functional Assessment    Outcome Measure Options AM-PAC 6 Clicks Daily Activity (OT)  -AC               User Key  (r) = Recorded By, (t) = Taken By, (c) = Cosigned By      Initials Name Provider Type    Pauly Mayes, SADAF Occupational Therapist    Brittney De Leon, RN Registered Nurse                    Occupational Therapy Education       Title:  PT OT SLP Therapies (In Progress)       Topic: Occupational Therapy (In Progress)       Point: ADL training (In Progress)       Description:   Instruct learner(s) on proper safety adaptation and remediation techniques during self care or transfers.   Instruct in proper use of assistive devices.                  Learning Progress Summary             Patient Acceptance, E, NR by  at 3/6/2024 1116    Acceptance, E, VU by  at 3/4/2024 1519                         Point: Home exercise program (Not Started)       Description:   Instruct learner(s) on appropriate technique for monitoring, assisting and/or progressing therapeutic exercises/activities.                  Learner Progress:  Not documented in this visit.              Point: Precautions (Done)       Description:   Instruct learner(s) on prescribed precautions during self-care and functional transfers.                  Learning Progress Summary             Patient Acceptance, E, VU by  at 3/4/2024 1519                         Point: Body mechanics (Not Started)       Description:   Instruct learner(s) on proper positioning and spine alignment during self-care, functional mobility activities and/or exercises.                  Learner Progress:  Not documented in this visit.                              User Key       Initials Effective Dates Name Provider Type Discipline     02/03/23 -  Pauly Davey, OT Occupational Therapist OT     06/16/21 -  Justina Joyce OT Occupational Therapist OT                  OT Recommendation and Plan     Plan of Care Review  Plan of Care Reviewed With: patient  Progress: no change  Outcome Evaluation: Pt presents  below baseline with self care/mobiltiy d/t  confusion, minimal command following, pain , weakness, decr balance and activity tolerance.  Pt setup/ VCs to wash face, max A UBD,  mod A x 2 to take 3 steps to chair given RUE support.  Pt tolerated PROM L elbow and below - unable to get pt to actively assist.   Recommend SNF upon d/c.     Time Calculation:         Time Calculation- OT       Row Name 03/06/24 0956             Time Calculation- OT    OT Start Time 0956  -AC      OT Received On 03/06/24  -AC      OT Goal Re-Cert Due Date 03/14/24  -AC         Timed Charges    35918 - OT Self Care/Mgmt Minutes 12  -AC         Total Minutes    Timed Charges Total Minutes 12  -AC       Total Minutes 12  -AC                User Key  (r) = Recorded By, (t) = Taken By, (c) = Cosigned By      Initials Name Provider Type    AC Pauly Davey, OT Occupational Therapist                  Therapy Charges for Today       Code Description Service Date Service Provider Modifiers Qty    51814672871 HC OT SELF CARE/MGMT/TRAIN EA 15 MIN 3/6/2024 Pauly Davey, OT GO 1                 Pauly Davey OT  3/6/2024    Electronically signed by Pauly Davey OT at 03/06/24 1117

## 2024-03-06 ENCOUNTER — APPOINTMENT (OUTPATIENT)
Dept: GENERAL RADIOLOGY | Facility: HOSPITAL | Age: 79
DRG: 329 | End: 2024-03-06
Payer: MEDICARE

## 2024-03-06 ENCOUNTER — APPOINTMENT (OUTPATIENT)
Dept: CT IMAGING | Facility: HOSPITAL | Age: 79
DRG: 329 | End: 2024-03-06
Payer: MEDICARE

## 2024-03-06 LAB
ALBUMIN SERPL-MCNC: 3.3 G/DL (ref 3.5–5.2)
ALBUMIN/GLOB SERPL: 0.9 G/DL
ALP SERPL-CCNC: 61 U/L (ref 39–117)
ALT SERPL W P-5'-P-CCNC: 46 U/L (ref 1–41)
ANION GAP SERPL CALCULATED.3IONS-SCNC: 14 MMOL/L (ref 5–15)
ARTERIAL PATENCY WRIST A: ABNORMAL
AST SERPL-CCNC: 81 U/L (ref 1–40)
ATMOSPHERIC PRESS: ABNORMAL MM[HG]
BACTERIA UR QL AUTO: ABNORMAL /HPF
BASE EXCESS BLDA CALC-SCNC: -1.6 MMOL/L (ref 0–2)
BASOPHILS # BLD AUTO: 0.03 10*3/MM3 (ref 0–0.2)
BASOPHILS NFR BLD AUTO: 0.2 % (ref 0–1.5)
BDY SITE: ABNORMAL
BILIRUB SERPL-MCNC: 0.7 MG/DL (ref 0–1.2)
BILIRUB UR QL STRIP: ABNORMAL
BODY TEMPERATURE: 37
BUN SERPL-MCNC: 15 MG/DL (ref 8–23)
BUN/CREAT SERPL: 16.3 (ref 7–25)
CALCIUM SPEC-SCNC: 9 MG/DL (ref 8.6–10.5)
CHLORIDE SERPL-SCNC: 98 MMOL/L (ref 98–107)
CLARITY UR: ABNORMAL
CO2 BLDA-SCNC: 23.8 MMOL/L (ref 22–33)
CO2 SERPL-SCNC: 22 MMOL/L (ref 22–29)
COHGB MFR BLD: 1.6 % (ref 0–2)
COLOR UR: ABNORMAL
CREAT SERPL-MCNC: 0.92 MG/DL (ref 0.76–1.27)
D-LACTATE SERPL-SCNC: 2.2 MMOL/L (ref 0.5–2)
D-LACTATE SERPL-SCNC: 2.9 MMOL/L (ref 0.5–2)
DEPRECATED RDW RBC AUTO: 51.3 FL (ref 37–54)
EGFRCR SERPLBLD CKD-EPI 2021: 84.6 ML/MIN/1.73
EOSINOPHIL # BLD AUTO: 0 10*3/MM3 (ref 0–0.4)
EOSINOPHIL NFR BLD AUTO: 0 % (ref 0.3–6.2)
EPAP: 0
ERYTHROCYTE [DISTWIDTH] IN BLOOD BY AUTOMATED COUNT: 15.1 % (ref 12.3–15.4)
FINE GRAN CASTS URNS QL MICRO: ABNORMAL /LPF
GLOBULIN UR ELPH-MCNC: 3.5 GM/DL
GLUCOSE BLDC GLUCOMTR-MCNC: 103 MG/DL (ref 70–130)
GLUCOSE BLDC GLUCOMTR-MCNC: 114 MG/DL (ref 70–130)
GLUCOSE BLDC GLUCOMTR-MCNC: 127 MG/DL (ref 70–130)
GLUCOSE BLDC GLUCOMTR-MCNC: 98 MG/DL (ref 70–130)
GLUCOSE SERPL-MCNC: 121 MG/DL (ref 65–99)
GLUCOSE UR STRIP-MCNC: ABNORMAL MG/DL
HCO3 BLDA-SCNC: 22.7 MMOL/L (ref 20–26)
HCT VFR BLD AUTO: 35.8 % (ref 37.5–51)
HCT VFR BLD CALC: 32.8 % (ref 38–51)
HGB BLD-MCNC: 11.5 G/DL (ref 13–17.7)
HGB BLDA-MCNC: 10.7 G/DL (ref 13.5–17.5)
HGB UR QL STRIP.AUTO: ABNORMAL
HYALINE CASTS UR QL AUTO: ABNORMAL /LPF
IMM GRANULOCYTES # BLD AUTO: 0.2 10*3/MM3 (ref 0–0.05)
IMM GRANULOCYTES NFR BLD AUTO: 1.4 % (ref 0–0.5)
INHALED O2 CONCENTRATION: 21 %
IPAP: 0
KETONES UR QL STRIP: ABNORMAL
LEUKOCYTE ESTERASE UR QL STRIP.AUTO: ABNORMAL
LYMPHOCYTES # BLD AUTO: 0.78 10*3/MM3 (ref 0.7–3.1)
LYMPHOCYTES NFR BLD AUTO: 5.3 % (ref 19.6–45.3)
MCH RBC QN AUTO: 29.9 PG (ref 26.6–33)
MCHC RBC AUTO-ENTMCNC: 32.1 G/DL (ref 31.5–35.7)
MCV RBC AUTO: 93.2 FL (ref 79–97)
METHGB BLD QL: 0 % (ref 0–1.5)
MODALITY: ABNORMAL
MONOCYTES # BLD AUTO: 1.46 10*3/MM3 (ref 0.1–0.9)
MONOCYTES NFR BLD AUTO: 9.9 % (ref 5–12)
MUCOUS THREADS URNS QL MICRO: ABNORMAL /HPF
NEUTROPHILS NFR BLD AUTO: 12.21 10*3/MM3 (ref 1.7–7)
NEUTROPHILS NFR BLD AUTO: 83.2 % (ref 42.7–76)
NITRITE UR QL STRIP: POSITIVE
NRBC BLD AUTO-RTO: 0 /100 WBC (ref 0–0.2)
OXYHGB MFR BLDV: 89.9 % (ref 94–99)
PAW @ PEAK INSP FLOW SETTING VENT: 0 CMH2O
PCO2 BLDA: 35.6 MM HG (ref 35–45)
PCO2 TEMP ADJ BLD: 35.6 MM HG (ref 35–48)
PH BLDA: 7.41 PH UNITS (ref 7.35–7.45)
PH UR STRIP.AUTO: 5.5 [PH] (ref 5–8)
PH, TEMP CORRECTED: 7.41 PH UNITS
PLATELET # BLD AUTO: 221 10*3/MM3 (ref 140–450)
PMV BLD AUTO: 8.9 FL (ref 6–12)
PO2 BLDA: 63 MM HG (ref 83–108)
PO2 TEMP ADJ BLD: 63 MM HG (ref 83–108)
POTASSIUM SERPL-SCNC: 3.8 MMOL/L (ref 3.5–5.2)
PROCALCITONIN SERPL-MCNC: 0.12 NG/ML (ref 0–0.25)
PROT SERPL-MCNC: 6.8 G/DL (ref 6–8.5)
PROT UR QL STRIP: ABNORMAL
RBC # BLD AUTO: 3.84 10*6/MM3 (ref 4.14–5.8)
RBC # UR STRIP: ABNORMAL /HPF
REF LAB TEST METHOD: ABNORMAL
SODIUM SERPL-SCNC: 134 MMOL/L (ref 136–145)
SP GR UR STRIP: >=1.03 (ref 1–1.03)
SQUAMOUS #/AREA URNS HPF: ABNORMAL /HPF
TOTAL RATE: 0 BREATHS/MINUTE
UROBILINOGEN UR QL STRIP: ABNORMAL
WBC # UR STRIP: ABNORMAL /HPF
WBC NRBC COR # BLD AUTO: 14.68 10*3/MM3 (ref 3.4–10.8)

## 2024-03-06 PROCEDURE — 80053 COMPREHEN METABOLIC PANEL: CPT | Performed by: INTERNAL MEDICINE

## 2024-03-06 PROCEDURE — 0 DIATRIZOATE MEGLUMINE & SODIUM PER 1 ML

## 2024-03-06 PROCEDURE — G0378 HOSPITAL OBSERVATION PER HR: HCPCS

## 2024-03-06 PROCEDURE — 81001 URINALYSIS AUTO W/SCOPE: CPT | Performed by: INTERNAL MEDICINE

## 2024-03-06 PROCEDURE — 83605 ASSAY OF LACTIC ACID: CPT | Performed by: INTERNAL MEDICINE

## 2024-03-06 PROCEDURE — 85025 COMPLETE CBC W/AUTO DIFF WBC: CPT | Performed by: INTERNAL MEDICINE

## 2024-03-06 PROCEDURE — 99291 CRITICAL CARE FIRST HOUR: CPT | Performed by: INTERNAL MEDICINE

## 2024-03-06 PROCEDURE — 84145 PROCALCITONIN (PCT): CPT | Performed by: INTERNAL MEDICINE

## 2024-03-06 PROCEDURE — 25810000003 SODIUM CHLORIDE 0.9 % SOLUTION: Performed by: INTERNAL MEDICINE

## 2024-03-06 PROCEDURE — 87040 BLOOD CULTURE FOR BACTERIA: CPT | Performed by: INTERNAL MEDICINE

## 2024-03-06 PROCEDURE — 82375 ASSAY CARBOXYHB QUANT: CPT

## 2024-03-06 PROCEDURE — 82948 REAGENT STRIP/BLOOD GLUCOSE: CPT

## 2024-03-06 PROCEDURE — 83050 HGB METHEMOGLOBIN QUAN: CPT

## 2024-03-06 PROCEDURE — 74176 CT ABD & PELVIS W/O CONTRAST: CPT

## 2024-03-06 PROCEDURE — 71045 X-RAY EXAM CHEST 1 VIEW: CPT

## 2024-03-06 PROCEDURE — 25010000002 HEPARIN (PORCINE) PER 1000 UNITS: Performed by: INTERNAL MEDICINE

## 2024-03-06 PROCEDURE — 25010000002 CEFTRIAXONE PER 250 MG: Performed by: INTERNAL MEDICINE

## 2024-03-06 PROCEDURE — 25010000002 PIPERACILLIN SOD-TAZOBACTAM PER 1 G: Performed by: FAMILY MEDICINE

## 2024-03-06 PROCEDURE — 82805 BLOOD GASES W/O2 SATURATION: CPT

## 2024-03-06 PROCEDURE — 99232 SBSQ HOSP IP/OBS MODERATE 35: CPT | Performed by: INTERNAL MEDICINE

## 2024-03-06 PROCEDURE — 74018 RADEX ABDOMEN 1 VIEW: CPT

## 2024-03-06 PROCEDURE — 25010000002 HYDRALAZINE PER 20 MG: Performed by: NURSE PRACTITIONER

## 2024-03-06 PROCEDURE — 97530 THERAPEUTIC ACTIVITIES: CPT

## 2024-03-06 PROCEDURE — 36600 WITHDRAWAL OF ARTERIAL BLOOD: CPT

## 2024-03-06 PROCEDURE — 97535 SELF CARE MNGMENT TRAINING: CPT

## 2024-03-06 RX ORDER — SODIUM CHLORIDE 9 MG/ML
50 INJECTION, SOLUTION INTRAVENOUS CONTINUOUS
Status: DISCONTINUED | OUTPATIENT
Start: 2024-03-06 | End: 2024-03-07

## 2024-03-06 RX ORDER — HYDRALAZINE HYDROCHLORIDE 20 MG/ML
10 INJECTION INTRAMUSCULAR; INTRAVENOUS ONCE
Status: COMPLETED | OUTPATIENT
Start: 2024-03-06 | End: 2024-03-06

## 2024-03-06 RX ORDER — LISINOPRIL 40 MG/1
40 TABLET ORAL
Status: DISCONTINUED | OUTPATIENT
Start: 2024-03-06 | End: 2024-03-09

## 2024-03-06 RX ADMIN — LEVETIRACETAM 500 MG: 500 TABLET, FILM COATED ORAL at 21:19

## 2024-03-06 RX ADMIN — LIDOCAINE 2 PATCH: 4 PATCH TOPICAL at 09:06

## 2024-03-06 RX ADMIN — LEVOTHYROXINE SODIUM 88 MCG: 0.09 TABLET ORAL at 04:50

## 2024-03-06 RX ADMIN — SODIUM CHLORIDE 1000 ML: 9 INJECTION, SOLUTION INTRAVENOUS at 22:54

## 2024-03-06 RX ADMIN — DIVALPROEX SODIUM 500 MG: 250 TABLET, FILM COATED, EXTENDED RELEASE ORAL at 09:08

## 2024-03-06 RX ADMIN — PIPERACILLIN AND TAZOBACTAM 3.38 G: 3; .375 INJECTION, POWDER, LYOPHILIZED, FOR SOLUTION INTRAVENOUS at 21:19

## 2024-03-06 RX ADMIN — CEFTRIAXONE 2000 MG: 2 INJECTION, POWDER, FOR SOLUTION INTRAMUSCULAR; INTRAVENOUS at 17:56

## 2024-03-06 RX ADMIN — Medication 1000 MCG: at 09:05

## 2024-03-06 RX ADMIN — LISINOPRIL 40 MG: 40 TABLET ORAL at 09:05

## 2024-03-06 RX ADMIN — LEVETIRACETAM 500 MG: 500 TABLET, FILM COATED ORAL at 09:05

## 2024-03-06 RX ADMIN — TAMSULOSIN HYDROCHLORIDE 0.4 MG: 0.4 CAPSULE ORAL at 09:05

## 2024-03-06 RX ADMIN — HEPARIN SODIUM 5000 UNITS: 5000 INJECTION INTRAVENOUS; SUBCUTANEOUS at 21:18

## 2024-03-06 RX ADMIN — HYDRALAZINE HYDROCHLORIDE 10 MG: 20 INJECTION INTRAMUSCULAR; INTRAVENOUS at 21:18

## 2024-03-06 RX ADMIN — ATORVASTATIN CALCIUM 10 MG: 10 TABLET, FILM COATED ORAL at 21:18

## 2024-03-06 RX ADMIN — DIATRIZOATE MEGLUMINE AND DIATRIZOATE SODIUM 15 ML: 660; 100 LIQUID ORAL; RECTAL at 18:08

## 2024-03-06 RX ADMIN — Medication 10 ML: at 21:19

## 2024-03-06 RX ADMIN — Medication 10 ML: at 09:07

## 2024-03-06 RX ADMIN — DOCUSATE SODIUM 50MG AND SENNOSIDES 8.6MG 2 TABLET: 8.6; 5 TABLET, FILM COATED ORAL at 09:05

## 2024-03-06 RX ADMIN — SODIUM CHLORIDE 1000 ML: 9 INJECTION, SOLUTION INTRAVENOUS at 16:41

## 2024-03-06 RX ADMIN — SODIUM CHLORIDE 75 ML/HR: 9 INJECTION, SOLUTION INTRAVENOUS at 21:19

## 2024-03-06 RX ADMIN — PANTOPRAZOLE SODIUM 40 MG: 40 TABLET, DELAYED RELEASE ORAL at 04:51

## 2024-03-06 RX ADMIN — HEPARIN SODIUM 5000 UNITS: 5000 INJECTION INTRAVENOUS; SUBCUTANEOUS at 09:06

## 2024-03-06 NOTE — PLAN OF CARE
Goal Outcome Evaluation:  Plan of Care Reviewed With: patient        Progress: no change  Outcome Evaluation: Pt presents  below baseline with self care/mobiltiy d/t  confusion, minimal command following, pain , weakness, decr balance and activity tolerance.  Pt setup/ VCs to wash face, max A UBD,  mod A x 2 to take 3 steps to chair given RUE support.  Pt tolerated PROM L elbow and below - unable to get pt to actively assist.  Recommend SNF upon d/c.

## 2024-03-06 NOTE — THERAPY TREATMENT NOTE
Patient Name: Lea Rivers  : 1945    MRN: [id number]                              Today's Date: 3/6/2024       Admit Date: 3/2/2024    Visit Dx:     ICD-10-CM ICD-9-CM   1. Fall, initial encounter  W19.XXXA E888.9   2. Closed fracture of multiple ribs of left side, initial encounter  S22.42XA 807.09   3. Closed displaced fracture of acromial end of left clavicle, initial encounter  S42.032A 810.03   4. Falls frequently  R29.6 V15.88   5. Acute UTI (urinary tract infection)  N39.0 599.0     Patient Active Problem List   Diagnosis    Coronary artery disease    Dyslipidemia    Hypothyroidism    GERD (gastroesophageal reflux disease)    Seizure disorder    BPH (benign prostatic hypertrophy)    Hypertension    Primary osteoarthritis of both knees    Syncope and collapse    Precordial pain    Diabetes    Status post total right knee replacement    Postoperative urinary retention    Confusion, postoperative    Acute blood loss anemia, asymptomatic, no transfusion required    Elevated prostate specific antigen (PSA)    Prostate cancer    Anemia    Body mass index (BMI) of 32.0-32.9 in adult    Localization-related focal epilepsy with simple partial seizures    Need for vaccination against Streptococcus pneumoniae    Upper extremity tendon tear, right, initial encounter    Vitamin D deficiency    PSA elevation    Polyp of colon    Overactive bladder    Gross hematuria    History of colon polyps    History of colon cancer    B12 deficiency    Falls     Past Medical History:   Diagnosis Date    Anemia     Colon cancer     Status post partial colectomy in . No chemotherapy or radiation therapy.    Coronary artery disease     Nonobstructive coronary artery disease.    Diabetes     Dyslipidemia     GERD (gastroesophageal reflux disease)     Hx of.    Hyperlipidemia     Hypertension     Hyponatremia     Hypothyroidism     Left shoulder pain     Low sodium levels     recent issue; saw nephrologist who  ruled out kidney issues; took Sodium Chloride po to bring levels up    Memory deficit     FROM ANESTHESIA    Osteoarthritis     Prostate cancer 07/23/2022    Seizure disorder     silent seziure-diagnosed years ago    Skin cancer      Past Surgical History:   Procedure Laterality Date    APPENDECTOMY      CARDIAC CATHETERIZATION  2012    30 to 40% LAD    CARPAL TUNNEL RELEASE Bilateral     CHOLECYSTECTOMY      COLECTOMY PARTIAL / TOTAL  1990    Partial colectomy    COLONOSCOPY      CYBERKNIFE  02/09/2023    Prostate/SV    CYSTOSCOPY TRANSURETHRAL RESECTION OF PROSTATE N/A 09/21/2018    Procedure: CYSTOSCOPY TRANSURETHRAL RESECTION OF PROSTATE GREENLIGHT;  Surgeon: Naseem Clayton MD;  Location:  [facility];  Service: Urology    OTHER SURGICAL HISTORY      Contraction surgery on bilateral hands and wrists.    PROSTATE BIOPSY N/A 11/11/2022    Procedure: TRANSRECTAL ULTRASOUND GUIDED BIOPSY OF PROSTATE;  Surgeon: Naseem Noland MD;  Location:  [facility];  Service: Urology;  Laterality: N/A;    SINUS SURGERY      SKIN BIOPSY      TEETH EXTRACTION      TONSILLECTOMY      TOTAL KNEE ARTHROPLASTY Right 04/07/2022    Procedure: TOTAL KNEE ARTHROPLASTY WITH ORTHO ROBOT RIGHT;  Surgeon: Louis Malcolm MD;  Location:  [facility];  Service: Robotics - Ortho;  Laterality: Right;    TRANSRECTAL INCISION PROSTATE WITH GOLD SEED Bilateral 01/06/2023    Procedure: TRANSRECTAL ULTRASOUND GUIDED PROSTATE FIDUCIAL MARKER PLACEMENT;  Surgeon: Naseem Noland MD;  Location:  [facility];  Service: Urology;  Laterality: Bilateral;    TURP / TRANSURETHRAL INCISION / DRAINAGE PROSTATE      VASECTOMY        General Information       Row Name 03/06/24 1101 03/06/24 1058       OT Time and Intention    Document Type therapy note (daily note)  -AC therapy note (daily note)  -AC    Mode of Treatment occupational therapy  -AC occupational therapy  -AC      Row Name 03/06/24 1101          General Information    Patient Profile Reviewed  yes  -     Existing Precautions/Restrictions fall;non-weight bearing;left  dementia, L rib fractures,  L clavicle fracture, NWB LUE,  LUE sling, [place]  -     Barriers to Rehab medically complex;previous functional deficit;cognitive status;hearing deficit  -       Row Name 03/06/24 1101          Cognition    Orientation Status (Cognition) disoriented to;person;place;situation;time  -       Row Name 03/06/24 1101          Safety Issues, Functional Mobility    Safety Issues Affecting Function (Mobility) ability to follow commands;awareness of need for assistance;insight into deficits/self-awareness;judgment;problem-solving;safety precaution awareness;safety precautions follow-through/compliance;sequencing abilities  -     Impairments Affecting Function (Mobility) balance;cognition;endurance/activity tolerance;pain;postural/trunk control;strength;range of motion (ROM)  -     Cognitive Impairments, Mobility Safety/Performance attention;awareness, need for assistance;insight into deficits/self-awareness;judgment;problem-solving/reasoning;safety precaution awareness;safety precaution follow-through;sequencing abilities  -               User Key  (r) = Recorded By, (t) = Taken By, (c) = Cosigned By      Initials Name Provider Type     Pauly Davey, OT Occupational Therapist                     Mobility/ADL's       Row Name 03/06/24 1104          Bed Mobility    Bed Mobility supine-sit  -     Supine-Sit [place] (Bed Mobility) verbal cues;moderate assist (50% patient effort);2 person assist  -     Assistive Device (Bed Mobility) draw sheet;head of bed elevated  -     Comment, (Bed Mobility) VCs to sequence,  pt initiating legs EOB, required assist trunk to midline  -       Row Name 03/06/24 1104          Transfers    Transfers sit-stand transfer;bed-chair transfer  -       Row Name 03/06/24 1104          Bed-Chair Transfer    Bed-Chair [place] (Transfers) moderate assist (50% patient  effort);2 person assist;verbal cues;nonverbal cues (demo/gesture)  -     Assistive Device (Bed-Chair Transfers) --  R UE support  -       Row Name 03/06/24 1104          Sit-Stand Transfer    Sit-Stand [place] (Transfers) moderate assist (50% patient effort);2 person assist;verbal cues;nonverbal cues (demo/gesture)  -     Assistive Device (Sit-Stand Transfers) other (see comments)  RUE support  -       Row Name 03/06/24 1104          Functional Mobility    Functional Mobility- Ind. Level moderate assist (50% patient effort);2 person assist required  -     Functional Mobility-Distance (Feet) 3  -AC     Functional Mobility- Safety Issues step length decreased;sequencing ability decreased  -       Row Name 03/06/24 1104          Activities of Daily Living    BADL Assessment/Intervention upper body dressing;grooming  -       Row Name 03/06/24 1104          Mobility    Extremity Weight-bearing Status left upper extremity   -     Left Upper Extremity (Weight-bearing Status) non weight-bearing (NWB)   -       Row Name 03/06/24 1104          Grooming Assessment/Training    [place] Level (Grooming) wash face, hands;set up;verbal cues  -     Position (Grooming) supported sitting  -       Row Name 03/06/24 1104          Upper Body Dressing Assessment/Training    [place] Level (Upper Body Dressing) don;doff;pajama/robe;maximum assist (25% patient effort)  -     Position (Upper Body Dressing) edge of bed sitting  -               User Key  (r) = Recorded By, (t) = Taken By, (c) = Cosigned By      Initials Name Provider Type     Pauly Davey, OT Occupational Therapist                   Obj/Interventions       Row Name 03/06/24 1107          Elbow/Forearm (Therapeutic Exercise)    Elbow/Forearm (Therapeutic Exercise) PROM (passive range of motion)  pt did not initate actively assisting  -     Elbow/Forearm PROM (Therapeutic Exercise) flexion;extension;pronation;supination;10  repetitions;left  -       Row Name 03/06/24 1107          Hand (Therapeutic Exercise)    Hand (Therapeutic Exercise) PROM (passive range of motion)  -     Hand PROM (Therapeutic Exercise) left;finger extension;finger flexion;10 repetitions  pt would not actively assist  -       Row Name 03/06/24 1107          Motor Skills    Therapeutic Exercise elbow/forearm;hand  -               User Key  (r) = Recorded By, (t) = Taken By, (c) = Cosigned By      Initials Name Provider Type     Pauly Davey OT Occupational Therapist                   Goals/Plan    No documentation.                  Clinical Impression       Row Name 03/06/24 1109          Pain Scale: FACES Pre/Post-Treatment    Pain: FACES Scale, Pretreatment 4-->hurts little more  -AC     Posttreatment Pain Rating 4-->hurts little more  -AC     Pain Location - Side/Orientation Left  -     Pain Location upper  -AC     Pain Location - extremity  -AC     Pre/Posttreatment Pain Comment pain with movement  -       Row Name 03/06/24 1109          Plan of Care Review    Plan of Care Reviewed With patient  -     Progress no change  -     Outcome Evaluation Pt presents  below baseline with self care/mobiltiy d/t  confusion, minimal command following, pain , weakness, decr balance and activity tolerance.  Pt setup/ VCs to wash face, max A UBD,  mod A x 2 to take 3 steps to chair given RUE support.  Pt tolerated PROM L elbow and below - unable to get pt to actively assist.  Recommend SNF upon d/c.  -       Row Name 03/06/24 1109          Vital Signs    Pre Systolic BP Rehab 182  -AC     Pre Treatment Diastolic   -AC     Post Systolic BP Rehab 182  -AC     Post Treatment Diastolic   -AC     Pretreatment Heart Rate (beats/min) 91  -AC     Posttreatment Heart Rate (beats/min) 99  -AC     Pre SpO2 (%) 92  -AC     O2 Delivery Pre Treatment room air  -AC     O2 Delivery Intra Treatment room air  -AC     Post SpO2 (%) 91  -AC     O2 Delivery Post  Treatment room air  -AC     Pre Patient Position Supine  -AC     Post Patient Position Sitting  -AC       Row Name 03/06/24 1109          Positioning and Restraints    Pre-Treatment Position in bed  -AC     Post Treatment Position chair  -AC     In Chair notified nsg;reclined;call light within reach;encouraged to call for assist;exit alarm on;with family/caregiver;on mechanical lift sling;waffle cushion;heels elevated  LUE sling and pillow under elbow  -AC               User Key  (r) = Recorded By, (t) = Taken By, (c) = Cosigned By      Initials Name Provider Type    Pauly Mayes, OT Occupational Therapist                   Outcome Measures       Row Name 03/06/24 1116          How much help from another is currently needed...    Putting on and taking off regular lower body clothing? 1  -AC     Bathing (including washing, rinsing, and drying) 1  -AC     Toileting (which includes using toilet bed pan or urinal) 1  -AC     Putting on and taking off regular upper body clothing 2  -AC     Taking care of personal grooming (such as brushing teeth) 2  -AC     Eating meals 2  -AC     AM-PAC 6 Clicks Score (OT) 9  -AC       Row Name 03/06/24 0855          How much help from another person do you currently need...    Turning from your back to your side while in flat bed without using bedrails? 2  -LV     Moving from lying on back to sitting on the side of a flat bed without bedrails? 2  -LV     Moving to and from a bed to a chair (including a wheelchair)? 2  -LV     Standing up from a chair using your arms (e.g., wheelchair, bedside chair)? 1  -LV     Climbing 3-5 steps with a railing? 1  -LV     To walk in hospital room? 1  -LV     AM-PAC 6 Clicks Score (PT) 9  -LV     Highest Level of Mobility Goal 3 --> Sit at edge of bed  -LV       Row Name 03/06/24 1116          Functional Assessment    Outcome Measure Options AM-PAC 6 Clicks Daily Activity (OT)  -               User Key  (r) = Recorded By, (t) = Taken By, (c) =  Cosigned By      Initials Name Provider Type    AC Pauly Davey, OT Occupational Therapist    Brittney De Leon, RN Registered Nurse                    Occupational Therapy Education       Title: PT OT SLP Therapies (In Progress)       Topic: Occupational Therapy (In Progress)       Point: ADL training (In Progress)       Description:   Instruct learner(s) on proper safety adaptation and remediation techniques during self care or transfers.   Instruct in proper use of assistive devices.                  Learning Progress Summary             Patient Acceptance, E, NR by  at 3/6/2024 1116    Acceptance, E, VU by  at 3/4/2024 1519                         Point: Home exercise program (Not Started)       Description:   Instruct learner(s) on appropriate technique for monitoring, assisting and/or progressing therapeutic exercises/activities.                  Learner Progress:  Not documented in this visit.              Point: Precautions (Done)       Description:   Instruct learner(s) on prescribed precautions during self-care and functional transfers.                  Learning Progress Summary             Patient Acceptance, E, VU by  at 3/4/2024 1519                         Point: Body mechanics (Not Started)       Description:   Instruct learner(s) on proper positioning and spine alignment during self-care, functional mobility activities and/or exercises.                  Learner Progress:  Not documented in this visit.                              User Key       Initials Effective Dates Name Provider Type Discipline     02/03/23 -  Pauly Davey, OT Occupational Therapist OT     06/16/21 -  Justina Joyce OT Occupational Therapist OT                  OT Recommendation and Plan     Plan of Care Review  Plan of Care Reviewed With: patient  Progress: no change  Outcome Evaluation: Pt presents  below baseline with self care/mobiltiy d/t  confusion, minimal command following, pain , weakness, decr balance  and activity tolerance.  Pt setup/ VCs to wash face, max A UBD,  mod A x 2 to take 3 steps to chair given RUE support.  Pt tolerated PROM L elbow and below - unable to get pt to actively assist.  Recommend SNF upon d/c.     Time Calculation:         Time Calculation- OT       Row Name 03/06/24 0956             Time Calculation- OT    OT Start Time 0956  -AC      OT Received On 03/06/24  -AC      OT Goal Re-Cert Due Date 03/14/24  -AC         Timed Charges    41456 - OT Self Care/Mgmt Minutes 12  -AC         Total Minutes    Timed Charges Total Minutes 12  -AC       Total Minutes 12  -AC                User Key  (r) = Recorded By, (t) = Taken By, (c) = Cosigned By      Initials Name Provider Type    AC Pauly Davey OT Occupational Therapist                  Therapy Charges for Today       Code Description Service Date Service Provider Modifiers Qty    [id number] HC OT SELF CARE/MGMT/TRAIN EA 15 MIN 3/6/2024 Pauly Davey OT GO 1                 Pauly Davey OT  3/6/2024

## 2024-03-06 NOTE — PROGRESS NOTES
[facility] Medicine Services  PROGRESS NOTE    Patient Name: Lea Rivers  : 1945  MRN: [id number]    Date of Admission: 3/2/2024  Primary Care Physician: Mannie Melvin MD    Subjective   Subjective     CC:  S/p fall, rib fractures    HPI:  More confused per son in-law at bedside, has been sleeping a lot per daughter. Urine dark per nurse.       Objective   Objective     Vital Signs:   Temp:  [97.8 °F (36.6 °C)-99.5 °F (37.5 °C)] 98.3 °F (36.8 °C)  Heart Rate:  [] 103  Resp:  [16-19] 19  BP: (137-194)/() 137/94     Physical Exam:  Constitutional: No acute distress, awake, alert, son in-law at bedside  HENT: NCAT, mucous membranes moist  Respiratory: Clear to auscultation bilaterally, respiratory effort normal   Cardiovascular: RRR, no murmurs, rubs, or gallops  Gastrointestinal: Positive bowel sounds, soft, nontender, +distended  Musculoskeletal: No bilateral ankle edema, LUE in sling  Psychiatric: Appropriate affect, cooperative  Neurologic: confused, moves all extremities, Cranial Nerves grossly intact to confrontation, speech clear  Skin: No rashes      Results Reviewed:  LAB RESULTS:      Lab 24  0428 24  0734 24  0301 248 24  1602 24  1348 24  0947   WBC 8.14 6.79  --   --   --   --  9.72   HEMOGLOBIN 9.7* 9.8*  --   --   --   --  12.7*   HEMATOCRIT 30.5* 30.9*  --   --   --   --  38.6   PLATELETS 162 140  --   --   --   --  177   NEUTROS ABS  --   --   --   --   --   --  7.77*   IMMATURE GRANS (ABS)  --   --   --   --   --   --  0.06*   LYMPHS ABS  --   --   --   --   --   --  1.02   MONOS ABS  --   --   --   --   --   --  0.85   EOS ABS  --   --   --   --   --   --  0.00   MCV 91.6 91.4  --   --   --   --  93.0   CRP  --   --   --   --   --   --  0.68*   LACTATE  --   --  1.6 2.8* 2.8* 2.3* 2.6*         Lab 24  0428 24  0449 24  0947   SODIUM 132* 131* 132*   POTASSIUM 3.8 4.2 4.1    CHLORIDE 99 98 96*   CO2 24.0 24.0 25.0   ANION GAP 9.0 9.0 11.0   BUN 10 10 14   CREATININE 0.75* 0.77 0.85   EGFR 91.8 91.1 88.4   GLUCOSE 102* 82 116*   CALCIUM 8.7 8.2* 9.6   MAGNESIUM  --   --  2.2   PHOSPHORUS  --   --  2.8   HEMOGLOBIN A1C  --   --  5.50   TSH  --   --  2.280         Lab 03/02/24  0947   TOTAL PROTEIN 7.5   ALBUMIN 4.1   GLOBULIN 3.4   ALT (SGPT) 9   AST (SGOT) 16   BILIRUBIN 0.5   ALK PHOS 58         Lab 03/02/24  0947   PROBNP 601.2   HSTROP T 16             Lab 03/03/24  1629   FOLATE 2.32*   VITAMIN B 12 807         Brief Urine Lab Results  (Last result in the past 365 days)        Color   Clarity   Blood   Leuk Est   Nitrite   Protein   CREAT   Urine HCG        03/02/24 0923 Yellow   Clear   Large (3+)   Trace   Negative   30 mg/dL (1+)                   Microbiology Results Abnormal       Procedure Component Value - Date/Time    Blood Culture - Blood, Arm, Right [[id number]]  (Normal) Collected: 03/02/24 1132    Lab Status: Preliminary result Specimen: Blood from Arm, Right Updated: 03/06/24 1200     Blood Culture No growth at 4 days    Narrative:      Less than seven (7) mL's of blood was collected.  Insufficient quantity may yield false negative results.    Blood Culture - Blood, Arm, Right [[id number]]  (Normal) Collected: 03/02/24 1132    Lab Status: Preliminary result Specimen: Blood from Arm, Right Updated: 03/06/24 1200     Blood Culture No growth at 4 days    Narrative:      Less than seven (7) mL's of blood was collected.  Insufficient quantity may yield false negative results.    Urine Culture - Urine, Straight Cath [[id number]]  (Normal) Collected: 03/02/24 0923    Lab Status: Final result Specimen: Urine from Straight Cath Updated: 03/03/24 1601     Urine Culture No growth    COVID PRE-OP / PRE-PROCEDURE SCREENING ORDER (NO ISOLATION) - Swab, Nasopharynx [[id number]]  (Normal) Collected: 03/02/24 1133    Lab Status: Final result Specimen: Swab from Nasopharynx Updated:  03/02/24 1227    Narrative:      The following orders were created for panel order COVID PRE-OP / PRE-PROCEDURE SCREENING ORDER (NO ISOLATION) - Swab, Nasopharynx.  Procedure                               Abnormality         Status                     ---------                               -----------         ------                     COVID-19, FLU A/B, RSV P...[[id number]]  Normal              Final result                 Please view results for these tests on the individual orders.    COVID-19, FLU A/B, RSV PCR 1 HR TAT - Swab, Nasopharynx [[id number]]  (Normal) Collected: 03/02/24 1133    Lab Status: Final result Specimen: Swab from Nasopharynx Updated: 03/02/24 1227     COVID19 Not Detected     Influenza A PCR Not Detected     Influenza B PCR Not Detected     RSV, PCR Not Detected    Narrative:      Fact sheet for providers: https://www.fda.gov/media/498925/download    Fact sheet for patients: https://www.fda.gov/media/903687/download    Test performed by PCR.            No radiology results from the last 24 hrs    Results for orders placed during the hospital encounter of 07/20/21    Adult Transthoracic Echo Complete W/ Cont if Necessary Per Protocol    Interpretation Summary  · Left ventricular wall thickness is consistent with mild concentric hypertrophy.  · Normal left-ventricular systolic function, estimated EF 65%.  · Left ventricular diastolic function is consistent with (grade I) impaired relaxation.  · Aortic sclerosis without aortic stenosis. Trace aortic insufficiency.  · Trace mitral regurgitation, trace tricuspid regurgitation.      Current medications:  Scheduled Meds:atorvastatin, 10 mg, Oral, Daily  divalproex, 500 mg, Oral, Daily  heparin (porcine), 5,000 Units, Subcutaneous, Q12H  insulin lispro, 2-7 Units, Subcutaneous, 4x Daily AC & at Bedtime  levETIRAcetam, 500 mg, Oral, BID  levothyroxine, 88 mcg, Oral, Q AM  Lidocaine, 2 patch, Transdermal, Q24H  lisinopril, 40 mg, Oral,  Q24H  pantoprazole, 40 mg, Oral, Q AM  senna-docusate sodium, 2 tablet, Oral, BID  sodium chloride, 10 mL, Intravenous, Q12H  tamsulosin, 0.4 mg, Oral, Daily  vitamin B-12, 1,000 mcg, Oral, Daily      Continuous Infusions:     PRN Meds:.  acetaminophen **OR** acetaminophen **OR** acetaminophen    senna-docusate sodium **AND** polyethylene glycol **AND** bisacodyl **AND** bisacodyl    calcium carbonate    Calcium Replacement - Follow Nurse / BPA Driven Protocol    dextrose    dextrose    fluticasone    glucagon (human recombinant)    Magnesium Standard Dose Replacement - Follow Nurse / BPA Driven Protocol    ondansetron ODT **OR** ondansetron    Phosphorus Replacement - Follow Nurse / BPA Driven Protocol    Potassium Replacement - Follow Nurse / BPA Driven Protocol    sodium chloride    sodium chloride    sodium chloride    traMADol    Assessment & Plan   Assessment & Plan     Active Hospital Problems    Diagnosis  POA    **Falls [W19.XXXA]  Yes      Resolved Hospital Problems   No resolved problems to display.        Brief Hospital Course to date:  Lea Rivers is a 78 y.o. male with past medical history of hypertension, hyperlipidemia, hypothyroidism, seizure disorder.  Patient is being admitted for a fall and nondisplaced first through fourth left rib fractures.  Patient also has left distal clavicle fracture.     Multiple falls with frequency of falls increasing  -difficulty with balance  -CT of head shows age-related changes which are chronic but no acute process  -neuro consulted.  D/w KATE Boo who states that patient's falls likely d/t neuropathy (confirmed with EMG) and progression of dementia       Multiple rib fractures and also left clavicle fracture  -With no displacement or mildly displaced.  Orthopedic surgery following  No surgical intervention is planned.  Recs nonweightbearing LUE, may come out of sling in 5-7 days to initiate gentle ROM exercises per ortho.  F/u with ortho/  Erica in 2 weeks  -Left upper limb is already in sling  -Pain control  -lidocaine patch    Distended Abd  --prn bowel meds  --check KUB     Prostate cancer  -Follows with Dr. Mayo  -S/p radiation therapy.  -Continue family patient has had hematuria and has had cystoscopy by Dr. Mayo and they were told that probably because of the prostate cancer and the therapy patient will have hematuria for a while.  We will monitor if necessary will ask urology to see patient     Dementia and increasing memory problem  -Patient still lives alone and also drives  -Mentioned above patient has had multiple falls and the frequency of falls are increasing  -d/w family that patient should not be driving.  They are interested in rehab/possible placement  -confusion worsening and sleeping a lot more per family.  Stop PRN tramadol.  Check u/a (was ok on admit)     Hypertension  Hyperlipidemia  Seizure disorder  Hypothyroidism  - continue home meds    D/w son in-law at bedside and with daughter on speaker phone on 3/6.     Addendum 3/6/24 17:15:  Called with concern that patient becoming more lethargic throughout day.  Also very little UOP confirmed with bladder scan only 30cc.  On exam patient sleeping but will arouse and answer some questions appropriately though not oriented.  KUB shows dilated colon with possible rectal obstruction so asked nurse to disimpact, nurse states only liquid stool on her exam.  ABG looks ok.  U/a maybe c/w UTI so started rocephin.  Note VSS ok and BP actually been a little elevated but will give NS bolus and then leave at rate of 75h/hr.  Labs ok yesterday but not done today so will check cbc, cmp, blood cultures, and lactic acid.  CT head was ok on admission.  Will check a CT abd/pelvis with distended abdomen and dilated colon on KUB.  Keep NPO for now.  Informed nurse of the plan and asked to keep me updated.      Discussed with both daughters Halle and Celia at the bedside.      Expected  Discharge Location and Transportation: PT recs SNF  Expected Discharge   Expected Discharge Date: 3/5/2024; Expected Discharge Time:      DVT prophylaxis:  Medical DVT prophylaxis orders are present.         AM-PAC 6 Clicks Score (PT): 11 (03/06/24 1120)    CODE STATUS:   Code Status and Medical Interventions:   Ordered at: 03/02/24 1456     Medical Intervention Limits:    NO intubation (DNI)     Code Status (Patient has no pulse and is not breathing):    No CPR (Do Not Attempt to Resuscitate)     Medical Interventions (Patient has pulse or is breathing):    Limited Support       Noel Petersen MD  03/06/24

## 2024-03-06 NOTE — PLAN OF CARE
Goal Outcome Evaluation:  Plan of Care Reviewed With: patient        Progress: no change  Outcome Evaluation: Patient continues to be limited by confusion and difficulty following commands today. He required increased assist for sitting balance and was able to take steps 3' with modAx2 and RUE HHA to chair. IPPT will continue to progress to patient's tolerance. Recommend SNF.      Anticipated Discharge Disposition (PT): skilled nursing facility

## 2024-03-06 NOTE — CASE MANAGEMENT/SOCIAL WORK
Continued Stay Note  [facility]     Patient Name: Lea Rivers  MRN: [id number]  Today's Date: 3/6/2024    Admit Date: 3/2/2024    Plan: TBD   Discharge Plan       Row Name 03/06/24 1423       Plan    Plan TBD    Patient/Family in Agreement with Plan yes    Plan Comments Met with Mr. Rivers and son-in-law at the bedside to discuss discharge plan. Mr. Rivers was confused and unable to participate in conversation. Son-in-law reported that the family has been looking for a caregiver for patient but have had no luck. Therapy is still recommending rehab, but [facility] will not take him unless he has a safe plan after discharge from there. Encouraged son-in-law to continue looking for caregiver so that Mr. Rivers can get rehab.  will continue to follow plan of care and assist with discharge planning needs as indicated.    Final Discharge Disposition Code 30 - still a patient                   Discharge Codes    No documentation.                 Expected Discharge Date and Time       Expected Discharge Date Expected Discharge Time    Mar 5, 2024               Valerie Keith RN

## 2024-03-06 NOTE — THERAPY TREATMENT NOTE
Patient Name: Lea Rivers  : 1945    MRN: [id number]                              Today's Date: 3/6/2024       Admit Date: 3/2/2024    Visit Dx:     ICD-10-CM ICD-9-CM   1. Fall, initial encounter  W19.XXXA E888.9   2. Closed fracture of multiple ribs of left side, initial encounter  S22.42XA 807.09   3. Closed displaced fracture of acromial end of left clavicle, initial encounter  S42.032A 810.03   4. Falls frequently  R29.6 V15.88   5. Acute UTI (urinary tract infection)  N39.0 599.0     Patient Active Problem List   Diagnosis    Coronary artery disease    Dyslipidemia    Hypothyroidism    GERD (gastroesophageal reflux disease)    Seizure disorder    BPH (benign prostatic hypertrophy)    Hypertension    Primary osteoarthritis of both knees    Syncope and collapse    Precordial pain    Diabetes    Status post total right knee replacement    Postoperative urinary retention    Confusion, postoperative    Acute blood loss anemia, asymptomatic, no transfusion required    Elevated prostate specific antigen (PSA)    Prostate cancer    Anemia    Body mass index (BMI) of 32.0-32.9 in adult    Localization-related focal epilepsy with simple partial seizures    Need for vaccination against Streptococcus pneumoniae    Upper extremity tendon tear, right, initial encounter    Vitamin D deficiency    PSA elevation    Polyp of colon    Overactive bladder    Gross hematuria    History of colon polyps    History of colon cancer    B12 deficiency    Falls     Past Medical History:   Diagnosis Date    Anemia     Colon cancer     Status post partial colectomy in . No chemotherapy or radiation therapy.    Coronary artery disease     Nonobstructive coronary artery disease.    Diabetes     Dyslipidemia     GERD (gastroesophageal reflux disease)     Hx of.    Hyperlipidemia     Hypertension     Hyponatremia     Hypothyroidism     Left shoulder pain     Low sodium levels     recent issue; saw nephrologist who  ruled out kidney issues; took Sodium Chloride po to bring levels up    Memory deficit     FROM ANESTHESIA    Osteoarthritis     Prostate cancer 07/23/2022    Seizure disorder     silent seziure-diagnosed years ago    Skin cancer      Past Surgical History:   Procedure Laterality Date    APPENDECTOMY      CARDIAC CATHETERIZATION  2012    30 to 40% LAD    CARPAL TUNNEL RELEASE Bilateral     CHOLECYSTECTOMY      COLECTOMY PARTIAL / TOTAL  1990    Partial colectomy    COLONOSCOPY      CYBERKNIFE  02/09/2023    Prostate/SV    CYSTOSCOPY TRANSURETHRAL RESECTION OF PROSTATE N/A 09/21/2018    Procedure: CYSTOSCOPY TRANSURETHRAL RESECTION OF PROSTATE GREENLIGHT;  Surgeon: Naseem Clayton MD;  Location:  [facility];  Service: Urology    OTHER SURGICAL HISTORY      Contraction surgery on bilateral hands and wrists.    PROSTATE BIOPSY N/A 11/11/2022    Procedure: TRANSRECTAL ULTRASOUND GUIDED BIOPSY OF PROSTATE;  Surgeon: Naseem Noland MD;  Location:  [facility];  Service: Urology;  Laterality: N/A;    SINUS SURGERY      SKIN BIOPSY      TEETH EXTRACTION      TONSILLECTOMY      TOTAL KNEE ARTHROPLASTY Right 04/07/2022    Procedure: TOTAL KNEE ARTHROPLASTY WITH ORTHO ROBOT RIGHT;  Surgeon: Louis Malcolm MD;  Location:  [facility];  Service: Robotics - Ortho;  Laterality: Right;    TRANSRECTAL INCISION PROSTATE WITH GOLD SEED Bilateral 01/06/2023    Procedure: TRANSRECTAL ULTRASOUND GUIDED PROSTATE FIDUCIAL MARKER PLACEMENT;  Surgeon: Naseem Noland MD;  Location:  [facility];  Service: Urology;  Laterality: Bilateral;    TURP / TRANSURETHRAL INCISION / DRAINAGE PROSTATE      VASECTOMY        General Information       Row Name 03/06/24 1112          Physical Therapy Time and Intention    Document Type therapy note (daily note)  -CK     Mode of Treatment physical therapy  -CK       Row Name 03/06/24 1112          General Information    Patient Profile Reviewed yes  -CK     Existing Precautions/Restrictions  fall;non-weight bearing;left  L rib fxs, LUE NWB, confusion  -CK     Barriers to Rehab medically complex;previous functional deficit;cognitive status;hearing deficit  -CK       Row Name 03/06/24 1112          Cognition    Orientation Status (Cognition) disoriented to;person;place;situation;time  -CK       Row Name 03/06/24 1112          Safety Issues, Functional Mobility    Safety Issues Affecting Function (Mobility) ability to follow commands;awareness of need for assistance;impulsivity;insight into deficits/self-awareness;judgment;problem-solving;safety precaution awareness;safety precautions follow-through/compliance;unable to maintain weight-bearing restrictions  -CK     Impairments Affecting Function (Mobility) balance;cognition;endurance/activity tolerance;pain;postural/trunk control;strength;range of motion (ROM)  -CK               User Key  (r) = Recorded By, (t) = Taken By, (c) = Cosigned By      Initials Name Provider Type    CK Tiffanie Carmen, PT Physical Therapist                   Mobility       Row Name 03/06/24 1114          Bed Mobility    Bed Mobility supine-sit  -CK     Supine-Sit Cavalier (Bed Mobility) verbal cues;moderate assist (50% patient effort);2 person assist  -CK     Assistive Device (Bed Mobility) draw sheet;head of bed elevated  -CK     Comment, (Bed Mobility) cues for sequencing, patient able to bring BLEs off EOB, but significant assist to lift trunk from HOB and maintain upright posture sitting EOB  -CK       Row Name 03/06/24 1114          Sit-Stand Transfer    Sit-Stand Cavalier (Transfers) moderate assist (50% patient effort);2 person assist;verbal cues;nonverbal cues (demo/gesture)  -CK     Assistive Device (Sit-Stand Transfers) other (see comments)  KUMRA HHA  -CK     Comment, (Sit-Stand Transfer) boost to clear hips from bed, cues for upright posture  -CK       Row Name 03/06/24 1114          Gait/Stairs (Locomotion)    Cavalier Level (Gait) moderate assist (50%  patient effort);2 person assist;verbal cues;nonverbal cues (demo/gesture)  -CK     Assistive Device (Gait) other (see comments)  RUE HHA  -CK     Distance in Feet (Gait) 3  -CK     Deviations/Abnormal Patterns (Gait) bilateral deviations;gait speed decreased;stride length decreased;bisi decreased  -CK     Bilateral Gait Deviations forward flexed posture;heel strike decreased  -CK     Right Sided Gait Deviations leans right  -CK     Comment, (Gait/Stairs) Patient took shuffling steps from EOB to chair placed a few steps away from bed. Cues for upright posture and sequencing with steps toward chair. Patient very unsteady with R lean flexed posture throughout  -CK       Row Name 03/06/24 1114          Mobility    Extremity Weight-bearing Status left upper extremity  -CK     Left Upper Extremity (Weight-bearing Status) non weight-bearing (NWB)   -CK               User Key  (r) = Recorded By, (t) = Taken By, (c) = Cosigned By      Initials Name Provider Type    CK Tiffanie Carmen, PT Physical Therapist                   Obj/Interventions       Row Name 03/06/24 1117          Motor Skills    Therapeutic Exercise knee;ankle  -CK       Row Name 03/06/24 [facility]7          Knee (Therapeutic Exercise)    Knee (Therapeutic Exercise) AAROM (active assistive range of motion)  -CK     Knee AAROM (Therapeutic Exercise) bilateral;flexion;extension;10 repetitions  -CK       Row Name 03/06/24 1117          Ankle (Therapeutic Exercise)    Ankle (Therapeutic Exercise) AAROM (active assistive range of motion)  -CK     Ankle AAROM (Therapeutic Exercise) bilateral;dorsiflexion;plantarflexion;10 repetitions  -CK       Row Name 03/06/24 1117          Balance    Balance Assessment sitting static balance;standing static balance;standing dynamic balance  -CK     Static Sitting Balance moderate assist  -CK     Position, Sitting Balance unsupported;sitting edge of bed  -CK     Static Standing Balance moderate assist  -CK     Dynamic Standing  Balance moderate assist  -CK     Position/Device Used, Standing Balance supported;other (see comments)  RUE HHA  -CK     Comment, Balance R lean while sitting EOB and also in standing, unsteady on feet  -CK               User Key  (r) = Recorded By, (t) = Taken By, (c) = Cosigned By      Initials Name Provider Type    CK Tiffanie Carmen, PT Physical Therapist                   Goals/Plan    No documentation.                  Clinical Impression       Row Name 03/06/24 1118          Pain    Additional Documentation Pain Scale: FACES Pre/Post-Treatment (Group)  -CK       Row Name 03/06/24 1118          Pain Scale: FACES Pre/Post-Treatment    Pain: FACES Scale, Pretreatment 4-->hurts little more  -CK     Posttreatment Pain Rating 4-->hurts little more  -CK     Pain Location - Side/Orientation Left  -CK     Pain Location upper  -CK     Pain Location - extremity  -CK       Row Name 03/06/24 1118          Plan of Care Review    Plan of Care Reviewed With patient  -CK     Progress no change  -CK     Outcome Evaluation Patient continues to be limited by confusion and difficulty following commands today. He required increased assist for sitting balance and was able to take steps 3' with modAx2 and RUE HHA to chair. IPPT will continue to progress to patient's tolerance. Recommend SNF.  -CK       Row Name 03/06/24 1118          Vital Signs    Pre Systolic BP Rehab 182  -CK     Pre Treatment Diastolic   -CK     Post Systolic BP Rehab 182  -CK     Post Treatment Diastolic   -CK     Pretreatment Heart Rate (beats/min) 91  -CK     Posttreatment Heart Rate (beats/min) 99  -CK     Pre SpO2 (%) 92  -CK     O2 Delivery Pre Treatment room air  -CK     O2 Delivery Intra Treatment room air  -CK     Post SpO2 (%) 91  -CK     O2 Delivery Post Treatment room air  -CK     Pre Patient Position Supine  -CK     Intra Patient Position Standing  -CK     Post Patient Position Sitting  -CK       Row Name 03/06/24 1118           Positioning and Restraints    Pre-Treatment Position in bed  -CK     Post Treatment Position chair  -CK     In Chair reclined;call light within reach;encouraged to call for assist;exit alarm on;with family/caregiver;waffle cushion;on mechanical lift sling;heels elevated;LUE elevated;with brace;notified nsg  -CK               User Key  (r) = Recorded By, (t) = Taken By, (c) = Cosigned By      Initials Name Provider Type    CK Tiffanie Carmen, PT Physical Therapist                   Outcome Measures       Row Name 03/06/24 1120 03/06/24 0855       How much help from another person do you currently need...    Turning from your back to your side while in flat bed without using bedrails? 2  -CK 2  -LV    Moving from lying on back to sitting on the side of a flat bed without bedrails? 2  -CK 2  -LV    Moving to and from a bed to a chair (including a wheelchair)? 2  -CK 2  -LV    Standing up from a chair using your arms (e.g., wheelchair, bedside chair)? 2  -CK 1  -LV    Climbing 3-5 steps with a railing? 1  -CK 1  -LV    To walk in hospital room? 2  -CK 1  -LV    AM-PAC 6 Clicks Score (PT) 11  -CK 9  -LV    Highest Level of Mobility Goal 4 --> Transfer to chair/commode  -CK 3 --> Sit at edge of bed  -LV      Row Name 03/06/24 1120 03/06/24 1116       Functional Assessment    Outcome Measure Options AM-PAC 6 Clicks Basic Mobility (PT)  -CK AM-PAC 6 Clicks Daily Activity (OT)  -AC              User Key  (r) = Recorded By, (t) = Taken By, (c) = Cosigned By      Initials Name Provider Type    Pauly Mayes OT Occupational Therapist    CK Tiffanie Carmen, PT Physical Therapist    LV Brittney Brewer RN Registered Nurse                                 Physical Therapy Education       Title: PT OT SLP Therapies (In Progress)       Topic: Physical Therapy (In Progress)       Point: Mobility training (In Progress)       Learning Progress Summary             Patient Acceptance, E, NR by CK at 3/6/2024 1121     Acceptance, E, VU,NR by LO at 3/4/2024 1340    Comment: PT POC   Family Acceptance, E, NR by CK at 3/6/2024 1121    Acceptance, E, VU,NR by LO at 3/4/2024 1340    Comment: PT POC                         Point: Home exercise program (In Progress)       Learning Progress Summary             Patient Acceptance, E, NR by CK at 3/6/2024 1121    Acceptance, E, VU,NR by LO at 3/4/2024 1340    Comment: PT POC   Family Acceptance, E, NR by CK at 3/6/2024 1121    Acceptance, E, VU,NR by LO at 3/4/2024 1340    Comment: PT POC                         Point: Body mechanics (In Progress)       Learning Progress Summary             Patient Acceptance, E, NR by CK at 3/6/2024 1121    Acceptance, E, VU,NR by LO at 3/4/2024 1340    Comment: PT POC   Family Acceptance, E, NR by CK at 3/6/2024 1121    Acceptance, E, VU,NR by LO at 3/4/2024 1340    Comment: PT POC                         Point: Precautions (In Progress)       Learning Progress Summary             Patient Acceptance, E, NR by CK at 3/6/2024 1121    Acceptance, E, VU,NR by LO at 3/4/2024 1340    Comment: PT POC   Family Acceptance, E, NR by CK at 3/6/2024 1121    Acceptance, E, VU,NR by LO at 3/4/2024 1340    Comment: PT POC                                         User Key       Initials Effective Dates Name Provider Type Discipline     06/16/21 -  Emily Muro, PT Physical Therapist PT     02/06/24 -  Tiffanie Carmen, PT Physical Therapist PT                  PT Recommendation and Plan     Plan of Care Reviewed With: patient  Progress: no change  Outcome Evaluation: Patient continues to be limited by confusion and difficulty following commands today. He required increased assist for sitting balance and was able to take steps 3' with modAx2 and RUE HHA to chair. IPPT will continue to progress to patient's tolerance. Recommend SNF.     Time Calculation:         PT Charges       Row Name 03/06/24 1121             Time Calculation    Start Time 0955  -      PT  Received On 03/06/24  -CK      PT Goal Re-Cert Due Date 03/14/24  -CK         Timed Charges    40785 - PT Therapeutic Activity Minutes 13  -CK         Total Minutes    Timed Charges Total Minutes 13  -CK       Total Minutes 13  -CK                User Key  (r) = Recorded By, (t) = Taken By, (c) = Cosigned By      Initials Name Provider Type    CK Tiffanie Carmen, PT Physical Therapist                  Therapy Charges for Today       Code Description Service Date Service Provider Modifiers Qty    [id number] HC PT THERAPEUTIC ACT EA 15 MIN 3/6/2024 Tiffanie Carmen, PT GP 1            PT G-Codes  Outcome Measure Options: AM-PAC 6 Clicks Basic Mobility (PT)  AM-PAC 6 Clicks Score (PT): 11  AM-PAC 6 Clicks Score (OT): 9  PT Discharge Summary  Anticipated Discharge Disposition (PT): skilled nursing facility    Tiffanie Carmen PT  3/6/2024

## 2024-03-06 NOTE — PLAN OF CARE
Problem: Adult Inpatient Plan of Care  Goal: Plan of Care Review  Outcome: Ongoing, Progressing  Flowsheets  Taken 3/6/2024 0127 by Mikel Stringer RN  Plan of Care Reviewed With: patient  Outcome Evaluation: BP elevated with medication given (see EMAR for more information), RA, patient has rested very well, decreased UO this shift but bladder scan <200 att, pain well controlled, no other complications noted or stated.  Taken 3/3/2024 1930 by Lenny Lehman RN  Progress: improving  Goal: Patient-Specific Goal (Individualized)  Outcome: Ongoing, Progressing  Goal: Absence of Hospital-Acquired Illness or Injury  Outcome: Ongoing, Progressing  Intervention: Identify and Manage Fall Risk  Recent Flowsheet Documentation  Taken 3/6/2024 0000 by Mikel Stringer RN  Safety Promotion/Fall Prevention:   assistive device/personal items within reach   clutter free environment maintained   fall prevention program maintained   lighting adjusted   nonskid shoes/slippers when out of bed   room organization consistent   safety round/check completed   toileting scheduled  Taken 3/5/2024 1926 by Mikel Stringer RN  Safety Promotion/Fall Prevention:   assistive device/personal items within reach   clutter free environment maintained   fall prevention program maintained   lighting adjusted   nonskid shoes/slippers when out of bed   room organization consistent   safety round/check completed   toileting scheduled  Intervention: Prevent Skin Injury  Recent Flowsheet Documentation  Taken 3/6/2024 0000 by Mikel Stringer RN  Body Position: weight shifting  Taken 3/5/2024 1926 by Mikel Stringer RN  Body Position: weight shifting  Skin Protection:   adhesive use limited   incontinence pads utilized   tubing/devices free from skin contact  Intervention: Prevent and Manage VTE (Venous Thromboembolism) Risk  Recent Flowsheet Documentation  Taken 3/6/2024 0000 by Mikel Stringer RN  Activity  Management: activity encouraged  Taken 3/5/2024 1926 by Mikel Stringer RN  Activity Management: activity encouraged  VTE Prevention/Management: (SQ heparin) other (see comments)  Range of Motion: active ROM (range of motion) encouraged  Intervention: Prevent Infection  Recent Flowsheet Documentation  Taken 3/6/2024 0000 by Mikel Stringer RN  Infection Prevention:   environmental surveillance performed   equipment surfaces disinfected   hand hygiene promoted   rest/sleep promoted   single patient room provided  Taken 3/5/2024 1926 by Mikel Stringer RN  Infection Prevention:   environmental surveillance performed   equipment surfaces disinfected   hand hygiene promoted   rest/sleep promoted   single patient room provided  Goal: Optimal Comfort and Wellbeing  Outcome: Ongoing, Progressing  Intervention: Monitor Pain and Promote Comfort  Recent Flowsheet Documentation  Taken 3/5/2024 1926 by Mikel Stringer RN  Pain Management Interventions:   care clustered   pain management plan reviewed with patient/caregiver   pillow support provided   position adjusted   quiet environment facilitated   see MAR  Intervention: Provide Person-Centered Care  Recent Flowsheet Documentation  Taken 3/5/2024 1926 by Mikel Stringer RN  Trust Relationship/Rapport:   care explained   questions answered   questions encouraged   thoughts/feelings acknowledged  Goal: Readiness for Transition of Care  Outcome: Ongoing, Progressing     Problem: Skin Injury Risk Increased  Goal: Skin Health and Integrity  Outcome: Ongoing, Progressing  Intervention: Optimize Skin Protection  Recent Flowsheet Documentation  Taken 3/6/2024 0000 by Mikel Stringer RN  Head of Bed (HOB) Positioning: HOB at 30-45 degrees  Taken 3/5/2024 1926 by Mikel Stringer RN  Pressure Reduction Techniques:   frequent weight shift encouraged   heels elevated off bed   weight shift assistance provided  Head of Bed (HOB)  Positioning: HOB at 30-45 degrees  Pressure Reduction Devices:   positioning supports utilized   pressure-redistributing mattress utilized  Skin Protection:   adhesive use limited   incontinence pads utilized   tubing/devices free from skin contact     Problem: Fall Injury Risk  Goal: Absence of Fall and Fall-Related Injury  Outcome: Ongoing, Progressing  Intervention: Identify and Manage Contributors  Recent Flowsheet Documentation  Taken 3/6/2024 0000 by Mikel Stringer RN  Medication Review/Management: medications reviewed  Taken 3/5/2024 1926 by Mikel Stringer RN  Medication Review/Management: medications reviewed  Intervention: Promote Injury-Free Environment  Recent Flowsheet Documentation  Taken 3/6/2024 0000 by Mikel Stringer RN  Safety Promotion/Fall Prevention:   assistive device/personal items within reach   clutter free environment maintained   fall prevention program maintained   lighting adjusted   nonskid shoes/slippers when out of bed   room organization consistent   safety round/check completed   toileting scheduled  Taken 3/5/2024 1926 by Mikel Stringer RN  Safety Promotion/Fall Prevention:   assistive device/personal items within reach   clutter free environment maintained   fall prevention program maintained   lighting adjusted   nonskid shoes/slippers when out of bed   room organization consistent   safety round/check completed   toileting scheduled   Goal Outcome Evaluation:  Plan of Care Reviewed With: patient           Outcome Evaluation: BP elevated with medication given (see EMAR for more information), RA, patient has rested very well, decreased UO this shift but bladder scan <200 att, pain well controlled, no other complications noted or stated.

## 2024-03-07 PROBLEM — K63.89 PNEUMATOSIS INTESTINALIS: Status: ACTIVE | Noted: 2024-03-07

## 2024-03-07 LAB
ANION GAP SERPL CALCULATED.3IONS-SCNC: 10 MMOL/L (ref 5–15)
BACTERIA SPEC AEROBE CULT: NORMAL
BACTERIA SPEC AEROBE CULT: NORMAL
BUN SERPL-MCNC: 15 MG/DL (ref 8–23)
BUN/CREAT SERPL: 20.3 (ref 7–25)
CA-I SERPL ISE-MCNC: 1.22 MMOL/L (ref 1.12–1.32)
CALCIUM SPEC-SCNC: 8.5 MG/DL (ref 8.6–10.5)
CHLORIDE SERPL-SCNC: 99 MMOL/L (ref 98–107)
CO2 SERPL-SCNC: 22 MMOL/L (ref 22–29)
CREAT SERPL-MCNC: 0.74 MG/DL (ref 0.76–1.27)
D-LACTATE SERPL-SCNC: 1.4 MMOL/L (ref 0.5–2)
D-LACTATE SERPL-SCNC: 1.4 MMOL/L (ref 0.5–2)
D-LACTATE SERPL-SCNC: 1.6 MMOL/L (ref 0.5–2)
D-LACTATE SERPL-SCNC: 1.6 MMOL/L (ref 0.5–2)
DEPRECATED RDW RBC AUTO: 50.3 FL (ref 37–54)
EGFRCR SERPLBLD CKD-EPI 2021: 92.2 ML/MIN/1.73
ERYTHROCYTE [DISTWIDTH] IN BLOOD BY AUTOMATED COUNT: 15.4 % (ref 12.3–15.4)
GLUCOSE BLDC GLUCOMTR-MCNC: 118 MG/DL (ref 70–130)
GLUCOSE SERPL-MCNC: 124 MG/DL (ref 65–99)
HCT VFR BLD AUTO: 32.5 % (ref 37.5–51)
HGB BLD-MCNC: 10.5 G/DL (ref 13–17.7)
MAGNESIUM SERPL-MCNC: 1.9 MG/DL (ref 1.6–2.4)
MCH RBC QN AUTO: 29.6 PG (ref 26.6–33)
MCHC RBC AUTO-ENTMCNC: 32.3 G/DL (ref 31.5–35.7)
MCV RBC AUTO: 91.5 FL (ref 79–97)
PHOSPHATE SERPL-MCNC: 3 MG/DL (ref 2.5–4.5)
PLATELET # BLD AUTO: 204 10*3/MM3 (ref 140–450)
PMV BLD AUTO: 8.7 FL (ref 6–12)
POTASSIUM SERPL-SCNC: 2.9 MMOL/L (ref 3.5–5.2)
POTASSIUM SERPL-SCNC: 3.1 MMOL/L (ref 3.5–5.2)
RBC # BLD AUTO: 3.55 10*6/MM3 (ref 4.14–5.8)
SODIUM SERPL-SCNC: 131 MMOL/L (ref 136–145)
WBC NRBC COR # BLD AUTO: 15.6 10*3/MM3 (ref 3.4–10.8)

## 2024-03-07 PROCEDURE — 25010000002 METHYLNALTREXONE 12 MG/0.6ML SOLUTION: Performed by: PHYSICIAN ASSISTANT

## 2024-03-07 PROCEDURE — 25010000002 POTASSIUM CHLORIDE 10 MEQ/100ML SOLUTION: Performed by: FAMILY MEDICINE

## 2024-03-07 PROCEDURE — 80048 BASIC METABOLIC PNL TOTAL CA: CPT | Performed by: INTERNAL MEDICINE

## 2024-03-07 PROCEDURE — 25010000002 HYDRALAZINE PER 20 MG: Performed by: FAMILY MEDICINE

## 2024-03-07 PROCEDURE — 99233 SBSQ HOSP IP/OBS HIGH 50: CPT | Performed by: FAMILY MEDICINE

## 2024-03-07 PROCEDURE — 83735 ASSAY OF MAGNESIUM: CPT | Performed by: INTERNAL MEDICINE

## 2024-03-07 PROCEDURE — 25010000002 LEVETRIRACETAM PER 10 MG: Performed by: FAMILY MEDICINE

## 2024-03-07 PROCEDURE — 25010000002 PIPERACILLIN SOD-TAZOBACTAM PER 1 G: Performed by: FAMILY MEDICINE

## 2024-03-07 PROCEDURE — 84132 ASSAY OF SERUM POTASSIUM: CPT | Performed by: NURSE PRACTITIONER

## 2024-03-07 PROCEDURE — 25810000003 SODIUM CHLORIDE 0.9 % SOLUTION: Performed by: SURGERY

## 2024-03-07 PROCEDURE — 99222 1ST HOSP IP/OBS MODERATE 55: CPT | Performed by: PHYSICIAN ASSISTANT

## 2024-03-07 PROCEDURE — 25010000002 FUROSEMIDE PER 20 MG: Performed by: FAMILY MEDICINE

## 2024-03-07 PROCEDURE — 83605 ASSAY OF LACTIC ACID: CPT | Performed by: INTERNAL MEDICINE

## 2024-03-07 PROCEDURE — 85027 COMPLETE CBC AUTOMATED: CPT | Performed by: INTERNAL MEDICINE

## 2024-03-07 PROCEDURE — 82948 REAGENT STRIP/BLOOD GLUCOSE: CPT

## 2024-03-07 PROCEDURE — 84100 ASSAY OF PHOSPHORUS: CPT | Performed by: INTERNAL MEDICINE

## 2024-03-07 PROCEDURE — 25010000002 METOCLOPRAMIDE PER 10 MG: Performed by: PHYSICIAN ASSISTANT

## 2024-03-07 PROCEDURE — 25010000002 POTASSIUM CHLORIDE 10 MEQ/100ML SOLUTION: Performed by: NURSE PRACTITIONER

## 2024-03-07 PROCEDURE — 25010000002 HEPARIN (PORCINE) PER 1000 UNITS: Performed by: INTERNAL MEDICINE

## 2024-03-07 PROCEDURE — 82330 ASSAY OF CALCIUM: CPT | Performed by: INTERNAL MEDICINE

## 2024-03-07 RX ORDER — LEVETIRACETAM 500 MG/5ML
500 INJECTION, SOLUTION, CONCENTRATE INTRAVENOUS EVERY 12 HOURS SCHEDULED
Status: DISCONTINUED | OUTPATIENT
Start: 2024-03-07 | End: 2024-03-15

## 2024-03-07 RX ORDER — METOCLOPRAMIDE HYDROCHLORIDE 5 MG/ML
10 INJECTION INTRAMUSCULAR; INTRAVENOUS EVERY 6 HOURS SCHEDULED
Status: DISCONTINUED | OUTPATIENT
Start: 2024-03-07 | End: 2024-03-09

## 2024-03-07 RX ORDER — IPRATROPIUM BROMIDE AND ALBUTEROL SULFATE 2.5; .5 MG/3ML; MG/3ML
3 SOLUTION RESPIRATORY (INHALATION) EVERY 6 HOURS PRN
Status: DISCONTINUED | OUTPATIENT
Start: 2024-03-07 | End: 2024-03-31 | Stop reason: HOSPADM

## 2024-03-07 RX ORDER — POTASSIUM CHLORIDE 7.45 MG/ML
10 INJECTION INTRAVENOUS
Status: DISPENSED | OUTPATIENT
Start: 2024-03-07 | End: 2024-03-07

## 2024-03-07 RX ORDER — HYDRALAZINE HYDROCHLORIDE 20 MG/ML
10 INJECTION INTRAMUSCULAR; INTRAVENOUS EVERY 4 HOURS PRN
Status: DISCONTINUED | OUTPATIENT
Start: 2024-03-07 | End: 2024-03-31 | Stop reason: HOSPADM

## 2024-03-07 RX ORDER — PANTOPRAZOLE SODIUM 40 MG/10ML
40 INJECTION, POWDER, LYOPHILIZED, FOR SOLUTION INTRAVENOUS
Status: DISCONTINUED | OUTPATIENT
Start: 2024-03-07 | End: 2024-03-31 | Stop reason: HOSPADM

## 2024-03-07 RX ORDER — METOPROLOL TARTRATE 1 MG/ML
5 INJECTION, SOLUTION INTRAVENOUS EVERY 6 HOURS
Status: DISCONTINUED | OUTPATIENT
Start: 2024-03-07 | End: 2024-03-10

## 2024-03-07 RX ORDER — FUROSEMIDE 10 MG/ML
20 INJECTION INTRAMUSCULAR; INTRAVENOUS ONCE
Status: COMPLETED | OUTPATIENT
Start: 2024-03-07 | End: 2024-03-07

## 2024-03-07 RX ORDER — POTASSIUM CHLORIDE 7.45 MG/ML
10 INJECTION INTRAVENOUS
Status: COMPLETED | OUTPATIENT
Start: 2024-03-07 | End: 2024-03-07

## 2024-03-07 RX ADMIN — HYDRALAZINE HYDROCHLORIDE 10 MG: 20 INJECTION INTRAMUSCULAR; INTRAVENOUS at 12:09

## 2024-03-07 RX ADMIN — PIPERACILLIN AND TAZOBACTAM 3.38 G: 3; .375 INJECTION, POWDER, LYOPHILIZED, FOR SOLUTION INTRAVENOUS at 05:13

## 2024-03-07 RX ADMIN — Medication 10 ML: at 08:36

## 2024-03-07 RX ADMIN — HEPARIN SODIUM 5000 UNITS: 5000 INJECTION INTRAVENOUS; SUBCUTANEOUS at 08:32

## 2024-03-07 RX ADMIN — POTASSIUM CHLORIDE 10 MEQ: 7.46 INJECTION, SOLUTION INTRAVENOUS at 16:49

## 2024-03-07 RX ADMIN — METOCLOPRAMIDE 10 MG: 5 INJECTION, SOLUTION INTRAMUSCULAR; INTRAVENOUS at 14:39

## 2024-03-07 RX ADMIN — METOPROLOL TARTRATE 5 MG: 5 INJECTION INTRAVENOUS at 10:12

## 2024-03-07 RX ADMIN — PANTOPRAZOLE SODIUM 40 MG: 40 INJECTION, POWDER, FOR SOLUTION INTRAVENOUS at 10:11

## 2024-03-07 RX ADMIN — POTASSIUM CHLORIDE 10 MEQ: 7.46 INJECTION, SOLUTION INTRAVENOUS at 18:31

## 2024-03-07 RX ADMIN — POTASSIUM CHLORIDE 10 MEQ: 7.46 INJECTION, SOLUTION INTRAVENOUS at 10:12

## 2024-03-07 RX ADMIN — ACETAMINOPHEN 650 MG: 650 SOLUTION ORAL at 16:51

## 2024-03-07 RX ADMIN — HYDRALAZINE HYDROCHLORIDE 10 MG: 20 INJECTION INTRAMUSCULAR; INTRAVENOUS at 18:50

## 2024-03-07 RX ADMIN — METOCLOPRAMIDE 10 MG: 5 INJECTION, SOLUTION INTRAMUSCULAR; INTRAVENOUS at 22:48

## 2024-03-07 RX ADMIN — METOPROLOL TARTRATE 5 MG: 5 INJECTION INTRAVENOUS at 15:18

## 2024-03-07 RX ADMIN — SODIUM CHLORIDE 500 MG: 9 INJECTION, SOLUTION INTRAVENOUS at 20:14

## 2024-03-07 RX ADMIN — LIDOCAINE 2 PATCH: 4 PATCH TOPICAL at 08:32

## 2024-03-07 RX ADMIN — FUROSEMIDE 20 MG: 10 INJECTION, SOLUTION INTRAMUSCULAR; INTRAVENOUS at 17:16

## 2024-03-07 RX ADMIN — LEVETIRACETAM 500 MG: 100 INJECTION, SOLUTION INTRAVENOUS at 10:12

## 2024-03-07 RX ADMIN — METOPROLOL TARTRATE 5 MG: 5 INJECTION INTRAVENOUS at 23:28

## 2024-03-07 RX ADMIN — POTASSIUM CHLORIDE 10 MEQ: 7.46 INJECTION, SOLUTION INTRAVENOUS at 11:46

## 2024-03-07 RX ADMIN — SODIUM CHLORIDE 500 MG: 9 INJECTION, SOLUTION INTRAVENOUS at 15:16

## 2024-03-07 RX ADMIN — METHYLNALTREXONE BROMIDE 12 MG: 12 INJECTION, SOLUTION SUBCUTANEOUS at 16:48

## 2024-03-07 RX ADMIN — PIPERACILLIN AND TAZOBACTAM 3.38 G: 3; .375 INJECTION, POWDER, LYOPHILIZED, FOR SOLUTION INTRAVENOUS at 14:39

## 2024-03-07 RX ADMIN — LEVETIRACETAM 500 MG: 100 INJECTION, SOLUTION INTRAVENOUS at 20:14

## 2024-03-07 RX ADMIN — POTASSIUM CHLORIDE 10 MEQ: 7.46 INJECTION, SOLUTION INTRAVENOUS at 06:34

## 2024-03-07 RX ADMIN — HEPARIN SODIUM 5000 UNITS: 5000 INJECTION INTRAVENOUS; SUBCUTANEOUS at 20:14

## 2024-03-07 RX ADMIN — PIPERACILLIN AND TAZOBACTAM 3.38 G: 3; .375 INJECTION, POWDER, LYOPHILIZED, FOR SOLUTION INTRAVENOUS at 23:43

## 2024-03-07 RX ADMIN — SODIUM CHLORIDE 125 ML/HR: 9 INJECTION, SOLUTION INTRAVENOUS at 08:36

## 2024-03-07 RX ADMIN — POTASSIUM CHLORIDE 10 MEQ: 7.46 INJECTION, SOLUTION INTRAVENOUS at 05:23

## 2024-03-07 NOTE — PROGRESS NOTES
"Patient Name:  Lea Rivers  YOB: 1945  [id number]    Surgery Progress Note    Date of visit: 3/7/2024    Subjective   Patient currently sleeping and unable to obtain full history due to patient's dementia.  Continues with multiple small bowel movements.  No fevers or chills overnight.         Objective       BP (!) 148/104 (BP Location: Right arm, Patient Position: Lying)   Pulse 94   Temp 98.1 °F (36.7 °C) (Oral)   Resp 18   Ht 170.2 cm (67\")   Wt 88.9 kg (196 lb)   SpO2 91%   BMI 30.70 kg/m²     Intake/Output Summary (Last 24 hours) at 3/7/2024 0711  Last data filed at 3/7/2024 0637  Gross per 24 hour   Intake 1666.25 ml   Output 400 ml   Net 1266.25 ml       CV:  Rhythm regular and rate regular  L:  Clear to auscultation bilaterally  Abd:  Bowel sounds positive, soft, significant distention, nontender  Ext:  No cyanosis, clubbing, edema    Recent labs and imaging that are back at this time have been reviewed.   Creatinine 0.74  Potassium 3.1  Calcium 8.5  Lactate 1.6  WBC 15.6  Hemoglobin 10.5       Assessment & Plan     Patient with significant colonic distention and possible pneumatosis seen on CT scan.  Patient's lactate level now normalized after resuscitation.  Continue n.p.o. for now given the patient's significant distention.  I have consulted gastroenterology for assessment for possible colonic ileus and potential medical treatment for this.  I will continue to follow closely.        Harley Godfrey MD  3/7/2024  07:11 EST      "

## 2024-03-07 NOTE — CASE MANAGEMENT/SOCIAL WORK
Continued Stay Note  [facility]     Patient Name: Lea Rivers  MRN: [id number]  Today's Date: 3/7/2024    Admit Date: 3/2/2024    Plan: TBD   Discharge Plan       Row Name 03/07/24 1401       Plan    Plan TBD    Patient/Family in Agreement with Plan yes    Plan Comments Met with Mr. Rivers and his son at the bedside to discuss discharge plan. Mr. Rivers is now inpatient status. Family is interested in rehab at [facility] and transitioning to long term care.  will continue to follow plan of care and assist with discharge planning needs as indicated.    Final Discharge Disposition Code 30 - still a patient                   Discharge Codes    No documentation.                 Expected Discharge Date and Time       Expected Discharge Date Expected Discharge Time    Mar 11, 2024               Valerie Keith RN

## 2024-03-07 NOTE — PLAN OF CARE
Problem: Adult Inpatient Plan of Care  Goal: Plan of Care Review  Outcome: Ongoing, Progressing  Flowsheets  Taken 3/7/2024 0543 by Mikel Stringer RN  Plan of Care Reviewed With:   patient   daughter   family   son  Outcome Evaluation: VSS, RA, pain well controlled, lethargic but easily arousable, became much more alert by end of shift, multiple small BM (Dr. Godfrey aware), decreased UO but only 64 ml via bladder scan, family at bedside, no other complications assessed or stated.  Taken 3/6/2024 1118 by Tiffanie Carmen, PT  Progress: no change  Goal: Patient-Specific Goal (Individualized)  Outcome: Ongoing, Progressing  Goal: Absence of Hospital-Acquired Illness or Injury  Outcome: Ongoing, Progressing  Intervention: Identify and Manage Fall Risk  Recent Flowsheet Documentation  Taken 3/7/2024 0414 by Mikel Stringer, RN  Safety Promotion/Fall Prevention:   assistive device/personal items within reach   clutter free environment maintained   fall prevention program maintained   lighting adjusted   nonskid shoes/slippers when out of bed   room organization consistent   safety round/check completed   toileting scheduled  Taken 3/7/2024 0034 by Mikel Stringer RN  Safety Promotion/Fall Prevention:   assistive device/personal items within reach   clutter free environment maintained   fall prevention program maintained   lighting adjusted   nonskid shoes/slippers when out of bed   room organization consistent   safety round/check completed   toileting scheduled  Taken 3/6/2024 1917 by Mikel Stringer, RN  Safety Promotion/Fall Prevention:   assistive device/personal items within reach   clutter free environment maintained   fall prevention program maintained   lighting adjusted   nonskid shoes/slippers when out of bed   room organization consistent   safety round/check completed   toileting scheduled  Intervention: Prevent Skin Injury  Recent Flowsheet Documentation  Taken 3/7/2024  0414 by Mikel Stringer RN  Body Position: weight shifting  Taken 3/7/2024 0034 by Mikel Stringer RN  Body Position: weight shifting  Taken 3/6/2024 1917 by Mikel Stringer RN  Body Position: weight shifting  Skin Protection:   adhesive use limited   incontinence pads utilized   tubing/devices free from skin contact  Intervention: Prevent and Manage VTE (Venous Thromboembolism) Risk  Recent Flowsheet Documentation  Taken 3/7/2024 0414 by Mikel Stringer RN  Activity Management: activity encouraged  Taken 3/7/2024 0034 by Mikel Stringer RN  Activity Management: activity encouraged  Taken 3/6/2024 1917 by Mikel Stringer RN  Activity Management: activity encouraged  VTE Prevention/Management: (SQ heparin) other (see comments)  Range of Motion: active ROM (range of motion) encouraged  Intervention: Prevent Infection  Recent Flowsheet Documentation  Taken 3/7/2024 0414 by Mikel Stringer RN  Infection Prevention:   environmental surveillance performed   equipment surfaces disinfected   hand hygiene promoted   rest/sleep promoted   single patient room provided  Taken 3/7/2024 0034 by Mikel Stringer RN  Infection Prevention:   environmental surveillance performed   equipment surfaces disinfected   hand hygiene promoted   rest/sleep promoted   single patient room provided  Taken 3/6/2024 1917 by Mikel Stringer RN  Infection Prevention:   environmental surveillance performed   equipment surfaces disinfected   hand hygiene promoted   rest/sleep promoted   single patient room provided  Goal: Optimal Comfort and Wellbeing  Outcome: Ongoing, Progressing  Intervention: Monitor Pain and Promote Comfort  Recent Flowsheet Documentation  Taken 3/6/2024 1917 by Mikel Stringer RN  Pain Management Interventions:   care clustered   pain management plan reviewed with patient/caregiver   pillow support provided   position adjusted   quiet environment  facilitated  Intervention: Provide Person-Centered Care  Recent Flowsheet Documentation  Taken 3/6/2024 1917 by Mikel Stringer RN  Trust Relationship/Rapport:   care explained   questions answered   questions encouraged   thoughts/feelings acknowledged  Goal: Readiness for Transition of Care  Outcome: Ongoing, Progressing     Problem: Skin Injury Risk Increased  Goal: Skin Health and Integrity  Outcome: Ongoing, Progressing  Intervention: Optimize Skin Protection  Recent Flowsheet Documentation  Taken 3/7/2024 0414 by Mikel Stringer RN  Head of Bed (HOB) Positioning: HOB at 30-45 degrees  Taken 3/7/2024 0034 by Mikel Stringer RN  Head of Bed (HOB) Positioning: HOB at 30-45 degrees  Taken 3/6/2024 1917 by Mikel Stringer RN  Pressure Reduction Techniques:   frequent weight shift encouraged   heels elevated off bed   weight shift assistance provided  Head of Bed (HOB) Positioning: HOB at 30-45 degrees  Pressure Reduction Devices:   heel offloading device utilized   positioning supports utilized   pressure-redistributing mattress utilized  Skin Protection:   adhesive use limited   incontinence pads utilized   tubing/devices free from skin contact     Problem: Fall Injury Risk  Goal: Absence of Fall and Fall-Related Injury  Outcome: Ongoing, Progressing  Intervention: Identify and Manage Contributors  Recent Flowsheet Documentation  Taken 3/7/2024 0414 by Mikel Stringer RN  Medication Review/Management: medications reviewed  Taken 3/7/2024 0034 by Mikel Stringer RN  Medication Review/Management: medications reviewed  Taken 3/6/2024 1917 by Mikel Stringer RN  Medication Review/Management: medications reviewed  Intervention: Promote Injury-Free Environment  Recent Flowsheet Documentation  Taken 3/7/2024 0414 by Mikel Stringer RN  Safety Promotion/Fall Prevention:   assistive device/personal items within reach   clutter free environment maintained    fall prevention program maintained   lighting adjusted   nonskid shoes/slippers when out of bed   room organization consistent   safety round/check completed   toileting scheduled  Taken 3/7/2024 0034 by Mikel Stringer, RN  Safety Promotion/Fall Prevention:   assistive device/personal items within reach   clutter free environment maintained   fall prevention program maintained   lighting adjusted   nonskid shoes/slippers when out of bed   room organization consistent   safety round/check completed   toileting scheduled  Taken 3/6/2024 1917 by Mikel Stringer RN  Safety Promotion/Fall Prevention:   assistive device/personal items within reach   clutter free environment maintained   fall prevention program maintained   lighting adjusted   nonskid shoes/slippers when out of bed   room organization consistent   safety round/check completed   toileting scheduled     Problem: Adjustment to Illness (Sepsis/Septic Shock)  Goal: Optimal Coping  Outcome: Ongoing, Progressing  Intervention: Optimize Psychosocial Adjustment to Illness  Recent Flowsheet Documentation  Taken 3/6/2024 1917 by Mikel Stringer RN  Family/Support System Care:   self-care encouraged   support provided     Problem: Bleeding (Sepsis/Septic Shock)  Goal: Absence of Bleeding  Outcome: Ongoing, Progressing     Problem: Glycemic Control Impaired (Sepsis/Septic Shock)  Goal: Blood Glucose Level Within Desired Range  Outcome: Ongoing, Progressing  Intervention: Optimize Glycemic Control  Recent Flowsheet Documentation  Taken 3/6/2024 1917 by Mikel Stringer RN  Glycemic Management: blood glucose monitored     Problem: Infection Progression (Sepsis/Septic Shock)  Goal: Absence of Infection Signs and Symptoms  Outcome: Ongoing, Progressing  Intervention: Initiate Sepsis Management  Recent Flowsheet Documentation  Taken 3/7/2024 0414 by Mikel Stringer, RN  Infection Prevention:   environmental surveillance performed    equipment surfaces disinfected   hand hygiene promoted   rest/sleep promoted   single patient room provided  Taken 3/7/2024 0034 by Mikel Stringer RN  Infection Prevention:   environmental surveillance performed   equipment surfaces disinfected   hand hygiene promoted   rest/sleep promoted   single patient room provided  Taken 3/6/2024 1917 by Mikel Stringer RN  Infection Prevention:   environmental surveillance performed   equipment surfaces disinfected   hand hygiene promoted   rest/sleep promoted   single patient room provided  Intervention: Promote Recovery  Recent Flowsheet Documentation  Taken 3/7/2024 0414 by Mikel Stringer RN  Activity Management: activity encouraged  Taken 3/7/2024 0034 by Mikel Stringer RN  Activity Management: activity encouraged  Taken 3/6/2024 1917 by Mikel Stringer RN  Activity Management: activity encouraged     Problem: Nutrition Impaired (Sepsis/Septic Shock)  Goal: Optimal Nutrition Intake  Outcome: Ongoing, Progressing   Goal Outcome Evaluation:  Plan of Care Reviewed With: patient, daughter, family, son           Outcome Evaluation: VSS, RA, pain well controlled, lethargic but easily arousable, became much more alert by end of shift, multiple small BM (Dr. Godfrey aware), decreased UO but only 64 ml via bladder scan, family at bedside, no other complications assessed or stated.

## 2024-03-07 NOTE — CONSULTS
Patient Name:  eLa Rivers  YOB: 1945  [id number]       Patient Care Team:  Mannie Melvin MD as PCP - General (Family Medicine)  Jerman Luu MD as Consulting Physician (Radiation Oncology)  Naseem Noland MD as Referring Physician (Urology)  Maryanne Baker MD as Consulting Physician (Cardiology)  Benja Kauffman MD as Consulting Physician (Gastroenterology)      General Surgery Consult Note     Date of Consultation: 03/06/24    Referring Physician - Noel Petersen MD    Reason for Consult -pneumatosis    Subjective     I have been asked to see  Lea Rivers , a 78 y.o. male in consultation for pneumatosis from Dr. Petersen/Ghulam.  The patient has dementia and all history was obtained from the patient's providers and hospital chart documentation.  The patient presented to our hospital on 3/2/2024 status post fall with multiple rib fractures and clavicular fracture.  At that time the patient had no nausea, vomiting, diarrhea, or abdominal pain.  Since admission to the hospital per the patient's nurse he slowly began having increasing distention of his abdomen.  He has had multiple bowel movements but per staff recently this has been small, liquid, or smears.  No nausea or vomiting.  No fevers or chills.      Allergy:   Allergies   Allergen Reactions    Chlorhexidine Rash    Sulfa Antibiotics Rash     Childhood reaction        Medications:  atorvastatin, 10 mg, Oral, Daily  divalproex, 500 mg, Oral, Daily  heparin (porcine), 5,000 Units, Subcutaneous, Q12H  insulin lispro, 2-7 Units, Subcutaneous, 4x Daily AC & at Bedtime  levETIRAcetam, 500 mg, Oral, BID  levothyroxine, 88 mcg, Oral, Q AM  Lidocaine, 2 patch, Transdermal, Q24H  lisinopril, 40 mg, Oral, Q24H  pantoprazole, 40 mg, Oral, Q AM  piperacillin-tazobactam, 3.375 g, Intravenous, Q8H  senna-docusate sodium, 2 tablet, Oral, BID  sodium chloride, 1,000 mL, Intravenous, Once  sodium chloride, 10 mL,  Intravenous, Q12H  tamsulosin, 0.4 mg, Oral, Daily  vitamin B-12, 1,000 mcg, Oral, Daily      sodium chloride, 125 mL/hr, Last Rate: 75 mL/hr (03/06/24 2119)      No current facility-administered medications on file prior to encounter.     Current Outpatient Medications on File Prior to Encounter   Medication Sig    D-Mannose 500 MG capsule Take 1 capsule by mouth Daily. (Patient taking differently: Take 1 capsule by mouth Daily. OTC)    divalproex (DEPAKOTE) 500 MG 24 hr tablet Take 1 tablet by mouth 2 (two) times a day. 1 tablet 500mg QAM, 2 tablets 1000mg QPM    fluticasone (FLONASE) 50 MCG/ACT nasal spray 2 sprays into the nostril(s) as directed by provider As Needed for Rhinitis. Administer 2 sprays in each nostril for each dose.    isosorbide mononitrate (IMDUR) 30 MG 24 hr tablet Take 1 tablet by mouth 2 (Two) Times a Day.    levETIRAcetam (KEPPRA) 500 MG tablet Take 1 tablet by mouth 2 (Two) Times a Day.    levothyroxine (SYNTHROID, LEVOTHROID) 88 MCG tablet Take 1 tablet by mouth Every Morning.    lisinopril (PRINIVIL,ZESTRIL) 20 MG tablet Take 1 tablet by mouth 2 (Two) Times a Day.    metFORMIN ER (GLUCOPHAGE-XR) 500 MG 24 hr tablet Take 2 tablets by mouth Daily With Breakfast.    omeprazole (priLOSEC) 20 MG capsule Take 1 capsule by mouth Daily.    simvastatin (ZOCOR) 20 MG tablet Take 1 tablet by mouth Every Night.    sodium chloride 1 g tablet Take 2 tablets by mouth Daily. OTC    tamsulosin (FLOMAX) 0.4 MG capsule 24 hr capsule Take 1 capsule by mouth Daily.    verapamil SR (CALAN-SR) 240 MG CR tablet Take 1 tablet by mouth Daily.    vitamin B-12 (CYANOCOBALAMIN) 1000 MCG tablet Take 1 tablet by mouth Daily.       PMHx:   Past Medical History:   Diagnosis Date    Anemia     Colon cancer 1990    Status post partial colectomy in 1990. No chemotherapy or radiation therapy.    Coronary artery disease     Nonobstructive coronary artery disease.    Diabetes     Dyslipidemia     GERD (gastroesophageal reflux  disease)     Hx of.    Hyperlipidemia     Hypertension     Hyponatremia     Hypothyroidism     Left shoulder pain     Low sodium levels 2022    recent issue; saw nephrologist who ruled out kidney issues; took Sodium Chloride po to bring levels up    Memory deficit     FROM ANESTHESIA    Osteoarthritis     Prostate cancer 07/23/2022    Seizure disorder     silent seziure-diagnosed years ago    Skin cancer        Past Surgical History:  Past Surgical History:   Procedure Laterality Date    APPENDECTOMY      CARDIAC CATHETERIZATION  2012    30 to 40% LAD    CARPAL TUNNEL RELEASE Bilateral     CHOLECYSTECTOMY      COLECTOMY PARTIAL / TOTAL  1990    Partial colectomy    COLONOSCOPY      CYBERKNIFE  02/09/2023    Prostate/SV    CYSTOSCOPY TRANSURETHRAL RESECTION OF PROSTATE N/A 09/21/2018    Procedure: CYSTOSCOPY TRANSURETHRAL RESECTION OF PROSTATE GREENLIGHT;  Surgeon: Naseem Clayton MD;  Location:  [facility];  Service: Urology    OTHER SURGICAL HISTORY      Contraction surgery on bilateral hands and wrists.    PROSTATE BIOPSY N/A 11/11/2022    Procedure: TRANSRECTAL ULTRASOUND GUIDED BIOPSY OF PROSTATE;  Surgeon: Naseem Noland MD;  Location:  [facility];  Service: Urology;  Laterality: N/A;    SINUS SURGERY      SKIN BIOPSY      TEETH EXTRACTION      TONSILLECTOMY      TOTAL KNEE ARTHROPLASTY Right 04/07/2022    Procedure: TOTAL KNEE ARTHROPLASTY WITH ORTHO ROBOT RIGHT;  Surgeon: Louis Malcolm MD;  Location:  [facility];  Service: Robotics - Ortho;  Laterality: Right;    TRANSRECTAL INCISION PROSTATE WITH GOLD SEED Bilateral 01/06/2023    Procedure: TRANSRECTAL ULTRASOUND GUIDED PROSTATE FIDUCIAL MARKER PLACEMENT;  Surgeon: Naseem Noland MD;  Location:  [facility];  Service: Urology;  Laterality: Bilateral;    TURP / TRANSURETHRAL INCISION / DRAINAGE PROSTATE      VASECTOMY          Family History:   Family History   Problem Relation Age of Onset    Cancer Mother         brain    Hypertension Father      "Stroke Father     No Known Problems Sister     Stroke Sister     Esophageal cancer Brother     Stroke Maternal Grandfather     No Known Problems Paternal Grandmother     No Known Problems Paternal Grandfather     Stroke Other     Arthritis Other     Cancer Other         Social History:   Social History     Socioeconomic History    Marital status:    Tobacco Use    Smoking status: Never     Passive exposure: Past    Smokeless tobacco: Never   Vaping Use    Vaping status: Never Used   Substance and Sexual Activity    Alcohol use: No    Drug use: No    Sexual activity: Not Currently         Review of Systems    Unable to obtain          Objective     Physical Exam:      Vital Signs  /87 (BP Location: Right arm, Patient Position: Lying)   Pulse 101   Temp 97.4 °F (36.3 °C) (Oral)   Resp 18   Ht 170.2 cm (67\")   Wt 88.9 kg (196 lb)   SpO2 93%   BMI 30.70 kg/m²     Intake/Output Summary (Last 24 hours) at 3/6/2024 2346  Last data filed at 3/6/2024 1500  Gross per 24 hour   Intake 120 ml   Output 300 ml   Net -180 ml         Physical Exam:    Head: Normocephalic, atraumatic.   Eyes: Pupils equal, round, react to light and accommodation.   Mouth: Oral mucosa without lesions  Neck: No masses, lymphadenopathy or carotid bruits bilaterally  CV: Rhythm and rate regular, no murmurs, rubs or gallops  Lungs: Clear to auscultation bilaterally  Abdomen: Bowel sounds positive, soft, distended, nontender  Groin : No obvious hernias bilaterally  Extremities:  No cyanosis, clubbing or edema bilaterally  Lymphatics: No abnormal lymphadenopathy appreciated        Results Review: I have personally reviewed all of the recent lab and imaging results available at this time.   Creatinine 0.92  AST 81  ALT 46  Total bilirubin 0.7  Lactate 2.9 -> 2.2  WBC 14.7  Hemoglobin 11.5    CT abdomen pelvis without contrast 3/6/2024:  Dilated loops of large bowel with likely pneumatosis intestinalis. No bowel wall thickening. " Inflammation surrounding the distal descending and sigmoid colon. Findings are concerning for possible developing necrotizing enterocolitis given the   presence of pneumatosis intestinalis and inflammation of the region of the sigmoid colon with air-fluid levels and dilated large bowel.       Assessment & Plan     Assessment and Plan:    Patient with significant colonic dilation.  Radiologist read as possible pneumatosis of the large bowel.  I have personally reviewed these images and this finding is questionable.  Nonetheless the patient has significant colonic dilation.  Given the development of this finding gradually over the course of several days in the hospital I favor colonic ileus over an obstructive issue.  Would continue n.p.o. for now.  IV fluid resuscitation.  Serial lactate levels.  Broad-spectrum antibiotic coverage with Zosyn.  Recommend NG tube placement if the patient begins to vomit.  Provided continued stability overnight recommend gastroenterology consultation in the a.m. to assess for possible treatment of colonic ileus.      I discussed the patient's findings and my recommendations with the primary team     Harley Godfrey MD  03/06/24  23:46 EST

## 2024-03-07 NOTE — PROGRESS NOTES
Our team became aware of an abnormal CT scan which resulted out a few hours ago.  CT scan is concerning for necrotizing enterocolitis.  I went and examined the patient, vital signs are stable.  Abdomen was distended, firm with high-pitched tinkling bowel sounds, tympanic and tender to palpation with mild involuntary guarding.  I spoke with Dr. Godfrey with general surgery who is coming in to evaluate the patient.  Placed n.p.o. orders.  1 L NS bolus ordered, continuing with maintenance fluids at 75 cc/hour.  Serial lactic acid levels have been ordered every 4 hours.  Patient has had no vomiting, will place NG with any vomiting.  KATE Monroy discussed case with family.  Patient is DNR/DNI but they would want surgical intervention if needed.        62 minutes of critical care provided. This time excludes other billable procedures. Time does include preparation of documents, medical consultations, review of old records, and direct bedside care. Patient is at high risk for life-threatening deterioration due to concerns for necrotizing enterocolitis.

## 2024-03-07 NOTE — PROGRESS NOTES
[facility] Medicine Services  PROGRESS NOTE    Patient Name: Lea Rivers  : 1945  MRN: [id number]    Date of Admission: 3/2/2024  Primary Care Physician: Mannie Melvin MD    Subjective   Subjective     CC:  S/p fall, rib fractures, Ileus     HPI:  Patient seen and examined. Son in law at bedside. CT overnight reviewed. Surgery following. Had large BM yesterday per son in law report. Patient confused but much more interactive today.     Objective   Objective     Vital Signs:   Temp:  [97.4 °F (36.3 °C)-98.7 °F (37.1 °C)] 98.7 °F (37.1 °C)  Heart Rate:  [] 87  Resp:  [16-18] 18  BP: (145-189)/() 180/113     Physical Exam:  Constitutional: No acute distress, awake, alert, son in-law at bedside  HENT: NCAT, mucous membranes moist  Respiratory: Clear to auscultation bilaterally, respiratory effort normal   Cardiovascular: RRR, no murmurs, rubs, or gallops  Gastrointestinal: Decreased bowel sounds, firm, nontender, +distended  Musculoskeletal: No bilateral ankle edema, LUE in sling  Psychiatric: Appropriate affect, cooperative  Neurologic: oriented to self, no focal deficits  Skin: No rashes      Results Reviewed:  LAB RESULTS:      Lab 24  0804 24  0411 24  0048 24  2243 24  1838 24  1751 24  0428 24  0734 24  1348 24  0947   WBC  --  15.60*  --   --   --  14.68* 8.14 6.79  --  9.72   HEMOGLOBIN  --  10.5*  --   --   --  11.5* 9.7* 9.8*  --  12.7*   HEMATOCRIT  --  32.5*  --   --   --  35.8* 30.5* 30.9*  --  38.6   PLATELETS  --  204  --   --   --  221 162 140  --  177   NEUTROS ABS  --   --   --   --   --  12.21*  --   --   --  7.77*   IMMATURE GRANS (ABS)  --   --   --   --   --  0.20*  --   --   --  0.06*   LYMPHS ABS  --   --   --   --   --  0.78  --   --   --  1.02   MONOS ABS  --   --   --   --   --  1.46*  --   --   --  0.85   EOS ABS  --   --   --   --   --  0.00  --   --   --  0.00   MCV   --  91.5  --   --   --  93.2 91.6 91.4  --  93.0   CRP  --   --   --   --   --   --   --   --   --  0.68*   PROCALCITONIN  --   --   --   --   --  0.12  --   --   --   --    LACTATE 1.4 1.6 1.6 2.2* 2.9*  --   --   --    < > 2.6*    < > = values in this interval not displayed.         Lab 03/07/24  0411 03/06/24  1751 03/05/24  0428 03/04/24  0449 03/02/24  0947   SODIUM 131* 134* 132* 131* 132*   POTASSIUM 3.1* 3.8 3.8 4.2 4.1   CHLORIDE 99 98 99 98 96*   CO2 22.0 22.0 24.0 24.0 25.0   ANION GAP 10.0 14.0 9.0 9.0 11.0   BUN 15 15 10 10 14   CREATININE 0.74* 0.92 0.75* 0.77 0.85   EGFR 92.2 84.6 91.8 91.1 88.4   GLUCOSE 124* 121* 102* 82 116*   CALCIUM 8.5* 9.0 8.7 8.2* 9.6   IONIZED CALCIUM 1.22  --   --   --   --    MAGNESIUM 1.9  --   --   --  2.2   PHOSPHORUS 3.0  --   --   --  2.8   HEMOGLOBIN A1C  --   --   --   --  5.50   TSH  --   --   --   --  2.280         Lab 03/06/24 1751 03/02/24  0947   TOTAL PROTEIN 6.8 7.5   ALBUMIN 3.3* 4.1   GLOBULIN 3.5 3.4   ALT (SGPT) 46* 9   AST (SGOT) 81* 16   BILIRUBIN 0.7 0.5   ALK PHOS 61 58         Lab 03/02/24  0947   PROBNP 601.2   HSTROP T 16             Lab 03/03/24  1629   FOLATE 2.32*   VITAMIN B 12 807         Lab 03/06/24  1636   PH, ARTERIAL 7.413   PCO2, ARTERIAL 35.6   PO2 ART 63.0*   FIO2 21   HCO3 ART 22.7   BASE EXCESS ART -1.6*   CARBOXYHEMOGLOBIN 1.6     Brief Urine Lab Results  (Last result in the past 365 days)        Color   Clarity   Blood   Leuk Est   Nitrite   Protein   CREAT   Urine HCG        03/06/24 1548 Dark Yellow   Slightly Cloudy   Large (3+)   Small (1+)   Positive   100 mg/dL (2+)                   Microbiology Results Abnormal       Procedure Component Value - Date/Time    Blood Culture - Blood, Arm, Right [[id number]]  (Normal) Collected: 03/02/24 1132    Lab Status: Final result Specimen: Blood from Arm, Right Updated: 03/07/24 1201     Blood Culture No growth at 5 days    Narrative:      Less than seven (7) mL's of blood was collected.   Insufficient quantity may yield false negative results.    Blood Culture - Blood, Arm, Right [[id number]]  (Normal) Collected: 03/02/24 1132    Lab Status: Final result Specimen: Blood from Arm, Right Updated: 03/07/24 1201     Blood Culture No growth at 5 days    Narrative:      Less than seven (7) mL's of blood was collected.  Insufficient quantity may yield false negative results.    Urine Culture - Urine, Straight Cath [[id number]]  (Normal) Collected: 03/02/24 0923    Lab Status: Final result Specimen: Urine from Straight Cath Updated: 03/03/24 1601     Urine Culture No growth    COVID PRE-OP / PRE-PROCEDURE SCREENING ORDER (NO ISOLATION) - Swab, Nasopharynx [[id number]]  (Normal) Collected: 03/02/24 1133    Lab Status: Final result Specimen: Swab from Nasopharynx Updated: 03/02/24 1227    Narrative:      The following orders were created for panel order COVID PRE-OP / PRE-PROCEDURE SCREENING ORDER (NO ISOLATION) - Swab, Nasopharynx.  Procedure                               Abnormality         Status                     ---------                               -----------         ------                     COVID-19, FLU A/B, RSV P...[[id number]]  Normal              Final result                 Please view results for these tests on the individual orders.    COVID-19, FLU A/B, RSV PCR 1 HR TAT - Swab, Nasopharynx [[id number]]  (Normal) Collected: 03/02/24 1133    Lab Status: Final result Specimen: Swab from Nasopharynx Updated: 03/02/24 1227     COVID19 Not Detected     Influenza A PCR Not Detected     Influenza B PCR Not Detected     RSV, PCR Not Detected    Narrative:      Fact sheet for providers: https://www.fda.gov/media/197748/download    Fact sheet for patients: https://www.fda.gov/media/982278/download    Test performed by PCR.            CT Abdomen Pelvis Without Contrast    Result Date: 3/6/2024  CT ABDOMEN PELVIS WO CONTRAST Date of Exam: 3/6/2024 7:53 PM EST Indication: distended abdomen, colon dilated on  KUB, abdominal pain. Comparison: None available. Technique: Axial CT images were obtained of the abdomen and pelvis without the administration of contrast. Reconstructed coronal and sagittal images were also obtained. Automated exposure control and iterative construction methods were used. FINDINGS: Lung bases: Trace bilateral pleural fluid. Overlying mild atelectasis. Liver:No masses. No intrahepatic biliary ductal dilatation. Spleen:No masses. No perisplenic hematoma. Pancreas:No pancreatic masses. No evidence of pancreatitis. Gallbladder and common bile duct: The gallbladder is not identified and may be surgically absent. Adrenal glands:No adrenal masses Kidneys and ureters:No kidney stones. No renal masses.No calculi present within the ureters. Normal caliber ureters. Urinary bladder:No urinary bladder wall thickening. No bladder masses. Small bowel:Normal caliber small bowel. Large bowel: Multiple dilated loops of large bowel with air-fluid levels. Locules of intramural gas noted consistent with concerning for pneumatosis intestinalis. Postsurgical changes on the sigmoid colon with surrounding surgical clips. Appendix: Not seen however there is no evidence of appendicitis. GENITOURINARY: Previous prostatectomy Ascites or pneumoperitoneum: None Adenopathy:None present Osseous structures: Degenerative changes of the hips and lumbar spine. Other findings: None     Impression: Dilated loops of large bowel with likely pneumatosis intestinalis. No bowel wall thickening. Inflammation surrounding the distal descending and sigmoid colon. Findings are concerning for possible developing necrotizing enterocolitis given the  presence of pneumatosis intestinalis and inflammation of the region of the sigmoid colon with air-fluid levels and dilated large bowel. Electronically Signed: Ventura Campbell MD  3/6/2024 8:23 PM EST  Workstation ID: [id number]    XR Chest 1 View    Result Date: 3/6/2024  XR CHEST 1 VW Date of Exam: 3/6/2024  5:03 PM EST Indication: cough Comparison: 3/2/2024 Findings: Lines: None Lungs: Hazy opacities in the left lower lung most likely represents atelectasis. Otherwise the lungs are clear. No consolidation Pleura: Possible trace left pleural effusion. No pneumothorax Cardiomediastinum: The cardiomediastinal silhouette is normal Soft Tissues: Unremarkable. Bones: No acute osseous abnormality.     Impression: Impression: No definite acute cardiopulmonary abnormality Electronically Signed: James Foreman DO  3/6/2024 5:26 PM EST  Workstation ID: [id number]    XR Abdomen KUB    Result Date: 3/6/2024  XR ABDOMEN KUB Date of Exam: 3/6/2024 3:13 PM EST Indication: distended abd Comparison: None available. Findings: 3 supine views. There is moderately severe diffuse gaseous distention of the colon to the level of the rectum. There are multiple surgical clips in the midline of the mid to low pelvis. There are surgical clips in the left lower quadrant as well. There is gas within nondilated small bowel.     Impression: Impression: Moderately severe colon distention to the level of the rectum. Obstruction at the level of the rectum is not excluded. Electronically Signed: Heather Jaimes MD  3/6/2024 3:43 PM EST  Workstation ID: [id number]     Results for orders placed during the hospital encounter of 07/20/21    Adult Transthoracic Echo Complete W/ Cont if Necessary Per Protocol    Interpretation Summary  · Left ventricular wall thickness is consistent with mild concentric hypertrophy.  · Normal left-ventricular systolic function, estimated EF 65%.  · Left ventricular diastolic function is consistent with (grade I) impaired relaxation.  · Aortic sclerosis without aortic stenosis. Trace aortic insufficiency.  · Trace mitral regurgitation, trace tricuspid regurgitation.      Current medications:  Scheduled Meds:[Held by provider] atorvastatin, 10 mg, Oral, Daily  heparin (porcine), 5,000 Units, Subcutaneous, Q12H  insulin lispro,  2-7 Units, Subcutaneous, 4x Daily AC & at Bedtime  levETIRAcetam, 500 mg, Intravenous, Q12H  [Held by provider] levETIRAcetam, 500 mg, Oral, BID  [Held by provider] levothyroxine, 88 mcg, Oral, Q AM  Lidocaine, 2 patch, Transdermal, Q24H  [Held by provider] lisinopril, 40 mg, Oral, Q24H  metoprolol tartrate, 5 mg, Intravenous, Q6H  pantoprazole, 40 mg, Intravenous, Q AM  piperacillin-tazobactam, 3.375 g, Intravenous, Q8H  senna-docusate sodium, 2 tablet, Oral, BID  sodium chloride, 10 mL, Intravenous, Q12H  [Held by provider] tamsulosin, 0.4 mg, Oral, Daily  valproate sodium, 500 mg, Intravenous, Q8H  [Held by provider] vitamin B-12, 1,000 mcg, Oral, Daily      Continuous Infusions:sodium chloride, 125 mL/hr, Last Rate: 125 mL/hr (03/07/24 0836)        PRN Meds:.  acetaminophen **OR** acetaminophen **OR** acetaminophen    senna-docusate sodium **AND** polyethylene glycol **AND** bisacodyl **AND** bisacodyl    calcium carbonate    Calcium Replacement - Follow Nurse / BPA Driven Protocol    dextrose    dextrose    fluticasone    glucagon (human recombinant)    hydrALAZINE    Magnesium Standard Dose Replacement - Follow Nurse / BPA Driven Protocol    ondansetron ODT **OR** ondansetron    Phosphorus Replacement - Follow Nurse / BPA Driven Protocol    Potassium Replacement - Follow Nurse / BPA Driven Protocol    sodium chloride    sodium chloride    sodium chloride    Assessment & Plan   Assessment & Plan     Active Hospital Problems    Diagnosis  POA    **Falls [W19.XXXA]  Yes      Resolved Hospital Problems   No resolved problems to display.        Brief Hospital Course to date:  Lea Rivers is a 78 y.o. male with past medical history of hypertension, hyperlipidemia, hypothyroidism, seizure disorder.  Patient is being admitted for a fall and nondisplaced first through fourth left rib fractures.  Patient also has left distal clavicle fracture.    This patient's problems and plans were partially entered by my  partner and updated as appropriate by me 03/07/24.   All problems are new to me today     Multiple falls with increasing frequency   -CT of head shows age-related changes which are chronic but no acute process  -neuro consulted. likely d/t neuropathy (confirmed with EMG) and progression of dementia     Multiple rib fractures and also left clavicle fracture  -With no displacement or mildly displaced.  Orthopedic surgery evaluated. No surgical intervention is planned.  Recs nonweightbearing LUE, may come out of sling in 5-7 days to initiate gentle ROM exercises per ortho.  F/u with ortho/Dr. Maldonado in 2 weeks  -Pain control  -lidocaine patch    Pneumatosis intestinalis/ileus  -Dr Godfrey following  -Strict NPO, meds changed to IV  -IVF  -Lactic has normalized  -GI consulted for further evaluation      Prostate cancer  Hx BPH s/p greenlight photo vaporization of prostate 2018  -Follows with Dr. Noland  -S/P cyberknife radiation 2/9/23  -Has intermittent hematuria      Dementia and increasing memory problem  -Patient still lives alone and also drives  -practice partner d/w family that patient should not be driving.  They are interested in rehab/possible placement     Hypertension  Hyperlipidemia  Seizure disorder  Hypothyroidism  - Depakote and keppra (changed to IV)     Expected Discharge Location and Transportation: SNF  Expected Discharge   Expected Discharge Date: 3/11/2024; Expected Discharge Time:      DVT prophylaxis:  Medical DVT prophylaxis orders are present.         AM-PAC 6 Clicks Score (PT): 6 (03/06/24 3477)    CODE STATUS:   Code Status and Medical Interventions:   Ordered at: 03/02/24 3196     Medical Intervention Limits:    NO intubation (DNI)     Code Status (Patient has no pulse and is not breathing):    No CPR (Do Not Attempt to Resuscitate)     Medical Interventions (Patient has pulse or is breathing):    Limited Support       Eleanor Yost, DO  03/07/24

## 2024-03-07 NOTE — CONSULTS
[facility] [place] [facility] [place]    Referring Provider: Harley Godfrey MD     PCP: Mannie Melvin MD    Reason for Consultation: Assess for colonic ileus     Chief complaint: Abdominal distention     History of present illness:    Lea Rivers is a 78 y.o. male who is admitted with recurrent mechanical falls resulting in a minimally displaced left clavicle fracture and multiple rib fractures.  He has been receiving Tramadol as needed for pain.   He was noted to have increasing abdominal distention since admission and KUB showed moderately diffuse gaseous distention of the colon to the level of rectum.  Subsequent CT was obtained last night showed dilated loops of large bowel with air-fluid levels and locules of intramural gas consistent with concerning pneumatosis intestinalis.  Post surgical changes of the sigmoid colon noted with surgical clips.   He was evaluated by surgery overnight and started on IV Zosyn.     His daughter describes remote history of a sigmoid colectomy for colon cancer.   His last colonoscopy was 1/24/23 with Dr. Kauffman.  Preparation of the colon was poor at that time.  He had 3 polyps removed. Pathology consistent with tubular adenomas without high grade dysplasia.   He has had difficulty with fecal and urinary incontinence.      Patient has dementia and lives alone but has had a significant decline since the loss of his wife 3 years ago with a most recent decline in the last six months.       Allergies:  Chlorhexidine and Sulfa antibiotics    Scheduled Meds:  [Held by provider] atorvastatin, 10 mg, Oral, Daily  heparin (porcine), 5,000 Units, Subcutaneous, Q12H  insulin lispro, 2-7 Units, Subcutaneous, 4x Daily AC & at Bedtime  levETIRAcetam, 500 mg, Intravenous, Q12H  [Held by provider] levETIRAcetam, 500 mg, Oral, BID  [Held by provider] levothyroxine, 88 mcg, Oral, Q AM  Lidocaine, 2 patch, Transdermal, Q24H  [Held by provider] lisinopril, 40 mg, Oral, Q24H  metoprolol  tartrate, 5 mg, Intravenous, Q6H  pantoprazole, 40 mg, Intravenous, Q AM  piperacillin-tazobactam, 3.375 g, Intravenous, Q8H  potassium chloride, 10 mEq, Intravenous, Q1H  senna-docusate sodium, 2 tablet, Oral, BID  sodium chloride, 10 mL, Intravenous, Q12H  [Held by provider] tamsulosin, 0.4 mg, Oral, Daily  valproate sodium, 500 mg, Intravenous, Q8H  [Held by provider] vitamin B-12, 1,000 mcg, Oral, Daily         Infusions:  sodium chloride, 125 mL/hr, Last Rate: 125 mL/hr (03/07/24 0836)        PRN Meds:    acetaminophen **OR** acetaminophen **OR** acetaminophen    senna-docusate sodium **AND** polyethylene glycol **AND** bisacodyl **AND** bisacodyl    calcium carbonate    Calcium Replacement - Follow Nurse / BPA Driven Protocol    dextrose    dextrose    fluticasone    glucagon (human recombinant)    hydrALAZINE    Magnesium Standard Dose Replacement - Follow Nurse / BPA Driven Protocol    ondansetron ODT **OR** ondansetron    Phosphorus Replacement - Follow Nurse / BPA Driven Protocol    Potassium Replacement - Follow Nurse / BPA Driven Protocol    sodium chloride    sodium chloride    sodium chloride    Home Meds:  Medications Prior to Admission   Medication Sig Dispense Refill Last Dose    D-Mannose 500 MG capsule Take 1 capsule by mouth Daily. (Patient taking differently: Take 1 capsule by mouth Daily. OTC) 90 capsule 3 3/1/2024    divalproex (DEPAKOTE) 500 MG 24 hr tablet Take 1 tablet by mouth 2 (two) times a day. 1 tablet 500mg QAM, 2 tablets 1000mg QPM   3/1/2024    fluticasone (FLONASE) 50 MCG/ACT nasal spray 2 sprays into the nostril(s) as directed by provider As Needed for Rhinitis. Administer 2 sprays in each nostril for each dose.   3/1/2024    isosorbide mononitrate (IMDUR) 30 MG 24 hr tablet Take 1 tablet by mouth 2 (Two) Times a Day.   3/1/2024    levETIRAcetam (KEPPRA) 500 MG tablet Take 1 tablet by mouth 2 (Two) Times a Day.   3/1/2024    levothyroxine (SYNTHROID, LEVOTHROID) 88 MCG tablet Take  1 tablet by mouth Every Morning.   3/1/2024    lisinopril (PRINIVIL,ZESTRIL) 20 MG tablet Take 1 tablet by mouth 2 (Two) Times a Day.   3/1/2024    metFORMIN ER (GLUCOPHAGE-XR) 500 MG 24 hr tablet Take 2 tablets by mouth Daily With Breakfast.   3/1/2024    omeprazole (priLOSEC) 20 MG capsule Take 1 capsule by mouth Daily.   3/1/2024    simvastatin (ZOCOR) 20 MG tablet Take 1 tablet by mouth Every Night.   3/1/2024    sodium chloride 1 g tablet Take 2 tablets by mouth Daily. OTC   3/1/2024    tamsulosin (FLOMAX) 0.4 MG capsule 24 hr capsule Take 1 capsule by mouth Daily. 90 capsule 3 3/1/2024    verapamil SR (CALAN-SR) 240 MG CR tablet Take 1 tablet by mouth Daily.   3/1/2024    vitamin B-12 (CYANOCOBALAMIN) 1000 MCG tablet Take 1 tablet by mouth Daily. 90 tablet 3 3/1/2024       ROS: Review of Systems   Unable to perform ROS: Dementia       PAST MED HX:  Past Medical History:   Diagnosis Date    Anemia     Colon cancer 1990    Status post partial colectomy in 1990. No chemotherapy or radiation therapy.    Coronary artery disease     Nonobstructive coronary artery disease.    Diabetes     Dyslipidemia     GERD (gastroesophageal reflux disease)     Hx of.    Hyperlipidemia     Hypertension     Hyponatremia     Hypothyroidism     Left shoulder pain     Low sodium levels 2022    recent issue; saw nephrologist who ruled out kidney issues; took Sodium Chloride po to bring levels up    Memory deficit     FROM ANESTHESIA    Osteoarthritis     Prostate cancer 07/23/2022    Seizure disorder     silent seziure-diagnosed years ago    Skin cancer        PAST SURG HX:  Past Surgical History:   Procedure Laterality Date    APPENDECTOMY      CARDIAC CATHETERIZATION  2012    30 to 40% LAD    CARPAL TUNNEL RELEASE Bilateral     CHOLECYSTECTOMY      COLECTOMY PARTIAL / TOTAL  1990    Partial colectomy    COLONOSCOPY      CYBERKNIFE  02/09/2023    Prostate/SV    CYSTOSCOPY TRANSURETHRAL RESECTION OF PROSTATE N/A 09/21/2018     "Procedure: CYSTOSCOPY TRANSURETHRAL RESECTION OF PROSTATE GREENLIGHT;  Surgeon: Naseem Clayton MD;  Location:  [facility];  Service: Urology    OTHER SURGICAL HISTORY      Contraction surgery on bilateral hands and wrists.    PROSTATE BIOPSY N/A 11/11/2022    Procedure: TRANSRECTAL ULTRASOUND GUIDED BIOPSY OF PROSTATE;  Surgeon: Naseem Noland MD;  Location:  [facility];  Service: Urology;  Laterality: N/A;    SINUS SURGERY      SKIN BIOPSY      TEETH EXTRACTION      TONSILLECTOMY      TOTAL KNEE ARTHROPLASTY Right 04/07/2022    Procedure: TOTAL KNEE ARTHROPLASTY WITH ORTHO ROBOT RIGHT;  Surgeon: Louis Malcolm MD;  Location:  [facility];  Service: Robotics - Ortho;  Laterality: Right;    TRANSRECTAL INCISION PROSTATE WITH GOLD SEED Bilateral 01/06/2023    Procedure: TRANSRECTAL ULTRASOUND GUIDED PROSTATE FIDUCIAL MARKER PLACEMENT;  Surgeon: Naseem Noland MD;  Location:  [facility];  Service: Urology;  Laterality: Bilateral;    TURP / TRANSURETHRAL INCISION / DRAINAGE PROSTATE      VASECTOMY         FAM HX:  Family History   Problem Relation Age of Onset    Cancer Mother         brain    Hypertension Father     Stroke Father     No Known Problems Sister     Stroke Sister     Esophageal cancer Brother     Stroke Maternal Grandfather     No Known Problems Paternal Grandmother     No Known Problems Paternal Grandfather     Stroke Other     Arthritis Other     Cancer Other        SOC HX:  Social History     Socioeconomic History    Marital status:    Tobacco Use    Smoking status: Never     Passive exposure: Past    Smokeless tobacco: Never   Vaping Use    Vaping status: Never Used   Substance and Sexual Activity    Alcohol use: No    Drug use: No    Sexual activity: Not Currently       PHYSICAL EXAM  BP (!) 180/113 (BP Location: Right arm, Patient Position: Lying)   Pulse 87   Temp 98.7 °F (37.1 °C) (Oral)   Resp 18   Ht 170.2 cm (67\")   Wt 88.9 kg (196 lb)   SpO2 96%   BMI 30.70 kg/m²   Wt " Readings from Last 3 Encounters:   03/02/24 88.9 kg (196 lb)   02/27/24 89.1 kg (196 lb 6.4 oz)   02/15/24 92.1 kg (203 lb)   ,body mass index is 30.7 kg/m².  Physical Exam  Constitutional:       General: He is not in acute distress.     Appearance: He is ill-appearing.   Cardiovascular:      Rate and Rhythm: Normal rate and regular rhythm.   Pulmonary:      Effort: Pulmonary effort is normal. No respiratory distress.   Abdominal:      General: There is distension.      Tenderness: There is no abdominal tenderness.      Comments: Tympanitic to percussion  High pitched tinkling bowel sounds    Musculoskeletal:      Right lower leg: No edema.      Left lower leg: No edema.   Skin:     General: Skin is warm and dry.   Neurological:      Mental Status: He is alert.      Comments: Alert, oriented to self    Psychiatric:         Cognition and Memory: Memory is impaired.       Results Review:   I reviewed the patient's new clinical results.    Lab Results   Component Value Date    WBC 15.60 (H) 03/07/2024    HGB 10.5 (L) 03/07/2024    HGB 11.5 (L) 03/06/2024    HGB 9.7 (L) 03/05/2024    HCT 32.5 (L) 03/07/2024    MCV 91.5 03/07/2024     03/07/2024     Lab Results   Component Value Date    INR 1.08 03/24/2022    INR 1.11 05/26/2021     Lab Results   Component Value Date    GLUCOSE 124 (H) 03/07/2024    BUN 15 03/07/2024    CREATININE 0.74 (L) 03/07/2024    EGFRIFNONA 64 09/10/2021    BCR 20.3 03/07/2024     (L) 03/07/2024    K 3.1 (L) 03/07/2024    CO2 22.0 03/07/2024    CALCIUM 8.5 (L) 03/07/2024    PROTENTOTREF 6.9 04/28/2023    ALBUMIN 3.3 (L) 03/06/2024    ALKPHOS 61 03/06/2024    BILITOT 0.7 03/06/2024    ALT 46 (H) 03/06/2024    AST 81 (H) 03/06/2024      Latest Reference Range & Units 03/02/24 09:47   TSH Baseline 0.270 - 4.200 uIU/mL 2.280      Latest Reference Range & Units 03/02/24 09:47   Free T4 0.93 - 1.70 ng/dL 1.29      Latest Reference Range & Units 03/07/24 08:04   Lactate 0.5 - 2.0 mmol/L 1.4        CT Abdomen/Pelvis (as interpreted by radiologist) 3/6/24  FINDINGS:   Lung bases: Trace bilateral pleural fluid. Overlying mild atelectasis.   Liver:No masses. No intrahepatic biliary ductal dilatation.   Spleen:No masses. No perisplenic hematoma.   Pancreas:No pancreatic masses. No evidence of pancreatitis.   Gallbladder and common bile duct: The gallbladder is not identified and may be surgically absent.   Adrenal glands:No adrenal masses   Kidneys and ureters:No kidney stones. No renal masses.No calculi present within the ureters. Normal caliber ureters.   Urinary bladder:No urinary bladder wall thickening. No bladder masses.   Small bowel:Normal caliber small bowel.   Large bowel: Multiple dilated loops of large bowel with air-fluid levels. Locules of intramural gas noted consistent with concerning for pneumatosis intestinalis. Postsurgical changes on the sigmoid colon with surrounding surgical clips.   Appendix: Not seen however there is no evidence of appendicitis.   GENITOURINARY: Previous prostatectomy   Ascites or pneumoperitoneum: None   Adenopathy:None present   Osseous structures: Degenerative changes of the hips and lumbar spine.   Other findings: None   IMPRESSION:  Dilated loops of large bowel with likely pneumatosis intestinalis. No bowel wall thickening. Inflammation surrounding the distal descending and sigmoid colon. Findings are concerning for possible developing necrotizing enterocolitis given the   presence of pneumatosis intestinalis and inflammation of the region of the sigmoid colon with air-fluid levels and dilated large bowel.    ASSESSMENTS/PLANS    Colonic ileus with CT concerns of pneumatosis intestinalis   History of remote colon resection for colon cancer  Dementia  Multiple falls   Hypokalemia    >> Agree with holding Tramadol.   Will add SQ Relistor   >> IV Reglan 10 mg QID   >> IV Zosyn  >> Further management per surgery   >> Correct hypokalemia     I discussed the patient's  findings and my recommendations with patient, his son whom is at bedside and his daughter via telephone.      ALMAZ Bullock  03/07/24  11:41 EST     (4) no impairment

## 2024-03-08 ENCOUNTER — ANESTHESIA EVENT (OUTPATIENT)
Dept: PERIOP | Facility: HOSPITAL | Age: 79
End: 2024-03-08
Payer: MEDICARE

## 2024-03-08 ENCOUNTER — APPOINTMENT (OUTPATIENT)
Dept: GENERAL RADIOLOGY | Facility: HOSPITAL | Age: 79
DRG: 329 | End: 2024-03-08
Payer: MEDICARE

## 2024-03-08 LAB
ALBUMIN SERPL-MCNC: 3.1 G/DL (ref 3.5–5.2)
ALP SERPL-CCNC: 61 U/L (ref 39–117)
ALT SERPL W P-5'-P-CCNC: 103 U/L (ref 1–41)
ANION GAP SERPL CALCULATED.3IONS-SCNC: 10 MMOL/L (ref 5–15)
ANION GAP SERPL CALCULATED.3IONS-SCNC: 12 MMOL/L (ref 5–15)
AST SERPL-CCNC: 105 U/L (ref 1–40)
BILIRUB CONJ SERPL-MCNC: 0.3 MG/DL (ref 0–0.3)
BILIRUB INDIRECT SERPL-MCNC: 0.2 MG/DL
BILIRUB SERPL-MCNC: 0.5 MG/DL (ref 0–1.2)
BUN SERPL-MCNC: 15 MG/DL (ref 8–23)
BUN SERPL-MCNC: 16 MG/DL (ref 8–23)
BUN/CREAT SERPL: 22.1 (ref 7–25)
BUN/CREAT SERPL: 22.9 (ref 7–25)
CA-I SERPL ISE-MCNC: 1.25 MMOL/L (ref 1.12–1.32)
CALCIUM SPEC-SCNC: 8.2 MG/DL (ref 8.6–10.5)
CALCIUM SPEC-SCNC: 8.5 MG/DL (ref 8.6–10.5)
CHLORIDE SERPL-SCNC: 101 MMOL/L (ref 98–107)
CHLORIDE SERPL-SCNC: 104 MMOL/L (ref 98–107)
CHOLEST SERPL-MCNC: 103 MG/DL (ref 0–200)
CO2 SERPL-SCNC: 19 MMOL/L (ref 22–29)
CO2 SERPL-SCNC: 21 MMOL/L (ref 22–29)
CREAT SERPL-MCNC: 0.68 MG/DL (ref 0.76–1.27)
CREAT SERPL-MCNC: 0.7 MG/DL (ref 0.76–1.27)
CRP SERPL-MCNC: 12.05 MG/DL (ref 0–0.5)
DEPRECATED RDW RBC AUTO: 53.2 FL (ref 37–54)
EGFRCR SERPLBLD CKD-EPI 2021: 93.7 ML/MIN/1.73
EGFRCR SERPLBLD CKD-EPI 2021: 94.6 ML/MIN/1.73
ERYTHROCYTE [DISTWIDTH] IN BLOOD BY AUTOMATED COUNT: 15.8 % (ref 12.3–15.4)
GLUCOSE BLDC GLUCOMTR-MCNC: 101 MG/DL (ref 70–130)
GLUCOSE BLDC GLUCOMTR-MCNC: 107 MG/DL (ref 70–130)
GLUCOSE BLDC GLUCOMTR-MCNC: 107 MG/DL (ref 70–130)
GLUCOSE BLDC GLUCOMTR-MCNC: 109 MG/DL (ref 70–130)
GLUCOSE BLDC GLUCOMTR-MCNC: 110 MG/DL (ref 70–130)
GLUCOSE BLDC GLUCOMTR-MCNC: 93 MG/DL (ref 70–130)
GLUCOSE BLDC GLUCOMTR-MCNC: 94 MG/DL (ref 70–130)
GLUCOSE SERPL-MCNC: 90 MG/DL (ref 65–99)
GLUCOSE SERPL-MCNC: 98 MG/DL (ref 65–99)
HCT VFR BLD AUTO: 32.6 % (ref 37.5–51)
HGB BLD-MCNC: 10.2 G/DL (ref 13–17.7)
MAGNESIUM SERPL-MCNC: 1.8 MG/DL (ref 1.6–2.4)
MAGNESIUM SERPL-MCNC: 1.8 MG/DL (ref 1.6–2.4)
MAGNESIUM SERPL-MCNC: 2.3 MG/DL (ref 1.6–2.4)
MCH RBC QN AUTO: 29.4 PG (ref 26.6–33)
MCHC RBC AUTO-ENTMCNC: 31.3 G/DL (ref 31.5–35.7)
MCV RBC AUTO: 93.9 FL (ref 79–97)
PHOSPHATE SERPL-MCNC: 2.9 MG/DL (ref 2.5–4.5)
PLATELET # BLD AUTO: 270 10*3/MM3 (ref 140–450)
PMV BLD AUTO: 9.2 FL (ref 6–12)
POTASSIUM SERPL-SCNC: 2.5 MMOL/L (ref 3.5–5.2)
POTASSIUM SERPL-SCNC: 2.6 MMOL/L (ref 3.5–5.2)
POTASSIUM SERPL-SCNC: 3 MMOL/L (ref 3.5–5.2)
PROT SERPL-MCNC: 5.6 G/DL (ref 6–8.5)
QT INTERVAL: 380 MS
QTC INTERVAL: 424 MS
RBC # BLD AUTO: 3.47 10*6/MM3 (ref 4.14–5.8)
SODIUM SERPL-SCNC: 132 MMOL/L (ref 136–145)
SODIUM SERPL-SCNC: 135 MMOL/L (ref 136–145)
TRIGL SERPL-MCNC: 161 MG/DL (ref 0–150)
WBC NRBC COR # BLD AUTO: 16.04 10*3/MM3 (ref 3.4–10.8)

## 2024-03-08 PROCEDURE — 25010000002 MAGNESIUM SULFATE 2 GM/50ML SOLUTION: Performed by: FAMILY MEDICINE

## 2024-03-08 PROCEDURE — 25010000002 LEVETRIRACETAM PER 10 MG: Performed by: FAMILY MEDICINE

## 2024-03-08 PROCEDURE — 25010000002 DIAZEPAM PER 5 MG: Performed by: NURSE PRACTITIONER

## 2024-03-08 PROCEDURE — 25010000002 HEPARIN (PORCINE) PER 1000 UNITS: Performed by: INTERNAL MEDICINE

## 2024-03-08 PROCEDURE — 84100 ASSAY OF PHOSPHORUS: CPT

## 2024-03-08 PROCEDURE — 83735 ASSAY OF MAGNESIUM: CPT | Performed by: FAMILY MEDICINE

## 2024-03-08 PROCEDURE — 25810000003 SODIUM CHLORIDE 0.9 % SOLUTION 250 ML FLEX CONT: Performed by: FAMILY MEDICINE

## 2024-03-08 PROCEDURE — 25010000002 CALCIUM GLUCONATE PER 10 ML

## 2024-03-08 PROCEDURE — 80076 HEPATIC FUNCTION PANEL: CPT

## 2024-03-08 PROCEDURE — 80048 BASIC METABOLIC PNL TOTAL CA: CPT | Performed by: FAMILY MEDICINE

## 2024-03-08 PROCEDURE — 25010000002 MAGNESIUM SULFATE PER 500 MG OF MAGNESIUM

## 2024-03-08 PROCEDURE — 02HV33Z INSERTION OF INFUSION DEVICE INTO SUPERIOR VENA CAVA, PERCUTANEOUS APPROACH: ICD-10-PCS | Performed by: INTERNAL MEDICINE

## 2024-03-08 PROCEDURE — 82465 ASSAY BLD/SERUM CHOLESTEROL: CPT

## 2024-03-08 PROCEDURE — 82948 REAGENT STRIP/BLOOD GLUCOSE: CPT

## 2024-03-08 PROCEDURE — 93005 ELECTROCARDIOGRAM TRACING: CPT | Performed by: FAMILY MEDICINE

## 2024-03-08 PROCEDURE — 84134 ASSAY OF PREALBUMIN: CPT

## 2024-03-08 PROCEDURE — 93010 ELECTROCARDIOGRAM REPORT: CPT | Performed by: INTERNAL MEDICINE

## 2024-03-08 PROCEDURE — 82330 ASSAY OF CALCIUM: CPT

## 2024-03-08 PROCEDURE — 25010000002 HYDRALAZINE PER 20 MG: Performed by: FAMILY MEDICINE

## 2024-03-08 PROCEDURE — 99233 SBSQ HOSP IP/OBS HIGH 50: CPT | Performed by: FAMILY MEDICINE

## 2024-03-08 PROCEDURE — 85027 COMPLETE CBC AUTOMATED: CPT | Performed by: FAMILY MEDICINE

## 2024-03-08 PROCEDURE — C1751 CATH, INF, PER/CENT/MIDLINE: HCPCS

## 2024-03-08 PROCEDURE — 25010000002 PIPERACILLIN SOD-TAZOBACTAM PER 1 G: Performed by: FAMILY MEDICINE

## 2024-03-08 PROCEDURE — 84132 ASSAY OF SERUM POTASSIUM: CPT | Performed by: FAMILY MEDICINE

## 2024-03-08 PROCEDURE — 71045 X-RAY EXAM CHEST 1 VIEW: CPT

## 2024-03-08 PROCEDURE — 25010000002 POTASSIUM CHLORIDE 10 MEQ/100ML SOLUTION: Performed by: FAMILY MEDICINE

## 2024-03-08 PROCEDURE — 83735 ASSAY OF MAGNESIUM: CPT

## 2024-03-08 PROCEDURE — 84478 ASSAY OF TRIGLYCERIDES: CPT

## 2024-03-08 PROCEDURE — 25010000002 METOCLOPRAMIDE PER 10 MG: Performed by: PHYSICIAN ASSISTANT

## 2024-03-08 PROCEDURE — C1894 INTRO/SHEATH, NON-LASER: HCPCS

## 2024-03-08 PROCEDURE — 86140 C-REACTIVE PROTEIN: CPT

## 2024-03-08 RX ORDER — POTASSIUM CHLORIDE 7.45 MG/ML
10 INJECTION INTRAVENOUS
Status: DISPENSED | OUTPATIENT
Start: 2024-03-08 | End: 2024-03-09

## 2024-03-08 RX ORDER — CLONIDINE 0.1 MG/24H
1 PATCH, EXTENDED RELEASE TRANSDERMAL WEEKLY
Status: DISCONTINUED | OUTPATIENT
Start: 2024-03-08 | End: 2024-03-09

## 2024-03-08 RX ORDER — POTASSIUM CHLORIDE 7.45 MG/ML
10 INJECTION INTRAVENOUS
Status: DISPENSED | OUTPATIENT
Start: 2024-03-08 | End: 2024-03-08

## 2024-03-08 RX ORDER — SODIUM CHLORIDE 0.9 % (FLUSH) 0.9 %
10 SYRINGE (ML) INJECTION EVERY 12 HOURS SCHEDULED
Status: DISCONTINUED | OUTPATIENT
Start: 2024-03-08 | End: 2024-03-14 | Stop reason: SDUPTHER

## 2024-03-08 RX ORDER — MAGNESIUM SULFATE HEPTAHYDRATE 40 MG/ML
2 INJECTION, SOLUTION INTRAVENOUS ONCE
Status: COMPLETED | OUTPATIENT
Start: 2024-03-08 | End: 2024-03-08

## 2024-03-08 RX ORDER — SODIUM CHLORIDE 0.9 % (FLUSH) 0.9 %
10 SYRINGE (ML) INJECTION EVERY 12 HOURS SCHEDULED
Status: CANCELLED | OUTPATIENT
Start: 2024-03-08

## 2024-03-08 RX ORDER — FAMOTIDINE 10 MG/ML
20 INJECTION, SOLUTION INTRAVENOUS ONCE
Status: CANCELLED | OUTPATIENT
Start: 2024-03-08 | End: 2024-03-08

## 2024-03-08 RX ORDER — IBUPROFEN 600 MG/1
1 TABLET ORAL
Status: DISCONTINUED | OUTPATIENT
Start: 2024-03-08 | End: 2024-03-08

## 2024-03-08 RX ORDER — DEXTROSE MONOHYDRATE 25 G/50ML
25 INJECTION, SOLUTION INTRAVENOUS
Status: DISCONTINUED | OUTPATIENT
Start: 2024-03-08 | End: 2024-03-08

## 2024-03-08 RX ORDER — DIAZEPAM 5 MG/ML
2.5 INJECTION, SOLUTION INTRAMUSCULAR; INTRAVENOUS ONCE
Status: COMPLETED | OUTPATIENT
Start: 2024-03-08 | End: 2024-03-08

## 2024-03-08 RX ORDER — FAMOTIDINE 20 MG/1
20 TABLET, FILM COATED ORAL ONCE
Status: CANCELLED | OUTPATIENT
Start: 2024-03-08 | End: 2024-03-08

## 2024-03-08 RX ORDER — SODIUM CHLORIDE 0.9 % (FLUSH) 0.9 %
10 SYRINGE (ML) INJECTION AS NEEDED
Status: DISCONTINUED | OUTPATIENT
Start: 2024-03-08 | End: 2024-03-31 | Stop reason: HOSPADM

## 2024-03-08 RX ORDER — NICOTINE POLACRILEX 4 MG
15 LOZENGE BUCCAL
Status: DISCONTINUED | OUTPATIENT
Start: 2024-03-08 | End: 2024-03-08

## 2024-03-08 RX ADMIN — CLONIDINE 1 PATCH: 0.1 PATCH, EXTENDED RELEASE TRANSDERMAL at 17:27

## 2024-03-08 RX ADMIN — LIDOCAINE 2 PATCH: 4 PATCH TOPICAL at 08:55

## 2024-03-08 RX ADMIN — POTASSIUM CHLORIDE 10 MEQ: 7.46 INJECTION, SOLUTION INTRAVENOUS at 23:07

## 2024-03-08 RX ADMIN — POTASSIUM CHLORIDE 10 MEQ: 7.46 INJECTION, SOLUTION INTRAVENOUS at 08:55

## 2024-03-08 RX ADMIN — PIPERACILLIN AND TAZOBACTAM 3.38 G: 3; .375 INJECTION, POWDER, LYOPHILIZED, FOR SOLUTION INTRAVENOUS at 21:51

## 2024-03-08 RX ADMIN — METOCLOPRAMIDE 10 MG: 5 INJECTION, SOLUTION INTRAMUSCULAR; INTRAVENOUS at 05:14

## 2024-03-08 RX ADMIN — POTASSIUM CHLORIDE 10 MEQ: 7.46 INJECTION, SOLUTION INTRAVENOUS at 14:43

## 2024-03-08 RX ADMIN — HYDRALAZINE HYDROCHLORIDE 10 MG: 20 INJECTION INTRAMUSCULAR; INTRAVENOUS at 23:15

## 2024-03-08 RX ADMIN — WATER: 1 INJECTION INTRAMUSCULAR; INTRAVENOUS; SUBCUTANEOUS at 17:28

## 2024-03-08 RX ADMIN — METOPROLOL TARTRATE 5 MG: 5 INJECTION INTRAVENOUS at 08:56

## 2024-03-08 RX ADMIN — METOCLOPRAMIDE 10 MG: 5 INJECTION, SOLUTION INTRAMUSCULAR; INTRAVENOUS at 17:28

## 2024-03-08 RX ADMIN — PIPERACILLIN AND TAZOBACTAM 3.38 G: 3; .375 INJECTION, POWDER, LYOPHILIZED, FOR SOLUTION INTRAVENOUS at 05:14

## 2024-03-08 RX ADMIN — PIPERACILLIN AND TAZOBACTAM 3.38 G: 3; .375 INJECTION, POWDER, LYOPHILIZED, FOR SOLUTION INTRAVENOUS at 14:51

## 2024-03-08 RX ADMIN — DIAZEPAM 2.5 MG: 5 INJECTION, SOLUTION INTRAMUSCULAR; INTRAVENOUS at 21:52

## 2024-03-08 RX ADMIN — SODIUM CHLORIDE 500 MG: 9 INJECTION, SOLUTION INTRAVENOUS at 03:32

## 2024-03-08 RX ADMIN — HYDRALAZINE HYDROCHLORIDE 10 MG: 20 INJECTION INTRAMUSCULAR; INTRAVENOUS at 15:28

## 2024-03-08 RX ADMIN — MAGNESIUM SULFATE HEPTAHYDRATE 2 G: 2 INJECTION, SOLUTION INTRAVENOUS at 14:35

## 2024-03-08 RX ADMIN — HEPARIN SODIUM 5000 UNITS: 5000 INJECTION INTRAVENOUS; SUBCUTANEOUS at 08:54

## 2024-03-08 RX ADMIN — SODIUM CHLORIDE 500 MG: 9 INJECTION, SOLUTION INTRAVENOUS at 13:43

## 2024-03-08 RX ADMIN — POTASSIUM CHLORIDE 10 MEQ: 7.46 INJECTION, SOLUTION INTRAVENOUS at 10:42

## 2024-03-08 RX ADMIN — HEPARIN SODIUM 5000 UNITS: 5000 INJECTION INTRAVENOUS; SUBCUTANEOUS at 20:07

## 2024-03-08 RX ADMIN — METOCLOPRAMIDE 10 MG: 5 INJECTION, SOLUTION INTRAMUSCULAR; INTRAVENOUS at 23:07

## 2024-03-08 RX ADMIN — HYDRALAZINE HYDROCHLORIDE 10 MG: 20 INJECTION INTRAMUSCULAR; INTRAVENOUS at 10:48

## 2024-03-08 RX ADMIN — Medication 10 ML: at 08:58

## 2024-03-08 RX ADMIN — SODIUM CHLORIDE 500 MG: 9 INJECTION, SOLUTION INTRAVENOUS at 20:07

## 2024-03-08 RX ADMIN — METOCLOPRAMIDE 10 MG: 5 INJECTION, SOLUTION INTRAMUSCULAR; INTRAVENOUS at 01:01

## 2024-03-08 RX ADMIN — Medication 10 ML: at 20:07

## 2024-03-08 RX ADMIN — LEVETIRACETAM 500 MG: 100 INJECTION, SOLUTION INTRAVENOUS at 20:06

## 2024-03-08 RX ADMIN — PANTOPRAZOLE SODIUM 40 MG: 40 INJECTION, POWDER, FOR SOLUTION INTRAVENOUS at 05:14

## 2024-03-08 RX ADMIN — METOPROLOL TARTRATE 5 MG: 5 INJECTION INTRAVENOUS at 17:28

## 2024-03-08 RX ADMIN — METOPROLOL TARTRATE 5 MG: 5 INJECTION INTRAVENOUS at 03:32

## 2024-03-08 RX ADMIN — LEVETIRACETAM 500 MG: 100 INJECTION, SOLUTION INTRAVENOUS at 08:54

## 2024-03-08 RX ADMIN — METOPROLOL TARTRATE 5 MG: 5 INJECTION INTRAVENOUS at 21:51

## 2024-03-08 RX ADMIN — POTASSIUM CHLORIDE 10 MEQ: 7.46 INJECTION, SOLUTION INTRAVENOUS at 07:46

## 2024-03-08 RX ADMIN — POTASSIUM CHLORIDE 10 MEQ: 7.46 INJECTION, SOLUTION INTRAVENOUS at 21:52

## 2024-03-08 RX ADMIN — SMOFLIPID 250 ML: 6; 6; 5; 3 INJECTION, EMULSION INTRAVENOUS at 17:28

## 2024-03-08 NOTE — PLAN OF CARE
Problem: Adult Inpatient Plan of Care  Goal: Plan of Care Review  Outcome: Ongoing, Not Progressing  Goal: Patient-Specific Goal (Individualized)  Outcome: Ongoing, Not Progressing  Goal: Absence of Hospital-Acquired Illness or Injury  Outcome: Ongoing, Not Progressing  Intervention: Identify and Manage Fall Risk  Recent Flowsheet Documentation  Taken 3/7/2024 1800 by Curtis Caldwell RN  Safety Promotion/Fall Prevention:   activity supervised   assistive device/personal items within reach   clutter free environment maintained   fall prevention program maintained   lighting adjusted   nonskid shoes/slippers when out of bed   room organization consistent   safety round/check completed  Taken 3/7/2024 1600 by Curtis Caldwell RN  Safety Promotion/Fall Prevention:   activity supervised   assistive device/personal items within reach   clutter free environment maintained   fall prevention program maintained   lighting adjusted   nonskid shoes/slippers when out of bed   room organization consistent   safety round/check completed  Taken 3/7/2024 1400 by Curtis Caldwell RN  Safety Promotion/Fall Prevention:   activity supervised   assistive device/personal items within reach   clutter free environment maintained   fall prevention program maintained   lighting adjusted   nonskid shoes/slippers when out of bed   room organization consistent   safety round/check completed  Taken 3/7/2024 1200 by Curtis Caldwell RN  Safety Promotion/Fall Prevention:   activity supervised   assistive device/personal items within reach   clutter free environment maintained   fall prevention program maintained   lighting adjusted   nonskid shoes/slippers when out of bed   room organization consistent   safety round/check completed  Taken 3/7/2024 1000 by Curtis Caldwell RN  Safety Promotion/Fall Prevention:   activity supervised   assistive device/personal items within reach   clutter free environment maintained   fall prevention  program maintained   lighting adjusted   nonskid shoes/slippers when out of bed   room organization consistent   safety round/check completed  Taken 3/7/2024 0800 by Curtis Caldwell RN  Safety Promotion/Fall Prevention:   activity supervised   assistive device/personal items within reach   clutter free environment maintained   fall prevention program maintained   gait belt   lighting adjusted   nonskid shoes/slippers when out of bed   room organization consistent   safety round/check completed  Intervention: Prevent Skin Injury  Recent Flowsheet Documentation  Taken 3/7/2024 1800 by Curtis Caldwell RN  Body Position: position changed independently  Skin Protection:   adhesive use limited   incontinence pads utilized   transparent dressing maintained   tubing/devices free from skin contact  Taken 3/7/2024 1600 by Curtis Caldwell RN  Body Position: position changed independently  Skin Protection:   adhesive use limited   incontinence pads utilized   transparent dressing maintained   tubing/devices free from skin contact  Taken 3/7/2024 1400 by Curtis Caldwell RN  Body Position: position changed independently  Skin Protection:   adhesive use limited   incontinence pads utilized   transparent dressing maintained   tubing/devices free from skin contact  Taken 3/7/2024 1200 by Curtis Caldwell RN  Body Position: position changed independently  Skin Protection:   adhesive use limited   incontinence pads utilized   transparent dressing maintained   tubing/devices free from skin contact  Taken 3/7/2024 1000 by Curtis Caldwell RN  Body Position: position changed independently  Skin Protection:   adhesive use limited   incontinence pads utilized   transparent dressing maintained   tubing/devices free from skin contact  Taken 3/7/2024 0800 by Curtis Caldwell RN  Body Position: position changed independently  Skin Protection:   adhesive use limited   incontinence pads utilized   transparent dressing  maintained   tubing/devices free from skin contact  Intervention: Prevent and Manage VTE (Venous Thromboembolism) Risk  Recent Flowsheet Documentation  Taken 3/7/2024 1800 by Curtis Caldwell RN  Activity Management: activity minimized  Taken 3/7/2024 1600 by Curtis Caldwell RN  Activity Management: activity minimized  Taken 3/7/2024 1400 by Curtis Caldwell RN  Activity Management: activity minimized  Taken 3/7/2024 1200 by Curtis Caldwell RN  Activity Management: activity minimized  Taken 3/7/2024 1000 by Curtis Caldwell RN  Activity Management: activity minimized  Taken 3/7/2024 0800 by Curtis Caldwell RN  Activity Management: activity minimized  VTE Prevention/Management: (See MAR) other (see comments)  Range of Motion: active ROM (range of motion) encouraged  Intervention: Prevent Infection  Recent Flowsheet Documentation  Taken 3/7/2024 1800 by Curtis Caldwell RN  Infection Prevention:   environmental surveillance performed   hand hygiene promoted   rest/sleep promoted  Taken 3/7/2024 1600 by Curtis Caldwell RN  Infection Prevention:   environmental surveillance performed   hand hygiene promoted   rest/sleep promoted  Taken 3/7/2024 1400 by Curtis Caldwell RN  Infection Prevention:   environmental surveillance performed   hand hygiene promoted   rest/sleep promoted  Taken 3/7/2024 1200 by Curits Caldwell RN  Infection Prevention:   environmental surveillance performed   hand hygiene promoted   rest/sleep promoted  Taken 3/7/2024 1000 by Curtis Caldwell RN  Infection Prevention:   environmental surveillance performed   hand hygiene promoted   rest/sleep promoted  Taken 3/7/2024 0800 by Curtis Caldwell RN  Infection Prevention:   environmental surveillance performed   hand hygiene promoted   rest/sleep promoted  Goal: Optimal Comfort and Wellbeing  Outcome: Ongoing, Not Progressing  Goal: Readiness for Transition of Care  Outcome: Ongoing, Not Progressing     Problem: Skin  Injury Risk Increased  Goal: Skin Health and Integrity  Outcome: Ongoing, Not Progressing  Intervention: Optimize Skin Protection  Recent Flowsheet Documentation  Taken 3/7/2024 1800 by Curtis Caldwell RN  Pressure Reduction Techniques: frequent weight shift encouraged  Head of Bed (HOB) Positioning: [facility] at 30-45 degrees  Pressure Reduction Devices:   positioning supports utilized   pressure-redistributing mattress utilized  Skin Protection:   adhesive use limited   incontinence pads utilized   transparent dressing maintained   tubing/devices free from skin contact  Taken 3/7/2024 1600 by Curtis Caldwell RN  Pressure Reduction Techniques: frequent weight shift encouraged  Head of Bed (HOB) Positioning: HOB at 30-45 degrees  Pressure Reduction Devices:   positioning supports utilized   pressure-redistributing mattress utilized  Skin Protection:   adhesive use limited   incontinence pads utilized   transparent dressing maintained   tubing/devices free from skin contact  Taken 3/7/2024 1400 by Curtis Caldwell RN  Pressure Reduction Techniques: frequent weight shift encouraged  Head of Bed (HOB) Positioning: HOB at 30-45 degrees  Pressure Reduction Devices:   positioning supports utilized   pressure-redistributing mattress utilized  Skin Protection:   adhesive use limited   incontinence pads utilized   transparent dressing maintained   tubing/devices free from skin contact  Taken 3/7/2024 1200 by Curtis Caldwell RN  Pressure Reduction Techniques: frequent weight shift encouraged  Head of Bed (HOB) Positioning: HOB at 30-45 degrees  Pressure Reduction Devices:   positioning supports utilized   pressure-redistributing mattress utilized  Skin Protection:   adhesive use limited   incontinence pads utilized   transparent dressing maintained   tubing/devices free from skin contact  Taken 3/7/2024 1000 by Curtis Caldwell RN  Pressure Reduction Techniques: frequent weight shift encouraged  Head of Bed (HOB)  Positioning: HOB at 30-45 degrees  Pressure Reduction Devices:   positioning supports utilized   pressure-redistributing mattress utilized  Skin Protection:   adhesive use limited   incontinence pads utilized   transparent dressing maintained   tubing/devices free from skin contact  Taken 3/7/2024 0800 by Curtis Caldwell RN  Pressure Reduction Techniques: frequent weight shift encouraged  Head of Bed (HOB) Positioning: HOB at 30-45 degrees  Pressure Reduction Devices:   positioning supports utilized   pressure-redistributing mattress utilized  Skin Protection:   adhesive use limited   incontinence pads utilized   transparent dressing maintained   tubing/devices free from skin contact     Problem: Fall Injury Risk  Goal: Absence of Fall and Fall-Related Injury  Outcome: Ongoing, Not Progressing  Intervention: Identify and Manage Contributors  Recent Flowsheet Documentation  Taken 3/7/2024 1800 by Curtis Caldwell RN  Medication Review/Management: medications reviewed  Taken 3/7/2024 1600 by Curtis Caldwell RN  Medication Review/Management: medications reviewed  Taken 3/7/2024 1400 by Curtis Caldwell RN  Medication Review/Management: medications reviewed  Taken 3/7/2024 1200 by Curtis Caldwell RN  Medication Review/Management: medications reviewed  Taken 3/7/2024 1000 by Curtis Caldwell RN  Medication Review/Management: medications reviewed  Taken 3/7/2024 0800 by Curtis Caldwell RN  Medication Review/Management: medications reviewed  Intervention: Promote Injury-Free Environment  Recent Flowsheet Documentation  Taken 3/7/2024 1800 by Curtis Caldwell RN  Safety Promotion/Fall Prevention:   activity supervised   assistive device/personal items within reach   clutter free environment maintained   fall prevention program maintained   lighting adjusted   nonskid shoes/slippers when out of bed   room organization consistent   safety round/check completed  Taken 3/7/2024 1600 by Curtis Caldwell  RN  Safety Promotion/Fall Prevention:   activity supervised   assistive device/personal items within reach   clutter free environment maintained   fall prevention program maintained   lighting adjusted   nonskid shoes/slippers when out of bed   room organization consistent   safety round/check completed  Taken 3/7/2024 1400 by Curtis Caldwell RN  Safety Promotion/Fall Prevention:   activity supervised   assistive device/personal items within reach   clutter free environment maintained   fall prevention program maintained   lighting adjusted   nonskid shoes/slippers when out of bed   room organization consistent   safety round/check completed  Taken 3/7/2024 1200 by Curtsi Caldwell RN  Safety Promotion/Fall Prevention:   activity supervised   assistive device/personal items within reach   clutter free environment maintained   fall prevention program maintained   lighting adjusted   nonskid shoes/slippers when out of bed   room organization consistent   safety round/check completed  Taken 3/7/2024 1000 by Curtis Caldwell RN  Safety Promotion/Fall Prevention:   activity supervised   assistive device/personal items within reach   clutter free environment maintained   fall prevention program maintained   lighting adjusted   nonskid shoes/slippers when out of bed   room organization consistent   safety round/check completed  Taken 3/7/2024 0800 by Curtis Caldwell, RN  Safety Promotion/Fall Prevention:   activity supervised   assistive device/personal items within reach   clutter free environment maintained   fall prevention program maintained   gait belt   lighting adjusted   nonskid shoes/slippers when out of bed   room organization consistent   safety round/check completed     Problem: Adjustment to Illness (Sepsis/Septic Shock)  Goal: Optimal Coping  Outcome: Ongoing, Not Progressing     Problem: Bleeding (Sepsis/Septic Shock)  Goal: Absence of Bleeding  Outcome: Ongoing, Not Progressing     Problem:  Glycemic Control Impaired (Sepsis/Septic Shock)  Goal: Blood Glucose Level Within Desired Range  Outcome: Ongoing, Not Progressing  Intervention: Optimize Glycemic Control  Recent Flowsheet Documentation  Taken 3/7/2024 0800 by Curtis Caldwell RN  Glycemic Management: blood glucose monitored     Problem: Infection Progression (Sepsis/Septic Shock)  Goal: Absence of Infection Signs and Symptoms  Outcome: Ongoing, Not Progressing  Intervention: Initiate Sepsis Management  Recent Flowsheet Documentation  Taken 3/7/2024 1800 by Curtis Caldwell RN  Infection Prevention:   environmental surveillance performed   hand hygiene promoted   rest/sleep promoted  Taken 3/7/2024 1600 by Curtis Caldwell RN  Infection Prevention:   environmental surveillance performed   hand hygiene promoted   rest/sleep promoted  Taken 3/7/2024 1400 by Curtis Caldwell RN  Infection Prevention:   environmental surveillance performed   hand hygiene promoted   rest/sleep promoted  Taken 3/7/2024 1200 by Curtis Caldwell RN  Infection Prevention:   environmental surveillance performed   hand hygiene promoted   rest/sleep promoted  Taken 3/7/2024 1000 by Curtis Caldwell RN  Infection Prevention:   environmental surveillance performed   hand hygiene promoted   rest/sleep promoted  Taken 3/7/2024 0800 by Curtis Caldewll RN  Infection Prevention:   environmental surveillance performed   hand hygiene promoted   rest/sleep promoted  Intervention: Promote Recovery  Recent Flowsheet Documentation  Taken 3/7/2024 1800 by Curtis Caldwell RN  Activity Management: activity minimized  Taken 3/7/2024 1600 by Curtis Caldwell RN  Activity Management: activity minimized  Taken 3/7/2024 1400 by Curtis Caldwell, RN  Activity Management: activity minimized  Taken 3/7/2024 1200 by Curtis Caldwell RN  Activity Management: activity minimized  Taken 3/7/2024 1000 by Curtis Caldwell RN  Activity Management: activity minimized  Taken 3/7/2024  0800 by Curtis Caldwell, RN  Activity Management: activity minimized     Problem: Nutrition Impaired (Sepsis/Septic Shock)  Goal: Optimal Nutrition Intake  Outcome: Ongoing, Not Progressing   Goal Outcome Evaluation:

## 2024-03-08 NOTE — CASE MANAGEMENT/SOCIAL WORK
Continued Stay Note  [facility]     Patient Name: Lea Rivers  MRN: [id number]  Today's Date: 3/8/2024    Admit Date: 3/2/2024    Plan: SNF to LTC   Discharge Plan       Row Name 03/08/24 1404       Plan    Plan SNF to LTC    Patient/Family in Agreement with Plan yes    Plan Comments Met with Mr. Rivers and his son at the bedside to discuss discharge plan. Son requested  make referral to [facility] for SNF to LTC.  faxed referral to [facility] ([phone]). Per MDR, patient is not medically ready for discharge at this point.  will continue to follow plan of care and assist with discharge planning needs as indicated.    Final Discharge Disposition Code 30 - still a patient                   Discharge Codes    No documentation.                 Expected Discharge Date and Time       Expected Discharge Date Expected Discharge Time    Mar 11, 2024               Valerie Keith RN

## 2024-03-08 NOTE — PROGRESS NOTES
Pharmacy Parenteral Nutrition Evaluation    Lea Rivers is a  78 y.o. male receiving TPN.     Indication:     Prolonged Paralytic Ileus     Consulting Physician: Eleanor Yost DO     Total Fluid Rate (if stated): n/a    Labs  Results from last 7 days   Lab Units 03/08/24  1215 03/08/24 0421 03/07/24  1804 03/07/24 0411 03/06/24  1751 03/04/24  0449 03/02/24  0947   SODIUM mmol/L 135* 132*  --  131* 134*   < > 132*   POTASSIUM mmol/L 3.0* 2.6* 2.9* 3.1* 3.8   < > 4.1   CHLORIDE mmol/L 104 101  --  99 98   < > 96*   CO2 mmol/L 21.0* 19.0*  --  22.0 22.0   < > 25.0   BUN mg/dL 16 15  --  15 15   < > 14   CREATININE mg/dL 0.70* 0.68*  --  0.74* 0.92   < > 0.85   CALCIUM mg/dL 8.5* 8.2*  --  8.5* 9.0   < > 9.6   BILIRUBIN mg/dL 0.5  --   --   --  0.7  --  0.5   ALK PHOS U/L 61  --   --   --  61  --  58   ALT (SGPT) U/L 103*  --   --   --  46*  --  9   AST (SGOT) U/L 105*  --   --   --  81*  --  16   GLUCOSE mg/dL 98 90  --  124* 121*   < > 116*    < > = values in this interval not displayed.       Results from last 7 days   Lab Units 03/08/24  1215 03/08/24 0421 03/07/24 0411 03/02/24  0947   MAGNESIUM mg/dL 1.8 1.8 1.9 2.2   PHOSPHORUS mg/dL 2.9  --  3.0 2.8       Results from last 7 days   Lab Units 03/08/24  0421 03/07/24 0411 03/06/24  1751   WBC 10*3/mm3 16.04* 15.60* 14.68*   HEMOGLOBIN g/dL 10.2* 10.5* 11.5*   HEMATOCRIT % 32.6* 32.5* 35.8*   PLATELETS 10*3/mm3 270 204 221       Triglycerides   Date Value Ref Range Status   03/08/2024 161 (H) 0 - 150 mg/dL Final     Comment:     Falsely depressed results may occur on samples drawn from patients receiving N-Acetylcysteine (NAC) or Metamizole.       estimated creatinine clearance is 91 mL/min (A) (by C-G formula based on SCr of 0.7 mg/dL (L)).    Intake & Output (last 3 days)         03/05 0701 03/06 0700 03/06 0701 03/07 0700 03/07 0701 03/08 0700 03/08 0701 03/09 0700    P.O. 1080 240      I.V. (mL/kg)  1026.3 (11.5)      IV Piggyback  400       Total Intake(mL/kg) 1080 (12.1) 1666.3 (18.7)      Urine (mL/kg/hr) 150 (0.1) 400 (0.2)      Stool 0 0      Total Output 150 400      Net +930 +1266.3              Urine Unmeasured Occurrence 4 x 2 x 1 x     Stool Unmeasured Occurrence 5 x 4 x 3 x 1 x            Dietitian Recommendations  Diet Orders (active) (From admission, onward)       Start     Ordered    03/08/24 1800  Adult Cyclic Central 2-in-1 TPN  Cyclic TPN - See Admin Instructions        Note to Pharmacy: Lea ARAUJO [place]  5H  S572-1  MRN: [id number]  CSN: [id number]    03/08/24 1422    03/08/24 1800  Fat Emul Fish Oil/Plant Based (SMOFLIPID) 20 % emulsion 250 mL  Every 24 Hours Scheduled (TPN)         03/08/24 1422 03/08/24 1355  NPO Diet NPO Type: Strict NPO  Diet Effective Now         03/08/24 1358    03/05/24 1656  DIET MESSAGE Pt would like a grilled cheese sandwich, please. Thanks!  Once        Comments: Pt would like a grilled cheese sandwich, please. Thanks!    03/05/24 1656                    Current TPN Regimen Recommendation:   Dextrose 15% / Amino Acid 7.7% at  rate of 25 mL/hr x8hr, then 50 ml/hr x8hr, then gaol rate of 65 ml/hr.      20% Lipid Emulsion 250mL every 24 hours.    Na 45 mEq/L  K 40.5 mEq/L  Ca 5 mEq/L  Mg 8 mEq/L  PO4 15 mmol/L  Cl:Ace - max acetate    Assessment/Plan:  TPN dosing for Prolonged Paralytic Ileus  Electrolyte replacement per protocol  Maximize acetate  Q6H glucose monitoring - SSI ordered    Thanks  Wilmar Sosa [facility]  3/8/2024  14:45 EST

## 2024-03-08 NOTE — NURSING NOTE
Patient with severe distention.    Consult for pre-op stoma marking.    End ileostomy per Dr. Godfrey.  Plan for tomorrow.    Right lower right upper quadrant marked.  Right upper quadrant preferable however bowel may not be able to be mobilized here.    Patient will not be able to see right lower quadrant however will be going to a SNF with 24-hour care.    Unlikely that patient will be able to take care of this ostomy by himself per family.  However education will proceed as planned.

## 2024-03-08 NOTE — PLAN OF CARE
Problem: Adult Inpatient Plan of Care  Goal: Plan of Care Review  Outcome: Ongoing, Progressing  Flowsheets  Taken 3/8/2024 0309 by Blanca Mendoza RN  Plan of Care Reviewed With: patient  Taken 3/6/2024 1118 by Tiffanie Carmen PT  Progress: no change     Problem: Adult Inpatient Plan of Care  Goal: Optimal Comfort and Wellbeing  Outcome: Ongoing, Progressing  Intervention: Provide Person-Centered Care  Recent Flowsheet Documentation  Taken 3/7/2024 2000 by Blanca Mendoza RN  Trust Relationship/Rapport:   care explained   choices provided   questions answered   thoughts/feelings acknowledged     Problem: Fall Injury Risk  Goal: Absence of Fall and Fall-Related Injury  Outcome: Ongoing, Progressing  Intervention: Identify and Manage Contributors  Recent Flowsheet Documentation  Taken 3/8/2024 0200 by Blanca Mendoza RN  Medication Review/Management: medications reviewed  Taken 3/8/2024 0000 by Blanca Mendoza RN  Medication Review/Management: medications reviewed  Taken 3/7/2024 2000 by Blanca Mendoza RN  Medication Review/Management: medications reviewed  Intervention: Promote Injury-Free Environment  Recent Flowsheet Documentation  Taken 3/8/2024 0200 by Blanca Mendoza RN  Safety Promotion/Fall Prevention:   activity supervised   assistive device/personal items within reach   clutter free environment maintained   fall prevention program maintained   nonskid shoes/slippers when out of bed   safety round/check completed  Taken 3/8/2024 0000 by Blanca Mendoza RN  Safety Promotion/Fall Prevention:   activity supervised   assistive device/personal items within reach   clutter free environment maintained   fall prevention program maintained   nonskid shoes/slippers when out of bed   safety round/check completed  Taken 3/7/2024 2000 by Blanca Mendoza RN  Safety Promotion/Fall Prevention:   activity supervised   assistive device/personal items within reach   clutter free environment maintained   fall prevention program  maintained   nonskid shoes/slippers when out of bed   safety round/check completed   Goal Outcome Evaluation:  Plan of Care Reviewed With: patient

## 2024-03-08 NOTE — ANESTHESIA PREPROCEDURE EVALUATION
Anesthesia Evaluation     Patient summary reviewed and Nursing notes reviewed   no history of anesthetic complications:   NPO Solid Status: > 8 hours  NPO Liquid Status: > 2 hours           Airway   Mallampati: II  TM distance: >3 FB  Neck ROM: full  No difficulty expected  Dental      Pulmonary - normal exam   Cardiovascular - normal exam    ECG reviewed    (+) hypertension, CAD, hyperlipidemia    ROS comment: 7/2021-· Left ventricular wall thickness is consistent with mild concentric hypertrophy.  · Normal left-ventricular systolic function, estimated EF 65%.  · Left ventricular diastolic function is consistent with (grade I) impaired relaxation.  · Aortic sclerosis without aortic stenosis. Trace aortic insufficiency.  · Trace mitral regurgitation, trace tricuspid regurgitation.         7/21-stress-· Lexiscan stress test completed without complications.  · The patient reported left-sided chest discomfort during the stress test which resolved in early recovery.  · Abnormal baseline ECG with nonspecific changes. Stress ECG remained unchanged.  · Myocardial perfusion imaging indicates a normal myocardial perfusion study with no evidence of ischemia.  · Left ventricular ejection fraction is normal. EF 73%.  · Impressions are consistent with a low risk study.      Neuro/Psych  (+) dementia  GI/Hepatic/Renal/Endo    (+) GERD, diabetes mellitus, thyroid problem hypothyroidism    Musculoskeletal     Abdominal    Substance History      OB/GYN          Other      history of cancer    ROS/Med Hx Other: Hgb 10.2 k 3.0                Anesthesia Plan    ASA 3     general     intravenous induction     Anesthetic plan, risks, benefits, and alternatives have been provided, discussed and informed consent has been obtained with: patient.    Plan discussed with CRNA.    CODE STATUS:    Medical Intervention Limits: NO intubation (DNI)  Code Status (Patient has no pulse and is not breathing): No CPR (Do Not Attempt to  Resuscitate)  Medical Interventions (Patient has pulse or is breathing): Limited Support

## 2024-03-08 NOTE — PROGRESS NOTES
"Patient Name:  Lea Rivers  YOB: 1945  [id number]    Surgery Progress Note    Date of visit: 3/8/2024    Subjective     Patient currently sleeping and unable to obtain full history due to patient's dementia.  Continues to have multiple small bowel movements.  No fevers or chills overnight.       Objective       BP (!) 179/101 (BP Location: Right arm, Patient Position: Lying)   Pulse 87   Temp 98 °F (36.7 °C) (Oral)   Resp 18   Ht 170.2 cm (67\")   Wt 88.9 kg (196 lb)   SpO2 93%   BMI 30.70 kg/m²   No intake or output data in the 24 hours ending 03/08/24 0733    CV:  Rhythm regular and rate regular  L:  Clear to auscultation bilaterally  Abd:  Bowel sounds positive, soft, slight decrease in distention although significantly distended, nontender  Ext:  No cyanosis, clubbing, edema    Recent labs and imaging that are back at this time have been reviewed.   Potassium 2.6  Creatinine 0.68  WBC 16  Hemoglobin 10.2       Assessment & Plan     Patient with significant colonic distention and possible pneumatosis seen on CT scan. Continue n.p.o. given the patient's significant distention.  Consider PICC line and TPN.  Continue bowel regimen.  Will plan for rectal tube placement today for assistance in colonic decompression.        Harley Godfrey MD  3/8/2024  07:33 EST     "

## 2024-03-08 NOTE — PLAN OF CARE
Problem: Adult Inpatient Plan of Care  Goal: Plan of Care Review  Outcome: Ongoing, Not Progressing  Goal: Patient-Specific Goal (Individualized)  Outcome: Ongoing, Not Progressing  Goal: Absence of Hospital-Acquired Illness or Injury  Outcome: Ongoing, Not Progressing  Intervention: Identify and Manage Fall Risk  Recent Flowsheet Documentation  Taken 3/8/2024 1600 by Curtis Caldwell RN  Safety Promotion/Fall Prevention:   activity supervised   assistive device/personal items within reach   clutter free environment maintained   fall prevention program maintained   lighting adjusted   nonskid shoes/slippers when out of bed   room organization consistent   safety round/check completed  Taken 3/8/2024 1400 by Curtis Caldwell RN  Safety Promotion/Fall Prevention:   activity supervised   assistive device/personal items within reach   clutter free environment maintained   fall prevention program maintained   lighting adjusted   nonskid shoes/slippers when out of bed   room organization consistent   safety round/check completed  Taken 3/8/2024 1200 by Curtis Caldwell RN  Safety Promotion/Fall Prevention:   activity supervised   assistive device/personal items within reach   clutter free environment maintained   fall prevention program maintained   lighting adjusted   nonskid shoes/slippers when out of bed   room organization consistent   safety round/check completed  Taken 3/8/2024 1000 by Curtis Caldwell RN  Safety Promotion/Fall Prevention:   activity supervised   assistive device/personal items within reach   clutter free environment maintained   fall prevention program maintained   lighting adjusted   nonskid shoes/slippers when out of bed   room organization consistent   safety round/check completed  Taken 3/8/2024 0800 by Curtis Caldwell, RN  Safety Promotion/Fall Prevention:   activity supervised   assistive device/personal items within reach   clutter free environment maintained   fall prevention  program maintained   lighting adjusted   nonskid shoes/slippers when out of bed   room organization consistent   safety round/check completed  Intervention: Prevent Skin Injury  Recent Flowsheet Documentation  Taken 3/8/2024 1600 by Curtis Caldwell RN  Body Position: position changed independently  Skin Protection:   adhesive use limited   incontinence pads utilized   transparent dressing maintained   tubing/devices free from skin contact  Taken 3/8/2024 1400 by Curtis Caldwell RN  Body Position: position changed independently  Skin Protection:   adhesive use limited   incontinence pads utilized   transparent dressing maintained   tubing/devices free from skin contact  Taken 3/8/2024 1200 by Curtis Caldwell RN  Body Position: position changed independently  Skin Protection:   adhesive use limited   incontinence pads utilized   transparent dressing maintained   tubing/devices free from skin contact  Taken 3/8/2024 1000 by Curtis Caldwell RN  Body Position: position changed independently  Skin Protection:   adhesive use limited   incontinence pads utilized   transparent dressing maintained   tubing/devices free from skin contact  Taken 3/8/2024 0800 by Curtis Caldwell RN  Body Position: position changed independently  Skin Protection:   adhesive use limited   incontinence pads utilized   transparent dressing maintained   tubing/devices free from skin contact  Intervention: Prevent and Manage VTE (Venous Thromboembolism) Risk  Recent Flowsheet Documentation  Taken 3/8/2024 1600 by Curtis Caldwell RN  Activity Management: activity minimized  Taken 3/8/2024 1400 by Curtis Caldwell RN  Activity Management: activity encouraged  Taken 3/8/2024 1200 by Curtis Caldwell RN  Activity Management: activity minimized  Taken 3/8/2024 1000 by Curtis Caldwell, RN  Activity Management: activity minimized  Taken 3/8/2024 0800 by Curtis Caldwell RN  Activity Management: activity minimized  Range of Motion:  active ROM (range of motion) encouraged  Intervention: Prevent Infection  Recent Flowsheet Documentation  Taken 3/8/2024 1600 by Curtis Caldwell RN  Infection Prevention:   environmental surveillance performed   hand hygiene promoted   rest/sleep promoted  Taken 3/8/2024 1400 by Curtis Caldwell RN  Infection Prevention:   environmental surveillance performed   hand hygiene promoted   rest/sleep promoted  Taken 3/8/2024 1200 by Curtis Caldwell RN  Infection Prevention:   environmental surveillance performed   hand hygiene promoted   rest/sleep promoted  Taken 3/8/2024 1000 by Curtis Caldwell RN  Infection Prevention:   environmental surveillance performed   hand hygiene promoted   rest/sleep promoted  Taken 3/8/2024 0800 by Curtis Caldwell RN  Infection Prevention:   environmental surveillance performed   hand hygiene promoted   rest/sleep promoted   single patient room provided  Goal: Optimal Comfort and Wellbeing  Outcome: Ongoing, Not Progressing  Goal: Readiness for Transition of Care  Outcome: Ongoing, Not Progressing     Problem: Skin Injury Risk Increased  Goal: Skin Health and Integrity  Outcome: Ongoing, Not Progressing  Intervention: Optimize Skin Protection  Recent Flowsheet Documentation  Taken 3/8/2024 1600 by Curtis Caldwell RN  Pressure Reduction Techniques: frequent weight shift encouraged  Head of Bed (HOB) Positioning: HOB at 30-45 degrees  Pressure Reduction Devices: pressure-redistributing mattress utilized  Skin Protection:   adhesive use limited   incontinence pads utilized   transparent dressing maintained   tubing/devices free from skin contact  Taken 3/8/2024 1400 by Curtis Caldwell RN  Pressure Reduction Techniques: frequent weight shift encouraged  Head of Bed (HOB) Positioning: HOB at 30-45 degrees  Pressure Reduction Devices: pressure-redistributing mattress utilized  Skin Protection:   adhesive use limited   incontinence pads utilized   transparent dressing  maintained   tubing/devices free from skin contact  Taken 3/8/2024 1200 by Curtis Caldwell RN  Pressure Reduction Techniques: frequent weight shift encouraged  Head of Bed (HOB) Positioning: HOB at 30-45 degrees  Pressure Reduction Devices: pressure-redistributing mattress utilized  Skin Protection:   adhesive use limited   incontinence pads utilized   transparent dressing maintained   tubing/devices free from skin contact  Taken 3/8/2024 1000 by Curtis Caldwell RN  Pressure Reduction Techniques: frequent weight shift encouraged  Head of Bed (HOB) Positioning: HOB at 30-45 degrees  Pressure Reduction Devices: pressure-redistributing mattress utilized  Skin Protection:   adhesive use limited   incontinence pads utilized   transparent dressing maintained   tubing/devices free from skin contact  Taken 3/8/2024 0800 by Curtis Caldwell RN  Pressure Reduction Techniques: frequent weight shift encouraged  Head of Bed (HOB) Positioning: HOB at 30-45 degrees  Pressure Reduction Devices: pressure-redistributing mattress utilized  Skin Protection:   adhesive use limited   incontinence pads utilized   transparent dressing maintained   tubing/devices free from skin contact     Problem: Fall Injury Risk  Goal: Absence of Fall and Fall-Related Injury  Outcome: Ongoing, Not Progressing  Intervention: Identify and Manage Contributors  Recent Flowsheet Documentation  Taken 3/8/2024 1400 by Curtis Caldwell RN  Medication Review/Management: medications reviewed  Taken 3/8/2024 1200 by Curtis Caldwell RN  Medication Review/Management: medications reviewed  Taken 3/8/2024 1000 by Curtis Caldwell RN  Medication Review/Management: medications reviewed  Taken 3/8/2024 0800 by Curtis Caldwell RN  Medication Review/Management: medications reviewed  Intervention: Promote Injury-Free Environment  Recent Flowsheet Documentation  Taken 3/8/2024 1600 by Curtis Caldwell RN  Safety Promotion/Fall Prevention:   activity  supervised   assistive device/personal items within reach   clutter free environment maintained   fall prevention program maintained   lighting adjusted   nonskid shoes/slippers when out of bed   room organization consistent   safety round/check completed  Taken 3/8/2024 1400 by Curtis Caldwell RN  Safety Promotion/Fall Prevention:   activity supervised   assistive device/personal items within reach   clutter free environment maintained   fall prevention program maintained   lighting adjusted   nonskid shoes/slippers when out of bed   room organization consistent   safety round/check completed  Taken 3/8/2024 1200 by Curtis Caldwell RN  Safety Promotion/Fall Prevention:   activity supervised   assistive device/personal items within reach   clutter free environment maintained   fall prevention program maintained   lighting adjusted   nonskid shoes/slippers when out of bed   room organization consistent   safety round/check completed  Taken 3/8/2024 1000 by Curtis Caldwell RN  Safety Promotion/Fall Prevention:   activity supervised   assistive device/personal items within reach   clutter free environment maintained   fall prevention program maintained   lighting adjusted   nonskid shoes/slippers when out of bed   room organization consistent   safety round/check completed  Taken 3/8/2024 0800 by Curtis Caldwell RN  Safety Promotion/Fall Prevention:   activity supervised   assistive device/personal items within reach   clutter free environment maintained   fall prevention program maintained   lighting adjusted   nonskid shoes/slippers when out of bed   room organization consistent   safety round/check completed     Problem: Adjustment to Illness (Sepsis/Septic Shock)  Goal: Optimal Coping  Outcome: Ongoing, Not Progressing     Problem: Bleeding (Sepsis/Septic Shock)  Goal: Absence of Bleeding  Outcome: Ongoing, Not Progressing     Problem: Glycemic Control Impaired (Sepsis/Septic Shock)  Goal: Blood Glucose  Level Within Desired Range  Outcome: Ongoing, Not Progressing  Intervention: Optimize Glycemic Control  Recent Flowsheet Documentation  Taken 3/8/2024 0800 by Curtis Caldwell RN  Glycemic Management: blood glucose monitored     Problem: Infection Progression (Sepsis/Septic Shock)  Goal: Absence of Infection Signs and Symptoms  Outcome: Ongoing, Not Progressing  Intervention: Initiate Sepsis Management  Recent Flowsheet Documentation  Taken 3/8/2024 1600 by Curtis Caldwell RN  Infection Prevention:   environmental surveillance performed   hand hygiene promoted   rest/sleep promoted  Taken 3/8/2024 1400 by Curtis Caldwell RN  Infection Prevention:   environmental surveillance performed   hand hygiene promoted   rest/sleep promoted  Taken 3/8/2024 1200 by Curtis Caldwell RN  Infection Prevention:   environmental surveillance performed   hand hygiene promoted   rest/sleep promoted  Taken 3/8/2024 1000 by Curtis Caldwell RN  Infection Prevention:   environmental surveillance performed   hand hygiene promoted   rest/sleep promoted  Taken 3/8/2024 0800 by Curtis Caldwell RN  Infection Prevention:   environmental surveillance performed   hand hygiene promoted   rest/sleep promoted   single patient room provided  Intervention: Promote Recovery  Recent Flowsheet Documentation  Taken 3/8/2024 1600 by Curtis Caldwell RN  Activity Management: activity minimized  Taken 3/8/2024 1400 by Curtis Caldwell RN  Activity Management: activity encouraged  Taken 3/8/2024 1200 by Curtis Caldwell RN  Activity Management: activity minimized  Taken 3/8/2024 1000 by Curtis Caldwell RN  Activity Management: activity minimized  Taken 3/8/2024 0800 by Curtis Caldwell RN  Activity Management: activity minimized     Problem: Nutrition Impaired (Sepsis/Septic Shock)  Goal: Optimal Nutrition Intake  Outcome: Ongoing, Not Progressing   Goal Outcome Evaluation:   Plan of care reviewed with: Patient and family

## 2024-03-08 NOTE — PROGRESS NOTES
[facility] Medicine Services  PROGRESS NOTE    Patient Name: Lea Rivers  : 1945  MRN: [id number]    Date of Admission: 3/2/2024  Primary Care Physician: Mannie Melvin MD    Subjective   Subjective     CC:  S/p fall, rib fractures, Ileus     HPI:  Patient seen and examined. Son at bedside. Patient had BM yesterday. Having some resting tachypnea. Had some rhythm change on tele. EKG obtained. NSR. K+ and Mg2+ both low.     Objective   Objective     Vital Signs:   Temp:  [97.7 °F (36.5 °C)-98.2 °F (36.8 °C)] 97.8 °F (36.6 °C)  Heart Rate:  [81-87] 84  Resp:  [18-30] 18  BP: (168-196)/() 176/111     Physical Exam:  Constitutional: No acute distress, awake, alert, son at bedside  HENT: NCAT, mucous membranes moist  Respiratory: Decreased in bases, occ exp wheezes, resting tachypnea, shallow respirations   Cardiovascular: RRR, no murmurs, rubs, or gallops  Gastrointestinal: Decreased bowel sounds, firm, nontender, +distended  Musculoskeletal: No bilateral ankle edema,   Psychiatric: Appropriate affect, cooperative  Neurologic: oriented to self, no focal deficits  Skin: No rashes      Results Reviewed:  LAB RESULTS:      Lab 24  0421 24  1241 24  0804 24  0411 24  0048 24  2243 24  1838 24  1751 24  0428 24  0734 24  1348 24  0947   WBC 16.04*  --   --  15.60*  --   --   --  14.68* 8.14 6.79  --  9.72   HEMOGLOBIN 10.2*  --   --  10.5*  --   --   --  11.5* 9.7* 9.8*  --  12.7*   HEMATOCRIT 32.6*  --   --  32.5*  --   --   --  35.8* 30.5* 30.9*  --  38.6   PLATELETS 270  --   --  204  --   --   --  221 162 140  --  177   NEUTROS ABS  --   --   --   --   --   --   --  12.21*  --   --   --  7.77*   IMMATURE GRANS (ABS)  --   --   --   --   --   --   --  0.20*  --   --   --  0.06*   LYMPHS ABS  --   --   --   --   --   --   --  0.78  --   --   --  1.02   MONOS ABS  --   --   --   --   --   --   --   1.46*  --   --   --  0.85   EOS ABS  --   --   --   --   --   --   --  0.00  --   --   --  0.00   MCV 93.9  --   --  91.5  --   --   --  93.2 91.6 91.4  --  93.0   CRP  --   --   --   --   --   --   --   --   --   --   --  0.68*   PROCALCITONIN  --   --   --   --   --   --   --  0.12  --   --   --   --    LACTATE  --  1.4 1.4 1.6 1.6 2.2*   < >  --   --   --    < > 2.6*    < > = values in this interval not displayed.         Lab 03/08/24 0421 03/07/24  1804 03/07/24 0411 03/06/24  1751 03/05/24 0428 03/04/24  0449 03/02/24  0947   SODIUM 132*  --  131* 134* 132* 131* 132*   POTASSIUM 2.6* 2.9* 3.1* 3.8 3.8 4.2 4.1   CHLORIDE 101  --  99 98 99 98 96*   CO2 19.0*  --  22.0 22.0 24.0 24.0 25.0   ANION GAP 12.0  --  10.0 14.0 9.0 9.0 11.0   BUN 15  --  15 15 10 10 14   CREATININE 0.68*  --  0.74* 0.92 0.75* 0.77 0.85   EGFR 94.6  --  92.2 84.6 91.8 91.1 88.4   GLUCOSE 90  --  124* 121* 102* 82 116*   CALCIUM 8.2*  --  8.5* 9.0 8.7 8.2* 9.6   IONIZED CALCIUM  --   --  1.22  --   --   --   --    MAGNESIUM 1.8  --  1.9  --   --   --  2.2   PHOSPHORUS  --   --  3.0  --   --   --  2.8   HEMOGLOBIN A1C  --   --   --   --   --   --  5.50   TSH  --   --   --   --   --   --  2.280         Lab 03/06/24 1751 03/02/24  0947   TOTAL PROTEIN 6.8 7.5   ALBUMIN 3.3* 4.1   GLOBULIN 3.5 3.4   ALT (SGPT) 46* 9   AST (SGOT) 81* 16   BILIRUBIN 0.7 0.5   ALK PHOS 61 58         Lab 03/02/24  0947   PROBNP 601.2   HSTROP T 16             Lab 03/03/24  1629   FOLATE 2.32*   VITAMIN B 12 807         Lab 03/06/24  1636   PH, ARTERIAL 7.413   PCO2, ARTERIAL 35.6   PO2 ART 63.0*   FIO2 21   HCO3 ART 22.7   BASE EXCESS ART -1.6*   CARBOXYHEMOGLOBIN 1.6     Brief Urine Lab Results  (Last result in the past 365 days)        Color   Clarity   Blood   Leuk Est   Nitrite   Protein   CREAT   Urine HCG        03/06/24 1548 Dark Yellow   Slightly Cloudy   Large (3+)   Small (1+)   Positive   100 mg/dL (2+)                   Microbiology Results  Abnormal       Procedure Component Value - Date/Time    Blood Culture - Blood, Arm, Right [[id number]]  (Normal) Collected: 03/06/24 1857    Lab Status: Preliminary result Specimen: Blood from Arm, Right Updated: 03/07/24 1946     Blood Culture No growth at 24 hours    Blood Culture - Blood, Arm, Right [[id number]]  (Normal) Collected: 03/06/24 1751    Lab Status: Preliminary result Specimen: Blood from Arm, Right Updated: 03/07/24 1900     Blood Culture No growth at 24 hours    Blood Culture - Blood, Arm, Right [[id number]]  (Normal) Collected: 03/02/24 1132    Lab Status: Final result Specimen: Blood from Arm, Right Updated: 03/07/24 1201     Blood Culture No growth at 5 days    Narrative:      Less than seven (7) mL's of blood was collected.  Insufficient quantity may yield false negative results.    Blood Culture - Blood, Arm, Right [[id number]]  (Normal) Collected: 03/02/24 1132    Lab Status: Final result Specimen: Blood from Arm, Right Updated: 03/07/24 1201     Blood Culture No growth at 5 days    Narrative:      Less than seven (7) mL's of blood was collected.  Insufficient quantity may yield false negative results.    Urine Culture - Urine, Straight Cath [[id number]]  (Normal) Collected: 03/02/24 0923    Lab Status: Final result Specimen: Urine from Straight Cath Updated: 03/03/24 1601     Urine Culture No growth    COVID PRE-OP / PRE-PROCEDURE SCREENING ORDER (NO ISOLATION) - Swab, Nasopharynx [[id number]]  (Normal) Collected: 03/02/24 1133    Lab Status: Final result Specimen: Swab from Nasopharynx Updated: 03/02/24 1227    Narrative:      The following orders were created for panel order COVID PRE-OP / PRE-PROCEDURE SCREENING ORDER (NO ISOLATION) - Swab, Nasopharynx.  Procedure                               Abnormality         Status                     ---------                               -----------         ------                     COVID-19, FLU A/B, RSV P...[[id number]]  Normal              Final  result                 Please view results for these tests on the individual orders.    COVID-19, FLU A/B, RSV PCR 1 HR TAT - Swab, Nasopharynx [[id number]]  (Normal) Collected: 03/02/24 1133    Lab Status: Final result Specimen: Swab from Nasopharynx Updated: 03/02/24 1227     COVID19 Not Detected     Influenza A PCR Not Detected     Influenza B PCR Not Detected     RSV, PCR Not Detected    Narrative:      Fact sheet for providers: https://www.fda.gov/media/351311/download    Fact sheet for patients: https://www.fda.gov/media/310763/download    Test performed by PCR.            XR Chest 1 View    Result Date: 3/8/2024  XR CHEST 1 VW Date of Exam: 3/8/2024 9:32 AM EST Indication: tachypnea, soa Comparison: 3/6/2024 Findings: Cardiomediastinal silhouette is stable. There is atherosclerosis of the aorta. No airspace disease, pneumothorax, nor pleural effusion. No acute osseous abnormality identified.     Impression: Impression: No acute cardiopulmonary abnormality. Electronically Signed: Ellen Suarez MD  3/8/2024 9:58 AM EST  Workstation ID: [id number]    CT Abdomen Pelvis Without Contrast    Result Date: 3/6/2024  CT ABDOMEN PELVIS WO CONTRAST Date of Exam: 3/6/2024 7:53 PM EST Indication: distended abdomen, colon dilated on KUB, abdominal pain. Comparison: None available. Technique: Axial CT images were obtained of the abdomen and pelvis without the administration of contrast. Reconstructed coronal and sagittal images were also obtained. Automated exposure control and iterative construction methods were used. FINDINGS: Lung bases: Trace bilateral pleural fluid. Overlying mild atelectasis. Liver:No masses. No intrahepatic biliary ductal dilatation. Spleen:No masses. No perisplenic hematoma. Pancreas:No pancreatic masses. No evidence of pancreatitis. Gallbladder and common bile duct: The gallbladder is not identified and may be surgically absent. Adrenal glands:No adrenal masses Kidneys and ureters:No kidney stones. No  renal masses.No calculi present within the ureters. Normal caliber ureters. Urinary bladder:No urinary bladder wall thickening. No bladder masses. Small bowel:Normal caliber small bowel. Large bowel: Multiple dilated loops of large bowel with air-fluid levels. Locules of intramural gas noted consistent with concerning for pneumatosis intestinalis. Postsurgical changes on the sigmoid colon with surrounding surgical clips. Appendix: Not seen however there is no evidence of appendicitis. GENITOURINARY: Previous prostatectomy Ascites or pneumoperitoneum: None Adenopathy:None present Osseous structures: Degenerative changes of the hips and lumbar spine. Other findings: None     Impression: Dilated loops of large bowel with likely pneumatosis intestinalis. No bowel wall thickening. Inflammation surrounding the distal descending and sigmoid colon. Findings are concerning for possible developing necrotizing enterocolitis given the  presence of pneumatosis intestinalis and inflammation of the region of the sigmoid colon with air-fluid levels and dilated large bowel. Electronically Signed: Ventura Campbell MD  3/6/2024 8:23 PM EST  Workstation ID: [id number]    XR Chest 1 View    Result Date: 3/6/2024  XR CHEST 1 VW Date of Exam: 3/6/2024 5:03 PM EST Indication: cough Comparison: 3/2/2024 Findings: Lines: None Lungs: Hazy opacities in the left lower lung most likely represents atelectasis. Otherwise the lungs are clear. No consolidation Pleura: Possible trace left pleural effusion. No pneumothorax Cardiomediastinum: The cardiomediastinal silhouette is normal Soft Tissues: Unremarkable. Bones: No acute osseous abnormality.     Impression: Impression: No definite acute cardiopulmonary abnormality Electronically Signed: James Foreman DO  3/6/2024 5:26 PM EST  Workstation ID: [id number]    XR Abdomen KUB    Result Date: 3/6/2024  XR ABDOMEN KUB Date of Exam: 3/6/2024 3:13 PM EST Indication: distended abd Comparison: None available.  Findings: 3 supine views. There is moderately severe diffuse gaseous distention of the colon to the level of the rectum. There are multiple surgical clips in the midline of the mid to low pelvis. There are surgical clips in the left lower quadrant as well. There is gas within nondilated small bowel.     Impression: Impression: Moderately severe colon distention to the level of the rectum. Obstruction at the level of the rectum is not excluded. Electronically Signed: Heather Jaimes MD  3/6/2024 3:43 PM EST  Workstation ID: [id number]     Results for orders placed during the hospital encounter of 07/20/21    Adult Transthoracic Echo Complete W/ Cont if Necessary Per Protocol    Interpretation Summary  · Left ventricular wall thickness is consistent with mild concentric hypertrophy.  · Normal left-ventricular systolic function, estimated EF 65%.  · Left ventricular diastolic function is consistent with (grade I) impaired relaxation.  · Aortic sclerosis without aortic stenosis. Trace aortic insufficiency.  · Trace mitral regurgitation, trace tricuspid regurgitation.      Current medications:  Scheduled Meds:[Held by provider] atorvastatin, 10 mg, Oral, Daily  cloNIDine, 1 patch, Transdermal, Weekly  heparin (porcine), 5,000 Units, Subcutaneous, Q12H  insulin lispro, 2-7 Units, Subcutaneous, 4x Daily AC & at Bedtime  levETIRAcetam, 500 mg, Intravenous, Q12H  [Held by provider] levETIRAcetam, 500 mg, Oral, BID  [Held by provider] levothyroxine, 88 mcg, Oral, Q AM  Lidocaine, 2 patch, Transdermal, Q24H  [Held by provider] lisinopril, 40 mg, Oral, Q24H  magnesium sulfate, 2 g, Intravenous, Once  methylnaltrexone, 12 mg, Subcutaneous, Every Other Day  metoclopramide, 10 mg, Intravenous, Q6H  metoprolol tartrate, 5 mg, Intravenous, Q6H  pantoprazole, 40 mg, Intravenous, Q AM  piperacillin-tazobactam, 3.375 g, Intravenous, Q8H  potassium chloride, 10 mEq, Intravenous, Q1H  senna-docusate sodium, 2 tablet, Oral, BID  sodium  chloride, 10 mL, Intravenous, Q12H  [Held by provider] tamsulosin, 0.4 mg, Oral, Daily  valproate sodium, 500 mg, Intravenous, Q8H  [Held by provider] vitamin B-12, 1,000 mcg, Oral, Daily      Continuous Infusions:       PRN Meds:.  acetaminophen **OR** acetaminophen **OR** acetaminophen    senna-docusate sodium **AND** polyethylene glycol **AND** bisacodyl **AND** bisacodyl    calcium carbonate    Calcium Replacement - Follow Nurse / BPA Driven Protocol    dextrose    dextrose    fluticasone    glucagon (human recombinant)    hydrALAZINE    ipratropium-albuterol    Magnesium Standard Dose Replacement - Follow Nurse / BPA Driven Protocol    ondansetron ODT **OR** ondansetron    Phosphorus Replacement - Follow Nurse / BPA Driven Protocol    Potassium Replacement - Follow Nurse / BPA Driven Protocol    sodium chloride    sodium chloride    sodium chloride    Assessment & Plan   Assessment & Plan     Active Hospital Problems    Diagnosis  POA    **Falls [W19.XXXA]  Yes    Pneumatosis intestinalis [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        Brief Hospital Course to date:  Lea Rivers is a 78 y.o. male with past medical history of hypertension, hyperlipidemia, hypothyroidism, seizure disorder.  Patient is being admitted for a fall and nondisplaced first through fourth left rib fractures.  Patient also has left distal clavicle fracture.    This patient's problems and plans were partially entered by my partner and updated as appropriate by me 03/08/24.      Multiple falls with increasing frequency   -CT of head shows age-related changes which are chronic but no acute process  -neuro consulted. likely d/t neuropathy (confirmed with EMG) and progression of dementia     Multiple rib fractures and also left clavicle fracture  -With no displacement or mildly displaced.  Orthopedic surgery evaluated. No surgical intervention  planned.  Recs nonweightbearing LUE, may come out  of sling in 5-7 days to initiate gentle ROM exercises per ortho.  F/u with ortho/Dr. Maldonado in 2 weeks  -Pain control  -lidocaine patch    Pneumatosis intestinalis/ileus  -Dr Godfrey following. Discussed case. Rectal tube ordered. Will order TPN + PICC line. If no improvement, he may take to OR tomorrow.   -Strict NPO  -Lactic normalized  -GI evaluated, continue SQ Relistor, Reglan QID, Zosyn   -If WBC count continues to trend up tomorrow, will change Zosyn to Invanz      Prostate cancer  Hx BPH s/p greenlight photo vaporization of prostate 2018  -Follows with Dr. Noland  -S/P cyberknife radiation 2/9/23  -Has intermittent hematuria      Dementia and increasing memory problem  -Patient still lives alone and also drives  -practice partner d/w family that patient should not be driving.  They are interested in rehab/possible placement     Hypertension  -BP remains elevated  -Continue scheduled Lopressor IV  -Add Clonidine patch   -PRN Hydralazine     Seizure disorder  - depakote and keppra (changed to IV)     Hyperlipidemia  Hypothyroidism  -Home meds on hold, NPO    Resting tachypnea  -Improved now  -CXR clear  -On RA    Rhythm Change on tele  -EKG ok  -Replace Mg and K     Expected Discharge Location and Transportation: SNF  Expected Discharge   Expected Discharge Date: 3/11/2024; Expected Discharge Time:      DVT prophylaxis:  Medical DVT prophylaxis orders are present.         AM-PAC 6 Clicks Score (PT): 8 (03/07/24 2000)    CODE STATUS:   Code Status and Medical Interventions:   Ordered at: 03/02/24 1456     Medical Intervention Limits:    NO intubation (DNI)     Code Status (Patient has no pulse and is not breathing):    No CPR (Do Not Attempt to Resuscitate)     Medical Interventions (Patient has pulse or is breathing):    Limited Support       Eleanor Yost, DO  03/08/24

## 2024-03-08 NOTE — CONSULTS
Clinical Nutrition     Nutrition Support Assessment  Reason for Visit: Consult, PN    Patient Name: Lea Rivers  YOB: 1945  MRN: [id number]  Date of Encounter: 03/08/24 14:12 EST  Admission date: 3/2/2024    Comments:    TPN Recommendations via PICC:  15% dextrose, 7.7% AA @ goal rate of 65mL/hr. Provide 250mL 20% SMOF daily.   Initiate at 25mL/hr and advance by 25mL q 8hrs to goal of 65mL/hr.  Infused at goal over 24hrs this regimen provides:  1.56L TGV; GIR = 1.82mg/kg/min  1776kcal (99% est needs); 120g protein (100% est needs)    RD to complete full nutrition assessment as able.     Nutrition Assessment   Admission Diagnosis:  Falls [W19.XXXA]  Pneumatosis intestinalis [K63.89]      Problem List:    Falls    Pneumatosis intestinalis        PMH:   He  has a past medical history of Anemia, Colon cancer (1990), Coronary artery disease, Diabetes, Dyslipidemia, GERD (gastroesophageal reflux disease), Hyperlipidemia, Hypertension, Hyponatremia, Hypothyroidism, Left shoulder pain, Low sodium levels (2022), Memory deficit, Osteoarthritis, Prostate cancer (07/23/2022), Seizure disorder, and Skin cancer.    PSH:  He  has a past surgical history that includes Colectomy partial / total (1990); Cholecystectomy; Appendectomy; Other surgical history; Sinus surgery; Tonsillectomy; Vasectomy; Carpal tunnel release (Bilateral); TURP / transurethral incision / drainage prostate; Colonoscopy; Transurethral resection of prostate (N/A, 09/21/2018); Skin biopsy; Teeth Extraction; Total knee arthroplasty (Right, 04/07/2022); Cardiac catheterization (2012); Prostate biopsy (N/A, 11/11/2022); Transrectal Incision Prostate (Bilateral, 01/06/2023); and Cyberknife (02/09/2023).      Applicable Nutrition Concerns:   Skin:  Oral:  GI:      Applicable Interval History:         Reported/Observed/Food/Nutrition Related History:     Pt unavailable during RD visit, having PICC placed. Also noted  w/dementia-attempted to call family w/o success. Discussed w/nsg, pt to likely have bowel sx tomorrow w/colostomy creation. Rectal tube placed today 2/2 colonic decompression. Pt appears to have had 7lb/3.6% non significant wt loss in the past month. NKFA.     Labs    Labs Reviewed: Yes     Results from last 7 days   Lab Units 03/08/24  1215 03/08/24  0421 03/07/24  1804 03/07/24  1241 03/07/24  0804 03/07/24  0411 03/06/24  1838 03/06/24  1751 03/02/24  1348 03/02/24  0947   GLUCOSE mg/dL 98 90  --   --   --  124*  --  121*   < > 116*   BUN mg/dL 16 15  --   --   --  15  --  15   < > 14   CREATININE mg/dL 0.70* 0.68*  --   --   --  0.74*  --  0.92   < > 0.85   SODIUM mmol/L 135* 132*  --   --   --  131*  --  134*   < > 132*   CHLORIDE mmol/L 104 101  --   --   --  99  --  98   < > 96*   POTASSIUM mmol/L 3.0* 2.6* 2.9*  --   --  3.1*  --  3.8   < > 4.1   PHOSPHORUS mg/dL  --   --   --   --   --  3.0  --   --   --  2.8   MAGNESIUM mg/dL 1.8 1.8  --   --   --  1.9  --   --   --  2.2   ALT (SGPT) U/L  --   --   --   --   --   --   --  46*  --  9   LACTATE mmol/L  --   --   --  1.4 1.4 1.6   < >  --    < > 2.6*    < > = values in this interval not displayed.       Results from last 7 days   Lab Units 03/07/24  0411 03/06/24  1751 03/02/24  0947   ALBUMIN g/dL  --  3.3* 4.1   CRP mg/dL  --   --  0.68*   IONIZED CALCIUM mmol/L 1.22  --   --        Results from last 7 days   Lab Units 03/08/24  1047 03/08/24  0805 03/07/24  1905 03/07/24  1611 03/07/24  1209 03/07/24  0713   GLUCOSE mg/dL 94 109 107 93 101 118     Lab Results   Lab Value Date/Time    HGBA1C 5.50 03/02/2024 0947    HGBA1C 5.5 04/28/2023 1142    HGBA1C 5.00 01/04/2023 1227         Results from last 7 days   Lab Units 03/02/24  0947   PROBNP pg/mL 601.2         Medications    Medications Reviewed: Yes  Pertinent: insulin, reglan, protonix, abx, pericolace  Infusion:  PRN:     Intake/Ouptut 24 hrs (0701 - 0700)   I&O's Reviewed: Yes       Anthropometrics  "    Flowsheet Rows      Flowsheet Row First Filed Value   Admission Height 170.2 cm (67\") Documented at 03/02/2024 0947   Admission Weight 88.9 kg (196 lb) Documented at 03/02/2024 0947              Height: Height: 170.2 cm (67\")  Last Filed Weight: Weight: 88.9 kg (196 lb) (03/02/24 0947)  Method: Weight Method: Stated  BMI: BMI (Calculated): 30.7  BMI classification: Obese Class I: 30-34.9kg/m2  IBW:      UBW: 203lbs per EMR  Weight change:    Weight       Weight (kg) Weight (lbs) Weight Method Visit Report   3/23/2023 87.091 kg  192 lb   --    4/10/2023 87.091 kg  192 lb   --    4/28/2023 94.62 kg  208 lb 9.6 oz   --    5/6/2023 91.627 kg  202 lb      5/16/2023 90.719 kg  200 lb   --    6/20/2023 90.719 kg  200 lb   --    8/14/2023 93.441 kg  206 lb   --    9/14/2023 92.398 kg  203 lb 11.2 oz   --    12/4/2023 92.08 kg  203 lb   --    1/5/2024 78.926 kg  174 lb  Stated     2/15/2024 92.08 kg  203 lb   --    2/27/2024 89.086 kg  196 lb 6.4 oz      3/2/2024 88.905 kg  196 lb  Stated        Stated         Nutrition Focused Physical Exam     Date: 3/8    Unable to perform exam due to: Pt unable to participate at time of visit    Needs Assessment   Date: 3/8    Height used:Height: 170.2 cm (67\")  Weights used: 196lb/89kg    Estimated Calorie needs: ~ 1800 Kcal/day  Method:  MSJ (0053) x 1.2 =1876  Method:  17-20 Kcals/KG 1513-1780kcals    Estimated Protein needs: ~ 120 g PRO/day  Method: 1.2 g/Kg ABW =107g  Method: 2g/kg IBW = 132g    Estimated Fluid needs: ~ ml/day   Per clinical status    Current Nutrition Prescription     PO: NPO Diet NPO Type: Strict NPO  Oral Nutrition Supplement:   Intake: N/A      Nutrition Diagnosis   Date: 3/8 Updated:   Problem Altered GI function   Etiology Colonic distention, ileus, pending bowel sx   Signs/Symptoms Need for TPN   Status:     Goal:   General: Nutrition to support treatment  PO: N/A  EN/PN: Initiate PN    Nutrition Intervention      Follow treatment progress, Care plan " reviewed, Nutrition support order placed to pharmacy    Initiate TPN via PICC    Monitoring/Evaluation:   Per protocol, I&O, Pertinent labs, PN delivery/tolerance, Weight, GI status      Aria Morton MS,RD,LD  Time Spent: 30

## 2024-03-08 NOTE — DISCHARGE PLACEMENT REQUEST
"To: [facility]  ATTN: Rosaroi  From: Valerie Keith,   [phone]    Rosario, I thought I faxed this much earlier today. Sorry for the delay.   Valerie      Tita Lionelshyla Barrett (78 y.o. Male)       Date of Birth   1945    Social Security Number       Address   [address] [place] [address]    Home Phone       MRN   [id number]       [employer]   [facility]    Marital Status                               Admission Date   3/2/24    Admission Type   Emergency    Admitting Provider   Eleanor Yost DO    Attending Provider   Eleanor Yost DO    Department, Room/Bed   [address], S572/1       Discharge Date       Discharge Disposition       Discharge Destination                                 Attending Provider: Eleanor Yost DO    Allergies: Chlorhexidine, Sulfa Antibiotics    Isolation: None   Infection: None   Code Status: No CPR    Ht: 170.2 cm (67\")   Wt: 88.9 kg (196 lb)    Admission Cmt: None   Principal Problem: Falls [W19.XXXA]                   Active Insurance as of 3/2/2024       Primary Coverage       Payor Plan Insurance Group Employer/Plan Group    MEDICARE MEDICARE A & B        Payor Plan Address Payor Plan Phone Number Payor Plan Fax Number Effective Dates    [address] [phone]  7/1/2009 - None Entered    [facility] [id number]         Subscriber Name Subscriber Birth Date Member HAWK MACIAS 1945 [id number]               Secondary Coverage       Payor Plan Insurance Group Employer/Plan Group    [facility] F       Payor Plan Address Payor Plan Phone Number Payor Plan Fax Number Effective Dates    [facility] [phone]  1/1/2016 - None Entered    [address]       [facility] [address]         Subscriber Name Subscriber Birth Date Member HAWK MACIAS 1945 [id number]                     Emergency Contacts        (Rel.) Home Phone " Work Phone Mobile Phone    taylor loyola (Son) [phone] -- [phone]    Luis Enrique Guadalupe (Daughter) [phone] -- [phone]    Celia Dave (Daughter) -- -- --    Fred Loyola (Son) -- -- --                 History & Physical        Dawson Samayoa MD at 24 1503              [facility] Medicine Services  HISTORY AND PHYSICAL    Patient Name: Lea Rivers  : 1945  MRN: [id number]  Primary Care Physician: Mannie Melvin MD  Date of admission: 3/2/2024      Subjective  Subjective     Chief Complaint:  Fall and rib fracture    HPI:  Lea Rivers is a 78 y.o. male with past medical history significant for hypertension, hyperlipidemia, hypothyroidism, seizure disorder, history of colon cancer, history of prostate cancer s/p radiation therapy, diabetes mellitus, dementia.  Patient still lives alone and family checks up on him frequently.  I come to the patient and also family who is present at the bedside patient has had frequent falls and the frequency has increased lately.  Evidently patient had a fall today and then was complaining of chest and shoulder pain and was brought to the emergency room.  Evaluation here at the emergency room reveals multiple rib fracture and also clavicular fracture.  Patient tells me that prior to this fall he was doing okay except having frequent falls.  Because of these falls is not clear at this time but patient tells me that sometimes he get foggy in the head.  Family also reports that patient has difficulty with balance.  No fever or chills or any respiratory symptoms during the past several days.  No nausea vomiting or diarrhea or abdominal pain during the same period.      Personal History     Past Medical History:   Diagnosis Date    Anemia     Colon cancer     Status post partial colectomy in . No chemotherapy or radiation therapy.    Coronary artery disease     Nonobstructive coronary artery disease.    Diabetes      Dyslipidemia     GERD (gastroesophageal reflux disease)     Hx of.    Hyperlipidemia     Hypertension     Hyponatremia     Hypothyroidism     Left shoulder pain     Low sodium levels 2022    recent issue; saw nephrologist who ruled out kidney issues; took Sodium Chloride po to bring levels up    Memory deficit     FROM ANESTHESIA    Osteoarthritis     Prostate cancer 07/23/2022    Seizure disorder     silent seziure-diagnosed years ago    Skin cancer          Oncology Problem List:  Prostate cancer (12/01/2022; Status: Active)  Oncology/Hematology History   Prostate cancer   11/11/2022 Cancer Staged    Staging form: Prostate, AJCC 8th Edition  - Clinical stage from 11/11/2022: Stage IIC (cT2c, cN0, cM0, PSA: 11.3, Grade Group: 3) - Signed by Jerman Luu MD on 12/1/2022 12/1/2022 Initial Diagnosis    Prostate cancer (HCC)     1/30/2023 - 2/9/2023 Radiation    Radiation OncologyTreatment Course:  Lea Rivers received 3500 cGy in 5 fractions to prostate and seminal vesicles via Stereotactic Radiation Therapy - SRT.       Past Surgical History:   Procedure Laterality Date    APPENDECTOMY      CARDIAC CATHETERIZATION  2012    30 to 40% LAD    CARPAL TUNNEL RELEASE Bilateral     CHOLECYSTECTOMY      COLECTOMY PARTIAL / TOTAL  1990    Partial colectomy    COLONOSCOPY      CYBERKNIFE  02/09/2023    Prostate/SV    CYSTOSCOPY TRANSURETHRAL RESECTION OF PROSTATE N/A 09/21/2018    Procedure: CYSTOSCOPY TRANSURETHRAL RESECTION OF PROSTATE GREENLIGHT;  Surgeon: Naseem Clayton MD;  Location: [facility];  Service: Urology    OTHER SURGICAL HISTORY      Contraction surgery on bilateral hands and wrists.    PROSTATE BIOPSY N/A 11/11/2022    Procedure: TRANSRECTAL ULTRASOUND GUIDED BIOPSY OF PROSTATE;  Surgeon: Naseem Noland MD;  Location: [facility] OR;  Service: Urology;  Laterality: N/A;    SINUS SURGERY      SKIN BIOPSY      TEETH EXTRACTION      TONSILLECTOMY      TOTAL KNEE ARTHROPLASTY Right  04/07/2022    Procedure: TOTAL KNEE ARTHROPLASTY WITH ORTHO ROBOT RIGHT;  Surgeon: Louis Malcolm MD;  Location:  [facility];  Service: Robotics - Ortho;  Laterality: Right;    TRANSRECTAL INCISION PROSTATE WITH GOLD SEED Bilateral 01/06/2023    Procedure: TRANSRECTAL ULTRASOUND GUIDED PROSTATE FIDUCIAL MARKER PLACEMENT;  Surgeon: Naseem Noland MD;  Location:  [facility];  Service: Urology;  Laterality: Bilateral;    TURP / TRANSURETHRAL INCISION / DRAINAGE PROSTATE      VASECTOMY         Family History: family history includes Arthritis in an other family member; Cancer in his mother and another family member; Esophageal cancer in his brother; Hypertension in his father; No Known Problems in his paternal grandfather, paternal grandmother, and sister; Stroke in his father, maternal grandfather, sister, and another family member.     Social History:  reports that he has never smoked. He has been exposed to tobacco smoke. He has never used smokeless tobacco. He reports that he does not drink alcohol and does not use drugs.  Social History     Social History Narrative    Caffeine: None       Medications:  Available home medication information reviewed.  D-Mannose, Diclofenac Sodium, Hydrocortisone (Perianal), OneTouch Delica Lancets 33G, divalproex, fluticasone, glucose blood, isosorbide mononitrate, levETIRAcetam, levothyroxine, lisinopril, metFORMIN, nitrofurantoin (macrocrystal-monohydrate), omeprazole, simvastatin, sodium chloride, tamsulosin, verapamil SR, and vitamin B-12    Allergies   Allergen Reactions    Chlorhexidine Rash    Sulfa Antibiotics Rash     Childhood reaction        Objective  Objective     Vital Signs:   Temp:  [97.7 °F (36.5 °C)] 97.7 °F (36.5 °C)  Heart Rate:  [60-77] 60  Resp:  [16] 16  BP: (136-166)/() 137/76       Physical Exam                             Constitutional: No acute distress, looks comfortable  HENT: NCAT, mucous membranes moist  Respiratory: Clear to auscultation  bilaterally, respiratory effort normal   Cardiovascular: RRR, no murmurs, rubs, or gallops  Gastrointestinal: Positive bowel sounds, soft, nontender, nondistended  Musculoskeletal: trace bilateral ankle edema, left arm is in sling, painful range of motion of left shoulder.  Psychiatric: Appropriate affect, cooperative  Neurologic: Awake, alert, oriented x 3, no focality appreciated, difficulty with memory, speech clear  Skin: No rashes          Result Review:  I have personally reviewed the results from the time of this admission to 3/2/2024 15:04 EST and agree with these findings:  [x]  Laboratory list / accordion  []  Microbiology  [x]  Radiology  []  EKG/Telemetry   []  Cardiology/Vascular   []  Pathology  [x]  Old records  []  Other:  Most notable findings include: Chest x-ray shows mildly displaced fracture of the distal left clavicle.  This study also shows minimally displaced left lateral third rib fracture.  CT scan of the chest however shows minimally displaced fractures of the left first through fourth ribs.  There is no visible pneumothorax.      LAB RESULTS:      Lab 03/02/24  1348 03/02/24  0947   WBC  --  9.72   HEMOGLOBIN  --  12.7*   HEMATOCRIT  --  38.6   PLATELETS  --  177   NEUTROS ABS  --  7.77*   IMMATURE GRANS (ABS)  --  0.06*   LYMPHS ABS  --  1.02   MONOS ABS  --  0.85   EOS ABS  --  0.00   MCV  --  93.0   CRP  --  0.68*   LACTATE 2.3* 2.6*         Lab 03/02/24  0947   SODIUM 132*   POTASSIUM 4.1   CHLORIDE 96*   CO2 25.0   ANION GAP 11.0   BUN 14   CREATININE 0.85   EGFR 88.4   GLUCOSE 116*   CALCIUM 9.6   MAGNESIUM 2.2   PHOSPHORUS 2.8   TSH 2.280         Lab 03/02/24  0947   TOTAL PROTEIN 7.5   ALBUMIN 4.1   GLOBULIN 3.4   ALT (SGPT) 9   AST (SGOT) 16   BILIRUBIN 0.5   ALK PHOS 58         Lab 03/02/24  0947   PROBNP 601.2   HSTROP T 16                 UA          2/15/2024    12:04 2/27/2024    09:53 3/2/2024    09:23   Urinalysis   Squamous Epithelial Cells, UA   0-2    Specific Gravity,  UA   1.022    Ketones, UA Trace  1+  15 mg/dL (1+)    Blood, UA   Large (3+)    Leukocytes, UA Moderate (2+)  Trace  Trace    Nitrite, UA   Negative    RBC, UA   11-20    WBC, UA   3-5    Bacteria, UA   None Seen        Microbiology Results (last 10 days)       Procedure Component Value - Date/Time    COVID PRE-OP / PRE-PROCEDURE SCREENING ORDER (NO ISOLATION) - Swab, Nasopharynx [[id number]]  (Normal) Collected: 03/02/24 1133    Lab Status: Final result Specimen: Swab from Nasopharynx Updated: 03/02/24 1227    Narrative:      The following orders were created for panel order COVID PRE-OP / PRE-PROCEDURE SCREENING ORDER (NO ISOLATION) - Swab, Nasopharynx.  Procedure                               Abnormality         Status                     ---------                               -----------         ------                     COVID-19, FLU A/B, RSV P...[[id number]]  Normal              Final result                 Please view results for these tests on the individual orders.    COVID-19, FLU A/B, RSV PCR 1 HR TAT - Swab, Nasopharynx [[id number]]  (Normal) Collected: 03/02/24 1133    Lab Status: Final result Specimen: Swab from Nasopharynx Updated: 03/02/24 1227     COVID19 Not Detected     Influenza A PCR Not Detected     Influenza B PCR Not Detected     RSV, PCR Not Detected    Narrative:      Fact sheet for providers: https://www.fda.gov/media/599047/download    Fact sheet for patients: https://www.fda.gov/media/363601/download    Test performed by PCR.            XR Knee 1 or 2 View Bilateral    Result Date: 3/2/2024  XR KNEE 1 OR 2 VW BILATERAL Date of Exam: 3/2/2024 11:58 AM EST Indication: Pain after fall, dementia Comparison: Right knee radiographs dated 4/10/2023 Findings: Right knee: There is no acute fracture or dislocation. The right total knee prosthesis appears intact without evidence of periprosthetic fracture or loosening. There is a trace suprapatellar joint effusion. Bone mineralization is normal.  Left knee: There is no acute fracture or dislocation. There is no joint effusion. There is tricompartmental degenerative joint disease. There is enthesopathy at the tibial tuberosity which appears similar to the prior examination.     Impression: Impression: 1. Right total knee prosthesis without evidence of hardware complication. 2. Tricompartmental degenerative joint disease of the left knee. 3. No evidence of acute fracture Electronically Signed: Johny Poon  3/2/2024 12:39 PM EST  Workstation ID: [id number]    CT Head Without Contrast    Result Date: 3/2/2024  CT HEAD WO CONTRAST, CT CHEST WO CONTRAST DIAGNOSTIC, CT CERVICAL SPINE WO CONTRAST Date of Exam: 3/2/2024 10:43 AM EST Indication: Fall with worsening confusion. Comparison: 1/5/2024. Technique: Axial CT images were obtained of the head, cervical spine and chest without contrast administration.  Automated exposure control and iterative construction methods were used. FINDINGS: CT HEAD: Gray-white differentiation is maintained and there is no evidence of intracranial hemorrhage, mass or mass effect. Age-related changes of the brain are present including volume loss and typical periventricular sequela of chronic small vessel ischemia. There is otherwise no evidence of intracranial hemorrhage, mass or mass effect. The ventricles are normal in size and configuration accounting for surrounding volume loss. The orbits are normal and the paranasal sinuses are grossly clear. CT cervical spine: Cortical margins are intact, without evidence of acute fracture. Alignment is anatomic without listhesis or subluxation. Multilevel spondylosis changes are present, with areas of disc osteophyte complex formation and facet arthropathy,  appearing most advanced at C5-6. The prevertebral soft tissues are normal. The dens is intact. CT CHEST: There is no pathologic axillary adenopathy or other worrisome body wall soft tissue finding in the chest. No acute findings are  present in the partially characterized upper abdomen. There is no pleural or pericardial effusion. Mildly atherosclerotic, nonaneurysmal thoracic aorta. Evaluation of the lung fields demonstrates no evidence of acute infectious process or distinct suspicious focal pulmonary nodularity. Evaluation of the osseous structures demonstrates mildly displaced left clavicle fracture as noted on chest radiograph. There there is a nondisplaced acute fracture of the left first rib near the costovertebral junction as well as mildly displaced fractures of the left lateral second through fourth ribs laterally. No thoracic spinal fracture is evident, with multilevel spondylosis changes present.     Impression: Age-related changes of the brain as above, otherwise without evidence of acute intracranial abnormality. No acute fracture or traumatic malalignment of the cervical spine. Acute mildly displaced fracture involving the distal left clavicle, without acromioclavicular joint involvement, in addition to nondisplaced or minimally displaced fractures of the left first through fourth ribs. There is no associated pneumothorax or other acute traumatic finding in the chest. Electronically Signed: Mega Rea MD  3/2/2024 11:12 AM EST  Workstation ID: [id number]    CT Cervical Spine Without Contrast    Result Date: 3/2/2024  CT HEAD WO CONTRAST, CT CHEST WO CONTRAST DIAGNOSTIC, CT CERVICAL SPINE WO CONTRAST Date of Exam: 3/2/2024 10:43 AM EST Indication: Fall with worsening confusion. Comparison: 1/5/2024. Technique: Axial CT images were obtained of the head, cervical spine and chest without contrast administration.  Automated exposure control and iterative construction methods were used. FINDINGS: CT HEAD: Gray-white differentiation is maintained and there is no evidence of intracranial hemorrhage, mass or mass effect. Age-related changes of the brain are present including volume loss and typical periventricular sequela of chronic  small vessel ischemia. There is otherwise no evidence of intracranial hemorrhage, mass or mass effect. The ventricles are normal in size and configuration accounting for surrounding volume loss. The orbits are normal and the paranasal sinuses are grossly clear. CT cervical spine: Cortical margins are intact, without evidence of acute fracture. Alignment is anatomic without listhesis or subluxation. Multilevel spondylosis changes are present, with areas of disc osteophyte complex formation and facet arthropathy,  appearing most advanced at C5-6. The prevertebral soft tissues are normal. The dens is intact. CT CHEST: There is no pathologic axillary adenopathy or other worrisome body wall soft tissue finding in the chest. No acute findings are present in the partially characterized upper abdomen. There is no pleural or pericardial effusion. Mildly atherosclerotic, nonaneurysmal thoracic aorta. Evaluation of the lung fields demonstrates no evidence of acute infectious process or distinct suspicious focal pulmonary nodularity. Evaluation of the osseous structures demonstrates mildly displaced left clavicle fracture as noted on chest radiograph. There there is a nondisplaced acute fracture of the left first rib near the costovertebral junction as well as mildly displaced fractures of the left lateral second through fourth ribs laterally. No thoracic spinal fracture is evident, with multilevel spondylosis changes present.     Impression: Age-related changes of the brain as above, otherwise without evidence of acute intracranial abnormality. No acute fracture or traumatic malalignment of the cervical spine. Acute mildly displaced fracture involving the distal left clavicle, without acromioclavicular joint involvement, in addition to nondisplaced or minimally displaced fractures of the left first through fourth ribs. There is no associated pneumothorax or other acute traumatic finding in the chest. Electronically Signed:  Mega Rea MD  3/2/2024 11:12 AM EST  Workstation ID: [id number]    CT Chest Without Contrast Diagnostic    Result Date: 3/2/2024  CT HEAD WO CONTRAST, CT CHEST WO CONTRAST DIAGNOSTIC, CT CERVICAL SPINE WO CONTRAST Date of Exam: 3/2/2024 10:43 AM EST Indication: Fall with worsening confusion. Comparison: 1/5/2024. Technique: Axial CT images were obtained of the head, cervical spine and chest without contrast administration.  Automated exposure control and iterative construction methods were used. FINDINGS: CT HEAD: Gray-white differentiation is maintained and there is no evidence of intracranial hemorrhage, mass or mass effect. Age-related changes of the brain are present including volume loss and typical periventricular sequela of chronic small vessel ischemia. There is otherwise no evidence of intracranial hemorrhage, mass or mass effect. The ventricles are normal in size and configuration accounting for surrounding volume loss. The orbits are normal and the paranasal sinuses are grossly clear. CT cervical spine: Cortical margins are intact, without evidence of acute fracture. Alignment is anatomic without listhesis or subluxation. Multilevel spondylosis changes are present, with areas of disc osteophyte complex formation and facet arthropathy,  appearing most advanced at C5-6. The prevertebral soft tissues are normal. The dens is intact. CT CHEST: There is no pathologic axillary adenopathy or other worrisome body wall soft tissue finding in the chest. No acute findings are present in the partially characterized upper abdomen. There is no pleural or pericardial effusion. Mildly atherosclerotic, nonaneurysmal thoracic aorta. Evaluation of the lung fields demonstrates no evidence of acute infectious process or distinct suspicious focal pulmonary nodularity. Evaluation of the osseous structures demonstrates mildly displaced left clavicle fracture as noted on chest radiograph. There there is a nondisplaced acute  fracture of the left first rib near the costovertebral junction as well as mildly displaced fractures of the left lateral second through fourth ribs laterally. No thoracic spinal fracture is evident, with multilevel spondylosis changes present.     Impression: Age-related changes of the brain as above, otherwise without evidence of acute intracranial abnormality. No acute fracture or traumatic malalignment of the cervical spine. Acute mildly displaced fracture involving the distal left clavicle, without acromioclavicular joint involvement, in addition to nondisplaced or minimally displaced fractures of the left first through fourth ribs. There is no associated pneumothorax or other acute traumatic finding in the chest. Electronically Signed: Mega Rea MD  3/2/2024 11:12 AM EST  Workstation ID: [id number]    XR Chest 1 View    Result Date: 3/2/2024  XR CHEST 1 VW Date of Exam: 3/2/2024 9:34 AM EST Indication: Weak/Dizzy/AMS triage protocol Comparison: 4/16/2022 Findings: Unremarkable cardiomediastinal silhouette. No focal airspace opacity. No pleural effusion or pneumothorax. Mildly displaced fracture of the distal left clavicle. Normal sternoclavicular and acromioclavicular joint alignment. Mild overlying soft tissue edema of the left shoulder. There is also a minimally displaced left lateral third rib fracture.     Impression: Impression: Mildly displaced fracture of the distal left clavicle. Minimally displaced left lateral third rib fracture. No visible pneumothorax. No acute cardiopulmonary abnormality.. Electronically Signed: Tirso Rankin MD  3/2/2024 9:57 AM EST  Workstation ID: [id number]     Results for orders placed during the hospital encounter of 07/20/21    Adult Transthoracic Echo Complete W/ Cont if Necessary Per Protocol    Interpretation Summary  · Left ventricular wall thickness is consistent with mild concentric hypertrophy.  · Normal left-ventricular systolic function, estimated EF 65%.  · Left  ventricular diastolic function is consistent with (grade I) impaired relaxation.  · Aortic sclerosis without aortic stenosis. Trace aortic insufficiency.  · Trace mitral regurgitation, trace tricuspid regurgitation.      Assessment & Plan  Assessment & Plan       Falls    Patient is a 78-year-old male with past medical history of hypertension, hyperlipidemia, hypothyroidism, seizure disorder.  Patient is being admitted for a fall and nondisplaced first through fourth left rib fractures.  Patient also has left distal clavicle fracture.    Multiple falls with frequency of falls increasing  -Etiology not quite clear but family tells me that patient has difficulty with balance  -CT of head shows age-related changes which are chronic but no acute process  -Will ask neurology to see the patient for further evaluation    Multiple rib fractures and also left clavicle fracture  -With no displacement or mildly displaced.  Orthopedic surgery has already seen the patient.  No surgical intervention is planned.  -Left upper limb is already in sling  -Pain control    Prostate cancer  -Follows with Dr. Mayo  -S/p radiation therapy.  -Continue family patient has had hematuria and has had cystoscopy by Dr. Mayo and they were told that probably because of the prostate cancer and the therapy patient will have hematuria for a while.  We will monitor if necessary will ask urology to see patient    Dementia and increasing memory problem  -Patient still lives alone and also drives  -Mentioned above patient has had multiple falls and the frequency of falls are increasing  -Will ask neurology to see the patient for both falls and also worsening memory problems  -I have already talked to the family and have called her attention to the fact that most likely the living arrangement should be changed    Stable problems include:  Hypertension  Hyperlipidemia  Seizure disorder  Hypothyroidism  -Will continue home medication for the  above        DVT prophylaxis: Heparin  Medical DVT prophylaxis orders are present.          CODE STATUS:    Code Status and Medical Interventions:   Ordered at: 24 1456     Medical Intervention Limits:    NO intubation (DNI)     Code Status (Patient has no pulse and is not breathing):    No CPR (Do Not Attempt to Resuscitate)     Medical Interventions (Patient has pulse or is breathing):    Limited Support       Expected Discharge   Expected discharge date/ time has not been documented.  To be determined    Dawson Samayoa MD  24     Electronically signed by Dawson Samayoa MD at 24 1521          Physician Progress Notes (most recent note)        Eleanor Yost DO at 24 1253              [facility] Medicine Services  PROGRESS NOTE    Patient Name: Lea Rivers  : 1945  MRN: [id number]    Date of Admission: 3/2/2024  Primary Care Physician: Mannie Melvin MD    Subjective   Subjective     CC:  S/p fall, rib fractures, Ileus     HPI:  Patient seen and examined. Son at bedside. Patient had BM yesterday. Having some resting tachypnea. Had some rhythm change on tele. EKG obtained. NSR. K+ and Mg2+ both low.     Objective   Objective     Vital Signs:   Temp:  [97.7 °F (36.5 °C)-98.2 °F (36.8 °C)] 97.8 °F (36.6 °C)  Heart Rate:  [81-87] 84  Resp:  [18-30] 18  BP: (168-196)/() 176/111     Physical Exam:  Constitutional: No acute distress, awake, alert, son at bedside  HENT: NCAT, mucous membranes moist  Respiratory: Decreased in bases, occ exp wheezes, resting tachypnea, shallow respirations   Cardiovascular: RRR, no murmurs, rubs, or gallops  Gastrointestinal: Decreased bowel sounds, firm, nontender, +distended  Musculoskeletal: No bilateral ankle edema,   Psychiatric: Appropriate affect, cooperative  Neurologic: oriented to self, no focal deficits  Skin: No rashes      Results Reviewed:  LAB RESULTS:      Lab 24  2524  03/07/24  1241 03/07/24  0804 03/07/24  0411 03/07/24  0048 03/06/24  2243 03/06/24  1838 03/06/24  1751 03/05/24  0428 03/04/24  0734 03/02/24  1348 03/02/24  0947   WBC 16.04*  --   --  15.60*  --   --   --  14.68* 8.14 6.79  --  9.72   HEMOGLOBIN 10.2*  --   --  10.5*  --   --   --  11.5* 9.7* 9.8*  --  12.7*   HEMATOCRIT 32.6*  --   --  32.5*  --   --   --  35.8* 30.5* 30.9*  --  38.6   PLATELETS 270  --   --  204  --   --   --  221 162 140  --  177   NEUTROS ABS  --   --   --   --   --   --   --  12.21*  --   --   --  7.77*   IMMATURE GRANS (ABS)  --   --   --   --   --   --   --  0.20*  --   --   --  0.06*   LYMPHS ABS  --   --   --   --   --   --   --  0.78  --   --   --  1.02   MONOS ABS  --   --   --   --   --   --   --  1.46*  --   --   --  0.85   EOS ABS  --   --   --   --   --   --   --  0.00  --   --   --  0.00   MCV 93.9  --   --  91.5  --   --   --  93.2 91.6 91.4  --  93.0   CRP  --   --   --   --   --   --   --   --   --   --   --  0.68*   PROCALCITONIN  --   --   --   --   --   --   --  0.12  --   --   --   --    LACTATE  --  1.4 1.4 1.6 1.6 2.2*   < >  --   --   --    < > 2.6*    < > = values in this interval not displayed.         Lab 03/08/24  0421 03/07/24  1804 03/07/24 0411 03/06/24  1751 03/05/24  0428 03/04/24  0449 03/02/24  0947   SODIUM 132*  --  131* 134* 132* 131* 132*   POTASSIUM 2.6* 2.9* 3.1* 3.8 3.8 4.2 4.1   CHLORIDE 101  --  99 98 99 98 96*   CO2 19.0*  --  22.0 22.0 24.0 24.0 25.0   ANION GAP 12.0  --  10.0 14.0 9.0 9.0 11.0   BUN 15  --  15 15 10 10 14   CREATININE 0.68*  --  0.74* 0.92 0.75* 0.77 0.85   EGFR 94.6  --  92.2 84.6 91.8 91.1 88.4   GLUCOSE 90  --  124* 121* 102* 82 116*   CALCIUM 8.2*  --  8.5* 9.0 8.7 8.2* 9.6   IONIZED CALCIUM  --   --  1.22  --   --   --   --    MAGNESIUM 1.8  --  1.9  --   --   --  2.2   PHOSPHORUS  --   --  3.0  --   --   --  2.8   HEMOGLOBIN A1C  --   --   --   --   --   --  5.50   TSH  --   --   --   --   --   --  2.280         Lab  03/06/24  1751 03/02/24  0947   TOTAL PROTEIN 6.8 7.5   ALBUMIN 3.3* 4.1   GLOBULIN 3.5 3.4   ALT (SGPT) 46* 9   AST (SGOT) 81* 16   BILIRUBIN 0.7 0.5   ALK PHOS 61 58         Lab 03/02/24  0947   PROBNP 601.2   HSTROP T 16             Lab 03/03/24  1629   FOLATE 2.32*   VITAMIN B 12 807         Lab 03/06/24  1636   PH, ARTERIAL 7.413   PCO2, ARTERIAL 35.6   PO2 ART 63.0*   FIO2 21   HCO3 ART 22.7   BASE EXCESS ART -1.6*   CARBOXYHEMOGLOBIN 1.6     Brief Urine Lab Results  (Last result in the past 365 days)        Color   Clarity   Blood   Leuk Est   Nitrite   Protein   CREAT   Urine HCG        03/06/24 1548 Dark Yellow   Slightly Cloudy   Large (3+)   Small (1+)   Positive   100 mg/dL (2+)                   Microbiology Results Abnormal       Procedure Component Value - Date/Time    Blood Culture - Blood, Arm, Right [[id number]]  (Normal) Collected: 03/06/24 1857    Lab Status: Preliminary result Specimen: Blood from Arm, Right Updated: 03/07/24 1946     Blood Culture No growth at 24 hours    Blood Culture - Blood, Arm, Right [[id number]]  (Normal) Collected: 03/06/24 1751    Lab Status: Preliminary result Specimen: Blood from Arm, Right Updated: 03/07/24 1900     Blood Culture No growth at 24 hours    Blood Culture - Blood, Arm, Right [[id number]]  (Normal) Collected: 03/02/24 1132    Lab Status: Final result Specimen: Blood from Arm, Right Updated: 03/07/24 1201     Blood Culture No growth at 5 days    Narrative:      Less than seven (7) mL's of blood was collected.  Insufficient quantity may yield false negative results.    Blood Culture - Blood, Arm, Right [[id number]]  (Normal) Collected: 03/02/24 1132    Lab Status: Final result Specimen: Blood from Arm, Right Updated: 03/07/24 1201     Blood Culture No growth at 5 days    Narrative:      Less than seven (7) mL's of blood was collected.  Insufficient quantity may yield false negative results.    Urine Culture - Urine, Straight Cath [[id number]]  (Normal)  Collected: 03/02/24 0923    Lab Status: Final result Specimen: Urine from Straight Cath Updated: 03/03/24 1601     Urine Culture No growth    COVID PRE-OP / PRE-PROCEDURE SCREENING ORDER (NO ISOLATION) - Swab, Nasopharynx [[id number]]  (Normal) Collected: 03/02/24 1133    Lab Status: Final result Specimen: Swab from Nasopharynx Updated: 03/02/24 1227    Narrative:      The following orders were created for panel order COVID PRE-OP / PRE-PROCEDURE SCREENING ORDER (NO ISOLATION) - Swab, Nasopharynx.  Procedure                               Abnormality         Status                     ---------                               -----------         ------                     COVID-19, FLU A/B, RSV P...[[id number]]  Normal              Final result                 Please view results for these tests on the individual orders.    COVID-19, FLU A/B, RSV PCR 1 HR TAT - Swab, Nasopharynx [[id number]]  (Normal) Collected: 03/02/24 1133    Lab Status: Final result Specimen: Swab from Nasopharynx Updated: 03/02/24 1227     COVID19 Not Detected     Influenza A PCR Not Detected     Influenza B PCR Not Detected     RSV, PCR Not Detected    Narrative:      Fact sheet for providers: https://www.fda.gov/media/579920/download    Fact sheet for patients: https://www.fda.gov/media/694180/download    Test performed by PCR.            XR Chest 1 View    Result Date: 3/8/2024  XR CHEST 1 VW Date of Exam: 3/8/2024 9:32 AM EST Indication: tachypnea, soa Comparison: 3/6/2024 Findings: Cardiomediastinal silhouette is stable. There is atherosclerosis of the aorta. No airspace disease, pneumothorax, nor pleural effusion. No acute osseous abnormality identified.     Impression: Impression: No acute cardiopulmonary abnormality. Electronically Signed: Ellen Suarez MD  3/8/2024 9:58 AM EST  Workstation ID: [id number]    CT Abdomen Pelvis Without Contrast    Result Date: 3/6/2024  CT ABDOMEN PELVIS WO CONTRAST Date of Exam: 3/6/2024 7:53 PM EST Indication:  distended abdomen, colon dilated on KUB, abdominal pain. Comparison: None available. Technique: Axial CT images were obtained of the abdomen and pelvis without the administration of contrast. Reconstructed coronal and sagittal images were also obtained. Automated exposure control and iterative construction methods were used. FINDINGS: Lung bases: Trace bilateral pleural fluid. Overlying mild atelectasis. Liver:No masses. No intrahepatic biliary ductal dilatation. Spleen:No masses. No perisplenic hematoma. Pancreas:No pancreatic masses. No evidence of pancreatitis. Gallbladder and common bile duct: The gallbladder is not identified and may be surgically absent. Adrenal glands:No adrenal masses Kidneys and ureters:No kidney stones. No renal masses.No calculi present within the ureters. Normal caliber ureters. Urinary bladder:No urinary bladder wall thickening. No bladder masses. Small bowel:Normal caliber small bowel. Large bowel: Multiple dilated loops of large bowel with air-fluid levels. Locules of intramural gas noted consistent with concerning for pneumatosis intestinalis. Postsurgical changes on the sigmoid colon with surrounding surgical clips. Appendix: Not seen however there is no evidence of appendicitis. GENITOURINARY: Previous prostatectomy Ascites or pneumoperitoneum: None Adenopathy:None present Osseous structures: Degenerative changes of the hips and lumbar spine. Other findings: None     Impression: Dilated loops of large bowel with likely pneumatosis intestinalis. No bowel wall thickening. Inflammation surrounding the distal descending and sigmoid colon. Findings are concerning for possible developing necrotizing enterocolitis given the  presence of pneumatosis intestinalis and inflammation of the region of the sigmoid colon with air-fluid levels and dilated large bowel. Electronically Signed: Ventura Campbell MD  3/6/2024 8:23 PM EST  Workstation ID: [id number]    XR Chest 1 View    Result Date:  3/6/2024  XR CHEST 1 VW Date of Exam: 3/6/2024 5:03 PM EST Indication: cough Comparison: 3/2/2024 Findings: Lines: None Lungs: Hazy opacities in the left lower lung most likely represents atelectasis. Otherwise the lungs are clear. No consolidation Pleura: Possible trace left pleural effusion. No pneumothorax Cardiomediastinum: The cardiomediastinal silhouette is normal Soft Tissues: Unremarkable. Bones: No acute osseous abnormality.     Impression: Impression: No definite acute cardiopulmonary abnormality Electronically Signed: James Foreman,   3/6/2024 5:26 PM EST  Workstation ID: [id number]    XR Abdomen KUB    Result Date: 3/6/2024  XR ABDOMEN KUB Date of Exam: 3/6/2024 3:13 PM EST Indication: distended abd Comparison: None available. Findings: 3 supine views. There is moderately severe diffuse gaseous distention of the colon to the level of the rectum. There are multiple surgical clips in the midline of the mid to low pelvis. There are surgical clips in the left lower quadrant as well. There is gas within nondilated small bowel.     Impression: Impression: Moderately severe colon distention to the level of the rectum. Obstruction at the level of the rectum is not excluded. Electronically Signed: Heather Jaimse MD  3/6/2024 3:43 PM EST  Workstation ID: [id number]     Results for orders placed during the hospital encounter of 07/20/21    Adult Transthoracic Echo Complete W/ Cont if Necessary Per Protocol    Interpretation Summary  · Left ventricular wall thickness is consistent with mild concentric hypertrophy.  · Normal left-ventricular systolic function, estimated EF 65%.  · Left ventricular diastolic function is consistent with (grade I) impaired relaxation.  · Aortic sclerosis without aortic stenosis. Trace aortic insufficiency.  · Trace mitral regurgitation, trace tricuspid regurgitation.      Current medications:  Scheduled Meds:[Held by provider] atorvastatin, 10 mg, Oral, Daily  cloNIDine, 1 patch,  Transdermal, Weekly  heparin (porcine), 5,000 Units, Subcutaneous, Q12H  insulin lispro, 2-7 Units, Subcutaneous, 4x Daily AC & at Bedtime  levETIRAcetam, 500 mg, Intravenous, Q12H  [Held by provider] levETIRAcetam, 500 mg, Oral, BID  [Held by provider] levothyroxine, 88 mcg, Oral, Q AM  Lidocaine, 2 patch, Transdermal, Q24H  [Held by provider] lisinopril, 40 mg, Oral, Q24H  magnesium sulfate, 2 g, Intravenous, Once  methylnaltrexone, 12 mg, Subcutaneous, Every Other Day  metoclopramide, 10 mg, Intravenous, Q6H  metoprolol tartrate, 5 mg, Intravenous, Q6H  pantoprazole, 40 mg, Intravenous, Q AM  piperacillin-tazobactam, 3.375 g, Intravenous, Q8H  potassium chloride, 10 mEq, Intravenous, Q1H  senna-docusate sodium, 2 tablet, Oral, BID  sodium chloride, 10 mL, Intravenous, Q12H  [Held by provider] tamsulosin, 0.4 mg, Oral, Daily  valproate sodium, 500 mg, Intravenous, Q8H  [Held by provider] vitamin B-12, 1,000 mcg, Oral, Daily      Continuous Infusions:       PRN Meds:.  acetaminophen **OR** acetaminophen **OR** acetaminophen    senna-docusate sodium **AND** polyethylene glycol **AND** bisacodyl **AND** bisacodyl    calcium carbonate    Calcium Replacement - Follow Nurse / BPA Driven Protocol    dextrose    dextrose    fluticasone    glucagon (human recombinant)    hydrALAZINE    ipratropium-albuterol    Magnesium Standard Dose Replacement - Follow Nurse / BPA Driven Protocol    ondansetron ODT **OR** ondansetron    Phosphorus Replacement - Follow Nurse / BPA Driven Protocol    Potassium Replacement - Follow Nurse / BPA Driven Protocol    sodium chloride    sodium chloride    sodium chloride    Assessment & Plan   Assessment & Plan     Active Hospital Problems    Diagnosis  POA    **Falls [W19.XXXA]  Yes    Pneumatosis intestinalis [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        Brief Hospital Course to date:  Lea Rivers is a 78 y.o. male with past medical history of  hypertension, hyperlipidemia, hypothyroidism, seizure disorder.  Patient is being admitted for a fall and nondisplaced first through fourth left rib fractures.  Patient also has left distal clavicle fracture.    This patient's problems and plans were partially entered by my partner and updated as appropriate by me 03/08/24.      Multiple falls with increasing frequency   -CT of head shows age-related changes which are chronic but no acute process  -neuro consulted. likely d/t neuropathy (confirmed with EMG) and progression of dementia     Multiple rib fractures and also left clavicle fracture  -With no displacement or mildly displaced.  Orthopedic surgery evaluated. No surgical intervention  planned.  Recs nonweightbearing LUE, may come out of sling in 5-7 days to initiate gentle ROM exercises per ortho.  F/u with ortho/Dr. Maldonado in 2 weeks  -Pain control  -lidocaine patch    Pneumatosis intestinalis/ileus  -Dr Godfrey following. Discussed case. Rectal tube ordered. Will order TPN + PICC line. If no improvement, he may take to OR tomorrow.   -Strict NPO  -Lactic normalized  -GI evaluated, continue SQ Relistor, Reglan QID, Zosyn   -If WBC count continues to trend up tomorrow, will change Zosyn to Invanz      Prostate cancer  Hx BPH s/p greenlight photo vaporization of prostate 2018  -Follows with Dr. Noland  -S/P cyberknife radiation 2/9/23  -Has intermittent hematuria      Dementia and increasing memory problem  -Patient still lives alone and also drives  -practice partner d/w family that patient should not be driving.  They are interested in rehab/possible placement     Hypertension  -BP remains elevated  -Continue scheduled Lopressor IV  -Add Clonidine patch   -PRN Hydralazine     Seizure disorder  - continue home meds including home vimpat and keppra (changed to IV)     Hyperlipidemia  Hypothyroidism  -Home meds on hold, NPO    Resting tachypnea  -Improved now  -CXR clear  -On RA    Rhythm Change on  tele  -EKG ok  -Replace Mg and K     Expected Discharge Location and Transportation: SNF  Expected Discharge   Expected Discharge Date: 3/11/2024; Expected Discharge Time:      DVT prophylaxis:  Medical DVT prophylaxis orders are present.         AM-PAC 6 Clicks Score (PT): 8 (03/07/24 2000)    CODE STATUS:   Code Status and Medical Interventions:   Ordered at: 03/02/24 1456     Medical Intervention Limits:    NO intubation (DNI)     Code Status (Patient has no pulse and is not breathing):    No CPR (Do Not Attempt to Resuscitate)     Medical Interventions (Patient has pulse or is breathing):    Limited Support       Eleanor Yost DO  03/08/24        Electronically signed by Eleanor Yost DO at 03/08/24 1306          Consult Notes (most recent note)        Anabelle Fabian PA at 03/07/24 1141        Consult Orders    1. Inpatient Gastroenterology Consult [[id number]] ordered by Harley Godfrey MD at 03/07/24 0639              Attestation signed by Kirt Wolfe MD at 03/07/24 1604    I have reviewed this documentation and agree.                  [facility] Gastroenterology Consult    Referring Provider: Harley Godfrey MD     PCP: Mannie Melvin MD    Reason for Consultation: Assess for colonic ileus     Chief complaint: Abdominal distention     History of present illness:    Lea Rivers is a 78 y.o. male who is admitted with recurrent mechanical falls resulting in a minimally displaced left clavicle fracture and multiple rib fractures.  He has been receiving Tramadol as needed for pain.   He was noted to have increasing abdominal distention since admission and KUB showed moderately diffuse gaseous distention of the colon to the level of rectum.  Subsequent CT was obtained last night showed dilated loops of large bowel with air-fluid levels and locules of intramural gas consistent with concerning pneumatosis intestinalis.  Post surgical changes of the sigmoid colon noted with surgical  sudha.   He was evaluated by surgery overnight and started on IV Zosyn.     His daughter describes remote history of a sigmoid colectomy for colon cancer.   His last colonoscopy was 1/24/23 with Dr. Kauffman.  Preparation of the colon was poor at that time.  He had 3 polyps removed. Pathology consistent with tubular adenomas without high grade dysplasia.   He has had difficulty with fecal and urinary incontinence.      Patient has dementia and lives alone but has had a significant decline since the loss of his wife 3 years ago with a most recent decline in the last six months.       Allergies:  Chlorhexidine and Sulfa antibiotics    Scheduled Meds:  [Held by provider] atorvastatin, 10 mg, Oral, Daily  heparin (porcine), 5,000 Units, Subcutaneous, Q12H  insulin lispro, 2-7 Units, Subcutaneous, 4x Daily AC & at Bedtime  levETIRAcetam, 500 mg, Intravenous, Q12H  [Held by provider] levETIRAcetam, 500 mg, Oral, BID  [Held by provider] levothyroxine, 88 mcg, Oral, Q AM  Lidocaine, 2 patch, Transdermal, Q24H  [Held by provider] lisinopril, 40 mg, Oral, Q24H  metoprolol tartrate, 5 mg, Intravenous, Q6H  pantoprazole, 40 mg, Intravenous, Q AM  piperacillin-tazobactam, 3.375 g, Intravenous, Q8H  potassium chloride, 10 mEq, Intravenous, Q1H  senna-docusate sodium, 2 tablet, Oral, BID  sodium chloride, 10 mL, Intravenous, Q12H  [Held by provider] tamsulosin, 0.4 mg, Oral, Daily  valproate sodium, 500 mg, Intravenous, Q8H  [Held by provider] vitamin B-12, 1,000 mcg, Oral, Daily         Infusions:  sodium chloride, 125 mL/hr, Last Rate: 125 mL/hr (03/07/24 0836)        PRN Meds:    acetaminophen **OR** acetaminophen **OR** acetaminophen    senna-docusate sodium **AND** polyethylene glycol **AND** bisacodyl **AND** bisacodyl    calcium carbonate    Calcium Replacement - Follow Nurse / BPA Driven Protocol    dextrose    dextrose    fluticasone    glucagon (human recombinant)    hydrALAZINE    Magnesium Standard Dose Replacement -  Follow Nurse / BPA Driven Protocol    ondansetron ODT **OR** ondansetron    Phosphorus Replacement - Follow Nurse / BPA Driven Protocol    Potassium Replacement - Follow Nurse / BPA Driven Protocol    sodium chloride    sodium chloride    sodium chloride    Home Meds:  Medications Prior to Admission   Medication Sig Dispense Refill Last Dose    D-Mannose 500 MG capsule Take 1 capsule by mouth Daily. (Patient taking differently: Take 1 capsule by mouth Daily. OTC) 90 capsule 3 3/1/2024    divalproex (DEPAKOTE) 500 MG 24 hr tablet Take 1 tablet by mouth 2 (two) times a day. 1 tablet 500mg QAM, 2 tablets 1000mg QPM   3/1/2024    fluticasone (FLONASE) 50 MCG/ACT nasal spray 2 sprays into the nostril(s) as directed by provider As Needed for Rhinitis. Administer 2 sprays in each nostril for each dose.   3/1/2024    isosorbide mononitrate (IMDUR) 30 MG 24 hr tablet Take 1 tablet by mouth 2 (Two) Times a Day.   3/1/2024    levETIRAcetam (KEPPRA) 500 MG tablet Take 1 tablet by mouth 2 (Two) Times a Day.   3/1/2024    levothyroxine (SYNTHROID, LEVOTHROID) 88 MCG tablet Take 1 tablet by mouth Every Morning.   3/1/2024    lisinopril (PRINIVIL,ZESTRIL) 20 MG tablet Take 1 tablet by mouth 2 (Two) Times a Day.   3/1/2024    metFORMIN ER (GLUCOPHAGE-XR) 500 MG 24 hr tablet Take 2 tablets by mouth Daily With Breakfast.   3/1/2024    omeprazole (priLOSEC) 20 MG capsule Take 1 capsule by mouth Daily.   3/1/2024    simvastatin (ZOCOR) 20 MG tablet Take 1 tablet by mouth Every Night.   3/1/2024    sodium chloride 1 g tablet Take 2 tablets by mouth Daily. OTC   3/1/2024    tamsulosin (FLOMAX) 0.4 MG capsule 24 hr capsule Take 1 capsule by mouth Daily. 90 capsule 3 3/1/2024    verapamil SR (CALAN-SR) 240 MG CR tablet Take 1 tablet by mouth Daily.   3/1/2024    vitamin B-12 (CYANOCOBALAMIN) 1000 MCG tablet Take 1 tablet by mouth Daily. 90 tablet 3 3/1/2024       ROS: Review of Systems   Unable to perform ROS: Dementia       PAST MED  HX:  Past Medical History:   Diagnosis Date    Anemia     Colon cancer 1990    Status post partial colectomy in 1990. No chemotherapy or radiation therapy.    Coronary artery disease     Nonobstructive coronary artery disease.    Diabetes     Dyslipidemia     GERD (gastroesophageal reflux disease)     Hx of.    Hyperlipidemia     Hypertension     Hyponatremia     Hypothyroidism     Left shoulder pain     Low sodium levels 2022    recent issue; saw nephrologist who ruled out kidney issues; took Sodium Chloride po to bring levels up    Memory deficit     FROM ANESTHESIA    Osteoarthritis     Prostate cancer 07/23/2022    Seizure disorder     silent seziure-diagnosed years ago    Skin cancer        PAST SURG HX:  Past Surgical History:   Procedure Laterality Date    APPENDECTOMY      CARDIAC CATHETERIZATION  2012    30 to 40% LAD    CARPAL TUNNEL RELEASE Bilateral     CHOLECYSTECTOMY      COLECTOMY PARTIAL / TOTAL  1990    Partial colectomy    COLONOSCOPY      CYBERKNIFE  02/09/2023    Prostate/SV    CYSTOSCOPY TRANSURETHRAL RESECTION OF PROSTATE N/A 09/21/2018    Procedure: CYSTOSCOPY TRANSURETHRAL RESECTION OF PROSTATE GREENLIGHT;  Surgeon: Naseem Clayton MD;  Location: Replaced by [facility];  Service: Urology    OTHER SURGICAL HISTORY      Contraction surgery on bilateral hands and wrists.    PROSTATE BIOPSY N/A 11/11/2022    Procedure: TRANSRECTAL ULTRASOUND GUIDED BIOPSY OF PROSTATE;  Surgeon: Naseem Noland MD;  Location:  [facility];  Service: Urology;  Laterality: N/A;    SINUS SURGERY      SKIN BIOPSY      TEETH EXTRACTION      TONSILLECTOMY      TOTAL KNEE ARTHROPLASTY Right 04/07/2022    Procedure: TOTAL KNEE ARTHROPLASTY WITH ORTHO ROBOT RIGHT;  Surgeon: Louis Malcolm MD;  Location:  [facility] OR;  Service: Robotics - Ortho;  Laterality: Right;    TRANSRECTAL INCISION PROSTATE WITH GOLD SEED Bilateral 01/06/2023    Procedure: TRANSRECTAL ULTRASOUND GUIDED PROSTATE FIDUCIAL MARKER PLACEMENT;  Surgeon:  "Naseem Noland MD;  Location: [facility];  Service: Urology;  Laterality: Bilateral;    TURP / TRANSURETHRAL INCISION / DRAINAGE PROSTATE      VASECTOMY         FAM HX:  Family History   Problem Relation Age of Onset    Cancer Mother         brain    Hypertension Father     Stroke Father     No Known Problems Sister     Stroke Sister     Esophageal cancer Brother     Stroke Maternal Grandfather     No Known Problems Paternal Grandmother     No Known Problems Paternal Grandfather     Stroke Other     Arthritis Other     Cancer Other        SOC HX:  Social History     Socioeconomic History    Marital status:    Tobacco Use    Smoking status: Never     Passive exposure: Past    Smokeless tobacco: Never   Vaping Use    Vaping status: Never Used   Substance and Sexual Activity    Alcohol use: No    Drug use: No    Sexual activity: Not Currently       PHYSICAL EXAM  BP (!) 180/113 (BP Location: Right arm, Patient Position: Lying)   Pulse 87   Temp 98.7 °F (37.1 °C) (Oral)   Resp 18   Ht 170.2 cm (67\")   Wt 88.9 kg (196 lb)   SpO2 96%   BMI 30.70 kg/m²   Wt Readings from Last 3 Encounters:   03/02/24 88.9 kg (196 lb)   02/27/24 89.1 kg (196 lb 6.4 oz)   02/15/24 92.1 kg (203 lb)   ,body mass index is 30.7 kg/m².  Physical Exam  Constitutional:       General: He is not in acute distress.     Appearance: He is ill-appearing.   Cardiovascular:      Rate and Rhythm: Normal rate and regular rhythm.   Pulmonary:      Effort: Pulmonary effort is normal. No respiratory distress.   Abdominal:      General: There is distension.      Tenderness: There is no abdominal tenderness.      Comments: Tympanitic to percussion  High pitched tinkling bowel sounds    Musculoskeletal:      Right lower leg: No edema.      Left lower leg: No edema.   Skin:     General: Skin is warm and dry.   Neurological:      Mental Status: He is alert.      Comments: Alert, oriented to self    Psychiatric:         Cognition and Memory: Memory " is impaired.       Results Review:   I reviewed the patient's new clinical results.    Lab Results   Component Value Date    WBC 15.60 (H) 03/07/2024    HGB 10.5 (L) 03/07/2024    HGB 11.5 (L) 03/06/2024    HGB 9.7 (L) 03/05/2024    HCT 32.5 (L) 03/07/2024    MCV 91.5 03/07/2024     03/07/2024     Lab Results   Component Value Date    INR 1.08 03/24/2022    INR 1.11 05/26/2021     Lab Results   Component Value Date    GLUCOSE 124 (H) 03/07/2024    BUN 15 03/07/2024    CREATININE 0.74 (L) 03/07/2024    EGFRIFNONA 64 09/10/2021    BCR 20.3 03/07/2024     (L) 03/07/2024    K 3.1 (L) 03/07/2024    CO2 22.0 03/07/2024    CALCIUM 8.5 (L) 03/07/2024    PROTENTOTREF 6.9 04/28/2023    ALBUMIN 3.3 (L) 03/06/2024    ALKPHOS 61 03/06/2024    BILITOT 0.7 03/06/2024    ALT 46 (H) 03/06/2024    AST 81 (H) 03/06/2024      Latest Reference Range & Units 03/02/24 09:47   TSH Baseline 0.270 - 4.200 uIU/mL 2.280      Latest Reference Range & Units 03/02/24 09:47   Free T4 0.93 - 1.70 ng/dL 1.29      Latest Reference Range & Units 03/07/24 08:04   Lactate 0.5 - 2.0 mmol/L 1.4       CT Abdomen/Pelvis (as interpreted by radiologist) 3/6/24  FINDINGS:   Lung bases: Trace bilateral pleural fluid. Overlying mild atelectasis.   Liver:No masses. No intrahepatic biliary ductal dilatation.   Spleen:No masses. No perisplenic hematoma.   Pancreas:No pancreatic masses. No evidence of pancreatitis.   Gallbladder and common bile duct: The gallbladder is not identified and may be surgically absent.   Adrenal glands:No adrenal masses   Kidneys and ureters:No kidney stones. No renal masses.No calculi present within the ureters. Normal caliber ureters.   Urinary bladder:No urinary bladder wall thickening. No bladder masses.   Small bowel:Normal caliber small bowel.   Large bowel: Multiple dilated loops of large bowel with air-fluid levels. Locules of intramural gas noted consistent with concerning for pneumatosis intestinalis. Postsurgical  changes on the sigmoid colon with surrounding surgical clips.   Appendix: Not seen however there is no evidence of appendicitis.   GENITOURINARY: Previous prostatectomy   Ascites or pneumoperitoneum: None   Adenopathy:None present   Osseous structures: Degenerative changes of the hips and lumbar spine.   Other findings: None   IMPRESSION:  Dilated loops of large bowel with likely pneumatosis intestinalis. No bowel wall thickening. Inflammation surrounding the distal descending and sigmoid colon. Findings are concerning for possible developing necrotizing enterocolitis given the   presence of pneumatosis intestinalis and inflammation of the region of the sigmoid colon with air-fluid levels and dilated large bowel.    ASSESSMENTS/PLANS    Colonic ileus with CT concerns of pneumatosis intestinalis   History of remote colon resection for colon cancer  Dementia  Multiple falls   Hypokalemia    >> Agree with holding Tramadol.   Will add SQ Relistor   >> IV Reglan 10 mg QID   >> IV Zosyn  >> Further management per surgery   >> Correct hypokalemia     I discussed the patient's findings and my recommendations with patient, his son whom is at bedside and his daughter via telephone.      ALMAZ Bullock  24  11:41 EST      Electronically signed by Kirt Wolfe MD at 24 1604          Physical Therapy Notes (most recent note)        Tiffanie Carmen, PT at 24 0955  Version 1 of 1         Patient Name: Lea Rivers  : 1945    MRN: [id number]                              Today's Date: 3/6/2024       Admit Date: 3/2/2024    Visit Dx:     ICD-10-CM ICD-9-CM   1. Fall, initial encounter  W19.XXXA E888.9   2. Closed fracture of multiple ribs of left side, initial encounter  S22.42XA 807.09   3. Closed displaced fracture of acromial end of left clavicle, initial encounter  S42.032A 810.03   4. Falls frequently  R29.6 V15.88   5. Acute UTI (urinary tract infection)  N39.0 599.0     Patient Active  Problem List   Diagnosis    Coronary artery disease    Dyslipidemia    Hypothyroidism    GERD (gastroesophageal reflux disease)    Seizure disorder    BPH (benign prostatic hypertrophy)    Hypertension    Primary osteoarthritis of both knees    Syncope and collapse    Precordial pain    Diabetes    Status post total right knee replacement    Postoperative urinary retention    Confusion, postoperative    Acute blood loss anemia, asymptomatic, no transfusion required    Elevated prostate specific antigen (PSA)    Prostate cancer    Anemia    Body mass index (BMI) of 32.0-32.9 in adult    Localization-related focal epilepsy with simple partial seizures    Need for vaccination against Streptococcus pneumoniae    Upper extremity tendon tear, right, initial encounter    Vitamin D deficiency    PSA elevation    Polyp of colon    Overactive bladder    Gross hematuria    History of colon polyps    History of colon cancer    B12 deficiency    Falls     Past Medical History:   Diagnosis Date    Anemia     Colon cancer 1990    Status post partial colectomy in 1990. No chemotherapy or radiation therapy.    Coronary artery disease     Nonobstructive coronary artery disease.    Diabetes     Dyslipidemia     GERD (gastroesophageal reflux disease)     Hx of.    Hyperlipidemia     Hypertension     Hyponatremia     Hypothyroidism     Left shoulder pain     Low sodium levels 2022    recent issue; saw nephrologist who ruled out kidney issues; took Sodium Chloride po to bring levels up    Memory deficit     FROM ANESTHESIA    Osteoarthritis     Prostate cancer 07/23/2022    Seizure disorder     silent seziure-diagnosed years ago    Skin cancer      Past Surgical History:   Procedure Laterality Date    APPENDECTOMY      CARDIAC CATHETERIZATION  2012    30 to 40% LAD    CARPAL TUNNEL RELEASE Bilateral     CHOLECYSTECTOMY      COLECTOMY PARTIAL / TOTAL  1990    Partial colectomy    COLONOSCOPY      CYBERKNIFE  02/09/2023    Prostate/SV     CYSTOSCOPY TRANSURETHRAL RESECTION OF PROSTATE N/A 09/21/2018    Procedure: CYSTOSCOPY TRANSURETHRAL RESECTION OF PROSTATE GREENLIGHT;  Surgeon: Naseem Clayton MD;  Location: [facility] OR;  Service: Urology    OTHER SURGICAL HISTORY      Contraction surgery on bilateral hands and wrists.    PROSTATE BIOPSY N/A 11/11/2022    Procedure: TRANSRECTAL ULTRASOUND GUIDED BIOPSY OF PROSTATE;  Surgeon: Naseem Noland MD;  Location: [facility] OR;  Service: Urology;  Laterality: N/A;    SINUS SURGERY      SKIN BIOPSY      TEETH EXTRACTION      TONSILLECTOMY      TOTAL KNEE ARTHROPLASTY Right 04/07/2022    Procedure: TOTAL KNEE ARTHROPLASTY WITH ORTHO ROBOT RIGHT;  Surgeon: Louis Malcolm MD;  Location: [facility] OR;  Service: Robotics - Ortho;  Laterality: Right;    TRANSRECTAL INCISION PROSTATE WITH GOLD SEED Bilateral 01/06/2023    Procedure: TRANSRECTAL ULTRASOUND GUIDED PROSTATE FIDUCIAL MARKER PLACEMENT;  Surgeon: Naseem Noland MD;  Location: [facility] OR;  Service: Urology;  Laterality: Bilateral;    TURP / TRANSURETHRAL INCISION / DRAINAGE PROSTATE      VASECTOMY        General Information       Row Name 03/06/24 1112          Physical Therapy Time and Intention    Document Type therapy note (daily note)  -CK     Mode of Treatment physical therapy  -       Row Name 03/06/24 1112          General Information    Patient Profile Reviewed yes  -CK     Existing Precautions/Restrictions fall;non-weight bearing;left  L rib fxs, LUE NWB, confusion  -CK     Barriers to Rehab medically complex;previous functional deficit;cognitive status;hearing deficit  -CK       Row Name 03/06/24 1112          Cognition    Orientation Status (Cognition) disoriented to;person;place;situation;time  -CK       Row Name 03/06/24 1112          Safety Issues, Functional Mobility    Safety Issues Affecting Function (Mobility) ability to follow commands;awareness of need for assistance;impulsivity;insight into  deficits/self-awareness;judgment;problem-solving;safety precaution awareness;safety precautions follow-through/compliance;unable to maintain weight-bearing restrictions  -CK     Impairments Affecting Function (Mobility) balance;cognition;endurance/activity tolerance;pain;postural/trunk control;strength;range of motion (ROM)  -CK               User Key  (r) = Recorded By, (t) = Taken By, (c) = Cosigned By      Initials Name Provider Type    CK Tiffanie Carmen, PT Physical Therapist                   Mobility       Row Name 03/06/24 1114          Bed Mobility    Bed Mobility supine-sit  -CK     Supine-Sit [place] (Bed Mobility) verbal cues;moderate assist (50% patient effort);2 person assist  -CK     Assistive Device (Bed Mobility) draw sheet;head of bed elevated  -CK     Comment, (Bed Mobility) cues for sequencing, patient able to bring BLEs off EOB, but significant assist to lift trunk from HOB and maintain upright posture sitting EOB  -CK       Row Name 03/06/24 1114          Sit-Stand Transfer    Sit-Stand [place] (Transfers) moderate assist (50% patient effort);2 person assist;verbal cues;nonverbal cues (demo/gesture)  -CK     Assistive Device (Sit-Stand Transfers) other (see comments)  RUJEREMIAS HHA  -CK     Comment, (Sit-Stand Transfer) boost to clear hips from bed, cues for upright posture  -CK       Row Name 03/06/24 1114          Gait/Stairs (Locomotion)    [place] Level (Gait) moderate assist (50% patient effort);2 person assist;verbal cues;nonverbal cues (demo/gesture)  -CK     Assistive Device (Gait) other (see comments)  RUE HHA  -CK     Distance in Feet (Gait) 3  -CK     Deviations/Abnormal Patterns (Gait) bilateral deviations;gait speed decreased;stride length decreased;bisi decreased  -CK     Bilateral Gait Deviations forward flexed posture;heel strike decreased  -CK     Right Sided Gait Deviations leans right  -CK     Comment, (Gait/Stairs) Patient took shuffling steps from EOB to  chair placed a few steps away from bed. Cues for upright posture and sequencing with steps toward chair. Patient very unsteady with R lean flexed posture throughout  -CK       Row Name 03/06/24 1114          Mobility    Extremity Weight-bearing Status left upper extremity  -CK     Left Upper Extremity (Weight-bearing Status) non weight-bearing (NWB)   -CK               User Key  (r) = Recorded By, (t) = Taken By, (c) = Cosigned By      Initials Name Provider Type    CK Tiffanie Carmen PT Physical Therapist                   Obj/Interventions       Row Name 03/06/24 1117          Motor Skills    Therapeutic Exercise knee;ankle  -CK       Row Name 03/06/24 1117          Knee (Therapeutic Exercise)    Knee (Therapeutic Exercise) AAROM (active assistive range of motion)  -CK     Knee AAROM (Therapeutic Exercise) bilateral;flexion;extension;10 repetitions  -CK       Row Name 03/06/24 1117          Ankle (Therapeutic Exercise)    Ankle (Therapeutic Exercise) AAROM (active assistive range of motion)  -CK     Ankle AAROM (Therapeutic Exercise) bilateral;dorsiflexion;plantarflexion;10 repetitions  -CK       Row Name 03/06/24 1117          Balance    Balance Assessment sitting static balance;standing static balance;standing dynamic balance  -CK     Static Sitting Balance moderate assist  -CK     Position, Sitting Balance unsupported;sitting edge of bed  -CK     Static Standing Balance moderate assist  -CK     Dynamic Standing Balance moderate assist  -CK     Position/Device Used, Standing Balance supported;other (see comments)  KUMAR COLMENARESA  -CK     Comment, Balance R lean while sitting EOB and also in standing, unsteady on feet  -CK               User Key  (r) = Recorded By, (t) = Taken By, (c) = Cosigned By      Initials Name Provider Type    CK Tiffanie Carmen PT Physical Therapist                   Goals/Plan    No documentation.                  Clinical Impression       Row Name 03/06/24 1118          Pain     Additional Documentation Pain Scale: FACES Pre/Post-Treatment (Group)  -CK       Row Name 03/06/24 1118          Pain Scale: FACES Pre/Post-Treatment    Pain: FACES Scale, Pretreatment 4-->hurts little more  -CK     Posttreatment Pain Rating 4-->hurts little more  -CK     Pain Location - Side/Orientation Left  -CK     Pain Location upper  -CK     Pain Location - extremity  -CK       Row Name 03/06/24 1118          Plan of Care Review    Plan of Care Reviewed With patient  -CK     Progress no change  -CK     Outcome Evaluation Patient continues to be limited by confusion and difficulty following commands today. He required increased assist for sitting balance and was able to take steps 3' with modAx2 and RUE HHA to chair. IPPT will continue to progress to patient's tolerance. Recommend SNF.  -CK       Row Name 03/06/24 1118          Vital Signs    Pre Systolic BP Rehab 182  -CK     Pre Treatment Diastolic   -CK     Post Systolic BP Rehab 182  -CK     Post Treatment Diastolic   -CK     Pretreatment Heart Rate (beats/min) 91  -CK     Posttreatment Heart Rate (beats/min) 99  -CK     Pre SpO2 (%) 92  -CK     O2 Delivery Pre Treatment room air  -CK     O2 Delivery Intra Treatment room air  -CK     Post SpO2 (%) 91  -CK     O2 Delivery Post Treatment room air  -CK     Pre Patient Position Supine  -CK     Intra Patient Position Standing  -CK     Post Patient Position Sitting  -CK       Row Name 03/06/24 1118          Positioning and Restraints    Pre-Treatment Position in bed  -CK     Post Treatment Position chair  -CK     In Chair reclined;call light within reach;encouraged to call for assist;exit alarm on;with family/caregiver;waffle cushion;on mechanical lift sling;heels elevated;LUE elevated;with brace;notified nsg  -CK               User Key  (r) = Recorded By, (t) = Taken By, (c) = Cosigned By      Initials Name Provider Type    CK Tiffanie Carmen PT Physical Therapist                   Outcome  Measures       Row Name 03/06/24 1120 03/06/24 0855       How much help from another person do you currently need...    Turning from your back to your side while in flat bed without using bedrails? 2  -CK 2  -LV    Moving from lying on back to sitting on the side of a flat bed without bedrails? 2  -CK 2  -LV    Moving to and from a bed to a chair (including a wheelchair)? 2  -CK 2  -LV    Standing up from a chair using your arms (e.g., wheelchair, bedside chair)? 2  -CK 1  -LV    Climbing 3-5 steps with a railing? 1  -CK 1  -LV    To walk in hospital room? 2  -CK 1  -LV    AM-PAC 6 Clicks Score (PT) 11  -CK 9  -LV    Highest Level of Mobility Goal 4 --> Transfer to chair/commode  -CK 3 --> Sit at edge of bed  -LV      Row Name 03/06/24 1120 03/06/24 1116       Functional Assessment    Outcome Measure Options AM-PAC 6 Clicks Basic Mobility (PT)  -CK AM-PAC 6 Clicks Daily Activity (OT)  -AC              User Key  (r) = Recorded By, (t) = Taken By, (c) = Cosigned By      Initials Name Provider Type    AC Pauly Davey, OT Occupational Therapist    Tiffanie Kramer, PT Physical Therapist    Brittney De Leon, RN Registered Nurse                                 Physical Therapy Education       Title: PT OT SLP Therapies (In Progress)       Topic: Physical Therapy (In Progress)       Point: Mobility training (In Progress)       Learning Progress Summary             Patient Acceptance, E, NR by CK at 3/6/2024 1121    Acceptance, E, VU,NR by LO at 3/4/2024 1340    Comment: PT POC   Family Acceptance, E, NR by CK at 3/6/2024 1121    Acceptance, E, VU,NR by LO at 3/4/2024 1340    Comment: PT POC                         Point: Home exercise program (In Progress)       Learning Progress Summary             Patient Acceptance, E, NR by CK at 3/6/2024 1121    Acceptance, E, VU,NR by LO at 3/4/2024 1340    Comment: PT POC   Family Acceptance, E, NR by CK at 3/6/2024 1121    Acceptance, E, VU,NR by LO at 3/4/2024 1340     Comment: PT POC                         Point: Body mechanics (In Progress)       Learning Progress Summary             Patient Acceptance, E, NR by CK at 3/6/2024 1121    Acceptance, E, VU,NR by LO at 3/4/2024 1340    Comment: PT POC   Family Acceptance, E, NR by CK at 3/6/2024 1121    Acceptance, E, VU,NR by LO at 3/4/2024 1340    Comment: PT POC                         Point: Precautions (In Progress)       Learning Progress Summary             Patient Acceptance, E, NR by CK at 3/6/2024 1121    Acceptance, E, VU,NR by LO at 3/4/2024 1340    Comment: PT POC   Family Acceptance, E, NR by CK at 3/6/2024 1121    Acceptance, E, VU,NR by LO at 3/4/2024 1340    Comment: PT POC                                         User Key       Initials Effective Dates Name Provider Type Discipline     06/16/21 -  Emily Muro, PT Physical Therapist PT     02/06/24 -  Tiffanie Carmen, PT Physical Therapist PT                  PT Recommendation and Plan     Plan of Care Reviewed With: patient  Progress: no change  Outcome Evaluation: Patient continues to be limited by confusion and difficulty following commands today. He required increased assist for sitting balance and was able to take steps 3' with modAx2 and RUE HHA to chair. IPPT will continue to progress to patient's tolerance. Recommend SNF.     Time Calculation:         PT Charges       Row Name 03/06/24 1121             Time Calculation    Start Time 0955  -CK      PT Received On 03/06/24  -CK      PT Goal Re-Cert Due Date 03/14/24  -CK         Timed Charges    23477 - PT Therapeutic Activity Minutes 13  -CK         Total Minutes    Timed Charges Total Minutes 13  -CK       Total Minutes 13  -CK                User Key  (r) = Recorded By, (t) = Taken By, (c) = Cosigned By      Initials Name Provider Type    CK Tiffanie Carmen, PT Physical Therapist                  Therapy Charges for Today       Code Description Service Date Service Provider Modifiers Qty     [id number]  PT THERAPEUTIC ACT EA 15 MIN 3/6/2024 Tiffanie Carmen, PT GP 1            PT G-Codes  Outcome Measure Options: AM-PAC 6 Clicks Basic Mobility (PT)  AM-PAC 6 Clicks Score (PT): 11  AM-PAC 6 Clicks Score (OT): 9  PT Discharge Summary  Anticipated Discharge Disposition (PT): skilled nursing facility    Tiffanie Carmen, PT  3/6/2024      Electronically signed by Tiffanie Carmen, PT at 24 1122          Occupational Therapy Notes (most recent note)        Pauly Davey, OT at 24 0956          Patient Name: Lea Rivers  : 1945    MRN: [id number]                              Today's Date: 3/6/2024       Admit Date: 3/2/2024    Visit Dx:     ICD-10-CM ICD-9-CM   1. Fall, initial encounter  W19.XXXA E888.9   2. Closed fracture of multiple ribs of left side, initial encounter  S22.42XA 807.09   3. Closed displaced fracture of acromial end of left clavicle, initial encounter  S42.032A 810.03   4. Falls frequently  R29.6 V15.88   5. Acute UTI (urinary tract infection)  N39.0 599.0     Patient Active Problem List   Diagnosis    Coronary artery disease    Dyslipidemia    Hypothyroidism    GERD (gastroesophageal reflux disease)    Seizure disorder    BPH (benign prostatic hypertrophy)    Hypertension    Primary osteoarthritis of both knees    Syncope and collapse    Precordial pain    Diabetes    Status post total right knee replacement    Postoperative urinary retention    Confusion, postoperative    Acute blood loss anemia, asymptomatic, no transfusion required    Elevated prostate specific antigen (PSA)    Prostate cancer    Anemia    Body mass index (BMI) of 32.0-32.9 in adult    Localization-related focal epilepsy with simple partial seizures    Need for vaccination against Streptococcus pneumoniae    Upper extremity tendon tear, right, initial encounter    Vitamin D deficiency    PSA elevation    Polyp of colon    Overactive bladder    Gross hematuria    History of colon  polyps    History of colon cancer    B12 deficiency    Falls     Past Medical History:   Diagnosis Date    Anemia     Colon cancer 1990    Status post partial colectomy in 1990. No chemotherapy or radiation therapy.    Coronary artery disease     Nonobstructive coronary artery disease.    Diabetes     Dyslipidemia     GERD (gastroesophageal reflux disease)     Hx of.    Hyperlipidemia     Hypertension     Hyponatremia     Hypothyroidism     Left shoulder pain     Low sodium levels 2022    recent issue; saw nephrologist who ruled out kidney issues; took Sodium Chloride po to bring levels up    Memory deficit     FROM ANESTHESIA    Osteoarthritis     Prostate cancer 07/23/2022    Seizure disorder     silent seziure-diagnosed years ago    Skin cancer      Past Surgical History:   Procedure Laterality Date    APPENDECTOMY      CARDIAC CATHETERIZATION  2012    30 to 40% LAD    CARPAL TUNNEL RELEASE Bilateral     CHOLECYSTECTOMY      COLECTOMY PARTIAL / TOTAL  1990    Partial colectomy    COLONOSCOPY      CYBERKNIFE  02/09/2023    Prostate/SV    CYSTOSCOPY TRANSURETHRAL RESECTION OF PROSTATE N/A 09/21/2018    Procedure: CYSTOSCOPY TRANSURETHRAL RESECTION OF PROSTATE GREENLIGHT;  Surgeon: Naseem Clayton MD;  Location: [facility] OR;  Service: Urology    OTHER SURGICAL HISTORY      Contraction surgery on bilateral hands and wrists.    PROSTATE BIOPSY N/A 11/11/2022    Procedure: TRANSRECTAL ULTRASOUND GUIDED BIOPSY OF PROSTATE;  Surgeon: Naseem Noland MD;  Location:  [facility] OR;  Service: Urology;  Laterality: N/A;    SINUS SURGERY      SKIN BIOPSY      TEETH EXTRACTION      TONSILLECTOMY      TOTAL KNEE ARTHROPLASTY Right 04/07/2022    Procedure: TOTAL KNEE ARTHROPLASTY WITH ORTHO ROBOT RIGHT;  Surgeon: Louis Malcolm MD;  Location:  [facility] OR;  Service: Robotics - Ortho;  Laterality: Right;    TRANSRECTAL INCISION PROSTATE WITH GOLD SEED Bilateral 01/06/2023    Procedure: TRANSRECTAL ULTRASOUND GUIDED PROSTATE  FIDUCIAL MARKER PLACEMENT;  Surgeon: Naseem Noland MD;  [facility];  Service: Urology;  Laterality: Bilateral;    TURP / TRANSURETHRAL INCISION / DRAINAGE PROSTATE      VASECTOMY        General Information       Row Name 03/06/24 1101 03/06/24 1058       OT Time and Intention    Document Type therapy note (daily note)  - therapy note (daily note)  -    Mode of Treatment occupational therapy  - occupational therapy  -      Row Name 03/06/24 1101          General Information    Patient Profile Reviewed yes  -     Existing Precautions/Restrictions fall;non-weight bearing;left  dementia, L rib fractures,  L clavicle fracture, NWB LUE,  LUE sling, [place]  -     Barriers to Rehab medically complex;previous functional deficit;cognitive status;hearing deficit  -       Row Name 03/06/24 1101          Cognition    Orientation Status (Cognition) disoriented to;person;place;situation;time  -       Row Name 03/06/24 1101          Safety Issues, Functional Mobility    Safety Issues Affecting Function (Mobility) ability to follow commands;awareness of need for assistance;insight into deficits/self-awareness;judgment;problem-solving;safety precaution awareness;safety precautions follow-through/compliance;sequencing abilities  -     Impairments Affecting Function (Mobility) balance;cognition;endurance/activity tolerance;pain;postural/trunk control;strength;range of motion (ROM)  -     Cognitive Impairments, Mobility Safety/Performance attention;awareness, need for assistance;insight into deficits/self-awareness;judgment;problem-solving/reasoning;safety precaution awareness;safety precaution follow-through;sequencing abilities  -               User Key  (r) = Recorded By, (t) = Taken By, (c) = Cosigned By      Initials Name Provider Type    AC Pauly Davey, OT Occupational Therapist                     Mobility/ADL's       Row Name 03/06/24 1108          Bed Mobility    Bed Mobility supine-sit   -     Supine-Sit [place] (Bed Mobility) verbal cues;moderate assist (50% patient effort);2 person assist  -     Assistive Device (Bed Mobility) draw sheet;head of bed elevated  -     Comment, (Bed Mobility) VCs to sequence,  pt initiating legs EOB, required assist trunk to midline  -       Row Name 03/06/24 1104          Transfers    Transfers sit-stand transfer;bed-chair transfer  -       Row Name 03/06/24 1104          Bed-Chair Transfer    Bed-Chair Geary (Transfers) moderate assist (50% patient effort);2 person assist;verbal cues;nonverbal cues (demo/gesture)  -     Assistive Device (Bed-Chair Transfers) --  R UE support  -       Row Name 03/06/24 1104          Sit-Stand Transfer    Sit-Stand Geary (Transfers) moderate assist (50% patient effort);2 person assist;verbal cues;nonverbal cues (demo/gesture)  -     Assistive Device (Sit-Stand Transfers) other (see comments)  RUE support  -       Row Name 03/06/24 1104          Functional Mobility    Functional Mobility- Ind. Level moderate assist (50% patient effort);2 person assist required  -     Functional Mobility-Distance (Feet) 3  -     Functional Mobility- Safety Issues step length decreased;sequencing ability decreased  -       Row Name 03/06/24 1104          Activities of Daily Living    BADL Assessment/Intervention upper body dressing;grooming  -       Row Name 03/06/24 1104          Mobility    Extremity Weight-bearing Status left upper extremity   -     Left Upper Extremity (Weight-bearing Status) non weight-bearing (NWB)   -       Row Name 03/06/24 1104          Grooming Assessment/Training    Geary Level (Grooming) wash face, hands;set up;verbal cues  -     Position (Grooming) supported sitting  -       Row Name 03/06/24 1104          Upper Body Dressing Assessment/Training    Geary Level (Upper Body Dressing) don;doff;pajama/robe;maximum assist (25% patient effort)  -     Position  (Upper Body Dressing) edge of bed sitting  -               User Key  (r) = Recorded By, (t) = Taken By, (c) = Cosigned By      Initials Name Provider Type    Pauly Mayes, OT Occupational Therapist                   Obj/Interventions       Row Name 03/06/24 1107          Elbow/Forearm (Therapeutic Exercise)    Elbow/Forearm (Therapeutic Exercise) PROM (passive range of motion)  pt did not initate actively assisting  -AC     Elbow/Forearm PROM (Therapeutic Exercise) flexion;extension;pronation;supination;10 repetitions;left  -       Row Name 03/06/24 1107          Hand (Therapeutic Exercise)    Hand (Therapeutic Exercise) PROM (passive range of motion)  -     Hand PROM (Therapeutic Exercise) left;finger extension;finger flexion;10 repetitions  pt would not actively assist  -       Row Name 03/06/24 1107          Motor Skills    Therapeutic Exercise elbow/forearm;hand  -               User Key  (r) = Recorded By, (t) = Taken By, (c) = Cosigned By      Initials Name Provider Type    Pauly Mayes, OT Occupational Therapist                   Goals/Plan    No documentation.                  Clinical Impression       Row Name 03/06/24 1109          Pain Scale: FACES Pre/Post-Treatment    Pain: FACES Scale, Pretreatment 4-->hurts little more  -AC     Posttreatment Pain Rating 4-->hurts little more  -AC     Pain Location - Side/Orientation Left  -AC     Pain Location upper  -AC     Pain Location - extremity  -AC     Pre/Posttreatment Pain Comment pain with movement  -       Row Name 03/06/24 1109          Plan of Care Review    Plan of Care Reviewed With patient  -AC     Progress no change  -     Outcome Evaluation Pt presents  below baseline with self care/mobiltiy d/t  confusion, minimal command following, pain , weakness, decr balance and activity tolerance.  Pt setup/ VCs to wash face, max A UBD,  mod A x 2 to take 3 steps to chair given RUE support.  Pt tolerated PROM L elbow and below - unable  to get pt to actively assist.  Recommend SNF upon d/c.  -AC       Row Name 03/06/24 1109          Vital Signs    Pre Systolic BP Rehab 182  -AC     Pre Treatment Diastolic   -AC     Post Systolic BP Rehab 182  -AC     Post Treatment Diastolic   -AC     Pretreatment Heart Rate (beats/min) 91  -AC     Posttreatment Heart Rate (beats/min) 99  -AC     Pre SpO2 (%) 92  -AC     O2 Delivery Pre Treatment room air  -AC     O2 Delivery Intra Treatment room air  -AC     Post SpO2 (%) 91  -AC     O2 Delivery Post Treatment room air  -AC     Pre Patient Position Supine  -AC     Post Patient Position Sitting  -AC       Row Name 03/06/24 1109          Positioning and Restraints    Pre-Treatment Position in bed  -AC     Post Treatment Position chair  -AC     In Chair notified nsg;reclined;call light within reach;encouraged to call for assist;exit alarm on;with family/caregiver;on mechanical lift sling;waffle cushion;heels elevated  LUE sling and pillow under elbow  -AC               User Key  (r) = Recorded By, (t) = Taken By, (c) = Cosigned By      Initials Name Provider Type    AC Pauly Davey, OT Occupational Therapist                   Outcome Measures       Row Name 03/06/24 1116          How much help from another is currently needed...    Putting on and taking off regular lower body clothing? 1  -AC     Bathing (including washing, rinsing, and drying) 1  -AC     Toileting (which includes using toilet bed pan or urinal) 1  -AC     Putting on and taking off regular upper body clothing 2  -AC     Taking care of personal grooming (such as brushing teeth) 2  -AC     Eating meals 2  -AC     AM-PAC 6 Clicks Score (OT) 9  -AC       Row Name 03/06/24 0855          How much help from another person do you currently need...    Turning from your back to your side while in flat bed without using bedrails? 2  -LV     Moving from lying on back to sitting on the side of a flat bed without bedrails? 2  -LV     Moving to and  from a bed to a chair (including a wheelchair)? 2  -LV     Standing up from a chair using your arms (e.g., wheelchair, bedside chair)? 1  -LV     Climbing 3-5 steps with a railing? 1  -LV     To walk in hospital room? 1  -LV     AM-PAC 6 Clicks Score (PT) 9  -LV     Highest Level of Mobility Goal 3 --> Sit at edge of bed  -LV       Row Name 03/06/24 1116          Functional Assessment    Outcome Measure Options AM-PAC 6 Clicks Daily Activity (OT)  -AC               User Key  (r) = Recorded By, (t) = Taken By, (c) = Cosigned By      Initials Name Provider Type    AC Pauly Davey OT Occupational Therapist    Brittney De Leon RN Registered Nurse                    Occupational Therapy Education       Title: PT OT SLP Therapies (In Progress)       Topic: Occupational Therapy (In Progress)       Point: ADL training (In Progress)       Description:   Instruct learner(s) on proper safety adaptation and remediation techniques during self care or transfers.   Instruct in proper use of assistive devices.                  Learning Progress Summary             Patient Acceptance, E, NR by AC at 3/6/2024 1116    Acceptance, E, VU by LAINE at 3/4/2024 1519                         Point: Home exercise program (Not Started)       Description:   Instruct learner(s) on appropriate technique for monitoring, assisting and/or progressing therapeutic exercises/activities.                  Learner Progress:  Not documented in this visit.              Point: Precautions (Done)       Description:   Instruct learner(s) on prescribed precautions during self-care and functional transfers.                  Learning Progress Summary             Patient Acceptance, E, VU by LAINE at 3/4/2024 1519                         Point: Body mechanics (Not Started)       Description:   Instruct learner(s) on proper positioning and spine alignment during self-care, functional mobility activities and/or exercises.                  Learner Progress:  Not  documented in this visit.                              User Key       Initials Effective Dates Name Provider Type Discipline    AC 02/03/23 -  Pauly Davey OT Occupational Therapist OT    SW 06/16/21 -  Justina Joyce OT Occupational Therapist OT                  OT Recommendation and Plan     Plan of Care Review  Plan of Care Reviewed With: patient  Progress: no change  Outcome Evaluation: Pt presents  below baseline with self care/mobiltiy d/t  confusion, minimal command following, pain , weakness, decr balance and activity tolerance.  Pt setup/ VCs to wash face, max A UBD,  mod A x 2 to take 3 steps to chair given RUE support.  Pt tolerated PROM L elbow and below - unable to get pt to actively assist.  Recommend SNF upon d/c.     Time Calculation:         Time Calculation- OT       Row Name 03/06/24 0956             Time Calculation- OT    OT Start Time 0956  -AC      OT Received On 03/06/24  -AC      OT Goal Re-Cert Due Date 03/14/24  -AC         Timed Charges    50742 - OT Self Care/Mgmt Minutes 12  -AC         Total Minutes    Timed Charges Total Minutes 12  -AC       Total Minutes 12  -AC                User Key  (r) = Recorded By, (t) = Taken By, (c) = Cosigned By      Initials Name Provider Type    AC Pauly Davye, OT Occupational Therapist                  Therapy Charges for Today       Code Description Service Date Service Provider Modifiers Qty    49867068344 HC OT SELF CARE/MGMT/TRAIN EA 15 MIN 3/6/2024 Pauly Davey OT GO 1                 Pauly Davey OT  3/6/2024    Electronically signed by Pauly Davey OT at 03/06/24 6522

## 2024-03-09 ENCOUNTER — APPOINTMENT (OUTPATIENT)
Dept: CARDIOLOGY | Facility: HOSPITAL | Age: 79
DRG: 329 | End: 2024-03-09
Payer: MEDICARE

## 2024-03-09 ENCOUNTER — ANESTHESIA EVENT CONVERTED (OUTPATIENT)
Dept: ANESTHESIOLOGY | Facility: HOSPITAL | Age: 79
DRG: 329 | End: 2024-03-09
Payer: MEDICARE

## 2024-03-09 ENCOUNTER — ANESTHESIA (OUTPATIENT)
Dept: PERIOP | Facility: HOSPITAL | Age: 79
End: 2024-03-09
Payer: MEDICARE

## 2024-03-09 ENCOUNTER — APPOINTMENT (OUTPATIENT)
Dept: GENERAL RADIOLOGY | Facility: HOSPITAL | Age: 79
DRG: 329 | End: 2024-03-09
Payer: MEDICARE

## 2024-03-09 LAB
ABO GROUP BLD: NORMAL
ABO GROUP BLD: NORMAL
ALBUMIN SERPL-MCNC: 2.9 G/DL (ref 3.5–5.2)
ALBUMIN/GLOB SERPL: 0.9 G/DL
ALP SERPL-CCNC: 70 U/L (ref 39–117)
ALT SERPL W P-5'-P-CCNC: 86 U/L (ref 1–41)
ANION GAP SERPL CALCULATED.3IONS-SCNC: 11 MMOL/L (ref 5–15)
AST SERPL-CCNC: 64 U/L (ref 1–40)
BASOPHILS # BLD AUTO: 0.08 10*3/MM3 (ref 0–0.2)
BASOPHILS NFR BLD AUTO: 0.6 % (ref 0–1.5)
BILIRUB SERPL-MCNC: 0.6 MG/DL (ref 0–1.2)
BLD GP AB SCN SERPL QL: NEGATIVE
BUN SERPL-MCNC: 15 MG/DL (ref 8–23)
BUN/CREAT SERPL: 20.8 (ref 7–25)
CALCIUM SPEC-SCNC: 8.7 MG/DL (ref 8.6–10.5)
CHLORIDE SERPL-SCNC: 107 MMOL/L (ref 98–107)
CO2 SERPL-SCNC: 23 MMOL/L (ref 22–29)
CREAT SERPL-MCNC: 0.72 MG/DL (ref 0.76–1.27)
D-LACTATE SERPL-SCNC: 1.3 MMOL/L (ref 0.5–2)
DEPRECATED RDW RBC AUTO: 51 FL (ref 37–54)
EGFRCR SERPLBLD CKD-EPI 2021: 92.9 ML/MIN/1.73
EOSINOPHIL # BLD AUTO: 0.02 10*3/MM3 (ref 0–0.4)
EOSINOPHIL NFR BLD AUTO: 0.1 % (ref 0.3–6.2)
ERYTHROCYTE [DISTWIDTH] IN BLOOD BY AUTOMATED COUNT: 15.4 % (ref 12.3–15.4)
GLOBULIN UR ELPH-MCNC: 3.2 GM/DL
GLUCOSE BLDC GLUCOMTR-MCNC: 128 MG/DL (ref 70–130)
GLUCOSE BLDC GLUCOMTR-MCNC: 205 MG/DL (ref 70–130)
GLUCOSE SERPL-MCNC: 118 MG/DL (ref 65–99)
HCT VFR BLD AUTO: 35.1 % (ref 37.5–51)
HGB BLD-MCNC: 11.3 G/DL (ref 13–17.7)
IMM GRANULOCYTES # BLD AUTO: 0.35 10*3/MM3 (ref 0–0.05)
IMM GRANULOCYTES NFR BLD AUTO: 2.5 % (ref 0–0.5)
LYMPHOCYTES # BLD AUTO: 0.88 10*3/MM3 (ref 0.7–3.1)
LYMPHOCYTES NFR BLD AUTO: 6.2 % (ref 19.6–45.3)
MAGNESIUM SERPL-MCNC: 2 MG/DL (ref 1.6–2.4)
MCH RBC QN AUTO: 29.7 PG (ref 26.6–33)
MCHC RBC AUTO-ENTMCNC: 32.2 G/DL (ref 31.5–35.7)
MCV RBC AUTO: 92.4 FL (ref 79–97)
MONOCYTES # BLD AUTO: 1.43 10*3/MM3 (ref 0.1–0.9)
MONOCYTES NFR BLD AUTO: 10.1 % (ref 5–12)
NEUTROPHILS NFR BLD AUTO: 11.36 10*3/MM3 (ref 1.7–7)
NEUTROPHILS NFR BLD AUTO: 80.5 % (ref 42.7–76)
NRBC BLD AUTO-RTO: 0 /100 WBC (ref 0–0.2)
PHOSPHATE SERPL-MCNC: 2 MG/DL (ref 2.5–4.5)
PHOSPHATE SERPL-MCNC: 3.4 MG/DL (ref 2.5–4.5)
PLATELET # BLD AUTO: 328 10*3/MM3 (ref 140–450)
PMV BLD AUTO: 8.4 FL (ref 6–12)
POTASSIUM SERPL-SCNC: 2.8 MMOL/L (ref 3.5–5.2)
POTASSIUM SERPL-SCNC: 3.3 MMOL/L (ref 3.5–5.2)
PREALB SERPL-MCNC: 7.9 MG/DL (ref 20–40)
PROT SERPL-MCNC: 6.1 G/DL (ref 6–8.5)
RBC # BLD AUTO: 3.8 10*6/MM3 (ref 4.14–5.8)
RH BLD: NEGATIVE
RH BLD: NEGATIVE
SODIUM SERPL-SCNC: 141 MMOL/L (ref 136–145)
T&S EXPIRATION DATE: NORMAL
WBC NRBC COR # BLD AUTO: 14.12 10*3/MM3 (ref 3.4–10.8)

## 2024-03-09 PROCEDURE — 84132 ASSAY OF SERUM POTASSIUM: CPT | Performed by: SURGERY

## 2024-03-09 PROCEDURE — 86923 COMPATIBILITY TEST ELECTRIC: CPT

## 2024-03-09 PROCEDURE — 25010000002 CEFOXITIN PER 1 G: Performed by: SURGERY

## 2024-03-09 PROCEDURE — 82948 REAGENT STRIP/BLOOD GLUCOSE: CPT

## 2024-03-09 PROCEDURE — 88307 TISSUE EXAM BY PATHOLOGIST: CPT | Performed by: SURGERY

## 2024-03-09 PROCEDURE — 84100 ASSAY OF PHOSPHORUS: CPT

## 2024-03-09 PROCEDURE — 25010000002 HYDRALAZINE PER 20 MG: Performed by: NURSE ANESTHETIST, CERTIFIED REGISTERED

## 2024-03-09 PROCEDURE — 25010000002 ONDANSETRON PER 1 MG: Performed by: ANESTHESIOLOGY

## 2024-03-09 PROCEDURE — 25010000002 DEXAMETHASONE SODIUM PHOSPHATE 10 MG/ML SOLUTION: Performed by: ANESTHESIOLOGY

## 2024-03-09 PROCEDURE — 86901 BLOOD TYPING SEROLOGIC RH(D): CPT

## 2024-03-09 PROCEDURE — 25010000002 LEVETRIRACETAM PER 10 MG: Performed by: SURGERY

## 2024-03-09 PROCEDURE — 25010000002 HYDRALAZINE PER 20 MG: Performed by: SURGERY

## 2024-03-09 PROCEDURE — 25010000002 LABETALOL 5 MG/ML SOLUTION: Performed by: NURSE ANESTHETIST, CERTIFIED REGISTERED

## 2024-03-09 PROCEDURE — 25810000003 LACTATED RINGERS PER 1000 ML: Performed by: ANESTHESIOLOGY

## 2024-03-09 PROCEDURE — 25010000002 POTASSIUM CHLORIDE 10 MEQ/100ML SOLUTION: Performed by: FAMILY MEDICINE

## 2024-03-09 PROCEDURE — 0DTE0ZZ RESECTION OF LARGE INTESTINE, OPEN APPROACH: ICD-10-PCS | Performed by: SURGERY

## 2024-03-09 PROCEDURE — 86850 RBC ANTIBODY SCREEN: CPT | Performed by: ANESTHESIOLOGY

## 2024-03-09 PROCEDURE — 84100 ASSAY OF PHOSPHORUS: CPT | Performed by: SURGERY

## 2024-03-09 PROCEDURE — 63710000001 INSULIN REGULAR HUMAN PER 5 UNITS: Performed by: SURGERY

## 2024-03-09 PROCEDURE — 25010000002 DEXAMETHASONE PER 1 MG: Performed by: NURSE ANESTHETIST, CERTIFIED REGISTERED

## 2024-03-09 PROCEDURE — 25010000002 HEPARIN (PORCINE) PER 1000 UNITS: Performed by: SURGERY

## 2024-03-09 PROCEDURE — 25810000003 LACTATED RINGERS PER 1000 ML: Performed by: SURGERY

## 2024-03-09 PROCEDURE — 0DN80ZZ RELEASE SMALL INTESTINE, OPEN APPROACH: ICD-10-PCS | Performed by: SURGERY

## 2024-03-09 PROCEDURE — P9041 ALBUMIN (HUMAN),5%, 50ML: HCPCS | Performed by: NURSE ANESTHETIST, CERTIFIED REGISTERED

## 2024-03-09 PROCEDURE — 86901 BLOOD TYPING SEROLOGIC RH(D): CPT | Performed by: ANESTHESIOLOGY

## 2024-03-09 PROCEDURE — 80053 COMPREHEN METABOLIC PANEL: CPT | Performed by: FAMILY MEDICINE

## 2024-03-09 PROCEDURE — 25010000002 ONDANSETRON PER 1 MG: Performed by: SURGERY

## 2024-03-09 PROCEDURE — 94640 AIRWAY INHALATION TREATMENT: CPT

## 2024-03-09 PROCEDURE — 83735 ASSAY OF MAGNESIUM: CPT

## 2024-03-09 PROCEDURE — 25010000002 FENTANYL CITRATE (PF) 100 MCG/2ML SOLUTION: Performed by: NURSE ANESTHETIST, CERTIFIED REGISTERED

## 2024-03-09 PROCEDURE — 25010000002 BUPIVACAINE (PF) 0.25 % SOLUTION: Performed by: ANESTHESIOLOGY

## 2024-03-09 PROCEDURE — 25010000002 METOCLOPRAMIDE PER 10 MG: Performed by: PHYSICIAN ASSISTANT

## 2024-03-09 PROCEDURE — 74018 RADEX ABDOMEN 1 VIEW: CPT

## 2024-03-09 PROCEDURE — 94761 N-INVAS EAR/PLS OXIMETRY MLT: CPT

## 2024-03-09 PROCEDURE — 25010000002 POTASSIUM CHLORIDE 10 MEQ/100ML SOLUTION: Performed by: SURGERY

## 2024-03-09 PROCEDURE — 85025 COMPLETE CBC W/AUTO DIFF WBC: CPT | Performed by: FAMILY MEDICINE

## 2024-03-09 PROCEDURE — 25010000002 PIPERACILLIN SOD-TAZOBACTAM PER 1 G: Performed by: FAMILY MEDICINE

## 2024-03-09 PROCEDURE — 25010000002 SUGAMMADEX 200 MG/2ML SOLUTION: Performed by: ANESTHESIOLOGY

## 2024-03-09 PROCEDURE — 83605 ASSAY OF LACTIC ACID: CPT | Performed by: SURGERY

## 2024-03-09 PROCEDURE — 0D1B0Z4 BYPASS ILEUM TO CUTANEOUS, OPEN APPROACH: ICD-10-PCS | Performed by: SURGERY

## 2024-03-09 PROCEDURE — 25010000002 ALBUMIN HUMAN 5% PER 50 ML: Performed by: NURSE ANESTHETIST, CERTIFIED REGISTERED

## 2024-03-09 PROCEDURE — 86900 BLOOD TYPING SEROLOGIC ABO: CPT | Performed by: ANESTHESIOLOGY

## 2024-03-09 PROCEDURE — 86900 BLOOD TYPING SEROLOGIC ABO: CPT

## 2024-03-09 PROCEDURE — 25010000002 PROPOFOL 10 MG/ML EMULSION: Performed by: NURSE ANESTHETIST, CERTIFIED REGISTERED

## 2024-03-09 PROCEDURE — 44210 LAPARO TOTAL PROCTOCOLECTOMY: CPT | Performed by: PHYSICIAN ASSISTANT

## 2024-03-09 DEVICE — PROXIMATE RELOADABLE LINEAR CUTTER WITH SAFETY LOCK-OUT, 75MM
Type: IMPLANTABLE DEVICE | Site: ABDOMEN | Status: FUNCTIONAL
Brand: PROXIMATE

## 2024-03-09 DEVICE — ECHELON CONTOUR W/ GREEN RELOAD
Type: IMPLANTABLE DEVICE | Site: ABDOMEN | Status: FUNCTIONAL
Brand: ECHELON

## 2024-03-09 DEVICE — PROXIMATE LINEAR CUTTER RELOAD, BLUE, 75MM
Type: IMPLANTABLE DEVICE | Site: ABDOMEN | Status: FUNCTIONAL
Brand: PROXIMATE

## 2024-03-09 RX ORDER — DEXAMETHASONE SODIUM PHOSPHATE 10 MG/ML
INJECTION, SOLUTION INTRAMUSCULAR; INTRAVENOUS
Status: COMPLETED | OUTPATIENT
Start: 2024-03-09 | End: 2024-03-09

## 2024-03-09 RX ORDER — POTASSIUM CHLORIDE 7.45 MG/ML
10 INJECTION INTRAVENOUS
Status: COMPLETED | OUTPATIENT
Start: 2024-03-09 | End: 2024-03-10

## 2024-03-09 RX ORDER — DOCUSATE SODIUM 100 MG/1
100 CAPSULE, LIQUID FILLED ORAL 2 TIMES DAILY PRN
Status: DISCONTINUED | OUTPATIENT
Start: 2024-03-09 | End: 2024-03-31 | Stop reason: HOSPADM

## 2024-03-09 RX ORDER — MAGNESIUM HYDROXIDE 1200 MG/15ML
LIQUID ORAL AS NEEDED
Status: DISCONTINUED | OUTPATIENT
Start: 2024-03-09 | End: 2024-03-09 | Stop reason: HOSPADM

## 2024-03-09 RX ORDER — NALOXONE HCL 0.4 MG/ML
0.1 VIAL (ML) INJECTION
Status: DISCONTINUED | OUTPATIENT
Start: 2024-03-09 | End: 2024-03-31 | Stop reason: HOSPADM

## 2024-03-09 RX ORDER — LIDOCAINE HYDROCHLORIDE 10 MG/ML
0.5 INJECTION, SOLUTION EPIDURAL; INFILTRATION; INTRACAUDAL; PERINEURAL ONCE AS NEEDED
Status: DISCONTINUED | OUTPATIENT
Start: 2024-03-09 | End: 2024-03-09 | Stop reason: HOSPADM

## 2024-03-09 RX ORDER — HYDROMORPHONE HYDROCHLORIDE 1 MG/ML
0.5 INJECTION, SOLUTION INTRAMUSCULAR; INTRAVENOUS; SUBCUTANEOUS
Status: DISCONTINUED | OUTPATIENT
Start: 2024-03-09 | End: 2024-03-09 | Stop reason: HOSPADM

## 2024-03-09 RX ORDER — ONDANSETRON 2 MG/ML
INJECTION INTRAMUSCULAR; INTRAVENOUS AS NEEDED
Status: DISCONTINUED | OUTPATIENT
Start: 2024-03-09 | End: 2024-03-09 | Stop reason: SURG

## 2024-03-09 RX ORDER — FENTANYL/ROPIVACAINE/NS/PF 2-625MCG/1
15 PLASTIC BAG, INJECTION (ML) EPIDURAL ONCE
Status: DISCONTINUED | OUTPATIENT
Start: 2024-03-09 | End: 2024-03-09

## 2024-03-09 RX ORDER — SODIUM CHLORIDE 9 MG/ML
40 INJECTION, SOLUTION INTRAVENOUS AS NEEDED
Status: DISCONTINUED | OUTPATIENT
Start: 2024-03-09 | End: 2024-03-09 | Stop reason: HOSPADM

## 2024-03-09 RX ORDER — IPRATROPIUM BROMIDE AND ALBUTEROL SULFATE 2.5; .5 MG/3ML; MG/3ML
3 SOLUTION RESPIRATORY (INHALATION) ONCE
Status: COMPLETED | OUTPATIENT
Start: 2024-03-09 | End: 2024-03-09

## 2024-03-09 RX ORDER — FENTANYL CITRATE 50 UG/ML
50 INJECTION, SOLUTION INTRAMUSCULAR; INTRAVENOUS
Status: DISCONTINUED | OUTPATIENT
Start: 2024-03-09 | End: 2024-03-09 | Stop reason: HOSPADM

## 2024-03-09 RX ORDER — FENTANYL CITRATE 50 UG/ML
INJECTION, SOLUTION INTRAMUSCULAR; INTRAVENOUS AS NEEDED
Status: DISCONTINUED | OUTPATIENT
Start: 2024-03-09 | End: 2024-03-09 | Stop reason: SURG

## 2024-03-09 RX ORDER — HYDRALAZINE HYDROCHLORIDE 20 MG/ML
INJECTION INTRAMUSCULAR; INTRAVENOUS AS NEEDED
Status: DISCONTINUED | OUTPATIENT
Start: 2024-03-09 | End: 2024-03-09 | Stop reason: SURG

## 2024-03-09 RX ORDER — ONDANSETRON 4 MG/1
4 TABLET, ORALLY DISINTEGRATING ORAL EVERY 6 HOURS PRN
Status: DISCONTINUED | OUTPATIENT
Start: 2024-03-09 | End: 2024-03-18

## 2024-03-09 RX ORDER — SODIUM CHLORIDE, SODIUM LACTATE, POTASSIUM CHLORIDE, CALCIUM CHLORIDE 600; 310; 30; 20 MG/100ML; MG/100ML; MG/100ML; MG/100ML
125 INJECTION, SOLUTION INTRAVENOUS CONTINUOUS
Status: DISCONTINUED | OUTPATIENT
Start: 2024-03-09 | End: 2024-03-11

## 2024-03-09 RX ORDER — IPRATROPIUM BROMIDE AND ALBUTEROL SULFATE 2.5; .5 MG/3ML; MG/3ML
SOLUTION RESPIRATORY (INHALATION)
Status: COMPLETED
Start: 2024-03-09 | End: 2024-03-09

## 2024-03-09 RX ORDER — ROCURONIUM BROMIDE 10 MG/ML
INJECTION, SOLUTION INTRAVENOUS AS NEEDED
Status: DISCONTINUED | OUTPATIENT
Start: 2024-03-09 | End: 2024-03-09 | Stop reason: SURG

## 2024-03-09 RX ORDER — DEXTROSE MONOHYDRATE 100 MG/ML
25 INJECTION, SOLUTION INTRAVENOUS
Status: ACTIVE | OUTPATIENT
Start: 2024-03-09 | End: 2024-03-10

## 2024-03-09 RX ORDER — ONDANSETRON 2 MG/ML
4 INJECTION INTRAMUSCULAR; INTRAVENOUS EVERY 6 HOURS PRN
Status: DISCONTINUED | OUTPATIENT
Start: 2024-03-09 | End: 2024-03-18

## 2024-03-09 RX ORDER — HYDROMORPHONE HYDROCHLORIDE 2 MG/ML
0.5 INJECTION, SOLUTION INTRAMUSCULAR; INTRAVENOUS; SUBCUTANEOUS
Status: DISCONTINUED | OUTPATIENT
Start: 2024-03-09 | End: 2024-03-11

## 2024-03-09 RX ORDER — BUPIVACAINE HYDROCHLORIDE 2.5 MG/ML
INJECTION, SOLUTION EPIDURAL; INFILTRATION; INTRACAUDAL
Status: COMPLETED | OUTPATIENT
Start: 2024-03-09 | End: 2024-03-09

## 2024-03-09 RX ORDER — SODIUM CHLORIDE 0.9 % (FLUSH) 0.9 %
10 SYRINGE (ML) INJECTION AS NEEDED
Status: DISCONTINUED | OUTPATIENT
Start: 2024-03-09 | End: 2024-03-09 | Stop reason: HOSPADM

## 2024-03-09 RX ORDER — LABETALOL HYDROCHLORIDE 5 MG/ML
INJECTION, SOLUTION INTRAVENOUS AS NEEDED
Status: DISCONTINUED | OUTPATIENT
Start: 2024-03-09 | End: 2024-03-09 | Stop reason: SURG

## 2024-03-09 RX ORDER — LIDOCAINE HYDROCHLORIDE 10 MG/ML
INJECTION, SOLUTION EPIDURAL; INFILTRATION; INTRACAUDAL; PERINEURAL AS NEEDED
Status: DISCONTINUED | OUTPATIENT
Start: 2024-03-09 | End: 2024-03-09 | Stop reason: SURG

## 2024-03-09 RX ORDER — ALBUMIN, HUMAN INJ 5% 5 %
SOLUTION INTRAVENOUS CONTINUOUS PRN
Status: DISCONTINUED | OUTPATIENT
Start: 2024-03-09 | End: 2024-03-09 | Stop reason: SURG

## 2024-03-09 RX ORDER — PROPOFOL 10 MG/ML
VIAL (ML) INTRAVENOUS AS NEEDED
Status: DISCONTINUED | OUTPATIENT
Start: 2024-03-09 | End: 2024-03-09 | Stop reason: SURG

## 2024-03-09 RX ORDER — SODIUM CHLORIDE, SODIUM LACTATE, POTASSIUM CHLORIDE, CALCIUM CHLORIDE 600; 310; 30; 20 MG/100ML; MG/100ML; MG/100ML; MG/100ML
9 INJECTION, SOLUTION INTRAVENOUS CONTINUOUS
Status: DISCONTINUED | OUTPATIENT
Start: 2024-03-09 | End: 2024-03-09

## 2024-03-09 RX ORDER — DEXAMETHASONE SODIUM PHOSPHATE 4 MG/ML
INJECTION, SOLUTION INTRA-ARTICULAR; INTRALESIONAL; INTRAMUSCULAR; INTRAVENOUS; SOFT TISSUE AS NEEDED
Status: DISCONTINUED | OUTPATIENT
Start: 2024-03-09 | End: 2024-03-09 | Stop reason: SURG

## 2024-03-09 RX ORDER — POTASSIUM CHLORIDE 7.45 MG/ML
10 INJECTION INTRAVENOUS
Status: DISCONTINUED | OUTPATIENT
Start: 2024-03-09 | End: 2024-03-09

## 2024-03-09 RX ORDER — MIDAZOLAM HYDROCHLORIDE 1 MG/ML
0.5 INJECTION INTRAMUSCULAR; INTRAVENOUS
Status: DISCONTINUED | OUTPATIENT
Start: 2024-03-09 | End: 2024-03-09 | Stop reason: HOSPADM

## 2024-03-09 RX ADMIN — SODIUM CHLORIDE, POTASSIUM CHLORIDE, SODIUM LACTATE AND CALCIUM CHLORIDE: 600; 310; 30; 20 INJECTION, SOLUTION INTRAVENOUS at 13:12

## 2024-03-09 RX ADMIN — PROPOFOL 200 MG: 10 INJECTION, EMULSION INTRAVENOUS at 13:19

## 2024-03-09 RX ADMIN — ALBUMIN (HUMAN): 12.5 INJECTION, SOLUTION INTRAVENOUS at 15:09

## 2024-03-09 RX ADMIN — HYDRALAZINE HYDROCHLORIDE 5 MG: 20 INJECTION INTRAMUSCULAR; INTRAVENOUS at 14:31

## 2024-03-09 RX ADMIN — ROCURONIUM BROMIDE 50 MG: 10 INJECTION INTRAVENOUS at 13:49

## 2024-03-09 RX ADMIN — POTASSIUM CHLORIDE 10 MEQ: 7.46 INJECTION, SOLUTION INTRAVENOUS at 23:30

## 2024-03-09 RX ADMIN — ALBUMIN (HUMAN): 12.5 INJECTION, SOLUTION INTRAVENOUS at 13:42

## 2024-03-09 RX ADMIN — POTASSIUM CHLORIDE 10 MEQ: 7.46 INJECTION, SOLUTION INTRAVENOUS at 02:44

## 2024-03-09 RX ADMIN — POTASSIUM CHLORIDE 10 MEQ: 7.46 INJECTION, SOLUTION INTRAVENOUS at 09:20

## 2024-03-09 RX ADMIN — IPRATROPIUM BROMIDE AND ALBUTEROL SULFATE 3 ML: 2.5; .5 SOLUTION RESPIRATORY (INHALATION) at 08:19

## 2024-03-09 RX ADMIN — HYDRALAZINE HYDROCHLORIDE 10 MG: 20 INJECTION INTRAMUSCULAR; INTRAVENOUS at 21:34

## 2024-03-09 RX ADMIN — DEXAMETHASONE SODIUM PHOSPHATE 4 MG: 4 INJECTION INTRA-ARTICULAR; INTRALESIONAL; INTRAMUSCULAR; INTRAVENOUS; SOFT TISSUE at 13:25

## 2024-03-09 RX ADMIN — SODIUM CHLORIDE 500 MG: 9 INJECTION, SOLUTION INTRAVENOUS at 07:37

## 2024-03-09 RX ADMIN — LEVETIRACETAM 500 MG: 100 INJECTION, SOLUTION INTRAVENOUS at 20:26

## 2024-03-09 RX ADMIN — POTASSIUM CHLORIDE 10 MEQ: 7.46 INJECTION, SOLUTION INTRAVENOUS at 22:25

## 2024-03-09 RX ADMIN — LIDOCAINE HYDROCHLORIDE 50 MG: 10 INJECTION, SOLUTION EPIDURAL; INFILTRATION; INTRACAUDAL; PERINEURAL at 13:19

## 2024-03-09 RX ADMIN — Medication 10 ML: at 20:38

## 2024-03-09 RX ADMIN — BUPIVACAINE HYDROCHLORIDE 60 ML: 2.5 INJECTION, SOLUTION EPIDURAL; INFILTRATION; INTRACAUDAL; PERINEURAL at 13:22

## 2024-03-09 RX ADMIN — ALBUMIN (HUMAN): 12.5 INJECTION, SOLUTION INTRAVENOUS at 15:35

## 2024-03-09 RX ADMIN — SODIUM CHLORIDE, POTASSIUM CHLORIDE, SODIUM LACTATE AND CALCIUM CHLORIDE 125 ML/HR: 600; 310; 30; 20 INJECTION, SOLUTION INTRAVENOUS at 20:25

## 2024-03-09 RX ADMIN — PIPERACILLIN AND TAZOBACTAM 3.38 G: 3; .375 INJECTION, POWDER, LYOPHILIZED, FOR SOLUTION INTRAVENOUS at 21:22

## 2024-03-09 RX ADMIN — POTASSIUM CHLORIDE 10 MEQ: 7.46 INJECTION, SOLUTION INTRAVENOUS at 00:19

## 2024-03-09 RX ADMIN — PANTOPRAZOLE SODIUM 40 MG: 40 INJECTION, POWDER, FOR SOLUTION INTRAVENOUS at 06:15

## 2024-03-09 RX ADMIN — CEFOXITIN SODIUM 2 G: 2 POWDER, FOR SOLUTION INTRAVENOUS at 13:30

## 2024-03-09 RX ADMIN — POTASSIUM CHLORIDE 10 MEQ: 7.46 INJECTION, SOLUTION INTRAVENOUS at 10:16

## 2024-03-09 RX ADMIN — CEFOXITIN SODIUM 2000 MG: 2 POWDER, FOR SOLUTION INTRAVENOUS at 17:12

## 2024-03-09 RX ADMIN — CEFOXITIN SODIUM 2 G: 2 POWDER, FOR SOLUTION INTRAVENOUS at 15:16

## 2024-03-09 RX ADMIN — SODIUM CHLORIDE 500 MG: 9 INJECTION, SOLUTION INTRAVENOUS at 20:25

## 2024-03-09 RX ADMIN — LABETALOL HYDROCHLORIDE 5 MG: 5 INJECTION, SOLUTION INTRAVENOUS at 14:14

## 2024-03-09 RX ADMIN — METOCLOPRAMIDE 10 MG: 5 INJECTION, SOLUTION INTRAMUSCULAR; INTRAVENOUS at 06:14

## 2024-03-09 RX ADMIN — DEXAMETHASONE SODIUM PHOSPHATE 4 MG: 10 INJECTION, SOLUTION INTRAMUSCULAR; INTRAVENOUS at 13:22

## 2024-03-09 RX ADMIN — SUGAMMADEX 200 MG: 100 INJECTION, SOLUTION INTRAVENOUS at 18:33

## 2024-03-09 RX ADMIN — POTASSIUM CHLORIDE 10 MEQ: 7.46 INJECTION, SOLUTION INTRAVENOUS at 01:18

## 2024-03-09 RX ADMIN — SODIUM CHLORIDE, POTASSIUM CHLORIDE, SODIUM LACTATE AND CALCIUM CHLORIDE: 600; 310; 30; 20 INJECTION, SOLUTION INTRAVENOUS at 15:09

## 2024-03-09 RX ADMIN — PIPERACILLIN AND TAZOBACTAM 3.38 G: 3; .375 INJECTION, POWDER, LYOPHILIZED, FOR SOLUTION INTRAVENOUS at 06:27

## 2024-03-09 RX ADMIN — METOPROLOL TARTRATE 5 MG: 5 INJECTION INTRAVENOUS at 21:05

## 2024-03-09 RX ADMIN — INSULIN HUMAN 3 UNITS: 100 INJECTION, SOLUTION PARENTERAL at 23:55

## 2024-03-09 RX ADMIN — DEXTROSE MONOHYDRATE 25 ML/HR: 100 INJECTION, SOLUTION INTRAVENOUS at 22:29

## 2024-03-09 RX ADMIN — HEPARIN SODIUM 5000 UNITS: 5000 INJECTION INTRAVENOUS; SUBCUTANEOUS at 20:26

## 2024-03-09 RX ADMIN — FENTANYL CITRATE 100 MCG: 50 INJECTION, SOLUTION INTRAMUSCULAR; INTRAVENOUS at 13:19

## 2024-03-09 RX ADMIN — SODIUM CHLORIDE, POTASSIUM CHLORIDE, SODIUM LACTATE AND CALCIUM CHLORIDE: 600; 310; 30; 20 INJECTION, SOLUTION INTRAVENOUS at 16:46

## 2024-03-09 RX ADMIN — IPRATROPIUM BROMIDE AND ALBUTEROL SULFATE 3 ML: 2.5; .5 SOLUTION RESPIRATORY (INHALATION) at 08:14

## 2024-03-09 RX ADMIN — ONDANSETRON 4 MG: 2 INJECTION INTRAMUSCULAR; INTRAVENOUS at 22:10

## 2024-03-09 RX ADMIN — LABETALOL HYDROCHLORIDE 5 MG: 5 INJECTION, SOLUTION INTRAVENOUS at 14:20

## 2024-03-09 RX ADMIN — ROCURONIUM BROMIDE 50 MG: 10 INJECTION INTRAVENOUS at 13:19

## 2024-03-09 RX ADMIN — ONDANSETRON 4 MG: 2 INJECTION INTRAMUSCULAR; INTRAVENOUS at 18:06

## 2024-03-09 RX ADMIN — SODIUM CHLORIDE, POTASSIUM CHLORIDE, SODIUM LACTATE AND CALCIUM CHLORIDE: 600; 310; 30; 20 INJECTION, SOLUTION INTRAVENOUS at 13:42

## 2024-03-09 RX ADMIN — ALBUMIN (HUMAN): 12.5 INJECTION, SOLUTION INTRAVENOUS at 14:05

## 2024-03-09 NOTE — CONSULTS
Clinical Nutrition     Nutrition Support Assessment  Reason for Visit: Consult, PN    Patient Name: Lea Rivers  YOB: 1945  MRN: [id number]  Date of Encounter: 03/09/24 10:01 EST  Admission date: 3/2/2024    Comments:    TPN Recommendations via PICC:  15% dextrose, 7.7% AA @ goal rate of 65mL/hr. Provide 250mL 20% SMOF daily.   Initiate at 25mL/hr and advance by 25mL q 8hrs to goal of 65mL/hr.  Infused at goal over 24hrs this regimen provides:  1.56L TGV; GIR = 1.82mg/kg/min  1776kcal (99% est needs); 120g protein (100% est needs)    Nutrition Assessment   Admission Diagnosis:  Falls [W19.XXXA]  Pneumatosis intestinalis [K63.89]      Problem List:    Falls    Pneumatosis intestinalis        PMH:   He  has a past medical history of Anemia, Colon cancer (1990), Coronary artery disease, Diabetes, Dyslipidemia, GERD (gastroesophageal reflux disease), Hyperlipidemia, Hypertension, Hyponatremia, Hypothyroidism, Left shoulder pain, Low sodium levels (2022), Memory deficit, Osteoarthritis, Prostate cancer (07/23/2022), Seizure disorder, and Skin cancer.    PSH:  He  has a past surgical history that includes Colectomy partial / total (1990); Cholecystectomy; Appendectomy; Other surgical history; Sinus surgery; Tonsillectomy; Vasectomy; Carpal tunnel release (Bilateral); TURP / transurethral incision / drainage prostate; Colonoscopy; Transurethral resection of prostate (N/A, 09/21/2018); Skin biopsy; Teeth Extraction; Total knee arthroplasty (Right, 04/07/2022); Cardiac catheterization (2012); Prostate biopsy (N/A, 11/11/2022); Transrectal Incision Prostate (Bilateral, 01/06/2023); and Cyberknife (02/09/2023).      Applicable Nutrition Concerns:   Skin:  Oral:  GI:      Applicable Interval History:         Reported/Observed/Food/Nutrition Related History:     3/9  Per RN, pt pulled PICC last night-pt OOR during visit but pt's son at bedside. Able to obtain nutrition hx. Pt eating well  PTA, usually a picky eater and eats the same things all the time-preferences noted below. Pt usually eats at [employer] for breakfast, makes something quick at home for lunch and has dinner provided by dtr. Son states pt's wt has been stable, ~196-200lbs. NKFA. Plan is for total colectomy w/end ileostomy today.     3/8  Pt unavailable during RD visit, having PICC placed. Also noted w/dementia-attempted to call family w/o success. Discussed w/nsg, pt to likely have bowel sx tomorrow w/colostomy creation. Rectal tube placed today 2/2 colonic decompression. Pt appears to have had 7lb/3.6% non significant wt loss in the past month. NKFA.     Labs    Labs Reviewed: Yes     Results from last 7 days   Lab Units 03/09/24  0335 03/08/24  1638 03/08/24  1215 03/08/24  0421 03/07/24  1804 03/07/24  1241 03/07/24  0804 03/07/24  0411 03/06/24  1838 03/06/24  1751   GLUCOSE mg/dL 118*  --  98 90  --   --   --  124*  --  121*   BUN mg/dL 15  --  16 15  --   --   --  15  --  15   CREATININE mg/dL 0.72*  --  0.70* 0.68*  --   --   --  0.74*  --  0.92   SODIUM mmol/L 141  --  135* 132*  --   --   --  131*  --  134*   CHLORIDE mmol/L 107  --  104 101  --   --   --  99  --  98   POTASSIUM mmol/L 2.8* 2.5* 3.0* 2.6*   < >  --   --  3.1*  --  3.8   PHOSPHORUS mg/dL 2.0*  --  2.9  --   --   --   --  3.0  --   --    MAGNESIUM mg/dL 2.0 2.3 1.8 1.8  --   --   --  1.9   < >  --    ALT (SGPT) U/L 86*  --  103*  --   --   --   --   --   --  46*   LACTATE mmol/L 1.3  --   --   --   --  1.4 1.4 1.6   < >  --     < > = values in this interval not displayed.       Results from last 7 days   Lab Units 03/09/24  0335 03/08/24  1638 03/08/24  1215 03/07/24  0411 03/06/24  1751   ALBUMIN g/dL 2.9*  --  3.1*  --  3.3*   PREALBUMIN mg/dL  --  7.9*  --   --   --    CRP mg/dL  --   --  12.05*  --   --    IONIZED CALCIUM mmol/L  --  1.25  --  1.22  --    CHOLESTEROL mg/dL  --   --  103  --   --    TRIGLYCERIDES mg/dL  --   --  161*  --   --        Results  "from last 7 days   Lab Units 03/09/24  0012 03/08/24  1730 03/08/24  1558 03/08/24  1047 03/08/24  0805 03/07/24  1905   GLUCOSE mg/dL 128 110 107 94 109 107     Lab Results   Lab Value Date/Time    HGBA1C 5.50 03/02/2024 0947    HGBA1C 5.5 04/28/2023 1142    HGBA1C 5.00 01/04/2023 1227                   Medications    Medications Reviewed: Yes  Pertinent: insulin, reglan, protonix, abx, pericolace  Infusion:  PRN:     Intake/Ouptut 24 hrs (0701 - 0700)   I&O's Reviewed: Yes   +2 BM 3/8    Anthropometrics     Flowsheet Rows      Flowsheet Row First Filed Value   Admission Height 170.2 cm (67\") Documented at 03/02/2024 0947   Admission Weight 88.9 kg (196 lb) Documented at 03/02/2024 0947          Height: Height: 170.2 cm (67.01\")  Last Filed Weight: Weight: 88.9 kg (195 lb 15.8 oz) (03/09/24 0815)  Method: Weight Method: Stated  BMI: BMI (Calculated): 30.7  BMI classification: Obese Class I: 30-34.9kg/m2  IBW:      UBW: 203lbs per EMR  Weight change:    Weight       Weight (kg) Weight (lbs) Weight Method Visit Report   3/23/2023 87.091 kg  192 lb   --    4/10/2023 87.091 kg  192 lb   --    4/28/2023 94.62 kg  208 lb 9.6 oz   --    5/6/2023 91.627 kg  202 lb      5/16/2023 90.719 kg  200 lb   --    6/20/2023 90.719 kg  200 lb   --    8/14/2023 93.441 kg  206 lb   --    9/14/2023 92.398 kg  203 lb 11.2 oz   --    12/4/2023 92.08 kg  203 lb   --    1/5/2024 78.926 kg  174 lb  Stated     2/15/2024 92.08 kg  203 lb   --    2/27/2024 89.086 kg  196 lb 6.4 oz      3/2/2024 88.905 kg  196 lb  Stated        Stated         Nutrition Focused Physical Exam     Date: 3/8    Unable to perform exam due to: Pt unable to participate at time of visit    Needs Assessment   Date: 3/8    Height used:Height: 170.2 cm (67.01\")  Weights used: 196lb/89kg    Estimated Calorie needs: ~ 1800 Kcal/day  Method:  MSJ (5203) x 1.2 =1876  Method:  17-20 Kcals/KG 1513-1780kcals    Estimated Protein needs: ~ 120 g PRO/day  Method: 1.2 g/Kg ABW " =107g  Method: 2g/kg IBW = 132g    Estimated Fluid needs: ~ ml/day   Per clinical status    Current Nutrition Prescription     PO: NPO Diet NPO Type: Strict NPO  Adult Cyclic Central 2-in-1 TPN  Oral Nutrition Supplement:   Intake: N/A      Nutrition Diagnosis   Date: 3/8 Updated:   Problem Altered GI function   Etiology Colonic distention, ileus, pending bowel sx   Signs/Symptoms Need for TPN   Status:     Goal:   General: Nutrition to support treatment  PO: N/A  EN/PN: Initiate PN    Nutrition Intervention      Follow treatment progress, Care plan reviewed, Nutrition support order placed to pharmacy    Initiate TPN once PICC placed    Monitoring/Evaluation:   Per protocol, I&O, Pertinent labs, PN delivery/tolerance, Weight, GI status      Aria Motron, MS,RD,LD  Time Spent: 25

## 2024-03-09 NOTE — ANESTHESIA POSTPROCEDURE EVALUATION
Patient: Lea Rivers    Procedure Summary       Date: 03/09/24 Room / Location:  [facility]    Anesthesia Start: 1312 Anesthesia Stop: 1850    Procedure: TOTAL ABDOMINAL COLECTOMY, AND END ILEOSTOMY (Abdomen) Diagnosis:     Surgeons: Harley Godfrey MD Provider: Ellie Wasserman DO    Anesthesia Type: general ASA Status: 3            Anesthesia Type: general    Vitals  No vitals data found for the desired time range.          Post Anesthesia Care and Evaluation    Patient location during evaluation: PACU  Patient participation: complete - patient participated  Level of consciousness: sleepy but conscious  Pain management: adequate    Airway patency: patent  Anesthetic complications: No anesthetic complications  PONV Status: none  Cardiovascular status: hemodynamically stable and acceptable  Respiratory status: nonlabored ventilation, acceptable and nasal cannula  Hydration status: acceptable    Comments: To PACU on O2NC, breathing comfortably.  Report to PACU RN at bedside. VSS.

## 2024-03-09 NOTE — PLAN OF CARE
Problem: Adult Inpatient Plan of Care  Goal: Optimal Comfort and Wellbeing  Outcome: Ongoing, Progressing  Intervention: Provide Person-Centered Care  Recent Flowsheet Documentation  Taken 3/8/2024 2000 by Blanca Mendoza RN  Trust Relationship/Rapport:   care explained   choices provided   questions answered   thoughts/feelings acknowledged     Problem: Fall Injury Risk  Goal: Absence of Fall and Fall-Related Injury  Intervention: Promote Injury-Free Environment  Recent Flowsheet Documentation  Taken 3/9/2024 0200 by Blanca Mendoza, RN  Safety Promotion/Fall Prevention:   activity supervised   assistive device/personal items within reach   clutter free environment maintained   fall prevention program maintained   nonskid shoes/slippers when out of bed   safety round/check completed  Taken 3/9/2024 0000 by Blanca Mendoza RN  Safety Promotion/Fall Prevention:   activity supervised   assistive device/personal items within reach   clutter free environment maintained   fall prevention program maintained   nonskid shoes/slippers when out of bed   safety round/check completed  Taken 3/8/2024 2000 by Blanca Mendoza, RN  Safety Promotion/Fall Prevention:   activity supervised   assistive device/personal items within reach   clutter free environment maintained   fall prevention program maintained   nonskid shoes/slippers when out of bed   safety round/check completed   Goal Outcome Evaluation:  Plan of Care Reviewed With: patient        Progress: declining

## 2024-03-09 NOTE — PROGRESS NOTES
"Patient Name:  Lea Rivers  YOB: 1945  [id number]    Surgery Progress Note    Date of visit: 3/9/2024    Subjective     Unable to obtain full history due to patient's dementia. Continues to have multiple small bowel movements. No fevers or chills overnight.        Objective       BP (!) 191/105 (BP Location: Right arm, Patient Position: Lying)   Pulse 90 Comment: Simultaneous filing. User may be unaware of other data.  Temp 98 °F (36.7 °C) (Temporal)   Resp 18 Comment: Simultaneous filing. User may be unaware of other data.  Ht 170.2 cm (67.01\")   Wt 88.9 kg (195 lb 15.8 oz)   SpO2 98%   BMI 30.69 kg/m²   No intake or output data in the 24 hours ending 03/09/24 0857    CV:  Rhythm regular and rate regular  L:  Clear to auscultation bilaterally  Abd:  Bowel sounds positive, soft, stable distention, nontender  Ext:  No cyanosis, clubbing, edema    Recent labs and imaging that are back at this time have been reviewed.   K 2.8  Cr 0.72  Lactate 1.3  WBC 14.1  Hgb 11.3    KUB 3/9/2024:  Impression:  Gaseous distention of the colon which appears similar as compared to the previous study.       Assessment & Plan     Patient with persistent colonic ileus and significant colonic distention. I had a long discussion with the patient's family about various treatment options and the risks and benefits of each. At the end of the discussion, they were agreeable to total abdominal colectomy with end ileostomy. NPO, IV fluids, K replacement, TPN, IV abx until the time of operation.         Harley Godfrey MD  3/9/2024  08:57 EST      "

## 2024-03-09 NOTE — ANESTHESIA PROCEDURE NOTES
Airway  Urgency: elective    Date/Time: 3/9/2024 1:20 PM  Airway not difficult    General Information and Staff    Patient location during procedure: OR  CRNA/CAA: Miguel Gaxiola CRNA    Indications and Patient Condition  Indications for airway management: airway protection    Preoxygenated: yes  MILS not maintained throughout  Mask difficulty assessment: 0 - not attempted    Final Airway Details  Final airway type: endotracheal airway      Successful airway: ETT  Cuffed: yes   Successful intubation technique: video laryngoscopy  Facilitating devices/methods: intubating stylet  Endotracheal tube insertion site: oral  Blade: Owusu  Blade size: 3  ETT size (mm): 7.0  Cormack-Lehane Classification: grade I - full view of glottis  Placement verified by: chest auscultation and capnometry   Cuff volume (mL): 7  Measured from: lips  ETT/EBT  to lips (cm): 20  Number of attempts at approach: 1  Assessment: lips, teeth, and gum same as pre-op and atraumatic intubation    Additional Comments  Negative epigastric sounds, Breath sound equal bilaterally with symmetric chest rise and fall. VC clear upon DL

## 2024-03-09 NOTE — ANESTHESIA PROCEDURE NOTES
"Peripheral Block      Patient reassessed immediately prior to procedure    Patient location during procedure: OR  Reason for block: at surgeon's request and post-op pain management  Performed by  Anesthesiologist: Gaston Quiroz MD  Preanesthetic Checklist  Completed: patient identified, IV checked, site marked, risks and benefits discussed, surgical consent, monitors and equipment checked, pre-op evaluation and timeout performed  Prep:  Pt Position: supine  Sterile barriers:cap, gloves, mask and washed/disinfected hands  Prep: ChloraPrep  Patient monitoring: blood pressure monitoring, continuous pulse oximetry and EKG  Procedure    Sedation: yes  Performed under: general  Guidance:ultrasound guided  Images:still images obtained, printed/placed on chart    Laterality:Bilateral  Block Type:TAP  Injection Technique:single-shot  Needle Type:short-bevel and echogenic  Needle Gauge:20 G  Resistance on Injection: none    Medications Used: dexamethasone sodium phosphate injection - Injection   4 mg - 3/9/2024 1:22:00 PM  bupivacaine PF (MARCAINE) 0.25 % injection - Injection   60 mL - 3/9/2024 1:22:00 PM      Medications  Comment:Block Injection:  LA dose divided between Right and Left block        Post Assessment  Injection Assessment: negative aspiration for heme, incremental injection and no paresthesia on injection  Patient Tolerance:comfortable throughout block  Complications:no  Additional Notes    Subcostal TAPs    A high-frequency linear transducer, with sterile cover, was placed sub-xiphoid to identify Line[place] Alba, right and left Rectus Abdominus Muscles (ERICA). The transducer was moved either right or left subcostally to identify the ERICA and the Transverse Abdominus Muscle (RODRIGUEZ). The insertion site was prepped in sterile fashion and then localized with 2-5 ml of 1% Lidocaine. Using ultrasound-guidance, a 20-gauge B-Santos 4\" Ultraplex 360 non-stimulating echogenic needle was advanced in plane, from medial to " "lateral, until the tip of the needle was in the fascial plane between the ERICA and RODRIGUEZ. 1-3ml of preservative free normal saline was used to hydro-dissect the fascial planes. After the fascial plane was verified, the local anesthetic (LA) was injected. The procedure was repeated on the opposite side for bilateral coverage. Aspiration every 5 ml to prevent intravascular injection. Injection was completed with negative aspiration of blood and negative intravascular injection. Injection pressures were normal with minimal resistance. The subcostal approach to the TAP nerve block ideally anesthetizes the intercostal nerves T6-T9.     Mid-Axillary/Lateral TAPs    A high-frequency linear transducer, with sterile cover, was placed in the midaxillary line between the ASIS and costal margin. The External Oblique Muscle (EOM), Internal Oblique Muscle (IOM), Transverse Abdominus Muscle (RODRIGUEZ), and Peritoneum were identified. The insertion site was prepped in sterile fashion and then localized with 2-5 ml of 1% Lidocaine. Using ultrasound-guidance, a 20-gauge B-Santos 4\" Ultraplex 360 non-stimulating echogenic needle was advanced in plane, from medial to lateral, until the tip of the needle was in the fascial plane between the IOM and RODRIGUEZ. 1-3ml of preservative free normal saline was used to hydro-dissect the fascial planes. After the fascial plane was verified, the local anesthetic (LA) was injected. The procedure was repeated on the opposite side for bilateral coverage. Aspiration every 5 ml to prevent intravascular injection. Injection was completed with negative aspiration of blood and negative intravascular injection. Injection pressures were normal with minimal resistance. Midaxillary TAPs should reach intercostal nerves T10- T11 and the subcostal nerve T12.              "

## 2024-03-10 ENCOUNTER — APPOINTMENT (OUTPATIENT)
Dept: CARDIOLOGY | Facility: HOSPITAL | Age: 79
DRG: 329 | End: 2024-03-10
Payer: MEDICARE

## 2024-03-10 LAB
ANION GAP SERPL CALCULATED.3IONS-SCNC: 9 MMOL/L (ref 5–15)
BH CV UPPER VENOUS LEFT SUBCLAVIAN AUGMENT: NORMAL
BH CV UPPER VENOUS LEFT SUBCLAVIAN COMPRESS: NORMAL
BH CV UPPER VENOUS LEFT SUBCLAVIAN PHASIC: NORMAL
BH CV UPPER VENOUS LEFT SUBCLAVIAN SPONT: NORMAL
BH CV UPPER VENOUS RIGHT AXILLARY AUGMENT: NORMAL
BH CV UPPER VENOUS RIGHT AXILLARY COMPRESS: NORMAL
BH CV UPPER VENOUS RIGHT AXILLARY PHASIC: NORMAL
BH CV UPPER VENOUS RIGHT AXILLARY SPONT: NORMAL
BH CV UPPER VENOUS RIGHT BASILIC FOREARM COMPRESS: NORMAL
BH CV UPPER VENOUS RIGHT BASILIC UPPER COLOR: 1
BH CV UPPER VENOUS RIGHT BASILIC UPPER COMPRESS: NORMAL
BH CV UPPER VENOUS RIGHT BRACHIAL COLOR: 1
BH CV UPPER VENOUS RIGHT BRACHIAL COMPRESS: NORMAL
BH CV UPPER VENOUS RIGHT CEPHALIC UPPER COMPRESS: NORMAL
BH CV UPPER VENOUS RIGHT INTERNAL JUGULAR AUGMENT: NORMAL
BH CV UPPER VENOUS RIGHT INTERNAL JUGULAR COMPRESS: NORMAL
BH CV UPPER VENOUS RIGHT INTERNAL JUGULAR PHASIC: NORMAL
BH CV UPPER VENOUS RIGHT INTERNAL JUGULAR SPONT: NORMAL
BH CV UPPER VENOUS RIGHT RADIAL COMPRESS: NORMAL
BH CV UPPER VENOUS RIGHT SUBCLAVIAN AUGMENT: NORMAL
BH CV UPPER VENOUS RIGHT SUBCLAVIAN COMPRESS: NORMAL
BH CV UPPER VENOUS RIGHT SUBCLAVIAN PHASIC: NORMAL
BH CV UPPER VENOUS RIGHT SUBCLAVIAN SPONT: NORMAL
BH CV UPPER VENOUS RIGHT ULNAR COMPRESS: NORMAL
BH CV VAS PRELIMINARY FINDINGS SCRIPTING: 1
BUN SERPL-MCNC: 17 MG/DL (ref 8–23)
BUN/CREAT SERPL: 26.2 (ref 7–25)
CALCIUM SPEC-SCNC: 8.4 MG/DL (ref 8.6–10.5)
CHLORIDE SERPL-SCNC: 108 MMOL/L (ref 98–107)
CO2 SERPL-SCNC: 22 MMOL/L (ref 22–29)
CREAT SERPL-MCNC: 0.65 MG/DL (ref 0.76–1.27)
EGFRCR SERPLBLD CKD-EPI 2021: 95.8 ML/MIN/1.73
GLUCOSE SERPL-MCNC: 135 MG/DL (ref 65–99)
MAGNESIUM SERPL-MCNC: 1.8 MG/DL (ref 1.6–2.4)
PHOSPHATE SERPL-MCNC: 2.8 MG/DL (ref 2.5–4.5)
POTASSIUM SERPL-SCNC: 3.7 MMOL/L (ref 3.5–5.2)
SODIUM SERPL-SCNC: 139 MMOL/L (ref 136–145)

## 2024-03-10 PROCEDURE — 02HV33Z INSERTION OF INFUSION DEVICE INTO SUPERIOR VENA CAVA, PERCUTANEOUS APPROACH: ICD-10-PCS | Performed by: INTERNAL MEDICINE

## 2024-03-10 PROCEDURE — 93971 EXTREMITY STUDY: CPT | Performed by: INTERNAL MEDICINE

## 2024-03-10 PROCEDURE — 93971 EXTREMITY STUDY: CPT

## 2024-03-10 PROCEDURE — 25010000002 PIPERACILLIN SOD-TAZOBACTAM PER 1 G: Performed by: FAMILY MEDICINE

## 2024-03-10 PROCEDURE — 25010000002 CALCIUM GLUCONATE PER 10 ML

## 2024-03-10 PROCEDURE — 83735 ASSAY OF MAGNESIUM: CPT | Performed by: SURGERY

## 2024-03-10 PROCEDURE — 63710000001 INSULIN REGULAR HUMAN PER 5 UNITS: Performed by: SURGERY

## 2024-03-10 PROCEDURE — 25010000002 HYDROMORPHONE PER 4 MG: Performed by: SURGERY

## 2024-03-10 PROCEDURE — 25010000002 MAGNESIUM SULFATE PER 500 MG OF MAGNESIUM

## 2024-03-10 PROCEDURE — 25010000002 HYDRALAZINE PER 20 MG: Performed by: SURGERY

## 2024-03-10 PROCEDURE — C1894 INTRO/SHEATH, NON-LASER: HCPCS

## 2024-03-10 PROCEDURE — 25010000002 HEPARIN (PORCINE) PER 1000 UNITS: Performed by: SURGERY

## 2024-03-10 PROCEDURE — 25010000002 POTASSIUM CHLORIDE PER 2 MEQ OF POTASSIUM

## 2024-03-10 PROCEDURE — 25010000002 LEVETRIRACETAM PER 10 MG: Performed by: SURGERY

## 2024-03-10 PROCEDURE — 99232 SBSQ HOSP IP/OBS MODERATE 35: CPT | Performed by: FAMILY MEDICINE

## 2024-03-10 PROCEDURE — 84100 ASSAY OF PHOSPHORUS: CPT | Performed by: SURGERY

## 2024-03-10 PROCEDURE — 25810000003 LACTATED RINGERS PER 1000 ML: Performed by: SURGERY

## 2024-03-10 PROCEDURE — C1751 CATH, INF, PER/CENT/MIDLINE: HCPCS

## 2024-03-10 PROCEDURE — 82948 REAGENT STRIP/BLOOD GLUCOSE: CPT

## 2024-03-10 PROCEDURE — 25010000002 POTASSIUM CHLORIDE 10 MEQ/100ML SOLUTION: Performed by: FAMILY MEDICINE

## 2024-03-10 PROCEDURE — 25010000002 HYDRALAZINE PER 20 MG: Performed by: FAMILY MEDICINE

## 2024-03-10 PROCEDURE — 80048 BASIC METABOLIC PNL TOTAL CA: CPT | Performed by: SURGERY

## 2024-03-10 RX ORDER — LABETALOL HYDROCHLORIDE 5 MG/ML
10 INJECTION, SOLUTION INTRAVENOUS EVERY 4 HOURS PRN
Status: DISCONTINUED | OUTPATIENT
Start: 2024-03-10 | End: 2024-03-31 | Stop reason: HOSPADM

## 2024-03-10 RX ORDER — SODIUM CHLORIDE 0.9 % (FLUSH) 0.9 %
10 SYRINGE (ML) INJECTION AS NEEDED
Status: DISCONTINUED | OUTPATIENT
Start: 2024-03-10 | End: 2024-03-31 | Stop reason: HOSPADM

## 2024-03-10 RX ORDER — HYDRALAZINE HYDROCHLORIDE 20 MG/ML
20 INJECTION INTRAMUSCULAR; INTRAVENOUS EVERY 6 HOURS
Status: DISCONTINUED | OUTPATIENT
Start: 2024-03-10 | End: 2024-03-15

## 2024-03-10 RX ORDER — SODIUM CHLORIDE 0.9 % (FLUSH) 0.9 %
20 SYRINGE (ML) INJECTION AS NEEDED
Status: DISCONTINUED | OUTPATIENT
Start: 2024-03-10 | End: 2024-03-31 | Stop reason: HOSPADM

## 2024-03-10 RX ORDER — SODIUM CHLORIDE 0.9 % (FLUSH) 0.9 %
10 SYRINGE (ML) INJECTION EVERY 12 HOURS SCHEDULED
Status: DISCONTINUED | OUTPATIENT
Start: 2024-03-10 | End: 2024-03-31 | Stop reason: HOSPADM

## 2024-03-10 RX ADMIN — METOPROLOL TARTRATE 5 MG: 5 INJECTION INTRAVENOUS at 04:10

## 2024-03-10 RX ADMIN — HYDROMORPHONE HYDROCHLORIDE 0.5 MG: 2 INJECTION, SOLUTION INTRAMUSCULAR; INTRAVENOUS; SUBCUTANEOUS at 18:14

## 2024-03-10 RX ADMIN — Medication 10 ML: at 19:52

## 2024-03-10 RX ADMIN — LEVETIRACETAM 500 MG: 100 INJECTION, SOLUTION INTRAVENOUS at 19:49

## 2024-03-10 RX ADMIN — SODIUM CHLORIDE 500 MG: 9 INJECTION, SOLUTION INTRAVENOUS at 19:50

## 2024-03-10 RX ADMIN — Medication 10 ML: at 19:51

## 2024-03-10 RX ADMIN — METOPROLOL TARTRATE 5 MG: 5 INJECTION INTRAVENOUS at 08:27

## 2024-03-10 RX ADMIN — HYDROMORPHONE HYDROCHLORIDE 0.5 MG: 2 INJECTION, SOLUTION INTRAMUSCULAR; INTRAVENOUS; SUBCUTANEOUS at 01:16

## 2024-03-10 RX ADMIN — HYDROMORPHONE HYDROCHLORIDE 0.5 MG: 2 INJECTION, SOLUTION INTRAMUSCULAR; INTRAVENOUS; SUBCUTANEOUS at 09:20

## 2024-03-10 RX ADMIN — SODIUM CHLORIDE 500 MG: 9 INJECTION, SOLUTION INTRAVENOUS at 13:09

## 2024-03-10 RX ADMIN — HYDRALAZINE HYDROCHLORIDE 20 MG: 20 INJECTION INTRAMUSCULAR; INTRAVENOUS at 17:48

## 2024-03-10 RX ADMIN — SODIUM CHLORIDE, POTASSIUM CHLORIDE, SODIUM LACTATE AND CALCIUM CHLORIDE 125 ML/HR: 600; 310; 30; 20 INJECTION, SOLUTION INTRAVENOUS at 16:30

## 2024-03-10 RX ADMIN — WATER: 1 INJECTION INTRAMUSCULAR; INTRAVENOUS; SUBCUTANEOUS at 17:48

## 2024-03-10 RX ADMIN — POTASSIUM CHLORIDE 10 MEQ: 7.46 INJECTION, SOLUTION INTRAVENOUS at 01:18

## 2024-03-10 RX ADMIN — SODIUM CHLORIDE, POTASSIUM CHLORIDE, SODIUM LACTATE AND CALCIUM CHLORIDE 125 ML/HR: 600; 310; 30; 20 INJECTION, SOLUTION INTRAVENOUS at 23:30

## 2024-03-10 RX ADMIN — HYDROMORPHONE HYDROCHLORIDE 0.5 MG: 2 INJECTION, SOLUTION INTRAMUSCULAR; INTRAVENOUS; SUBCUTANEOUS at 13:07

## 2024-03-10 RX ADMIN — HYDRALAZINE HYDROCHLORIDE 20 MG: 20 INJECTION INTRAMUSCULAR; INTRAVENOUS at 23:30

## 2024-03-10 RX ADMIN — LEVETIRACETAM 500 MG: 100 INJECTION, SOLUTION INTRAVENOUS at 08:14

## 2024-03-10 RX ADMIN — HYDROMORPHONE HYDROCHLORIDE 0.5 MG: 2 INJECTION, SOLUTION INTRAMUSCULAR; INTRAVENOUS; SUBCUTANEOUS at 16:25

## 2024-03-10 RX ADMIN — HEPARIN SODIUM 5000 UNITS: 5000 INJECTION INTRAVENOUS; SUBCUTANEOUS at 08:14

## 2024-03-10 RX ADMIN — Medication 10 ML: at 08:14

## 2024-03-10 RX ADMIN — HYDRALAZINE HYDROCHLORIDE 20 MG: 20 INJECTION INTRAMUSCULAR; INTRAVENOUS at 12:44

## 2024-03-10 RX ADMIN — PANTOPRAZOLE SODIUM 40 MG: 40 INJECTION, POWDER, FOR SOLUTION INTRAVENOUS at 05:59

## 2024-03-10 RX ADMIN — INSULIN HUMAN 2 UNITS: 100 INJECTION, SOLUTION PARENTERAL at 12:49

## 2024-03-10 RX ADMIN — SMOFLIPID 250 ML: 6; 6; 5; 3 INJECTION, EMULSION INTRAVENOUS at 17:48

## 2024-03-10 RX ADMIN — HYDRALAZINE HYDROCHLORIDE 10 MG: 20 INJECTION INTRAMUSCULAR; INTRAVENOUS at 09:20

## 2024-03-10 RX ADMIN — Medication 10 ML: at 12:43

## 2024-03-10 RX ADMIN — POTASSIUM CHLORIDE 10 MEQ: 7.46 INJECTION, SOLUTION INTRAVENOUS at 00:59

## 2024-03-10 RX ADMIN — PIPERACILLIN AND TAZOBACTAM 3.38 G: 3; .375 INJECTION, POWDER, LYOPHILIZED, FOR SOLUTION INTRAVENOUS at 13:04

## 2024-03-10 RX ADMIN — HEPARIN SODIUM 5000 UNITS: 5000 INJECTION INTRAVENOUS; SUBCUTANEOUS at 19:50

## 2024-03-10 RX ADMIN — SODIUM CHLORIDE 500 MG: 9 INJECTION, SOLUTION INTRAVENOUS at 04:10

## 2024-03-10 RX ADMIN — PIPERACILLIN AND TAZOBACTAM 3.38 G: 3; .375 INJECTION, POWDER, LYOPHILIZED, FOR SOLUTION INTRAVENOUS at 05:59

## 2024-03-10 RX ADMIN — Medication 10 ML: at 08:15

## 2024-03-10 RX ADMIN — PIPERACILLIN AND TAZOBACTAM 3.38 G: 3; .375 INJECTION, POWDER, LYOPHILIZED, FOR SOLUTION INTRAVENOUS at 21:32

## 2024-03-10 NOTE — OP NOTE
General Surgery Operative Note    COLON RESECTION RIGHT  Procedure Report    Patient Name:  Lea Rivers  YOB: 1945  [id number]     Date of Surgery:  3/9/2024       Pre-op Diagnosis: Toxic dilation of the colon    Post-op Diagnosis: Toxic dilation of the colon    Procedure(s):  TOTAL ABDOMINAL COLECTOMY AND END ILEOSTOMY    Surgeon: Harley Godfrey MD    Assistant: Augustin Carney PA     Anesthesia: General    This procedure was not performed to treat colon cancer through resection       Estimated Blood Loss: 200 mL    Complications: None     Implants:    Implant Name Type Inv. Item Serial No.  Lot No. LRB No. Used Action   STPLR LNR CUT PROX 75MM CHUY TLC75 - [id number] Implant STPLR LNR CUT PROX 75MM CHUY TLC75  [employer]  DIV [employer] J [employer] J  N/A 1 Implanted   STPLR CUT ECHELONCONTOUR CRV 40MM GRN - [id number] Implant STPLR CUT ECHELONCONTOUR CRV 40MM GRN  [employer]  DIV [employer] J [employer] J  N/A 1 Implanted   RELOAD STPLR LNR CUT PROX 75MM CHUY TCR75 - [id number] Implant RELOAD STPLR LNR CUT PROX 75MM CHUY TCR75  [employer]  DIV [employer] J [employer] J  N/A 1 Implanted       Specimen:          Specimens       ID Source Type Tests Collected By Collected At Frozen?    A Large Intestine, Sigmoid Colon Tissue TISSUE PATHOLOGY EXAM   Harley Godfrey MD 3/9/24 4094     Description: colon                Findings: Significant dilation of the entire colon and rectum without evidence of ischemia; intra-abdominal adhesions from several previous operations including appendectomy, cholecystectomy, and partial colon resection    Indications: The patient is a 78-year-old male who developed diffuse dilation of his entire colon with findings of pneumatosis intestinalis on imaging.  The patient had persistent dilation despite medical treatment for suspected colonic ileus.  Due to lack of improvement and CT scan findings discussions were made with the patient's family about various  treatment options and they were found to be agreeable to a total abdominal colectomy and end ileostomy.  Informed consent was obtained.    Description of Procedure:     After obtaining informed consent, the patient was taken to the operating room and placed in supine position. After appropriate DVT and antibiotic prophylaxis, general anesthesia was induced.  Tap blocks were performed by anesthesia.  A Odell catheter was placed under sterile conditions.  The abdomen was prepped and draped in standard sterile fashion.    A midline abdominal incision was made through the skin using a 10 blade.  The dermis and subcutaneous tissue were divided using Bovie electrocautery down to the level of the anterior abdominal wall fascia.  The anterior abdominal wall fascia was then incised superiorly away from previous midline incision.  The peritoneal cavity was entered bluntly without incident.  The fascia incision was then expanded superiorly and then inferiorly until midline abdominal adhesions were noted.  Adhesions were noted between the omentum and the anterior abdominal wall which were taken down using a combination of Bovie electrocautery and Metzenbaum scissors.  The midline fascial incision was then completed inferiorly.  The omentum was found to be adhesed to both the left and right of midline.  These adhesions were taken down using combination of Bovie electrocautery and Metzenbaum scissors.  The entire colon was found to be significantly dilated and there was murky ascites in the abdomen.  This was completely removed using suction.  There was no ischemia noted on the outer surface of any portion of the colon.  Adhesions were noted between loops of ileum and the pelvis.  These were taken down using combination of Metzenbaum scissors and Bovie electrocautery.  The lateral abdominal wall attachments of the cecum and ascending colon were then divided using Bovie electrocautery at the white line of Toldt.  In the right  upper quadrant there were adhesions between the hepatic flexure of the colon and the liver due to previous open cholecystectomy.  These were taken down using a combination of Bovie electrocautery and Metzenbaum scissors.  The gastrocolic ligament was then divided using the Enseal device to the level of the splenic flexure of the colon.  The omentum was found to be adhesed to the left lower quadrant of the abdominal wall and these adhesions were taken down using a combination of Bovie electrocautery and the Enseal device.  The lateral abdominal wall attachments of the sigmoid colon were then taken down using Bovie electrocautery at the white line of Toldt and this was continued superiorly to the splenic flexure of the colon.  The splenocolic ligaments were then divided using the Enseal device.  The ileum was then divided just proximal to the terminal ileum where previous adhesiolysis was performed.  It was divided using a GEOFF stapler with a blue load.  The mesentery of the terminal ileum, cecum, right colon, transverse colon, descending colon, and sigmoid colon were all taken serially with a clamp and tie technique using 2-0 silk ties and the Enseal device to divide the specimen side of the mesenteric vessels.  The entire rectum was found to be dilated down to the peritoneal reflection.  The rectum was mobilized from its surrounding attachments using the Enseal device.  The distal rectum which was somewhat less dilated was then transected using the contour stapling device with a green load.  The specimen was then removed in the body and sent to pathology as a permanent specimen.  The rectal stump was then oversewn using interrupted 3-0 silk sutures placed in a Lembert fashion.  The peritoneal cavity was then copiously irrigated using warm normal saline.  After irrigation all areas of dissection were found to be hemostatic.  A 15 Czech round Jose Miguel drain was placed through a separate stab incision in the left lower  quadrant of the abdominal wall and was placed over the rectal stump.  This was secured at the skin level using a 2-0 silk stitch.  I previously placed skin noman was identified in the right upper quadrant of the abdominal wall for ostomy placement.  The skin in this area was divided using Bovie electrocautery in a circular fashion and the underlying soft tissue was dissected down to the level of the anterior abdominal wall fascia, where both the skin and soft tissue were then removed.  The anterior abdominal wall fascia was then incised in a cruciate manner.  The underlying rectus muscle was bluntly split and the posterior abdominal wall fascia was then incised in a cruciate manner.  This allowed for just over 2 fingerbreadths of space.  The stapled end of the ileum was then brought out through this fascial defect and secured with a Mitul clamp.  The midline fascial incision was then reapproximated using a running #1 looped PDS.  The wound bed was rod irrigated using warm normal saline.  The skin was reapproximated using staples.  A blue towel was placed over the closed midline incision.  The stapled end of the ileum was then opened using Bovie electrocautery.  The ileum was then secured circumferentially to the surrounding dermis with 3-0 Vicryl stitches placed in a Tana manner.  An ostomy appliance was placed over the ostomy.  A dry dressing was placed over the midline incision and the drain exit site.  An abdominal binder was placed across the abdomen.    All sponge and needle counts were correct times two at the completion of the procedure. The patient recovered from anesthesia, was extubated in the operating room and was transported to the PACU in stable condition.        Assistant: Augustin Carney PA  was responsible for performing the following activities: Retraction, Suction, Irrigation, Suturing, Closing, and Placing Dressing and their skilled assistance was necessary for the success of this  case.    Harley Godfrey MD     Date: 3/9/2024  Time: 19:10 EST

## 2024-03-10 NOTE — PROGRESS NOTES
[facility] Medicine Services  PROGRESS NOTE    Patient Name: Lea Rivers  : 1945  MRN: [id number]    Date of Admission: 3/2/2024  Primary Care Physician: Mannie Melvin MD    Subjective   Subjective     CC:  S/p fall, rib fractures, Ileus     HPI:  Patient seen and examined. He was off the floor for the entire 12 hour day shift yesterday. Had surgery with Dr Godfrey. Opens his eyes this morning. BP remains elevated. Already having output from ostomy. PICC to be replaced today.     Objective   Objective     Vital Signs:   Temp:  [97.4 °F (36.3 °C)-98.9 °F (37.2 °C)] 98.4 °F (36.9 °C)  Heart Rate:  [73-91] 83  Resp:  [17-24] 24  BP: (124-194)/() 194/101  Flow (L/min):  [2-4] 2     Physical Exam:  Constitutional: No acute distress, opens eyes and half smiles  HENT: NCAT, mucous membranes dry, NG in nares  Respiratory: Decreased in bases, 2L NC  Cardiovascular: RRR, no murmurs, rubs, or gallops  Gastrointestinal: +BS, incision with staples, OLIVER with serosanguineous output, ostomy with output, abd binder   : Odell in place draining yellow urine   Musculoskeletal: No bilateral ankle edema,   Psychiatric: cooperative  Neurologic: no focal deficits  Skin: No rashes    Results Reviewed:  LAB RESULTS:      Lab 24  0335 24  1215 24  0421 24  1241 24  0804 24  0411 24  0048 24  1838 24  1751 24  0428   WBC 14.12*  --  16.04*  --   --  15.60*  --   --  14.68* 8.14   HEMOGLOBIN 11.3*  --  10.2*  --   --  10.5*  --   --  11.5* 9.7*   HEMATOCRIT 35.1*  --  32.6*  --   --  32.5*  --   --  35.8* 30.5*   PLATELETS 328  --  270  --   --  204  --   --  221 162   NEUTROS ABS 11.36*  --   --   --   --   --   --   --  12.21*  --    IMMATURE GRANS (ABS) 0.35*  --   --   --   --   --   --   --  0.20*  --    LYMPHS ABS 0.88  --   --   --   --   --   --   --  0.78  --    MONOS ABS 1.43*  --   --   --   --   --   --   --   1.46*  --    EOS ABS 0.02  --   --   --   --   --   --   --  0.00  --    MCV 92.4  --  93.9  --   --  91.5  --   --  93.2 91.6   CRP  --  12.05*  --   --   --   --   --   --   --   --    PROCALCITONIN  --   --   --   --   --   --   --   --  0.12  --    LACTATE 1.3  --   --  1.4 1.4 1.6 1.6   < >  --   --     < > = values in this interval not displayed.         Lab 03/10/24  1024 03/09/24  2055 03/09/24  0335 03/08/24  1638 03/08/24  1215 03/08/24  0421 03/07/24  1804 03/07/24  0411   SODIUM 139  --  141  --  135* 132*  --  131*   POTASSIUM 3.7 3.3* 2.8* 2.5* 3.0* 2.6*   < > 3.1*   CHLORIDE 108*  --  107  --  104 101  --  99   CO2 22.0  --  23.0  --  21.0* 19.0*  --  22.0   ANION GAP 9.0  --  11.0  --  10.0 12.0  --  10.0   BUN 17  --  15  --  16 15  --  15   CREATININE 0.65*  --  0.72*  --  0.70* 0.68*  --  0.74*   EGFR 95.8  --  92.9  --  93.7 94.6  --  92.2   GLUCOSE 135*  --  118*  --  98 90  --  124*   CALCIUM 8.4*  --  8.7  --  8.5* 8.2*  --  8.5*   IONIZED CALCIUM  --   --   --  1.25  --   --   --  1.22   MAGNESIUM 1.8  --  2.0 2.3 1.8 1.8  --  1.9   PHOSPHORUS 2.8 3.4 2.0*  --  2.9  --   --  3.0    < > = values in this interval not displayed.         Lab 03/09/24  0335 03/08/24  1215 03/06/24  1751   TOTAL PROTEIN 6.1 5.6* 6.8   ALBUMIN 2.9* 3.1* 3.3*   GLOBULIN 3.2  --  3.5   ALT (SGPT) 86* 103* 46*   AST (SGOT) 64* 105* 81*   BILIRUBIN 0.6 0.5 0.7   INDIRECT BILIRUBIN  --  0.2  --    BILIRUBIN DIRECT  --  0.3  --    ALK PHOS 70 61 61               Lab 03/08/24  1215   CHOLESTEROL 103   TRIGLYCERIDES 161*         Lab 03/09/24  0935 03/09/24  0335 03/03/24  1629   FOLATE  --   --  2.32*   VITAMIN B 12  --   --  807   ABO TYPING O   < >  --    RH TYPING Negative   < >  --    ANTIBODY SCREEN Negative  --   --     < > = values in this interval not displayed.         Lab 03/06/24  1636   PH, ARTERIAL 7.413   PCO2, ARTERIAL 35.6   PO2 ART 63.0*   FIO2 21   HCO3 ART 22.7   BASE EXCESS ART -1.6*   CARBOXYHEMOGLOBIN  1.6     Brief Urine Lab Results  (Last result in the past 365 days)        Color   Clarity   Blood   Leuk Est   Nitrite   Protein   CREAT   Urine HCG        03/06/24 1548 Dark Yellow   Slightly Cloudy   Large (3+)   Small (1+)   Positive   100 mg/dL (2+)                   Microbiology Results Abnormal       Procedure Component Value - Date/Time    Blood Culture - Blood, Arm, Right [[id number]]  (Normal) Collected: 03/06/24 1857    Lab Status: Preliminary result Specimen: Blood from Arm, Right Updated: 03/09/24 1946     Blood Culture No growth at 3 days    Blood Culture - Blood, Arm, Right [[id number]]  (Normal) Collected: 03/06/24 1751    Lab Status: Preliminary result Specimen: Blood from Arm, Right Updated: 03/09/24 1900     Blood Culture No growth at 3 days    Blood Culture - Blood, Arm, Right [[id number]]  (Normal) Collected: 03/02/24 1132    Lab Status: Final result Specimen: Blood from Arm, Right Updated: 03/07/24 1201     Blood Culture No growth at 5 days    Narrative:      Less than seven (7) mL's of blood was collected.  Insufficient quantity may yield false negative results.    Blood Culture - Blood, Arm, Right [[id number]]  (Normal) Collected: 03/02/24 1132    Lab Status: Final result Specimen: Blood from Arm, Right Updated: 03/07/24 1201     Blood Culture No growth at 5 days    Narrative:      Less than seven (7) mL's of blood was collected.  Insufficient quantity may yield false negative results.    Urine Culture - Urine, Straight Cath [[id number]]  (Normal) Collected: 03/02/24 0923    Lab Status: Final result Specimen: Urine from Straight Cath Updated: 03/03/24 1601     Urine Culture No growth    COVID PRE-OP / PRE-PROCEDURE SCREENING ORDER (NO ISOLATION) - Swab, Nasopharynx [[id number]]  (Normal) Collected: 03/02/24 1133    Lab Status: Final result Specimen: Swab from Nasopharynx Updated: 03/02/24 1227    Narrative:      The following orders were created for panel order COVID PRE-OP / PRE-PROCEDURE  SCREENING ORDER (NO ISOLATION) - Swab, Nasopharynx.  Procedure                               Abnormality         Status                     ---------                               -----------         ------                     COVID-19, FLU A/B, RSV P...[[id number]]  Normal              Final result                 Please view results for these tests on the individual orders.    COVID-19, FLU A/B, RSV PCR 1 HR TAT - Swab, Nasopharynx [[id number]]  (Normal) Collected: 03/02/24 1133    Lab Status: Final result Specimen: Swab from Nasopharynx Updated: 03/02/24 1227     COVID19 Not Detected     Influenza A PCR Not Detected     Influenza B PCR Not Detected     RSV, PCR Not Detected    Narrative:      Fact sheet for providers: https://www.fda.gov/media/212382/download    Fact sheet for patients: https://www.fda.gov/media/242475/download    Test performed by PCR.            Peripheral Block    Result Date: 3/9/2024  Miguel Gaxiola CRNA     3/9/2024  1:32 PM Peripheral Block Patient reassessed immediately prior to procedure Patient location during procedure: OR Reason for block: at surgeon's request and post-op pain management Performed by Anesthesiologist: Gaston Quiroz MD Preanesthetic Checklist Completed: patient identified, IV checked, site marked, risks and benefits discussed, surgical consent, monitors and equipment checked, pre-op evaluation and timeout performed Prep: Pt Position: supine Sterile barriers:cap, gloves, mask and washed/disinfected hands Prep: ChloraPrep Patient monitoring: blood pressure monitoring, continuous pulse oximetry and EKG Procedure Sedation: yes Performed under: general Guidance:ultrasound guided Images:still images obtained, printed/placed on chart Laterality:Bilateral Block Type:TAP Injection Technique:single-shot Needle Type:short-bevel and echogenic Needle Gauge:20 G Resistance on Injection: none Medications Used: dexamethasone sodium phosphate injection - Injection  4 mg - 3/9/2024  "1:22:00 PM bupivacaine PF (MARCAINE) 0.25 % injection - Injection  60 mL - 3/9/2024 1:22:00 PM Medications Comment:Block Injection:  LA dose divided between Right and Left block Post Assessment Injection Assessment: negative aspiration for heme, incremental injection and no paresthesia on injection Patient Tolerance:comfortable throughout block Complications:no Additional Notes Subcostal TAPs A high-frequency linear transducer, with sterile cover, was placed sub-xiphoid to identify Linea Alba, right and left Rectus Abdominus Muscles (ERICA). The transducer was moved either right or left subcostally to identify the ERICA and the Transverse Abdominus Muscle (RODRIGUEZ). The insertion site was prepped in sterile fashion and then localized with 2-5 ml of 1% Lidocaine. Using ultrasound-guidance, a 20-gauge B-Santos 4\" Ultraplex 360 non-stimulating echogenic needle was advanced in plane, from medial to lateral, until the tip of the needle was in the fascial plane between the ERICA and RODRIGUEZ. 1-3ml of preservative free normal saline was used to hydro-dissect the fascial planes. After the fascial plane was verified, the local anesthetic (LA) was injected. The procedure was repeated on the opposite side for bilateral coverage. Aspiration every 5 ml to prevent intravascular injection. Injection was completed with negative aspiration of blood and negative intravascular injection. Injection pressures were normal with minimal resistance. The subcostal approach to the TAP nerve block ideally anesthetizes the intercostal nerves T6-T9. Mid-Axillary/Lateral TAPs A high-frequency linear transducer, with sterile cover, was placed in the midaxillary line between the ASIS and costal margin. The External Oblique Muscle (EOM), Internal Oblique Muscle (IOM), Transverse Abdominus Muscle (RODRIGUEZ), and Peritoneum were identified. The insertion site was prepped in sterile fashion and then localized with 2-5 ml of 1% Lidocaine. Using ultrasound-guidance, a " "20-gauge B-Santos 4\" Ultraplex 360 non-stimulating echogenic needle was advanced in plane, from medial to lateral, until the tip of the needle was in the fascial plane between the IOM and RODRIGUEZ. 1-3ml of preservative free normal saline was used to hydro-dissect the fascial planes. After the fascial plane was verified, the local anesthetic (LA) was injected. The procedure was repeated on the opposite side for bilateral coverage. Aspiration every 5 ml to prevent intravascular injection. Injection was completed with negative aspiration of blood and negative intravascular injection. Injection pressures were normal with minimal resistance. Midaxillary TAPs should reach intercostal nerves T10- T11 and the subcostal nerve T12.      XR Abdomen KUB    Result Date: 3/9/2024  XR ABDOMEN KUB Date of Exam: 3/9/2024 5:58 AM EST Indication: Colonic ileus Comparison: 3/6/2024. Findings: Gaseous distention of the colon which appears similar as compared to the previous study. No definite small bowel dilatation identified. No evidence of pneumatosis or free air. No significant change.     Impression: Impression: Gaseous distention of the colon which appears similar as compared to the previous study. Electronically Signed: Sapna Costa MD  3/9/2024 6:28 AM EST  Workstation ID: [id number]     Results for orders placed during the hospital encounter of 07/20/21    Adult Transthoracic Echo Complete W/ Cont if Necessary Per Protocol    Interpretation Summary  · Left ventricular wall thickness is consistent with mild concentric hypertrophy.  · Normal left-ventricular systolic function, estimated EF 65%.  · Left ventricular diastolic function is consistent with (grade I) impaired relaxation.  · Aortic sclerosis without aortic stenosis. Trace aortic insufficiency.  · Trace mitral regurgitation, trace tricuspid regurgitation.      Current medications:  Scheduled Meds:heparin (porcine), 5,000 Units, Subcutaneous, Q12H  hydrALAZINE, 20 mg, " Intravenous, Q6H  insulin regular, 2-7 Units, Subcutaneous, Q6H  levETIRAcetam, 500 mg, Intravenous, Q12H  Lidocaine, 2 patch, Transdermal, Q24H  pantoprazole, 40 mg, Intravenous, Q AM  piperacillin-tazobactam, 3.375 g, Intravenous, Q8H  sodium chloride, 10 mL, Intravenous, Q12H  sodium chloride, 10 mL, Intravenous, Q12H  valproate sodium, 500 mg, Intravenous, Q8H      Continuous Infusions:Adult Cyclic Central 2-in-1 TPN,   dextrose, 25 mL/hr, Last Rate: 50 mL/hr (03/10/24 0601)  lactated ringers, 125 mL/hr, Last Rate: 125 mL/hr (03/09/24 2025)  [Held by provider] niCARdipine, 5-15 mg/hr, Last Rate: Stopped (03/09/24 2238)  Pharmacy to Dose TPN,           PRN Meds:.  acetaminophen **OR** acetaminophen **OR** acetaminophen    calcium carbonate    Calcium Replacement - Follow Nurse / BPA Driven Protocol    dextrose    dextrose    docusate sodium    fluticasone    glucagon (human recombinant)    hydrALAZINE    HYDROmorphone **AND** naloxone    ipratropium-albuterol    Magnesium Standard Dose Replacement - Follow Nurse / BPA Driven Protocol    ondansetron ODT **OR** ondansetron    ondansetron ODT **OR** ondansetron    Pharmacy to Dose TPN    Phosphorus Replacement - Follow Nurse / BPA Driven Protocol    Potassium Replacement - Follow Nurse / BPA Driven Protocol    sodium chloride    sodium chloride    sodium chloride    sodium chloride    Assessment & Plan   Assessment & Plan     Active Hospital Problems    Diagnosis  POA    **Falls [W19.XXXA]  Yes    Pneumatosis intestinalis [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        Brief Hospital Course to date:  Lea Rivers is a 78 y.o. male with past medical history of hypertension, hyperlipidemia, hypothyroidism, seizure disorder.  Patient is being admitted for a fall and nondisplaced first through fourth left rib fractures.  Patient also has left distal clavicle fracture.    This patient's problems and plans were partially  entered by my partner and updated as appropriate by me 03/10/24.      Multiple falls with increasing frequency   -CT of head shows age-related changes which are chronic but no acute process  -neuro consulted. likely d/t neuropathy (confirmed with EMG) and progression of dementia     Multiple rib fractures and also left clavicle fracture  -With no displacement or mildly displaced.  Orthopedic surgery evaluated. No surgical intervention  planned.  Recs nonweightbearing LUE, may come out of sling in 5-7 days to initiate gentle ROM exercises per ortho.  F/u with ortho/Dr. 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 Noland  -S/P cyberknife radiation 2/9/23  -Has intermittent hematuria      Dementia and increasing memory problem  -Patient still lives alone and also drives  -practice partner d/w family that patient should not be driving.  They are interested in rehab/possible placement     Hypertension  -BP remains elevated  -Schedule IV Hydralazine 20mg q6 hours  -If remains elevated, will start Cardene gtt   -IV Lopressor has been ineffective   -Unable to take his PO meds currently     Seizure disorder  - depakote and keppra (changed to IV)     Hyperlipidemia  Hypothyroidism  -Home meds on hold, NPO    Expected Discharge Location and Transportation: SNF  Expected Discharge   Expected Discharge Date: 3/15/2024; Expected Discharge Time:      DVT prophylaxis:  Medical and mechanical DVT prophylaxis orders are present.         AM-PAC 6 Clicks Score (PT): 9 (03/08/24 2000)    CODE STATUS:   Code Status and Medical Interventions:   Ordered at: 03/02/24 2182     Medical Intervention Limits:    NO  intubation (DNI)     Code Status (Patient has no pulse and is not breathing):    No CPR (Do Not Attempt to Resuscitate)     Medical Interventions (Patient has pulse or is breathing):    Limited Support       Eleanor Yost DO  03/10/24

## 2024-03-10 NOTE — CONSULTS
New 4FR double lumen PICC placed in the LEFT upper extremity by THERON HARVEY, tip verified by 3CG, see LDA

## 2024-03-10 NOTE — NURSING NOTE
PICC consult received 3/9/24, patient was off the floor to surgery both times PICC RN on floor to place, PICC will be placed 3/10/24.

## 2024-03-10 NOTE — BRIEF OP NOTE
COLON RESECTION RIGHT  Progress Note    Lea Arnold Fennimore  3/9/2024    Pre-op Diagnosis:   Toxic dilation of the colon        Post-Op Diagnosis Codes:     * Toxic dilation of the colon     Procedure/CPT® Codes:        Procedure(s):  TOTAL ABDOMINAL COLECTOMY AND END ILEOSTOMY              Surgeon(s):  Harley Godfrey MD    Anesthesia: General    Staff:   Circulator: Candi David RN; Sandra Beauchamp RN; Blaine Gabriel RN  Scrub Person: Areli Baum; Sandie Morgan  Assistant: Augustin Carney PA  Assistant: Augustin Carney PA      Estimated Blood Loss: 200 mL    Urine Voided: 215 mL    Specimens:                Specimens       ID Source Type Tests Collected By Collected At Frozen?    A Large Intestine, Sigmoid Colon Tissue TISSUE PATHOLOGY EXAM   Harley Godfrey MD 3/9/24 7096     Description: colon                  Drains:   Closed/Suction Drain Left;Anterior LLQ Bulb 15 Fr. (Active)       Rectal Tube (Active)       Urethral Catheter Silicone;Non-latex 16 Fr. (Active)       [REMOVED] External Urinary Catheter (Removed)   Daily Indications Daily output 03/08/24 2000   Site Assessment Clean;Skin intact 03/08/24 2000   Application/Removal no longer indicated 03/08/24 1300   Collection Container Wall suction 03/08/24 2000   Wall suction (mmHG) 80 mmHG 03/08/24 2000   Securement Method Securing device 03/08/24 2000   Catheter care complete Yes 03/06/24 1917   Output (mL) 250 mL 03/07/24 0637       Findings: Significant dilation of the entire colon and rectum without evidence of ischemia; intra-abdominal adhesions from several previous operations including appendectomy, cholecystectomy, and partial colon resection        Complications: None    Assistant: Augustin Carney PA  was responsible for performing the following activities: Retraction, Suction, Irrigation, Suturing, Closing, and Placing Dressing and their skilled assistance was necessary for the success of this case.    Harley LUGO  MD Bekah     Date: 3/9/2024  Time: 19:03 EST

## 2024-03-10 NOTE — PROGRESS NOTES
Pharmacy Parenteral Nutrition Evaluation    Lea Rivers is a  78 y.o. male receiving TPN.     Indication:     Prolonged Paralytic Ileus     Consulting Physician: Eleanor Yost DO     Total Fluid Rate (if stated): n/a    Labs  Results from last 7 days   Lab Units 03/10/24  1024 03/09/24  2055 03/09/24  0335 03/08/24  1638 03/08/24  1215 03/07/24  0411 03/06/24  1751   SODIUM mmol/L 139  --  141  --  135*   < > 134*   POTASSIUM mmol/L 3.7 3.3* 2.8*   < > 3.0*   < > 3.8   CHLORIDE mmol/L 108*  --  107  --  104   < > 98   CO2 mmol/L 22.0  --  23.0  --  21.0*   < > 22.0   BUN mg/dL 17  --  15  --  16   < > 15   CREATININE mg/dL 0.65*  --  0.72*  --  0.70*   < > 0.92   CALCIUM mg/dL 8.4*  --  8.7  --  8.5*   < > 9.0   BILIRUBIN mg/dL  --   --  0.6  --  0.5  --  0.7   ALK PHOS U/L  --   --  70  --  61  --  61   ALT (SGPT) U/L  --   --  86*  --  103*  --  46*   AST (SGOT) U/L  --   --  64*  --  105*  --  81*   GLUCOSE mg/dL 135*  --  118*  --  98   < > 121*    < > = values in this interval not displayed.       Results from last 7 days   Lab Units 03/10/24  1024 03/09/24  2055 03/09/24  0335 03/08/24  1638   MAGNESIUM mg/dL 1.8  --  2.0 2.3   PHOSPHORUS mg/dL 2.8 3.4 2.0*  --    PREALBUMIN mg/dL  --   --   --  7.9*       Results from last 7 days   Lab Units 03/09/24 0335 03/08/24  0421 03/07/24  0411   WBC 10*3/mm3 14.12* 16.04* 15.60*   HEMOGLOBIN g/dL 11.3* 10.2* 10.5*   HEMATOCRIT % 35.1* 32.6* 32.5*   PLATELETS 10*3/mm3 328 270 204       Triglycerides   Date Value Ref Range Status   03/08/2024 161 (H) 0 - 150 mg/dL Final     Comment:     Falsely depressed results may occur on samples drawn from patients receiving N-Acetylcysteine (NAC) or Metamizole.       estimated creatinine clearance is 98 mL/min (A) (by C-G formula based on SCr of 0.65 mg/dL (L)).    Intake & Output (last 3 days)         03/05 0701 03/06 0700 03/06 0701 03/07 0700 03/07 0701 03/08 0700 03/08 0701 03/09 0700    P.O. 1080 240       I.V. (mL/kg)  1026.3 (11.5)      IV Piggyback  400      Total Intake(mL/kg) 1080 (12.1) 1666.3 (18.7)      Urine (mL/kg/hr) 150 (0.1) 400 (0.2)      Stool 0 0      Total Output 150 400      Net +930 +1266.3              Urine Unmeasured Occurrence 4 x 2 x 1 x     Stool Unmeasured Occurrence 5 x 4 x 3 x 1 x            Dietitian Recommendations  Diet Orders (active) (From admission, onward)       Start     Ordered    03/08/24 1800  Adult Cyclic Central 2-in-1 TPN  Cyclic TPN - See Admin Instructions        Note to Pharmacy: Lea Rivers  5H  S572-1  MRN: [id number]  CSN: [id number]    03/08/24 1422 03/08/24 1800  Fat Emul Fish Oil/Plant Based (SMOFLIPID) 20 % emulsion 250 mL  Every 24 Hours Scheduled (TPN)         03/08/24 1422 03/08/24 1355  NPO Diet NPO Type: Strict NPO  Diet Effective Now         03/08/24 1358    03/05/24 1656  DIET MESSAGE Pt would like a grilled cheese sandwich, please. Thanks!  Once        Comments: Pt would like a grilled cheese sandwich, please. Thanks!    03/05/24 1656                    Current TPN Regimen Recommendation:   Dextrose 15% / Amino Acid 7.7% at rate of 25 mL/hr x8hr, then 50 ml/hr x8hr, then goal rate of 65 ml/hr.      20% Lipid Emulsion 250mL every 24 hours.    Na 45 mEq/L  K 40.5 mEq/L  Ca 5 mEq/L  Mg 8 mEq/L  PO4 15 mmol/L  Cl:Ace - 1:1    Assessment/Plan:    Patient to continue TPN for prolonged paralytic ileus. Macronutrients per dietary recommendation are listed above.  Patients electrolytes currently within normal limits. Will continue with standard electrolytes and order electrolyte replacements if needed. Sliding scale insulin is available.   Pharmacy will continue to follow and adjust as indicated     Thanks  Fritz Brown RPH  3/10/2024  12:21 EDT

## 2024-03-10 NOTE — PROGRESS NOTES
Nutrition Services    Patient Name:  Lea Rivers  YOB: 1945  MRN: [id number]  Admit Date:  3/2/2024    Spoke w/pharm D, PICC placed and plans to start TPN today. Pt s/p total abdominal colectomy and end ileostomy on 3/9.    TPN Recommendations via PICC:  15% dextrose, 7.7% AA @ goal rate of 65mL/hr. Provide 250mL 20% SMOF daily.   Initiate at 25mL/hr and advance by 25mL q 8hrs to goal of 65mL/hr.  Infused at goal over 24hrs this regimen provides:  1.56L TGV; GIR = 1.82mg/kg/min  1776kcal (99% est needs); 120g protein (100% est needs)    Electronically signed by:  Aria Morton, MS,RD,LD  03/10/24 12:19 EDT

## 2024-03-11 ENCOUNTER — APPOINTMENT (OUTPATIENT)
Dept: CARDIOLOGY | Facility: HOSPITAL | Age: 79
DRG: 329 | End: 2024-03-11
Payer: MEDICARE

## 2024-03-11 LAB
ALBUMIN SERPL-MCNC: 2.4 G/DL (ref 3.5–5.2)
ALBUMIN SERPL-MCNC: 2.5 G/DL (ref 3.5–5.2)
ALBUMIN/GLOB SERPL: 1.3 G/DL
ALP SERPL-CCNC: 60 U/L (ref 39–117)
ALP SERPL-CCNC: 63 U/L (ref 39–117)
ALT SERPL W P-5'-P-CCNC: 29 U/L (ref 1–41)
ALT SERPL W P-5'-P-CCNC: 31 U/L (ref 1–41)
ANION GAP SERPL CALCULATED.3IONS-SCNC: 6 MMOL/L (ref 5–15)
ANION GAP SERPL CALCULATED.3IONS-SCNC: 6 MMOL/L (ref 5–15)
AST SERPL-CCNC: 20 U/L (ref 1–40)
AST SERPL-CCNC: 23 U/L (ref 1–40)
BACTERIA SPEC AEROBE CULT: NORMAL
BACTERIA SPEC AEROBE CULT: NORMAL
BACTERIA UR QL AUTO: ABNORMAL /HPF
BASOPHILS # BLD AUTO: 0.07 10*3/MM3 (ref 0–0.2)
BASOPHILS NFR BLD AUTO: 0.4 % (ref 0–1.5)
BH CV UPPER VENOUS LEFT AXILLARY AUGMENT: NORMAL
BH CV UPPER VENOUS LEFT AXILLARY COMPRESS: NORMAL
BH CV UPPER VENOUS LEFT AXILLARY PHASIC: NORMAL
BH CV UPPER VENOUS LEFT AXILLARY SPONT: NORMAL
BH CV UPPER VENOUS LEFT BASILIC FOREARM COMPRESS: NORMAL
BH CV UPPER VENOUS LEFT BASILIC UPPER COMPRESS: NORMAL
BH CV UPPER VENOUS LEFT BRACHIAL COMPRESS: NORMAL
BH CV UPPER VENOUS LEFT CEPHALIC FOREARM COMPRESS: NORMAL
BH CV UPPER VENOUS LEFT CEPHALIC UPPER COMPRESS: NORMAL
BH CV UPPER VENOUS LEFT INTERNAL JUGULAR AUGMENT: NORMAL
BH CV UPPER VENOUS LEFT INTERNAL JUGULAR COMPRESS: NORMAL
BH CV UPPER VENOUS LEFT INTERNAL JUGULAR PHASIC: NORMAL
BH CV UPPER VENOUS LEFT INTERNAL JUGULAR SPONT: NORMAL
BH CV UPPER VENOUS LEFT RADIAL COMPRESS: NORMAL
BH CV UPPER VENOUS LEFT SUBCLAVIAN AUGMENT: NORMAL
BH CV UPPER VENOUS LEFT SUBCLAVIAN COMPRESS: NORMAL
BH CV UPPER VENOUS LEFT SUBCLAVIAN PHASIC: NORMAL
BH CV UPPER VENOUS LEFT SUBCLAVIAN SPONT: NORMAL
BH CV UPPER VENOUS LEFT ULNAR COMPRESS: NORMAL
BH CV UPPER VENOUS RIGHT SUBCLAVIAN AUGMENT: NORMAL
BH CV UPPER VENOUS RIGHT SUBCLAVIAN COMPRESS: NORMAL
BH CV UPPER VENOUS RIGHT SUBCLAVIAN PHASIC: NORMAL
BH CV UPPER VENOUS RIGHT SUBCLAVIAN SPONT: NORMAL
BILIRUB CONJ SERPL-MCNC: <0.2 MG/DL (ref 0–0.3)
BILIRUB INDIRECT SERPL-MCNC: ABNORMAL MG/DL
BILIRUB SERPL-MCNC: 0.3 MG/DL (ref 0–1.2)
BILIRUB SERPL-MCNC: 0.3 MG/DL (ref 0–1.2)
BILIRUB UR QL STRIP: NEGATIVE
BUN SERPL-MCNC: 15 MG/DL (ref 8–23)
BUN SERPL-MCNC: 15 MG/DL (ref 8–23)
BUN/CREAT SERPL: 22.7 (ref 7–25)
BUN/CREAT SERPL: 22.7 (ref 7–25)
CA-I SERPL ISE-MCNC: 1.26 MMOL/L (ref 1.12–1.32)
CALCIUM SPEC-SCNC: 8.3 MG/DL (ref 8.6–10.5)
CALCIUM SPEC-SCNC: 8.3 MG/DL (ref 8.6–10.5)
CHLORIDE SERPL-SCNC: 107 MMOL/L (ref 98–107)
CHLORIDE SERPL-SCNC: 107 MMOL/L (ref 98–107)
CLARITY UR: ABNORMAL
CO2 SERPL-SCNC: 25 MMOL/L (ref 22–29)
CO2 SERPL-SCNC: 25 MMOL/L (ref 22–29)
COLOR UR: YELLOW
CREAT SERPL-MCNC: 0.66 MG/DL (ref 0.76–1.27)
CREAT SERPL-MCNC: 0.66 MG/DL (ref 0.76–1.27)
CRP SERPL-MCNC: 13.06 MG/DL (ref 0–0.5)
DEPRECATED RDW RBC AUTO: 57.2 FL (ref 37–54)
EGFRCR SERPLBLD CKD-EPI 2021: 95.4 ML/MIN/1.73
EGFRCR SERPLBLD CKD-EPI 2021: 95.4 ML/MIN/1.73
EOSINOPHIL # BLD AUTO: 0.24 10*3/MM3 (ref 0–0.4)
EOSINOPHIL NFR BLD AUTO: 1.3 % (ref 0.3–6.2)
ERYTHROCYTE [DISTWIDTH] IN BLOOD BY AUTOMATED COUNT: 16.8 % (ref 12.3–15.4)
GLOBULIN UR ELPH-MCNC: 1.9 GM/DL
GLUCOSE BLDC GLUCOMTR-MCNC: 102 MG/DL (ref 70–130)
GLUCOSE BLDC GLUCOMTR-MCNC: 109 MG/DL (ref 70–130)
GLUCOSE BLDC GLUCOMTR-MCNC: 119 MG/DL (ref 70–130)
GLUCOSE BLDC GLUCOMTR-MCNC: 122 MG/DL (ref 70–130)
GLUCOSE BLDC GLUCOMTR-MCNC: 125 MG/DL (ref 70–130)
GLUCOSE BLDC GLUCOMTR-MCNC: 134 MG/DL (ref 70–130)
GLUCOSE BLDC GLUCOMTR-MCNC: 150 MG/DL (ref 70–130)
GLUCOSE BLDC GLUCOMTR-MCNC: 154 MG/DL (ref 70–130)
GLUCOSE BLDC GLUCOMTR-MCNC: 170 MG/DL (ref 70–130)
GLUCOSE BLDC GLUCOMTR-MCNC: 214 MG/DL (ref 70–130)
GLUCOSE SERPL-MCNC: 145 MG/DL (ref 65–99)
GLUCOSE SERPL-MCNC: 145 MG/DL (ref 65–99)
GLUCOSE UR STRIP-MCNC: NEGATIVE MG/DL
HCT VFR BLD AUTO: 22.8 % (ref 37.5–51)
HCT VFR BLD AUTO: 26.3 % (ref 37.5–51)
HGB BLD-MCNC: 7.3 G/DL (ref 13–17.7)
HGB BLD-MCNC: 8.4 G/DL (ref 13–17.7)
HGB UR QL STRIP.AUTO: ABNORMAL
HYALINE CASTS UR QL AUTO: ABNORMAL /LPF
IMM GRANULOCYTES # BLD AUTO: 0.6 10*3/MM3 (ref 0–0.05)
IMM GRANULOCYTES NFR BLD AUTO: 3.3 % (ref 0–0.5)
INR PPP: 1.18 (ref 0.89–1.12)
KETONES UR QL STRIP: NEGATIVE
LEUKOCYTE ESTERASE UR QL STRIP.AUTO: ABNORMAL
LYMPHOCYTES # BLD AUTO: 1.04 10*3/MM3 (ref 0.7–3.1)
LYMPHOCYTES NFR BLD AUTO: 5.6 % (ref 19.6–45.3)
MAGNESIUM SERPL-MCNC: 1.8 MG/DL (ref 1.6–2.4)
MCH RBC QN AUTO: 30.8 PG (ref 26.6–33)
MCHC RBC AUTO-ENTMCNC: 32 G/DL (ref 31.5–35.7)
MCV RBC AUTO: 96.2 FL (ref 79–97)
MONOCYTES # BLD AUTO: 2.08 10*3/MM3 (ref 0.1–0.9)
MONOCYTES NFR BLD AUTO: 11.3 % (ref 5–12)
NEUTROPHILS NFR BLD AUTO: 14.4 10*3/MM3 (ref 1.7–7)
NEUTROPHILS NFR BLD AUTO: 78.1 % (ref 42.7–76)
NITRITE UR QL STRIP: NEGATIVE
NRBC BLD AUTO-RTO: 0.1 /100 WBC (ref 0–0.2)
PH UR STRIP.AUTO: 5.5 [PH] (ref 5–8)
PHOSPHATE SERPL-MCNC: 1.7 MG/DL (ref 2.5–4.5)
PHOSPHATE SERPL-MCNC: 2.1 MG/DL (ref 2.5–4.5)
PHOSPHATE SERPL-MCNC: 2.1 MG/DL (ref 2.5–4.5)
PLATELET # BLD AUTO: 222 10*3/MM3 (ref 140–450)
PMV BLD AUTO: 8.8 FL (ref 6–12)
POTASSIUM SERPL-SCNC: 3.2 MMOL/L (ref 3.5–5.2)
POTASSIUM SERPL-SCNC: 3.2 MMOL/L (ref 3.5–5.2)
POTASSIUM SERPL-SCNC: 3.4 MMOL/L (ref 3.5–5.2)
PROT SERPL-MCNC: 4.4 G/DL (ref 6–8.5)
PROT SERPL-MCNC: 4.4 G/DL (ref 6–8.5)
PROT UR QL STRIP: ABNORMAL
PROTHROMBIN TIME: 15.1 SECONDS (ref 12.2–14.5)
RBC # BLD AUTO: 2.37 10*6/MM3 (ref 4.14–5.8)
RBC # UR STRIP: ABNORMAL /HPF
REF LAB TEST METHOD: ABNORMAL
SODIUM SERPL-SCNC: 138 MMOL/L (ref 136–145)
SODIUM SERPL-SCNC: 138 MMOL/L (ref 136–145)
SP GR UR STRIP: 1.02 (ref 1–1.03)
SQUAMOUS #/AREA URNS HPF: ABNORMAL /HPF
TRIGL SERPL-MCNC: 131 MG/DL (ref 0–150)
UROBILINOGEN UR QL STRIP: ABNORMAL
VALPROATE SERPL-MCNC: 44.9 MCG/ML (ref 50–125)
WBC # UR STRIP: ABNORMAL /HPF
WBC NRBC COR # BLD AUTO: 18.43 10*3/MM3 (ref 3.4–10.8)

## 2024-03-11 PROCEDURE — 86140 C-REACTIVE PROTEIN: CPT | Performed by: SURGERY

## 2024-03-11 PROCEDURE — 25010000002 HYDRALAZINE PER 20 MG: Performed by: FAMILY MEDICINE

## 2024-03-11 PROCEDURE — 85610 PROTHROMBIN TIME: CPT | Performed by: SURGERY

## 2024-03-11 PROCEDURE — 84478 ASSAY OF TRIGLYCERIDES: CPT | Performed by: SURGERY

## 2024-03-11 PROCEDURE — 25010000002 LEVETRIRACETAM PER 10 MG: Performed by: SURGERY

## 2024-03-11 PROCEDURE — 25010000002 MAGNESIUM SULFATE PER 500 MG OF MAGNESIUM

## 2024-03-11 PROCEDURE — 80177 DRUG SCRN QUAN LEVETIRACETAM: CPT | Performed by: FAMILY MEDICINE

## 2024-03-11 PROCEDURE — 25010000002 CALCIUM GLUCONATE PER 10 ML

## 2024-03-11 PROCEDURE — 25010000002 HEPARIN (PORCINE) PER 1000 UNITS: Performed by: SURGERY

## 2024-03-11 PROCEDURE — 82948 REAGENT STRIP/BLOOD GLUCOSE: CPT

## 2024-03-11 PROCEDURE — 84134 ASSAY OF PREALBUMIN: CPT | Performed by: SURGERY

## 2024-03-11 PROCEDURE — 25010000002 POTASSIUM CHLORIDE 10 MEQ/100ML SOLUTION: Performed by: NURSE PRACTITIONER

## 2024-03-11 PROCEDURE — 25010000002 FUROSEMIDE PER 20 MG: Performed by: FAMILY MEDICINE

## 2024-03-11 PROCEDURE — 81001 URINALYSIS AUTO W/SCOPE: CPT | Performed by: FAMILY MEDICINE

## 2024-03-11 PROCEDURE — 25010000002 PIPERACILLIN SOD-TAZOBACTAM PER 1 G: Performed by: FAMILY MEDICINE

## 2024-03-11 PROCEDURE — 25810000003 SODIUM CHLORIDE 0.9 % SOLUTION: Performed by: NURSE PRACTITIONER

## 2024-03-11 PROCEDURE — 86900 BLOOD TYPING SEROLOGIC ABO: CPT

## 2024-03-11 PROCEDURE — 84100 ASSAY OF PHOSPHORUS: CPT | Performed by: FAMILY MEDICINE

## 2024-03-11 PROCEDURE — 82330 ASSAY OF CALCIUM: CPT | Performed by: SURGERY

## 2024-03-11 PROCEDURE — 84100 ASSAY OF PHOSPHORUS: CPT | Performed by: NURSE PRACTITIONER

## 2024-03-11 PROCEDURE — 25010000002 FLUCONAZOLE PER 200 MG: Performed by: FAMILY MEDICINE

## 2024-03-11 PROCEDURE — 84132 ASSAY OF SERUM POTASSIUM: CPT | Performed by: NURSE PRACTITIONER

## 2024-03-11 PROCEDURE — 63710000001 INSULIN REGULAR HUMAN PER 5 UNITS: Performed by: SURGERY

## 2024-03-11 PROCEDURE — 85025 COMPLETE CBC W/AUTO DIFF WBC: CPT | Performed by: FAMILY MEDICINE

## 2024-03-11 PROCEDURE — 93971 EXTREMITY STUDY: CPT | Performed by: INTERNAL MEDICINE

## 2024-03-11 PROCEDURE — 99233 SBSQ HOSP IP/OBS HIGH 50: CPT | Performed by: FAMILY MEDICINE

## 2024-03-11 PROCEDURE — 25010000002 POTASSIUM CHLORIDE PER 2 MEQ: Performed by: FAMILY MEDICINE

## 2024-03-11 PROCEDURE — 25810000003 LACTATED RINGERS PER 1000 ML: Performed by: SURGERY

## 2024-03-11 PROCEDURE — 85018 HEMOGLOBIN: CPT | Performed by: SURGERY

## 2024-03-11 PROCEDURE — 25010000002 HYDROMORPHONE PER 4 MG: Performed by: FAMILY MEDICINE

## 2024-03-11 PROCEDURE — 25010000002 HYDROMORPHONE PER 4 MG: Performed by: SURGERY

## 2024-03-11 PROCEDURE — 80164 ASSAY DIPROPYLACETIC ACD TOT: CPT | Performed by: FAMILY MEDICINE

## 2024-03-11 PROCEDURE — 25010000002 ACETAMINOPHEN 10 MG/ML SOLUTION: Performed by: FAMILY MEDICINE

## 2024-03-11 PROCEDURE — P9016 RBC LEUKOCYTES REDUCED: HCPCS

## 2024-03-11 PROCEDURE — 85014 HEMATOCRIT: CPT | Performed by: SURGERY

## 2024-03-11 PROCEDURE — 80053 COMPREHEN METABOLIC PANEL: CPT | Performed by: SURGERY

## 2024-03-11 PROCEDURE — 87086 URINE CULTURE/COLONY COUNT: CPT | Performed by: FAMILY MEDICINE

## 2024-03-11 PROCEDURE — 36430 TRANSFUSION BLD/BLD COMPNT: CPT

## 2024-03-11 PROCEDURE — 83735 ASSAY OF MAGNESIUM: CPT | Performed by: SURGERY

## 2024-03-11 PROCEDURE — 93971 EXTREMITY STUDY: CPT

## 2024-03-11 PROCEDURE — 25010000002 POTASSIUM CHLORIDE PER 2 MEQ OF POTASSIUM

## 2024-03-11 PROCEDURE — 84100 ASSAY OF PHOSPHORUS: CPT | Performed by: SURGERY

## 2024-03-11 PROCEDURE — 82248 BILIRUBIN DIRECT: CPT | Performed by: SURGERY

## 2024-03-11 RX ORDER — POTASSIUM CHLORIDE 29.8 MG/ML
20 INJECTION INTRAVENOUS
Qty: 100 ML | Refills: 0 | Status: COMPLETED | OUTPATIENT
Start: 2024-03-11 | End: 2024-03-11

## 2024-03-11 RX ORDER — FUROSEMIDE 10 MG/ML
20 INJECTION INTRAMUSCULAR; INTRAVENOUS ONCE
Status: COMPLETED | OUTPATIENT
Start: 2024-03-11 | End: 2024-03-11

## 2024-03-11 RX ORDER — POTASSIUM CHLORIDE 7.45 MG/ML
10 INJECTION INTRAVENOUS
Qty: 400 ML | Refills: 0 | Status: COMPLETED | OUTPATIENT
Start: 2024-03-11 | End: 2024-03-11

## 2024-03-11 RX ORDER — HYDROMORPHONE HYDROCHLORIDE 1 MG/ML
0.5 INJECTION, SOLUTION INTRAMUSCULAR; INTRAVENOUS; SUBCUTANEOUS
Status: DISPENSED | OUTPATIENT
Start: 2024-03-11 | End: 2024-03-16

## 2024-03-11 RX ORDER — FENTANYL/ROPIVACAINE/NS/PF 2-625MCG/1
15 PLASTIC BAG, INJECTION (ML) EPIDURAL ONCE
Qty: 250 ML | Refills: 0 | Status: COMPLETED | OUTPATIENT
Start: 2024-03-11 | End: 2024-03-11

## 2024-03-11 RX ORDER — FLUCONAZOLE 2 MG/ML
400 INJECTION, SOLUTION INTRAVENOUS EVERY 24 HOURS
Status: DISCONTINUED | OUTPATIENT
Start: 2024-03-11 | End: 2024-03-16

## 2024-03-11 RX ORDER — ACETAMINOPHEN 10 MG/ML
1000 INJECTION, SOLUTION INTRAVENOUS EVERY 8 HOURS PRN
Status: DISPENSED | OUTPATIENT
Start: 2024-03-11 | End: 2024-03-12

## 2024-03-11 RX ADMIN — SODIUM CHLORIDE 500 MG: 9 INJECTION, SOLUTION INTRAVENOUS at 13:18

## 2024-03-11 RX ADMIN — POTASSIUM CHLORIDE 10 MEQ: 7.46 INJECTION, SOLUTION INTRAVENOUS at 09:06

## 2024-03-11 RX ADMIN — LEVETIRACETAM 500 MG: 100 INJECTION, SOLUTION INTRAVENOUS at 20:33

## 2024-03-11 RX ADMIN — Medication 10 ML: at 07:42

## 2024-03-11 RX ADMIN — LEVETIRACETAM 500 MG: 100 INJECTION, SOLUTION INTRAVENOUS at 07:41

## 2024-03-11 RX ADMIN — PANTOPRAZOLE SODIUM 40 MG: 40 INJECTION, POWDER, FOR SOLUTION INTRAVENOUS at 05:08

## 2024-03-11 RX ADMIN — HYDROMORPHONE HYDROCHLORIDE 0.5 MG: 2 INJECTION, SOLUTION INTRAMUSCULAR; INTRAVENOUS; SUBCUTANEOUS at 12:33

## 2024-03-11 RX ADMIN — Medication 10 ML: at 07:41

## 2024-03-11 RX ADMIN — HYDRALAZINE HYDROCHLORIDE 20 MG: 20 INJECTION INTRAMUSCULAR; INTRAVENOUS at 05:07

## 2024-03-11 RX ADMIN — HYDROMORPHONE HYDROCHLORIDE 0.5 MG: 2 INJECTION, SOLUTION INTRAMUSCULAR; INTRAVENOUS; SUBCUTANEOUS at 16:27

## 2024-03-11 RX ADMIN — HYDROMORPHONE HYDROCHLORIDE 0.5 MG: 2 INJECTION, SOLUTION INTRAMUSCULAR; INTRAVENOUS; SUBCUTANEOUS at 02:06

## 2024-03-11 RX ADMIN — POTASSIUM CHLORIDE 20 MEQ: 400 INJECTION, SOLUTION INTRAVENOUS at 22:43

## 2024-03-11 RX ADMIN — POTASSIUM PHOSPHATE, MONOBASIC POTASSIUM PHOSPHATE, DIBASIC 15 MMOL: 224; 236 INJECTION, SOLUTION, CONCENTRATE INTRAVENOUS at 18:50

## 2024-03-11 RX ADMIN — PIPERACILLIN AND TAZOBACTAM 3.38 G: 3; .375 INJECTION, POWDER, LYOPHILIZED, FOR SOLUTION INTRAVENOUS at 21:40

## 2024-03-11 RX ADMIN — POTASSIUM CHLORIDE 20 MEQ: 400 INJECTION, SOLUTION INTRAVENOUS at 21:38

## 2024-03-11 RX ADMIN — HYDRALAZINE HYDROCHLORIDE 20 MG: 20 INJECTION INTRAMUSCULAR; INTRAVENOUS at 12:25

## 2024-03-11 RX ADMIN — Medication 10 ML: at 20:33

## 2024-03-11 RX ADMIN — INSULIN HUMAN 2 UNITS: 100 INJECTION, SOLUTION PARENTERAL at 05:38

## 2024-03-11 RX ADMIN — POTASSIUM CHLORIDE 10 MEQ: 7.46 INJECTION, SOLUTION INTRAVENOUS at 06:40

## 2024-03-11 RX ADMIN — PIPERACILLIN AND TAZOBACTAM 3.38 G: 3; .375 INJECTION, POWDER, LYOPHILIZED, FOR SOLUTION INTRAVENOUS at 14:57

## 2024-03-11 RX ADMIN — PIPERACILLIN AND TAZOBACTAM 3.38 G: 3; .375 INJECTION, POWDER, LYOPHILIZED, FOR SOLUTION INTRAVENOUS at 05:08

## 2024-03-11 RX ADMIN — HEPARIN SODIUM 5000 UNITS: 5000 INJECTION INTRAVENOUS; SUBCUTANEOUS at 20:32

## 2024-03-11 RX ADMIN — HYDROMORPHONE HYDROCHLORIDE 0.5 MG: 1 INJECTION, SOLUTION INTRAMUSCULAR; INTRAVENOUS; SUBCUTANEOUS at 20:44

## 2024-03-11 RX ADMIN — HEPARIN SODIUM 5000 UNITS: 5000 INJECTION INTRAVENOUS; SUBCUTANEOUS at 07:41

## 2024-03-11 RX ADMIN — SODIUM CHLORIDE 500 MG: 9 INJECTION, SOLUTION INTRAVENOUS at 03:54

## 2024-03-11 RX ADMIN — SMOFLIPID 250 ML: 6; 6; 5; 3 INJECTION, EMULSION INTRAVENOUS at 17:45

## 2024-03-11 RX ADMIN — POTASSIUM PHOSPHATE, MONOBASIC POTASSIUM PHOSPHATE, DIBASIC 15 MMOL: 224; 236 INJECTION, SOLUTION, CONCENTRATE INTRAVENOUS at 06:40

## 2024-03-11 RX ADMIN — FUROSEMIDE 20 MG: 10 INJECTION, SOLUTION INTRAMUSCULAR; INTRAVENOUS at 13:18

## 2024-03-11 RX ADMIN — POTASSIUM CHLORIDE 10 MEQ: 7.46 INJECTION, SOLUTION INTRAVENOUS at 07:41

## 2024-03-11 RX ADMIN — HYDRALAZINE HYDROCHLORIDE 20 MG: 20 INJECTION INTRAMUSCULAR; INTRAVENOUS at 23:35

## 2024-03-11 RX ADMIN — SODIUM CHLORIDE, POTASSIUM CHLORIDE, SODIUM LACTATE AND CALCIUM CHLORIDE 125 ML/HR: 600; 310; 30; 20 INJECTION, SOLUTION INTRAVENOUS at 08:02

## 2024-03-11 RX ADMIN — ACETAMINOPHEN 1000 MG: 10 INJECTION INTRAVENOUS at 13:57

## 2024-03-11 RX ADMIN — HYDROMORPHONE HYDROCHLORIDE 0.5 MG: 1 INJECTION, SOLUTION INTRAMUSCULAR; INTRAVENOUS; SUBCUTANEOUS at 18:54

## 2024-03-11 RX ADMIN — HYDRALAZINE HYDROCHLORIDE 20 MG: 20 INJECTION INTRAMUSCULAR; INTRAVENOUS at 17:45

## 2024-03-11 RX ADMIN — SODIUM CHLORIDE 500 MG: 9 INJECTION, SOLUTION INTRAVENOUS at 20:33

## 2024-03-11 RX ADMIN — POTASSIUM CHLORIDE 10 MEQ: 7.46 INJECTION, SOLUTION INTRAVENOUS at 10:16

## 2024-03-11 RX ADMIN — HYDROMORPHONE HYDROCHLORIDE 0.5 MG: 1 INJECTION, SOLUTION INTRAMUSCULAR; INTRAVENOUS; SUBCUTANEOUS at 23:38

## 2024-03-11 RX ADMIN — FLUCONAZOLE 400 MG: 400 INJECTION, SOLUTION INTRAVENOUS at 09:06

## 2024-03-11 RX ADMIN — HYDROMORPHONE HYDROCHLORIDE 0.5 MG: 2 INJECTION, SOLUTION INTRAMUSCULAR; INTRAVENOUS; SUBCUTANEOUS at 07:41

## 2024-03-11 RX ADMIN — WATER: 1 INJECTION INTRAMUSCULAR; INTRAVENOUS; SUBCUTANEOUS at 17:45

## 2024-03-11 NOTE — PROGRESS NOTES
[facility] Medicine Services  PROGRESS NOTE    Patient Name: Lea Rivers  : 1945  MRN: [id number]    Date of Admission: 3/2/2024  Primary Care Physician: Mannie Melvin MD    Subjective   Subjective     CC:  F/U total abd colectomy with end ileostomy     HPI:  Patient seen and examined. Sitter at bedside. Daughter at bedside. Patient still remains lethargic after surgery. Discussed evidence of new RUE DVT. Discussed post op anemia. Discussed leukocytosis. Discussed electrolyte derangements.     Objective   Objective     Vital Signs:   Temp:  [96.1 °F (35.6 °C)-99.3 °F (37.4 °C)] 99.2 °F (37.3 °C)  Heart Rate:  [] 106  Resp:  [18-22] 18  BP: (139-188)/(69-99) 145/79  Flow (L/min):  [2] 2     Physical Exam:  Constitutional: No acute distress but remains lethargic   HENT: NCAT, mucous membranes dry  Respiratory: Decreased in bases, 2L NC  Cardiovascular: Sinus tach, no murmurs, rubs, or gallops  Gastrointestinal: +BS, incision with staples, OLIVER with serosanguineous output, ostomy with output, abd binder   : Odell in place draining yellow urine   Musculoskeletal: +LE edema. +B/L UE edema noted   Psychiatric: [place]  Neurologic: no focal deficits  Skin: No rashes    Results Reviewed:  LAB RESULTS:      Lab 24  0508 24  0335 24  1215 24  0421 24  1241 24  0804 24  0411 24  0048 24  1838 24  1751   WBC 18.43* 14.12*  --  16.04*  --   --  15.60*  --   --  14.68*   HEMOGLOBIN 7.3* 11.3*  --  10.2*  --   --  10.5*  --   --  11.5*   HEMATOCRIT 22.8* 35.1*  --  32.6*  --   --  32.5*  --   --  35.8*   PLATELETS 222 328  --  270  --   --  204  --   --  221   NEUTROS ABS 14.40* 11.36*  --   --   --   --   --   --   --  12.21*   IMMATURE GRANS (ABS) 0.60* 0.35*  --   --   --   --   --   --   --  0.20*   LYMPHS ABS 1.04 0.88  --   --   --   --   --   --   --  0.78   MONOS ABS 2.08* 1.43*  --   --   --   --   --   --    --  1.46*   EOS ABS 0.24 0.02  --   --   --   --   --   --   --  0.00   MCV 96.2 92.4  --  93.9  --   --  91.5  --   --  93.2   CRP  --   --  12.05*  --   --   --   --   --   --   --    PROCALCITONIN  --   --   --   --   --   --   --   --   --  0.12   LACTATE  --  1.3  --   --  1.4 1.4 1.6 1.6   < >  --     < > = values in this interval not displayed.         Lab 03/11/24  0508 03/10/24  1024 03/09/24  2055 03/09/24  0335 03/08/24  1638 03/08/24  1215 03/08/24  0421 03/07/24  1804 03/07/24  0411   SODIUM 138  138 139  --  141  --  135* 132*  --  131*   POTASSIUM 3.2*  3.2* 3.7 3.3* 2.8* 2.5* 3.0* 2.6*   < > 3.1*   CHLORIDE 107  107 108*  --  107  --  104 101  --  99   CO2 25.0  25.0 22.0  --  23.0  --  21.0* 19.0*  --  22.0   ANION GAP 6.0  6.0 9.0  --  11.0  --  10.0 12.0  --  10.0   BUN 15  15 17  --  15  --  16 15  --  15   CREATININE 0.66*  0.66* 0.65*  --  0.72*  --  0.70* 0.68*  --  0.74*   EGFR 95.4  95.4 95.8  --  92.9  --  93.7 94.6  --  92.2   GLUCOSE 145*  145* 135*  --  118*  --  98 90  --  124*   CALCIUM 8.3*  8.3* 8.4*  --  8.7  --  8.5* 8.2*  --  8.5*   IONIZED CALCIUM  --   --   --   --  1.25  --   --   --  1.22   MAGNESIUM 1.8 1.8  --  2.0 2.3 1.8 1.8  --  1.9   PHOSPHORUS 1.7* 2.8 3.4 2.0*  --  2.9  --   --  3.0    < > = values in this interval not displayed.         Lab 03/11/24  0508 03/09/24  0335 03/08/24  1215 03/06/24  1751   TOTAL PROTEIN 4.4* 6.1 5.6* 6.8   ALBUMIN 2.5* 2.9* 3.1* 3.3*   GLOBULIN 1.9 3.2  --  3.5   ALT (SGPT) 31 86* 103* 46*   AST (SGOT) 23 64* 105* 81*   BILIRUBIN 0.3 0.6 0.5 0.7   INDIRECT BILIRUBIN  --   --  0.2  --    BILIRUBIN DIRECT  --   --  0.3  --    ALK PHOS 63 70 61 61               Lab 03/08/24  1215   CHOLESTEROL 103   TRIGLYCERIDES 161*         Lab 03/09/24  0935   ABO TYPING O   RH TYPING Negative   ANTIBODY SCREEN Negative         Lab 03/06/24  1636   PH, ARTERIAL 7.413   PCO2, ARTERIAL 35.6   PO2 ART 63.0*   FIO2 21   HCO3 ART 22.7   BASE EXCESS  ART -1.6*   CARBOXYHEMOGLOBIN 1.6     Brief Urine Lab Results  (Last result in the past 365 days)        Color   Clarity   Blood   Leuk Est   Nitrite   Protein   CREAT   Urine HCG        03/06/24 1548 Dark Yellow   Slightly Cloudy   Large (3+)   Small (1+)   Positive   100 mg/dL (2+)                   Microbiology Results Abnormal       Procedure Component Value - Date/Time    Blood Culture - Blood, Arm, Right [[id number]]  (Normal) Collected: 03/06/24 1857    Lab Status: Preliminary result Specimen: Blood from Arm, Right Updated: 03/10/24 2045     Blood Culture No growth at 4 days    Blood Culture - Blood, Arm, Right [[id number]]  (Normal) Collected: 03/06/24 1751    Lab Status: Preliminary result Specimen: Blood from Arm, Right Updated: 03/10/24 2000     Blood Culture No growth at 4 days    Blood Culture - Blood, Arm, Right [[id number]]  (Normal) Collected: 03/02/24 1132    Lab Status: Final result Specimen: Blood from Arm, Right Updated: 03/07/24 1201     Blood Culture No growth at 5 days    Narrative:      Less than seven (7) mL's of blood was collected.  Insufficient quantity may yield false negative results.    Blood Culture - Blood, Arm, Right [[id number]]  (Normal) Collected: 03/02/24 1132    Lab Status: Final result Specimen: Blood from Arm, Right Updated: 03/07/24 1201     Blood Culture No growth at 5 days    Narrative:      Less than seven (7) mL's of blood was collected.  Insufficient quantity may yield false negative results.    Urine Culture - Urine, Straight Cath [[id number]]  (Normal) Collected: 03/02/24 0923    Lab Status: Final result Specimen: Urine from Straight Cath Updated: 03/03/24 1601     Urine Culture No growth    COVID PRE-OP / PRE-PROCEDURE SCREENING ORDER (NO ISOLATION) - Swab, Nasopharynx [[id number]]  (Normal) Collected: 03/02/24 1133    Lab Status: Final result Specimen: Swab from Nasopharynx Updated: 03/02/24 1227    Narrative:      The following orders were created for panel order COVID  PRE-OP / PRE-PROCEDURE SCREENING ORDER (NO ISOLATION) - Swab, Nasopharynx.  Procedure                               Abnormality         Status                     ---------                               -----------         ------                     COVID-19, FLU A/B, RSV P...[[id number]]  Normal              Final result                 Please view results for these tests on the individual orders.    COVID-19, FLU A/B, RSV PCR 1 HR TAT - Swab, Nasopharynx [[id number]]  (Normal) Collected: 03/02/24 1133    Lab Status: Final result Specimen: Swab from Nasopharynx Updated: 03/02/24 1227     COVID19 Not Detected     Influenza A PCR Not Detected     Influenza B PCR Not Detected     RSV, PCR Not Detected    Narrative:      Fact sheet for providers: https://www.fda.gov/media/520074/download    Fact sheet for patients: https://www.fda.gov/media/954935/download    Test performed by PCR.            Duplex Venous Upper Extremity - Right CAR    Result Date: 3/10/2024    Acute right upper extremity deep vein thrombosis noted in the brachial.   Acute right upper extremity superficial thrombophlebitis noted in the basilic (upper arm).     Peripheral Block    Result Date: 3/9/2024  Miguel Gaxiola CRNA     3/9/2024  1:32 PM Peripheral Block Patient reassessed immediately prior to procedure Patient location during procedure: OR Reason for block: at surgeon's request and post-op pain management Performed by Anesthesiologist: Gaston Quiroz MD Preanesthetic Checklist Completed: patient identified, IV checked, site marked, risks and benefits discussed, surgical consent, monitors and equipment checked, pre-op evaluation and timeout performed Prep: Pt Position: supine Sterile barriers:cap, gloves, mask and washed/disinfected hands Prep: ChloraPrep Patient monitoring: blood pressure monitoring, continuous pulse oximetry and EKG Procedure Sedation: yes Performed under: general Guidance:ultrasound guided Images:still images obtained,  "printed/placed on chart Laterality:Bilateral Block Type:TAP Injection Technique:single-shot Needle Type:short-bevel and echogenic Needle Gauge:20 G Resistance on Injection: none Medications Used: dexamethasone sodium phosphate injection - Injection  4 mg - 3/9/2024 1:22:00 PM bupivacaine PF (MARCAINE) 0.25 % injection - Injection  60 mL - 3/9/2024 1:22:00 PM Medications Comment:Block Injection:  LA dose divided between Right and Left block Post Assessment Injection Assessment: negative aspiration for heme, incremental injection and no paresthesia on injection Patient Tolerance:comfortable throughout block Complications:no Additional Notes Subcostal TAPs A high-frequency linear transducer, with sterile cover, was placed sub-xiphoid to identify [place], right and left Rectus Abdominus Muscles (ERICA). The transducer was moved either right or left subcostally to identify the ERICA and the Transverse Abdominus Muscle (RODRIGUEZ). The insertion site was prepped in sterile fashion and then localized with 2-5 ml of 1% Lidocaine. Using ultrasound-guidance, a 20-gauge B-Santos 4\" Ultraplex 360 non-stimulating echogenic needle was advanced in plane, from medial to lateral, until the tip of the needle was in the fascial plane between the ERICA and RODRIGUEZ. 1-3ml of preservative free normal saline was used to hydro-dissect the fascial planes. After the fascial plane was verified, the local anesthetic (LA) was injected. The procedure was repeated on the opposite side for bilateral coverage. Aspiration every 5 ml to prevent intravascular injection. Injection was completed with negative aspiration of blood and negative intravascular injection. Injection pressures were normal with minimal resistance. The subcostal approach to the TAP nerve block ideally anesthetizes the intercostal nerves T6-T9. Mid-Axillary/Lateral TAPs A high-frequency linear transducer, with sterile cover, was placed in the midaxillary line between the ASIS and costal margin. " "The External Oblique Muscle (EOM), Internal Oblique Muscle (IOM), Transverse Abdominus Muscle (RODRIGUEZ), and Peritoneum were identified. The insertion site was prepped in sterile fashion and then localized with 2-5 ml of 1% Lidocaine. Using ultrasound-guidance, a 20-gauge B-Santos 4\" Ultraplex 360 non-stimulating echogenic needle was advanced in plane, from medial to lateral, until the tip of the needle was in the fascial plane between the IOM and RODRIGUEZ. 1-3ml of preservative free normal saline was used to hydro-dissect the fascial planes. After the fascial plane was verified, the local anesthetic (LA) was injected. The procedure was repeated on the opposite side for bilateral coverage. Aspiration every 5 ml to prevent intravascular injection. Injection was completed with negative aspiration of blood and negative intravascular injection. Injection pressures were normal with minimal resistance. Midaxillary TAPs should reach intercostal nerves T10- T11 and the subcostal nerve T12.       Results for orders placed during the hospital encounter of 07/20/21    Adult Transthoracic Echo Complete W/ Cont if Necessary Per Protocol    Interpretation Summary  · Left ventricular wall thickness is consistent with mild concentric hypertrophy.  · Normal left-ventricular systolic function, estimated EF 65%.  · Left ventricular diastolic function is consistent with (grade I) impaired relaxation.  · Aortic sclerosis without aortic stenosis. Trace aortic insufficiency.  · Trace mitral regurgitation, trace tricuspid regurgitation.      Current medications:  Scheduled Meds:Fat Emul Fish Oil/Plant Based, 250 mL, Intravenous, Q24H (TPN)  fluconazole, 400 mg, Intravenous, Q24H  heparin (porcine), 5,000 Units, Subcutaneous, Q12H  hydrALAZINE, 20 mg, Intravenous, Q6H  insulin regular, 2-7 Units, Subcutaneous, Q6H  levETIRAcetam, 500 mg, Intravenous, Q12H  Lidocaine, 2 patch, Transdermal, Q24H  pantoprazole, 40 mg, Intravenous, Q " AM  piperacillin-tazobactam, 3.375 g, Intravenous, Q8H  potassium chloride, 10 mEq, Intravenous, Q1H  sodium chloride, 10 mL, Intravenous, Q12H  sodium chloride, 10 mL, Intravenous, Q12H  sodium chloride, 10 mL, Intravenous, Q12H  valproate sodium, 500 mg, Intravenous, Q8H      Continuous Infusions:Adult Cyclic Central 2-in-1 TPN, , Last Rate: 50 mL/hr at 03/11/24 0149  lactated ringers, 125 mL/hr, Last Rate: 125 mL/hr (03/11/24 0802)  Pharmacy to Dose TPN,           PRN Meds:.  acetaminophen **OR** acetaminophen **OR** acetaminophen    calcium carbonate    Calcium Replacement - Follow Nurse / BPA Driven Protocol    dextrose    dextrose    docusate sodium    fluticasone    glucagon (human recombinant)    hydrALAZINE    HYDROmorphone **AND** naloxone    ipratropium-albuterol    labetalol    Magnesium Standard Dose Replacement - Follow Nurse / BPA Driven Protocol    ondansetron ODT **OR** ondansetron    ondansetron ODT **OR** ondansetron    Pharmacy to Dose TPN    Phosphorus Replacement - Follow Nurse / BPA Driven Protocol    Potassium Replacement - Follow Nurse / BPA Driven Protocol    sodium chloride    sodium chloride    sodium chloride    sodium chloride    sodium chloride    sodium chloride    Assessment & Plan   Assessment & Plan     Active Hospital Problems    Diagnosis  POA    **Falls [W19.XXXA]  Yes    Pneumatosis intestinalis [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        Brief Hospital Course to date:  Lea Rivers is a 78 y.o. male with past medical history of hypertension, hyperlipidemia, hypothyroidism, seizure disorder.  Patient is being admitted for a fall and nondisplaced first through fourth left rib fractures.  Patient also has left distal clavicle fracture.    This patient's problems and plans were partially entered by my partner and updated as appropriate by me 03/11/24.      Multiple falls with increasing frequency   -CT of head shows age-related  changes which are chronic but no acute process  -neuro consulted. likely d/t neuropathy (confirmed with EMG) and progression of dementia     Multiple rib fractures and also left clavicle fracture  -With no displacement or mildly displaced.  Orthopedic surgery evaluated. No surgical intervention  planned.  Recs nonweightbearing LUE, may come out of sling in 5-7 days to initiate gentle ROM exercises per ortho.  F/u with ortho/Dr. Maldonado in 2 weeks  -Pain control  -lidocaine patch    Pneumatosis intestinalis/Toxic dilation of colon  -S/P total abdominal colectomy and end ileostomy 3/9  -NPO   -Continue TPN via PICC  -Replace electrolytes   -NG removed 3/10  -Remove patel when ok with surgery   -Continue Zosyn. Add Fluconazole   -CBC in AM   -DC IVF as appears he is becoming more swollen/volume overloaded   -Will give 20mg IV Lasix after blood transfusion   -Of note, UA 3/6 was NOT sent for culture. Appears he had UTI as this time. Will repeat UA today.     Acute on Chronic Anemia  -Post op noted decrease in H&H  -Did receive large quantity of IVF per surgery prior to case  -Repeat H&H at noon   -Transfuse 1 unit PRBCs   -Do not suspect active bleeding     Acute RUE DVT (brachial)  -Provoked, 2/2 PICC   -Discussed with surgery, hold off on AC for now to ensure H&H stabilizes   -Discussed with family   -Obtain Duplex LUE today as appears more swollen and has PICC in place      Prostate cancer  Hx BPH s/p greenlight photo vaporization of prostate 2018  -Follows with Dr. Noland  -S/P cyberknife radiation 2/9/23  -Has intermittent hematuria      Dementia and increasing memory problem  -Patient still lives alone and also drives  -practice partner d/w family that patient should not be driving.  They are interested in rehab/possible placement     Hypertension  -BP improved   -Continue IV Hydralazine 20mg q6 hours  -PRN IV Lopressor  -Unable to take his PO meds currently     Seizure disorder  - Depakote and keppra (changed  to IV)   - Check Keppra and Depakote levels due to lethargy   - If persists, will repeat EEG     Hyperlipidemia  Hypothyroidism  -Home meds on hold, NPO    Expected Discharge Location and Transportation: SNF  Expected Discharge   Expected Discharge Date: 3/15/2024; Expected Discharge Time:      DVT prophylaxis:  Medical and mechanical DVT prophylaxis orders are present.         AM-PAC 6 Clicks Score (PT): 6 (03/10/24 2002)    CODE STATUS:   Code Status and Medical Interventions:   Ordered at: 03/02/24 1456     Medical Intervention Limits:    NO intubation (DNI)     Code Status (Patient has no pulse and is not breathing):    No CPR (Do Not Attempt to Resuscitate)     Medical Interventions (Patient has pulse or is breathing):    Limited Support       Eleanor Yost,   03/11/24

## 2024-03-11 NOTE — PLAN OF CARE
Problem: Adult Inpatient Plan of Care  Goal: Plan of Care Review  Outcome: Ongoing, Progressing  Flowsheets (Taken 3/11/2024 0440)  Progress: no change  Plan of Care Reviewed With: patient  Outcome Evaluation: VSS, O2 at 2 LPM NC tolerating well, patient has slept for nearly entire shift, arousable to pain/touch/voice but goes right back to sleep, good output from OLIVER, stool production in ostomy, adequate UO in patel, zero output to NG tube, no other complications assessed/noted/stated.  Goal: Patient-Specific Goal (Individualized)  Outcome: Ongoing, Progressing  Goal: Absence of Hospital-Acquired Illness or Injury  Outcome: Ongoing, Progressing  Intervention: Identify and Manage Fall Risk  Recent Flowsheet Documentation  Taken 3/11/2024 0400 by Mikel Stringer RN  Safety Promotion/Fall Prevention:   assistive device/personal items within reach   clutter free environment maintained   fall prevention program maintained   lighting adjusted   nonskid shoes/slippers when out of bed   room organization consistent   safety round/check completed   toileting scheduled  Taken 3/11/2024 0000 by Mikel Stringer, RN  Safety Promotion/Fall Prevention:   assistive device/personal items within reach   clutter free environment maintained   fall prevention program maintained   lighting adjusted   nonskid shoes/slippers when out of bed   room organization consistent   safety round/check completed   toileting scheduled  Taken 3/10/2024 2002 by Mikel Stringer, RN  Safety Promotion/Fall Prevention:   assistive device/personal items within reach   clutter free environment maintained   fall prevention program maintained   lighting adjusted   nonskid shoes/slippers when out of bed   room organization consistent   safety round/check completed   toileting scheduled  Intervention: Prevent Skin Injury  Recent Flowsheet Documentation  Taken 3/11/2024 0400 by Mikel Stringer, RN  Body Position:   weight shifting    turned   left  Taken 3/11/2024 0000 by Mikel Stringer RN  Body Position:   weight shifting   turned   right  Taken 3/10/2024 2215 by Mikel Stringer RN  Body Position:   turned   left   weight shifting  Taken 3/10/2024 2002 by Mikel Stringer RN  Body Position:   weight shifting   turned   supine  Skin Protection:   adhesive use limited   incontinence pads utilized   tubing/devices free from skin contact  Intervention: Prevent and Manage VTE (Venous Thromboembolism) Risk  Recent Flowsheet Documentation  Taken 3/11/2024 0400 by Mikel Stringer RN  Activity Management: activity encouraged  Taken 3/11/2024 0000 by Mikel Stringer RN  Activity Management: activity encouraged  Taken 3/10/2024 2002 by Mikel Stringer RN  Activity Management: activity minimized  VTE Prevention/Management:   bilateral   sequential compression devices on  Range of Motion: active ROM (range of motion) encouraged  Intervention: Prevent Infection  Recent Flowsheet Documentation  Taken 3/11/2024 0400 by Mikel Stringer RN  Infection Prevention:   environmental surveillance performed   equipment surfaces disinfected   hand hygiene promoted   rest/sleep promoted   single patient room provided  Taken 3/11/2024 0000 by Mikel Stringer RN  Infection Prevention:   environmental surveillance performed   equipment surfaces disinfected   hand hygiene promoted   rest/sleep promoted   single patient room provided  Taken 3/10/2024 2002 by Mikel Stringer RN  Infection Prevention:   environmental surveillance performed   equipment surfaces disinfected   hand hygiene promoted   rest/sleep promoted   single patient room provided  Goal: Optimal Comfort and Wellbeing  Outcome: Ongoing, Progressing  Intervention: Monitor Pain and Promote Comfort  Recent Flowsheet Documentation  Taken 3/10/2024 2002 by Mikel Stringer RN  Pain Management Interventions:   care clustered   pain  management plan reviewed with patient/caregiver   pillow support provided   position adjusted   quiet environment facilitated   see MAR  Intervention: Provide Person-Centered Care  Recent Flowsheet Documentation  Taken 3/10/2024 2002 by Mikel Stringer RN  Trust Relationship/Rapport:   care explained   questions answered   questions encouraged   thoughts/feelings acknowledged  Goal: Readiness for Transition of Care  Outcome: Ongoing, Progressing     Problem: Skin Injury Risk Increased  Goal: Skin Health and Integrity  Outcome: Ongoing, Progressing  Intervention: Optimize Skin Protection  Recent Flowsheet Documentation  Taken 3/11/2024 0400 by Mikel Stringer RN  Head of Bed (HOB) Positioning: HOB at 30-45 degrees  Taken 3/11/2024 0000 by Mikel Stringer RN  Head of Bed (HOB) Positioning: HOB at 30-45 degrees  Taken 3/10/2024 2002 by Mikel Stringer RN  Pressure Reduction Techniques:   frequent weight shift encouraged   heels elevated off bed   weight shift assistance provided  Head of Bed (HOB) Positioning: HOB at 30-45 degrees  Pressure Reduction Devices:   heel offloading device utilized   positioning supports utilized   pressure-redistributing mattress utilized  Skin Protection:   adhesive use limited   incontinence pads utilized   tubing/devices free from skin contact     Problem: Fall Injury Risk  Goal: Absence of Fall and Fall-Related Injury  Outcome: Ongoing, Progressing  Intervention: Identify and Manage Contributors  Recent Flowsheet Documentation  Taken 3/11/2024 0400 by Mikel Stringer RN  Medication Review/Management: medications reviewed  Taken 3/11/2024 0000 by Mikel Stringer RN  Medication Review/Management: medications reviewed  Taken 3/10/2024 2002 by Mikel Stringer RN  Medication Review/Management: medications reviewed  Intervention: Promote Injury-Free Environment  Recent Flowsheet Documentation  Taken 3/11/2024 0400 by Myke  RAKESH Morin  Safety Promotion/Fall Prevention:   assistive device/personal items within reach   clutter free environment maintained   fall prevention program maintained   lighting adjusted   nonskid shoes/slippers when out of bed   room organization consistent   safety round/check completed   toileting scheduled  Taken 3/11/2024 0000 by Mikel Stringer RN  Safety Promotion/Fall Prevention:   assistive device/personal items within reach   clutter free environment maintained   fall prevention program maintained   lighting adjusted   nonskid shoes/slippers when out of bed   room organization consistent   safety round/check completed   toileting scheduled  Taken 3/10/2024 2002 by Mikel Stringer RN  Safety Promotion/Fall Prevention:   assistive device/personal items within reach   clutter free environment maintained   fall prevention program maintained   lighting adjusted   nonskid shoes/slippers when out of bed   room organization consistent   safety round/check completed   toileting scheduled     Problem: Adjustment to Illness (Sepsis/Septic Shock)  Goal: Optimal Coping  Outcome: Ongoing, Progressing  Intervention: Optimize Psychosocial Adjustment to Illness  Recent Flowsheet Documentation  Taken 3/10/2024 2002 by Mikel Stringer RN  Family/Support System Care: support provided     Problem: Bleeding (Sepsis/Septic Shock)  Goal: Absence of Bleeding  Outcome: Ongoing, Progressing  Intervention: Monitor and Manage Bleeding  Recent Flowsheet Documentation  Taken 3/11/2024 0400 by Mikel Stringer RN  Bleeding Precautions: monitored for signs of bleeding  Bleeding Management: dressing monitored  Taken 3/11/2024 0000 by Mikel Stringer RN  Bleeding Precautions: monitored for signs of bleeding  Bleeding Management: dressing monitored  Taken 3/10/2024 2002 by Mikel Stringer RN  Bleeding Precautions: monitored for signs of bleeding  Bleeding Management: dressing monitored      Problem: Glycemic Control Impaired (Sepsis/Septic Shock)  Goal: Blood Glucose Level Within Desired Range  Outcome: Ongoing, Progressing  Intervention: Optimize Glycemic Control  Recent Flowsheet Documentation  Taken 3/10/2024 2002 by Mikel Stringer RN  Glycemic Management: blood glucose monitored     Problem: Infection Progression (Sepsis/Septic Shock)  Goal: Absence of Infection Signs and Symptoms  Outcome: Ongoing, Progressing  Intervention: Initiate Sepsis Management  Recent Flowsheet Documentation  Taken 3/11/2024 0400 by Mikel Stringer RN  Infection Management: aseptic technique maintained  Infection Prevention:   environmental surveillance performed   equipment surfaces disinfected   hand hygiene promoted   rest/sleep promoted   single patient room provided  Taken 3/11/2024 0000 by Mikel Stringer RN  Infection Management: aseptic technique maintained  Infection Prevention:   environmental surveillance performed   equipment surfaces disinfected   hand hygiene promoted   rest/sleep promoted   single patient room provided  Taken 3/10/2024 2002 by Mikel Stringer RN  Infection Management: aseptic technique maintained  Infection Prevention:   environmental surveillance performed   equipment surfaces disinfected   hand hygiene promoted   rest/sleep promoted   single patient room provided  Intervention: Promote Recovery  Recent Flowsheet Documentation  Taken 3/11/2024 0400 by Mikel Stringer RN  Activity Management: activity encouraged  Taken 3/11/2024 0000 by Mikel Stringer RN  Activity Management: activity encouraged  Taken 3/10/2024 2002 by Mikel Stringer RN  Activity Management: activity minimized  Sleep/Rest Enhancement: awakenings minimized  Intervention: Promote Stabilization  Recent Flowsheet Documentation  Taken 3/10/2024 2002 by Mikel Stringer RN  Fever Reduction/Comfort Measures:   lightweight bedding   lightweight clothing      Problem: Nutrition Impaired (Sepsis/Septic Shock)  Goal: Optimal Nutrition Intake  Outcome: Ongoing, Progressing   Goal Outcome Evaluation:  Plan of Care Reviewed With: patient        Progress: no change  Outcome Evaluation: VSS, O2 at 2 LPM NC tolerating well, patient has slept for nearly entire shift, arousable to pain/touch/voice but goes right back to sleep, good output from OLIVER, stool production in ostomy, adequate UO in patel, zero output to NG tube, no other complications assessed/noted/stated.

## 2024-03-11 NOTE — PROGRESS NOTES
"Patient Name:  Lea Rivers  YOB: 1945  [id number]    Surgery Progress Note    Date of visit: 3/11/2024    Subjective     Unable to obtain full history due to patient's dementia.  Patient with ostomy function.  No nausea or vomiting around NG tube.  No fevers or chills.       Objective       /79 (BP Location: Left leg, Patient Position: Lying)   Pulse 103   Temp 99.3 °F (37.4 °C) (Oral)   Resp 18   Ht 170.2 cm (67.01\")   Wt 89 kg (196 lb 4.8 oz)   SpO2 97%   BMI 30.74 kg/m²     Intake/Output Summary (Last 24 hours) at 3/11/2024 0757  Last data filed at 3/11/2024 0651  Gross per 24 hour   Intake 1073.52 ml   Output 1030 ml   Net 43.52 ml   OLIVER drain 280 cc    CV:  Rhythm regular and rate regular  L:  Clear to auscultation bilaterally  Abd:  Bowel sounds positive, soft, appropriately tender, dressings clean dry and intact, OLIVER serosanguineous, ostomy viable and functioning  Ext:  No cyanosis, clubbing, edema    Recent labs and imaging that are back at this time have been reviewed.   Potassium 3.2  Creatinine 0.66  Calcium 8.3  Phosphorus 1.7  WBC 18.4  Hemoglobin 7.3       Assessment & Plan     Patient postoperative day 2 from total abdominal colectomy with end ileostomy.  Patient with acute anemia relative to hemoglobin 2 days ago.  Will plan for repeat H&H at noon today as no hemoglobin was checked yesterday.  Okay to DC NG tube but would keep n.p.o. until hemoglobin stability ensured.  As well would keep Odell in for now.  On electrolyte replacement.  Pain control.  Patient with acute upper extremity DVT.  Would hold off on anticoagulation for now due to drop in H&H.  Continue TPN.        Harley Godfrey MD  3/11/2024  07:57 EDT      "

## 2024-03-11 NOTE — CASE MANAGEMENT/SOCIAL WORK
Continued Stay Note  [facility]     Patient Name: Lea Rivers  MRN: [id number]  Today's Date: 3/11/2024    Admit Date: 3/2/2024    Plan: SNF   Discharge Plan       Row Name 03/11/24 1340       Plan    Plan SNF    Patient/Family in Agreement with Plan yes    Plan Comments Patient discussed in MDR. Patient is not medically ready for discharge. Family is considering comfort care depending on progress. For now, the plan remains for patient to go to rehab at discharge. Referral was faxed to [facility] this past Friday.  will continue to follow plan of care and assist with discharge planning needs as indicated.    Final Discharge Disposition Code 03 - skilled nursing facility (SNF)                   Discharge Codes    No documentation.                 Expected Discharge Date and Time       Expected Discharge Date Expected Discharge Time    Mar 15, 2024               Valerie Keith RN

## 2024-03-11 NOTE — PROGRESS NOTES
"CPN Review Note    Patient Name: Lea Rivers  Date of Encounter: 24 08:03 EDT  MRN: [id number]  Admission date: 3/2/2024    Reason for visit: CPN review . RD to continue to follow per protocol.     Information Obtained:  Plan to D/C NGT tube today per surgeons note, however will keep NPO for now due to drop in H/H.  PICC line replaced yesterday and TPN started yesterday.  Note with depleted electrolytes per morning labs.      EN infusing @ 50ml/hr at time of visit. Patient sleeping.  Family at bedside; no questions for RD at this time.      EMR reviewed:  NG output \"0\" past 24hrs; ostomy 50ml.   Medication reviewed: Insulin, Protonix.  LR @ 125ml/hr   Labs reviewed: BG, K  3.2, Phos 1.7, Mag 1.8    Current diet: NPO Diet NPO Type: Strict NPO  Adult Cyclic Central 2-in-1 TPN    Estimated Needs:   Calories ~ 1800 Kcal/day  Protein ~ 120 g PRO/day    PN Route: PICC  PN:   15% dextrose, 7.7% AA @ goal rate of 65mL/hr.         GIR:  1.82mg/kg/min  Lipid: Provide 250mL 20% SMOF daily                  PN@ Goal over 24hr to Supply:  Volume 1560 mL/day     Energy 1776 Kcal/day 99 % Est Need   Protein 120 g/day 100 % Est Need     ---------------------------------------------------------------------------  Formula/Rate verified at bedside: Yes per documentation (confirmed per RN)  Infusing Rate at time of visit: 50ml/hr     Average Delivery from [facility] Day:   PN Volume 724 mL/day     Lipid delivered?        Energy  Kcal/day  % Est Need   Protein  g/day  % Est Need       Intervention:  Keep PN @ 50ml/hr until electrolytes replacement is complete and rechecked(discussed with RN).  Pharmacy please make new bag with goal rate of 65ml/hr (hopefully can advance to that goal rate tonight).   Continue to monitor electrolytes;  replace per protocol.  Monitor diet progression and PO tolerance.        Follow up:   Per protocol    Daniella Ramos RD  08:03 EDT  Time: 30min     "

## 2024-03-11 NOTE — NURSING NOTE
WOC follow-up for ostomy care and education.    Patient now postop day 2 from total abdominal colectomy with end ileostomy.    Patient is not appropriate for education.  Reviewed ostomy education plan with patient's daughter.  Education folder provided further review at convenience.    Stoma is viable, having some bilious liquid effluent.    Will continue to follow.    Pola Arnold RN, BSN, CCRN, CWOCN  Wound, Ostomy and Continence (WOC) Department  [facility]

## 2024-03-11 NOTE — PROGRESS NOTES
Pharmacy Parenteral Nutrition Evaluation    Lea Rivers is a  78 y.o. male receiving TPN.     Indication:     Prolonged Paralytic Ileus     Consulting Physician: Eleanor Yost DO     Total Fluid Rate (if stated): n/a    Labs  Results from last 7 days   Lab Units 03/11/24  0508 03/10/24  1024 03/09/24 2055 03/09/24 0335 03/08/24  1638 03/08/24  1215   SODIUM mmol/L 138  138 139  --  141  --  135*   POTASSIUM mmol/L 3.2*  3.2* 3.7 3.3* 2.8*   < > 3.0*   CHLORIDE mmol/L 107  107 108*  --  107  --  104   CO2 mmol/L 25.0  25.0 22.0  --  23.0  --  21.0*   BUN mg/dL 15  15 17  --  15  --  16   CREATININE mg/dL 0.66*  0.66* 0.65*  --  0.72*  --  0.70*   CALCIUM mg/dL 8.3*  8.3* 8.4*  --  8.7  --  8.5*   BILIRUBIN mg/dL 0.3  --   --  0.6  --  0.5   ALK PHOS U/L 63  --   --  70  --  61   ALT (SGPT) U/L 31  --   --  86*  --  103*   AST (SGOT) U/L 23  --   --  64*  --  105*   GLUCOSE mg/dL 145*  145* 135*  --  118*  --  98    < > = values in this interval not displayed.       Results from last 7 days   Lab Units 03/11/24  0508 03/10/24  1024 03/09/24 2055 03/09/24 0335 03/08/24  1638   MAGNESIUM mg/dL 1.8 1.8  --  2.0 2.3   PHOSPHORUS mg/dL 1.7* 2.8 3.4 2.0*  --    PREALBUMIN mg/dL  --   --   --   --  7.9*       Results from last 7 days   Lab Units 03/11/24  0508 03/09/24 0335 03/08/24  0421   WBC 10*3/mm3 18.43* 14.12* 16.04*   HEMOGLOBIN g/dL 7.3* 11.3* 10.2*   HEMATOCRIT % 22.8* 35.1* 32.6*   PLATELETS 10*3/mm3 222 328 270       Triglycerides   Date Value Ref Range Status   03/08/2024 161 (H) 0 - 150 mg/dL Final     Comment:     Falsely depressed results may occur on samples drawn from patients receiving N-Acetylcysteine (NAC) or Metamizole.       estimated creatinine clearance is 96.7 mL/min (A) (by C-G formula based on SCr of 0.66 mg/dL (L)).    Intake & Output (last 3 days)         03/05 0701 03/06 0700 03/06 0701 03/07 0700 03/07 0701 03/08 0700 03/08 0701 03/09 0700    P.O. 1080 240       I.V. (mL/kg)  1026.3 (11.5)      IV Piggyback  400      Total Intake(mL/kg) 1080 (12.1) 1666.3 (18.7)      Urine (mL/kg/hr) 150 (0.1) 400 (0.2)      Stool 0 0      Total Output 150 400      Net +930 +1266.3              Urine Unmeasured Occurrence 4 x 2 x 1 x     Stool Unmeasured Occurrence 5 x 4 x 3 x 1 x            Dietitian Recommendations  Diet Orders (active) (From admission, onward)       Start     Ordered    03/08/24 1800  Adult Cyclic Central 2-in-1 TPN  Cyclic TPN - See Admin Instructions        Note to Pharmacy: Lea VAN [place]  5H  [id number]  MRN: [id number]  CSN: [id number]    03/08/24 1422 03/08/24 1800  Fat Emul Fish Oil/Plant Based (SMOFLIPID) 20 % emulsion 250 mL  Every 24 Hours Scheduled (TPN)         03/08/24 1422 03/08/24 1355  NPO Diet NPO Type: Strict NPO  Diet Effective Now         03/08/24 1358    03/05/24 1656  DIET MESSAGE Pt would like a grilled cheese sandwich, please. Thanks!  Once        Comments: Pt would like a grilled cheese sandwich, please. Thanks!    03/05/24 1656                    Current TPN Regimen Recommendation:   Dextrose 15% / Amino Acid 7.7% at rate of 25 mL/hr x8hr, then 50 ml/hr x8hr, then goal rate of 65 ml/hr.      20% Lipid Emulsion 250mL every 24 hours.    Na 45 mEq/L  K 50 mEq/L  Ca 5 mEq/L  Mg 8 mEq/L  PO4 15 mmol/L  Cl:Ace - 1:1    Assessment/Plan:   TPN for prolonged paralytic ileus.   Macronutrients per dietary recommendation are listed above.  Increase to K - 50 meq/L  Electrolyte replacement per protocol  SSI q6h  Follow and adjust as indicated     Thanks  Wilmar Sosa [facility]  3/11/2024  12:04 EDT

## 2024-03-12 ENCOUNTER — APPOINTMENT (OUTPATIENT)
Dept: GENERAL RADIOLOGY | Facility: HOSPITAL | Age: 79
DRG: 329 | End: 2024-03-12
Payer: MEDICARE

## 2024-03-12 ENCOUNTER — APPOINTMENT (OUTPATIENT)
Dept: CT IMAGING | Facility: HOSPITAL | Age: 79
DRG: 329 | End: 2024-03-12
Payer: MEDICARE

## 2024-03-12 LAB
ANION GAP SERPL CALCULATED.3IONS-SCNC: 7 MMOL/L (ref 5–15)
BACTERIA SPEC AEROBE CULT: NO GROWTH
BASOPHILS # BLD AUTO: 0.1 10*3/MM3 (ref 0–0.2)
BASOPHILS NFR BLD AUTO: 0.4 % (ref 0–1.5)
BH BB BLOOD EXPIRATION DATE: NORMAL
BH BB BLOOD TYPE BARCODE: 9500
BH BB DISPENSE STATUS: NORMAL
BH BB PRODUCT CODE: NORMAL
BH BB UNIT NUMBER: NORMAL
BUN SERPL-MCNC: 18 MG/DL (ref 8–23)
BUN/CREAT SERPL: 29.5 (ref 7–25)
CALCIUM SPEC-SCNC: 8 MG/DL (ref 8.6–10.5)
CHLORIDE SERPL-SCNC: 106 MMOL/L (ref 98–107)
CO2 SERPL-SCNC: 25 MMOL/L (ref 22–29)
CREAT SERPL-MCNC: 0.61 MG/DL (ref 0.76–1.27)
CROSSMATCH INTERPRETATION: NORMAL
CYTO UR: NORMAL
DEPRECATED RDW RBC AUTO: 57.6 FL (ref 37–54)
EGFRCR SERPLBLD CKD-EPI 2021: 97.7 ML/MIN/1.73
EOSINOPHIL # BLD AUTO: 0.61 10*3/MM3 (ref 0–0.4)
EOSINOPHIL NFR BLD AUTO: 2.7 % (ref 0.3–6.2)
ERYTHROCYTE [DISTWIDTH] IN BLOOD BY AUTOMATED COUNT: 17.4 % (ref 12.3–15.4)
GLUCOSE BLDC GLUCOMTR-MCNC: 152 MG/DL (ref 70–130)
GLUCOSE BLDC GLUCOMTR-MCNC: 154 MG/DL (ref 70–130)
GLUCOSE BLDC GLUCOMTR-MCNC: 169 MG/DL (ref 70–130)
GLUCOSE BLDC GLUCOMTR-MCNC: 175 MG/DL (ref 70–130)
GLUCOSE SERPL-MCNC: 148 MG/DL (ref 65–99)
HCT VFR BLD AUTO: 26.2 % (ref 37.5–51)
HGB BLD-MCNC: 8.3 G/DL (ref 13–17.7)
IMM GRANULOCYTES # BLD AUTO: 1.11 10*3/MM3 (ref 0–0.05)
IMM GRANULOCYTES NFR BLD AUTO: 4.8 % (ref 0–0.5)
LAB AP CASE REPORT: NORMAL
LAB AP CLINICAL INFORMATION: NORMAL
LYMPHOCYTES # BLD AUTO: 1.17 10*3/MM3 (ref 0.7–3.1)
LYMPHOCYTES NFR BLD AUTO: 5.1 % (ref 19.6–45.3)
MAGNESIUM SERPL-MCNC: 1.8 MG/DL (ref 1.6–2.4)
MCH RBC QN AUTO: 29.3 PG (ref 26.6–33)
MCHC RBC AUTO-ENTMCNC: 31.7 G/DL (ref 31.5–35.7)
MCV RBC AUTO: 92.6 FL (ref 79–97)
MONOCYTES # BLD AUTO: 1.77 10*3/MM3 (ref 0.1–0.9)
MONOCYTES NFR BLD AUTO: 7.7 % (ref 5–12)
NEUTROPHILS NFR BLD AUTO: 18.18 10*3/MM3 (ref 1.7–7)
NEUTROPHILS NFR BLD AUTO: 79.3 % (ref 42.7–76)
NRBC BLD AUTO-RTO: 0.2 /100 WBC (ref 0–0.2)
PATH REPORT.FINAL DX SPEC: NORMAL
PATH REPORT.GROSS SPEC: NORMAL
PHOSPHATE SERPL-MCNC: 1.9 MG/DL (ref 2.5–4.5)
PHOSPHATE SERPL-MCNC: 2.3 MG/DL (ref 2.5–4.5)
PLATELET # BLD AUTO: 195 10*3/MM3 (ref 140–450)
PMV BLD AUTO: 9.2 FL (ref 6–12)
POTASSIUM SERPL-SCNC: 4 MMOL/L (ref 3.5–5.2)
PREALB SERPL-MCNC: 5.4 MG/DL (ref 20–40)
PROCALCITONIN SERPL-MCNC: 0.27 NG/ML (ref 0–0.25)
RBC # BLD AUTO: 2.83 10*6/MM3 (ref 4.14–5.8)
SODIUM SERPL-SCNC: 138 MMOL/L (ref 136–145)
UNIT  ABO: NORMAL
UNIT  RH: NORMAL
WBC NRBC COR # BLD AUTO: 22.94 10*3/MM3 (ref 3.4–10.8)

## 2024-03-12 PROCEDURE — 25010000002 ACETAMINOPHEN 10 MG/ML SOLUTION: Performed by: FAMILY MEDICINE

## 2024-03-12 PROCEDURE — 74177 CT ABD & PELVIS W/CONTRAST: CPT

## 2024-03-12 PROCEDURE — 71045 X-RAY EXAM CHEST 1 VIEW: CPT

## 2024-03-12 PROCEDURE — 25010000002 HYDROMORPHONE PER 4 MG: Performed by: FAMILY MEDICINE

## 2024-03-12 PROCEDURE — 25010000002 METRONIDAZOLE 500 MG/100ML SOLUTION: Performed by: FAMILY MEDICINE

## 2024-03-12 PROCEDURE — 25010000002 CEFTRIAXONE PER 250 MG: Performed by: FAMILY MEDICINE

## 2024-03-12 PROCEDURE — 25010000002 POTASSIUM CHLORIDE PER 2 MEQ OF POTASSIUM

## 2024-03-12 PROCEDURE — 84100 ASSAY OF PHOSPHORUS: CPT | Performed by: FAMILY MEDICINE

## 2024-03-12 PROCEDURE — 25510000001 IOPAMIDOL 61 % SOLUTION: Performed by: FAMILY MEDICINE

## 2024-03-12 PROCEDURE — 25010000002 FLUCONAZOLE PER 200 MG: Performed by: FAMILY MEDICINE

## 2024-03-12 PROCEDURE — 25010000002 FUROSEMIDE PER 20 MG: Performed by: FAMILY MEDICINE

## 2024-03-12 PROCEDURE — 25010000002 MAGNESIUM SULFATE PER 500 MG OF MAGNESIUM

## 2024-03-12 PROCEDURE — 80048 BASIC METABOLIC PNL TOTAL CA: CPT | Performed by: SURGERY

## 2024-03-12 PROCEDURE — 84145 PROCALCITONIN (PCT): CPT | Performed by: FAMILY MEDICINE

## 2024-03-12 PROCEDURE — 25010000002 HYDRALAZINE PER 20 MG: Performed by: SURGERY

## 2024-03-12 PROCEDURE — 25010000002 LABETALOL 5 MG/ML SOLUTION: Performed by: FAMILY MEDICINE

## 2024-03-12 PROCEDURE — 25010000002 HYDRALAZINE PER 20 MG: Performed by: FAMILY MEDICINE

## 2024-03-12 PROCEDURE — 85025 COMPLETE CBC W/AUTO DIFF WBC: CPT | Performed by: FAMILY MEDICINE

## 2024-03-12 PROCEDURE — 25010000002 PIPERACILLIN SOD-TAZOBACTAM PER 1 G: Performed by: FAMILY MEDICINE

## 2024-03-12 PROCEDURE — 25010000002 CALCIUM GLUCONATE PER 10 ML

## 2024-03-12 PROCEDURE — 99233 SBSQ HOSP IP/OBS HIGH 50: CPT | Performed by: FAMILY MEDICINE

## 2024-03-12 PROCEDURE — 92610 EVALUATE SWALLOWING FUNCTION: CPT

## 2024-03-12 PROCEDURE — 25010000002 LEVETRIRACETAM PER 10 MG: Performed by: SURGERY

## 2024-03-12 PROCEDURE — 83735 ASSAY OF MAGNESIUM: CPT | Performed by: SURGERY

## 2024-03-12 PROCEDURE — 63710000001 INSULIN REGULAR HUMAN PER 5 UNITS: Performed by: SURGERY

## 2024-03-12 PROCEDURE — 82948 REAGENT STRIP/BLOOD GLUCOSE: CPT

## 2024-03-12 PROCEDURE — 73030 X-RAY EXAM OF SHOULDER: CPT

## 2024-03-12 RX ORDER — METRONIDAZOLE 500 MG/100ML
500 INJECTION, SOLUTION INTRAVENOUS EVERY 8 HOURS
Status: COMPLETED | OUTPATIENT
Start: 2024-03-12 | End: 2024-03-17

## 2024-03-12 RX ORDER — FUROSEMIDE 10 MG/ML
20 INJECTION INTRAMUSCULAR; INTRAVENOUS ONCE
Status: COMPLETED | OUTPATIENT
Start: 2024-03-12 | End: 2024-03-12

## 2024-03-12 RX ADMIN — METRONIDAZOLE 500 MG: 500 INJECTION, SOLUTION INTRAVENOUS at 23:46

## 2024-03-12 RX ADMIN — SODIUM CHLORIDE 500 MG: 9 INJECTION, SOLUTION INTRAVENOUS at 11:01

## 2024-03-12 RX ADMIN — Medication 10 ML: at 20:16

## 2024-03-12 RX ADMIN — Medication 10 MG: at 12:42

## 2024-03-12 RX ADMIN — CEFTRIAXONE 2000 MG: 2 INJECTION, POWDER, FOR SOLUTION INTRAMUSCULAR; INTRAVENOUS at 13:00

## 2024-03-12 RX ADMIN — HYDRALAZINE HYDROCHLORIDE 20 MG: 20 INJECTION INTRAMUSCULAR; INTRAVENOUS at 17:32

## 2024-03-12 RX ADMIN — FUROSEMIDE 20 MG: 10 INJECTION, SOLUTION INTRAMUSCULAR; INTRAVENOUS at 13:01

## 2024-03-12 RX ADMIN — HYDRALAZINE HYDROCHLORIDE 20 MG: 20 INJECTION INTRAMUSCULAR; INTRAVENOUS at 11:01

## 2024-03-12 RX ADMIN — INSULIN HUMAN 2 UNITS: 100 INJECTION, SOLUTION PARENTERAL at 05:35

## 2024-03-12 RX ADMIN — POTASSIUM PHOSPHATE, MONOBASIC POTASSIUM PHOSPHATE, DIBASIC 15 MMOL: 224; 236 INJECTION, SOLUTION, CONCENTRATE INTRAVENOUS at 05:30

## 2024-03-12 RX ADMIN — HYDROMORPHONE HYDROCHLORIDE 0.5 MG: 1 INJECTION, SOLUTION INTRAMUSCULAR; INTRAVENOUS; SUBCUTANEOUS at 14:55

## 2024-03-12 RX ADMIN — HYDRALAZINE HYDROCHLORIDE 20 MG: 20 INJECTION INTRAMUSCULAR; INTRAVENOUS at 23:46

## 2024-03-12 RX ADMIN — PANTOPRAZOLE SODIUM 40 MG: 40 INJECTION, POWDER, FOR SOLUTION INTRAVENOUS at 05:13

## 2024-03-12 RX ADMIN — Medication 10 ML: at 08:03

## 2024-03-12 RX ADMIN — SMOFLIPID 250 ML: 6; 6; 5; 3 INJECTION, EMULSION INTRAVENOUS at 17:32

## 2024-03-12 RX ADMIN — HYDROMORPHONE HYDROCHLORIDE 0.5 MG: 1 INJECTION, SOLUTION INTRAMUSCULAR; INTRAVENOUS; SUBCUTANEOUS at 17:28

## 2024-03-12 RX ADMIN — HYDRALAZINE HYDROCHLORIDE 20 MG: 20 INJECTION INTRAMUSCULAR; INTRAVENOUS at 05:13

## 2024-03-12 RX ADMIN — HYDROMORPHONE HYDROCHLORIDE 0.5 MG: 1 INJECTION, SOLUTION INTRAMUSCULAR; INTRAVENOUS; SUBCUTANEOUS at 21:53

## 2024-03-12 RX ADMIN — INSULIN HUMAN 2 UNITS: 100 INJECTION, SOLUTION PARENTERAL at 12:05

## 2024-03-12 RX ADMIN — INSULIN HUMAN 2 UNITS: 100 INJECTION, SOLUTION PARENTERAL at 23:50

## 2024-03-12 RX ADMIN — ACETAMINOPHEN 1000 MG: 10 INJECTION INTRAVENOUS at 02:03

## 2024-03-12 RX ADMIN — HYDRALAZINE HYDROCHLORIDE 10 MG: 20 INJECTION INTRAMUSCULAR; INTRAVENOUS at 20:15

## 2024-03-12 RX ADMIN — METRONIDAZOLE 500 MG: 500 INJECTION, SOLUTION INTRAVENOUS at 16:11

## 2024-03-12 RX ADMIN — HYDRALAZINE HYDROCHLORIDE 10 MG: 20 INJECTION INTRAMUSCULAR; INTRAVENOUS at 01:56

## 2024-03-12 RX ADMIN — FLUCONAZOLE 400 MG: 400 INJECTION, SOLUTION INTRAVENOUS at 08:29

## 2024-03-12 RX ADMIN — LEVETIRACETAM 500 MG: 100 INJECTION, SOLUTION INTRAVENOUS at 08:25

## 2024-03-12 RX ADMIN — SODIUM CHLORIDE 500 MG: 9 INJECTION, SOLUTION INTRAVENOUS at 20:15

## 2024-03-12 RX ADMIN — INSULIN HUMAN 2 UNITS: 100 INJECTION, SOLUTION PARENTERAL at 17:28

## 2024-03-12 RX ADMIN — LEVETIRACETAM 500 MG: 100 INJECTION, SOLUTION INTRAVENOUS at 20:15

## 2024-03-12 RX ADMIN — WATER: 1 INJECTION INTRAMUSCULAR; INTRAVENOUS; SUBCUTANEOUS at 17:32

## 2024-03-12 RX ADMIN — SODIUM CHLORIDE 500 MG: 9 INJECTION, SOLUTION INTRAVENOUS at 04:34

## 2024-03-12 RX ADMIN — HYDROMORPHONE HYDROCHLORIDE 0.5 MG: 1 INJECTION, SOLUTION INTRAMUSCULAR; INTRAVENOUS; SUBCUTANEOUS at 01:56

## 2024-03-12 RX ADMIN — IOPAMIDOL 85 ML: 612 INJECTION, SOLUTION INTRAVENOUS at 09:20

## 2024-03-12 RX ADMIN — PIPERACILLIN AND TAZOBACTAM 3.38 G: 3; .375 INJECTION, POWDER, LYOPHILIZED, FOR SOLUTION INTRAVENOUS at 05:43

## 2024-03-12 NOTE — PLAN OF CARE
Goal Outcome Evaluation:  Plan of Care Reviewed With: patient, son                  Anticipated Discharge Disposition (SLP): skilled nursing facility          SLP Swallowing Diagnosis: suspected pharyngeal dysphagia, oral dysphagia (03/12/24 1400)

## 2024-03-12 NOTE — PROGRESS NOTES
[facility] Medicine Services  PROGRESS NOTE    Patient Name: Lea Rivers  : 1945  MRN: [id number]    Date of Admission: 3/2/2024  Primary Care Physician: Mannie Melvin MD    Subjective   Subjective     CC:  F/U total abd colectomy with end ileostomy     HPI:  Patient seen and examined. RN/Sitter at bedside. Son at bedside. Discussed with daughter via speakerphone. Patient opens eyes and says Good Morning. Says he's had a long night. Then goes back to sleep. Has a new bruise on his L shoulder. Plan for CT A/P today. H&H ok. Discussed changing ABX.     Objective   Objective     Vital Signs:   Temp:  [96.1 °F (35.6 °C)-100.5 °F (38.1 °C)] 98.1 °F (36.7 °C)  Heart Rate:  [] 103  Resp:  [17-20] 18  BP: (124-196)/(56-94) 182/82  Flow (L/min):  [1] 1     Physical Exam:  Constitutional: No acute distress will awaken and talk some but then goes back to sleep   HENT: NCAT, mucous membranes moist  Respiratory: Decreased in bases, 1L NC  Cardiovascular: Sinus tach, no murmurs, rubs, or gallops  Gastrointestinal: +BS, incision with staples, OLIVER with serosanguineous output, ostomy with output, abd binder   : Odell in place draining yellow urine   Musculoskeletal: +LE edema. +B/L UE edema noted   Psychiatric: [place], lethargic   Neurologic: no focal deficits  Skin: Bruise noted to L shoulder/neck, border drawn     Results Reviewed:  LAB RESULTS:      Lab 24  0316 24  1618 24  1011 24  0508 24  0335 24  1215 24  0421 24  1241 24  0804 24  0411 24  0048 24  1838 24  1751   WBC 22.94*  --   --  18.43* 14.12*  --  16.04*  --   --  15.60*  --   --  14.68*   HEMOGLOBIN 8.3* 8.4*  --  7.3* 11.3*  --  10.2*  --   --  10.5*  --   --  11.5*   HEMATOCRIT 26.2* 26.3*  --  22.8* 35.1*  --  32.6*  --   --  32.5*  --   --  35.8*   PLATELETS 195  --   --  222 328  --  270  --   --  204  --   --  221   NEUTROS ABS  18.18*  --   --  14.40* 11.36*  --   --   --   --   --   --   --  12.21*   IMMATURE GRANS (ABS) 1.11*  --   --  0.60* 0.35*  --   --   --   --   --   --   --  0.20*   LYMPHS ABS 1.17  --   --  1.04 0.88  --   --   --   --   --   --   --  0.78   MONOS ABS 1.77*  --   --  2.08* 1.43*  --   --   --   --   --   --   --  1.46*   EOS ABS 0.61*  --   --  0.24 0.02  --   --   --   --   --   --   --  0.00   MCV 92.6  --   --  96.2 92.4  --  93.9  --   --  91.5  --   --  93.2   CRP  --   --  13.06*  --   --  12.05*  --   --   --   --   --   --   --    PROCALCITONIN 0.27*  --   --   --   --   --   --   --   --   --   --   --  0.12   LACTATE  --   --   --   --  1.3  --   --  1.4 1.4 1.6 1.6   < >  --    PROTIME  --  15.1*  --   --   --   --   --   --   --   --   --   --   --     < > = values in this interval not displayed.         Lab 03/12/24  1204 03/12/24  0316 03/11/24  1618 03/11/24  1011 03/11/24  0508 03/10/24  1024 03/09/24  2055 03/09/24  0335 03/08/24  1638 03/08/24  1215 03/07/24  1804 03/07/24  0411   SODIUM  --  138  --   --  138  138 139  --  141  --  135*   < > 131*   POTASSIUM  --  4.0 3.4*  --  3.2*  3.2* 3.7 3.3* 2.8* 2.5* 3.0*   < > 3.1*   CHLORIDE  --  106  --   --  107  107 108*  --  107  --  104   < > 99   CO2  --  25.0  --   --  25.0  25.0 22.0  --  23.0  --  21.0*   < > 22.0   ANION GAP  --  7.0  --   --  6.0  6.0 9.0  --  11.0  --  10.0   < > 10.0   BUN  --  18  --   --  15  15 17  --  15  --  16   < > 15   CREATININE  --  0.61*  --   --  0.66*  0.66* 0.65*  --  0.72*  --  0.70*   < > 0.74*   EGFR  --  97.7  --   --  95.4  95.4 95.8  --  92.9  --  93.7   < > 92.2   GLUCOSE  --  148*  --   --  145*  145* 135*  --  118*  --  98   < > 124*   CALCIUM  --  8.0*  --   --  8.3*  8.3* 8.4*  --  8.7  --  8.5*   < > 8.5*   IONIZED CALCIUM  --   --  1.26  --   --   --   --   --  1.25  --   --  1.22   MAGNESIUM  --  1.8  --   --  1.8 1.8  --  2.0 2.3 1.8   < > 1.9   PHOSPHORUS 2.3* 1.9* 2.1* 2.1* 1.7* 2.8  3.4 2.0*  --  2.9  --  3.0    < > = values in this interval not displayed.         Lab 03/11/24  1011 03/11/24  0508 03/09/24  0335 03/08/24  1215 03/06/24  1751   TOTAL PROTEIN 4.4* 4.4* 6.1 5.6* 6.8   ALBUMIN 2.4* 2.5* 2.9* 3.1* 3.3*   GLOBULIN  --  1.9 3.2  --  3.5   ALT (SGPT) 29 31 86* 103* 46*   AST (SGOT) 20 23 64* 105* 81*   BILIRUBIN 0.3 0.3 0.6 0.5 0.7   INDIRECT BILIRUBIN  --   --   --  0.2  --    BILIRUBIN DIRECT <0.2  --   --  0.3  --    ALK PHOS 60 63 70 61 61         Lab 03/11/24  1618   PROTIME 15.1*   INR 1.18*           Lab 03/11/24  1011 03/08/24  1215   CHOLESTEROL  --  103   TRIGLYCERIDES 131 161*         Lab 03/09/24  0935   ABO TYPING O   RH TYPING Negative   ANTIBODY SCREEN Negative         Lab 03/06/24  1636   PH, ARTERIAL 7.413   PCO2, ARTERIAL 35.6   PO2 ART 63.0*   FIO2 21   HCO3 ART 22.7   BASE EXCESS ART -1.6*   CARBOXYHEMOGLOBIN 1.6     Brief Urine Lab Results  (Last result in the past 365 days)        Color   Clarity   Blood   Leuk Est   Nitrite   Protein   CREAT   Urine HCG        03/11/24 1337 Yellow   Cloudy   Moderate (2+)   Trace   Negative   30 mg/dL (1+)                   Microbiology Results Abnormal       Procedure Component Value - Date/Time    Urine Culture - Urine, Indwelling Urethral Catheter [[id number]]  (Normal) Collected: 03/11/24 1337    Lab Status: Preliminary result Specimen: Urine from Indwelling Urethral Catheter Updated: 03/12/24 1019     Urine Culture No growth    Blood Culture - Blood, Arm, Right [[id number]]  (Normal) Collected: 03/06/24 1857    Lab Status: Final result Specimen: Blood from Arm, Right Updated: 03/11/24 2045     Blood Culture No growth at 5 days    Blood Culture - Blood, Arm, Right [[id number]]  (Normal) Collected: 03/06/24 1751    Lab Status: Final result Specimen: Blood from Arm, Right Updated: 03/11/24 2000     Blood Culture No growth at 5 days    Blood Culture - Blood, Arm, Right [[id number]]  (Normal) Collected: 03/02/24 1132    Lab Status:  Final result Specimen: Blood from Arm, Right Updated: 03/07/24 1201     Blood Culture No growth at 5 days    Narrative:      Less than seven (7) mL's of blood was collected.  Insufficient quantity may yield false negative results.    Blood Culture - Blood, Arm, Right [[id number]]  (Normal) Collected: 03/02/24 1132    Lab Status: Final result Specimen: Blood from Arm, Right Updated: 03/07/24 1201     Blood Culture No growth at 5 days    Narrative:      Less than seven (7) mL's of blood was collected.  Insufficient quantity may yield false negative results.    Urine Culture - Urine, Straight Cath [[id number]]  (Normal) Collected: 03/02/24 0923    Lab Status: Final result Specimen: Urine from Straight Cath Updated: 03/03/24 1601     Urine Culture No growth    COVID PRE-OP / PRE-PROCEDURE SCREENING ORDER (NO ISOLATION) - Swab, Nasopharynx [[id number]]  (Normal) Collected: 03/02/24 1133    Lab Status: Final result Specimen: Swab from Nasopharynx Updated: 03/02/24 1227    Narrative:      The following orders were created for panel order COVID PRE-OP / PRE-PROCEDURE SCREENING ORDER (NO ISOLATION) - Swab, Nasopharynx.  Procedure                               Abnormality         Status                     ---------                               -----------         ------                     COVID-19, FLU A/B, RSV P...[[id number]]  Normal              Final result                 Please view results for these tests on the individual orders.    COVID-19, FLU A/B, RSV PCR 1 HR TAT - Swab, Nasopharynx [[id number]]  (Normal) Collected: 03/02/24 1133    Lab Status: Final result Specimen: Swab from Nasopharynx Updated: 03/02/24 1227     COVID19 Not Detected     Influenza A PCR Not Detected     Influenza B PCR Not Detected     RSV, PCR Not Detected    Narrative:      Fact sheet for providers: https://www.fda.gov/media/438386/download    Fact sheet for patients: https://www.fda.gov/media/181744/download    Test performed by PCR.             CT Abdomen Pelvis With Contrast    Result Date: 3/12/2024  CT ABDOMEN PELVIS W CONTRAST Date of Exam: 3/12/2024 9:21 AM EDT Indication: Abdominal pain, acute, nonlocalized Patient POD#3 from total colectomy and end ileostomy with fevers and increased WBC. Comparison: CT of the abdomen and pelvis without contrast 3/6/2024 Technique: Axial CT images were obtained of the abdomen and pelvis following the uneventful intravenous administration of 85 mL Isovue 300. Reconstructed coronal and sagittal images were also obtained. Automated exposure control and iterative construction methods were used. Findings: LUNG BASES: Bilateral pleural effusions and dependent atelectasis are present. There is a calcified granuloma in the right middle lobe. The heart is mildly enlarged and there is a small pericardial effusion. Coronary artery atherosclerotic calcifications  are present. LIVER:  Unremarkable parenchyma without focal lesion. BILIARY/GALLBLADDER: Gallbladder is not visualized and may be surgically absent. SPLEEN:  Unremarkable PANCREAS:  Unremarkable ADRENAL:  Unremarkable KIDNEYS:  Unremarkable parenchyma with no solid mass identified. No obstruction.  No calculus identified. There is bilateral perinephric stranding and trace perinephric fluid. GASTROINTESTINAL/MESENTERY: There has been an interval total colectomy and end ileostomy within the right lower quadrant. Postoperative changes including scattered amounts of intraperitoneal air are noted. Additionally, there is mild ascites throughout the abdomen. There is a surgical drain which enters the left lower abdominal wall and terminates in the region of the distal colectomy. There is no significant fluid collection. There is small bowel thickening, which can be seen for example in the left upper quadrant on series 5 image 88. There is no evidence of small bowel obstruction. MESENTERIC VESSELS: The mesenteric vessels appear grossly patent though contrast bolus timing  is suboptimal. There is atherosclerotic calcification at the origin of the SMA without significant narrowing. There is severe atherosclerotic stenosis of the proximal inferior mesenteric artery. AORTA/IVC:  The aorta is normal in caliber with mild to moderate atherosclerotic disease. IVC is unremarkable. RETROPERITONEUM/LYMPH NODES:  Unremarkable REPRODUCTIVE: Prostate gland is surgically absent with multiple clips in the prostate bed. BLADDER: A Odell catheter is present within the urinary bladder, which demonstrates an air-fluid level. OSSEUS STRUCTURES: There are degenerative changes of the lumbar spine, sacroiliac joints and hips. No focal lytic or destructive osseous lesion. SOFT TISSUES: There is a midline abdominal surgical incision with skin staples and overlying bandage. Mild diffuse soft tissue edema is present     Impression: Impression: 1. Postoperative changes of total colectomy and right lower quadrant end ileostomy with scattered amounts of pneumoperitoneum. There is a surgical drain in the pelvis without obvious fluid collection or abscess. Free fluid is present in the abdomen and pelvis, slightly greater than expected for postoperative change. 2. There is mild diffuse small bowel thickening/edema without obstruction. This may represent changes of infectious/inflammatory enteritis, ischemic etiology is felt less likely. However, clinical and laboratory correlation as well as close clinical follow-up are advised. 3. Mild anasarca. These findings were discussed with the patient's nurse Brittney on 3/12/2024 at 10:37 a.m. Eastern standard time. Electronically Signed: Ellen Suarez MD  3/12/2024 10:39 AM EDT  Workstation ID: [id number]    Duplex Venous Upper Extremity - Left CAR    Result Date: 3/11/2024    Normal left upper extremity venous duplex scan.     Duplex Venous Upper Extremity - Right CAR    Result Date: 3/10/2024    Acute right upper extremity deep vein thrombosis noted in the brachial.   Acute  right upper extremity superficial thrombophlebitis noted in the basilic (upper arm).      Results for orders placed during the hospital encounter of 07/20/21    Adult Transthoracic Echo Complete W/ Cont if Necessary Per Protocol    Interpretation Summary  · Left ventricular wall thickness is consistent with mild concentric hypertrophy.  · Normal left-ventricular systolic function, estimated EF 65%.  · Left ventricular diastolic function is consistent with (grade I) impaired relaxation.  · Aortic sclerosis without aortic stenosis. Trace aortic insufficiency.  · Trace mitral regurgitation, trace tricuspid regurgitation.      Current medications:  Scheduled Meds:cefTRIAXone, 2,000 mg, Intravenous, Q24H  Fat Emul Fish Oil/Plant Based, 250 mL, Intravenous, Q24H (TPN)  fluconazole, 400 mg, Intravenous, Q24H  hydrALAZINE, 20 mg, Intravenous, Q6H  insulin regular, 2-7 Units, Subcutaneous, Q6H  levETIRAcetam, 500 mg, Intravenous, Q12H  Lidocaine, 2 patch, Transdermal, Q24H  metroNIDAZOLE, 500 mg, Intravenous, Q8H  pantoprazole, 40 mg, Intravenous, Q AM  sodium chloride, 10 mL, Intravenous, Q12H  sodium chloride, 10 mL, Intravenous, Q12H  sodium chloride, 10 mL, Intravenous, Q12H  valproate sodium, 500 mg, Intravenous, Q8H      Continuous Infusions:Adult Central 2-in-1 TPN, , Last Rate: 65 mL/hr at 03/11/24 1745  Pharmacy to Dose TPN,           PRN Meds:.  acetaminophen **OR** acetaminophen **OR** acetaminophen    calcium carbonate    Calcium Replacement - Follow Nurse / BPA Driven Protocol    dextrose    dextrose    docusate sodium    fluticasone    glucagon (human recombinant)    hydrALAZINE    HYDROmorphone    ipratropium-albuterol    labetalol    Magnesium Standard Dose Replacement - Follow Nurse / BPA Driven Protocol    [DISCONTINUED] HYDROmorphone **AND** naloxone    ondansetron ODT **OR** ondansetron    ondansetron ODT **OR** ondansetron    Pharmacy to Dose TPN    Phosphorus Replacement - Follow Nurse / BPA Driven  Protocol    Potassium Replacement - Follow Nurse / BPA Driven Protocol    sodium chloride    sodium chloride    sodium chloride    sodium chloride    sodium chloride    sodium chloride    Assessment & Plan   Assessment & Plan     Active Hospital Problems    Diagnosis  POA    **Falls [W19.XXXA]  Yes    Pneumatosis intestinalis [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        Brief Hospital Course to date:  Lea Rivers is a 78 y.o. male with past medical history of hypertension, hyperlipidemia, hypothyroidism, seizure disorder.  Patient is being admitted for a fall and nondisplaced first through fourth left rib fractures.  Patient also has left distal clavicle fracture.    This patient's problems and plans were partially entered by my partner and updated as appropriate by me 03/12/24.      Multiple falls with increasing frequency   -CT of head shows age-related changes which are chronic but no acute process  -neuro consulted. likely d/t neuropathy (confirmed with EMG) and progression of dementia     Multiple rib fractures and also left clavicle fracture  -With no displacement or mildly displaced.  Orthopedic surgery evaluated. No surgical intervention  planned.  Recs nonweightbearing LUE, may come out of sling in 5-7 days to initiate gentle ROM exercises per ortho.  F/u with ortho/Dr. Maldonado in 2 weeks  -Pain control  -lidocaine patch  -New bruise noted to L shoulder/neck region. Border drawn and not expanding. Obtain XR today    Pneumatosis intestinalis/Toxic dilation of colon  -S/P total abdominal colectomy and end ileostomy 3/9  -Continue TPN via PICC  -Replace electrolytes   -NG removed 3/10  -Remove patel when ok with surgery   -Continue Fluconazole (started 3/11)  -DC Zosyn as WBC rising. Discussed with pharmacy, unable to broaden to Merrem or Invanz as patient on Depakote  -Start Rocephin + Flagyl   -Urine Cx 3/12 negative   -Repeat CT A/P today without abscess  or fluid collection. Possibly enteritis.   -Discussed with Dr Godfrey, ok for patient to be started on a clear liquid diet if passes SLP eval.   -Obtain CXR, at risk for PNA with rib Fx    Acute on Chronic Anemia  -Post op noted decrease in H&H  -Did receive large quantity of IVF per surgery prior to case  -S/P 1 unit PRBCs 3/11 with appropriate rise in H&H  -Do not suspect active bleeding     Acute RUE DVT (brachial)  -Provoked, 2/2 PICC   -Discussed with Dr Godfrey. Pt not awake enough to take pills (cannot start Eliquis) and has TPN/ABX infusing via PICC so difficult to start a heparin gtt. If H&H ok tomorrow, plan is to start Sub Q therapeutic Lovenox      Prostate cancer  Hx BPH s/p greenlight photo vaporization of prostate 2018  -Follows with Dr. Noland  -S/P cyberknife radiation 2/9/23  -Has intermittent hematuria      Dementia and increasing memory problem  -Patient still lives alone and also drives  -practice partner d/w family that patient should not be driving.  They are interested in rehab/possible placement     Hypertension  -Continue IV Hydralazine 20mg q6 hours  -PRN IV Lopressor  -Unable to take his PO meds currently     Seizure disorder  - depakote and keppra (changed to IV)   - Depakote level ok. Keppra level PENDING   - If lethargy persists, will repeat EEG     Hyperlipidemia  Hypothyroidism  -Home meds on hold, NPO    Expected Discharge Location and Transportation: SNF  Expected Discharge   Expected Discharge Date: 3/15/2024; Expected Discharge Time:      DVT prophylaxis:  Mechanical DVT prophylaxis orders are present.         AM-PAC 6 Clicks Score (PT): 6 (03/12/24 8763)    CODE STATUS:   Code Status and Medical Interventions:   Ordered at: 03/02/24 2667     Medical Intervention Limits:    NO intubation (DNI)     Code Status (Patient has no pulse and is not breathing):    No CPR (Do Not Attempt to Resuscitate)     Medical Interventions (Patient has pulse or is breathing):    Limited Support        Eleanor Yost, DO  03/12/24

## 2024-03-12 NOTE — PLAN OF CARE
Problem: Adult Inpatient Plan of Care  Goal: Plan of Care Review  Outcome: Ongoing, Progressing  Goal: Patient-Specific Goal (Individualized)  Outcome: Ongoing, Progressing  Goal: Absence of Hospital-Acquired Illness or Injury  Outcome: Ongoing, Progressing  Intervention: Identify and Manage Fall Risk  Recent Flowsheet Documentation  Taken 3/12/2024 0726 by Thea Velez RN  Safety Promotion/Fall Prevention: activity supervised  Taken 3/12/2024 0700 by Thea Velez RN  Safety Promotion/Fall Prevention: activity supervised  Intervention: Prevent Skin Injury  Recent Flowsheet Documentation  Taken 3/12/2024 0726 by Thea Velez RN  Skin Protection: adhesive use limited  Taken 3/12/2024 0700 by Thea Velez RN  Body Position:   turned   weight shifting  Skin Protection: adhesive use limited  Intervention: Prevent and Manage VTE (Venous Thromboembolism) Risk  Recent Flowsheet Documentation  Taken 3/12/2024 0700 by Thea Velez RN  Activity Management: bedrest  Intervention: Prevent Infection  Recent Flowsheet Documentation  Taken 3/12/2024 0726 by Thea Velez RN  Infection Prevention: environmental surveillance performed  Goal: Optimal Comfort and Wellbeing  Outcome: Ongoing, Progressing  Goal: Readiness for Transition of Care  Outcome: Ongoing, Progressing     Problem: Skin Injury Risk Increased  Goal: Skin Health and Integrity  Outcome: Ongoing, Progressing  Intervention: Optimize Skin Protection  Recent Flowsheet Documentation  Taken 3/12/2024 0726 by Thea Velez RN  Pressure Reduction Techniques: frequent weight shift encouraged  Pressure Reduction Devices: heel offloading device utilized  Skin Protection: adhesive use limited  Taken 3/12/2024 0700 by Thea Velez RN  Pressure Reduction Techniques:   frequent weight shift encouraged   heels elevated off bed  Head of Bed (HOB) Positioning: HOB at 30 degrees  Pressure Reduction Devices: heel offloading device utilized  Skin  Protection: adhesive use limited     Problem: Fall Injury Risk  Goal: Absence of Fall and Fall-Related Injury  Outcome: Ongoing, Progressing  Intervention: Identify and Manage Contributors  Recent Flowsheet Documentation  Taken 3/12/2024 0726 by Thea Velez RN  Medication Review/Management: medications reviewed  Intervention: Promote Injury-Free Environment  Recent Flowsheet Documentation  Taken 3/12/2024 0726 by Thea Velez, RN  Safety Promotion/Fall Prevention: activity supervised  Taken 3/12/2024 0700 by Thea Velez RN  Safety Promotion/Fall Prevention: activity supervised     Problem: Adjustment to Illness (Sepsis/Septic Shock)  Goal: Optimal Coping  Outcome: Ongoing, Progressing     Problem: Bleeding (Sepsis/Septic Shock)  Goal: Absence of Bleeding  Outcome: Ongoing, Progressing     Problem: Glycemic Control Impaired (Sepsis/Septic Shock)  Goal: Blood Glucose Level Within Desired Range  Outcome: Ongoing, Progressing     Problem: Infection Progression (Sepsis/Septic Shock)  Goal: Absence of Infection Signs and Symptoms  Outcome: Ongoing, Progressing  Intervention: Initiate Sepsis Management  Recent Flowsheet Documentation  Taken 3/12/2024 0726 by Thea Velez RN  Infection Prevention: environmental surveillance performed  Intervention: Promote Recovery  Recent Flowsheet Documentation  Taken 3/12/2024 0726 by Thea Velez RN  Sleep/Rest Enhancement: awakenings minimized  Taken 3/12/2024 0700 by Thea Velez RN  Activity Management: bedrest     Problem: Nutrition Impaired (Sepsis/Septic Shock)  Goal: Optimal Nutrition Intake  Outcome: Ongoing, Progressing   Goal Outcome Evaluation:

## 2024-03-12 NOTE — NURSING NOTE
WOC follow-up for ostomy care and education.     Patient now postop day 3 from total abdominal colectomy with end ileostomy.     Patient is not appropriate for education.  Reviewed ostomy education plan with patient's son.  Education folder provided further review at convenience.     Stoma is viable, having some bilious/brown liquid effluent. WBC count up to 22.94 this AM. Patient to have CT ABD today.     Will continue to follow.     Pola Arnold RN, BSN, CCRN, CWOCN  Wound, Ostomy and Continence (WOC) Department  [facility]

## 2024-03-12 NOTE — PLAN OF CARE
Problem: Adult Inpatient Plan of Care  Goal: Plan of Care Review  Outcome: Ongoing, Progressing  Flowsheets  Taken 3/12/2024 0423  Progress: no change  Outcome Evaluation: VSS, RA, patient has rested well most of night, easily stimulated by voice/touch/pain, decreased output to OLIVER drain, stool production to ostomy, adequate UO via aptel catheter, pain appears to be well controlled with medications, temp spike even after IV Tylenol but quickly returned to 98.4 degrees F axillary using ice packs, no other complications assessed or stated.  Taken 3/11/2024 0440  Plan of Care Reviewed With: patient  Goal: Patient-Specific Goal (Individualized)  Outcome: Ongoing, Progressing  Goal: Absence of Hospital-Acquired Illness or Injury  Outcome: Ongoing, Progressing  Intervention: Identify and Manage Fall Risk  Recent Flowsheet Documentation  Taken 3/12/2024 0419 by Mikel Stringer, RN  Safety Promotion/Fall Prevention:   assistive device/personal items within reach   clutter free environment maintained   fall prevention program maintained   lighting adjusted   nonskid shoes/slippers when out of bed   room organization consistent   safety round/check completed   toileting scheduled  Taken 3/12/2024 0030 by Mikel Stringer RN  Safety Promotion/Fall Prevention:   assistive device/personal items within reach   clutter free environment maintained   fall prevention program maintained   lighting adjusted   nonskid shoes/slippers when out of bed   room organization consistent   safety round/check completed   toileting scheduled  Taken 3/11/2024 1931 by Mikel Stringer, RN  Safety Promotion/Fall Prevention:   assistive device/personal items within reach   clutter free environment maintained   fall prevention program maintained   lighting adjusted   nonskid shoes/slippers when out of bed   room organization consistent   safety round/check completed   toileting scheduled  Intervention: Prevent Skin Injury  Recent  Flowsheet Documentation  Taken 3/12/2024 0419 by Mikel Stringer RN  Body Position:   weight shifting   turned   supine  Taken 3/12/2024 0030 by Mikel Stringer RN  Body Position:   weight shifting   turned   right  Taken 3/11/2024 1931 by Mikel Stringer RN  Body Position:   weight shifting   turned   supine  Skin Protection:   adhesive use limited   incontinence pads utilized   tubing/devices free from skin contact  Intervention: Prevent and Manage VTE (Venous Thromboembolism) Risk  Recent Flowsheet Documentation  Taken 3/12/2024 0419 by Mikel Stringer RN  Activity Management: activity minimized  Taken 3/12/2024 0030 by Mikel Stringer RN  Activity Management: activity minimized  Taken 3/11/2024 1931 by Mikel Stringer RN  Activity Management: activity minimized  VTE Prevention/Management: (SQ Heparin also)   bilateral   sequential compression devices on  Range of Motion: active ROM (range of motion) encouraged  Intervention: Prevent Infection  Recent Flowsheet Documentation  Taken 3/12/2024 0419 by Mikel Stringer RN  Infection Prevention:   environmental surveillance performed   equipment surfaces disinfected   hand hygiene promoted   rest/sleep promoted   single patient room provided  Taken 3/12/2024 0030 by Mikel Stringer RN  Infection Prevention:   environmental surveillance performed   equipment surfaces disinfected   hand hygiene promoted   rest/sleep promoted   single patient room provided  Taken 3/11/2024 1931 by Mikel Stringer RN  Infection Prevention:   environmental surveillance performed   equipment surfaces disinfected   hand hygiene promoted   rest/sleep promoted   single patient room provided  Goal: Optimal Comfort and Wellbeing  Outcome: Ongoing, Progressing  Intervention: Monitor Pain and Promote Comfort  Recent Flowsheet Documentation  Taken 3/11/2024 1931 by Mikel Stringer RN  Pain Management Interventions:    care clustered   pain management plan reviewed with patient/caregiver   pillow support provided   position adjusted   quiet environment facilitated   see MAR  Intervention: Provide Person-Centered Care  Recent Flowsheet Documentation  Taken 3/11/2024 1931 by Mikel Stringer RN  Trust Relationship/Rapport:   care explained   questions answered   questions encouraged   thoughts/feelings acknowledged  Goal: Readiness for Transition of Care  Outcome: Ongoing, Progressing     Problem: Skin Injury Risk Increased  Goal: Skin Health and Integrity  Outcome: Ongoing, Progressing  Intervention: Optimize Skin Protection  Recent Flowsheet Documentation  Taken 3/12/2024 0419 by Mikel Stringer RN  Head of Bed (HOB) Positioning: HOB at 30-45 degrees  Taken 3/12/2024 0030 by Mikel Stringer RN  Head of Bed (HOB) Positioning: HOB at 30-45 degrees  Taken 3/11/2024 1931 by Mikel Stringer RN  Pressure Reduction Techniques:   frequent weight shift encouraged   heels elevated off bed   weight shift assistance provided  Head of Bed (HOB) Positioning: HOB at 30-45 degrees  Pressure Reduction Devices:   heel offloading device utilized   positioning supports utilized   pressure-redistributing mattress utilized  Skin Protection:   adhesive use limited   incontinence pads utilized   tubing/devices free from skin contact     Problem: Fall Injury Risk  Goal: Absence of Fall and Fall-Related Injury  Outcome: Ongoing, Progressing  Intervention: Identify and Manage Contributors  Recent Flowsheet Documentation  Taken 3/12/2024 0419 by Mikel Stringer RN  Medication Review/Management: medications reviewed  Taken 3/12/2024 0030 by Mikel Stringer RN  Medication Review/Management: medications reviewed  Taken 3/11/2024 1931 by Mikel Stringer RN  Medication Review/Management: medications reviewed  Intervention: Promote Injury-Free Environment  Recent Flowsheet Documentation  Taken 3/12/2024 0419  by Mikel Stringer RN  Safety Promotion/Fall Prevention:   assistive device/personal items within reach   clutter free environment maintained   fall prevention program maintained   lighting adjusted   nonskid shoes/slippers when out of bed   room organization consistent   safety round/check completed   toileting scheduled  Taken 3/12/2024 0030 by Mikel Stringer RN  Safety Promotion/Fall Prevention:   assistive device/personal items within reach   clutter free environment maintained   fall prevention program maintained   lighting adjusted   nonskid shoes/slippers when out of bed   room organization consistent   safety round/check completed   toileting scheduled  Taken 3/11/2024 1931 by Mikel Stringer RN  Safety Promotion/Fall Prevention:   assistive device/personal items within reach   clutter free environment maintained   fall prevention program maintained   lighting adjusted   nonskid shoes/slippers when out of bed   room organization consistent   safety round/check completed   toileting scheduled     Problem: Adjustment to Illness (Sepsis/Septic Shock)  Goal: Optimal Coping  Outcome: Ongoing, Progressing  Intervention: Optimize Psychosocial Adjustment to Illness  Recent Flowsheet Documentation  Taken 3/11/2024 1931 by Mikel Stringer RN  Family/Support System Care: support provided     Problem: Bleeding (Sepsis/Septic Shock)  Goal: Absence of Bleeding  Outcome: Ongoing, Progressing  Intervention: Monitor and Manage Bleeding  Recent Flowsheet Documentation  Taken 3/12/2024 0419 by Mikel Stringer RN  Bleeding Precautions: monitored for signs of bleeding  Bleeding Management: dressing monitored  Taken 3/12/2024 0030 by Mikel Stringer RN  Bleeding Precautions: monitored for signs of bleeding  Bleeding Management: dressing monitored  Taken 3/11/2024 1931 by Mikel Stringer RN  Bleeding Precautions: monitored for signs of bleeding  Bleeding Management: dressing  monitored     Problem: Glycemic Control Impaired (Sepsis/Septic Shock)  Goal: Blood Glucose Level Within Desired Range  Outcome: Ongoing, Progressing  Intervention: Optimize Glycemic Control  Recent Flowsheet Documentation  Taken 3/11/2024 1931 by Mikel Stringer RN  Glycemic Management: blood glucose monitored     Problem: Infection Progression (Sepsis/Septic Shock)  Goal: Absence of Infection Signs and Symptoms  Outcome: Ongoing, Progressing  Intervention: Initiate Sepsis Management  Recent Flowsheet Documentation  Taken 3/12/2024 0419 by Mikel Stringer RN  Infection Management: aseptic technique maintained  Infection Prevention:   environmental surveillance performed   equipment surfaces disinfected   hand hygiene promoted   rest/sleep promoted   single patient room provided  Taken 3/12/2024 0030 by Mikel Stringer RN  Infection Management: aseptic technique maintained  Infection Prevention:   environmental surveillance performed   equipment surfaces disinfected   hand hygiene promoted   rest/sleep promoted   single patient room provided  Taken 3/11/2024 1931 by Mikel Stringer RN  Infection Management: aseptic technique maintained  Infection Prevention:   environmental surveillance performed   equipment surfaces disinfected   hand hygiene promoted   rest/sleep promoted   single patient room provided  Intervention: Promote Recovery  Recent Flowsheet Documentation  Taken 3/12/2024 0419 by Mikel Stringer RN  Activity Management: activity minimized  Taken 3/12/2024 0030 by Mikel Stringer RN  Activity Management: activity minimized  Taken 3/11/2024 1931 by Mikel Stringer RN  Activity Management: activity minimized  Sleep/Rest Enhancement: awakenings minimized  Intervention: Promote Stabilization  Recent Flowsheet Documentation  Taken 3/11/2024 1931 by Mikel Stringer RN  Fever Reduction/Comfort Measures:   lightweight bedding   lightweight clothing      Problem: Nutrition Impaired (Sepsis/Septic Shock)  Goal: Optimal Nutrition Intake  Outcome: Ongoing, Progressing   Goal Outcome Evaluation:  Plan of Care Reviewed With: patient        Progress: no change  Outcome Evaluation: VSS, RA, patient has rested well most of night, easily stimulated by voice/touch/pain, decreased output to OLIVER drain, stool production to ostomy, adequate UO via patel catheter, pain appears to be well controlled with medications, temp spike even after IV Tylenol but quickly returned to 98.4 degrees F axillary using ice packs, no other complications assessed or stated.

## 2024-03-12 NOTE — PROGRESS NOTES
Patient Name:  Lea Rivers  YOB: 1945  [id number]    Surgery Progress Note    Date of visit: 3/10/2024    Subjective   S/p exp lap, total abdominal colectomy and end ileostomy yesterday. Confused and demented, but responsive. Have some bowel function in ostomy.       Objective     Temp:  [97.4 °F (36.3 °C)-98.9 °F (37.2 °C)] 98.4 °F (36.9 °C)  Heart Rate:  [73-91] 83  Resp:  [17-24] 24  BP: (124-194)/() 194/101  Flow (L/min):  [2-4] 2    Gen:    Appears well, confused  CV:  Rhythm regular and rate regular  L:  Clear to auscultation bilaterally  Abd:  Soft, appropriately tender, incision with wound dressing in place without strikethrough, ostomy healthy with some output  Ext:  No cyanosis, clubbing, edema    Recent labs and imaging that are back at this time have been reviewed.     Labs:      Lab 03/09/24  0335 03/08/24  1215 03/08/24  0421 03/07/24  1241 03/07/24  0804 03/07/24  0411 03/07/24  0048 03/06/24  1838 03/06/24  1751 03/05/24  0428   WBC 14.12*  --  16.04*  --   --  15.60*  --   --  14.68* 8.14   HEMOGLOBIN 11.3*  --  10.2*  --   --  10.5*  --   --  11.5* 9.7*   HEMATOCRIT 35.1*  --  32.6*  --   --  32.5*  --   --  35.8* 30.5*   PLATELETS 328  --  270  --   --  204  --   --  221 162   NEUTROS ABS 11.36*  --   --   --   --   --   --   --  12.21*  --    IMMATURE GRANS (ABS) 0.35*  --   --   --   --   --   --   --  0.20*  --    LYMPHS ABS 0.88  --   --   --   --   --   --   --  0.78  --    MONOS ABS 1.43*  --   --   --   --   --   --   --  1.46*  --    EOS ABS 0.02  --   --   --   --   --   --   --  0.00  --    MCV 92.4  --  93.9  --   --  91.5  --   --  93.2 91.6   CRP  --  12.05*  --   --   --   --   --   --   --   --    PROCALCITONIN  --   --   --   --   --   --   --   --  0.12  --    LACTATE 1.3  --   --  1.4 1.4 1.6 1.6   < >  --   --     < > = values in this interval not displayed.                     Lab 03/10/24  1024 03/09/24  2105 03/09/24  6116 03/08/24  7188  03/08/24  1215 03/08/24  0421 03/07/24  1804 03/07/24  0411   SODIUM 139  --  141  --  135* 132*  --  131*   POTASSIUM 3.7 3.3* 2.8* 2.5* 3.0* 2.6*   < > 3.1*   CHLORIDE 108*  --  107  --  104 101  --  99   CO2 22.0  --  23.0  --  21.0* 19.0*  --  22.0   ANION GAP 9.0  --  11.0  --  10.0 12.0  --  10.0   BUN 17  --  15  --  16 15  --  15   CREATININE 0.65*  --  0.72*  --  0.70* 0.68*  --  0.74*   EGFR 95.8  --  92.9  --  93.7 94.6  --  92.2   GLUCOSE 135*  --  118*  --  98 90  --  124*   CALCIUM 8.4*  --  8.7  --  8.5* 8.2*  --  8.5*   IONIZED CALCIUM  --   --   --  1.25  --   --   --  1.22   MAGNESIUM 1.8  --  2.0 2.3 1.8 1.8  --  1.9   PHOSPHORUS 2.8 3.4 2.0*  --  2.9  --   --  3.0    < > = values in this interval not displayed.                Lab 03/09/24  0335 03/08/24  1215 03/06/24  1751   TOTAL PROTEIN 6.1 5.6* 6.8   ALBUMIN 2.9* 3.1* 3.3*   GLOBULIN 3.2  --  3.5   ALT (SGPT) 86* 103* 46*   AST (SGOT) 64* 105* 81*   BILIRUBIN 0.6 0.5 0.7   INDIRECT BILIRUBIN  --  0.2  --    BILIRUBIN DIRECT  --  0.3  --    ALK PHOS 70 61 61                  Assessment & Plan     Problem List Items Addressed This Visit       * (Principal) Falls - Primary     Other Visit Diagnoses       Closed fracture of multiple ribs of left side, initial encounter        Closed displaced fracture of acromial end of left clavicle, initial encounter        Falls frequently        Acute UTI (urinary tract infection)        Relevant Medications    piperacillin-tazobactam (ZOSYN) 3.375 g IVPB in 100 mL NS MBP (CD) (Completed)    piperacillin-tazobactam (ZOSYN) 3.375 g IVPB in 100 mL NS MBP (CD)    cefOXItin (MEFOXIN) 2,000 mg in sodium chloride 0.9 % 100 mL MBP (Completed)    Colon distention        Relevant Orders    Tissue Pathology Exam    Paralytic ileus of small intestine and colon        Relevant Orders    Tissue Pathology Exam            Mr. Lea Rivers is a 78M w/ toxic megacolon and pneumatosis intestinalis who is 1 Days Post-Op from  total abdominal colectomy with end ileostomy. He is doing as expected post-operatively.     - Pain control with multimodal regimen  - NGT to wall suction, maintain in place for now  - NPO with ice chips for comfort until NGT removed  - Okay to restart TPN  - Continue antibiotics  - Leave patel in place due to some pelvic dissection, possibly remove tomorrow  - All other cares per hospitalist team, appreciate assistance      Yoel Young MD  3/10/2024  11:09 EDT

## 2024-03-12 NOTE — PROGRESS NOTES
Clinical Nutrition     Nutrition Support Assessment  Reason for Visit: Follow-up protocol, PN    Patient Name: Lea Rivers  YOB: 1945  MRN: [id number]  Date of Encounter: 03/12/24 11:25 EDT  Admission date: 3/2/2024    Comments:    TPN Recommendations via PICC:  15% dextrose, 7.7% AA @ goal rate of 65mL/hr. Provide 250mL 20% SMOF daily.   Initiate at 25mL/hr and advance by 25mL q 8hrs to goal of 65mL/hr.  Infused at goal over 24hrs this regimen provides:  1.56L TGV; GIR = 1.82mg/kg/min  1776kcal (99% est needs); 120g protein (100% est needs)    Discussed low phos trend w/nsg, recommend replacement via IV.     Nutrition Assessment   Admission Diagnosis:  Falls [W19.XXXA]  Pneumatosis intestinalis [K63.89]      Problem List:    Falls    Pneumatosis intestinalis        PMH:   He  has a past medical history of Anemia, Colon cancer (1990), Coronary artery disease, Diabetes, Dyslipidemia, GERD (gastroesophageal reflux disease), Hyperlipidemia, Hypertension, Hyponatremia, Hypothyroidism, Left shoulder pain, Low sodium levels (2022), Memory deficit, Osteoarthritis, Prostate cancer (07/23/2022), Seizure disorder, and Skin cancer.    PSH:  He  has a past surgical history that includes Colectomy partial / total (1990); Cholecystectomy; Appendectomy; Other surgical history; Sinus surgery; Tonsillectomy; Vasectomy; Carpal tunnel release (Bilateral); TURP / transurethral incision / drainage prostate; Colonoscopy; Transurethral resection of prostate (N/A, 09/21/2018); Skin biopsy; Teeth Extraction; Total knee arthroplasty (Right, 04/07/2022); Cardiac catheterization (2012); Prostate biopsy (N/A, 11/11/2022); Transrectal Incision Prostate (Bilateral, 01/06/2023); Cyberknife (02/09/2023); and Colectomy (N/A, 3/9/2024).      Applicable Nutrition Concerns:   Skin:  Oral:  GI:      Applicable Interval History:   3/9-total colectomy w/end ileostomy      Reported/Observed/Food/Nutrition Related  History:     3/12  Pt out of room for scan per pt's son at bedside. Spoke w/nsg-TPN at 65ml/hr. Discussed phos replacement IV.     3/9  Per RN, pt pulled PICC last night-pt OOR during visit but pt's son at bedside. Able to obtain nutrition hx. Pt eating well PTA, usually a picky eater and eats the same things all the time-preferences noted below. Pt usually eats at [employer] for breakfast, makes something quick at home for lunch and has dinner provided by dtr. Son states pt's wt has been stable, ~196-200lbs. NKFA. Plan is for total colectomy w/end ileostomy today.     3/8  Pt unavailable during RD visit, having PICC placed. Also noted w/dementia-attempted to call family w/o success. Discussed w/nsg, pt to likely have bowel sx tomorrow w/colostomy creation. Rectal tube placed today 2/2 colonic decompression. Pt appears to have had 7lb/3.6% non significant wt loss in the past month. NKFA.     Labs    Labs Reviewed: Yes     Results from last 7 days   Lab Units 03/12/24  0316 03/11/24  1618 03/11/24  1011 03/11/24  0508 03/10/24  1024 03/09/24  2055 03/09/24  0335 03/07/24  1804 03/07/24  1241 03/07/24  0804   GLUCOSE mg/dL 148*  --   --  145*  145* 135*  --  118*   < >  --   --    BUN mg/dL 18  --   --  15  15 17  --  15   < >  --   --    CREATININE mg/dL 0.61*  --   --  0.66*  0.66* 0.65*  --  0.72*   < >  --   --    SODIUM mmol/L 138  --   --  138  138 139  --  141   < >  --   --    CHLORIDE mmol/L 106  --   --  107  107 108*  --  107   < >  --   --    POTASSIUM mmol/L 4.0 3.4*  --  3.2*  3.2* 3.7   < > 2.8*   < >  --   --    PHOSPHORUS mg/dL 1.9* 2.1* 2.1* 1.7* 2.8   < > 2.0*   < >  --   --    MAGNESIUM mg/dL 1.8  --   --  1.8 1.8  --  2.0   < >  --   --    ALT (SGPT) U/L  --   --  29 31  --   --  86*   < >  --   --    LACTATE mmol/L  --   --   --   --   --   --  1.3  --  1.4 1.4    < > = values in this interval not displayed.       Results from last 7 days   Lab Units 03/11/24  1618 03/11/24  1011  "03/11/24  0508 03/09/24  0335 03/08/24  1638 03/08/24  1215 03/07/24  0411   ALBUMIN g/dL  --  2.4* 2.5* 2.9*  --  3.1*  --    PREALBUMIN mg/dL 5.4*  --   --   --  7.9*  --   --    CRP mg/dL  --  13.06*  --   --   --  12.05*  --    IONIZED CALCIUM mmol/L 1.26  --   --   --  1.25  --  1.22   CHOLESTEROL mg/dL  --   --   --   --   --  103  --    TRIGLYCERIDES mg/dL  --  131  --   --   --  161*  --        Results from last 7 days   Lab Units 03/12/24  0531 03/11/24  2333 03/11/24  1706 03/11/24  1157 03/11/24  0518 03/10/24  2227   GLUCOSE mg/dL 154* 122 119 109 150* 134*     Lab Results   Lab Value Date/Time    HGBA1C 5.50 03/02/2024 0947    HGBA1C 5.5 04/28/2023 1142    HGBA1C 5.00 01/04/2023 1227               Medications    Medications Reviewed: Yes  Pertinent: insulin, reglan, protonix, abx, pericolace  Infusion:  PRN:     Intake/Ouptut 24 hrs (0701 - 0700)   I&O's Reviewed: Yes   +2 BM 3/12    Anthropometrics     Flowsheet Rows      Flowsheet Row First Filed Value   Admission Height 170.2 cm (67\") Documented at 03/02/2024 0947   Admission Weight 88.9 kg (196 lb) Documented at 03/02/2024 0947          Height: Height: 170.2 cm (67.01\")  Last Filed Weight: Weight: 89 kg (196 lb 3.2 oz) (03/12/24 0426)  Method: Weight Method: Stated  BMI: BMI (Calculated): 30.7  BMI classification: Obese Class I: 30-34.9kg/m2  IBW:      UBW: 203lbs per EMR  Weight change:    Weight       Weight (kg) Weight (lbs) Weight Method Visit Report   3/23/2023 87.091 kg  192 lb   --    4/10/2023 87.091 kg  192 lb   --    4/28/2023 94.62 kg  208 lb 9.6 oz   --    5/6/2023 91.627 kg  202 lb      5/16/2023 90.719 kg  200 lb   --    6/20/2023 90.719 kg  200 lb   --    8/14/2023 93.441 kg  206 lb   --    9/14/2023 92.398 kg  203 lb 11.2 oz   --    12/4/2023 92.08 kg  203 lb   --    1/5/2024 78.926 kg  174 lb  Stated     2/15/2024 92.08 kg  203 lb   --    2/27/2024 89.086 kg  196 lb 6.4 oz      3/2/2024 88.905 kg  196 lb  Stated        Stated   " "      Nutrition Focused Physical Exam     Date: 3/8    Unable to perform exam due to: Pt unable to participate at time of visit    Needs Assessment   Date: 3/8    Height used:Height: 170.2 cm (67.01\")  Weights used: 196lb/89kg    Estimated Calorie needs: ~ 1800 Kcal/day  Method:  MSJ (1563) x 1.2 =1876  Method:  17-20 Kcals/KG 1513-1780kcals    Estimated Protein needs: ~ 120 g PRO/day  Method: 1.2 g/Kg ABW =107g  Method: 2g/kg IBW = 132g    Estimated Fluid needs: ~ ml/day   Per clinical status    Current Nutrition Prescription     PN Route: PICC  PN:   15% dextrose, 7.7% AA @ goal rate of 65mL/hr.         GIR:  1.82mg/kg/min  Lipid: Provide 250mL 20% SMOF daily                   PN@ Goal over 24hr to Supply:  Volume 1560 mL/day       Energy 1776 Kcal/day 99 % Est Need   Protein 120 g/day 100 % Est Need      ---------------------------------------------------------------------------  Formula/Rate verified at bedside: Yes per documentation (confirmed per RN)  Infusing Rate at time of visit: 50ml/hr      Average Delivery from [facility] Days:  PN Volume 2125 mL/day       Lipid delivered?   Y         Energy   Kcal/day   % Est Need   Protein   g/day   % Est Need     PO: NPO Diet NPO Type: Strict NPO  Adult Central 2-in-1 TPN  Oral Nutrition Supplement:   Intake: N/A      Nutrition Diagnosis   Date: 3/8 Updated:   Problem Altered GI function   Etiology Colonic distention, ileus, pending bowel sx   Signs/Symptoms Need for TPN   Status:     Goal:   General: Nutrition to support treatment  PO: N/A  EN/PN: Maintain PN    Nutrition Intervention      Follow treatment progress, Care plan reviewed, Nutrition support order placed to pharmacy    Continue current TPN regimen  Replace phos via IV  RD to provide ileostomy education (to family) once closer to discharge/as appropriate.     Monitoring/Evaluation:   Per protocol, I&O, Pertinent labs, PN delivery/tolerance, Weight, GI status      Aria Morton, MS,RD,LD  Time Spent: 20  "

## 2024-03-12 NOTE — THERAPY EVALUATION
Acute Care - Speech Language Pathology   Swallow Initial Evaluation [facility]  Clinical Swallow Evaluation     Patient Name: Lea Rivers  : 1945  MRN: [id number]  Today's Date: 3/12/2024               Admit Date: 3/2/2024    Visit Dx:     ICD-10-CM ICD-9-CM   1. Fall, initial encounter  W19.XXXA E888.9   2. Closed fracture of multiple ribs of left side, initial encounter  S22.42XA 807.09   3. Closed displaced fracture of acromial end of left clavicle, initial encounter  S42.032A 810.03   4. Falls frequently  R29.6 V15.88   5. Acute UTI (urinary tract infection)  N39.0 599.0   6. Colon distention  K63.89 569.89   7. Paralytic ileus of small intestine and colon  K56.0 560.1   8. Dysphagia, unspecified type  R13.10 787.20     Patient Active Problem List   Diagnosis    Coronary artery disease    Dyslipidemia    Hypothyroidism    GERD (gastroesophageal reflux disease)    Seizure disorder    BPH (benign prostatic hypertrophy)    Hypertension    Primary osteoarthritis of both knees    Syncope and collapse    Precordial pain    Diabetes    Status post total right knee replacement    Postoperative urinary retention    Confusion, postoperative    Acute blood loss anemia, asymptomatic, no transfusion required    Elevated prostate specific antigen (PSA)    Prostate cancer    Anemia    Body mass index (BMI) of 32.0-32.9 in adult    Localization-related focal epilepsy with simple partial seizures    Need for vaccination against Streptococcus pneumoniae    Upper extremity tendon tear, right, initial encounter    Vitamin D deficiency    PSA elevation    Polyp of colon    Overactive bladder    Gross hematuria    History of colon polyps    History of colon cancer    B12 deficiency    Falls    Pneumatosis intestinalis     Past Medical History:   Diagnosis Date    Anemia     Colon cancer     Status post partial colectomy in . No chemotherapy or radiation therapy.    Coronary artery disease     Nonobstructive  coronary artery disease.    Diabetes     Dyslipidemia     GERD (gastroesophageal reflux disease)     Hx of.    Hyperlipidemia     Hypertension     Hyponatremia     Hypothyroidism     Left shoulder pain     Low sodium levels 2022    recent issue; saw nephrologist who ruled out kidney issues; took Sodium Chloride po to bring levels up    Memory deficit     FROM ANESTHESIA    Osteoarthritis     Prostate cancer 07/23/2022    Seizure disorder     silent seziure-diagnosed years ago    Skin cancer      Past Surgical History:   Procedure Laterality Date    APPENDECTOMY      CARDIAC CATHETERIZATION  2012    30 to 40% LAD    CARPAL TUNNEL RELEASE Bilateral     CHOLECYSTECTOMY      COLECTOMY PARTIAL / TOTAL  1990    Partial colectomy    COLON RESECTION N/A 3/9/2024    Procedure: TOTAL ABDOMINAL COLECTOMY, END ILEOSTOMY;  Surgeon: Harley Godfrey MD;  Location:  [facility];  Service: General;  Laterality: N/A;    COLONOSCOPY      CYBERKNIFE  02/09/2023    Prostate/SV    CYSTOSCOPY TRANSURETHRAL RESECTION OF PROSTATE N/A 09/21/2018    Procedure: CYSTOSCOPY TRANSURETHRAL RESECTION OF PROSTATE GREENLIGHT;  Surgeon: Naseem Clayton MD;  Location:  [facility];  Service: Urology    OTHER SURGICAL HISTORY      Contraction surgery on bilateral hands and wrists.    PROSTATE BIOPSY N/A 11/11/2022    Procedure: TRANSRECTAL ULTRASOUND GUIDED BIOPSY OF PROSTATE;  Surgeon: Naseem Noland MD;  Location:  [facility];  Service: Urology;  Laterality: N/A;    SINUS SURGERY      SKIN BIOPSY      TEETH EXTRACTION      TONSILLECTOMY      TOTAL KNEE ARTHROPLASTY Right 04/07/2022    Procedure: TOTAL KNEE ARTHROPLASTY WITH ORTHO ROBOT RIGHT;  Surgeon: Louis Malcolm MD;  Location:  [facility];  Service: Robotics - Ortho;  Laterality: Right;    TRANSRECTAL INCISION PROSTATE WITH GOLD SEED Bilateral 01/06/2023    Procedure: TRANSRECTAL ULTRASOUND GUIDED PROSTATE FIDUCIAL MARKER PLACEMENT;  Surgeon: Naseem Noland MD;  Location:  [facility];  Service: Urology;  Laterality: Bilateral;    TURP / TRANSURETHRAL INCISION / DRAINAGE PROSTATE      VASECTOMY         SLP Recommendation and Plan  SLP Swallowing Diagnosis: suspected pharyngeal dysphagia, oral dysphagia (03/12/24 1400)  SLP Diet Recommendation: NPO, other (see comments) (except ice chips when alert/upright, w/ supervision, as tolerated) (03/12/24 1400)     SLP Rec. for Method of Medication Administration: meds via alternate route (03/12/24 1400)        Recommended Diagnostics: reassess via clinical swallow evaluation, other (see comments) (when more alert/cognitive status improved) (03/12/24 1400)  Swallow Criteria for Skilled Therapeutic Interventions Met: demonstrates skilled criteria (03/12/24 1400)  Anticipated Discharge Disposition (SLP): skilled nursing facility (03/12/24 1400)  Rehab Potential/Prognosis, Swallowing: good, to achieve stated therapy goals (03/12/24 1400)        Oral Care Recommendations: Oral Care BID/PRN, Suction toothbrush (03/12/24 1400)                                        Plan of Care Reviewed With: patient, son      SWALLOW EVALUATION (Last 72 Hours)       SLP Adult Swallow Evaluation       Row Name 03/12/24 1400                   Rehab Evaluation    Document Type evaluation  -AC        Subjective Information complains of;fatigue  -AC        Patient Observations alert;cooperative  -AC        Patient/Family/Caregiver Comments/Observations Pt pleasantly confused. RN sitter and pt's son present.  -AC        Patient Effort good  -AC        Oral Care swabbed with sterile water  -AC           General Information    Patient Profile Reviewed yes  -AC        Pertinent History Of Current Problem Adm after falls, sustaining rib fx, L clavicle fx, intestinal wall air. S/p total abdominal colectomy & end ileostomy 3/9. CXR: LLL consolidation & small L pleural effusion. Increased confusion. Hx GERD, sz d/o, prostate CA s/p XRT in Feb, dementia.  -AC        Current Method of  Nutrition NPO;parenteral feedings  -AC        Precautions/Limitations, Vision WFL;for purposes of eval  -AC        Precautions/Limitations, Hearing WFL;for purposes of eval  -AC        Prior Level of Function-Communication cognitive-linguistic impairment;other (see comments)  was living alone & driving PTA per chart; son reported pt typically able to communicate normally  -AC        Prior Level of Function-Swallowing no diet consistency restrictions  -AC        Plans/Goals Discussed with patient and family;agreed upon  -AC        Barriers to Rehab cognitive status;medically complex  -AC        Patient's Goals for Discharge patient did not state  -AC        Family Goals for Discharge patient able to return to PO diet;patient able to eat/drink without coughing/choking  -AC           Pain Scale: FACES Pre/Post-Treatment    Pain: FACES Scale, Pretreatment 2-->hurts little bit  -AC        Posttreatment Pain Rating 2-->hurts little bit  -AC        Pain Location - abdomen  -AC        Pre/Posttreatment Pain Comment RN present/aware  -AC           Oral Motor Structure and Function    Dentition Assessment natural, present and adequate  -AC        Secretion Management dried secretions in oral cavity  -AC        Mucosal Quality dry  -AC           Oral Musculature and Cranial Nerve Assessment    Oral Motor General Assessment unable to assess  2' cog status; appeared generally weak  -AC           General Eating/Swallowing Observations    Eating/Swallowing Skills fed by SLP  -AC        Positioning During Eating semi-reclined  difficulty tolerating fully upright position 2' fxs  -AC        Utensils Used spoon;cup  -AC        Consistencies Trialed ice chips;thin liquids;nectar/syrup-thick liquids  -AC           Clinical Swallow Eval    Oral Prep Phase impaired  -AC        Oral Transit impaired  -AC        Oral Residue WFL  -AC        Pharyngeal Phase suspected pharyngeal impairment  -AC        Esophageal Phase unremarkable  -AC            Oral Prep Concerns    Oral Prep Concerns incomplete or weak lip closure around spoon  -AC        Incomplete or Weak Lip Closure Around Spoon --  initially when taking liquid from spoon--appeared cognitive-based  -AC           Oral Transit Concerns    Oral Transit Concerns increased oral transit time  -        Increased Oral Transit Time all consistencies  suspected  -           Pharyngeal Phase Concerns    Pharyngeal Phase Concerns multiple swallows;cough  -AC        Multiple Swallows thin;nectar  -AC        Cough thin  -AC        Pharyngeal Phase Concerns, Comment Reportedly, pt had been lethargic all morning, but was the most awake he had been all day during this evaluation. C/o odynophagia. Overt clinical s/sxs aspiration w/ thin liquids via tsp and high risk for oropharyngeal dysphagia. No overt clinical s/sxs aspiration w/ single ice chips. Son agreed pt not yet cognitively appropriate for FEES.  -           Swallowing Quality of Life Assessment    Education and counseling provided Signs of aspiration;Risks of aspiration;Oral care recommendations and rationale  -           SLP Evaluation Clinical Impression    SLP Swallowing Diagnosis suspected pharyngeal dysphagia;oral dysphagia  -        Functional Impact risk of aspiration/pneumonia;risk of malnutrition;risk of dehydration  -        Rehab Potential/Prognosis, Swallowing good, to achieve stated therapy goals  -        Swallow Criteria for Skilled Therapeutic Interventions Met demonstrates skilled criteria  -           Recommendations    SLP Diet Recommendation NPO;other (see comments)  except ice chips when alert/upright, w/ supervision, as tolerated  -        Recommended Diagnostics reassess via clinical swallow evaluation;other (see comments)  when more alert/cognitive status improved  -        Oral Care Recommendations Oral Care BID/PRN;Suction toothbrush  -        SLP Rec. for Method of Medication Administration meds via  alternate route  -AC        Anticipated Discharge Disposition (SLP) skilled nursing facility  -AC                  User Key  (r) = Recorded By, (t) = Taken By, (c) = Cosigned By      Initials Name Effective Dates    Shyanne Ryan MS CCC-SLP 02/03/23 -                     EDUCATION  The patient has been educated in the following areas:   Dysphagia (Swallowing Impairment) NPO rationale.              Time Calculation:    Time Calculation- SLP       Row Name 03/12/24 1503             Time Calculation- SLP    SLP Start Time 1400  -AC      SLP Received On 03/12/24  -AC         Untimed Charges    79450-GR Eval Oral Pharyng Swallow Minutes 55  -AC         Total Minutes    Untimed Charges Total Minutes 55  -AC       Total Minutes 55  -AC                User Key  (r) = Recorded By, (t) = Taken By, (c) = Cosigned By      Initials Name Provider Type    Shyanne Ryan MS CCC-SLP Speech and Language Pathologist                    Therapy Charges for Today       Code Description Service Date Service Provider Modifiers Qty    [id number] HC ST EVAL ORAL PHARYNG SWALLOW 4 3/12/2024 Shyanne Ramos MS CCC-SLP GN 1                 Shyanne Ramos MS CCC-NATALIO  3/12/2024

## 2024-03-12 NOTE — PROGRESS NOTES
"Patient Name:  Lea Rivers  YOB: 1945  [id number]    Surgery Progress Note    Date of visit: 3/12/2024    Subjective   Unable to obtain full history due to patient's dementia.  Patient with ostomy function. No nausea/vomiting after NG removed. Fevers overnight, currently afebrile.         Objective       /76 (BP Location: Right leg, Patient Position: Sitting)   Pulse 103   Temp 96.1 °F (35.6 °C) (Axillary)   Resp 18   Ht 170.2 cm (67.01\")   Wt 89 kg (196 lb 3.2 oz)   SpO2 92%   BMI 30.72 kg/m²     Intake/Output Summary (Last 24 hours) at 3/12/2024 0736  Last data filed at 3/12/2024 0600  Gross per 24 hour   Intake 1791.05 ml   Output 1730 ml   Net 61.05 ml   OLIVER 30 cc    CV:  Rhythm regular and rate regular  L:  Clear to auscultation bilaterally  Abd:  Bowel sounds positive, soft, approp tender, incision c/d/I without erythema, ostomy viable/functioning, drain serous  Ext:  No cyanosis, clubbing, edema    Recent labs and imaging that are back at this time have been reviewed.   Cr 0.61  K 4  Ca 8  Phos 1.9  WBC 23  Hgb 8.3         Assessment & Plan     Patient postoperative day 3 from total abdominal colectomy with end ileostomy. H and H with appropriate response to 1 u PRBCs and stable this AM. Will order CT A/P to assess for intraabdominal source of fever/increased WBC. Keep NPO and off anticoagulation until results returned. On electrolyte replacement. Pain control. Patient with acute upper extremity DVT.  Continue TPN.         Harley Godfrey MD  3/12/2024  07:36 EDT      "

## 2024-03-12 NOTE — PROGRESS NOTES
Pharmacy Parenteral Nutrition Evaluation    Lea Rivers is a  78 y.o. male receiving TPN.     Indication:     Prolonged Paralytic Ileus     Consulting Physician: Eleanor Yost DO     Total Fluid Rate (if stated): n/a    Labs  Results from last 7 days   Lab Units 03/12/24  0316 03/11/24  1618 03/11/24  1011 03/11/24  0508 03/10/24  1024 03/09/24  2055 03/09/24  0335   SODIUM mmol/L 138  --   --  138  138 139  --  141   POTASSIUM mmol/L 4.0 3.4*  --  3.2*  3.2* 3.7   < > 2.8*   CHLORIDE mmol/L 106  --   --  107  107 108*  --  107   CO2 mmol/L 25.0  --   --  25.0  25.0 22.0  --  23.0   BUN mg/dL 18  --   --  15  15 17  --  15   CREATININE mg/dL 0.61*  --   --  0.66*  0.66* 0.65*  --  0.72*   CALCIUM mg/dL 8.0*  --   --  8.3*  8.3* 8.4*  --  8.7   BILIRUBIN mg/dL  --   --  0.3 0.3  --   --  0.6   ALK PHOS U/L  --   --  60 63  --   --  70   ALT (SGPT) U/L  --   --  29 31  --   --  86*   AST (SGOT) U/L  --   --  20 23  --   --  64*   GLUCOSE mg/dL 148*  --   --  145*  145* 135*  --  118*    < > = values in this interval not displayed.       Results from last 7 days   Lab Units 03/12/24  1204 03/12/24  0316 03/11/24  1618 03/11/24  1011 03/11/24  0508 03/10/24  1024 03/09/24  0335 03/08/24  1638   MAGNESIUM mg/dL  --  1.8  --   --  1.8 1.8   < > 2.3   PHOSPHORUS mg/dL 2.3* 1.9* 2.1*   < > 1.7* 2.8   < >  --    PREALBUMIN mg/dL  --   --  5.4*  --   --   --   --  7.9*    < > = values in this interval not displayed.       Results from last 7 days   Lab Units 03/12/24  0316 03/11/24  1618 03/11/24  0508 03/09/24  0335   WBC 10*3/mm3 22.94*  --  18.43* 14.12*   HEMOGLOBIN g/dL 8.3* 8.4* 7.3* 11.3*   HEMATOCRIT % 26.2* 26.3* 22.8* 35.1*   PLATELETS 10*3/mm3 195  --  222 328       Triglycerides   Date Value Ref Range Status   03/11/2024 131 0 - 150 mg/dL Final     Comment:     Falsely depressed results may occur on samples drawn from patients receiving N-Acetylcysteine (NAC) or Metamizole.       estimated  creatinine clearance is 104.6 mL/min (A) (by C-G formula based on SCr of 0.61 mg/dL (L)).    Intake & Output (last 3 days)         03/05 0701 03/06 0700 03/06 0701 03/07 0700 03/07 0701 03/08 0700 03/08 0701 03/09 0700    P.O. 1080 240      I.V. (mL/kg)  1026.3 (11.5)      IV Piggyback  400      Total Intake(mL/kg) 1080 (12.1) 1666.3 (18.7)      Urine (mL/kg/hr) 150 (0.1) 400 (0.2)      Stool 0 0      Total Output 150 400      Net +930 +1266.3              Urine Unmeasured Occurrence 4 x 2 x 1 x     Stool Unmeasured Occurrence 5 x 4 x 3 x 1 x            Dietitian Recommendations  Diet Orders (active) (From admission, onward)       Start     Ordered    03/08/24 1800  Adult Cyclic Central 2-in-1 TPN  Cyclic TPN - See Admin Instructions        Note to Pharmacy: Lea Rivers  5H  [id number]  MRN: [id number]  CSN: [id number]    03/08/24 1422    03/08/24 1800  Fat Emul Fish Oil/Plant Based (SMOFLIPID) 20 % emulsion 250 mL  Every 24 Hours Scheduled (TPN)         03/08/24 1422    03/08/24 1355  NPO Diet NPO Type: Strict NPO  Diet Effective Now         03/08/24 1358    03/05/24 1656  DIET MESSAGE Pt would like a grilled cheese sandwich, please. Thanks!  Once        Comments: Pt would like a grilled cheese sandwich, please. Thanks!    03/05/24 1656                    Current TPN Regimen Recommendation:   Dextrose 15% / Amino Acid 7.7% at rate of 25 mL/hr x8hr, then 50 ml/hr x8hr, then goal rate of 65 ml/hr.      20% Lipid Emulsion 250mL every 24 hours.    Na 45 mEq/L  K 50 mEq/L  Ca 5 mEq/L  Mg 8 mEq/L  PO4 15 mmol/L  Cl:Ace - 1:1    Assessment/Plan:   TPN for prolonged paralytic ileus.   Macronutrients per dietary recommendation are listed above.  Electrolyte replacement per protocol  SSI q6h  Follow and adjust as indicated     Thanks  Wilmar Sosa [facility]  3/12/2024  12:53 EDT

## 2024-03-13 ENCOUNTER — APPOINTMENT (OUTPATIENT)
Dept: NEUROLOGY | Facility: HOSPITAL | Age: 79
DRG: 329 | End: 2024-03-13
Payer: MEDICARE

## 2024-03-13 ENCOUNTER — APPOINTMENT (OUTPATIENT)
Dept: CT IMAGING | Facility: HOSPITAL | Age: 79
DRG: 329 | End: 2024-03-13
Payer: MEDICARE

## 2024-03-13 LAB
ANION GAP SERPL CALCULATED.3IONS-SCNC: 5 MMOL/L (ref 5–15)
ANISOCYTOSIS BLD QL: ABNORMAL
BASOPHILS # BLD MANUAL: 0.46 10*3/MM3 (ref 0–0.2)
BASOPHILS NFR BLD MANUAL: 2 % (ref 0–1.5)
BUN SERPL-MCNC: 18 MG/DL (ref 8–23)
BUN/CREAT SERPL: 40.9 (ref 7–25)
CALCIUM SPEC-SCNC: 7.8 MG/DL (ref 8.6–10.5)
CHLORIDE SERPL-SCNC: 106 MMOL/L (ref 98–107)
CO2 SERPL-SCNC: 28 MMOL/L (ref 22–29)
CREAT SERPL-MCNC: 0.44 MG/DL (ref 0.76–1.27)
D-LACTATE SERPL-SCNC: 1.5 MMOL/L (ref 0.5–2)
DEPRECATED RDW RBC AUTO: 57.7 FL (ref 37–54)
EGFRCR SERPLBLD CKD-EPI 2021: 107.8 ML/MIN/1.73
EOSINOPHIL # BLD MANUAL: 0.23 10*3/MM3 (ref 0–0.4)
EOSINOPHIL NFR BLD MANUAL: 1 % (ref 0.3–6.2)
ERYTHROCYTE [DISTWIDTH] IN BLOOD BY AUTOMATED COUNT: 17.5 % (ref 12.3–15.4)
GLUCOSE BLDC GLUCOMTR-MCNC: 145 MG/DL (ref 70–130)
GLUCOSE BLDC GLUCOMTR-MCNC: 155 MG/DL (ref 70–130)
GLUCOSE BLDC GLUCOMTR-MCNC: 165 MG/DL (ref 70–130)
GLUCOSE SERPL-MCNC: 163 MG/DL (ref 65–99)
HCT VFR BLD AUTO: 26.4 % (ref 37.5–51)
HGB BLD-MCNC: 8.6 G/DL (ref 13–17.7)
LYMPHOCYTES # BLD MANUAL: 2.55 10*3/MM3 (ref 0.7–3.1)
LYMPHOCYTES NFR BLD MANUAL: 5 % (ref 5–12)
MAGNESIUM SERPL-MCNC: 1.9 MG/DL (ref 1.6–2.4)
MCH RBC QN AUTO: 30.1 PG (ref 26.6–33)
MCHC RBC AUTO-ENTMCNC: 32.6 G/DL (ref 31.5–35.7)
MCV RBC AUTO: 92.3 FL (ref 79–97)
MONOCYTES # BLD: 1.16 10*3/MM3 (ref 0.1–0.9)
MYELOCYTES NFR BLD MANUAL: 3 % (ref 0–0)
NEUTROPHILS # BLD AUTO: 18.1 10*3/MM3 (ref 1.7–7)
NEUTROPHILS NFR BLD MANUAL: 73 % (ref 42.7–76)
NEUTS BAND NFR BLD MANUAL: 5 % (ref 0–5)
NRBC SPEC MANUAL: 0 /100 WBC (ref 0–0.2)
OVALOCYTES BLD QL SMEAR: ABNORMAL
PHOSPHATE SERPL-MCNC: 2.2 MG/DL (ref 2.5–4.5)
PHOSPHATE SERPL-MCNC: 2.7 MG/DL (ref 2.5–4.5)
PLAT MORPH BLD: NORMAL
PLATELET # BLD AUTO: 223 10*3/MM3 (ref 140–450)
PMV BLD AUTO: 9.7 FL (ref 6–12)
POTASSIUM SERPL-SCNC: 4 MMOL/L (ref 3.5–5.2)
PROCALCITONIN SERPL-MCNC: 0.2 NG/ML (ref 0–0.25)
RBC # BLD AUTO: 2.86 10*6/MM3 (ref 4.14–5.8)
SODIUM SERPL-SCNC: 139 MMOL/L (ref 136–145)
VARIANT LYMPHS NFR BLD MANUAL: 11 % (ref 19.6–45.3)
WBC MORPH BLD: NORMAL
WBC NRBC COR # BLD AUTO: 23.21 10*3/MM3 (ref 3.4–10.8)

## 2024-03-13 PROCEDURE — 70450 CT HEAD/BRAIN W/O DYE: CPT

## 2024-03-13 PROCEDURE — 85007 BL SMEAR W/DIFF WBC COUNT: CPT | Performed by: FAMILY MEDICINE

## 2024-03-13 PROCEDURE — 95816 EEG AWAKE AND DROWSY: CPT

## 2024-03-13 PROCEDURE — 25010000002 HYDROMORPHONE PER 4 MG: Performed by: FAMILY MEDICINE

## 2024-03-13 PROCEDURE — 85025 COMPLETE CBC W/AUTO DIFF WBC: CPT | Performed by: FAMILY MEDICINE

## 2024-03-13 PROCEDURE — 82948 REAGENT STRIP/BLOOD GLUCOSE: CPT

## 2024-03-13 PROCEDURE — 25010000002 ENOXAPARIN PER 10 MG: Performed by: INTERNAL MEDICINE

## 2024-03-13 PROCEDURE — 25010000002 HYDRALAZINE PER 20 MG: Performed by: FAMILY MEDICINE

## 2024-03-13 PROCEDURE — 25010000002 POTASSIUM CHLORIDE PER 2 MEQ OF POTASSIUM

## 2024-03-13 PROCEDURE — 25010000002 CEFTRIAXONE PER 250 MG: Performed by: FAMILY MEDICINE

## 2024-03-13 PROCEDURE — 84145 PROCALCITONIN (PCT): CPT | Performed by: FAMILY MEDICINE

## 2024-03-13 PROCEDURE — 25010000002 CALCIUM GLUCONATE PER 10 ML

## 2024-03-13 PROCEDURE — 84100 ASSAY OF PHOSPHORUS: CPT | Performed by: SURGERY

## 2024-03-13 PROCEDURE — 25010000002 METRONIDAZOLE 500 MG/100ML SOLUTION: Performed by: FAMILY MEDICINE

## 2024-03-13 PROCEDURE — 25010000002 LEVETRIRACETAM PER 10 MG: Performed by: SURGERY

## 2024-03-13 PROCEDURE — 25010000002 MAGNESIUM SULFATE PER 500 MG OF MAGNESIUM

## 2024-03-13 PROCEDURE — 80048 BASIC METABOLIC PNL TOTAL CA: CPT | Performed by: SURGERY

## 2024-03-13 PROCEDURE — 83735 ASSAY OF MAGNESIUM: CPT | Performed by: SURGERY

## 2024-03-13 PROCEDURE — 25010000002 FLUCONAZOLE PER 200 MG: Performed by: FAMILY MEDICINE

## 2024-03-13 PROCEDURE — 63710000001 INSULIN REGULAR HUMAN PER 5 UNITS: Performed by: SURGERY

## 2024-03-13 PROCEDURE — 92610 EVALUATE SWALLOWING FUNCTION: CPT

## 2024-03-13 PROCEDURE — 99233 SBSQ HOSP IP/OBS HIGH 50: CPT | Performed by: INTERNAL MEDICINE

## 2024-03-13 PROCEDURE — 84100 ASSAY OF PHOSPHORUS: CPT | Performed by: INTERNAL MEDICINE

## 2024-03-13 PROCEDURE — 83605 ASSAY OF LACTIC ACID: CPT | Performed by: INTERNAL MEDICINE

## 2024-03-13 PROCEDURE — 95816 EEG AWAKE AND DROWSY: CPT | Performed by: PSYCHIATRY & NEUROLOGY

## 2024-03-13 RX ORDER — ENOXAPARIN SODIUM 100 MG/ML
1 INJECTION SUBCUTANEOUS EVERY 12 HOURS
Status: DISCONTINUED | OUTPATIENT
Start: 2024-03-13 | End: 2024-03-20

## 2024-03-13 RX ADMIN — PANTOPRAZOLE SODIUM 40 MG: 40 INJECTION, POWDER, FOR SOLUTION INTRAVENOUS at 04:56

## 2024-03-13 RX ADMIN — METRONIDAZOLE 500 MG: 500 INJECTION, SOLUTION INTRAVENOUS at 16:38

## 2024-03-13 RX ADMIN — WATER: 1 INJECTION INTRAMUSCULAR; INTRAVENOUS; SUBCUTANEOUS at 18:20

## 2024-03-13 RX ADMIN — METRONIDAZOLE 500 MG: 500 INJECTION, SOLUTION INTRAVENOUS at 08:26

## 2024-03-13 RX ADMIN — ENOXAPARIN SODIUM 100 MG: 100 INJECTION SUBCUTANEOUS at 20:26

## 2024-03-13 RX ADMIN — HYDRALAZINE HYDROCHLORIDE 20 MG: 20 INJECTION INTRAMUSCULAR; INTRAVENOUS at 12:56

## 2024-03-13 RX ADMIN — HYDROMORPHONE HYDROCHLORIDE 0.5 MG: 1 INJECTION, SOLUTION INTRAMUSCULAR; INTRAVENOUS; SUBCUTANEOUS at 20:26

## 2024-03-13 RX ADMIN — HYDROMORPHONE HYDROCHLORIDE 0.5 MG: 1 INJECTION, SOLUTION INTRAMUSCULAR; INTRAVENOUS; SUBCUTANEOUS at 04:07

## 2024-03-13 RX ADMIN — SODIUM CHLORIDE 500 MG: 9 INJECTION, SOLUTION INTRAVENOUS at 12:54

## 2024-03-13 RX ADMIN — Medication 10 ML: at 08:23

## 2024-03-13 RX ADMIN — Medication 10 ML: at 20:27

## 2024-03-13 RX ADMIN — CEFTRIAXONE 2000 MG: 2 INJECTION, POWDER, FOR SOLUTION INTRAMUSCULAR; INTRAVENOUS at 14:38

## 2024-03-13 RX ADMIN — ENOXAPARIN SODIUM 100 MG: 100 INJECTION SUBCUTANEOUS at 08:21

## 2024-03-13 RX ADMIN — POTASSIUM PHOSPHATE, MONOBASIC POTASSIUM PHOSPHATE, DIBASIC 15 MMOL: 224; 236 INJECTION, SOLUTION, CONCENTRATE INTRAVENOUS at 08:22

## 2024-03-13 RX ADMIN — HYDRALAZINE HYDROCHLORIDE 20 MG: 20 INJECTION INTRAMUSCULAR; INTRAVENOUS at 04:56

## 2024-03-13 RX ADMIN — Medication 10 ML: at 08:24

## 2024-03-13 RX ADMIN — LEVETIRACETAM 500 MG: 100 INJECTION, SOLUTION INTRAVENOUS at 08:21

## 2024-03-13 RX ADMIN — INSULIN HUMAN 2 UNITS: 100 INJECTION, SOLUTION PARENTERAL at 04:56

## 2024-03-13 RX ADMIN — Medication 10 ML: at 20:26

## 2024-03-13 RX ADMIN — HYDROMORPHONE HYDROCHLORIDE 0.5 MG: 1 INJECTION, SOLUTION INTRAMUSCULAR; INTRAVENOUS; SUBCUTANEOUS at 12:57

## 2024-03-13 RX ADMIN — INSULIN HUMAN 2 UNITS: 100 INJECTION, SOLUTION PARENTERAL at 12:54

## 2024-03-13 RX ADMIN — FLUCONAZOLE 400 MG: 400 INJECTION, SOLUTION INTRAVENOUS at 09:33

## 2024-03-13 RX ADMIN — LIDOCAINE 2 PATCH: 4 PATCH TOPICAL at 08:22

## 2024-03-13 RX ADMIN — SMOFLIPID 250 ML: 6; 6; 5; 3 INJECTION, EMULSION INTRAVENOUS at 18:20

## 2024-03-13 RX ADMIN — Medication 10 ML: at 08:22

## 2024-03-13 RX ADMIN — SODIUM CHLORIDE 500 MG: 9 INJECTION, SOLUTION INTRAVENOUS at 04:00

## 2024-03-13 RX ADMIN — SODIUM CHLORIDE 500 MG: 9 INJECTION, SOLUTION INTRAVENOUS at 20:26

## 2024-03-13 RX ADMIN — LEVETIRACETAM 500 MG: 100 INJECTION, SOLUTION INTRAVENOUS at 20:26

## 2024-03-13 RX ADMIN — HYDROMORPHONE HYDROCHLORIDE 0.5 MG: 1 INJECTION, SOLUTION INTRAMUSCULAR; INTRAVENOUS; SUBCUTANEOUS at 06:27

## 2024-03-13 RX ADMIN — HYDRALAZINE HYDROCHLORIDE 20 MG: 20 INJECTION INTRAMUSCULAR; INTRAVENOUS at 20:26

## 2024-03-13 NOTE — PLAN OF CARE
Problem: Adult Inpatient Plan of Care  Goal: Plan of Care Review  Outcome: Ongoing, Progressing  Flowsheets  Taken 3/13/2024 0258  Plan of Care Reviewed With: patient  Outcome Evaluation: VSS, RA, only 10 ml ouput OLIVER drain, temp 98.5 degrees F or less axillary for entire shift, no leakage from midline incision or OLIVER drain site to this point in shift, patient still sleeps most of shift but has been much more alert and conversational especially early in shift, adeqaute UO from patel catheter, minimal output from ostomy to this point, no other complications assessed or stated.  Taken 3/12/2024 0423  Progress: no change  Goal: Patient-Specific Goal (Individualized)  Outcome: Ongoing, Progressing  Goal: Absence of Hospital-Acquired Illness or Injury  Outcome: Ongoing, Progressing  Intervention: Identify and Manage Fall Risk  Recent Flowsheet Documentation  Taken 3/13/2024 0000 by Mikel Stringer RN  Safety Promotion/Fall Prevention:   assistive device/personal items within reach   clutter free environment maintained   fall prevention program maintained   lighting adjusted   nonskid shoes/slippers when out of bed   room organization consistent   safety round/check completed   toileting scheduled  Taken 3/12/2024 1930 by Mikel Stringer RN  Safety Promotion/Fall Prevention:   assistive device/personal items within reach   clutter free environment maintained   fall prevention program maintained   lighting adjusted   nonskid shoes/slippers when out of bed   room organization consistent   safety round/check completed   toileting scheduled  Intervention: Prevent Skin Injury  Recent Flowsheet Documentation  Taken 3/13/2024 0000 by Mikel Stringer RN  Body Position: weight shifting  Taken 3/12/2024 1930 by Mikel Stringer RN  Body Position:   turned   supine   weight shifting  Skin Protection:   adhesive use limited   incontinence pads utilized   tubing/devices free from skin  contact  Intervention: Prevent and Manage VTE (Venous Thromboembolism) Risk  Recent Flowsheet Documentation  Taken 3/13/2024 0000 by Mikel Stringer RN  Activity Management: activity encouraged  Taken 3/12/2024 1930 by Mikel Stringer RN  Activity Management: activity encouraged  VTE Prevention/Management: (SQ heparin)   bilateral   sequential compression devices on   other (see comments)  Range of Motion: active ROM (range of motion) encouraged  Intervention: Prevent Infection  Recent Flowsheet Documentation  Taken 3/13/2024 0000 by Mikel Stringer RN  Infection Prevention:   environmental surveillance performed   equipment surfaces disinfected   hand hygiene promoted   rest/sleep promoted   single patient room provided  Taken 3/12/2024 1930 by Mikel Stringer RN  Infection Prevention:   environmental surveillance performed   equipment surfaces disinfected   hand hygiene promoted   rest/sleep promoted   single patient room provided  Goal: Optimal Comfort and Wellbeing  Outcome: Ongoing, Progressing  Intervention: Monitor Pain and Promote Comfort  Recent Flowsheet Documentation  Taken 3/12/2024 1930 by Mikel Stringer RN  Pain Management Interventions:   care clustered   pain management plan reviewed with patient/caregiver   pillow support provided   position adjusted   quiet environment facilitated   see MAR  Intervention: Provide Person-Centered Care  Recent Flowsheet Documentation  Taken 3/12/2024 1930 by Mikel Stringer RN  Trust Relationship/Rapport:   care explained   questions answered   questions encouraged   thoughts/feelings acknowledged  Goal: Readiness for Transition of Care  Outcome: Ongoing, Progressing     Problem: Skin Injury Risk Increased  Goal: Skin Health and Integrity  Outcome: Ongoing, Progressing  Intervention: Optimize Skin Protection  Recent Flowsheet Documentation  Taken 3/13/2024 0000 by Mikel Stringer RN  Head of Bed (HOB)  Positioning: HOB at 30-45 degrees  Taken 3/12/2024 1930 by Mikel Stringer, RN  Pressure Reduction Techniques:   frequent weight shift encouraged   heels elevated off bed   weight shift assistance provided  Head of Bed (HOB) Positioning: HOB at 30-45 degrees  Pressure Reduction Devices:   positioning supports utilized   heel offloading device utilized   pressure-redistributing mattress utilized  Skin Protection:   adhesive use limited   incontinence pads utilized   tubing/devices free from skin contact     Problem: Fall Injury Risk  Goal: Absence of Fall and Fall-Related Injury  Outcome: Ongoing, Progressing  Intervention: Identify and Manage Contributors  Recent Flowsheet Documentation  Taken 3/13/2024 0000 by Mikel Stringer RN  Medication Review/Management: medications reviewed  Taken 3/12/2024 1930 by Mikel Stringer RN  Medication Review/Management: medications reviewed  Intervention: Promote Injury-Free Environment  Recent Flowsheet Documentation  Taken 3/13/2024 0000 by Mikel Stringer RN  Safety Promotion/Fall Prevention:   assistive device/personal items within reach   clutter free environment maintained   fall prevention program maintained   lighting adjusted   nonskid shoes/slippers when out of bed   room organization consistent   safety round/check completed   toileting scheduled  Taken 3/12/2024 1930 by Mikel Stringer RN  Safety Promotion/Fall Prevention:   assistive device/personal items within reach   clutter free environment maintained   fall prevention program maintained   lighting adjusted   nonskid shoes/slippers when out of bed   room organization consistent   safety round/check completed   toileting scheduled     Problem: Adjustment to Illness (Sepsis/Septic Shock)  Goal: Optimal Coping  Outcome: Ongoing, Progressing  Intervention: Optimize Psychosocial Adjustment to Illness  Recent Flowsheet Documentation  Taken 3/12/2024 1930 by Mikel Stringer  RN  Family/Support System Care: support provided     Problem: Bleeding (Sepsis/Septic Shock)  Goal: Absence of Bleeding  Outcome: Ongoing, Progressing  Intervention: Monitor and Manage Bleeding  Recent Flowsheet Documentation  Taken 3/13/2024 0000 by Mikel Stringer RN  Bleeding Precautions: monitored for signs of bleeding  Bleeding Management: dressing monitored  Taken 3/12/2024 1930 by Mikel Stringer RN  Bleeding Precautions: monitored for signs of bleeding  Bleeding Management: dressing monitored     Problem: Glycemic Control Impaired (Sepsis/Septic Shock)  Goal: Blood Glucose Level Within Desired Range  Outcome: Ongoing, Progressing  Intervention: Optimize Glycemic Control  Recent Flowsheet Documentation  Taken 3/12/2024 1930 by Mikel Stringer RN  Glycemic Management: blood glucose monitored     Problem: Infection Progression (Sepsis/Septic Shock)  Goal: Absence of Infection Signs and Symptoms  Outcome: Ongoing, Progressing  Intervention: Initiate Sepsis Management  Recent Flowsheet Documentation  Taken 3/13/2024 0000 by Mikel Stringer RN  Infection Management: aseptic technique maintained  Infection Prevention:   environmental surveillance performed   equipment surfaces disinfected   hand hygiene promoted   rest/sleep promoted   single patient room provided  Taken 3/12/2024 1930 by Mikel Stringer RN  Infection Management: aseptic technique maintained  Infection Prevention:   environmental surveillance performed   equipment surfaces disinfected   hand hygiene promoted   rest/sleep promoted   single patient room provided  Intervention: Promote Recovery  Recent Flowsheet Documentation  Taken 3/13/2024 0000 by Mikel Stringer RN  Activity Management: activity encouraged  Taken 3/12/2024 1930 by Mikel Stringer RN  Activity Management: activity encouraged  Sleep/Rest Enhancement: awakenings minimized  Intervention: Promote Stabilization  Recent Flowsheet  Documentation  Taken 3/12/2024 1930 by Mikel Stringer, RN  Fever Reduction/Comfort Measures:   lightweight bedding   lightweight clothing     Problem: Nutrition Impaired (Sepsis/Septic Shock)  Goal: Optimal Nutrition Intake  Outcome: Ongoing, Progressing   Goal Outcome Evaluation:  Plan of Care Reviewed With: patient        Progress: no change  Outcome Evaluation: VSS, RA, only 10 ml ouput OLIVER drain, temp 98.5 degrees F or less axillary for entire shift, no leakage from midline incision or OLIVER drain site to this point in shift, patient still sleeps most of shift but has been much more alert and conversational especially early in shift, adeqaute UO from patel catheter, minimal output from ostomy to this point, no other complications assessed or stated.

## 2024-03-13 NOTE — PROGRESS NOTES
"Patient Name:  Lea Rivers  YOB: 1945  [id number]    Surgery Progress Note    Date of visit: 3/13/2024    Subjective   Unable to obtain full history due to patient's dementia.  Patient more alert.  Continues with ostomy function.  No nausea or vomiting.  No fevers last 24 hours.         Objective       BP (!) 181/82 (BP Location: Left leg, Patient Position: Lying)   Pulse 107   Temp 98.1 °F (36.7 °C) (Axillary)   Resp 19   Ht 170.2 cm (67.01\")   Wt 99.3 kg (219 lb)   SpO2 95%   BMI 34.29 kg/m²     Intake/Output Summary (Last 24 hours) at 3/13/2024 0717  Last data filed at 3/13/2024 0600  Gross per 24 hour   Intake 4209.89 ml   Output 2970 ml   Net 1239.89 ml       CV:  Rhythm regular and rate regular  L:  Clear to auscultation bilaterally  Abd:  Bowel sounds positive, soft, appropriately tender, incision clean dry and intact, ostomy viable and functioning, OLIVER minimal  Ext:  No cyanosis, clubbing, edema    Recent labs and imaging that are back at this time have been reviewed.   Creatinine 0.44  Potassium 4  Calcium 7.8  Phosphorus 2.2  Pro-Ren 0.2  WBC 23.2  Hemoglobin 8.6       Assessment & Plan     Patient postoperative day 4 from total abdominal colectomy with end ileostomy.  Okay for diet once cleared by speech and swallow evaluation.  Okay for anticoagulation for upper extremity DVT.  Continue broad-spectrum antibiotics for now.  On electrolyte replacement. Pain control. Continue TPN.         Harley Godfrey MD  3/13/2024  07:17 EDT      "

## 2024-03-13 NOTE — PROGRESS NOTES
[facility]  PROGRESS NOTE    Patient Name: Lea Rivers  : 1945  MRN: [id number]    Date of Admission: 3/2/2024  Primary Care Physician: Mannie Melvin MD    Subjective   Subjective     CC:  F/U total abd colectomy with end ileostomy     HPI:  Patient resting in bed. Daughter at bedside. Patient remains very lethargic and encephalopathic, he does awaken but falls right back asleep. Daughter notes this is a major change from his baseline. She also notes that he has had significant change in his speech and she notes it is barely understandable     Objective   Objective     Vital Signs:   Temp:  [98.1 °F (36.7 °C)-98.5 °F (36.9 °C)] 98.1 °F (36.7 °C)  Heart Rate:  [] 107  Resp:  [18-19] 18  BP: (152-201)/() 171/88     Physical Exam:  Constitutional: No acute distress, appears chronically ill, elderly. Sleeping in bed. lethargic  HENT: NCAT, mucous membranes moist  Respiratory: Clear to auscultation bilaterally, respiratory effort normal   Cardiovascular: tachycardic, no murmurs, rubs, or gallops  Gastrointestinal: abd binder in place, tender to palpation  Musculoskeletal: + upper and lower bilateral edema  Neurologic: Oriented to person only, briefly wakes up and then falls back asleep  Skin: No rashes, bruises noted on upper ext      Results Reviewed:  LAB RESULTS:      Lab 24  0511 24  0316 24  1618 24  1011 24  0508 24  0335 24  1215 24  0421 24  1241 24  0804 24  0411 24  0048 24  1838 24  1751   WBC 23.21* 22.94*  --   --  18.43* 14.12*  --  16.04*  --   --  15.60*  --   --  14.68*   HEMOGLOBIN 8.6* 8.3* 8.4*  --  7.3* 11.3*  --  10.2*  --   --  10.5*  --   --  11.5*   HEMATOCRIT 26.4* 26.2* 26.3*  --  22.8* 35.1*  --  32.6*  --   --  32.5*  --   --  35.8*   PLATELETS 223 195  --   --  222 328  --  270  --   --  204  --   --  221   NEUTROS ABS 18.10*  18.18*  --   --  14.40* 11.36*  --   --   --   --   --   --   --  12.21*   IMMATURE GRANS (ABS)  --  1.11*  --   --  0.60* 0.35*  --   --   --   --   --   --   --  0.20*   LYMPHS ABS  --  1.17  --   --  1.04 0.88  --   --   --   --   --   --   --  0.78   MONOS ABS  --  1.77*  --   --  2.08* 1.43*  --   --   --   --   --   --   --  1.46*   EOS ABS 0.23 0.61*  --   --  0.24 0.02  --   --   --   --   --   --   --  0.00   MCV 92.3 92.6  --   --  96.2 92.4  --  93.9  --   --  91.5  --   --  93.2   CRP  --   --   --  13.06*  --   --  12.05*  --   --   --   --   --   --   --    PROCALCITONIN 0.20 0.27*  --   --   --   --   --   --   --   --   --   --   --  0.12   LACTATE  --   --   --   --   --  1.3  --   --  1.4 1.4 1.6 1.6   < >  --    PROTIME  --   --  15.1*  --   --   --   --   --   --   --   --   --   --   --     < > = values in this interval not displayed.         Lab 03/13/24  0511 03/12/24  1204 03/12/24  0316 03/11/24  1618 03/11/24  1011 03/11/24  0508 03/10/24  1024 03/09/24  2055 03/09/24  0335 03/08/24  1638 03/07/24  1804 03/07/24  0411   SODIUM 139  --  138  --   --  138  138 139  --  141  --    < > 131*   POTASSIUM 4.0  --  4.0 3.4*  --  3.2*  3.2* 3.7   < > 2.8* 2.5*   < > 3.1*   CHLORIDE 106  --  106  --   --  107  107 108*  --  107  --    < > 99   CO2 28.0  --  25.0  --   --  25.0  25.0 22.0  --  23.0  --    < > 22.0   ANION GAP 5.0  --  7.0  --   --  6.0  6.0 9.0  --  11.0  --    < > 10.0   BUN 18  --  18  --   --  15  15 17  --  15  --    < > 15   CREATININE 0.44*  --  0.61*  --   --  0.66*  0.66* 0.65*  --  0.72*  --    < > 0.74*   EGFR 107.8  --  97.7  --   --  95.4  95.4 95.8  --  92.9  --    < > 92.2   GLUCOSE 163*  --  148*  --   --  145*  145* 135*  --  118*  --    < > 124*   CALCIUM 7.8*  --  8.0*  --   --  8.3*  8.3* 8.4*  --  8.7  --    < > 8.5*   IONIZED CALCIUM  --   --   --  1.26  --   --   --   --   --  1.25  --  1.22   MAGNESIUM 1.9  --  1.8  --   --  1.8 1.8  --  2.0 2.3   < >  1.9   PHOSPHORUS 2.2* 2.3* 1.9* 2.1* 2.1* 1.7* 2.8   < > 2.0*  --    < > 3.0    < > = values in this interval not displayed.         Lab 03/11/24  1011 03/11/24  0508 03/09/24  0335 03/08/24  1215 03/06/24  1751   TOTAL PROTEIN 4.4* 4.4* 6.1 5.6* 6.8   ALBUMIN 2.4* 2.5* 2.9* 3.1* 3.3*   GLOBULIN  --  1.9 3.2  --  3.5   ALT (SGPT) 29 31 86* 103* 46*   AST (SGOT) 20 23 64* 105* 81*   BILIRUBIN 0.3 0.3 0.6 0.5 0.7   INDIRECT BILIRUBIN  --   --   --  0.2  --    BILIRUBIN DIRECT <0.2  --   --  0.3  --    ALK PHOS 60 63 70 61 61         Lab 03/11/24  1618   PROTIME 15.1*   INR 1.18*           Lab 03/11/24  1011 03/08/24  1215   CHOLESTEROL  --  103   TRIGLYCERIDES 131 161*         Lab 03/09/24  0935   ABO TYPING O   RH TYPING Negative   ANTIBODY SCREEN Negative         Lab 03/06/24  1636   PH, ARTERIAL 7.413   PCO2, ARTERIAL 35.6   PO2 ART 63.0*   FIO2 21   HCO3 ART 22.7   BASE EXCESS ART -1.6*   CARBOXYHEMOGLOBIN 1.6     Brief Urine Lab Results  (Last result in the past 365 days)        Color   Clarity   Blood   Leuk Est   Nitrite   Protein   CREAT   Urine HCG        03/11/24 1337 Yellow   Cloudy   Moderate (2+)   Trace   Negative   30 mg/dL (1+)                   Microbiology Results Abnormal       Procedure Component Value - Date/Time    Urine Culture - Urine, Indwelling Urethral Catheter [[id number]]  (Normal) Collected: 03/11/24 1337    Lab Status: Final result Specimen: Urine from Indwelling Urethral Catheter Updated: 03/12/24 2024     Urine Culture No growth    Blood Culture - Blood, Arm, Right [[id number]]  (Normal) Collected: 03/06/24 1857    Lab Status: Final result Specimen: Blood from Arm, Right Updated: 03/11/24 2045     Blood Culture No growth at 5 days    Blood Culture - Blood, Arm, Right [[id number]]  (Normal) Collected: 03/06/24 5860    Lab Status: Final result Specimen: Blood from Arm, Right Updated: 03/11/24 2000     Blood Culture No growth at 5 days    Blood Culture - Blood, Arm, Right [[id number]]   (Normal) Collected: 03/02/24 1132    Lab Status: Final result Specimen: Blood from Arm, Right Updated: 03/07/24 1201     Blood Culture No growth at 5 days    Narrative:      Less than seven (7) mL's of blood was collected.  Insufficient quantity may yield false negative results.    Blood Culture - Blood, Arm, Right [[id number]]  (Normal) Collected: 03/02/24 1132    Lab Status: Final result Specimen: Blood from Arm, Right Updated: 03/07/24 1201     Blood Culture No growth at 5 days    Narrative:      Less than seven (7) mL's of blood was collected.  Insufficient quantity may yield false negative results.    Urine Culture - Urine, Straight Cath [[id number]]  (Normal) Collected: 03/02/24 0923    Lab Status: Final result Specimen: Urine from Straight Cath Updated: 03/03/24 1601     Urine Culture No growth    COVID PRE-OP / PRE-PROCEDURE SCREENING ORDER (NO ISOLATION) - Swab, Nasopharynx [[id number]]  (Normal) Collected: 03/02/24 1133    Lab Status: Final result Specimen: Swab from Nasopharynx Updated: 03/02/24 1227    Narrative:      The following orders were created for panel order COVID PRE-OP / PRE-PROCEDURE SCREENING ORDER (NO ISOLATION) - Swab, Nasopharynx.  Procedure                               Abnormality         Status                     ---------                               -----------         ------                     COVID-19, FLU A/B, RSV P...[[id number]]  Normal              Final result                 Please view results for these tests on the individual orders.    COVID-19, FLU A/B, RSV PCR 1 HR TAT - Swab, Nasopharynx [[id number]]  (Normal) Collected: 03/02/24 1133    Lab Status: Final result Specimen: Swab from Nasopharynx Updated: 03/02/24 1227     COVID19 Not Detected     Influenza A PCR Not Detected     Influenza B PCR Not Detected     RSV, PCR Not Detected    Narrative:      Fact sheet for providers: https://www.fda.gov/media/551840/download    Fact sheet for patients:  https://www.fda.gov/media/528465/download    Test performed by PCR.            XR Shoulder 2+ View Left    Result Date: 3/12/2024  XR SHOULDER 2+ VW LEFT Date of Exam: 3/12/2024 12:52 PM EDT Indication: new bruise, recent clavicle Fx Comparison: None available. Findings: 3 views of the left shoulder demonstrate a comminuted and mildly displaced distal clavicle fracture. There is overlying soft tissue swelling. The glenohumeral joint and proximal humerus are intact. There is a minimally displaced fracture of the lateral third rib.     Impression: Impression: 1. Mildly displaced and comminuted left distal clavicle fracture. 2. Minimally displaced fracture of the left lateral third rib. Electronically Signed: Ellen Suarez MD  3/12/2024 1:50 PM EDT  Workstation ID: [id number]    XR Chest 1 View    Result Date: 3/12/2024  XR CHEST 1 VW Date of Exam: 3/12/2024 12:52 PM EDT Indication: fever, leukocytosis, lethargy Comparison: 3/8/2024 Findings: The heart size is normal. There is consolidation and volume loss at the left lung base with a left pleural effusion. The lungs are otherwise clear and the vascular pattern is normal.     Impression: Impression: 1. Left lower lobe consolidation with a small left sided pleural effusion. Electronically Signed: Kirt Gentile MD  3/12/2024 1:37 PM EDT  Workstation ID: [id number]    CT Abdomen Pelvis With Contrast    Result Date: 3/12/2024  CT ABDOMEN PELVIS W CONTRAST Date of Exam: 3/12/2024 9:21 AM EDT Indication: Abdominal pain, acute, nonlocalized Patient POD#3 from total colectomy and end ileostomy with fevers and increased WBC. Comparison: CT of the abdomen and pelvis without contrast 3/6/2024 Technique: Axial CT images were obtained of the abdomen and pelvis following the uneventful intravenous administration of 85 mL Isovue 300. Reconstructed coronal and sagittal images were also obtained. Automated exposure control and iterative construction methods were used. Findings: LUNG  BASES: Bilateral pleural effusions and dependent atelectasis are present. There is a calcified granuloma in the right middle lobe. The heart is mildly enlarged and there is a small pericardial effusion. Coronary artery atherosclerotic calcifications  are present. LIVER:  Unremarkable parenchyma without focal lesion. BILIARY/GALLBLADDER: Gallbladder is not visualized and may be surgically absent. SPLEEN:  Unremarkable PANCREAS:  Unremarkable ADRENAL:  Unremarkable KIDNEYS:  Unremarkable parenchyma with no solid mass identified. No obstruction.  No calculus identified. There is bilateral perinephric stranding and trace perinephric fluid. GASTROINTESTINAL/MESENTERY: There has been an interval total colectomy and end ileostomy within the right lower quadrant. Postoperative changes including scattered amounts of intraperitoneal air are noted. Additionally, there is mild ascites throughout the abdomen. There is a surgical drain which enters the left lower abdominal wall and terminates in the region of the distal colectomy. There is no significant fluid collection. There is small bowel thickening, which can be seen for example in the left upper quadrant on series 5 image 88. There is no evidence of small bowel obstruction. MESENTERIC VESSELS: The mesenteric vessels appear grossly patent though contrast bolus timing is suboptimal. There is atherosclerotic calcification at the origin of the SMA without significant narrowing. There is severe atherosclerotic stenosis of the proximal inferior mesenteric artery. AORTA/IVC:  The aorta is normal in caliber with mild to moderate atherosclerotic disease. IVC is unremarkable. RETROPERITONEUM/LYMPH NODES:  Unremarkable REPRODUCTIVE: Prostate gland is surgically absent with multiple clips in the prostate bed. BLADDER: A Odell catheter is present within the urinary bladder, which demonstrates an air-fluid level. OSSEUS STRUCTURES: There are degenerative changes of the lumbar spine,  sacroiliac joints and hips. No focal lytic or destructive osseous lesion. SOFT TISSUES: There is a midline abdominal surgical incision with skin staples and overlying bandage. Mild diffuse soft tissue edema is present     Impression: Impression: 1. Postoperative changes of total colectomy and right lower quadrant end ileostomy with scattered amounts of pneumoperitoneum. There is a surgical drain in the pelvis without obvious fluid collection or abscess. Free fluid is present in the abdomen and pelvis, slightly greater than expected for postoperative change. 2. There is mild diffuse small bowel thickening/edema without obstruction. This may represent changes of infectious/inflammatory enteritis, ischemic etiology is felt less likely. However, clinical and laboratory correlation as well as close clinical follow-up are advised. 3. Mild anasarca. These findings were discussed with the patient's nurse Brittney on 3/12/2024 at 10:37 a.m. Eastern standard time. Electronically Signed: Ellen Suarez MD  3/12/2024 10:39 AM EDT  Workstation ID: [id number]    Duplex Venous Upper Extremity - Left CAR    Result Date: 3/11/2024    Normal left upper extremity venous duplex scan.      Results for orders placed during the hospital encounter of 07/20/21    Adult Transthoracic Echo Complete W/ Cont if Necessary Per Protocol    Interpretation Summary  · Left ventricular wall thickness is consistent with mild concentric hypertrophy.  · Normal left-ventricular systolic function, estimated EF 65%.  · Left ventricular diastolic function is consistent with (grade I) impaired relaxation.  · Aortic sclerosis without aortic stenosis. Trace aortic insufficiency.  · Trace mitral regurgitation, trace tricuspid regurgitation.      Current medications:  Scheduled Meds:cefTRIAXone, 2,000 mg, Intravenous, Q24H  enoxaparin, 1 mg/kg, Subcutaneous, Q12H  Fat Emul Fish Oil/Plant Based, 250 mL, Intravenous, Q24H (TPN)  fluconazole, 400 mg, Intravenous,  Q24H  hydrALAZINE, 20 mg, Intravenous, Q6H  insulin regular, 2-7 Units, Subcutaneous, Q6H  levETIRAcetam, 500 mg, Intravenous, Q12H  Lidocaine, 2 patch, Transdermal, Q24H  metroNIDAZOLE, 500 mg, Intravenous, Q8H  pantoprazole, 40 mg, Intravenous, Q AM  sodium chloride, 10 mL, Intravenous, Q12H  sodium chloride, 10 mL, Intravenous, Q12H  sodium chloride, 10 mL, Intravenous, Q12H  valproate sodium, 500 mg, Intravenous, Q8H      Continuous Infusions:Adult Central 2-in-1 TPN, , Last Rate: 65 mL/hr at 03/12/24 1732  Pharmacy to Dose TPN,           PRN Meds:.  acetaminophen **OR** acetaminophen **OR** acetaminophen    calcium carbonate    Calcium Replacement - Follow Nurse / BPA Driven Protocol    dextrose    dextrose    docusate sodium    fluticasone    glucagon (human recombinant)    hydrALAZINE    HYDROmorphone    ipratropium-albuterol    labetalol    Magnesium Standard Dose Replacement - Follow Nurse / BPA Driven Protocol    [DISCONTINUED] HYDROmorphone **AND** naloxone    ondansetron ODT **OR** ondansetron    ondansetron ODT **OR** ondansetron    Pharmacy to Dose TPN    Phosphorus Replacement - Follow Nurse / BPA Driven Protocol    Potassium Replacement - Follow Nurse / BPA Driven Protocol    sodium chloride    sodium chloride    sodium chloride    sodium chloride    sodium chloride    sodium chloride    Assessment & Plan   Assessment & Plan     Active Hospital Problems    Diagnosis  POA    **Falls [W19.XXXA]  Yes    Pneumatosis intestinalis [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        Brief Hospital Course to date:  Lea Rivers is a 78 y.o. male with past medical history of hypertension, hyperlipidemia, hypothyroidism, seizure disorder.  Patient is being admitted for a fall and nondisplaced first through fourth left rib fractures.  Patient also has left distal clavicle fracture.    This patient's problems and plans were partially entered by my partner and updated  as appropriate by me 03/13/24.      Pneumatosis intestinalis/Toxic dilation of colon s/p total abdominal colectomy w/\end ileostomy on 3/9 w/  Persistent  Leukocytosis  -Continue TPN via PICC, SLP following, wilman for CLD if cleared by speech but remain lethargic  -Replace electrolytes per protocol   -NG removed 3/10  -Remove patel when ok with surgery   -Continue Fluconazole (started 3/11), Zosyn changed to Rocephin + Flagyl 3/12  -CT abd/pelvis reviewed, d/w Dr. Godfrey, expected post-op findings, no abscess, remains afebrile and cultures are all negative so far. Will check lactate  -CXR 3/12 does show possible new LLL pneumonia, continue CTX    Encephalopathy/lethargy  - per daughter significant change in mental status and speech since arrival, we had a long discussion and I feel this is probably TME in setting of infection, recent large operation, underlying dementia  - initial CT head 3/2 negative, given dysarthria will repeat today  -will get EEG to r/o underlying seizures  -minimize sedating meds     Multiple falls with increasing frequency   -CT of head shows age-related changes which are chronic but no acute process  -neuro consulted. likely d/t neuropathy (confirmed with EMG) and progression of dementia     Multiple rib fractures and also left clavicle fracture  -With no displacement or mildly displaced.  Orthopedic surgery evaluated. No surgical intervention  planned.  Recs nonweightbearing LUE, may come out of sling in 5-7 days to initiate gentle ROM exercises per ortho.  F/u with ortho/Dr. Maldonado in 2 weeks  -Pain control  -lidocaine patch  -New bruise noted to L shoulder/neck region. X-ray negative    Acute on Chronic Anemia  -Post op noted decrease in H&H  -Did receive large quantity of IVF per surgery prior to case  -S/P 1 unit PRBCs 3/11 with appropriate rise in H&H  -Do not suspect active bleeding     Acute RUE DVT (brachial)  -Provoked, 2/2 PICC   -Dr. Bekah stovall with [facility], start  therapeutic lovenox today 3/13     Prostate cancer  Hx BPH s/p greenlight photo vaporization of prostate 2018  -Follows with Dr. Noland  -S/P cyberknife radiation 2/9/23  -Has intermittent hematuria      Dementia and increasing memory problem  -Patient still lives alone and also drives  -practice partner d/w family that patient should not be driving.  They are interested in placement     Hypertension  -Continue IV Hydralazine 20mg q6 hours  -PRN IV Lopressor  -Unable to take his PO meds currently     Seizure disorder  - continue home meds including home keppra (changed to IV)   - Depakote level ok.     Hyperlipidemia  Hypothyroidism  -Home meds on hold, NPO    Expected Discharge Location and Transportation: SNF  Expected Discharge   Expected Discharge Date: 3/18/2024; Expected Discharge Time:      DVT prophylaxis:  Medical and mechanical DVT prophylaxis orders are present.         AM-PAC 6 Clicks Score (PT): 6 (03/12/24 1930)    CODE STATUS:   Code Status and Medical Interventions:   Ordered at: 03/02/24 1456     Medical Intervention Limits:    NO intubation (DNI)     Code Status (Patient has no pulse and is not breathing):    No CPR (Do Not Attempt to Resuscitate)     Medical Interventions (Patient has pulse or is breathing):    Limited Support       Sienna Saleh, DO  03/13/24

## 2024-03-13 NOTE — PROGRESS NOTES
"CPN Review Note    Patient Name: Lea Rivers  Date of Encounter: 03/13/24 11:14 EDT  MRN: [id number]  Admission date: 3/2/2024    Reason for visit: CPN review . RD to continue to follow per protocol.     Information Obtained:  TPN infusing at goal at bedside-not changes today. RD spoke w/SLP, will attempt clear liquid FEES if pt alert enough. Sleeping during RD visit this am.     EMR reviewed:  NG output \"0\" past 24hrs; ostomy 50ml.   Medication reviewed: IV phos given this am  Labs reviewed: Phos 2.2    Current diet: NPO Diet NPO Type: Strict NPO  Adult Central 2-in-1 TPN    Estimated Needs:   Calories ~ 1800 Kcal/day  Protein ~ 120 g PRO/day    PN Route: PICC  PN:   15% dextrose, 7.7% AA @ goal rate of 65mL/hr.         GIR:  1.82mg/kg/min  Lipid: Provide 250mL 20% SMOF daily                  PN@ Goal over 24hr to Supply:  Volume 1560 mL/day     Energy 1776 Kcal/day 99 % Est Need   Protein 120 g/day 100 % Est Need     ---------------------------------------------------------------------------  Formula/Rate verified at bedside: Yes   Infusing Rate at time of visit: 65    Average Delivery from Charting:  inaccurate data-4010ml documented    PN Volume  mL/day     Lipid delivered?  Y      Energy  Kcal/day  % Est Need   Protein  g/day  % Est Need       Intervention:  Maintain TPN @ 65ml/hr.   Continue to monitor electrolytes;  replace per protocol.  Monitor diet progression and PO tolerance.        Follow up:   Per protocol    Aria Morton, MS,RD,LD  11:14 EDT  Time: 20min     "

## 2024-03-13 NOTE — THERAPY RE-EVALUATION
Acute Care - Speech Language Pathology   Swallow Re-Evaluation [facility]  Clinical Swallow Evaluation       Patient Name: Lea Rviers  : 1945  MRN: [id number]  Today's Date: 3/13/2024               Admit Date: 3/2/2024    Visit Dx:     ICD-10-CM ICD-9-CM   1. Fall, initial encounter  W19.XXXA E888.9   2. Closed fracture of multiple ribs of left side, initial encounter  S22.42XA 807.09   3. Closed displaced fracture of acromial end of left clavicle, initial encounter  S42.032A 810.03   4. Falls frequently  R29.6 V15.88   5. Acute UTI (urinary tract infection)  N39.0 599.0   6. Colon distention  K63.89 569.89   7. Paralytic ileus of small intestine and colon  K56.0 560.1   8. Dysphagia, unspecified type  R13.10 787.20     Patient Active Problem List   Diagnosis    Coronary artery disease    Dyslipidemia    Hypothyroidism    GERD (gastroesophageal reflux disease)    Seizure disorder    BPH (benign prostatic hypertrophy)    Hypertension    Primary osteoarthritis of both knees    Syncope and collapse    Precordial pain    Diabetes    Status post total right knee replacement    Postoperative urinary retention    Confusion, postoperative    Acute blood loss anemia, asymptomatic, no transfusion required    Elevated prostate specific antigen (PSA)    Prostate cancer    Anemia    Body mass index (BMI) of 32.0-32.9 in adult    Localization-related focal epilepsy with simple partial seizures    Need for vaccination against Streptococcus pneumoniae    Upper extremity tendon tear, right, initial encounter    Vitamin D deficiency    PSA elevation    Polyp of colon    Overactive bladder    Gross hematuria    History of colon polyps    History of colon cancer    B12 deficiency    Falls    Pneumatosis intestinalis     Past Medical History:   Diagnosis Date    Anemia     Colon cancer     Status post partial colectomy in . No chemotherapy or radiation therapy.    Coronary artery disease     Nonobstructive  coronary artery disease.    Diabetes     Dyslipidemia     GERD (gastroesophageal reflux disease)     Hx of.    Hyperlipidemia     Hypertension     Hyponatremia     Hypothyroidism     Left shoulder pain     Low sodium levels 2022    recent issue; saw nephrologist who ruled out kidney issues; took Sodium Chloride po to bring levels up    Memory deficit     FROM ANESTHESIA    Osteoarthritis     Prostate cancer 07/23/2022    Seizure disorder     silent seziure-diagnosed years ago    Skin cancer      Past Surgical History:   Procedure Laterality Date    APPENDECTOMY      CARDIAC CATHETERIZATION  2012    30 to 40% LAD    CARPAL TUNNEL RELEASE Bilateral     CHOLECYSTECTOMY      COLECTOMY PARTIAL / TOTAL  1990    Partial colectomy    COLON RESECTION N/A 3/9/2024    Procedure: TOTAL ABDOMINAL COLECTOMY, END ILEOSTOMY;  Surgeon: Harley Godfrey MD;  Location:  [facility];  Service: General;  Laterality: N/A;    COLONOSCOPY      CYBERKNIFE  02/09/2023    Prostate/SV    CYSTOSCOPY TRANSURETHRAL RESECTION OF PROSTATE N/A 09/21/2018    Procedure: CYSTOSCOPY TRANSURETHRAL RESECTION OF PROSTATE GREENLIGHT;  Surgeon: Naseem Clayton MD;  Location:  [facility];  Service: Urology    OTHER SURGICAL HISTORY      Contraction surgery on bilateral hands and wrists.    PROSTATE BIOPSY N/A 11/11/2022    Procedure: TRANSRECTAL ULTRASOUND GUIDED BIOPSY OF PROSTATE;  Surgeon: Naseem Noland MD;  Location:  [facility];  Service: Urology;  Laterality: N/A;    SINUS SURGERY      SKIN BIOPSY      TEETH EXTRACTION      TONSILLECTOMY      TOTAL KNEE ARTHROPLASTY Right 04/07/2022    Procedure: TOTAL KNEE ARTHROPLASTY WITH ORTHO ROBOT RIGHT;  Surgeon: Louis Malcolm MD;  Location:  [facility];  Service: Robotics - Ortho;  Laterality: Right;    TRANSRECTAL INCISION PROSTATE WITH GOLD SEED Bilateral 01/06/2023    Procedure: TRANSRECTAL ULTRASOUND GUIDED PROSTATE FIDUCIAL MARKER PLACEMENT;  Surgeon: Naseem Noland MD;  Location:  [facility];  Service: Urology;  Laterality: Bilateral;    TURP / TRANSURETHRAL INCISION / DRAINAGE PROSTATE      VASECTOMY         SLP Recommendation and Plan  SLP Swallowing Diagnosis: oral dysphagia, suspected pharyngeal dysphagia (03/13/24 1615)  SLP Diet Recommendation: NPO (03/13/24 1615)     SLP Rec. for Method of Medication Administration: meds via alternate route (03/13/24 1615)     Monitor for Signs of Aspiration: yes, notify SLP if any concerns (03/13/24 1615)  Recommended Diagnostics: reassess via clinical swallow evaluation, other (see comments) (03/13/24 1615)  Swallow Criteria for Skilled Therapeutic Interventions Met: demonstrates skilled criteria (03/13/24 1615)  Anticipated Discharge Disposition (SLP): skilled nursing facility (03/13/24 1615)  Rehab Potential/Prognosis, Swallowing: good, to achieve stated therapy goals (03/13/24 1615)     Predicted Duration Therapy Intervention (Days): until discharge (03/13/24 1615)  Oral Care Recommendations: Oral Care BID/PRN, Suction toothbrush (03/13/24 1615)                                               SWALLOW EVALUATION (Last 72 Hours)       SLP Adult Swallow Evaluation       Row Name 03/13/24 1615 03/12/24 1400                Rehab Evaluation    Document Type re-evaluation  - evaluation  -       Subjective Information no complaints  - complains of;fatigue  -       Patient Observations alert;cooperative  - alert;cooperative  -       Patient/Family/Caregiver Comments/Observations Family present  - Pt pleasantly confused. RN sitter and pt's son present.  -       Patient Effort adequate  - good  -       Symptoms Noted During/After Treatment none  - --       Oral Care -- swabbed with sterile water  -          General Information    Patient Profile Reviewed yes  - yes  -AC       Pertinent History Of Current Problem See intial eval  - Adm after falls, sustaining rib fx, L clavicle fx, intestinal wall air. S/p total abdominal colectomy & end  ileostomy 3/9. CXR: LLL consolidation & small L pleural effusion. Increased confusion. Hx GERD, sz d/o, prostate CA s/p XRT in Feb, dementia.  -       Current Method of Nutrition NPO;parenteral feedings  - NPO;parenteral feedings  -       Precautions/Limitations, Vision WFL;for purposes of eval  - WFL;for purposes of eval  -AC       Precautions/Limitations, Hearing WFL;for purposes of eval  - WFL;for purposes of eval  -AC       Prior Level of Function-Communication cognitive-linguistic impairment;other (see comments)  was living alone & driving PTA per chart; son reported pt typically able to communicate normally  - cognitive-linguistic impairment;other (see comments)  was living alone & driving PTA per chart; son reported pt typically able to communicate normally  -       Prior Level of Function-Swallowing no diet consistency restrictions  - no diet consistency restrictions  -       Plans/Goals Discussed with patient and family;agreed upon  - patient and family;agreed upon  -       Barriers to Rehab cognitive status;medically complex  - cognitive status;medically complex  -       Patient's Goals for Discharge patient did not state  - patient did not state  -       Family Goals for Discharge family did not state  - patient able to return to PO diet;patient able to eat/drink without coughing/choking  -          Pain    Additional Documentation Pain Scale: FACES Pre/Post-Treatment (Group)  - --          Pain Scale: FACES Pre/Post-Treatment    Pain: FACES Scale, Pretreatment 2-->hurts little bit  - 2-->hurts little bit  -AC       Posttreatment Pain Rating 2-->hurts little bit  - 2-->hurts little bit  -AC       Pain Location - back  - abdomen  -       Pre/Posttreatment Pain Comment RN aware  - RN present/aware  -AC          Oral Motor Structure and Function    Dentition Assessment natural, present and adequate  - natural, present and adequate  -AC       Secretion  Management -- dried secretions in oral cavity  -       Mucosal Quality -- dry  -AC          Oral Musculature and Cranial Nerve Assessment    Oral Motor General Assessment unable to assess;other (see comments)  pt u/a to follow OM commands  - unable to assess  2' cog status; appeared generally weak  -          General Eating/Swallowing Observations    Respiratory Support Currently in Use room air  - --       Eating/Swallowing Skills fed by SLP  - fed by [facility]  -       Positioning During Eating upright in bed  - semi-reclined  difficulty tolerating fully upright position 2' fxs  -       Utensils Used spoon;cup  - spoon;cup  -       Consistencies Trialed ice chips;thin liquids  clear liquid restriction  - ice chips;thin liquids;nectar/syrup-thick liquids  -          Clinical Swallow Eval    Oral Prep Phase impaired  - impaired  -       Oral Transit impaired  - impaired  -       Oral Residue -- WFL  -AC       Pharyngeal Phase suspected pharyngeal impairment  - suspected pharyngeal impairment  -       Esophageal Phase -- unremarkable  -       Clinical Swallow Evaluation Summary Pt lethargic, max cues required for pt to accept trials. Pt accepted minimal thin liquid trials, declining additional trials. Multiple swallows & wet cough w/ thins via tsp. Had lengthy d/w dtr regarding overt s/s of aspiration & instrumental assessment. Continued concern pt not appropraite for FEES given cog status and poor acceptance of PO trials, also discussed concern pt would not tolerate MBS given significant pain w/ movement. Recommend cont NPO, SLP will f/u for re-eval  - --          Oral Prep Concerns    Oral Prep Concerns incomplete or weak lip closure around spoon;anterior loss  - incomplete or weak lip closure around spoon  -       Incomplete or Weak Lip Closure Around Spoon thin  [facility] --  initially when taking liquid from spoon--appeared cognitive-based  -       Anterior Loss thin  - --           Oral Transit Concerns    Oral Transit Concerns increased oral transit time  - increased oral transit time  -       Increased Oral Transit Time thin  -MH all consistencies  suspected  -          Pharyngeal Phase Concerns    Pharyngeal Phase Concerns multiple swallows;cough  -MH multiple swallows;cough  -AC       Multiple Swallows thin  -MH thin;nectar  -AC       Cough thin  -MH thin  -AC       Pharyngeal Phase Concerns, Comment -- Reportedly, pt had been lethargic all morning, but was the most awake he had been all day during this evaluation. C/o odynophagia. Overt clinical s/sxs aspiration w/ thin liquids via tsp and high risk for oropharyngeal dysphagia. No overt clinical s/sxs aspiration w/ single ice chips. Son agreed pt not yet cognitively appropriate for FEES.  -          Swallowing Quality of Life Assessment    Education and counseling provided -- Signs of aspiration;Risks of aspiration;Oral care recommendations and rationale  -          SLP Evaluation Clinical Impression    SLP Swallowing Diagnosis oral dysphagia;suspected pharyngeal dysphagia  - suspected pharyngeal dysphagia;oral dysphagia  -       Functional Impact risk of aspiration/pneumonia;risk of malnutrition;risk of dehydration  - risk of aspiration/pneumonia;risk of malnutrition;risk of dehydration  -       Rehab Potential/Prognosis, Swallowing good, to achieve stated therapy goals  - good, to achieve stated therapy goals  -       Swallow Criteria for Skilled Therapeutic Interventions Met demonstrates skilled criteria  - demonstrates skilled criteria  -          Recommendations    Predicted Duration Therapy Intervention (Days) until discharge  - --       SLP Diet Recommendation NPO  - NPO;other (see comments)  except ice chips when alert/upright, w/ supervision, as tolerated  -       Recommended Diagnostics reassess via clinical swallow evaluation;other (see comments)  - reassess via clinical swallow  evaluation;other (see comments)  when more alert/cognitive status improved  -       Oral Care Recommendations Oral Care BID/PRN;Suction toothbrush  - Oral Care BID/PRN;Suction toothbrush  -       SLP Rec. for Method of Medication Administration meds via alternate route  - meds via alternate route  -       Monitor for Signs of Aspiration yes;notify SLP if any concerns  - --       Anticipated Discharge Disposition (SLP) skilled nursing facility  - skilled nursing [facility]  -                 User Key  (r) = Recorded By, (t) = Taken By, (c) = Cosigned By      Initials Name Effective Dates     Shyanne Ramos MS MARIA D-SLP 02/03/23 -      Shi Guadalupe MS CCC-SLP 05/12/23 -                     EDUCATION  The patient has been educated in the following areas:   Dysphagia (Swallowing Impairment) NPO rationale.              Time Calculation:    Time Calculation- SLP       Row Name 03/13/24 1657             Time Calculation- [facility]    SLP Start Time 1615  -      SLP Received On 03/13/24  -         Untimed Charges    50407-FV Eval Oral Pharyng Swallow Minutes 60  -         Total Minutes    Untimed Charges Total Minutes 60  -       Total Minutes 60  -                User Key  (r) = Recorded By, (t) = Taken By, (c) = Cosigned By      Initials Name Provider Type     Shi Guadalupe MS CCC-SLP Speech and Language Pathologist                    Therapy Charges for Today       Code Description Service Date Service Provider Modifiers Qty    [id number] HC ST EVAL ORAL PHARYNG SWALLOW 4 3/13/2024 Shi Guadalupe MS CCC-SLP GN 1                 Shi Guadalupe MS CCC-SLP  3/13/2024

## 2024-03-13 NOTE — CASE MANAGEMENT/SOCIAL WORK
Continued Stay Note  [facility]     Patient Name: Lea Rivers  MRN: [id number]  Today's Date: 3/13/2024    Admit Date: 3/2/2024    Plan: TBD   Discharge Plan       Row Name 03/13/24 1459       Plan    Plan TBD    Patient/Family in Agreement with Plan unable to assess    Plan Comments Attempted bedside visit with patient, but he was sleeping.  observed patient breathing and noted tele. Per MDR, patient is not medically ready. CM will continue to follow evolving plan of care and assist with discharge needs as indicated.    Final Discharge Disposition Code 30 - still a patient                   Discharge Codes    No documentation.                 Expected Discharge Date and Time       Expected Discharge Date Expected Discharge Time    Mar 18, 2024               Valerie Keith RN

## 2024-03-13 NOTE — NURSING NOTE
WOC follow-up for ostomy care and education.     Patient now postop day 4 from total abdominal colectomy with end ileostomy.     Patient is not appropriate for education.  Reviewed ostomy education plan with patient's other daughter today.  I explained the importance of regular hydration and use of electrolytes to help prevent dehydration.  Patient's daughter reports that patient does not like drinking anything except Diet Coke.  This could be be problematic.  I explained that appliance changes would be approximately for 3 to 4 days because of the need to assess the peristomal skin.      Education folder contents reviewed with daughter.    Patient has multiple comorbidities that will make care for his ostomy independently very challenging.  We will continue to follow daily to assess patient's status and assist family with education as needed.       Will continue to follow.     Pola Arnold RN, BSN, CCRN, CWOCN  Wound, Ostomy and Continence (WOC) Department  [facility]

## 2024-03-13 NOTE — PROGRESS NOTES
Pharmacy Parenteral Nutrition Evaluation    Lea Rivers is a  78 y.o. male receiving TPN.     Indication:     Prolonged Paralytic Ileus     Consulting Physician: Eleanor Yost DO     Total Fluid Rate (if stated): n/a    Labs  Results from last 7 days   Lab Units 03/13/24  0511 03/12/24  0316 03/11/24  1618 03/11/24  1011 03/11/24  0508 03/09/24 2055 03/09/24  0335   SODIUM mmol/L 139 138  --   --  138  138   < > 141   POTASSIUM mmol/L 4.0 4.0 3.4*  --  3.2*  3.2*   < > 2.8*   CHLORIDE mmol/L 106 106  --   --  107  107   < > 107   CO2 mmol/L 28.0 25.0  --   --  25.0  25.0   < > 23.0   BUN mg/dL 18 18  --   --  15  15   < > 15   CREATININE mg/dL 0.44* 0.61*  --   --  0.66*  0.66*   < > 0.72*   CALCIUM mg/dL 7.8* 8.0*  --   --  8.3*  8.3*   < > 8.7   BILIRUBIN mg/dL  --   --   --  0.3 0.3  --  0.6   ALK PHOS U/L  --   --   --  60 63  --  70   ALT (SGPT) U/L  --   --   --  29 31  --  86*   AST (SGOT) U/L  --   --   --  20 23  --  64*   GLUCOSE mg/dL 163* 148*  --   --  145*  145*   < > 118*    < > = values in this interval not displayed.       Results from last 7 days   Lab Units 03/13/24  0511 03/12/24  1204 03/12/24  0316 03/11/24  1618 03/11/24  1011 03/11/24 0508 03/09/24 0335 03/08/24  1638   MAGNESIUM mg/dL 1.9  --  1.8  --   --  1.8   < > 2.3   PHOSPHORUS mg/dL 2.2* 2.3* 1.9* 2.1*   < > 1.7*   < >  --    PREALBUMIN mg/dL  --   --   --  5.4*  --   --   --  7.9*    < > = values in this interval not displayed.       Results from last 7 days   Lab Units 03/13/24  0511 03/12/24  0316 03/11/24  1618 03/11/24  0508   WBC 10*3/mm3 23.21* 22.94*  --  18.43*   HEMOGLOBIN g/dL 8.6* 8.3* 8.4* 7.3*   HEMATOCRIT % 26.4* 26.2* 26.3* 22.8*   PLATELETS 10*3/mm3 223 195  --  222       Triglycerides   Date Value Ref Range Status   03/11/2024 131 0 - 150 mg/dL Final     Comment:     Falsely depressed results may occur on samples drawn from patients receiving N-Acetylcysteine (NAC) or Metamizole.        estimated creatinine clearance is 152.9 mL/min (A) (by C-G formula based on SCr of 0.44 mg/dL (L)).    Intake & Output (last 3 days)         03/05 0701 03/06 0700 03/06 0701 03/07 0700 03/07 0701 03/08 0700 03/08 0701 03/09 0700    P.O. 1080 240      I.V. (mL/kg)  1026.3 (11.5)      IV Piggyback  400      Total Intake(mL/kg) 1080 (12.1) 1666.3 (18.7)      Urine (mL/kg/hr) 150 (0.1) 400 (0.2)      Stool 0 0      Total Output 150 400      Net +930 +1266.3              Urine Unmeasured Occurrence 4 x 2 x 1 x     Stool Unmeasured Occurrence 5 x 4 x 3 x 1 x            Dietitian Recommendations  Diet Orders (active) (From admission, onward)       Start     Ordered    03/08/24 1800  Adult Cyclic Central 2-in-1 TPN  Cyclic TPN - See Admin Instructions        Note to Pharmacy: Lea Rivers  5H  S572-1  MRN: [id number]  CSN: [id number]    03/08/24 1422    03/08/24 1800  Fat Emul Fish Oil/Plant Based (SMOFLIPID) 20 % emulsion 250 mL  Every 24 Hours Scheduled (TPN)         03/08/24 1422    03/08/24 1355  NPO Diet NPO Type: Strict NPO  Diet Effective Now         03/08/24 1358    03/05/24 1656  DIET MESSAGE Pt would like a grilled cheese sandwich, please. Thanks!  Once        Comments: Pt would like a grilled cheese sandwich, please. Thanks!    03/05/24 1656                    Current TPN Regimen Recommendation:   Dextrose 15% / Amino Acid 7.7% at rate of 25 mL/hr x8hr, then 50 ml/hr x8hr, then goal rate of 65 ml/hr.      20% Lipid Emulsion 250mL every 24 hours.    Na 45 mEq/L  K 50 mEq/L  Ca 5 mEq/L  Mg 8 mEq/L  PO4 15 mmol/L  Cl:Ace - 1:1    Assessment/Plan:  TPN for prolonged paralytic ileus.   Macronutrients per dietary recommendation are listed above.  Electrolyte replacement per protocol  SSI q6h  Follow and adjust as indicated     Thanks  Gaston Moreno [facility]  3/13/2024  12:51 EDT

## 2024-03-14 ENCOUNTER — ANCILLARY PROCEDURE (OUTPATIENT)
Dept: SPEECH THERAPY | Facility: HOSPITAL | Age: 79
DRG: 329 | End: 2024-03-14
Payer: MEDICARE

## 2024-03-14 LAB
ANION GAP SERPL CALCULATED.3IONS-SCNC: 6 MMOL/L (ref 5–15)
ANISOCYTOSIS BLD QL: ABNORMAL
BASOPHILS # BLD MANUAL: 0 10*3/MM3 (ref 0–0.2)
BASOPHILS NFR BLD MANUAL: 0 % (ref 0–1.5)
BUN SERPL-MCNC: 19 MG/DL (ref 8–23)
BUN/CREAT SERPL: 41.3 (ref 7–25)
CALCIUM SPEC-SCNC: 7.9 MG/DL (ref 8.6–10.5)
CHLORIDE SERPL-SCNC: 106 MMOL/L (ref 98–107)
CO2 SERPL-SCNC: 28 MMOL/L (ref 22–29)
CREAT SERPL-MCNC: 0.46 MG/DL (ref 0.76–1.27)
DEPRECATED RDW RBC AUTO: 66.3 FL (ref 37–54)
EGFRCR SERPLBLD CKD-EPI 2021: 106.4 ML/MIN/1.73
EOSINOPHIL # BLD MANUAL: 0.51 10*3/MM3 (ref 0–0.4)
EOSINOPHIL NFR BLD MANUAL: 3 % (ref 0.3–6.2)
ERYTHROCYTE [DISTWIDTH] IN BLOOD BY AUTOMATED COUNT: 18.6 % (ref 12.3–15.4)
GLUCOSE BLDC GLUCOMTR-MCNC: 138 MG/DL (ref 70–130)
GLUCOSE BLDC GLUCOMTR-MCNC: 143 MG/DL (ref 70–130)
GLUCOSE BLDC GLUCOMTR-MCNC: 149 MG/DL (ref 70–130)
GLUCOSE BLDC GLUCOMTR-MCNC: 156 MG/DL (ref 70–130)
GLUCOSE BLDC GLUCOMTR-MCNC: 161 MG/DL (ref 70–130)
GLUCOSE SERPL-MCNC: 159 MG/DL (ref 65–99)
HCT VFR BLD AUTO: 25.4 % (ref 37.5–51)
HGB BLD-MCNC: 7.5 G/DL (ref 13–17.7)
LEVETIRACETAM SERPL-MCNC: 23.1 UG/ML (ref 10–40)
LYMPHOCYTES # BLD MANUAL: 1.54 10*3/MM3 (ref 0.7–3.1)
LYMPHOCYTES NFR BLD MANUAL: 4 % (ref 5–12)
MAGNESIUM SERPL-MCNC: 2.2 MG/DL (ref 1.6–2.4)
MCH RBC QN AUTO: 29.5 PG (ref 26.6–33)
MCHC RBC AUTO-ENTMCNC: 29.5 G/DL (ref 31.5–35.7)
MCV RBC AUTO: 100 FL (ref 79–97)
METAMYELOCYTES NFR BLD MANUAL: 5 % (ref 0–0)
MONOCYTES # BLD: 0.68 10*3/MM3 (ref 0.1–0.9)
NEUTROPHILS # BLD AUTO: 13.48 10*3/MM3 (ref 1.7–7)
NEUTROPHILS NFR BLD MANUAL: 73 % (ref 42.7–76)
NEUTS BAND NFR BLD MANUAL: 6 % (ref 0–5)
NRBC SPEC MANUAL: 0 /100 WBC (ref 0–0.2)
OVALOCYTES BLD QL SMEAR: ABNORMAL
PHOSPHATE SERPL-MCNC: 2.4 MG/DL (ref 2.5–4.5)
PLAT MORPH BLD: NORMAL
PLATELET # BLD AUTO: 242 10*3/MM3 (ref 140–450)
PMV BLD AUTO: 9.5 FL (ref 6–12)
POTASSIUM SERPL-SCNC: 3.9 MMOL/L (ref 3.5–5.2)
RBC # BLD AUTO: 2.54 10*6/MM3 (ref 4.14–5.8)
SODIUM SERPL-SCNC: 140 MMOL/L (ref 136–145)
VARIANT LYMPHS NFR BLD MANUAL: 9 % (ref 19.6–45.3)
WBC MORPH BLD: NORMAL
WBC NRBC COR # BLD AUTO: 17.06 10*3/MM3 (ref 3.4–10.8)

## 2024-03-14 PROCEDURE — 25010000002 METOCLOPRAMIDE PER 10 MG: Performed by: SURGERY

## 2024-03-14 PROCEDURE — 92610 EVALUATE SWALLOWING FUNCTION: CPT | Performed by: SPEECH-LANGUAGE PATHOLOGIST

## 2024-03-14 PROCEDURE — 63710000001 INSULIN REGULAR HUMAN PER 5 UNITS: Performed by: SURGERY

## 2024-03-14 PROCEDURE — 25010000002 CEFTRIAXONE PER 250 MG: Performed by: FAMILY MEDICINE

## 2024-03-14 PROCEDURE — 25010000002 ENOXAPARIN PER 10 MG: Performed by: INTERNAL MEDICINE

## 2024-03-14 PROCEDURE — 25010000002 LEVETRIRACETAM PER 10 MG: Performed by: SURGERY

## 2024-03-14 PROCEDURE — 25010000002 HYDROMORPHONE PER 4 MG: Performed by: FAMILY MEDICINE

## 2024-03-14 PROCEDURE — 80048 BASIC METABOLIC PNL TOTAL CA: CPT | Performed by: SURGERY

## 2024-03-14 PROCEDURE — 82948 REAGENT STRIP/BLOOD GLUCOSE: CPT

## 2024-03-14 PROCEDURE — 99233 SBSQ HOSP IP/OBS HIGH 50: CPT | Performed by: INTERNAL MEDICINE

## 2024-03-14 PROCEDURE — 25010000002 POTASSIUM CHLORIDE PER 2 MEQ OF POTASSIUM: Performed by: PHARMACIST

## 2024-03-14 PROCEDURE — 25010000002 METRONIDAZOLE 500 MG/100ML SOLUTION: Performed by: FAMILY MEDICINE

## 2024-03-14 PROCEDURE — 83735 ASSAY OF MAGNESIUM: CPT | Performed by: SURGERY

## 2024-03-14 PROCEDURE — 25010000002 HYDRALAZINE PER 20 MG: Performed by: FAMILY MEDICINE

## 2024-03-14 PROCEDURE — 84100 ASSAY OF PHOSPHORUS: CPT | Performed by: SURGERY

## 2024-03-14 PROCEDURE — 85007 BL SMEAR W/DIFF WBC COUNT: CPT | Performed by: SURGERY

## 2024-03-14 PROCEDURE — 25010000002 FLUCONAZOLE PER 200 MG: Performed by: FAMILY MEDICINE

## 2024-03-14 PROCEDURE — 25010000002 MAGNESIUM SULFATE PER 500 MG OF MAGNESIUM: Performed by: PHARMACIST

## 2024-03-14 PROCEDURE — 25010000002 CALCIUM GLUCONATE PER 10 ML: Performed by: PHARMACIST

## 2024-03-14 PROCEDURE — 85025 COMPLETE CBC W/AUTO DIFF WBC: CPT | Performed by: SURGERY

## 2024-03-14 PROCEDURE — 92612 ENDOSCOPY SWALLOW (FEES) VID: CPT | Performed by: SPEECH-LANGUAGE PATHOLOGIST

## 2024-03-14 RX ORDER — METOCLOPRAMIDE HYDROCHLORIDE 5 MG/ML
10 INJECTION INTRAMUSCULAR; INTRAVENOUS EVERY 6 HOURS
Status: DISCONTINUED | OUTPATIENT
Start: 2024-03-14 | End: 2024-03-25

## 2024-03-14 RX ADMIN — ENOXAPARIN SODIUM 100 MG: 100 INJECTION SUBCUTANEOUS at 08:27

## 2024-03-14 RX ADMIN — CEFTRIAXONE 2000 MG: 2 INJECTION, POWDER, FOR SOLUTION INTRAMUSCULAR; INTRAVENOUS at 14:12

## 2024-03-14 RX ADMIN — HYDRALAZINE HYDROCHLORIDE 20 MG: 20 INJECTION INTRAMUSCULAR; INTRAVENOUS at 23:05

## 2024-03-14 RX ADMIN — LEVETIRACETAM 500 MG: 100 INJECTION, SOLUTION INTRAVENOUS at 08:28

## 2024-03-14 RX ADMIN — INSULIN HUMAN 2 UNITS: 100 INJECTION, SOLUTION PARENTERAL at 00:50

## 2024-03-14 RX ADMIN — SMOFLIPID 250 ML: 6; 6; 5; 3 INJECTION, EMULSION INTRAVENOUS at 17:28

## 2024-03-14 RX ADMIN — Medication 10 ML: at 08:29

## 2024-03-14 RX ADMIN — METRONIDAZOLE 500 MG: 500 INJECTION, SOLUTION INTRAVENOUS at 00:50

## 2024-03-14 RX ADMIN — METRONIDAZOLE 500 MG: 500 INJECTION, SOLUTION INTRAVENOUS at 15:40

## 2024-03-14 RX ADMIN — HYDROMORPHONE HYDROCHLORIDE 0.5 MG: 1 INJECTION, SOLUTION INTRAMUSCULAR; INTRAVENOUS; SUBCUTANEOUS at 05:08

## 2024-03-14 RX ADMIN — METOCLOPRAMIDE 10 MG: 5 INJECTION, SOLUTION INTRAMUSCULAR; INTRAVENOUS at 14:12

## 2024-03-14 RX ADMIN — SODIUM CHLORIDE 500 MG: 9 INJECTION, SOLUTION INTRAVENOUS at 14:47

## 2024-03-14 RX ADMIN — METOCLOPRAMIDE 10 MG: 5 INJECTION, SOLUTION INTRAMUSCULAR; INTRAVENOUS at 08:28

## 2024-03-14 RX ADMIN — LEVETIRACETAM 500 MG: 100 INJECTION, SOLUTION INTRAVENOUS at 20:10

## 2024-03-14 RX ADMIN — WATER: 1 INJECTION INTRAMUSCULAR; INTRAVENOUS; SUBCUTANEOUS at 17:28

## 2024-03-14 RX ADMIN — PANTOPRAZOLE SODIUM 40 MG: 40 INJECTION, POWDER, FOR SOLUTION INTRAVENOUS at 05:08

## 2024-03-14 RX ADMIN — Medication 10 MG: at 16:01

## 2024-03-14 RX ADMIN — Medication 10 ML: at 20:10

## 2024-03-14 RX ADMIN — HYDROMORPHONE HYDROCHLORIDE 0.5 MG: 1 INJECTION, SOLUTION INTRAMUSCULAR; INTRAVENOUS; SUBCUTANEOUS at 03:10

## 2024-03-14 RX ADMIN — METOCLOPRAMIDE 10 MG: 5 INJECTION, SOLUTION INTRAMUSCULAR; INTRAVENOUS at 19:28

## 2024-03-14 RX ADMIN — SODIUM CHLORIDE 500 MG: 9 INJECTION, SOLUTION INTRAVENOUS at 05:08

## 2024-03-14 RX ADMIN — HYDRALAZINE HYDROCHLORIDE 20 MG: 20 INJECTION INTRAMUSCULAR; INTRAVENOUS at 17:28

## 2024-03-14 RX ADMIN — METRONIDAZOLE 500 MG: 500 INJECTION, SOLUTION INTRAVENOUS at 23:06

## 2024-03-14 RX ADMIN — HYDRALAZINE HYDROCHLORIDE 20 MG: 20 INJECTION INTRAMUSCULAR; INTRAVENOUS at 00:50

## 2024-03-14 RX ADMIN — HYDROMORPHONE HYDROCHLORIDE 0.5 MG: 1 INJECTION, SOLUTION INTRAMUSCULAR; INTRAVENOUS; SUBCUTANEOUS at 21:31

## 2024-03-14 RX ADMIN — FLUCONAZOLE 400 MG: 400 INJECTION, SOLUTION INTRAVENOUS at 08:29

## 2024-03-14 RX ADMIN — HYDRALAZINE HYDROCHLORIDE 20 MG: 20 INJECTION INTRAMUSCULAR; INTRAVENOUS at 11:25

## 2024-03-14 RX ADMIN — LIDOCAINE 2 PATCH: 4 PATCH TOPICAL at 08:28

## 2024-03-14 RX ADMIN — HYDRALAZINE HYDROCHLORIDE 20 MG: 20 INJECTION INTRAMUSCULAR; INTRAVENOUS at 05:08

## 2024-03-14 RX ADMIN — HYDROMORPHONE HYDROCHLORIDE 0.5 MG: 1 INJECTION, SOLUTION INTRAMUSCULAR; INTRAVENOUS; SUBCUTANEOUS at 16:01

## 2024-03-14 RX ADMIN — INSULIN HUMAN 2 UNITS: 100 INJECTION, SOLUTION PARENTERAL at 05:08

## 2024-03-14 RX ADMIN — SODIUM CHLORIDE 500 MG: 9 INJECTION, SOLUTION INTRAVENOUS at 23:06

## 2024-03-14 RX ADMIN — METRONIDAZOLE 500 MG: 500 INJECTION, SOLUTION INTRAVENOUS at 08:28

## 2024-03-14 RX ADMIN — ENOXAPARIN SODIUM 100 MG: 100 INJECTION SUBCUTANEOUS at 20:10

## 2024-03-14 NOTE — PLAN OF CARE
Goal Outcome Evaluation:  Plan of Care Reviewed With: patient        Progress: improving       SLP evaluation completed. Will continue to address dysphagia. FEES completed, recommend puree w/ honey thick liquids, no mixed consistencies. Please see note for further details and recommendations.    Anticipated Discharge Disposition (SLP): skilled nursing facility          SLP Swallowing Diagnosis: moderate, oral dysphagia, pharyngeal dysphagia (03/14/24 9789)

## 2024-03-14 NOTE — MBS/VFSS/FEES
Acute Care - Speech Language Pathology   Swallow Initial Evaluation [facility]  Clinical Swallow Evaluation  +Fiberoptic Endoscopic Evaluation of Swallowing (FEES)       Patient Name: Lea Rivers  : 1945  MRN: [id number]  Today's Date: 3/14/2024               Admit Date: 3/2/2024    Visit Dx:     ICD-10-CM ICD-9-CM   1. Fall, initial encounter  W19.XXXA E888.9   2. Closed fracture of multiple ribs of left side, initial encounter  S22.42XA 807.09   3. Closed displaced fracture of acromial end of left clavicle, initial encounter  S42.032A 810.03   4. Falls frequently  R29.6 V15.88   5. Acute UTI (urinary tract infection)  N39.0 599.0   6. Colon distention  K63.89 569.89   7. Paralytic ileus of small intestine and colon  K56.0 560.1   8. Oropharyngeal dysphagia  R13.12 787.22     Patient Active Problem List   Diagnosis    Coronary artery disease    Dyslipidemia    Hypothyroidism    GERD (gastroesophageal reflux disease)    Seizure disorder    BPH (benign prostatic hypertrophy)    Hypertension    Primary osteoarthritis of both knees    Syncope and collapse    Precordial pain    Diabetes    Status post total right knee replacement    Postoperative urinary retention    Confusion, postoperative    Acute blood loss anemia, asymptomatic, no transfusion required    Elevated prostate specific antigen (PSA)    Prostate cancer    Anemia    Body mass index (BMI) of 32.0-32.9 in adult    Localization-related focal epilepsy with simple partial seizures    Need for vaccination against Streptococcus pneumoniae    Upper extremity tendon tear, right, initial encounter    Vitamin D deficiency    PSA elevation    Polyp of colon    Overactive bladder    Gross hematuria    History of colon polyps    History of colon cancer    B12 deficiency    Falls    Pneumatosis intestinalis     Past Medical History:   Diagnosis Date    Anemia     Colon cancer     Status post partial colectomy in . No chemotherapy or radiation  therapy.    Coronary artery disease     Nonobstructive coronary artery disease.    Diabetes     Dyslipidemia     GERD (gastroesophageal reflux disease)     Hx of.    Hyperlipidemia     Hypertension     Hyponatremia     Hypothyroidism     Left shoulder pain     Low sodium levels 2022    recent issue; saw nephrologist who ruled out kidney issues; took Sodium Chloride po to bring levels up    Memory deficit     FROM ANESTHESIA    Osteoarthritis     Prostate cancer 07/23/2022    Seizure disorder     silent seziure-diagnosed years ago    Skin cancer      Past Surgical History:   Procedure Laterality Date    APPENDECTOMY      CARDIAC CATHETERIZATION  2012    30 to 40% LAD    CARPAL TUNNEL RELEASE Bilateral     CHOLECYSTECTOMY      COLECTOMY PARTIAL / TOTAL  1990    Partial colectomy    COLON RESECTION N/A 3/9/2024    Procedure: TOTAL ABDOMINAL COLECTOMY, END ILEOSTOMY;  Surgeon: Harley Godfrey MD;  Location:  [facility];  Service: General;  Laterality: N/A;    COLONOSCOPY      CYBERKNIFE  02/09/2023    Prostate/SV    CYSTOSCOPY TRANSURETHRAL RESECTION OF PROSTATE N/A 09/21/2018    Procedure: CYSTOSCOPY TRANSURETHRAL RESECTION OF PROSTATE GREENLIGHT;  Surgeon: Naseem Clayton MD;  Location:  [facility];  Service: Urology    OTHER SURGICAL HISTORY      Contraction surgery on bilateral hands and wrists.    PROSTATE BIOPSY N/A 11/11/2022    Procedure: TRANSRECTAL ULTRASOUND GUIDED BIOPSY OF PROSTATE;  Surgeon: Naseem Noland MD;  Location:  [facility];  Service: Urology;  Laterality: N/A;    SINUS SURGERY      SKIN BIOPSY      TEETH EXTRACTION      TONSILLECTOMY      TOTAL KNEE ARTHROPLASTY Right 04/07/2022    Procedure: TOTAL KNEE ARTHROPLASTY WITH ORTHO ROBOT RIGHT;  Surgeon: Louis Malcolm MD;  Location:  [facility];  Service: Robotics - Ortho;  Laterality: Right;    TRANSRECTAL INCISION PROSTATE WITH GOLD SEED Bilateral 01/06/2023    Procedure: TRANSRECTAL ULTRASOUND GUIDED PROSTATE FIDUCIAL MARKER PLACEMENT;   Surgeon: Naseem Noland MD;  Location: [facility];  Service: Urology;  Laterality: Bilateral;    TURP / TRANSURETHRAL INCISION / DRAINAGE PROSTATE      VASECTOMY         SLP Recommendation and Plan  SLP Swallowing Diagnosis: moderate, oral dysphagia, pharyngeal dysphagia (03/14/24 1245)  SLP Diet Recommendation: puree, no mixed consistencies, honey thick liquids (03/14/24 1245)  Recommended Precautions and Strategies: upright posture during/after eating, small bites of food and sips of liquid, multiple swallows per bite of food, multiple swallows per sip of liquid, check mouth frequently for oral residue/pocketing, general aspiration precautions, 1:1 supervision, assist with feeding, fatigue precautions (03/14/24 1245)  SLP Rec. for Method of Medication Administration: meds crushed, with puree, as tolerated, meds via alternate route (03/14/24 1245)     Monitor for Signs of Aspiration: yes, notify SLP if any concerns (03/14/24 1245)  Recommended Diagnostics: reassess via FEES (03/14/24 0940)  Swallow Criteria for Skilled Therapeutic Interventions Met: demonstrates skilled criteria (03/14/24 1245)  Anticipated Discharge Disposition (SLP): skilled nursing facility (03/14/24 1245)  Rehab Potential/Prognosis, Swallowing: good, to achieve stated therapy goals (03/14/24 1245)  Therapy Frequency (Swallow): 5 days per week (03/14/24 1245)  Predicted Duration Therapy Intervention (Days): until discharge (03/14/24 1245)  Oral Care Recommendations: Oral Care BID/PRN, Suction toothbrush (03/14/24 1245)                                        Plan of Care Reviewed With: patient  Progress: improving      SWALLOW EVALUATION (Last 72 Hours)       SLP Adult Swallow Evaluation       Row Name 03/14/24 1245 03/14/24 0940       Rehab Evaluation    Document Type evaluation  -CJ re-evaluation  -CJ    Subjective Information no complaints  -CJ no complaints  -CJ    Patient Observations alert  -CJ alert;cooperative  -CJ     Patient/Family/Caregiver Comments/Observations no family present  -CJ no family present  -CJ    Patient Effort adequate  -CJ adequate  -CJ    Symptoms Noted During/After Treatment none  -CJ none  -CJ    Oral Care -- lip/mouth moisturizer applied;suction provided;swabbed with antiseptic solution;teeth brushed - suction toothbrush;tongue brushed  -CJ       General Information    Patient Profile Reviewed yes  -CJ yes  -CJ    Pertinent History Of Current Problem see am eval; referred for FEES to determine po intake  -CJ see initial eval; more alert this date  -CJ    Current Method of Nutrition NPO;parenteral feedings  -CJ NPO;parenteral feedings  -CJ    Precautions/Limitations, Vision WFL;for purposes of eval  -CJ WFL;for purposes of eval  -CJ    Precautions/Limitations, Hearing WFL;for purposes of eval  -CJ WFL;for purposes of eval  -CJ    Prior Level of Function-Communication cognitive-linguistic impairment;other (see comments)  -CJ cognitive-linguistic impairment;other (see comments)  -CJ    Prior Level of Function-Swallowing no diet consistency restrictions  -CJ no diet consistency restrictions  -CJ    Plans/Goals Discussed with patient and family;agreed upon  -CJ patient and family;agreed upon  -CJ    Barriers to Rehab cognitive status;medically complex  -CJ cognitive status;medically complex  -CJ    Patient's Goals for Discharge return to PO diet  -CJ return to PO diet  -CJ    Family Goals for Discharge family did not state  -CJ --       Pain    Additional Documentation Pain Scale: FACES Pre/Post-Treatment (Group)  -CJ Pain Scale: FACES Pre/Post-Treatment (Group)  -CJ       Pain Scale: FACES Pre/Post-Treatment    Pain: FACES Scale, Pretreatment 0-->no hurt  -CJ 0-->no hurt  -CJ    Posttreatment Pain Rating 0-->no hurt  -CJ 0-->no hurt  -CJ    Pain Location - -- --    Pre/Posttreatment Pain Comment -- --       Oral Motor Structure and Function    Dentition Assessment -- natural, present and adequate  -CJ     Secretion Management -- dried secretions in oral cavity  -    Mucosal Quality -- dry;sticky  -       Oral Musculature and Cranial Nerve Assessment    Oral Motor General Assessment -- generalized oral motor weakness  -       General Eating/Swallowing Observations    Respiratory Support Currently in Use -- room air  -    Eating/Swallowing Skills -- fed by SLP;self-fed;needed assist  -CJ    Positioning During Eating -- upright in bed  -    Utensils Used -- cup;spoon;straw  -    Consistencies Trialed -- pureed;ice chips;thin liquids  -       Clinical Swallow Eval    Oral Prep Phase -- --    Oral Transit -- --    Oral Residue -- --    Pharyngeal Phase -- suspected pharyngeal impairment  -CJ    Esophageal Phase -- --    Clinical Swallow Evaluation Summary -- More alert this date in comparison to previous evaluation, pt conversing as well although still continues w/ ams. Hard cough w/ trials of thin liquids. No glaring overt s/s of aspiration w/ any other presentations. Will plan to attempt FEES today w/ further recs pending.  -       Oral Prep Concerns    Oral Prep Concerns -- --    Incomplete or Weak Lip Closure Around Spoon -- --    Anterior Loss -- --       Oral Transit Concerns    Oral Transit Concerns -- --    Increased Oral Transit Time -- --       Pharyngeal Phase Concerns    Pharyngeal Phase Concerns -- --    Multiple Swallows -- --    Cough -- --    Pharyngeal Phase Concerns, Comment -- --       Fiberoptic Endoscopic Evaluation of Swallowing (FEES)    Risks/Benefits Reviewed risks/benefits explained;patient;agreed to eval  -CJ --    Nasal Entry right:  -CJ --    Scope serial number/identification 338  -CJ --       Anatomy and Physiology    Anatomic Considerations erythema  -CJ --    Comment Thick dried/stringy yellow tinged secretions coated in pharynx  -CJ --    Velopharyngeal CNA  -CJ --    Base of Tongue range reduced  -CJ --    Epiglottis WFL  -CJ --    Laryngeal Function Breathing symmetrical   -CJ --    Laryngeal Function Phonation CNA  -CJ --    Laryngeal Function to Breath Hold CNA  -CJ --    Secretion Rating Scale (Peter et al. 1996) 2- secretions initially outside the vestibule but later entered the vestibule  -CJ --    Secretion Description thick;discolored  -CJ --    Ice Chips partially cleared secretions  -CJ --    Spontaneous Swallow frequency reduced  -CJ --    Sensory sensed scope  -CJ --    Utensils Used Spoon;Cup;Straw  -CJ --    Consistencies Trialed thin liquids;ice chips;nectar-thick liquids;honey-thick liquids;pudding/puree;spoon;cup;straw  -CJ --       FEES Interpretation    Oral Phase solids not tested  pt refused  -CJ --       Initiation of Pharyngeal Swallow    Initiation of Pharyngeal Swallow bolus in pyriform sinuses  -CJ --    Pharyngeal Phase impaired pharyngeal phase of swallowing  -CJ --    Aspiration During the Swallow thin liquids;nectar-thick liquids;secondary to delayed swallow initiation or mistiming;secondary to reduced laryngeal elevation;secondary to reduced vestibular closure  -CJ --    Aspiration After the Swallow thin liquids;nectar-thick liquids;secondary to residue;in pyriform sinuses;in laryngeal vestibule  -CJ --    Depth of Penetration deep  -CJ --    Response to Penetration No  -CJ --    No spontaneous response to penetration and non-effective laryngeal clearance with cue (see comments)  -CJ --    Response to Aspiration No  -CJ --    No spontaneous response to aspiration with could not produce cough response despite cue  2/2 pt becoming increasingly agitated  -CJ --    Rosenbek's Scale thin:;nectar:;8-->Level 8  -CJ --    Residue all consistencies tested;diffuse within pharynx;secondary to reduced posterior pharyngeal wall stripping;secondary to reduced laryngeal elevation;secondary to reduced hyolaryngeal excursion;other (see comments)  worse w/ thins/nectar  -CJ --    Response to Residue unable to clear residue;with cued swallow  -CJ --    Attempted  Compensatory Maneuvers bolus size;bolus presentation style  -CJ --    Response to Attempted Compensatory Maneuvers did not prevent aspiration  -CJ --    Successful Compensatory Maneuver Competency patient unable to adequately demonstrate/teach back compensations  -CJ --    FEES Summary Moderate oropharyngeal dysphagia. Pt refused trials of solids and became increasingly restless as evaluation continued. Noted thick diffuse stringy secretions coated in pharynx prior to po presentations, able to clear majority after multiple swallows but not all of secretions. Silent aspiration w/ thin/nectar during/after the swallow. Unable to perform cued cough despite max cues and prompts. No penetration w/ pudding or honey thick liquids via spoon/cup/straw. Pt refused further po presentations when attempted solids. Will initiate modified po diet of puree w/ honey thick liquids ,no mixed consistencies. Will monitor closely given waxing/waning mental status  -CJ --       Swallowing Quality of Life Assessment    Education and counseling provided -- --       SLP Evaluation Clinical Impression    SLP Swallowing Diagnosis moderate;oral dysphagia;pharyngeal dysphagia  -CJ oral dysphagia;suspected pharyngeal dysphagia  -    Functional Impact risk of aspiration/pneumonia;risk of malnutrition;risk of dehydration  -CJ risk of aspiration/pneumonia;risk of malnutrition;risk of dehydration  -CJ    Rehab Potential/Prognosis, Swallowing good, to achieve stated therapy goals  - good, to achieve stated therapy goals  -    Swallow Criteria for Skilled Therapeutic Interventions Met demonstrates skilled criteria  -CJ demonstrates skilled criteria  -       Recommendations    Therapy Frequency (Swallow) 5 days per week  -CJ --    Predicted Duration Therapy Intervention (Days) until discharge  -CJ until discharge  -CJ    SLP Diet Recommendation puree;no mixed consistencies;honey thick liquids  -CJ NPO;temporary alternate methods of  nutrition/hydration  -    Recommended Diagnostics -- reassess via FEES  -    Recommended Precautions and Strategies upright posture during/after eating;small bites of food and sips of liquid;multiple swallows per bite of food;multiple swallows per sip of liquid;check mouth frequently for oral residue/pocketing;general aspiration precautions;1:1 supervision;assist with feeding;fatigue precautions  - general aspiration precautions  -    Oral Care Recommendations Oral Care BID/PRN;Suction toothbrush  - Oral Care BID/PRN;Suction toothbrush  -    SLP Rec. for Method of Medication Administration meds crushed;with puree;as tolerated;meds via alternate route  - meds via alternate route  -    Monitor for Signs of Aspiration yes;notify SLP if any concerns  -CJ yes;notify SLP if any concerns  -    Anticipated Discharge Disposition (SLP) skilled nursing facility  - skilled nursing facility  -              User Key  (r) = Recorded By, (t) = Taken By, (c) = Cosigned By      Initials Name Effective Dates    AC Shyanne Ramos, MS [facility]-SLP 02/03/23 -     Jaylyn Holland, MS CCC-SLP 07/11/23 -      Shi Guadalupe, MS [facility]-SLP 05/12/23 -                     EDUCATION  The patient has been educated in the following areas:   Dysphagia (Swallowing Impairment) Oral Care/Hydration Modified Diet Instruction.        SLP GOALS       Row Name 03/14/24 1245             (LTG) Patient will demonstrate functional swallow for    Diet Texture (Demonstrate functional swallow) soft to chew (chopped) textures  -CJ      Liquid viscosity (Demonstrate functional swallow) nectar/ mildly thick liquids  -CJ      [place] (Demonstrate functional swallow) with minimal cues (75-90% accuracy)  -CJ      Time Frame (Demonstrate functional swallow) by discharge  -CJ      Progress/Outcomes (Demonstrate functional swallow) new goal  -CJ         (STG) Patient will tolerate trials of    Consistencies Trialed (Tolerate trials) pureed  textures;honey/ moderately thick liquids  -CJ      Desired Outcome (Tolerate trials) without signs/symptoms of aspiration;without signs of distress;with adequate oral prep/transit/clearance  -CJ      [place] (Tolerate trials) with minimal cues (75-90% accuracy)  -CJ      Time Frame (Tolerate trials) 1 week  -CJ      Progress/Outcomes (Tolerate trials) new goal  -CJ         (STG) Patient will tolerate therapeutic trials of    Consistencies Trialed (Tolerate therapeutic trials) mechanical ground textures  -CJ      Desired Outcome (Tolerate therapeutic trials) with adequate oral prep/transit/clearance  -CJ      [place] (Tolerate therapeutic trials) with minimal cues (75-90% accuracy)  -CJ      Time Frame (Tolerate therapeutic trials) 1 week  -CJ      Progress/Outcomes (Tolerate therapeutic trials) new goal  -CJ         (STG) Lingual Strengthening Goal 1 (SLP)    Activity (Lingual Strengthening Goal 1, SLP) increase lingual tone/sensation/control/coordination/movement;increase tongue back strength  -CJ      Increase Lingual Tone/Sensation/Control/Coordination/Movement swallow trials;lingual resistance exercises  -CJ      Increase Tongue Back Strength lingual resistance exercises  -CJ      [place]/Accuracy (Lingual Strengthening Goal 1, SLP) with moderate cues (50-74% accuracy)  -CJ      Time Frame (Lingual Strengthening Goal 1, SLP) 1 week  -CJ      Progress/Outcomes (Lingual Strengthening Goal 1, SLP) new goal  -CJ         (STG) Pharyngeal Strengthening Exercise Goal 1 (SLP)    Activity (Pharyngeal Strengthening Goal 1, SLP) increase timing;increase superior movement of the hyolaryngeal complex;increase anterior movement of the hyolaryngeal complex;increase squeeze/positive pressure generation  -CJ      Increase Timing prepping - 3 second prep or suck swallow or 3-step swallow  -CJ      Increase Superior Movement of the Hyolaryngeal Complex falsetto  -CJ      Increase Anterior Movement of the  Hyolaryngeal Complex chin tuck against resistance (CTAR)  -CJ      Increase Squeeze/Positive Pressure Generation hard effortful swallow  -CJ      [place]/Accuracy (Pharyngeal Strengthening Goal 1, SLP) with maximum cues (25-49% accuracy)  -CJ      Time Frame (Pharyngeal Strengthening Goal 1, SLP) 1 week  -CJ      Progress/Outcomes (Pharyngeal Strengthening Goal 1, SLP) new goal  -CJ                User Key  (r) = Recorded By, (t) = Taken By, (c) = Cosigned By      Initials Name Provider Type    Jaylyn Holland MS CCC-SLP Speech and Language Pathologist                       Time Calculation:    Time Calculation- SLP       Row Name 03/14/24 1518 03/14/24 1021          Time Calculation- SLP    SLP Start Time 1245  - 0940  -     SLP Received On 03/14/24  -CJ 03/14/24  -        Untimed Charges    [id number] Eval Oral Pharyng Swallow Minutes -- 41  -CJ     [id number] Fiberoptic Endo Eval Swallow Minutes 85  -CJ --        Total Minutes    Untimed Charges Total Minutes 85  -CJ 41  -CJ      Total Minutes 85  -CJ 41  -CJ               User Key  (r) = Recorded By, (t) = Taken By, (c) = Cosigned By      Initials Name Provider Type    Jaylyn Holland MS CCC-SLP Speech and Language Pathologist                    Therapy Charges for Today       Code Description Service Date Service Provider Modifiers Qty    93780220943 HC ST EVAL ORAL PHARYNG SWALLOW 3 3/14/2024 Jaylyn Peterson MS CCC-SLP GN 1    [id number] HC ST FIBEROPTIC ENDO EVAL SWALL 6 3/14/2024 Jaylyn Peterson MS CCC-SLP GN 1                 Jaylyn Peterson MS CCC-NATALIO  3/14/2024

## 2024-03-14 NOTE — PROGRESS NOTES
Clinical Nutrition     Nutrition Support Assessment  Reason for Visit: Follow-up protocol, PN    Patient Name: Lea Rivers  YOB: 1945  MRN: [id number]  Date of Encounter: 03/14/24 11:12 EDT  Admission date: 3/2/2024    Comments:    TPN Recommendations via PICC:  15% dextrose, 7.7% AA @ goal rate of 65mL/hr. Provide 250mL 20% SMOF daily.   Initiate at 25mL/hr and advance by 25mL q 8hrs to goal of 65mL/hr.  Infused at goal over 24hrs this regimen provides:  1.56L TGV; GIR = 1.82mg/kg/min  1776kcal (99% est needs); 120g protein (100% est needs)    Spoke w/SLP plan to attempt FEES today. If unable to successfully complete FEES/advance diet, spoke w/sx and okay w/keofeed placement.     ADDENDUM: pt cleared for pureed/honey thick liquids-sx okay w/starting pt on this diet. Communicated to SLP.     Nutrition Assessment   Admission Diagnosis:  Falls [W19.XXXA]  Pneumatosis intestinalis [K63.89]      Problem List:    Falls    Pneumatosis intestinalis        PMH:   He  has a past medical history of Anemia, Colon cancer (1990), Coronary artery disease, Diabetes, Dyslipidemia, GERD (gastroesophageal reflux disease), Hyperlipidemia, Hypertension, Hyponatremia, Hypothyroidism, Left shoulder pain, Low sodium levels (2022), Memory deficit, Osteoarthritis, Prostate cancer (07/23/2022), Seizure disorder, and Skin cancer.    PSH:  He  has a past surgical history that includes Colectomy partial / total (1990); Cholecystectomy; Appendectomy; Other surgical history; Sinus surgery; Tonsillectomy; Vasectomy; Carpal tunnel release (Bilateral); TURP / transurethral incision / drainage prostate; Colonoscopy; Transurethral resection of prostate (N/A, 09/21/2018); Skin biopsy; Teeth Extraction; Total knee arthroplasty (Right, 04/07/2022); Cardiac catheterization (2012); Prostate biopsy (N/A, 11/11/2022); Transrectal Incision Prostate (Bilateral, 01/06/2023); Cyberknife (02/09/2023); and Colectomy (N/A,  "  History     Chief Complaint:  Cough    HPI   Kath Bosch is a fully immunized 5 year old female with a history of pneumonia who presents with a cough. The patient's mother says that she has had a cough for the past 10-14 days, but presents to the ED after the cough became productive this morning.  She has not been febrile, but has been weak and fatigued with a runny nose. The mother mentions that the patient has had a non-painful rash on her back for some time as well, diagnosed as molluscum contagiosum by her clinic.  She says that the patient had a somewhat similar incidence of symptoms that turned out to be pneumonia about a year ago and is concerned that this may be pneumonia again.  Mother states \"I'm paranoid.\"  The patient QVAR for her allergies but she ran out of QVAR about a week ago, and she is having insurance issues so her primary prescribed her Flovent though this was also not covered so she does not have any inhaled steroids available.  Mother inquires about possibility of an alternate medication. She was exposed to her grandmother who has bronchitis two weeks ago.  No fevers and no antipyretics given.  Never in ICU or intubated.        Allergies:  No known drug allergies     Medications:  Out of these now  Albuterol  Beclomethasone    Past Medical History:    Pneumonia  Allergic rhinitis    Past Surgical History:    Dental surgery    Family History:    History reviewed. No pertinent family history.     Social History:  Patient presents with her mother.   In first grade.  Brother turns 2yo later this month.  Likes waffles.     Review of Systems   All other systems reviewed and are negative.    Physical Exam   Patient Vitals for the past 24 hrs:   Temp Temp src Heart Rate SpO2 Weight   10/14/18 0813 98.3  F (36.8  C) Oral 113 100 % 22.5 kg (49 lb 9.6 oz)     Physical Exam  Gen: nontoxic appearing girl sitting upright in room 30, mother at bedside  HENT: mucous membranes moist, L TM wnl, R TM " 3/9/2024).      Applicable Nutrition Concerns:   Skin:  Oral:  GI:      Applicable Interval History:   3/9-total colectomy w/end ileostomy      Reported/Observed/Food/Nutrition Related History:     3/14  Spoke w/sx-okay to place keofeed it pt too lethargic for FEES today/unable to advance po diet. Will continue current TPN for today.     3/12  Pt out of room for scan per pt's son at bedside. Spoke w/nsg-TPN at 65ml/hr. Discussed phos replacement IV.     3/9  Per RN, pt pulled PICC last night-pt OOR during visit but pt's son at bedside. Able to obtain nutrition hx. Pt eating well PTA, usually a picky eater and eats the same things all the time-preferences noted below. Pt usually eats at [employer] for breakfast, makes something quick at home for lunch and has dinner provided by dtr. Son states pt's wt has been stable, ~196-200lbs. NKFA. Plan is for total colectomy w/end ileostomy today.     3/8  Pt unavailable during RD visit, having PICC placed. Also noted w/dementia-attempted to call family w/o success. Discussed w/nsg, pt to likely have bowel sx tomorrow w/colostomy creation. Rectal tube placed today 2/2 colonic decompression. Pt appears to have had 7lb/3.6% non significant wt loss in the past month. NKFA.     Labs    Labs Reviewed: Yes     Results from last 7 days   Lab Units 03/14/24  0514 03/13/24  1711 03/13/24  0934 03/13/24  0511 03/12/24  1204 03/12/24  0316 03/11/24  1618 03/11/24  1011 03/11/24  0508 03/09/24  2055 03/09/24  0335 03/07/24  1804 03/07/24  1241   GLUCOSE mg/dL 159*  --   --  163*  --  148*  --   --  145*  145*   < > 118*   < >  --    BUN mg/dL 19  --   --  18  --  18  --   --  15  15   < > 15   < >  --    CREATININE mg/dL 0.46*  --   --  0.44*  --  0.61*  --   --  0.66*  0.66*   < > 0.72*   < >  --    SODIUM mmol/L 140  --   --  139  --  138  --   --  138  138   < > 141   < >  --    CHLORIDE mmol/L 106  --   --  106  --  106  --   --  107  107   < > 107   < >  --    POTASSIUM mmol/L 3.9   wnl, mastoids nontender, OP clear without swelling or exudate  Eyes: pupils normal, tracks movements normally  CV: regular rhythm, cap refill normal  Resp: CTAB, unlabored, no wheezing, no stridor, occasional dry cough observed  GI: abdomen soft and nontender, no guarding  MSK: no bony tenderness, no CVAT  Skin: appropriately warm and dry, no ecchymosis, no petechiae, several very small lesions to left upper back consistent with molluscum contagiosum  Neuro: awake, alert, normal tone in extremities, no meningismus  Psych: normal age-appropriate behavior      Emergency Department Course     Emergency Department Course:  Past medical records, nursing notes, and vitals reviewed.  0812: I performed an exam of the patient as documented above. Clinical findings and plan explained to the Patient and mother. Patient discharged home with instructions regarding supportive care, medications, and reasons to return as well as the importance of close follow-up were reviewed.     0828: I spoke to the pharmacist about inhaled steroid options.     0934 (after patient discharged) -I spoke with pharmacist who called the ED, as apparently the Pulmicort I prescribed is also not covered by her insurance.    Impression & Plan      Medical Decision Making:  She presents with cough in the setting of prior pulmonary infections and bronchospasm.  She is not hypoxic or febrile here and appears nontoxic.  No evidence of active bronchospasm at this time, and I do not think she requires steroids.  We discussed the possibility of chest x-ray with her mother who was eager to defer this given that her clinical exam does not raise concern for pneumonia.  Therefore we will defer antibiotics.  Also considered, but do not suspect, pneumothorax, pulmonary edema, bloodstream infection, and cardiac conditions.  I think this is most likely a viral process that should resolve on its own.  To optimize her outpatient management, attempted to find an inhaled  "--   --  4.0  --  4.0   < >  --  3.2*  3.2*   < > 2.8*   < >  --    PHOSPHORUS mg/dL 2.4* 2.7  --  2.2*   < > 1.9*   < > 2.1* 1.7*   < > 2.0*   < >  --    MAGNESIUM mg/dL 2.2  --   --  1.9  --  1.8  --   --  1.8   < > 2.0   < >  --    ALT (SGPT) U/L  --   --   --   --   --   --   --  29 31  --  86*   < >  --    LACTATE mmol/L  --   --  1.5  --   --   --   --   --   --   --  1.3  --  1.4    < > = values in this interval not displayed.       Results from last 7 days   Lab Units 03/11/24  1618 03/11/24  1011 03/11/24  0508 03/09/24  0335 03/08/24  1638 03/08/24  1215   ALBUMIN g/dL  --  2.4* 2.5* 2.9*  --  3.1*   PREALBUMIN mg/dL 5.4*  --   --   --  7.9*  --    CRP mg/dL  --  13.06*  --   --   --  12.05*   IONIZED CALCIUM mmol/L 1.26  --   --   --  1.25  --    CHOLESTEROL mg/dL  --   --   --   --   --  103   TRIGLYCERIDES mg/dL  --  131  --   --   --  161*       Results from last 7 days   Lab Units 03/14/24  0504 03/14/24  0047 03/13/24  1750 03/13/24  1150 03/13/24  0451 03/12/24  2346   GLUCOSE mg/dL 156* 161* 145* 155* 165* 152*     Lab Results   Lab Value Date/Time    HGBA1C 5.50 03/02/2024 0947    HGBA1C 5.5 04/28/2023 1142    HGBA1C 5.00 01/04/2023 1227               Medications    Medications Reviewed: Yes  Pertinent: insulin, reglan, protonix, abx, pericolace  Infusion:  PRN:     Intake/Ouptut 24 hrs (0701 - 0700)   I&O's Reviewed: Yes   BM 3/13    Anthropometrics     Flowsheet Rows      Flowsheet Row First Filed Value   Admission Height 170.2 cm (67\") Documented at 03/02/2024 0947   Admission Weight 88.9 kg (196 lb) Documented at 03/02/2024 0947          Height: Height: 170.2 cm (67.01\")  Last Filed Weight: Weight: 96.6 kg (213 lb) (per bed scale) (03/14/24 0600)  Method: Weight Method: Stated  BMI: BMI (Calculated): 33.4  BMI classification: Obese Class I: 30-34.9kg/m2  IBW:      UBW: 203lbs per EMR  Weight change:    Weight       Weight (kg) Weight (lbs) Weight Method Visit Report   3/23/2023 87.091 kg  192 " "steroid that she could take for her maintenance therapy given trouble she has had through insurance getting her previously prescribed medications, though it seems that there are ongoing barriers that will need to be worked out more fully through primary clinic.  She was specifically asked to return here for acute worsening at any hour.  Mother was content with this plan she was discharged home.      Diagnosis:    ICD-10-CM    1. Cough R05    2. History of acute bronchitis with bronchospasm Z87.09      Disposition:  Discharged to home with mother.    Discharge Medications:   Details   albuterol (PROAIR HFA) 108 (90 Base) MCG/ACT inhaler Inhale 2 puffs into the lungs every 4 hours as needed for shortness of breath / dyspnea, Disp-1 Inhaler, R-0, Local PrintPLEASE ALSO PROVIDE AGE APPROPRIATE MASK/SPACER      budesonide (PULMICORT FLEXHALER) 180 MCG/ACT inhaler Inhale 1 puff into the lungs 2 times daily, Disp-1 Inhaler, R-0, Local Print     *This record was created at least in part using electronic voice recognition software, so please excuse any \"typos.\"     Bao Quezada  10/14/2018    EMERGENCY DEPARTMENT    Scribe Disclosure:  IBao Chi, am serving as a scribe at 8:12 AM on 10/14/2018 to document services personally performed by Celestine An MD based on my observations and the provider's statements to me.        Celestine An MD  10/14/18 1008    " "lb   --    4/10/2023 87.091 kg  192 lb   --    2023 94.62 kg  208 lb 9.6 oz   --    2023 91.627 kg  202 lb      2023 90.719 kg  200 lb   --    2023 90.719 kg  200 lb   --    2023 93.441 kg  206 lb   --    2023 92.398 kg  203 lb 11.2 oz   --    2023 92.08 kg  203 lb   --    2024 78.926 kg  174 lb  Stated     2/15/2024 92.08 kg  203 lb   --    2024 89.086 kg  196 lb 6.4 oz      3/2/2024 88.905 kg  196 lb  Stated        Stated         Nutrition Focused Physical Exam     Date: 3/8    Unable to perform exam due to: Pt unable to participate at time of visit    Needs Assessment   Date: 3/8    Height used:Height: 170.2 cm (67.01\")  Weights used: 196lb/89kg    Estimated Calorie needs: ~ 1800 Kcal/day  Method:  MSJ (1563) x 1.2 =1876  Method:  17-20 Kcals/KG 1513-1780kcals    Estimated Protein needs: ~ 120 g PRO/day  Method: 1.2 g/Kg ABW =107g  Method: 2g/kg IBW = 132g    Estimated Fluid needs: ~ ml/day   Per clinical status    Current Nutrition Prescription     PN Route: PICC  PN:   15% dextrose, 7.7% AA @ goal rate of 65mL/hr.         GIR:  1.82mg/kg/min  Lipid: Provide 250mL 20% SMOF daily                   PN@ Goal over 24hr to Supply:  Volume 1560 mL/day       Energy 1776 Kcal/day 99 % Est Need   Protein 120 g/day 100 % Est Need      ---------------------------------------------------------------------------  Formula/Rate verified at bedside: Yes  Infusing Rate at time of visit: 65ml/hr      Average Delivery from [facility] Days:  PN Volume 1001 mL/day       Lipid delivered?   Y         Energy   Kcal/day   % Est Need   Protein   g/day   % Est Need     PO: NPO Diet NPO Type: Strict NPO  Adult Central 2-in-1 TPN  Oral Nutrition Supplement:   Intake: N/A      Nutrition Diagnosis   Date: 3/8 Updated:   Problem Altered GI function   Etiology Colonic distention, ileus, pending bowel sx   Signs/Symptoms Need for TPN   Status:     Goal:   General: Nutrition to support treatment  PO: " N/A  EN/PN: Maintain PN    Nutrition Intervention      Follow treatment progress, Care plan reviewed, Nutrition support order placed to pharmacy    Continue current TPN regimen  Monitor ability for po diet vs keofeed  Elyte replacement per protocol  RD to provide ileostomy education (to family) closer to discharge if appropriate.     Monitoring/Evaluation:   Per protocol, I&O, Pertinent labs, PN delivery/tolerance, Weight, GI status      Aria Morton MS,RD,LD  Time Spent: 20

## 2024-03-14 NOTE — PROGRESS NOTES
"Patient Name:  Lea Rivers  YOB: 1945  [id number]    Surgery Progress Note    Date of visit: 3/14/2024    Subjective   Unable to obtain full history due to patient's dementia.  Continues with ostomy function.  No nausea or vomiting.  No fevers or chills.         Objective       BP (!) 182/86 (BP Location: Left leg, Patient Position: Lying)   Pulse 95   Temp 97.2 °F (36.2 °C) (Axillary)   Resp 22   Ht 170.2 cm (67.01\")   Wt 96.6 kg (213 lb) Comment: per bed scale  SpO2 95%   BMI 33.35 kg/m²     Intake/Output Summary (Last 24 hours) at 3/14/2024 0658  Last data filed at 3/14/2024 0600  Gross per 24 hour   Intake 1201 ml   Output 785 ml   Net 416 ml   Ostomy 150 cc  OLIVER 10 cc    CV:  Rhythm regular and rate regular  L:  Clear to auscultation bilaterally  Abd:  Bowel sounds positive, soft, nontender, incision clean dry and intact, OLIVER minimal, ostomy viable and functioning  Ext:  No cyanosis, clubbing, edema    Recent labs and imaging that are back at this time have been reviewed.   Creatinine 0.46  Potassium 3.9  Calcium 7.9  Phosphorus 2.4  CBC pending       Assessment & Plan     Patient postoperative day 5 from total abdominal colectomy with end ileostomy.  Okay for diet once cleared by speech and swallow evaluation.  Okay for anticoagulation for upper extremity DVT.  Continue broad-spectrum antibiotics for now.  On electrolyte replacement. Pain control. Continue TPN.          Harley Godfrey MD  3/14/2024  06:58 EDT      "

## 2024-03-14 NOTE — NURSING NOTE
WOC follow-up for ostomy care and education.     Patient now postop day 5 from total abdominal colectomy with end ileostomy.     Patient is not appropriate for education.  Ostomy appliance intact.  Ostomy measured 32 mm.  Budded, viable changed to a 2 piece Alfa blue, drainable, flat with barrier ring.      Will continue to follow.     Pola Arnold RN, BSN, CCRN, CWOCN  Wound, Ostomy and Continence (WOC) Department  [facility]

## 2024-03-14 NOTE — PROGRESS NOTES
[facility] Medicine Services  PROGRESS NOTE    Patient Name: Lea Rivers  : 1945  MRN: [id number]    Date of Admission: 3/2/2024  Primary Care Physician: Mannie Melvin MD    Subjective   Subjective     CC:  F/U total abd colectomy with end ileostomy     HPI:  Patient sleeping in bed. Remains lethargic but able to wake up and say a few words before faling back to sleep    Objective   Objective     Vital Signs:   Temp:  [97.2 °F (36.2 °C)-98.9 °F (37.2 °C)] 98.5 °F (36.9 °C)  Heart Rate:  [] 97  Resp:  [18-22] 18  BP: (148-192)/() 169/78     Physical Exam:  Constitutional: No acute distress, appears chronically ill, elderly. Sleeping in bed. lethargic  HENT: NCAT, mucous membranes moist  Respiratory: Clear to auscultation bilaterally, respiratory effort normal   Cardiovascular: tachycardic, no murmurs, rubs, or gallops  Gastrointestinal: abd binder in place, tender to palpation  Musculoskeletal: + upper and lower bilateral edema  Neurologic: Oriented to person only, briefly wakes up and then falls back asleep  Skin: No rashes, bruises noted on upper ext    Exam is unchanged from 3/13  Results Reviewed:  LAB RESULTS:      Lab 24  0934 24  0511 24  0316 24  1618 24  1011 24  0508 24  0335 24  1215 24  0421 24  0421 24  1241   WBC  --  23.21* 22.94*  --   --  18.43* 14.12*  --   --  16.04*  --    HEMOGLOBIN  --  8.6* 8.3* 8.4*  --  7.3* 11.3*  --    < > 10.2*  --    HEMATOCRIT  --  26.4* 26.2* 26.3*  --  22.8* 35.1*  --    < > 32.6*  --    PLATELETS  --  223 195  --   --  222 328  --   --  270  --    NEUTROS ABS  --  18.10* 18.18*  --   --  14.40* 11.36*  --   --   --   --    IMMATURE GRANS (ABS)  --   --  1.11*  --   --  0.60* 0.35*  --   --   --   --    LYMPHS ABS  --   --  1.17  --   --  1.04 0.88  --   --   --   --    MONOS ABS  --   --  1.77*  --   --  2.08* 1.43*  --   --   --   --     EOS ABS  --  0.23 0.61*  --   --  0.24 0.02  --   --   --   --    MCV  --  92.3 92.6  --   --  96.2 92.4  --   --  93.9  --    CRP  --   --   --   --  13.06*  --   --  12.05*  --   --   --    PROCALCITONIN  --  0.20 0.27*  --   --   --   --   --   --   --   --    LACTATE 1.5  --   --   --   --   --  1.3  --   --   --  1.4   PROTIME  --   --   --  15.1*  --   --   --   --   --   --   --     < > = values in this interval not displayed.         Lab 03/14/24  0514 03/13/24  1711 03/13/24  0511 03/12/24  1204 03/12/24  0316 03/11/24  1618 03/11/24  1011 03/11/24  0508 03/10/24  1024 03/09/24  0335 03/08/24  1638   SODIUM 140  --  139  --  138  --   --  138  138 139   < >  --    POTASSIUM 3.9  --  4.0  --  4.0 3.4*  --  3.2*  3.2* 3.7   < > 2.5*   CHLORIDE 106  --  106  --  106  --   --  107  107 108*   < >  --    CO2 28.0  --  28.0  --  25.0  --   --  25.0  25.0 22.0   < >  --    ANION GAP 6.0  --  5.0  --  7.0  --   --  6.0  6.0 9.0   < >  --    BUN 19  --  18  --  18  --   --  15  15 17   < >  --    CREATININE 0.46*  --  0.44*  --  0.61*  --   --  0.66*  0.66* 0.65*   < >  --    EGFR 106.4  --  107.8  --  97.7  --   --  95.4  95.4 95.8   < >  --    GLUCOSE 159*  --  163*  --  148*  --   --  145*  145* 135*   < >  --    CALCIUM 7.9*  --  7.8*  --  8.0*  --   --  8.3*  8.3* 8.4*   < >  --    IONIZED CALCIUM  --   --   --   --   --  1.26  --   --   --   --  1.25   MAGNESIUM 2.2  --  1.9  --  1.8  --   --  1.8 1.8   < > 2.3   PHOSPHORUS 2.4* 2.7 2.2* 2.3* 1.9* 2.1*   < > 1.7* 2.8   < >  --     < > = values in this interval not displayed.         Lab 03/11/24  1011 03/11/24  0508 03/09/24  0335 03/08/24  1215   TOTAL PROTEIN 4.4* 4.4* 6.1 5.6*   ALBUMIN 2.4* 2.5* 2.9* 3.1*   GLOBULIN  --  1.9 3.2  --    ALT (SGPT) 29 31 86* 103*   AST (SGOT) 20 23 64* 105*   BILIRUBIN 0.3 0.3 0.6 0.5   INDIRECT BILIRUBIN  --   --   --  0.2   BILIRUBIN DIRECT <0.2  --   --  0.3   ALK PHOS 60 63 70 61         Lab 03/11/24  8798    PROTIME 15.1*   INR 1.18*           Lab 03/11/24  1011 03/08/24  1215   CHOLESTEROL  --  103   TRIGLYCERIDES 131 161*         Lab 03/09/24  0935   ABO TYPING O   RH TYPING Negative   ANTIBODY SCREEN Negative           Brief Urine Lab Results  (Last result in the past 365 days)        Color   Clarity   Blood   Leuk Est   Nitrite   Protein   CREAT   Urine HCG        03/11/24 1337 Yellow   Cloudy   Moderate (2+)   Trace   Negative   30 mg/dL (1+)                   Microbiology Results Abnormal       Procedure Component Value - Date/Time    Urine Culture - Urine, Indwelling Urethral Catheter [[id number]]  (Normal) Collected: 03/11/24 1337    Lab Status: Final result Specimen: Urine from Indwelling Urethral Catheter Updated: 03/12/24 2024     Urine Culture No growth    Blood Culture - Blood, Arm, Right [[id number]]  (Normal) Collected: 03/06/24 1857    Lab Status: Final result Specimen: Blood from Arm, Right Updated: 03/11/24 2045     Blood Culture No growth at 5 days    Blood Culture - Blood, Arm, Right [[id number]]  (Normal) Collected: 03/06/24 1751    Lab Status: Final result Specimen: Blood from Arm, Right Updated: 03/11/24 2000     Blood Culture No growth at 5 days    Blood Culture - Blood, Arm, Right [[id number]]  (Normal) Collected: 03/02/24 1132    Lab Status: Final result Specimen: Blood from Arm, Right Updated: 03/07/24 1201     Blood Culture No growth at 5 days    Narrative:      Less than seven (7) mL's of blood was collected.  Insufficient quantity may yield false negative results.    Blood Culture - Blood, Arm, Right [[id number]]  (Normal) Collected: 03/02/24 1132    Lab Status: Final result Specimen: Blood from Arm, Right Updated: 03/07/24 1201     Blood Culture No growth at 5 days    Narrative:      Less than seven (7) mL's of blood was collected.  Insufficient quantity may yield false negative results.    Urine Culture - Urine, Straight Cath [[id number]]  (Normal) Collected: 03/02/24 0923    Lab Status:  Final result Specimen: Urine from Straight Cath Updated: 03/03/24 1601     Urine Culture No growth    COVID PRE-OP / PRE-PROCEDURE SCREENING ORDER (NO ISOLATION) - Swab, Nasopharynx [[id number]]  (Normal) Collected: 03/02/24 1133    Lab Status: Final result Specimen: Swab from Nasopharynx Updated: 03/02/24 1227    Narrative:      The following orders were created for panel order COVID PRE-OP / PRE-PROCEDURE SCREENING ORDER (NO ISOLATION) - Swab, Nasopharynx.  Procedure                               Abnormality         Status                     ---------                               -----------         ------                     COVID-19, FLU A/B, RSV P...[[id number]]  Normal              Final result                 Please view results for these tests on the individual orders.    COVID-19, FLU A/B, RSV PCR 1 HR TAT - Swab, Nasopharynx [[id number]]  (Normal) Collected: 03/02/24 1133    Lab Status: Final result Specimen: Swab from Nasopharynx Updated: 03/02/24 1227     COVID19 Not Detected     Influenza A PCR Not Detected     Influenza B PCR Not Detected     RSV, PCR Not Detected    Narrative:      Fact sheet for providers: https://www.fda.gov/media/032061/download    Fact sheet for patients: https://www.fda.gov/media/798253/download    Test performed by PCR.            CT Head Without Contrast    Result Date: 3/13/2024  CT HEAD WO CONTRAST Date of Exam: 3/13/2024 6:57 PM EDT Indication: Stroke, follow up dysarthria, change from baseline. Comparison: 3/2/2024 Technique: Axial CT images were obtained of the head without contrast administration.  Automated exposure control and iterative construction methods were used. Findings: There is mild generalized parenchymal volume loss. There is no evidence of acute infarct or hemorrhage. No abnormal extra-axial fluid collections are seen. No mass effect or hydrocephalus. The globes and orbits are within normal limits. There is a polyp or mucous retention cyst within the left  maxillary sinus. Paranasal sinuses and mastoid air cells are otherwise clear.     Impression: Impression: 1. No acute intracranial abnormality. Electronically Signed: Bob Hanna MD  3/13/2024 9:33 PM EDT  Workstation ID: [id number]    EEG    Result Date: 3/13/2024  Reason for referral: 78 y.o.male with altered mental status, consideration of seizures Technical Summary:  A 19 channel digital EEG was performed using the international 10-20 placement system, including eye leads and EKG leads. Duration: 20 minutes Findings: The patient is awake.  Diffuse low to medium amplitude 2-5 Hz intermixed delta and theta activity is present symmetrically over both hemispheres.  A posterior rhythm is not seen.  Low amplitude EMG artifact is present anteriorly.  Photic stimulation does not change the background.  Hyperventilation is not performed.  Stage II sleep is not seen.  No focal features or epileptiform activity are present. Video: Available Technical quality: Superior EKG: Irregular, 80-90 bpm SUMMARY: Moderate generalized slow No focal features or epileptiform activity are seen     Impression: Diffuse cerebral dysfunction of moderate degree, nonspecific.  This finding is most commonly seen with encephalopathy due to toxic/metabolic cause No ongoing seizures are seen This report is transcribed using the Dragon dictation system.      XR Shoulder 2+ View Left    Result Date: 3/12/2024  XR SHOULDER 2+ VW LEFT Date of Exam: 3/12/2024 12:52 PM EDT Indication: new bruise, recent clavicle Fx Comparison: None available. Findings: 3 views of the left shoulder demonstrate a comminuted and mildly displaced distal clavicle fracture. There is overlying soft tissue swelling. The glenohumeral joint and proximal humerus are intact. There is a minimally displaced fracture of the lateral third rib.     Impression: Impression: 1. Mildly displaced and comminuted left distal clavicle fracture. 2. Minimally displaced fracture of the left  lateral third rib. Electronically Signed: Ellen Suarez MD  3/12/2024 1:50 PM EDT  Workstation ID: [id number]    XR Chest 1 View    Result Date: 3/12/2024  XR CHEST 1 VW Date of Exam: 3/12/2024 12:52 PM EDT Indication: fever, leukocytosis, lethargy Comparison: 3/8/2024 Findings: The heart size is normal. There is consolidation and volume loss at the left lung base with a left pleural effusion. The lungs are otherwise clear and the vascular pattern is normal.     Impression: Impression: 1. Left lower lobe consolidation with a small left sided pleural effusion. Electronically Signed: Kirt Gentile MD  3/12/2024 1:37 PM EDT  Workstation ID: [id number]     Results for orders placed during the hospital encounter of 07/20/21    Adult Transthoracic Echo Complete W/ Cont if Necessary Per Protocol    Interpretation Summary  · Left ventricular wall thickness is consistent with mild concentric hypertrophy.  · Normal left-ventricular systolic function, estimated EF 65%.  · Left ventricular diastolic function is consistent with (grade I) impaired relaxation.  · Aortic sclerosis without aortic stenosis. Trace aortic insufficiency.  · Trace mitral regurgitation, trace tricuspid regurgitation.      Current medications:  Scheduled Meds:alteplase (CATHFLO/ACTIVASE) INTRACATHETER injection 2 mg, 2 mg, Intracatheter, Once  cefTRIAXone, 2,000 mg, Intravenous, Q24H  enoxaparin, 1 mg/kg, Subcutaneous, Q12H  Fat Emul Fish Oil/Plant Based, 250 mL, Intravenous, Q24H (TPN)  fluconazole, 400 mg, Intravenous, Q24H  hydrALAZINE, 20 mg, Intravenous, Q6H  insulin regular, 2-7 Units, Subcutaneous, Q6H  levETIRAcetam, 500 mg, Intravenous, Q12H  Lidocaine, 2 patch, Transdermal, Q24H  metoclopramide, 10 mg, Intravenous, Q6H  metroNIDAZOLE, 500 mg, Intravenous, Q8H  pantoprazole, 40 mg, Intravenous, Q AM  sodium chloride, 10 mL, Intravenous, Q12H  sodium chloride, 10 mL, Intravenous, Q12H  sodium chloride, 10 mL, Intravenous, Q12H  valproate sodium, 500  mg, Intravenous, Q8H      Continuous Infusions:Adult Central 2-in-1 TPN, , Last Rate: 65 mL/hr at 03/13/24 1820  Pharmacy to Dose TPN,           PRN Meds:.  acetaminophen **OR** acetaminophen **OR** acetaminophen    calcium carbonate    Calcium Replacement - Follow Nurse / BPA Driven Protocol    dextrose    dextrose    docusate sodium    fluticasone    glucagon (human recombinant)    hydrALAZINE    HYDROmorphone    ipratropium-albuterol    labetalol    Magnesium Standard Dose Replacement - Follow Nurse / BPA Driven Protocol    [DISCONTINUED] HYDROmorphone **AND** naloxone    ondansetron ODT **OR** ondansetron    ondansetron ODT **OR** ondansetron    Pharmacy to Dose TPN    Phosphorus Replacement - Follow Nurse / BPA Driven Protocol    Potassium Replacement - Follow Nurse / BPA Driven Protocol    sodium chloride    sodium chloride    sodium chloride    sodium chloride    sodium chloride    sodium chloride    Assessment & Plan   Assessment & Plan     Active Hospital Problems    Diagnosis  POA    **Falls [W19.XXXA]  Yes    Pneumatosis intestinalis [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        Brief Hospital Course to date:  Lea Rivers is a 78 y.o. male with past medical history of hypertension, hyperlipidemia, hypothyroidism, seizure disorder.  Patient is being admitted for a fall and nondisplaced first through fourth left rib fractures.  Patient also has left distal clavicle fracture.    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 Godfrey, expected post-op findings, no abscess, remains afebrile and  cultures are all negative so far. Lactate WNL, CBC pending this morning  -CXR 3/12 does show possible new LLL pneumonia, continue CTX    Encephalopathy/lethargy  - per daughter significant change in mental status and speech since arrival, we had a long discussion and I feel this is probably TME in setting of infection, recent large operation, underlying dementia  - initial CT head 3/2 negative, repeated CT head 3/13 without any acute findings  - EEG negative for seizures, findings consistent with TME  -minimize sedating meds     Multiple falls with increasing frequency   -CT of head shows age-related changes which are chronic but no acute process  -neuro consulted. likely d/t neuropathy (confirmed with EMG) and progression of dementia     Multiple rib fractures and also left clavicle fracture  -With no displacement or mildly displaced.  Orthopedic surgery evaluated. No surgical intervention  planned.  Recs nonweightbearing LUE, may come out of sling in 5-7 days to initiate gentle ROM exercises per ortho.  F/u with ortho/Dr. Maldonado in 2 weeks  -Pain control  -lidocaine patch  -bruise noted to L shoulder/neck region. X-ray negative    Acute on Chronic Anemia  -Post op noted decrease in H&H  -Did receive large quantity of IVF per surgery prior to case  -S/P 1 unit PRBCs 3/11 with appropriate rise in H&H  -Do not suspect active bleeding     Acute RUE DVT (brachial)  -Provoked, 2/2 PICC   -therapeutic lovenox started 3/13     Prostate cancer  Hx BPH s/p greenlight photo vaporization of prostate 2018  -Follows with Dr. Noland  -S/P cyberknife radiation 2/9/23  -Has intermittent hematuria, monitor while on AC     Dementia and increasing memory problem  -Patient was previously living alone and driving, given significant decline will need placement     Hypertension  -Continue IV Hydralazine 20mg q6 hours  -PRN IV Lopressor  -Unable to take his PO meds currently, if cleared by speech can hopefully resume some oral  meds    Seizure disorder  - continue home meds including home keppra (changed to IV)   - Depakote level ok    Hyperlipidemia  Hypothyroidism  -Home meds on hold, NPO    Expected Discharge Location and Transportation: SNF  Expected Discharge   Expected Discharge Date: 3/18/2024; Expected Discharge Time:      DVT prophylaxis:  Medical and mechanical DVT prophylaxis orders are present.         AM-PAC 6 Clicks Score (PT): 6 (03/13/24 2007)    CODE STATUS:   Code Status and Medical Interventions:   Ordered at: 03/02/24 1456     Medical Intervention Limits:    NO intubation (DNI)     Code Status (Patient has no pulse and is not breathing):    No CPR (Do Not Attempt to Resuscitate)     Medical Interventions (Patient has pulse or is breathing):    Limited Support       Sienna Saleh,   03/14/24

## 2024-03-14 NOTE — PLAN OF CARE
Problem: Adult Inpatient Plan of Care  Goal: Plan of Care Review  Outcome: Ongoing, Progressing  Flowsheets  Taken 3/14/2024 0610  Progress: no change  Outcome Evaluation: VSS, RA, pain well controlled, patient slept most of shift, easily arousable, no OLIVER output, minimal ostomy output, no other complications noted or stated, afebrile entire shift.  Taken 3/13/2024 0258  Plan of Care Reviewed With: patient  Goal: Patient-Specific Goal (Individualized)  Outcome: Ongoing, Progressing  Goal: Absence of Hospital-Acquired Illness or Injury  Outcome: Ongoing, Progressing  Intervention: Identify and Manage Fall Risk  Recent Flowsheet Documentation  Taken 3/14/2024 0600 by Mikel Stringer, RN  Safety Promotion/Fall Prevention:   assistive device/personal items within reach   clutter free environment maintained   fall prevention program maintained   lighting adjusted   nonskid shoes/slippers when out of bed   room organization consistent   safety round/check completed   toileting scheduled  Taken 3/14/2024 0400 by Mikel Stringer RN  Safety Promotion/Fall Prevention:   assistive device/personal items within reach   clutter free environment maintained   fall prevention program maintained   lighting adjusted   nonskid shoes/slippers when out of bed   room organization consistent   safety round/check completed   toileting scheduled  Taken 3/14/2024 0000 by Mikel Stringer RN  Safety Promotion/Fall Prevention:   assistive device/personal items within reach   clutter free environment maintained   fall prevention program maintained   lighting adjusted   nonskid shoes/slippers when out of bed   room organization consistent   safety round/check completed   toileting scheduled  Taken 3/13/2024 2007 by Mikel Stringer RN  Safety Promotion/Fall Prevention:   assistive device/personal items within reach   clutter free environment maintained   fall prevention program maintained   lighting adjusted    nonskid shoes/slippers when out of bed   room organization consistent   safety round/check completed   toileting scheduled  Intervention: Prevent Skin Injury  Recent Flowsheet Documentation  Taken 3/14/2024 0600 by Mikel Stringer RN  Body Position:   weight shifting   turned   supine  Taken 3/14/2024 0400 by Mikel Stringer RN  Body Position:   weight shifting   turned   left  Taken 3/14/2024 0000 by Mikel Stringer RN  Body Position:   turned   supine   weight shifting  Taken 3/13/2024 2007 by Mikel Stringer RN  Body Position:   turned   left   weight shifting  Skin Protection:   adhesive use limited   incontinence pads utilized   tubing/devices free from skin contact  Intervention: Prevent and Manage VTE (Venous Thromboembolism) Risk  Recent Flowsheet Documentation  Taken 3/14/2024 0600 by Mikel Stringer RN  Activity Management: activity encouraged  Taken 3/14/2024 0400 by Mikel Stringer RN  Activity Management: activity encouraged  Taken 3/14/2024 0000 by Mikel Stringer RN  Activity Management: activity encouraged  Taken 3/13/2024 2007 by Mikel Stringer RN  Activity Management: activity encouraged  VTE Prevention/Management: (SQ Lovenox) other (see comments)  Range of Motion: active ROM (range of motion) encouraged  Intervention: Prevent Infection  Recent Flowsheet Documentation  Taken 3/14/2024 0600 by Mikel Stringer RN  Infection Prevention:   environmental surveillance performed   equipment surfaces disinfected   hand hygiene promoted   rest/sleep promoted   single patient room provided  Taken 3/14/2024 0400 by Mikel Stringer RN  Infection Prevention:   environmental surveillance performed   equipment surfaces disinfected   hand hygiene promoted   rest/sleep promoted   single patient room provided  Taken 3/14/2024 0000 by Mikel Stringer RN  Infection Prevention:   environmental surveillance performed   equipment  surfaces disinfected   hand hygiene promoted   rest/sleep promoted   single patient room provided  Taken 3/13/2024 2007 by Mikel Stringer RN  Infection Prevention:   environmental surveillance performed   equipment surfaces disinfected   hand hygiene promoted   rest/sleep promoted   single patient room provided  Goal: Optimal Comfort and Wellbeing  Outcome: Ongoing, Progressing  Intervention: Monitor Pain and Promote Comfort  Recent Flowsheet Documentation  Taken 3/13/2024 2007 by Mikel Stringer RN  Pain Management Interventions:   care clustered   pillow support provided   position adjusted   quiet environment facilitated   see MAR  Intervention: Provide Person-Centered Care  Recent Flowsheet Documentation  Taken 3/13/2024 2007 by Mikel Stringer RN  Trust Relationship/Rapport:   care explained   questions encouraged   questions answered   thoughts/feelings acknowledged  Goal: Readiness for Transition of Care  Outcome: Ongoing, Progressing     Problem: Skin Injury Risk Increased  Goal: Skin Health and Integrity  Outcome: Ongoing, Progressing  Intervention: Optimize Skin Protection  Recent Flowsheet Documentation  Taken 3/14/2024 0600 by Mikel Stringer RN  Head of Bed (HOB) Positioning: HOB at 30-45 degrees  Taken 3/14/2024 0400 by Mikel Stringer RN  Head of Bed (HOB) Positioning: HOB at 30-45 degrees  Taken 3/14/2024 0000 by Mikel Stringer RN  Head of Bed (HOB) Positioning: HOB at 30-45 degrees  Taken 3/13/2024 2007 by Mikel Stringer RN  Pressure Reduction Techniques:   frequent weight shift encouraged   heels elevated off bed   weight shift assistance provided  Head of Bed (HOB) Positioning: HOB at 30-45 degrees  Pressure Reduction Devices:   positioning supports utilized   pressure-redistributing mattress utilized  Skin Protection:   adhesive use limited   incontinence pads utilized   tubing/devices free from skin contact     Problem: Fall Injury  Risk  Goal: Absence of Fall and Fall-Related Injury  Outcome: Ongoing, Progressing  Intervention: Identify and Manage Contributors  Recent Flowsheet Documentation  Taken 3/14/2024 0600 by Mikel Stringer RN  Medication Review/Management: medications reviewed  Taken 3/14/2024 0400 by Mikel Stringer RN  Medication Review/Management: medications reviewed  Taken 3/14/2024 0000 by Mikel Stringer RN  Medication Review/Management: medications reviewed  Taken 3/13/2024 2007 by Mikel Stringer RN  Medication Review/Management: medications reviewed  Intervention: Promote Injury-Free Environment  Recent Flowsheet Documentation  Taken 3/14/2024 0600 by Mikel Stringer RN  Safety Promotion/Fall Prevention:   assistive device/personal items within reach   clutter free environment maintained   fall prevention program maintained   lighting adjusted   nonskid shoes/slippers when out of bed   room organization consistent   safety round/check completed   toileting scheduled  Taken 3/14/2024 0400 by Mikel Stringer RN  Safety Promotion/Fall Prevention:   assistive device/personal items within reach   clutter free environment maintained   fall prevention program maintained   lighting adjusted   nonskid shoes/slippers when out of bed   room organization consistent   safety round/check completed   toileting scheduled  Taken 3/14/2024 0000 by Mikel Stringer RN  Safety Promotion/Fall Prevention:   assistive device/personal items within reach   clutter free environment maintained   fall prevention program maintained   lighting adjusted   nonskid shoes/slippers when out of bed   room organization consistent   safety round/check completed   toileting scheduled  Taken 3/13/2024 2007 by Mikel Stringer RN  Safety Promotion/Fall Prevention:   assistive device/personal items within reach   clutter free environment maintained   fall prevention program maintained   lighting  adjusted   nonskid shoes/slippers when out of bed   room organization consistent   safety round/check completed   toileting scheduled     Problem: Adjustment to Illness (Sepsis/Septic Shock)  Goal: Optimal Coping  Outcome: Ongoing, Progressing  Intervention: Optimize Psychosocial Adjustment to Illness  Recent Flowsheet Documentation  Taken 3/13/2024 2007 by Mikel Stringer RN  Supportive Measures: active listening utilized  Family/Support System Care:   self-care encouraged   support provided     Problem: Bleeding (Sepsis/Septic Shock)  Goal: Absence of Bleeding  Outcome: Ongoing, Progressing  Intervention: Monitor and Manage Bleeding  Recent Flowsheet Documentation  Taken 3/14/2024 0600 by Mikel Stringer RN  Bleeding Precautions: monitored for signs of bleeding  Bleeding Management: dressing monitored  Taken 3/14/2024 0400 by Mikel Stringer RN  Bleeding Precautions: monitored for signs of bleeding  Bleeding Management: dressing monitored  Taken 3/14/2024 0000 by Mikel Stringer RN  Bleeding Precautions: monitored for signs of bleeding  Bleeding Management: dressing monitored  Taken 3/13/2024 2007 by Mikel Stringer RN  Bleeding Precautions: monitored for signs of bleeding  Bleeding Management: dressing monitored     Problem: Glycemic Control Impaired (Sepsis/Septic Shock)  Goal: Blood Glucose Level Within Desired Range  Outcome: Ongoing, Progressing  Intervention: Optimize Glycemic Control  Recent Flowsheet Documentation  Taken 3/13/2024 2007 by Mikel Stringer RN  Glycemic Management: blood glucose monitored     Problem: Infection Progression (Sepsis/Septic Shock)  Goal: Absence of Infection Signs and Symptoms  Outcome: Ongoing, Progressing  Intervention: Initiate Sepsis Management  Recent Flowsheet Documentation  Taken 3/14/2024 0600 by Mikel Stringer RN  Infection Prevention:   environmental surveillance performed   equipment surfaces disinfected   hand  hygiene promoted   rest/sleep promoted   single patient room provided  Taken 3/14/2024 0400 by Mikel Stringer RN  Infection Prevention:   environmental surveillance performed   equipment surfaces disinfected   hand hygiene promoted   rest/sleep promoted   single patient room provided  Taken 3/14/2024 0000 by Mikel Stringer RN  Infection Prevention:   environmental surveillance performed   equipment surfaces disinfected   hand hygiene promoted   rest/sleep promoted   single patient room provided  Taken 3/13/2024 2007 by Mikel Stringer RN  Infection Management: aseptic technique maintained  Infection Prevention:   environmental surveillance performed   equipment surfaces disinfected   hand hygiene promoted   rest/sleep promoted   single patient room provided  Intervention: Promote Recovery  Recent Flowsheet Documentation  Taken 3/14/2024 0600 by Mikel Stringer RN  Activity Management: activity encouraged  Taken 3/14/2024 0400 by Mikel Stringer RN  Activity Management: activity encouraged  Taken 3/14/2024 0000 by Mikel Stringer RN  Activity Management: activity encouraged  Taken 3/13/2024 2007 by Mikel Stringer RN  Activity Management: activity encouraged  Sleep/Rest Enhancement: awakenings minimized  Intervention: Promote Stabilization  Recent Flowsheet Documentation  Taken 3/13/2024 2007 by Mikel Stringer RN  Fever Reduction/Comfort Measures:   lightweight bedding   lightweight clothing     Problem: Nutrition Impaired (Sepsis/Septic Shock)  Goal: Optimal Nutrition Intake  Outcome: Ongoing, Progressing   Goal Outcome Evaluation:  Plan of Care Reviewed With: patient        Progress: no change  Outcome Evaluation: VSS, RA, pain well controlled, patient slept most of shift, easily arousable, no OLIVER output, minimal ostomy output, no other complications noted or stated, afebrile entire shift.

## 2024-03-14 NOTE — PROGRESS NOTES
Pharmacy Parenteral Nutrition Evaluation    Lea Rivers is a  78 y.o. male receiving TPN.     Indication:     Prolonged Paralytic Ileus     Consulting Physician: Eleanor Yost DO     Total Fluid Rate (if stated): n/a    Labs  Results from last 7 days   Lab Units 03/14/24  0514 03/13/24  0511 03/12/24  0316 03/11/24  1618 03/11/24  1011 03/11/24  0508 03/09/24 2055 03/09/24  0335   SODIUM mmol/L 140 139 138  --   --  138  138   < > 141   POTASSIUM mmol/L 3.9 4.0 4.0   < >  --  3.2*  3.2*   < > 2.8*   CHLORIDE mmol/L 106 106 106  --   --  107  107   < > 107   CO2 mmol/L 28.0 28.0 25.0  --   --  25.0  25.0   < > 23.0   BUN mg/dL 19 18 18  --   --  15  15   < > 15   CREATININE mg/dL 0.46* 0.44* 0.61*  --   --  0.66*  0.66*   < > 0.72*   CALCIUM mg/dL 7.9* 7.8* 8.0*  --   --  8.3*  8.3*   < > 8.7   BILIRUBIN mg/dL  --   --   --   --  0.3 0.3  --  0.6   ALK PHOS U/L  --   --   --   --  60 63  --  70   ALT (SGPT) U/L  --   --   --   --  29 31  --  86*   AST (SGOT) U/L  --   --   --   --  20 23  --  64*   GLUCOSE mg/dL 159* 163* 148*  --   --  145*  145*   < > 118*    < > = values in this interval not displayed.       Results from last 7 days   Lab Units 03/14/24  0514 03/13/24  1711 03/13/24  0511 03/12/24  1204 03/12/24  0316 03/11/24  1618 03/09/24  0335 03/08/24  1638   MAGNESIUM mg/dL 2.2  --  1.9  --  1.8  --    < > 2.3   PHOSPHORUS mg/dL 2.4* 2.7 2.2*   < > 1.9* 2.1*   < >  --    PREALBUMIN mg/dL  --   --   --   --   --  5.4*  --  7.9*    < > = values in this interval not displayed.       Results from last 7 days   Lab Units 03/14/24  1125 03/13/24  0511 03/12/24  0316   WBC 10*3/mm3 17.06* 23.21* 22.94*   HEMOGLOBIN g/dL 7.5* 8.6* 8.3*   HEMATOCRIT % 25.4* 26.4* 26.2*   PLATELETS 10*3/mm3 242 223 195       Triglycerides   Date Value Ref Range Status   03/11/2024 131 0 - 150 mg/dL Final     Comment:     Falsely depressed results may occur on samples drawn from patients receiving  N-Acetylcysteine (NAC) or Metamizole.       estimated creatinine clearance is 144.2 mL/min (A) (by C-G formula based on SCr of 0.46 mg/dL (L)).    Intake & Output (last 3 days)         03/05 0701 03/06 0700 03/06 0701 03/07 0700 03/07 0701 03/08 0700 03/08 0701 03/09 0700    P.O. 1080 240      I.V. (mL/kg)  1026.3 (11.5)      IV Piggyback  400      Total Intake(mL/kg) 1080 (12.1) 1666.3 (18.7)      Urine (mL/kg/hr) 150 (0.1) 400 (0.2)      Stool 0 0      Total Output 150 400      Net +930 +1266.3              Urine Unmeasured Occurrence 4 x 2 x 1 x     Stool Unmeasured Occurrence 5 x 4 x 3 x 1 x            Dietitian Recommendations  Diet Orders (active) (From admission, onward)       Start     Ordered    03/08/24 1800  Adult Cyclic Central 2-in-1 TPN  Cyclic TPN - See Admin Instructions        Note to Pharmacy: Lea Rivers  5H  [id number]  MRN: [id number]  CSN: [id number]    03/08/24 1422    03/08/24 1800  Fat Emul Fish Oil/Plant Based (SMOFLIPID) 20 % emulsion 250 mL  Every 24 Hours Scheduled (TPN)         03/08/24 1422 03/08/24 1355  NPO Diet NPO Type: Strict NPO  Diet Effective Now         03/08/24 1358    03/05/24 1656  DIET MESSAGE Pt would like a grilled cheese sandwich, please. Thanks!  Once        Comments: Pt would like a grilled cheese sandwich, please. Thanks!    03/05/24 1656                    Current TPN Regimen Recommendation:   Dextrose 15% / Amino Acid 7.7% at rate of 25 mL/hr x8hr, then 50 ml/hr x8hr, then goal rate of 65 ml/hr.      20% Lipid Emulsion 250mL every 24 hours.    Na 45 mEq/L  K 50 mEq/L  Ca 5 mEq/L  Mg 8 mEq/L  PO4 15 mmol/L  Cl:Ace - 1:1    Assessment/Plan:  TPN for prolonged paralytic ileus.   Macronutrients per dietary recommendation are listed above.  Electrolyte replacement per protocol  SSI q6h  Follow and adjust as indicated     Thanks  Gallo Cunningham, PharmD  3/14/2024  14:11 EDT

## 2024-03-15 LAB
ANION GAP SERPL CALCULATED.3IONS-SCNC: 8 MMOL/L (ref 5–15)
BASOPHILS # BLD AUTO: 0.08 10*3/MM3 (ref 0–0.2)
BASOPHILS NFR BLD AUTO: 0.5 % (ref 0–1.5)
BUN SERPL-MCNC: 18 MG/DL (ref 8–23)
BUN/CREAT SERPL: 33.3 (ref 7–25)
CALCIUM SPEC-SCNC: 7.8 MG/DL (ref 8.6–10.5)
CHLORIDE SERPL-SCNC: 105 MMOL/L (ref 98–107)
CLUMPED PLATELETS: PRESENT
CO2 SERPL-SCNC: 28 MMOL/L (ref 22–29)
CREAT SERPL-MCNC: 0.54 MG/DL (ref 0.76–1.27)
DEPRECATED RDW RBC AUTO: 60.7 FL (ref 37–54)
EGFRCR SERPLBLD CKD-EPI 2021: 101.4 ML/MIN/1.73
ELLIPTOCYTES BLD QL SMEAR: NORMAL
EOSINOPHIL # BLD AUTO: 0.61 10*3/MM3 (ref 0–0.4)
EOSINOPHIL NFR BLD AUTO: 4 % (ref 0.3–6.2)
ERYTHROCYTE [DISTWIDTH] IN BLOOD BY AUTOMATED COUNT: 18.1 % (ref 12.3–15.4)
GLUCOSE BLDC GLUCOMTR-MCNC: 147 MG/DL (ref 70–130)
GLUCOSE BLDC GLUCOMTR-MCNC: 151 MG/DL (ref 70–130)
GLUCOSE BLDC GLUCOMTR-MCNC: 159 MG/DL (ref 70–130)
GLUCOSE BLDC GLUCOMTR-MCNC: 164 MG/DL (ref 70–130)
GLUCOSE SERPL-MCNC: 147 MG/DL (ref 65–99)
HCT VFR BLD AUTO: 23.1 % (ref 37.5–51)
HGB BLD-MCNC: 7.2 G/DL (ref 13–17.7)
HYPOCHROMIA BLD QL: NORMAL
IMM GRANULOCYTES # BLD AUTO: 1.3 10*3/MM3 (ref 0–0.05)
IMM GRANULOCYTES NFR BLD AUTO: 8.6 % (ref 0–0.5)
LYMPHOCYTES # BLD AUTO: 1.04 10*3/MM3 (ref 0.7–3.1)
LYMPHOCYTES NFR BLD AUTO: 6.9 % (ref 19.6–45.3)
MAGNESIUM SERPL-MCNC: 2 MG/DL (ref 1.6–2.4)
MCH RBC QN AUTO: 29.1 PG (ref 26.6–33)
MCHC RBC AUTO-ENTMCNC: 31.2 G/DL (ref 31.5–35.7)
MCV RBC AUTO: 93.5 FL (ref 79–97)
MONOCYTES # BLD AUTO: 1.31 10*3/MM3 (ref 0.1–0.9)
MONOCYTES NFR BLD AUTO: 8.7 % (ref 5–12)
NEUTROPHILS NFR BLD AUTO: 10.75 10*3/MM3 (ref 1.7–7)
NEUTROPHILS NFR BLD AUTO: 71.3 % (ref 42.7–76)
NRBC BLD AUTO-RTO: 0.1 /100 WBC (ref 0–0.2)
PHOSPHATE SERPL-MCNC: 2.9 MG/DL (ref 2.5–4.5)
PLATELET # BLD AUTO: 245 10*3/MM3 (ref 140–450)
PMV BLD AUTO: 9.3 FL (ref 6–12)
POTASSIUM SERPL-SCNC: 3.8 MMOL/L (ref 3.5–5.2)
RBC # BLD AUTO: 2.47 10*6/MM3 (ref 4.14–5.8)
SMALL PLATELETS BLD QL SMEAR: ADEQUATE
SODIUM SERPL-SCNC: 141 MMOL/L (ref 136–145)
WBC MORPH BLD: NORMAL
WBC NRBC COR # BLD AUTO: 15.09 10*3/MM3 (ref 3.4–10.8)

## 2024-03-15 PROCEDURE — 85025 COMPLETE CBC W/AUTO DIFF WBC: CPT | Performed by: SURGERY

## 2024-03-15 PROCEDURE — 25010000002 METOCLOPRAMIDE PER 10 MG: Performed by: SURGERY

## 2024-03-15 PROCEDURE — 25010000002 CALCIUM GLUCONATE PER 10 ML

## 2024-03-15 PROCEDURE — 25010000002 HYDRALAZINE PER 20 MG: Performed by: SURGERY

## 2024-03-15 PROCEDURE — 80048 BASIC METABOLIC PNL TOTAL CA: CPT | Performed by: SURGERY

## 2024-03-15 PROCEDURE — 85007 BL SMEAR W/DIFF WBC COUNT: CPT | Performed by: SURGERY

## 2024-03-15 PROCEDURE — 63710000001 INSULIN REGULAR HUMAN PER 5 UNITS: Performed by: SURGERY

## 2024-03-15 PROCEDURE — 25010000002 FLUCONAZOLE PER 200 MG: Performed by: FAMILY MEDICINE

## 2024-03-15 PROCEDURE — 83735 ASSAY OF MAGNESIUM: CPT | Performed by: SURGERY

## 2024-03-15 PROCEDURE — 99232 SBSQ HOSP IP/OBS MODERATE 35: CPT | Performed by: INTERNAL MEDICINE

## 2024-03-15 PROCEDURE — 82948 REAGENT STRIP/BLOOD GLUCOSE: CPT

## 2024-03-15 PROCEDURE — 25010000002 POTASSIUM CHLORIDE PER 2 MEQ OF POTASSIUM

## 2024-03-15 PROCEDURE — 25010000002 MAGNESIUM SULFATE PER 500 MG OF MAGNESIUM

## 2024-03-15 PROCEDURE — 25010000002 HYDRALAZINE PER 20 MG: Performed by: FAMILY MEDICINE

## 2024-03-15 PROCEDURE — 84100 ASSAY OF PHOSPHORUS: CPT | Performed by: SURGERY

## 2024-03-15 PROCEDURE — 97164 PT RE-EVAL EST PLAN CARE: CPT

## 2024-03-15 PROCEDURE — 25010000002 CEFTRIAXONE PER 250 MG: Performed by: FAMILY MEDICINE

## 2024-03-15 PROCEDURE — 97530 THERAPEUTIC ACTIVITIES: CPT

## 2024-03-15 PROCEDURE — 92526 ORAL FUNCTION THERAPY: CPT

## 2024-03-15 PROCEDURE — 25010000002 ENOXAPARIN PER 10 MG: Performed by: INTERNAL MEDICINE

## 2024-03-15 PROCEDURE — 25010000002 HYDROMORPHONE PER 4 MG: Performed by: FAMILY MEDICINE

## 2024-03-15 PROCEDURE — 25010000002 LEVETRIRACETAM PER 10 MG: Performed by: SURGERY

## 2024-03-15 PROCEDURE — 25010000002 METRONIDAZOLE 500 MG/100ML SOLUTION: Performed by: FAMILY MEDICINE

## 2024-03-15 RX ORDER — DIVALPROEX SODIUM 250 MG/1
1000 TABLET, EXTENDED RELEASE ORAL NIGHTLY
Status: DISCONTINUED | OUTPATIENT
Start: 2024-03-15 | End: 2024-03-16

## 2024-03-15 RX ORDER — DIVALPROEX SODIUM 250 MG/1
500 TABLET, EXTENDED RELEASE ORAL EVERY MORNING
Status: DISCONTINUED | OUTPATIENT
Start: 2024-03-16 | End: 2024-03-16

## 2024-03-15 RX ORDER — LISINOPRIL 20 MG/1
20 TABLET ORAL
Status: DISCONTINUED | OUTPATIENT
Start: 2024-03-15 | End: 2024-03-23

## 2024-03-15 RX ORDER — ISOSORBIDE MONONITRATE 30 MG/1
30 TABLET, EXTENDED RELEASE ORAL
Status: DISCONTINUED | OUTPATIENT
Start: 2024-03-15 | End: 2024-03-18

## 2024-03-15 RX ORDER — LEVETIRACETAM 500 MG/1
500 TABLET ORAL EVERY 12 HOURS SCHEDULED
Status: DISCONTINUED | OUTPATIENT
Start: 2024-03-15 | End: 2024-03-31 | Stop reason: HOSPADM

## 2024-03-15 RX ORDER — DIVALPROEX SODIUM 250 MG/1
500 TABLET, EXTENDED RELEASE ORAL EVERY MORNING
Status: DISCONTINUED | OUTPATIENT
Start: 2024-03-15 | End: 2024-03-15

## 2024-03-15 RX ORDER — VERAPAMIL HYDROCHLORIDE 240 MG/1
240 TABLET, FILM COATED, EXTENDED RELEASE ORAL
Status: DISCONTINUED | OUTPATIENT
Start: 2024-03-15 | End: 2024-03-18

## 2024-03-15 RX ORDER — LEVOTHYROXINE SODIUM 88 UG/1
88 TABLET ORAL
Status: DISCONTINUED | OUTPATIENT
Start: 2024-03-15 | End: 2024-03-31 | Stop reason: HOSPADM

## 2024-03-15 RX ADMIN — METRONIDAZOLE 500 MG: 500 INJECTION, SOLUTION INTRAVENOUS at 16:33

## 2024-03-15 RX ADMIN — METRONIDAZOLE 500 MG: 500 INJECTION, SOLUTION INTRAVENOUS at 07:44

## 2024-03-15 RX ADMIN — HYDRALAZINE HYDROCHLORIDE 10 MG: 20 INJECTION INTRAMUSCULAR; INTRAVENOUS at 18:15

## 2024-03-15 RX ADMIN — METOCLOPRAMIDE 10 MG: 5 INJECTION, SOLUTION INTRAMUSCULAR; INTRAVENOUS at 06:33

## 2024-03-15 RX ADMIN — Medication 10 ML: at 20:11

## 2024-03-15 RX ADMIN — METOCLOPRAMIDE 10 MG: 5 INJECTION, SOLUTION INTRAMUSCULAR; INTRAVENOUS at 00:31

## 2024-03-15 RX ADMIN — METRONIDAZOLE 500 MG: 500 INJECTION, SOLUTION INTRAVENOUS at 23:13

## 2024-03-15 RX ADMIN — LEVETIRACETAM 500 MG: 500 TABLET, FILM COATED ORAL at 20:10

## 2024-03-15 RX ADMIN — INSULIN HUMAN 2 UNITS: 100 INJECTION, SOLUTION PARENTERAL at 05:13

## 2024-03-15 RX ADMIN — VERAPAMIL HYDROCHLORIDE 240 MG: 240 TABLET, FILM COATED, EXTENDED RELEASE ORAL at 12:02

## 2024-03-15 RX ADMIN — HYDROMORPHONE HYDROCHLORIDE 0.5 MG: 1 INJECTION, SOLUTION INTRAMUSCULAR; INTRAVENOUS; SUBCUTANEOUS at 03:20

## 2024-03-15 RX ADMIN — HYDROMORPHONE HYDROCHLORIDE 0.5 MG: 1 INJECTION, SOLUTION INTRAMUSCULAR; INTRAVENOUS; SUBCUTANEOUS at 20:10

## 2024-03-15 RX ADMIN — SMOFLIPID 250 ML: 6; 6; 5; 3 INJECTION, EMULSION INTRAVENOUS at 17:07

## 2024-03-15 RX ADMIN — LEVETIRACETAM 500 MG: 100 INJECTION, SOLUTION INTRAVENOUS at 08:21

## 2024-03-15 RX ADMIN — CEFTRIAXONE 2000 MG: 2 INJECTION, POWDER, FOR SOLUTION INTRAMUSCULAR; INTRAVENOUS at 13:30

## 2024-03-15 RX ADMIN — PANTOPRAZOLE SODIUM 40 MG: 40 INJECTION, POWDER, FOR SOLUTION INTRAVENOUS at 05:13

## 2024-03-15 RX ADMIN — ISOSORBIDE MONONITRATE 30 MG: 30 TABLET, EXTENDED RELEASE ORAL at 08:21

## 2024-03-15 RX ADMIN — INSULIN HUMAN 2 UNITS: 100 INJECTION, SOLUTION PARENTERAL at 12:02

## 2024-03-15 RX ADMIN — HYDRALAZINE HYDROCHLORIDE 20 MG: 20 INJECTION INTRAMUSCULAR; INTRAVENOUS at 06:22

## 2024-03-15 RX ADMIN — Medication 10 MG: at 18:59

## 2024-03-15 RX ADMIN — METOCLOPRAMIDE 10 MG: 5 INJECTION, SOLUTION INTRAMUSCULAR; INTRAVENOUS at 18:57

## 2024-03-15 RX ADMIN — LISINOPRIL 20 MG: 20 TABLET ORAL at 08:21

## 2024-03-15 RX ADMIN — FLUCONAZOLE 400 MG: 400 INJECTION, SOLUTION INTRAVENOUS at 09:35

## 2024-03-15 RX ADMIN — ENOXAPARIN SODIUM 100 MG: 100 INJECTION SUBCUTANEOUS at 08:21

## 2024-03-15 RX ADMIN — SODIUM CHLORIDE 500 MG: 9 INJECTION, SOLUTION INTRAVENOUS at 08:20

## 2024-03-15 RX ADMIN — LEVOTHYROXINE SODIUM 88 MCG: 88 TABLET ORAL at 12:02

## 2024-03-15 RX ADMIN — ENOXAPARIN SODIUM 100 MG: 100 INJECTION SUBCUTANEOUS at 20:10

## 2024-03-15 RX ADMIN — WATER: 1 INJECTION INTRAMUSCULAR; INTRAVENOUS; SUBCUTANEOUS at 17:06

## 2024-03-15 RX ADMIN — DIVALPROEX SODIUM 1000 MG: 250 TABLET, EXTENDED RELEASE ORAL at 20:10

## 2024-03-15 RX ADMIN — METOCLOPRAMIDE 10 MG: 5 INJECTION, SOLUTION INTRAMUSCULAR; INTRAVENOUS at 13:30

## 2024-03-15 NOTE — THERAPY TREATMENT NOTE
Acute Care - Speech Language Pathology   Swallow Treatment Note [facility]     Patient Name: Lea Rivers  : 1945  MRN: [id number]  Today's Date: 3/15/2024               Admit Date: 3/2/2024    Visit Dx:     ICD-10-CM ICD-9-CM   1. Fall, initial encounter  W19.XXXA E888.9   2. Closed fracture of multiple ribs of left side, initial encounter  S22.42XA 807.09   3. Closed displaced fracture of acromial end of left clavicle, initial encounter  S42.032A 810.03   4. Falls frequently  R29.6 V15.88   5. Acute UTI (urinary tract infection)  N39.0 599.0   6. Colon distention  K63.89 569.89   7. Paralytic ileus of small intestine and colon  K56.0 560.1   8. Oropharyngeal dysphagia  R13.12 787.22     Patient Active Problem List   Diagnosis    Coronary artery disease    Dyslipidemia    Hypothyroidism    GERD (gastroesophageal reflux disease)    Seizure disorder    BPH (benign prostatic hypertrophy)    Hypertension    Primary osteoarthritis of both knees    Syncope and collapse    Precordial pain    Diabetes    Status post total right knee replacement    Postoperative urinary retention    Confusion, postoperative    Acute blood loss anemia, asymptomatic, no transfusion required    Elevated prostate specific antigen (PSA)    Prostate cancer    Anemia    Body mass index (BMI) of 32.0-32.9 in adult    Localization-related focal epilepsy with simple partial seizures    Need for vaccination against Streptococcus pneumoniae    Upper extremity tendon tear, right, initial encounter    Vitamin D deficiency    PSA elevation    Polyp of colon    Overactive bladder    Gross hematuria    History of colon polyps    History of colon cancer    B12 deficiency    Falls    Pneumatosis intestinalis     Past Medical History:   Diagnosis Date    Anemia     Colon cancer     Status post partial colectomy in . No chemotherapy or radiation therapy.    Coronary artery disease     Nonobstructive coronary artery disease.    Diabetes      Dyslipidemia     GERD (gastroesophageal reflux disease)     Hx of.    Hyperlipidemia     Hypertension     Hyponatremia     Hypothyroidism     Left shoulder pain     Low sodium levels 2022    recent issue; saw nephrologist who ruled out kidney issues; took Sodium Chloride po to bring levels up    Memory deficit     FROM ANESTHESIA    Osteoarthritis     Prostate cancer 07/23/2022    Seizure disorder     silent seziure-diagnosed years ago    Skin cancer      Past Surgical History:   Procedure Laterality Date    APPENDECTOMY      CARDIAC CATHETERIZATION  2012    30 to 40% LAD    CARPAL TUNNEL RELEASE Bilateral     CHOLECYSTECTOMY      COLECTOMY PARTIAL / TOTAL  1990    Partial colectomy    COLON RESECTION N/A 3/9/2024    Procedure: TOTAL ABDOMINAL COLECTOMY, END ILEOSTOMY;  Surgeon: Harley Godfrey MD;  Location:  [facility];  Service: General;  Laterality: N/A;    COLONOSCOPY      CYBERKNIFE  02/09/2023    Prostate/SV    CYSTOSCOPY TRANSURETHRAL RESECTION OF PROSTATE N/A 09/21/2018    Procedure: CYSTOSCOPY TRANSURETHRAL RESECTION OF PROSTATE GREENLIGHT;  Surgeon: Naseem Clayton MD;  Location:  [facility];  Service: Urology    OTHER SURGICAL HISTORY      Contraction surgery on bilateral hands and wrists.    PROSTATE BIOPSY N/A 11/11/2022    Procedure: TRANSRECTAL ULTRASOUND GUIDED BIOPSY OF PROSTATE;  Surgeon: Naseem Noland MD;  Location:  [facility];  Service: Urology;  Laterality: N/A;    SINUS SURGERY      SKIN BIOPSY      TEETH EXTRACTION      TONSILLECTOMY      TOTAL KNEE ARTHROPLASTY Right 04/07/2022    Procedure: TOTAL KNEE ARTHROPLASTY WITH ORTHO ROBOT RIGHT;  Surgeon: Louis Malcolm MD;  Location:  [facility];  Service: Robotics - Ortho;  Laterality: Right;    TRANSRECTAL INCISION PROSTATE WITH GOLD SEED Bilateral 01/06/2023    Procedure: TRANSRECTAL ULTRASOUND GUIDED PROSTATE FIDUCIAL MARKER PLACEMENT;  Surgeon: Naseem Noland MD;  Location:  [facility];  Service: Urology;  Laterality:  Bilateral;    TURP / TRANSURETHRAL INCISION / DRAINAGE PROSTATE      VASECTOMY         SLP Recommendation and Plan  SLP Swallowing Diagnosis: moderate, oral dysphagia, pharyngeal dysphagia (03/15/24 1400)  SLP Diet Recommendation: puree, no mixed consistencies, honey thick liquids (03/15/24 1400)  Recommended Precautions and Strategies: upright posture during/after eating, small bites of food and sips of liquid, multiple swallows per bite of food, multiple swallows per sip of liquid, check mouth frequently for oral residue/pocketing, general aspiration precautions, 1:1 supervision, assist with feeding, fatigue precautions (03/15/24 1400)  SLP Rec. for Method of Medication Administration: meds crushed, with puree, as tolerated, meds via alternate route (03/15/24 1400)     Monitor for Signs of Aspiration: yes, notify SLP if any concerns (03/15/24 1400)     Swallow Criteria for Skilled Therapeutic Interventions Met: demonstrates skilled criteria (03/15/24 1400)  Anticipated Discharge Disposition (SLP): skilled nursing facility (03/15/24 1400)  Rehab Potential/Prognosis, Swallowing: good, to achieve stated therapy goals (03/15/24 1400)  Therapy Frequency (Swallow): 5 days per week (03/15/24 1400)  Predicted Duration Therapy Intervention (Days): until discharge (03/15/24 1400)  Oral Care Recommendations: Oral Care BID/PRN, Suction toothbrush (03/15/24 1400)        Daily Summary of Progress (SLP): progress toward functional goals as expected (03/15/24 1400)               Treatment Assessment (SLP): continued, oral dysphagia, pharyngeal dysphagia (03/15/24 1400)  Treatment Assessment Comments (SLP): Patient tolerated trials of pureed and honey thick liquids without s/s of aspiration.Dysphagia exersices completed with mod cues. Patient had some difficulty with multi step exercises even with cues and models. (03/15/24 1400)  Plan for Continued Treatment (SLP): continue treatment per plan of care (03/15/24 1400)                 SWALLOW EVALUATION (Last 72 Hours)       SLP Adult Swallow Evaluation       Row Name 03/15/24 1400 03/14/24 1245 03/14/24 0940 03/13/24 6878          Rehab Evaluation    Document Type therapy note (daily note)  - evaluation  - re-evaluation  - re-evaluation  -     Subjective Information no complaints  - no complaints  - no complaints  - no complaints  -     Patient Observations alert;cooperative;agree to therapy  - alert  - alert;cooperative  - alert;cooperative  -     Patient/Family/Caregiver Comments/Observations sitter present  - no family present  - no family present  - Family present  -     Patient Effort adequate  - adequate  - adequate  - adequate  -     Symptoms Noted During/After Treatment none  - none  - none  - none  -     Oral Care -- -- lip/mouth moisturizer applied;suction provided;swabbed with antiseptic solution;teeth brushed - suction toothbrush;tongue brushed  - --        General Information    Patient Profile Reviewed yes  - yes  - yes  - yes  -     Pertinent History Of Current Problem -- see am eval; referred for FEES to determine po intake  - see initial eval; more alert this date  - See intial eval  -     Current Method of Nutrition -- NPO;parenteral feedings  - NPO;parenteral feedings  - NPO;parenteral feedings  -     Precautions/Limitations, Vision -- WFL;for purposes of eval  - WFL;for purposes of eval  - WFL;for purposes of al  -     Precautions/Limitations, Hearing -- WFL;for purposes of eval  - WFL;for purposes of eval  - WFL;for purposes of eval  -     Prior Level of Function-Communication -- cognitive-linguistic impairment;other (see comments)  - cognitive-linguistic impairment;other (see comments)  - cognitive-linguistic impairment;other (see comments)  was living alone & driving PTA per chart; son reported pt typically able to communicate normally  -     Prior Level of Function-Swallowing  -- no diet consistency restrictions  - no diet consistency restrictions  - no diet consistency restrictions  -     Plans/Goals Discussed with -- patient and family;agreed upon  - patient and family;agreed upon  - patient and family;agreed upon  -     Barriers to Rehab -- cognitive status;medically complex  - cognitive status;medically complex  - cognitive status;medically complex  -     Patient's Goals for Discharge -- return to PO diet  - return to PO diet  - patient did not state  -     Family Goals for Discharge -- family did not state  - -- family did not state  -        Pain    Additional Documentation Pain Scale: FACES Pre/Post-Treatment (Group)  - Pain Scale: FACES Pre/Post-Treatment (Group)  - Pain Scale: FACES Pre/Post-Treatment (Group)  - Pain Scale: FACES Pre/Post-Treatment ([facility])  [facility]        Pain Scale: FACES Pre/Post-Treatment    Pain: FACES Scale, Pretreatment 0-->no hurt  -CH 0-->no hurt  -CJ 0-->no hurt  -CJ 2-->hurts little bit  -     Posttreatment Pain Rating 0-->no hurt  -CH 0-->no hurt  -CJ 0-->no hurt  -CJ 2-->hurts little bit  -     Pain Location - -- -- -- back  -     Pre/Posttreatment Pain Comment -- -- -- RN aware  -        Oral Motor Structure and Function    Dentition Assessment -- -- natural, present and adequate  - natural, present and adequate  [facility]     Secretion Management -- -- dried secretions in oral cavity  - --     Mucosal Quality -- -- dry;sticky  - --        Oral Musculature and Cranial Nerve Assessment    Oral Motor General Assessment -- -- generalized oral motor weakness  - unable to assess;other (see comments)  pt u/a to follow OM commands  -        General Eating/Swallowing Observations    Respiratory Support Currently in Use -- -- room air  - room air  -     Eating/Swallowing Skills -- -- fed by SLP;self-fed;needed assist  - fed by SLP  -MH     Positioning During Eating -- -- upright in bed  - upright in bed  -      Utensils Used -- -- cup;spoon;straw  - spoon;cup  -     Consistencies Trialed -- -- pureed;ice chips;thin liquids  - ice chips;thin liquids  clear liquid restriction  -        Clinical Swallow Eval    Oral Prep Phase -- -- -- impaired  -     Oral Transit -- -- -- impaired  -     Pharyngeal Phase -- -- suspected pharyngeal impairment  - suspected pharyngeal impairment  -     Clinical Swallow Evaluation Summary -- -- More alert this date in comparison to previous evaluation, pt conversing as well although still continues w/ ams. Hard cough w/ trials of thin liquids. No glaring overt s/s of aspiration w/ any other presentations. Will plan to attempt FEES today w/ further recs pending.  - Pt lethargic, max cues required for pt to accept trials. Pt accepted minimal thin liquid trials, declining additional trials. Multiple swallows & wet cough w/ thins via tsp. Had lengthy d/w dtr regarding overt s/s of aspiration & instrumental assessment. Continued concern pt not appropraite for FEES given cog status and poor acceptance of PO trials, also discussed concern pt would not tolerate MBS given significant pain w/ movement. Recommend cont NPO, SLP will f/u for re-eval  -        Oral Prep Concerns    Oral Prep Concerns -- -- -- incomplete or weak lip closure around spoon;anterior loss  -     Incomplete or Weak Lip Closure Around Spoon -- -- -- thin  -     Anterior Loss -- -- -- thin  -        Oral Transit Concerns    Oral Transit Concerns -- -- -- increased oral transit time  -     Increased Oral Transit Time -- -- -- thin  -        Pharyngeal Phase Concerns    Pharyngeal Phase Concerns -- -- -- multiple swallows;cough  -     Multiple Swallows -- -- -- thin  -     Cough -- -- -- thin  -        Fiberoptic Endoscopic Evaluation of Swallowing (FEES)    Risks/Benefits Reviewed -- risks/benefits explained;patient;agreed to eval  - -- --     Nasal Entry -- right:  - -- --     Scope serial  number/identification -- 338  -CJ -- --        Anatomy and Physiology    Anatomic Considerations -- erythema  -CJ -- --     Comment -- Thick dried/stringy yellow tinged secretions coated in pharynx  -CJ -- --     Velopharyngeal -- CNA  -CJ -- --     Base of Tongue -- range reduced  -CJ -- --     Epiglottis -- WFL  -CJ -- --     Laryngeal Function Breathing -- symmetrical  -CJ -- --     Laryngeal Function Phonation -- CNA  -CJ -- --     Laryngeal Function to Breath Hold -- CNA  -CJ -- --     Secretion Rating Scale (Peter mathis alDarryl 1996) -- 2- secretions initially outside the vestibule but later entered the vestibule  -CJ -- --     Secretion Description -- thick;discolored  -CJ -- --     Ice Chips -- partially cleared secretions  -CJ -- --     Spontaneous Swallow -- frequency reduced  -CJ -- --     Sensory -- sensed scope  -CJ -- --     Utensils Used -- Spoon;Cup;Straw  -CJ -- --     Consistencies Trialed -- thin liquids;ice chips;nectar-thick liquids;honey-thick liquids;pudding/puree;spoon;cup;straw  -CJ -- --        FEES Interpretation    Oral Phase -- solids not tested  pt refused  -CJ -- --        Initiation of Pharyngeal Swallow    Initiation of Pharyngeal Swallow -- bolus in pyriform sinuses  -CJ -- --     Pharyngeal Phase -- impaired pharyngeal phase of swallowing  -CJ -- --     Aspiration During the Swallow -- thin liquids;nectar-thick liquids;secondary to delayed swallow initiation or mistiming;secondary to reduced laryngeal elevation;secondary to reduced vestibular closure  -CJ -- --     Aspiration After the Swallow -- thin liquids;nectar-thick liquids;secondary to residue;in pyriform sinuses;in laryngeal vestibule  -CJ -- --     Depth of Penetration -- deep  -CJ -- --     Response to Penetration -- No  -CJ -- --     No spontaneous response to penetration and -- non-effective laryngeal clearance with cue (see comments)  -CJ -- --     Response to Aspiration -- No  -CJ -- --     No spontaneous response to  aspiration with -- could not produce cough response despite cue  2/2 pt becoming increasingly agitated  -CJ -- --     Rosenbek's Scale -- thin:;nectar:;8-->Level 8  -CJ -- --     Residue -- all consistencies tested;diffuse within pharynx;secondary to reduced posterior pharyngeal wall stripping;secondary to reduced laryngeal elevation;secondary to reduced hyolaryngeal excursion;other (see comments)  worse w/ thins/nectar  -CJ -- --     Response to Residue -- unable to clear residue;with cued swallow  -CJ -- --     Attempted Compensatory Maneuvers -- bolus size;bolus presentation style  -CJ -- --     Response to Attempted Compensatory Maneuvers -- did not prevent aspiration  -CJ -- --     Successful Compensatory Maneuver Competency -- patient unable to adequately demonstrate/teach back compensations  -CJ -- --     FEES Summary -- Moderate oropharyngeal dysphagia. Pt refused trials of solids and became increasingly restless as evaluation continued. Noted thick diffuse stringy secretions coated in pharynx prior to po presentations, able to clear majority after multiple swallows but not all of secretions. Silent aspiration w/ thin/nectar during/after the swallow. Unable to perform cued cough despite max cues and prompts. No penetration w/ pudding or honey thick liquids via spoon/cup/straw. Pt refused further po presentations when attempted solids. Will initiate modified po diet of puree w/ honey thick liquids ,no mixed consistencies. Will monitor closely given waxing/waning mental status  -CJ -- --        SLP Evaluation Clinical Impression    SLP Swallowing Diagnosis moderate;oral dysphagia;pharyngeal dysphagia  -CH moderate;oral dysphagia;pharyngeal dysphagia  -CJ oral dysphagia;suspected pharyngeal dysphagia  -CJ oral dysphagia;suspected pharyngeal dysphagia  -     Functional Impact risk of aspiration/pneumonia;risk of malnutrition;risk of dehydration  - risk of aspiration/pneumonia;risk of malnutrition;risk of  dehydration  - risk of aspiration/pneumonia;risk of malnutrition;risk of dehydration  - risk of aspiration/pneumonia;risk of malnutrition;risk of dehydration  -     Rehab Potential/Prognosis, Swallowing good, to achieve stated therapy goals  -CH good, to achieve stated therapy goals  -CJ good, to achieve stated therapy goals  -CJ good, to achieve stated therapy goals  -     Swallow Criteria for Skilled Therapeutic Interventions Met demonstrates skilled criteria  -CH demonstrates skilled criteria  -CJ demonstrates skilled criteria  - demonstrates skilled criteria  -        SLP Treatment Clinical Impressions    Treatment Assessment (SLP) continued;oral dysphagia;pharyngeal dysphagia  - -- -- --     Treatment Assessment Comments (SLP) Patient tolerated trials of pureed and honey thick liquids without s/s of aspiration.Dysphagia exersices completed with mod cues. Patient had some difficulty with multi step exercises even with cues and models.  - -- -- --     Daily Summary of Progress (SLP) progress toward functional goals as expected  - -- -- --     Barriers to Overall Progress (SLP) Cognitive status  - -- -- --     Plan for Continued Treatment (SLP) continue treatment per plan of care  - -- -- --     Care Plan Review evaluation/treatment results reviewed;care plan/treatment goals reviewed  - -- -- --        Recommendations    Therapy Frequency ([facility]) 5 days per week  - 5 days per week  - -- --     Predicted Duration Therapy Intervention (Days) until discharge  - until discharge  - until discharge  - until discharge  -[facility] Diet Recommendation puree;no mixed consistencies;honey thick liquids  - puree;no mixed consistencies;honey thick liquids  - NPO;temporary alternate methods of nutrition/hydration  - NPO  -     Recommended Diagnostics -- -- reassess via FEES  - reassess via clinical swallow evaluation;other (see comments)  -     Recommended Precautions and  Strategies upright posture during/after eating;small bites of food and sips of liquid;multiple swallows per bite of food;multiple swallows per sip of liquid;check mouth frequently for oral residue/pocketing;general aspiration precautions;1:1 supervision;assist with feeding;fatigue precautions  - upright posture during/after eating;small bites of food and sips of liquid;multiple swallows per bite of food;multiple swallows per sip of liquid;check mouth frequently for oral residue/pocketing;general aspiration precautions;1:1 supervision;assist with feeding;fatigue precautions  - general aspiration precautions  - --     Oral Care Recommendations Oral Care BID/PRN;Suction toothbrush  - Oral Care BID/PRN;Suction toothbrush  - Oral Care BID/PRN;Suction toothbrush  - Oral Care BID/PRN;Suction toothbrush  -     SLP Rec. for Method of Medication Administration meds crushed;with puree;as tolerated;meds via alternate route  - meds crushed;with puree;as tolerated;meds via alternate route  - meds via alternate route  - meds via alternate route  -     Monitor for Signs of Aspiration yes;notify SLP if any concerns  - yes;notify SLP if any concerns  - yes;notify SLP if any concerns  - yes;notify SLP if any concerns  -     Anticipated Discharge Disposition (SLP) skilled nursing facility  - skilled nursing facility  - skilled nursing facility  - skilled nursing facility  -               User Key  (r) = Recorded By, (t) = Taken By, (c) = Cosigned By      Initials Name Effective Dates     Jaylyn Peterson MS CCC-SLP 07/11/23 -      Cecelia Carvajal MS CCC-SLP 06/16/21 -      Shi Guadalupe MS CCC-SLP 05/12/23 -                     EDUCATION  The patient has been educated in the following areas:   Home Exercise Program (HEP) Dysphagia (Swallowing Impairment) Oral Care/Hydration Modified Diet Instruction.        SLP GOALS       Row Name 03/15/24 1400 03/14/24 7605          (LTG) Patient  will demonstrate functional swallow for    Diet Texture (Demonstrate functional swallow) soft to chew (chopped) textures  -CH soft to chew (chopped) textures  -CJ     Liquid viscosity (Demonstrate functional swallow) nectar/ mildly thick liquids  -CH nectar/ mildly thick liquids  -CJ     [place] (Demonstrate functional swallow) with minimal cues (75-90% accuracy)  -CH with minimal cues (75-90% accuracy)  -CJ     Time Frame (Demonstrate functional swallow) by discharge  -CH by discharge  -CJ     Progress/Outcomes (Demonstrate functional swallow) continuing progress toward goal  -CH new goal  -CJ        (STG) Patient will tolerate trials of    Consistencies Trialed (Tolerate trials) pureed textures;honey/ moderately thick liquids  -CH pureed textures;honey/ moderately thick liquids  -CJ     Desired Outcome (Tolerate trials) without signs/symptoms of aspiration;without signs of distress;with adequate oral prep/transit/clearance  -CH without signs/symptoms of aspiration;without signs of distress;with adequate oral prep/transit/clearance  -CJ     [place] (Tolerate trials) with minimal cues (75-90% accuracy)  -CH with minimal cues (75-90% accuracy)  -CJ     Time Frame (Tolerate trials) 1 week  -CH 1 week  -CJ     Progress/Outcomes (Tolerate trials) continuing progress toward goal  -CH new goal  -CJ     Comment (Tolerate trials) No overt clinical s/s of aspiration with pureed or honey thick trials.  -CH --        (STG) Patient will tolerate therapeutic trials of    Consistencies Trialed (Tolerate therapeutic trials) mechanical ground textures  -CH mechanical ground textures  -CJ     Desired Outcome (Tolerate therapeutic trials) with adequate oral prep/transit/clearance  -CH with adequate oral prep/transit/clearance  -CJ     [place] (Tolerate therapeutic trials) with minimal cues (75-90% accuracy)  -CH with minimal cues (75-90% accuracy)  -CJ     Time Frame (Tolerate therapeutic trials) 1 week  -CH 1 week   -CJ     Progress/Outcomes (Tolerate therapeutic trials) goal ongoing  -CH new goal  -CJ        (STG) Lingual Strengthening Goal 1 (SLP)    Activity (Lingual Strengthening Goal 1, SLP) increase lingual tone/sensation/control/coordination/movement;increase tongue back strength  -CH increase lingual tone/sensation/control/coordination/movement;increase tongue back strength  -CJ     Increase Lingual Tone/Sensation/Control/Coordination/Movement swallow trials;lingual resistance exercises  - swallow trials;lingual resistance exercises  -CJ     Increase Tongue Back Strength lingual resistance exercises  - lingual resistance exercises  -CJ     [place]/Accuracy (Lingual Strengthening Goal 1, SLP) with moderate cues (50-74% accuracy)  -CH with moderate cues (50-74% accuracy)  -CJ     Time Frame (Lingual Strengthening Goal 1, SLP) 1 week  -CH 1 week  -CJ     Progress/Outcomes (Lingual Strengthening Goal 1, SLP) continuing progress toward goal  -CH new goal  -CJ     Comment (Lingual Strengthening Goal 1, SLP) Exercises completed with mod cues and models. Patient had difficulty with multi step exercises even with mod cues.  - --        (STG) Pharyngeal Strengthening Exercise Goal 1 (SLP)    Activity (Pharyngeal Strengthening Goal 1, SLP) increase timing;increase superior movement of the hyolaryngeal complex;increase anterior movement of the hyolaryngeal complex;increase squeeze/positive pressure generation  - increase timing;increase superior movement of the hyolaryngeal complex;increase anterior movement of the hyolaryngeal complex;increase squeeze/positive pressure generation  -CJ     Increase Timing prepping - 3 second prep or suck swallow or 3-step swallow  -CH prepping - 3 second prep or suck swallow or 3-step swallow  -CJ     Increase Superior Movement of the Hyolaryngeal Complex falsetto  -CH falsetto  -CJ     Increase Anterior Movement of the Hyolaryngeal Complex chin tuck against resistance (CTAR)  - chin  tuck against resistance (CTAR)  -CJ     Increase Squeeze/Positive Pressure Generation hard effortful swallow  -CH hard effortful swallow  -CJ     [place]/Accuracy (Pharyngeal Strengthening Goal 1, SLP) with maximum cues (25-49% accuracy)  -CH with maximum cues (25-49% accuracy)  -CJ     Time Frame (Pharyngeal Strengthening Goal 1, SLP) 1 week  -CH 1 week  -CJ     Progress/Outcomes (Pharyngeal Strengthening Goal 1, SLP) continuing progress toward goal  -CH new goal  -CJ     Comment (Pharyngeal Strengthening Goal 1, SLP) Exercises completed with mod cues and models. Patient had difficulty with multi step exercises even with mod cues.  -CH --               User Key  (r) = Recorded By, (t) = Taken By, (c) = Cosigned By      Initials Name Provider Type    Jaylyn Holland MS CCC-SLP Speech and Language Pathologist    Cecelia Limon MS CCC-SLP Speech and Language Pathologist                       Time Calculation:    Time Calculation- SLP       Row Name 03/15/24 1502             Time Calculation- SLP    SLP Start Time 1400  -CH      SLP Received On 03/15/24  -CH         Untimed Charges    22728-WT Treatment Swallow Minutes 45  -CH         Total Minutes    Untimed Charges Total Minutes 45  -CH       Total Minutes 45  -CH                User Key  (r) = Recorded By, (t) = Taken By, (c) = Cosigned By      Initials Name Provider Type    Cecelia Limon MS CCC-SLP Speech and Language Pathologist                    Therapy Charges for Today       Code Description Service Date Service Provider Modifiers Qty    19163620690 HC ST TREATMENT SWALLOW 3 3/15/2024 Cecelia Carvajal MS CCC-SLP GN 1                 Cecelia Carvajal MS CCC-NATALIO  3/15/2024

## 2024-03-15 NOTE — PLAN OF CARE
Problem: Adult Inpatient Plan of Care  Goal: Plan of Care Review  Outcome: Ongoing, Progressing  Flowsheets (Taken 3/15/2024 7511)  Progress: improving  Plan of Care Reviewed With: patient     Goal Outcome Evaluation:    No acute events overnight. Tolerating RA, NSR on monitor, afebrile, systolic BP maintained around 150-160mmHg. A+O to self and birthday, disoriented otherwise, PRN dilaudid given x 2 for back and L shoulder pain. Pt behavior appropriate throughout shift - no attempts to get up unassisted, no attempts to remove equipment/PICC, rested well. No c/o nausea or SOA. 175mL stool output, 550 mL UOP via PW. TPN continues, lipids complete.

## 2024-03-15 NOTE — PROGRESS NOTES
CPN Review Note    Patient Name: Lea Rivers  Date of Encounter: 03/15/24 11:16 EDT  MRN: [id number]  Admission date: 3/2/2024    Reason for visit: CPN review . RD to continue to follow per protocol.     Information Obtained:  TPN infusing at goal at bedside-pt started on pureed/HTL yesterday though nsg states pt did not eat lunch or dinner last night, had 1 cup of applesauce this am. Based on limited intake, difficult to discern if pt tolerating po intake-would recommend continue TPN today and reassess tomorrow. Discussed w/sx yesterday-ok w/transitioning to keofeed if bowel fxn returned and po intake remains poor.     EMR reviewed:  yes  Medication reviewed: yes  Labs reviewed: yes    Current diet: Diet: Gastrointestinal, Diabetic, Cardiac; Healthy Heart (2-3 Na+); Consistent Carbohydrate; Low Irritant; No Mixed Consistencies, Feeding Assistance - Nursing; Texture: Pureed (NDD 1); Fluid Consistency: Honey Thick  Adult Central 2-in-1 TPN    Estimated Needs:   Calories ~ 1800 Kcal/day  Protein ~ 120 g PRO/day    PN Route: PICC  PN:   15% dextrose, 7.7% AA @ goal rate of 65mL/hr.         GIR:  1.82mg/kg/min  Lipid: Provide 250mL 20% SMOF daily                  PN@ Goal over 24hr to Supply:  Volume 1560 mL/day     Energy 1776 Kcal/day 99 % Est Need   Protein 120 g/day 100 % Est Need     ---------------------------------------------------------------------------  Formula/Rate verified at bedside: Yes   Infusing Rate at time of visit: 65    Average Delivery from Charting:   PN Volume 1698 mL/day     Lipid delivered?  Y      Energy  Kcal/day  % Est Need   Protein  g/day  % Est Need       Intervention:  Maintain TPN @ 65ml/hr.   Monitor diet progression and PO tolerance.        Follow up:   Per protocol    Aria Morton MS,RD,LD  11:16 EDT  Time: 20min

## 2024-03-15 NOTE — THERAPY RE-EVALUATION
Patient Name: Lea Rivers  : 1945    MRN: [id number]                              Today's Date: 3/15/2024       Admit Date: 3/2/2024    Visit Dx:     ICD-10-CM ICD-9-CM   1. Fall, initial encounter  W19.XXXA E888.9   2. Closed fracture of multiple ribs of left side, initial encounter  S22.42XA 807.09   3. Closed displaced fracture of acromial end of left clavicle, initial encounter  S42.032A 810.03   4. Falls frequently  R29.6 V15.88   5. Acute UTI (urinary tract infection)  N39.0 599.0   6. Colon distention  K63.89 569.89   7. Paralytic ileus of small intestine and colon  K56.0 560.1   8. Oropharyngeal dysphagia  R13.12 787.22     Patient Active Problem List   Diagnosis    Coronary artery disease    Dyslipidemia    Hypothyroidism    GERD (gastroesophageal reflux disease)    Seizure disorder    BPH (benign prostatic hypertrophy)    Hypertension    Primary osteoarthritis of both knees    Syncope and collapse    Precordial pain    Diabetes    Status post total right knee replacement    Postoperative urinary retention    Confusion, postoperative    Acute blood loss anemia, asymptomatic, no transfusion required    Elevated prostate specific antigen (PSA)    Prostate cancer    Anemia    Body mass index (BMI) of 32.0-32.9 in adult    Localization-related focal epilepsy with simple partial seizures    Need for vaccination against Streptococcus pneumoniae    Upper extremity tendon tear, right, initial encounter    Vitamin D deficiency    PSA elevation    Polyp of colon    Overactive bladder    Gross hematuria    History of colon polyps    History of colon cancer    B12 deficiency    Falls    Pneumatosis intestinalis     Past Medical History:   Diagnosis Date    Anemia     Colon cancer     Status post partial colectomy in . No chemotherapy or radiation therapy.    Coronary artery disease     Nonobstructive coronary artery disease.    Diabetes     Dyslipidemia     GERD (gastroesophageal reflux  disease)     Hx of.    Hyperlipidemia     Hypertension     Hyponatremia     Hypothyroidism     Left shoulder pain     Low sodium levels 2022    recent issue; saw nephrologist who ruled out kidney issues; took Sodium Chloride po to bring levels up    Memory deficit     FROM ANESTHESIA    Osteoarthritis     Prostate cancer 07/23/2022    Seizure disorder     silent seziure-diagnosed years ago    Skin cancer      Past Surgical History:   Procedure Laterality Date    APPENDECTOMY      CARDIAC CATHETERIZATION  2012    30 to 40% LAD    CARPAL TUNNEL RELEASE Bilateral     CHOLECYSTECTOMY      COLECTOMY PARTIAL / TOTAL  1990    Partial colectomy    COLON RESECTION N/A 3/9/2024    Procedure: TOTAL ABDOMINAL COLECTOMY, END ILEOSTOMY;  Surgeon: Harley Godfrey MD;  Location:  [facility];  Service: General;  Laterality: N/A;    COLONOSCOPY      CYBERKNIFE  02/09/2023    Prostate/SV    CYSTOSCOPY TRANSURETHRAL RESECTION OF PROSTATE N/A 09/21/2018    Procedure: CYSTOSCOPY TRANSURETHRAL RESECTION OF PROSTATE GREENLIGHT;  Surgeon: Naseem Clayton MD;  Location:  [facility];  Service: Urology    OTHER SURGICAL HISTORY      Contraction surgery on bilateral hands and wrists.    PROSTATE BIOPSY N/A 11/11/2022    Procedure: TRANSRECTAL ULTRASOUND GUIDED BIOPSY OF PROSTATE;  Surgeon: Naseem Noland MD;  Location:  [facility];  Service: Urology;  Laterality: N/A;    SINUS SURGERY      SKIN BIOPSY      TEETH EXTRACTION      TONSILLECTOMY      TOTAL KNEE ARTHROPLASTY Right 04/07/2022    Procedure: TOTAL KNEE ARTHROPLASTY WITH ORTHO ROBOT RIGHT;  Surgeon: Louis Malcolm MD;  Location:  [facility];  Service: Robotics - Ortho;  Laterality: Right;    TRANSRECTAL INCISION PROSTATE WITH GOLD SEED Bilateral 01/06/2023    Procedure: TRANSRECTAL ULTRASOUND GUIDED PROSTATE FIDUCIAL MARKER PLACEMENT;  Surgeon: Naseem Noland MD;  Location:  [facility];  Service: Urology;  Laterality: Bilateral;    TURP / TRANSURETHRAL INCISION / DRAINAGE  PROSTATE      VASECTOMY        General Information       Row Name 03/15/24 [id number]          Physical Therapy Time and Intention    Document Type re-evaluation  -KW       Row Name 03/15/24 1358          General Information    Existing Precautions/Restrictions fall;non-weight bearing;left  L rib fxs, LUE NWB, confusion  -KW       Row Name 03/15/24 1358          Cognition    Orientation Status (Cognition) oriented to;person;disoriented to;place;situation;time  -KW               User Key  (r) = Recorded By, (t) = Taken By, (c) = Cosigned By      Initials Name Provider Type    Mildred Jiménez PT Physical Therapist                   Mobility       Row Name 03/15/24 1358          Bed Mobility    Bed Mobility supine-sit;sit-supine  -KW     Supine-Sit [place] (Bed Mobility) maximum assist (25% patient effort)  -KW     Sit-Supine [place] (Bed Mobility) maximum assist (25% patient effort)  -KW     Assistive Device (Bed Mobility) draw sheet;head of bed elevated  -KW       Row Name 03/15/24 [id number]          Mobility    Extremity Weight-bearing Status left upper extremity  -KW     Left Upper Extremity (Weight-bearing Status) non weight-bearing (NWB)  -KW               User Key  (r) = Recorded By, (t) = Taken By, (c) = Cosigned By      Initials Name Provider Type    Mildred Jiménez PT Physical Therapist                   Obj/Interventions       Row Name 03/15/24 [email or web address]          Strength Comprehensive (MMT)    General Manual Muscle Testing (MMT) Assessment lower extremity strength deficits identified  -KW       [facility] Name 03/15/24 [email or web address]          Balance    Static Sitting Balance moderate assist  -KW     Dynamic Sitting Balance moderate assist;verbal cues  -KW     Position, Sitting Balance supported;unsupported;sitting edge of bed  -KW     Balance Interventions sitting  -KW               User Key  (r) = Recorded By, (t) = Taken By, (c) = Cosigned By      Initials Name Provider Type    Mildred Jiménez PT  Physical Therapist                   Goals/Plan       Row Name 03/15/24 1358          Bed Mobility Goal 1 (PT)    Activity/Assistive Device (Bed Mobility Goal 1, PT) rolling to left;rolling to right;scooting;sit to supine;supine to sit  -KW     [place] Level/Cues Needed (Bed Mobility Goal 1, PT) minimum assist (75% or more patient effort)  -KW     Time Frame (Bed Mobility Goal 1, PT) long term goal (LTG);10 days  -KW     Progress/Outcomes (Bed Mobility Goal 1, PT) goal revised this date  -KW       Row Name 03/15/24 1358          Transfer Goal 1 (PT)    Activity/Assistive Device (Transfer Goal 1, PT) sit-to-stand/stand-to-sit;bed-to-chair/chair-to-bed;car transfer  -KW     [place] Level/Cues Needed (Transfer Goal 1, PT) minimum assist (75% or more patient effort)  -KW     Time Frame (Transfer Goal 1, PT) long term goal (LTG);10 days  -KW     Progress/Outcome (Transfer Goal 1, PT) goal revised this date  -KW       Row Name 03/15/24 1358          Gait Training Goal 1 (PT)    Activity/Assistive Device (Gait Training Goal 1, PT) gait (walking locomotion);assistive device use;decrease fall risk;diminish gait deviation;improve balance and speed;increase endurance/gait distance;cane, straight  -KW     [place] Level (Gait Training Goal 1, PT) minimum assist (75% or more patient effort)  -KW     Distance (Gait Training Goal 1, PT) 25  -KW     Time Frame (Gait Training Goal 1, PT) long term goal (LTG);10 days  -KW     Progress/Outcome (Gait Training Goal 1, PT) goal revised this date  -KW               User Key  (r) = Recorded By, (t) = Taken By, (c) = Cosigned By      Initials Name Provider Type    KW Mildred Jean-Baptiste, PT Physical Therapist                   Clinical Impression       Row Name 03/15/24 1358          Pain    Pretreatment Pain Rating 0/10 - no pain  -KW       Row Name 03/15/24 1358          Plan of Care Review    Plan of Care Reviewed With patient  -KW     Progress no change  -KW     Outcome  Evaluation PT re-eval completed. He was ableto transfer to EOB however unable to maintain NWB status in L UE requriing constant cues. Patient with posterior lean and difficulty with forward weigth shfit despite cues. He reported dizziness at EOB, BP measured. Patient's BP raised to 182/120. PT measured BP again reading 195/122. Patient reported worsening dizziness and requested to return to supine. PT assisted patient back to supine and BP measured again reading 164/82. RN notified. PTx limited due to dizziness and change in BP. Patient presents below baseline for functional mobility. Patient demonstrates decreased activity tolerance, deconditioning and reduced dynamic balance. Patient would continue to benefit from skilled PT services to improve dynamic balance with gait, transfers strength, and activity tolerance training in order to build endurance and reduce risk of falls.  -       Row Name 03/15/24 6078          Therapy Assessment/Plan (PT)    Rehab Potential (PT) good, to achieve stated therapy goals  -KW     Criteria for Skilled Interventions Met (PT) yes;skilled treatment is necessary  -KW     Therapy Frequency (PT) daily  -KW       Row Name 03/15/24 1358          Vital Signs    Pre Systolic BP Rehab 174  -KW     Pre Treatment Diastolic BP 82  -KW     Pretreatment Heart Rate (beats/min) 103  -KW     Pre SpO2 (%) 93  -KW       Row Name 03/15/24 1358          Positioning and Restraints    Pre-Treatment Position in bed  -KW     Post Treatment Position bed  -KW     In Bed supine;call light within reach;encouraged to call for assist;exit alarm on  -KW               User Key  (r) = Recorded By, (t) = Taken By, (c) = Cosigned By      Initials Name Provider Type    Mildred Jiménez, PT Physical Therapist                   Outcome Measures       Row Name 03/15/24 3958 03/15/24 0706       How much help from another person do you currently need...    Turning from your back to your side while in flat bed  without using bedrails? 2  -KW 1  -CB    Moving from lying on back to sitting on the side of a flat bed without bedrails? 2  -KW 1  -CB    Moving to and from a bed to a chair (including a wheelchair)? 2  -KW 1  -CB    Standing up from a chair using your arms (e.g., wheelchair, bedside chair)? 2  -KW 1  -CB    Climbing 3-5 steps with a railing? 1  -KW 1  -CB    To walk in hospital room? 1  -KW 1  -CB    AM-PAC 6 Clicks Score (PT) 10  -KW 6  -CB    Highest Level of Mobility Goal 4 --> Transfer to chair/commode  -KW 2 --> Bed activities/dependent transfer  -CB      Row Name 03/15/24 1358          Functional Assessment    Outcome Measure Options AM-PAC 6 Clicks Basic Mobility (PT)  -KW               User Key  (r) = Recorded By, (t) = Taken By, (c) = Cosigned By      Initials Name Provider Type    Parul Schwab, RN Registered Nurse    Mildred Jiménez, PT Physical Therapist                                 Physical Therapy Education       Title: PT OT SLP Therapies (In Progress)       Topic: Physical Therapy (In Progress)       Point: Mobility training (In Progress)       Learning Progress Summary             Patient Acceptance, E, NR by CK at 3/6/2024 1121    Acceptance, E, VU,NR by LO at 3/4/2024 1340    Comment: PT POC   Family Acceptance, E, NR by CK at 3/6/2024 1121    Acceptance, E, VU,NR by LO at 3/4/2024 1340    Comment: PT POC                         Point: Home exercise program (In Progress)       Learning Progress Summary             Patient Acceptance, E, NR by CK at 3/6/2024 1121    Acceptance, E, VU,NR by LO at 3/4/2024 1340    Comment: PT POC   Family Acceptance, E, NR by CK at 3/6/2024 1121    Acceptance, E, VU,NR by LO at 3/4/2024 1340    Comment: PT POC                         Point: Body mechanics (In Progress)       Learning Progress Summary             Patient Acceptance, E, NR by CK at 3/6/2024 1121    Acceptance, E, VU,NR by LO at 3/4/2024 1340    Comment: PT POC   Family Acceptance, E, NR  by  at 3/6/2024 1121    Acceptance, E, VU,NR by  at 3/4/2024 1340    Comment: PT POC                         Point: Precautions (In Progress)       Learning Progress Summary             Patient Acceptance, E, NR by  at 3/6/2024 1121    Acceptance, E, VU,NR by  at 3/4/2024 1340    Comment: PT POC   Family Acceptance, E, NR by  at 3/6/2024 1121    Acceptance, E, VU,NR by  at 3/4/2024 1340    Comment: PT POC                                         User Key       Initials Effective Dates Name Provider Type Discipline     06/16/21 -  Emily Muro, PT Physical Therapist PT     02/06/24 -  Tiffanie Carmen, PT Physical Therapist PT                  PT Recommendation and Plan     Plan of Care Reviewed With: patient  Progress: no change  Outcome Evaluation: PT re-eval completed. He was ableto transfer to EOB however unable to maintain NWB status in L UE requriing constant cues. Patient with posterior lean and difficulty with forward weigth shfit despite cues. He reported dizziness at EOB, BP measured. Patient's BP raised to 182/120. PT measured BP again reading 195/122. Patient reported worsening dizziness and requested to return to supine. PT assisted patient back to supine and BP measured again reading 164/82. RN notified. PTx limited due to dizziness and change in BP. Patient presents below baseline for functional mobility. Patient demonstrates decreased activity tolerance, deconditioning and reduced dynamic balance. Patient would continue to benefit from skilled PT services to improve dynamic balance with gait, transfers strength, and activity tolerance training in order to build endurance and reduce risk of falls.     Time Calculation:         PT Charges       Row Name 03/15/24 5868             Time Calculation    Start Time 1358  -KW      PT Received On 03/15/24  -KW      PT Goal Re-Cert Due Date 03/25/24  -KW         Timed Charges    20266 - PT Therapeutic Activity Minutes 10  -KW         Untimed  Charges    PT Eval/Re-eval Minutes 25  -KW         Total Minutes    Timed Charges Total Minutes 10  -KW      Untimed Charges Total Minutes 25  -KW       Total Minutes 35  -KW                User Key  (r) = Recorded By, (t) = Taken By, (c) = Cosigned By      Initials Name Provider Type    Mildred Jiménez PT Physical Therapist                  Therapy Charges for Today       Code Description Service Date Service Provider Modifiers Qty    98428345117 HC PT THERAPEUTIC ACT EA 15 MIN 3/15/2024 Mildred Jean-Baptiste PT GP 1    17133621373 HC PT RE-EVAL ESTABLISHED PLAN 2 3/15/2024 Mildred Jean-Baptiste PT GP 1            PT G-Codes  Outcome Measure Options: AM-PAC 6 Clicks Basic Mobility (PT)  AM-PAC 6 Clicks Score (PT): 10  AM-PAC 6 Clicks Score (OT): 9  PT Discharge Summary  Anticipated Discharge Disposition (PT): skilled nursing facility    Mildred Jean-Baptiste PT  3/15/2024

## 2024-03-15 NOTE — PROGRESS NOTES
"Patient Name:  Lea Rivers  YOB: 1945  [id number]    Surgery Progress Note    Date of visit: 3/15/2024    Subjective     Unable to obtain full history due to patient's dementia.  Continues with ostomy function.  No nausea or vomiting.  No fevers or chills.  Poor p.o. intake.       Objective       /67   Pulse 91   Temp 97.6 °F (36.4 °C) (Axillary)   Resp 20   Ht 170.2 cm (67.01\")   Wt 96.6 kg (213 lb) Comment: per bed scale  SpO2 92%   BMI 33.35 kg/m²     Intake/Output Summary (Last 24 hours) at 3/15/2024 0732  Last data filed at 3/15/2024 0515  Gross per 24 hour   Intake 1931.82 ml   Output 710 ml   Net 1221.82 ml       CV:  Rhythm regular and rate regular  L:  Clear to auscultation bilaterally  Abd:  Bowel sounds positive, soft, appropriately tender, incision clean dry and intact, OLIVER minimal, ostomy viable and functioning  Ext:  No cyanosis, clubbing, edema    Recent labs and imaging that are back at this time have been reviewed.   Creatinine 0.54  WBC 15.1  Hemoglobin 7.2       Assessment & Plan     Patient postoperative day 6 from total abdominal colectomy with end ileostomy.  Diet per speech and swallow recommendations.  Okay for anticoagulation for upper extremity DVT.  Continue broad-spectrum antibiotics for now.  On electrolyte replacement. Pain control. Continue TPN until tolerates oral diet.      Harley Godfrey MD  3/15/2024  07:32 EDT      "

## 2024-03-15 NOTE — DISCHARGE PLACEMENT REQUEST
"To: [facility]  Attn: Rosario  From: Valerie Sagar,   [phone]    Hawk Rivers (78 y.o. Male)       Date of Birth   1945    Social Security Number       Address   [address] [place], [place] [address]    Home Phone       MRN   [id number]       [facility]    Marital Status                               Admission Date   3/2/24    Admission Type   Emergency    Admitting Provider   Sienna Saleh DO    Attending Provider   Sienna Saleh DO    Department, Room/Bed   [facility] 5H, S572/1       Discharge Date       Discharge Disposition       Discharge Destination                                 Attending Provider: Sienna Saleh DO    Allergies: Chlorhexidine, Sulfa Antibiotics    Isolation: None   Infection: None   Code Status: No CPR    Ht: 170.2 cm (67.01\")   Wt: 96.6 kg (213 lb)    Admission Cmt: None   Principal Problem: Falls [W19.XXXA]                   Active Insurance as of 3/2/2024       Primary Coverage       Payor Plan Insurance Group Employer/Plan Group    MEDICARE MEDICARE A & B        Payor Plan Address Payor Plan Phone Number Payor Plan Fax Number Effective Dates    [address] [phone]  7/1/2009 - None Entered    [facility] [id number]         Subscriber Name Subscriber Birth Date Member HAWK MACIAS 1945 [id number]               Secondary Coverage       Payor Plan Insurance Group Employer/Plan Group    AARP  SUP AARP HEALTH CARE OPTIONS PLAN F       Payor Plan Address Payor Plan Phone Number Payor Plan Fax Number Effective Dates    [facility] [phone]  1/1/2016 - None Entered    [address]       [facility] [id number]         Subscriber Name Subscriber Birth Date Member HAWK MACIAS 1945 [id number]                     Emergency Contacts        (Rel.) Home Phone Work Phone Mobile Phone    taylor marley (Son) [phone] -- [phone]    Luis Enrique Guadalupe " (Daughter) [phone] -- [phone]    Celia Dave (Daughter) -- -- --    Fred Loyola (Son) -- -- --                 History & Physical        Dawson Samayoa MD at 24 [phone]              [facility] Medicine Services  HISTORY AND PHYSICAL    Patient Name: Lea Rivers  : 1945  MRN: [id number]  Primary Care Physician: Mannie Melvin MD  Date of admission: 3/2/2024      Subjective  Subjective     Chief Complaint:  Fall and rib fracture    HPI:  Lea Rivers is a 78 y.o. male with past medical history significant for hypertension, hyperlipidemia, hypothyroidism, seizure disorder, history of colon cancer, history of prostate cancer s/p radiation therapy, diabetes mellitus, dementia.  Patient still lives alone and family checks up on him frequently.  I come to the patient and also family who is present at the bedside patient has had frequent falls and the frequency has increased lately.  Evidently patient had a fall today and then was complaining of chest and shoulder pain and was brought to the emergency room.  Evaluation here at the emergency room reveals multiple rib fracture and also clavicular fracture.  Patient tells me that prior to this fall he was doing okay except having frequent falls.  Because of these falls is not clear at this time but patient tells me that sometimes he get foggy in the head.  Family also reports that patient has difficulty with balance.  No fever or chills or any respiratory symptoms during the past several days.  No nausea vomiting or diarrhea or abdominal pain during the same period.      Personal History     Past Medical History:   Diagnosis Date    Anemia     Colon cancer     Status post partial colectomy in . No chemotherapy or radiation therapy.    Coronary artery disease     Nonobstructive coronary artery disease.    Diabetes     Dyslipidemia     GERD (gastroesophageal reflux disease)     Hx of.     Hyperlipidemia     Hypertension     Hyponatremia     Hypothyroidism     Left shoulder pain     Low sodium levels 2022    recent issue; saw nephrologist who ruled out kidney issues; took Sodium Chloride po to bring levels up    Memory deficit     FROM ANESTHESIA    Osteoarthritis     Prostate cancer 07/23/2022    Seizure disorder     silent seziure-diagnosed years ago    Skin cancer          Oncology Problem List:  Prostate cancer (12/01/2022; Status: Active)  Oncology/Hematology History   Prostate cancer   11/11/2022 Cancer Staged    Staging form: Prostate, AJCC 8th Edition  - Clinical stage from 11/11/2022: Stage IIC (cT2c, cN0, cM0, PSA: 11.3, Grade Group: 3) - Signed by Jerman Luu MD on 12/1/2022 12/1/2022 Initial Diagnosis    Prostate cancer (HCC)     1/30/2023 - 2/9/2023 Radiation    Radiation OncologyTreatment Course:  Lea Rivers received 3500 cGy in 5 fractions to prostate and seminal vesicles via Stereotactic Radiation Therapy - SRT.       Past Surgical History:   Procedure Laterality Date    APPENDECTOMY      CARDIAC CATHETERIZATION  2012    30 to 40% LAD    CARPAL TUNNEL RELEASE Bilateral     CHOLECYSTECTOMY      COLECTOMY PARTIAL / TOTAL  1990    Partial colectomy    COLONOSCOPY      CYBERKNIFE  02/09/2023    Prostate/SV    CYSTOSCOPY TRANSURETHRAL RESECTION OF PROSTATE N/A 09/21/2018    Procedure: CYSTOSCOPY TRANSURETHRAL RESECTION OF PROSTATE GREENLIGHT;  Surgeon: Naseem Clayton MD;  Location: [facility];  Service: Urology    OTHER SURGICAL HISTORY      Contraction surgery on bilateral hands and wrists.    PROSTATE BIOPSY N/A 11/11/2022    Procedure: TRANSRECTAL ULTRASOUND GUIDED BIOPSY OF PROSTATE;  Surgeon: Naseem Noland MD;  Location: [facility] OR;  Service: Urology;  Laterality: N/A;    SINUS SURGERY      SKIN BIOPSY      TEETH EXTRACTION      TONSILLECTOMY      TOTAL KNEE ARTHROPLASTY Right 04/07/2022    Procedure: TOTAL KNEE ARTHROPLASTY WITH ORTHO ROBOT RIGHT;   Surgeon: Louis Malcolm MD;  Location:  [facility] OR;  Service: Robotics - Ortho;  Laterality: Right;    TRANSRECTAL INCISION PROSTATE WITH GOLD SEED Bilateral 01/06/2023    Procedure: TRANSRECTAL ULTRASOUND GUIDED PROSTATE FIDUCIAL MARKER PLACEMENT;  Surgeon: Naseem Noland MD;  Location:  [facility] OR;  Service: Urology;  Laterality: Bilateral;    TURP / TRANSURETHRAL INCISION / DRAINAGE PROSTATE      VASECTOMY         Family History: family history includes Arthritis in an other family member; Cancer in his mother and another family member; Esophageal cancer in his brother; Hypertension in his father; No Known Problems in his paternal grandfather, paternal grandmother, and sister; Stroke in his father, maternal grandfather, sister, and another family member.     Social History:  reports that he has never smoked. He has been exposed to tobacco smoke. He has never used smokeless tobacco. He reports that he does not drink alcohol and does not use drugs.  Social History     Social History Narrative    Caffeine: None       Medications:  Available home medication information reviewed.  D-Mannose, Diclofenac Sodium, Hydrocortisone (Perianal), OneTouch Delica Lancets 33G, divalproex, fluticasone, glucose blood, isosorbide mononitrate, levETIRAcetam, levothyroxine, lisinopril, metFORMIN, nitrofurantoin (macrocrystal-monohydrate), omeprazole, simvastatin, sodium chloride, tamsulosin, verapamil SR, and vitamin B-12    Allergies   Allergen Reactions    Chlorhexidine Rash    Sulfa Antibiotics Rash     Childhood reaction        Objective  Objective     Vital Signs:   Temp:  [97.7 °F (36.5 °C)] 97.7 °F (36.5 °C)  Heart Rate:  [60-77] 60  Resp:  [16] 16  BP: (136-166)/() 137/76       Physical Exam                             Constitutional: No acute distress, looks comfortable  HENT: NCAT, mucous membranes moist  Respiratory: Clear to auscultation bilaterally, respiratory effort normal   Cardiovascular: RRR, no murmurs,  rubs, or gallops  Gastrointestinal: Positive bowel sounds, soft, nontender, nondistended  Musculoskeletal: trace bilateral ankle edema, left arm is in sling, painful range of motion of left shoulder.  Psychiatric: Appropriate affect, cooperative  Neurologic: Awake, alert, oriented x 3, no focality appreciated, difficulty with memory, speech clear  Skin: No rashes          Result Review:  I have personally reviewed the results from the time of this admission to 3/2/2024 15:04 EST and agree with these findings:  [x]  Laboratory list / accordion  []  Microbiology  [x]  Radiology  []  EKG/Telemetry   []  Cardiology/Vascular   []  Pathology  [x]  Old records  []  Other:  Most notable findings include: Chest x-ray shows mildly displaced fracture of the distal left clavicle.  This study also shows minimally displaced left lateral third rib fracture.  CT scan of the chest however shows minimally displaced fractures of the left first through fourth ribs.  There is no visible pneumothorax.      LAB RESULTS:      Lab 03/02/24  1348 03/02/24  0947   WBC  --  9.72   HEMOGLOBIN  --  12.7*   HEMATOCRIT  --  38.6   PLATELETS  --  177   NEUTROS ABS  --  7.77*   IMMATURE GRANS (ABS)  --  0.06*   LYMPHS ABS  --  1.02   MONOS ABS  --  0.85   EOS ABS  --  0.00   MCV  --  93.0   CRP  --  0.68*   LACTATE 2.3* 2.6*         Lab 03/02/24  0947   SODIUM 132*   POTASSIUM 4.1   CHLORIDE 96*   CO2 25.0   ANION GAP 11.0   BUN 14   CREATININE 0.85   EGFR 88.4   GLUCOSE 116*   CALCIUM 9.6   MAGNESIUM 2.2   PHOSPHORUS 2.8   TSH 2.280         Lab 03/02/24  0947   TOTAL PROTEIN 7.5   ALBUMIN 4.1   GLOBULIN 3.4   ALT (SGPT) 9   AST (SGOT) 16   BILIRUBIN 0.5   ALK PHOS 58         Lab 03/02/24  0947   PROBNP 601.2   HSTROP T 16                 UA          2/15/2024    12:04 2/27/2024    09:53 3/2/2024    09:23   Urinalysis   Squamous Epithelial Cells, UA   0-2    Specific Gravity, UA   1.022    Ketones, UA Trace  1+  15 mg/dL (1+)    Blood, UA   Large  (3+)    Leukocytes, UA Moderate (2+)  Trace  Trace    Nitrite, UA   Negative    RBC, UA   11-20    WBC, UA   3-5    Bacteria, UA   None Seen        Microbiology Results (last 10 days)       Procedure Component Value - Date/Time    COVID PRE-OP / PRE-PROCEDURE SCREENING ORDER (NO ISOLATION) - Swab, Nasopharynx [[id number]]  (Normal) Collected: 03/02/24 1133    Lab Status: Final result Specimen: Swab from Nasopharynx Updated: 03/02/24 1227    Narrative:      The following orders were created for panel order COVID PRE-OP / PRE-PROCEDURE SCREENING ORDER (NO ISOLATION) - Swab, Nasopharynx.  Procedure                               Abnormality         Status                     ---------                               -----------         ------                     COVID-19, FLU A/B, RSV P...[[id number]]  Normal              Final result                 Please view results for these tests on the individual orders.    COVID-19, FLU A/B, RSV PCR 1 HR TAT - Swab, Nasopharynx [[id number]]  (Normal) Collected: 03/02/24 1133    Lab Status: Final result Specimen: Swab from Nasopharynx Updated: 03/02/24 1227     COVID19 Not Detected     Influenza A PCR Not Detected     Influenza B PCR Not Detected     RSV, PCR Not Detected    Narrative:      Fact sheet for providers: https://www.fda.gov/media/918086/download    Fact sheet for patients: https://www.fda.gov/media/958973/download    Test performed by PCR.            XR Knee 1 or 2 View Bilateral    Result Date: 3/2/2024  XR KNEE 1 OR 2 VW BILATERAL Date of Exam: 3/2/2024 11:58 AM EST Indication: Pain after fall, dementia Comparison: Right knee radiographs dated 4/10/2023 Findings: Right knee: There is no acute fracture or dislocation. The right total knee prosthesis appears intact without evidence of periprosthetic fracture or loosening. There is a trace suprapatellar joint effusion. Bone mineralization is normal. Left knee: There is no acute fracture or dislocation. There is no joint  effusion. There is tricompartmental degenerative joint disease. There is enthesopathy at the tibial tuberosity which appears similar to the prior examination.     Impression: Impression: 1. Right total knee prosthesis without evidence of hardware complication. 2. Tricompartmental degenerative joint disease of the left knee. 3. No evidence of acute fracture Electronically Signed: Johny Cleve  3/2/2024 12:39 PM EST  Workstation ID: [id number]    CT Head Without Contrast    Result Date: 3/2/2024  CT HEAD WO CONTRAST, CT CHEST WO CONTRAST DIAGNOSTIC, CT CERVICAL SPINE WO CONTRAST Date of Exam: 3/2/2024 10:43 AM EST Indication: Fall with worsening confusion. Comparison: 1/5/2024. Technique: Axial CT images were obtained of the head, cervical spine and chest without contrast administration.  Automated exposure control and iterative construction methods were used. FINDINGS: CT HEAD: Gray-white differentiation is maintained and there is no evidence of intracranial hemorrhage, mass or mass effect. Age-related changes of the brain are present including volume loss and typical periventricular sequela of chronic small vessel ischemia. There is otherwise no evidence of intracranial hemorrhage, mass or mass effect. The ventricles are normal in size and configuration accounting for surrounding volume loss. The orbits are normal and the paranasal sinuses are grossly clear. CT cervical spine: Cortical margins are intact, without evidence of acute fracture. Alignment is anatomic without listhesis or subluxation. Multilevel spondylosis changes are present, with areas of disc osteophyte complex formation and facet arthropathy,  appearing most advanced at C5-6. The prevertebral soft tissues are normal. The dens is intact. CT CHEST: There is no pathologic axillary adenopathy or other worrisome body wall soft tissue finding in the chest. No acute findings are present in the partially characterized upper abdomen. There is no pleural or  pericardial effusion. Mildly atherosclerotic, nonaneurysmal thoracic aorta. Evaluation of the lung fields demonstrates no evidence of acute infectious process or distinct suspicious focal pulmonary nodularity. Evaluation of the osseous structures demonstrates mildly displaced left clavicle fracture as noted on chest radiograph. There there is a nondisplaced acute fracture of the left first rib near the costovertebral junction as well as mildly displaced fractures of the left lateral second through fourth ribs laterally. No thoracic spinal fracture is evident, with multilevel spondylosis changes present.     Impression: Age-related changes of the brain as above, otherwise without evidence of acute intracranial abnormality. No acute fracture or traumatic malalignment of the cervical spine. Acute mildly displaced fracture involving the distal left clavicle, without acromioclavicular joint involvement, in addition to nondisplaced or minimally displaced fractures of the left first through fourth ribs. There is no associated pneumothorax or other acute traumatic finding in the chest. Electronically Signed: Mega Rea MD  3/2/2024 11:12 AM EST  Workstation ID: [id number]    CT Cervical Spine Without Contrast    Result Date: 3/2/2024  CT HEAD WO CONTRAST, CT CHEST WO CONTRAST DIAGNOSTIC, CT CERVICAL SPINE WO CONTRAST Date of Exam: 3/2/2024 10:43 AM EST Indication: Fall with worsening confusion. Comparison: 1/5/2024. Technique: Axial CT images were obtained of the head, cervical spine and chest without contrast administration.  Automated exposure control and iterative construction methods were used. FINDINGS: CT HEAD: Gray-white differentiation is maintained and there is no evidence of intracranial hemorrhage, mass or mass effect. Age-related changes of the brain are present including volume loss and typical periventricular sequela of chronic small vessel ischemia. There is otherwise no evidence of intracranial hemorrhage,  mass or mass effect. The ventricles are normal in size and configuration accounting for surrounding volume loss. The orbits are normal and the paranasal sinuses are grossly clear. CT cervical spine: Cortical margins are intact, without evidence of acute fracture. Alignment is anatomic without listhesis or subluxation. Multilevel spondylosis changes are present, with areas of disc osteophyte complex formation and facet arthropathy,  appearing most advanced at C5-6. The prevertebral soft tissues are normal. The dens is intact. CT CHEST: There is no pathologic axillary adenopathy or other worrisome body wall soft tissue finding in the chest. No acute findings are present in the partially characterized upper abdomen. There is no pleural or pericardial effusion. Mildly atherosclerotic, nonaneurysmal thoracic aorta. Evaluation of the lung fields demonstrates no evidence of acute infectious process or distinct suspicious focal pulmonary nodularity. Evaluation of the osseous structures demonstrates mildly displaced left clavicle fracture as noted on chest radiograph. There there is a nondisplaced acute fracture of the left first rib near the costovertebral junction as well as mildly displaced fractures of the left lateral second through fourth ribs laterally. No thoracic spinal fracture is evident, with multilevel spondylosis changes present.     Impression: Age-related changes of the brain as above, otherwise without evidence of acute intracranial abnormality. No acute fracture or traumatic malalignment of the cervical spine. Acute mildly displaced fracture involving the distal left clavicle, without acromioclavicular joint involvement, in addition to nondisplaced or minimally displaced fractures of the left first through fourth ribs. There is no associated pneumothorax or other acute traumatic finding in the chest. Electronically Signed: Mega Rea MD  3/2/2024 11:12 AM EST  Workstation ID: [id number]    CT Chest  Without Contrast Diagnostic    Result Date: 3/2/2024  CT HEAD WO CONTRAST, CT CHEST WO CONTRAST DIAGNOSTIC, CT CERVICAL SPINE WO CONTRAST Date of Exam: 3/2/2024 10:43 AM EST Indication: Fall with worsening confusion. Comparison: 1/5/2024. Technique: Axial CT images were obtained of the head, cervical spine and chest without contrast administration.  Automated exposure control and iterative construction methods were used. FINDINGS: CT HEAD: Gray-white differentiation is maintained and there is no evidence of intracranial hemorrhage, mass or mass effect. Age-related changes of the brain are present including volume loss and typical periventricular sequela of chronic small vessel ischemia. There is otherwise no evidence of intracranial hemorrhage, mass or mass effect. The ventricles are normal in size and configuration accounting for surrounding volume loss. The orbits are normal and the paranasal sinuses are grossly clear. CT cervical spine: Cortical margins are intact, without evidence of acute fracture. Alignment is anatomic without listhesis or subluxation. Multilevel spondylosis changes are present, with areas of disc osteophyte complex formation and facet arthropathy,  appearing most advanced at C5-6. The prevertebral soft tissues are normal. The dens is intact. CT CHEST: There is no pathologic axillary adenopathy or other worrisome body wall soft tissue finding in the chest. No acute findings are present in the partially characterized upper abdomen. There is no pleural or pericardial effusion. Mildly atherosclerotic, nonaneurysmal thoracic aorta. Evaluation of the lung fields demonstrates no evidence of acute infectious process or distinct suspicious focal pulmonary nodularity. Evaluation of the osseous structures demonstrates mildly displaced left clavicle fracture as noted on chest radiograph. There there is a nondisplaced acute fracture of the left first rib near the costovertebral junction as well as mildly  displaced fractures of the left lateral second through fourth ribs laterally. No thoracic spinal fracture is evident, with multilevel spondylosis changes present.     Impression: Age-related changes of the brain as above, otherwise without evidence of acute intracranial abnormality. No acute fracture or traumatic malalignment of the cervical spine. Acute mildly displaced fracture involving the distal left clavicle, without acromioclavicular joint involvement, in addition to nondisplaced or minimally displaced fractures of the left first through fourth ribs. There is no associated pneumothorax or other acute traumatic finding in the chest. Electronically Signed: Mega Rea MD  3/2/2024 11:12 AM EST  Workstation ID: [id number]    XR Chest 1 View    Result Date: 3/2/2024  XR CHEST 1 VW Date of Exam: 3/2/2024 9:34 AM EST Indication: Weak/Dizzy/AMS triage protocol Comparison: 4/16/2022 Findings: Unremarkable cardiomediastinal silhouette. No focal airspace opacity. No pleural effusion or pneumothorax. Mildly displaced fracture of the distal left clavicle. Normal sternoclavicular and acromioclavicular joint alignment. Mild overlying soft tissue edema of the left shoulder. There is also a minimally displaced left lateral third rib fracture.     Impression: Impression: Mildly displaced fracture of the distal left clavicle. Minimally displaced left lateral third rib fracture. No visible pneumothorax. No acute cardiopulmonary abnormality.. Electronically Signed: Tirso Rankin MD  3/2/2024 9:57 AM EST  Workstation ID: [id number]     Results for orders placed during the hospital encounter of 07/20/21    Adult Transthoracic Echo Complete W/ Cont if Necessary Per Protocol    Interpretation Summary  · Left ventricular wall thickness is consistent with mild concentric hypertrophy.  · Normal left-ventricular systolic function, estimated EF 65%.  · Left ventricular diastolic function is consistent with (grade I) impaired relaxation.  ·  Aortic sclerosis without aortic stenosis. Trace aortic insufficiency.  · Trace mitral regurgitation, trace tricuspid regurgitation.      Assessment & Plan  Assessment & Plan       Falls    Patient is a 78-year-old male with past medical history of hypertension, hyperlipidemia, hypothyroidism, seizure disorder.  Patient is being admitted for a fall and nondisplaced first through fourth left rib fractures.  Patient also has left distal clavicle fracture.    Multiple falls with frequency of falls increasing  -Etiology not quite clear but family tells me that patient has difficulty with balance  -CT of head shows age-related changes which are chronic but no acute process  -Will ask neurology to see the patient for further evaluation    Multiple rib fractures and also left clavicle fracture  -With no displacement or mildly displaced.  Orthopedic surgery has already seen the patient.  No surgical intervention is planned.  -Left upper limb is already in sling  -Pain control    Prostate cancer  -Follows with Dr. Mayo  -S/p radiation therapy.  -Continue family patient has had hematuria and has had cystoscopy by Dr. Mayo and they were told that probably because of the prostate cancer and the therapy patient will have hematuria for a while.  We will monitor if necessary will ask urology to see patient    Dementia and increasing memory problem  -Patient still lives alone and also drives  -Mentioned above patient has had multiple falls and the frequency of falls are increasing  -Will ask neurology to see the patient for both falls and also worsening memory problems  -I have already talked to the family and have called her attention to the fact that most likely the living arrangement should be changed    Stable problems include:  Hypertension  Hyperlipidemia  Seizure disorder  Hypothyroidism  -Will continue home medication for the above        DVT prophylaxis: Heparin  Medical DVT prophylaxis orders are  "present.          CODE STATUS:    Code Status and Medical Interventions:   Ordered at: 03/02/24 1456     Medical Intervention Limits:    NO intubation (DNI)     Code Status (Patient has no pulse and is not breathing):    No CPR (Do Not Attempt to Resuscitate)     Medical Interventions (Patient has pulse or is breathing):    Limited Support       Expected Discharge   Expected discharge date/ time has not been documented.  To be determined    Dawson Samayoa MD  03/02/24     Electronically signed by Dawson Samayoa MD at 03/02/24 1521          Physician Progress Notes (most recent note)        Harley Godfrey MD at 03/15/24 0732          Patient Name:  Lea Rivers  YOB: 1945  [id number]    Surgery Progress Note    Date of visit: 3/15/2024    Subjective     Unable to obtain full history due to patient's dementia.  Continues with ostomy function.  No nausea or vomiting.  No fevers or chills.  Poor p.o. intake.      Objective       /67   Pulse 91   Temp 97.6 °F (36.4 °C) (Axillary)   Resp 20   Ht 170.2 cm (67.01\")   Wt 96.6 kg (213 lb) Comment: per bed scale  SpO2 92%   BMI 33.35 kg/m²     Intake/Output Summary (Last 24 hours) at 3/15/2024 0732  Last data filed at 3/15/2024 0515  Gross per 24 hour   Intake 1931.82 ml   Output 710 ml   Net 1221.82 ml       CV:  Rhythm regular and rate regular  L:  Clear to auscultation bilaterally  Abd:  Bowel sounds positive, soft, appropriately tender, incision clean dry and intact, OLIVER minimal, ostomy viable and functioning  Ext:  No cyanosis, clubbing, edema    Recent labs and imaging that are back at this time have been reviewed.   Creatinine 0.54  WBC 15.1  Hemoglobin 7.2      Assessment & Plan     Patient postoperative day 6 from total abdominal colectomy with end ileostomy.  Diet per speech and swallow recommendations.  Okay for anticoagulation for upper extremity DVT.  Continue broad-spectrum antibiotics for now.  On electrolyte " replacement. Pain control. Continue TPN until tolerates oral diet.      Harley Godfrey MD  3/15/2024  07:32 EDT        Electronically signed by Harley Godfrey MD at 03/15/24 0734          Consult Notes (most recent note)        Anabelle Fabian PA at 03/07/24 1141        Consult Orders    1. Inpatient Gastroenterology Consult [[id number]] ordered by Harley Godfrey MD at 03/07/24 0639              Attestation signed by Kirt Wolfe MD at 03/07/24 1604    I have reviewed this documentation and agree.                  [facility] [place] Gastroenterology Consult    Referring Provider: Harley Godfrey MD     PCP: Mannie Melvin MD    Reason for Consultation: Assess for colonic ileus     Chief complaint: Abdominal distention     History of present illness:    Lea Rivers is a 78 y.o. male who is admitted with recurrent mechanical falls resulting in a minimally displaced left clavicle fracture and multiple rib fractures.  He has been receiving Tramadol as needed for pain.   He was noted to have increasing abdominal distention since admission and KUB showed moderately diffuse gaseous distention of the colon to the level of rectum.  Subsequent CT was obtained last night showed dilated loops of large bowel with air-fluid levels and locules of intramural gas consistent with concerning pneumatosis intestinalis.  Post surgical changes of the sigmoid colon noted with surgical clips.   He was evaluated by surgery overnight and started on IV Zosyn.     His daughter describes remote history of a sigmoid colectomy for colon cancer.   His last colonoscopy was 1/24/23 with Dr. Kauffman.  Preparation of the colon was poor at that time.  He had 3 polyps removed. Pathology consistent with tubular adenomas without high grade dysplasia.   He has had difficulty with fecal and urinary incontinence.      Patient has dementia and lives alone but has had a significant decline since the loss of his wife 3 years ago with a  most recent decline in the last six months.       Allergies:  Chlorhexidine and Sulfa antibiotics    Scheduled Meds:  [Held by provider] atorvastatin, 10 mg, Oral, Daily  heparin (porcine), 5,000 Units, Subcutaneous, Q12H  insulin lispro, 2-7 Units, Subcutaneous, 4x Daily AC & at Bedtime  levETIRAcetam, 500 mg, Intravenous, Q12H  [Held by provider] levETIRAcetam, 500 mg, Oral, BID  [Held by provider] levothyroxine, 88 mcg, Oral, Q AM  Lidocaine, 2 patch, Transdermal, Q24H  [Held by provider] lisinopril, 40 mg, Oral, Q24H  metoprolol tartrate, 5 mg, Intravenous, Q6H  pantoprazole, 40 mg, Intravenous, Q AM  piperacillin-tazobactam, 3.375 g, Intravenous, Q8H  potassium chloride, 10 mEq, Intravenous, Q1H  senna-docusate sodium, 2 tablet, Oral, BID  sodium chloride, 10 mL, Intravenous, Q12H  [Held by provider] tamsulosin, 0.4 mg, Oral, Daily  valproate sodium, 500 mg, Intravenous, Q8H  [Held by provider] vitamin B-12, 1,000 mcg, Oral, Daily         Infusions:  sodium chloride, 125 mL/hr, Last Rate: 125 mL/hr (03/07/24 0836)        PRN Meds:    acetaminophen **OR** acetaminophen **OR** acetaminophen    senna-docusate sodium **AND** polyethylene glycol **AND** bisacodyl **AND** bisacodyl    calcium carbonate    Calcium Replacement - Follow Nurse / BPA Driven Protocol    dextrose    dextrose    fluticasone    glucagon (human recombinant)    hydrALAZINE    Magnesium Standard Dose Replacement - Follow Nurse / BPA Driven Protocol    ondansetron ODT **OR** ondansetron    Phosphorus Replacement - Follow Nurse / BPA Driven Protocol    Potassium Replacement - Follow Nurse / BPA Driven Protocol    sodium chloride    sodium chloride    sodium chloride    Home Meds:  Medications Prior to Admission   Medication Sig Dispense Refill Last Dose    D-Mannose 500 MG capsule Take 1 capsule by mouth Daily. (Patient taking differently: Take 1 capsule by mouth Daily. OTC) 90 capsule 3 3/1/2024    divalproex (DEPAKOTE) 500 MG 24 hr tablet Take 1  tablet by mouth 2 (two) times a day. 1 tablet 500mg QAM, 2 tablets 1000mg QPM   3/1/2024    fluticasone (FLONASE) 50 MCG/ACT nasal spray 2 sprays into the nostril(s) as directed by provider As Needed for Rhinitis. Administer 2 sprays in each nostril for each dose.   3/1/2024    isosorbide mononitrate (IMDUR) 30 MG 24 hr tablet Take 1 tablet by mouth 2 (Two) Times a Day.   3/1/2024    levETIRAcetam (KEPPRA) 500 MG tablet Take 1 tablet by mouth 2 (Two) Times a Day.   3/1/2024    levothyroxine (SYNTHROID, LEVOTHROID) 88 MCG tablet Take 1 tablet by mouth Every Morning.   3/1/2024    lisinopril (PRINIVIL,ZESTRIL) 20 MG tablet Take 1 tablet by mouth 2 (Two) Times a Day.   3/1/2024    metFORMIN ER (GLUCOPHAGE-XR) 500 MG 24 hr tablet Take 2 tablets by mouth Daily With Breakfast.   3/1/2024    omeprazole (priLOSEC) 20 MG capsule Take 1 capsule by mouth Daily.   3/1/2024    simvastatin (ZOCOR) 20 MG tablet Take 1 tablet by mouth Every Night.   3/1/2024    sodium chloride 1 g tablet Take 2 tablets by mouth Daily. OTC   3/1/2024    tamsulosin (FLOMAX) 0.4 MG capsule 24 hr capsule Take 1 capsule by mouth Daily. 90 capsule 3 3/1/2024    verapamil SR (CALAN-SR) 240 MG CR tablet Take 1 tablet by mouth Daily.   3/1/2024    vitamin B-12 (CYANOCOBALAMIN) 1000 MCG tablet Take 1 tablet by mouth Daily. 90 tablet 3 3/1/2024       ROS: Review of Systems   Unable to perform ROS: Dementia       PAST MED HX:  Past Medical History:   Diagnosis Date    Anemia     Colon cancer 1990    Status post partial colectomy in 1990. No chemotherapy or radiation therapy.    Coronary artery disease     Nonobstructive coronary artery disease.    Diabetes     Dyslipidemia     GERD (gastroesophageal reflux disease)     Hx of.    Hyperlipidemia     Hypertension     Hyponatremia     Hypothyroidism     Left shoulder pain     Low sodium levels 2022    recent issue; saw nephrologist who ruled out kidney issues; took Sodium Chloride po to bring levels up    Memory  deficit     FROM ANESTHESIA    Osteoarthritis     Prostate cancer 07/23/2022    Seizure disorder     silent seziure-diagnosed years ago    Skin cancer        PAST SURG HX:  Past Surgical History:   Procedure Laterality Date    APPENDECTOMY      CARDIAC CATHETERIZATION  2012    30 to 40% LAD    CARPAL TUNNEL RELEASE Bilateral     CHOLECYSTECTOMY      COLECTOMY PARTIAL / TOTAL  1990    Partial colectomy    COLONOSCOPY      CYBERKNIFE  02/09/2023    Prostate/SV    CYSTOSCOPY TRANSURETHRAL RESECTION OF PROSTATE N/A 09/21/2018    Procedure: CYSTOSCOPY TRANSURETHRAL RESECTION OF PROSTATE GREENLIGHT;  Surgeon: Naseem Clayton MD;  Location:  [facility];  Service: Urology    OTHER SURGICAL HISTORY      Contraction surgery on bilateral hands and wrists.    PROSTATE BIOPSY N/A 11/11/2022    Procedure: TRANSRECTAL ULTRASOUND GUIDED BIOPSY OF PROSTATE;  Surgeon: Naseem Noland MD;  Location:  [facility];  Service: Urology;  Laterality: N/A;    SINUS SURGERY      SKIN BIOPSY      TEETH EXTRACTION      TONSILLECTOMY      TOTAL KNEE ARTHROPLASTY Right 04/07/2022    Procedure: TOTAL KNEE ARTHROPLASTY WITH ORTHO ROBOT RIGHT;  Surgeon: Louis Malcolm MD;  Location:  [facility];  Service: Robotics - Ortho;  Laterality: Right;    TRANSRECTAL INCISION PROSTATE WITH GOLD SEED Bilateral 01/06/2023    Procedure: TRANSRECTAL ULTRASOUND GUIDED PROSTATE FIDUCIAL MARKER PLACEMENT;  Surgeon: Naseem Noland MD;  Location:  [facility];  Service: Urology;  Laterality: Bilateral;    TURP / TRANSURETHRAL INCISION / DRAINAGE PROSTATE      VASECTOMY         FAM HX:  Family History   Problem Relation Age of Onset    Cancer Mother         brain    Hypertension Father     Stroke Father     No Known Problems Sister     Stroke Sister     Esophageal cancer Brother     Stroke Maternal Grandfather     No Known Problems Paternal Grandmother     No Known Problems Paternal Grandfather     Stroke Other     Arthritis Other     Cancer Other        SOC  "HX:  Social History     Socioeconomic History    Marital status:    Tobacco Use    Smoking status: Never     Passive exposure: Past    Smokeless tobacco: Never   Vaping Use    Vaping status: Never Used   Substance and Sexual Activity    Alcohol use: No    Drug use: No    Sexual activity: Not Currently       PHYSICAL EXAM  BP (!) 180/113 (BP Location: Right arm, Patient Position: Lying)   Pulse 87   Temp 98.7 °F (37.1 °C) (Oral)   Resp 18   Ht 170.2 cm (67\")   Wt 88.9 kg (196 lb)   SpO2 96%   BMI 30.70 kg/m²   Wt Readings from Last 3 Encounters:   03/02/24 88.9 kg (196 lb)   02/27/24 89.1 kg (196 lb 6.4 oz)   02/15/24 92.1 kg (203 lb)   ,body mass index is 30.7 kg/m².  Physical Exam  Constitutional:       General: He is not in acute distress.     Appearance: He is ill-appearing.   Cardiovascular:      Rate and Rhythm: Normal rate and regular rhythm.   Pulmonary:      Effort: Pulmonary effort is normal. No respiratory distress.   Abdominal:      General: There is distension.      Tenderness: There is no abdominal tenderness.      Comments: Tympanitic to percussion  High pitched tinkling bowel sounds    Musculoskeletal:      Right lower leg: No edema.      Left lower leg: No edema.   Skin:     General: Skin is warm and dry.   Neurological:      Mental Status: He is alert.      Comments: Alert, oriented to self    Psychiatric:         Cognition and Memory: Memory is impaired.       Results Review:   I reviewed the patient's new clinical results.    Lab Results   Component Value Date    WBC 15.60 (H) 03/07/2024    HGB 10.5 (L) 03/07/2024    HGB 11.5 (L) 03/06/2024    HGB 9.7 (L) 03/05/2024    HCT 32.5 (L) 03/07/2024    MCV 91.5 03/07/2024     03/07/2024     Lab Results   Component Value Date    INR 1.08 03/24/2022    INR 1.11 05/26/2021     Lab Results   Component Value Date    GLUCOSE 124 (H) 03/07/2024    BUN 15 03/07/2024    CREATININE 0.74 (L) 03/07/2024    EGFRIFNONA 64 09/10/2021    BCR 20.3 " 03/07/2024     (L) 03/07/2024    K 3.1 (L) 03/07/2024    CO2 22.0 03/07/2024    CALCIUM 8.5 (L) 03/07/2024    PROTENTOTREF 6.9 04/28/2023    ALBUMIN 3.3 (L) 03/06/2024    ALKPHOS 61 03/06/2024    BILITOT 0.7 03/06/2024    ALT 46 (H) 03/06/2024    AST 81 (H) 03/06/2024      Latest Reference Range & Units 03/02/24 09:47   TSH Baseline 0.270 - 4.200 uIU/mL 2.280      Latest Reference Range & Units 03/02/24 09:47   Free T4 0.93 - 1.70 ng/dL 1.29      Latest Reference Range & Units 03/07/24 08:04   Lactate 0.5 - 2.0 mmol/L 1.4       CT Abdomen/Pelvis (as interpreted by radiologist) 3/6/24  FINDINGS:   Lung bases: Trace bilateral pleural fluid. Overlying mild atelectasis.   Liver:No masses. No intrahepatic biliary ductal dilatation.   Spleen:No masses. No perisplenic hematoma.   Pancreas:No pancreatic masses. No evidence of pancreatitis.   Gallbladder and common bile duct: The gallbladder is not identified and may be surgically absent.   Adrenal glands:No adrenal masses   Kidneys and ureters:No kidney stones. No renal masses.No calculi present within the ureters. Normal caliber ureters.   Urinary bladder:No urinary bladder wall thickening. No bladder masses.   Small bowel:Normal caliber small bowel.   Large bowel: Multiple dilated loops of large bowel with air-fluid levels. Locules of intramural gas noted consistent with concerning for pneumatosis intestinalis. Postsurgical changes on the sigmoid colon with surrounding surgical clips.   Appendix: Not seen however there is no evidence of appendicitis.   GENITOURINARY: Previous prostatectomy   Ascites or pneumoperitoneum: None   Adenopathy:None present   Osseous structures: Degenerative changes of the hips and lumbar spine.   Other findings: None   IMPRESSION:  Dilated loops of large bowel with likely pneumatosis intestinalis. No bowel wall thickening. Inflammation surrounding the distal descending and sigmoid colon. Findings are concerning for possible developing  necrotizing enterocolitis given the   presence of pneumatosis intestinalis and inflammation of the region of the sigmoid colon with air-fluid levels and dilated large bowel.    ASSESSMENTS/PLANS    Colonic ileus with CT concerns of pneumatosis intestinalis   History of remote colon resection for colon cancer  Dementia  Multiple falls   Hypokalemia    >> Agree with holding Tramadol.   Will add SQ Relistor   >> IV Reglan 10 mg QID   >> IV Zosyn  >> Further management per surgery   >> Correct hypokalemia     I discussed the patient's findings and my recommendations with patient, his son whom is at bedside and his daughter via telephone.      ALMAZ Bullock  03/07/24  11:41 EST      Electronically signed by Kirt Wolfe MD at 03/07/24 1604          Nutrition Notes (most recent note)        Aria Morton MS,RD,LD at 03/14/24 1112                              Clinical Nutrition     Nutrition Support Assessment  Reason for Visit: Follow-up protocol, PN    Patient Name: Lea Rivers  YOB: 1945  MRN: [id number]  Date of Encounter: 03/14/24 11:12 EDT  Admission date: 3/2/2024    Comments:    TPN Recommendations via PICC:  15% dextrose, 7.7% AA @ goal rate of 65mL/hr. Provide 250mL 20% SMOF daily.   Initiate at 25mL/hr and advance by 25mL q 8hrs to goal of 65mL/hr.  Infused at goal over 24hrs this regimen provides:  1.56L TGV; GIR = 1.82mg/kg/min  1776kcal (99% est needs); 120g protein (100% est needs)    Spoke w/SLP plan to attempt FEES today. If unable to successfully complete FEES/advance diet, spoke w/sx and okay w/keofeed placement.     ADDENDUM: pt cleared for pureed/honey thick liquids-sx okay w/starting pt on this diet. Communicated to SLP.     Nutrition Assessment   Admission Diagnosis:  Falls [W19.XXXA]  Pneumatosis intestinalis [K63.89]      Problem List:    Falls    Pneumatosis intestinalis        PMH:   He  has a past medical history of Anemia, Colon cancer (1990), Coronary artery  disease, Diabetes, Dyslipidemia, GERD (gastroesophageal reflux disease), Hyperlipidemia, Hypertension, Hyponatremia, Hypothyroidism, Left shoulder pain, Low sodium levels (2022), Memory deficit, Osteoarthritis, Prostate cancer (07/23/2022), Seizure disorder, and Skin cancer.    PSH:  He  has a past surgical history that includes Colectomy partial / total (1990); Cholecystectomy; Appendectomy; Other surgical history; Sinus surgery; Tonsillectomy; Vasectomy; Carpal tunnel release (Bilateral); TURP / transurethral incision / drainage prostate; Colonoscopy; Transurethral resection of prostate (N/A, 09/21/2018); Skin biopsy; Teeth Extraction; Total knee arthroplasty (Right, 04/07/2022); Cardiac catheterization (2012); Prostate biopsy (N/A, 11/11/2022); Transrectal Incision Prostate (Bilateral, 01/06/2023); Cyberknife (02/09/2023); and Colectomy (N/A, 3/9/2024).      Applicable Nutrition Concerns:   Skin:  Oral:  GI:      Applicable Interval History:   3/9-total colectomy w/end ileostomy      Reported/Observed/Food/Nutrition Related History:     3/14  Spoke w/sx-okay to place keofeed it pt too lethargic for FEES today/unable to advance po diet. Will continue current TPN for today.     3/12  Pt out of room for scan per pt's son at bedside. Spoke w/nsg-TPN at 65ml/hr. Discussed phos replacement IV.     3/9  Per RN, pt pulled PICC last night-pt OOR during visit but pt's son at bedside. Able to obtain nutrition hx. Pt eating well PTA, usually a picky eater and eats the same things all the time-preferences noted below. Pt usually eats at [employer] for breakfast, makes something quick at home for lunch and has dinner provided by dtr. Son states pt's wt has been stable, ~196-200lbs. NKFA. Plan is for total colectomy w/end ileostomy today.     3/8  Pt unavailable during RD visit, having PICC placed. Also noted w/dementia-attempted to call family w/o success. Discussed w/nsg, pt to likely have bowel sx tomorrow w/colostomy  creation. Rectal tube placed today 2/2 colonic decompression. Pt appears to have had 7lb/3.6% non significant wt loss in the past month. NKFA.     Labs    Labs Reviewed: Yes     Results from last 7 days   Lab Units 03/14/24  0514 03/13/24  1711 03/13/24  0934 03/13/24  0511 03/12/24  1204 03/12/24  0316 03/11/24  1618 03/11/24  1011 03/11/24  0508 03/09/24  2055 03/09/24  0335 03/07/24  1804 03/07/24  1241   GLUCOSE mg/dL 159*  --   --  163*  --  148*  --   --  145*  145*   < > 118*   < >  --    BUN mg/dL 19  --   --  18  --  18  --   --  15  15   < > 15   < >  --    CREATININE mg/dL 0.46*  --   --  0.44*  --  0.61*  --   --  0.66*  0.66*   < > 0.72*   < >  --    SODIUM mmol/L 140  --   --  139  --  138  --   --  138  138   < > 141   < >  --    CHLORIDE mmol/L 106  --   --  106  --  106  --   --  107  107   < > 107   < >  --    POTASSIUM mmol/L 3.9  --   --  4.0  --  4.0   < >  --  3.2*  3.2*   < > 2.8*   < >  --    PHOSPHORUS mg/dL 2.4* 2.7  --  2.2*   < > 1.9*   < > 2.1* 1.7*   < > 2.0*   < >  --    MAGNESIUM mg/dL 2.2  --   --  1.9  --  1.8  --   --  1.8   < > 2.0   < >  --    ALT (SGPT) U/L  --   --   --   --   --   --   --  29 31  --  86*   < >  --    LACTATE mmol/L  --   --  1.5  --   --   --   --   --   --   --  1.3  --  1.4    < > = values in this interval not displayed.       Results from last 7 days   Lab Units 03/11/24  1618 03/11/24  1011 03/11/24  0508 03/09/24  0335 03/08/24  1638 03/08/24  1215   ALBUMIN g/dL  --  2.4* 2.5* 2.9*  --  3.1*   PREALBUMIN mg/dL 5.4*  --   --   --  7.9*  --    CRP mg/dL  --  13.06*  --   --   --  12.05*   IONIZED CALCIUM mmol/L 1.26  --   --   --  1.25  --    CHOLESTEROL mg/dL  --   --   --   --   --  103   TRIGLYCERIDES mg/dL  --  131  --   --   --  161*       Results from last 7 days   Lab Units 03/14/24  0504 03/14/24  0047 03/13/24  1750 03/13/24  1150 03/13/24  0451 03/12/24  2346   GLUCOSE mg/dL 156* 161* 145* 155* 165* 152*     Lab Results   Lab Value  "Date/Time    HGBA1C 5.50 03/02/2024 0947    HGBA1C 5.5 04/28/2023 1142    HGBA1C 5.00 01/04/2023 1227               Medications    Medications Reviewed: Yes  Pertinent: insulin, reglan, protonix, abx, pericolace  Infusion:  PRN:     Intake/Ouptut 24 hrs (0701 - 0700)   I&O's Reviewed: Yes    3/13    Anthropometrics     Flowsheet Rows      Flowsheet Row First Filed Value   Admission Height 170.2 cm (67\") Documented at 03/02/2024 0947   Admission Weight 88.9 kg (196 lb) Documented at 03/02/2024 0947          Height: Height: 170.2 cm (67.01\")  Last Filed Weight: Weight: 96.6 kg (213 lb) (per bed scale) (03/14/24 0600)  Method: Weight Method: Stated  BMI: BMI (Calculated): 33.4  BMI classification: Obese Class I: 30-34.9kg/m2  IBW:      UBW: 203lbs per EMR  Weight change:    Weight       Weight (kg) Weight (lbs) Weight Method Visit Report   3/23/2023 87.091 kg  192 lb   --    4/10/2023 87.091 kg  192 lb   --    4/28/2023 94.62 kg  208 lb 9.6 oz   --    5/6/2023 91.627 kg  202 lb      5/16/2023 90.719 kg  200 lb   --    6/20/2023 90.719 kg  200 lb   --    8/14/2023 93.441 kg  206 lb   --    9/14/2023 92.398 kg  203 lb 11.2 oz   --    12/4/2023 92.08 kg  203 lb   --    1/5/2024 78.926 kg  174 lb  Stated     2/15/2024 92.08 kg  203 lb   --    2/27/2024 89.086 kg  196 lb 6.4 oz      3/2/2024 88.905 kg  196 lb  Stated        Stated         Nutrition Focused Physical Exam     Date: 3/8    Unable to perform exam due to: Pt unable to participate at time of visit    Needs Assessment   Date: 3/8    Height used:Height: 170.2 cm (67.01\")  Weights used: 196lb/89kg    Estimated Calorie needs: ~ 1800 Kcal/day  Method:  MSJ (5273) x 1.2 =1876  Method:  17-20 Kcals/KG 1513-1780kcals    Estimated Protein needs: ~ 120 g PRO/day  Method: 1.2 g/Kg ABW =107g  Method: 2g/kg IBW = 132g    Estimated Fluid needs: ~ ml/day   Per clinical status    Current Nutrition Prescription     PN Route: PICC  PN:   15% dextrose, 7.7% AA @ goal rate of " 65mL/hr.         GIR:  1.82mg/kg/min  Lipid: Provide 250mL 20% SMOF daily                   PN@ Goal over 24hr to Supply:  Volume 1560 mL/day       Energy 1776 Kcal/day 99 % Est Need   Protein 120 g/day 100 % Est Need      ---------------------------------------------------------------------------  Formula/Rate verified at bedside: Yes  Infusing Rate at time of visit: 65ml/hr      Average Delivery from [facility] Days:  PN Volume 1001 mL/day       Lipid delivered?   Y         Energy   Kcal/day   % Est Need   Protein   g/day   % Est Need     PO: NPO Diet NPO Type: Strict NPO  Adult Central 2-in-1 TPN  Oral Nutrition Supplement:   Intake: N/A      Nutrition Diagnosis   Date: 3/8 Updated:   Problem Altered GI function   Etiology Colonic distention, ileus, pending bowel sx   Signs/Symptoms Need for TPN   Status:     Goal:   General: Nutrition to support treatment  PO: N/A  EN/PN: Maintain PN    Nutrition Intervention      Follow treatment progress, Care plan reviewed, Nutrition support order placed to pharmacy    Continue current TPN regimen  Monitor ability for po diet vs keofeed  Elyte replacement per protocol  RD to provide ileostomy education (to family) closer to discharge if appropriate.     Monitoring/Evaluation:   Per protocol, I&O, Pertinent labs, PN delivery/tolerance, Weight, GI status      Aria Morton MS,RD,LD  Time Spent: 20    Electronically signed by Aria Morton, MS,RD,LD at 24 1335          Physical Therapy Notes (most recent note)        Tiffanie Carmen, PT at 24 5431  Version 1 of 1         Patient Name: Lea Rivers  : 1945    MRN: [id number]                              Today's Date: 3/6/2024       Admit Date: 3/2/2024    Visit Dx:     ICD-10-CM ICD-9-CM   1. Fall, initial encounter  W19.XXXA E888.9   2. Closed fracture of multiple ribs of left side, initial encounter  S22.42XA 807.09   3. Closed displaced fracture of acromial end of left clavicle, initial  encounter  S42.032A 810.03   4. Falls frequently  R29.6 V15.88   5. Acute UTI (urinary tract infection)  N39.0 599.0     Patient Active Problem List   Diagnosis    Coronary artery disease    Dyslipidemia    Hypothyroidism    GERD (gastroesophageal reflux disease)    Seizure disorder    BPH (benign prostatic hypertrophy)    Hypertension    Primary osteoarthritis of both knees    Syncope and collapse    Precordial pain    Diabetes    Status post total right knee replacement    Postoperative urinary retention    Confusion, postoperative    Acute blood loss anemia, asymptomatic, no transfusion required    Elevated prostate specific antigen (PSA)    Prostate cancer    Anemia    Body mass index (BMI) of 32.0-32.9 in adult    Localization-related focal epilepsy with simple partial seizures    Need for vaccination against Streptococcus pneumoniae    Upper extremity tendon tear, right, initial encounter    Vitamin D deficiency    PSA elevation    Polyp of colon    Overactive bladder    Gross hematuria    History of colon polyps    History of colon cancer    B12 deficiency    Falls     Past Medical History:   Diagnosis Date    Anemia     Colon cancer 1990    Status post partial colectomy in 1990. No chemotherapy or radiation therapy.    Coronary artery disease     Nonobstructive coronary artery disease.    Diabetes     Dyslipidemia     GERD (gastroesophageal reflux disease)     Hx of.    Hyperlipidemia     Hypertension     Hyponatremia     Hypothyroidism     Left shoulder pain     Low sodium levels 2022    recent issue; saw nephrologist who ruled out kidney issues; took Sodium Chloride po to bring levels up    Memory deficit     FROM ANESTHESIA    Osteoarthritis     Prostate cancer 07/23/2022    Seizure disorder     silent seziure-diagnosed years ago    Skin cancer      Past Surgical History:   Procedure Laterality Date    APPENDECTOMY      CARDIAC CATHETERIZATION  2012    30 to 40% LAD    CARPAL TUNNEL RELEASE Bilateral      CHOLECYSTECTOMY      COLECTOMY PARTIAL / TOTAL  1990    Partial colectomy    COLONOSCOPY      CYBERKNIFE  02/09/2023    Prostate/SV    CYSTOSCOPY TRANSURETHRAL RESECTION OF PROSTATE N/A 09/21/2018    Procedure: CYSTOSCOPY TRANSURETHRAL RESECTION OF PROSTATE GREENLIGHT;  Surgeon: Naseem Clayton MD;  Location: [facility] OR;  Service: Urology    OTHER SURGICAL HISTORY      Contraction surgery on bilateral hands and wrists.    PROSTATE BIOPSY N/A 11/11/2022    Procedure: TRANSRECTAL ULTRASOUND GUIDED BIOPSY OF PROSTATE;  Surgeon: Naseem Noland MD;  Location: [facility] OR;  Service: Urology;  Laterality: N/A;    SINUS SURGERY      SKIN BIOPSY      TEETH EXTRACTION      TONSILLECTOMY      TOTAL KNEE ARTHROPLASTY Right 04/07/2022    Procedure: TOTAL KNEE ARTHROPLASTY WITH ORTHO ROBOT RIGHT;  Surgeon: Louis Malcolm MD;  Location: [facility] OR;  Service: Robotics - Ortho;  Laterality: Right;    TRANSRECTAL INCISION PROSTATE WITH GOLD SEED Bilateral 01/06/2023    Procedure: TRANSRECTAL ULTRASOUND GUIDED PROSTATE FIDUCIAL MARKER PLACEMENT;  Surgeon: Naseem Noland MD;  Location: [facility] OR;  Service: Urology;  Laterality: Bilateral;    TURP / TRANSURETHRAL INCISION / DRAINAGE PROSTATE      VASECTOMY        General Information       Row Name 03/06/24 1112          Physical Therapy Time and Intention    Document Type therapy note (daily note)  -CK     Mode of Treatment physical therapy  -       Row Name 03/06/24 1112          General Information    Patient Profile Reviewed yes  -CK     Existing Precautions/Restrictions fall;non-weight bearing;left  L rib fxs, LUE NWB, confusion  -CK     Barriers to Rehab medically complex;previous functional deficit;cognitive status;hearing deficit  -CK       Row Name 03/06/24 1112          Cognition    Orientation Status (Cognition) disoriented to;person;place;situation;time  -CK       Row Name 03/06/24 1112          Safety Issues, Functional Mobility    Safety Issues Affecting  Function (Mobility) ability to follow commands;awareness of need for assistance;impulsivity;insight into deficits/self-awareness;judgment;problem-solving;safety precaution awareness;safety precautions follow-through/compliance;unable to maintain weight-bearing restrictions  -CK     Impairments Affecting Function (Mobility) balance;cognition;endurance/activity tolerance;pain;postural/trunk control;strength;range of motion (ROM)  -CK               User Key  (r) = Recorded By, (t) = Taken By, (c) = Cosigned By      Initials Name Provider Type    CK Tiffanie Carmen, PT Physical Therapist                   Mobility       Row Name 03/06/24 1114          Bed Mobility    Bed Mobility supine-sit  -CK     Supine-Sit [place] (Bed Mobility) verbal cues;moderate assist (50% patient effort);2 person assist  -CK     Assistive Device (Bed Mobility) draw sheet;head of bed elevated  -CK     Comment, (Bed Mobility) cues for sequencing, patient able to bring BLEs off EOB, but significant assist to lift trunk from HOB and maintain upright posture sitting EOB  -CK       Row Name 03/06/24 1114          Sit-Stand Transfer    Sit-Stand [place] (Transfers) moderate assist (50% patient effort);2 person assist;verbal cues;nonverbal cues (demo/gesture)  -CK     Assistive Device (Sit-Stand Transfers) other (see comments)  KUMAR OLIVAS  -CK     Comment, (Sit-Stand Transfer) boost to clear hips from bed, cues for upright posture  -CK       Row Name 03/06/24 1114          Gait/Stairs (Locomotion)    [place] Level (Gait) moderate assist (50% patient effort);2 person assist;verbal cues;nonverbal cues (demo/gesture)  -CK     Assistive Device (Gait) other (see comments)  KUMAR COLMENARESA  -CK     Distance in Feet (Gait) 3  -CK     Deviations/Abnormal Patterns (Gait) bilateral deviations;gait speed decreased;stride length decreased;bisi decreased  -CK     Bilateral Gait Deviations forward flexed posture;heel strike decreased  -CK     Right Sided  Gait Deviations leans right  -CK     Comment, (Gait/Stairs) Patient took shuffling steps from EOB to chair placed a few steps away from bed. Cues for upright posture and sequencing with steps toward chair. Patient very unsteady with R lean flexed posture throughout  -CK       Row Name 03/06/24 1114          Mobility    Extremity Weight-bearing Status left upper extremity  -CK     Left Upper Extremity (Weight-bearing Status) non weight-bearing (NWB)   -CK               User Key  (r) = Recorded By, (t) = Taken By, (c) = Cosigned By      Initials Name Provider Type    CK Tiffanie Carmen PT Physical Therapist                   Obj/Interventions       Row Name 03/06/24 1117          Motor Skills    Therapeutic Exercise knee;ankle  -CK       Row Name 03/06/24 1117          Knee (Therapeutic Exercise)    Knee (Therapeutic Exercise) AAROM (active assistive range of motion)  -CK     Knee AAROM (Therapeutic Exercise) bilateral;flexion;extension;10 repetitions  -CK       Row Name 03/06/24 1117          Ankle (Therapeutic Exercise)    Ankle (Therapeutic Exercise) AAROM (active assistive range of motion)  -CK     Ankle AAROM (Therapeutic Exercise) bilateral;dorsiflexion;plantarflexion;10 repetitions  -CK       Row Name 03/06/24 1117          Balance    Balance Assessment sitting static balance;standing static balance;standing dynamic balance  -CK     Static Sitting Balance moderate assist  -CK     Position, Sitting Balance unsupported;sitting edge of bed  -CK     Static Standing Balance moderate assist  -CK     Dynamic Standing Balance moderate assist  -CK     Position/Device Used, Standing Balance supported;other (see comments)  KUMAR COLMENARESA  -CK     Comment, Balance R lean while sitting EOB and also in standing, unsteady on feet  -CK               User Key  (r) = Recorded By, (t) = Taken By, (c) = Cosigned By      Initials Name Provider Type    Tiffanie Kramer PT Physical Therapist                   Goals/Plan    No  documentation.                  Clinical Impression       Row Name 03/06/24 1118          Pain    Additional Documentation Pain Scale: FACES Pre/Post-Treatment (Group)  -CK       Row Name 03/06/24 1118          Pain Scale: FACES Pre/Post-Treatment    Pain: FACES Scale, Pretreatment 4-->hurts little more  -CK     Posttreatment Pain Rating 4-->hurts little more  -CK     Pain Location - Side/Orientation Left  -CK     Pain Location upper  -CK     Pain Location - extremity  -CK       Row Name 03/06/24 1118          Plan of Care Review    Plan of Care Reviewed With patient  -CK     Progress no change  -CK     Outcome Evaluation Patient continues to be limited by confusion and difficulty following commands today. He required increased assist for sitting balance and was able to take steps 3' with modAx2 and RUE HHA to chair. IPPT will continue to progress to patient's tolerance. Recommend SNF.  -CK       Row Name 03/06/24 1118          Vital Signs    Pre Systolic BP Rehab 182  -CK     Pre Treatment Diastolic   -CK     Post Systolic BP Rehab 182  -CK     Post Treatment Diastolic   -CK     Pretreatment Heart Rate (beats/min) 91  -CK     Posttreatment Heart Rate (beats/min) 99  -CK     Pre SpO2 (%) 92  -CK     O2 Delivery Pre Treatment room air  -CK     O2 Delivery Intra Treatment room air  -CK     Post SpO2 (%) 91  -CK     O2 Delivery Post Treatment room air  -CK     Pre Patient Position Supine  -CK     Intra Patient Position Standing  -CK     Post Patient Position Sitting  -CK       Row Name 03/06/24 1118          Positioning and Restraints    Pre-Treatment Position in bed  -CK     Post Treatment Position chair  -CK     In Chair reclined;call light within reach;encouraged to call for assist;exit alarm on;with family/caregiver;waffle cushion;on mechanical lift sling;heels elevated;LUE elevated;with brace;notified nsg  -CK               User Key  (r) = Recorded By, (t) = Taken By, (c) = Cosigned By      Initials  Name Provider Type    Tiffanie Kramer, PT Physical Therapist                   Outcome Measures       Row Name 03/06/24 1120 03/06/24 0855       How much help from another person do you currently need...    Turning from your back to your side while in flat bed without using bedrails? 2  -CK 2  -LV    Moving from lying on back to sitting on the side of a flat bed without bedrails? 2  -CK 2  -LV    Moving to and from a bed to a chair (including a wheelchair)? 2  -CK 2  -LV    Standing up from a chair using your arms (e.g., wheelchair, bedside chair)? 2  -CK 1  -LV    Climbing 3-5 steps with a railing? 1  -CK 1  -LV    To walk in hospital room? 2  -CK 1  -LV    AM-PAC 6 Clicks Score (PT) 11  -CK 9  -LV    Highest Level of Mobility Goal 4 --> Transfer to chair/commode  -CK 3 --> Sit at edge of bed  -LV      Row Name 03/06/24 1120 03/06/24 1116       Functional Assessment    Outcome Measure Options AM-PAC 6 Clicks Basic Mobility (PT)  -CK AM-PAC 6 Clicks Daily Activity (OT)  -AC              User Key  (r) = Recorded By, (t) = Taken By, (c) = Cosigned By      Initials Name Provider Type    AC Pauly Davey, OT Occupational Therapist    Tiffanie Kramer, PT Physical Therapist    LV Brittney Brewer, RN Registered Nurse                                 Physical Therapy Education       Title: PT OT SLP Therapies (In Progress)       Topic: Physical Therapy (In Progress)       Point: Mobility training (In Progress)       Learning Progress Summary             Patient Acceptance, E, NR by CK at 3/6/2024 1121    Acceptance, E, VU,NR by LO at 3/4/2024 1340    Comment: PT POC   Family Acceptance, E, NR by CK at 3/6/2024 1121    Acceptance, E, VU,NR by LO at 3/4/2024 1340    Comment: PT POC                         Point: Home exercise program (In Progress)       Learning Progress Summary             Patient Acceptance, E, NR by CK at 3/6/2024 1121    Acceptance, E, VU,NR by LO at 3/4/2024 1340    Comment: PT POC    Family Acceptance, E, NR by CK at 3/6/2024 1121    Acceptance, E, VU,NR by LO at 3/4/2024 1340    Comment: PT POC                         Point: Body mechanics (In Progress)       Learning Progress Summary             Patient Acceptance, E, NR by CK at 3/6/2024 1121    Acceptance, E, VU,NR by LO at 3/4/2024 1340    Comment: PT POC   Family Acceptance, E, NR by CK at 3/6/2024 1121    Acceptance, E, VU,NR by LO at 3/4/2024 1340    Comment: PT POC                         Point: Precautions (In Progress)       Learning Progress Summary             Patient Acceptance, E, NR by CK at 3/6/2024 1121    Acceptance, E, VU,NR by LO at 3/4/2024 1340    Comment: PT POC   Family Acceptance, E, NR by CK at 3/6/2024 1121    Acceptance, E, VU,NR by LO at 3/4/2024 1340    Comment: PT POC                                         User Key       Initials Effective Dates Name Provider Type Discipline     06/16/21 -  Emily Muro, PT Physical Therapist PT     02/06/24 -  Tiffanie Carmen, BRISSA Physical Therapist PT                  PT Recommendation and Plan     Plan of Care Reviewed With: patient  Progress: no change  Outcome Evaluation: Patient continues to be limited by confusion and difficulty following commands today. He required increased assist for sitting balance and was able to take steps 3' with modAx2 and RUE HHA to chair. IPPT will continue to progress to patient's tolerance. Recommend SNF.     Time Calculation:         PT Charges       Row Name 03/06/24 1121             Time Calculation    Start Time 0955  -CK      PT Received On 03/06/24  -CK      PT Goal Re-Cert Due Date 03/14/24  -CK         Timed Charges    16823 - PT Therapeutic Activity Minutes 13  -CK         Total Minutes    Timed Charges Total Minutes 13  -CK       Total Minutes 13  -CK                User Key  (r) = Recorded By, (t) = Taken By, (c) = Cosigned By      Initials Name Provider Type    CK Tiffanie Carmen, PT Physical Therapist                   Therapy Charges for Today       Code Description Service Date Service Provider Modifiers Qty    [id number]  PT THERAPEUTIC ACT EA 15 MIN 3/6/2024 Tiffanie Carmen, PT GP 1            PT G-Joao  Outcome Measure Options: AM-PAC 6 Clicks Basic Mobility (PT)  AM-PAC 6 Clicks Score (PT): 11  AM-PAC 6 Clicks Score (OT): 9  PT Discharge Summary  Anticipated Discharge Disposition (PT): skilled nursing facility    Tiffanie Carmen, PT  3/6/2024      Electronically signed by Tiffanie Carmen, PT at 24 1122          Occupational Therapy Notes (most recent note)        Pauly Davey, OT at 24 0956          Patient Name: Lea Rivers  : 1945    MRN: [id number]                              Today's Date: 3/6/2024       Admit Date: 3/2/2024    Visit Dx:     ICD-10-CM ICD-9-CM   1. Fall, initial encounter  W19.XXXA E888.9   2. Closed fracture of multiple ribs of left side, initial encounter  S22.42XA 807.09   3. Closed displaced fracture of acromial end of left clavicle, initial encounter  S42.032A 810.03   4. Falls frequently  R29.6 V15.88   5. Acute UTI (urinary tract infection)  N39.0 599.0     Patient Active Problem List   Diagnosis    Coronary artery disease    Dyslipidemia    Hypothyroidism    GERD (gastroesophageal reflux disease)    Seizure disorder    BPH (benign prostatic hypertrophy)    Hypertension    Primary osteoarthritis of both knees    Syncope and collapse    Precordial pain    Diabetes    Status post total right knee replacement    Postoperative urinary retention    Confusion, postoperative    Acute blood loss anemia, asymptomatic, no transfusion required    Elevated prostate specific antigen (PSA)    Prostate cancer    Anemia    Body mass index (BMI) of 32.0-32.9 in adult    Localization-related focal epilepsy with simple partial seizures    Need for vaccination against Streptococcus pneumoniae    Upper extremity tendon tear, right, initial encounter    Vitamin D  deficiency    PSA elevation    Polyp of colon    Overactive bladder    Gross hematuria    History of colon polyps    History of colon cancer    B12 deficiency    Falls     Past Medical History:   Diagnosis Date    Anemia     Colon cancer 1990    Status post partial colectomy in 1990. No chemotherapy or radiation therapy.    Coronary artery disease     Nonobstructive coronary artery disease.    Diabetes     Dyslipidemia     GERD (gastroesophageal reflux disease)     Hx of.    Hyperlipidemia     Hypertension     Hyponatremia     Hypothyroidism     Left shoulder pain     Low sodium levels 2022    recent issue; saw nephrologist who ruled out kidney issues; took Sodium Chloride po to bring levels up    Memory deficit     FROM ANESTHESIA    Osteoarthritis     Prostate cancer 07/23/2022    Seizure disorder     silent seziure-diagnosed years ago    Skin cancer      Past Surgical History:   Procedure Laterality Date    APPENDECTOMY      CARDIAC CATHETERIZATION  2012    30 to 40% LAD    CARPAL TUNNEL RELEASE Bilateral     CHOLECYSTECTOMY      COLECTOMY PARTIAL / TOTAL  1990    Partial colectomy    COLONOSCOPY      CYBERKNIFE  02/09/2023    Prostate/SV    CYSTOSCOPY TRANSURETHRAL RESECTION OF PROSTATE N/A 09/21/2018    Procedure: CYSTOSCOPY TRANSURETHRAL RESECTION OF PROSTATE GREENLIGHT;  Surgeon: Naseem Clayton MD;  Location:  [facility];  Service: Urology    OTHER SURGICAL HISTORY      Contraction surgery on bilateral hands and wrists.    PROSTATE BIOPSY N/A 11/11/2022    Procedure: TRANSRECTAL ULTRASOUND GUIDED BIOPSY OF PROSTATE;  Surgeon: Naseem Noland MD;  Location:  [facility];  Service: Urology;  Laterality: N/A;    SINUS SURGERY      SKIN BIOPSY      TEETH EXTRACTION      TONSILLECTOMY      TOTAL KNEE ARTHROPLASTY Right 04/07/2022    Procedure: TOTAL KNEE ARTHROPLASTY WITH ORTHO ROBOT RIGHT;  Surgeon: Louis Malcolm MD;  Location:  [facility];  Service: Robotics - Ortho;  Laterality: Right;    TRANSRECTAL  INCISION PROSTATE WITH GOLD SEED Bilateral 01/06/2023    Procedure: TRANSRECTAL ULTRASOUND GUIDED PROSTATE FIDUCIAL MARKER PLACEMENT;  Surgeon: Naseem Noland MD;  Location: [facility];  Service: Urology;  Laterality: Bilateral;    TURP / TRANSURETHRAL INCISION / DRAINAGE PROSTATE      VASECTOMY        General Information       Row Name 03/06/24 1101 03/06/24 1058       OT Time and Intention    Document Type therapy note (daily note)  - therapy note (daily note)  -    Mode of Treatment occupational therapy  - occupational therapy  -      Row Name 03/06/24 1101          General Information    Patient Profile Reviewed yes  -     Existing Precautions/Restrictions fall;non-weight bearing;left  dementia, L rib fractures,  L clavicle fracture, NWB LUE,  LUE sling, [place]  -     Barriers to Rehab medically complex;previous functional deficit;cognitive status;hearing deficit  -       Row Name 03/06/24 1101          Cognition    Orientation Status (Cognition) disoriented to;person;place;situation;time  -       Row Name 03/06/24 1101          Safety Issues, Functional Mobility    Safety Issues Affecting Function (Mobility) ability to follow commands;awareness of need for assistance;insight into deficits/self-awareness;judgment;problem-solving;safety precaution awareness;safety precautions follow-through/compliance;sequencing abilities  -     Impairments Affecting Function (Mobility) balance;cognition;endurance/activity tolerance;pain;postural/trunk control;strength;range of motion (ROM)  -     Cognitive Impairments, Mobility Safety/Performance attention;awareness, need for assistance;insight into deficits/self-awareness;judgment;problem-solving/reasoning;safety precaution awareness;safety precaution follow-through;sequencing abilities  -               User Key  (r) = Recorded By, (t) = Taken By, (c) = Cosigned By      Initials Name Provider Type    AC Pauly Davey, OT Occupational Therapist                      Mobility/ADL's       Row Name 03/06/24 1104          Bed Mobility    Bed Mobility supine-sit  -     Supine-Sit [place] (Bed Mobility) verbal cues;moderate assist (50% patient effort);2 person assist  -     Assistive Device (Bed Mobility) draw sheet;head of bed elevated  -     Comment, (Bed Mobility) VCs to sequence,  pt initiating legs EOB, required assist trunk to midline  -       Row Name 03/06/24 1104          Transfers    Transfers sit-stand transfer;bed-chair transfer  -       Row Name 03/06/24 1104          Bed-Chair Transfer    Bed-Chair [place] (Transfers) moderate assist (50% patient effort);2 person assist;verbal cues;nonverbal cues (demo/gesture)  -     Assistive Device (Bed-Chair Transfers) --  R UE support  -       Row Name 03/06/24 1104          Sit-Stand Transfer    Sit-Stand [place] (Transfers) moderate assist (50% patient effort);2 person assist;verbal cues;nonverbal cues (demo/gesture)  -     Assistive Device (Sit-Stand Transfers) other (see comments)  RUE support  -       Row Name 03/06/24 1104          Functional Mobility    Functional Mobility- Ind. Level moderate assist (50% patient effort);2 person assist required  -     Functional Mobility-Distance (Feet) 3  -     Functional Mobility- Safety Issues step length decreased;sequencing ability decreased  -       Row Name 03/06/24 1104          Activities of Daily Living    BADL Assessment/Intervention upper body dressing;grooming  -       Row Name 03/06/24 1104          Mobility    Extremity Weight-bearing Status left upper extremity   -     Left Upper Extremity (Weight-bearing Status) non weight-bearing (NWB)   -       Row Name 03/06/24 1104          Grooming Assessment/Training    [place] Level (Grooming) wash face, hands;set up;verbal cues  -     Position (Grooming) supported sitting  -       Row Name 03/06/24 1104          Upper Body Dressing Assessment/Training    [place]  Level (Upper Body Dressing) don;doff;pajama/robe;maximum assist (25% patient effort)  -     Position (Upper Body Dressing) edge of bed sitting  -               User Key  (r) = Recorded By, (t) = Taken By, (c) = Cosigned By      Initials Name Provider Type    Pauly Mayes, OT Occupational Therapist                   Obj/Interventions       Row Name 03/06/24 1107          Elbow/Forearm (Therapeutic Exercise)    Elbow/Forearm (Therapeutic Exercise) PROM (passive range of motion)  pt did not initate actively assisting  -     Elbow/Forearm PROM (Therapeutic Exercise) flexion;extension;pronation;supination;10 repetitions;left  -       Row Name 03/06/24 1107          Hand (Therapeutic Exercise)    Hand (Therapeutic Exercise) PROM (passive range of motion)  -     Hand PROM (Therapeutic Exercise) left;finger extension;finger flexion;10 repetitions  pt would not actively assist  -       Row Name 03/06/24 1107          Motor Skills    Therapeutic Exercise elbow/forearm;hand  -               User Key  (r) = Recorded By, (t) = Taken By, (c) = Cosigned By      Initials Name Provider Type    Pauly Mayes, OT Occupational Therapist                   Goals/Plan    No documentation.                  Clinical Impression       Row Name 03/06/24 1109          Pain Scale: FACES Pre/Post-Treatment    Pain: FACES Scale, Pretreatment 4-->hurts little more  -AC     Posttreatment Pain Rating 4-->hurts little more  -AC     Pain Location - Side/Orientation Left  -AC     Pain Location upper  -AC     Pain Location - extremity  -AC     Pre/Posttreatment Pain Comment pain with movement  -       Row Name 03/06/24 1109          Plan of Care Review    Plan of Care Reviewed With patient  -AC     Progress no change  -     Outcome Evaluation Pt presents  below baseline with self care/mobiltiy d/t  confusion, minimal command following, pain , weakness, decr balance and activity tolerance.  Pt setup/ VCs to wash face, max A UBD,   mod A x 2 to take 3 steps to chair given RUE support.  Pt tolerated PROM L elbow and below - unable to get pt to actively assist.  Recommend SNF upon d/c.  -AC       Row Name 03/06/24 1109          Vital Signs    Pre Systolic BP Rehab 182  -AC     Pre Treatment Diastolic   -AC     Post Systolic BP Rehab 182  -AC     Post Treatment Diastolic   -AC     Pretreatment Heart Rate (beats/min) 91  -AC     Posttreatment Heart Rate (beats/min) 99  -AC     Pre SpO2 (%) 92  -AC     O2 Delivery Pre Treatment room air  -AC     O2 Delivery Intra Treatment room air  -AC     Post SpO2 (%) 91  -AC     O2 Delivery Post Treatment room air  -AC     Pre Patient Position Supine  -AC     Post Patient Position Sitting  -AC       Row Name 03/06/24 1109          Positioning and Restraints    Pre-Treatment Position in bed  -AC     Post Treatment Position chair  -AC     In Chair notified nsg;reclined;call light within reach;encouraged to call for assist;exit alarm on;with family/caregiver;on mechanical lift sling;waffle cushion;heels elevated  LUE sling and pillow under elbow  -AC               User Key  (r) = Recorded By, (t) = Taken By, (c) = Cosigned By      Initials Name Provider Type    AC Pauly Davey, OT Occupational Therapist                   Outcome Measures       Row Name 03/06/24 1116          How much help from another is currently needed...    Putting on and taking off regular lower body clothing? 1  -AC     Bathing (including washing, rinsing, and drying) 1  -AC     Toileting (which includes using toilet bed pan or urinal) 1  -AC     Putting on and taking off regular upper body clothing 2  -AC     Taking care of personal grooming (such as brushing teeth) 2  -AC     Eating meals 2  -AC     AM-PAC 6 Clicks Score (OT) 9  -AC       Row Name 03/06/24 0855          How much help from another person do you currently need...    Turning from your back to your side while in flat bed without using bedrails? 2  -LV     Moving  from lying on back to sitting on the side of a flat bed without bedrails? 2  -LV     Moving to and from a bed to a chair (including a wheelchair)? 2  -LV     Standing up from a chair using your arms (e.g., wheelchair, bedside chair)? 1  -LV     Climbing 3-5 steps with a railing? 1  -LV     To walk in hospital room? 1  -LV     AM-PAC 6 Clicks Score (PT) 9  -LV     Highest Level of Mobility Goal 3 --> Sit at edge of bed  -LV       Row Name 03/06/24 1116          Functional Assessment    Outcome Measure Options AM-PAC 6 Clicks Daily Activity (OT)  -               User Key  (r) = Recorded By, (t) = Taken By, (c) = Cosigned By      Initials Name Provider Type    AC Pauly Davey, OT Occupational Therapist    Brittney De Leon, RN Registered Nurse                    Occupational Therapy Education       Title: PT OT SLP Therapies (In Progress)       Topic: Occupational Therapy (In Progress)       Point: ADL training (In Progress)       Description:   Instruct learner(s) on proper safety adaptation and remediation techniques during self care or transfers.   Instruct in proper use of assistive devices.                  Learning Progress Summary             Patient Acceptance, E, NR by JOHANNA at 3/6/2024 1116    Acceptance, E, VU by LAINE at 3/4/2024 1519                         Point: Home exercise program (Not Started)       Description:   Instruct learner(s) on appropriate technique for monitoring, assisting and/or progressing therapeutic exercises/activities.                  Learner Progress:  Not documented in this visit.              Point: Precautions (Done)       Description:   Instruct learner(s) on prescribed precautions during self-care and functional transfers.                  Learning Progress Summary             Patient Acceptance, E, VU by LAINE at 3/4/2024 1519                         Point: Body mechanics (Not Started)       Description:   Instruct learner(s) on proper positioning and spine alignment during  self-care, functional mobility activities and/or exercises.                  Learner Progress:  Not documented in this visit.                              User Key       Initials Effective Dates Name Provider Type Discipline    AC 02/03/23 -  Pauly Davey, OT Occupational Therapist OT     06/16/21 -  Justina Joyce OT Occupational Therapist OT                  OT Recommendation and Plan     Plan of Care Review  Plan of Care Reviewed With: patient  Progress: no change  Outcome Evaluation: Pt presents  below baseline with self care/mobiltiy d/t  confusion, minimal command following, pain , weakness, decr balance and activity tolerance.  Pt setup/ VCs to wash face, max A UBD,  mod A x 2 to take 3 steps to chair given RUE support.  Pt tolerated PROM L elbow and below - unable to get pt to actively assist.  Recommend SNF upon d/c.     Time Calculation:         Time Calculation- OT       Row Name 03/06/24 0956             Time Calculation- OT    OT Start Time 0956  -AC      OT Received On 03/06/24  -AC      OT Goal Re-Cert Due Date 03/14/24  -AC         Timed Charges    69356 - OT Self Care/Mgmt Minutes 12  -AC         Total Minutes    Timed Charges Total Minutes 12  -AC       Total Minutes 12  -AC                User Key  (r) = Recorded By, (t) = Taken By, (c) = Cosigned By      Initials Name Provider Type    AC Pauly Davey, OT Occupational Therapist                  Therapy Charges for Today       Code Description Service Date Service Provider Modifiers Qty    69653880502 HC OT SELF CARE/MGMT/TRAIN EA 15 MIN 3/6/2024 Pauly Davey OT GO 1                 Pauly ROJAS. SADAF Davey  3/6/2024    Electronically signed by Pauly Davey OT at 03/06/24 1119          Speech Language Pathology Notes (most recent note)        Jaylyn Peterson, MS CCC-SLP at 03/14/24 1519          Acute Care - Speech Language Pathology   Swallow Initial Evaluation  [place]  Clinical Swallow Evaluation  +Fiberoptic Endoscopic Evaluation of  Swallowing (FEES)       Patient Name: Lea Rivers  : 1945  MRN: [id number]  Today's Date: 3/14/2024               Admit Date: 3/2/2024    Visit Dx:     ICD-10-CM ICD-9-CM   1. Fall, initial encounter  W19.XXXA E888.9   2. Closed fracture of multiple ribs of left side, initial encounter  S22.42XA 807.09   3. Closed displaced fracture of acromial end of left clavicle, initial encounter  S42.032A 810.03   4. Falls frequently  R29.6 V15.88   5. Acute UTI (urinary tract infection)  N39.0 599.0   6. Colon distention  K63.89 569.89   7. Paralytic ileus of small intestine and colon  K56.0 560.1   8. Oropharyngeal dysphagia  R13.12 787.22     Patient Active Problem List   Diagnosis    Coronary artery disease    Dyslipidemia    Hypothyroidism    GERD (gastroesophageal reflux disease)    Seizure disorder    BPH (benign prostatic hypertrophy)    Hypertension    Primary osteoarthritis of both knees    Syncope and collapse    Precordial pain    Diabetes    Status post total right knee replacement    Postoperative urinary retention    Confusion, postoperative    Acute blood loss anemia, asymptomatic, no transfusion required    Elevated prostate specific antigen (PSA)    Prostate cancer    Anemia    Body mass index (BMI) of 32.0-32.9 in adult    Localization-related focal epilepsy with simple partial seizures    Need for vaccination against Streptococcus pneumoniae    Upper extremity tendon tear, right, initial encounter    Vitamin D deficiency    PSA elevation    Polyp of colon    Overactive bladder    Gross hematuria    History of colon polyps    History of colon cancer    B12 deficiency    Falls    Pneumatosis intestinalis     Past Medical History:   Diagnosis Date    Anemia     Colon cancer     Status post partial colectomy in . No chemotherapy or radiation therapy.    Coronary artery disease     Nonobstructive coronary artery disease.    Diabetes     Dyslipidemia     GERD (gastroesophageal reflux  disease)     Hx of.    Hyperlipidemia     Hypertension     Hyponatremia     Hypothyroidism     Left shoulder pain     Low sodium levels 2022    recent issue; saw nephrologist who ruled out kidney issues; took Sodium Chloride po to bring levels up    Memory deficit     FROM ANESTHESIA    Osteoarthritis     Prostate cancer 07/23/2022    Seizure disorder     silent seziure-diagnosed years ago    Skin cancer      Past Surgical History:   Procedure Laterality Date    APPENDECTOMY      CARDIAC CATHETERIZATION  2012    30 to 40% LAD    CARPAL TUNNEL RELEASE Bilateral     CHOLECYSTECTOMY      COLECTOMY PARTIAL / TOTAL  1990    Partial colectomy    COLON RESECTION N/A 3/9/2024    Procedure: TOTAL ABDOMINAL COLECTOMY, END ILEOSTOMY;  Surgeon: Harley Godfrey MD;  Location:  [facility];  Service: General;  Laterality: N/A;    COLONOSCOPY      CYBERKNIFE  02/09/2023    Prostate/SV    CYSTOSCOPY TRANSURETHRAL RESECTION OF PROSTATE N/A 09/21/2018    Procedure: CYSTOSCOPY TRANSURETHRAL RESECTION OF PROSTATE GREENLIGHT;  Surgeon: Naseem Clayton MD;  Location:  [facility];  Service: Urology    OTHER SURGICAL HISTORY      Contraction surgery on bilateral hands and wrists.    PROSTATE BIOPSY N/A 11/11/2022    Procedure: TRANSRECTAL ULTRASOUND GUIDED BIOPSY OF PROSTATE;  Surgeon: Naseem Noland MD;  Location:  [facility];  Service: Urology;  Laterality: N/A;    SINUS SURGERY      SKIN BIOPSY      TEETH EXTRACTION      TONSILLECTOMY      TOTAL KNEE ARTHROPLASTY Right 04/07/2022    Procedure: TOTAL KNEE ARTHROPLASTY WITH ORTHO ROBOT RIGHT;  Surgeon: Louis Malcolm MD;  Location:  [facility];  Service: Robotics - Ortho;  Laterality: Right;    TRANSRECTAL INCISION PROSTATE WITH GOLD SEED Bilateral 01/06/2023    Procedure: TRANSRECTAL ULTRASOUND GUIDED PROSTATE FIDUCIAL MARKER PLACEMENT;  Surgeon: Naseem Noland MD;  Location:  [facility];  Service: Urology;  Laterality: Bilateral;    TURP / TRANSURETHRAL INCISION / DRAINAGE  PROSTATE      VASECTOMY         SLP Recommendation and Plan  SLP Swallowing Diagnosis: moderate, oral dysphagia, pharyngeal dysphagia (03/14/24 1245)  SLP Diet Recommendation: puree, no mixed consistencies, honey thick liquids (03/14/24 1245)  Recommended Precautions and Strategies: upright posture during/after eating, small bites of food and sips of liquid, multiple swallows per bite of food, multiple swallows per sip of liquid, check mouth frequently for oral residue/pocketing, general aspiration precautions, 1:1 supervision, assist with feeding, fatigue precautions (03/14/24 1245)  SLP Rec. for Method of Medication Administration: meds crushed, with puree, as tolerated, meds via alternate route (03/14/24 1245)     Monitor for Signs of Aspiration: yes, notify SLP if any concerns (03/14/24 1245)  Recommended Diagnostics: reassess via FEES (03/14/24 0940)  Swallow Criteria for Skilled Therapeutic Interventions Met: demonstrates skilled criteria (03/14/24 1245)  Anticipated Discharge Disposition (SLP): skilled nursing facility (03/14/24 1245)  Rehab Potential/Prognosis, Swallowing: good, to achieve stated therapy goals (03/14/24 1245)  Therapy Frequency (Swallow): 5 days per week (03/14/24 1245)  Predicted Duration Therapy Intervention (Days): until discharge (03/14/24 1245)  Oral Care Recommendations: Oral Care BID/PRN, Suction toothbrush (03/14/24 1245)                                        Plan of Care Reviewed With: patient  Progress: improving      SWALLOW EVALUATION (Last 72 Hours)       SLP Adult Swallow Evaluation       Row Name 03/14/24 124 03/14/24 0940       Rehab Evaluation    Document Type evaluation  -CJ re-evaluation  -CJ    Subjective Information no complaints  -CJ no complaints  -CJ    Patient Observations alert  -CJ alert;cooperative  -CJ    Patient/Family/Caregiver Comments/Observations no family present  -CJ no family present  -CJ    Patient Effort adequate  -CJ adequate  -CJ    Symptoms Noted  During/After Treatment none  -CJ none  -CJ    Oral Care -- lip/mouth moisturizer applied;suction provided;swabbed with antiseptic solution;teeth brushed - suction toothbrush;tongue brushed  -CJ       General Information    Patient Profile Reviewed yes  -CJ yes  -CJ    Pertinent History Of Current Problem see am eval; referred for FEES to determine po intake  -CJ see initial eval; more alert this date  -CJ    Current Method of Nutrition NPO;parenteral feedings  -CJ NPO;parenteral feedings  -CJ    Precautions/Limitations, Vision WFL;for purposes of eval  -CJ WFL;for purposes of eval  -CJ    Precautions/Limitations, Hearing WFL;for purposes of eval  -CJ WFL;for purposes of eval  -CJ    Prior Level of Function-Communication cognitive-linguistic impairment;other (see comments)  -CJ cognitive-linguistic impairment;other (see comments)  -CJ    Prior Level of Function-Swallowing no diet consistency restrictions  -CJ no diet consistency restrictions  -CJ    Plans/Goals Discussed with patient and family;agreed upon  -CJ patient and family;agreed upon  -CJ    Barriers to Rehab cognitive status;medically complex  -CJ cognitive status;medically complex  -CJ    Patient's Goals for Discharge return to PO diet  -CJ return to PO diet  -CJ    Family Goals for Discharge family did not state  -CJ --       Pain    Additional Documentation Pain Scale: FACES Pre/Post-Treatment (Group)  -CJ Pain Scale: FACES Pre/Post-Treatment (Group)  -CJ       Pain Scale: FACES Pre/Post-Treatment    Pain: FACES Scale, Pretreatment 0-->no hurt  -CJ 0-->no hurt  -CJ    Posttreatment Pain Rating 0-->no hurt  -CJ 0-->no hurt  -CJ    Pain Location - -- --    Pre/Posttreatment Pain Comment -- --       Oral Motor Structure and Function    Dentition Assessment -- natural, present and adequate  -CJ    Secretion Management -- dried secretions in oral cavity  -CJ    Mucosal Quality -- dry;sticky  -CJ       Oral Musculature and Cranial Nerve Assessment    Oral Motor  General Assessment -- generalized oral motor weakness  -       General Eating/Swallowing Observations    Respiratory Support Currently in Use -- room air  -    Eating/Swallowing Skills -- fed by SLP;self-fed;needed assist  -    Positioning During Eating -- upright in bed  -    Utensils Used -- cup;spoon;straw  -    Consistencies Trialed -- pureed;ice chips;thin liquids  -       Clinical Swallow Eval    Oral Prep Phase -- --    Oral Transit -- --    Oral Residue -- --    Pharyngeal Phase -- suspected pharyngeal impairment  -CJ    Esophageal Phase -- --    Clinical Swallow Evaluation Summary -- More alert this date in comparison to previous evaluation, pt conversing as well although still continues w/ ams. Hard cough w/ trials of thin liquids. No glaring overt s/s of aspiration w/ any other presentations. Will plan to attempt FEES today w/ further recs pending.  -       Oral Prep Concerns    Oral Prep Concerns -- --    Incomplete or Weak Lip Closure Around Spoon -- --    Anterior Loss -- --       Oral Transit Concerns    Oral Transit Concerns -- --    Increased Oral Transit Time -- --       Pharyngeal Phase Concerns    Pharyngeal Phase Concerns -- --    Multiple Swallows -- --    Cough -- --    Pharyngeal Phase Concerns, Comment -- --       Fiberoptic Endoscopic Evaluation of Swallowing (FEES)    Risks/Benefits Reviewed risks/benefits explained;patient;agreed to eval  - --    Nasal Entry right:  - --    Scope serial number/identification [id number]  - --       Anatomy and Physiology    Anatomic Considerations erythema  - --    Comment Thick dried/stringy yellow tinged secretions coated in pharynx  - --    Velopharyngeal CNA  - --    Base of Tongue range reduced  -CJ --    Epiglottis WFL  -CJ --    Laryngeal Function Breathing symmetrical  -CJ --    Laryngeal Function Phonation CNA  -CJ --    Laryngeal Function to Breath Hold CNA  - --    Secretion Rating Scale (Peter et alDarryl 1996) 2- secretions  initially outside the vestibule but later entered the vestibule  -CJ --    Secretion Description thick;discolored  -CJ --    Ice Chips partially cleared secretions  -CJ --    Spontaneous Swallow frequency reduced  -CJ --    Sensory sensed scope  -CJ --    Utensils Used Spoon;Cup;Straw  -CJ --    Consistencies Trialed thin liquids;ice chips;nectar-thick liquids;honey-thick liquids;pudding/puree;spoon;cup;straw  -CJ --       FEES Interpretation    Oral Phase solids not tested  pt refused  -CJ --       Initiation of Pharyngeal Swallow    Initiation of Pharyngeal Swallow bolus in pyriform sinuses  -CJ --    Pharyngeal Phase impaired pharyngeal phase of swallowing  -CJ --    Aspiration During the Swallow thin liquids;nectar-thick liquids;secondary to delayed swallow initiation or mistiming;secondary to reduced laryngeal elevation;secondary to reduced vestibular closure  -CJ --    Aspiration After the Swallow thin liquids;nectar-thick liquids;secondary to residue;in pyriform sinuses;in laryngeal vestibule  -CJ --    Depth of Penetration deep  -CJ --    Response to Penetration No  -CJ --    No spontaneous response to penetration and non-effective laryngeal clearance with cue (see comments)  -CJ --    Response to Aspiration No  -CJ --    No spontaneous response to aspiration with could not produce cough response despite cue  2/2 pt becoming increasingly agitated  -CJ --    Rosenbek's Scale thin:;nectar:;8-->Level 8  -CJ --    Residue all consistencies tested;diffuse within pharynx;secondary to reduced posterior pharyngeal wall stripping;secondary to reduced laryngeal elevation;secondary to reduced hyolaryngeal excursion;other (see comments)  worse w/ thins/nectar  -CJ --    Response to Residue unable to clear residue;with cued swallow  -CJ --    Attempted Compensatory Maneuvers bolus size;bolus presentation style  -CJ --    Response to Attempted Compensatory Maneuvers did not prevent aspiration  -CJ --    Successful  Compensatory Maneuver Competency patient unable to adequately demonstrate/teach back compensations  - --    FEES Summary Moderate oropharyngeal dysphagia. Pt refused trials of solids and became increasingly restless as evaluation continued. Noted thick diffuse stringy secretions coated in pharynx prior to po presentations, able to clear majority after multiple swallows but not all of secretions. Silent aspiration w/ thin/nectar during/after the swallow. Unable to perform cued cough despite max cues and prompts. No penetration w/ pudding or honey thick liquids via spoon/cup/straw. Pt refused further po presentations when attempted solids. Will initiate modified po diet of puree w/ honey thick liquids ,no mixed consistencies. Will monitor closely given waxing/waning mental status  - --       Swallowing Quality of Life Assessment    Education and counseling provided -- --       SLP Evaluation Clinical Impression    SLP Swallowing Diagnosis moderate;oral dysphagia;pharyngeal dysphagia  - oral dysphagia;suspected pharyngeal dysphagia  -    Functional Impact risk of aspiration/pneumonia;risk of malnutrition;risk of dehydration  - risk of aspiration/pneumonia;risk of malnutrition;risk of dehydration  -    Rehab Potential/Prognosis, Swallowing good, to achieve stated therapy goals  - good, to achieve stated therapy goals  -    Swallow Criteria for Skilled Therapeutic Interventions Met demonstrates skilled criteria  - demonstrates skilled criteria  -       Recommendations    Therapy Frequency (Swallow) 5 days per week  - --    Predicted Duration Therapy Intervention (Days) until discharge  - until discharge  -    SLP Diet Recommendation puree;no mixed consistencies;honey thick liquids  - NPO;temporary alternate methods of nutrition/hydration  -    Recommended Diagnostics -- reassess via FEES  -    Recommended Precautions and Strategies upright posture during/after eating;small bites of food  and sips of liquid;multiple swallows per bite of food;multiple swallows per sip of liquid;check mouth frequently for oral residue/pocketing;general aspiration precautions;1:1 supervision;assist with feeding;fatigue precautions  - general aspiration precautions  -    Oral Care Recommendations Oral Care BID/PRN;Suction toothbrush  -CJ Oral Care BID/PRN;Suction toothbrush  -    SLP Rec. for Method of Medication Administration meds crushed;with puree;as tolerated;meds via alternate route  - meds via alternate route  -CJ    Monitor for Signs of Aspiration yes;notify SLP if any concerns  -CJ yes;notify SLP if any concerns  -CJ    Anticipated Discharge Disposition (SLP) skilled nursing facility  - skilled nursing facility  -              User Key  (r) = Recorded By, (t) = Taken By, (c) = Cosigned By      Initials Name Effective Dates    AC Shyanne Ramos, MS [facility]-SLP 02/03/23 -     Jaylyn Holland, MS CCC-SLP 07/11/23 -     Shi Francis, MS [facility]-SLP 05/12/23 -                     EDUCATION  The patient has been educated in the following areas:   Dysphagia (Swallowing Impairment) Oral Care/Hydration Modified Diet Instruction.        SLP GOALS       Row Name 03/14/24 1245             (LTG) Patient will demonstrate functional swallow for    Diet Texture (Demonstrate functional swallow) soft to chew (chopped) textures  -CJ      Liquid viscosity (Demonstrate functional swallow) nectar/ mildly thick liquids  -CJ      [place] (Demonstrate functional swallow) with minimal cues (75-90% accuracy)  -CJ      Time Frame (Demonstrate functional swallow) by discharge  -CJ      Progress/Outcomes (Demonstrate functional swallow) new goal  -CJ         (STG) Patient will tolerate trials of    Consistencies Trialed (Tolerate trials) pureed textures;honey/ moderately thick liquids  -CJ      Desired Outcome (Tolerate trials) without signs/symptoms of aspiration;without signs of distress;with adequate oral  prep/transit/clearance  -CJ      [place] (Tolerate trials) with minimal cues (75-90% accuracy)  -CJ      Time Frame (Tolerate trials) 1 week  -CJ      Progress/Outcomes (Tolerate trials) new goal  -CJ         (STG) Patient will tolerate therapeutic trials of    Consistencies Trialed (Tolerate therapeutic trials) mechanical ground textures  -CJ      Desired Outcome (Tolerate therapeutic trials) with adequate oral prep/transit/clearance  -CJ      [place] (Tolerate therapeutic trials) with minimal cues (75-90% accuracy)  -CJ      Time Frame (Tolerate therapeutic trials) 1 week  -CJ      Progress/Outcomes (Tolerate therapeutic trials) new goal  -CJ         (STG) Lingual Strengthening Goal 1 (SLP)    Activity (Lingual Strengthening Goal 1, SLP) increase lingual tone/sensation/control/coordination/movement;increase tongue back strength  -CJ      Increase Lingual Tone/Sensation/Control/Coordination/Movement swallow trials;lingual resistance exercises  -CJ      Increase Tongue Back Strength lingual resistance exercises  -CJ      [place]/Accuracy (Lingual Strengthening Goal 1, SLP) with moderate cues (50-74% accuracy)  -CJ      Time Frame (Lingual Strengthening Goal 1, SLP) 1 week  -CJ      Progress/Outcomes (Lingual Strengthening Goal 1, SLP) new goal  -CJ         (STG) Pharyngeal Strengthening Exercise Goal 1 (SLP)    Activity (Pharyngeal Strengthening Goal 1, SLP) increase timing;increase superior movement of the hyolaryngeal complex;increase anterior movement of the hyolaryngeal complex;increase squeeze/positive pressure generation  -CJ      Increase Timing prepping - 3 second prep or suck swallow or 3-step swallow  -CJ      Increase Superior Movement of the Hyolaryngeal Complex falsetto  -CJ      Increase Anterior Movement of the Hyolaryngeal Complex chin tuck against resistance (CTAR)  -CJ      Increase Squeeze/Positive Pressure Generation hard effortful swallow  -CJ      [place]/Accuracy  (Pharyngeal Strengthening Goal 1, SLP) with maximum cues (25-49% accuracy)  -      Time Frame (Pharyngeal Strengthening Goal 1, SLP) 1 week  -      Progress/Outcomes (Pharyngeal Strengthening Goal 1, SLP) new goal  -                User Key  (r) = Recorded By, (t) = Taken By, (c) = Cosigned By      Initials Name Provider Type    Jaylyn Holland MS CCC-SLP Speech and Language Pathologist                       Time Calculation:    Time Calculation- SLP       Row Name 03/14/24 1518 03/14/24 1021          Time Calculation- SLP    SLP Start Time 1245  - 0940  -     SLP Received On 03/14/24  -CJ 03/14/24  -        Untimed Charges    49039-VU Eval Oral Pharyng Swallow Minutes -- 41  -CJ     76080-KM Fiberoptic Endo Eval Swallow Minutes 85  -CJ --        Total Minutes    Untimed Charges Total Minutes 85  -CJ 41  -CJ      Total Minutes 85  -CJ 41  -CJ               User Key  (r) = Recorded By, (t) = Taken By, (c) = Cosigned By      Initials Name Provider Type    Jaylyn Holland MS CCC-SLP Speech and Language Pathologist                    Therapy Charges for Today       Code Description Service Date Service Provider Modifiers Qty    41726725838 HC ST EVAL ORAL PHARYNG SWALLOW 3 3/14/2024 Jaylyn Peterson MS CCC-SLP GN 1    [id number] HC ST FIBEROPTIC ENDO EVAL SWALL 6 3/14/2024 Jaylyn Peterson MS CCC-SLP GN 1                 Jaylyn Peterson MS CCC-NATALIO  3/14/2024    Electronically signed by Jaylyn Peterson MS CCC-SLP at 03/14/24 1520

## 2024-03-15 NOTE — PROGRESS NOTES
Pharmacy Parenteral Nutrition Evaluation    Lea Rivers is a  78 y.o. male receiving TPN.     Indication:     Prolonged Paralytic Ileus     Consulting Physician: Eleanor Yost DO     Total Fluid Rate (if stated): n/a    Labs  Results from last 7 days   Lab Units 03/15/24  0405 03/14/24  0514 03/13/24  0511 03/11/24  1618 03/11/24  1011 03/11/24  0508 03/09/24 2055 03/09/24  0335   SODIUM mmol/L 141 140 139   < >  --  138  138   < > 141   POTASSIUM mmol/L 3.8 3.9 4.0   < >  --  3.2*  3.2*   < > 2.8*   CHLORIDE mmol/L 105 106 106   < >  --  107  107   < > 107   CO2 mmol/L 28.0 28.0 28.0   < >  --  25.0  25.0   < > 23.0   BUN mg/dL 18 19 18   < >  --  15  15   < > 15   CREATININE mg/dL 0.54* 0.46* 0.44*   < >  --  0.66*  0.66*   < > 0.72*   CALCIUM mg/dL 7.8* 7.9* 7.8*   < >  --  8.3*  8.3*   < > 8.7   BILIRUBIN mg/dL  --   --   --   --  0.3 0.3  --  0.6   ALK PHOS U/L  --   --   --   --  60 63  --  70   ALT (SGPT) U/L  --   --   --   --  29 31  --  86*   AST (SGOT) U/L  --   --   --   --  20 23  --  64*   GLUCOSE mg/dL 147* 159* 163*   < >  --  145*  145*   < > 118*    < > = values in this interval not displayed.       Results from last 7 days   Lab Units 03/15/24  0405 03/14/24  0514 03/13/24  1711 03/13/24  0511 03/12/24  0316 03/11/24  1618 03/09/24  0335 03/08/24  1638   MAGNESIUM mg/dL 2.0 2.2  --  1.9   < >  --    < > 2.3   PHOSPHORUS mg/dL 2.9 2.4* 2.7 2.2*   < > 2.1*   < >  --    PREALBUMIN mg/dL  --   --   --   --   --  5.4*  --  7.9*    < > = values in this interval not displayed.       Results from last 7 days   Lab Units 03/15/24  0405 03/14/24  1125 03/13/24  0511   WBC 10*3/mm3 15.09* 17.06* 23.21*   HEMOGLOBIN g/dL 7.2* 7.5* 8.6*   HEMATOCRIT % 23.1* 25.4* 26.4*   PLATELETS 10*3/mm3 245 242 223       Triglycerides   Date Value Ref Range Status   03/11/2024 131 0 - 150 mg/dL Final     Comment:     Falsely depressed results may occur on samples drawn from patients receiving  N-Acetylcysteine (NAC) or Metamizole.       estimated creatinine clearance is 122.8 mL/min (A) (by C-G formula based on SCr of 0.54 mg/dL (L)).    Intake & Output (last 3 days)         03/05 0701 03/06 0700 03/06 0701 03/07 0700 03/07 0701 03/08 0700 03/08 0701 03/09 0700    P.O. 1080 240      I.V. (mL/kg)  1026.3 (11.5)      IV Piggyback  400      Total Intake(mL/kg) 1080 (12.1) 1666.3 (18.7)      Urine (mL/kg/hr) 150 (0.1) 400 (0.2)      Stool 0 0      Total Output 150 400      Net +930 +1266.3              Urine Unmeasured Occurrence 4 x 2 x 1 x     Stool Unmeasured Occurrence 5 x 4 x 3 x 1 x            Dietitian Recommendations  Diet Orders (active) (From admission, onward)       Start     Ordered    03/08/24 1800  Adult Cyclic Central 2-in-1 TPN  Cyclic TPN - See Admin Instructions        Note to Pharmacy: Lea Rivers  5H  [id number]  MRN: [id number]  CSN: [id number]    03/08/24 1422    03/08/24 1800  Fat Emul Fish Oil/Plant Based (SMOFLIPID) 20 % emulsion 250 mL  Every 24 Hours Scheduled (TPN)         03/08/24 1422 03/08/24 1355  NPO Diet NPO Type: Strict NPO  Diet Effective Now         03/08/24 1358    03/05/24 1656  DIET MESSAGE Pt would like a grilled cheese sandwich, please. Thanks!  Once        Comments: Pt would like a grilled cheese sandwich, please. Thanks!    03/05/24 1656                    Current TPN Regimen Recommendation:   Dextrose 15% / Amino Acid 7.7% at a goal rate of 65 ml/hr.      20% Lipid Emulsion 250mL every 24 hours.    Na 45 mEq/L  K 50 mEq/L  Ca 5 mEq/L  Mg 8 mEq/L  PO4 15 mmol/L  Cl:Ace - 1:1    Assessment/Plan:  TPN for prolonged paralytic ileus.   Macronutrients per dietary recommendation are listed above.  Electrolyte replacement per protocol, will continue with standard electrolytes.   SSI q6h ordered  Pharmacy will continue to follow and adjust as indicated     Thanks  Maria Victoria Grimaldo, PharmD  Pharmacy Resident  3/15/2024  13:54 EDT

## 2024-03-15 NOTE — PLAN OF CARE
Goal Outcome Evaluation:                     Anticipated Discharge Disposition (SLP): [facility] nursing facility          SLP Swallowing Diagnosis: moderate, oral dysphagia, pharyngeal dysphagia (03/15/24 1400)  Treatment Assessment (SLP): continued, oral dysphagia, pharyngeal dysphagia (03/15/24 1400)  Treatment Assessment Comments (SLP): Patient tolerated trials of pureed and honey thick liquids without s/s of aspiration.Dysphagia exersices completed with mod cues. Patient had some difficulty with multi step exercises even with cues and models. (03/15/24 1400)  Plan for Continued Treatment (SLP): continue treatment per plan of care (03/15/24 1400)

## 2024-03-15 NOTE — PROGRESS NOTES
[facility] Medicine Services  PROGRESS NOTE    Patient Name: Lea Rivers  : 1945  MRN: [id number]    Date of Admission: 3/2/2024  Primary Care Physician: Mannie Melvin MD    Subjective   Subjective     CC:  F/U total abd colectomy with end ileostomy     HPI:  Patient resting in bed. Son at bedside states patient is much more alert today, ate some breakfast.     Objective   Objective     Vital Signs:   Temp:  [97.6 °F (36.4 °C)-98.1 °F (36.7 °C)] 97.6 °F (36.4 °C)  Heart Rate:  [] 91  Resp:  [18-20] 20  BP: (152-197)/(67-91) 156/67     Physical Exam:  Constitutional: No acute distress, appears chronically ill, elderly. Sleeping in bed but lethargy somewhat better today  HENT: NCAT, mucous membranes moist  Respiratory: Clear to auscultation bilaterally, respiratory effort normal   Cardiovascular: tachycardic, no murmurs, rubs, or gallops  Gastrointestinal: abd binder in place, tender to palpation  Musculoskeletal: + upper and lower bilateral edema  Neurologic: Oriented to person only, lethargy is somewhat improved today  Skin: No rashes, bruises noted on upper ext    Results Reviewed:  LAB RESULTS:      Lab 03/15/24  0405 24  1125 24  0934 24  0511 24  0316 24  1618 24  1011 24  0508 24  0335 24  0335 24  1215   WBC 15.09* 17.06*  --  23.21* 22.94*  --   --  18.43*   < > 14.12*  --    HEMOGLOBIN 7.2* 7.5*  --  8.6* 8.3* 8.4*  --  7.3*   < > 11.3*  --    HEMATOCRIT 23.1* 25.4*  --  26.4* 26.2* 26.3*  --  22.8*   < > 35.1*  --    PLATELETS 245 242  --  223 195  --   --  222   < > 328  --    NEUTROS ABS 10.75* 13.48*  --  18.10* 18.18*  --   --  14.40*   < > 11.36*  --    IMMATURE GRANS (ABS) 1.30*  --   --   --  1.11*  --   --  0.60*  --  0.35*  --    LYMPHS ABS 1.04  --   --   --  1.17  --   --  1.04  --  0.88  --    MONOS ABS 1.31*  --   --   --  1.77*  --   --  2.08*  --  1.43*  --    EOS ABS 0.61*  0.51*  --  0.23 0.61*  --   --  0.24   < > 0.02  --    MCV 93.5 100.0*  --  92.3 92.6  --   --  96.2   < > 92.4  --    CRP  --   --   --   --   --   --  13.06*  --   --   --  12.05*   PROCALCITONIN  --   --   --  0.20 0.27*  --   --   --   --   --   --    LACTATE  --   --  1.5  --   --   --   --   --   --  1.3  --    PROTIME  --   --   --   --   --  15.1*  --   --   --   --   --     < > = values in this interval not displayed.         Lab 03/15/24  0405 03/14/24  0514 03/13/24  1711 03/13/24  0511 03/12/24  1204 03/12/24  0316 03/11/24  1618 03/11/24  1011 03/11/24  0508 03/09/24  0335 03/08/24  1638   SODIUM 141 140  --  139  --  138  --   --  138  138   < >  --    POTASSIUM 3.8 3.9  --  4.0  --  4.0 3.4*  --  3.2*  3.2*   < > 2.5*   CHLORIDE 105 106  --  106  --  106  --   --  107  107   < >  --    CO2 28.0 28.0  --  28.0  --  25.0  --   --  25.0  25.0   < >  --    ANION GAP 8.0 6.0  --  5.0  --  7.0  --   --  6.0  6.0   < >  --    BUN 18 19  --  18  --  18  --   --  15  15   < >  --    CREATININE 0.54* 0.46*  --  0.44*  --  0.61*  --   --  0.66*  0.66*   < >  --    EGFR 101.4 106.4  --  107.8  --  97.7  --   --  95.4  95.4   < >  --    GLUCOSE 147* 159*  --  163*  --  148*  --   --  145*  145*   < >  --    CALCIUM 7.8* 7.9*  --  7.8*  --  8.0*  --   --  8.3*  8.3*   < >  --    IONIZED CALCIUM  --   --   --   --   --   --  1.26  --   --   --  1.25   MAGNESIUM 2.0 2.2  --  1.9  --  1.8  --   --  1.8   < > 2.3   PHOSPHORUS 2.9 2.4* 2.7 2.2* 2.3* 1.9* 2.1*   < > 1.7*   < >  --     < > = values in this interval not displayed.         Lab 03/11/24  1011 03/11/24  0508 03/09/24  0335 03/08/24  1215   TOTAL PROTEIN 4.4* 4.4* 6.1 5.6*   ALBUMIN 2.4* 2.5* 2.9* 3.1*   GLOBULIN  --  1.9 3.2  --    ALT (SGPT) 29 31 86* 103*   AST (SGOT) 20 23 64* 105*   BILIRUBIN 0.3 0.3 0.6 0.5   INDIRECT BILIRUBIN  --   --   --  0.2   BILIRUBIN DIRECT <0.2  --   --  0.3   ALK PHOS 60 63 70 61         Lab 03/11/24  1618   PROTIME  15.1*   INR 1.18*           Lab 03/11/24  1011 03/08/24  1215   CHOLESTEROL  --  103   TRIGLYCERIDES 131 161*         Lab 03/09/24  0935   ABO TYPING O   RH TYPING Negative   ANTIBODY SCREEN Negative           Brief Urine Lab Results  (Last result in the past 365 days)        Color   Clarity   Blood   Leuk Est   Nitrite   Protein   CREAT   Urine HCG        03/11/24 1337 Yellow   Cloudy   Moderate (2+)   Trace   Negative   30 mg/dL (1+)                   Microbiology Results Abnormal       Procedure Component Value - Date/Time    Urine Culture - Urine, Indwelling Urethral Catheter [[id number]]  (Normal) Collected: 03/11/24 1337    Lab Status: Final result Specimen: Urine from Indwelling Urethral Catheter Updated: 03/12/24 2024     Urine Culture No growth    Blood Culture - Blood, Arm, Right [[id number]]  (Normal) Collected: 03/06/24 1857    Lab Status: Final result Specimen: Blood from Arm, Right Updated: 03/11/24 2045     Blood Culture No growth at 5 days    Blood Culture - Blood, Arm, Right [[id number]]  (Normal) Collected: 03/06/24 1751    Lab Status: Final result Specimen: Blood from Arm, Right Updated: 03/11/24 2000     Blood Culture No growth at 5 days    Blood Culture - Blood, Arm, Right [[id number]]  (Normal) Collected: 03/02/24 1132    Lab Status: Final result Specimen: Blood from Arm, Right Updated: 03/07/24 1201     Blood Culture No growth at 5 days    Narrative:      Less than seven (7) mL's of blood was collected.  Insufficient quantity may yield false negative results.    Blood Culture - Blood, Arm, Right [[id number]]  (Normal) Collected: 03/02/24 1132    Lab Status: Final result Specimen: Blood from Arm, Right Updated: 03/07/24 1201     Blood Culture No growth at 5 days    Narrative:      Less than seven (7) mL's of blood was collected.  Insufficient quantity may yield false negative results.    Urine Culture - Urine, Straight Cath [[id number]]  (Normal) Collected: 03/02/24 0923    Lab Status: Final result  Specimen: Urine from Straight Cath Updated: 03/03/24 1601     Urine Culture No growth    COVID PRE-OP / PRE-PROCEDURE SCREENING ORDER (NO ISOLATION) - Swab, Nasopharynx [[id number]]  (Normal) Collected: 03/02/24 1133    Lab Status: Final result Specimen: Swab from Nasopharynx Updated: 03/02/24 1227    Narrative:      The following orders were created for panel order COVID PRE-OP / PRE-PROCEDURE SCREENING ORDER (NO ISOLATION) - Swab, Nasopharynx.  Procedure                               Abnormality         Status                     ---------                               -----------         ------                     COVID-19, FLU A/B, RSV P...[[id number]]  Normal              Final result                 Please view results for these tests on the individual orders.    COVID-19, FLU A/B, RSV PCR 1 HR TAT - Swab, Nasopharynx [[id number]]  (Normal) Collected: 03/02/24 1133    Lab Status: Final result Specimen: Swab from Nasopharynx Updated: 03/02/24 1227     COVID19 Not Detected     Influenza A PCR Not Detected     Influenza B PCR Not Detected     RSV, PCR Not Detected    Narrative:      Fact sheet for providers: https://www.fda.gov/media/002773/download    Fact sheet for patients: https://www.fda.gov/media/120734/download    Test performed by PCR.            SLP FEES - Fiberoptic Endo Eval Swallow    Result Date: 3/14/2024  This procedure was auto-finalized with no dictation required.    CT Head Without Contrast    Result Date: 3/13/2024  CT HEAD WO CONTRAST Date of Exam: 3/13/2024 6:57 PM EDT Indication: Stroke, follow up dysarthria, change from baseline. Comparison: 3/2/2024 Technique: Axial CT images were obtained of the head without contrast administration.  Automated exposure control and iterative construction methods were used. Findings: There is mild generalized parenchymal volume loss. There is no evidence of acute infarct or hemorrhage. No abnormal extra-axial fluid collections are seen. No mass effect or  hydrocephalus. The globes and orbits are within normal limits. There is a polyp or mucous retention cyst within the left maxillary sinus. Paranasal sinuses and mastoid air cells are otherwise clear.     Impression: Impression: 1. No acute intracranial abnormality. Electronically Signed: Bob Hanna MD  3/13/2024 9:33 PM EDT  Workstation ID: [id number]    EEG    Result Date: 3/13/2024  Reason for referral: 78 y.o.male with altered mental status, consideration of seizures Technical Summary:  A 19 channel digital EEG was performed using the international 10-20 placement system, including eye leads and EKG leads. Duration: 20 minutes Findings: The patient is awake.  Diffuse low to medium amplitude 2-5 Hz intermixed delta and theta activity is present symmetrically over both hemispheres.  A posterior rhythm is not seen.  Low amplitude EMG artifact is present anteriorly.  Photic stimulation does not change the background.  Hyperventilation is not performed.  Stage II sleep is not seen.  No focal features or epileptiform activity are present. Video: Available Technical quality: Superior EKG: Irregular, 80-90 bpm SUMMARY: Moderate generalized slow No focal features or epileptiform activity are seen     Impression: Diffuse cerebral dysfunction of moderate degree, nonspecific.  This finding is most commonly seen with encephalopathy due to toxic/metabolic cause No ongoing seizures are seen This report is transcribed using the Dragon dictation system.       Results for orders placed during the hospital encounter of 07/20/21    Adult Transthoracic Echo Complete W/ Cont if Necessary Per Protocol    Interpretation Summary  · Left ventricular wall thickness is consistent with mild concentric hypertrophy.  · Normal left-ventricular systolic function, estimated EF 65%.  · Left ventricular diastolic function is consistent with (grade I) impaired relaxation.  · Aortic sclerosis without aortic stenosis. Trace aortic insufficiency.  ·  Trace mitral regurgitation, trace tricuspid regurgitation.      Current medications:  Scheduled Meds:alteplase (CATHFLO/ACTIVASE) INTRACATHETER injection 2 mg, 2 mg, Intracatheter, Once  cefTRIAXone, 2,000 mg, Intravenous, Q24H  enoxaparin, 1 mg/kg, Subcutaneous, Q12H  Fat Emul Fish Oil/Plant Based, 250 mL, Intravenous, Q24H (TPN)  fluconazole, 400 mg, Intravenous, Q24H  insulin regular, 2-7 Units, Subcutaneous, Q6H  isosorbide mononitrate, 30 mg, Oral, Q24H  levETIRAcetam, 500 mg, Intravenous, Q12H  Lidocaine, 2 patch, Transdermal, Q24H  lisinopril, 20 mg, Oral, Q24H  metoclopramide, 10 mg, Intravenous, Q6H  metroNIDAZOLE, 500 mg, Intravenous, Q8H  pantoprazole, 40 mg, Intravenous, Q AM  sodium chloride, 10 mL, Intravenous, Q12H  valproate sodium, 500 mg, Intravenous, Q8H  verapamil SR, 240 mg, Oral, Q24H      Continuous Infusions:Adult Central 2-in-1 TPN, , Last Rate: 65 mL/hr at 03/14/24 1728  Pharmacy to Dose TPN,           PRN Meds:.  acetaminophen **OR** acetaminophen **OR** acetaminophen    calcium carbonate    Calcium Replacement - Follow Nurse / BPA Driven Protocol    dextrose    dextrose    docusate sodium    fluticasone    glucagon (human recombinant)    hydrALAZINE    HYDROmorphone    ipratropium-albuterol    labetalol    Magnesium Standard Dose Replacement - Follow Nurse / BPA Driven Protocol    [DISCONTINUED] HYDROmorphone **AND** naloxone    ondansetron ODT **OR** ondansetron    ondansetron ODT **OR** ondansetron    Pharmacy to Dose TPN    Phosphorus Replacement - Follow Nurse / BPA Driven Protocol    Potassium Replacement - Follow Nurse / BPA Driven Protocol    sodium chloride    sodium chloride    sodium chloride    sodium chloride    sodium chloride    sodium chloride    Assessment & Plan   Assessment & Plan     Active Hospital Problems    Diagnosis  POA    **Falls [W19.XXXA]  Yes    Pneumatosis intestinalis [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         Brief Hospital Course to date:  Lea Rivers is a 78 y.o. male with past medical history of hypertension, hyperlipidemia, hypothyroidism, seizure disorder.  Patient is being admitted for a fall and nondisplaced first through fourth left rib fractures.  Patient also has left distal clavicle fracture.    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 Continue TPN until oral intake improves  -Replace electrolytes per protocol   -NG removed 3/10, patel removed  -Continue Fluconazole (started 3/11), Zosyn changed to Rocephin + Flagyl 3/12, cont empiric coverage  -CT abd/pelvis reviewed, d/w Dr. Godfrey, expected post-op findings, no abscess, remains afebrile and cultures are all negative so far. Lactate WNL, WBC improving  -CXR 3/12 does show possible new LLL pneumonia, continue CTX    Encephalopathy/lethargy  - per daughter significant change in mental status and speech since arrival, we had a long discussion and I feel this is probably TME in setting of infection, recent large operation, underlying dementia  - initial CT head 3/2 negative, repeated CT head 3/13 without any acute findings  - EEG negative for seizures, findings consistent with TME  -minimize sedating meds     Multiple falls with increasing frequency   -CT of head shows age-related changes which are chronic but no acute process  -neuro consulted. likely d/t neuropathy (confirmed with EMG) and progression of dementia     Multiple rib fractures and also left clavicle fracture  -With no displacement or mildly displaced.  Orthopedic surgery evaluated. No surgical intervention  planned.  Recs nonweightbearing LUE, may come out of sling in 5-7 days to initiate gentle ROM exercises per ortho.  F/u with ortho/Dr. Maldonado in 2 weeks  -Pain control  -lidocaine patch  -bruise  noted to L shoulder/neck region. X-ray negative    Acute on Chronic Anemia  -Post op noted decrease in H&H  -S/P 1 unit PRBCs 3/11 with appropriate rise in H&H  -Do not suspect active bleeding   -Hemoglobin trending down, monitor in setting of therapeutic AC, transfuse PRN hgb <7    Acute RUE DVT (brachial)  -Provoked, 2/2 PICC   -therapeutic lovenox started 3/13     Prostate cancer  Hx BPH s/p greenlight photo vaporization of prostate 2018  -Follows with Dr. Noland  -S/P cyberknife radiation 2/9/23  -Has intermittent hematuria, monitor while on AC     Dementia and increasing memory problem  -Patient was previously living alone and driving, given significant decline will need placement     Hypertension  -cleared by speech, resume home oral regimen    Seizure disorder  - continue home meds including home keppra, change back to PO  - Depakote level ok    Hyperlipidemia  Hypothyroidism  -resume home regimen    Expected Discharge Location and Transportation: SNF  Expected Discharge   Expected Discharge Date: 3/18/2024; Expected Discharge Time:      DVT prophylaxis:  Medical and mechanical DVT prophylaxis orders are present.         AM-PAC 6 Clicks Score (PT): 6 (03/15/24 1114)    CODE STATUS:   Code Status and Medical Interventions:   Ordered at: 03/02/24 1827     Medical Intervention Limits:    NO intubation (DNI)     Code Status (Patient has no pulse and is not breathing):    No CPR (Do Not Attempt to Resuscitate)     Medical Interventions (Patient has pulse or is breathing):    Limited Support       Sienna Saleh, DO  03/15/24

## 2024-03-15 NOTE — CASE MANAGEMENT/SOCIAL WORK
Continued Stay Note   [place]     Patient Name: Lea Rivers  MRN: [id number]  Today's Date: 3/15/2024    Admit Date: 3/2/2024    Plan: SNF   Discharge Plan       Row Name 03/15/24 0924       Plan    Plan SNF    Plan Comments Met with Mr. Rivers and his son at the bedside to discuss discharge plan. Mr. Rivers is pleasantly confused. He was eating applesauce when  visited. Son reported the discharge plan is [facility] SNF to LTC.  called [facility] liaisonRosario, and faxed updated referral to  [phone]. Rosario agreed to review and update .  will continue to follow plan of care and assist with discharge planning needs as indicated.    Final Discharge Disposition Code 03 - skilled nursing facility (SNF)                   Discharge Codes    No documentation.                 Expected Discharge Date and Time       Expected Discharge Date Expected Discharge Time    Mar 18, 2024               Valerie Keith RN

## 2024-03-15 NOTE — NURSING NOTE
WOC follow-up for ostomy care and education.     Patient now postop day 6 from total abdominal colectomy with end ileostomy.     Patient is not appropriate for education.  Ostomy appliance intact.  Stoma viable.    Discussed with son at bedside.  Reinforced importance of regular hydration with patient including use of water and Gatorade.    Will follow-up on Monday.    Pola Arnold RN, BSN, CCRN, CWOCN  Wound, Ostomy and Continence (WOC) Department  [facility]

## 2024-03-15 NOTE — PLAN OF CARE
Goal Outcome Evaluation:  Plan of Care Reviewed With: patient        Progress: no change  Outcome Evaluation: PT re-eval completed. He was ableto transfer to EOB however unable to maintain NWB status in L UE requriing constant cues. Patient with posterior lean and difficulty with forward weigth shfit despite cues. He reported dizziness at EOB, BP measured. Patient's BP raised to 182/120. PT measured BP again reading 195/122. Patient reported worsening dizziness and requested to return to supine. PT assisted patient back to supine and BP measured again reading 164/82. RN notified. PTx limited due to dizziness and change in BP. Patient presents below baseline for functional mobility. Patient demonstrates decreased activity tolerance, deconditioning and reduced dynamic balance. Patient would continue to benefit from skilled PT services to improve dynamic balance with gait, transfers strength, and activity tolerance training in order to build endurance and reduce risk of falls.      Anticipated Discharge Disposition (PT): skilled nursing facility

## 2024-03-16 LAB
ABO GROUP BLD: NORMAL
ANISOCYTOSIS BLD QL: ABNORMAL
BASOPHILS # BLD MANUAL: 0 10*3/MM3 (ref 0–0.2)
BASOPHILS NFR BLD MANUAL: 0 % (ref 0–1.5)
BLD GP AB SCN SERPL QL: NEGATIVE
DEPRECATED RDW RBC AUTO: 62.5 FL (ref 37–54)
EOSINOPHIL # BLD MANUAL: 1.11 10*3/MM3 (ref 0–0.4)
EOSINOPHIL NFR BLD MANUAL: 7 % (ref 0.3–6.2)
ERYTHROCYTE [DISTWIDTH] IN BLOOD BY AUTOMATED COUNT: 18.3 % (ref 12.3–15.4)
GLUCOSE BLDC GLUCOMTR-MCNC: 138 MG/DL (ref 70–130)
GLUCOSE BLDC GLUCOMTR-MCNC: 147 MG/DL (ref 70–130)
GLUCOSE BLDC GLUCOMTR-MCNC: 150 MG/DL (ref 70–130)
HCT VFR BLD AUTO: 21.4 % (ref 37.5–51)
HCT VFR BLD AUTO: 21.8 % (ref 37.5–51)
HCT VFR BLD AUTO: 26.2 % (ref 37.5–51)
HEMOCCULT STL QL: NEGATIVE
HGB BLD-MCNC: 6.4 G/DL (ref 13–17.7)
HGB BLD-MCNC: 6.6 G/DL (ref 13–17.7)
HGB BLD-MCNC: 7.9 G/DL (ref 13–17.7)
LYMPHOCYTES # BLD MANUAL: 0.95 10*3/MM3 (ref 0.7–3.1)
LYMPHOCYTES NFR BLD MANUAL: 4 % (ref 5–12)
MCH RBC QN AUTO: 29.3 PG (ref 26.6–33)
MCHC RBC AUTO-ENTMCNC: 30.8 G/DL (ref 31.5–35.7)
MCV RBC AUTO: 95.1 FL (ref 79–97)
METAMYELOCYTES NFR BLD MANUAL: 4 % (ref 0–0)
MONOCYTES # BLD: 0.63 10*3/MM3 (ref 0.1–0.9)
MYELOCYTES NFR BLD MANUAL: 5 % (ref 0–0)
NEUTROPHILS # BLD AUTO: 11.72 10*3/MM3 (ref 1.7–7)
NEUTROPHILS NFR BLD MANUAL: 70 % (ref 42.7–76)
NEUTS BAND NFR BLD MANUAL: 4 % (ref 0–5)
NRBC SPEC MANUAL: 1 /100 WBC (ref 0–0.2)
PLAT MORPH BLD: NORMAL
PLATELET # BLD AUTO: 277 10*3/MM3 (ref 140–450)
PMV BLD AUTO: 9.3 FL (ref 6–12)
RBC # BLD AUTO: 2.25 10*6/MM3 (ref 4.14–5.8)
RH BLD: NEGATIVE
T&S EXPIRATION DATE: NORMAL
VARIANT LYMPHS NFR BLD MANUAL: 6 % (ref 19.6–45.3)
WBC MORPH BLD: NORMAL
WBC NRBC COR # BLD AUTO: 15.84 10*3/MM3 (ref 3.4–10.8)

## 2024-03-16 PROCEDURE — 25010000002 CALCIUM GLUCONATE PER 10 ML

## 2024-03-16 PROCEDURE — 63710000001 INSULIN REGULAR HUMAN PER 5 UNITS: Performed by: SURGERY

## 2024-03-16 PROCEDURE — 99232 SBSQ HOSP IP/OBS MODERATE 35: CPT | Performed by: INTERNAL MEDICINE

## 2024-03-16 PROCEDURE — 25010000002 MAGNESIUM SULFATE PER 500 MG OF MAGNESIUM

## 2024-03-16 PROCEDURE — 86900 BLOOD TYPING SEROLOGIC ABO: CPT

## 2024-03-16 PROCEDURE — 25010000002 CEFTRIAXONE PER 250 MG: Performed by: FAMILY MEDICINE

## 2024-03-16 PROCEDURE — 36430 TRANSFUSION BLD/BLD COMPNT: CPT

## 2024-03-16 PROCEDURE — 85014 HEMATOCRIT: CPT | Performed by: NURSE PRACTITIONER

## 2024-03-16 PROCEDURE — 85014 HEMATOCRIT: CPT | Performed by: SURGERY

## 2024-03-16 PROCEDURE — 25010000002 METRONIDAZOLE 500 MG/100ML SOLUTION: Performed by: FAMILY MEDICINE

## 2024-03-16 PROCEDURE — 82272 OCCULT BLD FECES 1-3 TESTS: CPT | Performed by: NURSE PRACTITIONER

## 2024-03-16 PROCEDURE — 86923 COMPATIBILITY TEST ELECTRIC: CPT

## 2024-03-16 PROCEDURE — 25010000002 ENOXAPARIN PER 10 MG: Performed by: INTERNAL MEDICINE

## 2024-03-16 PROCEDURE — 86850 RBC ANTIBODY SCREEN: CPT | Performed by: NURSE PRACTITIONER

## 2024-03-16 PROCEDURE — 25010000002 FLUCONAZOLE PER 200 MG: Performed by: FAMILY MEDICINE

## 2024-03-16 PROCEDURE — 85018 HEMOGLOBIN: CPT | Performed by: NURSE PRACTITIONER

## 2024-03-16 PROCEDURE — 85025 COMPLETE CBC W/AUTO DIFF WBC: CPT | Performed by: SURGERY

## 2024-03-16 PROCEDURE — 82948 REAGENT STRIP/BLOOD GLUCOSE: CPT

## 2024-03-16 PROCEDURE — 25010000002 POTASSIUM CHLORIDE PER 2 MEQ OF POTASSIUM

## 2024-03-16 PROCEDURE — 86900 BLOOD TYPING SEROLOGIC ABO: CPT | Performed by: NURSE PRACTITIONER

## 2024-03-16 PROCEDURE — 85007 BL SMEAR W/DIFF WBC COUNT: CPT | Performed by: SURGERY

## 2024-03-16 PROCEDURE — P9016 RBC LEUKOCYTES REDUCED: HCPCS

## 2024-03-16 PROCEDURE — 85018 HEMOGLOBIN: CPT | Performed by: SURGERY

## 2024-03-16 PROCEDURE — 25010000002 HYDROMORPHONE PER 4 MG: Performed by: FAMILY MEDICINE

## 2024-03-16 PROCEDURE — 25010000002 METOCLOPRAMIDE PER 10 MG: Performed by: SURGERY

## 2024-03-16 PROCEDURE — 25010000002 HYDROMORPHONE PER 4 MG: Performed by: NURSE PRACTITIONER

## 2024-03-16 PROCEDURE — 86901 BLOOD TYPING SEROLOGIC RH(D): CPT | Performed by: NURSE PRACTITIONER

## 2024-03-16 RX ORDER — FLUCONAZOLE 2 MG/ML
200 INJECTION, SOLUTION INTRAVENOUS EVERY 24 HOURS
Qty: 100 ML | Refills: 0 | Status: DISCONTINUED | OUTPATIENT
Start: 2024-03-17 | End: 2024-03-17

## 2024-03-16 RX ORDER — HYDROMORPHONE HYDROCHLORIDE 1 MG/ML
0.5 INJECTION, SOLUTION INTRAMUSCULAR; INTRAVENOUS; SUBCUTANEOUS
Status: DISCONTINUED | OUTPATIENT
Start: 2024-03-16 | End: 2024-03-20

## 2024-03-16 RX ORDER — VALPROIC ACID 250 MG/5ML
375 SOLUTION ORAL EVERY 6 HOURS SCHEDULED
Status: DISCONTINUED | OUTPATIENT
Start: 2024-03-16 | End: 2024-03-31 | Stop reason: HOSPADM

## 2024-03-16 RX ADMIN — VALPROIC ACID 375 MG: 250 SOLUTION ORAL at 08:34

## 2024-03-16 RX ADMIN — FLUCONAZOLE 400 MG: 400 INJECTION, SOLUTION INTRAVENOUS at 08:01

## 2024-03-16 RX ADMIN — METOCLOPRAMIDE 10 MG: 5 INJECTION, SOLUTION INTRAMUSCULAR; INTRAVENOUS at 07:31

## 2024-03-16 RX ADMIN — Medication 10 ML: at 21:09

## 2024-03-16 RX ADMIN — ACETAMINOPHEN 650 MG: 325 TABLET ORAL at 21:05

## 2024-03-16 RX ADMIN — WATER: 1 INJECTION INTRAMUSCULAR; INTRAVENOUS; SUBCUTANEOUS at 17:05

## 2024-03-16 RX ADMIN — PANTOPRAZOLE SODIUM 40 MG: 40 INJECTION, POWDER, FOR SOLUTION INTRAVENOUS at 05:50

## 2024-03-16 RX ADMIN — METOCLOPRAMIDE 10 MG: 5 INJECTION, SOLUTION INTRAMUSCULAR; INTRAVENOUS at 01:06

## 2024-03-16 RX ADMIN — CEFTRIAXONE 2000 MG: 2 INJECTION, POWDER, FOR SOLUTION INTRAMUSCULAR; INTRAVENOUS at 14:24

## 2024-03-16 RX ADMIN — HYDROMORPHONE HYDROCHLORIDE 0.5 MG: 1 INJECTION, SOLUTION INTRAMUSCULAR; INTRAVENOUS; SUBCUTANEOUS at 04:08

## 2024-03-16 RX ADMIN — METOCLOPRAMIDE 10 MG: 5 INJECTION, SOLUTION INTRAMUSCULAR; INTRAVENOUS at 14:24

## 2024-03-16 RX ADMIN — METRONIDAZOLE 500 MG: 500 INJECTION, SOLUTION INTRAVENOUS at 15:56

## 2024-03-16 RX ADMIN — LIDOCAINE 2 PATCH: 4 PATCH TOPICAL at 08:04

## 2024-03-16 RX ADMIN — ISOSORBIDE MONONITRATE 30 MG: 30 TABLET, EXTENDED RELEASE ORAL at 08:04

## 2024-03-16 RX ADMIN — VALPROIC ACID 375 MG: 250 SOLUTION ORAL at 17:10

## 2024-03-16 RX ADMIN — METOCLOPRAMIDE 10 MG: 5 INJECTION, SOLUTION INTRAMUSCULAR; INTRAVENOUS at 18:52

## 2024-03-16 RX ADMIN — VERAPAMIL HYDROCHLORIDE 240 MG: 240 TABLET, FILM COATED, EXTENDED RELEASE ORAL at 08:04

## 2024-03-16 RX ADMIN — LEVETIRACETAM 500 MG: 500 TABLET, FILM COATED ORAL at 21:06

## 2024-03-16 RX ADMIN — Medication 10 MG: at 16:06

## 2024-03-16 RX ADMIN — LISINOPRIL 20 MG: 20 TABLET ORAL at 08:04

## 2024-03-16 RX ADMIN — METRONIDAZOLE 500 MG: 500 INJECTION, SOLUTION INTRAVENOUS at 07:31

## 2024-03-16 RX ADMIN — LEVOTHYROXINE SODIUM 88 MCG: 88 TABLET ORAL at 05:50

## 2024-03-16 RX ADMIN — VALPROIC ACID 375 MG: 250 SOLUTION ORAL at 12:15

## 2024-03-16 RX ADMIN — SMOFLIPID 250 ML: 6; 6; 5; 3 INJECTION, EMULSION INTRAVENOUS at 17:05

## 2024-03-16 RX ADMIN — INSULIN HUMAN 2 UNITS: 100 INJECTION, SOLUTION PARENTERAL at 23:55

## 2024-03-16 RX ADMIN — ENOXAPARIN SODIUM 100 MG: 100 INJECTION SUBCUTANEOUS at 08:35

## 2024-03-16 RX ADMIN — HYDROMORPHONE HYDROCHLORIDE 0.5 MG: 1 INJECTION, SOLUTION INTRAMUSCULAR; INTRAVENOUS; SUBCUTANEOUS at 21:30

## 2024-03-16 RX ADMIN — VALPROIC ACID 375 MG: 250 SOLUTION ORAL at 23:37

## 2024-03-16 RX ADMIN — METRONIDAZOLE 500 MG: 500 INJECTION, SOLUTION INTRAVENOUS at 23:35

## 2024-03-16 RX ADMIN — LEVETIRACETAM 500 MG: 500 TABLET, FILM COATED ORAL at 08:04

## 2024-03-16 NOTE — PROGRESS NOTES
CPN Review Note    Patient Name: Lea Rivers  Date of Encounter: 03/16/24 12:38 EDT  MRN: [id number]  Admission date: 3/2/2024    Reason for visit: CPN review . RD to continue to follow per protocol.     Information Obtained:  Continues with marginal PO intakes, requiring nutrition support to meet needs. PN continues though no longer indicated as having good ostomy output.     EMR reviewed:  yes  Medication reviewed: yes  Labs reviewed: yes  I/Os: 1175 ml ostomy output last shift    Current diet: Diet: Gastrointestinal, Diabetic, Cardiac; Healthy Heart (2-3 Na+); Consistent Carbohydrate; Low Irritant; No Mixed Consistencies, Feeding Assistance - Nursing; Texture: Pureed (NDD 1); Fluid Consistency: Honey Thick  Adult Central 2-in-1 TPN    Estimated Needs:   Calories ~ 1800 Kcal/day  Protein ~ 120 g PRO/day    PN Route: PICC  PN:   15% dextrose, 7.7% AA @ goal rate of 65mL/hr.         GIR:  1.82mg/kg/min  Lipid: Provide 250mL 20% SMOF daily                  PN@ Goal over 24hr to Supply:  Volume 1560 mL/day     Energy 1776 Kcal/day 99 % Est Need   Protein 120 g/day 100 % Est Need     ---------------------------------------------------------------------------  Formula/Rate verified at bedside: No   Infusing Rate at time of visit: 65 ml/hr per MAR    Average Delivery from Charting:  ?accuracy documentation  PN Volume 1750 mL/day 112% goal   Lipid delivered?  Y      Energy  Kcal/day  % Est Need   Protein  g/day  % Est Need       Intervention:  Maintain TPN @ 65ml/hr.   Monitor diet progression and PO tolerance. Consider keofeed and transitioning to EN if no improvement soon given pt with functioning bowel thus PN no longer indicated.       Follow up:   Per protocol    Adelita Pope RD  12:38 EDT  Time: 20min

## 2024-03-16 NOTE — PROGRESS NOTES
Pharmacy Parenteral Nutrition Evaluation    Lea Rivers is a  78 y.o. male receiving TPN.     Indication:     Prolonged Paralytic Ileus     Consulting Physician: Eleanor Yost DO     Total Fluid Rate (if stated): n/a    Labs  Results from last 7 days   Lab Units 03/15/24  0405 03/14/24  0514 03/13/24  0511 03/11/24  1618 03/11/24  1011 03/11/24  0508   SODIUM mmol/L 141 140 139   < >  --  138  138   POTASSIUM mmol/L 3.8 3.9 4.0   < >  --  3.2*  3.2*   CHLORIDE mmol/L 105 106 106   < >  --  107  107   CO2 mmol/L 28.0 28.0 28.0   < >  --  25.0  25.0   BUN mg/dL 18 19 18   < >  --  15  15   CREATININE mg/dL 0.54* 0.46* 0.44*   < >  --  0.66*  0.66*   CALCIUM mg/dL 7.8* 7.9* 7.8*   < >  --  8.3*  8.3*   BILIRUBIN mg/dL  --   --   --   --  0.3 0.3   ALK PHOS U/L  --   --   --   --  60 63   ALT (SGPT) U/L  --   --   --   --  29 31   AST (SGOT) U/L  --   --   --   --  20 23   GLUCOSE mg/dL 147* 159* 163*   < >  --  145*  145*    < > = values in this interval not displayed.       Results from last 7 days   Lab Units 03/15/24  0405 03/14/24  0514 03/13/24  1711 03/13/24  0511 03/12/24  0316 03/11/24  1618   MAGNESIUM mg/dL 2.0 2.2  --  1.9   < >  --    PHOSPHORUS mg/dL 2.9 2.4* 2.7 2.2*   < > 2.1*   PREALBUMIN mg/dL  --   --   --   --   --  5.4*    < > = values in this interval not displayed.       Results from last 7 days   Lab Units 03/16/24  0630 03/16/24  0543 03/15/24  0405 03/14/24  1125   WBC 10*3/mm3  --  15.84* 15.09* 17.06*   HEMOGLOBIN g/dL 6.4* 6.6* 7.2* 7.5*   HEMATOCRIT % 21.8* 21.4* 23.1* 25.4*   PLATELETS 10*3/mm3  --  277 245 242       Triglycerides   Date Value Ref Range Status   03/11/2024 131 0 - 150 mg/dL Final     Comment:     Falsely depressed results may occur on samples drawn from patients receiving N-Acetylcysteine (NAC) or Metamizole.       estimated creatinine clearance is 125.7 mL/min (A) (by C-G formula based on SCr of 0.54 mg/dL (L)).    Intake & Output (last 3 days)          03/05 0701  03/06 0700 03/06 0701  03/07 0700 03/07 0701  03/08 0700 03/08 0701  03/09 0700    P.O. 1080 240      I.V. (mL/kg)  1026.3 (11.5)      IV Piggyback  400      Total Intake(mL/kg) 1080 (12.1) 1666.3 (18.7)      Urine (mL/kg/hr) 150 (0.1) 400 (0.2)      Stool 0 0      Total Output 150 400      Net +930 +1266.3              Urine Unmeasured Occurrence 4 x 2 x 1 x     Stool Unmeasured Occurrence 5 x 4 x 3 x 1 x            Dietitian Recommendations  Diet Orders (active) (From admission, onward)       Start     Ordered    03/08/24 1800  Adult Cyclic Central 2-in-1 TPN  Cyclic TPN - See Admin Instructions        Note to Pharmacy: Lea ARAUJO [place]  5H RM [id number]  MRN: [id number]  CSN: [id number]    03/08/24 1422    03/08/24 1800  Fat Emul Fish Oil/Plant Based (SMOFLIPID) 20 % emulsion 250 mL  Every 24 Hours Scheduled (TPN)         03/08/24 1422    03/08/24 1355  NPO Diet NPO Type: Strict NPO  Diet Effective Now         03/08/24 1358    03/05/24 1656  DIET MESSAGE Pt would like a grilled cheese sandwich, please. Thanks!  Once        Comments: Pt would like a grilled cheese sandwich, please. Thanks!    03/05/24 1656                    Current TPN Regimen Recommendation:   Dextrose 15% / Amino Acid 7.7% at a goal rate of 65 ml/hr.      20% Lipid Emulsion 250mL every 24 hours.    Na 45 mEq/L  K 50 mEq/L  Ca 5 mEq/L  Mg 8 mEq/L  PO4 15 mmol/L  Cl:Ace - 1:1    Assessment/Plan:  TPN for prolonged paralytic ileus.   Macronutrients per dietary recommendation and electrolyte per pharmacy recommendation are listed above, same as yesterday.  Electrolyte replacement per protocol, will continue with standard electrolytes.   SSI q6h ordered  Pharmacy will continue to follow and adjust as indicated     Thanks  Giorgio Ferrara, PharmD  3/16/2024  13:04 EDT

## 2024-03-16 NOTE — PROGRESS NOTES
[facility] Medicine Services  PROGRESS NOTE    Patient Name: Lea Rivers  : 1945  MRN: [id number]    Date of Admission: 3/2/2024  Primary Care Physician: Mannie Melvin MD    Subjective   Subjective     CC:  F/U total abd colectomy with end ileostomy     HPI:  Patient resting in bed. Anemia worsened overnight. More alert but remains confused. Oral intake marginal.    Objective   Objective     Vital Signs:   Temp:  [98.1 °F (36.7 °C)-99.4 °F (37.4 °C)] 98.7 °F (37.1 °C)  Heart Rate:  [] 89  Resp:  [18-22] 20  BP: (163-192)/(73-93) 188/83     Physical Exam:  Constitutional: No acute distress, appears chronically ill, pale appearing this morning  HENT: NCAT, mucous membranes moist  Respiratory: Clear to auscultation bilaterally, respiratory effort normal   Cardiovascular: RRR, no murmurs, rubs, or gallops  Gastrointestinal: abd binder in place, tender to palpation  Musculoskeletal: + upper and lower bilateral edema  Neurologic: Oriented to person, lethargic but alertness improving  Skin: No rashes, bruises noted on upper ext    Results Reviewed:  LAB RESULTS:      Lab 24  0630 24  0543 03/15/24  0405 24  1125 24  0934 24  0511 24  0316 24  1618 24  1011 24  0508   WBC  --  15.84* 15.09* 17.06*  --  23.21* 22.94*  --   --  18.43*   HEMOGLOBIN 6.4* 6.6* 7.2* 7.5*  --  8.6* 8.3* 8.4*  --  7.3*   HEMATOCRIT 21.8* 21.4* 23.1* 25.4*  --  26.4* 26.2* 26.3*  --  22.8*   PLATELETS  --  277 245 242  --  223 195  --   --  222   NEUTROS ABS  --  11.72* 10.75* 13.48*  --  18.10* 18.18*  --   --  14.40*   IMMATURE GRANS (ABS)  --   --  1.30*  --   --   --  1.11*  --   --  0.60*   LYMPHS ABS  --   --  1.04  --   --   --  1.17  --   --  1.04   MONOS ABS  --   --  1.31*  --   --   --  1.77*  --   --  2.08*   EOS ABS  --  1.11* 0.61* 0.51*  --  0.23 0.61*  --   --  0.24   MCV  --  95.1 93.5 100.0*  --  92.3 92.6  --   --   96.2   CRP  --   --   --   --   --   --   --   --  13.06*  --    PROCALCITONIN  --   --   --   --   --  0.20 0.27*  --   --   --    LACTATE  --   --   --   --  1.5  --   --   --   --   --    PROTIME  --   --   --   --   --   --   --  15.1*  --   --          Lab 03/15/24  0405 03/14/24  0514 03/13/24  1711 03/13/24  0511 03/12/24  1204 03/12/24  0316 03/11/24  1618 03/11/24  1011 03/11/24  0508   SODIUM 141 140  --  139  --  138  --   --  138  138   POTASSIUM 3.8 3.9  --  4.0  --  4.0 3.4*  --  3.2*  3.2*   CHLORIDE 105 106  --  106  --  106  --   --  107  107   CO2 28.0 28.0  --  28.0  --  25.0  --   --  25.0  25.0   ANION GAP 8.0 6.0  --  5.0  --  7.0  --   --  6.0  6.0   BUN 18 19  --  18  --  18  --   --  15  15   CREATININE 0.54* 0.46*  --  0.44*  --  0.61*  --   --  0.66*  0.66*   EGFR 101.4 106.4  --  107.8  --  97.7  --   --  95.4  95.4   GLUCOSE 147* 159*  --  163*  --  148*  --   --  145*  145*   CALCIUM 7.8* 7.9*  --  7.8*  --  8.0*  --   --  8.3*  8.3*   IONIZED CALCIUM  --   --   --   --   --   --  1.26  --   --    MAGNESIUM 2.0 2.2  --  1.9  --  1.8  --   --  1.8   PHOSPHORUS 2.9 2.4* 2.7 2.2* 2.3* 1.9* 2.1*   < > 1.7*    < > = values in this interval not displayed.         Lab 03/11/24  1011 03/11/24  0508   TOTAL PROTEIN 4.4* 4.4*   ALBUMIN 2.4* 2.5*   GLOBULIN  --  1.9   ALT (SGPT) 29 31   AST (SGOT) 20 23   BILIRUBIN 0.3 0.3   BILIRUBIN DIRECT <0.2  --    ALK PHOS 60 63         Lab 03/11/24  1618   PROTIME 15.1*   INR 1.18*           Lab 03/11/24  1011   TRIGLYCERIDES 131         Lab 03/16/24  0630 03/09/24  0935   ABO TYPING O O   RH TYPING Negative Negative   ANTIBODY SCREEN Negative Negative           Brief Urine Lab Results  (Last result in the past 365 days)        Color   Clarity   Blood   Leuk Est   Nitrite   Protein   CREAT   Urine HCG        03/11/24 1337 Yellow   Cloudy   Moderate (2+)   Trace   Negative   30 mg/dL (1+)                   Microbiology Results Abnormal        Procedure Component Value - Date/Time    Urine Culture - Urine, Indwelling Urethral Catheter [[id number]]  (Normal) Collected: 03/11/24 1337    Lab Status: Final result Specimen: Urine from Indwelling Urethral Catheter Updated: 03/12/24 2024     Urine Culture No growth    Blood Culture - Blood, Arm, Right [[id number]]  (Normal) Collected: 03/06/24 1857    Lab Status: Final result Specimen: Blood from Arm, Right Updated: 03/11/24 2045     Blood Culture No growth at 5 days    Blood Culture - Blood, Arm, Right [[id number]]  (Normal) Collected: 03/06/24 1751    Lab Status: Final result Specimen: Blood from Arm, Right Updated: 03/11/24 2000     Blood Culture No growth at 5 days    Blood Culture - Blood, Arm, Right [[id number]]  (Normal) Collected: 03/02/24 1132    Lab Status: Final result Specimen: Blood from Arm, Right Updated: 03/07/24 1201     Blood Culture No growth at 5 days    Narrative:      Less than seven (7) mL's of blood was collected.  Insufficient quantity may yield false negative results.    Blood Culture - Blood, Arm, Right [[id number]]  (Normal) Collected: 03/02/24 1132    Lab Status: Final result Specimen: Blood from Arm, Right Updated: 03/07/24 1201     Blood Culture No growth at 5 days    Narrative:      Less than seven (7) mL's of blood was collected.  Insufficient quantity may yield false negative results.    Urine Culture - Urine, Straight Cath [[id number]]  (Normal) Collected: 03/02/24 0923    Lab Status: Final result Specimen: Urine from Straight Cath Updated: 03/03/24 1601     Urine Culture No growth    COVID PRE-OP / PRE-PROCEDURE SCREENING ORDER (NO ISOLATION) - Swab, Nasopharynx [[id number]]  (Normal) Collected: 03/02/24 1133    Lab Status: Final result Specimen: Swab from Nasopharynx Updated: 03/02/24 1227    Narrative:      The following orders were created for panel order COVID PRE-OP / PRE-PROCEDURE SCREENING ORDER (NO ISOLATION) - Swab, Nasopharynx.  Procedure                                Abnormality         Status                     ---------                               -----------         ------                     COVID-19, FLU A/B, RSV P...[[id number]]  Normal              Final result                 Please view results for these tests on the individual orders.    COVID-19, FLU A/B, RSV PCR 1 HR TAT - Swab, Nasopharynx [[id number]]  (Normal) Collected: 03/02/24 1133    Lab Status: Final result Specimen: Swab from Nasopharynx Updated: 03/02/24 1227     COVID19 Not Detected     Influenza A PCR Not Detected     Influenza B PCR Not Detected     RSV, PCR Not Detected    Narrative:      Fact sheet for providers: https://www.fda.gov/media/335010/download    Fact sheet for patients: https://www.fda.gov/media/586083/download    Test performed by PCR.            SLP FEES - Fiberoptic Endo Eval Swallow    Result Date: 3/14/2024  This procedure was auto-finalized with no dictation required.     Results for orders placed during the hospital encounter of 07/20/21    Adult Transthoracic Echo Complete W/ Cont if Necessary Per Protocol    Interpretation Summary  · Left ventricular wall thickness is consistent with mild concentric hypertrophy.  · Normal left-ventricular systolic function, estimated EF 65%.  · Left ventricular diastolic function is consistent with (grade I) impaired relaxation.  · Aortic sclerosis without aortic stenosis. Trace aortic insufficiency.  · Trace mitral regurgitation, trace tricuspid regurgitation.      Current medications:  Scheduled Meds:alteplase (CATHFLO/ACTIVASE) INTRACATHETER injection 2 mg, 2 mg, Intracatheter, Once  cefTRIAXone, 2,000 mg, Intravenous, Q24H  [Held by provider] enoxaparin, 1 mg/kg, Subcutaneous, Q12H  Fat Emul Fish Oil/Plant Based, 250 mL, Intravenous, Q24H (TPN)  fluconazole, 400 mg, Intravenous, Q24H  insulin regular, 2-7 Units, Subcutaneous, Q6H  isosorbide mononitrate, 30 mg, Oral, Q24H  levETIRAcetam, 500 mg, Oral, Q12H  levothyroxine, 88 mcg, Oral, Q  AM  Lidocaine, 2 patch, Transdermal, Q24H  lisinopril, 20 mg, Oral, Q24H  metoclopramide, 10 mg, Intravenous, Q6H  metroNIDAZOLE, 500 mg, Intravenous, Q8H  pantoprazole, 40 mg, Intravenous, Q AM  sodium chloride, 10 mL, Intravenous, Q12H  Valproic Acid, 375 mg, Oral, Q6H  verapamil SR, 240 mg, Oral, Q24H      Continuous Infusions:Adult Central 2-in-1 TPN, , Last Rate: 65 mL/hr at 03/15/24 1706  Pharmacy to Dose TPN,           PRN Meds:.  acetaminophen **OR** acetaminophen **OR** acetaminophen    calcium carbonate    Calcium Replacement - Follow Nurse / BPA Driven Protocol    dextrose    dextrose    docusate sodium    fluticasone    glucagon (human recombinant)    hydrALAZINE    HYDROmorphone    ipratropium-albuterol    labetalol    Magnesium Standard Dose Replacement - Follow Nurse / BPA Driven Protocol    [DISCONTINUED] HYDROmorphone **AND** naloxone    ondansetron ODT **OR** ondansetron    ondansetron ODT **OR** ondansetron    Pharmacy to Dose TPN    Phosphorus Replacement - Follow Nurse / BPA Driven Protocol    Potassium Replacement - Follow Nurse / BPA Driven Protocol    sodium chloride    sodium chloride    sodium chloride    sodium chloride    sodium chloride    sodium chloride    Assessment & Plan   Assessment & Plan     Active Hospital Problems    Diagnosis  POA    **Falls [W19.XXXA]  Yes    Pneumatosis intestinalis [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        Brief Hospital Course to date:  Lea Rivers is a 78 y.o. male with past medical history of hypertension, hyperlipidemia, hypothyroidism, seizure disorder.  Patient is being admitted for a fall and nondisplaced first through fourth left rib fractures.  Patient also has left distal clavicle fracture.    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 Continue TPN until oral intake improves  -Replace electrolytes per protocol   -NG removed 3/10  -Continue Fluconazole (started 3/11), Zosyn changed to Rocephin + Flagyl 3/12, cont empiric coverage  -CT abd/pelvis reviewed, d/w Dr. Godfrey, expected post-op findings, no abscess, remains afebrile and cultures are all negative so far. Lactate WNL, WBC improving  -CXR 3/12 does show possible new LLL pneumonia, continue CTX    Encephalopathy/lethargy  - per daughter significant change in mental status and speech since arrival, we had a long discussion and I feel this is probably TME in setting of infection, recent large operation, underlying dementia  - initial CT head 3/2 negative, repeated CT head 3/13 without any acute findings  - EEG negative for seizures, findings consistent with TME  -minimize sedating meds   -slowly improving    Multiple falls with increasing frequency   -CT of head shows age-related changes which are chronic but no acute process  -neuro consulted. likely d/t neuropathy (confirmed with EMG) and progression of dementia     Multiple rib fractures and also left clavicle fracture  -With no displacement or mildly displaced.  Orthopedic surgery evaluated. No surgical intervention  planned.  Recs nonweightbearing LUE, may come out of sling in 5-7 days to initiate gentle ROM exercises per ortho.  F/u with ortho/Dr. Maldonado in 2 weeks  -Pain control  -lidocaine patch  -bruise noted to L shoulder/neck region. X-ray negative    Acute on Chronic Anemia  -Post op noted decrease in H&H  -S/P 1 unit PRBCs 3/11, now with anemia worsening today, will transfuse additional unit PRBCs today  -no clear source of active bleeding, hemoccult negative  -repeat H&H later today, transfuse PRN hgb <7, hold lovenox    Acute RUE DVT (brachial)  -Provoked, 2/2 PICC   -therapeutic lovenox started 3/13, on hold today per surgery due to worsening anemia     Prostate cancer  Hx BPH s/p  greenlight photo vaporization of prostate 2018  -Follows with Dr. Noalnd  -S/P cyberknife radiation 2/9/23  -Has intermittent hematuria, monitor while on AC     Dementia and increasing memory problem  -Patient was previously living alone and driving, given significant decline will need placement     Hypertension  -cleared by speech, resumed home oral regimen  - PRN IV dosing for SBP >180    Seizure disorder  - continue vimpat and keppra    Hyperlipidemia  Hypothyroidism  -resume home regimen    Expected Discharge Location and Transportation: SNF  Expected Discharge   Expected Discharge Date: 3/18/2024; Expected Discharge Time:      DVT prophylaxis:  Medical and mechanical DVT prophylaxis orders are present.         AM-PAC 6 Clicks Score (PT): 10 (03/16/24 4137)    CODE STATUS:   Code Status and Medical Interventions:   Ordered at: 03/02/24 1456     Medical Intervention Limits:    NO intubation (DNI)     Code Status (Patient has no pulse and is not breathing):    No CPR (Do Not Attempt to Resuscitate)     Medical Interventions (Patient has pulse or is breathing):    Limited Support       Sienna Saleh,   03/16/24

## 2024-03-16 NOTE — PLAN OF CARE
Goal Outcome Evaluation:  Plan of Care Reviewed With: patient           Outcome Evaluation:   -Pt remains alert and oriented only to self and place.  -Remains on RA-tolerating well  -PRN dilaudid given x2 for abdominal pain,see MAR  -Pt only attempted to get out of bed once, but was able to be redirected.   -UOP:500mL and one occurrence. Ostomy output:500mL  -Lipids and TPN continue.   -AM labs sent, HGB:6.6-

## 2024-03-16 NOTE — PROGRESS NOTES
"Patient Name:  Lea Rivers  YOB: 1945  [id number]    Surgery Progress Note    Date of visit: 3/16/2024    Subjective   Unable to obtain full history due to patient's dementia.  Continues with ostomy function with increased output over the last 24 hours.  No nausea or vomiting.  No fevers or chills.  Poor p.o. intake.          Objective       BP (!) 188/83 (BP Location: Left leg)   Pulse 89   Temp 98.7 °F (37.1 °C) (Oral)   Resp 20   Ht 170.2 cm (67.01\")   Wt 101 kg (223 lb 6.4 oz)   SpO2 96%   BMI 34.98 kg/m²     Intake/Output Summary (Last 24 hours) at 3/16/2024 1009  Last data filed at 3/16/2024 0400  Gross per 24 hour   Intake 3405 ml   Output 2330 ml   Net 1075 ml       CV:  Rhythm regular and rate regular  L:  Clear to auscultation bilaterally  Abd:  Bowel sounds positive, soft, appropriately tender, incision clean dry and intact with a mild amount of erythema at the inferior portion, ostomy viable and functioning, OLIVER minimal  Ext:  No cyanosis, clubbing, edema    Recent labs and imaging that are back at this time have been reviewed.   WBC 15.8  Hemoglobin 6.6  Platelets 277         Assessment & Plan     Patient postoperative day 7 from total abdominal colectomy with end ileostomy.  Diet per speech and swallow recommendations.  Hold therapeutic Lovenox due to worsening anemia.  Check H&H after blood transfusion today.  Antibiotics per primary team.  On electrolyte replacement. Pain control. Continue TPN until tolerates oral diet.         Harley Godfrey MD  3/16/2024  10:09 EDT      "

## 2024-03-17 LAB
ANISOCYTOSIS BLD QL: ABNORMAL
BASOPHILS # BLD MANUAL: 0 10*3/MM3 (ref 0–0.2)
BASOPHILS NFR BLD MANUAL: 0 % (ref 0–1.5)
BH BB BLOOD EXPIRATION DATE: NORMAL
BH BB BLOOD TYPE BARCODE: 9500
BH BB DISPENSE STATUS: NORMAL
BH BB PRODUCT CODE: NORMAL
BH BB UNIT NUMBER: NORMAL
CROSSMATCH INTERPRETATION: NORMAL
DACRYOCYTES BLD QL SMEAR: ABNORMAL
DEPRECATED RDW RBC AUTO: 70.2 FL (ref 37–54)
EOSINOPHIL # BLD MANUAL: 0.18 10*3/MM3 (ref 0–0.4)
EOSINOPHIL NFR BLD MANUAL: 1 % (ref 0.3–6.2)
ERYTHROCYTE [DISTWIDTH] IN BLOOD BY AUTOMATED COUNT: 19 % (ref 12.3–15.4)
GLUCOSE BLDC GLUCOMTR-MCNC: 128 MG/DL (ref 70–130)
GLUCOSE BLDC GLUCOMTR-MCNC: 158 MG/DL (ref 70–130)
GLUCOSE BLDC GLUCOMTR-MCNC: 174 MG/DL (ref 70–130)
GLUCOSE BLDC GLUCOMTR-MCNC: 184 MG/DL (ref 70–130)
HCT VFR BLD AUTO: 27.1 % (ref 37.5–51)
HGB BLD-MCNC: 8.6 G/DL (ref 13–17.7)
LYMPHOCYTES # BLD MANUAL: 1.97 10*3/MM3 (ref 0.7–3.1)
LYMPHOCYTES NFR BLD MANUAL: 6 % (ref 5–12)
MCH RBC QN AUTO: 32.6 PG (ref 26.6–33)
MCHC RBC AUTO-ENTMCNC: 31.7 G/DL (ref 31.5–35.7)
MCV RBC AUTO: 102.7 FL (ref 79–97)
METAMYELOCYTES NFR BLD MANUAL: 1 % (ref 0–0)
MONOCYTES # BLD: 1.07 10*3/MM3 (ref 0.1–0.9)
MYELOCYTES NFR BLD MANUAL: 4 % (ref 0–0)
NEUTROPHILS # BLD AUTO: 13.78 10*3/MM3 (ref 1.7–7)
NEUTROPHILS NFR BLD MANUAL: 73 % (ref 42.7–76)
NEUTS BAND NFR BLD MANUAL: 4 % (ref 0–5)
NRBC SPEC MANUAL: 0 /100 WBC (ref 0–0.2)
PLAT MORPH BLD: NORMAL
PLATELET # BLD AUTO: 332 10*3/MM3 (ref 140–450)
PMV BLD AUTO: 10.9 FL (ref 6–12)
RBC # BLD AUTO: 2.64 10*6/MM3 (ref 4.14–5.8)
SMUDGE CELLS BLD QL SMEAR: ABNORMAL
UNIT  ABO: NORMAL
UNIT  RH: NORMAL
VARIANT LYMPHS NFR BLD MANUAL: 11 % (ref 19.6–45.3)
WBC NRBC COR # BLD AUTO: 17.9 10*3/MM3 (ref 3.4–10.8)

## 2024-03-17 PROCEDURE — 63710000001 INSULIN REGULAR HUMAN PER 5 UNITS: Performed by: SURGERY

## 2024-03-17 PROCEDURE — 85025 COMPLETE CBC W/AUTO DIFF WBC: CPT | Performed by: SURGERY

## 2024-03-17 PROCEDURE — 25010000002 CALCIUM GLUCONATE PER 10 ML

## 2024-03-17 PROCEDURE — 25010000002 METOCLOPRAMIDE PER 10 MG: Performed by: SURGERY

## 2024-03-17 PROCEDURE — 25010000002 MAGNESIUM SULFATE PER 500 MG OF MAGNESIUM

## 2024-03-17 PROCEDURE — 25010000002 HYDROMORPHONE PER 4 MG: Performed by: NURSE PRACTITIONER

## 2024-03-17 PROCEDURE — 25010000002 POTASSIUM CHLORIDE PER 2 MEQ OF POTASSIUM

## 2024-03-17 PROCEDURE — 97530 THERAPEUTIC ACTIVITIES: CPT

## 2024-03-17 PROCEDURE — 99233 SBSQ HOSP IP/OBS HIGH 50: CPT | Performed by: INTERNAL MEDICINE

## 2024-03-17 PROCEDURE — 85007 BL SMEAR W/DIFF WBC COUNT: CPT | Performed by: SURGERY

## 2024-03-17 PROCEDURE — 25010000002 METRONIDAZOLE 500 MG/100ML SOLUTION: Performed by: FAMILY MEDICINE

## 2024-03-17 PROCEDURE — 97168 OT RE-EVAL EST PLAN CARE: CPT

## 2024-03-17 PROCEDURE — 97110 THERAPEUTIC EXERCISES: CPT

## 2024-03-17 PROCEDURE — 82948 REAGENT STRIP/BLOOD GLUCOSE: CPT

## 2024-03-17 RX ADMIN — WATER: 1 INJECTION INTRAMUSCULAR; INTRAVENOUS; SUBCUTANEOUS at 17:48

## 2024-03-17 RX ADMIN — METOCLOPRAMIDE 10 MG: 5 INJECTION, SOLUTION INTRAMUSCULAR; INTRAVENOUS at 12:51

## 2024-03-17 RX ADMIN — LEVOTHYROXINE SODIUM 88 MCG: 88 TABLET ORAL at 05:37

## 2024-03-17 RX ADMIN — Medication 10 ML: at 08:07

## 2024-03-17 RX ADMIN — VALPROIC ACID 375 MG: 250 SOLUTION ORAL at 12:51

## 2024-03-17 RX ADMIN — METOCLOPRAMIDE 10 MG: 5 INJECTION, SOLUTION INTRAMUSCULAR; INTRAVENOUS at 00:31

## 2024-03-17 RX ADMIN — ISOSORBIDE MONONITRATE 30 MG: 30 TABLET, EXTENDED RELEASE ORAL at 08:05

## 2024-03-17 RX ADMIN — HYDROMORPHONE HYDROCHLORIDE 0.5 MG: 1 INJECTION, SOLUTION INTRAMUSCULAR; INTRAVENOUS; SUBCUTANEOUS at 04:23

## 2024-03-17 RX ADMIN — LISINOPRIL 20 MG: 20 TABLET ORAL at 08:05

## 2024-03-17 RX ADMIN — Medication 10 ML: at 20:38

## 2024-03-17 RX ADMIN — METRONIDAZOLE 500 MG: 500 INJECTION, SOLUTION INTRAVENOUS at 08:00

## 2024-03-17 RX ADMIN — INSULIN HUMAN 2 UNITS: 100 INJECTION, SOLUTION PARENTERAL at 12:50

## 2024-03-17 RX ADMIN — INSULIN HUMAN 2 UNITS: 100 INJECTION, SOLUTION PARENTERAL at 17:47

## 2024-03-17 RX ADMIN — LEVETIRACETAM 500 MG: 500 TABLET, FILM COATED ORAL at 20:37

## 2024-03-17 RX ADMIN — HYDROMORPHONE HYDROCHLORIDE 0.5 MG: 1 INJECTION, SOLUTION INTRAMUSCULAR; INTRAVENOUS; SUBCUTANEOUS at 00:31

## 2024-03-17 RX ADMIN — METOCLOPRAMIDE 10 MG: 5 INJECTION, SOLUTION INTRAMUSCULAR; INTRAVENOUS at 06:54

## 2024-03-17 RX ADMIN — LIDOCAINE 2 PATCH: 4 PATCH TOPICAL at 08:06

## 2024-03-17 RX ADMIN — LEVETIRACETAM 500 MG: 500 TABLET, FILM COATED ORAL at 08:05

## 2024-03-17 RX ADMIN — VALPROIC ACID 375 MG: 250 SOLUTION ORAL at 17:47

## 2024-03-17 RX ADMIN — SMOFLIPID 250 ML: 6; 6; 5; 3 INJECTION, EMULSION INTRAVENOUS at 20:37

## 2024-03-17 RX ADMIN — METOCLOPRAMIDE 10 MG: 5 INJECTION, SOLUTION INTRAMUSCULAR; INTRAVENOUS at 20:37

## 2024-03-17 RX ADMIN — PANTOPRAZOLE SODIUM 40 MG: 40 INJECTION, POWDER, FOR SOLUTION INTRAVENOUS at 05:14

## 2024-03-17 RX ADMIN — VALPROIC ACID 375 MG: 250 SOLUTION ORAL at 05:37

## 2024-03-17 RX ADMIN — VERAPAMIL HYDROCHLORIDE 240 MG: 240 TABLET, FILM COATED, EXTENDED RELEASE ORAL at 08:05

## 2024-03-17 NOTE — PROGRESS NOTES
"Patient Name:  Lea Rivers  YOB: 1945  [id number]    Surgery Progress Note    Date of visit: 3/17/2024      Subjective: No acute events overnight.  Slow to respond to questions but responds appropriately this morning.  Had a Tmax of 100.6.  Reports some abdominal pain that is not significantly changed.  At 1160 mL of output from the ileostomy yesterday.  Tolerated small amounts of a diet.          Objective:     /95 (BP Location: Right arm, Patient Position: Lying)   Pulse 96   Temp 98 °F (36.7 °C) (Oral)   Resp 18   Ht 170.2 cm (67.01\")   Wt 101 kg (223 lb 6.4 oz)   SpO2 97%   BMI 34.98 kg/m²     Intake/Output Summary (Last 24 hours) at 3/17/2024 0943  Last data filed at 3/17/2024 0625  Gross per 24 hour   Intake 1383.75 ml   Output 2125 ml   Net -741.25 ml       GEN:   Awake, drowsy but answers questions appropriately and is oriented, elderly and chronically ill-appearing  CV:   Regular rate and rhythm  L:  Symmetric expansion, not labored on oxygen by nasal cannula  Abd:  Soft, no obvious distress with palpation and only some mild appropriate tenderness throughout the abdomen, ileostomy in the right side is pink and patent with stool in the bag, midline incision is well-approximated with some reactive erythema and small amount of serous drainage at the inferior aspect but no naun purulence or spreading erythema, OLIVER drain on the left with serosanguineous output  Ext:  No cyanosis, clubbing, or edema    Recent labs that are back at this time have been reviewed.           Assessment/ Plan:    Mr. Rivers is a 78-year-old gentleman who is now postoperative day 8 following total abdominal colectomy with end ileostomy    # Toxic dilation of the colon  -Postoperative day 8  -Tmax 100.6 overnight.  Vital signs otherwise stable.  -White blood cell count is up slightly at 17.9 from 15 the last several days.  Hemoglobin responded appropriately to blood transfusion " yesterday.  -Tolerating diet.  Having ileostomy output.  Wound appears stable with some reactive erythema but no naun purulence or necrosis  -Continue TPN  -Discussed with Dr. Saleh this morning. WBC similar to the last several days and clinically without obvious change in exam. If recurrent fever will consider repeat CT and reinitiation of antibiotics, otherwise trend WBC tomorrow        Dawit Butler MD  3/17/2024  09:43 EDT

## 2024-03-17 NOTE — THERAPY RE-EVALUATION
Patient Name: Lea Rivers  : 1945    MRN: [id number]                              Today's Date: 3/17/2024       Admit Date: 3/2/2024    Visit Dx:     ICD-10-CM ICD-9-CM   1. Fall, initial encounter  W19.XXXA E888.9   2. Closed fracture of multiple ribs of left side, initial encounter  S22.42XA 807.09   3. Closed displaced fracture of acromial end of left clavicle, initial encounter  S42.032A 810.03   4. Falls frequently  R29.6 V15.88   5. Acute UTI (urinary tract infection)  N39.0 599.0   6. Colon distention  K63.89 569.89   7. Paralytic ileus of small intestine and colon  K56.0 560.1   8. Oropharyngeal dysphagia  R13.12 787.22     Patient Active Problem List   Diagnosis    Coronary artery disease    Dyslipidemia    Hypothyroidism    GERD (gastroesophageal reflux disease)    Seizure disorder    BPH (benign prostatic hypertrophy)    Hypertension    Primary osteoarthritis of both knees    Syncope and collapse    Precordial pain    Diabetes    Status post total right knee replacement    Postoperative urinary retention    Confusion, postoperative    Acute blood loss anemia, asymptomatic, no transfusion required    Elevated prostate specific antigen (PSA)    Prostate cancer    Anemia    Body mass index (BMI) of 32.0-32.9 in adult    Localization-related focal epilepsy with simple partial seizures    Need for vaccination against Streptococcus pneumoniae    Upper extremity tendon tear, right, initial encounter    Vitamin D deficiency    PSA elevation    Polyp of colon    Overactive bladder    Gross hematuria    History of colon polyps    History of colon cancer    B12 deficiency    Falls    Pneumatosis intestinalis     Past Medical History:   Diagnosis Date    Anemia     Colon cancer     Status post partial colectomy in . No chemotherapy or radiation therapy.    Coronary artery disease     Nonobstructive coronary artery disease.    Diabetes     Dyslipidemia     GERD (gastroesophageal reflux  disease)     Hx of.    Hyperlipidemia     Hypertension     Hyponatremia     Hypothyroidism     Left shoulder pain     Low sodium levels 2022    recent issue; saw nephrologist who ruled out kidney issues; took Sodium Chloride po to bring levels up    Memory deficit     FROM ANESTHESIA    Osteoarthritis     Prostate cancer 07/23/2022    Seizure disorder     silent seziure-diagnosed years ago    Skin cancer      Past Surgical History:   Procedure Laterality Date    APPENDECTOMY      CARDIAC CATHETERIZATION  2012    30 to 40% LAD    CARPAL TUNNEL RELEASE Bilateral     CHOLECYSTECTOMY      COLECTOMY PARTIAL / TOTAL  1990    Partial colectomy    COLON RESECTION N/A 3/9/2024    Procedure: TOTAL ABDOMINAL COLECTOMY, END ILEOSTOMY;  Surgeon: Harley Godfrey MD;  Location:  [facility];  Service: General;  Laterality: N/A;    COLONOSCOPY      CYBERKNIFE  02/09/2023    Prostate/SV    CYSTOSCOPY TRANSURETHRAL RESECTION OF PROSTATE N/A 09/21/2018    Procedure: CYSTOSCOPY TRANSURETHRAL RESECTION OF PROSTATE GREENLIGHT;  Surgeon: Naseem Clayton MD;  Location:  [facility];  Service: Urology    OTHER SURGICAL HISTORY      Contraction surgery on bilateral hands and wrists.    PROSTATE BIOPSY N/A 11/11/2022    Procedure: TRANSRECTAL ULTRASOUND GUIDED BIOPSY OF PROSTATE;  Surgeon: Naseem Noland MD;  Location:  [facility];  Service: Urology;  Laterality: N/A;    SINUS SURGERY      SKIN BIOPSY      TEETH EXTRACTION      TONSILLECTOMY      TOTAL KNEE ARTHROPLASTY Right 04/07/2022    Procedure: TOTAL KNEE ARTHROPLASTY WITH ORTHO ROBOT RIGHT;  Surgeon: Louis Malcolm MD;  Location:  [facility];  Service: Robotics - Ortho;  Laterality: Right;    TRANSRECTAL INCISION PROSTATE WITH GOLD SEED Bilateral 01/06/2023    Procedure: TRANSRECTAL ULTRASOUND GUIDED PROSTATE FIDUCIAL MARKER PLACEMENT;  Surgeon: Naseem Noland MD;  Location:  [facility];  Service: Urology;  Laterality: Bilateral;    TURP / TRANSURETHRAL INCISION / DRAINAGE  PROSTATE      VASECTOMY        General Information       Row Name 03/17/24 1512          OT Time and Intention    Document Type re-evaluation  -     Mode of Treatment occupational therapy  -       Row Name 03/17/24 1512          General Information    Patient Profile Reviewed yes  -     Prior Level of Function --  Please refer to IE for PLOF  -     Existing Precautions/Restrictions fall;non-weight bearing;left  L rib fx's, L clavicle fx (NWB LUE, LUE sling; may come out of sling for ROM as tolerated); ostomy, OLIVER drain, abd incision & abd binder  -     Barriers to Rehab medically complex;previous functional deficit;cognitive status  -       Row Name 03/17/24 1512          Living Environment    People in Home --  Please refer to IE for PLOF  -       Row Name 03/17/24 1512          Cognition    Orientation Status (Cognition) oriented to;person;verbal cues/prompts needed for orientation;place;disoriented to;situation;time  -       Row Name 03/17/24 1512          Safety Issues, Functional Mobility    Safety Issues Affecting Function (Mobility) ability to follow commands;awareness of need for assistance;impulsivity;insight into deficits/self-awareness;judgment;problem-solving;safety precaution awareness;safety precautions follow-through/compliance;sequencing abilities  -     Impairments Affecting Function (Mobility) balance;cognition;endurance/activity tolerance;pain;postural/trunk control;strength;range of motion (ROM)  -     Cognitive Impairments, Mobility Safety/Performance attention;awareness, need for assistance;insight into deficits/self-awareness;judgment;problem-solving/reasoning;safety precaution awareness;safety precaution follow-through;sequencing abilities  -     Comment, Safety Issues/Impairments (Mobility) Per ortho 3/4 pt to be NWB LUE in sling; family in room reports that pt has not had sling on since PICC line placement on 3/8. OT provided pt w/ donjoy ultra II sling for comfort and  educated pt on importance of maintaining NWB LUE d/t clavicle fx. RN notified that OT provided pt w/ sling and ortho rec's for sling & NWB LUE.  -               User Key  (r) = Recorded By, (t) = Taken By, (c) = Cosigned By      Initials Name Provider Type     Sandra Stahl OT Occupational Therapist                     Mobility/ADL's       Row Name 03/17/24 1517          Bed Mobility    Bed Mobility supine-sit  -     Supine-Sit [place] (Bed Mobility) maximum assist (25% patient effort);verbal cues  -     Bed Mobility, Safety Issues cognitive deficits limit understanding;decreased use of arms for pushing/pulling;decreased use of legs for bridging/pushing;impaired trunk control for bed mobility  -     Assistive Device (Bed Mobility) draw sheet;head of bed elevated;bed rails  -     Comment, (Bed Mobility) Upon sitting EOB pt initially req mod A to maintain static sitting balance but progressed to min A w/ RUE hand placement on bedrail  -[facility] Name 03/17/24 1517          Transfers    Transfers sit-stand transfer;stand-sit transfer;bed-chair transfer  -[facility] Name 03/17/24 1517          Bed-Chair Transfer    Bed-Chair [place] (Transfers) maximum assist (25% patient effort);2 person assist;verbal cues  -     Assistive Device (Bed-Chair Transfers) other (see comments)  RUE support  -       Row Name 03/17/24 1517          Sit-Stand Transfer    Sit-Stand [place] (Transfers) maximum assist (25% patient effort);2 person assist;verbal cues  -     Assistive Device (Sit-Stand Transfers) other (see comments)  -       Row Name 03/17/24 1517          Stand-Sit Transfer    Stand-Sit [place] (Transfers) maximum assist (25% patient effort);2 person assist;verbal cues  -     Assistive Device (Stand-Sit Transfers) other (see comments)  -       Row Name 03/17/24 1517          Activities of Daily Living    BADL Assessment/Intervention lower body dressing;upper body dressing   -[facility] Name 03/17/24 1517          Mobility    Extremity Weight-bearing Status left upper extremity   -     Left Upper Extremity (Weight-bearing Status) non weight-bearing (NWB)   -[facility] Name 03/17/24 1517          Lower Body Dressing Assessment/Training    [place] Level (Lower Body Dressing) don;doff;socks;dependent (less than 25% patient effort);verbal cues  -     Position (Lower Body Dressing) supine;supported sitting  -[facility] Name 03/17/24 1517          Upper Body Dressing Assessment/Training    [place] Level (Upper Body Dressing) don;other (see comments);dependent (less than 25% patient effort);verbal cues  LUE sling  -     Position (Upper Body Dressing) sitting up in bed  -               User Key  (r) = Recorded By, (t) = Taken By, (c) = Cosigned By      Initials Name Provider Type     Sandra Stahl OT Occupational Therapist                   Obj/Interventions       [facility] Name 03/17/24 1520          Shoulder (Therapeutic Exercise)    Shoulder (Therapeutic Exercise) AAROM (active assistive range of motion)  -     Shoulder AAROM (Therapeutic Exercise) right;flexion;extension;aBduction;aDduction;10 repetitions;supine  -[facility] Name 03/17/24 1520          Elbow/Forearm (Therapeutic Exercise)    Elbow/Forearm (Therapeutic Exercise) AAROM (active assistive range of motion)  -     Elbow/Forearm AAROM (Therapeutic Exercise) right;flexion;extension;10 repetitions;supine  -[facility] Name 03/17/24 1520          Motor Skills    Therapeutic Exercise shoulder;elbow/forearm  Pt unable to tolerate LUE elbow/wrist/hand ROM d/t pain  -[facility] 03/17/24 1520          Balance    Balance Assessment sitting static balance;sit to stand dynamic balance;standing static balance;standing dynamic balance  -     Static Sitting Balance minimal assist;verbal cues  -     Position, Sitting Balance supported;sitting in chair;sitting edge of bed  -     Sit to Stand Dynamic  Balance maximum assist;2-person assist;verbal cues  -     Static Standing Balance maximum assist;2-person assist;verbal cues  -     Dynamic Standing Balance maximum assist;2-person assist;verbal cues  -     Position/Device Used, Standing Balance supported  -     Balance Interventions sitting;sit to stand;occupation based/functional task  -               User Key  (r) = Recorded By, (t) = Taken By, (c) = Cosigned By      Initials Name Provider Type     Sandra Stahl OT Occupational Therapist                   Goals/Plan       Row Name 03/17/24 1524          Transfer Goal 1 (OT)    Activity/Assistive Device (Transfer Goal 1, OT) sit-to-stand/stand-to-sit;bed-to-chair/chair-to-bed;commode, bedside without drop arms  -     [place] Level/Cues Needed (Transfer Goal 1, OT) moderate assist (50-74% patient effort);verbal cues required  -     Time Frame (Transfer Goal 1, OT) long term goal (LTG);10 days  -     Progress/Outcome (Transfer Goal 1, OT) goal revised this date;goal ongoing  -       Row Name 03/17/24 1524          Dressing Goal 1 (OT)    Activity/Device (Dressing Goal 1, OT) lower body dressing  -     [place]/Cues Needed (Dressing Goal 1, OT) moderate assist (50-74% patient effort)  -     Time Frame (Dressing Goal 1, OT) long term goal (LTG);by discharge  -     Progress/Outcome (Dressing Goal 1, OT) goal no longer appropriate  -       Row Name 03/17/24 1524          Grooming Goal 1 (OT)    Activity/Device (Grooming Goal 1, OT) oral care;wash face, hands  -     [place] (Grooming Goal 1, OT) moderate assist (50-74% patient effort);verbal cues required  unsupported sitting  -     Time Frame (Grooming Goal 1, OT) long term goal (LTG);10 days  -     Progress/Outcome (Grooming Goal 1, OT) goal ongoing  -       Row Name 03/17/24 1524          ROM Goal 1 (OT)    ROM Goal 1 (OT) Pt will demo ability to perform BUE HEP (LUE ROM as tolerated) daily to support ADL  independence.  -     Time Frame (ROM Goal 1, OT) long term goal (LTG);10 days  -     Progress/Outcome (ROM Goal 1, OT) goal ongoing  -       Row Name 03/17/24 1524          Therapy Assessment/Plan (OT)    Planned Therapy Interventions (OT) activity tolerance training;adaptive equipment training;BADL retraining;cognitive/visual perception retraining;edema control/reduction;functional balance retraining;IADL retraining;occupation/activity based interventions;patient/caregiver education/training;ROM/therapeutic exercise;strengthening exercise;transfer/mobility retraining  -               User Key  (r) = Recorded By, (t) = Taken By, (c) = Cosigned By      Initials Name Provider Type     Sandra Stahl OT Occupational Therapist                   Clinical Impression       Row Name 03/17/24 1521          Pain Assessment    Pain Intervention(s) Repositioned;Ambulation/increased activity  -     Additional Documentation Pain Scale: FACES Pre/Post-Treatment (Group)  -       Row Name 03/17/24 1521          Pain Scale: FACES Pre/Post-Treatment    Pain: FACES Scale, Pretreatment 4-->hurts little more  -     Posttreatment Pain Rating 4-->hurts little more  -     Pain Location - Side/Orientation Left  -     Pain Location upper  -     Pain Location - extremity  -       Row Name 03/17/24 1521          Plan of Care Review    Plan of Care Reviewed With patient;family  -     Progress declining  -     Outcome Evaluation Pt presents w/ increased lethargy, increased confusion, generalized weakness, decreased functional endurance, balance and postural control deficits limiting his ADL independence. Pt provided w/ LUE sling for comfort and to maintain LUE NWB restrictions w/ activity. Pt req max Ax1 for supine-to-sit,max Ax2 for STS & SPT from bed-to-chair, dep for ADLs. Pt would benefit from continued skilled IPOT services to address current functional deficits. Rec SNF at d/c.  -       Row Name 03/17/24  1521          Therapy Assessment/Plan (OT)    Rehab Potential (OT) good, to achieve stated therapy goals  -     Criteria for Skilled Therapeutic Interventions Met (OT) yes;meets criteria;skilled treatment is necessary  -     Therapy Frequency (OT) daily  -       Row Name 03/17/24 1521          Therapy Plan Review/Discharge Plan (OT)    Anticipated Discharge Disposition (OT) skilled nursing facility  -       Row Name 03/17/24 1521          Vital Signs    O2 Delivery Pre Treatment room air  -     O2 Delivery Intra Treatment room air  -     O2 Delivery Post Treatment room air  -     Pre Patient Position Supine  -     Intra Patient Position Standing  -     Post Patient Position Sitting  -       Row Name 03/17/24 1521          Positioning and Restraints    Pre-Treatment Position in bed  -     Post Treatment Position chair  -     In Chair notified nsg;reclined;call light within reach;encouraged to call for assist;exit alarm on;waffle cushion;on mechanical lift sling;legs elevated;heels elevated;with brace;with nsg;with family/caregiver  -               User Key  (r) = Recorded By, (t) = Taken By, (c) = Cosigned By      Initials Name Provider Type    Sandra Valentine, SADAF Occupational Therapist                   Outcome Measures       Row Name 03/17/24 1525          How much help from another is currently needed...    Putting on and taking off regular lower body clothing? 1  -MC     Bathing (including washing, rinsing, and drying) 1  -MC     Toileting (which includes using toilet bed pan or urinal) 1  -MC     Putting on and taking off regular upper body clothing 2  -MC     Taking care of personal grooming (such as brushing teeth) 2  -MC     Eating meals 2  -MC     AM-PAC 6 Clicks Score (OT) 9  -       Row Name 03/17/24 0762          How much help from another person do you currently need...    Turning from your back to your side while in flat bed without using bedrails? 2  -DV     Moving  from lying on back to sitting on the side of a flat bed without bedrails? 2  -DV     Moving to and from a bed to a chair (including a wheelchair)? 2  -DV     Standing up from a chair using your arms (e.g., wheelchair, bedside chair)? 2  -DV     Climbing 3-5 steps with a railing? 1  -DV     To walk in hospital room? 1  -DV     AM-PAC 6 Clicks Score (PT) 10  -DV     Highest Level of Mobility Goal 4 --> Transfer to chair/commode  -DV       Row Name 03/17/24 1525          Functional Assessment    Outcome Measure Options AM-PAC 6 Clicks Daily Activity (OT)  -               User Key  (r) = Recorded By, (t) = Taken By, (c) = Cosigned By      Initials Name Provider Type     Sandra Stahl OT Occupational Therapist    Maribell Tapia RN Registered Nurse                    Occupational Therapy Education       Title: PT OT SLP Therapies (In Progress)       Topic: Occupational Therapy (In Progress)       Point: ADL training (In Progress)       Description:   Instruct learner(s) on proper safety adaptation and remediation techniques during self care or transfers.   Instruct in proper use of assistive devices.                  Learning Progress Summary             Patient Acceptance, E, NR by  at 3/17/2024 1526    Acceptance, E, NR by  at 3/6/2024 1116    Acceptance, E, VU by LAINE at 3/4/2024 1519                         Point: Home exercise program (In Progress)       Description:   Instruct learner(s) on appropriate technique for monitoring, assisting and/or progressing therapeutic exercises/activities.                  Learning Progress Summary             Patient Acceptance, E, NR by  at 3/17/2024 1526                         Point: Precautions (In Progress)       Description:   Instruct learner(s) on prescribed precautions during self-care and functional transfers.                  Learning Progress Summary             Patient Acceptance, E, NR by  at 3/17/2024 1526    Acceptance, E, VU by LAINE at 3/4/2024 1519                          Point: Body mechanics (In Progress)       Description:   Instruct learner(s) on proper positioning and spine alignment during self-care, functional mobility activities and/or exercises.                  Learning Progress Summary             Patient Acceptance, E, NR by  at 3/17/2024 1526                                         User Key       Initials Effective Dates Name Provider Type Discipline     02/03/23 -  Pauly Davey, OT Occupational Therapist OT     06/16/21 -  Justina Joyce OT Occupational Therapist OT     10/14/22 -  Sandra Stahl OT Occupational Therapist OT                  OT Recommendation and Plan  Planned Therapy Interventions (OT): activity tolerance training, adaptive equipment training, BADL retraining, cognitive/visual perception retraining, edema control/reduction, functional balance retraining, IADL retraining, occupation/activity based interventions, patient/caregiver education/training, ROM/therapeutic exercise, strengthening exercise, transfer/mobility retraining  Therapy Frequency (OT): daily  Plan of Care Review  Plan of Care Reviewed With: patient, family  Progress: declining  Outcome Evaluation: Pt presents w/ increased lethargy, increased confusion, generalized weakness, decreased functional endurance, balance and postural control deficits limiting his ADL independence. Pt provided w/ LUE sling for comfort and to maintain LUE NWB restrictions w/ activity. Pt req max Ax1 for supine-to-sit,max Ax2 for STS & SPT from bed-to-chair, dep for ADLs. Pt would benefit from continued skilled IPOT services to address current functional deficits. Rec SNF at d/c.     Time Calculation:         Time Calculation- OT       Row Name 03/17/24 1526             Time Calculation- OT    OT Start Time 1428  -      OT Received On 03/17/24  -      OT Goal Re-Cert Due Date 03/27/24  -         Timed Charges    07768 - OT Therapeutic Exercise Minutes 6  -      53529 - OT  Therapeutic Activity Minutes 15  -MC      56086 - OT Self Care/Mgmt Minutes 4  -MC         Untimed Charges    OT Eval/Re-eval Minutes 24  -MC         Total Minutes    Timed Charges Total Minutes 25  -MC      Untimed Charges Total Minutes 24  -MC       Total Minutes 49  -MC                User Key  (r) = Recorded By, (t) = Taken By, (c) = Cosigned By      Initials Name Provider Type     Sandra Stahl, OT Occupational Therapist                  Therapy Charges for Today       Code Description Service Date Service Provider Modifiers Qty    26019019701  OT THER PROC EA 15 MIN 3/17/2024 Sandra Stahl OT GO 1    [id number]  OT THERAPEUTIC ACT EA 15 MIN 3/17/2024 Sandra Stahl OT GO 1    [id number]  OT RE-EVAL 2 3/17/2024 Sandra Stahl OT GO 1                 Sandra Stahl OT  3/17/2024

## 2024-03-17 NOTE — NURSING NOTE
Lethargic, oriented to self only. NSR-ST on tele. Hypertensive. 02 sat 97%  on room air. L UA PICC TPN infusing @65 mL/hr. NSR-ST on tele. L arm NWB, arm sling in place. RLQ colostomy, DCI. LLQ OLIVER drain in place, DCI with minimal output. Adb midline incision, redness, per GI, it is ok. Denies pain, N/V. Afebrile. Sitter at bedside. No acute changes. Safety check/round provided.

## 2024-03-17 NOTE — PROGRESS NOTES
Pharmacy Parenteral Nutrition Evaluation    Lea Rivers is a  78 y.o. male receiving TPN.     Indication:     Prolonged Paralytic Ileus     Consulting Physician: Eleanor Yost DO     Total Fluid Rate (if stated): n/a    Labs  Results from last 7 days   Lab Units 03/15/24  0405 03/14/24  0514 03/13/24  0511 03/11/24  1618 03/11/24  1011 03/11/24  0508   SODIUM mmol/L 141 140 139   < >  --  138  138   POTASSIUM mmol/L 3.8 3.9 4.0   < >  --  3.2*  3.2*   CHLORIDE mmol/L 105 106 106   < >  --  107  107   CO2 mmol/L 28.0 28.0 28.0   < >  --  25.0  25.0   BUN mg/dL 18 19 18   < >  --  15  15   CREATININE mg/dL 0.54* 0.46* 0.44*   < >  --  0.66*  0.66*   CALCIUM mg/dL 7.8* 7.9* 7.8*   < >  --  8.3*  8.3*   BILIRUBIN mg/dL  --   --   --   --  0.3 0.3   ALK PHOS U/L  --   --   --   --  60 63   ALT (SGPT) U/L  --   --   --   --  29 31   AST (SGOT) U/L  --   --   --   --  20 23   GLUCOSE mg/dL 147* 159* 163*   < >  --  145*  145*    < > = values in this interval not displayed.       Results from last 7 days   Lab Units 03/15/24  0405 03/14/24  0514 03/13/24  1711 03/13/24  0511 03/12/24  0316 03/11/24  1618   MAGNESIUM mg/dL 2.0 2.2  --  1.9   < >  --    PHOSPHORUS mg/dL 2.9 2.4* 2.7 2.2*   < > 2.1*   PREALBUMIN mg/dL  --   --   --   --   --  5.4*    < > = values in this interval not displayed.       Results from last 7 days   Lab Units 03/17/24  0513 03/16/24  1429 03/16/24  0630 03/16/24  0543 03/15/24  0405   WBC 10*3/mm3 17.90*  --   --  15.84* 15.09*   HEMOGLOBIN g/dL 8.6* 7.9* 6.4* 6.6* 7.2*   HEMATOCRIT % 27.1* 26.2* 21.8* 21.4* 23.1*   PLATELETS 10*3/mm3 332  --   --  277 245       Triglycerides   Date Value Ref Range Status   03/11/2024 131 0 - 150 mg/dL Final     Comment:     Falsely depressed results may occur on samples drawn from patients receiving N-Acetylcysteine (NAC) or Metamizole.       estimated creatinine clearance is 125.7 mL/min (A) (by C-G formula based on SCr of 0.54 mg/dL  (L)).    Intake & Output (last 3 days)         03/05 0701 03/06 0700 03/06 0701  03/07 0700 03/07 0701 03/08 0700 03/08 0701 03/09 0700    P.O. 1080 240      I.V. (mL/kg)  1026.3 (11.5)      IV Piggyback  400      Total Intake(mL/kg) 1080 (12.1) 1666.3 (18.7)      Urine (mL/kg/hr) 150 (0.1) 400 (0.2)      Stool 0 0      Total Output 150 400      Net +930 +1266.3              Urine Unmeasured Occurrence 4 x 2 x 1 x     Stool Unmeasured Occurrence 5 x 4 x 3 x 1 x            Dietitian Recommendations  Diet Orders (active) (From admission, onward)       Start     Ordered    03/08/24 1800  Adult Cyclic Central 2-in-1 TPN  Cyclic TPN - See Admin Instructions        Note to Pharmacy: Bellmaster Rivers  5H  [id number]  MRN: [id number]  CSN: [id number]    03/08/24 1422    03/08/24 1800  Fat Emul Fish Oil/Plant Based (SMOFLIPID) 20 % emulsion 250 mL  Every 24 Hours Scheduled (TPN)         03/08/24 1422    03/08/24 1355  NPO Diet NPO Type: Strict NPO  Diet Effective Now         03/08/24 1358    03/05/24 1656  DIET MESSAGE Pt would like a grilled cheese sandwich, please. Thanks!  Once        Comments: Pt would like a grilled cheese sandwich, please. Thanks!    03/05/24 1656                    Current TPN Regimen Recommendation:   Dextrose 15% / Amino Acid 7.7% at a goal rate of 65 ml/hr.      20% Lipid Emulsion 250mL every 24 hours.    Na 45 mEq/L  K 50 mEq/L  Ca 5 mEq/L  Mg 8 mEq/L  PO4 15 mmol/L  Cl:Ace - 1:1    Assessment/Plan:  TPN for prolonged paralytic ileus.   Macronutrients per dietary recommendation and electrolyte per pharmacy recommendation are listed above (same as yesterday).  Electrolyte replacement per protocol, will continue with standard electrolytes.   SSI q6h ordered  Pharmacy will continue to follow and adjust as indicated     Thanks  Giorgio Ferrara, PharmD  3/17/2024  13:10 EDT

## 2024-03-17 NOTE — PLAN OF CARE
Goal Outcome Evaluation:  Plan of Care Reviewed With: patient, family        Progress: declining  Outcome Evaluation: Pt presents w/ increased lethargy, increased confusion, generalized weakness, decreased functional endurance, balance and postural control deficits limiting his ADL independence. Pt provided w/ LUE sling for comfort and to maintain LUE NWB restrictions w/ activity. Pt req max Ax1 for supine-to-sit,max Ax2 for STS & SPT from bed-to-chair, dep for ADLs. Pt would benefit from continued skilled IPOT services to address current functional deficits. Rec SNF at d/c.      Anticipated Discharge Disposition (OT): skilled nursing facility

## 2024-03-17 NOTE — PROGRESS NOTES
[facility] Medicine Services  PROGRESS NOTE    Patient Name: Lea Rivers  : 1945  MRN: [id number]    Date of Admission: 3/2/2024  Primary Care Physician: Mannie Melvin MD    Subjective   Subjective     CC:  F/U total abd colectomy with end ileostomy     HPI:  Patient resting in bed. Remains drowsy, but more alert than previous days. Does not want to eat much.  Objective   Objective     Vital Signs:   Temp:  [98 °F (36.7 °C)-100.6 °F (38.1 °C)] 98 °F (36.7 °C)  Heart Rate:  [84-97] 96  Resp:  [16-20] 18  BP: (125-184)/() 173/95  Flow (L/min):  [1] 1     Physical Exam:  Constitutional: No acute distress, appears chronically ill, pale appearing this morning  HENT: NCAT, mucous membranes moist  Respiratory: Clear to auscultation bilaterally, respiratory effort normal   Cardiovascular: RRR, no murmurs, rubs, or gallops  Gastrointestinal: abd binder in place, some redness noted around lower abdominal incision and RLQ drain  Musculoskeletal: edema improved  Neurologic: Oriented to person, lethargic but alertness improving  Skin: No rashes, bruises noted on upper ext    Results Reviewed:  LAB RESULTS:      Lab 24  0513 24  1429 24  0630 24  0543 03/15/24  0405 24  1125 24  0934 24  0511 24  0316 24  1618 24  1011 24  0508   WBC 17.90*  --   --  15.84* 15.09* 17.06*  --  23.21* 22.94*  --   --  18.43*   HEMOGLOBIN 8.6* 7.9* 6.4* 6.6* 7.2* 7.5*  --  8.6* 8.3* 8.4*  --  7.3*   HEMATOCRIT 27.1* 26.2* 21.8* 21.4* 23.1* 25.4*  --  26.4* 26.2* 26.3*  --  22.8*   PLATELETS 332  --   --  277 245 242  --  223 195  --   --  222   NEUTROS ABS 13.78*  --   --  11.72* 10.75* 13.48*  --  18.10* 18.18*  --   --  14.40*   IMMATURE GRANS (ABS)  --   --   --   --  1.30*  --   --   --  1.11*  --   --  0.60*   LYMPHS ABS  --   --   --   --  1.04  --   --   --  1.17  --   --  1.04   MONOS ABS  --   --   --   --  1.31*  --    --   --  1.77*  --   --  2.08*   EOS ABS 0.18  --   --  1.11* 0.61* 0.51*  --  0.23 0.61*  --   --  0.24   .7*  --   --  95.1 93.5 100.0*  --  92.3 92.6  --   --  96.2   CRP  --   --   --   --   --   --   --   --   --   --  13.06*  --    PROCALCITONIN  --   --   --   --   --   --   --  0.20 0.27*  --   --   --    LACTATE  --   --   --   --   --   --  1.5  --   --   --   --   --    PROTIME  --   --   --   --   --   --   --   --   --  15.1*  --   --          Lab 03/15/24  0405 03/14/24  0514 03/13/24  1711 03/13/24  0511 03/12/24  1204 03/12/24  0316 03/11/24  1618 03/11/24  1011 03/11/24  0508   SODIUM 141 140  --  139  --  138  --   --  138  138   POTASSIUM 3.8 3.9  --  4.0  --  4.0 3.4*  --  3.2*  3.2*   CHLORIDE 105 106  --  106  --  106  --   --  107  107   CO2 28.0 28.0  --  28.0  --  25.0  --   --  25.0  25.0   ANION GAP 8.0 6.0  --  5.0  --  7.0  --   --  6.0  6.0   BUN 18 19  --  18  --  18  --   --  15  15   CREATININE 0.54* 0.46*  --  0.44*  --  0.61*  --   --  0.66*  0.66*   EGFR 101.4 106.4  --  107.8  --  97.7  --   --  95.4  95.4   GLUCOSE 147* 159*  --  163*  --  148*  --   --  145*  145*   CALCIUM 7.8* 7.9*  --  7.8*  --  8.0*  --   --  8.3*  8.3*   IONIZED CALCIUM  --   --   --   --   --   --  1.26  --   --    MAGNESIUM 2.0 2.2  --  1.9  --  1.8  --   --  1.8   PHOSPHORUS 2.9 2.4* 2.7 2.2* 2.3* 1.9* 2.1*   < > 1.7*    < > = values in this interval not displayed.         Lab 03/11/24  1011 03/11/24  0508   TOTAL PROTEIN 4.4* 4.4*   ALBUMIN 2.4* 2.5*   GLOBULIN  --  1.9   ALT (SGPT) 29 31   AST (SGOT) 20 23   BILIRUBIN 0.3 0.3   BILIRUBIN DIRECT <0.2  --    ALK PHOS 60 63         Lab 03/11/24  1618   PROTIME 15.1*   INR 1.18*           Lab 03/11/24  1011   TRIGLYCERIDES 131         Lab 03/16/24  0630   ABO TYPING O   RH TYPING Negative   ANTIBODY SCREEN Negative           Brief Urine Lab Results  (Last result in the past 365 days)        Color   Clarity   Blood   Leuk Est   Nitrite    Protein   CREAT   Urine HCG        03/11/24 1337 Yellow   Cloudy   Moderate (2+)   Trace   Negative   30 mg/dL (1+)                   Microbiology Results Abnormal       Procedure Component Value - Date/Time    Urine Culture - Urine, Indwelling Urethral Catheter [[id number]]  (Normal) Collected: 03/11/24 1337    Lab Status: Final result Specimen: Urine from Indwelling Urethral Catheter Updated: 03/12/24 2024     Urine Culture No growth    Blood Culture - Blood, Arm, Right [[id number]]  (Normal) Collected: 03/06/24 1857    Lab Status: Final result Specimen: Blood from Arm, Right Updated: 03/11/24 2045     Blood Culture No growth at 5 days    Blood Culture - Blood, Arm, Right [[id number]]  (Normal) Collected: 03/06/24 1751    Lab Status: Final result Specimen: Blood from Arm, Right Updated: 03/11/24 2000     Blood Culture No growth at 5 days    Blood Culture - Blood, Arm, Right [[id number]]  (Normal) Collected: 03/02/24 1132    Lab Status: Final result Specimen: Blood from Arm, Right Updated: 03/07/24 1201     Blood Culture No growth at 5 days    Narrative:      Less than seven (7) mL's of blood was collected.  Insufficient quantity may yield false negative results.    Blood Culture - Blood, Arm, Right [[id number]]  (Normal) Collected: 03/02/24 1132    Lab Status: Final result Specimen: Blood from Arm, Right Updated: 03/07/24 1201     Blood Culture No growth at 5 days    Narrative:      Less than seven (7) mL's of blood was collected.  Insufficient quantity may yield false negative results.    Urine Culture - Urine, Straight Cath [[id number]]  (Normal) Collected: 03/02/24 0923    Lab Status: Final result Specimen: Urine from Straight Cath Updated: 03/03/24 1601     Urine Culture No growth    COVID PRE-OP / PRE-PROCEDURE SCREENING ORDER (NO ISOLATION) - Swab, Nasopharynx [[id number]]  (Normal) Collected: 03/02/24 1133    Lab Status: Final result Specimen: Swab from Nasopharynx Updated: 03/02/24 1227    Narrative:      The  following orders were created for panel order COVID PRE-OP / PRE-PROCEDURE SCREENING ORDER (NO ISOLATION) - Swab, Nasopharynx.  Procedure                               Abnormality         Status                     ---------                               -----------         ------                     COVID-19, FLU A/B, RSV P...[[id number]]  Normal              Final result                 Please view results for these tests on the individual orders.    COVID-19, FLU A/B, RSV PCR 1 HR TAT - Swab, Nasopharynx [[id number]]  (Normal) Collected: 03/02/24 1133    Lab Status: Final result Specimen: Swab from Nasopharynx Updated: 03/02/24 1227     COVID19 Not Detected     Influenza A PCR Not Detected     Influenza B PCR Not Detected     RSV, PCR Not Detected    Narrative:      Fact sheet for providers: https://www.fda.gov/media/066761/download    Fact sheet for patients: https://www.[employer].gov/media/556271/download    Test performed by PCR.            No radiology results from the last 24 hrs    Results for orders placed during the hospital encounter of 07/20/21    Adult Transthoracic Echo Complete W/ Cont if Necessary Per Protocol    Interpretation Summary  · Left ventricular wall thickness is consistent with mild concentric hypertrophy.  · Normal left-ventricular systolic function, estimated EF 65%.  · Left ventricular diastolic function is consistent with (grade I) impaired relaxation.  · Aortic sclerosis without aortic stenosis. Trace aortic insufficiency.  · Trace mitral regurgitation, trace tricuspid regurgitation.      Current medications:  Scheduled Meds:[Held by provider] enoxaparin, 1 mg/kg, Subcutaneous, Q12H  Fat Emul Fish Oil/Plant Based, 250 mL, Intravenous, Q24H (TPN)  insulin regular, 2-7 Units, Subcutaneous, Q6H  isosorbide mononitrate, 30 mg, Oral, Q24H  levETIRAcetam, 500 mg, Oral, Q12H  levothyroxine, 88 mcg, Oral, Q AM  Lidocaine, 2 patch, Transdermal, Q24H  lisinopril, 20 mg, Oral, Q24H  metoclopramide, 10  mg, Intravenous, Q6H  pantoprazole, 40 mg, Intravenous, Q AM  sodium chloride, 10 mL, Intravenous, Q12H  Valproic Acid, 375 mg, Oral, Q6H  verapamil SR, 240 mg, Oral, Q24H      Continuous Infusions:Adult Central 2-in-1 TPN, , Last Rate: 65 mL/hr at 03/16/24 1705  Pharmacy to Dose TPN,           PRN Meds:.  acetaminophen **OR** acetaminophen **OR** acetaminophen    calcium carbonate    Calcium Replacement - Follow Nurse / BPA Driven Protocol    dextrose    dextrose    docusate sodium    fluticasone    glucagon (human recombinant)    hydrALAZINE    HYDROmorphone    ipratropium-albuterol    labetalol    Magnesium Standard Dose Replacement - Follow Nurse / BPA Driven Protocol    [DISCONTINUED] HYDROmorphone **AND** naloxone    ondansetron ODT **OR** ondansetron    ondansetron ODT **OR** ondansetron    Pharmacy to Dose TPN    Phosphorus Replacement - Follow Nurse / BPA Driven Protocol    Potassium Replacement - Follow Nurse / BPA Driven Protocol    sodium chloride    sodium chloride    sodium chloride    sodium chloride    sodium chloride    sodium chloride    Assessment & Plan   Assessment & Plan     Active Hospital Problems    Diagnosis  POA    **Falls [W19.XXXA]  Yes    Pneumatosis intestinalis [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        Brief Hospital Course to date:  Lea Rivers is a 78 y.o. male with past medical history of hypertension, hyperlipidemia, hypothyroidism, seizure disorder.  Patient is being admitted for a fall and nondisplaced first through fourth left rib fractures.  Patient also has left distal clavicle fracture.    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 Continue TPN until oral intake improves, currently remains poor  -Replace electrolytes per protocol    -completed course of Fluconazole, CTX/Flagyl today, incisional findings d/w surgery, for now plan to monitor, consider repeat CT abd/pelvis in AM if worsening, watching WBC  - cultures are all negative  -CXR 3/12 does show possible new LLL pneumonia, completed course of CTX    Encephalopathy/lethargy  - per daughter significant change in mental status and speech since arrival, we had a long discussion and I feel this is probably TME in setting of infection, recent large operation, underlying dementia  - initial CT head 3/2 negative, repeated CT head 3/13 without any acute findings  - EEG negative for seizures, findings consistent with TME  -minimize sedating meds   -slowly improving    Multiple falls with increasing frequency   -CT of head shows age-related changes which are chronic but no acute process  -neuro consulted. likely d/t neuropathy (confirmed with EMG) and progression of dementia     Multiple rib fractures and also left clavicle fracture  -With no displacement or mildly displaced.  Orthopedic surgery evaluated. No surgical intervention  planned.  Recs nonweightbearing LUE, may come out of sling in 5-7 days to initiate gentle ROM exercises per ortho.  F/u with ortho/Dr. Maldonado in 2 weeks  -Pain control  -lidocaine patch  -bruise noted to L shoulder/neck region. X-ray negative    Acute on Chronic Anemia  -S/P 1 unit PRBCs 3/11 and 3/16, hemoglobin now improved, monitoring  -no clear source of active bleeding, hemoccult negative  - transfuse PRN hgb <7, hold therapeutic lovenox for now    Acute RUE DVT (brachial)  -Provoked, 2/2 PICC   -therapeutic lovenox started 3/13, on hold  per surgery due to worsening anemia     Prostate cancer  Hx BPH s/p greenlight photo vaporization of prostate 2018  -Follows with Dr. Noland  -S/P cyberknife radiation 2/9/23  -Has intermittent hematuria, monitor while on AC     Dementia and increasing memory problem  -Patient was previously living alone and driving, given  significant decline will need placement     Hypertension  -cleared by speech, resumed home oral regimen  - PRN IV dosing for SBP >180    Seizure disorder  - continue vimpat and keppra    Hyperlipidemia  Hypothyroidism  -resume home regimen    Expected Discharge Location and Transportation: SNF  Expected Discharge   Expected Discharge Date: 3/18/2024; Expected Discharge Time:      DVT prophylaxis:  Medical and mechanical DVT prophylaxis orders are present.         AM-PAC 6 Clicks Score (PT): 10 (03/16/24 2000)    CODE STATUS:   Code Status and Medical Interventions:   Ordered at: 03/02/24 1456     Medical Intervention Limits:    NO intubation (DNI)     Code Status (Patient has no pulse and is not breathing):    No CPR (Do Not Attempt to Resuscitate)     Medical Interventions (Patient has pulse or is breathing):    Limited Support       Sienna Saleh DO  03/17/24

## 2024-03-17 NOTE — PROGRESS NOTES
CPN Review Note    Patient Name: Lea Rivers  Date of Encounter: 03/17/24 11:25 EDT  MRN: [id number]  Admission date: 3/2/2024    Reason for visit: CPN review . RD to continue to follow per protocol.     Information Obtained:  Continues with marginal PO intakes thus difficult to gauge tolerance, having ostomy output. PN to continue at this time per surgeon.    EMR reviewed:  yes  Medication reviewed: yes  Labs reviewed: yes  I/Os: 1160 ml ostomy output last shift    Current diet: Diet: Gastrointestinal, Diabetic, Cardiac; Healthy Heart (2-3 Na+); Consistent Carbohydrate; Low Irritant; No Mixed Consistencies, Feeding Assistance - Nursing; Texture: Pureed (NDD 1); Fluid Consistency: Honey Thick  Adult Central 2-in-1 TPN    Estimated Needs:   Calories ~ 1800 Kcal/day  Protein ~ 120 g PRO/day    PN Route: PICC  PN:   15% dextrose, 7.7% AA @ goal rate of 65mL/hr.         GIR:  1.82mg/kg/min  Lipid: Provide 250mL 20% SMOF daily                  PN@ Goal over 24hr to Supply:  Volume 1560 mL/day     Energy 1776 Kcal/day 99 % Est Need   Protein 120 g/day 100 % Est Need     ---------------------------------------------------------------------------  Formula/Rate verified at bedside: No   Infusing Rate at time of visit: 65 ml/hr per MAR    Average Delivery from Charting: 3 Days:    PN Volume 1426 mL/day 91% goal   Lipid delivered?  Y      Energy  Kcal/day  % Est Need   Protein  g/day  % Est Need       Intervention:  Maintain TPN @ 65ml/hr per surgeon.   Monitor diet progression and PO tolerance. Consider keofeed and transitioning to EN if no improvement soon. Pt with functioning bowel thus PN no longer indicated and difficult to gauge tolerance with little PO intake, suspect unlikely to improve.       Follow up:   Per protocol    Adelita Pope RD  11:29 EDT  Time: 20min

## 2024-03-17 NOTE — PLAN OF CARE
Pt has no new nursing issues at this time. Pt is alert & oriented to self and place, RA, NSR, BP elevated but within parameters. Around pt OLIVER site and midline are warm and is red with some swelling. The bottom part of the midline incision has dehisced. Cleaned with normal saline, applied abdominal pad to midline and split guaze around OLIVER. Pt has slight expiratory wheezing. Pt has no complaints at this time.      Problem: Adult Inpatient Plan of Care  Goal: Plan of Care Review  Outcome: Ongoing, Progressing  Goal: Patient-Specific Goal (Individualized)  Outcome: Ongoing, Progressing  Goal: Absence of Hospital-Acquired Illness or Injury  Outcome: Ongoing, Progressing  Intervention: Identify and Manage Fall Risk  Recent Flowsheet Documentation  Taken 3/17/2024 0400 by Praveen Kuklarni RN  Safety Promotion/Fall Prevention:   safety round/check completed   activity supervised   assistive device/personal items within reach   clutter free environment maintained   fall prevention program maintained  Taken 3/17/2024 0200 by Praeven Kulkarni, RN  Safety Promotion/Fall Prevention:   activity supervised   assistive device/personal items within reach   safety round/check completed   clutter free environment maintained   nonskid shoes/slippers when out of bed  Taken 3/17/2024 0000 by Praveen Kulkarni, RN  Safety Promotion/Fall Prevention:   safety round/check completed   activity supervised   assistive device/personal items within reach   clutter free environment maintained   fall prevention program maintained  Taken 3/16/2024 2200 by Praveen Kulkarni, RN  Safety Promotion/Fall Prevention:   activity supervised   assistive device/personal items within reach   safety round/check completed   clutter free environment maintained   nonskid shoes/slippers when out of bed  Taken 3/16/2024 2000 by Praveen Kulkarni, RN  Safety Promotion/Fall Prevention:   safety round/check completed   activity supervised   assistive  device/personal items within reach   clutter free environment maintained   fall prevention program maintained  Intervention: Prevent Skin Injury  Recent Flowsheet Documentation  Taken 3/17/2024 0400 by Praveen Kulkarni RN  Body Position:   turned   left   weight shifting  Skin Protection:   adhesive use limited   incontinence pads utilized   skin-to-device areas padded   tubing/devices free from skin contact  Taken 3/17/2024 0200 by Praveen Kulkarni RN  Body Position:   turned   right   weight shifting  Skin Protection:   adhesive use limited   incontinence pads utilized   skin-to-device areas padded   tubing/devices free from skin contact  Taken 3/17/2024 0000 by Praveen Kulkarni RN  Body Position:   turned   left   weight shifting  Skin Protection:   adhesive use limited   incontinence pads utilized   skin-to-device areas padded   tubing/devices free from skin contact  Taken 3/16/2024 2200 by Praveen Kulkarni RN  Body Position:   weight shifting   supine, legs elevated  Skin Protection:   adhesive use limited   incontinence pads utilized   skin-to-device areas padded   tubing/devices free from skin contact  Taken 3/16/2024 2000 by Praveen Kulkarni RN  Body Position:   tilted   right   weight shifting   legs elevated  Skin Protection:   adhesive use limited   incontinence pads utilized   skin-to-device areas padded   tubing/devices free from skin contact  Intervention: Prevent and Manage VTE (Venous Thromboembolism) Risk  Recent Flowsheet Documentation  Taken 3/17/2024 0400 by Praveen Kulkarni RN  Activity Management: activity minimized  Taken 3/17/2024 0200 by Praveen Kulkarni RN  Activity Management: activity minimized  Taken 3/17/2024 0000 by Praveen Kulkarni RN  Activity Management: activity minimized  Taken 3/16/2024 2200 by Praveen Kulkarni RN  Activity Management: activity minimized  Taken 3/16/2024 2000 by Praveen Kulkarni RN  Activity Management: activity minimized  VTE  Prevention/Management:   bilateral   sequential compression devices on  Range of Motion: active ROM (range of motion) encouraged  Intervention: Prevent Infection  Recent Flowsheet Documentation  Taken 3/17/2024 0400 by Praveen Kulkarni RN  Infection Prevention:   hand hygiene promoted   rest/sleep promoted  Taken 3/17/2024 0200 by Praveen Kulkarni RN  Infection Prevention:   environmental surveillance performed   rest/sleep promoted  Taken 3/17/2024 0000 by Praveen Kulkarni RN  Infection Prevention:   hand hygiene promoted   rest/sleep promoted  Taken 3/16/2024 2200 by Praveen Kulkarni RN  Infection Prevention:   environmental surveillance performed   rest/sleep promoted  Taken 3/16/2024 2000 by Praveen Kulkarni RN  Infection Prevention:   cohorting utilized   rest/sleep promoted  Goal: Optimal Comfort and Wellbeing  Outcome: Ongoing, Progressing  Intervention: Provide Person-Centered Care  Recent Flowsheet Documentation  Taken 3/16/2024 2000 by Praveen Kulkarni RN  Trust Relationship/Rapport:   care explained   emotional support provided   questions answered   questions encouraged   thoughts/feelings acknowledged  Goal: Readiness for Transition of Care  Outcome: Ongoing, Progressing     Problem: Skin Injury Risk Increased  Goal: Skin Health and Integrity  Outcome: Ongoing, Progressing  Intervention: Optimize Skin Protection  Recent Flowsheet Documentation  Taken 3/17/2024 0400 by Praveen Kulkarni RN  Pressure Reduction Techniques:   frequent weight shift encouraged   weight shift assistance provided  Head of Bed (HOB) Positioning: HOB elevated  Pressure Reduction Devices:   positioning supports utilized   pressure-redistributing mattress utilized  Skin Protection:   adhesive use limited   incontinence pads utilized   skin-to-device areas padded   tubing/devices free from skin contact  Taken 3/17/2024 0200 by Praveen Kulkarni RN  Pressure Reduction Techniques:   frequent weight shift  encouraged   weight shift assistance provided  Head of Bed ([facility]) Positioning: [facility] elevated  Pressure Reduction Devices:   positioning supports utilized   pressure-redistributing mattress utilized  Skin Protection:   adhesive use limited   incontinence pads utilized   skin-to-device areas padded   tubing/devices free from skin contact  Taken 3/17/2024 0000 by Praveen Kulkarni RN  Pressure Reduction Techniques:   frequent weight shift encouraged   weight shift assistance provided  Head of Bed ([facility]) Positioning: [facility] elevated  Pressure Reduction Devices:   positioning supports utilized   pressure-redistributing mattress utilized  Skin Protection:   adhesive use limited   incontinence pads utilized   skin-to-device areas padded   tubing/devices free from skin contact  Taken 3/16/2024 2200 by Praveen Kulkarni RN  Pressure Reduction Techniques:   frequent weight shift encouraged   weight shift assistance provided  Head of Bed ([facility]) Positioning: [facility] elevated  Pressure Reduction Devices:   positioning supports utilized   pressure-redistributing mattress utilized  Skin Protection:   adhesive use limited   incontinence pads utilized   skin-to-device areas padded   tubing/devices free from skin contact  Taken 3/16/2024 2000 by Praveen Kulkarni RN  Pressure Reduction Techniques:   frequent weight shift encouraged   weight shift assistance provided  Head of Bed ([facility]) Positioning: [facility] elevated  Pressure Reduction Devices:   pressure-redistributing mattress utilized   positioning supports utilized  Skin Protection:   adhesive use limited   incontinence pads utilized   skin-to-device areas padded   tubing/devices free from skin contact     Problem: Fall Injury Risk  Goal: Absence of Fall and Fall-Related Injury  Outcome: Ongoing, Progressing  Intervention: Identify and Manage Contributors  Recent Flowsheet Documentation  Taken 3/17/2024 0200 by Praveen Kulkarni RN  Self-Care Promotion: independence encouraged  Taken  3/16/2024 2200 by Praveen Kulkarni, RN  Self-Care Promotion: independence encouraged  Taken 3/16/2024 2000 by Praveen Kulkarni RN  Medication Review/Management: medications reviewed  Intervention: Promote Injury-Free Environment  Recent Flowsheet Documentation  Taken 3/17/2024 0400 by Praveen Kulkarni RN  Safety Promotion/Fall Prevention:   safety round/check completed   activity supervised   assistive device/personal items within reach   clutter free environment maintained   fall prevention program maintained  Taken 3/17/2024 0200 by Praveen Kulkarni RN  Safety Promotion/Fall Prevention:   activity supervised   assistive device/personal items within reach   safety round/check completed   clutter free environment maintained   nonskid shoes/slippers when out of bed  Taken 3/17/2024 0000 by Praveen Kulkarni RN  Safety Promotion/Fall Prevention:   safety round/check completed   activity supervised   assistive device/personal items within reach   clutter free environment maintained   fall prevention program maintained  Taken 3/16/2024 2200 by Praveen Kulkarni RN  Safety Promotion/Fall Prevention:   activity supervised   assistive device/personal items within reach   safety round/check completed   clutter free environment maintained   nonskid shoes/slippers when out of bed  Taken 3/16/2024 2000 by Praveen Kulkarni RN  Safety Promotion/Fall Prevention:   safety round/check completed   activity supervised   assistive device/personal items within reach   clutter free environment maintained   fall prevention program maintained     Problem: Adjustment to Illness (Sepsis/Septic Shock)  Goal: Optimal Coping  Outcome: Ongoing, Progressing  Intervention: Optimize Psychosocial Adjustment to Illness  Recent Flowsheet Documentation  Taken 3/16/2024 2000 by Praveen Kulkarni, RN  Family/Support System Care:   support provided   self-care encouraged     Problem: Infection Progression (Sepsis/Septic Shock)  Goal:  Absence of Infection Signs and Symptoms  Outcome: Ongoing, Progressing  Intervention: Initiate Sepsis Management  Recent Flowsheet Documentation  Taken 3/17/2024 0400 by Praveen Kulkarni RN  Infection Prevention:   hand hygiene promoted   rest/sleep promoted  Taken 3/17/2024 0200 by Praveen Kulkarni RN  Infection Prevention:   environmental surveillance performed   rest/sleep promoted  Taken 3/17/2024 0000 by Praveen Kulkarni RN  Infection Prevention:   hand hygiene promoted   rest/sleep promoted  Taken 3/16/2024 2200 by Praveen Kulkarni RN  Infection Prevention:   environmental surveillance performed   rest/sleep promoted  Taken 3/16/2024 2000 by Praveen Kulkarni RN  Infection Prevention:   cohorting utilized   rest/sleep promoted  Intervention: Promote Recovery  Recent Flowsheet Documentation  Taken 3/17/2024 0400 by Praveen Kulkarni RN  Activity Management: activity minimized  Taken 3/17/2024 0200 by Praveen Kulkarni, RN  Activity Management: activity minimized  Taken 3/17/2024 0000 by Praveen Kulkarni, RN  Activity Management: activity minimized  Taken 3/16/2024 2200 by Praveen Kulkarni RN  Activity Management: activity minimized  Taken 3/16/2024 2000 by Praveen Kulkarni, RN  Activity Management: activity minimized   Goal Outcome Evaluation:

## 2024-03-18 ENCOUNTER — ANESTHESIA (OUTPATIENT)
Dept: PERIOP | Facility: HOSPITAL | Age: 79
End: 2024-03-18
Payer: MEDICARE

## 2024-03-18 ENCOUNTER — APPOINTMENT (OUTPATIENT)
Dept: CT IMAGING | Facility: HOSPITAL | Age: 79
DRG: 329 | End: 2024-03-18
Payer: MEDICARE

## 2024-03-18 ENCOUNTER — ANESTHESIA EVENT CONVERTED (OUTPATIENT)
Dept: ANESTHESIOLOGY | Facility: HOSPITAL | Age: 79
DRG: 329 | End: 2024-03-18
Payer: MEDICARE

## 2024-03-18 ENCOUNTER — ANESTHESIA EVENT (OUTPATIENT)
Dept: PERIOP | Facility: HOSPITAL | Age: 79
End: 2024-03-18
Payer: MEDICARE

## 2024-03-18 LAB
ANISOCYTOSIS BLD QL: ABNORMAL
BASOPHILS # BLD MANUAL: 0 10*3/MM3 (ref 0–0.2)
BASOPHILS NFR BLD MANUAL: 0 % (ref 0–1.5)
DEPRECATED RDW RBC AUTO: 66.7 FL (ref 37–54)
EOSINOPHIL # BLD MANUAL: 0 10*3/MM3 (ref 0–0.4)
EOSINOPHIL NFR BLD MANUAL: 0 % (ref 0.3–6.2)
ERYTHROCYTE [DISTWIDTH] IN BLOOD BY AUTOMATED COUNT: 18.4 % (ref 12.3–15.4)
GLUCOSE BLDC GLUCOMTR-MCNC: 129 MG/DL (ref 70–130)
GLUCOSE BLDC GLUCOMTR-MCNC: 168 MG/DL (ref 70–130)
GLUCOSE BLDC GLUCOMTR-MCNC: 185 MG/DL (ref 70–130)
HCT VFR BLD AUTO: 24.6 % (ref 37.5–51)
HGB BLD-MCNC: 8 G/DL (ref 13–17.7)
LYMPHOCYTES # BLD MANUAL: 1 10*3/MM3 (ref 0.7–3.1)
LYMPHOCYTES NFR BLD MANUAL: 11 % (ref 5–12)
MACROCYTES BLD QL SMEAR: ABNORMAL
MCH RBC QN AUTO: 32.9 PG (ref 26.6–33)
MCHC RBC AUTO-ENTMCNC: 32.5 G/DL (ref 31.5–35.7)
MCV RBC AUTO: 101.2 FL (ref 79–97)
METAMYELOCYTES NFR BLD MANUAL: 1 % (ref 0–0)
MONOCYTES # BLD: 1.83 10*3/MM3 (ref 0.1–0.9)
MYELOCYTES NFR BLD MANUAL: 1 % (ref 0–0)
NEUTROPHILS # BLD AUTO: 13.5 10*3/MM3 (ref 1.7–7)
NEUTROPHILS NFR BLD MANUAL: 71 % (ref 42.7–76)
NEUTS BAND NFR BLD MANUAL: 10 % (ref 0–5)
PLAT MORPH BLD: NORMAL
PLATELET # BLD AUTO: 379 10*3/MM3 (ref 140–450)
PMV BLD AUTO: 10.1 FL (ref 6–12)
RBC # BLD AUTO: 2.43 10*6/MM3 (ref 4.14–5.8)
VARIANT LYMPHS NFR BLD MANUAL: 6 % (ref 19.6–45.3)
WBC MORPH BLD: NORMAL
WBC NRBC COR # BLD AUTO: 16.67 10*3/MM3 (ref 3.4–10.8)

## 2024-03-18 PROCEDURE — 36430 TRANSFUSION BLD/BLD COMPNT: CPT

## 2024-03-18 PROCEDURE — 87206 SMEAR FLUORESCENT/ACID STAI: CPT | Performed by: SURGERY

## 2024-03-18 PROCEDURE — 25010000002 ESMOLOL 100 MG/10ML SOLUTION: Performed by: NURSE ANESTHETIST, CERTIFIED REGISTERED

## 2024-03-18 PROCEDURE — 87015 SPECIMEN INFECT AGNT CONCNTJ: CPT | Performed by: SURGERY

## 2024-03-18 PROCEDURE — 25010000002 ONDANSETRON PER 1 MG: Performed by: NURSE ANESTHETIST, CERTIFIED REGISTERED

## 2024-03-18 PROCEDURE — 25810000003 SODIUM CHLORIDE 0.9 % SOLUTION: Performed by: NURSE ANESTHETIST, CERTIFIED REGISTERED

## 2024-03-18 PROCEDURE — 0W9G0ZZ DRAINAGE OF PERITONEAL CAVITY, OPEN APPROACH: ICD-10-PCS | Performed by: SURGERY

## 2024-03-18 PROCEDURE — 25010000002 PIPERACILLIN SOD-TAZOBACTAM PER 1 G: Performed by: SURGERY

## 2024-03-18 PROCEDURE — 25010000002 FLUCONAZOLE PER 200 MG: Performed by: SURGERY

## 2024-03-18 PROCEDURE — 85007 BL SMEAR W/DIFF WBC COUNT: CPT | Performed by: SURGERY

## 2024-03-18 PROCEDURE — 87070 CULTURE OTHR SPECIMN AEROBIC: CPT | Performed by: SURGERY

## 2024-03-18 PROCEDURE — 87181 SC STD AGAR DILUTION PER AGT: CPT | Performed by: SURGERY

## 2024-03-18 PROCEDURE — 25010000002 METOCLOPRAMIDE PER 10 MG: Performed by: SURGERY

## 2024-03-18 PROCEDURE — 25010000002 DEXAMETHASONE SODIUM PHOSPHATE 10 MG/ML SOLUTION: Performed by: NURSE ANESTHETIST, CERTIFIED REGISTERED

## 2024-03-18 PROCEDURE — 87205 SMEAR GRAM STAIN: CPT | Performed by: SURGERY

## 2024-03-18 PROCEDURE — 25810000003 LACTATED RINGERS PER 1000 ML: Performed by: ANESTHESIOLOGY

## 2024-03-18 PROCEDURE — 25010000002 HYDROMORPHONE PER 4 MG: Performed by: NURSE PRACTITIONER

## 2024-03-18 PROCEDURE — 87077 CULTURE AEROBIC IDENTIFY: CPT | Performed by: SURGERY

## 2024-03-18 PROCEDURE — 25010000002 PROPOFOL 10 MG/ML EMULSION: Performed by: NURSE ANESTHETIST, CERTIFIED REGISTERED

## 2024-03-18 PROCEDURE — 99232 SBSQ HOSP IP/OBS MODERATE 35: CPT | Performed by: INTERNAL MEDICINE

## 2024-03-18 PROCEDURE — 87186 SC STD MICRODIL/AGAR DIL: CPT | Performed by: SURGERY

## 2024-03-18 PROCEDURE — 63710000001 INSULIN REGULAR HUMAN PER 5 UNITS: Performed by: SURGERY

## 2024-03-18 PROCEDURE — 25010000002 POTASSIUM CHLORIDE PER 2 MEQ OF POTASSIUM: Performed by: SURGERY

## 2024-03-18 PROCEDURE — 85025 COMPLETE CBC W/AUTO DIFF WBC: CPT | Performed by: SURGERY

## 2024-03-18 PROCEDURE — 87075 CULTR BACTERIA EXCEPT BLOOD: CPT | Performed by: SURGERY

## 2024-03-18 PROCEDURE — 86900 BLOOD TYPING SEROLOGIC ABO: CPT

## 2024-03-18 PROCEDURE — 25010000002 FENTANYL CITRATE (PF) 100 MCG/2ML SOLUTION: Performed by: NURSE ANESTHETIST, CERTIFIED REGISTERED

## 2024-03-18 PROCEDURE — 87116 MYCOBACTERIA CULTURE: CPT | Performed by: SURGERY

## 2024-03-18 PROCEDURE — 25010000002 CALCIUM GLUCONATE PER 10 ML: Performed by: SURGERY

## 2024-03-18 PROCEDURE — 25010000002 MAGNESIUM SULFATE PER 500 MG OF MAGNESIUM: Performed by: SURGERY

## 2024-03-18 PROCEDURE — 87102 FUNGUS ISOLATION CULTURE: CPT | Performed by: SURGERY

## 2024-03-18 PROCEDURE — 25510000001 IOPAMIDOL 61 % SOLUTION: Performed by: INTERNAL MEDICINE

## 2024-03-18 PROCEDURE — 25010000002 BUPIVACAINE (PF) 0.25 % SOLUTION: Performed by: NURSE ANESTHETIST, CERTIFIED REGISTERED

## 2024-03-18 PROCEDURE — 25810000003 LACTATED RINGERS PER 1000 ML: Performed by: NURSE ANESTHETIST, CERTIFIED REGISTERED

## 2024-03-18 PROCEDURE — 74177 CT ABD & PELVIS W/CONTRAST: CPT

## 2024-03-18 PROCEDURE — 82948 REAGENT STRIP/BLOOD GLUCOSE: CPT

## 2024-03-18 PROCEDURE — P9016 RBC LEUKOCYTES REDUCED: HCPCS

## 2024-03-18 RX ORDER — LABETALOL HYDROCHLORIDE 5 MG/ML
INJECTION, SOLUTION INTRAVENOUS AS NEEDED
Status: DISCONTINUED | OUTPATIENT
Start: 2024-03-18 | End: 2024-03-18 | Stop reason: SURG

## 2024-03-18 RX ORDER — ROCURONIUM BROMIDE 10 MG/ML
INJECTION, SOLUTION INTRAVENOUS AS NEEDED
Status: DISCONTINUED | OUTPATIENT
Start: 2024-03-18 | End: 2024-03-18 | Stop reason: SURG

## 2024-03-18 RX ORDER — FENTANYL CITRATE 50 UG/ML
INJECTION, SOLUTION INTRAMUSCULAR; INTRAVENOUS AS NEEDED
Status: DISCONTINUED | OUTPATIENT
Start: 2024-03-18 | End: 2024-03-18 | Stop reason: SURG

## 2024-03-18 RX ORDER — ONDANSETRON 2 MG/ML
4 INJECTION INTRAMUSCULAR; INTRAVENOUS ONCE AS NEEDED
Status: DISCONTINUED | OUTPATIENT
Start: 2024-03-18 | End: 2024-03-18 | Stop reason: HOSPADM

## 2024-03-18 RX ORDER — FENTANYL CITRATE 50 UG/ML
50 INJECTION, SOLUTION INTRAMUSCULAR; INTRAVENOUS
Status: DISCONTINUED | OUTPATIENT
Start: 2024-03-18 | End: 2024-03-18 | Stop reason: HOSPADM

## 2024-03-18 RX ORDER — METOPROLOL TARTRATE 1 MG/ML
INJECTION, SOLUTION INTRAVENOUS AS NEEDED
Status: DISCONTINUED | OUTPATIENT
Start: 2024-03-18 | End: 2024-03-18 | Stop reason: SURG

## 2024-03-18 RX ORDER — SODIUM CHLORIDE, SODIUM LACTATE, POTASSIUM CHLORIDE, CALCIUM CHLORIDE 600; 310; 30; 20 MG/100ML; MG/100ML; MG/100ML; MG/100ML
INJECTION, SOLUTION INTRAVENOUS CONTINUOUS PRN
Status: DISCONTINUED | OUTPATIENT
Start: 2024-03-18 | End: 2024-03-18 | Stop reason: SURG

## 2024-03-18 RX ORDER — MAGNESIUM HYDROXIDE 1200 MG/15ML
LIQUID ORAL AS NEEDED
Status: DISCONTINUED | OUTPATIENT
Start: 2024-03-18 | End: 2024-03-18 | Stop reason: HOSPADM

## 2024-03-18 RX ORDER — ISOSORBIDE DINITRATE 10 MG/1
10 TABLET ORAL
Status: DISCONTINUED | OUTPATIENT
Start: 2024-03-18 | End: 2024-03-31 | Stop reason: HOSPADM

## 2024-03-18 RX ORDER — DEXAMETHASONE SODIUM PHOSPHATE 10 MG/ML
INJECTION, SOLUTION INTRAMUSCULAR; INTRAVENOUS
Status: COMPLETED | OUTPATIENT
Start: 2024-03-18 | End: 2024-03-18

## 2024-03-18 RX ORDER — PHENYLEPHRINE HCL IN 0.9% NACL 1 MG/10 ML
SYRINGE (ML) INTRAVENOUS AS NEEDED
Status: DISCONTINUED | OUTPATIENT
Start: 2024-03-18 | End: 2024-03-18 | Stop reason: SURG

## 2024-03-18 RX ORDER — FLUCONAZOLE 2 MG/ML
400 INJECTION, SOLUTION INTRAVENOUS EVERY 24 HOURS
Qty: 1400 ML | Refills: 0 | Status: DISCONTINUED | OUTPATIENT
Start: 2024-03-18 | End: 2024-03-20

## 2024-03-18 RX ORDER — VERAPAMIL HYDROCHLORIDE 80 MG/1
80 TABLET ORAL EVERY 8 HOURS SCHEDULED
Status: DISCONTINUED | OUTPATIENT
Start: 2024-03-18 | End: 2024-03-27

## 2024-03-18 RX ORDER — BUPIVACAINE HYDROCHLORIDE 2.5 MG/ML
INJECTION, SOLUTION EPIDURAL; INFILTRATION; INTRACAUDAL
Status: COMPLETED | OUTPATIENT
Start: 2024-03-18 | End: 2024-03-18

## 2024-03-18 RX ORDER — LIDOCAINE HYDROCHLORIDE 10 MG/ML
INJECTION, SOLUTION EPIDURAL; INFILTRATION; INTRACAUDAL; PERINEURAL AS NEEDED
Status: DISCONTINUED | OUTPATIENT
Start: 2024-03-18 | End: 2024-03-18 | Stop reason: SURG

## 2024-03-18 RX ORDER — PROPOFOL 10 MG/ML
VIAL (ML) INTRAVENOUS AS NEEDED
Status: DISCONTINUED | OUTPATIENT
Start: 2024-03-18 | End: 2024-03-18 | Stop reason: SURG

## 2024-03-18 RX ORDER — SODIUM CHLORIDE 9 MG/ML
INJECTION, SOLUTION INTRAVENOUS CONTINUOUS PRN
Status: DISCONTINUED | OUTPATIENT
Start: 2024-03-18 | End: 2024-03-18 | Stop reason: SURG

## 2024-03-18 RX ORDER — ESMOLOL HYDROCHLORIDE 10 MG/ML
INJECTION INTRAVENOUS AS NEEDED
Status: DISCONTINUED | OUTPATIENT
Start: 2024-03-18 | End: 2024-03-18 | Stop reason: SURG

## 2024-03-18 RX ORDER — HYDROMORPHONE HYDROCHLORIDE 1 MG/ML
0.5 INJECTION, SOLUTION INTRAMUSCULAR; INTRAVENOUS; SUBCUTANEOUS
Status: DISCONTINUED | OUTPATIENT
Start: 2024-03-18 | End: 2024-03-18 | Stop reason: HOSPADM

## 2024-03-18 RX ORDER — SODIUM CHLORIDE, SODIUM LACTATE, POTASSIUM CHLORIDE, CALCIUM CHLORIDE 600; 310; 30; 20 MG/100ML; MG/100ML; MG/100ML; MG/100ML
9 INJECTION, SOLUTION INTRAVENOUS CONTINUOUS PRN
Status: DISCONTINUED | OUTPATIENT
Start: 2024-03-18 | End: 2024-03-18 | Stop reason: HOSPADM

## 2024-03-18 RX ORDER — ONDANSETRON 2 MG/ML
INJECTION INTRAMUSCULAR; INTRAVENOUS AS NEEDED
Status: DISCONTINUED | OUTPATIENT
Start: 2024-03-18 | End: 2024-03-18 | Stop reason: SURG

## 2024-03-18 RX ORDER — DEXAMETHASONE SODIUM PHOSPHATE 10 MG/ML
INJECTION, SOLUTION INTRAMUSCULAR; INTRAVENOUS AS NEEDED
Status: DISCONTINUED | OUTPATIENT
Start: 2024-03-18 | End: 2024-03-18 | Stop reason: SURG

## 2024-03-18 RX ADMIN — VALPROIC ACID 375 MG: 250 SOLUTION ORAL at 16:57

## 2024-03-18 RX ADMIN — Medication 10 ML: at 08:27

## 2024-03-18 RX ADMIN — ISOSORBIDE DINITRATE 10 MG: 10 TABLET ORAL at 18:41

## 2024-03-18 RX ADMIN — VALPROIC ACID 375 MG: 250 SOLUTION ORAL at 05:05

## 2024-03-18 RX ADMIN — ESMOLOL HYDROCHLORIDE 40 MG: 10 INJECTION, SOLUTION INTRAVENOUS at 13:22

## 2024-03-18 RX ADMIN — Medication 100 MCG: at 13:42

## 2024-03-18 RX ADMIN — SODIUM CHLORIDE, POTASSIUM CHLORIDE, SODIUM LACTATE AND CALCIUM CHLORIDE 9 ML/HR: 600; 310; 30; 20 INJECTION, SOLUTION INTRAVENOUS at 13:00

## 2024-03-18 RX ADMIN — METOCLOPRAMIDE 10 MG: 5 INJECTION, SOLUTION INTRAMUSCULAR; INTRAVENOUS at 00:10

## 2024-03-18 RX ADMIN — METOCLOPRAMIDE 10 MG: 5 INJECTION, SOLUTION INTRAMUSCULAR; INTRAVENOUS at 08:27

## 2024-03-18 RX ADMIN — BUPIVACAINE HYDROCHLORIDE 60 ML: 2.5 INJECTION, SOLUTION EPIDURAL; INFILTRATION; INTRACAUDAL; PERINEURAL at 13:19

## 2024-03-18 RX ADMIN — IOPAMIDOL 85 ML: 612 INJECTION, SOLUTION INTRAVENOUS at 11:42

## 2024-03-18 RX ADMIN — LABETALOL 20 MG/4 ML (5 MG/ML) INTRAVENOUS SYRINGE 10 MG: at 15:07

## 2024-03-18 RX ADMIN — PIPERACILLIN AND TAZOBACTAM 3.38 G: 3; .375 INJECTION, POWDER, LYOPHILIZED, FOR SOLUTION INTRAVENOUS; PARENTERAL at 13:22

## 2024-03-18 RX ADMIN — SODIUM CHLORIDE, POTASSIUM CHLORIDE, SODIUM LACTATE AND CALCIUM CHLORIDE: 600; 310; 30; 20 INJECTION, SOLUTION INTRAVENOUS at 13:22

## 2024-03-18 RX ADMIN — SODIUM CHLORIDE: 9 INJECTION, SOLUTION INTRAVENOUS at 13:22

## 2024-03-18 RX ADMIN — LEVETIRACETAM 500 MG: 500 TABLET, FILM COATED ORAL at 22:57

## 2024-03-18 RX ADMIN — FLUCONAZOLE 400 MG: 400 INJECTION, SOLUTION INTRAVENOUS at 17:44

## 2024-03-18 RX ADMIN — FENTANYL CITRATE 100 MCG: 50 INJECTION, SOLUTION INTRAMUSCULAR; INTRAVENOUS at 14:06

## 2024-03-18 RX ADMIN — INSULIN HUMAN 2 UNITS: 100 INJECTION, SOLUTION PARENTERAL at 06:25

## 2024-03-18 RX ADMIN — METOPROLOL TARTRATE 5 MG: 5 INJECTION INTRAVENOUS at 15:03

## 2024-03-18 RX ADMIN — METOCLOPRAMIDE 10 MG: 5 INJECTION, SOLUTION INTRAMUSCULAR; INTRAVENOUS at 18:04

## 2024-03-18 RX ADMIN — LEVOTHYROXINE SODIUM 88 MCG: 88 TABLET ORAL at 05:05

## 2024-03-18 RX ADMIN — WATER: 1 INJECTION INTRAMUSCULAR; INTRAVENOUS; SUBCUTANEOUS at 18:34

## 2024-03-18 RX ADMIN — SMOFLIPID 250 ML: 6; 6; 5; 3 INJECTION, EMULSION INTRAVENOUS at 18:36

## 2024-03-18 RX ADMIN — DEXAMETHASONE SODIUM PHOSPHATE 4 MG: 10 INJECTION, SOLUTION INTRAMUSCULAR; INTRAVENOUS at 13:19

## 2024-03-18 RX ADMIN — PROPOFOL 150 MG: 10 INJECTION, EMULSION INTRAVENOUS at 13:22

## 2024-03-18 RX ADMIN — DEXAMETHASONE SODIUM PHOSPHATE 6 MG: 10 INJECTION, SOLUTION INTRAMUSCULAR; INTRAVENOUS at 13:22

## 2024-03-18 RX ADMIN — Medication 200 MCG: at 13:30

## 2024-03-18 RX ADMIN — HYDROMORPHONE HYDROCHLORIDE 0.5 MG: 1 INJECTION, SOLUTION INTRAMUSCULAR; INTRAVENOUS; SUBCUTANEOUS at 05:05

## 2024-03-18 RX ADMIN — LABETALOL 20 MG/4 ML (5 MG/ML) INTRAVENOUS SYRINGE 10 MG: at 15:21

## 2024-03-18 RX ADMIN — Medication 10 ML: at 22:58

## 2024-03-18 RX ADMIN — LISINOPRIL 20 MG: 20 TABLET ORAL at 16:57

## 2024-03-18 RX ADMIN — VALPROIC ACID 375 MG: 250 SOLUTION ORAL at 23:17

## 2024-03-18 RX ADMIN — ONDANSETRON 4 MG: 2 INJECTION INTRAMUSCULAR; INTRAVENOUS at 13:22

## 2024-03-18 RX ADMIN — Medication 200 MCG: at 13:33

## 2024-03-18 RX ADMIN — PANTOPRAZOLE SODIUM 40 MG: 40 INJECTION, POWDER, FOR SOLUTION INTRAVENOUS at 05:05

## 2024-03-18 RX ADMIN — LIDOCAINE HYDROCHLORIDE 50 MG: 10 INJECTION, SOLUTION EPIDURAL; INFILTRATION; INTRACAUDAL; PERINEURAL at 13:22

## 2024-03-18 RX ADMIN — ROCURONIUM BROMIDE 100 MG: 10 INJECTION INTRAVENOUS at 13:22

## 2024-03-18 RX ADMIN — INSULIN HUMAN 2 UNITS: 100 INJECTION, SOLUTION PARENTERAL at 00:10

## 2024-03-18 RX ADMIN — FENTANYL CITRATE 100 MCG: 50 INJECTION, SOLUTION INTRAMUSCULAR; INTRAVENOUS at 15:06

## 2024-03-18 RX ADMIN — VALPROIC ACID 375 MG: 250 SOLUTION ORAL at 00:47

## 2024-03-18 RX ADMIN — PIPERACILLIN AND TAZOBACTAM 3.38 G: 3; .375 INJECTION, POWDER, LYOPHILIZED, FOR SOLUTION INTRAVENOUS at 22:55

## 2024-03-18 RX ADMIN — VERAPAMIL HYDROCHLORIDE 80 MG: 80 TABLET ORAL at 22:57

## 2024-03-18 RX ADMIN — ACETAMINOPHEN 650 MG: 650 SOLUTION ORAL at 16:57

## 2024-03-18 RX ADMIN — INSULIN HUMAN 2 UNITS: 100 INJECTION, SOLUTION PARENTERAL at 18:01

## 2024-03-18 NOTE — PROGRESS NOTES
Pharmacy Parenteral Nutrition Evaluation    Lea Rivers is a  78 y.o. male receiving TPN.     Indication:     Prolonged Paralytic Ileus     Consulting Physician: Eleanor Yost DO     Total Fluid Rate (if stated): n/a    Labs  Results from last 7 days   Lab Units 03/15/24  0405 03/14/24  0514 03/13/24  0511 03/11/24  1618 03/11/24  1011 03/11/24  0508   SODIUM mmol/L 141 140 139   < >  --  138  138   POTASSIUM mmol/L 3.8 3.9 4.0   < >  --  3.2*  3.2*   CHLORIDE mmol/L 105 106 106   < >  --  107  107   CO2 mmol/L 28.0 28.0 28.0   < >  --  25.0  25.0   BUN mg/dL 18 19 18   < >  --  15  15   CREATININE mg/dL 0.54* 0.46* 0.44*   < >  --  0.66*  0.66*   CALCIUM mg/dL 7.8* 7.9* 7.8*   < >  --  8.3*  8.3*   BILIRUBIN mg/dL  --   --   --   --  0.3 0.3   ALK PHOS U/L  --   --   --   --  60 63   ALT (SGPT) U/L  --   --   --   --  29 31   AST (SGOT) U/L  --   --   --   --  20 23   GLUCOSE mg/dL 147* 159* 163*   < >  --  145*  145*    < > = values in this interval not displayed.       Results from last 7 days   Lab Units 03/15/24  0405 03/14/24  0514 03/13/24  1711 03/13/24  0511 03/12/24  0316 03/11/24  1618   MAGNESIUM mg/dL 2.0 2.2  --  1.9   < >  --    PHOSPHORUS mg/dL 2.9 2.4* 2.7 2.2*   < > 2.1*   PREALBUMIN mg/dL  --   --   --   --   --  5.4*    < > = values in this interval not displayed.       Results from last 7 days   Lab Units 03/17/24  0513 03/16/24  1429 03/16/24  0630 03/16/24  0543 03/15/24  0405   WBC 10*3/mm3 17.90*  --   --  15.84* 15.09*   HEMOGLOBIN g/dL 8.6* 7.9* 6.4* 6.6* 7.2*   HEMATOCRIT % 27.1* 26.2* 21.8* 21.4* 23.1*   PLATELETS 10*3/mm3 332  --   --  277 245       Triglycerides   Date Value Ref Range Status   03/11/2024 131 0 - 150 mg/dL Final     Comment:     Falsely depressed results may occur on samples drawn from patients receiving N-Acetylcysteine (NAC) or Metamizole.       estimated creatinine clearance is 125.7 mL/min (A) (by C-G formula based on SCr of 0.54 mg/dL  (L)).    Intake & Output (last 3 days)         03/05 0701 03/06 0700 03/06 0701  03/07 0700 03/07 0701 03/08 0700 03/08 0701 03/09 0700    P.O. 1080 240      I.V. (mL/kg)  1026.3 (11.5)      IV Piggyback  400      Total Intake(mL/kg) 1080 (12.1) 1666.3 (18.7)      Urine (mL/kg/hr) 150 (0.1) 400 (0.2)      Stool 0 0      Total Output 150 400      Net +930 +1266.3              Urine Unmeasured Occurrence 4 x 2 x 1 x     Stool Unmeasured Occurrence 5 x 4 x 3 x 1 x            Dietitian Recommendations  Diet Orders (active) (From admission, onward)       Start     Ordered    03/08/24 1800  Adult Cyclic Central 2-in-1 TPN  Cyclic TPN - See Admin Instructions        Note to Pharmacy: Bellmaster Rivers  5H  [id number]  MRN: [id number]  CSN: [id number]    03/08/24 1422    03/08/24 1800  Fat Emul Fish Oil/Plant Based (SMOFLIPID) 20 % emulsion 250 mL  Every 24 Hours Scheduled (TPN)         03/08/24 1422    03/08/24 1355  NPO Diet NPO Type: Strict NPO  Diet Effective Now         03/08/24 1358    03/05/24 1656  DIET MESSAGE Pt would like a grilled cheese sandwich, please. Thanks!  Once        Comments: Pt would like a grilled cheese sandwich, please. Thanks!    03/05/24 1656                    Current TPN Regimen Recommendation:   Dextrose 15% / Amino Acid 7.7% at a goal rate of 65 ml/hr.      20% Lipid Emulsion 250mL every 24 hours.    Na 45 mEq/L  K 50 mEq/L  Ca 5 mEq/L  Mg 8 mEq/L  PO4 15 mmol/L  Cl:Ace - 1:1    Assessment/Plan:  TPN for prolonged paralytic ileus.   Macronutrients per dietary recommendation and electrolyte per pharmacy recommendation are listed above (same as yesterday).  Electrolyte replacement per protocol, will continue with standard electrolytes.   SSI q6h ordered  Pharmacy will continue to follow and adjust as indicated     Thanks  Yessy Espinoza, PharmD  3/18/2024  13:03 EDT

## 2024-03-18 NOTE — ANESTHESIA PROCEDURE NOTES
"Peripheral Block      Patient reassessed immediately prior to procedure    Patient location during procedure: OR  Reason for block: at surgeon's request and post-op pain management  Performed by  CRNA/CAA: Maxim Manrique, CRNA  Assisted by: Yessy Slater RN  Preanesthetic Checklist  Completed: patient identified, IV checked, site marked, risks and benefits discussed, surgical consent, monitors and equipment checked, pre-op evaluation and timeout performed  Prep:  Pt Position: supine  Sterile barriers:cap, gloves, mask and washed/disinfected hands  Prep: ChloraPrep  Patient monitoring: blood pressure monitoring, continuous pulse oximetry and EKG  Procedure    Sedation: yes  Performed under: general  Guidance:ultrasound guided  Images:still images obtained, printed/placed on chart    Laterality:Bilateral  Block Type:TAP  Injection Technique:single-shot  Needle Type:short-bevel and echogenic  Needle Gauge:20 G  Resistance on Injection: none    Medications Used: dexamethasone sodium phosphate injection - Injection   4 mg - 3/18/2024 1:19:00 PM  bupivacaine PF (MARCAINE) 0.25 % injection - Injection   60 mL - 3/18/2024 1:19:00 PM      Medications  Preservative Free Saline:10ml  Comment:Block Injection:  LA dose divided between Right and Left block        Post Assessment  Injection Assessment: negative aspiration for heme, incremental injection and no paresthesia on injection  Patient Tolerance:comfortable throughout block  Complications:no  Additional Notes    Subcostal TAPs    A high-frequency linear transducer, with sterile cover, was placed sub-xiphoid to identify [place], right and left Rectus Abdominus Muscles (ERICA). The transducer was moved either right or left subcostally to identify the ERICA and the Transverse Abdominus Muscle (RODRIGUEZ). The insertion site was prepped in sterile fashion and then localized with 2-5 ml of 1% Lidocaine. Using ultrasound-guidance, a 20-gauge B-Santos 4\" Ultraplex 360 non-stimulating " "echogenic needle was advanced in plane, from medial to lateral, until the tip of the needle was in the fascial plane between the ERICA and RODRIGUEZ. 1-3ml of preservative free normal saline was used to hydro-dissect the fascial planes. After the fascial plane was verified, the local anesthetic (LA) was injected. The procedure was repeated on the opposite side for bilateral coverage. Aspiration every 5 ml to prevent intravascular injection. Injection was completed with negative aspiration of blood and negative intravascular injection. Injection pressures were normal with minimal resistance. The subcostal approach to the TAP nerve block ideally anesthetizes the intercostal nerves T6-T9.     Mid-Axillary/Lateral TAPs    A high-frequency linear transducer, with sterile cover, was placed in the midaxillary line between the ASIS and costal margin. The External Oblique Muscle (EOM), Internal Oblique Muscle (IOM), Transverse Abdominus Muscle (RODRIGUEZ), and Peritoneum were identified. The insertion site was prepped in sterile fashion and then localized with 2-5 ml of 1% Lidocaine. Using ultrasound-guidance, a 20-gauge B-Santos 4\" Ultraplex 360 non-stimulating echogenic needle was advanced in plane, from medial to lateral, until the tip of the needle was in the fascial plane between the IOM and RODRIGUEZ. 1-3ml of preservative free normal saline was used to hydro-dissect the fascial planes. After the fascial plane was verified, the local anesthetic (LA) was injected. The procedure was repeated on the opposite side for bilateral coverage. Aspiration every 5 ml to prevent intravascular injection. Injection was completed with negative aspiration of blood and negative intravascular injection. Injection pressures were normal with minimal resistance. Midaxillary TAPs should reach intercostal nerves T10- T11 and the subcostal nerve T12.              "

## 2024-03-18 NOTE — BRIEF OP NOTE
LAPAROTOMY EXPLORATORY  Progress Note    Lea Arnold Hughson  3/18/2024    Pre-op Diagnosis:   Abdominal free fluid       Post-Op Diagnosis Codes:     * Abdominal ascites [R18.8]    Procedure/CPT® Codes:        Procedure(s):  LAPAROTOMY EXPLORATORY, Abdominal washout              Surgeon(s):  Harley Godfrey MD    Anesthesia: General with Block    Staff:   Circulator: Velvet Palmer RN  Scrub Person: Kezia Jorge  Assistant: Adelita Lombardo PA-C  Assistant: Adelita Lombardo PA-C      Estimated Blood Loss: minimal    Urine Voided: 350 mL    Specimens:                Specimens       ID Source Type Tests Collected By Collected At Frozen?    1 Perineum Peritoneal Fluid ANAEROBIC CULTURE  FUNGAL CULTURE  BODY FLUID CULTURE  AFB CULTURE   Harley Godfrey MD 3/18/24 1345     Description: Peritoneum cavity                  Drains:   Closed/Suction Drain Left;Anterior LLQ Bulb 15 Fr. (Active)   Site Description Edema/swelling;Reddened 03/18/24 1200   Dressing Status Clean;Dry;Intact 03/18/24 1200   Drainage Appearance Tan;Thin 03/18/24 1200   Status To bulb suction 03/18/24 1200   Output (mL) 0 mL 03/18/24 0626       Closed/Suction Drain 1 LUQ Bulb 10 Fr. (Active)       Closed/Suction Drain 2 Lateral LLQ Bulb 10 Fr. (Active)       Colostomy RLQ (Active)   Stomal Appliance 2 piece;Clean;Dry;Intact 03/18/24 1200   Stoma Appearance rosebud appearance;flush with skin 03/18/24 0600   Peristomal Assessment BISHOP 03/18/24 1200   Stoma Function flatus;stool 03/18/24 1200   Stool Color brown 03/18/24 1200   Stool Consistency loose;liquid 03/17/24 2000   Treatment Placement checked 03/17/24 0738   Output (mL) 400 mL 03/18/24 0835       Urethral Catheter Straight-tip 16 Fr. (Active)       External Urinary Catheter (Active)   Daily Indications Daily output 03/18/24 1200   Site Assessment Clean;Skin intact 03/18/24 1015   Application/Removal skin care provided 03/17/24 2000   Collection Container Wall suction  03/18/24 1200   Wall suction (mmHG) 100 mmHG 03/18/24 1200   Securement Method Securing device 03/18/24 1200   Catheter care complete Yes 03/17/24 0738   Output (mL) 250 mL 03/18/24 1106       [REMOVED] NG/OG Tube Nasogastric Left nostril (Removed)   Site Assessment Clean;Dry;Intact 03/11/24 0745   Securement taped to nostril center 03/11/24 0745   Secured at (cm) 65 03/10/24 2002   NG/OG Site Interventions Site assessed;Pressure offloaded;Nasal/Oral care performed 03/10/24 2002   Status Suction-low intermittent 03/11/24 0745   Output (mL) 0 mL 03/11/24 0745       [REMOVED] Rectal Tube (Removed)       [REMOVED] Urethral Catheter Silicone;Non-latex 16 Fr. (Removed)   Daily Indications Selected surgeries ( tract, abdomen) 03/13/24 0820   Site Assessment Clean;Skin intact 03/13/24 1250   Collection Container Standard drainage bag 03/13/24 1438   Securement Method Securing device 03/13/24 1438   Catheter care complete Yes 03/12/24 2000   Output (mL) 250 mL 03/13/24 1443       [REMOVED] External Urinary Catheter (Removed)   Daily Indications Daily output 03/08/24 2000   Site Assessment Clean;Skin intact 03/08/24 2000   Application/Removal no longer indicated 03/08/24 1300   Collection Container Wall suction 03/08/24 2000   Wall suction (mmHG) 80 mmHG 03/08/24 2000   Securement Method Securing device 03/08/24 2000   Catheter care complete Yes 03/06/24 1917   Output (mL) 250 mL 03/07/24 0637       Findings: 2L of serous ascites without evidence of purulence or bile; stomach, duodenum and small bowel all viable without evidence of injury        Complications: None    Assistant: Adelita Lombardo PA-C  was responsible for performing the following activities: Retraction, Suction, Irrigation, Suturing, Closing, and Placing Dressing and their skilled assistance was necessary for the success of this case.    Harley Godfrey MD     Date: 3/18/2024  Time: 15:19 EDT

## 2024-03-18 NOTE — PLAN OF CARE
Goal Outcome Evaluation: Pt complied well overnight. No significant issues overnight. VS stable at this time. POC ongoing. Giulia Soto RN

## 2024-03-18 NOTE — PROGRESS NOTES
"Patient Name:  Lea Rivers  YOB: 1945  [id number]    Surgery Progress Note    Date of visit: 3/18/2024    Subjective     Unable to obtain full history due to patient's dementia.  Continues with ostomy function.  No nausea or vomiting.  Recurrent fever overnight, currently afebrile.  Poor p.o. intake.           Objective       /86 (BP Location: Right arm, Patient Position: Lying)   Pulse 99   Temp 98.2 °F (36.8 °C) (Oral)   Resp 20   Ht 170.2 cm (67.01\")   Wt 99.6 kg (219 lb 9.6 oz)   SpO2 93%   BMI 34.39 kg/m²     Intake/Output Summary (Last 24 hours) at 3/18/2024 0711  Last data filed at 3/18/2024 0626  Gross per 24 hour   Intake 180 ml   Output 1450 ml   Net -1270 ml       CV:  Rhythm regular and rate regular  L:  Clear to auscultation bilaterally  Abd:  Bowel sounds positive, soft, nontender, nondistended, ostomy viable and functioning, OLIVER minimal, wound with stable mild erythema  Ext:  No cyanosis, clubbing, edema    Recent labs and imaging that are back at this time have been reviewed.   A.m. labs pending       Assessment & Plan     Patient postoperative day 9 from total abdominal colectomy with end ileostomy.  Due to recurrent fever, will order stat CT abdomen pelvis to evaluate for fever source.  Will make n.p.o. for now.  Continue to hold therapeutic Lovenox.  Pain control. Continue TPN.          Harley Godfrey MD  3/18/2024  07:11 EDT      "

## 2024-03-18 NOTE — PROGRESS NOTES
"                  Clinical Nutrition   Nutrition Support Assessment  Reason for Visit: Follow-up protocol, PN    Patient Name: Lea Rivers  YOB: 1945  MRN: [id number]  Date of Encounter: 03/18/24 09:02 EDT  Admission date: 3/2/2024    Comments:    Pt was tolerating PO intake, with ostomy output however underwent stat CT abd/pelvis to evaluate fever source - per Surgery, \"plan for emergent re-exploration to assess for bowel leak or infected fluid collections\".    Will continue to monitor for re-advancement of diet as clinically indicated  Continue current PN regimen as appropriate  15% dextrose, 7.7% AA @ goal rate of 65mL/hr.   Provide 250mL 20% SMOF daily        Nutrition Assessment   Admission Diagnosis:  Falls [W19.XXXA]  Pneumatosis intestinalis [K63.89]      Problem List:    Falls    Pneumatosis intestinalis        PMH:   He  has a past medical history of Anemia, Colon cancer (1990), Coronary artery disease, Diabetes, Dyslipidemia, GERD (gastroesophageal reflux disease), Hyperlipidemia, Hypertension, Hyponatremia, Hypothyroidism, Left shoulder pain, Low sodium levels (2022), Memory deficit, Osteoarthritis, Prostate cancer (07/23/2022), Seizure disorder, and Skin cancer.    PSH:  He  has a past surgical history that includes Colectomy partial / total (1990); Cholecystectomy; Appendectomy; Other surgical history; Sinus surgery; Tonsillectomy; Vasectomy; Carpal tunnel release (Bilateral); TURP / transurethral incision / drainage prostate; Colonoscopy; Transurethral resection of prostate (N/A, 09/21/2018); Skin biopsy; Teeth Extraction; Total knee arthroplasty (Right, 04/07/2022); Cardiac catheterization (2012); Prostate biopsy (N/A, 11/11/2022); Transrectal Incision Prostate (Bilateral, 01/06/2023); Cyberknife (02/09/2023); and Colectomy (N/A, 3/9/2024).      Applicable Nutrition Concerns:   Skin:  Oral:  GI:      Applicable Interval History:    3/9-total colectomy w/end ileostomy  3/10) " "PICC placed, PN started  3/14) FEES: SLP Diet Recommendation: puree, no mixed consistencies, honey thick liquids   3/18) CT abd/pelvis  Return to OR       Reported/Observed/Food/Nutrition Related History:     3/18  Pt was tolerating PO intake, with ostomy output however underwent stat CT abd/pelvis to evaluate fever source - per Surgery, \"plan for emergent re-exploration to assess for bowel leak or infected fluid collections\". Ostomy output - 700mL in past 24hrs     3/14  Spoke w/sx-okay to place keofeed it pt too lethargic for FEES today/unable to advance po diet. Will continue current TPN for today.     3/12  Pt out of room for scan per pt's son at bedside. Spoke w/nsg-TPN at 65ml/hr. Discussed phos replacement IV.     3/9  Per RN, pt pulled PICC last night-pt OOR during visit but pt's son at bedside. Able to obtain nutrition hx. Pt eating well PTA, usually a picky eater and eats the same things all the time-preferences noted below. Pt usually eats at [employer] for breakfast, makes something quick at home for lunch and has dinner provided by dtr. Son states pt's wt has been stable, ~196-200lbs. NKFA. Plan is for total colectomy w/end ileostomy today.     3/8  Pt unavailable during RD visit, having PICC placed. Also noted w/dementia-attempted to call family w/o success. Discussed w/nsg, pt to likely have bowel sx tomorrow w/colostomy creation. Rectal tube placed today 2/2 colonic decompression. Pt appears to have had 7lb/3.6% non significant wt loss in the past month. NKFA.     Labs    Labs Reviewed: Yes     Medications    Medications Reviewed: Yes  Reglan, abx  TPN @ 65mL/hr    Intake/Ouptut 24 hrs (0701 - 0700)   I&O's Reviewed: Yes       Anthropometrics     Flowsheet Rows      Flowsheet Row First Filed Value   Admission Height 170.2 cm (67\") Documented at 03/02/2024 0947   Admission Weight 88.9 kg (196 lb) Documented at 03/02/2024 0947          Height: Height: 170.2 cm (67.01\")  Last Filed Weight: Weight: 99.6 kg " "(219 lb 9.6 oz) (03/18/24 0418)  Method: Weight Method: Bed scale  BMI: BMI (Calculated): 34.4  BMI classification: Obese Class I: 30-34.9kg/m2  IBW:      UBW: 203lbs per EMR  Weight change:    Weight       Weight (kg) Weight (lbs) Weight Method Visit Report   3/23/2023 87.091 kg  192 lb   --    4/10/2023 87.091 kg  192 lb   --    4/28/2023 94.62 kg  208 lb 9.6 oz   --    5/6/2023 91.627 kg  202 lb      5/16/2023 90.719 kg  200 lb   --    6/20/2023 90.719 kg  200 lb   --    8/14/2023 93.441 kg  206 lb   --    9/14/2023 92.398 kg  203 lb 11.2 oz   --    12/4/2023 92.08 kg  203 lb   --    1/5/2024 78.926 kg  174 lb  Stated     2/15/2024 92.08 kg  203 lb   --    2/27/2024 89.086 kg  196 lb 6.4 oz      3/2/2024 88.905 kg  196 lb  Stated        Stated         Nutrition Focused Physical Exam     Date: 3/8    Unable to perform exam due to: Pt unable to participate at time of visit    Needs Assessment   Date: 3/8    Height used:Height: 170.2 cm (67.01\")  Weights used: 196lb/89kg    Estimated Calorie needs: ~ 1800 Kcal/day  Method:  MSJ (2443) x 1.2 =1876  Method:  17-20 Kcals/KG 1513-1780kcals    Estimated Protein needs: ~ 120 g PRO/day  Method: 1.2 g/Kg ABW =107g  Method: 2g/kg IBW = 132g    Estimated Fluid needs: ~ ml/day   Per clinical status    Current Nutrition Prescription     PN Route: PICC  PN:   15% dextrose, 7.7% AA @ goal rate of 65mL/hr.         GIR:  1.82mg/kg/min  Lipid: Provide 250mL 20% SMOF daily                   PN@ Goal over 24hr to Supply:  Volume 1560 mL/day       Energy 1776 Kcal/day 99 % Est Need   Protein 120 g/day 100 % Est Need      ---------------------------------------------------------------------------  Formula/Rate verified at bedside: No - per Mar  Infusing Rate at time of visit: BUZZ     Average Delivery from Charting: Insufficient data  PN Volume  mL/day       Lipid delivered?   Y         Energy   Kcal/day   % Est Need   Protein   g/day   % Est Need     PO: Adult Central 2-in-1 TPN  NPO " Diet NPO Type: Strict NPO  Oral Nutrition Supplement: N/A  Intake: N/A      Nutrition Diagnosis   Date: 3/8 Updated: 3/18  Problem Altered GI function   Etiology Colonic distention, ileus, pending bowel sx   Signs/Symptoms Need for TPN   Status: ongoing    Goal:   General: Nutrition to support treatment  PO: N/A  EN/PN: Maintain PN    Nutrition Intervention      Follow treatment progress, Care plan reviewed, Nutrition support order placed to pharmacy      Monitoring/Evaluation:   Per protocol, I&O, Pertinent labs, PN delivery/tolerance, Weight, GI status      Adelita Loza MS,RD,LD  Time Spent: 35min

## 2024-03-18 NOTE — ANESTHESIA PREPROCEDURE EVALUATION
Anesthesia Evaluation     Patient summary reviewed and Nursing notes reviewed   no history of anesthetic complications:   NPO Solid Status: > 8 hours  NPO Liquid Status: > 2 hours           Airway   Mallampati: II  TM distance: >3 FB  Neck ROM: full  No difficulty expected  Dental      Pulmonary - negative pulmonary ROS   (+) ,decreased breath sounds  Cardiovascular   Exercise tolerance: poor (<4 METS)    ECG reviewed  Rhythm: regular  Rate: normal    (+) hypertension well controlled 2 medications or greater, CAD, hyperlipidemia    ROS comment: 7/21: Interpretation Summary    · Left ventricular wall thickness is consistent with mild concentric hypertrophy.  · Normal left-ventricular systolic function, estimated EF 65%.  · Left ventricular diastolic function is consistent with (grade I) impaired relaxation.  · Aortic sclerosis without aortic stenosis. Trace aortic insufficiency.  · Trace mitral regurgitation, trace tricuspid regurgitation.         Neuro/Psych  (+) seizures, syncope, psychiatric history  GI/Hepatic/Renal/Endo    (+) obesity, GERD well controlled, diabetes mellitus type 2 well controlled, thyroid problem hypothyroidism    Musculoskeletal     Abdominal   (+) obese    Abdomen: soft.   Substance History      OB/GYN          Other   arthritis, blood dyscrasia anemia,   history of cancer remission                Anesthesia Plan    ASA 3 - emergent     general with block     intravenous induction     Anesthetic plan, risks, benefits, and alternatives have been provided, discussed and informed consent has been obtained with: patient.    Plan discussed with CRNA.    CODE STATUS:    Medical Intervention Limits: NO intubation (DNI)  Code Status (Patient has no pulse and is not breathing): No CPR (Do Not Attempt to Resuscitate)  Medical Interventions (Patient has pulse or is breathing): Limited Support

## 2024-03-18 NOTE — ANESTHESIA PROCEDURE NOTES
Airway  Urgency: elective    Date/Time: 3/18/2024 1:24 PM  Airway not difficult    General Information and Staff    Patient location during procedure: OR  CRNA/CAA: Phoenix Barron CRNA    Indications and Patient Condition  Indications for airway management: airway protection    Preoxygenated: yes  MILS not maintained throughout  Mask difficulty assessment: 1 - vent by mask    Final Airway Details  Final airway type: endotracheal airway      Successful airway: ETT  Cuffed: yes   Successful intubation technique: direct laryngoscopy  Facilitating devices/methods: anterior pressure/BURP and Bougie  Endotracheal tube insertion site: oral  Blade: Jocelin  Blade size: 3  ETT size (mm): 7.5  Cormack-Lehane Classification: grade III - view of epiglottis only  Placement verified by: chest auscultation and capnometry   Measured from: lips  ETT/EBT  to lips (cm): 21  Number of attempts at approach: 1  Assessment: lips, teeth, and gum same as pre-op and atraumatic intubation    Additional Comments  Negative epigastric sounds, Breath sound equal bilaterally with symmetric chest rise and fall

## 2024-03-18 NOTE — PROGRESS NOTES
[facility] Medicine Services  PROGRESS NOTE    Patient Name: Lea Rivers  : 1945  MRN: [id number]    Date of Admission: 3/2/2024  Primary Care Physician: Mannie Melvin MD    Subjective   Subjective     CC:  F/U total abd colectomy with end ileostomy     HPI:  Patient awake and alert this morning but remains very pleasantly confused, he was able to remember that I was the doctor this morning.    Objective   Objective     Vital Signs:   Temp:  [97.7 °F (36.5 °C)-100.2 °F (37.9 °C)] 97.7 °F (36.5 °C)  Heart Rate:  [92-99] 92  Resp:  [18-20] 20  BP: (137-179)/() 149/81  Flow (L/min):  [1] 1     Physical Exam:  Constitutional: No acute distress, appears chronically ill, pale appearing   HENT: NCAT, mucous membranes dry  Respiratory: Clear to auscultation bilaterally, respiratory effort normal   Cardiovascular: RRR, no murmurs, rubs, or gallops  Gastrointestinal: abd binder in place, some redness noted around lower abdominal incision and RLQ drain  Musculoskeletal: edema improved but still with 1+ bilateral LE and UE  Neurologic: Oriented to person, lethargic but alertness improving   Skin: No rashes, bruises noted on upper ext    Results Reviewed:  LAB RESULTS:      Lab 24  0715 24  0513 24  1429 24  0630 24  0543 03/15/24  0405 24  1125 24  0934 24  0511 24  0316 24  0316 24  1618 24  1618   WBC 16.67* 17.90*  --   --  15.84* 15.09* 17.06*  --  23.21*  --  22.94*   < >  --    HEMOGLOBIN 8.0* 8.6* 7.9* 6.4* 6.6* 7.2* 7.5*  --  8.6*  --  8.3*  --  8.4*   HEMATOCRIT 24.6* 27.1* 26.2* 21.8* 21.4* 23.1* 25.4*  --  26.4*  --  26.2*  --  26.3*   PLATELETS 379 332  --   --  277 245 242  --  223  --  195   < >  --    NEUTROS ABS 13.50* 13.78*  --   --  11.72* 10.75* 13.48*  --  18.10*   < > 18.18*  --   --    IMMATURE GRANS (ABS)  --   --   --   --   --  1.30*  --   --   --   --  1.11*  --   --     LYMPHS ABS  --   --   --   --   --  1.04  --   --   --   --  1.17  --   --    MONOS ABS  --   --   --   --   --  1.31*  --   --   --   --  1.77*  --   --    EOS ABS 0.00 0.18  --   --  1.11* 0.61* 0.51*  --  0.23   < > 0.61*  --   --    .2* 102.7*  --   --  95.1 93.5 100.0*  --  92.3  --  92.6   < >  --    PROCALCITONIN  --   --   --   --   --   --   --   --  0.20  --  0.27*  --   --    LACTATE  --   --   --   --   --   --   --  1.5  --   --   --   --   --    PROTIME  --   --   --   --   --   --   --   --   --   --   --   --  15.1*    < > = values in this interval not displayed.         Lab 03/15/24  0405 03/14/24  0514 03/13/24  1711 03/13/24  0511 03/12/24  1204 03/12/24  0316 03/11/24  1618   SODIUM 141 140  --  139  --  138  --    POTASSIUM 3.8 3.9  --  4.0  --  4.0 3.4*   CHLORIDE 105 106  --  106  --  106  --    CO2 28.0 28.0  --  28.0  --  25.0  --    ANION GAP 8.0 6.0  --  5.0  --  7.0  --    BUN 18 19  --  18  --  18  --    CREATININE 0.54* 0.46*  --  0.44*  --  0.61*  --    EGFR 101.4 106.4  --  107.8  --  97.7  --    GLUCOSE 147* 159*  --  163*  --  148*  --    CALCIUM 7.8* 7.9*  --  7.8*  --  8.0*  --    IONIZED CALCIUM  --   --   --   --   --   --  1.26   MAGNESIUM 2.0 2.2  --  1.9  --  1.8  --    PHOSPHORUS 2.9 2.4* 2.7 2.2* 2.3* 1.9* 2.1*               Lab 03/11/24  1618   PROTIME 15.1*   INR 1.18*                 Lab 03/16/24  0630   ABO TYPING O   RH TYPING Negative   ANTIBODY SCREEN Negative           Brief Urine Lab Results  (Last result in the past 365 days)        Color   Clarity   Blood   Leuk Est   Nitrite   Protein   CREAT   Urine HCG        03/11/24 1337 Yellow   Cloudy   Moderate (2+)   Trace   Negative   30 mg/dL (1+)                   Microbiology Results Abnormal       Procedure Component Value - Date/Time    Urine Culture - Urine, Indwelling Urethral Catheter [[id number]]  (Normal) Collected: 03/11/24 1337    Lab Status: Final result Specimen: Urine from Indwelling Urethral  Catheter Updated: 03/12/24 2024     Urine Culture No growth    Blood Culture - Blood, Arm, Right [[id number]]  (Normal) Collected: 03/06/24 1857    Lab Status: Final result Specimen: Blood from Arm, Right Updated: 03/11/24 2045     Blood Culture No growth at 5 days    Blood Culture - Blood, Arm, Right [[id number]]  (Normal) Collected: 03/06/24 1751    Lab Status: Final result Specimen: Blood from Arm, Right Updated: 03/11/24 2000     Blood Culture No growth at 5 days    Blood Culture - Blood, Arm, Right [[id number]]  (Normal) Collected: 03/02/24 1132    Lab Status: Final result Specimen: Blood from Arm, Right Updated: 03/07/24 1201     Blood Culture No growth at 5 days    Narrative:      Less than seven (7) mL's of blood was collected.  Insufficient quantity may yield false negative results.    Blood Culture - Blood, Arm, Right [[id number]]  (Normal) Collected: 03/02/24 1132    Lab Status: Final result Specimen: Blood from Arm, Right Updated: 03/07/24 1201     Blood Culture No growth at 5 days    Narrative:      Less than seven (7) mL's of blood was collected.  Insufficient quantity may yield false negative results.    Urine Culture - Urine, Straight Cath [[id number]]  (Normal) Collected: 03/02/24 0923    Lab Status: Final result Specimen: Urine from Straight Cath Updated: 03/03/24 1601     Urine Culture No growth    COVID PRE-OP / PRE-PROCEDURE SCREENING ORDER (NO ISOLATION) - Swab, Nasopharynx [[id number]]  (Normal) Collected: 03/02/24 1133    Lab Status: Final result Specimen: Swab from Nasopharynx Updated: 03/02/24 1227    Narrative:      The following orders were created for panel order COVID PRE-OP / PRE-PROCEDURE SCREENING ORDER (NO ISOLATION) - Swab, Nasopharynx.  Procedure                               Abnormality         Status                     ---------                               -----------         ------                     COVID-19, FLU A/B, RSV P...[[id number]]  Normal              Final result                  Please view results for these tests on the individual orders.    COVID-19, FLU A/B, RSV PCR 1 HR TAT - Swab, Nasopharynx [[id number]]  (Normal) Collected: 03/02/24 1133    Lab Status: Final result Specimen: Swab from Nasopharynx Updated: 03/02/24 1227     COVID19 Not Detected     Influenza A PCR Not Detected     Influenza B PCR Not Detected     RSV, PCR Not Detected    Narrative:      Fact sheet for providers: https://www.fda.gov/media/207956/download    Fact sheet for patients: https://www.fda.gov/media/643245/download    Test performed by PCR.            No radiology results from the last 24 hrs    Results for orders placed during the hospital encounter of 07/20/21    Adult Transthoracic Echo Complete W/ Cont if Necessary Per Protocol    Interpretation Summary  · Left ventricular wall thickness is consistent with mild concentric hypertrophy.  · Normal left-ventricular systolic function, estimated EF 65%.  · Left ventricular diastolic function is consistent with (grade I) impaired relaxation.  · Aortic sclerosis without aortic stenosis. Trace aortic insufficiency.  · Trace mitral regurgitation, trace tricuspid regurgitation.      Current medications:  Scheduled Meds:[Held by provider] enoxaparin, 1 mg/kg, Subcutaneous, Q12H  Fat Emul Fish Oil/Plant Based, 250 mL, Intravenous, Q24H (TPN)  insulin regular, 2-7 Units, Subcutaneous, Q6H  isosorbide mononitrate, 30 mg, Oral, Q24H  levETIRAcetam, 500 mg, Oral, Q12H  levothyroxine, 88 mcg, Oral, Q AM  Lidocaine, 2 patch, Transdermal, Q24H  lisinopril, 20 mg, Oral, Q24H  metoclopramide, 10 mg, Intravenous, Q6H  pantoprazole, 40 mg, Intravenous, Q AM  sodium chloride, 10 mL, Intravenous, Q12H  Valproic Acid, 375 mg, Oral, Q6H  verapamil SR, 240 mg, Oral, Q24H      Continuous Infusions:Adult Central 2-in-1 TPN, , Last Rate: 65 mL/hr at 03/17/24 5661  Pharmacy to Dose TPN,           PRN Meds:.  acetaminophen **OR** acetaminophen **OR** acetaminophen    calcium  carbonate    Calcium Replacement - Follow Nurse / BPA Driven Protocol    dextrose    dextrose    docusate sodium    fluticasone    glucagon (human recombinant)    hydrALAZINE    HYDROmorphone    ipratropium-albuterol    labetalol    Magnesium Standard Dose Replacement - Follow Nurse / BPA Driven Protocol    [DISCONTINUED] HYDROmorphone **AND** naloxone    ondansetron ODT **OR** ondansetron    ondansetron ODT **OR** ondansetron    Pharmacy to Dose TPN    Phosphorus Replacement - Follow Nurse / BPA Driven Protocol    Potassium Replacement - Follow Nurse / BPA Driven Protocol    sodium chloride    sodium chloride    sodium chloride    sodium chloride    sodium chloride    sodium chloride    Assessment & Plan   Assessment & Plan     Active Hospital Problems    Diagnosis  POA    **Falls [W19.XXXA]  Yes    Pneumatosis intestinalis [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        Brief Hospital Course to date:  Lea Rivers is a 78 y.o. male with past medical history of hypertension, hyperlipidemia, hypothyroidism, seizure disorder.  Patient is being admitted for a fall and nondisplaced first through fourth left rib fractures.  Patient also has left distal clavicle fracture.    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 on 3/9 w/  Persistent  Leukocytosis  LLL PNA  -Continue TPN via PICC, SLP following, passed FEES 3/14, now cleared for diet. Continue TPN until oral intake improves, currently remains poor  -Replace electrolytes per protocol   -completed course of Fluconazole, CTX/Flagyl 3/17, still with low grade temp and leukocytosis, Dr. Godfrey planning repeat CT Abd/pelvis this morning, NPO for now  - cultures all remain negative  -CXR 3/12 with possible LLL pneumonia, completed course of CTX    Encephalopathy/lethargy - TME  - per daughter significant change in mental status and speech since arrival, we had a  long discussion and I feel this is probably TME in setting of infection, recent large operation, underlying dementia  - initial CT head 3/2 negative, repeated CT head 3/13 without any acute findings  - EEG negative for seizures, findings consistent with TME  -minimize sedating meds   -slowly improving    Multiple falls with increasing frequency   -CT of head shows age-related changes which are chronic but no acute process  -neuro consulted. likely d/t neuropathy (confirmed with EMG) and progression of dementia     Multiple rib fractures and also left clavicle fracture  -With no displacement or mildly displaced.  Orthopedic surgery evaluated. No surgical intervention  planned.  Recs nonweightbearing LUE, may come out of sling in 5-7 days to initiate gentle ROM exercises per ortho.  F/u with ortho/Dr. Maldonado in 2 weeks  -Pain control, lidocaine patch  -bruise noted to L shoulder/neck region however X-ray negative    Acute on Chronic Anemia  -S/P 1 unit PRBCs 3/11 and 3/16, hemoglobin now improved, monitoring  -no clear source of active bleeding, hemoccult negative  - transfuse PRN hgb <7, hold therapeutic lovenox for now    Acute RUE DVT (brachial)  -Provoked, 2/2 PICC   -therapeutic lovenox started 3/13, on hold  per surgery due to worsening anemia     Prostate cancer  Hx BPH s/p greenlight photo vaporization of prostate 2018  -Follows with Dr. Noland  -S/P cyberknife radiation 2/9/23  -Has intermittent hematuria, monitor while on AC     Dementia and increasing memory problem  -Patient was previously living alone and driving, given significant decline will need placement     Hypertension  -cleared by speech, resumed home oral regimen  - PRN IV dosing for SBP >180    Seizure disorder  - continue vimpat and keppra    Hyperlipidemia  Hypothyroidism  -resume home regimen    Expected Discharge Location and Transportation: SNF  Expected Discharge   Expected Discharge Date: 3/18/2024; Expected Discharge Time:      DVT  prophylaxis:  Medical and mechanical DVT prophylaxis orders are present.         AM-PAC 6 Clicks Score (PT): 10 (03/17/24 3833)    CODE STATUS:   Code Status and Medical Interventions:   Ordered at: 03/02/24 1456     Medical Intervention Limits:    NO intubation (DNI)     Code Status (Patient has no pulse and is not breathing):    No CPR (Do Not Attempt to Resuscitate)     Medical Interventions (Patient has pulse or is breathing):    Limited Support       Sienna Saleh, DO  03/18/24

## 2024-03-18 NOTE — ANESTHESIA POSTPROCEDURE EVALUATION
Patient: Lea Rivers    Procedure Summary       Date: 03/18/24 Room / Location:  [facility] / [facility]    Anesthesia Start: 1315 Anesthesia Stop: 1523    Procedure: LAPAROTOMY EXPLORATORY, Abdominal washout (Abdomen) Diagnosis: Abdominal ascites    Surgeons: Harley Godfrey MD Provider: Konstantin Coronado MD    Anesthesia Type: general with block ASA Status: 3 - Emergent            Anesthesia Type: general with block    Vitals  Vitals Value Taken Time   /82 03/18/24 1520   Temp     Pulse 67 03/18/24 1522   Resp     SpO2 97 % 03/18/24 1522   Vitals shown include unfiled device data.        Post Anesthesia Care and Evaluation    Patient location during evaluation: PACU  Patient participation: complete - patient participated  Level of consciousness: awake and alert  Pain management: adequate    Airway patency: patent  Anesthetic complications: No anesthetic complications  PONV Status: none  Cardiovascular status: hemodynamically stable and acceptable  Respiratory status: nonlabored ventilation, acceptable and nasal cannula  Hydration status: acceptable

## 2024-03-18 NOTE — PROGRESS NOTES
Reviewed CT scan images, showing more free fluid and continued small amount of free air. Given these findings, I will plan for emergent reexploration to assess for bowel leak or infected fluid collections. I have discussed this plan with the patient's daughter who is agreeable to this plan.

## 2024-03-18 NOTE — CASE MANAGEMENT/SOCIAL WORK
Continued Stay Note  [facility]     Patient Name: Lea Rivers  MRN: [id number]  Today's Date: 3/18/2024    Admit Date: 3/2/2024    Plan: SNF   Discharge Plan       Row Name 03/18/24 1442       Plan    Plan SNF    Patient/Family in Agreement with Plan unable to assess    Plan Comments Attempted bedside visit with Mr. Rivers, but he was gone to the OR.  received call from [facility] Rosario lazo, and she would like to meet with patient when he is closer to medically ready.  will continue to follow plan of care and assist with discharge planning needs as indicated.    Final Discharge Disposition Code 03 - skilled nursing facility (SNF)                   Discharge Codes    No documentation.                 Expected Discharge Date and Time       Expected Discharge Date Expected Discharge Time    Mar 18, 2024               Valerie Keith RN

## 2024-03-18 NOTE — OP NOTE
General Surgery Operative Note    LAPAROTOMY EXPLORATORY  Procedure Report    Patient Name:  Lea Rivers  YOB: 1945  [id number]     Date of Surgery:  3/18/2024       Pre-op Diagnosis: Intraabdominal fluid collection    Post-op Diagnosis: Peritoneal ascites      Procedure(s):  LAPAROTOMY EXPLORATORY, Abdominal washout    Surgeon: Harley Godfrey MD    Assistant: Adelita Lombardo PA-C     Anesthesia: General with Block         Estimated Blood Loss: minimal    Complications: None     Implants:    Nothing was implanted during the procedure    Specimen:          Specimens       ID Source Type Tests Collected By Collected At Frozen?    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  The patient had persistent elevation of white blood cell count with low-grade fevers.  A CT scan was performed to evaluate and he was noted to have a large amount of intra-abdominal ascites with flecks of free air.  Discussed the risk, benefits, and alternatives to exploratory laparotomy with possible bowel resection with the patient's children due to the patient's dementia and the patient's children were agreeable to proceed.  Informed consent was obtained.    Description of Procedure:     After obtaining informed consent, the patient was taken to the operating room and placed in supine position. After appropriate DVT and antibiotic prophylaxis, general anesthesia was induced.  Tap blocks were performed by anesthesia.  A Odell catheter was placed under sterile conditions.  The ostomy was closed at the  skin level using a running #1 chromic.  The abdomen was prepped and draped in standard sterile fashion.    The previously placed skin staples were removed.  There was no evidence of superficial wound infection.  The previously placed fascial sutures were removed with heavy scissors.  The peritoneal cavity was entered with immediate return of serous ascites.  This was sent for culture.  Early adhesions were noted between the abdominal wall and the small bowel which were taken down bluntly.  Additional loculations of serous ascites were noted in the left and right paracolic gutters which were suctioned out.  Adhesions between loops of small bowel were then taken down bluntly allowing for inspection of the entire small bowel from the ligament of Treitz to the exit site of the end ileostomy.  The small bowel was noted to be without evidence of injury and all small bowel was viable by inspection.  The rectal stump was found to be intact without evidence of leakage.  The stomach and duodenum were inspected and were without evidence of injury and both were viable.  The peritoneal cavity was then copiously irrigated with 2 L of warm normal saline.  2 additional OLIVER drains were placed into the peritoneal cavity through separate stab incisions in the left abdominal wall.  The superiormost drain was placed in the right paracolic gutter and the middle drain was placed in the left paracolic gutter while the inferior drain remained in the pelvis.  The midline abdominal fascia was then reapproximated using running #1 looped PDS.  The wound bed was rod irrigated using saline.  Skin was reapproximated using staples.  A dry dressing was placed over each site.  The previously placed #1 chromic was then removed from the ostomy at the skin level and an ostomy appliance was applied.    All sponge and needle counts were correct times two at the completion of the procedure. The patient recovered from anesthesia, was extubated in the  operating room and was transported to the PACU in stable condition.        Assistant: Adelita Lombardo PA-C  was responsible for performing the following activities: Retraction, Suction, Irrigation, Suturing, Closing, and Placing Dressing and their skilled assistance was necessary for the success of this case.    Harley Godfrey MD     Date: 3/18/2024  Time: 15:23 EDT

## 2024-03-19 LAB
BASOPHILS # BLD AUTO: 0.16 10*3/MM3 (ref 0–0.2)
BASOPHILS NFR BLD AUTO: 0.7 % (ref 0–1.5)
BH BB BLOOD EXPIRATION DATE: NORMAL
BH BB BLOOD EXPIRATION DATE: NORMAL
BH BB BLOOD TYPE BARCODE: 9500
BH BB BLOOD TYPE BARCODE: 9500
BH BB DISPENSE STATUS: NORMAL
BH BB DISPENSE STATUS: NORMAL
BH BB PRODUCT CODE: NORMAL
BH BB PRODUCT CODE: NORMAL
BH BB UNIT NUMBER: NORMAL
BH BB UNIT NUMBER: NORMAL
CROSSMATCH INTERPRETATION: NORMAL
CROSSMATCH INTERPRETATION: NORMAL
CRP SERPL-MCNC: 13.84 MG/DL (ref 0–0.5)
DEPRECATED RDW RBC AUTO: 72.3 FL (ref 37–54)
EOSINOPHIL # BLD AUTO: 0.01 10*3/MM3 (ref 0–0.4)
EOSINOPHIL NFR BLD AUTO: 0 % (ref 0.3–6.2)
ERYTHROCYTE [DISTWIDTH] IN BLOOD BY AUTOMATED COUNT: 20.1 % (ref 12.3–15.4)
GLUCOSE BLDC GLUCOMTR-MCNC: 224 MG/DL (ref 70–130)
GLUCOSE BLDC GLUCOMTR-MCNC: 249 MG/DL (ref 70–130)
GLUCOSE BLDC GLUCOMTR-MCNC: 279 MG/DL (ref 70–130)
GLUCOSE BLDC GLUCOMTR-MCNC: 296 MG/DL (ref 70–130)
HCT VFR BLD AUTO: 31.4 % (ref 37.5–51)
HGB BLD-MCNC: 9 G/DL (ref 13–17.7)
IMM GRANULOCYTES # BLD AUTO: 1.18 10*3/MM3 (ref 0–0.05)
IMM GRANULOCYTES NFR BLD AUTO: 5.2 % (ref 0–0.5)
INR PPP: 1.26 (ref 0.89–1.12)
LYMPHOCYTES # BLD AUTO: 0.81 10*3/MM3 (ref 0.7–3.1)
LYMPHOCYTES NFR BLD AUTO: 3.5 % (ref 19.6–45.3)
MCH RBC QN AUTO: 28.9 PG (ref 26.6–33)
MCHC RBC AUTO-ENTMCNC: 28.7 G/DL (ref 31.5–35.7)
MCV RBC AUTO: 101 FL (ref 79–97)
MONOCYTES # BLD AUTO: 2.29 10*3/MM3 (ref 0.1–0.9)
MONOCYTES NFR BLD AUTO: 10 % (ref 5–12)
NEUTROPHILS NFR BLD AUTO: 18.38 10*3/MM3 (ref 1.7–7)
NEUTROPHILS NFR BLD AUTO: 80.6 % (ref 42.7–76)
NRBC BLD AUTO-RTO: 0.1 /100 WBC (ref 0–0.2)
PLATELET # BLD AUTO: 413 10*3/MM3 (ref 140–450)
PMV BLD AUTO: 10 FL (ref 6–12)
PREALB SERPL-MCNC: 6.5 MG/DL (ref 20–40)
PROTHROMBIN TIME: 15.9 SECONDS (ref 12.2–14.5)
RBC # BLD AUTO: 3.11 10*6/MM3 (ref 4.14–5.8)
TRIGL SERPL-MCNC: 167 MG/DL (ref 0–150)
UNIT  ABO: NORMAL
UNIT  ABO: NORMAL
UNIT  RH: NORMAL
UNIT  RH: NORMAL
WBC NRBC COR # BLD AUTO: 22.83 10*3/MM3 (ref 3.4–10.8)

## 2024-03-19 PROCEDURE — 25010000002 FLUCONAZOLE PER 200 MG: Performed by: SURGERY

## 2024-03-19 PROCEDURE — 85025 COMPLETE CBC W/AUTO DIFF WBC: CPT | Performed by: SURGERY

## 2024-03-19 PROCEDURE — 25010000002 METOCLOPRAMIDE PER 10 MG: Performed by: SURGERY

## 2024-03-19 PROCEDURE — 63710000001 INSULIN REGULAR HUMAN PER 5 UNITS: Performed by: SURGERY

## 2024-03-19 PROCEDURE — 84134 ASSAY OF PREALBUMIN: CPT | Performed by: SURGERY

## 2024-03-19 PROCEDURE — 82948 REAGENT STRIP/BLOOD GLUCOSE: CPT

## 2024-03-19 PROCEDURE — 25010000002 HYDROMORPHONE PER 4 MG: Performed by: SURGERY

## 2024-03-19 PROCEDURE — 84478 ASSAY OF TRIGLYCERIDES: CPT | Performed by: SURGERY

## 2024-03-19 PROCEDURE — 25010000002 CALCIUM GLUCONATE PER 10 ML: Performed by: PHARMACIST

## 2024-03-19 PROCEDURE — 25010000002 PIPERACILLIN SOD-TAZOBACTAM PER 1 G: Performed by: SURGERY

## 2024-03-19 PROCEDURE — 25010000002 MAGNESIUM SULFATE PER 500 MG OF MAGNESIUM: Performed by: PHARMACIST

## 2024-03-19 PROCEDURE — 85610 PROTHROMBIN TIME: CPT | Performed by: SURGERY

## 2024-03-19 PROCEDURE — 25010000002 POTASSIUM CHLORIDE PER 2 MEQ OF POTASSIUM: Performed by: PHARMACIST

## 2024-03-19 PROCEDURE — 99233 SBSQ HOSP IP/OBS HIGH 50: CPT | Performed by: HOSPITALIST

## 2024-03-19 PROCEDURE — 86140 C-REACTIVE PROTEIN: CPT | Performed by: SURGERY

## 2024-03-19 RX ADMIN — HYDROMORPHONE HYDROCHLORIDE 0.5 MG: 1 INJECTION, SOLUTION INTRAMUSCULAR; INTRAVENOUS; SUBCUTANEOUS at 20:44

## 2024-03-19 RX ADMIN — VERAPAMIL HYDROCHLORIDE 80 MG: 80 TABLET ORAL at 14:33

## 2024-03-19 RX ADMIN — ISOSORBIDE DINITRATE 10 MG: 10 TABLET ORAL at 12:18

## 2024-03-19 RX ADMIN — SMOFLIPID 250 ML: 6; 6; 5; 3 INJECTION, EMULSION INTRAVENOUS at 17:58

## 2024-03-19 RX ADMIN — INSULIN HUMAN 4 UNITS: 100 INJECTION, SOLUTION PARENTERAL at 06:27

## 2024-03-19 RX ADMIN — VALPROIC ACID 375 MG: 250 SOLUTION ORAL at 17:52

## 2024-03-19 RX ADMIN — VALPROIC ACID 375 MG: 250 SOLUTION ORAL at 12:18

## 2024-03-19 RX ADMIN — PIPERACILLIN AND TAZOBACTAM 3.38 G: 3; .375 INJECTION, POWDER, LYOPHILIZED, FOR SOLUTION INTRAVENOUS at 14:33

## 2024-03-19 RX ADMIN — PIPERACILLIN AND TAZOBACTAM 3.38 G: 3; .375 INJECTION, POWDER, LYOPHILIZED, FOR SOLUTION INTRAVENOUS at 05:24

## 2024-03-19 RX ADMIN — WATER: 1 INJECTION INTRAMUSCULAR; INTRAVENOUS; SUBCUTANEOUS at 17:58

## 2024-03-19 RX ADMIN — LIDOCAINE 2 PATCH: 4 PATCH TOPICAL at 09:05

## 2024-03-19 RX ADMIN — LEVETIRACETAM 500 MG: 500 TABLET, FILM COATED ORAL at 09:26

## 2024-03-19 RX ADMIN — Medication 10 ML: at 20:45

## 2024-03-19 RX ADMIN — Medication 10 ML: at 09:26

## 2024-03-19 RX ADMIN — ISOSORBIDE DINITRATE 10 MG: 10 TABLET ORAL at 09:26

## 2024-03-19 RX ADMIN — INSULIN HUMAN 3 UNITS: 100 INJECTION, SOLUTION PARENTERAL at 17:53

## 2024-03-19 RX ADMIN — PANTOPRAZOLE SODIUM 40 MG: 40 INJECTION, POWDER, FOR SOLUTION INTRAVENOUS at 05:24

## 2024-03-19 RX ADMIN — VALPROIC ACID 375 MG: 250 SOLUTION ORAL at 05:24

## 2024-03-19 RX ADMIN — METOCLOPRAMIDE 10 MG: 5 INJECTION, SOLUTION INTRAMUSCULAR; INTRAVENOUS at 20:44

## 2024-03-19 RX ADMIN — METOCLOPRAMIDE 10 MG: 5 INJECTION, SOLUTION INTRAMUSCULAR; INTRAVENOUS at 09:05

## 2024-03-19 RX ADMIN — INSULIN HUMAN 3 UNITS: 100 INJECTION, SOLUTION PARENTERAL at 12:16

## 2024-03-19 RX ADMIN — VERAPAMIL HYDROCHLORIDE 80 MG: 80 TABLET ORAL at 05:25

## 2024-03-19 RX ADMIN — FLUCONAZOLE 400 MG: 400 INJECTION, SOLUTION INTRAVENOUS at 18:52

## 2024-03-19 RX ADMIN — INSULIN HUMAN 4 UNITS: 100 INJECTION, SOLUTION PARENTERAL at 00:11

## 2024-03-19 RX ADMIN — LEVETIRACETAM 500 MG: 500 TABLET, FILM COATED ORAL at 20:45

## 2024-03-19 RX ADMIN — ISOSORBIDE DINITRATE 10 MG: 10 TABLET ORAL at 17:52

## 2024-03-19 RX ADMIN — VALPROIC ACID 375 MG: 250 SOLUTION ORAL at 23:31

## 2024-03-19 RX ADMIN — METOCLOPRAMIDE 10 MG: 5 INJECTION, SOLUTION INTRAMUSCULAR; INTRAVENOUS at 02:16

## 2024-03-19 RX ADMIN — LEVOTHYROXINE SODIUM 88 MCG: 88 TABLET ORAL at 05:23

## 2024-03-19 RX ADMIN — VERAPAMIL HYDROCHLORIDE 80 MG: 80 TABLET ORAL at 22:14

## 2024-03-19 RX ADMIN — LISINOPRIL 20 MG: 20 TABLET ORAL at 09:26

## 2024-03-19 RX ADMIN — METOCLOPRAMIDE 10 MG: 5 INJECTION, SOLUTION INTRAMUSCULAR; INTRAVENOUS at 14:16

## 2024-03-19 RX ADMIN — PIPERACILLIN AND TAZOBACTAM 3.38 G: 3; .375 INJECTION, POWDER, LYOPHILIZED, FOR SOLUTION INTRAVENOUS at 22:14

## 2024-03-19 NOTE — PROGRESS NOTES
CPN Review Note    Patient Name: Lea Rivers  Date of Encounter: 24 12:29 EDT  MRN: [id number]  Admission date: 3/2/2024    Reason for visit: CPN review . RD to continue to follow per protocol.     Information Obtained: S/P abdominal washout yesterday.  NGT removed today. Remains NPO with PN for nutrition.   Patient sleeping at time of visit.     EMR reviewed:  yes; documented ostomy output 400ml, NG 100ml  Medication reviewed:  yes; reglan   Labs reviewed: yes; TRIG 167, CRP 13.84, Pre-albumin pending     Current diet: Adult Central 2-in-1 TPN  NPO Diet NPO Type: Sips with Meds, Ice Chips    Estimated Needs:  Calories:  ~ 1800 Kcal/day  Protein: ~120 g PRO/day    PN Route: PICC  PN:  15%* dextrose, 7.7% AA @ goal rate of 65mL/hr       GIR:  1.82mg/kg/min   Lipid:  Provide 250mL 20% SMOF daily        PN@ Goal over 24hr to Supply:  Volume 1560 mL/day     Energy 1776 Kcal/day 99 % Est Need   Protein 120 g/day 100 % Est Need     ---------------------------------------------------------------------------  Formula/Rate verified at bedside: Yes  Infusing Rate at time of visit: @ goal rate  (RD note for 15%, however bag and order is 14%) discussed with pharmacist- order will be adjusted with todays order to reflect changes.            With 14% dextrose, patient has been receiving 1724kcals (96% est needs)     Average Delivery from [facility]: documented delivery not accurate.   PN Volume 435 mL/day     Lipid delivered?  Yes       Energy  Kcal/day  % Est Need   Protein  g/day  % Est Need       Intervention:  Care plan reviewed  Pharmacist to correct PN order to reflect RD recommendation/RX.      Follow up:   Per protocol      Daniella Ramos RD  12:29 EDT  Time: 20min

## 2024-03-19 NOTE — CASE MANAGEMENT/SOCIAL WORK
Continued Stay Note  [facility]     Patient Name: Lea Rivers  MRN: [id number]  Today's Date: 3/19/2024    Admit Date: 3/2/2024    Plan: SNF   Discharge Plan       Row Name 03/19/24 1349       Plan    Plan SNF    Patient/Family in Agreement with Plan yes  family in agreement    Plan Comments Attempted bedside visit with Mr. Rivers at the bedside, but he was sleeping. Observed telemetry and noted breahing. The discharge plan remains unchanged. He requested SNF at [facility].  will continue to follow plan of care and assist with discharge planning needs as indicated.    Final Discharge Disposition Code 03 - skilled nursing facility (SNF)                   Discharge Codes    No documentation.                 Expected Discharge Date and Time       Expected Discharge Date Expected Discharge Time    Mar 25, 2024               Valerie Keith RN

## 2024-03-19 NOTE — PROGRESS NOTES
[facility] Medicine Services  PROGRESS NOTE    Patient Name: Lea Rivers  : 1945  MRN: [id number]    Date of Admission: 3/2/2024  Primary Care Physician: Mannie Melvin MD    Subjective   Subjective     CC: Follow up colectomy    HPI: Awake and alert. Confused. Some abdominal soreness. Denies dyspnea.     Objective   Objective     Vital Signs:   Temp:  [96.5 °F (35.8 °C)-99.3 °F (37.4 °C)] 98.2 °F (36.8 °C)  Heart Rate:  [67-91] 85  Resp:  [16-18] 16  BP: (123-186)/() 156/78  Flow (L/min):  [2-4] 2     Physical Exam:  NAD, alert and oriented  OP clear, dry MM  Neck supple  No LAD  RRR  Decreased at bases, otherwise clear  Abdominal binder in placed, ostomy appliance in place, drains noted  B LE edema, heels dressed B  SHELBY  Flat affect    Results Reviewed:  LAB RESULTS:      Lab 24  0715 24  0513 24  1429 24  0630 24  0543 03/15/24  0405 24  1125 24  0934 24  0511   WBC 16.67* 17.90*  --   --  15.84* 15.09* 17.06*  --  23.21*   HEMOGLOBIN 8.0* 8.6* 7.9* 6.4* 6.6* 7.2* 7.5*  --  8.6*   HEMATOCRIT 24.6* 27.1* 26.2* 21.8* 21.4* 23.1* 25.4*  --  26.4*   PLATELETS 379 332  --   --  277 245 242  --  223   NEUTROS ABS 13.50* 13.78*  --   --  11.72* 10.75* 13.48*  --  18.10*   IMMATURE GRANS (ABS)  --   --   --   --   --  1.30*  --   --   --    LYMPHS ABS  --   --   --   --   --  1.04  --   --   --    MONOS ABS  --   --   --   --   --  1.31*  --   --   --    EOS ABS 0.00 0.18  --   --  1.11* 0.61* 0.51*  --  0.23   .2* 102.7*  --   --  95.1 93.5 100.0*  --  92.3   PROCALCITONIN  --   --   --   --   --   --   --   --  0.20   LACTATE  --   --   --   --   --   --   --  1.5  --          Lab 03/15/24  0405 24  0514 24  1711 24  0511 24  1204   SODIUM 141 140  --  139  --    POTASSIUM 3.8 3.9  --  4.0  --    CHLORIDE 105 106  --  106  --    CO2 28.0 28.0  --  28.0  --    ANION GAP 8.0 6.0  --   5.0  --    BUN 18 19  --  18  --    CREATININE 0.54* 0.46*  --  0.44*  --    EGFR 101.4 106.4  --  107.8  --    GLUCOSE 147* 159*  --  163*  --    CALCIUM 7.8* 7.9*  --  7.8*  --    MAGNESIUM 2.0 2.2  --  1.9  --    PHOSPHORUS 2.9 2.4* 2.7 2.2* 2.3*                             Lab 03/16/24  0630   ABO TYPING O   RH TYPING Negative   ANTIBODY SCREEN Negative           Brief Urine Lab Results  (Last result in the past 365 days)        Color   Clarity   Blood   Leuk Est   Nitrite   Protein   CREAT   Urine HCG        03/11/24 1337 Yellow   Cloudy   Moderate (2+)   Trace   Negative   30 mg/dL (1+)                   Microbiology Results Abnormal       Procedure Component Value - Date/Time    Body Fluid Culture - Peritoneal Fluid, Perineum [[id number]] Collected: 03/18/24 1349    Lab Status: Preliminary result Specimen: Peritoneal Fluid from Perineum Updated: 03/19/24 0818     Body Fluid Culture No growth     Gram Stain Occasional WBCs seen      No organisms seen    Urine Culture - Urine, Indwelling Urethral Catheter [[id number]]  (Normal) Collected: 03/11/24 1337    Lab Status: Final result Specimen: Urine from Indwelling Urethral Catheter Updated: 03/12/24 2024     Urine Culture No growth    Blood Culture - Blood, Arm, Right [[id number]]  (Normal) Collected: 03/06/24 1857    Lab Status: Final result Specimen: Blood from Arm, Right Updated: 03/11/24 2045     Blood Culture No growth at 5 days    Blood Culture - Blood, Arm, Right [[id number]]  (Normal) Collected: 03/06/24 1751    Lab Status: Final result Specimen: Blood from Arm, Right Updated: 03/11/24 2000     Blood Culture No growth at 5 days    Blood Culture - Blood, Arm, Right [[id number]]  (Normal) Collected: 03/02/24 1132    Lab Status: Final result Specimen: Blood from Arm, Right Updated: 03/07/24 1201     Blood Culture No growth at 5 days    Narrative:      Less than seven (7) mL's of blood was collected.  Insufficient quantity may yield false negative results.    Blood  Culture - Blood, Arm, Right [[id number]]  (Normal) Collected: 03/02/24 1132    Lab Status: Final result Specimen: Blood from Arm, Right Updated: 03/07/24 1201     Blood Culture No growth at 5 days    Narrative:      Less than seven (7) mL's of blood was collected.  Insufficient quantity may yield false negative results.    Urine Culture - Urine, Straight Cath [[id number]]  (Normal) Collected: 03/02/24 0923    Lab Status: Final result Specimen: Urine from Straight Cath Updated: 03/03/24 1601     Urine Culture No growth    COVID PRE-OP / PRE-PROCEDURE SCREENING ORDER (NO ISOLATION) - Swab, Nasopharynx [[id number]]  (Normal) Collected: 03/02/24 1133    Lab Status: Final result Specimen: Swab from Nasopharynx Updated: 03/02/24 1227    Narrative:      The following orders were created for panel order COVID PRE-OP / PRE-PROCEDURE SCREENING ORDER (NO ISOLATION) - Swab, Nasopharynx.  Procedure                               Abnormality         Status                     ---------                               -----------         ------                     COVID-19, FLU A/B, RSV P...[[id number]]  Normal              Final result                 Please view results for these tests on the individual orders.    COVID-19, FLU A/B, RSV PCR 1 HR TAT - Swab, Nasopharynx [[id number]]  (Normal) Collected: 03/02/24 1133    Lab Status: Final result Specimen: Swab from Nasopharynx Updated: 03/02/24 1227     COVID19 Not Detected     Influenza A PCR Not Detected     Influenza B PCR Not Detected     RSV, PCR Not Detected    Narrative:      Fact sheet for providers: https://www.fda.gov/media/658872/download    Fact sheet for patients: https://www.fda.gov/media/343511/download    Test performed by PCR.            Peripheral Block    Result Date: 3/18/2024  Maxim Manrique CRNA     3/18/2024  1:37 PM Peripheral Block Patient reassessed immediately prior to procedure Patient location during procedure: OR Reason for block: at surgeon's request and  "post-op pain management Performed by CRNA/CAA: Maxim Manrique, CRNA Assisted by: Yessy Slater RN Preanesthetic Checklist Completed: patient identified, IV checked, site marked, risks and benefits discussed, surgical consent, monitors and equipment checked, pre-op evaluation and timeout performed Prep: Pt Position: supine Sterile barriers:cap, gloves, mask and washed/disinfected hands Prep: ChloraPrep Patient monitoring: blood pressure monitoring, continuous pulse oximetry and EKG Procedure Sedation: yes Performed under: general Guidance:ultrasound guided Images:still images obtained, printed/placed on chart Laterality:Bilateral Block Type:TAP Injection Technique:single-shot Needle Type:short-bevel and echogenic Needle Gauge:20 G Resistance on Injection: none Medications Used: dexamethasone sodium phosphate injection - Injection  4 mg - 3/18/2024 1:19:00 PM bupivacaine PF (MARCAINE) 0.25 % injection - Injection  60 mL - 3/18/2024 1:19:00 PM Medications Preservative Free Saline:10ml Comment:Block Injection:  LA dose divided between Right and Left block Post Assessment Injection Assessment: negative aspiration for heme, incremental injection and no paresthesia on injection Patient Tolerance:comfortable throughout block Complications:no Additional Notes Subcostal TAPs A high-frequency linear transducer, with sterile cover, was placed sub-xiphoid to identify Line[place]ba, right and left Rectus Abdominus Muscles (ERICA). The transducer was moved either right or left subcostally to identify the ERICA and the Transverse Abdominus Muscle (RODRIGUEZ). The insertion site was prepped in sterile fashion and then localized with 2-5 ml of 1% Lidocaine. Using ultrasound-guidance, a 20-gauge B-Santos 4\" Ultraplex 360 non-stimulating echogenic needle was advanced in plane, from medial to lateral, until the tip of the needle was in the fascial plane between the ERICA and RODRIGUEZ. 1-3ml of preservative free normal saline was used to hydro-dissect " "the fascial planes. After the fascial plane was verified, the local anesthetic (LA) was injected. The procedure was repeated on the opposite side for bilateral coverage. Aspiration every 5 ml to prevent intravascular injection. Injection was completed with negative aspiration of blood and negative intravascular injection. Injection pressures were normal with minimal resistance. The subcostal approach to the TAP nerve block ideally anesthetizes the intercostal nerves T6-T9. Mid-Axillary/Lateral TAPs A high-frequency linear transducer, with sterile cover, was placed in the midaxillary line between the ASIS and costal margin. The External Oblique Muscle (EOM), Internal Oblique Muscle (IOM), Transverse Abdominus Muscle (RODRIGUEZ), and Peritoneum were identified. The insertion site was prepped in sterile fashion and then localized with 2-5 ml of 1% Lidocaine. Using ultrasound-guidance, a 20-gauge B-Santos 4\" Ultraplex 360 non-stimulating echogenic needle was advanced in plane, from medial to lateral, until the tip of the needle was in the fascial plane between the IOM and RODRIGUEZ. 1-3ml of preservative free normal saline was used to hydro-dissect the fascial planes. After the fascial plane was verified, the local anesthetic (LA) was injected. The procedure was repeated on the opposite side for bilateral coverage. Aspiration every 5 ml to prevent intravascular injection. Injection was completed with negative aspiration of blood and negative intravascular injection. Injection pressures were normal with minimal resistance. Midaxillary TAPs should reach intercostal nerves T10- T11 and the subcostal nerve T12.      CT Abdomen Pelvis With Contrast    Result Date: 3/18/2024  CT ABDOMEN PELVIS W CONTRAST Date of Exam: 3/18/2024 11:32 AM EDT Indication: Abdominal abscess (Ped 0-17y) Patient POD#9 total colectomy, end ileostomy with recurrent fevers. Comparison: CT of the abdomen and pelvis dated 3/12/2024 Technique: Axial CT images were " obtained of the abdomen and pelvis following the uneventful intravenous administration of 85 mL Isovue-300. Reconstructed coronal and sagittal images were also obtained. Automated exposure control and iterative construction methods were used. Findings: Liver: The liver is unremarkable in morphology. No focal liver lesion is seen. No biliary dilation is seen. Gallbladder: The gallbladder is not identified. Pancreas: Unremarkable. Spleen: Unremarkable. Adrenal glands: Unremarkable. Genitourinary tract: Kidneys appear unremarkable. No hydronephrosis is seen. The visualized portions of the ureters are unremarkable. Urinary bladder is unremarkable. There are surgical changes of the prostate gland. Gastrointestinal tract: Patient is status post total colectomy with right-sided ileostomy and rectal pouch noted. Remainder of the visualized hollow viscera is unremarkable. No evidence of bowel obstruction. Other findings: Surgical drain terminates within the dependent pelvis. There is moderate loculated ascitic fluid collecting within the anterior abdomen. Largest locule within the left anterior abdomen measures approximately 15 x 6 cm. Milder loculated fluid near the caudal aspect of the liver measures approximately 9 x 6 cm. Additional scattered fluid noted. Infected fluid cannot be excluded. No free air is identified. No pathologically enlarged lymph nodes are seen. Vascular calcifications are present. The IVC is unremarkable. Bones and soft tissues: No acute or suspicious osseous or soft tissue lesion is identified. Superficial soft tissue anasarca is noted. There are surgical changes of the ventral abdominal wall. Lung bases: Partially visualized bilateral pleural effusions with bibasilar atelectasis. Calcified granuloma within the right lung base.     Impression: Impression: 1.Postsurgical changes of total colectomy with right-sided ileostomy and rectal pouch. Surgical drain terminates within the dependent pelvis.  2.There is moderate loculated ascitic fluid collecting within the anterior abdomen. Largest locule within the left anterior abdomen measures approximately 15 x 6 cm. Milder loculated fluid near the caudal aspect of the liver measures approximately 9 x 6 cm. Additional scattered fluid also noted. Infected fluid cannot be excluded. 3.Additional findings as detailed above. Electronically Signed: Peter Bill MD  3/18/2024 12:00 PM EDT  Workstation ID: [id number]     Results for orders placed during the hospital encounter of 07/20/21    Adult Transthoracic Echo Complete W/ Cont if Necessary Per Protocol    Interpretation Summary  · Left ventricular wall thickness is consistent with mild concentric hypertrophy.  · Normal left-ventricular systolic function, estimated EF 65%.  · Left ventricular diastolic function is consistent with (grade I) impaired relaxation.  · Aortic sclerosis without aortic stenosis. Trace aortic insufficiency.  · Trace mitral regurgitation, trace tricuspid regurgitation.      Current medications:  Scheduled Meds:[Held by provider] enoxaparin, 1 mg/kg, Subcutaneous, Q12H  Fat Emul Fish Oil/Plant Based, 250 mL, Intravenous, Q24H (TPN)  fluconazole, 400 mg, Intravenous, Q24H  insulin regular, 2-7 Units, Subcutaneous, Q6H  isosorbide dinitrate, 10 mg, Oral, TID - Nitrates  levETIRAcetam, 500 mg, Oral, Q12H  levothyroxine, 88 mcg, Oral, Q AM  Lidocaine, 2 patch, Transdermal, Q24H  lisinopril, 20 mg, Oral, Q24H  metoclopramide, 10 mg, Intravenous, Q6H  pantoprazole, 40 mg, Intravenous, Q AM  piperacillin-tazobactam, 3.375 g, Intravenous, Q8H  sodium chloride, 10 mL, Intravenous, Q12H  Valproic Acid, 375 mg, Oral, Q6H  verapamil, 80 mg, Oral, Q8H      Continuous Infusions:Adult Central 2-in-1 TPN, , Last Rate: 65 mL/hr at 03/18/24 1834  Pharmacy to Dose TPN,           PRN Meds:.  acetaminophen **OR** acetaminophen **OR** acetaminophen    calcium carbonate    Calcium Replacement - Follow Nurse / BPA  Driven Protocol    dextrose    dextrose    docusate sodium    fluticasone    glucagon (human recombinant)    hydrALAZINE    HYDROmorphone    ipratropium-albuterol    labetalol    Magnesium Standard Dose Replacement - Follow Nurse / BPA Driven Protocol    [DISCONTINUED] HYDROmorphone **AND** naloxone    ondansetron ODT **OR** ondansetron    Pharmacy to Dose TPN    Phosphorus Replacement - Follow Nurse / BPA Driven Protocol    Potassium Replacement - Follow Nurse / BPA Driven Protocol    sodium chloride    sodium chloride    sodium chloride    sodium chloride    sodium chloride    sodium chloride    Assessment & Plan   Assessment & Plan     Active Hospital Problems    Diagnosis  POA    **Falls [W19.XXXA]  Yes    Pneumatosis intestinalis [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        Brief Hospital Course to date:  Lea Rivers is a 78 y.o. male with past medical history of hypertension, hyperlipidemia, hypothyroidism, seizure disorder.  Patient is being admitted for a fall and nondisplaced first through fourth left rib fractures.  Patient also has left distal clavicle fracture.    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 on 3/9 w/  Persistent  Leukocytosis  LLL PNA  -s/p repeat CT 3/18, with fluid collection noted, s/p exploratory laparotomy 3/18 with abdominal washout and ascites noted  -on zosyn/diflucan empirically for now  -completed course of Fluconazole, CTX/Flagyl 3/17 prior to repeat surgery  -CXR 3/12 with possible LLL pneumonia, completed course of CTX    Toxic metabolic encephalopathy  -per daughter significant change in mental status and speech since arrival, and team had a long discussion and felt this is probably TME in setting of infection, recent large operation, underlying dementia  -initial CT head 3/2 negative, repeated CT head 3/13 without any acute findings  -EEG negative for seizures,  findings consistent with TME  -minimize sedating meds   -slowly improving    Multiple falls with increasing frequency   -CT of head shows age-related changes which are chronic but no acute process  -neuro consulted, likely d/t neuropathy (confirmed with EMG) and progression of dementia     Multiple rib fractures and also left clavicle fracture  -With no displacement or mildly displaced.  Orthopedic surgery evaluated. No surgical intervention  planned.  Recs nonweightbearing LUE, may come out of sling in 5-7 days to initiate gentle ROM exercises per ortho.  F/u with ortho/Dr. Maldonado in 2 weeks  -Pain control, lidocaine patch  -bruise noted to L shoulder/neck region however X-ray negative    Acute on Chronic Anemia  -S/P 1 unit PRBCs 3/11 and 3/16, hemoglobin now improved, monitoring  -no clear source of active bleeding, hemoccult negative  -transfuse PRN hgb <7, hold therapeutic lovenox for now    Acute RUE DVT (brachial)  -Provoked, 2/2 PICC   -therapeutic lovenox started 3/13, on hold per surgery due to worsening anemia     Prostate cancer  Hx BPH s/p greenlight photo vaporization of prostate 2018  -Follows with Dr. Noland  -S/P cyberknife radiation 2/9/23  -Has intermittent hematuria, monitor while on AC     Dementia and increasing memory problem  -Patient was previously living alone and driving, given significant decline will need placement     Hypertension  -cleared by speech, resumed home oral regimen  -PRN IV dosing for SBP >180    Seizure disorder  -continue vimpat and keppra    Hyperlipidemia  Hypothyroidism  -resume home regimen    Expected Discharge Location and Transportation: SNF  Expected Discharge   Expected Discharge Date: 3/25/2024; Expected Discharge Time:      DVT prophylaxis:  Medical and mechanical DVT prophylaxis orders are present.         AM-PAC 6 Clicks Score (PT): 9 (03/18/24 2015)    CODE STATUS:   Code Status and Medical Interventions:   Ordered at: 03/02/24 9159     Medical  Intervention Limits:    NO intubation (DNI)     Code Status (Patient has no pulse and is not breathing):    No CPR (Do Not Attempt to Resuscitate)     Medical Interventions (Patient has pulse or is breathing):    Limited Support       Fausto Melendez MD  03/19/24

## 2024-03-19 NOTE — PROGRESS NOTES
Pharmacy Parenteral Nutrition Evaluation    Lea Rivers is a  78 y.o. male receiving TPN.     Indication:     Prolonged Paralytic Ileus     Consulting Physician: Eleanor Yost DO     Total Fluid Rate (if stated): n/a    Labs  Results from last 7 days   Lab Units 03/15/24  0405 03/14/24  0514 03/13/24  0511 03/11/24  1618 03/11/24  1011 03/11/24  0508   SODIUM mmol/L 141 140 139   < >  --  138  138   POTASSIUM mmol/L 3.8 3.9 4.0   < >  --  3.2*  3.2*   CHLORIDE mmol/L 105 106 106   < >  --  107  107   CO2 mmol/L 28.0 28.0 28.0   < >  --  25.0  25.0   BUN mg/dL 18 19 18   < >  --  15  15   CREATININE mg/dL 0.54* 0.46* 0.44*   < >  --  0.66*  0.66*   CALCIUM mg/dL 7.8* 7.9* 7.8*   < >  --  8.3*  8.3*   BILIRUBIN mg/dL  --   --   --   --  0.3 0.3   ALK PHOS U/L  --   --   --   --  60 63   ALT (SGPT) U/L  --   --   --   --  29 31   AST (SGOT) U/L  --   --   --   --  20 23   GLUCOSE mg/dL 147* 159* 163*   < >  --  145*  145*    < > = values in this interval not displayed.       Results from last 7 days   Lab Units 03/15/24  0405 03/14/24  0514 03/13/24  1711 03/13/24  0511 03/12/24  0316 03/11/24  1618   MAGNESIUM mg/dL 2.0 2.2  --  1.9   < >  --    PHOSPHORUS mg/dL 2.9 2.4* 2.7 2.2*   < > 2.1*   PREALBUMIN mg/dL  --   --   --   --   --  5.4*    < > = values in this interval not displayed.       Results from last 7 days   Lab Units 03/17/24  0513 03/16/24  1429 03/16/24  0630 03/16/24  0543 03/15/24  0405   WBC 10*3/mm3 17.90*  --   --  15.84* 15.09*   HEMOGLOBIN g/dL 8.6* 7.9* 6.4* 6.6* 7.2*   HEMATOCRIT % 27.1* 26.2* 21.8* 21.4* 23.1*   PLATELETS 10*3/mm3 332  --   --  277 245       Triglycerides   Date Value Ref Range Status   03/11/2024 131 0 - 150 mg/dL Final     Comment:     Falsely depressed results may occur on samples drawn from patients receiving N-Acetylcysteine (NAC) or Metamizole.       estimated creatinine clearance is 125.7 mL/min (A) (by C-G formula based on SCr of 0.54 mg/dL  (L)).    Intake & Output (last 3 days)         03/05 0701 03/06 0700 03/06 0701 03/07 0700 03/07 0701 03/08 0700 03/08 0701 03/09 0700    P.O. 1080 240      I.V. (mL/kg)  1026.3 (11.5)      IV Piggyback  400      Total Intake(mL/kg) 1080 (12.1) 1666.3 (18.7)      Urine (mL/kg/hr) 150 (0.1) 400 (0.2)      Stool 0 0      Total Output 150 400      Net +930 +1266.3              Urine Unmeasured Occurrence 4 x 2 x 1 x     Stool Unmeasured Occurrence 5 x 4 x 3 x 1 x            Dietitian Recommendations  Diet Orders (active) (From admission, onward)       Start     Ordered    03/08/24 1800  Adult Cyclic Central 2-in-1 TPN  Cyclic TPN - See Admin Instructions        Note to Pharmacy: Bellmaster Rivers  5H  [id number]  MRN: [id number]  CSN: [id number]    03/08/24 1422    03/08/24 1800  Fat Emul Fish Oil/Plant Based (SMOFLIPID) 20 % emulsion 250 mL  Every 24 Hours Scheduled (TPN)         03/08/24 1422    03/08/24 1355  NPO Diet NPO Type: Strict NPO  Diet Effective Now         03/08/24 1358    03/05/24 1656  DIET MESSAGE Pt would like a grilled cheese sandwich, please. Thanks!  Once        Comments: Pt would like a grilled cheese sandwich, please. Thanks!    03/05/24 1656                    Current TPN Regimen Recommendation:   Dextrose 15% / Amino Acid 7.7% at a goal rate of 65 ml/hr.      20% Lipid Emulsion 250mL every 24 hours.    Na 45 mEq/L  K 50 mEq/L  Ca 5 mEq/L  Mg 8 mEq/L  PO4 15 mmol/L  Cl:Ace - 1:1    Assessment/Plan:  TPN for prolonged paralytic ileus.   Macronutrients per dietary recommendation and electrolyte per pharmacy recommendation are listed above (same as yesterday).  Electrolyte replacement per protocol, will continue with standard electrolytes.   SSI q6h ordered. 13 units regular insulin ordered over the last 24 hours.   Pharmacy will continue to follow and adjust as indicated     Thank you,    Gallo AshleyD, MAGALY, BCPS  3/19/2024  13:04 EDT

## 2024-03-19 NOTE — PLAN OF CARE
Goal Outcome Evaluation:      Pt is Unit draw. Multiple attempts to draw CMP- blood hemolyzed per lab. Re-drawn and sent to lab.             Problem: Adult Inpatient Plan of Care  Goal: Plan of Care Review  Outcome: Ongoing, Progressing  Goal: Patient-Specific Goal (Individualized)  Outcome: Ongoing, Progressing  Goal: Absence of Hospital-Acquired Illness or Injury  Outcome: Ongoing, Progressing  Intervention: Identify and Manage Fall Risk  Recent Flowsheet Documentation  Taken 3/19/2024 0416 by Teresa Parmar, RN  Safety Promotion/Fall Prevention:   activity supervised   assistive device/personal items within reach   clutter free environment maintained   nonskid shoes/slippers when out of bed   room organization consistent   safety round/check completed  Taken 3/19/2024 0206 by Teresa Parmar, RN  Safety Promotion/Fall Prevention:   activity supervised   assistive device/personal items within reach   clutter free environment maintained   nonskid shoes/slippers when out of bed   room organization consistent   safety round/check completed  Taken 3/19/2024 0015 by Teresa Parmar, RN  Safety Promotion/Fall Prevention:   activity supervised   assistive device/personal items within reach   clutter free environment maintained   nonskid shoes/slippers when out of bed   room organization consistent   safety round/check completed  Taken 3/18/2024 2254 by Teresa Parmar, RN  Safety Promotion/Fall Prevention:   activity supervised   assistive device/personal items within reach   clutter free environment maintained   nonskid shoes/slippers when out of bed   room organization consistent   safety round/check completed  Taken 3/18/2024 2015 by Teresa Parmar, RN  Safety Promotion/Fall Prevention:   clutter free environment maintained   activity supervised   assistive device/personal items within reach   nonskid shoes/slippers when out of bed   room organization consistent   safety round/check  completed  Intervention: Prevent Skin Injury  Recent Flowsheet Documentation  Taken 3/19/2024 0416 by Teresa Parmar RN  Body Position:   weight shifting   turned   supine  Skin Protection:   adhesive use limited   incontinence pads utilized   transparent dressing maintained   tubing/devices free from skin contact  Taken 3/19/2024 0206 by Teresa Parmar RN  Body Position: weight shifting  Skin Protection:   adhesive use limited   incontinence pads utilized   transparent dressing maintained   tubing/devices free from skin contact  Taken 3/19/2024 0015 by Teresa Parmar RN  Body Position:   turned   left   weight shifting  Skin Protection:   adhesive use limited   incontinence pads utilized   transparent dressing maintained   tubing/devices free from skin contact  Taken 3/18/2024 2254 by Teresa Parmar RN  Body Position:   turned   left   weight shifting  Skin Protection:   adhesive use limited   incontinence pads utilized   transparent dressing maintained   tubing/devices free from skin contact  Taken 3/18/2024 2015 by Teresa Parmar RN  Body Position: turned  Skin Protection:   adhesive use limited   incontinence pads utilized   transparent dressing maintained  Intervention: Prevent and Manage VTE (Venous Thromboembolism) Risk  Recent Flowsheet Documentation  Taken 3/19/2024 0416 by Teresa Parmar RN  Activity Management: activity minimized  Taken 3/19/2024 0206 by Teresa Parmar RN  Activity Management: activity minimized  Taken 3/19/2024 0015 by Teresa Parmar RN  Activity Management: activity minimized  Taken 3/18/2024 2254 by Teresa Parmar RN  Activity Management: activity minimized  Taken 3/18/2024 2015 by Teresa Parmar RN  Activity Management: activity minimized  Intervention: Prevent Infection  Recent Flowsheet Documentation  Taken 3/18/2024 2015 by Teresa Parmar RN  Infection Prevention:   cohorting utilized   environmental surveillance performed    equipment surfaces disinfected   hand hygiene promoted   rest/sleep promoted   single patient room provided  Goal: Optimal Comfort and Wellbeing  Outcome: Ongoing, Progressing  Intervention: Provide Person-Centered Care  Recent Flowsheet Documentation  Taken 3/18/2024 2015 by Teresa Parmar RN  Trust Relationship/Rapport:   care explained   thoughts/feelings acknowledged  Goal: Readiness for Transition of Care  Outcome: Ongoing, Progressing     Problem: Skin Injury Risk Increased  Goal: Skin Health and Integrity  Outcome: Ongoing, Progressing  Intervention: Optimize Skin Protection  Recent Flowsheet Documentation  Taken 3/19/2024 0416 by Teresa Parmar RN  Pressure Reduction Techniques:   frequent weight shift encouraged   weight shift assistance provided   pressure points protected  Head of Bed (HOB) Positioning: HOB at 30 degrees  Pressure Reduction Devices:   pressure-redistributing mattress utilized   positioning supports utilized   heel offloading device utilized  Skin Protection:   adhesive use limited   incontinence pads utilized   transparent dressing maintained   tubing/devices free from skin contact  Taken 3/19/2024 0206 by Teresa Parmar RN  Pressure Reduction Techniques:   frequent weight shift encouraged   pressure points protected   weight shift assistance provided  Head of Bed (HOB) Positioning: HOB at 30 degrees  Pressure Reduction Devices:   pressure-redistributing mattress utilized   positioning supports utilized   heel offloading device utilized  Skin Protection:   adhesive use limited   incontinence pads utilized   transparent dressing maintained   tubing/devices free from skin contact  Taken 3/19/2024 0015 by Teresa Parmar RN  Pressure Reduction Techniques:   frequent weight shift encouraged   pressure points protected   weight shift assistance provided   heels elevated off bed  Head of Bed (HOB) Positioning: HOB at 30 degrees  Pressure Reduction Devices:    pressure-redistributing mattress utilized   positioning supports utilized  Skin Protection:   adhesive use limited   incontinence pads utilized   transparent dressing maintained   tubing/devices free from skin contact  Taken 3/18/2024 2254 by Teresa Parmar RN  Pressure Reduction Techniques:   frequent weight shift encouraged   heels elevated off bed   pressure points protected  Head of Bed (HOB) Positioning: HOB at 30 degrees  Pressure Reduction Devices:   pressure-redistributing mattress utilized   positioning supports utilized   foam padding utilized  Skin Protection:   adhesive use limited   incontinence pads utilized   transparent dressing maintained   tubing/devices free from skin contact  Taken 3/18/2024 2015 by Teresa Parmar RN  Pressure Reduction Techniques:   frequent weight shift encouraged   heels elevated off bed   positioned off wounds   pressure points protected   weight shift assistance provided  Head of Bed (HOB) Positioning: HOB at 30 degrees  Pressure Reduction Devices:   pressure-redistributing mattress utilized   positioning supports utilized  Skin Protection:   adhesive use limited   incontinence pads utilized   transparent dressing maintained     Problem: Fall Injury Risk  Goal: Absence of Fall and Fall-Related Injury  Outcome: Ongoing, Progressing  Intervention: Identify and Manage Contributors  Recent Flowsheet Documentation  Taken 3/19/2024 0416 by Teresa Parmar RN  Medication Review/Management: medications reviewed  Self-Care Promotion: safe use of adaptive equipment encouraged  Taken 3/19/2024 0206 by Teresa Parmar RN  Medication Review/Management: medications reviewed  Self-Care Promotion: safe use of adaptive equipment encouraged  Taken 3/19/2024 0015 by Teresa Parmar RN  Medication Review/Management: medications reviewed  Self-Care Promotion: safe use of adaptive equipment encouraged  Taken 3/18/2024 2254 by Teresa Parmar RN  Medication  Review/Management: medications reviewed  Self-Care Promotion: safe use of adaptive equipment encouraged  Taken 3/18/2024 2015 by Teresa Parmar RN  Medication Review/Management: medications reviewed  Self-Care Promotion: safe use of adaptive equipment encouraged  Intervention: Promote Injury-Free Environment  Recent Flowsheet Documentation  Taken 3/19/2024 0416 by Teresa Parmar, RN  Safety Promotion/Fall Prevention:   activity supervised   assistive device/personal items within reach   clutter free environment maintained   nonskid shoes/slippers when out of bed   room organization consistent   safety round/check completed  Taken 3/19/2024 0206 by Teresa Parmar, RN  Safety Promotion/Fall Prevention:   activity supervised   assistive device/personal items within reach   clutter free environment maintained   nonskid shoes/slippers when out of bed   room organization consistent   safety round/check completed  Taken 3/19/2024 0015 by Teresa Parmar, RN  Safety Promotion/Fall Prevention:   activity supervised   assistive device/personal items within reach   clutter free environment maintained   nonskid shoes/slippers when out of bed   room organization consistent   safety round/check completed  Taken 3/18/2024 2254 by Teresa Parmar, RN  Safety Promotion/Fall Prevention:   activity supervised   assistive device/personal items within reach   clutter free environment maintained   nonskid shoes/slippers when out of bed   room organization consistent   safety round/check completed  Taken 3/18/2024 2015 by Teresa Parmar, RN  Safety Promotion/Fall Prevention:   clutter free environment maintained   activity supervised   assistive device/personal items within reach   nonskid shoes/slippers when out of bed   room organization consistent   safety round/check completed     Problem: Adjustment to Illness (Sepsis/Septic Shock)  Goal: Optimal Coping  Outcome: Ongoing, Progressing  Intervention: Optimize  Psychosocial Adjustment to Illness  Recent Flowsheet Documentation  Taken 3/18/2024 2015 by Teresa Parmar RN  Supportive Measures: active listening utilized  Family/Support System Care: support provided     Problem: Bleeding (Sepsis/Septic Shock)  Goal: Absence of Bleeding  Outcome: Ongoing, Progressing  Intervention: Monitor and Manage Bleeding  Recent Flowsheet Documentation  Taken 3/18/2024 2015 by Teresa Parmar RN  Bleeding Precautions: blood pressure closely monitored  Bleeding Management: dressing monitored     Problem: Glycemic Control Impaired (Sepsis/Septic Shock)  Goal: Blood Glucose Level Within Desired Range  Outcome: Ongoing, Progressing  Intervention: Optimize Glycemic Control  Recent Flowsheet Documentation  Taken 3/18/2024 2015 by Teresa Parmar RN  Glycemic Management: blood glucose monitored     Problem: Infection Progression (Sepsis/Septic Shock)  Goal: Absence of Infection Signs and Symptoms  Outcome: Ongoing, Progressing  Intervention: Initiate Sepsis Management  Recent Flowsheet Documentation  Taken 3/18/2024 2015 by Teresa Parmar RN  Infection Prevention:   cohorting utilized   environmental surveillance performed   equipment surfaces disinfected   hand hygiene promoted   rest/sleep promoted   single patient room provided  Intervention: Promote Recovery  Recent Flowsheet Documentation  Taken 3/19/2024 0416 by Teresa Parmar RN  Activity Management: activity minimized  Taken 3/19/2024 0206 by Teresa Parmar RN  Activity Management: activity minimized  Taken 3/19/2024 0015 by Teresa Parmar RN  Activity Management: activity minimized  Taken 3/18/2024 2254 by Teresa Parmar RN  Activity Management: activity minimized  Taken 3/18/2024 2015 by Teresa Parmar RN  Activity Management: activity minimized     Problem: Nutrition Impaired (Sepsis/Septic Shock)  Goal: Optimal Nutrition Intake  Outcome: Ongoing, Progressing

## 2024-03-19 NOTE — PROGRESS NOTES
"Patient Name:  Lea Rivers  YOB: 1945  [id number]    Surgery Progress Note    Date of visit: 3/19/2024    Subjective   Unable to obtain full history due to patient's dementia. No ostomy output since OR yesterday. No nausea/vomiting. No fevers.         Objective       /85   Pulse 85   Temp 98.1 °F (36.7 °C) (Axillary)   Resp 16   Ht 170.2 cm (67.01\")   Wt 99.6 kg (219 lb 9.6 oz)   SpO2 94%   BMI 34.39 kg/m²     Intake/Output Summary (Last 24 hours) at 3/19/2024 0707  Last data filed at 3/19/2024 0600  Gross per 24 hour   Intake 2035 ml   Output 2500 ml   Net -465 ml       CV:  Rhythm regular and rate regular  L:  Clear to auscultation bilaterally  Abd:  Bowel sounds positive, soft, OLIVER s serosang, ostomy viable, dressing c/d/I  Ext:  No cyanosis, clubbing, edema    Recent labs and imaging that are back at this time have been reviewed.   AM labs pending       Assessment & Plan     Patient postoperative day 10 from total abdominal colectomy with end ileostomy, postoperative day 1 abdominal washout. Pain control. DC NG, NPO with sips for meds. RUIZ patel. Continue TPN. Broad spectrum abx while awaiting OR cultures.           Harley Godfrey MD  3/19/2024  07:07 EDT     "

## 2024-03-20 LAB
ALBUMIN SERPL-MCNC: 1.9 G/DL (ref 3.5–5.2)
ALBUMIN/GLOB SERPL: 0.7 G/DL
ALP SERPL-CCNC: 77 U/L (ref 39–117)
ALT SERPL W P-5'-P-CCNC: 5 U/L (ref 1–41)
ANION GAP SERPL CALCULATED.3IONS-SCNC: 6 MMOL/L (ref 5–15)
AST SERPL-CCNC: 14 U/L (ref 1–40)
BASOPHILS # BLD AUTO: 0.09 10*3/MM3 (ref 0–0.2)
BASOPHILS NFR BLD AUTO: 0.5 % (ref 0–1.5)
BILIRUB SERPL-MCNC: 0.2 MG/DL (ref 0–1.2)
BUN SERPL-MCNC: 26 MG/DL (ref 8–23)
BUN/CREAT SERPL: 40 (ref 7–25)
CALCIUM SPEC-SCNC: 7.6 MG/DL (ref 8.6–10.5)
CHLORIDE SERPL-SCNC: 106 MMOL/L (ref 98–107)
CO2 SERPL-SCNC: 29 MMOL/L (ref 22–29)
CREAT SERPL-MCNC: 0.65 MG/DL (ref 0.76–1.27)
DEPRECATED RDW RBC AUTO: 60.7 FL (ref 37–54)
EGFRCR SERPLBLD CKD-EPI 2021: 95.8 ML/MIN/1.73
EOSINOPHIL # BLD AUTO: 0.12 10*3/MM3 (ref 0–0.4)
EOSINOPHIL NFR BLD AUTO: 0.7 % (ref 0.3–6.2)
ERYTHROCYTE [DISTWIDTH] IN BLOOD BY AUTOMATED COUNT: 18.2 % (ref 12.3–15.4)
GLOBULIN UR ELPH-MCNC: 2.8 GM/DL
GLUCOSE BLDC GLUCOMTR-MCNC: 132 MG/DL (ref 70–130)
GLUCOSE BLDC GLUCOMTR-MCNC: 162 MG/DL (ref 70–130)
GLUCOSE BLDC GLUCOMTR-MCNC: 164 MG/DL (ref 70–130)
GLUCOSE BLDC GLUCOMTR-MCNC: 191 MG/DL (ref 70–130)
GLUCOSE BLDC GLUCOMTR-MCNC: 199 MG/DL (ref 70–130)
GLUCOSE SERPL-MCNC: 161 MG/DL (ref 65–99)
HCT VFR BLD AUTO: 27.6 % (ref 37.5–51)
HGB BLD-MCNC: 8.6 G/DL (ref 13–17.7)
IMM GRANULOCYTES # BLD AUTO: 0.96 10*3/MM3 (ref 0–0.05)
IMM GRANULOCYTES NFR BLD AUTO: 5.4 % (ref 0–0.5)
LYMPHOCYTES # BLD AUTO: 1.07 10*3/MM3 (ref 0.7–3.1)
LYMPHOCYTES NFR BLD AUTO: 6 % (ref 19.6–45.3)
MCH RBC QN AUTO: 29.2 PG (ref 26.6–33)
MCHC RBC AUTO-ENTMCNC: 31.2 G/DL (ref 31.5–35.7)
MCV RBC AUTO: 93.6 FL (ref 79–97)
MONOCYTES # BLD AUTO: 1.77 10*3/MM3 (ref 0.1–0.9)
MONOCYTES NFR BLD AUTO: 10 % (ref 5–12)
NEUTROPHILS NFR BLD AUTO: 13.73 10*3/MM3 (ref 1.7–7)
NEUTROPHILS NFR BLD AUTO: 77.4 % (ref 42.7–76)
NRBC BLD AUTO-RTO: 0.1 /100 WBC (ref 0–0.2)
PHOSPHATE SERPL-MCNC: 2.9 MG/DL (ref 2.5–4.5)
PLATELET # BLD AUTO: 465 10*3/MM3 (ref 140–450)
PMV BLD AUTO: 9.5 FL (ref 6–12)
POTASSIUM SERPL-SCNC: 4.4 MMOL/L (ref 3.5–5.2)
PROT SERPL-MCNC: 4.7 G/DL (ref 6–8.5)
RBC # BLD AUTO: 2.95 10*6/MM3 (ref 4.14–5.8)
SODIUM SERPL-SCNC: 141 MMOL/L (ref 136–145)
WBC NRBC COR # BLD AUTO: 17.74 10*3/MM3 (ref 3.4–10.8)

## 2024-03-20 PROCEDURE — 25010000002 HYDRALAZINE PER 20 MG: Performed by: SURGERY

## 2024-03-20 PROCEDURE — 84100 ASSAY OF PHOSPHORUS: CPT

## 2024-03-20 PROCEDURE — 25010000002 MAGNESIUM SULFATE PER 500 MG OF MAGNESIUM

## 2024-03-20 PROCEDURE — 25010000002 LABETALOL 5 MG/ML SOLUTION: Performed by: SURGERY

## 2024-03-20 PROCEDURE — 92610 EVALUATE SWALLOWING FUNCTION: CPT

## 2024-03-20 PROCEDURE — 85025 COMPLETE CBC W/AUTO DIFF WBC: CPT | Performed by: SURGERY

## 2024-03-20 PROCEDURE — 25010000002 PIPERACILLIN SOD-TAZOBACTAM PER 1 G: Performed by: SURGERY

## 2024-03-20 PROCEDURE — 25010000002 HYDROMORPHONE PER 4 MG: Performed by: SURGERY

## 2024-03-20 PROCEDURE — 25010000002 METOCLOPRAMIDE PER 10 MG: Performed by: SURGERY

## 2024-03-20 PROCEDURE — 25010000002 POTASSIUM CHLORIDE PER 2 MEQ OF POTASSIUM

## 2024-03-20 PROCEDURE — 63710000001 INSULIN REGULAR HUMAN PER 5 UNITS: Performed by: SURGERY

## 2024-03-20 PROCEDURE — 82948 REAGENT STRIP/BLOOD GLUCOSE: CPT

## 2024-03-20 PROCEDURE — 80053 COMPREHEN METABOLIC PANEL: CPT | Performed by: HOSPITALIST

## 2024-03-20 PROCEDURE — 25010000002 MICAFUNGIN SODIUM 100 MG RECONSTITUTED SOLUTION 1 EACH VIAL: Performed by: INTERNAL MEDICINE

## 2024-03-20 PROCEDURE — 99232 SBSQ HOSP IP/OBS MODERATE 35: CPT | Performed by: HOSPITALIST

## 2024-03-20 PROCEDURE — 25010000002 ENOXAPARIN PER 10 MG: Performed by: HOSPITALIST

## 2024-03-20 PROCEDURE — 25010000002 CALCIUM GLUCONATE PER 10 ML

## 2024-03-20 RX ORDER — OXYCODONE HYDROCHLORIDE 5 MG/1
5 TABLET ORAL EVERY 4 HOURS PRN
Status: DISPENSED | OUTPATIENT
Start: 2024-03-20 | End: 2024-03-27

## 2024-03-20 RX ORDER — ENOXAPARIN SODIUM 100 MG/ML
1 INJECTION SUBCUTANEOUS EVERY 12 HOURS SCHEDULED
Status: DISCONTINUED | OUTPATIENT
Start: 2024-03-20 | End: 2024-03-23

## 2024-03-20 RX ADMIN — LEVOTHYROXINE SODIUM 88 MCG: 88 TABLET ORAL at 05:12

## 2024-03-20 RX ADMIN — METOCLOPRAMIDE 10 MG: 5 INJECTION, SOLUTION INTRAMUSCULAR; INTRAVENOUS at 01:11

## 2024-03-20 RX ADMIN — HYDRALAZINE HYDROCHLORIDE 10 MG: 20 INJECTION INTRAMUSCULAR; INTRAVENOUS at 17:00

## 2024-03-20 RX ADMIN — VALPROIC ACID 375 MG: 250 SOLUTION ORAL at 05:14

## 2024-03-20 RX ADMIN — Medication 10 MG: at 11:14

## 2024-03-20 RX ADMIN — LISINOPRIL 20 MG: 20 TABLET ORAL at 08:00

## 2024-03-20 RX ADMIN — LEVETIRACETAM 500 MG: 500 TABLET, FILM COATED ORAL at 08:00

## 2024-03-20 RX ADMIN — LIDOCAINE 2 PATCH: 4 PATCH TOPICAL at 08:00

## 2024-03-20 RX ADMIN — PANTOPRAZOLE SODIUM 40 MG: 40 INJECTION, POWDER, FOR SOLUTION INTRAVENOUS at 05:12

## 2024-03-20 RX ADMIN — PIPERACILLIN AND TAZOBACTAM 3.38 G: 3; .375 INJECTION, POWDER, LYOPHILIZED, FOR SOLUTION INTRAVENOUS at 21:07

## 2024-03-20 RX ADMIN — LEVETIRACETAM 500 MG: 500 TABLET, FILM COATED ORAL at 21:07

## 2024-03-20 RX ADMIN — ACETAMINOPHEN 650 MG: 650 SOLUTION ORAL at 11:57

## 2024-03-20 RX ADMIN — WATER: 1 INJECTION INTRAMUSCULAR; INTRAVENOUS; SUBCUTANEOUS at 17:04

## 2024-03-20 RX ADMIN — METOCLOPRAMIDE 10 MG: 5 INJECTION, SOLUTION INTRAMUSCULAR; INTRAVENOUS at 17:02

## 2024-03-20 RX ADMIN — ISOSORBIDE DINITRATE 10 MG: 10 TABLET ORAL at 08:00

## 2024-03-20 RX ADMIN — ISOSORBIDE DINITRATE 10 MG: 10 TABLET ORAL at 17:02

## 2024-03-20 RX ADMIN — ENOXAPARIN SODIUM 100 MG: 100 INJECTION SUBCUTANEOUS at 21:07

## 2024-03-20 RX ADMIN — VALPROIC ACID 375 MG: 250 SOLUTION ORAL at 17:02

## 2024-03-20 RX ADMIN — HYDROMORPHONE HYDROCHLORIDE 0.5 MG: 1 INJECTION, SOLUTION INTRAMUSCULAR; INTRAVENOUS; SUBCUTANEOUS at 03:40

## 2024-03-20 RX ADMIN — INSULIN HUMAN 2 UNITS: 100 INJECTION, SOLUTION PARENTERAL at 23:16

## 2024-03-20 RX ADMIN — MICAFUNGIN 100 MG: 20 INJECTION, POWDER, LYOPHILIZED, FOR SOLUTION INTRAVENOUS at 15:45

## 2024-03-20 RX ADMIN — METOCLOPRAMIDE 10 MG: 5 INJECTION, SOLUTION INTRAMUSCULAR; INTRAVENOUS at 12:02

## 2024-03-20 RX ADMIN — PIPERACILLIN AND TAZOBACTAM 3.38 G: 3; .375 INJECTION, POWDER, LYOPHILIZED, FOR SOLUTION INTRAVENOUS at 05:12

## 2024-03-20 RX ADMIN — SMOFLIPID 250 ML: 6; 6; 5; 3 INJECTION, EMULSION INTRAVENOUS at 17:04

## 2024-03-20 RX ADMIN — Medication 10 ML: at 08:01

## 2024-03-20 RX ADMIN — VERAPAMIL HYDROCHLORIDE 80 MG: 80 TABLET ORAL at 13:01

## 2024-03-20 RX ADMIN — VERAPAMIL HYDROCHLORIDE 80 MG: 80 TABLET ORAL at 21:08

## 2024-03-20 RX ADMIN — INSULIN HUMAN 2 UNITS: 100 INJECTION, SOLUTION PARENTERAL at 01:10

## 2024-03-20 RX ADMIN — VALPROIC ACID 375 MG: 250 SOLUTION ORAL at 23:18

## 2024-03-20 RX ADMIN — Medication 10 MG: at 03:40

## 2024-03-20 RX ADMIN — VALPROIC ACID 375 MG: 250 SOLUTION ORAL at 11:15

## 2024-03-20 RX ADMIN — VERAPAMIL HYDROCHLORIDE 80 MG: 80 TABLET ORAL at 05:12

## 2024-03-20 RX ADMIN — ENOXAPARIN SODIUM 100 MG: 100 INJECTION SUBCUTANEOUS at 11:49

## 2024-03-20 RX ADMIN — INSULIN HUMAN 2 UNITS: 100 INJECTION, SOLUTION PARENTERAL at 17:13

## 2024-03-20 RX ADMIN — PIPERACILLIN AND TAZOBACTAM 3.38 G: 3; .375 INJECTION, POWDER, LYOPHILIZED, FOR SOLUTION INTRAVENOUS at 13:01

## 2024-03-20 RX ADMIN — METOCLOPRAMIDE 10 MG: 5 INJECTION, SOLUTION INTRAMUSCULAR; INTRAVENOUS at 08:00

## 2024-03-20 RX ADMIN — ISOSORBIDE DINITRATE 10 MG: 10 TABLET ORAL at 12:02

## 2024-03-20 RX ADMIN — INSULIN HUMAN 2 UNITS: 100 INJECTION, SOLUTION PARENTERAL at 05:12

## 2024-03-20 NOTE — PROGRESS NOTES
Pharmacy Parenteral Nutrition Evaluation    Lea Rivers is a  78 y.o. male receiving TPN.     Indication:     Prolonged Paralytic Ileus     Consulting Physician: Eleanor Yost DO     Total Fluid Rate (if stated): n/a    Labs  Results from last 7 days   Lab Units 03/15/24  0405 03/14/24  0514 03/13/24  0511 03/11/24  1618 03/11/24  1011 03/11/24  0508   SODIUM mmol/L 141 140 139   < >  --  138  138   POTASSIUM mmol/L 3.8 3.9 4.0   < >  --  3.2*  3.2*   CHLORIDE mmol/L 105 106 106   < >  --  107  107   CO2 mmol/L 28.0 28.0 28.0   < >  --  25.0  25.0   BUN mg/dL 18 19 18   < >  --  15  15   CREATININE mg/dL 0.54* 0.46* 0.44*   < >  --  0.66*  0.66*   CALCIUM mg/dL 7.8* 7.9* 7.8*   < >  --  8.3*  8.3*   BILIRUBIN mg/dL  --   --   --   --  0.3 0.3   ALK PHOS U/L  --   --   --   --  60 63   ALT (SGPT) U/L  --   --   --   --  29 31   AST (SGOT) U/L  --   --   --   --  20 23   GLUCOSE mg/dL 147* 159* 163*   < >  --  145*  145*    < > = values in this interval not displayed.       Results from last 7 days   Lab Units 03/15/24  0405 03/14/24  0514 03/13/24  1711 03/13/24  0511 03/12/24  0316 03/11/24  1618   MAGNESIUM mg/dL 2.0 2.2  --  1.9   < >  --    PHOSPHORUS mg/dL 2.9 2.4* 2.7 2.2*   < > 2.1*   PREALBUMIN mg/dL  --   --   --   --   --  5.4*    < > = values in this interval not displayed.       Results from last 7 days   Lab Units 03/17/24  0513 03/16/24  1429 03/16/24  0630 03/16/24  0543 03/15/24  0405   WBC 10*3/mm3 17.90*  --   --  15.84* 15.09*   HEMOGLOBIN g/dL 8.6* 7.9* 6.4* 6.6* 7.2*   HEMATOCRIT % 27.1* 26.2* 21.8* 21.4* 23.1*   PLATELETS 10*3/mm3 332  --   --  277 245       Triglycerides   Date Value Ref Range Status   03/11/2024 131 0 - 150 mg/dL Final     Comment:     Falsely depressed results may occur on samples drawn from patients receiving N-Acetylcysteine (NAC) or Metamizole.       estimated creatinine clearance is 125.7 mL/min (A) (by C-G formula based on SCr of 0.54 mg/dL  (L)).    Intake & Output (last 3 days)         03/05 0701 03/06 0700 03/06 0701  03/07 0700 03/07 0701 03/08 0700 03/08 0701 03/09 0700    P.O. 1080 240      I.V. (mL/kg)  1026.3 (11.5)      IV Piggyback  400      Total Intake(mL/kg) 1080 (12.1) 1666.3 (18.7)      Urine (mL/kg/hr) 150 (0.1) 400 (0.2)      Stool 0 0      Total Output 150 400      Net +930 +1266.3              Urine Unmeasured Occurrence 4 x 2 x 1 x     Stool Unmeasured Occurrence 5 x 4 x 3 x 1 x            Dietitian Recommendations  Diet Orders (active) (From admission, onward)       Start     Ordered    03/08/24 1800  Adult Cyclic Central 2-in-1 TPN  Cyclic TPN - See Admin Instructions        Note to Pharmacy: Bellmaster Rivers  5H  [id number]  MRN: [id number]  CSN: [id number]    03/08/24 1422    03/08/24 1800  Fat Emul Fish Oil/Plant Based (SMOFLIPID) 20 % emulsion 250 mL  Every 24 Hours Scheduled (TPN)         03/08/24 1422    03/08/24 1355  NPO Diet NPO Type: Strict NPO  Diet Effective Now         03/08/24 1358    03/05/24 1656  DIET MESSAGE Pt would like a grilled cheese sandwich, please. Thanks!  Once        Comments: Pt would like a grilled cheese sandwich, please. Thanks!    03/05/24 1656                    Current TPN Regimen Recommendation:   Dextrose 15% / Amino Acid 7.7% at a goal rate of 65 ml/hr.      20% Lipid Emulsion 250mL every 24 hours.    Na 45 mEq/L  K 50 mEq/L  Ca 5 mEq/L  Mg 8 mEq/L  PO4 15 mmol/L  Cl:Ace - 1:1    Assessment/Plan:  TPN for prolonged paralytic ileus.   Macronutrients per dietary recommendation and electrolyte per pharmacy recommendation are listed above (same as yesterday).  Electrolyte replacement per protocol, will continue with standard electrolytes.   SSI q6h ordered.  Pharmacy will continue to follow and adjust as indicated     Thanks  Yessy Espinoza, PharmD  3/20/2024  13:48 EDT

## 2024-03-20 NOTE — CASE MANAGEMENT/SOCIAL WORK
Continued Stay Note   [place]     Patient Name: Lea Rivers  MRN: [id number]  Today's Date: 3/20/2024    Admit Date: 3/2/2024    Plan: SNF   Discharge Plan       Row Name 03/20/24 1610       Plan    Plan SNF    Patient/Family in Agreement with Plan yes    Plan Comments Met with Mr. Rivers at the bedside to discuss discharge plan. Informed him that [facility] following [facility] for [facility]. He is not medically ready at this point. Rosario with [facility] wants to meet with patient at bedside before making a decision. CM will need to call her and set up time ([phone]).  will continue to follow.    Final Discharge Disposition Code 03 - skilled nursing facility (SNF)                   Discharge Codes    No documentation.                 Expected Discharge Date and Time       Expected Discharge Date Expected Discharge Time    Mar 25, 2024               Valerie Keith RN

## 2024-03-20 NOTE — PLAN OF CARE
Goal Outcome Evaluation:  Plan of Care Reviewed With: patient        Progress: improving  Outcome Evaluation: confusion still at times-drowsy, ra/ 2L NC when sleeping, HTN-prns given, NSR pac, fees tomorrow, ostomy output 175ml, OLIVER 15/30/20 ml, sling removed, abd binder in place, edema +3-4, up to chair with lift for several hours, attempts for IS, no c/o pain, pt/ot resumed to see in am, family updated

## 2024-03-20 NOTE — PROGRESS NOTES
"Patient Name:  Lea Rivers  YOB: 1945  [id number]    Surgery Progress Note    Date of visit: 3/20/2024    Subjective   Unable to obtain full history due to patient's dementia. No nausea/vomiting. No fevers.  Began having return of ostomy output this morning.         Objective       /85   Pulse 72   Temp 97.3 °F (36.3 °C) (Axillary)   Resp 22   Ht 170.2 cm (67.01\")   Wt 101 kg (222 lb)   SpO2 93%   BMI 34.76 kg/m²     Intake/Output Summary (Last 24 hours) at 3/20/2024 0717  Last data filed at 3/20/2024 0700  Gross per 24 hour   Intake 2676 ml   Output 370 ml   Net 2306 ml   OLIVER 1 70 cc  OLIVER 2 30 cc  OLIVER 3 20 cc    CV:  Rhythm regular and rate regular  L:  Clear to auscultation bilaterally  Abd:  Bowel sounds positive, soft, appropriately tender, incision clean dry and intact, ostomy viable and functioning, all OLIVER drains serosanguineous  Ext:  No cyanosis, clubbing, edema    Recent labs and imaging that are back at this time have been reviewed.   Creatinine 0.65  Albumin 1.9  WBC 17.7  Hemoglobin 8.6         Assessment & Plan     Patient postoperative day 11 from total abdominal colectomy with end ileostomy, postoperative day 2 abdominal washout. Pain control.  Diet per speech and swallow recommendations. Continue TPN until adequate oral intake. Broad spectrum abx while awaiting OR cultures.            Harley Godfrey MD  3/20/2024  07:17 EDT      "

## 2024-03-20 NOTE — PLAN OF CARE
Goal Outcome Evaluation:  Plan of Care Reviewed With: patient        Progress: no change         Anticipated Discharge Disposition (SLP): skilled nursing facility          SLP Swallowing Diagnosis: suspected pharyngeal dysphagia, oral dysphagia (03/20/24 6859)

## 2024-03-20 NOTE — NURSING NOTE
WOC follow-up for ostomy care and education.     Patient s/p total abdominal colectomy with end ileostomy.     Patient is not appropriate for education.  Ostomy appliance intact.  Stoma viable.Up in chair.     No family at bedside.    Appliance intact, liquid dark brown effluent.     Will continue to follow.     Pola Arnold RN, BSN, CCRN, CWOCN  Wound, Ostomy and Continence (WOC) Department  [facility]

## 2024-03-20 NOTE — PROGRESS NOTES
[facility] Medicine Services  PROGRESS NOTE    Patient Name: Lea Rivers  : 1945  MRN: [id number]    Date of Admission: 3/2/2024  Primary Care Physician: Mannie Melvin MD    Subjective   Subjective     CC: Follow up colectomy    HPI: Awake and alert. Up in chair. Seemingly more alert/conversant today.     Objective   Objective     Vital Signs:   Temp:  [97.3 °F (36.3 °C)-98.4 °F (36.9 °C)] 98.2 °F (36.8 °C)  Heart Rate:  [72-96] 82  Resp:  [16-22] 20  BP: (158-197)/() 170/94  Flow (L/min):  [2] 2     Physical Exam:  NAD, alert and oriented  OP clear, dry MM  Neck supple  No LAD  RRR  Decreased at bases, otherwise clear  Abdominal binder in placed, ostomy appliance in place, drains noted  B LE edema, heels dressed B  SHELBY  Flat affect  No change from 3/19    Results Reviewed:  LAB RESULTS:      Lab 24  0510 24  1643 24  0936 24  0715 24  0513 24  1429 24  0630 24  0543 03/15/24  0405 24  1125 24  0934   WBC 17.74*  --  22.83* 16.67* 17.90*  --   --  15.84* 15.09*   < >  --    HEMOGLOBIN 8.6*  --  9.0* 8.0* 8.6* 7.9*   < > 6.6* 7.2*   < >  --    HEMATOCRIT 27.6*  --  31.4* 24.6* 27.1* 26.2*   < > 21.4* 23.1*   < >  --    PLATELETS 465*  --  413 379 332  --   --  277 245   < >  --    NEUTROS ABS 13.73*  --  18.38* 13.50* 13.78*  --   --  11.72* 10.75*   < >  --    IMMATURE GRANS (ABS) 0.96*  --  1.18*  --   --   --   --   --  1.30*  --   --    LYMPHS ABS 1.07  --  0.81  --   --   --   --   --  1.04  --   --    MONOS ABS 1.77*  --  2.29*  --   --   --   --   --  1.31*  --   --    EOS ABS 0.12  --  0.01 0.00 0.18  --   --  1.11* 0.61*   < >  --    MCV 93.6  --  101.0* 101.2* 102.7*  --   --  95.1 93.5   < >  --    CRP  --   --  13.84*  --   --   --   --   --   --   --   --    LACTATE  --   --   --   --   --   --   --   --   --   --  1.5   PROTIME  --  15.9*  --   --   --   --   --   --   --   --   --      < > = values in this interval not displayed.         Lab 03/20/24  0510 03/15/24  0405 03/14/24  0514 03/13/24  1711   SODIUM 141 141 140  --    POTASSIUM 4.4 3.8 3.9  --    CHLORIDE 106 105 106  --    CO2 29.0 28.0 28.0  --    ANION GAP 6.0 8.0 6.0  --    BUN 26* 18 19  --    CREATININE 0.65* 0.54* 0.46*  --    EGFR 95.8 101.4 106.4  --    GLUCOSE 161* 147* 159*  --    CALCIUM 7.6* 7.8* 7.9*  --    MAGNESIUM  --  2.0 2.2  --    PHOSPHORUS  --  2.9 2.4* 2.7         Lab 03/20/24  0510   TOTAL PROTEIN 4.7*   ALBUMIN 1.9*   GLOBULIN 2.8   ALT (SGPT) 5   AST (SGOT) 14   BILIRUBIN 0.2   ALK PHOS 77           Lab 03/19/24  1643   PROTIME 15.9*   INR 1.26*             Lab 03/19/24  0936   TRIGLYCERIDES 167*           Lab 03/16/24  0630   ABO TYPING O   RH TYPING Negative   ANTIBODY SCREEN Negative           Brief Urine Lab Results  (Last result in the past 365 days)        Color   Clarity   Blood   Leuk Est   Nitrite   Protein   CREAT   Urine HCG        03/11/24 1337 Yellow   Cloudy   Moderate (2+)   Trace   Negative   30 mg/dL (1+)                   Microbiology Results Abnormal       Procedure Component Value - Date/Time    AFB Culture - Peritoneal Fluid, Perineum [[id number]] Collected: 03/18/24 1349    Lab Status: Preliminary result Specimen: Peritoneal Fluid from Perineum Updated: 03/19/24 1339     AFB Stain No acid fast bacilli seen on concentrated smear    Urine Culture - Urine, Indwelling Urethral Catheter [[id number]]  (Normal) Collected: 03/11/24 1337    Lab Status: Final result Specimen: Urine from Indwelling Urethral Catheter Updated: 03/12/24 2024     Urine Culture No growth    Blood Culture - Blood, Arm, Right [[id number]]  (Normal) Collected: 03/06/24 1857    Lab Status: Final result Specimen: Blood from Arm, Right Updated: 03/11/24 2045     Blood Culture No growth at 5 days    Blood Culture - Blood, Arm, Right [[id number]]  (Normal) Collected: 03/06/24 1751    Lab Status: Final result Specimen: Blood from Arm,  Right Updated: 03/11/24 2000     Blood Culture No growth at 5 days    Blood Culture - Blood, Arm, Right [[id number]]  (Normal) Collected: 03/02/24 1132    Lab Status: Final result Specimen: Blood from Arm, Right Updated: 03/07/24 1201     Blood Culture No growth at 5 days    Narrative:      Less than seven (7) mL's of blood was collected.  Insufficient quantity may yield false negative results.    Blood Culture - Blood, Arm, Right [[id number]]  (Normal) Collected: 03/02/24 1132    Lab Status: Final result Specimen: Blood from Arm, Right Updated: 03/07/24 1201     Blood Culture No growth at 5 days    Narrative:      Less than seven (7) mL's of blood was collected.  Insufficient quantity may yield false negative results.    Urine Culture - Urine, Straight Cath [[id number]]  (Normal) Collected: 03/02/24 0923    Lab Status: Final result Specimen: Urine from Straight Cath Updated: 03/03/24 1601     Urine Culture No growth    COVID PRE-OP / PRE-PROCEDURE SCREENING ORDER (NO ISOLATION) - Swab, Nasopharynx [[id number]]  (Normal) Collected: 03/02/24 1133    Lab Status: Final result Specimen: Swab from Nasopharynx Updated: 03/02/24 1227    Narrative:      The following orders were created for panel order COVID PRE-OP / PRE-PROCEDURE SCREENING ORDER (NO ISOLATION) - Swab, Nasopharynx.  Procedure                               Abnormality         Status                     ---------                               -----------         ------                     COVID-19, FLU A/B, RSV P...[[id number]]  Normal              Final result                 Please view results for these tests on the individual orders.    COVID-19, FLU A/B, RSV PCR 1 HR TAT - Swab, Nasopharynx [[id number]]  (Normal) Collected: 03/02/24 1133    Lab Status: Final result Specimen: Swab from Nasopharynx Updated: 03/02/24 1227     COVID19 Not Detected     Influenza A PCR Not Detected     Influenza B PCR Not Detected     RSV, PCR Not Detected    Narrative:      Fact  sheet for providers: https://www.fda.gov/media/322254/download    Fact sheet for patients: https://www.fda.gov/media/990762/download    Test performed by PCR.            Peripheral Block    Result Date: 3/18/2024  Maxim Manrique CRNA     3/18/2024  1:37 PM Peripheral Block Patient reassessed immediately prior to procedure Patient location during procedure: OR Reason for block: at surgeon's request and post-op pain management Performed by CRNA/CAA: Maxim Manrique CRNA Assisted by: Yessy Slater RN Preanesthetic Checklist Completed: patient identified, IV checked, site marked, risks and benefits discussed, surgical consent, monitors and equipment checked, pre-op evaluation and timeout performed Prep: Pt Position: supine Sterile barriers:cap, gloves, mask and washed/disinfected hands Prep: ChloraPrep Patient monitoring: blood pressure monitoring, continuous pulse oximetry and EKG Procedure Sedation: yes Performed under: general Guidance:ultrasound guided Images:still images obtained, printed/placed on chart Laterality:Bilateral Block Type:TAP Injection Technique:single-shot Needle Type:short-bevel and echogenic Needle Gauge:20 G Resistance on Injection: none Medications Used: dexamethasone sodium phosphate injection - Injection  4 mg - 3/18/2024 1:19:00 PM bupivacaine PF (MARCAINE) 0.25 % injection - Injection  60 mL - 3/18/2024 1:19:00 PM Medications Preservative Free Saline:10ml Comment:Block Injection:  LA dose divided between Right and Left block Post Assessment Injection Assessment: negative aspiration for heme, incremental injection and no paresthesia on injection Patient Tolerance:comfortable throughout block Complications:no Additional Notes Subcostal TAPs A high-frequency linear transducer, with sterile cover, was placed sub-xiphoid to identify [place], right and left Rectus Abdominus Muscles (ERICA). The transducer was moved either right or left subcostally to identify the ERICA and the Transverse  "Abdominus Muscle (RODRIGUEZ). The insertion site was prepped in sterile fashion and then localized with 2-5 ml of 1% Lidocaine. Using ultrasound-guidance, a 20-gauge B-Santos 4\" Ultraplex 360 non-stimulating echogenic needle was advanced in plane, from medial to lateral, until the tip of the needle was in the fascial plane between the ERICA and RODRIGUEZ. 1-3ml of preservative free normal saline was used to hydro-dissect the fascial planes. After the fascial plane was verified, the local anesthetic (LA) was injected. The procedure was repeated on the opposite side for bilateral coverage. Aspiration every 5 ml to prevent intravascular injection. Injection was completed with negative aspiration of blood and negative intravascular injection. Injection pressures were normal with minimal resistance. The subcostal approach to the TAP nerve block ideally anesthetizes the intercostal nerves T6-T9. Mid-Axillary/Lateral TAPs A high-frequency linear transducer, with sterile cover, was placed in the midaxillary line between the ASIS and costal margin. The External Oblique Muscle (EOM), Internal Oblique Muscle (IOM), Transverse Abdominus Muscle (RODRIGUEZ), and Peritoneum were identified. The insertion site was prepped in sterile fashion and then localized with 2-5 ml of 1% Lidocaine. Using ultrasound-guidance, a 20-gauge B-Santos 4\" Ultraplex 360 non-stimulating echogenic needle was advanced in plane, from medial to lateral, until the tip of the needle was in the fascial plane between the IOM and RODRIGUEZ. 1-3ml of preservative free normal saline was used to hydro-dissect the fascial planes. After the fascial plane was verified, the local anesthetic (LA) was injected. The procedure was repeated on the opposite side for bilateral coverage. Aspiration every 5 ml to prevent intravascular injection. Injection was completed with negative aspiration of blood and negative intravascular injection. Injection pressures were normal with minimal resistance. " Midaxillary TAPs should reach intercostal nerves T10- T11 and the subcostal nerve T12.      CT Abdomen Pelvis With Contrast    Result Date: 3/18/2024  CT ABDOMEN PELVIS W CONTRAST Date of Exam: 3/18/2024 11:32 AM EDT Indication: Abdominal abscess (Ped 0-17y) Patient POD#9 total colectomy, end ileostomy with recurrent fevers. Comparison: CT of the abdomen and pelvis dated 3/12/2024 Technique: Axial CT images were obtained of the abdomen and pelvis following the uneventful intravenous administration of 85 mL Isovue-300. Reconstructed coronal and sagittal images were also obtained. Automated exposure control and iterative construction methods were used. Findings: Liver: The liver is unremarkable in morphology. No focal liver lesion is seen. No biliary dilation is seen. Gallbladder: The gallbladder is not identified. Pancreas: Unremarkable. Spleen: Unremarkable. Adrenal glands: Unremarkable. Genitourinary tract: Kidneys appear unremarkable. No hydronephrosis is seen. The visualized portions of the ureters are unremarkable. Urinary bladder is unremarkable. There are surgical changes of the prostate gland. Gastrointestinal tract: Patient is status post total colectomy with right-sided ileostomy and rectal pouch noted. Remainder of the visualized hollow viscera is unremarkable. No evidence of bowel obstruction. Other findings: Surgical drain terminates within the dependent pelvis. There is moderate loculated ascitic fluid collecting within the anterior abdomen. Largest locule within the left anterior abdomen measures approximately 15 x 6 cm. Milder loculated fluid near the caudal aspect of the liver measures approximately 9 x 6 cm. Additional scattered fluid noted. Infected fluid cannot be excluded. No free air is identified. No pathologically enlarged lymph nodes are seen. Vascular calcifications are present. The IVC is unremarkable. Bones and soft tissues: No acute or suspicious osseous or soft tissue lesion is  identified. Superficial soft tissue anasarca is noted. There are surgical changes of the ventral abdominal wall. Lung bases: Partially visualized bilateral pleural effusions with bibasilar atelectasis. Calcified granuloma within the right lung base.     Impression: Impression: 1.Postsurgical changes of total colectomy with right-sided ileostomy and rectal pouch. Surgical drain terminates within the dependent pelvis. 2.There is moderate loculated ascitic fluid collecting within the anterior abdomen. Largest locule within the left anterior abdomen measures approximately 15 x 6 cm. Milder loculated fluid near the caudal aspect of the liver measures approximately 9 x 6 cm. Additional scattered fluid also noted. Infected fluid cannot be excluded. 3.Additional findings as detailed above. Electronically Signed: Peter Bill MD  3/18/2024 12:00 PM EDT  Workstation ID: [id number]     Results for orders placed during the hospital encounter of 07/20/21    Adult Transthoracic Echo Complete W/ Cont if Necessary Per Protocol    Interpretation Summary  · Left ventricular wall thickness is consistent with mild concentric hypertrophy.  · Normal left-ventricular systolic function, estimated EF 65%.  · Left ventricular diastolic function is consistent with (grade I) impaired relaxation.  · Aortic sclerosis without aortic stenosis. Trace aortic insufficiency.  · Trace mitral regurgitation, trace tricuspid regurgitation.      Current medications:  Scheduled Meds:[Held by provider] enoxaparin, 1 mg/kg, Subcutaneous, Q12H  Fat Emul Fish Oil/Plant Based, 250 mL, Intravenous, Q24H (TPN)  fluconazole, 400 mg, Intravenous, Q24H  insulin regular, 2-7 Units, Subcutaneous, Q6H  isosorbide dinitrate, 10 mg, Oral, TID - Nitrates  levETIRAcetam, 500 mg, Oral, Q12H  levothyroxine, 88 mcg, Oral, Q AM  Lidocaine, 2 patch, Transdermal, Q24H  lisinopril, 20 mg, Oral, Q24H  metoclopramide, 10 mg, Intravenous, Q6H  pantoprazole, 40 mg, Intravenous, Q  AM  piperacillin-tazobactam, 3.375 g, Intravenous, Q8H  sodium chloride, 10 mL, Intravenous, Q12H  Valproic Acid, 375 mg, Oral, Q6H  verapamil, 80 mg, Oral, Q8H      Continuous Infusions:Adult Central 2-in-1 TPN, , Last Rate: 65 mL/hr at 03/19/24 1758  Pharmacy to Dose TPN,           PRN Meds:.  acetaminophen **OR** acetaminophen **OR** acetaminophen    calcium carbonate    Calcium Replacement - Follow Nurse / BPA Driven Protocol    dextrose    dextrose    docusate sodium    fluticasone    glucagon (human recombinant)    hydrALAZINE    ipratropium-albuterol    labetalol    Magnesium Standard Dose Replacement - Follow Nurse / BPA Driven Protocol    [DISCONTINUED] HYDROmorphone **AND** naloxone    ondansetron ODT **OR** ondansetron    oxyCODONE    Pharmacy to Dose TPN    Phosphorus Replacement - Follow Nurse / BPA Driven Protocol    Potassium Replacement - Follow Nurse / BPA Driven Protocol    sodium chloride    sodium chloride    sodium chloride    sodium chloride    sodium chloride    sodium chloride    Assessment & Plan   Assessment & Plan     Active Hospital Problems    Diagnosis  POA    **Falls [W19.XXXA]  Yes    Pneumatosis intestinalis [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        Brief Hospital Course to date:  Lea Rivers is a 78 y.o. male with past medical history of hypertension, hyperlipidemia, hypothyroidism, seizure disorder.  Patient is being admitted for a fall and nondisplaced first through fourth left rib fractures.  Patient also has left distal clavicle fracture.    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 on 3/9 w/  Persistent  Leukocytosis  LLL PNA  -s/p repeat CT 3/18, with fluid collection noted, s/p exploratory laparotomy 3/18 with abdominal washout and ascites noted  -on zosyn/diflucan empirically for now  -completed course of Fluconazole, CTX/Flagyl 3/17 prior to repeat  surgery  -CXR 3/12 with possible LLL pneumonia, completed course of CTX  -will ask ID for antibiotic recommendations, abdominal fluid from 3/18 with GNR    Toxic metabolic encephalopathy  -per daughter significant change in mental status and speech since arrival, and team had a long discussion and felt this is probably TME in setting of infection, recent large operation, underlying dementia  -initial CT head 3/2 negative, repeated CT head 3/13 without any acute findings  -EEG negative for seizures, findings consistent with TME  -minimize sedating meds   -slowly improving    Multiple falls with increasing frequency   -CT of head shows age-related changes which are chronic but no acute process  -neuro consulted, likely d/t neuropathy (confirmed with EMG) and progression of dementia     Multiple rib fractures and also left clavicle fracture  -With no displacement or mildly displaced.  Orthopedic surgery evaluated. No surgical intervention  planned.  Recs nonweightbearing LUE, may come out of sling in 5-7 days to initiate gentle ROM exercises per ortho.  F/u with ortho/Dr. Maldonado in 2 weeks  -Pain control, lidocaine patch  -bruise noted to L shoulder/neck region however X-ray negative    Acute on Chronic Anemia  -S/P 1 unit PRBCs 3/11 and 3/16, monitoring H&H  -no clear source of active bleeding, hemoccult negative  -transfuse PRN hgb <7, hold therapeutic lovenox for now    Acute RUE DVT (brachial)  -Provoked, 2/2 PICC   -therapeutic lovenox started 3/13, on hold per surgery due to worsening anemia  -will d/w surgery and resume when appropriate     Prostate cancer  Hx BPH s/p greenlight photo vaporization of prostate 2018  -Follows with Dr. Noland  -S/P cyberknife radiation 2/9/23  -Has intermittent hematuria, monitor while on AC     Dementia and increasing memory problem  -Patient was previously living alone and driving, given significant decline will need placement     Hypertension  -cleared by speech, resumed  home oral regimen  -PRN IV dosing for SBP >180    Seizure disorder  -continue vimpat and keppra    Hyperlipidemia  Hypothyroidism  -resume home regimen    Expected Discharge Location and Transportation: SNF  Expected Discharge   Expected Discharge Date: 3/25/2024; Expected Discharge Time:      DVT prophylaxis:  Medical and mechanical DVT prophylaxis orders are present.         AM-PAC 6 Clicks Score (PT): 9 (03/19/24 0800)    CODE STATUS:   Code Status and Medical Interventions:   Ordered at: 03/02/24 1456     Medical Intervention Limits:    NO intubation (DNI)     Code Status (Patient has no pulse and is not breathing):    No CPR (Do Not Attempt to Resuscitate)     Medical Interventions (Patient has pulse or is breathing):    Limited Support       Fausto Melendez MD  03/20/24

## 2024-03-20 NOTE — THERAPY RE-EVALUATION
Acute Care - Speech Language Pathology   Swallow Re-Evaluation [facility]  Clinical Swallow Evaluation     Patient Name: Lea Rivers  : 1945  MRN: [id number]  Today's Date: 3/20/2024               Admit Date: 3/2/2024    Visit Dx:     ICD-10-CM ICD-9-CM   1. Fall, initial encounter  W19.XXXA E888.9   2. Closed fracture of multiple ribs of left side, initial encounter  S22.42XA 807.09   3. Closed displaced fracture of acromial end of left clavicle, initial encounter  S42.032A 810.03   4. Falls frequently  R29.6 V15.88   5. Acute UTI (urinary tract infection)  N39.0 599.0   6. Colon distention  K63.89 569.89   7. Paralytic ileus of small intestine and colon  K56.0 560.1   8. Oropharyngeal dysphagia  R13.12 787.22   9. Intra-abdominal fluid  R18.8 789.59     Patient Active Problem List   Diagnosis    Coronary artery disease    Dyslipidemia    Hypothyroidism    GERD (gastroesophageal reflux disease)    Seizure disorder    BPH (benign prostatic hypertrophy)    Hypertension    Primary osteoarthritis of both knees    Syncope and collapse    Precordial pain    Diabetes    Status post total right knee replacement    Postoperative urinary retention    Confusion, postoperative    Acute blood loss anemia, asymptomatic, no transfusion required    Elevated prostate specific antigen (PSA)    Prostate cancer    Anemia    Body mass index (BMI) of 32.0-32.9 in adult    Localization-related focal epilepsy with simple partial seizures    Need for vaccination against Streptococcus pneumoniae    Upper extremity tendon tear, right, initial encounter    Vitamin D deficiency    PSA elevation    Polyp of colon    Overactive bladder    Gross hematuria    History of colon polyps    History of colon cancer    B12 deficiency    Falls    Pneumatosis intestinalis     Past Medical History:   Diagnosis Date    Anemia     Colon cancer     Status post partial colectomy in . No chemotherapy or radiation therapy.    Coronary  artery disease     Nonobstructive coronary artery disease.    Diabetes     Dyslipidemia     GERD (gastroesophageal reflux disease)     Hx of.    Hyperlipidemia     Hypertension     Hyponatremia     Hypothyroidism     Left shoulder pain     Low sodium levels 2022    recent issue; saw nephrologist who ruled out kidney issues; took Sodium Chloride po to bring levels up    Memory deficit     FROM ANESTHESIA    Osteoarthritis     Prostate cancer 07/23/2022    Seizure disorder     silent seziure-diagnosed years ago    Skin cancer      Past Surgical History:   Procedure Laterality Date    APPENDECTOMY      CARDIAC CATHETERIZATION  2012    30 to 40% LAD    CARPAL TUNNEL RELEASE Bilateral     CHOLECYSTECTOMY      COLECTOMY PARTIAL / TOTAL  1990    Partial colectomy    COLON RESECTION N/A 3/9/2024    Procedure: TOTAL ABDOMINAL COLECTOMY, END ILEOSTOMY;  Surgeon: Harley Godfrey MD;  Location:  [facility];  Service: General;  Laterality: N/A;    COLONOSCOPY      CYBERKNIFE  02/09/2023    Prostate/SV    CYSTOSCOPY TRANSURETHRAL RESECTION OF PROSTATE N/A 09/21/2018    Procedure: CYSTOSCOPY TRANSURETHRAL RESECTION OF PROSTATE GREENLIGHT;  Surgeon: Naseem Clayton MD;  Location:  [facility];  Service: Urology    EXPLORATORY LAPAROTOMY N/A 3/18/2024    Procedure: LAPAROTOMY EXPLORATORY, ABDOMINAL WASH OUT;  Surgeon: Harley Godfrey MD;  Location:  [facility];  Service: General;  Laterality: N/A;    OTHER SURGICAL HISTORY      Contraction surgery on bilateral hands and wrists.    PROSTATE BIOPSY N/A 11/11/2022    Procedure: TRANSRECTAL ULTRASOUND GUIDED BIOPSY OF PROSTATE;  Surgeon: Naseem Noland MD;  Location:  [facility];  Service: Urology;  Laterality: N/A;    SINUS SURGERY      SKIN BIOPSY      TEETH EXTRACTION      TONSILLECTOMY      TOTAL KNEE ARTHROPLASTY Right 04/07/2022    Procedure: TOTAL KNEE ARTHROPLASTY WITH ORTHO ROBOT RIGHT;  Surgeon: Louis Malcolm MD;  Location:  [facility];  Service: Robotics -  Ortho;  Laterality: Right;    TRANSRECTAL INCISION PROSTATE WITH GOLD SEED Bilateral 01/06/2023    Procedure: TRANSRECTAL ULTRASOUND GUIDED PROSTATE FIDUCIAL MARKER PLACEMENT;  Surgeon: Naseem Noland MD;  Location: [facility];  Service: Urology;  Laterality: Bilateral;    TURP / TRANSURETHRAL INCISION / DRAINAGE PROSTATE      VASECTOMY         SLP Recommendation and Plan  SLP Swallowing Diagnosis: suspected pharyngeal dysphagia, oral dysphagia (03/20/24 1430)  SLP Diet Recommendation: NPO, other (see comments) (except ice chips sparingly w/ supervision, as tolerated) (03/20/24 1430)     SLP Rec. for Method of Medication Administration: meds via alternate route (per physician discretion) (03/20/24 1430)        Recommended Diagnostics: FEES (03/20/24 1430)  Swallow Criteria for Skilled Therapeutic Interventions Met: demonstrates skilled criteria (03/20/24 1430)  Anticipated Discharge Disposition (SLP): skilled nursing facility (03/20/24 1430)  Rehab Potential/Prognosis, Swallowing: good, to achieve stated therapy goals (03/20/24 1430)        Oral Care Recommendations: Oral Care BID/PRN, Suction toothbrush (03/20/24 1430)                                        Plan of Care Reviewed With: patient  Progress: no change      SWALLOW EVALUATION (Last 72 Hours)       SLP Adult Swallow Evaluation       Row Name 03/20/24 1430                   Rehab Evaluation    Document Type re-evaluation  -AC        Subjective Information no complaints  -AC        Patient Observations alert;cooperative  -AC        Patient/Family/Caregiver Comments/Observations Pleasantly confused. Staff sitter present.  -AC        Patient Effort adequate  -AC        Oral Care swabbed with antiseptic solution;oral rinse provided;suction provided  -AC           General Information    Patient Profile Reviewed yes  -AC        Pertinent History Of Current Problem See previous eval. FEE 3/14 revealed silent aspiration, oropharyngeal dysphagia, and SLP  recommended pureed diet and honey-thick liquid. Pt was then made NPO after developed fever. S/p exploratory lap/washout 3/18. SLP consult discontinued. Reconsulted today when surgeon gave clearance for pt to resume PO diet per SLP recs. Spoke to Dr. Godfrey on the phone who reported no restrictions on diet texture per surgery standpoint.  -AC        Current Method of Nutrition NPO;parenteral feedings  -AC        Prior Level of Function-Swallowing no diet consistency restrictions  PTA  -AC        Plans/Goals Discussed with patient;agreed upon  -AC        Barriers to Rehab cognitive status;medically complex  -AC        Patient's Goals for Discharge patient did not state  -AC           Pain Scale: FACES Pre/Post-Treatment    Pain: FACES Scale, Pretreatment 0-->no hurt  -AC        Posttreatment Pain Rating 0-->no hurt  -AC           Oral Motor Structure and Function    Dentition Assessment natural, present and adequate  -AC        Secretion Management WNL/WFL  -AC        Mucosal Quality dry  -AC        Volitional Swallow unable to elicit  -AC        Volitional Cough weak;reduced respiratory support  -AC           Oral Musculature and Cranial Nerve Assessment    Oral Motor General Assessment generalized oral motor weakness  -AC           General Eating/Swallowing Observations    Respiratory Support Currently in Use nasal cannula  -AC        O2 Liters 2L  -AC        Eating/Swallowing Skills fed by SLP  -AC        Positioning During Eating upright 90 degree;upright in bed  -AC        Utensils Used spoon;cup  -AC        Consistencies Trialed thin liquids;honey-thick liquids;pureed  -AC           Respiratory    Respiratory Status increase in respiratory rate;during swallowing/eating  -AC           Clinical Swallow Eval    Oral Prep Phase impaired  -AC        Oral Transit WFL  -AC        Oral Residue WFL  -AC        Pharyngeal Phase suspected pharyngeal impairment  -AC        Esophageal Phase unremarkable  -AC           Oral  Prep Concerns    Oral Prep Concerns incomplete or weak lip closure around spoon  -        Incomplete or Weak Lip Closure Around Spoon thin;honey;other (see comments)  initially--appeared cognitive-based  -           Pharyngeal Phase Concerns    Pharyngeal Phase Concerns multiple swallows;acute vocal quality changes  -        Acute Vocal Quality Changes dysphonia;other (see comments)  weak vocal quality--RN reported voice did not sound as dysphonic prior to pt sleeping/mouth breathing  -        Multiple Swallows all consistencies  -        Pharyngeal Phase Concerns, Comment Recent hx silent aspiration.  -           SLP Evaluation Clinical Impression    SLP Swallowing Diagnosis suspected pharyngeal dysphagia;oral dysphagia  -        Functional Impact risk of aspiration/pneumonia  -        Rehab Potential/Prognosis, Swallowing good, to achieve stated therapy goals  -        Swallow Criteria for Skilled Therapeutic Interventions Met demonstrates skilled criteria  -           Recommendations    SLP Diet Recommendation NPO;other (see comments)  except ice chips sparingly w/ supervision, as tolerated  -        Recommended Diagnostics FEES  -        Oral Care Recommendations Oral Care BID/PRN;Suction toothbrush  -        SLP Rec. for Method of Medication Administration meds via alternate route  per physician discretion  -        Anticipated Discharge Disposition (SLP) skilled nursing facility  -                  User Key  (r) = Recorded By, (t) = Taken By, (c) = Cosigned By      Initials Name Effective Dates     Shyanne Ramos MS [facility]-SLP 02/03/23 -                     EDUCATION  The patient has been educated in the following areas:   Dysphagia (Swallowing Impairment) Oral Care/Hydration NPO rationale.              Time Calculation:    Time Calculation- SLP       Row Name 03/20/24              Time Calculation- SLP    SLP Start Time 1430  -      SLP Received On 03/20/24  -          Untimed Charges    50328-FJ Eval Oral Pharyng Swallow Minutes 42  -AC         Total Minutes    Untimed Charges Total Minutes 42  -AC       Total Minutes 42  -AC                User Key  (r) = Recorded By, (t) = Taken By, (c) = Cosigned By      Initials Name Provider Type    Shyanne Ryan MS CCC-SLP Speech and Language Pathologist                    Therapy Charges for Today       Code Description Service Date Service Provider Modifiers Qty    [id number]  ST EVAL ORAL PHARYNG SWALLOW 3 3/20/2024 Shyanne Ramos MS CCC-SLP GN 1                 Shyanne Ramos MS CCC-SLP  3/20/2024

## 2024-03-20 NOTE — PROGRESS NOTES
CPN Review Note    Patient Name: Lea Rivers  Date of Encounter: 03/20/24 10:50 EDT  MRN: [id number]  Admission date: 3/2/2024    Reason for visit: CPN review . RD to continue to follow per protocol.     Information Obtained:   Pt with Ileostomy output today. No N/V. SLP eval today - results pending. PN infusing with correct Rx. Per Surgery continue PN until adequate intake.      EMR reviewed:  yes  Medication reviewed:  yes   Labs reviewed: yes;     24hr I&Os:  Ileostomy - 75mL out    Current diet: Adult Central 2-in-1 TPN    Estimated Needs:  Calories:  ~ 1800 Kcal/day  Protein: ~120 g PRO/day    PN Route: PICC  PN:  15% dextrose, 7.7% AA @ goal rate of 65mL/hr       GIR:  1.82mg/kg/min   Lipid:  Provide 250mL 20% SMOF daily        PN@ Goal over 24hr to Supply:  Volume 1560 mL/day     Energy 1776 Kcal/day 99 % Est Need   Protein 120 g/day 100 % Est Need     ---------------------------------------------------------------------------  Formula/Rate verified at bedside: Yes  Infusing Rate at time of visit: 65mL/hr    Average Delivery from Charting: Insufficient data < 24hr infusion vs inadequate documentation   PN Volume 241.3 mL/day     Lipid delivered?  Yes       Energy  Kcal/day  % Est Need   Protein  g/day  % Est Need       Intervention:  Care plan reviewed     No changes to PN warranted at this time  Will continue to monitor for diet recs per SLP  May want to consider placing keofeed and transitioning to EN with continued GI fxn.      Follow up:   Per protocol      Adelita Loza MS,RD,LD  10:50 EDT  Time: 20min

## 2024-03-20 NOTE — CONSULTS
Lea Rivers  1945  [id number]    Date of Consult: 3/20/2024    Admit Date:  3/2/2024      Requesting Provider: Dr Melendez  Evaluating Physician: Louis Chow MD    Chief Complaint: abdpain    Reason for Consultation: IA infection    History of present illness:     Patient is a 78 y.o.  Yr old male with history of seizure disorder, colon cancer/prostate cancer with prior radiation, diabetes and dementia per admission notes with admission March 2 after frequent falls noted to have multiple rib fractures and left side clavicular fracture in the emergency room.noted to have significant colon distention with potential for pneumatosis on CT scan and seen by gastroenterology/surgery.taken for surgery on March 9 with diagnosis of toxic dilation of the colon for total abdominal colectomy and end ileostomy; medicine notes indicate he received ceftriaxone/Flagyl and Diflucan.  Leukocytosis persisted.repeat CT scan on March 18 with postsurgical changes, moderate loculated ascitic fluid in the anterior abdomen as described by radiology.taken back to surgery on March 18 for exploratory laparotomy with washout.  Culture subsequently with gram-negative manuel at least.  Empiric antibiotics adjusted to Zosyn/Mycamine    He does interact and follow commands although his insight is generally poor.  He has abdominal pain which is constant, dull at present, sharp at times, worse with palpation, better with pain meds and he will not attach a numerical severity.    He denies headache photophobia or neck stiffness.  No dyspnea or cough.  He has a sling on the left arm with left arm PICC line.  No rash.  Nursing reports no other new focal pain or distress.    Patient reports No raw or undercooked food.  No unpasteurized milk or milk products.  No animal insect or arthropod exposure.  No tick bites.  No outdoor camping or hunting exposure.  No travel exposure.  No ill exposure.  No history of TB or TB exposure.   Denies a  history of MRSA/VRE and no history of C. difficile or ESBL/KPC  organisms.    Past Medical History:   Diagnosis Date    Anemia     Colon cancer 1990    Status post partial colectomy in 1990. No chemotherapy or radiation therapy.    Coronary artery disease     Nonobstructive coronary artery disease.    Diabetes     Dyslipidemia     GERD (gastroesophageal reflux disease)     Hx of.    Hyperlipidemia     Hypertension     Hyponatremia     Hypothyroidism     Left shoulder pain     Low sodium levels 2022    recent issue; saw nephrologist who ruled out kidney issues; took Sodium Chloride po to bring levels up    Memory deficit     FROM ANESTHESIA    Osteoarthritis     Prostate cancer 07/23/2022    Seizure disorder     silent seziure-diagnosed years ago    Skin cancer        Past Surgical History:   Procedure Laterality Date    APPENDECTOMY      CARDIAC CATHETERIZATION  2012    30 to 40% LAD    CARPAL TUNNEL RELEASE Bilateral     CHOLECYSTECTOMY      COLECTOMY PARTIAL / TOTAL  1990    Partial colectomy    COLON RESECTION N/A 3/9/2024    Procedure: TOTAL ABDOMINAL COLECTOMY, END ILEOSTOMY;  Surgeon: Harley Godfrey MD;  Location:  [facility];  Service: General;  Laterality: N/A;    COLONOSCOPY      CYBERKNIFE  02/09/2023    Prostate/SV    CYSTOSCOPY TRANSURETHRAL RESECTION OF PROSTATE N/A 09/21/2018    Procedure: CYSTOSCOPY TRANSURETHRAL RESECTION OF PROSTATE GREENLIGHT;  Surgeon: Naseem Clayton MD;  Location:  [facility];  Service: Urology    EXPLORATORY LAPAROTOMY N/A 3/18/2024    Procedure: LAPAROTOMY EXPLORATORY, ABDOMINAL WASH OUT;  Surgeon: Harley Godfrey MD;  Location:  [facility];  Service: General;  Laterality: N/A;    OTHER SURGICAL HISTORY      Contraction surgery on bilateral hands and wrists.    PROSTATE BIOPSY N/A 11/11/2022    Procedure: TRANSRECTAL ULTRASOUND GUIDED BIOPSY OF PROSTATE;  Surgeon: Naseem Noland MD;  Location:  [facility];  Service: Urology;  Laterality: N/A;    SINUS SURGERY       SKIN BIOPSY      TEETH EXTRACTION      TONSILLECTOMY      TOTAL KNEE ARTHROPLASTY Right 04/07/2022    Procedure: TOTAL KNEE ARTHROPLASTY WITH ORTHO ROBOT RIGHT;  Surgeon: Louis Malcolm MD;  Location:  [facility];  Service: Robotics - Ortho;  Laterality: Right;    TRANSRECTAL INCISION PROSTATE WITH GOLD SEED Bilateral 01/06/2023    Procedure: TRANSRECTAL ULTRASOUND GUIDED PROSTATE FIDUCIAL MARKER PLACEMENT;  Surgeon: Naseem Noland MD;  Location:  [facility];  Service: Urology;  Laterality: Bilateral;    TURP / TRANSURETHRAL INCISION / DRAINAGE PROSTATE      VASECTOMY         Pediatric History   Patient Parents    Not on file     Other Topics Concern    Not on file   Social History Narrative    Caffeine: None       family history includes Arthritis in an other family member; Cancer in his mother and another family member; Esophageal cancer in his brother; Hypertension in his father; No Known Problems in his paternal grandfather, paternal grandmother, and sister; Stroke in his father, maternal grandfather, sister, and another family member.    Allergies   Allergen Reactions    Chlorhexidine Rash    Sulfa Antibiotics Rash     Childhood reaction        Medication:  Current Facility-Administered Medications   Medication Dose Route Frequency Provider Last Rate Last Admin    acetaminophen (TYLENOL) tablet 650 mg  650 mg Oral Q4H PRN Harley Godfrey MD   650 mg at 03/16/24 2105    Or    acetaminophen (TYLENOL) 160 MG/5ML oral solution 650 mg  650 mg Oral Q4H PRN Harley Godfrey MD   650 mg at 03/18/24 1657    Or    acetaminophen (TYLENOL) suppository 650 mg  650 mg Rectal Q4H PRN Harley Godfrey MD        Adult Central 2-in-1 TPN   Intravenous Continuous Gallo Cunningham PharmD 65 mL/hr at 03/19/24 1758 New Bag at 03/19/24 1758    calcium carbonate (TUMS) chewable tablet 500 mg (200 mg elemental)  1 tablet Oral TID PRN Harley Godfrey MD        Calcium Replacement - Follow Nurse / BPA Driven  Protocol   Does not apply PRN Harley Godfrey MD        dextrose (D50W) (25 g/50 mL) IV injection 25 g  25 g Intravenous Q15 Min PRN Harley Godfrey MD        dextrose (GLUTOSE) oral gel 15 g  15 g Oral Q15 Min PRN Harley Godfrey MD        docusate sodium (COLACE) capsule 100 mg  100 mg Oral BID PRN Harley Godfrey MD        [Held by provider] Enoxaparin Sodium (LOVENOX) syringe 100 mg  1 mg/kg Subcutaneous Q12H Harley Godfrey MD   100 mg at 03/16/24 0835    Fat Emul Fish Oil/Plant Based (SMOFLIPID) 20 % emulsion 250 mL  250 mL Intravenous Q24H (TPN) Harley Godfrey MD 20.8 mL/hr at 03/19/24 1758 250 mL at 03/19/24 1758    fluconazole (DIFLUCAN) IVPB 400 mg  400 mg Intravenous Q24H Harley Godfrey  mL/hr at 03/19/24 1852 400 mg at 03/19/24 1852    fluticasone (FLONASE) 50 MCG/ACT nasal spray 2 spray  2 spray Nasal Daily PRN Harley Godfrey MD        glucagon (GLUCAGEN) injection 1 mg  1 mg Intramuscular Q15 Min PRN Harley Godfrey MD        hydrALAZINE (APRESOLINE) injection 10 mg  10 mg Intravenous Q4H PRN Harley Godfrey MD   10 mg at 03/15/24 1815    insulin regular (humuLIN R,novoLIN R) injection 2-7 Units  2-7 Units Subcutaneous Q6H Harley Godfrey MD   2 Units at 03/20/24 0512    ipratropium-albuterol (DUO-NEB) nebulizer solution 3 mL  3 mL Nebulization Q6H PRN Harley Godfrey MD        isosorbide dinitrate (ISORDIL) tablet 10 mg  10 mg Oral TID - Nitrates Sienna Saleh DO   10 mg at 03/20/24 0800    labetalol (NORMODYNE,TRANDATE) injection 10 mg  10 mg Intravenous Q4H PRN Harley Godfrey MD   10 mg at 03/20/24 0340    levETIRAcetam (KEPPRA) tablet 500 mg  500 mg Oral Q12H Harley Godfrey MD   500 mg at 03/20/24 0800    levothyroxine (SYNTHROID, LEVOTHROID) tablet 88 mcg  88 mcg Oral Q AM aHrley Godfrey MD   88 mcg at 03/20/24 0512    Lidocaine 4 % 2 patch  2 patch Transdermal Q24H Harley Godfrey MD   2 patch at  03/20/24 0800    lisinopril (PRINIVIL,ZESTRIL) tablet 20 mg  20 mg Oral Q24H Harley Godfrey MD   20 mg at 03/20/24 0800    Magnesium Standard Dose Replacement - Follow Nurse / BPA Driven Protocol   Does not apply PRN Harley Godfrey MD        metoclopramide (REGLAN) injection 10 mg  10 mg Intravenous Q6H Harley Godfrey MD   10 mg at 03/20/24 0800    naloxone (NARCAN) injection 0.1 mg  0.1 mg Intravenous Q5 Min PRN Harley Godfrey MD        ondansetron ODT (ZOFRAN-ODT) disintegrating tablet 4 mg  4 mg Oral Q6H PRN Harley Godfrey MD        Or    ondansetron (ZOFRAN) injection 4 mg  4 mg Intravenous Q6H PRN Harley Godfrey MD   4 mg at 03/09/24 2210    oxyCODONE (ROXICODONE) immediate release tablet 5 mg  5 mg Oral Q4H PRN Fausto Melendez MD        pantoprazole (PROTONIX) injection 40 mg  40 mg Intravenous Q AM Harley Godfrey MD   40 mg at 03/20/24 0512    Pharmacy to Dose TPN   Does not apply Continuous PRN Harley Godfrey MD        Phosphorus Replacement - Follow Nurse / BPA Driven Protocol   Does not apply PRN Harley Godfrey MD        piperacillin-tazobactam (ZOSYN) 3.375 g IVPB in 100 mL NS MBP (CD)  3.375 g Intravenous Q8H Harley Godfrey MD   3.375 g at 03/20/24 0512    Potassium Replacement - Follow Nurse / BPA Driven Protocol   Does not apply PRN Harley Godfrey MD        sodium chloride 0.9 % flush 10 mL  10 mL Intravenous PRN Harley Godfrey MD        sodium chloride 0.9 % flush 10 mL  10 mL Intravenous PRN Harley Godfrey MD        sodium chloride 0.9 % flush 10 mL  10 mL Intravenous PRN Harley Godfrey MD        sodium chloride 0.9 % flush 10 mL  10 mL Intravenous Q12H Harley Godfrey MD   10 mL at 03/20/24 0801    sodium chloride 0.9 % flush 10 mL  10 mL Intravenous PRN Harley Godfrey MD        sodium chloride 0.9 % flush 20 mL  20 mL Intravenous PRN Harley Godfrey MD        sodium chloride 0.9 % infusion 40 mL  40  mL Intravenous PRN Harley Godfrey MD        Valproic Acid (DEPAKENE) syrup 375 mg  375 mg Oral Q6H Harley Godfrey MD   375 mg at 03/20/24 0514    verapamil (CALAN) tablet 80 mg  80 mg Oral Q8H Sienna Saleh, DO   80 mg at 03/20/24 0512       Antibiotics:  Anti-Infectives (From admission, onward)      Ordered     Dose/Rate Route Frequency Start Stop    03/18/24 1550  piperacillin-tazobactam (ZOSYN) 3.375 g IVPB in 100 mL NS MBP (CD)        Ordering Provider: Harley Godfrey MD    3.375 g  over 4 Hours Intravenous Every 8 Hours 03/18/24 2200 03/25/24 2159    03/18/24 1550  fluconazole (DIFLUCAN) IVPB 400 mg        Ordering Provider: Harley Godfrey MD    400 mg  100 mL/hr over 120 Minutes Intravenous Every 24 Hours 03/18/24 1700 03/25/24 1659    03/18/24 1245  piperacillin-tazobactam (ZOSYN) 3.375 g IVPB in 100 mL NS MBP (CD)        Ordering Provider: Harley Godfrey MD    3.375 g  over 0.5 Hours Intravenous Once 03/18/24 1247 03/18/24 1322    03/12/24 1126  metroNIDAZOLE (FLAGYL) IVPB 500 mg        Ordering Provider: Eleanor Yost DO    500 mg  200 mL/hr over 30 Minutes Intravenous Every 8 Hours 03/12/24 1600 03/17/24 0830    03/12/24 1126  cefTRIAXone (ROCEPHIN) 2,000 mg in sodium chloride 0.9 % 100 mL MBP        Ordering Provider: Eleanor Yost DO    2,000 mg  200 mL/hr over 30 Minutes Intravenous Every 24 Hours 03/12/24 1400 03/16/24 1454    03/09/24 1340  cefOXItin (MEFOXIN) 2,000 mg in sodium chloride 0.9 % 100 mL MBP        Ordering Provider: Harley Godfrey MD    2,000 mg  200 mL/hr over 30 Minutes Intravenous Every 2 Hours 03/09/24 1342 03/09/24 1516    03/02/24 1045  piperacillin-tazobactam (ZOSYN) 3.375 g IVPB in 100 mL NS MBP (CD)        Ordering Provider: Jef Chavez MD    3.375 g  over 30 Minutes Intravenous Once 03/02/24 1101 03/02/24 1228              Review of Systems    Constitutional-- No Fever, chills or sweats.  Appetite diminished with  "fatigue  Heent-- No new vision, hearing or throat complaints.  No epistaxis or oral sores.  Denies odynophagia or dysphagia.  No flashers, floaters or eye pain. No odynophagia or dysphagia. No headache, photophobia or neck stiffness.  CV-- No chest pain, palpitation or syncope  Resp-- No SOB/cough/Hemoptysis  GI- see above.  No hematochezia, melena, or hematemesis. Denies jaundice or chronic liver disease.  -- No dysuria, hematuria, or flank pain.  Denies hesitancy, urgency or flank pain.  Lymph- no swollen lymph nodes in neck/axilla or groin.   Heme- No active bruising or bleeding  MS-- no swelling or pain in the bones or joints of arms/legs.  No new back pain.  Neuro-- generally debilitated, unable to lift his legs off the bed according to nursing.  Wiggles his feet/toes    Full 12 point review of systems reviewed and negative otherwise for acute complaints, except forabove    Physical Exam:   Vital Signs   /94 (BP Location: Left leg, Patient Position: Lying)   Pulse 77   Temp 98.2 °F (36.8 °C) (Axillary)   Resp 20   Ht 170.2 cm (67.01\")   Wt 101 kg (222 lb)   SpO2 94%   BMI 34.76 kg/m²     GENERAL: Awake , answers questions and follows basic commands as above, in no acute distress. Appears chronically debilitated.  On TPN  HEENT: Normocephalic, atraumatic.   No conjunctival injection. No icterus. Oropharynx clear without evidence of thrush or exudate. No evidence of periodontal disease.    NECK: Supple without nuchal rigidity. No mass.  LYMPH: No cervical, axillary or inguinal lymphadenopathy.  HEART: RRR; No murmur, rubs, gallops.   LUNGS: diminished at bases with some crackles at the bases but otherwiseClear to auscultation bilaterally without wheezing/ rhonchi.  Nonlabored. No dullness.  ABDOMEN: Soft,  tender, nondistended. Positive bowel sounds but diminished. No rebound or guarding. NO mass or HSM.  EXT:  No cyanosis, clubbing;  No cord.has edema  :    has edema  MSK: FROM without joint " effusions noted arms/legs.    SKIN: Warm and dry without cutaneous eruptions on Inspection/palpation.    NEURO: follows basic commands    No peripheral stigmata/phenomena of endocarditis    IV without obvious redness or drainage at left arm    Laboratory Data    Results from last 7 days   Lab Units 03/20/24  0510 03/19/24  0936 03/18/24  0715   WBC 10*3/mm3 17.74* 22.83* 16.67*   HEMOGLOBIN g/dL 8.6* 9.0* 8.0*   HEMATOCRIT % 27.6* 31.4* 24.6*   PLATELETS 10*3/mm3 465* 413 379     Results from last 7 days   Lab Units 03/20/24  0510   SODIUM mmol/L 141   POTASSIUM mmol/L 4.4   CHLORIDE mmol/L 106   CO2 mmol/L 29.0   BUN mg/dL 26*   CREATININE mg/dL 0.65*   GLUCOSE mg/dL 161*   CALCIUM mg/dL 7.6*     Results from last 7 days   Lab Units 03/20/24  0510   ALK PHOS U/L 77   BILIRUBIN mg/dL 0.2   ALT (SGPT) U/L 5   AST (SGOT) U/L 14         Results from last 7 days   Lab Units 03/19/24  0936   CRP mg/dL 13.84*       Estimated Creatinine Clearance: 104.4 mL/min (A) (by C-G formula based on SCr of 0.65 mg/dL (L)).      Microbiology:      Radiology:  Imaging Results (Last 72 Hours)       Procedure Component Value Units Date/Time    CT Abdomen Pelvis With Contrast [[id number]] Collected: 03/18/24 1150     Updated: 03/18/24 1203    Narrative:      CT ABDOMEN PELVIS W CONTRAST    Date of Exam: 3/18/2024 11:32 AM EDT    Indication: Abdominal abscess (Ped 0-17y)  Patient POD#9 total colectomy, end ileostomy with recurrent fevers.    Comparison: CT of the abdomen and pelvis dated 3/12/2024    Technique: Axial CT images were obtained of the abdomen and pelvis following the uneventful intravenous administration of 85 mL Isovue-300. Reconstructed coronal and sagittal images were also obtained. Automated exposure control and iterative   construction methods were used.      Findings:    Liver: The liver is unremarkable in morphology. No focal liver lesion is seen. No biliary dilation is seen.    Gallbladder: The gallbladder is not  identified.    Pancreas: Unremarkable.    Spleen: Unremarkable.    Adrenal glands: Unremarkable.    Genitourinary tract: Kidneys appear unremarkable. No hydronephrosis is seen. The visualized portions of the ureters are unremarkable. Urinary bladder is unremarkable. There are surgical changes of the prostate gland.    Gastrointestinal tract: Patient is status post total colectomy with right-sided ileostomy and rectal pouch noted. Remainder of the visualized hollow viscera is unremarkable. No evidence of bowel obstruction.    Other findings: Surgical drain terminates within the dependent pelvis. There is moderate loculated ascitic fluid collecting within the anterior abdomen. Largest locule within the left anterior abdomen measures approximately 15 x 6 cm. Milder loculated   fluid near the caudal aspect of the liver measures approximately 9 x 6 cm. Additional scattered fluid noted. Infected fluid cannot be excluded. No free air is identified. No pathologically enlarged lymph nodes are seen. Vascular calcifications are   present. The IVC is unremarkable.    Bones and soft tissues: No acute or suspicious osseous or soft tissue lesion is identified. Superficial soft tissue anasarca is noted. There are surgical changes of the ventral abdominal wall.    Lung bases: Partially visualized bilateral pleural effusions with bibasilar atelectasis. Calcified granuloma within the right lung base.      Impression:      Impression:  1.Postsurgical changes of total colectomy with right-sided ileostomy and rectal pouch. Surgical drain terminates within the dependent pelvis.  2.There is moderate loculated ascitic fluid collecting within the anterior abdomen. Largest locule within the left anterior abdomen measures approximately 15 x 6 cm. Milder loculated fluid near the caudal aspect of the liver measures approximately 9 x 6   cm. Additional scattered fluid also noted. Infected fluid cannot be excluded.   3.Additional findings as  detailed above.        Electronically Signed: Peter Bill MD    3/18/2024 12:00 PM EDT    Workstation ID: [id number]              Impression:     --acute abdominal pain with total abdominal colectomy/ileostomy as above related to toxic dilation of colon As per operative note, postop with persistent leukocytosis and fluid collections on CT scan, taken for exploratory laparotomy and fluid culture positive for gram-negative rods so far, consistent with abscess.  Empiric antimicrobials adjusted to Zosyn/Mycamine and further adjustment/duration to depend on clinical course/study results and response to therapy.    --Left lower lobe pneumonia per medicine team notes, abnormal chest x-ray March 12.no respiratory distress or productive sputum.  Empiric Zosyn ongoing.  If worsening respiratory status you could consider further imaging etc.nasal cannula stable per nursing    --history dementia per admission notes a little specifics regarding baseline not clear and generally poor insight overall.  Description of toxic/metabolic encephalopathy per medicine team notes during this admission.  Prior history seizure and generally debilitated.    --History of multiple falls with left clavicular and multiple rib fractures per admission notes.  Orthopedics has seen.    --Right upper extremity DVT, described as brachial per medicine team notes.  Anticoagulation at discretion of medicine team    --History prostate and colon cancer previously per admission notes.        PLAN: Thank you for asking us to see Lea Rivers, I recommend the following:    --IV Zosyn/Mycamine    --Check/reviewed labs cultures and scans    --Partial history per nursing staff    --Discussed with microbiology    --Highly complex of issues with high risk for further serious morbidity and other serious sequela       Louis Chow MD  3/20/2024

## 2024-03-21 ENCOUNTER — APPOINTMENT (OUTPATIENT)
Dept: GENERAL RADIOLOGY | Facility: HOSPITAL | Age: 79
DRG: 329 | End: 2024-03-21
Payer: MEDICARE

## 2024-03-21 LAB
ALBUMIN SERPL-MCNC: 2 G/DL (ref 3.5–5.2)
ALBUMIN/GLOB SERPL: 0.7 G/DL
ALP SERPL-CCNC: 101 U/L (ref 39–117)
ALT SERPL W P-5'-P-CCNC: <5 U/L (ref 1–41)
ANION GAP SERPL CALCULATED.3IONS-SCNC: 6 MMOL/L (ref 5–15)
AST SERPL-CCNC: 17 U/L (ref 1–40)
BASOPHILS # BLD AUTO: 0.14 10*3/MM3 (ref 0–0.2)
BASOPHILS NFR BLD AUTO: 0.9 % (ref 0–1.5)
BILIRUB SERPL-MCNC: 0.2 MG/DL (ref 0–1.2)
BUN SERPL-MCNC: 17 MG/DL (ref 8–23)
BUN/CREAT SERPL: 32.7 (ref 7–25)
CALCIUM SPEC-SCNC: 7.6 MG/DL (ref 8.6–10.5)
CHLORIDE SERPL-SCNC: 98 MMOL/L (ref 98–107)
CK SERPL-CCNC: 13 U/L (ref 20–200)
CO2 SERPL-SCNC: 29 MMOL/L (ref 22–29)
CREAT SERPL-MCNC: 0.52 MG/DL (ref 0.76–1.27)
DEPRECATED RDW RBC AUTO: 62.2 FL (ref 37–54)
EGFRCR SERPLBLD CKD-EPI 2021: 102.5 ML/MIN/1.73
EOSINOPHIL # BLD AUTO: 0.41 10*3/MM3 (ref 0–0.4)
EOSINOPHIL NFR BLD AUTO: 2.7 % (ref 0.3–6.2)
ERYTHROCYTE [DISTWIDTH] IN BLOOD BY AUTOMATED COUNT: 17.8 % (ref 12.3–15.4)
GLOBULIN UR ELPH-MCNC: 2.8 GM/DL
GLUCOSE BLDC GLUCOMTR-MCNC: 160 MG/DL (ref 70–130)
GLUCOSE BLDC GLUCOMTR-MCNC: 161 MG/DL (ref 70–130)
GLUCOSE BLDC GLUCOMTR-MCNC: 175 MG/DL (ref 70–130)
GLUCOSE BLDC GLUCOMTR-MCNC: 181 MG/DL (ref 70–130)
GLUCOSE SERPL-MCNC: 152 MG/DL (ref 65–99)
HCT VFR BLD AUTO: 27.7 % (ref 37.5–51)
HGB BLD-MCNC: 9.1 G/DL (ref 13–17.7)
IMM GRANULOCYTES # BLD AUTO: 0.83 10*3/MM3 (ref 0–0.05)
IMM GRANULOCYTES NFR BLD AUTO: 5.4 % (ref 0–0.5)
LYMPHOCYTES # BLD AUTO: 1.09 10*3/MM3 (ref 0.7–3.1)
LYMPHOCYTES NFR BLD AUTO: 7.1 % (ref 19.6–45.3)
MCH RBC QN AUTO: 32.2 PG (ref 26.6–33)
MCHC RBC AUTO-ENTMCNC: 32.9 G/DL (ref 31.5–35.7)
MCV RBC AUTO: 97.9 FL (ref 79–97)
MONOCYTES # BLD AUTO: 1.15 10*3/MM3 (ref 0.1–0.9)
MONOCYTES NFR BLD AUTO: 7.5 % (ref 5–12)
NEUTROPHILS NFR BLD AUTO: 11.78 10*3/MM3 (ref 1.7–7)
NEUTROPHILS NFR BLD AUTO: 76.4 % (ref 42.7–76)
NRBC BLD AUTO-RTO: 0 /100 WBC (ref 0–0.2)
PLATELET # BLD AUTO: 517 10*3/MM3 (ref 140–450)
PMV BLD AUTO: 9.9 FL (ref 6–12)
POTASSIUM SERPL-SCNC: 4.5 MMOL/L (ref 3.5–5.2)
PROT SERPL-MCNC: 4.8 G/DL (ref 6–8.5)
RBC # BLD AUTO: 2.83 10*6/MM3 (ref 4.14–5.8)
SODIUM SERPL-SCNC: 133 MMOL/L (ref 136–145)
WBC NRBC COR # BLD AUTO: 15.4 10*3/MM3 (ref 3.4–10.8)

## 2024-03-21 PROCEDURE — 25010000002 PIPERACILLIN SOD-TAZOBACTAM PER 1 G: Performed by: SURGERY

## 2024-03-21 PROCEDURE — 99232 SBSQ HOSP IP/OBS MODERATE 35: CPT | Performed by: HOSPITALIST

## 2024-03-21 PROCEDURE — 85025 COMPLETE CBC W/AUTO DIFF WBC: CPT | Performed by: SURGERY

## 2024-03-21 PROCEDURE — 92611 MOTION FLUOROSCOPY/SWALLOW: CPT | Performed by: SPEECH-LANGUAGE PATHOLOGIST

## 2024-03-21 PROCEDURE — 25010000002 METOCLOPRAMIDE PER 10 MG: Performed by: SURGERY

## 2024-03-21 PROCEDURE — 80053 COMPREHEN METABOLIC PANEL: CPT | Performed by: HOSPITALIST

## 2024-03-21 PROCEDURE — 74230 X-RAY XM SWLNG FUNCJ C+: CPT

## 2024-03-21 PROCEDURE — 97110 THERAPEUTIC EXERCISES: CPT

## 2024-03-21 PROCEDURE — 97530 THERAPEUTIC ACTIVITIES: CPT

## 2024-03-21 PROCEDURE — 25010000002 MICAFUNGIN SODIUM 100 MG RECONSTITUTED SOLUTION 1 EACH VIAL: Performed by: INTERNAL MEDICINE

## 2024-03-21 PROCEDURE — 97164 PT RE-EVAL EST PLAN CARE: CPT

## 2024-03-21 PROCEDURE — 82550 ASSAY OF CK (CPK): CPT | Performed by: INTERNAL MEDICINE

## 2024-03-21 PROCEDURE — 25010000002 ENOXAPARIN PER 10 MG: Performed by: HOSPITALIST

## 2024-03-21 PROCEDURE — 97168 OT RE-EVAL EST PLAN CARE: CPT

## 2024-03-21 PROCEDURE — 63710000001 INSULIN REGULAR HUMAN PER 5 UNITS: Performed by: SURGERY

## 2024-03-21 PROCEDURE — 25010000002 DAPTOMYCIN PER 1 MG: Performed by: INTERNAL MEDICINE

## 2024-03-21 PROCEDURE — 25010000002 LABETALOL 5 MG/ML SOLUTION: Performed by: SURGERY

## 2024-03-21 PROCEDURE — 25010000002 POTASSIUM CHLORIDE PER 2 MEQ OF POTASSIUM

## 2024-03-21 PROCEDURE — 25010000002 CALCIUM GLUCONATE PER 10 ML

## 2024-03-21 PROCEDURE — 82948 REAGENT STRIP/BLOOD GLUCOSE: CPT

## 2024-03-21 PROCEDURE — 25010000002 MAGNESIUM SULFATE PER 500 MG OF MAGNESIUM

## 2024-03-21 PROCEDURE — 25010000002 HYDRALAZINE PER 20 MG: Performed by: SURGERY

## 2024-03-21 RX ADMIN — WATER: 1 INJECTION INTRAMUSCULAR; INTRAVENOUS; SUBCUTANEOUS at 17:09

## 2024-03-21 RX ADMIN — OXYCODONE 5 MG: 5 TABLET ORAL at 23:53

## 2024-03-21 RX ADMIN — BARIUM SULFATE 50 ML: 400 SUSPENSION ORAL at 13:23

## 2024-03-21 RX ADMIN — ISOSORBIDE DINITRATE 10 MG: 10 TABLET ORAL at 08:19

## 2024-03-21 RX ADMIN — ISOSORBIDE DINITRATE 10 MG: 10 TABLET ORAL at 17:08

## 2024-03-21 RX ADMIN — BARIUM SULFATE 10 ML: 400 SUSPENSION ORAL at 13:23

## 2024-03-21 RX ADMIN — BARIUM SULFATE 20 ML: 400 PASTE ORAL at 13:23

## 2024-03-21 RX ADMIN — VALPROIC ACID 375 MG: 250 SOLUTION ORAL at 23:52

## 2024-03-21 RX ADMIN — INSULIN HUMAN 2 UNITS: 100 INJECTION, SOLUTION PARENTERAL at 05:33

## 2024-03-21 RX ADMIN — LEVETIRACETAM 500 MG: 500 TABLET, FILM COATED ORAL at 20:02

## 2024-03-21 RX ADMIN — ENOXAPARIN SODIUM 100 MG: 100 INJECTION SUBCUTANEOUS at 08:18

## 2024-03-21 RX ADMIN — VALPROIC ACID 375 MG: 250 SOLUTION ORAL at 17:08

## 2024-03-21 RX ADMIN — PIPERACILLIN AND TAZOBACTAM 3.38 G: 3; .375 INJECTION, POWDER, LYOPHILIZED, FOR SOLUTION INTRAVENOUS at 20:03

## 2024-03-21 RX ADMIN — ISOSORBIDE DINITRATE 10 MG: 10 TABLET ORAL at 12:19

## 2024-03-21 RX ADMIN — LEVOTHYROXINE SODIUM 88 MCG: 88 TABLET ORAL at 05:33

## 2024-03-21 RX ADMIN — MICAFUNGIN 100 MG: 20 INJECTION, POWDER, LYOPHILIZED, FOR SOLUTION INTRAVENOUS at 15:32

## 2024-03-21 RX ADMIN — LISINOPRIL 20 MG: 20 TABLET ORAL at 08:18

## 2024-03-21 RX ADMIN — DAPTOMYCIN 450 MG: 500 INJECTION, POWDER, LYOPHILIZED, FOR SOLUTION INTRAVENOUS at 11:12

## 2024-03-21 RX ADMIN — METOCLOPRAMIDE 10 MG: 5 INJECTION, SOLUTION INTRAMUSCULAR; INTRAVENOUS at 18:30

## 2024-03-21 RX ADMIN — LIDOCAINE 2 PATCH: 4 PATCH TOPICAL at 08:19

## 2024-03-21 RX ADMIN — INSULIN HUMAN 2 UNITS: 100 INJECTION, SOLUTION PARENTERAL at 17:55

## 2024-03-21 RX ADMIN — Medication 10 ML: at 08:20

## 2024-03-21 RX ADMIN — SMOFLIPID 250 ML: 6; 6; 5; 3 INJECTION, EMULSION INTRAVENOUS at 17:08

## 2024-03-21 RX ADMIN — VERAPAMIL HYDROCHLORIDE 80 MG: 80 TABLET ORAL at 12:40

## 2024-03-21 RX ADMIN — Medication 10 MG: at 00:28

## 2024-03-21 RX ADMIN — VERAPAMIL HYDROCHLORIDE 80 MG: 80 TABLET ORAL at 20:03

## 2024-03-21 RX ADMIN — VALPROIC ACID 375 MG: 250 SOLUTION ORAL at 12:20

## 2024-03-21 RX ADMIN — BARIUM SULFATE 100 ML: 0.81 POWDER, FOR SUSPENSION ORAL at 13:23

## 2024-03-21 RX ADMIN — Medication 10 ML: at 20:02

## 2024-03-21 RX ADMIN — HYDRALAZINE HYDROCHLORIDE 10 MG: 20 INJECTION INTRAMUSCULAR; INTRAVENOUS at 15:45

## 2024-03-21 RX ADMIN — HYDRALAZINE HYDROCHLORIDE 10 MG: 20 INJECTION INTRAMUSCULAR; INTRAVENOUS at 01:49

## 2024-03-21 RX ADMIN — HYDRALAZINE HYDROCHLORIDE 10 MG: 20 INJECTION INTRAMUSCULAR; INTRAVENOUS at 10:38

## 2024-03-21 RX ADMIN — LEVETIRACETAM 500 MG: 500 TABLET, FILM COATED ORAL at 08:20

## 2024-03-21 RX ADMIN — METOCLOPRAMIDE 10 MG: 5 INJECTION, SOLUTION INTRAMUSCULAR; INTRAVENOUS at 08:18

## 2024-03-21 RX ADMIN — METOCLOPRAMIDE 10 MG: 5 INJECTION, SOLUTION INTRAMUSCULAR; INTRAVENOUS at 00:24

## 2024-03-21 RX ADMIN — METOCLOPRAMIDE 10 MG: 5 INJECTION, SOLUTION INTRAMUSCULAR; INTRAVENOUS at 12:40

## 2024-03-21 RX ADMIN — VALPROIC ACID 375 MG: 250 SOLUTION ORAL at 05:32

## 2024-03-21 RX ADMIN — PANTOPRAZOLE SODIUM 40 MG: 40 INJECTION, POWDER, FOR SOLUTION INTRAVENOUS at 05:33

## 2024-03-21 RX ADMIN — PIPERACILLIN AND TAZOBACTAM 3.38 G: 3; .375 INJECTION, POWDER, LYOPHILIZED, FOR SOLUTION INTRAVENOUS at 12:41

## 2024-03-21 RX ADMIN — Medication 10 MG: at 12:20

## 2024-03-21 RX ADMIN — INSULIN HUMAN 2 UNITS: 100 INJECTION, SOLUTION PARENTERAL at 12:22

## 2024-03-21 RX ADMIN — ENOXAPARIN SODIUM 100 MG: 100 INJECTION SUBCUTANEOUS at 20:01

## 2024-03-21 RX ADMIN — INSULIN HUMAN 2 UNITS: 100 INJECTION, SOLUTION PARENTERAL at 23:53

## 2024-03-21 RX ADMIN — Medication 10 MG: at 08:18

## 2024-03-21 RX ADMIN — VERAPAMIL HYDROCHLORIDE 80 MG: 80 TABLET ORAL at 05:33

## 2024-03-21 RX ADMIN — PIPERACILLIN AND TAZOBACTAM 3.38 G: 3; .375 INJECTION, POWDER, LYOPHILIZED, FOR SOLUTION INTRAVENOUS at 05:35

## 2024-03-21 RX ADMIN — METOCLOPRAMIDE 10 MG: 5 INJECTION, SOLUTION INTRAMUSCULAR; INTRAVENOUS at 23:53

## 2024-03-21 NOTE — DISCHARGE PLACEMENT REQUEST
"Hawk Quijano (78 y.o. Male)   [facility]  From  [place]       Date of Birth   1945    Social Security Number       Address   [address] [place], [place] [address]    Home Phone       MRN   [id number]       [facility]    Marital Status                               Admission Date   3/2/24    Admission Type   Emergency    Admitting Provider   Fausto Melendez MD    Attending Provider   Fausto Melendez MD    Department, Room/Bed   [address], S572/1       Discharge Date       Discharge Disposition       Discharge Destination                                 Attending Provider: Fausto Melendez MD    Allergies: Chlorhexidine, Sulfa Antibiotics    Isolation: None   Infection: None   Code Status: No CPR    Ht: 170.2 cm (67.01\")   Wt: 97.7 kg (215 lb 6.4 oz)    Admission Cmt: None   Principal Problem: Falls [W19.XXXA]                   Active Insurance as of 3/2/2024       Primary Coverage       Payor Plan Insurance Group Employer/Plan Group    MEDICARE MEDICARE A & B        Payor Plan Address Payor Plan Phone Number Payor Plan Fax Number Effective Dates    [address] [phone]  7/1/2009 - None Entered    [facility] [id number]         Subscriber Name Subscriber Birth Date Member ID       HAWK QUIJANO 1945 [id number]               Secondary Coverage       Payor Plan Insurance Group Employer/Plan Group    AARP MC SUP AARP HEALTH CARE OPTIONS PLAN F       Payor Plan Address Payor Plan Phone Number Payor Plan Fax Number Effective Dates    [facility] [phone]  1/1/2016 - None Entered    [address]       [facility] [id number]         Subscriber Name Subscriber Birth Date Member ID       HAWK QUIJANO 1945 [id number]                     Emergency Contacts        (Rel.) Home Phone Work Phone Mobile Phone    taylor marley (Son) [phone] -- [phone]    Luis Enrique Guadalupe (Daughter) [phone] -- [phone]    " Celia Dave (Daughter) -- -- --    Fred Loyola (Son) -- -- --              Insurance Information                  MEDICARE/MEDICARE A & B Phone: [phone]    Subscriber: Lea Rivers Subscriber#: [id number]    Group#: -- Precert#: --        [facility] SUP/[facility] HEALTH CARE OPTIONS Phone: [phone]    Subscriber: Lea Rivers Subscriber#: [id number]    Group#: PLAN [id number] Precert#: --             Physician Progress Notes (most recent note)        Fausto Melendez MD at 24 0834              [facility] Medicine Services  PROGRESS NOTE    Patient Name: Lea Rivers  : 1945  MRN: [id number]    Date of Admission: 3/2/2024  Primary Care Physician: Mannie Melvin MD    Subjective   Subjective     CC: Follow up colectomy    HPI: Awake and alert. Up in bed. Some abdominal soreness. No f/c. No dyspnea.     Objective   Objective     Vital Signs:   Temp:  [97.6 °F (36.4 °C)-98.5 °F (36.9 °C)] 97.9 °F (36.6 °C)  Heart Rate:  [73-89] 83  Resp:  [17-18] 17  BP: (175-216)/() 198/94  Flow (L/min):  [2] 2     Physical Exam:  NAD, alert and oriented  OP clear, dry MM  Neck supple  No LAD  RRR  Decreased at bases, otherwise clear  Abdominal binder in placed, ostomy appliance in place, drains noted, liquid stool in ostomy  B LE edema, heels dressed B  SHELBY  Flat affect  No change from 3/20    Results Reviewed:  LAB RESULTS:      Lab 24  0439 24  0510 24  1643 24  0936 24  0715 24  0513 24  0543 03/15/24  0405   WBC 15.40* 17.74*  --  22.83* 16.67* 17.90*   < > 15.09*   HEMOGLOBIN 9.1* 8.6*  --  9.0* 8.0* 8.6*   < > 7.2*   HEMATOCRIT 27.7* 27.6*  --  31.4* 24.6* 27.1*   < > 23.1*   PLATELETS 517* 465*  --  413 379 332   < > 245   NEUTROS ABS 11.78* 13.73*  --  18.38* 13.50* 13.78*   < > 10.75*   IMMATURE GRANS (ABS) 0.83* 0.96*  --  1.18*  --   --   --  1.30*   LYMPHS ABS 1.09 1.07  --  0.81  --   --   --  1.04   MONOS ABS  1.15* 1.77*  --  2.29*  --   --   --  1.31*   EOS ABS 0.41* 0.12  --  0.01 0.00 0.18   < > 0.61*   MCV 97.9* 93.6  --  101.0* 101.2* 102.7*   < > 93.5   CRP  --   --   --  13.84*  --   --   --   --    PROTIME  --   --  15.9*  --   --   --   --   --     < > = values in this interval not displayed.         Lab 03/21/24  0622 03/20/24  0510 03/15/24  0405   SODIUM 133* 141 141   POTASSIUM 4.5 4.4 3.8   CHLORIDE 98 106 105   CO2 29.0 29.0 28.0   ANION GAP 6.0 6.0 8.0   BUN 17 26* 18   CREATININE 0.52* 0.65* 0.54*   EGFR 102.5 95.8 101.4   GLUCOSE 152* 161* 147*   CALCIUM 7.6* 7.6* 7.8*   MAGNESIUM  --   --  2.0   PHOSPHORUS  --  2.9 2.9         Lab 03/21/24  0622 03/20/24  0510   TOTAL PROTEIN 4.8* 4.7*   ALBUMIN 2.0* 1.9*   GLOBULIN 2.8 2.8   ALT (SGPT) <5 5   AST (SGOT) 17 14   BILIRUBIN 0.2 0.2   ALK PHOS 101 77           Lab 03/19/24  1643   PROTIME 15.9*   INR 1.26*             Lab 03/19/24  0936   TRIGLYCERIDES 167*           Lab 03/16/24  0630   ABO TYPING O   RH TYPING Negative   ANTIBODY SCREEN Negative           Brief Urine Lab Results  (Last result in the past 365 days)        Color   Clarity   Blood   Leuk Est   Nitrite   Protein   CREAT   Urine HCG        03/11/24 1337 Yellow   Cloudy   Moderate (2+)   Trace   Negative   30 mg/dL (1+)                   Microbiology Results Abnormal       Procedure Component Value - Date/Time    Anaerobic Culture - Peritoneal Fluid, Perineum [[id number]]  (Normal) Collected: 03/18/24 1349    Lab Status: Preliminary result Specimen: Peritoneal Fluid from Perineum Updated: 03/21/24 0741     Anaerobic Culture No anaerobes isolated at 3 days    AFB Culture - Peritoneal Fluid, Perineum [[id number]] Collected: 03/18/24 1349    Lab Status: Preliminary result Specimen: Peritoneal Fluid from Perineum Updated: 03/19/24 1339     AFB Stain No acid fast bacilli seen on concentrated smear    Urine Culture - Urine, Indwelling Urethral Catheter [[id number]]  (Normal) Collected: 03/11/24 7943     Lab Status: Final result Specimen: Urine from Indwelling Urethral Catheter Updated: 03/12/24 2024     Urine Culture No growth    Blood Culture - Blood, Arm, Right [[id number]]  (Normal) Collected: 03/06/24 1857    Lab Status: Final result Specimen: Blood from Arm, Right Updated: 03/11/24 2045     Blood Culture No growth at 5 days    Blood Culture - Blood, Arm, Right [[id number]]  (Normal) Collected: 03/06/24 1751    Lab Status: Final result Specimen: Blood from Arm, Right Updated: 03/11/24 2000     Blood Culture No growth at 5 days    Blood Culture - Blood, Arm, Right [[id number]]  (Normal) Collected: 03/02/24 1132    Lab Status: Final result Specimen: Blood from Arm, Right Updated: 03/07/24 1201     Blood Culture No growth at 5 days    Narrative:      Less than seven (7) mL's of blood was collected.  Insufficient quantity may yield false negative results.    Blood Culture - Blood, Arm, Right [[id number]]  (Normal) Collected: 03/02/24 1132    Lab Status: Final result Specimen: Blood from Arm, Right Updated: 03/07/24 1201     Blood Culture No growth at 5 days    Narrative:      Less than seven (7) mL's of blood was collected.  Insufficient quantity may yield false negative results.    Urine Culture - Urine, Straight Cath [[id number]]  (Normal) Collected: 03/02/24 0923    Lab Status: Final result Specimen: Urine from Straight Cath Updated: 03/03/24 1601     Urine Culture No growth    COVID PRE-OP / PRE-PROCEDURE SCREENING ORDER (NO ISOLATION) - Swab, Nasopharynx [[id number]]  (Normal) Collected: 03/02/24 1133    Lab Status: Final result Specimen: Swab from Nasopharynx Updated: 03/02/24 1227    Narrative:      The following orders were created for panel order COVID PRE-OP / PRE-PROCEDURE SCREENING ORDER (NO ISOLATION) - Swab, Nasopharynx.  Procedure                               Abnormality         Status                     ---------                               -----------         ------                     COVID-19,  FLU A/B, RSV P...[[id number]]  Normal              Final result                 Please view results for these tests on the individual orders.    COVID-19, FLU A/B, RSV PCR 1 HR TAT - Swab, Nasopharynx [[id number]]  (Normal) Collected: 03/02/24 1133    Lab Status: Final result Specimen: Swab from Nasopharynx Updated: 03/02/24 1227     COVID19 Not Detected     Influenza A PCR Not Detected     Influenza B PCR Not Detected     RSV, PCR Not Detected    Narrative:      Fact sheet for providers: https://www.fda.gov/media/325752/download    Fact sheet for patients: https://www.fda.gov/media/518877/download    Test performed by PCR.            No radiology results from the last 24 hrs    Results for orders placed during the hospital encounter of 07/20/21    Adult Transthoracic Echo Complete W/ Cont if Necessary Per Protocol    Interpretation Summary  · Left ventricular wall thickness is consistent with mild concentric hypertrophy.  · Normal left-ventricular systolic function, estimated EF 65%.  · Left ventricular diastolic function is consistent with (grade I) impaired relaxation.  · Aortic sclerosis without aortic stenosis. Trace aortic insufficiency.  · Trace mitral regurgitation, trace tricuspid regurgitation.      Current medications:  Scheduled Meds:DAPTOmycin, 6 mg/kg (Adjusted), Intravenous, Once  enoxaparin, 1 mg/kg, Subcutaneous, Q12H  Fat Emul Fish Oil/Plant Based, 250 mL, Intravenous, Q24H (TPN)  insulin regular, 2-7 Units, Subcutaneous, Q6H  isosorbide dinitrate, 10 mg, Oral, TID - Nitrates  levETIRAcetam, 500 mg, Oral, Q12H  levothyroxine, 88 mcg, Oral, Q AM  Lidocaine, 2 patch, Transdermal, Q24H  lisinopril, 20 mg, Oral, Q24H  metoclopramide, 10 mg, Intravenous, Q6H  micafungin (MYCAMINE) IV, 100 mg, Intravenous, Q24H  pantoprazole, 40 mg, Intravenous, Q AM  piperacillin-tazobactam, 3.375 g, Intravenous, Q8H  sodium chloride, 10 mL, Intravenous, Q12H  Valproic Acid, 375 mg, Oral, Q6H  verapamil, 80 mg, Oral,  Q8H      Continuous Infusions:Adult Central 2-in-1 TPN, , Last Rate: 65 mL/hr at 03/20/24 1704  Pharmacy to Dose TPN,           PRN Meds:.  acetaminophen **OR** acetaminophen **OR** acetaminophen    calcium carbonate    Calcium Replacement - Follow Nurse / BPA Driven Protocol    dextrose    dextrose    docusate sodium    fluticasone    glucagon (human recombinant)    hydrALAZINE    ipratropium-albuterol    labetalol    Magnesium Standard Dose Replacement - Follow Nurse / BPA Driven Protocol    [DISCONTINUED] HYDROmorphone **AND** naloxone    ondansetron ODT **OR** ondansetron    oxyCODONE    Pharmacy to Dose TPN    Phosphorus Replacement - Follow Nurse / BPA Driven Protocol    Potassium Replacement - Follow Nurse / BPA Driven Protocol    sodium chloride    sodium chloride    sodium chloride    sodium chloride    Assessment & Plan   Assessment & Plan     Active Hospital Problems    Diagnosis  POA    **Falls [W19.XXXA]  Yes    Pneumatosis intestinalis [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        Brief Hospital Course to date:  Lea Rivers is a 78 y.o. male with past medical history of hypertension, hyperlipidemia, hypothyroidism, seizure disorder.  Patient is being admitted for a fall and nondisplaced first through fourth left rib fractures.  Patient also has left distal clavicle fracture.    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 on 3/9 w/  Persistent  Leukocytosis  LLL PNA  -s/p repeat CT 3/18, with fluid collection noted, s/p exploratory laparotomy 3/18 with abdominal washout and ascites noted  -on zosyn/mycamine now  -completed course of Fluconazole, CTX/Flagyl 3/17 prior to repeat surgery  -CXR 3/12 with possible LLL pneumonia, completed course of CTX  -ID following, abdominal fluid culture with pseudomonas    Dysphagia  -speech following, FEES tentatively today    Toxic metabolic  encephalopathy  -per daughter significant change in mental status and speech since arrival, and team had a long discussion and felt this is probably TME in setting of infection, recent large operation, underlying dementia  -initial CT head 3/2 negative, repeated CT head 3/13 without any acute findings  -EEG negative for seizures, findings consistent with TME  -minimize sedating meds   -slowly improving    Multiple falls with increasing frequency   -CT of head shows age-related changes which are chronic but no acute process  -neuro consulted, likely d/t neuropathy (confirmed with EMG) and progression of dementia     Multiple rib fractures and also left clavicle fracture  -With no displacement or mildly displaced.  Orthopedic surgery evaluated. No surgical intervention  planned.  Recs nonweightbearing LUE, may come out of sling in 5-7 days to initiate gentle ROM exercises per ortho.  F/u with ortho/Dr. Maldonado in 2 weeks  -Pain control, lidocaine patch  -bruise noted to L shoulder/neck region however X-ray negative    Acute on Chronic Anemia  -S/P 1 unit PRBCs 3/11 and 3/16, monitoring H&H  -no clear source of active bleeding, hemoccult negative  -transfuse PRN hgb <7, hold therapeutic lovenox for now    Acute RUE DVT (brachial)  -Provoked, 2/2 PICC   -on lovenox, transition to PO anticoagulation pending speech evaluations     Prostate cancer  Hx BPH s/p greenlight photo vaporization of prostate 2018  -Follows with Dr. Noland  -S/P cyberknife radiation 2/9/23  -Has intermittent hematuria, monitor while on AC     Dementia and increasing memory problem  -Patient was previously living alone and driving, given significant decline will need placement     Hypertension  -cleared by speech, resumed home oral regimen  -PRN IV dosing for SBP >180    Seizure disorder  -continue vimpat and keppra    Hyperlipidemia  Hypothyroidism  -resume home regimen    Expected Discharge Location and Transportation: SNF  Expected Discharge    Expected Discharge Date: 3/25/2024; Expected Discharge Time:      DVT prophylaxis:  Medical and mechanical DVT prophylaxis orders are present.         AM-PAC 6 Clicks Score (PT): 6 (24 0800)    CODE STATUS:   Code Status and Medical Interventions:   Ordered at: 24 1456     Medical Intervention Limits:    NO intubation (DNI)     Code Status (Patient has no pulse and is not breathing):    No CPR (Do Not Attempt to Resuscitate)     Medical Interventions (Patient has pulse or is breathing):    Limited Support       Fausto Melendez MD  24        Electronically signed by Fausto Melendez MD at 24 0868          Physical Therapy Notes (most recent note)        Abe Melara, PT at 24 1115  Version 1 of 1         Patient Name: Lea Rivers  : 1945    MRN: [id number]                              Today's Date: 3/21/2024       Admit Date: 3/2/2024    Visit Dx:     ICD-10-CM ICD-9-CM   1. Fall, initial encounter  W19.XXXA E888.9   2. Closed fracture of multiple ribs of left side, initial encounter  S22.42XA 807.09   3. Closed displaced fracture of acromial end of left clavicle, initial encounter  S42.032A 810.03   4. Falls frequently  R29.6 V15.88   5. Acute UTI (urinary tract infection)  N39.0 599.0   6. Colon distention  K63.89 569.89   7. Paralytic ileus of small intestine and colon  K56.0 560.1   8. Oropharyngeal dysphagia  R13.12 787.22   9. Intra-abdominal fluid  R18.8 789.59     Patient Active Problem List   Diagnosis    Coronary artery disease    Dyslipidemia    Hypothyroidism    GERD (gastroesophageal reflux disease)    Seizure disorder    BPH (benign prostatic hypertrophy)    Hypertension    Primary osteoarthritis of both knees    Syncope and collapse    Precordial pain    Diabetes    Status post total right knee replacement    Postoperative urinary retention    Confusion, postoperative    Acute blood loss anemia, asymptomatic, no transfusion required    Elevated  prostate specific antigen (PSA)    Prostate cancer    Anemia    Body mass index (BMI) of 32.0-32.9 in adult    Localization-related focal epilepsy with simple partial seizures    Need for vaccination against Streptococcus pneumoniae    Upper extremity tendon tear, right, initial encounter    Vitamin D deficiency    PSA elevation    Polyp of colon    Overactive bladder    Gross hematuria    History of colon polyps    History of colon cancer    B12 deficiency    Falls    Pneumatosis intestinalis     Past Medical History:   Diagnosis Date    Anemia     Colon cancer 1990    Status post partial colectomy in 1990. No chemotherapy or radiation therapy.    Coronary artery disease     Nonobstructive coronary artery disease.    Diabetes     Dyslipidemia     GERD (gastroesophageal reflux disease)     Hx of.    Hyperlipidemia     Hypertension     Hyponatremia     Hypothyroidism     Left shoulder pain     Low sodium levels 2022    recent issue; saw nephrologist who ruled out kidney issues; took Sodium Chloride po to bring levels up    Memory deficit     FROM ANESTHESIA    Osteoarthritis     Prostate cancer 07/23/2022    Seizure disorder     silent seziure-diagnosed years ago    Skin cancer      Past Surgical History:   Procedure Laterality Date    APPENDECTOMY      CARDIAC CATHETERIZATION  2012    30 to 40% LAD    CARPAL TUNNEL RELEASE Bilateral     CHOLECYSTECTOMY      COLECTOMY PARTIAL / TOTAL  1990    Partial colectomy    COLON RESECTION N/A 3/9/2024    Procedure: TOTAL ABDOMINAL COLECTOMY, END ILEOSTOMY;  Surgeon: Harley Godfrey MD;  Location:  [facility];  Service: General;  Laterality: N/A;    COLONOSCOPY      CYBERKNIFE  02/09/2023    Prostate/SV    CYSTOSCOPY TRANSURETHRAL RESECTION OF PROSTATE N/A 09/21/2018    Procedure: CYSTOSCOPY TRANSURETHRAL RESECTION OF PROSTATE GREENLIGHT;  Surgeon: Naseem Clayton MD;  Location:  [facility];  Service: Urology    EXPLORATORY LAPAROTOMY N/A 3/18/2024    Procedure:  "LAPAROTOMY EXPLORATORY, ABDOMINAL WASH OUT;  Surgeon: Harley Godfrey MD;  Location:  [facility];  Service: General;  Laterality: N/A;    OTHER SURGICAL HISTORY      Contraction surgery on bilateral hands and wrists.    PROSTATE BIOPSY N/A 11/11/2022    Procedure: TRANSRECTAL ULTRASOUND GUIDED BIOPSY OF PROSTATE;  Surgeon: Naseem Noland MD;  Location:  [facility];  Service: Urology;  Laterality: N/A;    SINUS SURGERY      SKIN BIOPSY      TEETH EXTRACTION      TONSILLECTOMY      TOTAL KNEE ARTHROPLASTY Right 04/07/2022    Procedure: TOTAL KNEE ARTHROPLASTY WITH ORTHO ROBOT RIGHT;  Surgeon: Louis Malcolm MD;  Location:  [facility];  Service: Robotics - Ortho;  Laterality: Right;    TRANSRECTAL INCISION PROSTATE WITH GOLD SEED Bilateral 01/06/2023    Procedure: TRANSRECTAL ULTRASOUND GUIDED PROSTATE FIDUCIAL MARKER PLACEMENT;  Surgeon: Naseem Noland MD;  Location:  [facility];  Service: Urology;  Laterality: Bilateral;    TURP / TRANSURETHRAL INCISION / DRAINAGE PROSTATE      VASECTOMY        General Information       Row Name 03/21/24 1305          Physical Therapy Time and Intention    Document Type re-evaluation  -AE     Mode of Treatment physical therapy  -AE       Row Name 03/21/24 1305          General Information    Patient Profile Reviewed yes  -AE     Prior Level of Function --  Refer to IE  -AE     Existing Precautions/Restrictions fall;non-weight bearing;left  L rib fx's, L clavicle fx (NWB LUE, LUE sling; may come out of sling for ROM as tolerated); ostomy, OLIVER drain, abd incision & abd binder  -AE     Barriers to Rehab medically complex;previous functional deficit;cognitive status  -AE       Row Name 03/21/24 1305          Cognition    Orientation Status (Cognition) oriented to;person;disoriented to;place;time  States \"2074\" despite given choices  -AE       Row Name 03/21/24 1305          Safety Issues, Functional Mobility    Safety Issues Affecting Function (Mobility) awareness of need for " assistance;insight into deficits/self-awareness;safety precaution awareness;safety precautions follow-through/compliance;sequencing abilities  -AE     Impairments Affecting Function (Mobility) balance;cognition;endurance/activity tolerance;pain;postural/trunk control;strength;range of motion (ROM);sensation/sensory awareness  -AE     Cognitive Impairments, Mobility Safety/Performance awareness, need for assistance;insight into deficits/self-awareness;safety precaution awareness;safety precaution follow-through;sequencing abilities;problem-solving/reasoning  -AE               User Key  (r) = Recorded By, (t) = Taken By, (c) = Cosigned By      Initials Name Provider Type    AE Abe Melara, PT Physical Therapist                   Mobility       Row Name 03/21/24 1307          Transfers    Comment, (Transfers) Pt tolerated dep A lift from bed-chair using mechanical lift.  -AE       Row Name 03/21/24 1307          Bed-Chair Transfer    Bed-Chair [place] (Transfers) dependent (less than 25% patient effort);2 person assist;verbal cues  -AE     Assistive Device (Bed-Chair Transfers) lift device  -AE       Row Name 03/21/24 1307          Sit-Stand Transfer    Sit-Stand [place] (Transfers) unable to assess  -AE     Comment, (Sit-Stand Transfer) Did not assess d/t poor sitting balance in chair with decreased command following.  -AE       Row Name 03/21/24 1307          Mobility    Extremity Weight-bearing Status left upper extremity  -AE     Left Upper Extremity (Weight-bearing Status) non weight-bearing (NWB)   -AE               User Key  (r) = Recorded By, (t) = Taken By, (c) = Cosigned By      Initials Name Provider Type    AE Abe Melara, PT Physical Therapist                   Obj/Interventions       Row Name 03/21/24 1309          Range of Motion Comprehensive    General Range of Motion bilateral lower extremity ROM WFL  -AE       Row Name 03/21/24 1309          Strength Comprehensive (MMT)    General  Manual Muscle Testing (MMT) Assessment lower extremity strength deficits identified  -AE     Comment, General Manual Muscle Testing (MMT) Assessment BLE 4-/5, generalized weakness  -AE       Row Name 03/21/24 1309          Motor Skills    Therapeutic Exercise knee;ankle  -AE       Row Name 03/21/24 1309          Knee (Therapeutic Exercise)    Knee (Therapeutic Exercise) AROM (active range of motion)  -AE     Knee AROM (Therapeutic Exercise) bilateral;LAQ (long arc quad);sitting;10 repetitions  -AE       Row Name 03/21/24 1309          Ankle (Therapeutic Exercise)    Ankle (Therapeutic Exercise) AROM (active range of motion)  -AE     Ankle AROM (Therapeutic Exercise) bilateral;dorsiflexion;plantarflexion;15 repititions;sitting  -AE       Row Name 03/21/24 1309          Balance    Balance Assessment sitting static balance;sitting dynamic balance  -AE     Static Sitting Balance moderate assist  -AE     Dynamic Sitting Balance maximum assist  -AE     Position, Sitting Balance supported;sitting in chair  -AE     Comment, Balance Pt demo significant posterior lean while seated in chair. Balance training attempted, pt fatigues quickly and reports abdominal pain limiting further attempts.  -AE               User Key  (r) = Recorded By, (t) = Taken By, (c) = Cosigned By      Initials Name Provider Type    AE Abe Melara, PT Physical Therapist                   Goals/Plan       Row Name 03/21/24 1315          Bed Mobility Goal 1 (PT)    Activity/Assistive Device (Bed Mobility Goal 1, PT) sit to supine/supine to sit  -AE     [place] Level/Cues Needed (Bed Mobility Goal 1, PT) moderate assist (50-74% patient effort)  -AE     Time Frame (Bed Mobility Goal 1, PT) long term goal (LTG);10 days  -AE     Progress/Outcomes (Bed Mobility Goal 1, PT) goal revised this date  -AE       Row Name 03/21/24 1315          Transfer Goal 1 (PT)    Activity/Assistive Device (Transfer Goal 1, PT)  sit-to-stand/stand-to-sit;bed-to-chair/chair-to-bed  -AE     [place] Level/Cues Needed (Transfer Goal 1, PT) moderate assist (50-74% patient effort)  -AE     Time Frame (Transfer Goal 1, PT) long term goal (LTG);10 days  -AE     Progress/Outcome (Transfer Goal 1, PT) goal revised this date  -AE       Row Name 03/21/24 1315          Gait Training Goal 1 (PT)    Activity/Assistive Device (Gait Training Goal 1, PT) gait (walking locomotion);assistive device use  -AE     [place] Level (Gait Training Goal 1, PT) maximum assist (25-49% patient effort)  -AE     Distance (Gait Training Goal 1, PT) 10ft  -AE     Time Frame (Gait Training Goal 1, PT) long term goal (LTG);10 days  -AE     Progress/Outcome (Gait Training Goal 1, PT) goal revised this date  -AE               User Key  (r) = Recorded By, (t) = Taken By, (c) = Cosigned By      Initials Name Provider Type    AE Abe Melara, PT Physical Therapist                   Clinical Impression       Row Name 03/21/24 1312          Pain Scale: FACES Pre/Post-Treatment    Pain: FACES Scale, Pretreatment 0-->no hurt  -AE     Posttreatment Pain Rating 0-->no hurt  -AE     Pre/Posttreatment Pain Comment No pain at rest but pt reports pain with attempts to sit upright in chair  -AE       Row Name 03/21/24 1312          Plan of Care Review    Plan of Care Reviewed With patient  -AE     Progress no change  -AE     Outcome Evaluation Re-eval completed. Pt presents with increased confusion, decreased functional independence, and generalized weakness. Pt tolerated dep A lift from bed-chair with mechanical lift. Completed seated ther ex in chair and balance training. Recommend continued skilled IP PT interventions. Recommend D/C to SNF.  -AE       Row Name 03/21/24 1312          Vital Signs    Pre Systolic BP Rehab 191  -AE     Pre Treatment Diastolic BP 89  -AE     Pretreatment Heart Rate (beats/min) 80  -AE     Posttreatment Heart Rate (beats/min) 82  -AE     Pre SpO2  (%) 94  -AE     O2 Delivery Pre Treatment room air  -AE     O2 Delivery Intra Treatment room air  -AE     Post SpO2 (%) 93  -AE     O2 Delivery Post Treatment room air  -AE     Pre Patient Position Supine  -AE     Intra Patient Position Sitting  -AE     Post Patient Position Sitting  -AE       Row Name 03/21/24 1312          Positioning and Restraints    Pre-Treatment Position in bed  -AE     Post Treatment Position chair  -AE     In Chair notified nsg;reclined;call light within reach;encouraged to call for assist;exit alarm on;with nsg;legs elevated;on mechanical lift sling  with sitter  -AE               User Key  (r) = Recorded By, (t) = Taken By, (c) = Cosigned By      Initials Name Provider Type    Abe Valdivia PT Physical Therapist                   Outcome Measures       Row Name 03/21/24 1316 03/21/24 0800       How much help from another person do you currently need...    Turning from your back to your side while in flat bed without using bedrails? 1  -AE 1  -MK    Moving from lying on back to sitting on the side of a flat bed without bedrails? 1  -AE 1  -MK    Moving to and from a bed to a chair (including a wheelchair)? 1  -AE 1  -MK    Standing up from a chair using your arms (e.g., wheelchair, bedside chair)? 2  -AE 1  -MK    Climbing 3-5 steps with a railing? 1  -AE 1  -MK    To walk in hospital room? 1  -AE 1  -MK    AM-PAC 6 Clicks Score (PT) 7  -AE 6  -MK    Highest Level of Mobility Goal 2 --> Bed activities/dependent transfer  -AE 2 --> Bed activities/dependent transfer  -MK      Row Name 03/21/24 1316          Functional Assessment    Outcome Measure Options AM-PAC 6 Clicks Basic Mobility (PT)  -AE               User Key  (r) = Recorded By, (t) = Taken By, (c) = Cosigned By      Initials Name Provider Type    Abe Valdivia PT Physical Therapist    Sandra Dougherty RN Registered Nurse                                 Physical Therapy Education       Title: PT OT SLP Therapies (In  Progress)       Topic: Physical Therapy (In Progress)       Point: Mobility training (In Progress)       Learning Progress Summary             Patient Acceptance, E, NR by AE at 3/21/2024 1115    Acceptance, E,TB, NR,NL by CH at 3/20/2024 0755    Acceptance, E, NR by CK at 3/6/2024 1121    Acceptance, E, VU,NR by LO at 3/4/2024 1340    Comment: PT POC   Family Acceptance, E, NR by CK at 3/6/2024 1121    Acceptance, E, VU,NR by LO at 3/4/2024 1340    Comment: PT POC                         Point: Home exercise program (In Progress)       Learning Progress Summary             Patient Acceptance, E, NR by AE at 3/21/2024 1115    Acceptance, E,TB, NR,NL by CH at 3/20/2024 0755    Acceptance, E, NR by CK at 3/6/2024 1121    Acceptance, E, VU,NR by LO at 3/4/2024 1340    Comment: PT POC   Family Acceptance, E, NR by CK at 3/6/2024 1121    Acceptance, E, VU,NR by LO at 3/4/2024 1340    Comment: PT POC                         Point: Body mechanics (In Progress)       Learning Progress Summary             Patient Acceptance, E, NR by AE at 3/21/2024 1115    Acceptance, E,TB, NR,NL by CH at 3/20/2024 0755    Acceptance, E, NR by CK at 3/6/2024 1121    Acceptance, E, VU,NR by LO at 3/4/2024 1340    Comment: PT POC   Family Acceptance, E, NR by CK at 3/6/2024 1121    Acceptance, E, VU,NR by LO at 3/4/2024 1340    Comment: PT POC                         Point: Precautions (In Progress)       Learning Progress Summary             Patient Acceptance, E, NR by AE at 3/21/2024 1115    Acceptance, E,TB, NR,NL by CH at 3/20/2024 0755    Acceptance, E, NR by CK at 3/6/2024 1121    Acceptance, E, VU,NR by LO at 3/4/2024 1340    Comment: PT POC   Family Acceptance, E, NR by CK at 3/6/2024 1121    Acceptance, E, VU,NR by LO at 3/4/2024 1340    Comment: PT POC                                         User Key       Initials Effective Dates Name Provider Type Discipline     06/16/21 -  Teresa Batista, RN Registered Nurse Nurse    LO  06/16/21 -  Emily Muro, PT Physical Therapist PT    AE 09/21/21 -  Abe Melara, PT Physical Therapist PT    CK 02/06/24 -  Tiffanie Carmen, PT Physical Therapist PT                  PT Recommendation and Plan     Plan of Care Reviewed With: patient  Progress: no change  Outcome Evaluation: Re-eval completed. Pt presents with increased confusion, decreased functional independence, and generalized weakness. Pt tolerated dep A lift from bed-chair with mechanical lift. Completed seated ther ex in chair and balance training. Recommend continued skilled IP PT interventions. Recommend D/C to SNF.     Time Calculation:         PT Charges       Row Name 03/21/24 1317             Time Calculation    Start Time 1115  -AE      PT Received On 03/21/24  -AE      PT Goal Re-Cert Due Date 03/31/24  -AE         Timed Charges    48805 - PT Therapeutic Exercise Minutes 10  -AE         Untimed Charges    PT Eval/Re-eval Minutes 23  -AE         Total Minutes    Timed Charges Total Minutes 10  -AE      Untimed Charges Total Minutes 23  -AE       Total Minutes 33  -AE                User Key  (r) = Recorded By, (t) = Taken By, (c) = Cosigned By      Initials Name Provider Type    AE Abe Melara, PT Physical Therapist                  Therapy Charges for Today       Code Description Service Date Service Provider Modifiers Qty    94361321178 HC PT THER PROC EA 15 MIN 3/21/2024 Abe Melara, PT GP 1    38598025873 HC PT RE-EVAL ESTABLISHED PLAN 2 3/21/2024 Abe Melara, PT GP 1            PT G-Codes  Outcome Measure Options: AM-PAC 6 Clicks Basic Mobility (PT)  AM-PAC 6 Clicks Score (PT): 7  AM-PAC 6 Clicks Score (OT): 9  PT Discharge Summary  Anticipated Discharge Disposition (PT): skilled nursing facility    Abe Melara PT  3/21/2024      Electronically signed by Abe Melara PT at 03/21/24 1318          Occupational Therapy Notes (most recent note)        Cha Landry, OT at 03/21/24 1130          Patient  Name: Lea Rivers  : 1945    MRN: [id number]                              Today's Date: 3/21/2024       Admit Date: 3/2/2024    Visit Dx:     ICD-10-CM ICD-9-CM   1. Fall, initial encounter  W19.XXXA E888.9   2. Closed fracture of multiple ribs of left side, initial encounter  S22.42XA 807.09   3. Closed displaced fracture of acromial end of left clavicle, initial encounter  S42.032A 810.03   4. Falls frequently  R29.6 V15.88   5. Acute UTI (urinary tract infection)  N39.0 599.0   6. Colon distention  K63.89 569.89   7. Paralytic ileus of small intestine and colon  K56.0 560.1   8. Oropharyngeal dysphagia  R13.12 787.22   9. Intra-abdominal fluid  R18.8 789.59     Patient Active Problem List   Diagnosis    Coronary artery disease    Dyslipidemia    Hypothyroidism    GERD (gastroesophageal reflux disease)    Seizure disorder    BPH (benign prostatic hypertrophy)    Hypertension    Primary osteoarthritis of both knees    Syncope and collapse    Precordial pain    Diabetes    Status post total right knee replacement    Postoperative urinary retention    Confusion, postoperative    Acute blood loss anemia, asymptomatic, no transfusion required    Elevated prostate specific antigen (PSA)    Prostate cancer    Anemia    Body mass index (BMI) of 32.0-32.9 in adult    Localization-related focal epilepsy with simple partial seizures    Need for vaccination against Streptococcus pneumoniae    Upper extremity tendon tear, right, initial encounter    Vitamin D deficiency    PSA elevation    Polyp of colon    Overactive bladder    Gross hematuria    History of colon polyps    History of colon cancer    B12 deficiency    Falls    Pneumatosis intestinalis     Past Medical History:   Diagnosis Date    Anemia     Colon cancer     Status post partial colectomy in . No chemotherapy or radiation therapy.    Coronary artery disease     Nonobstructive coronary artery disease.    Diabetes     Dyslipidemia     GERD  (gastroesophageal reflux disease)     Hx of.    Hyperlipidemia     Hypertension     Hyponatremia     Hypothyroidism     Left shoulder pain     Low sodium levels 2022    recent issue; saw nephrologist who ruled out kidney issues; took Sodium Chloride po to bring levels up    Memory deficit     FROM ANESTHESIA    Osteoarthritis     Prostate cancer 07/23/2022    Seizure disorder     silent seziure-diagnosed years ago    Skin cancer      Past Surgical History:   Procedure Laterality Date    APPENDECTOMY      CARDIAC CATHETERIZATION  2012    30 to 40% LAD    CARPAL TUNNEL RELEASE Bilateral     CHOLECYSTECTOMY      COLECTOMY PARTIAL / TOTAL  1990    Partial colectomy    COLON RESECTION N/A 3/9/2024    Procedure: TOTAL ABDOMINAL COLECTOMY, END ILEOSTOMY;  Surgeon: Harley Godfrey MD;  Location:  [facility];  Service: General;  Laterality: N/A;    COLONOSCOPY      CYBERKNIFE  02/09/2023    Prostate/SV    CYSTOSCOPY TRANSURETHRAL RESECTION OF PROSTATE N/A 09/21/2018    Procedure: CYSTOSCOPY TRANSURETHRAL RESECTION OF PROSTATE GREENLIGHT;  Surgeon: Naseem Clayton MD;  Location:  [facility];  Service: Urology    EXPLORATORY LAPAROTOMY N/A 3/18/2024    Procedure: LAPAROTOMY EXPLORATORY, ABDOMINAL WASH OUT;  Surgeon: Harley Godfrey MD;  Location:  [facility];  Service: General;  Laterality: N/A;    OTHER SURGICAL HISTORY      Contraction surgery on bilateral hands and wrists.    PROSTATE BIOPSY N/A 11/11/2022    Procedure: TRANSRECTAL ULTRASOUND GUIDED BIOPSY OF PROSTATE;  Surgeon: Naseem Noland MD;  Location:  [facility];  Service: Urology;  Laterality: N/A;    SINUS SURGERY      SKIN BIOPSY      TEETH EXTRACTION      TONSILLECTOMY      TOTAL KNEE ARTHROPLASTY Right 04/07/2022    Procedure: TOTAL KNEE ARTHROPLASTY WITH ORTHO ROBOT RIGHT;  Surgeon: Louis Malcolm MD;  Location:  [facility];  Service: Robotics - Ortho;  Laterality: Right;    TRANSRECTAL INCISION PROSTATE WITH GOLD SEED Bilateral 01/06/2023     Procedure: TRANSRECTAL ULTRASOUND GUIDED PROSTATE FIDUCIAL MARKER PLACEMENT;  Surgeon: Naseem Noland MD;  Location: [facility];  Service: Urology;  Laterality: Bilateral;    TURP / TRANSURETHRAL INCISION / DRAINAGE PROSTATE      VASECTOMY        General Information       Row Name 03/21/24 135          OT Time and Intention    Document Type re-evaluation  -     Mode of Treatment occupational therapy  -       Row Name 03/21/24 7464          General Information    Patient Profile Reviewed yes  -     Existing Precautions/Restrictions fall;non-weight bearing;left  L rib fx's, L clavicle fx (NWB LUE, LUE sling; may come out of sling for ROM as tolerated); ostomy, OLIVER drain, abd incision & abd binder  -     Barriers to Rehab medically complex;previous functional deficit;cognitive status  -       Row Name 03/21/24 3465          Cognition    Orientation Status (Cognition) oriented to;person;disoriented to;place;time  -       Row Name 03/21/24 1734          Safety Issues, Functional Mobility    Safety Issues Affecting Function (Mobility) ability to follow commands;awareness of need for assistance;insight into deficits/self-awareness;judgment;problem-solving;safety precaution awareness;safety precautions follow-through/compliance;sequencing abilities  -     Impairments Affecting Function (Mobility) balance;cognition;endurance/activity tolerance;pain;postural/trunk control;strength;range of motion (ROM);sensation/sensory awareness  -     Cognitive Impairments, Mobility Safety/Performance attention  -               User Key  (r) = Recorded By, (t) = Taken By, (c) = Cosigned By      Initials Name Provider Type     Cha Landry OT Occupational Therapist                     Mobility/ADL's       Row Name 03/21/24 3809          Bed-Chair Transfer    Bed-Chair [place] (Transfers) dependent (less than 25% patient effort);2 person assist;verbal cues  -     Assistive Device (Bed-Chair Transfers) lift  device  -KL       Row Name 03/21/24 [facility]          Sit-Stand Transfer    Comment, (Sit-Stand Transfer) Deferred d/t strong retropulsion and poor command following  -KL       Row Name 03/21/24 [facility]          Activities of Daily Living    BADL Assessment/Intervention lower body dressing;feeding  -KL       Row Name 03/21/24 [facility]          Mobility    Extremity Weight-bearing Status left upper extremity  -     Left Upper Extremity (Weight-bearing Status) non weight-bearing (NWB)  -KL       Row Name 03/21/24 [facility]          Lower Body Dressing Assessment/Training    [place] Level (Lower Body Dressing) don;doff;socks;dependent (less than 25% patient effort);verbal cues  -KL       Row Name 03/21/24 [facility]          Self-Feeding Assessment/Training    Comment, (Feeding) Pt provided and educated on built-up utensils d/t poor grasp.  -               User Key  (r) = Recorded By, (t) = Taken By, (c) = Cosigned By      Initials Name Provider Type    Cha Bernard OT Occupational Therapist                   Obj/Interventions       Row Name 03/21/24 [facility]          Sensory Assessment (Somatosensory)    Sensory Assessment (Somatosensory) unable/difficult to assess  -     Sensory Assessment Pt unable to follow commands for sensory testing  -KL       Row Name 03/21/24 [facility]          Range of Motion Comprehensive    Comment, General Range of Motion RUE AROM WFL, LUE elbow down AROM WFL, L shoulder ROM deferred  -KL       Row Name 03/21/24 [facility]          Strength Comprehensive (MMT)    Comment, General Manual Muscle Testing (MMT) Assessment RUE MMT 3+/5; LUE deferred; decreased grasp noted in BUE  -KL       Row Name 03/21/24 [facility]          Balance    Static Sitting Balance moderate assist  -     Dynamic Sitting Balance maximum assist  -     Position, Sitting Balance supported;sitting in chair  -               User Key  (r) = Recorded By, (t) = Taken By, (c) = Cosigned By      Initials Name Provider Type    Cha Bernard OT  Occupational Therapist                   Goals/Plan       Row Name 03/21/24 1402          Transfer Goal 1 (OT)    Activity/Assistive Device (Transfer Goal 1, OT) sit-to-stand/stand-to-sit;bed-to-chair/chair-to-bed;commode, bedside without drop arms  -     [place] Level/Cues Needed (Transfer Goal 1, OT) moderate assist (50-74% patient effort);verbal cues required  -KL     Time Frame (Transfer Goal 1, OT) long term goal (LTG);10 days  -KL     Progress/Outcome (Transfer Goal 1, OT) goal ongoing  -       Row Name 03/21/24 1402          Dressing Goal 1 (OT)    Activity/Device (Dressing Goal 1, OT) lower body dressing  -KL     [place]/Cues Needed (Dressing Goal 1, OT) moderate assist (50-74% patient effort)  -KL     Time Frame (Dressing Goal 1, OT) long term goal (LTG);by discharge  -KL     Progress/Outcome (Dressing Goal 1, OT) goal no longer appropriate  -       Row Name 03/21/24 1402          Grooming Goal 1 (OT)    Activity/Device (Grooming Goal 1, OT) oral care;wash face, hands  -KL     [place] (Grooming Goal 1, OT) moderate assist (50-74% patient effort);verbal cues required  -KL     Time Frame (Grooming Goal 1, OT) long term goal (LTG);10 days  -     Strategies/Barriers (Grooming Goal 1, OT) seated  -KL     Progress/Outcome (Grooming Goal 1, OT) goal ongoing  -       Row Name 03/21/24 1402          ROM Goal 1 (OT)    ROM Goal 1 (OT) Pt will demo ability to perform BUE HEP (LUE ROM as tolerated) daily to support ADL independence.  -KL     Time Frame (ROM Goal 1, OT) long term goal (LTG);10 days  -KL     Progress/Outcome (ROM Goal 1, OT) goal ongoing  -       Row Name 03/21/24 1402          Therapy Assessment/Plan (OT)    Planned Therapy Interventions (OT) activity tolerance training;functional balance retraining;occupation/activity based interventions;patient/caregiver education/training;ROM/therapeutic exercise;strengthening exercise;transfer/mobility retraining  -KL               User  Key  (r) = Recorded By, (t) = Taken By, (c) = Cosigned By      Initials Name Provider Type    Cha Bernard, SADAF Occupational Therapist                   Clinical Impression       Row Name 03/21/24 0337          Pain Scale: FACES Pre/Post-Treatment    Pain: FACES Scale, Pretreatment 0-->no hurt  -KL     Posttreatment Pain Rating 0-->no hurt  -KL     Pre/Posttreatment Pain Comment Reports abdominal pain, improved w/ reclined position  -       Row Name 03/21/24 3986          Plan of Care Review    Plan of Care Reviewed With patient  -     Progress no change  -     Outcome Evaluation Pt presents to OT re-eval w/ deficits in mobility, strength, ROM and self-care. Will continue w/ POC for IPOT to address deficits in order to progress towards PLOF. d/c rec is SNF.  -       Row Name 03/21/24 4062          Therapy Assessment/Plan (OT)    Rehab Potential (OT) fair, will monitor progress closely  -     Criteria for Skilled Therapeutic Interventions Met (OT) yes;meets criteria;skilled treatment is necessary  -     Therapy Frequency (OT) daily  -       Row Name 03/21/24 4227          Therapy Plan Review/Discharge Plan (OT)    Anticipated Discharge Disposition (OT) skilled nursing facility  -       Row Name 03/21/24 1350          Vital Signs    Pre Systolic BP Rehab 191  -KL     Pre Treatment Diastolic BP 89  -KL     Posttreatment Heart Rate (beats/min) 85  -KL     Post SpO2 (%) 95  -KL     O2 Delivery Post Treatment room air  -     Pre Patient Position Supine  -     Intra Patient Position Sitting  -     Post Patient Position Sitting  -       Row Name 03/21/24 7595          Positioning and Restraints    Pre-Treatment Position in bed  -KL     Post Treatment Position chair  -     In Chair notified nsg;reclined;sitting;call light within reach;encouraged to call for assist;exit alarm on;waffle cushion;on mechanical lift sling;legs elevated  w/ sitter  -               User Key  (r) = Recorded By, (t) =  Taken By, (c) = Cosigned By      Initials Name Provider Type    Cha Bernard OT Occupational Therapist                   Outcome Measures       Row Name 03/21/24 1403          How much help from another is currently needed...    Putting on and taking off regular lower body clothing? 1  -KL     Bathing (including washing, rinsing, and drying) 1  -KL     Toileting (which includes using toilet bed pan or urinal) 1  -KL     Putting on and taking off regular upper body clothing 2  -KL     Taking care of personal grooming (such as brushing teeth) 2  -KL     Eating meals 2  -KL     AM-PAC 6 Clicks Score (OT) 9  -KL       Row Name 03/21/24 1316 03/21/24 0800       How much help from another person do you currently need...    Turning from your back to your side while in flat bed without using bedrails? 1  -AE 1  -MK    Moving from lying on back to sitting on the side of a flat bed without bedrails? 1  -AE 1  -MK    Moving to and from a bed to a chair (including a wheelchair)? 1  -AE 1  -MK    Standing up from a chair using your arms (e.g., wheelchair, bedside chair)? 2  -AE 1  -MK    Climbing 3-5 steps with a railing? 1  -AE 1  -MK    To walk in hospital room? 1  -AE 1  -MK    AM-PAC 6 Clicks Score (PT) 7  -AE 6  -MK    Highest Level of Mobility Goal 2 --> Bed activities/dependent transfer  -AE 2 --> Bed activities/dependent transfer  -MK      Row Name 03/21/24 1403 03/21/24 1316       Functional Assessment    Outcome Measure Options AM-PAC 6 Clicks Daily Activity (OT)  -KL AM-PAC 6 Clicks Basic Mobility (PT)  -AE              User Key  (r) = Recorded By, (t) = Taken By, (c) = Cosigned By      Initials Name Provider Type    AE Abe Melara, PT Physical Therapist    Sandra Dougherty, RN Registered Nurse    Cha Bernard OT Occupational Therapist                    Occupational Therapy Education       Title: PT OT SLP Therapies (In Progress)       Topic: Occupational Therapy (In Progress)       Point: ADL training  (In Progress)       Description:   Instruct learner(s) on proper safety adaptation and remediation techniques during self care or transfers.   Instruct in proper use of assistive devices.                  Learning Progress Summary             Patient Acceptance, E,D, NR by  at 3/21/2024 1404    Acceptance, E,TB, NR,NL by  at 3/20/2024 0755    Acceptance, E, NR by  at 3/17/2024 1526    Acceptance, E, NR by  at 3/6/2024 1116    Acceptance, E, VU by  at 3/4/2024 1519                         Point: Home exercise program (In Progress)       Description:   Instruct learner(s) on appropriate technique for monitoring, assisting and/or progressing therapeutic exercises/activities.                  Learning Progress Summary             Patient Acceptance, E,TB, NR,NL by  at 3/20/2024 0755    Acceptance, E, NR by  at 3/17/2024 1526                         Point: Precautions (In Progress)       Description:   Instruct learner(s) on prescribed precautions during self-care and functional transfers.                  Learning Progress Summary             Patient Acceptance, E,D, NR by  at 3/21/2024 1404    Acceptance, E,TB, NR,NL by  at 3/20/2024 0755    Acceptance, E, NR by  at 3/17/2024 1526    Acceptance, E, VU by  at 3/4/2024 1519                         Point: Body mechanics (In Progress)       Description:   Instruct learner(s) on proper positioning and spine alignment during self-care, functional mobility activities and/or exercises.                  Learning Progress Summary             Patient Acceptance, E,D, NR by  at 3/21/2024 1404    Acceptance, E,TB, NR,NL by  at 3/20/2024 0755    Acceptance, E, NR by  at 3/17/2024 1526                                         User Key       Initials Effective Dates Name Provider Type Discipline     02/03/23 -  Pauly Davey OT Occupational Therapist OT     06/16/21 -  Teresa Batista RN Registered Nurse Nurse     06/16/21 -  Justina Joyce OT  Occupational Therapist OT     10/14/22 -  Sandra Stahl OT Occupational Therapist OT     02/05/24 -  Cha Landry OT Occupational Therapist OT                  OT Recommendation and Plan  Planned Therapy Interventions (OT): activity tolerance training, functional balance retraining, occupation/activity based interventions, patient/caregiver education/training, ROM/therapeutic exercise, strengthening exercise, transfer/mobility retraining  Therapy Frequency (OT): daily  Plan of Care Review  Plan of Care Reviewed With: patient  Progress: no change  Outcome Evaluation: Pt presents to OT re-eval w/ deficits in mobility, strength, ROM and self-care. Will continue w/ POC for IPOT to address deficits in order to progress towards PLOF. d/c rec is SNF.     Time Calculation:         Time Calculation- OT       Row Name 03/21/24 1405             Time Calculation- OT    OT Start Time 1130  -KL      OT Received On 03/21/24  -      OT Goal Re-Cert Due Date 03/31/24  -         Timed Charges    26455 - OT Therapeutic Activity Minutes 10  -KL         Untimed Charges    OT Eval/Re-eval Minutes 23  -KL         Total Minutes    Timed Charges Total Minutes 10  -KL      Untimed Charges Total Minutes 23  -KL       Total Minutes 33  -KL                User Key  (r) = Recorded By, (t) = Taken By, (c) = Cosigned By      Initials Name Provider Type     Cha Landry OT Occupational Therapist                  Therapy Charges for Today       Code Description Service Date Service Provider Modifiers Qty    88621741653  OT THERAPEUTIC ACT EA 15 MIN 3/21/2024 Cha Landry OT GO 1    [id number] HC OT RE-EVAL 2 3/21/2024 Cha Landry OT GO 1                 Cha Landry OT  3/21/2024    Electronically signed by Cha Landry OT at 03/21/24 1406

## 2024-03-21 NOTE — PLAN OF CARE
Goal Outcome Evaluation:           Problem: Adult Inpatient Plan of Care  Goal: Plan of Care Review  Outcome: Ongoing, Progressing  Goal: Patient-Specific Goal (Individualized)  Outcome: Ongoing, Progressing  Goal: Absence of Hospital-Acquired Illness or Injury  Outcome: Ongoing, Progressing  Intervention: Identify and Manage Fall Risk  Recent Flowsheet Documentation  Taken 3/21/2024 1600 by Sandra Sarabia RN  Safety Promotion/Fall Prevention:   activity supervised   assistive device/personal items within reach   clutter free environment maintained   fall prevention program maintained   lighting adjusted   nonskid shoes/slippers when out of bed   room organization consistent   safety round/check completed  Taken 3/21/2024 1400 by Sandra Sarabia RN  Safety Promotion/Fall Prevention:   assistive device/personal items within reach   clutter free environment maintained   fall prevention program maintained   lighting adjusted   nonskid shoes/slippers when out of bed   room organization consistent   safety round/check completed   activity supervised  Taken 3/21/2024 1200 by Sandra Sarabia RN  Safety Promotion/Fall Prevention:   assistive device/personal items within reach   clutter free environment maintained   fall prevention program maintained   lighting adjusted   nonskid shoes/slippers when out of bed   room organization consistent   safety round/check completed   activity supervised  Taken 3/21/2024 1000 by Sandra Sarabia RN  Safety Promotion/Fall Prevention:   assistive device/personal items within reach   clutter free environment maintained   fall prevention program maintained   lighting adjusted   nonskid shoes/slippers when out of bed   room organization consistent   safety round/check completed   activity supervised  Taken 3/21/2024 0800 by Sandra Sarabia, RN  Safety Promotion/Fall Prevention:   assistive device/personal items within reach   clutter free environment maintained   fall prevention  program maintained   lighting adjusted   nonskid shoes/slippers when out of bed   room organization consistent   safety round/check completed   activity supervised  Intervention: Prevent Skin Injury  Recent Flowsheet Documentation  Taken 3/21/2024 1600 by Sandra Sarabia RN  Body Position:   turned   left  Taken 3/21/2024 1400 by Sandra Sarabia RN  Body Position:   turned   right  Taken 3/21/2024 1200 by Sandra Sarabia RN  Body Position: (sitting in chair)   other (see comments)   weight shifting  Taken 3/21/2024 1000 by Sandra Sarabia RN  Body Position:   turned   left  Taken 3/21/2024 0800 by Sandra Sarabia RN  Body Position:   turned   right  Intervention: Prevent and Manage VTE (Venous Thromboembolism) Risk  Recent Flowsheet Documentation  Taken 3/21/2024 1200 by Sandra Sarabia RN  Activity Management: up in chair  Intervention: Prevent Infection  Recent Flowsheet Documentation  Taken 3/21/2024 1600 by Sandra Sarabia RN  Infection Prevention:   environmental surveillance performed   equipment surfaces disinfected   hand hygiene promoted   personal protective equipment utilized   rest/sleep promoted   single patient room provided  Taken 3/21/2024 1400 by Sandra Sarabia RN  Infection Prevention:   environmental surveillance performed   equipment surfaces disinfected   hand hygiene promoted   personal protective equipment utilized   rest/sleep promoted   single patient room provided  Taken 3/21/2024 1200 by Sandra Sarabia RN  Infection Prevention:   environmental surveillance performed   equipment surfaces disinfected   hand hygiene promoted   personal protective equipment utilized   rest/sleep promoted   single patient room provided  Taken 3/21/2024 1000 by Sandra Sarabia RN  Infection Prevention:   environmental surveillance performed   equipment surfaces disinfected   hand hygiene promoted   personal protective equipment utilized   rest/sleep promoted   single patient room  provided  Taken 3/21/2024 0800 by Sandra Sarabia RN  Infection Prevention:   environmental surveillance performed   equipment surfaces disinfected   hand hygiene promoted   personal protective equipment utilized   rest/sleep promoted   single patient room provided  Goal: Optimal Comfort and Wellbeing  Outcome: Ongoing, Progressing  Intervention: Provide Person-Centered Care  Recent Flowsheet Documentation  Taken 3/21/2024 0800 by Sandra Sarabia RN  Trust Relationship/Rapport:   care explained   choices provided   emotional support provided   empathic listening provided   questions answered   questions encouraged   reassurance provided   thoughts/feelings acknowledged  Goal: Readiness for Transition of Care  Outcome: Ongoing, Progressing     Problem: Skin Injury Risk Increased  Goal: Skin Health and Integrity  Outcome: Ongoing, Progressing  Intervention: Optimize Skin Protection  Recent Flowsheet Documentation  Taken 3/21/2024 1600 by Sandra Sarabia RN  Head of Bed (HOB) Positioning: HOB elevated  Taken 3/21/2024 1400 by Sandra Sarabia RN  Head of Bed (HOB) Positioning: HOB elevated  Taken 3/21/2024 1200 by Sandra Sarabia RN  Head of Bed (HOB) Positioning: (sitting in chair) other (see comments)  Taken 3/21/2024 1000 by Sandra Sarabia RN  Head of Bed (HOB) Positioning: HOB elevated  Taken 3/21/2024 0800 by Sandra Sarabia RN  Head of Bed (HOB) Positioning: HOB elevated     Problem: Fall Injury Risk  Goal: Absence of Fall and Fall-Related Injury  Outcome: Ongoing, Progressing  Intervention: Identify and Manage Contributors  Recent Flowsheet Documentation  Taken 3/21/2024 1600 by Sandra Sarabia RN  Medication Review/Management: medications reviewed  Taken 3/21/2024 1400 by Sandra Sarabia RN  Medication Review/Management: medications reviewed  Taken 3/21/2024 1200 by Sandra Sarabia RN  Medication Review/Management: medications reviewed  Taken 3/21/2024 1000 by Sandra Sarabia  RN  Medication Review/Management: medications reviewed  Taken 3/21/2024 0800 by Sandra Sarabia RN  Medication Review/Management: medications reviewed  Intervention: Promote Injury-Free Environment  Recent Flowsheet Documentation  Taken 3/21/2024 1600 by Sandra Sarabia RN  Safety Promotion/Fall Prevention:   activity supervised   assistive device/personal items within reach   clutter free environment maintained   fall prevention program maintained   lighting adjusted   nonskid shoes/slippers when out of bed   room organization consistent   safety round/check completed  Taken 3/21/2024 1400 by Sandra Sarabia RN  Safety Promotion/Fall Prevention:   assistive device/personal items within reach   clutter free environment maintained   fall prevention program maintained   lighting adjusted   nonskid shoes/slippers when out of bed   room organization consistent   safety round/check completed   activity supervised  Taken 3/21/2024 1200 by Sandra Sarabia RN  Safety Promotion/Fall Prevention:   assistive device/personal items within reach   clutter free environment maintained   fall prevention program maintained   lighting adjusted   nonskid shoes/slippers when out of bed   room organization consistent   safety round/check completed   activity supervised  Taken 3/21/2024 1000 by Sandra Sarabia RN  Safety Promotion/Fall Prevention:   assistive device/personal items within reach   clutter free environment maintained   fall prevention program maintained   lighting adjusted   nonskid shoes/slippers when out of bed   room organization consistent   safety round/check completed   activity supervised  Taken 3/21/2024 0800 by Sandra Sarabia RN  Safety Promotion/Fall Prevention:   assistive device/personal items within reach   clutter free environment maintained   fall prevention program maintained   lighting adjusted   nonskid shoes/slippers when out of bed   room organization consistent   safety round/check  completed   activity supervised     Problem: Adjustment to Illness (Sepsis/Septic Shock)  Goal: Optimal Coping  Outcome: Ongoing, Progressing     Problem: Bleeding (Sepsis/Septic Shock)  Goal: Absence of Bleeding  Outcome: Ongoing, Progressing     Problem: Glycemic Control Impaired (Sepsis/Septic Shock)  Goal: Blood Glucose Level Within Desired Range  Outcome: Ongoing, Progressing     Problem: Infection Progression (Sepsis/Septic Shock)  Goal: Absence of Infection Signs and Symptoms  Outcome: Ongoing, Progressing  Intervention: Initiate Sepsis Management  Recent Flowsheet Documentation  Taken 3/21/2024 1600 by Sandra Sarabia RN  Infection Prevention:   environmental surveillance performed   equipment surfaces disinfected   hand hygiene promoted   personal protective equipment utilized   rest/sleep promoted   single patient room provided  Taken 3/21/2024 1400 by Sandra Sarabia RN  Infection Prevention:   environmental surveillance performed   equipment surfaces disinfected   hand hygiene promoted   personal protective equipment utilized   rest/sleep promoted   single patient room provided  Taken 3/21/2024 1200 by Sandra Sarabia RN  Infection Prevention:   environmental surveillance performed   equipment surfaces disinfected   hand hygiene promoted   personal protective equipment utilized   rest/sleep promoted   single patient room provided  Taken 3/21/2024 1000 by Sandra Sarabia RN  Infection Prevention:   environmental surveillance performed   equipment surfaces disinfected   hand hygiene promoted   personal protective equipment utilized   rest/sleep promoted   single patient room provided  Taken 3/21/2024 0800 by Sandra Sarabia RN  Infection Prevention:   environmental surveillance performed   equipment surfaces disinfected   hand hygiene promoted   personal protective equipment utilized   rest/sleep promoted   single patient room provided  Intervention: Promote Recovery  Recent Flowsheet  Documentation  Taken 3/21/2024 1200 by Sandra Sarabia, RN  Activity Management: up in chair     Problem: Nutrition Impaired (Sepsis/Septic Shock)  Goal: Optimal Nutrition Intake  Outcome: Ongoing, Progressing

## 2024-03-21 NOTE — THERAPY RE-EVALUATION
Patient Name: Lea Rivers  : 1945    MRN: [id number]                              Today's Date: 3/21/2024       Admit Date: 3/2/2024    Visit Dx:     ICD-10-CM ICD-9-CM   1. Fall, initial encounter  W19.XXXA E888.9   2. Closed fracture of multiple ribs of left side, initial encounter  S22.42XA 807.09   3. Closed displaced fracture of acromial end of left clavicle, initial encounter  S42.032A 810.03   4. Falls frequently  R29.6 V15.88   5. Acute UTI (urinary tract infection)  N39.0 599.0   6. Colon distention  K63.89 569.89   7. Paralytic ileus of small intestine and colon  K56.0 560.1   8. Oropharyngeal dysphagia  R13.12 787.22   9. Intra-abdominal fluid  R18.8 789.59     Patient Active Problem List   Diagnosis    Coronary artery disease    Dyslipidemia    Hypothyroidism    GERD (gastroesophageal reflux disease)    Seizure disorder    BPH (benign prostatic hypertrophy)    Hypertension    Primary osteoarthritis of both knees    Syncope and collapse    Precordial pain    Diabetes    Status post total right knee replacement    Postoperative urinary retention    Confusion, postoperative    Acute blood loss anemia, asymptomatic, no transfusion required    Elevated prostate specific antigen (PSA)    Prostate cancer    Anemia    Body mass index (BMI) of 32.0-32.9 in adult    Localization-related focal epilepsy with simple partial seizures    Need for vaccination against Streptococcus pneumoniae    Upper extremity tendon tear, right, initial encounter    Vitamin D deficiency    PSA elevation    Polyp of colon    Overactive bladder    Gross hematuria    History of colon polyps    History of colon cancer    B12 deficiency    Falls    Pneumatosis intestinalis     Past Medical History:   Diagnosis Date    Anemia     Colon cancer     Status post partial colectomy in . No chemotherapy or radiation therapy.    Coronary artery disease     Nonobstructive coronary artery disease.    Diabetes     Dyslipidemia      GERD (gastroesophageal reflux disease)     Hx of.    Hyperlipidemia     Hypertension     Hyponatremia     Hypothyroidism     Left shoulder pain     Low sodium levels 2022    recent issue; saw nephrologist who ruled out kidney issues; took Sodium Chloride po to bring levels up    Memory deficit     FROM ANESTHESIA    Osteoarthritis     Prostate cancer 07/23/2022    Seizure disorder     silent seziure-diagnosed years ago    Skin cancer      Past Surgical History:   Procedure Laterality Date    APPENDECTOMY      CARDIAC CATHETERIZATION  2012    30 to 40% LAD    CARPAL TUNNEL RELEASE Bilateral     CHOLECYSTECTOMY      COLECTOMY PARTIAL / TOTAL  1990    Partial colectomy    COLON RESECTION N/A 3/9/2024    Procedure: TOTAL ABDOMINAL COLECTOMY, END ILEOSTOMY;  Surgeon: Harley Godfrey MD;  Location:  [facility];  Service: General;  Laterality: N/A;    COLONOSCOPY      CYBERKNIFE  02/09/2023    Prostate/SV    CYSTOSCOPY TRANSURETHRAL RESECTION OF PROSTATE N/A 09/21/2018    Procedure: CYSTOSCOPY TRANSURETHRAL RESECTION OF PROSTATE GREENLIGHT;  Surgeon: Naseem Clayton MD;  Location:  [facility];  Service: Urology    EXPLORATORY LAPAROTOMY N/A 3/18/2024    Procedure: LAPAROTOMY EXPLORATORY, ABDOMINAL WASH OUT;  Surgeon: Harley Godfrey MD;  Location:  [facility];  Service: General;  Laterality: N/A;    OTHER SURGICAL HISTORY      Contraction surgery on bilateral hands and wrists.    PROSTATE BIOPSY N/A 11/11/2022    Procedure: TRANSRECTAL ULTRASOUND GUIDED BIOPSY OF PROSTATE;  Surgeon: Naseem Noland MD;  Location:  [facility];  Service: Urology;  Laterality: N/A;    SINUS SURGERY      SKIN BIOPSY      TEETH EXTRACTION      TONSILLECTOMY      TOTAL KNEE ARTHROPLASTY Right 04/07/2022    Procedure: TOTAL KNEE ARTHROPLASTY WITH ORTHO ROBOT RIGHT;  Surgeon: Louis Malcolm MD;  Location:  [facility];  Service: Robotics - Ortho;  Laterality: Right;    TRANSRECTAL INCISION PROSTATE WITH GOLD SEED Bilateral  "01/06/2023    Procedure: TRANSRECTAL ULTRASOUND GUIDED PROSTATE FIDUCIAL MARKER PLACEMENT;  Surgeon: Naseem Noland MD;  Location: [facility];  Service: Urology;  Laterality: Bilateral;    TURP / TRANSURETHRAL INCISION / DRAINAGE PROSTATE      VASECTOMY        General Information       Row Name 03/21/24 1305          Physical Therapy Time and Intention    Document Type re-evaluation  -AE     Mode of Treatment physical therapy  -AE       Row Name 03/21/24 1305          General Information    Patient Profile Reviewed yes  -AE     Prior Level of Function --  Refer to IE  -AE     Existing Precautions/Restrictions fall;non-weight bearing;left  L rib fx's, L clavicle fx (NWB LUE, LUE sling; may come out of sling for ROM as tolerated); ostomy, OLIVER drain, abd incision & abd binder  -AE     Barriers to Rehab medically complex;previous functional deficit;cognitive status  -AE       Row Name 03/21/24 1303          Cognition    Orientation Status (Cognition) oriented to;person;disoriented to;place;time  States \"2074\" despite given choices  -AE       Row Name 03/21/24 1307          Safety Issues, Functional Mobility    Safety Issues Affecting Function (Mobility) awareness of need for assistance;insight into deficits/self-awareness;safety precaution awareness;safety precautions follow-through/compliance;sequencing abilities  -AE     Impairments Affecting Function (Mobility) balance;cognition;endurance/activity tolerance;pain;postural/trunk control;strength;range of motion (ROM);sensation/sensory awareness  -AE     Cognitive Impairments, Mobility Safety/Performance awareness, need for assistance;insight into deficits/self-awareness;safety precaution awareness;safety precaution follow-through;sequencing abilities;problem-solving/reasoning  -AE               User Key  (r) = Recorded By, (t) = Taken By, (c) = Cosigned By      Initials Name Provider Type    AE Abe Melara PT Physical Therapist                   Mobility  "      Row Name 03/21/24 1307          Transfers    Comment, (Transfers) Pt tolerated dep A lift from bed-chair using mechanical lift.  -AE       Row Name 03/21/24 1307          Bed-Chair Transfer    Bed-Chair [place] (Transfers) dependent (less than 25% patient effort);2 person assist;verbal cues  -AE     Assistive Device (Bed-Chair Transfers) lift device  -AE       Row Name 03/21/24 1307          Sit-Stand Transfer    Sit-Stand [place] (Transfers) unable to assess  -AE     Comment, (Sit-Stand Transfer) Did not assess d/t poor sitting balance in chair with decreased command following.  -AE       Row Name 03/21/24 1307          Mobility    Extremity Weight-bearing Status left upper extremity  -AE     Left Upper Extremity (Weight-bearing Status) non weight-bearing (NWB)   -AE               User Key  (r) = Recorded By, (t) = Taken By, (c) = Cosigned By      Initials Name Provider Type    AE Abe Melara, PT Physical Therapist                   Obj/Interventions       Row Name 03/21/24 1309          Range of Motion Comprehensive    General Range of Motion bilateral lower extremity ROM WFL  -AE       Row Name 03/21/24 1309          Strength Comprehensive (MMT)    General Manual Muscle Testing (MMT) Assessment lower extremity strength deficits identified  -AE     Comment, General Manual Muscle Testing (MMT) Assessment BLE 4-/5, generalized weakness  -AE       Row Name 03/21/24 1309          Motor Skills    Therapeutic Exercise knee;ankle  -AE       Row Name 03/21/24 1309          Knee (Therapeutic Exercise)    Knee (Therapeutic Exercise) AROM (active range of motion)  -AE     Knee AROM (Therapeutic Exercise) bilateral;LAQ (long arc quad);sitting;10 repetitions  -AE       Row Name 03/21/24 1309          Ankle (Therapeutic Exercise)    Ankle (Therapeutic Exercise) AROM (active range of motion)  -AE     Ankle AROM (Therapeutic Exercise) bilateral;dorsiflexion;plantarflexion;15 repititions;sitting  -AE       Row  Name 03/21/24 1309          Balance    Balance Assessment sitting static balance;sitting dynamic balance  -AE     Static Sitting Balance moderate assist  -AE     Dynamic Sitting Balance maximum assist  -AE     Position, Sitting Balance supported;sitting in chair  -AE     Comment, Balance Pt demo significant posterior lean while seated in chair. Balance training attempted, pt fatigues quickly and reports abdominal pain limiting further attempts.  -AE               User Key  (r) = Recorded By, (t) = Taken By, (c) = Cosigned By      Initials Name Provider Type    AE Abe Melara, PT Physical Therapist                   Goals/Plan       Row Name 03/21/24 1315          Bed Mobility Goal 1 (PT)    Activity/Assistive Device (Bed Mobility Goal 1, PT) sit to supine/supine to sit  -AE     [place] Level/Cues Needed (Bed Mobility Goal 1, PT) moderate assist (50-74% patient effort)  -AE     Time Frame (Bed Mobility Goal 1, PT) long term goal (LTG);10 days  -AE     Progress/Outcomes (Bed Mobility Goal 1, PT) goal revised this date  -AE       Row Name 03/21/24 1319          Transfer Goal 1 (PT)    Activity/Assistive Device (Transfer Goal 1, PT) sit-to-stand/stand-to-sit;bed-to-chair/chair-to-bed  -AE     [place] Level/Cues Needed (Transfer Goal 1, PT) moderate assist (50-74% patient effort)  -AE     Time Frame (Transfer Goal 1, PT) long term goal (LTG);10 days  -AE     Progress/Outcome (Transfer Goal 1, PT) goal revised this date  -AE       Row Name 03/21/24 1316          Gait Training Goal 1 (PT)    Activity/Assistive Device (Gait Training Goal 1, PT) gait (walking locomotion);assistive device use  -AE     [place] Level (Gait Training Goal 1, PT) maximum assist (25-49% patient effort)  -AE     Distance (Gait Training Goal 1, PT) 10ft  -AE     Time Frame (Gait Training Goal 1, PT) long term goal (LTG);10 days  -AE     Progress/Outcome (Gait Training Goal 1, PT) goal revised this date  -AE               User Key   (r) = Recorded By, (t) = Taken By, (c) = Cosigned By      Initials Name Provider Type    AE Abe Melara, PT Physical Therapist                   Clinical Impression       Row Name 03/21/24 1312          Pain Scale: FACES Pre/Post-Treatment    Pain: FACES Scale, Pretreatment 0-->no hurt  -AE     Posttreatment Pain Rating 0-->no hurt  -AE     Pre/Posttreatment Pain Comment No pain at rest but pt reports pain with attempts to sit upright in chair  -AE       Row Name 03/21/24 1312          Plan of Care Review    Plan of Care Reviewed With patient  -AE     Progress no change  -AE     Outcome Evaluation Re-eval completed. Pt presents with increased confusion, decreased functional independence, and generalized weakness. Pt tolerated dep A lift from bed-chair with mechanical lift. Completed seated ther ex in chair and balance training. Recommend continued skilled IP PT interventions. Recommend D/C to SNF.  -AE       Row Name 03/21/24 1312          Vital Signs    Pre Systolic BP Rehab 191  -AE     Pre Treatment Diastolic BP 89  -AE     Pretreatment Heart Rate (beats/min) 80  -AE     Posttreatment Heart Rate (beats/min) 82  -AE     Pre SpO2 (%) 94  -AE     O2 Delivery Pre Treatment room air  -AE     O2 Delivery Intra Treatment room air  -AE     Post SpO2 (%) 93  -AE     O2 Delivery Post Treatment room air  -AE     Pre Patient Position Supine  -AE     Intra Patient Position Sitting  -AE     Post Patient Position Sitting  -AE       Row Name 03/21/24 1312          Positioning and Restraints    Pre-Treatment Position in bed  -AE     Post Treatment Position chair  -AE     In Chair notified nsg;reclined;call light within reach;encouraged to call for assist;exit alarm on;with nsg;legs elevated;on mechanical lift sling  with sitter  -AE               User Key  (r) = Recorded By, (t) = Taken By, (c) = Cosigned By      Initials Name Provider Type    AE Abe Melara, PT Physical Therapist                   Outcome Measures        Row Name 03/21/24 1316 03/21/24 0800       How much help from another person do you currently need...    Turning from your back to your side while in flat bed without using bedrails? 1  -AE 1  -MK    Moving from lying on back to sitting on the side of a flat bed without bedrails? 1  -AE 1  -MK    Moving to and from a bed to a chair (including a wheelchair)? 1  -AE 1  -MK    Standing up from a chair using your arms (e.g., wheelchair, bedside chair)? 2  -AE 1  -MK    Climbing 3-5 steps with a railing? 1  -AE 1  -MK    To walk in hospital room? 1  -AE 1  -MK    AM-PAC 6 Clicks Score (PT) 7  -AE 6  -MK    Highest Level of Mobility Goal 2 --> Bed activities/dependent transfer  -AE 2 --> Bed activities/dependent transfer  -MK      Row Name 03/21/24 1316          Functional Assessment    Outcome Measure Options AM-PAC 6 Clicks Basic Mobility (PT)  -AE               User Key  (r) = Recorded By, (t) = Taken By, (c) = Cosigned By      Initials Name Provider Type    AE Abe Melara, PT Physical Therapist    Sandra Dougherty, RN Registered Nurse                                 Physical Therapy Education       Title: PT OT SLP Therapies (In Progress)       Topic: Physical Therapy (In Progress)       Point: Mobility training (In Progress)       Learning Progress Summary             Patient Acceptance, E, NR by AE at 3/21/2024 1115    Acceptance, E,TB, NR,NL by  at 3/20/2024 0755    Acceptance, E, NR by CK at 3/6/2024 1121    Acceptance, E, VU,NR by LO at 3/4/2024 1340    Comment: PT POC   Family Acceptance, E, NR by CK at 3/6/2024 1121    Acceptance, E, VU,NR by LO at 3/4/2024 1340    Comment: PT POC                         Point: Home exercise program (In Progress)       Learning Progress Summary             Patient Acceptance, E, NR by AE at 3/21/2024 1115    Acceptance, E,TB, NR,NL by CH at 3/20/2024 0755    Acceptance, E, NR by CK at 3/6/2024 1121    Acceptance, E, VU,NR by LO at 3/4/2024 1340    Comment: PT POC    Family Acceptance, E, NR by CK at 3/6/2024 1121    Acceptance, E, VU,NR by LO at 3/4/2024 1340    Comment: PT POC                         Point: Body mechanics (In Progress)       Learning Progress Summary             Patient Acceptance, E, NR by AE at 3/21/2024 1115    Acceptance, E,TB, NR,NL by  at 3/20/2024 0755    Acceptance, E, NR by CK at 3/6/2024 1121    Acceptance, E, VU,NR by LO at 3/4/2024 1340    Comment: PT POC   Family Acceptance, E, NR by CK at 3/6/2024 1121    Acceptance, E, VU,NR by LO at 3/4/2024 1340    Comment: PT POC                         Point: Precautions (In Progress)       Learning Progress Summary             Patient Acceptance, E, NR by AE at 3/21/2024 1115    Acceptance, E,TB, NR,NL by  at 3/20/2024 0755    Acceptance, E, NR by CK at 3/6/2024 1121    Acceptance, E, VU,NR by LO at 3/4/2024 1340    Comment: PT POC   Family Acceptance, E, NR by CK at 3/6/2024 1121    Acceptance, E, VU,NR by LO at 3/4/2024 1340    Comment: PT POC                                         User Key       Initials Effective Dates Name Provider Type Discipline     06/16/21 -  Teresa Batista RN Registered Nurse Nurse     06/16/21 -  Emily Muro, PT Physical Therapist PT    AE 09/21/21 -  Abe Melara, PT Physical Therapist PT    CK 02/06/24 -  Tiffanie Carmen, PT Physical Therapist PT                  PT Recommendation and Plan     Plan of Care Reviewed With: patient  Progress: no change  Outcome Evaluation: Re-eval completed. Pt presents with increased confusion, decreased functional independence, and generalized weakness. Pt tolerated dep A lift from bed-chair with mechanical lift. Completed seated ther ex in chair and balance training. Recommend continued skilled IP PT interventions. Recommend D/C to SNF.     Time Calculation:         PT Charges       Row Name 03/21/24 6497             Time Calculation    Start Time 1115  -AE      PT Received On 03/21/24  -AE      PT Goal Re-Cert Due Date  03/31/24  -AE         Timed Charges    72513 - PT Therapeutic Exercise Minutes 10  -AE         Untimed Charges    PT Eval/Re-eval Minutes 23  -AE         Total Minutes    Timed Charges Total Minutes 10  -AE      Untimed Charges Total Minutes 23  -AE       Total Minutes 33  -AE                User Key  (r) = Recorded By, (t) = Taken By, (c) = Cosigned By      Initials Name Provider Type    AE Abe Melara, PT Physical Therapist                  Therapy Charges for Today       Code Description Service Date Service Provider Modifiers Qty    31769439886 HC PT THER PROC EA 15 MIN 3/21/2024 Abe Melara, PT GP 1    [id number] HC PT RE-EVAL ESTABLISHED PLAN 2 3/21/2024 Abe Melara, PT GP 1            PT G-Codes  Outcome Measure Options: AM-PAC 6 Clicks Basic Mobility (PT)  AM-PAC 6 Clicks Score (PT): 7  AM-PAC 6 Clicks Score (OT): 9  PT Discharge Summary  Anticipated Discharge Disposition (PT): skilled nursing facility    Abe Melara PT  3/21/2024

## 2024-03-21 NOTE — CONSULTS
Lea Rivers  1945  [id number]    Date of Consult: 3/21/2024    Admit Date:  3/2/2024      Requesting Provider: Dr Melendez  Evaluating Physician: Louis Chow MD    Chief Complaint: abdpain    Reason for Consultation: IA infection    History of present illness:     Patient is a 78 y.o.  Yr old male with history of seizure disorder, colon cancer/prostate cancer with prior radiation, diabetes and dementia per admission notes with admission March 2 after frequent falls noted to have multiple rib fractures and left side clavicular fracture in the emergency room.noted to have significant colon distention with potential for pneumatosis on CT scan and seen by gastroenterology/surgery.taken for surgery on March 9 with diagnosis of toxic dilation of the colon for total abdominal colectomy and end ileostomy; medicine notes indicate he received ceftriaxone/Flagyl and Diflucan.  Leukocytosis persisted.repeat CT scan on March 18 with postsurgical changes, moderate loculated ascitic fluid in the anterior abdomen as described by radiology.taken back to surgery on March 18 for exploratory laparotomy with washout.  Culture subsequently with gram-negative manuel at least.  Empiric antibiotics adjusted to Zosyn/Mycamine    3/21/24 not oriented overnight per nursing; insight is generally poor.  Per nursing, + abdominal pain which is constant, dull at present, sharp at times, worse with palpation, better with pain meds and he will not attach a numerical severity.    no headache photophobia or neck stiffness.  No dyspnea or cough.  He has a sling on the left arm with left arm PICC line.  No rash.  Nursing reports no other new focal pain or distress.       Past Medical History:   Diagnosis Date    Anemia     Colon cancer 1990    Status post partial colectomy in 1990. No chemotherapy or radiation therapy.    Coronary artery disease     Nonobstructive coronary artery disease.    Diabetes     Dyslipidemia     GERD  (gastroesophageal reflux disease)     Hx of.    Hyperlipidemia     Hypertension     Hyponatremia     Hypothyroidism     Left shoulder pain     Low sodium levels 2022    recent issue; saw nephrologist who ruled out kidney issues; took Sodium Chloride po to bring levels up    Memory deficit     FROM ANESTHESIA    Osteoarthritis     Prostate cancer 07/23/2022    Seizure disorder     silent seziure-diagnosed years ago    Skin cancer        Past Surgical History:   Procedure Laterality Date    APPENDECTOMY      CARDIAC CATHETERIZATION  2012    30 to 40% LAD    CARPAL TUNNEL RELEASE Bilateral     CHOLECYSTECTOMY      COLECTOMY PARTIAL / TOTAL  1990    Partial colectomy    COLON RESECTION N/A 3/9/2024    Procedure: TOTAL ABDOMINAL COLECTOMY, END ILEOSTOMY;  Surgeon: Harley Godfrey MD;  Location:  [facility];  Service: General;  Laterality: N/A;    COLONOSCOPY      CYBERKNIFE  02/09/2023    Prostate/SV    CYSTOSCOPY TRANSURETHRAL RESECTION OF PROSTATE N/A 09/21/2018    Procedure: CYSTOSCOPY TRANSURETHRAL RESECTION OF PROSTATE GREENLIGHT;  Surgeon: Naseem Clayton MD;  Location:  [facility];  Service: Urology    EXPLORATORY LAPAROTOMY N/A 3/18/2024    Procedure: LAPAROTOMY EXPLORATORY, ABDOMINAL WASH OUT;  Surgeon: Harley Godfrey MD;  Location:  [facility];  Service: General;  Laterality: N/A;    OTHER SURGICAL HISTORY      Contraction surgery on bilateral hands and wrists.    PROSTATE BIOPSY N/A 11/11/2022    Procedure: TRANSRECTAL ULTRASOUND GUIDED BIOPSY OF PROSTATE;  Surgeon: Naseem Noland MD;  Location:  [facility];  Service: Urology;  Laterality: N/A;    SINUS SURGERY      SKIN BIOPSY      TEETH EXTRACTION      TONSILLECTOMY      TOTAL KNEE ARTHROPLASTY Right 04/07/2022    Procedure: TOTAL KNEE ARTHROPLASTY WITH ORTHO ROBOT RIGHT;  Surgeon: Louis Malcolm MD;  Location:  [facility];  Service: Robotics - Ortho;  Laterality: Right;    TRANSRECTAL INCISION PROSTATE WITH GOLD SEED Bilateral 01/06/2023     Procedure: TRANSRECTAL ULTRASOUND GUIDED PROSTATE FIDUCIAL MARKER PLACEMENT;  Surgeon: Naseem Noland MD;  Location: [facility];  Service: Urology;  Laterality: Bilateral;    TURP / TRANSURETHRAL INCISION / DRAINAGE PROSTATE      VASECTOMY         Pediatric History   Patient Parents    Not on file     Other Topics Concern    Not on file   Social History Narrative    Caffeine: None       family history includes Arthritis in an other family member; Cancer in his mother and another family member; Esophageal cancer in his brother; Hypertension in his father; No Known Problems in his paternal grandfather, paternal grandmother, and sister; Stroke in his father, maternal grandfather, sister, and another family member.    Allergies   Allergen Reactions    Chlorhexidine Rash    Sulfa Antibiotics Rash     Childhood reaction             Review of Systems    3/21/24     Constitutional-- No Fever, chills or sweats.  Appetite diminished with fatigue  Heent-- No new vision, hearing or throat complaints.  No epistaxis or oral sores.  Denies odynophagia or dysphagia.  No flashers, floaters or eye pain. No odynophagia or dysphagia. No headache, photophobia or neck stiffness.  CV-- No chest pain, palpitation or syncope  Resp-- No SOB/cough/Hemoptysis  GI- see above.  No hematochezia, melena, or hematemesis. Denies jaundice or chronic liver disease.  -- No dysuria, hematuria, or flank pain.  Denies hesitancy, urgency or flank pain.  Lymph- no swollen lymph nodes in neck/axilla or groin.   Heme- No active bruising or bleeding  MS-- no swelling or pain in the bones or joints of arms/legs.  No new back pain.  Neuro-- generally debilitated, unable to lift his legs off the bed according to nursing.  Wiggles his feet/toes    Full 12 point review of systems reviewed and negative otherwise for acute complaints, except for above    Physical Exam:   Vital Signs   BP (!) 198/94   Pulse 83   Temp 97.9 °F (36.6 °C)   Resp 17   Ht 170.2  "cm (67.01\")   Wt 97.7 kg (215 lb 6.4 oz)   SpO2 96%   BMI 33.73 kg/m²     GENERAL: sleepy answers questions and follows basic commands as above, in no acute distress. Appears chronically debilitated.     HEENT: Normocephalic, atraumatic.   No conjunctival injection. No icterus. Oropharynx clear without evidence of thrush or exudate. No evidence of periodontal disease.    NECK: Supple without nuchal rigidity. No mass.  LYMPH: No cervical, axillary or inguinal lymphadenopathy.  HEART: RRR; No murmur, rubs, gallops.   LUNGS: diminished at bases with some crackles at the bases but otherwiseClear to auscultation bilaterally without wheezing/ rhonchi.  Nonlabored. No dullness.  ABDOMEN: Soft,  tender, nondistended. Positive bowel sounds but diminished. No rebound or guarding. NO mass or HSM.  EXT:  No cyanosis, clubbing;  No cord.has edema  MSK: FROM without joint effusions noted arms/legs.    SKIN: Warm and dry without cutaneous eruptions on Inspection/palpation.    NEURO: follows basic commands    No peripheral stigmata/phenomena of endocarditis    IV without obvious redness or drainage at left arm    Laboratory Data    Results from last 7 days   Lab Units 03/21/24  0439 03/20/24  0510 03/19/24  0936   WBC 10*3/mm3 15.40* 17.74* 22.83*   HEMOGLOBIN g/dL 9.1* 8.6* 9.0*   HEMATOCRIT % 27.7* 27.6* 31.4*   PLATELETS 10*3/mm3 517* 465* 413     Results from last 7 days   Lab Units 03/21/24  0622   SODIUM mmol/L 133*   POTASSIUM mmol/L 4.5   CHLORIDE mmol/L 98   CO2 mmol/L 29.0   BUN mg/dL 17   CREATININE mg/dL 0.52*   GLUCOSE mg/dL 152*   CALCIUM mg/dL 7.6*     Results from last 7 days   Lab Units 03/21/24  0622   ALK PHOS U/L 101   BILIRUBIN mg/dL 0.2   ALT (SGPT) U/L <5   AST (SGOT) U/L 17         Results from last 7 days   Lab Units 03/19/24  0936   CRP mg/dL 13.84*       Estimated Creatinine Clearance: 128.2 mL/min (A) (by C-G formula based on SCr of 0.52 mg/dL (L)).      Microbiology:      Radiology:  Imaging Results " (Last 72 Hours)       Procedure Component Value Units Date/Time    CT Abdomen Pelvis With Contrast [[id number]] Collected: 03/18/24 1150     Updated: 03/18/24 1203    Narrative:      CT ABDOMEN PELVIS W CONTRAST    Date of Exam: 3/18/2024 11:32 AM EDT    Indication: Abdominal abscess (Ped 0-17y)  Patient POD#9 total colectomy, end ileostomy with recurrent fevers.    Comparison: CT of the abdomen and pelvis dated 3/12/2024    Technique: Axial CT images were obtained of the abdomen and pelvis following the uneventful intravenous administration of 85 mL Isovue-300. Reconstructed coronal and sagittal images were also obtained. Automated exposure control and iterative   construction methods were used.      Findings:    Liver: The liver is unremarkable in morphology. No focal liver lesion is seen. No biliary dilation is seen.    Gallbladder: The gallbladder is not identified.    Pancreas: Unremarkable.    Spleen: Unremarkable.    Adrenal glands: Unremarkable.    Genitourinary tract: Kidneys appear unremarkable. No hydronephrosis is seen. The visualized portions of the ureters are unremarkable. Urinary bladder is unremarkable. There are surgical changes of the prostate gland.    Gastrointestinal tract: Patient is status post total colectomy with right-sided ileostomy and rectal pouch noted. Remainder of the visualized hollow viscera is unremarkable. No evidence of bowel obstruction.    Other findings: Surgical drain terminates within the dependent pelvis. There is moderate loculated ascitic fluid collecting within the anterior abdomen. Largest locule within the left anterior abdomen measures approximately 15 x 6 cm. Milder loculated   fluid near the caudal aspect of the liver measures approximately 9 x 6 cm. Additional scattered fluid noted. Infected fluid cannot be excluded. No free air is identified. No pathologically enlarged lymph nodes are seen. Vascular calcifications are   present. The IVC is unremarkable.    Bones  and soft tissues: No acute or suspicious osseous or soft tissue lesion is identified. Superficial soft tissue anasarca is noted. There are surgical changes of the ventral abdominal wall.    Lung bases: Partially visualized bilateral pleural effusions with bibasilar atelectasis. Calcified granuloma within the right lung base.      Impression:      Impression:  1.Postsurgical changes of total colectomy with right-sided ileostomy and rectal pouch. Surgical drain terminates within the dependent pelvis.  2.There is moderate loculated ascitic fluid collecting within the anterior abdomen. Largest locule within the left anterior abdomen measures approximately 15 x 6 cm. Milder loculated fluid near the caudal aspect of the liver measures approximately 9 x 6   cm. Additional scattered fluid also noted. Infected fluid cannot be excluded.   3.Additional findings as detailed above.        Electronically Signed: Peter Bill MD    3/18/2024 12:00 PM EDT    Workstation ID: [id number]              Impression:     --acute abdominal pain with total abdominal colectomy/ileostomy as above related to toxic dilation of colon As per operative note, postop with persistent leukocytosis and fluid collections on CT scan, taken for exploratory laparotomy and fluid culture positive for gram-negative rods so far, consistent with abscess.  Empiric antimicrobials adjusted to Zosyn/Mycamine. Daptomycin IV ×1 on March 21 until further identification/YVAN data known for gram-positive organism    --Left lower lobe pneumonia per medicine team notes, abnormal chest x-ray March 12.no respiratory distress or productive sputum.  Empiric Zosyn ongoing.  If worsening respiratory status you could consider further imaging etc.nasal cannula stable per nursing    --history dementia per admission notes a little specifics regarding baseline not clear and generally poor insight overall.  Description of toxic/metabolic encephalopathy per medicine team notes during  this admission.  Prior history seizure and generally debilitated.    --History of multiple falls with left clavicular and multiple rib fractures per admission notes.  Orthopedics has seen.    --Right upper extremity DVT, described as brachial per medicine team notes.  Anticoagulation at discretion of medicine team    --History prostate and colon cancer previously per admission notes.        PLAN:       --IV Zosyn/Mycamine    **culture with pseudomonas aeruginosa and gram-positive, further YVAN data pending    --daptomycin IV ×1 while awaiting further identification/YVAN data for gram-positive organisms    --Check/reviewed labs cultures and scans    --Partial history per nursing staff    --Discussed with microbiology    --Highly complex of issues with high risk for further serious morbidity and other serious sequela    Copied text in this note has been reviewed and is accurate as of 03/21/24.        Louis Chow MD  3/21/2024

## 2024-03-21 NOTE — DISCHARGE PLACEMENT REQUEST
"Hawk Rivers (78 y.o. Male)   [facility]  Cm Norma       Date of Birth   1945    Social Security Number       Address   [address] [address]    Home Phone       MRN   [id number]       [facility]    Marital Status                               Admission Date   3/2/24    Admission Type   Emergency    Admitting Provider   Fausto Melendez MD    Attending Provider   Fausto Melendez MD    Department, Room/Bed   [address], S572/1       Discharge Date       Discharge Disposition       Discharge Destination                                 Attending Provider: Fausto Melendez MD    Allergies: Chlorhexidine, Sulfa Antibiotics    Isolation: None   Infection: None   Code Status: No CPR    Ht: 170.2 cm (67.01\")   Wt: 97.7 kg (215 lb 6.4 oz)    Admission Cmt: None   Principal Problem: Falls [W19.XXXA]                   Active Insurance as of 3/2/2024       Primary Coverage       Payor Plan Insurance Group Employer/Plan Group    MEDICARE MEDICARE A & B        Payor Plan Address Payor Plan Phone Number Payor Plan Fax Number Effective Dates    [address] [phone]  7/1/2009 - None Entered    [facility] [address]         Subscriber Name Subscriber Birth Date Member ID       HAWK RIVERS 1945 [id number]               Secondary Coverage       Payor Plan Insurance Group Employer/Plan Group    AARP MC SUP AAR HEALTH CARE OPTIONS PLAN F       Payor Plan Address Payor Plan Phone Number Payor Plan Fax Number Effective Dates    [facility] [phone]  1/1/2016 - None Entered    [address]       [facility] [address]         Subscriber Name Subscriber Birth Date Member ID       HAWK RIVERS 1945 [id number]                     Emergency Contacts        (Rel.) Home Phone Work Phone Mobile Phone    kingtaylor (Son) [phone] -- [phone]    Omar Guadalupen (Daughter) [phone] -- [phone]    " Celia Dave (Daughter) -- -- --    Fred Loyola (Son) -- -- --              Insurance Information                  MEDICARE/MEDICARE A & B Phone: [phone]    Subscriber: Lea Rivers Subscriber#: [id number]    Group#: -- Precert#: --        [facility] HEALTH CARE OPTIONS Phone: [phone]    Subscriber: [place] Subscriber#: [id number]    Group#: PLAN [id number] Precert#: --             Physical Therapy Notes (most recent note)        Mildred Jean-Baptiste, PT at 03/15/24 5893  Version 1 of 1         Goal Outcome Evaluation:  Plan of Care Reviewed With: patient        Progress: no change  Outcome Evaluation: PT re-eval completed. He was ableto transfer to EOB however unable to maintain NWB status in L UE requriing constant cues. Patient with posterior lean and difficulty with forward weigth shfit despite cues. He reported dizziness at EOB, BP measured. Patient's BP raised to 182/120. PT measured BP again reading 195/122. Patient reported worsening dizziness and requested to return to supine. PT assisted patient back to supine and BP measured again reading 164/82. RN notified. PTx limited due to dizziness and change in BP. Patient presents below baseline for functional mobility. Patient demonstrates decreased activity tolerance, deconditioning and reduced dynamic balance. Patient would continue to benefit from skilled PT services to improve dynamic balance with gait, transfers strength, and activity tolerance training in order to build endurance and reduce risk of falls.      Anticipated Discharge Disposition (PT): skilled nursing facility                          Electronically signed by Mildred Jean-Baptiste PT at 03/15/24 1510          Occupational Therapy Notes (most recent note)        Sandra Stahl, OT at 24 1428          Patient Name: Lea Rivers  : 1945    MRN: [id number]                              Today's Date: 3/17/2024       Admit Date: 3/2/2024    Visit  Dx:     ICD-10-CM ICD-9-CM   1. Fall, initial encounter  W19.XXXA E888.9   2. Closed fracture of multiple ribs of left side, initial encounter  S22.42XA 807.09   3. Closed displaced fracture of acromial end of left clavicle, initial encounter  S42.032A 810.03   4. Falls frequently  R29.6 V15.88   5. Acute UTI (urinary tract infection)  N39.0 599.0   6. Colon distention  K63.89 569.89   7. Paralytic ileus of small intestine and colon  K56.0 560.1   8. Oropharyngeal dysphagia  R13.12 787.22     Patient Active Problem List   Diagnosis    Coronary artery disease    Dyslipidemia    Hypothyroidism    GERD (gastroesophageal reflux disease)    Seizure disorder    BPH (benign prostatic hypertrophy)    Hypertension    Primary osteoarthritis of both knees    Syncope and collapse    Precordial pain    Diabetes    Status post total right knee replacement    Postoperative urinary retention    Confusion, postoperative    Acute blood loss anemia, asymptomatic, no transfusion required    Elevated prostate specific antigen (PSA)    Prostate cancer    Anemia    Body mass index (BMI) of 32.0-32.9 in adult    Localization-related focal epilepsy with simple partial seizures    Need for vaccination against Streptococcus pneumoniae    Upper extremity tendon tear, right, initial encounter    Vitamin D deficiency    PSA elevation    Polyp of colon    Overactive bladder    Gross hematuria    History of colon polyps    History of colon cancer    B12 deficiency    Falls    Pneumatosis intestinalis     Past Medical History:   Diagnosis Date    Anemia     Colon cancer 1990    Status post partial colectomy in 1990. No chemotherapy or radiation therapy.    Coronary artery disease     Nonobstructive coronary artery disease.    Diabetes     Dyslipidemia     GERD (gastroesophageal reflux disease)     Hx of.    Hyperlipidemia     Hypertension     Hyponatremia     Hypothyroidism     Left shoulder pain     Low sodium levels 2022    recent issue; saw  nephrologist who ruled out kidney issues; took Sodium Chloride po to bring levels up    Memory deficit     FROM ANESTHESIA    Osteoarthritis     Prostate cancer 07/23/2022    Seizure disorder     silent seziure-diagnosed years ago    Skin cancer      Past Surgical History:   Procedure Laterality Date    APPENDECTOMY      CARDIAC CATHETERIZATION  2012    30 to 40% LAD    CARPAL TUNNEL RELEASE Bilateral     CHOLECYSTECTOMY      COLECTOMY PARTIAL / TOTAL  1990    Partial colectomy    COLON RESECTION N/A 3/9/2024    Procedure: TOTAL ABDOMINAL COLECTOMY, END ILEOSTOMY;  Surgeon: Harley Godfrey MD;  Location:  [facility] OR;  Service: General;  Laterality: N/A;    COLONOSCOPY      CYBERKNIFE  02/09/2023    Prostate/SV    CYSTOSCOPY TRANSURETHRAL RESECTION OF PROSTATE N/A 09/21/2018    Procedure: CYSTOSCOPY TRANSURETHRAL RESECTION OF PROSTATE GREENLIGHT;  Surgeon: Naseem Clayton MD;  Location:  [facility] OR;  Service: Urology    OTHER SURGICAL HISTORY      Contraction surgery on bilateral hands and wrists.    PROSTATE BIOPSY N/A 11/11/2022    Procedure: TRANSRECTAL ULTRASOUND GUIDED BIOPSY OF PROSTATE;  Surgeon: Naseem Noland MD;  Location:  [facility] OR;  Service: Urology;  Laterality: N/A;    SINUS SURGERY      SKIN BIOPSY      TEETH EXTRACTION      TONSILLECTOMY      TOTAL KNEE ARTHROPLASTY Right 04/07/2022    Procedure: TOTAL KNEE ARTHROPLASTY WITH ORTHO ROBOT RIGHT;  Surgeon: Louis Malcolm MD;  Location:  [facility] OR;  Service: Robotics - Ortho;  Laterality: Right;    TRANSRECTAL INCISION PROSTATE WITH GOLD SEED Bilateral 01/06/2023    Procedure: TRANSRECTAL ULTRASOUND GUIDED PROSTATE FIDUCIAL MARKER PLACEMENT;  Surgeon: Naseem Noland MD;  Location:  [facility] OR;  Service: Urology;  Laterality: Bilateral;    TURP / TRANSURETHRAL INCISION / DRAINAGE PROSTATE      VASECTOMY        General Information       Row Name 03/17/24 7682          OT Time and Intention    Document Type re-evaluation  -     Mode of  Treatment occupational therapy  -       Row Name 03/17/24 1512          General Information    Patient Profile Reviewed yes  -     Prior Level of Function --  Please refer to IE for PLOF  -     Existing Precautions/Restrictions fall;non-weight bearing;left  L rib fx's, L clavicle fx (NWB LUE, LUE sling; may come out of sling for ROM as tolerated); ostomy, OLIVER drain, abd incision & abd binder  -     Barriers to Rehab medically complex;previous functional deficit;cognitive status  -       Row Name 03/17/24 1512          Living Environment    People in Home --  Please refer to IE for PLOF  -       Row Name 03/17/24 1512          Cognition    Orientation Status (Cognition) oriented to;person;verbal cues/prompts needed for orientation;place;disoriented to;situation;time  -       Row Name 03/17/24 1512          Safety Issues, Functional Mobility    Safety Issues Affecting Function (Mobility) ability to follow commands;awareness of need for assistance;impulsivity;insight into deficits/self-awareness;judgment;problem-solving;safety precaution awareness;safety precautions follow-through/compliance;sequencing abilities  [facility]     Impairments Affecting Function (Mobility) balance;cognition;endurance/activity tolerance;pain;postural/trunk control;strength;range of motion (ROM)  -     Cognitive Impairments, Mobility Safety/Performance attention;awareness, need for assistance;insight into deficits/self-awareness;judgment;problem-solving/reasoning;safety precaution awareness;safety precaution follow-through;sequencing abilities  [facility]     Comment, Safety Issues/Impairments (Mobility) Per ortho 3/4 pt to be NWB LUE in sling; family in room reports that pt has not had sling on since PICC line placement on 3/8. OT provided pt w/ donjoy ultra II sling for comfort and educated pt on importance of maintaining NWB LUE d/t clavicle fx. RN notified that OT provided pt w/ sling and ortho rec's for sling & NWB LUE.  -                User Key  (r) = Recorded By, (t) = Taken By, (c) = Cosigned By      Initials Name Provider Type     Sandra Stahl OT Occupational Therapist                     Mobility/ADL's       Row Name 03/17/24 1517          Bed Mobility    Bed Mobility supine-sit  -     Supine-Sit Pedro (Bed Mobility) maximum assist (25% patient effort);verbal cues  -     Bed Mobility, Safety Issues cognitive deficits limit understanding;decreased use of arms for pushing/pulling;decreased use of legs for bridging/pushing;impaired trunk control for bed mobility  -     Assistive Device (Bed Mobility) draw sheet;head of bed elevated;bed rails  -     Comment, (Bed Mobility) Upon sitting EOB pt initially req mod A to maintain static sitting balance but progressed to min A w/ RUE hand placement on bedrail  -       Row Name 03/17/24 1517          Transfers    Transfers sit-stand transfer;stand-sit transfer;bed-chair transfer  -       Row Name 03/17/24 1517          Bed-Chair Transfer    Bed-Chair Pedro (Transfers) maximum assist (25% patient effort);2 person assist;verbal cues  -     Assistive Device (Bed-Chair Transfers) other (see comments)  RUE support  -       Row Name 03/17/24 1517          Sit-Stand Transfer    Sit-Stand Pedro (Transfers) maximum assist (25% patient effort);2 person assist;verbal cues  -     Assistive Device (Sit-Stand Transfers) other (see comments)  -       Row Name 03/17/24 1517          Stand-Sit Transfer    Stand-Sit Pedro (Transfers) maximum assist (25% patient effort);2 person assist;verbal cues  -     Assistive Device (Stand-Sit Transfers) other (see comments)  -       Row Name 03/17/24 1517          Activities of Daily Living    BADL Assessment/Intervention lower body dressing;upper body dressing  -       Row Name 03/17/24 1517          Mobility    Extremity Weight-bearing Status left upper extremity   -     Left Upper Extremity (Weight-bearing  Status) non weight-bearing (NWB)   -       Row Name 03/17/24 1517          Lower Body Dressing Assessment/Training    [place] Level (Lower Body Dressing) don;doff;socks;dependent (less than 25% patient effort);verbal cues  -     Position (Lower Body Dressing) supine;supported sitting  -[facility] Name 03/17/24 1517          Upper Body Dressing Assessment/Training    [place] Level (Upper Body Dressing) don;other (see comments);dependent (less than 25% patient effort);verbal cues  LUE sling  -     Position (Upper Body Dressing) sitting up in bed  -               User Key  (r) = Recorded By, (t) = Taken By, (c) = Cosigned By      Initials Name Provider Type     Sandra Stahl OT Occupational Therapist                   Obj/Interventions       [facility] Name 03/17/24 1520          Shoulder (Therapeutic Exercise)    Shoulder (Therapeutic Exercise) AAROM (active assistive range of motion)  -     Shoulder AAROM (Therapeutic Exercise) right;flexion;extension;aBduction;aDduction;10 repetitions;supine  -[facility] Name 03/17/24 1520          Elbow/Forearm (Therapeutic Exercise)    Elbow/Forearm (Therapeutic Exercise) AAROM (active assistive range of motion)  -     Elbow/Forearm AAROM (Therapeutic Exercise) right;flexion;extension;10 repetitions;supine  -[facility] Name 03/17/24 1520          Motor Skills    Therapeutic Exercise shoulder;elbow/forearm  Pt unable to tolerate LUE elbow/wrist/hand ROM d/t pain  -[facility] Name 03/17/24 1520          Balance    Balance Assessment sitting static balance;sit to stand dynamic balance;standing static balance;standing dynamic balance  -     Static Sitting Balance minimal assist;verbal cues  -     Position, Sitting Balance supported;sitting in chair;sitting edge of bed  -     Sit to Stand Dynamic Balance maximum assist;2-person assist;verbal cues  -     Static Standing Balance maximum assist;2-person assist;verbal cues  -     Dynamic Standing  Balance maximum assist;2-person assist;verbal cues  -     Position/Device Used, Standing Balance supported  -     Balance Interventions sitting;sit to stand;occupation based/functional task  -               User Key  (r) = Recorded By, (t) = Taken By, (c) = Cosigned By      Initials Name Provider Type    Sandra Valentine OT Occupational Therapist                   Goals/Plan       Row Name 03/17/24 1524          Transfer Goal 1 (OT)    Activity/Assistive Device (Transfer Goal 1, OT) sit-to-stand/stand-to-sit;bed-to-chair/chair-to-bed;commode, bedside without drop arms  -     [place] Level/Cues Needed (Transfer Goal 1, OT) moderate assist (50-74% patient effort);verbal cues required  -     Time Frame (Transfer Goal 1, OT) long term goal (LTG);10 days  -     Progress/Outcome (Transfer Goal 1, OT) goal revised this date;goal ongoing  -       Row Name 03/17/24 1524          Dressing Goal 1 (OT)    Activity/Device (Dressing Goal 1, OT) lower body dressing  -     [place]/Cues Needed (Dressing Goal 1, OT) moderate assist (50-74% patient effort)  -     Time Frame (Dressing Goal 1, OT) long term goal (LTG);by discharge  -     Progress/Outcome (Dressing Goal 1, OT) goal no longer appropriate  -       Row Name 03/17/24 1524          Grooming Goal 1 (OT)    Activity/Device (Grooming Goal 1, OT) oral care;wash face, hands  -     [place] (Grooming Goal 1, OT) moderate assist (50-74% patient effort);verbal cues required  unsupported sitting  -     Time Frame (Grooming Goal 1, OT) long term goal (LTG);10 days  -     Progress/Outcome (Grooming Goal 1, OT) goal ongoing  -       Row Name 03/17/24 1524          ROM Goal 1 (OT)    ROM Goal 1 (OT) Pt will demo ability to perform BUE HEP (LUE ROM as tolerated) daily to support ADL independence.  -     Time Frame (ROM Goal 1, OT) long term goal (LTG);10 days  -     Progress/Outcome (ROM Goal 1, OT) goal ongoing  -       Row Name  03/17/24 1524          Therapy Assessment/Plan (OT)    Planned Therapy Interventions (OT) activity tolerance training;adaptive equipment training;BADL retraining;cognitive/visual perception retraining;edema control/reduction;functional balance retraining;IADL retraining;occupation/activity based interventions;patient/caregiver education/training;ROM/therapeutic exercise;strengthening exercise;transfer/mobility retraining  -               User Key  (r) = Recorded By, (t) = Taken By, (c) = Cosigned By      Initials Name Provider Type     Sandra Stahl OT Occupational Therapist                   Clinical Impression       Row Name 03/17/24 1521          Pain Assessment    Pain Intervention(s) Repositioned;Ambulation/increased activity  -     Additional Documentation Pain Scale: FACES Pre/Post-Treatment (Group)  -       Row Name 03/17/24 1521          Pain Scale: FACES Pre/Post-Treatment    Pain: FACES Scale, Pretreatment 4-->hurts little more  -     Posttreatment Pain Rating 4-->hurts little more  -     Pain Location - Side/Orientation Left  -     Pain Location upper  -     Pain Location - extremity  -       Row Name 03/17/24 1521          Plan of Care Review    Plan of Care Reviewed With patient;family  -     Progress declining  -     Outcome Evaluation Pt presents w/ increased lethargy, increased confusion, generalized weakness, decreased functional endurance, balance and postural control deficits limiting his ADL independence. Pt provided w/ LUE sling for comfort and to maintain LUE NWB restrictions w/ activity. Pt req max Ax1 for supine-to-sit,max Ax2 for STS & SPT from bed-to-chair, dep for ADLs. Pt would benefit from continued skilled IPOT services to address current functional deficits. Rec SNF at d/c.  -       Row Name 03/17/24 1521          Therapy Assessment/Plan (OT)    Rehab Potential (OT) good, to achieve stated therapy goals  -     Criteria for Skilled Therapeutic  Interventions Met (OT) yes;meets criteria;skilled treatment is necessary  -     Therapy Frequency (OT) daily  -       Row Name 03/17/24 1521          Therapy Plan Review/Discharge Plan (OT)    Anticipated Discharge Disposition (OT) skilled nursing facility  -       Row Name 03/17/24 1521          Vital Signs    O2 Delivery Pre Treatment room air  -     O2 Delivery Intra Treatment room air  -     O2 Delivery Post Treatment room air  -     Pre Patient Position Supine  -     Intra Patient Position Standing  -     Post Patient Position Sitting  -       Row Name 03/17/24 1521          Positioning and Restraints    Pre-Treatment Position in bed  -     Post Treatment Position chair  -     In Chair notified nsg;reclined;call light within reach;encouraged to call for assist;exit alarm on;waffle cushion;on mechanical lift sling;legs elevated;heels elevated;with brace;with nsg;with family/caregiver  -               User Key  (r) = Recorded By, (t) = Taken By, (c) = Cosigned By      Initials Name Provider Type    Sandra Valentine, OT Occupational Therapist                   Outcome Measures       Row Name 03/17/24 1525          How much help from another is currently needed...    Putting on and taking off regular lower body clothing? 1  -MC     Bathing (including washing, rinsing, and drying) 1  -MC     Toileting (which includes using toilet bed pan or urinal) 1  -MC     Putting on and taking off regular upper body clothing 2  -MC     Taking care of personal grooming (such as brushing teeth) 2  -MC     Eating meals 2  -MC     AM-PAC 6 Clicks Score (OT) 9  -       Row Name 03/17/24 0706          How much help from another person do you currently need...    Turning from your back to your side while in flat bed without using bedrails? 2  -DV     Moving from lying on back to sitting on the side of a flat bed without bedrails? 2  -DV     Moving to and from a bed to a chair (including a wheelchair)? 2   -DV     Standing up from a chair using your arms (e.g., wheelchair, bedside chair)? 2  -DV     Climbing 3-5 steps with a railing? 1  -DV     To walk in hospital room? 1  -DV     AM-PAC 6 Clicks Score (PT) 10  -DV     Highest Level of Mobility Goal 4 --> Transfer to chair/commode  -DV       Row Name 03/17/24 1525          Functional Assessment    Outcome Measure Options AM-PAC 6 Clicks Daily Activity (OT)  -               User Key  (r) = Recorded By, (t) = Taken By, (c) = Cosigned By      Initials Name Provider Type     Sandra Stahl OT Occupational Therapist    Maribell Tapia RN Registered Nurse                    Occupational Therapy Education       Title: PT OT SLP Therapies (In Progress)       Topic: Occupational Therapy (In Progress)       Point: ADL training (In Progress)       Description:   Instruct learner(s) on proper safety adaptation and remediation techniques during self care or transfers.   Instruct in proper use of assistive devices.                  Learning Progress Summary             Patient Acceptance, E, NR by  at 3/17/2024 1526    Acceptance, E, NR by  at 3/6/2024 1116    Acceptance, E, VU by LAINE at 3/4/2024 1519                         Point: Home exercise program (In Progress)       Description:   Instruct learner(s) on appropriate technique for monitoring, assisting and/or progressing therapeutic exercises/activities.                  Learning Progress Summary             Patient Acceptance, E, NR by  at 3/17/2024 1526                         Point: Precautions (In Progress)       Description:   Instruct learner(s) on prescribed precautions during self-care and functional transfers.                  Learning Progress Summary             Patient Acceptance, E, NR by  at 3/17/2024 1526    Acceptance, E, VU by LAINE at 3/4/2024 1519                         Point: Body mechanics (In Progress)       Description:   Instruct learner(s) on proper positioning and spine alignment during  self-care, functional mobility activities and/or exercises.                  Learning Progress Summary             Patient Acceptance, E, NR by  at 3/17/2024 1526                                         User Key       Initials Effective Dates Name Provider Type Discipline     02/03/23 -  Pauly Davey, OT Occupational Therapist OT     06/16/21 -  Justina Joyce OT Occupational Therapist OT     10/14/22 -  Sandra Stahl OT Occupational Therapist OT                  OT Recommendation and Plan  Planned Therapy Interventions (OT): activity tolerance training, adaptive equipment training, BADL retraining, cognitive/visual perception retraining, edema control/reduction, functional balance retraining, IADL retraining, occupation/activity based interventions, patient/caregiver education/training, ROM/therapeutic exercise, strengthening exercise, transfer/mobility retraining  Therapy Frequency (OT): daily  Plan of Care Review  Plan of Care Reviewed With: patient, family  Progress: declining  Outcome Evaluation: Pt presents w/ increased lethargy, increased confusion, generalized weakness, decreased functional endurance, balance and postural control deficits limiting his ADL independence. Pt provided w/ LUE sling for comfort and to maintain LUE NWB restrictions w/ activity. Pt req max Ax1 for supine-to-sit,max Ax2 for STS & SPT from bed-to-chair, dep for ADLs. Pt would benefit from continued skilled IPOT services to address current functional deficits. Rec SNF at d/c.     Time Calculation:         Time Calculation- OT       Row Name 03/17/24 1526             Time Calculation- OT    OT Start Time 1428  -      OT Received On 03/17/24  -      OT Goal Re-Cert Due Date 03/27/24  -         Timed Charges    41250 - OT Therapeutic Exercise Minutes 6  -      09941 - OT Therapeutic Activity Minutes 15  -      18397 - OT Self Care/Mgmt Minutes 4  -         Untimed Charges    OT Eval/Re-eval Minutes 24  -          Total Minutes    Timed Charges Total Minutes 25  -MC      Untimed Charges Total Minutes 24  -MC       Total Minutes 49  -MC                User Key  (r) = Recorded By, (t) = Taken By, (c) = Cosigned By      Initials Name Provider Type    Sandra Valentine OT Occupational Therapist                  Therapy Charges for Today       Code Description Service Date Service Provider Modifiers Qty    [id number]  OT THER PROC EA 15 MIN 3/17/2024 Sandra Stahl OT GO 1    [id number]  OT THERAPEUTIC ACT EA 15 MIN 3/17/2024 Sandra Stahl OT GO 1    [id number]  OT RE-EVAL 2 3/17/2024 Sandra Stahl OT GO 1                 Sandra Stahl OT  3/17/2024    Electronically signed by Sandra Stahl OT at 03/17/24 3290

## 2024-03-21 NOTE — CASE MANAGEMENT/SOCIAL WORK
Continued Stay Note  [facility]     Patient Name: Lea Rivers  MRN: [id number]  Today's Date: 3/21/2024    Admit Date: 3/2/2024    Plan: rehab   Discharge Plan       Row Name 03/21/24 1046       Plan    Plan rehab    Patient/Family in Agreement with Plan yes    Plan Comments I met with this patient at the bedside. He stated that he is still agreeable with going to [facility]  skilled rehab to LTC, possibly on Monday. SAVANNA spoke with Rosario at [facility]. She stated that she will need updated PT/OT notes. They are both signed into see the patient today. CM will fax notes when they become available. She was notified of the possible discharge on Monday. The patient also has a sitter. He is on TPN and IV antibiotics. Rosario, from [facility], stated that she will need to come assess this patient prior to them offering a bed. CM will follow.    1525  PT and OT notes faxed to [facility]. CM left VM with Rosario regarding MD thinking he might be discharged Monday. They are stating that he cannot go there as he is still on TPN. Perhaps the MD is anticipating that he will be off it prior to Monday. He does still have a sitter, and the facility still needs to come have an on-site assessment of this patient. CM to follow.    Final Discharge Disposition Code 03 - skilled nursing facility (SNF)                   Discharge Codes    No documentation.                 Expected Discharge Date and Time       Expected Discharge Date Expected Discharge Time    Mar 25, 2024               Norma Horta RN

## 2024-03-21 NOTE — PROGRESS NOTES
[facility] Medicine Services  PROGRESS NOTE    Patient Name: Lea Rivers  : 1945  MRN: [id number]    Date of Admission: 3/2/2024  Primary Care Physician: Mannie Melvin MD    Subjective   Subjective     CC: Follow up colectomy    HPI: Awake and alert. Up in bed. Some abdominal soreness. No f/c. No dyspnea.     Objective   Objective     Vital Signs:   Temp:  [97.6 °F (36.4 °C)-98.5 °F (36.9 °C)] 97.9 °F (36.6 °C)  Heart Rate:  [73-89] 83  Resp:  [17-18] 17  BP: (175-216)/() 198/94  Flow (L/min):  [2] 2     Physical Exam:  NAD, alert and oriented  OP clear, dry MM  Neck supple  No LAD  RRR  Decreased at bases, otherwise clear  Abdominal binder in placed, ostomy appliance in place, drains noted, liquid stool in ostomy  B LE edema, heels dressed B  SHELBY  Flat affect  No change from 3/20    Results Reviewed:  LAB RESULTS:      Lab 24  0439 24  0510 24  1643 24  0936 24  0715 24  0513 24  0543 03/15/24  0405   WBC 15.40* 17.74*  --  22.83* 16.67* 17.90*   < > 15.09*   HEMOGLOBIN 9.1* 8.6*  --  9.0* 8.0* 8.6*   < > 7.2*   HEMATOCRIT 27.7* 27.6*  --  31.4* 24.6* 27.1*   < > 23.1*   PLATELETS 517* 465*  --  413 379 332   < > 245   NEUTROS ABS 11.78* 13.73*  --  18.38* 13.50* 13.78*   < > 10.75*   IMMATURE GRANS (ABS) 0.83* 0.96*  --  1.18*  --   --   --  1.30*   LYMPHS ABS 1.09 1.07  --  0.81  --   --   --  1.04   MONOS ABS 1.15* 1.77*  --  2.29*  --   --   --  1.31*   EOS ABS 0.41* 0.12  --  0.01 0.00 0.18   < > 0.61*   MCV 97.9* 93.6  --  101.0* 101.2* 102.7*   < > 93.5   CRP  --   --   --  13.84*  --   --   --   --    PROTIME  --   --  15.9*  --   --   --   --   --     < > = values in this interval not displayed.         Lab 24  0622 24  0510 03/15/24  0405   SODIUM 133* 141 141   POTASSIUM 4.5 4.4 3.8   CHLORIDE 98 106 105   CO2 29.0 29.0 28.0   ANION GAP 6.0 6.0 8.0   BUN 17 26* 18   CREATININE 0.52* 0.65*  0.54*   EGFR 102.5 95.8 101.4   GLUCOSE 152* 161* 147*   CALCIUM 7.6* 7.6* 7.8*   MAGNESIUM  --   --  2.0   PHOSPHORUS  --  2.9 2.9         Lab 03/21/24  0622 03/20/24  0510   TOTAL PROTEIN 4.8* 4.7*   ALBUMIN 2.0* 1.9*   GLOBULIN 2.8 2.8   ALT (SGPT) <5 5   AST (SGOT) 17 14   BILIRUBIN 0.2 0.2   ALK PHOS 101 77           Lab 03/19/24  1643   PROTIME 15.9*   INR 1.26*             Lab 03/19/24  0936   TRIGLYCERIDES 167*           Lab 03/16/24  0630   ABO TYPING O   RH TYPING Negative   ANTIBODY SCREEN Negative           Brief Urine Lab Results  (Last result in the past 365 days)        Color   Clarity   Blood   Leuk Est   Nitrite   Protein   CREAT   Urine HCG        03/11/24 1337 Yellow   Cloudy   Moderate (2+)   Trace   Negative   30 mg/dL (1+)                   Microbiology Results Abnormal       Procedure Component Value - Date/Time    Anaerobic Culture - Peritoneal Fluid, Perineum [[id number]]  (Normal) Collected: 03/18/24 1349    Lab Status: Preliminary result Specimen: Peritoneal Fluid from Perineum Updated: 03/21/24 0741     Anaerobic Culture No anaerobes isolated at 3 days    AFB Culture - Peritoneal Fluid, Perineum [[id number]] Collected: 03/18/24 1349    Lab Status: Preliminary result Specimen: Peritoneal Fluid from Perineum Updated: 03/19/24 1339     AFB Stain No acid fast bacilli seen on concentrated smear    Urine Culture - Urine, Indwelling Urethral Catheter [[id number]]  (Normal) Collected: 03/11/24 1337    Lab Status: Final result Specimen: Urine from Indwelling Urethral Catheter Updated: 03/12/24 2024     Urine Culture No growth    Blood Culture - Blood, Arm, Right [[id number]]  (Normal) Collected: 03/06/24 1857    Lab Status: Final result Specimen: Blood from Arm, Right Updated: 03/11/24 2045     Blood Culture No growth at 5 days    Blood Culture - Blood, Arm, Right [[id number]]  (Normal) Collected: 03/06/24 1751    Lab Status: Final result Specimen: Blood from Arm, Right Updated: 03/11/24 2000     Blood  Culture No growth at 5 days    Blood Culture - Blood, Arm, Right [[id number]]  (Normal) Collected: 03/02/24 1132    Lab Status: Final result Specimen: Blood from Arm, Right Updated: 03/07/24 1201     Blood Culture No growth at 5 days    Narrative:      Less than seven (7) mL's of blood was collected.  Insufficient quantity may yield false negative results.    Blood Culture - Blood, Arm, Right [[id number]]  (Normal) Collected: 03/02/24 1132    Lab Status: Final result Specimen: Blood from Arm, Right Updated: 03/07/24 1201     Blood Culture No growth at 5 days    Narrative:      Less than seven (7) mL's of blood was collected.  Insufficient quantity may yield false negative results.    Urine Culture - Urine, Straight Cath [[id number]]  (Normal) Collected: 03/02/24 0923    Lab Status: Final result Specimen: Urine from Straight Cath Updated: 03/03/24 1601     Urine Culture No growth    COVID PRE-OP / PRE-PROCEDURE SCREENING ORDER (NO ISOLATION) - Swab, Nasopharynx [[id number]]  (Normal) Collected: 03/02/24 1133    Lab Status: Final result Specimen: Swab from Nasopharynx Updated: 03/02/24 1227    Narrative:      The following orders were created for panel order COVID PRE-OP / PRE-PROCEDURE SCREENING ORDER (NO ISOLATION) - Swab, Nasopharynx.  Procedure                               Abnormality         Status                     ---------                               -----------         ------                     COVID-19, FLU A/B, RSV P...[[id number]]  Normal              Final result                 Please view results for these tests on the individual orders.    COVID-19, FLU A/B, RSV PCR 1 HR TAT - Swab, Nasopharynx [[id number]]  (Normal) Collected: 03/02/24 1133    Lab Status: Final result Specimen: Swab from Nasopharynx Updated: 03/02/24 1227     COVID19 Not Detected     Influenza A PCR Not Detected     Influenza B PCR Not Detected     RSV, PCR Not Detected    Narrative:      Fact sheet for providers:  https://www.fda.gov/media/609448/download    Fact sheet for patients: https://www.fda.gov/media/849295/download    Test performed by PCR.            No radiology results from the last 24 hrs    Results for orders placed during the hospital encounter of 07/20/21    Adult Transthoracic Echo Complete W/ Cont if Necessary Per Protocol    Interpretation Summary  · Left ventricular wall thickness is consistent with mild concentric hypertrophy.  · Normal left-ventricular systolic function, estimated EF 65%.  · Left ventricular diastolic function is consistent with (grade I) impaired relaxation.  · Aortic sclerosis without aortic stenosis. Trace aortic insufficiency.  · Trace mitral regurgitation, trace tricuspid regurgitation.      Current medications:  Scheduled Meds:DAPTOmycin, 6 mg/kg (Adjusted), Intravenous, Once  enoxaparin, 1 mg/kg, Subcutaneous, Q12H  Fat Emul Fish Oil/Plant Based, 250 mL, Intravenous, Q24H (TPN)  insulin regular, 2-7 Units, Subcutaneous, Q6H  isosorbide dinitrate, 10 mg, Oral, TID - Nitrates  levETIRAcetam, 500 mg, Oral, Q12H  levothyroxine, 88 mcg, Oral, Q AM  Lidocaine, 2 patch, Transdermal, Q24H  lisinopril, 20 mg, Oral, Q24H  metoclopramide, 10 mg, Intravenous, Q6H  micafungin (MYCAMINE) IV, 100 mg, Intravenous, Q24H  pantoprazole, 40 mg, Intravenous, Q AM  piperacillin-tazobactam, 3.375 g, Intravenous, Q8H  sodium chloride, 10 mL, Intravenous, Q12H  Valproic Acid, 375 mg, Oral, Q6H  verapamil, 80 mg, Oral, Q8H      Continuous Infusions:Adult Central 2-in-1 TPN, , Last Rate: 65 mL/hr at 03/20/24 1704  Pharmacy to Dose TPN,           PRN Meds:.  acetaminophen **OR** acetaminophen **OR** acetaminophen    calcium carbonate    Calcium Replacement - Follow Nurse / BPA Driven Protocol    dextrose    dextrose    docusate sodium    fluticasone    glucagon (human recombinant)    hydrALAZINE    ipratropium-albuterol    labetalol    Magnesium Standard Dose Replacement - Follow Nurse / BPA Driven  Protocol    [DISCONTINUED] HYDROmorphone **AND** naloxone    ondansetron ODT **OR** ondansetron    oxyCODONE    Pharmacy to Dose TPN    Phosphorus Replacement - Follow Nurse / BPA Driven Protocol    Potassium Replacement - Follow Nurse / BPA Driven Protocol    sodium chloride    sodium chloride    sodium chloride    sodium chloride    Assessment & Plan   Assessment & Plan     Active Hospital Problems    Diagnosis  POA    **Falls [W19.XXXA]  Yes    Pneumatosis intestinalis [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        Brief Hospital Course to date:  Lea Rivers is a 78 y.o. male with past medical history of hypertension, hyperlipidemia, hypothyroidism, seizure disorder.  Patient is being admitted for a fall and nondisplaced first through fourth left rib fractures.  Patient also has left distal clavicle fracture.    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 on 3/9 w/  Persistent  Leukocytosis  LLL PNA  -s/p repeat CT 3/18, with fluid collection noted, s/p exploratory laparotomy 3/18 with abdominal washout and ascites noted  -on zosyn/mycamine now  -completed course of Fluconazole, CTX/Flagyl 3/17 prior to repeat surgery  -CXR 3/12 with possible LLL pneumonia, completed course of CTX  -ID following, abdominal fluid culture with pseudomonas    Dysphagia  -speech following, FEES tentatively today    Toxic metabolic encephalopathy  -per daughter significant change in mental status and speech since arrival, and team had a long discussion and felt this is probably TME in setting of infection, recent large operation, underlying dementia  -initial CT head 3/2 negative, repeated CT head 3/13 without any acute findings  -EEG negative for seizures, findings consistent with TME  -minimize sedating meds   -slowly improving    Multiple falls with increasing frequency   -CT of head shows age-related changes which are  chronic but no acute process  -neuro consulted, likely d/t neuropathy (confirmed with EMG) and progression of dementia     Multiple rib fractures and also left clavicle fracture  -With no displacement or mildly displaced.  Orthopedic surgery evaluated. No surgical intervention  planned.  Recs nonweightbearing LUE, may come out of sling in 5-7 days to initiate gentle ROM exercises per ortho.  F/u with ortho/Dr. Maldonado in 2 weeks  -Pain control, lidocaine patch  -bruise noted to L shoulder/neck region however X-ray negative    Acute on Chronic Anemia  -S/P 1 unit PRBCs 3/11 and 3/16, monitoring H&H  -no clear source of active bleeding, hemoccult negative  -transfuse PRN hgb <7, hold therapeutic lovenox for now    Acute RUE DVT (brachial)  -Provoked, 2/2 PICC   -on lovenox, transition to PO anticoagulation pending speech evaluations     Prostate cancer  Hx BPH s/p greenlight photo vaporization of prostate 2018  -Follows with Dr. Noland  -S/P cyberknife radiation 2/9/23  -Has intermittent hematuria, monitor while on AC     Dementia and increasing memory problem  -Patient was previously living alone and driving, given significant decline will need placement     Hypertension  -cleared by speech, resumed home oral regimen  -PRN IV dosing for SBP >180    Seizure disorder  -continue vimpat and keppra    Hyperlipidemia  Hypothyroidism  -resume home regimen    Expected Discharge Location and Transportation: SNF  Expected Discharge   Expected Discharge Date: 3/25/2024; Expected Discharge Time:      DVT prophylaxis:  Medical and mechanical DVT prophylaxis orders are present.         AM-PAC 6 Clicks Score (PT): 6 (03/21/24 0800)    CODE STATUS:   Code Status and Medical Interventions:   Ordered at: 03/02/24 1456     Medical Intervention Limits:    NO intubation (DNI)     Code Status (Patient has no pulse and is not breathing):    No CPR (Do Not Attempt to Resuscitate)     Medical Interventions (Patient has pulse or is  breathing):    Limited Support       Fausto Melendez MD  03/21/24

## 2024-03-21 NOTE — PLAN OF CARE
Goal Outcome Evaluation:  Plan of Care Reviewed With: patient        Progress: improving     BP elevated throughout shift, disoriented to time and place, no complaints of pain, low output from drains.

## 2024-03-21 NOTE — PROGRESS NOTES
Pharmacy Parenteral Nutrition Evaluation    Lea Rivers is a  78 y.o. male receiving TPN.     Indication:     Prolonged Paralytic Ileus     Consulting Physician: Eleanor Yost DO     Total Fluid Rate (if stated): n/a    Labs  Results from last 7 days   Lab Units 03/21/24  0622 03/20/24  0510 03/15/24  0405   SODIUM mmol/L 133* 141 141   POTASSIUM mmol/L 4.5 4.4 3.8   CHLORIDE mmol/L 98 106 105   CO2 mmol/L 29.0 29.0 28.0   BUN mg/dL 17 26* 18   CREATININE mg/dL 0.52* 0.65* 0.54*   CALCIUM mg/dL 7.6* 7.6* 7.8*   BILIRUBIN mg/dL 0.2 0.2  --    ALK PHOS U/L 101 77  --    ALT (SGPT) U/L <5 5  --    AST (SGOT) U/L 17 14  --    GLUCOSE mg/dL 152* 161* 147*       Results from last 7 days   Lab Units 03/20/24  0510 03/19/24  1132 03/15/24  0405   MAGNESIUM mg/dL  --   --  2.0   PHOSPHORUS mg/dL 2.9  --  2.9   PREALBUMIN mg/dL  --  6.5*  --        Results from last 7 days   Lab Units 03/21/24  0439 03/20/24  0510 03/19/24  0936   WBC 10*3/mm3 15.40* 17.74* 22.83*   HEMOGLOBIN g/dL 9.1* 8.6* 9.0*   HEMATOCRIT % 27.7* 27.6* 31.4*   PLATELETS 10*3/mm3 517* 465* 413       Triglycerides   Date Value Ref Range Status   03/19/2024 167 (H) 0 - 150 mg/dL Final     Comment:     Falsely depressed results may occur on samples drawn from patients receiving N-Acetylcysteine (NAC) or Metamizole.       estimated creatinine clearance is 128.2 mL/min (A) (by C-G formula based on SCr of 0.52 mg/dL (L)).    Intake & Output (last 3 days)         03/05 0701 03/06 0700 03/06 0701 03/07 0700 03/07 0701 03/08 0700 03/08 0701 03/09 0700    P.O. 1080 240      I.V. (mL/kg)  1026.3 (11.5)      IV Piggyback  400      Total Intake(mL/kg) 1080 (12.1) 1666.3 (18.7)      Urine (mL/kg/hr) 150 (0.1) 400 (0.2)      Stool 0 0      Total Output 150 400      Net +930 +1266.3              Urine Unmeasured Occurrence 4 x 2 x 1 x     Stool Unmeasured Occurrence 5 x 4 x 3 x 1 x            Dietitian Recommendations  Diet Orders (active) (From  admission, onward)       Start     Ordered    03/08/24 1800  Adult Cyclic Central 2-in-1 TPN  Cyclic TPN - See Admin Instructions        Note to Pharmacy: Lea Rivers  5H  [id number]  MRN: [id number]  CSN: [id number]    03/08/24 1422    03/08/24 1800  Fat Emul Fish Oil/Plant Based (SMOFLIPID) 20 % emulsion 250 mL  Every 24 Hours Scheduled (TPN)         03/08/24 1422 03/08/24 1355  NPO Diet NPO Type: Strict NPO  Diet Effective Now         03/08/24 1358    03/05/24 1656  DIET MESSAGE Pt would like a grilled cheese sandwich, please. Thanks!  Once        Comments: Pt would like a grilled cheese sandwich, please. Thanks!    03/05/24 1656                    Current TPN Regimen Recommendation:   Dextrose 15% / Amino Acid 7.7% at a goal rate of 65 ml/hr.      20% Lipid Emulsion 250mL every 24 hours.    Na 45 mEq/L  K 50 mEq/L  Ca 5 mEq/L  Mg 8 mEq/L  PO4 15 mmol/L  Cl:Ace - 1:1    Assessment/Plan:  TPN for prolonged paralytic ileus.   Macronutrients per dietary recommendation and electrolyte per pharmacy recommendation are listed above (same as yesterday).  Electrolyte replacement per protocol, will continue with standard electrolytes.   SSI q6h ordered.  Pharmacy will continue to follow and adjust as indicated     Thanks  Yessy Espinoza, PharmD  3/21/2024  14:07 EDT

## 2024-03-21 NOTE — PROGRESS NOTES
"Patient Name:  Lea Rivers  YOB: 1945  [id number]    Surgery Progress Note    Date of visit: 3/21/2024    Subjective   Unable to obtain full history due to patient's dementia. More alert this morning. Improved abdominal pain. No nausea/vomiting. No fevers.  Having ostomy output.         Objective       BP (!) 198/94   Pulse 83   Temp 97.9 °F (36.6 °C)   Resp 17   Ht 170.2 cm (67.01\")   Wt 97.7 kg (215 lb 6.4 oz)   SpO2 96%   BMI 33.73 kg/m²     Intake/Output Summary (Last 24 hours) at 3/21/2024 0822  Last data filed at 3/21/2024 0542  Gross per 24 hour   Intake 859.9 ml   Output 350 ml   Net 509.9 ml   OLIVER 1 40 cc  OLIVER 2 30 cc  OLIVER 3 30 cc    CV:  Rhythm regular and rate regular  L:  Clear to auscultation bilaterally  Abd:  Bowel sounds positive, soft, approp tender, incision c/d/I without erythema, OLIVER drains minimal and serous, ostomy viable and functioning  Ext:  No cyanosis, clubbing, edema    Recent labs and imaging that are back at this time have been reviewed.   Cr 0.52  Albumin 2  WBC 15.4  Hgb 9.1         Assessment & Plan     Patient postoperative day 12 from total abdominal colectomy with end ileostomy, postoperative day 3 abdominal washout. Pain control.  Diet per speech and swallow recommendations, scheduled for FEES today. Continue TPN until adequate oral intake. Abx per ID, OR cultures with rare pseudomonas and GPC to date. OK to continue anticoagulation for UE DVT.             Harley Godfrey MD  3/21/2024  08:22 EDT      "

## 2024-03-21 NOTE — PLAN OF CARE
Goal Outcome Evaluation:  Plan of Care Reviewed With: patient        Progress: no change  Outcome Evaluation: Pt presents to OT re-eval w/ deficits in mobility, strength, ROM and self-care. Will continue w/ POC for IPOT to address deficits in order to progress towards PLOF. d/c rec is SNF.      Anticipated Discharge Disposition (OT): skilled nursing facility

## 2024-03-21 NOTE — THERAPY RE-EVALUATION
Patient Name: Lea Rivers  : 1945    MRN: [id number]                              Today's Date: 3/21/2024       Admit Date: 3/2/2024    Visit Dx:     ICD-10-CM ICD-9-CM   1. Fall, initial encounter  W19.XXXA E888.9   2. Closed fracture of multiple ribs of left side, initial encounter  S22.42XA 807.09   3. Closed displaced fracture of acromial end of left clavicle, initial encounter  S42.032A 810.03   4. Falls frequently  R29.6 V15.88   5. Acute UTI (urinary tract infection)  N39.0 599.0   6. Colon distention  K63.89 569.89   7. Paralytic ileus of small intestine and colon  K56.0 560.1   8. Oropharyngeal dysphagia  R13.12 787.22   9. Intra-abdominal fluid  R18.8 789.59     Patient Active Problem List   Diagnosis    Coronary artery disease    Dyslipidemia    Hypothyroidism    GERD (gastroesophageal reflux disease)    Seizure disorder    BPH (benign prostatic hypertrophy)    Hypertension    Primary osteoarthritis of both knees    Syncope and collapse    Precordial pain    Diabetes    Status post total right knee replacement    Postoperative urinary retention    Confusion, postoperative    Acute blood loss anemia, asymptomatic, no transfusion required    Elevated prostate specific antigen (PSA)    Prostate cancer    Anemia    Body mass index (BMI) of 32.0-32.9 in adult    Localization-related focal epilepsy with simple partial seizures    Need for vaccination against Streptococcus pneumoniae    Upper extremity tendon tear, right, initial encounter    Vitamin D deficiency    PSA elevation    Polyp of colon    Overactive bladder    Gross hematuria    History of colon polyps    History of colon cancer    B12 deficiency    Falls    Pneumatosis intestinalis     Past Medical History:   Diagnosis Date    Anemia     Colon cancer     Status post partial colectomy in . No chemotherapy or radiation therapy.    Coronary artery disease     Nonobstructive coronary artery disease.    Diabetes     Dyslipidemia      GERD (gastroesophageal reflux disease)     Hx of.    Hyperlipidemia     Hypertension     Hyponatremia     Hypothyroidism     Left shoulder pain     Low sodium levels 2022    recent issue; saw nephrologist who ruled out kidney issues; took Sodium Chloride po to bring levels up    Memory deficit     FROM ANESTHESIA    Osteoarthritis     Prostate cancer 07/23/2022    Seizure disorder     silent seziure-diagnosed years ago    Skin cancer      Past Surgical History:   Procedure Laterality Date    APPENDECTOMY      CARDIAC CATHETERIZATION  2012    30 to 40% LAD    CARPAL TUNNEL RELEASE Bilateral     CHOLECYSTECTOMY      COLECTOMY PARTIAL / TOTAL  1990    Partial colectomy    COLON RESECTION N/A 3/9/2024    Procedure: TOTAL ABDOMINAL COLECTOMY, END ILEOSTOMY;  Surgeon: Harley Godfrey MD;  Location:  [facility];  Service: General;  Laterality: N/A;    COLONOSCOPY      CYBERKNIFE  02/09/2023    Prostate/SV    CYSTOSCOPY TRANSURETHRAL RESECTION OF PROSTATE N/A 09/21/2018    Procedure: CYSTOSCOPY TRANSURETHRAL RESECTION OF PROSTATE GREENLIGHT;  Surgeon: Naseem Clayton MD;  Location:  [facility];  Service: Urology    EXPLORATORY LAPAROTOMY N/A 3/18/2024    Procedure: LAPAROTOMY EXPLORATORY, ABDOMINAL WASH OUT;  Surgeon: Harley Godfrey MD;  Location:  [facility];  Service: General;  Laterality: N/A;    OTHER SURGICAL HISTORY      Contraction surgery on bilateral hands and wrists.    PROSTATE BIOPSY N/A 11/11/2022    Procedure: TRANSRECTAL ULTRASOUND GUIDED BIOPSY OF PROSTATE;  Surgeon: Naseem Noland MD;  Location:  [facility];  Service: Urology;  Laterality: N/A;    SINUS SURGERY      SKIN BIOPSY      TEETH EXTRACTION      TONSILLECTOMY      TOTAL KNEE ARTHROPLASTY Right 04/07/2022    Procedure: TOTAL KNEE ARTHROPLASTY WITH ORTHO ROBOT RIGHT;  Surgeon: Louis Malcolm MD;  Location:  [facility];  Service: Robotics - Ortho;  Laterality: Right;    TRANSRECTAL INCISION PROSTATE WITH GOLD SEED Bilateral  01/06/2023    Procedure: TRANSRECTAL ULTRASOUND GUIDED PROSTATE FIDUCIAL MARKER PLACEMENT;  Surgeon: Naseem Noland MD;  Location: [facility];  Service: Urology;  Laterality: Bilateral;    TURP / TRANSURETHRAL INCISION / DRAINAGE PROSTATE      VASECTOMY        General Information       Row Name 03/21/24 1355          OT Time and Intention    Document Type re-evaluation  -     Mode of Treatment occupational therapy  -       Row Name 03/21/24 8668          General Information    Patient Profile Reviewed yes  -     Existing Precautions/Restrictions fall;non-weight bearing;left  L rib fx's, L clavicle fx (NWB LUE, LUE sling; may come out of sling for ROM as tolerated); ostomy, OLIVER drain, abd incision & abd binder  -     Barriers to Rehab medically complex;previous functional deficit;cognitive status  -       Row Name 03/21/24 0337          Cognition    Orientation Status (Cognition) oriented to;person;disoriented to;place;time  -       Row Name 03/21/24 0646          Safety Issues, Functional Mobility    Safety Issues Affecting Function (Mobility) ability to follow commands;awareness of need for assistance;insight into deficits/self-awareness;judgment;problem-solving;safety precaution awareness;safety precautions follow-through/compliance;sequencing abilities  -     Impairments Affecting Function (Mobility) balance;cognition;endurance/activity tolerance;pain;postural/trunk control;strength;range of motion (ROM);sensation/sensory awareness  -     Cognitive Impairments, Mobility Safety/Performance attention  -               User Key  (r) = Recorded By, (t) = Taken By, (c) = Cosigned By      Initials Name Provider Type    KL Cha Landry OT Occupational Therapist                     Mobility/ADL's       Row Name 03/21/24 1382          Bed-Chair Transfer    Bed-Chair [place] (Transfers) dependent (less than 25% patient effort);2 person assist;verbal cues  -     Assistive Device (Bed-Chair  Transfers) lift device  -KL       Row Name 03/21/24 [facility]          Sit-Stand Transfer    Comment, (Sit-Stand Transfer) Deferred d/t strong retropulsion and poor command following  -KL       Row Name 03/21/24 [facility]          Activities of Daily Living    BADL Assessment/Intervention lower body dressing;feeding  -KL       Row Name 03/21/24 [facility]          Mobility    Extremity Weight-bearing Status left upper extremity  -     Left Upper Extremity (Weight-bearing Status) non weight-bearing (NWB)  -KL       Row Name 03/21/24 [facility]          Lower Body Dressing Assessment/Training    [place] Level (Lower Body Dressing) don;doff;socks;dependent (less than 25% patient effort);verbal cues  -KL       Row Name 03/21/24 [facility]          Self-Feeding Assessment/Training    Comment, (Feeding) Pt provided and educated on built-up utensils d/t poor grasp.  -               User Key  (r) = Recorded By, (t) = Taken By, (c) = Cosigned By      Initials Name Provider Type    Cha Bernard OT Occupational Therapist                   Obj/Interventions       Row Name 03/21/24 [facility]          Sensory Assessment (Somatosensory)    Sensory Assessment (Somatosensory) unable/difficult to assess  -     Sensory Assessment Pt unable to follow commands for sensory testing  -KL       Row Name 03/21/24 [facility]          Range of Motion Comprehensive    Comment, General Range of Motion RUE AROM WFL, LUE elbow down AROM WFL, L shoulder ROM deferred  -KL       Row Name 03/21/24 [facility]          Strength Comprehensive (MMT)    Comment, General Manual Muscle Testing (MMT) Assessment RUE MMT 3+/5; LUE deferred; decreased grasp noted in BUE  -KL       Row Name 03/21/24 [facility]          Balance    Static Sitting Balance moderate assist  -     Dynamic Sitting Balance maximum assist  -     Position, Sitting Balance supported;sitting in chair  -               User Key  (r) = Recorded By, (t) = Taken By, (c) = Cosigned By      Initials Name Provider Type    OP  Cha Landry, OT Occupational Therapist                   Goals/Plan       Row Name 03/21/24 1402          Transfer Goal 1 (OT)    Activity/Assistive Device (Transfer Goal 1, OT) sit-to-stand/stand-to-sit;bed-to-chair/chair-to-bed;commode, bedside without drop arms  -     [place] Level/Cues Needed (Transfer Goal 1, OT) moderate assist (50-74% patient effort);verbal cues required  -KL     Time Frame (Transfer Goal 1, OT) long term goal (LTG);10 days  -KL     Progress/Outcome (Transfer Goal 1, OT) goal ongoing  -       Row Name 03/21/24 1402          Dressing Goal 1 (OT)    Activity/Device (Dressing Goal 1, OT) lower body dressing  -     [place]/Cues Needed (Dressing Goal 1, OT) moderate assist (50-74% patient effort)  -     Time Frame (Dressing Goal 1, OT) long term goal (LTG);by discharge  -KL     Progress/Outcome (Dressing Goal 1, OT) goal no longer appropriate  -       Row Name 03/21/24 1402          Grooming Goal 1 (OT)    Activity/Device (Grooming Goal 1, OT) oral care;wash face, hands  -     [place] (Grooming Goal 1, OT) moderate assist (50-74% patient effort);verbal cues required  -     Time Frame (Grooming Goal 1, OT) long term goal (LTG);10 days  -     Strategies/Barriers (Grooming Goal 1, OT) seated  -KL     Progress/Outcome (Grooming Goal 1, OT) goal ongoing  -       Row Name 03/21/24 1402          ROM Goal 1 (OT)    ROM Goal 1 (OT) Pt will demo ability to perform BUE HEP (LUE ROM as tolerated) daily to support ADL independence.  -     Time Frame (ROM Goal 1, OT) long term goal (LTG);10 days  -KL     Progress/Outcome (ROM Goal 1, OT) goal ongoing  -       Row Name 03/21/24 1402          Therapy Assessment/Plan (OT)    Planned Therapy Interventions (OT) activity tolerance training;functional balance retraining;occupation/activity based interventions;patient/caregiver education/training;ROM/therapeutic exercise;strengthening exercise;transfer/mobility retraining  -                User Key  (r) = Recorded By, (t) = Taken By, (c) = Cosigned By      Initials Name Provider Type    Cha Bernard, OT Occupational Therapist                   Clinical Impression       Row Name 03/21/24 9308          Pain Scale: FACES Pre/Post-Treatment    Pain: FACES Scale, Pretreatment 0-->no hurt  -KL     Posttreatment Pain Rating 0-->no hurt  -KL     Pre/Posttreatment Pain Comment Reports abdominal pain, improved w/ reclined position  -       Row Name 03/21/24 1358          Plan of Care Review    Plan of Care Reviewed With patient  -     Progress no change  -     Outcome Evaluation Pt presents to OT re-eval w/ deficits in mobility, strength, ROM and self-care. Will continue w/ POC for IPOT to address deficits in order to progress towards PLOF. d/c rec is SNF.  -       Row Name 03/21/24 4959          Therapy Assessment/Plan (OT)    Rehab Potential (OT) fair, will monitor progress closely  -     Criteria for Skilled Therapeutic Interventions Met (OT) yes;meets criteria;skilled treatment is necessary  -     Therapy Frequency (OT) daily  -       Row Name 03/21/24 9046          Therapy Plan Review/Discharge Plan (OT)    Anticipated Discharge Disposition (OT) skilled nursing facility  -       Row Name 03/21/24 135          Vital Signs    Pre Systolic BP Rehab 191  -KL     Pre Treatment Diastolic BP 89  -KL     Posttreatment Heart Rate (beats/min) 85  -KL     Post SpO2 (%) 95  -KL     O2 Delivery Post Treatment room air  -     Pre Patient Position Supine  -     Intra Patient Position Sitting  -     Post Patient Position Sitting  -       Row Name 03/21/24 1350          Positioning and Restraints    Pre-Treatment Position in bed  -     Post Treatment Position chair  -     In Chair notified nsg;reclined;sitting;call light within reach;encouraged to call for assist;exit alarm on;waffle cushion;on mechanical lift sling;legs elevated  w/ sitter  -               User Key  (r) = Recorded  By, (t) = Taken By, (c) = Cosigned By      Initials Name Provider Type    Cha Bernard OT Occupational Therapist                   Outcome Measures       Row Name 03/21/24 1403          How much help from another is currently needed...    Putting on and taking off regular lower body clothing? 1  -KL     Bathing (including washing, rinsing, and drying) 1  -KL     Toileting (which includes using toilet bed pan or urinal) 1  -KL     Putting on and taking off regular upper body clothing 2  -KL     Taking care of personal grooming (such as brushing teeth) 2  -KL     Eating meals 2  -KL     AM-PAC 6 Clicks Score (OT) 9  -KL       Row Name 03/21/24 1316 03/21/24 0800       How much help from another person do you currently need...    Turning from your back to your side while in flat bed without using bedrails? 1  -AE 1  -MK    Moving from lying on back to sitting on the side of a flat bed without bedrails? 1  -AE 1  -MK    Moving to and from a bed to a chair (including a wheelchair)? 1  -AE 1  -MK    Standing up from a chair using your arms (e.g., wheelchair, bedside chair)? 2  -AE 1  -MK    Climbing 3-5 steps with a railing? 1  -AE 1  -MK    To walk in hospital room? 1  -AE 1  -MK    AM-PAC 6 Clicks Score (PT) 7  -AE 6  -MK    Highest Level of Mobility Goal 2 --> Bed activities/dependent transfer  -AE 2 --> Bed activities/dependent transfer  -MK      Row Name 03/21/24 1403 03/21/24 1316       Functional Assessment    Outcome Measure Options AM-PAC 6 Clicks Daily Activity (OT)  -KL AM-PAC 6 Clicks Basic Mobility (PT)  -AE              User Key  (r) = Recorded By, (t) = Taken By, (c) = Cosigned By      Initials Name Provider Type    AE Abe Melara, PT Physical Therapist    Sandra Dougherty, RN Registered Nurse    Cha Bernard OT Occupational Therapist                    Occupational Therapy Education       Title: PT OT SLP Therapies (In Progress)       Topic: Occupational Therapy (In Progress)       Point: ADL  training (In Progress)       Description:   Instruct learner(s) on proper safety adaptation and remediation techniques during self care or transfers.   Instruct in proper use of assistive devices.                  Learning Progress Summary             Patient Acceptance, E,D, NR by  at 3/21/2024 1404    Acceptance, E,TB, NR,NL by  at 3/20/2024 0755    Acceptance, E, NR by  at 3/17/2024 1526    Acceptance, E, NR by  at 3/6/2024 1116    Acceptance, E, VU by  at 3/4/2024 1519                         Point: Home exercise program (In Progress)       Description:   Instruct learner(s) on appropriate technique for monitoring, assisting and/or progressing therapeutic exercises/activities.                  Learning Progress Summary             Patient Acceptance, E,TB, NR,NL by  at 3/20/2024 0755    Acceptance, E, NR by  at 3/17/2024 1526                         Point: Precautions (In Progress)       Description:   Instruct learner(s) on prescribed precautions during self-care and functional transfers.                  Learning Progress Summary             Patient Acceptance, E,D, NR by  at 3/21/2024 1404    Acceptance, E,TB, NR,NL by  at 3/20/2024 0755    Acceptance, E, NR by  at 3/17/2024 1526    Acceptance, E, VU by  at 3/4/2024 1519                         Point: Body mechanics (In Progress)       Description:   Instruct learner(s) on proper positioning and spine alignment during self-care, functional mobility activities and/or exercises.                  Learning Progress Summary             Patient Acceptance, E,D, NR by  at 3/21/2024 1404    Acceptance, E,TB, NR,NL by  at 3/20/2024 0755    Acceptance, E, NR by  at 3/17/2024 1526                                         User Key       Initials Effective Dates Name Provider Type Discipline     02/03/23 -  Pauly Davey OT Occupational Therapist OT     06/16/21 -  Teresa Batista RN Registered Nurse Nurse     06/16/21 -  Isiah  SADAF Horn Occupational Therapist OT     10/14/22 -  Sandra Stahl OT Occupational Therapist OT     02/05/24 -  Cha Landry OT Occupational Therapist OT                  OT Recommendation and Plan  Planned Therapy Interventions (OT): activity tolerance training, functional balance retraining, occupation/activity based interventions, patient/caregiver education/training, ROM/therapeutic exercise, strengthening exercise, transfer/mobility retraining  Therapy Frequency (OT): daily  Plan of Care Review  Plan of Care Reviewed With: patient  Progress: no change  Outcome Evaluation: Pt presents to OT re-eval w/ deficits in mobility, strength, ROM and self-care. Will continue w/ POC for IPOT to address deficits in order to progress towards PLOF. d/c rec is SNF.     Time Calculation:         Time Calculation- OT       Row Name 03/21/24 1405             Time Calculation- OT    OT Start Time 1130  -KL      OT Received On 03/21/24  -      OT Goal Re-Cert Due Date 03/31/24  -         Timed Charges    68440 - OT Therapeutic Activity Minutes 10  -KL         Untimed Charges    OT Eval/Re-eval Minutes 23  -KL         Total Minutes    Timed Charges Total Minutes 10  -KL      Untimed Charges Total Minutes 23  -KL       Total Minutes 33  -KL                User Key  (r) = Recorded By, (t) = Taken By, (c) = Cosigned By      Initials Name Provider Type    Cha Bernard OT Occupational Therapist                  Therapy Charges for Today       Code Description Service Date Service Provider Modifiers Qty    [id number]  OT THERAPEUTIC ACT EA 15 MIN 3/21/2024 Cha Landry OT GO 1    [id number] HC OT RE-EVAL 2 3/21/2024 Cha Landry OT GO 1                 Cha Landry OT  3/21/2024

## 2024-03-21 NOTE — PROGRESS NOTES
CPN Review Note    Patient Name: Lea Rivers  Date of Encounter: 03/21/24 13:14 EDT  MRN: [id number]  Admission date: 3/2/2024    Reason for visit: CPN review . RD to continue to follow per protocol.     Information Obtained:   Plan for MBS today vs FEES - passed for Pureed/HTL per SLP. Noted hyponatremia this morning - monitoring.     EMR reviewed:  yes  Medication reviewed:  yes   Labs reviewed: yes;     24hr I&Os:  Ileostomy - 250mL out    Current diet: Adult Central 2-in-1 TPN    Estimated Needs:  Calories:  ~ 1800 Kcal/day  Protein: ~120 g PRO/day    PN Route: PICC  PN:  15% dextrose, 7.7% AA @ goal rate of 65mL/hr       GIR:  1.82mg/kg/min   Lipid:  Provide 250mL 20% SMOF daily        PN@ Goal over 24hr to Supply:  Volume 1560 mL/day     Energy 1776 Kcal/day 99 % Est Need   Protein 120 g/day 100 % Est Need     ---------------------------------------------------------------------------  Formula/Rate verified at bedside: Yes  Infusing Rate at time of visit: 65mL/hr    Average Delivery from Charting: Insufficient data < 24hr infusion  PN Volume 844.3 mL/day     Lipid delivered?  Yes       Energy  Kcal/day  % Est Need   Protein  g/day  % Est Need       Intervention:  Care plan reviewed     No changes to PN warranted at this time  May want to consider placing keofeed and transitioning to EN with continued GI fxn pending PO acceptance      Follow up:   Per protocol      Adelita Loza, MS,RD,LD  13:14 EDT  Time: 20min

## 2024-03-21 NOTE — PLAN OF CARE
Goal Outcome Evaluation:  Plan of Care Reviewed With: patient        Progress: no change  Outcome Evaluation: Re-eval completed. Pt presents with increased confusion, decreased functional independence, and generalized weakness. Pt tolerated dep A lift from bed-chair with mechanical lift. Completed seated ther ex in chair and balance training. Recommend continued skilled IP PT interventions. Recommend D/C to SNF.      Anticipated Discharge Disposition (PT): skilled nursing facility

## 2024-03-21 NOTE — PLAN OF CARE
Goal Outcome Evaluation:  Plan of Care Reviewed With: patient        Progress: no change         Anticipated Discharge Disposition (SLP): skilled nursing facility          SLP Swallowing Diagnosis: moderate, oral dysphagia, pharyngeal dysphagia (03/21/24 3028)

## 2024-03-22 LAB
BASOPHILS # BLD AUTO: 0.11 10*3/MM3 (ref 0–0.2)
BASOPHILS NFR BLD AUTO: 0.7 % (ref 0–1.5)
DEPRECATED RDW RBC AUTO: 55.8 FL (ref 37–54)
EOSINOPHIL # BLD AUTO: 0.5 10*3/MM3 (ref 0–0.4)
EOSINOPHIL NFR BLD AUTO: 3 % (ref 0.3–6.2)
ERYTHROCYTE [DISTWIDTH] IN BLOOD BY AUTOMATED COUNT: 16.8 % (ref 12.3–15.4)
GLUCOSE BLDC GLUCOMTR-MCNC: 165 MG/DL (ref 70–130)
GLUCOSE BLDC GLUCOMTR-MCNC: 173 MG/DL (ref 70–130)
GLUCOSE BLDC GLUCOMTR-MCNC: 188 MG/DL (ref 70–130)
GLUCOSE BLDC GLUCOMTR-MCNC: 225 MG/DL (ref 70–130)
HCT VFR BLD AUTO: 26.9 % (ref 37.5–51)
HGB BLD-MCNC: 8.6 G/DL (ref 13–17.7)
IMM GRANULOCYTES # BLD AUTO: 0.92 10*3/MM3 (ref 0–0.05)
IMM GRANULOCYTES NFR BLD AUTO: 5.6 % (ref 0–0.5)
LYMPHOCYTES # BLD AUTO: 1.31 10*3/MM3 (ref 0.7–3.1)
LYMPHOCYTES NFR BLD AUTO: 7.9 % (ref 19.6–45.3)
MCH RBC QN AUTO: 29.8 PG (ref 26.6–33)
MCHC RBC AUTO-ENTMCNC: 32 G/DL (ref 31.5–35.7)
MCV RBC AUTO: 93.1 FL (ref 79–97)
MONOCYTES # BLD AUTO: 1.4 10*3/MM3 (ref 0.1–0.9)
MONOCYTES NFR BLD AUTO: 8.5 % (ref 5–12)
NEUTROPHILS NFR BLD AUTO: 12.32 10*3/MM3 (ref 1.7–7)
NEUTROPHILS NFR BLD AUTO: 74.3 % (ref 42.7–76)
NRBC BLD AUTO-RTO: 0 /100 WBC (ref 0–0.2)
PLATELET # BLD AUTO: 537 10*3/MM3 (ref 140–450)
PMV BLD AUTO: 9.2 FL (ref 6–12)
RBC # BLD AUTO: 2.89 10*6/MM3 (ref 4.14–5.8)
WBC NRBC COR # BLD AUTO: 16.56 10*3/MM3 (ref 3.4–10.8)

## 2024-03-22 PROCEDURE — 25010000002 MAGNESIUM SULFATE PER 500 MG OF MAGNESIUM

## 2024-03-22 PROCEDURE — 99232 SBSQ HOSP IP/OBS MODERATE 35: CPT | Performed by: NURSE PRACTITIONER

## 2024-03-22 PROCEDURE — 25010000002 LABETALOL 5 MG/ML SOLUTION: Performed by: SURGERY

## 2024-03-22 PROCEDURE — 25010000002 METOCLOPRAMIDE PER 10 MG: Performed by: SURGERY

## 2024-03-22 PROCEDURE — 25010000002 PIPERACILLIN SOD-TAZOBACTAM PER 1 G: Performed by: SURGERY

## 2024-03-22 PROCEDURE — 97110 THERAPEUTIC EXERCISES: CPT

## 2024-03-22 PROCEDURE — 85025 COMPLETE CBC W/AUTO DIFF WBC: CPT | Performed by: SURGERY

## 2024-03-22 PROCEDURE — 97530 THERAPEUTIC ACTIVITIES: CPT

## 2024-03-22 PROCEDURE — 25010000002 VANCOMYCIN HCL IN NACL 1.5-0.9 GM/500ML-% SOLUTION

## 2024-03-22 PROCEDURE — 25010000002 CALCIUM GLUCONATE PER 10 ML

## 2024-03-22 PROCEDURE — 25010000002 MICAFUNGIN SODIUM 100 MG RECONSTITUTED SOLUTION 1 EACH VIAL: Performed by: INTERNAL MEDICINE

## 2024-03-22 PROCEDURE — 25010000002 HYDRALAZINE PER 20 MG: Performed by: SURGERY

## 2024-03-22 PROCEDURE — 25010000002 POTASSIUM CHLORIDE PER 2 MEQ OF POTASSIUM

## 2024-03-22 PROCEDURE — 63710000001 INSULIN REGULAR HUMAN PER 5 UNITS: Performed by: SURGERY

## 2024-03-22 PROCEDURE — 25010000002 ENOXAPARIN PER 10 MG: Performed by: HOSPITALIST

## 2024-03-22 PROCEDURE — 82948 REAGENT STRIP/BLOOD GLUCOSE: CPT

## 2024-03-22 RX ORDER — CARVEDILOL 6.25 MG/1
6.25 TABLET ORAL 2 TIMES DAILY WITH MEALS
Status: DISCONTINUED | OUTPATIENT
Start: 2024-03-22 | End: 2024-03-23

## 2024-03-22 RX ORDER — VANCOMYCIN/0.9 % SOD CHLORIDE 1.5G/250ML
1500 PLASTIC BAG, INJECTION (ML) INTRAVENOUS EVERY 12 HOURS
Status: DISCONTINUED | OUTPATIENT
Start: 2024-03-22 | End: 2024-03-23

## 2024-03-22 RX ADMIN — VERAPAMIL HYDROCHLORIDE 80 MG: 80 TABLET ORAL at 14:32

## 2024-03-22 RX ADMIN — ISOSORBIDE DINITRATE 10 MG: 10 TABLET ORAL at 08:53

## 2024-03-22 RX ADMIN — WATER: 1 INJECTION INTRAMUSCULAR; INTRAVENOUS; SUBCUTANEOUS at 17:02

## 2024-03-22 RX ADMIN — PIPERACILLIN AND TAZOBACTAM 3.38 G: 3; .375 INJECTION, POWDER, LYOPHILIZED, FOR SOLUTION INTRAVENOUS at 14:17

## 2024-03-22 RX ADMIN — MICAFUNGIN 100 MG: 20 INJECTION, POWDER, LYOPHILIZED, FOR SOLUTION INTRAVENOUS at 16:33

## 2024-03-22 RX ADMIN — METOCLOPRAMIDE 10 MG: 5 INJECTION, SOLUTION INTRAMUSCULAR; INTRAVENOUS at 08:54

## 2024-03-22 RX ADMIN — ENOXAPARIN SODIUM 100 MG: 100 INJECTION SUBCUTANEOUS at 08:54

## 2024-03-22 RX ADMIN — PIPERACILLIN AND TAZOBACTAM 3.38 G: 3; .375 INJECTION, POWDER, LYOPHILIZED, FOR SOLUTION INTRAVENOUS at 05:33

## 2024-03-22 RX ADMIN — ISOSORBIDE DINITRATE 10 MG: 10 TABLET ORAL at 17:02

## 2024-03-22 RX ADMIN — OXYCODONE 5 MG: 5 TABLET ORAL at 09:30

## 2024-03-22 RX ADMIN — VERAPAMIL HYDROCHLORIDE 80 MG: 80 TABLET ORAL at 22:24

## 2024-03-22 RX ADMIN — HYDRALAZINE HYDROCHLORIDE 10 MG: 20 INJECTION INTRAMUSCULAR; INTRAVENOUS at 05:44

## 2024-03-22 RX ADMIN — Medication 10 MG: at 10:43

## 2024-03-22 RX ADMIN — OXYCODONE 5 MG: 5 TABLET ORAL at 18:04

## 2024-03-22 RX ADMIN — INSULIN HUMAN 2 UNITS: 100 INJECTION, SOLUTION PARENTERAL at 18:07

## 2024-03-22 RX ADMIN — VALPROIC ACID 375 MG: 250 SOLUTION ORAL at 17:02

## 2024-03-22 RX ADMIN — PANTOPRAZOLE SODIUM 40 MG: 40 INJECTION, POWDER, FOR SOLUTION INTRAVENOUS at 05:33

## 2024-03-22 RX ADMIN — Medication 1500 MG: at 22:18

## 2024-03-22 RX ADMIN — VALPROIC ACID 375 MG: 250 SOLUTION ORAL at 23:50

## 2024-03-22 RX ADMIN — LEVOTHYROXINE SODIUM 88 MCG: 88 TABLET ORAL at 05:33

## 2024-03-22 RX ADMIN — METOCLOPRAMIDE 10 MG: 5 INJECTION, SOLUTION INTRAMUSCULAR; INTRAVENOUS at 19:57

## 2024-03-22 RX ADMIN — INSULIN HUMAN 3 UNITS: 100 INJECTION, SOLUTION PARENTERAL at 12:30

## 2024-03-22 RX ADMIN — INSULIN HUMAN 2 UNITS: 100 INJECTION, SOLUTION PARENTERAL at 05:40

## 2024-03-22 RX ADMIN — Medication 10 ML: at 19:58

## 2024-03-22 RX ADMIN — VALPROIC ACID 375 MG: 250 SOLUTION ORAL at 05:33

## 2024-03-22 RX ADMIN — LIDOCAINE 2 PATCH: 4 PATCH TOPICAL at 08:54

## 2024-03-22 RX ADMIN — Medication 10 MG: at 23:12

## 2024-03-22 RX ADMIN — Medication 10 ML: at 08:55

## 2024-03-22 RX ADMIN — VERAPAMIL HYDROCHLORIDE 80 MG: 80 TABLET ORAL at 05:33

## 2024-03-22 RX ADMIN — INSULIN HUMAN 2 UNITS: 100 INJECTION, SOLUTION PARENTERAL at 23:50

## 2024-03-22 RX ADMIN — ACETAMINOPHEN 650 MG: 325 TABLET ORAL at 16:29

## 2024-03-22 RX ADMIN — HYDRALAZINE HYDROCHLORIDE 10 MG: 20 INJECTION INTRAMUSCULAR; INTRAVENOUS at 22:27

## 2024-03-22 RX ADMIN — ISOSORBIDE DINITRATE 10 MG: 10 TABLET ORAL at 12:25

## 2024-03-22 RX ADMIN — LEVETIRACETAM 500 MG: 500 TABLET, FILM COATED ORAL at 19:57

## 2024-03-22 RX ADMIN — SMOFLIPID 250 ML: 6; 6; 5; 3 INJECTION, EMULSION INTRAVENOUS at 17:02

## 2024-03-22 RX ADMIN — LISINOPRIL 20 MG: 20 TABLET ORAL at 08:54

## 2024-03-22 RX ADMIN — LEVETIRACETAM 500 MG: 500 TABLET, FILM COATED ORAL at 08:54

## 2024-03-22 RX ADMIN — ENOXAPARIN SODIUM 100 MG: 100 INJECTION SUBCUTANEOUS at 19:57

## 2024-03-22 RX ADMIN — CARVEDILOL 6.25 MG: 6.25 TABLET, FILM COATED ORAL at 17:02

## 2024-03-22 RX ADMIN — Medication 10 MG: at 16:50

## 2024-03-22 RX ADMIN — METOCLOPRAMIDE 10 MG: 5 INJECTION, SOLUTION INTRAMUSCULAR; INTRAVENOUS at 13:29

## 2024-03-22 RX ADMIN — SMOFLIPID 250 ML: 6; 6; 5; 3 INJECTION, EMULSION INTRAVENOUS at 05:37

## 2024-03-22 RX ADMIN — VALPROIC ACID 375 MG: 250 SOLUTION ORAL at 12:25

## 2024-03-22 RX ADMIN — Medication 1500 MG: at 11:38

## 2024-03-22 NOTE — PROGRESS NOTES
Pharmacy Parenteral Nutrition Evaluation    Lea Rivers is a  78 y.o. male receiving TPN.     Indication:     Prolonged Paralytic Ileus     Consulting Physician: Eleanor Yost DO     Total Fluid Rate (if stated): n/a    Labs  Results from last 7 days   Lab Units 03/21/24  0622 03/20/24  0510   SODIUM mmol/L 133* 141   POTASSIUM mmol/L 4.5 4.4   CHLORIDE mmol/L 98 106   CO2 mmol/L 29.0 29.0   BUN mg/dL 17 26*   CREATININE mg/dL 0.52* 0.65*   CALCIUM mg/dL 7.6* 7.6*   BILIRUBIN mg/dL 0.2 0.2   ALK PHOS U/L 101 77   ALT (SGPT) U/L <5 5   AST (SGOT) U/L 17 14   GLUCOSE mg/dL 152* 161*       Results from last 7 days   Lab Units 03/20/24  0510 03/19/24  1132   PHOSPHORUS mg/dL 2.9  --    PREALBUMIN mg/dL  --  6.5*       Results from last 7 days   Lab Units 03/22/24  0557 03/21/24  0439 03/20/24  0510   WBC 10*3/mm3 16.56* 15.40* 17.74*   HEMOGLOBIN g/dL 8.6* 9.1* 8.6*   HEMATOCRIT % 26.9* 27.7* 27.6*   PLATELETS 10*3/mm3 537* 517* 465*       Triglycerides   Date Value Ref Range Status   03/19/2024 167 (H) 0 - 150 mg/dL Final     Comment:     Falsely depressed results may occur on samples drawn from patients receiving N-Acetylcysteine (NAC) or Metamizole.       estimated creatinine clearance is 128.2 mL/min (A) (by C-G formula based on SCr of 0.52 mg/dL (L)).    Intake & Output (last 3 days)         03/05 0701 03/06 0700 03/06 0701 03/07 0700 03/07 0701 03/08 0700 03/08 0701  03/09 0700    P.O. 1080 240      I.V. (mL/kg)  1026.3 (11.5)      IV Piggyback  400      Total Intake(mL/kg) 1080 (12.1) 1666.3 (18.7)      Urine (mL/kg/hr) 150 (0.1) 400 (0.2)      Stool 0 0      Total Output 150 400      Net +930 +1266.3              Urine Unmeasured Occurrence 4 x 2 x 1 x     Stool Unmeasured Occurrence 5 x 4 x 3 x 1 x            Dietitian Recommendations  Diet Orders (active) (From admission, onward)       Start     Ordered    03/08/24 1800  Adult Cyclic Central 2-in-1 TPN  Cyclic TPN - See Admin Instructions         Note to Pharmacy: Lea ARAUJO Bulpitt  5H RM S572-1  MRN: [id number]  CSN: [id number]    03/08/24 1422 03/08/24 1800  Fat Emul Fish Oil/Plant Based (SMOFLIPID) 20 % emulsion 250 mL  Every 24 Hours Scheduled (TPN)         03/08/24 1422 03/08/24 1355  NPO Diet NPO Type: Strict NPO  Diet Effective Now         03/08/24 1358    03/05/24 1656  DIET MESSAGE Pt would like a grilled cheese sandwich, please. Thanks!  Once        Comments: Pt would like a grilled cheese sandwich, please. Thanks!    03/05/24 1656                    Current TPN Regimen Recommendation:   Dextrose 15% / Amino Acid 7.7% at a goal rate of 65 ml/hr.      20% Lipid Emulsion 250mL every 24 hours.    Na 45 mEq/L  K 50 mEq/L  Ca 5 mEq/L  Mg 8 mEq/L  PO4 15 mmol/L  Cl:Ace - 1:1    Assessment/Plan:  TPN for prolonged paralytic ileus.   Macronutrients per dietary recommendation and electrolyte per pharmacy recommendation are listed above (same as yesterday).  Electrolyte replacement per protocol, will continue with standard electrolytes.   SSI q6h ordered.  Pharmacy will continue to follow and adjust as indicated     Thanks  Yessy Espinoza, PharmD  3/22/2024  12:56 EDT

## 2024-03-22 NOTE — THERAPY TREATMENT NOTE
Patient Name: Lea Rivers  : 1945    MRN: [id number]                              Today's Date: 3/22/2024       Admit Date: 3/2/2024    Visit Dx:     ICD-10-CM ICD-9-CM   1. Fall, initial encounter  W19.XXXA E888.9   2. Closed fracture of multiple ribs of left side, initial encounter  S22.42XA 807.09   3. Closed displaced fracture of acromial end of left clavicle, initial encounter  S42.032A 810.03   4. Falls frequently  R29.6 V15.88   5. Acute UTI (urinary tract infection)  N39.0 599.0   6. Colon distention  K63.89 569.89   7. Paralytic ileus of small intestine and colon  K56.0 560.1   8. Oropharyngeal dysphagia  R13.12 787.22   9. Intra-abdominal fluid  R18.8 789.59     Patient Active Problem List   Diagnosis    Coronary artery disease    Dyslipidemia    Hypothyroidism    GERD (gastroesophageal reflux disease)    Seizure disorder    BPH (benign prostatic hypertrophy)    Hypertension    Primary osteoarthritis of both knees    Syncope and collapse    Precordial pain    Diabetes    Status post total right knee replacement    Postoperative urinary retention    Confusion, postoperative    Acute blood loss anemia, asymptomatic, no transfusion required    Elevated prostate specific antigen (PSA)    Prostate cancer    Anemia    Body mass index (BMI) of 32.0-32.9 in adult    Localization-related focal epilepsy with simple partial seizures    Need for vaccination against Streptococcus pneumoniae    Upper extremity tendon tear, right, initial encounter    Vitamin D deficiency    PSA elevation    Polyp of colon    Overactive bladder    Gross hematuria    History of colon polyps    History of colon cancer    B12 deficiency    Falls    Pneumatosis intestinalis     Past Medical History:   Diagnosis Date    Anemia     Colon cancer     Status post partial colectomy in . No chemotherapy or radiation therapy.    Coronary artery disease     Nonobstructive coronary artery disease.    Diabetes     Dyslipidemia      GERD (gastroesophageal reflux disease)     Hx of.    Hyperlipidemia     Hypertension     Hyponatremia     Hypothyroidism     Left shoulder pain     Low sodium levels 2022    recent issue; saw nephrologist who ruled out kidney issues; took Sodium Chloride po to bring levels up    Memory deficit     FROM ANESTHESIA    Osteoarthritis     Prostate cancer 07/23/2022    Seizure disorder     silent seziure-diagnosed years ago    Skin cancer      Past Surgical History:   Procedure Laterality Date    APPENDECTOMY      CARDIAC CATHETERIZATION  2012    30 to 40% LAD    CARPAL TUNNEL RELEASE Bilateral     CHOLECYSTECTOMY      COLECTOMY PARTIAL / TOTAL  1990    Partial colectomy    COLON RESECTION N/A 3/9/2024    Procedure: TOTAL ABDOMINAL COLECTOMY, END ILEOSTOMY;  Surgeon: Harley Godfrey MD;  Location:  [facility];  Service: General;  Laterality: N/A;    COLONOSCOPY      CYBERKNIFE  02/09/2023    Prostate/SV    CYSTOSCOPY TRANSURETHRAL RESECTION OF PROSTATE N/A 09/21/2018    Procedure: CYSTOSCOPY TRANSURETHRAL RESECTION OF PROSTATE GREENLIGHT;  Surgeon: Naseem Clayton MD;  Location:  [facility];  Service: Urology    EXPLORATORY LAPAROTOMY N/A 3/18/2024    Procedure: LAPAROTOMY EXPLORATORY, ABDOMINAL WASH OUT;  Surgeon: Harley Godfrey MD;  Location:  [facility];  Service: General;  Laterality: N/A;    OTHER SURGICAL HISTORY      Contraction surgery on bilateral hands and wrists.    PROSTATE BIOPSY N/A 11/11/2022    Procedure: TRANSRECTAL ULTRASOUND GUIDED BIOPSY OF PROSTATE;  Surgeon: Naseem Noland MD;  Location:  [facility];  Service: Urology;  Laterality: N/A;    SINUS SURGERY      SKIN BIOPSY      TEETH EXTRACTION      TONSILLECTOMY      TOTAL KNEE ARTHROPLASTY Right 04/07/2022    Procedure: TOTAL KNEE ARTHROPLASTY WITH ORTHO ROBOT RIGHT;  Surgeon: Louis Malcolm MD;  Location:  [facility];  Service: Robotics - Ortho;  Laterality: Right;    TRANSRECTAL INCISION PROSTATE WITH GOLD SEED Bilateral  01/06/2023    Procedure: TRANSRECTAL ULTRASOUND GUIDED PROSTATE FIDUCIAL MARKER PLACEMENT;  Surgeon: Naseem Noland MD;  [facility];  Service: Urology;  Laterality: Bilateral;    TURP / TRANSURETHRAL INCISION / DRAINAGE PROSTATE      VASECTOMY        General Information       Row Name 03/22/24 1607          Physical Therapy Time and Intention    Document Type therapy note (daily note)  -AE     Mode of Treatment physical therapy  -AE       Row Name 03/22/24 1607          General Information    Patient Profile Reviewed yes  -AE     Existing Precautions/Restrictions fall;non-weight bearing;left  L rib fx's, L clavicle fx (NWB LUE, LUE sling; may come out of sling for ROM as tolerated); ostomy, OLIVER drain x3, abd incision & abd binder  -AE     Barriers to Rehab medically complex;previous functional deficit;cognitive status  -AE       Row Name 03/22/24 1607          Cognition    Orientation Status (Cognition) oriented to;person;disoriented to;place;time  responds to name  -AE       Row Name 03/22/24 1607          Safety Issues, Functional Mobility    Safety Issues Affecting Function (Mobility) awareness of need for assistance;ability to follow commands;insight into deficits/self-awareness;safety precaution awareness;safety precautions follow-through/compliance;sequencing abilities;problem-solving  -AE     Impairments Affecting Function (Mobility) balance;cognition;endurance/activity tolerance;pain;postural/trunk control;strength;range of motion (ROM);sensation/sensory awareness  -AE               User Key  (r) = Recorded By, (t) = Taken By, (c) = Cosigned By      Initials Name Provider Type    AE Abe Melara PT Physical Therapist                   Mobility       Row Name 03/22/24 1608          Bed Mobility    Bed Mobility rolling left;rolling right  -AE     Rolling Left [place] (Bed Mobility) maximum assist (25% patient effort);2 person assist;verbal cues  -AE     Rolling Right [place] (Bed  Mobility) maximum assist (25% patient effort);2 person assist;verbal cues  -AE     Assistive Device (Bed Mobility) bed rails  -AE     Comment, (Bed Mobility) Pt able to hold onto bed rail with RUE while rolling for placement of lift sling and wilfrid care.  -AE       Row Name 03/22/24 1608          Transfers    Comment, (Transfers) Tolerated dep A lift from bed-chair with mechanical lift  -AE       Row Name 03/22/24 1608          Bed-Chair Transfer    Bed-Chair [place] (Transfers) dependent (less than 25% patient effort);2 person assist;verbal cues  -AE     Assistive Device (Bed-Chair Transfers) lift device  -AE       Row Name 03/22/24 1608          Sit-Stand Transfer    Sit-Stand [place] (Transfers) unable to assess  -AE     Comment, (Sit-Stand Transfer) Continued poor command following this session, unable to assess STS.  -AE       Row Name 03/22/24 1608          Mobility    Extremity Weight-bearing Status left upper extremity  -AE     Left Upper Extremity (Weight-bearing Status) non weight-bearing (NWB)  -AE               User Key  (r) = Recorded By, (t) = Taken By, (c) = Cosigned By      Initials Name Provider Type    AE Abe Melara, PT Physical Therapist                   Obj/Interventions       Row Name 03/22/24 1612          Knee (Therapeutic Exercise)    Knee (Therapeutic Exercise) AROM (active range of motion)  -AE     Knee AROM (Therapeutic Exercise) bilateral;LAQ (long arc quad);sitting  -AE       Row Name 03/22/24 1612          Ankle (Therapeutic Exercise)    Ankle (Therapeutic Exercise) AROM (active range of motion)  -AE     Ankle AROM (Therapeutic Exercise) bilateral;plantarflexion;dorsiflexion;10 repetitions;sitting  -AE       Row Name 03/22/24 1612          Balance    Balance Assessment sitting static balance;sitting dynamic balance  -AE     Static Sitting Balance moderate assist  -AE     Dynamic Sitting Balance moderate assist  -AE     Position, Sitting Balance supported;sitting in chair   -AE     Comment, Balance Pt demo improved command following and decreased posterior lean this session during core stability/sitting balance training. Pt continues to be limited by abdominal pain but overall demo good participation.  -AE               User Key  (r) = Recorded By, (t) = Taken By, (c) = Cosigned By      Initials Name Provider Type    AE Abe Melara, BRISSA Physical Therapist                   Goals/Plan    No documentation.                  Clinical Impression       Row Name 03/22/24 1614          Pain Scale: FACES Pre/Post-Treatment    Pain: FACES Scale, Pretreatment 0-->no hurt  -AE     Posttreatment Pain Rating 2-->hurts little bit  -AE     Pre/Posttreatment Pain Comment Abdominal pain with mobility  -AE       Row Name 03/22/24 1614          Plan of Care Review    Plan of Care Reviewed With patient  -AE     Progress improving  -AE     Outcome Evaluation Pt continues to present with generalized weakness and decreased command following. Pt demo improved command following this session with less abdominal pain overall. Pt still hesistant at times d/t pain. Continue to progress per pt tolerance.  -AE       Row Name 03/22/24 1613          Vital Signs    Pre Systolic BP Rehab 176  -AE     Pre Treatment Diastolic BP 79  -AE     Pretreatment Heart Rate (beats/min) 84  -AE     Posttreatment Heart Rate (beats/min) 86  -AE     Pre SpO2 (%) 95  -AE     O2 Delivery Pre Treatment room air  -AE     O2 Delivery Intra Treatment room air  -AE     Post SpO2 (%) 94  -AE     O2 Delivery Post Treatment room air  -AE     Pre Patient Position Supine  -AE     Intra Patient Position Sitting  -AE     Post Patient Position Sitting  -AE       Row Name 03/22/24 1619          Positioning and Restraints    Pre-Treatment Position in bed  -AE     Post Treatment Position chair  -AE     In Chair notified nsg;reclined;call light within reach;encouraged to call for assist;with nsg;waffle cushion;on mechanical lift sling;legs elevated   with sitter  -AE               User Key  (r) = Recorded By, (t) = Taken By, (c) = Cosigned By      Initials Name Provider Type    AE Abe Melara PT Physical Therapist                   Outcome Measures       Row Name 03/22/24 1617          How much help from another person do you currently need...    Turning from your back to your side while in flat bed without using bedrails? 2  -AE     Moving from lying on back to sitting on the side of a flat bed without bedrails? 1  -AE     Moving to and from a bed to a chair (including a wheelchair)? 1  -AE     Standing up from a chair using your arms (e.g., wheelchair, bedside chair)? 1  -AE     Climbing 3-5 steps with a railing? 1  -AE     To walk in hospital room? 1  -AE     AM-PAC 6 Clicks Score (PT) 7  -AE     Highest Level of Mobility Goal 2 --> Bed activities/dependent transfer  -AE       Row Name 03/22/24 1617          Functional Assessment    Outcome Measure Options AM-PAC 6 Clicks Basic Mobility (PT)  -AE               User Key  (r) = Recorded By, (t) = Taken By, (c) = Cosigned By      Initials Name Provider Type    Abe Valdivia, PT Physical Therapist                                 Physical Therapy Education       Title: PT OT SLP Therapies (In Progress)       Topic: Physical Therapy (In Progress)       Point: Mobility training (In Progress)       Learning Progress Summary             Patient Acceptance, E, NR by AE at 3/22/2024 1527    Acceptance, E, NR by AE at 3/21/2024 1115    Acceptance, E,TB, NR,NL by  at 3/20/2024 0755    Acceptance, E, NR by CK at 3/6/2024 1121    Acceptance, E, VU,NR by LO at 3/4/2024 1340    Comment: PT POC   Family Acceptance, E, NR by CK at 3/6/2024 1121    Acceptance, E, VU,NR by LO at 3/4/2024 1340    Comment: PT POC                         Point: Home exercise program (In Progress)       Learning Progress Summary             Patient Acceptance, E, NR by AE at 3/22/2024 1527    Acceptance, E, NR by AE at 3/21/2024 1115     Acceptance, E,TB, NR,NL by CH at 3/20/2024 0755    Acceptance, E, NR by CK at 3/6/2024 1121    Acceptance, E, VU,NR by LO at 3/4/2024 1340    Comment: PT POC   Family Acceptance, E, NR by CK at 3/6/2024 1121    Acceptance, E, VU,NR by LO at 3/4/2024 1340    Comment: PT POC                         Point: Body mechanics (In Progress)       Learning Progress Summary             Patient Acceptance, E, NR by AE at 3/22/2024 1527    Acceptance, E, NR by AE at 3/21/2024 1115    Acceptance, E,TB, NR,NL by  at 3/20/2024 0755    Acceptance, E, NR by CK at 3/6/2024 1121    Acceptance, E, VU,NR by LO at 3/4/2024 1340    Comment: PT POC   Family Acceptance, E, NR by CK at 3/6/2024 1121    Acceptance, E, VU,NR by LO at 3/4/2024 1340    Comment: PT POC                         Point: Precautions (In Progress)       Learning Progress Summary             Patient Acceptance, E, NR by AE at 3/22/2024 1527    Acceptance, E, NR by AE at 3/21/2024 1115    Acceptance, E,TB, NR,NL by  at 3/20/2024 0755    Acceptance, E, NR by CK at 3/6/2024 1121    Acceptance, E, VU,NR by LO at 3/4/2024 1340    Comment: PT POC   Family Acceptance, E, NR by CK at 3/6/2024 1121    Acceptance, E, VU,NR by LO at 3/4/2024 1340    Comment: PT POC                                         User Key       Initials Effective Dates Name Provider Type Discipline     06/16/21 -  Teresa Batista, RN Registered Nurse Nurse     06/16/21 -  Emily Muro, PT Physical Therapist PT    AE 09/21/21 -  Abe Melara, PT Physical Therapist PT    CK 02/06/24 -  Tiffanie Carmen, PT Physical Therapist PT                  PT Recommendation and Plan     Plan of Care Reviewed With: patient  Progress: improving  Outcome Evaluation: Pt continues to present with generalized weakness and decreased command following. Pt demo improved command following this session with less abdominal pain overall. Pt still hesistant at times d/t pain. Continue to progress per pt  tolerance.     Time Calculation:         PT Charges       Row Name 03/22/24 1618             Time Calculation    Start Time 1527  -AE      PT Received On 03/22/24  -AE      PT Goal Re-Cert Due Date 03/31/24  -AE         Timed Charges    02550 - PT Therapeutic Exercise Minutes 10  -AE      30132 - PT Therapeutic Activity Minutes 29  -AE         Total Minutes    Timed Charges Total Minutes 39  -AE       Total Minutes 39  -AE                User Key  (r) = Recorded By, (t) = Taken By, (c) = Cosigned By      Initials Name Provider Type    AE Abe Melara, PT Physical Therapist                  Therapy Charges for Today       Code Description Service Date Service Provider Modifiers Qty    69940342215 HC PT THER PROC EA 15 MIN 3/21/2024 Abe Melara, PT GP 1    51096681399 HC PT RE-EVAL ESTABLISHED PLAN 2 3/21/2024 Abe Melara, PT GP 1    02877142593 HC PT THER PROC EA 15 MIN 3/22/2024 Abe Melara, PT GP 1    99318389772 HC PT THERAPEUTIC ACT EA 15 MIN 3/22/2024 Abe Melara, PT GP 2            PT G-Codes  Outcome Measure Options: AM-PAC 6 Clicks Basic Mobility (PT)  AM-PAC 6 Clicks Score (PT): 7  AM-PAC 6 Clicks Score (OT): 9  PT Discharge Summary  Anticipated Discharge Disposition (PT): skilled nursing facility    Abe Melara PT  3/22/2024

## 2024-03-22 NOTE — PLAN OF CARE
Goal Outcome Evaluation:  Plan of Care Reviewed With: patient        Progress: improving  Outcome Evaluation: Pt continues to present with generalized weakness and decreased command following. Pt demo improved command following this session with less abdominal pain overall. Pt still hesistant at times d/t pain. Continue to progress per pt tolerance.      Anticipated Discharge Disposition (PT): skilled nursing facility

## 2024-03-22 NOTE — PROGRESS NOTES
Clinical Nutrition   Nutrition Support Assessment  Reason for Visit: Follow-up protocol, PN/PO    Patient Name: Lea Rivers  YOB: 1945  MRN: [id number]  Date of Encounter: 03/22/24 07:19 EDT  Admission date: 3/2/2024    Comments:    Pt declined breakfast this AM - RN able to have eat applesauce. Unable to provide meal preferences. General Surgery present at time of visit, discussed possible threshold for transitioning to EN in unable to improve PO intake. ? If would align with GOC due to dementia.     Provide Magic Cup 1x daily at lunch  Recommend pt up to chair at meals for possible PO improvements  Continue current PN regimen as appropriate  15% dextrose, 7.7% AA @ goal rate of 65mL/hr.   Provide 250mL 20% SMOF daily        Nutrition Assessment   Admission Diagnosis:  Falls [W19.XXXA]  Pneumatosis intestinalis [K63.89]      Problem List:    Falls    Pneumatosis intestinalis        PMH:   He  has a past medical history of Anemia, Colon cancer (1990), Coronary artery disease, Diabetes, Dyslipidemia, GERD (gastroesophageal reflux disease), Hyperlipidemia, Hypertension, Hyponatremia, Hypothyroidism, Left shoulder pain, Low sodium levels (2022), Memory deficit, Osteoarthritis, Prostate cancer (07/23/2022), Seizure disorder, and Skin cancer.    PSH:  He  has a past surgical history that includes Colectomy partial / total (1990); Cholecystectomy; Appendectomy; Other surgical history; Sinus surgery; Tonsillectomy; Vasectomy; Carpal tunnel release (Bilateral); TURP / transurethral incision / drainage prostate; Colonoscopy; Transurethral resection of prostate (N/A, 09/21/2018); Skin biopsy; Teeth Extraction; Total knee arthroplasty (Right, 04/07/2022); Cardiac catheterization (2012); Prostate biopsy (N/A, 11/11/2022); Transrectal Incision Prostate (Bilateral, 01/06/2023); Cyberknife (02/09/2023); Colectomy (N/A, 3/9/2024); and Exploratory Laparotomy (N/A, 3/18/2024).      Applicable  "Nutrition Concerns:   Skin:  Oral:  GI:    Applicable Interval History:    3/9-total colectomy w/end ileostomy  3/10) PICC placed, PN started  3/14) FEES: SLP Diet Recommendation: puree, no mixed consistencies, honey thick liquids   3/18) CT abd/pelvis  Return to OR   3/21) MBS - SLP rec: mechanical ground, HTL      Reported/Observed/Food/Nutrition Related History:     3/22  Pt declined breakfast this AM - RN able to have eat applesauce. Unable to provide meal preferences - pt did state enjoys chicken. General Surgery present at time of visit, discussed possible threshold for transitioning to EN in unable to improve PO intake. Discussed eating in bed vs up to chair with RN, state will get him up to chair if able. Ileostomy output.      3/21  Plan for MBS today vs FEES - passed for Pureed/HTL per SLP. Noted hyponatremia this morning - monitoring.      3/20  Pt with Ileostomy output today. No N/V. SLP eval today - results pending. PN infusing with correct Rx. Per Surgery continue PN until adequate intake.       3/19  S/P abdominal washout yesterday.  NGT removed today. Remains NPO with PN for nutrition.   Patient sleeping at time of visit.      3/18  Pt was tolerating PO intake, with ostomy output however underwent stat CT abd/pelvis to evaluate fever source - per Surgery, \"plan for emergent re-exploration to assess for bowel leak or infected fluid collections\". Ostomy output - 700mL in past 24hrs     3/14  Spoke w/sx-okay to place keofeed it pt too lethargic for FEES today/unable to advance po diet. Will continue current TPN for today.     3/12  Pt out of room for scan per pt's son at bedside. Spoke w/nsg-TPN at 65ml/hr. Discussed phos replacement IV.     3/9  Per RN, pt pulled PICC last night-pt OOR during visit but pt's son at bedside. Able to obtain nutrition hx. Pt eating well PTA, usually a picky eater and eats the same things all the time-preferences noted below. Pt usually eats at [employer] for breakfast, makes " "something quick at home for lunch and has dinner provided by dtr. Son states pt's wt has been stable, ~196-200lbs. NKFA. Plan is for total colectomy w/end ileostomy today.     3/8  Pt unavailable during RD visit, having PICC placed. Also noted w/dementia-attempted to call family w/o success. Discussed w/nsg, pt to likely have bowel sx tomorrow w/colostomy creation. Rectal tube placed today 2/2 colonic decompression. Pt appears to have had 7lb/3.6% non significant wt loss in the past month. NKFA.     Labs    Labs Reviewed: Yes   No new labs available    Medications    Medications Reviewed: Yes  Reglan, abx, protonix   TPN @ 65mL/hr    Intake/Ouptut 24 hrs (0701 - 0700)   I&O's Reviewed: Yes   Ileostomy - 850mL out    Anthropometrics     Height: Height: 170.2 cm (67.01\")  Last Filed Weight: Weight: 97.7 kg (215 lb 6.4 oz) (03/21/24 0535)  Method: Weight Method: Bed scale (after zeroed when in chair)  BMI: BMI (Calculated): 33.7    UBW: 203lbs per EMR  Weight change:    Weight       Weight (kg) Weight (lbs) Weight Method Visit Report   3/23/2023 87.091 kg  192 lb   --    4/10/2023 87.091 kg  192 lb   --    4/28/2023 94.62 kg  208 lb 9.6 oz   --    5/6/2023 91.627 kg  202 lb      5/16/2023 90.719 kg  200 lb   --    6/20/2023 90.719 kg  200 lb   --    8/14/2023 93.441 kg  206 lb   --    9/14/2023 92.398 kg  203 lb 11.2 oz   --    12/4/2023 92.08 kg  203 lb   --    1/5/2024 78.926 kg  174 lb  Stated     2/15/2024 92.08 kg  203 lb   --    2/27/2024 89.086 kg  196 lb 6.4 oz      3/2/2024 88.905 kg  196 lb  Stated        Stated         Nutrition Focused Physical Exam     Date: 3/8    Unable to perform exam due to: Pt unable to participate at time of visit    Needs Assessment   Date: 3/8; reassessed 3/22    Height used:Height: 170.2 cm (67.01\")  Weights used: 97.7kg CBW    Estimated Calorie needs: ~ 1800 Kcal/day  Method:  MSJ (1650) x 1.1 =1815  Method:  17-20 Kcals/KG = 3382-4971    Estimated Protein needs: ~ 120 g " PRO/day  Method: 1.2-1.5g/Kg CBW =117-147    Estimated Fluid needs: ~ ml/day   Per clinical status    Current Nutrition Prescription     PN Route: PICC  PN:   15% dextrose, 7.7% AA @ goal rate of 65mL/hr.         GIR:  1.82mg/kg/min  Lipid: Provide 250mL 20% SMOF daily                   PN@ Goal over 24hr to Supply:  Volume 1560 mL/day       Energy 1776 Kcal/day 99 % Est Need   Protein 120 g/day 100 % Est Need      ---------------------------------------------------------------------------  Formula/Rate verified at bedside: Yes  Infusing Rate at time of visit: 65mL/hr     Average Delivery from Charting: Insufficient data - no documentation in I&Os  PN Volume  mL/day       Lipid delivered?   Y         Energy   Kcal/day   % Est Need   Protein   g/day   % Est Need     PO: Diet: Gastrointestinal, Cardiac; Healthy Heart (2-3 Na+); Fiber-Restricted; No Mixed Consistencies, Feeding Assistance - Nursing; Texture: Mechanical Ground (NDD 2); Fluid Consistency: Honey Thick  Adult Central 2-in-1 TPN  Oral Nutrition Supplement: N/A  Intake: Insufficient data - breakfast refusal      Nutrition Diagnosis   Date: 3/8 Updated: 3/18; 3/22  Problem Altered GI function   Etiology Colonic distention, ileus, pending bowel sx   Signs/Symptoms Need for TPN   Status: Improvement shown    Date: 3/22  Updated:  Problem Predicted suboptimal energy intake   Etiology Dementia with s/p GI surgery x2   Signs/Symptoms Meal refusal per RN   Status:      Goal:   General: Nutrition to support treatment  PO: Tolerate PO, Increase intake  EN/PN: Maintain PN, PN to PO    Nutrition Intervention      Follow treatment progress, Care plan reviewed, Encourage intake, Supplement provided, Nutrition support order placed       Monitoring/Evaluation:   Per protocol, I&O, Pertinent labs, PN delivery/tolerance, Weight, GI status      Adelita Loza, MS,RD,LD  Time Spent: 35min

## 2024-03-22 NOTE — NURSING NOTE
[facility] visit for Stoma care and assessment    Surgery date: 03/09/2024  Surgical Procedures Since Admission:  Procedure(s):  TOTAL ABDOMINAL COLECTOMY, END ILEOSTOMY  Surgeon:  Harley Godfrey MD  Status:  13 Days Post-Op  -------------------    Procedure(s):  LAPAROTOMY EXPLORATORY, ABDOMINAL WASH OUT  Surgeon:  Harley Godfrey MD  Status:  4 Days Post-Op  -------------------     Patient resting in bed, sleeping. Patient seen Medical/Surgical Floor.  Support person not  at beside.    Stoma Type: End Ileostomy  Diameter/shape: 36mm,   Location: RUQ  Protrusion: Budded  Stoma Characteristics: Round, Rosebud Appearance, Moist, and Red  Zeeshan: Not present  Peristomal skin: Intact  Character of output: Brown and Loose,   Emptying frequency per day: per nursing flowsheet  Current pouching system: Alfa  Accessory products, appliance placed: Skin barrier ring, Alfa; one-piece blue flat.  Goal wear time:3-4 days  Last appliance change: 3/22/24    Surgical Incision: Midline abdominal incision  Approximating Devices: Staples  Drainage Characteristics: none    Drain(s) type: OLIVER drain x 3      Education provided:     Pt awakens intermittently but unable to remain awake or participate in education at this time.       [facility] Nurse will continue to follow for ostomy education. Please contact #[phone] with questions or if ostomy leaks occur.

## 2024-03-22 NOTE — NURSING NOTE
[employer] specialty bed ordered. Bed # 9    Please implement pressure injury prevention protocol. Specialty beds do not replace turning/offloading.    At discharge, please wipe down bed with appropriate cleaning wipes, place in hallway near patient room with a clear plastic covering and notify Charge RN and Transport to have [employer] returned to storage. Thank you.

## 2024-03-22 NOTE — PROGRESS NOTES
Pharmacy Consult-Vancomycin Dosing  Lea Rivers is a  78 y.o. male receiving vancomycin therapy.     Indication: Intra-Abdominal Infection, enterococcus infection   Consulting Provider: Dr. Ruiz  ID Consult: Yes    Goal AUC: 400 - 600 mg/L*hr    Current Antimicrobial Therapy  Anti-Infectives (From admission, onward)      Ordered     Dose/Rate Route Frequency Start Stop    03/22/24 0836  Pharmacy to dose vancomycin        Ordering Provider: Louis Chow MD     Does not apply Continuous PRN 03/22/24 0835 04/01/24 0834    03/21/24 0816  DAPTOmycin (CUBICIN) 450 mg in sodium chloride 0.9 % 50 mL IVPB        Ordering Provider: Louis Chow MD    6 mg/kg × 78.7 kg (Adjusted)  100 mL/hr over 30 Minutes Intravenous Once 03/21/24 0915 03/21/24 1142    03/20/24 1434  micafungin sodium (MYCAMINE) 100 mg in sodium chloride 0.9 % 100 mL MBP        Ordering Provider: Louis Chow MD    100 mg  over 60 Minutes Intravenous Every 24 Hours 03/20/24 1600 03/27/24 1559    03/18/24 1550  piperacillin-tazobactam (ZOSYN) 3.375 g IVPB in 100 mL NS MBP (CD)        Ordering Provider: Harley Godfrey MD    3.375 g  over 4 Hours Intravenous Every 8 Hours 03/18/24 2200 03/25/24 2159    03/18/24 1245  piperacillin-tazobactam (ZOSYN) 3.375 g IVPB in 100 mL NS MBP (CD)        Ordering Provider: Harley Godfrey MD    3.375 g  over 0.5 Hours Intravenous Once 03/18/24 1247 03/18/24 1322    03/12/24 1126  metroNIDAZOLE (FLAGYL) IVPB 500 mg        Ordering Provider: Eleanor Yost DO    500 mg  200 mL/hr over 30 Minutes Intravenous Every 8 Hours 03/12/24 1600 03/17/24 0830    03/12/24 1126  cefTRIAXone (ROCEPHIN) 2,000 mg in sodium chloride 0.9 % 100 mL MBP        Ordering Provider: Eleanor Yost DO    2,000 mg  200 mL/hr over 30 Minutes Intravenous Every 24 Hours 03/12/24 1400 03/16/24 1454    03/09/24 1340  cefOXItin (MEFOXIN) 2,000 mg in sodium chloride 0.9 % 100 mL MBP        Ordering Provider:  "Harley Godfrey MD    2,000 mg  200 mL/hr over 30 Minutes Intravenous Every 2 Hours 03/09/24 1342 03/09/24 1516    03/02/24 1045  piperacillin-tazobactam (ZOSYN) 3.375 g IVPB in 100 mL NS MBP (CD)        Ordering Provider: Jef Chavez MD    3.375 g  over 30 Minutes Intravenous Once 03/02/24 1101 03/02/24 1228            Allergies  Allergies as of 03/02/2024 - Reviewed 03/02/2024   Allergen Reaction Noted    Chlorhexidine Rash 09/22/2022    Sulfa antibiotics Rash 09/09/2016       Labs    Results from last 7 days   Lab Units 03/21/24  0622 03/20/24  0510   BUN mg/dL 17 26*   CREATININE mg/dL 0.52* 0.65*       Results from last 7 days   Lab Units 03/22/24  0557 03/21/24  0439 03/20/24  0510   WBC 10*3/mm3 16.56* 15.40* 17.74*       Evaluation of Dosing    Ht - 170.2 cm (67.01\")  Wt - 97.7 kg (215 lb 6.4 oz)    Estimated Creatinine Clearance: 128.2 mL/min (A) (by C-G formula based on SCr of 0.52 mg/dL (L)).    I/O last 3 completed shifts:  In: 0   Out: 1005 [Drains:80; Stool:925]    Microbiology and Radiology  Microbiology Results (last 10 days)       Procedure Component Value - Date/Time    Anaerobic Culture - Peritoneal Fluid, Perineum [[id number]]  (Normal) Collected: 03/18/24 1349    Lab Status: Preliminary result Specimen: Peritoneal Fluid from Perineum Updated: 03/21/24 0741     Anaerobic Culture No anaerobes isolated at 3 days    Body Fluid Culture - Peritoneal Fluid, Perineum [[id number]]  (Abnormal)  (Susceptibility) Collected: 03/18/24 1349    Lab Status: Final result Specimen: Peritoneal Fluid from Perineum Updated: 03/22/24 0632     Body Fluid Culture Rare Pseudomonas aeruginosa      Rare Enterococcus faecium     Gram Stain Occasional WBCs seen      No organisms seen    Susceptibility        Pseudomonas aeruginosa      YVAN      Cefepime <=1 ug/ml Susceptible      Ceftazidime 4 ug/ml Susceptible      Ciprofloxacin <=0.25 ug/ml Susceptible      Levofloxacin 0.5 ug/ml Susceptible      Piperacillin " + Tazobactam  Susceptible                       Susceptibility        Enterococcus faecium      YVAN      Ampicillin >=32 ug/ml Resistant      Gentamicin High Level Synergy SYN-S ug/ml Susceptible      Vancomycin <=0.5 ug/ml Susceptible                       Susceptibility Comments       Pseudomonas aeruginosa    With the exception of urinary-sourced infections, aminoglycosides should not be used as monotherapy.               AFB Culture - Peritoneal Fluid, Perineum [[id number]] Collected: 03/18/24 1349    Lab Status: Preliminary result Specimen: Peritoneal Fluid from Perineum Updated: 03/19/24 1339     AFB Stain No acid fast bacilli seen on concentrated smear            Reported Vancomycin Levels                         InsightRX AUC Calculation:    Current AUC:   N/A mg/L*hr    Predicted Steady State AUC on Current Dose: N/A mg/L*hr  _________________________________    Predicted Steady State AUC on New Dose:   547 mg/L*hr    Assessment/Plan:  1. Pharmacy to dose vancomycin for enterococcus intra-abdominal infection. Goal -600 mg/L*hr.  2. Will initiate a maintenance dose of vancomycin 1500mg (~15.4mg/kg) IV Q12hr on 3/22 @ 1000.  3. Assess clearance by vancomycin level on 3/23 @ 0600, prior to 3rd dose.  4. Pharmacy will continue to monitor renal function, cultures and sensitivities, and clinical status to adjust regimen as necessary.    Yessy Espinoza RPH  3/22/2024   08:41 EDT

## 2024-03-22 NOTE — PROGRESS NOTES
Lea Barrett [place]  1945  [id number]        Evaluating Physician: Louis Chow MD    Chief Complaint: abdpain    Reason for Consultation: IA infection    History of present illness:     Patient is a 78 y.o.  Yr old male with history of seizure disorder, colon cancer/prostate cancer with prior radiation, diabetes and dementia per admission notes with admission March 2 after frequent falls noted to have multiple rib fractures and left side clavicular fracture in the emergency room.noted to have significant colon distention with potential for pneumatosis on CT scan and seen by gastroenterology/surgery.taken for surgery on March 9 with diagnosis of toxic dilation of the colon for total abdominal colectomy and end ileostomy; medicine notes indicate he received ceftriaxone/Flagyl and Diflucan.  Leukocytosis persisted.repeat CT scan on March 18 with postsurgical changes, moderate loculated ascitic fluid in the anterior abdomen as described by radiology.taken back to surgery on March 18 for exploratory laparotomy with washout.  Culture subsequently with gram-negative manuel at least.  Empiric antibiotics adjusted to Zosyn/Mycamine    3/22/24 sleepy;  insight is generally poor.  Per nursing, + abdominal pain which is constant, dull at present, sharp at times, worse with palpation, better with pain meds and he will not attach a numerical severity.    no headache photophobia or neck stiffness.  No dyspnea or cough.  He has a sling on the left arm with left arm PICC line.  No rash.  Nursing reports no other new focal pain or distress.       Past Medical History:   Diagnosis Date    Anemia     Colon cancer 1990    Status post partial colectomy in 1990. No chemotherapy or radiation therapy.    Coronary artery disease     Nonobstructive coronary artery disease.    Diabetes     Dyslipidemia     GERD (gastroesophageal reflux disease)     Hx of.    Hyperlipidemia     Hypertension     Hyponatremia     Hypothyroidism      Left shoulder pain     Low sodium levels 2022    recent issue; saw nephrologist who ruled out kidney issues; took Sodium Chloride po to bring levels up    Memory deficit     FROM ANESTHESIA    Osteoarthritis     Prostate cancer 07/23/2022    Seizure disorder     silent seziure-diagnosed years ago    Skin cancer        Past Surgical History:   Procedure Laterality Date    APPENDECTOMY      CARDIAC CATHETERIZATION  2012    30 to 40% LAD    CARPAL TUNNEL RELEASE Bilateral     CHOLECYSTECTOMY      COLECTOMY PARTIAL / TOTAL  1990    Partial colectomy    COLON RESECTION N/A 3/9/2024    Procedure: TOTAL ABDOMINAL COLECTOMY, END ILEOSTOMY;  Surgeon: Harley Godfrey MD;  Location:  [facility];  Service: General;  Laterality: N/A;    COLONOSCOPY      CYBERKNIFE  02/09/2023    Prostate/SV    CYSTOSCOPY TRANSURETHRAL RESECTION OF PROSTATE N/A 09/21/2018    Procedure: CYSTOSCOPY TRANSURETHRAL RESECTION OF PROSTATE GREENLIGHT;  Surgeon: Naseem Clayton MD;  Location:  [facility];  Service: Urology    EXPLORATORY LAPAROTOMY N/A 3/18/2024    Procedure: LAPAROTOMY EXPLORATORY, ABDOMINAL WASH OUT;  Surgeon: Harley Godfrey MD;  Location:  [facility];  Service: General;  Laterality: N/A;    OTHER SURGICAL HISTORY      Contraction surgery on bilateral hands and wrists.    PROSTATE BIOPSY N/A 11/11/2022    Procedure: TRANSRECTAL ULTRASOUND GUIDED BIOPSY OF PROSTATE;  Surgeon: Naseem Noland MD;  Location:  [facility];  Service: Urology;  Laterality: N/A;    SINUS SURGERY      SKIN BIOPSY      TEETH EXTRACTION      TONSILLECTOMY      TOTAL KNEE ARTHROPLASTY Right 04/07/2022    Procedure: TOTAL KNEE ARTHROPLASTY WITH ORTHO ROBOT RIGHT;  Surgeon: Louis Malcolm MD;  Location:  [facility];  Service: Robotics - Ortho;  Laterality: Right;    TRANSRECTAL INCISION PROSTATE WITH GOLD SEED Bilateral 01/06/2023    Procedure: TRANSRECTAL ULTRASOUND GUIDED PROSTATE FIDUCIAL MARKER PLACEMENT;  Surgeon: Naseem Noland MD;   "Location: [facility] OR;  Service: Urology;  Laterality: Bilateral;    TURP / TRANSURETHRAL INCISION / DRAINAGE PROSTATE      VASECTOMY         Pediatric History   Patient Parents    Not on file     Other Topics Concern    Not on file   Social History Narrative    Caffeine: None       family history includes Arthritis in an other family member; Cancer in his mother and another family member; Esophageal cancer in his brother; Hypertension in his father; No Known Problems in his paternal grandfather, paternal grandmother, and sister; Stroke in his father, maternal grandfather, sister, and another family member.    Allergies   Allergen Reactions    Chlorhexidine Rash    Sulfa Antibiotics Rash     Childhood reaction             Review of Systems    3/22/24     Constitutional-- No Fever, chills or sweats.  Appetite diminished with fatigue  Heent-- No new vision, hearing or throat complaints.  No epistaxis or oral sores.  Denies odynophagia or dysphagia.  No flashers, floaters or eye pain. No odynophagia or dysphagia. No headache, photophobia or neck stiffness.  CV-- No chest pain, palpitation or syncope  Resp-- No SOB/cough/Hemoptysis  GI- see above.  No hematochezia, melena, or hematemesis. Denies jaundice or chronic liver disease.  -- No dysuria, hematuria, or flank pain.  Denies hesitancy, urgency or flank pain.  Lymph- no swollen lymph nodes in neck/axilla or groin.   Heme- No active bruising or bleeding  MS-- no swelling or pain in the bones or joints of arms/legs.  No new back pain.  Neuro-- generally debilitated, unable to lift his legs off the bed according to nursing.  Wiggles his feet/toes    Full 12 point review of systems reviewed and negative otherwise for acute complaints, except for above    Physical Exam:   Vital Signs   /79 (BP Location: Left leg, Patient Position: Lying)   Pulse 85   Temp 98.2 °F (36.8 °C) (Oral)   Resp 18   Ht 170.2 cm (67.01\")   Wt 97.7 kg (215 lb 6.4 oz)   SpO2 96%   BMI " 33.73 kg/m²     GENERAL: sleepy answers questions and follows basic commands as above, in no acute distress. Appears chronically debilitated.     HEENT: Normocephalic, atraumatic.   No conjunctival injection. No icterus. Oropharynx clear without evidence of thrush or exudate. No evidence of periodontal disease.    NECK: Supple without nuchal rigidity. No mass.  LYMPH: No cervical, axillary or inguinal lymphadenopathy.  HEART: RRR; No murmur, rubs, gallops.   LUNGS: diminished at bases with some crackles at the bases but otherwiseClear to auscultation bilaterally without wheezing/ rhonchi.  Nonlabored. No dullness.  ABDOMEN: Soft,  tender, nondistended. Positive bowel sounds but diminished. No rebound or guarding. NO mass or HSM.  EXT:  No cyanosis, clubbing;  No cord.has edema  MSK: FROM without joint effusions noted arms/legs.    SKIN: Warm and dry without cutaneous eruptions on Inspection/palpation.    NEURO: follows basic commands    No peripheral stigmata/phenomena of endocarditis    IV without obvious redness or drainage at left arm    Laboratory Data    Results from last 7 days   Lab Units 03/22/24  0557 03/21/24  0439 03/20/24  0510   WBC 10*3/mm3 16.56* 15.40* 17.74*   HEMOGLOBIN g/dL 8.6* 9.1* 8.6*   HEMATOCRIT % 26.9* 27.7* 27.6*   PLATELETS 10*3/mm3 537* 517* 465*     Results from last 7 days   Lab Units 03/21/24  0622   SODIUM mmol/L 133*   POTASSIUM mmol/L 4.5   CHLORIDE mmol/L 98   CO2 mmol/L 29.0   BUN mg/dL 17   CREATININE mg/dL 0.52*   GLUCOSE mg/dL 152*   CALCIUM mg/dL 7.6*     Results from last 7 days   Lab Units 03/21/24  0622   ALK PHOS U/L 101   BILIRUBIN mg/dL 0.2   ALT (SGPT) U/L <5   AST (SGOT) U/L 17         Results from last 7 days   Lab Units 03/19/24  0936   CRP mg/dL 13.84*       Estimated Creatinine Clearance: 128.2 mL/min (A) (by C-G formula based on SCr of 0.52 mg/dL (L)).      Microbiology:      Radiology:  Imaging Results (Last 72 Hours)       Procedure Component Value Units  Date/Time    FL Video Swallow With Speech Single Contrast [[id number]] Collected: 03/21/24 1325     Updated: 03/21/24 1514    Narrative:      FL VIDEO SWALLOW W SPEECH SINGLE-CONTRAST    Date of Exam: 3/21/2024 1:01 PM EDT    Indication: dysphagia      Comparison: None available.    Technique:   The speech pathologist administered food and/or liquid mixed with barium to the patient with cine/video imaging.  Imaging assistance was provided to the speech pathologist and an image was saved.    Fluoroscopic Time: 1 minute and 30 seconds    Number of Images: 12 associated fluoroscopic loops were saved    Findings:  Aspiration was seen during fluoroscopic guided modified barium swallowing series. Please see speech therapy report for full details and recommendations.      Impression:      Impression:  Fluoroscopy provided for a modified barium swallow. Aspiration was seen during swallowing evaluation. Please see speech therapy report for full details and recommendations.      Report dictated by: Lois Gao PA-c      I have personally reviewed this case and agree with the findings above:    Electronically Signed: Ayo Washington MD    3/21/2024 3:11 PM EDT    Workstation ID: [id number]              Impression:     --acute abdominal pain with total abdominal colectomy/ileostomy as above related to toxic dilation of colon As per operative note, postop with persistent leukocytosis and fluid collections on CT scan, taken for exploratory laparotomy and fluid culture positive for gram-negative rods so far, consistent with abscess.  Empiric antimicrobials adjusted to Zosyn/Mycamine/vancomycin until further identification/YVAN data known for gram-positive organism    --Left lower lobe pneumonia per medicine team notes, abnormal chest x-ray March 12.no respiratory distress or productive sputum.  Empiric Zosyn ongoing.  If worsening respiratory status you could consider further imaging etc.nasal cannula stable per nursing    --history  dementia per admission notes a little specifics regarding baseline not clear and generally poor insight overall.  Description of toxic/metabolic encephalopathy per medicine team notes during this admission.  Prior history seizure and generally debilitated.    --History of multiple falls with left clavicular and multiple rib fractures per admission notes.  Orthopedics has seen.    --Right upper extremity DVT, described as brachial per medicine team notes.  Anticoagulation at discretion of medicine team    --History prostate and colon cancer previously per admission notes.        PLAN:       --IV Zosyn/Mycamine/vancomycin; daptomycin YVAN data pending    **culture with pseudomonas aeruginosa and E Faecium      --Check/reviewed labs cultures and scans    --Partial history per nursing staff    --Discussed with microbiology; further YVAN data pending    --Highly complex of issues with high risk for further serious morbidity and other serious sequela    Copied text in this note has been reviewed and is accurate as of 03/22/24.        Louis Chow MD  3/22/2024

## 2024-03-22 NOTE — PROGRESS NOTES
[facility] Medicine Services  PROGRESS NOTE    Patient Name: Lea Rivers  : 1945  MRN: [id number]    Date of Admission: 3/2/2024  Primary Care Physician: Mannie Melvin MD    Subjective   Subjective     CC:  Follow up colectomy     HPI:  Patient seen resting in bed with eyes closed in no apparent distress.  Sitter at bedside. Patient has been intermittently confused, pulling at lines and tubes. Nursing reports he has been calm and cooperative with care this morning.       Objective   Objective     Vital Signs:   Temp:  [97.5 °F (36.4 °C)-99.1 °F (37.3 °C)] 97.5 °F (36.4 °C)  Heart Rate:  [71-92] 71  Resp:  [17-20] 17  BP: (155-204)/(73-93) 185/84     Physical Exam:  Constitutional: No acute distress, resting with eyes closed, chronically ill-appearing  HENT: NCAT, mucous membranes moist  Respiratory: Diminished in bases, respiratory effort normal on room air  Cardiovascular: RRR, no murmurs, cap refill brisk   Gastrointestinal: Positive bowel sounds, abdominal binder in place, drains in place, ostomy in place with liquid stool noted  Musculoskeletal: 1+ BLE edema, healed dressings in place  Psychiatric: resting with eyes closed   Neurologic: appears to be resting comfortably   Skin: warm, dry, no visible rash       Results Reviewed:  LAB RESULTS:      Lab 24  0557 24  0439 24  0510 24  1643 24  0936 24  0715   WBC 16.56* 15.40* 17.74*  --  22.83* 16.67*   HEMOGLOBIN 8.6* 9.1* 8.6*  --  9.0* 8.0*   HEMATOCRIT 26.9* 27.7* 27.6*  --  31.4* 24.6*   PLATELETS 537* 517* 465*  --  413 379   NEUTROS ABS 12.32* 11.78* 13.73*  --  18.38* 13.50*   IMMATURE GRANS (ABS) 0.92* 0.83* 0.96*  --  1.18*  --    LYMPHS ABS 1.31 1.09 1.07  --  0.81  --    MONOS ABS 1.40* 1.15* 1.77*  --  2.29*  --    EOS ABS 0.50* 0.41* 0.12  --  0.01 0.00   MCV 93.1 97.9* 93.6  --  101.0* 101.2*   CRP  --   --   --   --  13.84*  --    PROTIME  --   --   --  15.9*   --   --          Lab 03/21/24  0622 03/20/24  0510   SODIUM 133* 141   POTASSIUM 4.5 4.4   CHLORIDE 98 106   CO2 29.0 29.0   ANION GAP 6.0 6.0   BUN 17 26*   CREATININE 0.52* 0.65*   EGFR 102.5 95.8   GLUCOSE 152* 161*   CALCIUM 7.6* 7.6*   PHOSPHORUS  --  2.9         Lab 03/21/24  0622 03/20/24  0510   TOTAL PROTEIN 4.8* 4.7*   ALBUMIN 2.0* 1.9*   GLOBULIN 2.8 2.8   ALT (SGPT) <5 5   AST (SGOT) 17 14   BILIRUBIN 0.2 0.2   ALK PHOS 101 77         Lab 03/19/24  1643   PROTIME 15.9*   INR 1.26*         Lab 03/19/24  0936   TRIGLYCERIDES 167*         Lab 03/16/24  0630   ABO TYPING O   RH TYPING Negative   ANTIBODY SCREEN Negative         Brief Urine Lab Results  (Last result in the past 365 days)        Color   Clarity   Blood   Leuk Est   Nitrite   Protein   CREAT   Urine HCG        03/11/24 1337 Yellow   Cloudy   Moderate (2+)   Trace   Negative   30 mg/dL (1+)                   Microbiology Results Abnormal       Procedure Component Value - Date/Time    Anaerobic Culture - Peritoneal Fluid, Perineum [[id number]]  (Normal) Collected: 03/18/24 1349    Lab Status: Preliminary result Specimen: Peritoneal Fluid from Perineum Updated: 03/21/24 0741     Anaerobic Culture No anaerobes isolated at 3 days    AFB Culture - Peritoneal Fluid, Perineum [[id number]] Collected: 03/18/24 1349    Lab Status: Preliminary result Specimen: Peritoneal Fluid from Perineum Updated: 03/19/24 1339     AFB Stain No acid fast bacilli seen on concentrated smear    Urine Culture - Urine, Indwelling Urethral Catheter [[id number]]  (Normal) Collected: 03/11/24 1337    Lab Status: Final result Specimen: Urine from Indwelling Urethral Catheter Updated: 03/12/24 2024     Urine Culture No growth    Blood Culture - Blood, Arm, Right [[id number]]  (Normal) Collected: 03/06/24 1857    Lab Status: Final result Specimen: Blood from Arm, Right Updated: 03/11/24 2045     Blood Culture No growth at 5 days    Blood Culture - Blood, Arm, Right [[id number]]   (Normal) Collected: 03/06/24 1751    Lab Status: Final result Specimen: Blood from Arm, Right Updated: 03/11/24 2000     Blood Culture No growth at 5 days    Blood Culture - Blood, Arm, Right [[id number]]  (Normal) Collected: 03/02/24 1132    Lab Status: Final result Specimen: Blood from Arm, Right Updated: 03/07/24 1201     Blood Culture No growth at 5 days    Narrative:      Less than seven (7) mL's of blood was collected.  Insufficient quantity may yield false negative results.    Blood Culture - Blood, Arm, Right [[id number]]  (Normal) Collected: 03/02/24 1132    Lab Status: Final result Specimen: Blood from Arm, Right Updated: 03/07/24 1201     Blood Culture No growth at 5 days    Narrative:      Less than seven (7) mL's of blood was collected.  Insufficient quantity may yield false negative results.    Urine Culture - Urine, Straight Cath [[id number]]  (Normal) Collected: 03/02/24 0923    Lab Status: Final result Specimen: Urine from Straight Cath Updated: 03/03/24 1601     Urine Culture No growth    COVID PRE-OP / PRE-PROCEDURE SCREENING ORDER (NO ISOLATION) - Swab, Nasopharynx [[id number]]  (Normal) Collected: 03/02/24 1133    Lab Status: Final result Specimen: Swab from Nasopharynx Updated: 03/02/24 1227    Narrative:      The following orders were created for panel order COVID PRE-OP / PRE-PROCEDURE SCREENING ORDER (NO ISOLATION) - Swab, Nasopharynx.  Procedure                               Abnormality         Status                     ---------                               -----------         ------                     COVID-19, FLU A/B, RSV P...[[id number]]  Normal              Final result                 Please view results for these tests on the individual orders.    COVID-19, FLU A/B, RSV PCR 1 HR TAT - Swab, Nasopharynx [[id number]]  (Normal) Collected: 03/02/24 1133    Lab Status: Final result Specimen: Swab from Nasopharynx Updated: 03/02/24 1227     COVID19 Not Detected     Influenza A PCR Not  Detected     Influenza B PCR Not Detected     RSV, PCR Not Detected    Narrative:      Fact sheet for providers: https://www.fda.gov/media/155083/download    Fact sheet for patients: https://www.fda.gov/media/425043/download    Test performed by PCR.            FL Video Swallow With Speech Single Contrast    Result Date: 3/21/2024  FL VIDEO SWALLOW W SPEECH SINGLE-CONTRAST Date of Exam: 3/21/2024 1:01 PM EDT Indication: dysphagia Comparison: None available. Technique:   The speech pathologist administered food and/or liquid mixed with barium to the patient with cine/video imaging.  Imaging assistance was provided to the speech pathologist and an image was saved. Fluoroscopic Time: 1 minute and 30 seconds Number of Images: 12 associated fluoroscopic loops were saved Findings: Aspiration was seen during fluoroscopic guided modified barium swallowing series. Please see speech therapy report for full details and recommendations.     Impression: Impression: Fluoroscopy provided for a modified barium swallow. Aspiration was seen during swallowing evaluation. Please see speech therapy report for full details and recommendations. Report dictated by: Lois Gao PA-c  I have personally reviewed this case and agree with the findings above: Electronically Signed: Ayo Washington MD  3/21/2024 3:11 PM EDT  Workstation ID: [id number]     Results for orders placed during the hospital encounter of 07/20/21    Adult Transthoracic Echo Complete W/ Cont if Necessary Per Protocol    Interpretation Summary  · Left ventricular wall thickness is consistent with mild concentric hypertrophy.  · Normal left-ventricular systolic function, estimated EF 65%.  · Left ventricular diastolic function is consistent with (grade I) impaired relaxation.  · Aortic sclerosis without aortic stenosis. Trace aortic insufficiency.  · Trace mitral regurgitation, trace tricuspid regurgitation.      Current medications:  Scheduled Meds:enoxaparin, 1 mg/kg,  Subcutaneous, Q12H  Fat Emul Fish Oil/Plant Based, 250 mL, Intravenous, Q24H (TPN)  insulin regular, 2-7 Units, Subcutaneous, Q6H  isosorbide dinitrate, 10 mg, Oral, TID - Nitrates  levETIRAcetam, 500 mg, Oral, Q12H  levothyroxine, 88 mcg, Oral, Q AM  Lidocaine, 2 patch, Transdermal, Q24H  lisinopril, 20 mg, Oral, Q24H  metoclopramide, 10 mg, Intravenous, Q6H  micafungin (MYCAMINE) IV, 100 mg, Intravenous, Q24H  pantoprazole, 40 mg, Intravenous, Q AM  piperacillin-tazobactam, 3.375 g, Intravenous, Q8H  sodium chloride, 10 mL, Intravenous, Q12H  Valproic Acid, 375 mg, Oral, Q6H  vancomycin, 1,500 mg, Intravenous, Q12H  verapamil, 80 mg, Oral, Q8H      Continuous Infusions:Adult Central 2-in-1 TPN, , Last Rate: 65 mL/hr at 03/21/24 1709  Pharmacy to Dose TPN,   Pharmacy to dose vancomycin,       PRN Meds:.  acetaminophen **OR** acetaminophen **OR** acetaminophen    calcium carbonate    Calcium Replacement - Follow Nurse / BPA Driven Protocol    dextrose    dextrose    docusate sodium    fluticasone    glucagon (human recombinant)    hydrALAZINE    ipratropium-albuterol    labetalol    Magnesium Standard Dose Replacement - Follow Nurse / BPA Driven Protocol    [DISCONTINUED] HYDROmorphone **AND** naloxone    ondansetron ODT **OR** ondansetron    oxyCODONE    Pharmacy to Dose TPN    Pharmacy to dose vancomycin    Phosphorus Replacement - Follow Nurse / BPA Driven Protocol    Potassium Replacement - Follow Nurse / BPA Driven Protocol    sodium chloride    sodium chloride    sodium chloride    sodium chloride    Assessment & Plan   Assessment & Plan     Active Hospital Problems    Diagnosis  POA    **Falls [W19.XXXA]  Yes    Pneumatosis intestinalis [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        Brief Hospital Course to date:  Lea Rivers is a 78 y.o. male  with past medical history of hypertension, hyperlipidemia, hypothyroidism, and seizure disorder who was admitted on  3/2/2024 due to fall and nondisplaced first through fourth left rib fractures. He was also found to have left distal clavicle fracture.  Patient was noted to have increasing abdominal distention on 3/6/2024.  CT of the abdomen/pelvis revealed dilated loops of large bowel with likely pneumatosis intestinalis.  General surgery due to persistent colonic ileus and significant colonic distention.  Underwent total abdominal colectomy with end ileostomy creation on 3/9/2024.  Repeat CT of the abdomen/pelvis revealed postsurgical changes with a moderate loculated ascitic fluid collection within the anterior abdomen.  He underwent exploratory laparotomy  with abdominal washout on 3/18/2024 by Dr. Godfrey.  ID Dr. Chow was consulted and is managing antibiotic regimen.     This patient's problems and plans were partially entered by my partner and updated as appropriate by me 03/22/24.    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 on 3/9 w/  Persistent  Leukocytosis  LLL PNA  -s/p repeat CT 3/18, with fluid collection noted, s/p exploratory laparotomy 3/18 with abdominal washout and ascites noted  -completed course of Fluconazole, CTX/Flagyl 3/17 prior to repeat surgery  -CXR 3/12 with possible LLL pneumonia, completed course of CTX  -ID following, abdominal fluid culture with pseudomonas  -continue IV Zosyn, mycamine, vancomycin for now   -AM labs      Dysphagia  Malnutrition  -SLP following   -Nutrition following  -Currently on TPN      Toxic metabolic encephalopathy  -per daughter significant change in mental status and speech since arrival, and team had a long discussion and felt this is probably TME in setting of infection, recent large operation, underlying dementia  -initial CT head 3/2 negative, repeated CT head 3/13 without any acute findings  -EEG negative for seizures, findings consistent with TME  -minimize sedating meds   -slowly improving      Multiple falls with increasing frequency   -CT of head shows age-related changes which are chronic but no acute process  -neuro consulted, likely d/t neuropathy (confirmed with EMG) and progression of dementia     Multiple rib fractures and also left clavicle fracture  -With no displacement or mildly displaced.  Orthopedic surgery evaluated. No surgical intervention  planned.  Recs nonweightbearing LUE, may come out of sling in 5-7 days to initiate gentle ROM exercises per ortho.  F/u with ortho/Dr. Maldonado in 2 weeks  -Pain control, lidocaine patch  -bruise noted to L shoulder/neck region however X-ray negative     Acute on Chronic Anemia  -S/P 1 unit PRBCs 3/11 and 3/16, monitoring H&H  -no clear source of active bleeding, hemoccult negative  -Hgb 8.6 this AM   -check iron profile, b12, folate in AM   -transfuse PRN hgb <7     Acute RUE DVT (brachial)  -Provoked, 2/2 PICC   -on lovenox, plan to transition to PO anticoagulation pending speech evaluations     Prostate cancer  Hx BPH s/p greenlight photo vaporization of prostate 2018  -Follows with Dr. Noland  -S/P cyberknife radiation 2/9/23  -Has intermittent hematuria, monitor while on AC     Dementia and increasing memory problem  -Patient was previously living alone and driving, given significant decline will need placement     Hypertension  -cleared by speech, resumed home oral regimen  -continue Lisinopril, Verapamil   -BP uncontrolled   -Added Coreg 6.125 BID on 3/22  -PRN IV dosing for SBP >180     Seizure disorder  -continue vimpat and keppra     Hyperlipidemia  Hypothyroidism  -resume home regimen     Expected Discharge Location and Transportation: SNF  Expected Discharge   Expected Discharge Date: 3/25/2024; Expected Discharge Time:       DVT prophylaxis:  Medical and mechanical DVT prophylaxis orders are present.      AM-PAC 6 Clicks Score (PT): 9 (03/22/24 5363)    CODE STATUS:   Code Status and Medical Interventions:   Ordered at: 03/02/24 6935      Medical Intervention Limits:    NO intubation (DNI)     Code Status (Patient has no pulse and is not breathing):    No CPR (Do Not Attempt to Resuscitate)     Medical Interventions (Patient has pulse or is breathing):    Limited Support       Raúl De Oliveira, KATE  03/22/24

## 2024-03-22 NOTE — CASE MANAGEMENT/SOCIAL WORK
Discharge Planning Assessment  [facility]     Patient Name: Lea Rivers  MRN: [id number]  Today's Date: 3/22/2024    Admit Date: 3/2/2024    Plan: Hopefully to rehab once MR   Discharge Needs Assessment    No documentation.                  Discharge Plan       Row Name 03/22/24 1554       Plan    Plan Hopefully to rehab once MR    Plan Comments Patient wants to go to [facility] for rehab once Medically ready and they accept. [facility] wants to make an on-site evaluation before accepting too. However, patient on TPN/Lipids, IV Antibiotics and sitter at (form of restraint) to keep patient from pulling out his lines. All those are barriers for Skilled nursing home placement. CM will follow up Monday.    Final Discharge Disposition Code 03 - skilled nursing facility (SNF)                  Continued Care and Services - Admitted Since 3/2/2024       Destination       Service Provider Request Status Selected Services Address Phone Fax Patient Preferred    [facility] Pending - No Request Sent N/A [address] [facility] [id number] [phone] --                  Expected Discharge Date and Time       Expected Discharge Date Expected Discharge Time    Mar 27, 2024            Demographic Summary    No documentation.                  Functional Status    No documentation.                  Psychosocial    No documentation.                  Abuse/Neglect    No documentation.                  Legal    No documentation.                  Substance Abuse    No documentation.                  Patient Forms    No documentation.                     Yasemin Castro, RN

## 2024-03-22 NOTE — PROGRESS NOTES
"Patient Name:  Lea Rivers  YOB: 1945  [id number]    Surgery Progress Note    Date of visit: 3/22/2024    Subjective     Unable to obtain full history due to patient's dementia. More alert.  Mild abdominal pain. No nausea/vomiting. No fevers.  Having ostomy output.   Tolerating diet but poor appetite.       Objective       /77 (BP Location: Left leg, Patient Position: Lying)   Pulse 73   Temp 97.5 °F (36.4 °C) (Oral)   Resp 17   Ht 170.2 cm (67.01\")   Wt 97.7 kg (215 lb 6.4 oz)   SpO2 95%   BMI 33.73 kg/m²     Intake/Output Summary (Last 24 hours) at 3/22/2024 1351  Last data filed at 3/22/2024 1020  Gross per 24 hour   Intake 0 ml   Output 995 ml   Net -995 ml   OLIVER drain #1 17 cc  OLIVER drain #2 20 cc  OLIVER drain #3 8 cc    CV:  Rhythm regular and rate regular  L:  Clear to auscultation bilaterally  Abd:  Bowel sounds positive, soft, appropriately tender, incision clean dry and intact, ostomy viable and functioning, OLIVER drains serous  Ext:  No cyanosis, clubbing, edema    Recent labs and imaging that are back at this time have been reviewed.   WBC 16.6  Hemoglobin 8.6       Assessment & Plan     Patient postoperative day 13 from total abdominal colectomy with end ileostomy, postoperative day 4 abdominal washout. Pain control.  Diet per speech and swallow recommendations. Continue TPN until adequate oral intake. Abx per ID, OR cultures with rare pseudomonas and Enterococcus. OK to continue anticoagulation for UE DVT.              Harley Godfrey MD  3/22/2024  13:51 EDT      "

## 2024-03-23 LAB
ANION GAP SERPL CALCULATED.3IONS-SCNC: 7 MMOL/L (ref 5–15)
BACTERIA FLD CULT: ABNORMAL
BACTERIA FLD CULT: ABNORMAL
BACTERIA SPEC ANAEROBE CULT: NORMAL
BASOPHILS # BLD AUTO: 0.12 10*3/MM3 (ref 0–0.2)
BASOPHILS NFR BLD AUTO: 0.9 % (ref 0–1.5)
BUN SERPL-MCNC: 18 MG/DL (ref 8–23)
BUN/CREAT SERPL: 43.9 (ref 7–25)
CALCIUM SPEC-SCNC: 7.8 MG/DL (ref 8.6–10.5)
CHLORIDE SERPL-SCNC: 101 MMOL/L (ref 98–107)
CO2 SERPL-SCNC: 24 MMOL/L (ref 22–29)
CREAT SERPL-MCNC: 0.41 MG/DL (ref 0.76–1.27)
DEPRECATED RDW RBC AUTO: 63.1 FL (ref 37–54)
EGFRCR SERPLBLD CKD-EPI 2021: 110.2 ML/MIN/1.73
EOSINOPHIL # BLD AUTO: 0.74 10*3/MM3 (ref 0–0.4)
EOSINOPHIL NFR BLD AUTO: 5.7 % (ref 0.3–6.2)
ERYTHROCYTE [DISTWIDTH] IN BLOOD BY AUTOMATED COUNT: 18.1 % (ref 12.3–15.4)
FERRITIN SERPL-MCNC: 200.1 NG/ML (ref 30–400)
GLUCOSE BLDC GLUCOMTR-MCNC: 153 MG/DL (ref 70–130)
GLUCOSE BLDC GLUCOMTR-MCNC: 154 MG/DL (ref 70–130)
GLUCOSE BLDC GLUCOMTR-MCNC: 155 MG/DL (ref 70–130)
GLUCOSE SERPL-MCNC: 145 MG/DL (ref 65–99)
GRAM STN SPEC: ABNORMAL
GRAM STN SPEC: ABNORMAL
HCT VFR BLD AUTO: 27 % (ref 37.5–51)
HGB BLD-MCNC: 8.8 G/DL (ref 13–17.7)
IMM GRANULOCYTES # BLD AUTO: 0.73 10*3/MM3 (ref 0–0.05)
IMM GRANULOCYTES NFR BLD AUTO: 5.6 % (ref 0–0.5)
IRON 24H UR-MRATE: 34 MCG/DL (ref 59–158)
IRON SATN MFR SERPL: 19 % (ref 20–50)
LYMPHOCYTES # BLD AUTO: 1.02 10*3/MM3 (ref 0.7–3.1)
LYMPHOCYTES NFR BLD AUTO: 7.8 % (ref 19.6–45.3)
MCH RBC QN AUTO: 32.5 PG (ref 26.6–33)
MCHC RBC AUTO-ENTMCNC: 32.6 G/DL (ref 31.5–35.7)
MCV RBC AUTO: 99.6 FL (ref 79–97)
MONOCYTES # BLD AUTO: 0.97 10*3/MM3 (ref 0.1–0.9)
MONOCYTES NFR BLD AUTO: 7.4 % (ref 5–12)
NEUTROPHILS NFR BLD AUTO: 72.6 % (ref 42.7–76)
NEUTROPHILS NFR BLD AUTO: 9.46 10*3/MM3 (ref 1.7–7)
NRBC BLD AUTO-RTO: 0 /100 WBC (ref 0–0.2)
PLATELET # BLD AUTO: 519 10*3/MM3 (ref 140–450)
PMV BLD AUTO: 9.2 FL (ref 6–12)
POTASSIUM SERPL-SCNC: 4.4 MMOL/L (ref 3.5–5.2)
RBC # BLD AUTO: 2.71 10*6/MM3 (ref 4.14–5.8)
RETICS # AUTO: 0.12 10*6/MM3 (ref 0.02–0.13)
RETICS/RBC NFR AUTO: 4.25 % (ref 0.7–1.9)
SODIUM SERPL-SCNC: 132 MMOL/L (ref 136–145)
TIBC SERPL-MCNC: 182 MCG/DL (ref 298–536)
TRANSFERRIN SERPL-MCNC: 122 MG/DL (ref 200–360)
VANCOMYCIN SERPL-MCNC: 13.1 MCG/ML (ref 5–40)
VIT B12 BLD-MCNC: 1320 PG/ML (ref 211–946)
WBC NRBC COR # BLD AUTO: 13.04 10*3/MM3 (ref 3.4–10.8)

## 2024-03-23 PROCEDURE — 97530 THERAPEUTIC ACTIVITIES: CPT

## 2024-03-23 PROCEDURE — 82948 REAGENT STRIP/BLOOD GLUCOSE: CPT

## 2024-03-23 PROCEDURE — 25010000002 MAGNESIUM SULFATE PER 500 MG OF MAGNESIUM

## 2024-03-23 PROCEDURE — 25010000002 POTASSIUM CHLORIDE PER 2 MEQ OF POTASSIUM

## 2024-03-23 PROCEDURE — 25010000002 PIPERACILLIN SOD-TAZOBACTAM PER 1 G: Performed by: SURGERY

## 2024-03-23 PROCEDURE — 82728 ASSAY OF FERRITIN: CPT | Performed by: NURSE PRACTITIONER

## 2024-03-23 PROCEDURE — 25010000002 METOCLOPRAMIDE PER 10 MG: Performed by: SURGERY

## 2024-03-23 PROCEDURE — 82607 VITAMIN B-12: CPT | Performed by: NURSE PRACTITIONER

## 2024-03-23 PROCEDURE — 25010000002 MICAFUNGIN SODIUM 100 MG RECONSTITUTED SOLUTION 1 EACH VIAL: Performed by: INTERNAL MEDICINE

## 2024-03-23 PROCEDURE — 25010000002 NICARDIPINE 2.5 MG/ML SOLUTION 10 ML VIAL: Performed by: PHYSICIAN ASSISTANT

## 2024-03-23 PROCEDURE — 84466 ASSAY OF TRANSFERRIN: CPT | Performed by: NURSE PRACTITIONER

## 2024-03-23 PROCEDURE — 25810000003 SODIUM CHLORIDE 0.9 % SOLUTION 250 ML FLEX CONT: Performed by: PHYSICIAN ASSISTANT

## 2024-03-23 PROCEDURE — 25010000002 NA FERRIC GLUC CPLX PER 12.5 MG: Performed by: NURSE PRACTITIONER

## 2024-03-23 PROCEDURE — 25010000002 DAPTOMYCIN PER 1 MG: Performed by: INTERNAL MEDICINE

## 2024-03-23 PROCEDURE — 97110 THERAPEUTIC EXERCISES: CPT | Performed by: PHYSICAL THERAPIST

## 2024-03-23 PROCEDURE — 25010000002 ENOXAPARIN PER 10 MG: Performed by: HOSPITALIST

## 2024-03-23 PROCEDURE — 25010000002 HYDRALAZINE PER 20 MG: Performed by: SURGERY

## 2024-03-23 PROCEDURE — 85045 AUTOMATED RETICULOCYTE COUNT: CPT | Performed by: NURSE PRACTITIONER

## 2024-03-23 PROCEDURE — 85025 COMPLETE CBC W/AUTO DIFF WBC: CPT | Performed by: SURGERY

## 2024-03-23 PROCEDURE — 80048 BASIC METABOLIC PNL TOTAL CA: CPT | Performed by: NURSE PRACTITIONER

## 2024-03-23 PROCEDURE — 63710000001 INSULIN REGULAR HUMAN PER 5 UNITS: Performed by: SURGERY

## 2024-03-23 PROCEDURE — 25010000002 FUROSEMIDE PER 20 MG: Performed by: NURSE PRACTITIONER

## 2024-03-23 PROCEDURE — 83540 ASSAY OF IRON: CPT | Performed by: NURSE PRACTITIONER

## 2024-03-23 PROCEDURE — 82746 ASSAY OF FOLIC ACID SERUM: CPT | Performed by: STUDENT IN AN ORGANIZED HEALTH CARE EDUCATION/TRAINING PROGRAM

## 2024-03-23 PROCEDURE — 97110 THERAPEUTIC EXERCISES: CPT

## 2024-03-23 PROCEDURE — 97530 THERAPEUTIC ACTIVITIES: CPT | Performed by: PHYSICAL THERAPIST

## 2024-03-23 PROCEDURE — 99232 SBSQ HOSP IP/OBS MODERATE 35: CPT | Performed by: NURSE PRACTITIONER

## 2024-03-23 PROCEDURE — 25010000002 CALCIUM GLUCONATE PER 10 ML

## 2024-03-23 PROCEDURE — 80202 ASSAY OF VANCOMYCIN: CPT

## 2024-03-23 RX ORDER — CARVEDILOL 12.5 MG/1
12.5 TABLET ORAL 2 TIMES DAILY WITH MEALS
Status: DISCONTINUED | OUTPATIENT
Start: 2024-03-23 | End: 2024-03-24

## 2024-03-23 RX ORDER — FUROSEMIDE 10 MG/ML
40 INJECTION INTRAMUSCULAR; INTRAVENOUS ONCE
Status: COMPLETED | OUTPATIENT
Start: 2024-03-23 | End: 2024-03-23

## 2024-03-23 RX ORDER — LISINOPRIL 40 MG/1
40 TABLET ORAL
Status: DISCONTINUED | OUTPATIENT
Start: 2024-03-24 | End: 2024-03-30

## 2024-03-23 RX ORDER — CARVEDILOL 6.25 MG/1
6.25 TABLET ORAL ONCE
Status: COMPLETED | OUTPATIENT
Start: 2024-03-23 | End: 2024-03-23

## 2024-03-23 RX ADMIN — METOCLOPRAMIDE 10 MG: 5 INJECTION, SOLUTION INTRAMUSCULAR; INTRAVENOUS at 19:58

## 2024-03-23 RX ADMIN — METOCLOPRAMIDE 10 MG: 5 INJECTION, SOLUTION INTRAMUSCULAR; INTRAVENOUS at 14:09

## 2024-03-23 RX ADMIN — CARVEDILOL 6.25 MG: 6.25 TABLET, FILM COATED ORAL at 10:47

## 2024-03-23 RX ADMIN — SMOFLIPID 250 ML: 6; 6; 5; 3 INJECTION, EMULSION INTRAVENOUS at 18:11

## 2024-03-23 RX ADMIN — LISINOPRIL 20 MG: 20 TABLET ORAL at 08:18

## 2024-03-23 RX ADMIN — MICAFUNGIN 100 MG: 20 INJECTION, POWDER, LYOPHILIZED, FOR SOLUTION INTRAVENOUS at 16:45

## 2024-03-23 RX ADMIN — LEVOTHYROXINE SODIUM 88 MCG: 88 TABLET ORAL at 06:46

## 2024-03-23 RX ADMIN — CARVEDILOL 12.5 MG: 12.5 TABLET, FILM COATED ORAL at 18:10

## 2024-03-23 RX ADMIN — VALPROIC ACID 375 MG: 250 SOLUTION ORAL at 12:41

## 2024-03-23 RX ADMIN — PIPERACILLIN AND TAZOBACTAM 3.38 G: 3; .375 INJECTION, POWDER, LYOPHILIZED, FOR SOLUTION INTRAVENOUS at 10:28

## 2024-03-23 RX ADMIN — CARVEDILOL 6.25 MG: 6.25 TABLET, FILM COATED ORAL at 08:18

## 2024-03-23 RX ADMIN — ENOXAPARIN SODIUM 100 MG: 100 INJECTION SUBCUTANEOUS at 08:19

## 2024-03-23 RX ADMIN — METOCLOPRAMIDE 10 MG: 5 INJECTION, SOLUTION INTRAMUSCULAR; INTRAVENOUS at 01:20

## 2024-03-23 RX ADMIN — INSULIN HUMAN 2 UNITS: 100 INJECTION, SOLUTION PARENTERAL at 06:46

## 2024-03-23 RX ADMIN — VALPROIC ACID 375 MG: 250 SOLUTION ORAL at 06:45

## 2024-03-23 RX ADMIN — LEVETIRACETAM 500 MG: 500 TABLET, FILM COATED ORAL at 21:41

## 2024-03-23 RX ADMIN — NICARDIPINE HYDROCHLORIDE 15 MG/HR: 2.5 INJECTION, SOLUTION INTRAVENOUS at 09:02

## 2024-03-23 RX ADMIN — APIXABAN 5 MG: 5 TABLET, FILM COATED ORAL at 21:41

## 2024-03-23 RX ADMIN — NICARDIPINE HYDROCHLORIDE 5 MG/HR: 2.5 INJECTION, SOLUTION INTRAVENOUS at 03:59

## 2024-03-23 RX ADMIN — NICARDIPINE HYDROCHLORIDE 12.5 MG/HR: 2.5 INJECTION, SOLUTION INTRAVENOUS at 06:42

## 2024-03-23 RX ADMIN — PIPERACILLIN AND TAZOBACTAM 3.38 G: 3; .375 INJECTION, POWDER, LYOPHILIZED, FOR SOLUTION INTRAVENOUS at 01:20

## 2024-03-23 RX ADMIN — PANTOPRAZOLE SODIUM 40 MG: 40 INJECTION, POWDER, FOR SOLUTION INTRAVENOUS at 06:46

## 2024-03-23 RX ADMIN — PIPERACILLIN AND TAZOBACTAM 3.38 G: 3; .375 INJECTION, POWDER, LYOPHILIZED, FOR SOLUTION INTRAVENOUS at 17:51

## 2024-03-23 RX ADMIN — Medication 10 ML: at 21:42

## 2024-03-23 RX ADMIN — ISOSORBIDE DINITRATE 10 MG: 10 TABLET ORAL at 19:58

## 2024-03-23 RX ADMIN — DAPTOMYCIN 500 MG: 500 INJECTION, POWDER, LYOPHILIZED, FOR SOLUTION INTRAVENOUS at 11:52

## 2024-03-23 RX ADMIN — ISOSORBIDE DINITRATE 10 MG: 10 TABLET ORAL at 08:18

## 2024-03-23 RX ADMIN — VERAPAMIL HYDROCHLORIDE 80 MG: 80 TABLET ORAL at 14:10

## 2024-03-23 RX ADMIN — LEVETIRACETAM 500 MG: 500 TABLET, FILM COATED ORAL at 08:18

## 2024-03-23 RX ADMIN — SODIUM CHLORIDE 125 MG: 9 INJECTION, SOLUTION INTRAVENOUS at 12:38

## 2024-03-23 RX ADMIN — LIDOCAINE 2 PATCH: 4 PATCH TOPICAL at 11:06

## 2024-03-23 RX ADMIN — OXYCODONE 5 MG: 5 TABLET ORAL at 00:07

## 2024-03-23 RX ADMIN — FUROSEMIDE 40 MG: 10 INJECTION, SOLUTION INTRAMUSCULAR; INTRAVENOUS at 11:23

## 2024-03-23 RX ADMIN — HYDRALAZINE HYDROCHLORIDE 10 MG: 20 INJECTION INTRAMUSCULAR; INTRAVENOUS at 02:04

## 2024-03-23 RX ADMIN — VALPROIC ACID 375 MG: 250 SOLUTION ORAL at 18:10

## 2024-03-23 RX ADMIN — INSULIN HUMAN 2 UNITS: 100 INJECTION, SOLUTION PARENTERAL at 12:48

## 2024-03-23 RX ADMIN — WATER: 1 INJECTION INTRAMUSCULAR; INTRAVENOUS; SUBCUTANEOUS at 18:11

## 2024-03-23 RX ADMIN — VERAPAMIL HYDROCHLORIDE 80 MG: 80 TABLET ORAL at 21:41

## 2024-03-23 RX ADMIN — ISOSORBIDE DINITRATE 10 MG: 10 TABLET ORAL at 14:09

## 2024-03-23 RX ADMIN — INSULIN HUMAN 2 UNITS: 100 INJECTION, SOLUTION PARENTERAL at 18:10

## 2024-03-23 RX ADMIN — METOCLOPRAMIDE 10 MG: 5 INJECTION, SOLUTION INTRAMUSCULAR; INTRAVENOUS at 08:02

## 2024-03-23 RX ADMIN — Medication 10 ML: at 08:19

## 2024-03-23 NOTE — PROGRESS NOTES
[facility] Medicine Services  PROGRESS NOTE    Patient Name: Lea Rivers  : 1945  MRN: [id number]    Date of Admission: 3/2/2024  Primary Care Physician: Mannie Melvin MD    Subjective   Subjective     CC:  Follow-up colectomy    HPI:  Patient seen resting in bed no apparent distress.  No events overnight per nursing.  Sitter at bedside.  Patient is alert this morning and is conversing appropriately.  He is currently alert and oriented x 3 but at times he is slow to respond.  Discussed plan to transition to p.o. NOAC today.  Blood pressure was uncontrolled overnight.  He was started on Cardene drip.  Will attempt to wean this off due to incompatibility with other IV medications.  Adjusted p.o. BP medications.    Objective   Objective     Vital Signs:   Temp:  [97.5 °F (36.4 °C)-98.1 °F (36.7 °C)] 97.5 °F (36.4 °C)  Heart Rate:  [] 85  Resp:  [17-20] 20  BP: (144-217)/(67-96) 150/72     Physical Exam:  Constitutional: No acute distress, awake, alert, chronically ill-appearing  HENT: NCAT, mucous membranes moist  Respiratory: Diminished in bases, respiratory effort normal on room air  Cardiovascular: RRR, no murmurs, cap refill brisk   Gastrointestinal: Positive bowel sounds, abdominal binder in place, drains x 3 in place, ostomy in place, midline vertical incision with staples in place well-approximated  Musculoskeletal: 1+ BLE edema   Psychiatric: Appropriate affect, cooperative  Neurologic: Oriented x 3, confused at times, moves all extremities, slowed speech  Skin: warm, dry, no visible rash       Results Reviewed:  LAB RESULTS:      Lab 24  0348 24  0557 24  0439 24  0510 24  1643 24  0936   WBC 13.04* 16.56* 15.40* 17.74*  --  22.83*   HEMOGLOBIN 8.8* 8.6* 9.1* 8.6*  --  9.0*   HEMATOCRIT 27.0* 26.9* 27.7* 27.6*  --  31.4*   PLATELETS 519* 537* 517* 465*  --  413   NEUTROS ABS 9.46* 12.32* 11.78* 13.73*  --  18.38*    IMMATURE GRANS (ABS) 0.73* 0.92* 0.83* 0.96*  --  1.18*   LYMPHS ABS 1.02 1.31 1.09 1.07  --  0.81   MONOS ABS 0.97* 1.40* 1.15* 1.77*  --  2.29*   EOS ABS 0.74* 0.50* 0.41* 0.12  --  0.01   MCV 99.6* 93.1 97.9* 93.6  --  101.0*   CRP  --   --   --   --   --  13.84*   PROTIME  --   --   --   --  15.9*  --          Lab 03/21/24  0622 03/20/24  0510   SODIUM 133* 141   POTASSIUM 4.5 4.4   CHLORIDE 98 106   CO2 29.0 29.0   ANION GAP 6.0 6.0   BUN 17 26*   CREATININE 0.52* 0.65*   EGFR 102.5 95.8   GLUCOSE 152* 161*   CALCIUM 7.6* 7.6*   PHOSPHORUS  --  2.9         Lab 03/21/24  0622 03/20/24  0510   TOTAL PROTEIN 4.8* 4.7*   ALBUMIN 2.0* 1.9*   GLOBULIN 2.8 2.8   ALT (SGPT) <5 5   AST (SGOT) 17 14   BILIRUBIN 0.2 0.2   ALK PHOS 101 77         Lab 03/19/24  1643   PROTIME 15.9*   INR 1.26*         Lab 03/19/24  0936   TRIGLYCERIDES 167*         Lab 03/23/24  0348   IRON 34*   IRON SATURATION (TSAT) 19*   TIBC 182*   TRANSFERRIN 122*   FERRITIN 200.10         Brief Urine Lab Results  (Last result in the past 365 days)        Color   Clarity   Blood   Leuk Est   Nitrite   Protein   CREAT   Urine HCG        03/11/24 1337 Yellow   Cloudy   Moderate (2+)   Trace   Negative   30 mg/dL (1+)                   Microbiology Results Abnormal       Procedure Component Value - Date/Time    Anaerobic Culture - Peritoneal Fluid, Perineum [[id number]]  (Normal) Collected: 03/18/24 1349    Lab Status: Preliminary result Specimen: Peritoneal Fluid from Perineum Updated: 03/21/24 0741     Anaerobic Culture No anaerobes isolated at 3 days    AFB Culture - Peritoneal Fluid, Perineum [[id number]] Collected: 03/18/24 1349    Lab Status: Preliminary result Specimen: Peritoneal Fluid from Perineum Updated: 03/19/24 1339     AFB Stain No acid fast bacilli seen on concentrated smear    Urine Culture - Urine, Indwelling Urethral Catheter [[id number]]  (Normal) Collected: 03/11/24 1337    Lab Status: Final result Specimen: Urine from Indwelling  Urethral Catheter Updated: 03/12/24 2024     Urine Culture No growth    Blood Culture - Blood, Arm, Right [[id number]]  (Normal) Collected: 03/06/24 1857    Lab Status: Final result Specimen: Blood from Arm, Right Updated: 03/11/24 2045     Blood Culture No growth at 5 days    Blood Culture - Blood, Arm, Right [[id number]]  (Normal) Collected: 03/06/24 1751    Lab Status: Final result Specimen: Blood from Arm, Right Updated: 03/11/24 2000     Blood Culture No growth at 5 days    Blood Culture - Blood, Arm, Right [[id number]]  (Normal) Collected: 03/02/24 1132    Lab Status: Final result Specimen: Blood from Arm, Right Updated: 03/07/24 1201     Blood Culture No growth at 5 days    Narrative:      Less than seven (7) mL's of blood was collected.  Insufficient quantity may yield false negative results.    Blood Culture - Blood, Arm, Right [[id number]]  (Normal) Collected: 03/02/24 1132    Lab Status: Final result Specimen: Blood from Arm, Right Updated: 03/07/24 1201     Blood Culture No growth at 5 days    Narrative:      Less than seven (7) mL's of blood was collected.  Insufficient quantity may yield false negative results.    Urine Culture - Urine, Straight Cath [[id number]]  (Normal) Collected: 03/02/24 0923    Lab Status: Final result Specimen: Urine from Straight Cath Updated: 03/03/24 1601     Urine Culture No growth    COVID PRE-OP / PRE-PROCEDURE SCREENING ORDER (NO ISOLATION) - Swab, Nasopharynx [[id number]]  (Normal) Collected: 03/02/24 1133    Lab Status: Final result Specimen: Swab from Nasopharynx Updated: 03/02/24 1227    Narrative:      The following orders were created for panel order COVID PRE-OP / PRE-PROCEDURE SCREENING ORDER (NO ISOLATION) - Swab, Nasopharynx.  Procedure                               Abnormality         Status                     ---------                               -----------         ------                     COVID-19, FLU A/B, RSV P...[[id number]]  Normal              Final  result                 Please view results for these tests on the individual orders.    COVID-19, FLU A/B, RSV PCR 1 HR TAT - Swab, Nasopharynx [[id number]]  (Normal) Collected: 03/02/24 1133    Lab Status: Final result Specimen: Swab from Nasopharynx Updated: 03/02/24 1227     COVID19 Not Detected     Influenza A PCR Not Detected     Influenza B PCR Not Detected     RSV, PCR Not Detected    Narrative:      Fact sheet for providers: https://www.fda.gov/media/774724/download    Fact sheet for patients: https://www.fda.gov/media/322220/download    Test performed by PCR.            FL Video Swallow With Speech Single Contrast    Result Date: 3/21/2024  FL VIDEO SWALLOW W SPEECH SINGLE-CONTRAST Date of Exam: 3/21/2024 1:01 PM EDT Indication: dysphagia Comparison: None available. Technique:   The speech pathologist administered food and/or liquid mixed with barium to the patient with cine/video imaging.  Imaging assistance was provided to the speech pathologist and an image was saved. Fluoroscopic Time: 1 minute and 30 seconds Number of Images: 12 associated fluoroscopic loops were saved Findings: Aspiration was seen during fluoroscopic guided modified barium swallowing series. Please see speech therapy report for full details and recommendations.     Impression: Impression: Fluoroscopy provided for a modified barium swallow. Aspiration was seen during swallowing evaluation. Please see speech therapy report for full details and recommendations. Report dictated by: Lois Gao PA-c  I have personally reviewed this case and agree with the findings above: Electronically Signed: Ayo Washington MD  3/21/2024 3:11 PM EDT  Workstation ID: [id number]     Results for orders placed during the hospital encounter of 07/20/21    Adult Transthoracic Echo Complete W/ Cont if Necessary Per Protocol    Interpretation Summary  · Left ventricular wall thickness is consistent with mild concentric hypertrophy.  · Normal left-ventricular  systolic function, estimated EF 65%.  · Left ventricular diastolic function is consistent with (grade I) impaired relaxation.  · Aortic sclerosis without aortic stenosis. Trace aortic insufficiency.  · Trace mitral regurgitation, trace tricuspid regurgitation.      Current medications:  Scheduled Meds:apixaban, 5 mg, Oral, Q12H  carvedilol, 12.5 mg, Oral, BID With Meals  carvedilol, 6.25 mg, Oral, Once  DAPTOmycin, 6 mg/kg (Adjusted), Intravenous, Q24H  Fat Emul Fish Oil/Plant Based, 250 mL, Intravenous, Q24H (TPN)  ferric gluconate, 125 mg, Intravenous, Daily  furosemide, 40 mg, Intravenous, Once  insulin regular, 2-7 Units, Subcutaneous, Q6H  isosorbide dinitrate, 10 mg, Oral, TID - Nitrates  levETIRAcetam, 500 mg, Oral, Q12H  levothyroxine, 88 mcg, Oral, Q AM  Lidocaine, 2 patch, Transdermal, Q24H  [START ON 3/24/2024] lisinopril, 40 mg, Oral, Q24H  metoclopramide, 10 mg, Intravenous, Q6H  micafungin (MYCAMINE) IV, 100 mg, Intravenous, Q24H  pantoprazole, 40 mg, Intravenous, Q AM  piperacillin-tazobactam, 3.375 g, Intravenous, Q8H  sodium chloride, 10 mL, Intravenous, Q12H  Valproic Acid, 375 mg, Oral, Q6H  verapamil, 80 mg, Oral, Q8H      Continuous Infusions:Adult Central 2-in-1 TPN, , Last Rate: 65 mL/hr at 03/22/24 1800  Pharmacy to Dose TPN,   Pharmacy to dose vancomycin,       PRN Meds:.  acetaminophen **OR** acetaminophen **OR** acetaminophen    calcium carbonate    Calcium Replacement - Follow Nurse / BPA Driven Protocol    dextrose    dextrose    docusate sodium    fluticasone    glucagon (human recombinant)    hydrALAZINE    ipratropium-albuterol    labetalol    Magnesium Standard Dose Replacement - Follow Nurse / BPA Driven Protocol    [DISCONTINUED] HYDROmorphone **AND** naloxone    ondansetron ODT **OR** ondansetron    oxyCODONE    Pharmacy to Dose TPN    Pharmacy to dose vancomycin    Phosphorus Replacement - Follow Nurse / BPA Driven Protocol    Potassium Replacement - Follow Nurse / BPA Driven  Protocol    sodium chloride    sodium chloride    sodium chloride    sodium chloride    Assessment & Plan   Assessment & Plan     Active Hospital Problems    Diagnosis  POA    **Falls [W19.XXXA]  Yes    Pneumatosis intestinalis [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        Brief Hospital Course to date:  Lea Rivers is a 78 y.o. male   with past medical history of hypertension, hyperlipidemia, hypothyroidism, and seizure disorder who was admitted on 3/2/2024 due to fall and nondisplaced first through fourth left rib fractures. He was also found to have left distal clavicle fracture.  Patient was noted to have increasing abdominal distention on 3/6/2024.  CT of the abdomen/pelvis revealed dilated loops of large bowel with likely pneumatosis intestinalis.  General surgery due to persistent colonic ileus and significant colonic distention.  Underwent total abdominal colectomy with end ileostomy creation on 3/9/2024.  Repeat CT of the abdomen/pelvis revealed postsurgical changes with a moderate loculated ascitic fluid collection within the anterior abdomen.  He underwent exploratory laparotomy  with abdominal washout on 3/18/2024 by Dr. oGdfrey.  ID Dr. Chow was consulted and is managing antibiotic regimen.      This patient's problems and plans were partially entered by my partner and updated as appropriate by me 03/23/24.    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 on 3/9 w/  Persistent  Leukocytosis  LLL PNA  -s/p repeat CT 3/18, with fluid collection noted, s/p exploratory laparotomy 3/18 with abdominal washout and ascites noted  -completed course of Fluconazole, CTX/Flagyl 3/17 prior to repeat surgery  -CXR 3/12 with possible LLL pneumonia, completed course of CTX  -ID following, abdominal fluid culture with pseudomonas  -continue IV Zosyn, mycamine, daptomycin for now   -AM labs       Dysphagia  Malnutrition  -SLP following   -Nutrition following  -Currently on TPN     Toxic metabolic encephalopathy  -per daughter significant change in mental status and speech since arrival, and team had a long discussion and felt this is probably TME in setting of infection, recent large operation, underlying dementia  -initial CT head 3/2 negative, repeated CT head 3/13 without any acute findings  -EEG negative for seizures, findings consistent with TME  -minimize sedating meds   -slowly improving, A&O x 3 this a.m.     Multiple falls with increasing frequency   -CT of head shows age-related changes which are chronic but no acute process  -neuro consulted, likely d/t neuropathy (confirmed with EMG) and progression of dementia     Multiple rib fractures and also left clavicle fracture  -With no displacement or mildly displaced.  Orthopedic surgery evaluated. No surgical intervention  planned.  Recs nonweightbearing LUE, may come out of sling in 5-7 days to initiate gentle ROM exercises per ortho.  F/u with ortho/Dr. Maldonado in 2 weeks  -Pain control, lidocaine patch  -bruise noted to L shoulder/neck region however X-ray negative     Acute on Chronic Anemia  -S/P 1 unit PRBCs 3/11 and 3/16, monitoring H&H  -no clear source of active bleeding, hemoccult negative  -Hgb 8.6 this AM   -check iron profile, b12, folate in AM   -transfuse PRN hgb <7     Acute RUE DVT (brachial)  -Provoked, 2/2 PICC   -S/p Lovenox  -Started Eliquis 3/23     Prostate cancer  Hx BPH s/p greenlight photo vaporization of prostate 2018  -Follows with Dr. Noland  -S/P cyberknife radiation 2/9/23  -Has intermittent hematuria, monitor while on AC     Dementia and increasing memory problem  -Patient was previously living alone and driving, given significant decline will need placement     Hypertension  -cleared by speech, resumed home oral regimen  -continue Lisinopril, Verapamil   -BP remains uncontrolled   -S/p Cardene drip, will attempt  to wean off due to incompatibility with other IV medications  -Increase Coreg to 12.5 twice daily  -Increase lisinopril to 40 mg daily  -Resume verapamil  -Lasix 40 mg IV x 1 today  -PRN IV dosing for SBP >180     Seizure disorder  -continue vimpat and keppra     Hyperlipidemia  Hypothyroidism  -resume home regimen     Expected Discharge Location and Transportation: [facility]  Expected Discharge   Expected Discharge Date: 3/25/2024; Expected Discharge Time:       DVT prophylaxis:  Medical and mechanical DVT prophylaxis orders are present.    AM-PAC 6 Clicks Score (PT): 7 (03/22/24 2000)    CODE STATUS:   Code Status and Medical Interventions:   Ordered at: 03/02/24 1456     Medical Intervention Limits:    NO intubation (DNI)     Code Status (Patient has no pulse and is not breathing):    No CPR (Do Not Attempt to Resuscitate)     Medical Interventions (Patient has pulse or is breathing):    Limited Support       Raúl De Oliveira, APRN  03/23/24

## 2024-03-23 NOTE — PROGRESS NOTES
Pharmacy Parenteral Nutrition Evaluation    Lea Rivers is a  78 y.o. male receiving TPN.     Indication:     Prolonged Paralytic Ileus     Consulting Physician: Eleanor Yost DO     Total Fluid Rate (if stated): n/a    Labs  Results from last 7 days   Lab Units 03/23/24  1056 03/21/24  0622 03/20/24  0510   SODIUM mmol/L 132* 133* 141   POTASSIUM mmol/L 4.4 4.5 4.4   CHLORIDE mmol/L 101 98 106   CO2 mmol/L 24.0 29.0 29.0   BUN mg/dL 18 17 26*   CREATININE mg/dL 0.41* 0.52* 0.65*   CALCIUM mg/dL 7.8* 7.6* 7.6*   BILIRUBIN mg/dL  --  0.2 0.2   ALK PHOS U/L  --  101 77   ALT (SGPT) U/L  --  <5 5   AST (SGOT) U/L  --  17 14   GLUCOSE mg/dL 145* 152* 161*       Results from last 7 days   Lab Units 03/20/24  0510 03/19/24  1132   PHOSPHORUS mg/dL 2.9  --    PREALBUMIN mg/dL  --  6.5*       Results from last 7 days   Lab Units 03/23/24  0348 03/22/24  0557 03/21/24  0439   WBC 10*3/mm3 13.04* 16.56* 15.40*   HEMOGLOBIN g/dL 8.8* 8.6* 9.1*   HEMATOCRIT % 27.0* 26.9* 27.7*   PLATELETS 10*3/mm3 519* 537* 517*       Triglycerides   Date Value Ref Range Status   03/19/2024 167 (H) 0 - 150 mg/dL Final     Comment:     Falsely depressed results may occur on samples drawn from patients receiving N-Acetylcysteine (NAC) or Metamizole.       estimated creatinine clearance is 163.9 mL/min (A) (by C-G formula based on SCr of 0.41 mg/dL (L)).    Intake & Output (last 3 days)         03/05 0701 03/06 0700 03/06 0701 03/07 0700 03/07 0701 03/08 0700 03/08 0701  03/09 0700    P.O. 1080 240      I.V. (mL/kg)  1026.3 (11.5)      IV Piggyback  400      Total Intake(mL/kg) 1080 (12.1) 1666.3 (18.7)      Urine (mL/kg/hr) 150 (0.1) 400 (0.2)      Stool 0 0      Total Output 150 400      Net +930 +1266.3              Urine Unmeasured Occurrence 4 x 2 x 1 x     Stool Unmeasured Occurrence 5 x 4 x 3 x 1 x            Dietitian Recommendations  Diet Orders (active) (From admission, onward)       Start     Ordered    03/08/24 1800   Adult Cyclic Central 2-in-1 TPN  Cyclic TPN - See Admin Instructions        Note to Pharmacy: Lea ARAUJO Bellona  5H  [id number]  MRN: [id number]  CSN: [id number]    03/08/24 1422 03/08/24 1800  Fat Emul Fish Oil/Plant Based (SMOFLIPID) 20 % emulsion 250 mL  Every 24 Hours Scheduled (TPN)         03/08/24 1422 03/08/24 1355  NPO Diet NPO Type: Strict NPO  Diet Effective Now         03/08/24 1358    03/05/24 1656  DIET MESSAGE Pt would like a grilled cheese sandwich, please. Thanks!  Once        Comments: Pt would like a grilled cheese sandwich, please. Thanks!    03/05/24 1656                    Current TPN Regimen Recommendation:   Dextrose 15% / Amino Acid 7.7% at a goal rate of 65 ml/hr.      20% Lipid Emulsion 250mL every 24 hours.    Na 45 mEq/L  K 50 mEq/L  Ca 5 mEq/L  Mg 8 mEq/L  PO4 15 mmol/L  Cl:Ace - 1:1    Assessment/Plan:  TPN for prolonged paralytic ileus.   Macronutrients per dietary recommendation and electrolyte per pharmacy recommendation are listed above (same as yesterday).  Electrolyte replacement per protocol, will continue with standard electrolytes.   SSI q6h ordered.  Pharmacy will continue to follow and adjust as indicated     Thanks  Maria Victoria Grimaldo, PharmD  Pharmacy Resident  3/23/2024  14:02 EDT

## 2024-03-23 NOTE — PROGRESS NOTES
"Patient Name:  Lea Rivers  YOB: 1945  [id number]    Surgery Progress Note    Date of visit: 3/23/2024    Subjective     Unable to obtain full history due to patient's dementia. More alert.  No abdominal pain. No nausea/vomiting. No fevers.  Having ostomy output.   Tolerating diet but poor appetite.         Objective       /72   Pulse 85   Temp 97.5 °F (36.4 °C) (Oral)   Resp 20   Ht 170.2 cm (67.01\")   Wt 99 kg (218 lb 3.2 oz)   SpO2 94%   BMI 34.17 kg/m²     Intake/Output Summary (Last 24 hours) at 3/23/2024 0908  Last data filed at 3/23/2024 0700  Gross per 24 hour   Intake 1897.8 ml   Output 346 ml   Net 1551.8 ml   OLIVER 1 25 cc  OLIVER 2 20 cc  OLIVER 3 1 cc    CV:  Rhythm regular and rate regular  L:  Clear to auscultation bilaterally  Abd:  Bowel sounds positive, soft, nontender, incision c/d/I, OLIVER drains serous, ostomy viable and functioning  Ext:  No cyanosis, clubbing, edema    Recent labs and imaging that are back at this time have been reviewed.   WBC 13  Hgb 8.8       Assessment & Plan     Patient postoperative day 14 from total abdominal colectomy with end ileostomy, postoperative day 5 abdominal washout. DC inferiormost OLIVER drain. Pain control.  Diet per speech and swallow recommendations. Continue TPN until adequate oral intake. Abx per ID, OR cultures with rare pseudomonas and Enterococcus. OK to continue anticoagulation for UE DVT.               Harley Godfrey MD  3/23/2024  09:08 EDT      "

## 2024-03-23 NOTE — PLAN OF CARE
Problem: Skin Injury Risk Increased  Goal: Skin Health and Integrity  Outcome: Ongoing, Progressing  Intervention: Promote and Optimize Oral Intake  Recent Flowsheet Documentation  Taken 3/23/2024 1623 by Lenny Lehman RN  Oral Nutrition Promotion: safe use of adaptive equipment encouraged  Taken 3/23/2024 1238 by Lenny Lehman RN  Oral Nutrition Promotion: safe use of adaptive equipment encouraged  Taken 3/23/2024 1028 by Lenny Lehman RN  Oral Nutrition Promotion: safe use of adaptive equipment encouraged  Taken 3/23/2024 0818 by Lenny Lehman RN  Oral Nutrition Promotion: safe use of adaptive equipment encouraged  Intervention: Optimize Skin Protection  Recent Flowsheet Documentation  Taken 3/23/2024 1815 by Lenny Lehman RN  Pressure Reduction Techniques:   frequent weight shift encouraged   heels elevated off bed   weight shift assistance provided  Head of Bed (HOB) Positioning: HOB at 20-30 degrees  Pressure Reduction Devices:   specialty bed utilized   positioning supports utilized   heel offloading device utilized  Skin Protection:   adhesive use limited   incontinence pads utilized   tubing/devices free from skin contact   skin sealant/moisture barrier applied  Taken 3/23/2024 1623 by Lenny Lehman RN  Pressure Reduction Devices:   specialty bed utilized   positioning supports utilized   foam padding utilized  Taken 3/23/2024 1409 by Lenny Lehman RN  Head of Bed (HOB) Positioning: HOB at 45 degrees  Taken 3/23/2024 1238 by Lenny Lehman RN  Pressure Reduction Techniques:   frequent weight shift encouraged   heels elevated off bed   weight shift assistance provided  Head of Bed (HOB) Positioning: HOB at 20-30 degrees  Pressure Reduction Devices:   specialty bed utilized   positioning supports utilized   heel offloading device utilized  Taken 3/23/2024 1028 by Lenny Lehman RN  Pressure Reduction Techniques:   frequent weight shift encouraged   heels elevated off bed   weight shift  assistance provided  Head of Bed (HOB) Positioning: HOB at 30-45 degrees  Pressure Reduction Devices:   specialty bed utilized   positioning supports utilized   heel offloading device utilized  Skin Protection:   adhesive use limited   incontinence pads utilized   skin sealant/moisture barrier applied   tubing/devices free from skin contact  Taken 3/23/2024 0818 by Lenny Lehman RN  Pressure Reduction Techniques:   frequent weight shift encouraged   heels elevated off bed   weight shift assistance provided  Head of Bed (HOB) Positioning: HOB at 30 degrees  Pressure Reduction Devices:   specialty bed utilized   positioning supports utilized   heel offloading device utilized  Skin Protection:   adhesive use limited   incontinence pads utilized   tubing/devices free from skin contact   skin sealant/moisture barrier applied     Problem: Adult Inpatient Plan of Care  Goal: Plan of Care Review  Outcome: Ongoing, Progressing     Problem: Skin Injury Risk Increased  Goal: Skin Health and Integrity  Intervention: Optimize Skin Protection  Recent Flowsheet Documentation  Taken 3/23/2024 1815 by Lenny Lehman RN  Pressure Reduction Techniques:   frequent weight shift encouraged   heels elevated off bed   weight shift assistance provided  Head of Bed (HOB) Positioning: HOB at 20-30 degrees  Pressure Reduction Devices:   specialty bed utilized   positioning supports utilized   heel offloading device utilized  Skin Protection:   adhesive use limited   incontinence pads utilized   tubing/devices free from skin contact   skin sealant/moisture barrier applied  Taken 3/23/2024 1623 by Lenny Lehman RN  Pressure Reduction Devices:   specialty bed utilized   positioning supports utilized   foam padding utilized  Taken 3/23/2024 1409 by Lenny Lehman RN  Head of Bed (HOB) Positioning: HOB at 45 degrees  Taken 3/23/2024 1238 by Lenny Lehman RN  Pressure Reduction Techniques:   frequent weight shift encouraged   heels elevated  off bed   weight shift assistance provided  Head of Bed (HOB) Positioning: HOB at 20-30 degrees  Pressure Reduction Devices:   specialty bed utilized   positioning supports utilized   heel offloading device utilized  Taken 3/23/2024 1028 by Lenny Lehman RN  Pressure Reduction Techniques:   frequent weight shift encouraged   heels elevated off bed   weight shift assistance provided  Head of Bed (HOB) Positioning: HOB at 30-45 degrees  Pressure Reduction Devices:   specialty bed utilized   positioning supports utilized   heel offloading device utilized  Skin Protection:   adhesive use limited   incontinence pads utilized   skin sealant/moisture barrier applied   tubing/devices free from skin contact  Taken 3/23/2024 0818 by Lenny Lehman RN  Pressure Reduction Techniques:   frequent weight shift encouraged   heels elevated off bed   weight shift assistance provided  Head of Bed (HOB) Positioning: HOB at 30 degrees  Pressure Reduction Devices:   specialty bed utilized   positioning supports utilized   heel offloading device utilized  Skin Protection:   adhesive use limited   incontinence pads utilized   tubing/devices free from skin contact   skin sealant/moisture barrier applied     Problem: Fall Injury Risk  Goal: Absence of Fall and Fall-Related Injury  Outcome: Ongoing, Progressing  Intervention: Identify and Manage Contributors  Recent Flowsheet Documentation  Taken 3/23/2024 1623 by Lenny Lehman RN  Medication Review/Management: medications reviewed  Taken 3/23/2024 1238 by Lenny Lehman RN  Medication Review/Management: medications reviewed  Taken 3/23/2024 1028 by Lenny Lehman RN  Medication Review/Management: medications reviewed  Taken 3/23/2024 0818 by Lenny Lehman RN  Medication Review/Management: medications reviewed  Intervention: Promote Injury-Free Environment  Recent Flowsheet Documentation  Taken 3/23/2024 1815 by Lenny Lehman RN  Safety Promotion/Fall Prevention:   activity  supervised   assistive device/personal items within reach   clutter free environment maintained   fall prevention program maintained   nonskid shoes/slippers when out of bed   room organization consistent   safety round/check completed   toileting scheduled  Taken 3/23/2024 1623 by Lenny Lehman RN  Safety Promotion/Fall Prevention:   activity supervised   assistive device/personal items within reach   clutter free environment maintained   fall prevention program maintained   nonskid shoes/slippers when out of bed   room organization consistent   safety round/check completed   toileting scheduled  Taken 3/23/2024 1409 by Lenny Lehman, RN  Safety Promotion/Fall Prevention:   activity supervised   assistive device/personal items within reach   clutter free environment maintained   fall prevention program maintained   nonskid shoes/slippers when out of bed   room organization consistent   safety round/check completed   toileting scheduled  Taken 3/23/2024 1238 by Lenny Lehman, RN  Safety Promotion/Fall Prevention:   activity supervised   assistive device/personal items within reach   clutter free environment maintained   fall prevention program maintained   room organization consistent   safety round/check completed   toileting scheduled  Taken 3/23/2024 1028 by Lenny Lehman, RN  Safety Promotion/Fall Prevention:   activity supervised   assistive device/personal items within reach   clutter free environment maintained   fall prevention program maintained   nonskid shoes/slippers when out of bed   room organization consistent   safety round/check completed   toileting scheduled  Taken 3/23/2024 0818 by Lenny Lehman, RN  Safety Promotion/Fall Prevention:   activity supervised   assistive device/personal items within reach   clutter free environment maintained   safety round/check completed   toileting scheduled   muscle strengthening facilitated   fall prevention program maintained

## 2024-03-23 NOTE — PROGRESS NOTES
Lea Barrett [place]  1945  [id number]        Evaluating Physician: Louis Chow MD    Chief Complaint: abdpain    Reason for Consultation: IA infection    History of present illness:     Patient is a 78 y.o.  Yr old male with history of seizure disorder, colon cancer/prostate cancer with prior radiation, diabetes and dementia per admission notes with admission March 2 after frequent falls noted to have multiple rib fractures and left side clavicular fracture in the emergency room.noted to have significant colon distention with potential for pneumatosis on CT scan and seen by gastroenterology/surgery.taken for surgery on March 9 with diagnosis of toxic dilation of the colon for total abdominal colectomy and end ileostomy; medicine notes indicate he received ceftriaxone/Flagyl and Diflucan.  Leukocytosis persisted.repeat CT scan on March 18 with postsurgical changes, moderate loculated ascitic fluid in the anterior abdomen as described by radiology.taken back to surgery on March 18 for exploratory laparotomy with washout.  Culture subsequently with gram-negative manuel at least.  Empiric antibiotics adjusted to Zosyn/Mycamine    3/23/24 sleepy;   on TPN;  Per nursing, + abdominal pain which is less intense overall, dull at present, sharp at times, worse with palpation, better with pain meds and he will not attach a numerical severity.    no headache photophobia or neck stiffness.  No dyspnea or cough.  He has a sling on the left arm with left arm PICC line.  No rash.  Nursing reports no other new focal pain or distress.       Past Medical History:   Diagnosis Date    Anemia     Colon cancer 1990    Status post partial colectomy in 1990. No chemotherapy or radiation therapy.    Coronary artery disease     Nonobstructive coronary artery disease.    Diabetes     Dyslipidemia     GERD (gastroesophageal reflux disease)     Hx of.    Hyperlipidemia     Hypertension     Hyponatremia     Hypothyroidism     Left  shoulder pain     Low sodium levels 2022    recent issue; saw nephrologist who ruled out kidney issues; took Sodium Chloride po to bring levels up    Memory deficit     FROM ANESTHESIA    Osteoarthritis     Prostate cancer 07/23/2022    Seizure disorder     silent seziure-diagnosed years ago    Skin cancer        Past Surgical History:   Procedure Laterality Date    APPENDECTOMY      CARDIAC CATHETERIZATION  2012    30 to 40% LAD    CARPAL TUNNEL RELEASE Bilateral     CHOLECYSTECTOMY      COLECTOMY PARTIAL / TOTAL  1990    Partial colectomy    COLON RESECTION N/A 3/9/2024    Procedure: TOTAL ABDOMINAL COLECTOMY, END ILEOSTOMY;  Surgeon: Harley Godfrey MD;  Location:  [facility];  Service: General;  Laterality: N/A;    COLONOSCOPY      CYBERKNIFE  02/09/2023    Prostate/SV    CYSTOSCOPY TRANSURETHRAL RESECTION OF PROSTATE N/A 09/21/2018    Procedure: CYSTOSCOPY TRANSURETHRAL RESECTION OF PROSTATE GREENLIGHT;  Surgeon: Naseem Clayton MD;  Location:  [facility];  Service: Urology    EXPLORATORY LAPAROTOMY N/A 3/18/2024    Procedure: LAPAROTOMY EXPLORATORY, ABDOMINAL WASH OUT;  Surgeon: Harley Godfrey MD;  Location:  [facility];  Service: General;  Laterality: N/A;    OTHER SURGICAL HISTORY      Contraction surgery on bilateral hands and wrists.    PROSTATE BIOPSY N/A 11/11/2022    Procedure: TRANSRECTAL ULTRASOUND GUIDED BIOPSY OF PROSTATE;  Surgeon: Naseem Noland MD;  Location:  [facility];  Service: Urology;  Laterality: N/A;    SINUS SURGERY      SKIN BIOPSY      TEETH EXTRACTION      TONSILLECTOMY      TOTAL KNEE ARTHROPLASTY Right 04/07/2022    Procedure: TOTAL KNEE ARTHROPLASTY WITH ORTHO ROBOT RIGHT;  Surgeon: Louis Malcolm MD;  [facility];  Service: Robotics - Ortho;  Laterality: Right;    TRANSRECTAL INCISION PROSTATE WITH GOLD SEED Bilateral 01/06/2023    Procedure: TRANSRECTAL ULTRASOUND GUIDED PROSTATE FIDUCIAL MARKER PLACEMENT;  Surgeon: Naseem Noland MD;  Location:   "[facility];  Service: Urology;  Laterality: Bilateral;    TURP / TRANSURETHRAL INCISION / DRAINAGE PROSTATE      VASECTOMY         Pediatric History   Patient Parents    Not on file     Other Topics Concern    Not on file   Social History Narrative    Caffeine: None       family history includes Arthritis in an other family member; Cancer in his mother and another family member; Esophageal cancer in his brother; Hypertension in his father; No Known Problems in his paternal grandfather, paternal grandmother, and sister; Stroke in his father, maternal grandfather, sister, and another family member.    Allergies   Allergen Reactions    Chlorhexidine Rash    Sulfa Antibiotics Rash     Childhood reaction             Review of Systems    3/23/24     Constitutional-- No Fever, chills or sweats.  Appetite diminished with fatigue  Heent-- No new vision, hearing or throat complaints.  No epistaxis or oral sores.  Denies odynophagia or dysphagia.  No flashers, floaters or eye pain. No odynophagia or dysphagia. No headache, photophobia or neck stiffness.  CV-- No chest pain, palpitation or syncope  Resp-- No SOB/cough/Hemoptysis  GI- see above.  No hematochezia, melena, or hematemesis. Denies jaundice or chronic liver disease.  -- No dysuria, hematuria, or flank pain.  Denies hesitancy, urgency or flank pain.  Lymph- no swollen lymph nodes in neck/axilla or groin.   Heme- No active bruising or bleeding  MS-- no swelling or pain in the bones or joints of arms/legs.  No new back pain.  Neuro-- generally debilitated, unable to lift his legs off the bed according to nursing.  Wiggles his feet/toes    Full 12 point review of systems reviewed and negative otherwise for acute complaints, except for above    Physical Exam:   Vital Signs   /72   Pulse 85   Temp 97.5 °F (36.4 °C) (Oral)   Resp 20   Ht 170.2 cm (67.01\")   Wt 99 kg (218 lb 3.2 oz)   SpO2 94%   BMI 34.17 kg/m²     GENERAL: sleepy answers questions and follows " basic commands as above, in no acute distress. Appears chronically debilitated.     HEENT: Normocephalic, atraumatic.   No conjunctival injection. No icterus. Oropharynx clear without evidence of thrush or exudate. No evidence of periodontal disease.    NECK: Supple without nuchal rigidity. No mass.  LYMPH: No cervical, axillary or inguinal lymphadenopathy.  HEART: RRR; No murmur, rubs, gallops.   LUNGS: diminished at bases with some crackles at the bases but otherwiseClear to auscultation bilaterally without wheezing/ rhonchi.  Nonlabored. No dullness.  ABDOMEN: Soft,  tender, nondistended. Positive bowel sounds but diminished. No rebound or guarding. NO mass or HSM.  EXT:  No cyanosis, clubbing;  No cord.has edema  MSK: FROM without joint effusions noted arms/legs.    SKIN: Warm and dry without cutaneous eruptions on Inspection/palpation.    NEURO: follows basic commands    No peripheral stigmata/phenomena of endocarditis    IV without obvious redness or drainage at left arm    Laboratory Data    Results from last 7 days   Lab Units 03/23/24  0348 03/22/24  0557 03/21/24  0439   WBC 10*3/mm3 13.04* 16.56* 15.40*   HEMOGLOBIN g/dL 8.8* 8.6* 9.1*   HEMATOCRIT % 27.0* 26.9* 27.7*   PLATELETS 10*3/mm3 519* 537* 517*     Results from last 7 days   Lab Units 03/21/24  0622   SODIUM mmol/L 133*   POTASSIUM mmol/L 4.5   CHLORIDE mmol/L 98   CO2 mmol/L 29.0   BUN mg/dL 17   CREATININE mg/dL 0.52*   GLUCOSE mg/dL 152*   CALCIUM mg/dL 7.6*     Results from last 7 days   Lab Units 03/21/24  0622   ALK PHOS U/L 101   BILIRUBIN mg/dL 0.2   ALT (SGPT) U/L <5   AST (SGOT) U/L 17         Results from last 7 days   Lab Units 03/19/24  0936   CRP mg/dL 13.84*       Estimated Creatinine Clearance: 129.2 mL/min (A) (by C-G formula based on SCr of 0.52 mg/dL (L)).      Microbiology:      Radiology:  Imaging Results (Last 72 Hours)       Procedure Component Value Units Date/Time    FL Video Swallow With Speech Single Contrast  [[id number]] Collected: 03/21/24 1325     Updated: 03/21/24 1514    Narrative:      FL VIDEO SWALLOW W SPEECH SINGLE-CONTRAST    Date of Exam: 3/21/2024 1:01 PM EDT    Indication: dysphagia      Comparison: None available.    Technique:   The speech pathologist administered food and/or liquid mixed with barium to the patient with cine/video imaging.  Imaging assistance was provided to the speech pathologist and an image was saved.    Fluoroscopic Time: 1 minute and 30 seconds    Number of Images: 12 associated fluoroscopic loops were saved    Findings:  Aspiration was seen during fluoroscopic guided modified barium swallowing series. Please see speech therapy report for full details and recommendations.      Impression:      Impression:  Fluoroscopy provided for a modified barium swallow. Aspiration was seen during swallowing evaluation. Please see speech therapy report for full details and recommendations.      Report dictated by: Lois Gao PA-c      I have personally reviewed this case and agree with the findings above:    Electronically Signed: Ayo Washington MD    3/21/2024 3:11 PM EDT    Workstation ID: [id number]              Impression:     --acute abdominal pain with total abdominal colectomy/ileostomy as above related to toxic dilation of colon As per operative note, postop with persistent leukocytosis and fluid collections on CT scan, taken for exploratory laparotomy and fluid culture positive for gram-negative rods so far, consistent with abscess.  Empiric antimicrobials adjusted to Zosyn/Mycamine/daptomycin    --Left lower lobe pneumonia per medicine team notes, abnormal chest x-ray March 12.no respiratory distress or productive sputum.  Empiric Zosyn ongoing.  If worsening respiratory status you could consider further imaging etc.nasal cannula stable per nursing    --history dementia per admission notes a little specifics regarding baseline not clear and generally poor insight overall.  Description of  toxic/metabolic encephalopathy per medicine team notes during this admission.  Prior history seizure and generally debilitated.    --History of multiple falls with left clavicular and multiple rib fractures per admission notes.  Orthopedics has seen.    --Right upper extremity DVT, described as brachial per medicine team notes.  Anticoagulation at discretion of medicine team    --History prostate and colon cancer previously per admission notes.        PLAN:       --IV Zosyn/Mycamine/ daptomycin      **culture with pseudomonas aeruginosa and E Faecium      --Check/reviewed labs cultures and scans    --Partial history per nursing staff    --Discussed with microbiology     --Highly complex of issues with high risk for further serious morbidity and other serious sequela    Copied text in this note has been reviewed and is accurate as of 03/23/24.        Louis Chow MD  3/23/2024

## 2024-03-23 NOTE — PLAN OF CARE
Goal Outcome Evaluation:  Plan of Care Reviewed With: patient        Progress: no change  Outcome Evaluation: Pt progressed from dep x 2 to max x 2 bed to chair stand pivot given UE support, max A to don gown, min A to wash face, mod A to comb hair.  Pt is progressing, but continues below baseline with self care/mobility d/t cognition, weakness, decr balance and activity tolerance.  Recommend SNF upon d/c.

## 2024-03-23 NOTE — PROGRESS NOTES
Pharmacy Consult-Vancomycin Dosing  Lea Rivers is a  78 y.o. male receiving vancomycin therapy.     Indication: Intra-Abdominal Infection, enterococcus infection   Consulting Provider: Dr. Ruiz  ID Consult: Yes    Goal AUC: 400 - 600 mg/L*hr    Current Antimicrobial Therapy  Anti-Infectives (From admission, onward)      Ordered     Dose/Rate Route Frequency Start Stop    03/22/24 0855  vancomycin IVPB 1500 mg in 0.9% NaCl (Premix) 500 mL        Ordering Provider: Yessy Espinoza RPH    1,500 mg  333.3 mL/hr over 90 Minutes Intravenous Every 12 Hours 03/22/24 1000 04/01/24 0959    03/22/24 0836  Pharmacy to dose vancomycin        Ordering Provider: Louis Chow MD     Does not apply Continuous PRN 03/22/24 0835 04/01/24 0834    03/21/24 0816  DAPTOmycin (CUBICIN) 450 mg in sodium chloride 0.9 % 50 mL IVPB        Ordering Provider: Louis Chow MD    6 mg/kg × 78.7 kg (Adjusted)  100 mL/hr over 30 Minutes Intravenous Once 03/21/24 0915 03/21/24 1142    03/20/24 1434  micafungin sodium (MYCAMINE) 100 mg in sodium chloride 0.9 % 100 mL MBP        Ordering Provider: Louis Chow MD    100 mg  over 60 Minutes Intravenous Every 24 Hours 03/20/24 1600 03/27/24 1559    03/18/24 1550  piperacillin-tazobactam (ZOSYN) 3.375 g IVPB in 100 mL NS MBP (CD)        Ordering Provider: Harley Godfrey MD    3.375 g  over 4 Hours Intravenous Every 8 Hours 03/18/24 2200 03/25/24 2359    03/18/24 1245  piperacillin-tazobactam (ZOSYN) 3.375 g IVPB in 100 mL NS MBP (CD)        Ordering Provider: Harley Godfrey MD    3.375 g  over 0.5 Hours Intravenous Once 03/18/24 1247 03/18/24 1322    03/12/24 1126  metroNIDAZOLE (FLAGYL) IVPB 500 mg        Ordering Provider: Eleanor Yost DO    500 mg  200 mL/hr over 30 Minutes Intravenous Every 8 Hours 03/12/24 1600 03/17/24 0830    03/12/24 1126  cefTRIAXone (ROCEPHIN) 2,000 mg in sodium chloride 0.9 % 100 mL MBP        Ordering Provider: Priyank  "Eleanor ZENDEJAS DO    2,000 mg  200 mL/hr over 30 Minutes Intravenous Every 24 Hours 03/12/24 1400 03/16/24 1454    03/09/24 1340  cefOXItin (MEFOXIN) 2,000 mg in sodium chloride 0.9 % 100 mL MBP        Ordering Provider: Harley Godfrey MD    2,000 mg  200 mL/hr over 30 Minutes Intravenous Every 2 Hours 03/09/24 1342 03/09/24 1516    03/02/24 1045  piperacillin-tazobactam (ZOSYN) 3.375 g IVPB in 100 mL NS MBP (CD)        Ordering Provider: Jef Chavez MD    3.375 g  over 30 Minutes Intravenous Once 03/02/24 1101 03/02/24 1228            Allergies  Allergies as of 03/02/2024 - Reviewed 03/02/2024   Allergen Reaction Noted    Chlorhexidine Rash 09/22/2022    Sulfa antibiotics Rash 09/09/2016       Labs    Results from last 7 days   Lab Units 03/21/24  0622 03/20/24  0510   BUN mg/dL 17 26*   CREATININE mg/dL 0.52* 0.65*       Results from last 7 days   Lab Units 03/23/24  0348 03/22/24  0557 03/21/24  0439   WBC 10*3/mm3 13.04* 16.56* 15.40*       Evaluation of Dosing    Ht - 170.2 cm (67.01\")  Wt - 99 kg (218 lb 3.2 oz)    Estimated Creatinine Clearance: 129.2 mL/min (A) (by C-G formula based on SCr of 0.52 mg/dL (L)).    I/O last 3 completed shifts:  In: 1897.8   Out: 791 [Drains:91; Stool:700]    Microbiology and Radiology  Microbiology Results (last 10 days)       Procedure Component Value - Date/Time    Anaerobic Culture - Peritoneal Fluid, Perineum [[id number]]  (Normal) Collected: 03/18/24 1349    Lab Status: Preliminary result Specimen: Peritoneal Fluid from Perineum Updated: 03/21/24 0741     Anaerobic Culture No anaerobes isolated at 3 days    Body Fluid Culture - Peritoneal Fluid, Perineum [[id number]]  (Abnormal)  (Susceptibility) Collected: 03/18/24 1349    Lab Status: Edited Result - FINAL Specimen: Peritoneal Fluid from Perineum Updated: 03/23/24 0747     Body Fluid Culture Rare Pseudomonas aeruginosa      Rare Enterococcus faecium     Gram Stain Occasional WBCs seen      No organisms seen    " Narrative:      Dapto requested 3/22    Susceptibility        Pseudomonas aeruginosa      YVAN      Cefepime <=1 ug/ml Susceptible      Ceftazidime 4 ug/ml Susceptible      Ciprofloxacin <=0.25 ug/ml Susceptible      Levofloxacin 0.5 ug/ml Susceptible      Piperacillin + Tazobactam  Susceptible                       Susceptibility        Enterococcus faecium      YVAN Not Specified      Ampicillin >=32 ug/ml Resistant        Daptomycin   4 ug/ml Susceptible      Gentamicin High Level Synergy SYN-S ug/ml Susceptible        Vancomycin <=0.5 ug/ml Susceptible                           Susceptibility Comments       Pseudomonas aeruginosa    With the exception of urinary-sourced infections, aminoglycosides should not be used as monotherapy.               AFB Culture - Peritoneal Fluid, Perineum [[id number]] Collected: 03/18/24 1349    Lab Status: Preliminary result Specimen: Peritoneal Fluid from Perineum Updated: 03/19/24 1339     AFB Stain No acid fast bacilli seen on concentrated smear            Reported Vancomycin Levels    Results from last 7 days   Lab Units 03/23/24 0348   VANCOMYCIN RM mcg/mL 13.10                      [employer] AUC Calculation:    Current AUC:   377 mg/L*hr    Predicted Steady State AUC on Current Dose: 511 mg/L*hr  _________________________________    Predicted Steady State AUC on New Dose:   --mg/L*hr    Assessment/Plan:  Pharmacy to dose vancomycin for enterococcus intra-abdominal infection. Goal -600 mg/L*hr.  Patient not given vancomycin loading dose. Current maintenance dose of vancomycin 1500mg (~15.4mg/kg) IV Q12hr.  Vancomycin random returned therapeutic at 13.1 mcg/mL on 3/23 @ 0348 (~6 hr level), prior to 3rd dose. Given therapeutic level and almost therapeutic AUC, will continue with current maintenance regimen.   If therapy continued, will assess renal function and clearance by obtaining vancomycin random on 3/25 AM prior to seventh dose.  Pharmacy will continue to monitor  renal function, cultures and sensitivities, and clinical status to adjust regimen as necessary.    Maria Victoria Grimaldo, [facility]  3/23/2024   08:02 EDT

## 2024-03-23 NOTE — PROGRESS NOTES
CPN Review Note    Patient Name: Lea Rivers  Date of Encounter: 24 10:58 EDT  MRN: [id number]  Admission date: 3/2/2024    Reason for visit: CPN review . RD to continue to follow per protocol.     Information Obtained:     Pt resting in bed, lethargic, family at bedside    Family reports pt has poor appetite, did eat some of the magic cups, likes fruit, he sleeps most of the time    Per RN: no complaints of abdominal pain    EMR reviewed:  yes  Medication reviewed:  yes   Labs reviewed: yes    24hr I&Os:  Ileostomy - 300mL out    Current diet: Diet: Gastrointestinal, Cardiac; Healthy Heart (2-3 Na+); Fiber-Restricted; No Mixed Consistencies, Feeding Assistance - Nursing; Texture: Mechanical Ground (NDD 2); Fluid Consistency: Honey Thick  Adult Central 2-in-1 TPN  Magic Cup 1x/day  Po intake: no data    Estimated Needs:  Calories:  ~ 1800 Kcal/day  Protein: ~120 g PRO/day    PN Route: PICC  PN:  15% dextrose, 7.7% AA @ goal rate of 65mL/hr       GIR:  1.7mg/kg/min   Lipid:  Provide 250mL 20% daily    PN@ Goal over 24hr to Supply:  Volume 1560 mL/day     Energy 1776 Kcal/day 99 % Est Need   Protein 120 g/day 100 % Est Need     ---------------------------------------------------------------------------  Formula/Rate verified at bedside: Yes  Infusing Rate at time of visit: 65mL/hr    Average Delivery from Chartin Day:   PN Volume 1443 mL/day 93% goal     Lipid delivered?   455?      Energy  Kcal/day  % Est Need   Protein  g/day  % Est Need       Intervention:  Care plan reviewed  Menu adjusted  Supplement adjusted    Increase Magic Cups to 3x/day    Maintain current PN    Follow up:   Per protocol      Thea Rivera RD  10:58 EDT  Time: 25min

## 2024-03-23 NOTE — THERAPY TREATMENT NOTE
Patient Name: Lea Rivers  : 1945    MRN: [id number]                              Today's Date: 3/23/2024       Admit Date: 3/2/2024    Visit Dx:     ICD-10-CM ICD-9-CM   1. Fall, initial encounter  W19.XXXA E888.9   2. Closed fracture of multiple ribs of left side, initial encounter  S22.42XA 807.09   3. Closed displaced fracture of acromial end of left clavicle, initial encounter  S42.032A 810.03   4. Falls frequently  R29.6 V15.88   5. Acute UTI (urinary tract infection)  N39.0 599.0   6. Colon distention  K63.89 569.89   7. Paralytic ileus of small intestine and colon  K56.0 560.1   8. Oropharyngeal dysphagia  R13.12 787.22   9. Intra-abdominal fluid  R18.8 789.59     Patient Active Problem List   Diagnosis    Coronary artery disease    Dyslipidemia    Hypothyroidism    GERD (gastroesophageal reflux disease)    Seizure disorder    BPH (benign prostatic hypertrophy)    Hypertension    Primary osteoarthritis of both knees    Syncope and collapse    Precordial pain    Diabetes    Status post total right knee replacement    Postoperative urinary retention    Confusion, postoperative    Acute blood loss anemia, asymptomatic, no transfusion required    Elevated prostate specific antigen (PSA)    Prostate cancer    Anemia    Body mass index (BMI) of 32.0-32.9 in adult    Localization-related focal epilepsy with simple partial seizures    Need for vaccination against Streptococcus pneumoniae    Upper extremity tendon tear, right, initial encounter    Vitamin D deficiency    PSA elevation    Polyp of colon    Overactive bladder    Gross hematuria    History of colon polyps    History of colon cancer    B12 deficiency    Falls    Pneumatosis intestinalis     Past Medical History:   Diagnosis Date    Anemia     Colon cancer     Status post partial colectomy in . No chemotherapy or radiation therapy.    Coronary artery disease     Nonobstructive coronary artery disease.    Diabetes     Dyslipidemia      GERD (gastroesophageal reflux disease)     Hx of.    Hyperlipidemia     Hypertension     Hyponatremia     Hypothyroidism     Left shoulder pain     Low sodium levels 2022    recent issue; saw nephrologist who ruled out kidney issues; took Sodium Chloride po to bring levels up    Memory deficit     FROM ANESTHESIA    Osteoarthritis     Prostate cancer 07/23/2022    Seizure disorder     silent seziure-diagnosed years ago    Skin cancer      Past Surgical History:   Procedure Laterality Date    APPENDECTOMY      CARDIAC CATHETERIZATION  2012    30 to 40% LAD    CARPAL TUNNEL RELEASE Bilateral     CHOLECYSTECTOMY      COLECTOMY PARTIAL / TOTAL  1990    Partial colectomy    COLON RESECTION N/A 3/9/2024    Procedure: TOTAL ABDOMINAL COLECTOMY, END ILEOSTOMY;  Surgeon: Harley Godfrey MD;  Location:  [facility];  Service: General;  Laterality: N/A;    COLONOSCOPY      CYBERKNIFE  02/09/2023    Prostate/SV    CYSTOSCOPY TRANSURETHRAL RESECTION OF PROSTATE N/A 09/21/2018    Procedure: CYSTOSCOPY TRANSURETHRAL RESECTION OF PROSTATE GREENLIGHT;  Surgeon: Naseem Clayton MD;  Location:  [facility];  Service: Urology    EXPLORATORY LAPAROTOMY N/A 3/18/2024    Procedure: LAPAROTOMY EXPLORATORY, ABDOMINAL WASH OUT;  Surgeon: Harley Godfrey MD;  Location:  [facility];  Service: General;  Laterality: N/A;    OTHER SURGICAL HISTORY      Contraction surgery on bilateral hands and wrists.    PROSTATE BIOPSY N/A 11/11/2022    Procedure: TRANSRECTAL ULTRASOUND GUIDED BIOPSY OF PROSTATE;  Surgeon: Naseem Noland MD;  Location:  [facility];  Service: Urology;  Laterality: N/A;    SINUS SURGERY      SKIN BIOPSY      TEETH EXTRACTION      TONSILLECTOMY      TOTAL KNEE ARTHROPLASTY Right 04/07/2022    Procedure: TOTAL KNEE ARTHROPLASTY WITH ORTHO ROBOT RIGHT;  Surgeon: Louis Malcolm MD;  Location:  [facility];  Service: Robotics - Ortho;  Laterality: Right;    TRANSRECTAL INCISION PROSTATE WITH GOLD SEED Bilateral  01/06/2023    Procedure: TRANSRECTAL ULTRASOUND GUIDED PROSTATE FIDUCIAL MARKER PLACEMENT;  Surgeon: Naseem Noland MD;  Location: [facility];  Service: Urology;  Laterality: Bilateral;    TURP / TRANSURETHRAL INCISION / DRAINAGE PROSTATE      VASECTOMY        General Information       Row Name 03/23/24 1417          OT Time and Intention    Document Type therapy note (daily note)  -     Mode of Treatment occupational therapy  -       Row Name 03/23/24 1417          General Information    Patient Profile Reviewed yes  -     Existing Precautions/Restrictions fall;left;shoulder;non-weight bearing  Clavicle Fx; LUE NWB; LUE Sling; L Rib Fxs; Ostomy; OLIVER Drain x 2; Abd Incision; Abd Binder; Brief with OOB activity, [place]  -     Barriers to Rehab medically complex;previous functional deficit;cognitive status  -       Row Name 03/23/24 1417          Cognition    Orientation Status (Cognition) oriented to;person;time;disoriented to;place  -       Row Name 03/23/24 1417          Safety Issues, Functional Mobility    Impairments Affecting Function (Mobility) balance;cognition;endurance/activity tolerance;pain;postural/trunk control;strength;range of motion (ROM);sensation/sensory awareness  -               User Key  (r) = Recorded By, (t) = Taken By, (c) = Cosigned By      Initials Name Provider Type     Pauly Davey OT Occupational Therapist                     Mobility/ADL's       Row Name 03/23/24 1418          Bed Mobility    Bed Mobility rolling left;rolling right;supine-sit  -AC     Rolling Left [place] (Bed Mobility) moderate assist (50% patient effort);1 person assist;verbal cues  -AC     Rolling Right [place] (Bed Mobility) maximum assist (25% patient effort);2 person assist;verbal cues  -AC     Supine-Sit [place] (Bed Mobility) maximum assist (25% patient effort);2 person assist;verbal cues  -AC     Bed Mobility, Safety Issues cognitive deficits limit understanding;decreased use  of arms for pushing/pulling;decreased use of legs for bridging/pushing;impaired trunk control for bed mobility  -     Assistive Device (Bed Mobility) bed rails;draw sheet;head of bed elevated  -       Row Name 03/23/24 1418          Transfers    Transfers sit-stand transfer;bed-chair transfer  -       Row Name 03/23/24 [facility]          Bed-Chair Transfer    Bed-Chair [place] (Transfers) maximum assist (25% patient effort);2 person assist;verbal cues  -     Assistive Device (Bed-Chair Transfers) --  RUE support and gait belt  -       Row Name 03/23/24 [facility]          Sit-Stand Transfer    Sit-Stand [place] (Transfers) moderate assist (50% patient effort);2 person assist  gait belt and R UE support  -     Comment, (Sit-Stand Transfer) VCs for upright posture  -[facility] Name 03/23/24 [facility]          Activities of Daily Living    BADL Assessment/Intervention lower body dressing;grooming  -AC       Row Name 03/23/24 [facility]          Mobility    Extremity Weight-bearing Status left upper extremity   -     Left Upper Extremity (Weight-bearing Status) non weight-bearing (NWB)   -[facility] Name 03/23/24 [facility]          Lower Body Dressing Assessment/Training    [place] Level (Lower Body Dressing) don;doff;socks;dependent (less than 25% patient effort);verbal cues  -     Position (Lower Body Dressing) supine;supported sitting  -[facility] Name 03/23/24 [facility]          Grooming Assessment/Training    [place] Level (Grooming) wash face, hands;verbal cues;minimum assist (75% patient effort);hair care, combing/brushing;moderate assist (50% patient effort)  -     Position (Grooming) supported sitting  -AC       Row Name 03/23/24 [facility]          Upper Body Dressing Assessment/Training    [place] Level (Upper Body Dressing) don;doff;pajama/robe;maximum assist (25% patient effort)  -AC     Position (Upper Body Dressing) sitting up in bed  -               User Key  (r) = Recorded By, (t) = Taken  By, (c) = Cosigned By      Initials Name Provider Type    Pauly Mayes, OT Occupational Therapist                   Obj/Interventions       Row Name 03/23/24 1421          Elbow/Forearm (Therapeutic Exercise)    Elbow/Forearm (Therapeutic Exercise) AROM (active range of motion)  -     Elbow/Forearm AROM (Therapeutic Exercise) left;flexion;extension;10 repetitions  -     Elbow/Forearm PROM (Therapeutic Exercise) left;supination;pronation;10 repetitions  -       Row Name 03/23/24 1421          Hand (Therapeutic Exercise)    Hand PROM (Therapeutic Exercise) left;finger flexion;finger extension;10 repetitions  -       Row Name 03/23/24 1421          Balance    Balance Assessment sitting static balance;sitting dynamic balance  -     Static Sitting Balance moderate assist;verbal cues  -     Dynamic Sitting Balance verbal cues;moderate assist  -     Position, Sitting Balance unsupported;sitting edge of bed  -               User Key  (r) = Recorded By, (t) = Taken By, (c) = Cosigned By      Initials Name Provider Type    Pauly Mayes, OT Occupational Therapist                   Goals/Plan    No documentation.                  Clinical Impression       Row Name 03/23/24 1423          Pain Assessment    Pain Intervention(s) Repositioned;Ambulation/increased activity  -     Additional Documentation Pain Scale: FACES Pre/Post-Treatment (Group)  -[facility] Name 03/23/24 1423          Pain Scale: FACES Pre/Post-Treatment    Pain: FACES Scale, Pretreatment 2-->hurts little bit  -     Posttreatment Pain Rating 2-->hurts little bit  -     Pain Location - Side/Orientation Left  -     Pain Location - back;flank  -       Row Name 03/23/24 1423          Plan of Care Review    Plan of Care Reviewed With patient  -     Outcome Evaluation Pt progressed from dep x 2 to max x 2 bed to chair stand pivot given UE support, max A to don gown, min A to wash face, mod A to comb hair.  Pt is progressing, but  continues below baseline with self care/mobility d/t cognition, weakness, decr balance and activity tolerance.  Recommend SNF upon d/c.  -AC       Row Name 03/23/24 1423          Vital Signs    Pre Systolic BP Rehab 164  -AC     Pre Treatment Diastolic BP 87  -AC     Post Systolic BP Rehab 166  -AC     Post Treatment Diastolic BP 89  -AC     Pretreatment Heart Rate (beats/min) 75  -AC     Posttreatment Heart Rate (beats/min) 76  -AC     Pre SpO2 (%) 95  -AC     O2 Delivery Pre Treatment room air  -AC     Post SpO2 (%) 96  -AC     O2 Delivery Post Treatment room air  -AC     Pre Patient Position Supine  -AC     Post Patient Position Sitting  -AC       Row Name 03/23/24 1423          Positioning and Restraints    Pre-Treatment Position in bed  -AC     Post Treatment Position chair  -AC     In Chair notified nsg;reclined;call light within reach;encouraged to call for assist;exit alarm on;waffle cushion;on mechanical lift sling;RUE elevated;LUE elevated;heels elevated  -AC               User Key  (r) = Recorded By, (t) = Taken By, (c) = Cosigned By      Initials Name Provider Type    AC Pauly Davey, OT Occupational Therapist                   Outcome Measures       Row Name 03/23/24 1428          How much help from another is currently needed...    Putting on and taking off regular lower body clothing? 1  -AC     Bathing (including washing, rinsing, and drying) 2  -AC     Toileting (which includes using toilet bed pan or urinal) 1  -AC     Putting on and taking off regular upper body clothing 2  -AC     Taking care of personal grooming (such as brushing teeth) 2  -AC     Eating meals 2  -AC     AM-PAC 6 Clicks Score (OT) 10  -AC       Row Name 03/23/24 1416          How much help from another person do you currently need...    Turning from your back to your side while in flat bed without using bedrails? 2  -LM     Moving from lying on back to sitting on the side of a flat bed without bedrails? 2  -LM     Moving to  and from a bed to a chair (including a wheelchair)? 2  -LM     Standing up from a chair using your arms (e.g., wheelchair, bedside chair)? 2  -LM     Climbing 3-5 steps with a railing? 1  -LM     To walk in hospital room? 1  -LM     AM-PAC 6 Clicks Score (PT) 10  -LM     Highest Level of Mobility Goal 4 --> Transfer to chair/commode  -LM       Row Name 03/23/24 1428 03/23/24 1416       Functional Assessment    Outcome Measure Options AM-PAC 6 Clicks Daily Activity (OT)  -AC AM-PAC 6 Clicks Basic Mobility (PT)  -LM              User Key  (r) = Recorded By, (t) = Taken By, (c) = Cosigned By      Initials Name Provider Type    AC Pauly Davey, OT Occupational Therapist    LM Nicki Mcfadden, PT Physical Therapist                    Occupational Therapy Education       Title: PT OT SLP Therapies (In Progress)       Topic: Occupational Therapy (In Progress)       Point: ADL training (In Progress)       Description:   Instruct learner(s) on proper safety adaptation and remediation techniques during self care or transfers.   Instruct in proper use of assistive devices.                  Learning Progress Summary             Patient Acceptance, E, NR by  at 3/23/2024 1428    Acceptance, E,D, NR by  at 3/21/2024 1404    Acceptance, E,TB, NR,NL by  at 3/20/2024 0755    Acceptance, E, NR by  at 3/17/2024 1526    Acceptance, E, NR by  at 3/6/2024 1116    Acceptance, E, VU by  at 3/4/2024 1519                         Point: Home exercise program (In Progress)       Description:   Instruct learner(s) on appropriate technique for monitoring, assisting and/or progressing therapeutic exercises/activities.                  Learning Progress Summary             Patient Acceptance, E,TB, NR,NL by  at 3/20/2024 0755    Acceptance, E, NR by  at 3/17/2024 1526                         Point: Precautions (In Progress)       Description:   Instruct learner(s) on prescribed precautions during self-care and functional transfers.                   Learning Progress Summary             Patient Acceptance, E,D, NR by  at 3/21/2024 1404    Acceptance, E,TB, NR,NL by  at 3/20/2024 0755    Acceptance, E, NR by  at 3/17/2024 1526    Acceptance, E, VU by  at 3/4/2024 1519                         Point: Body mechanics (In Progress)       Description:   Instruct learner(s) on proper positioning and spine alignment during self-care, functional mobility activities and/or exercises.                  Learning Progress Summary             Patient Acceptance, E,D, NR by  at 3/21/2024 1404    Acceptance, E,TB, NR,NL by  at 3/20/2024 0755    Acceptance, E, NR by  at 3/17/2024 1526                                         User Key       Initials Effective Dates Name Provider Type Discipline     02/03/23 -  Pauly Davey, OT Occupational Therapist OT     06/16/21 -  Teresa Batista RN Registered Nurse Nurse     06/16/21 -  Justina Joyce, OT Occupational Therapist OT     10/14/22 -  Sandra Stahl OT Occupational Therapist OT     02/05/24 -  Cha Landry OT Occupational Therapist OT                  OT Recommendation and Plan     Plan of Care Review  Plan of Care Reviewed With: patient  Progress: no change  Outcome Evaluation: Pt progressed from dep x 2 to max x 2 bed to chair stand pivot given UE support, max A to don gown, min A to wash face, mod A to comb hair.  Pt is progressing, but continues below baseline with self care/mobility d/t cognition, weakness, decr balance and activity tolerance.  Recommend SNF upon d/c.     Time Calculation:         Time Calculation- OT       Row Name 03/23/24 1312             Time Calculation- OT    OT Start Time 1312  -AC      OT Received On 03/23/24  -      OT Goal Re-Cert Due Date 03/31/24  -AC         Timed Charges    29624 - OT Therapeutic Exercise Minutes 5  -AC      09891 - OT Therapeutic Activity Minutes 13  -AC      49558 - OT Self Care/Mgmt Minutes 5  -AC         Total Minutes     Timed Charges Total Minutes 23  -AC       Total Minutes 23  -AC                User Key  (r) = Recorded By, (t) = Taken By, (c) = Cosigned By      Initials Name Provider Type    AC Pauly Davey, OT Occupational Therapist                  Therapy Charges for Today       Code Description Service Date Service Provider Modifiers Qty    [id number]  OT THER PROC EA 15 MIN 3/23/2024 Pauly Davey, OT GO 1    [id number]  OT THERAPEUTIC ACT EA 15 MIN 3/23/2024 Pauly Davey, OT GO 1                 Pauly Davey OT  3/23/2024

## 2024-03-23 NOTE — THERAPY TREATMENT NOTE
Patient Name: Lea Rivers  : 1945    MRN: [id number]                              Today's Date: 3/23/2024       Admit Date: 3/2/2024    Visit Dx:     ICD-10-CM ICD-9-CM   1. Fall, initial encounter  W19.XXXA E888.9   2. Closed fracture of multiple ribs of left side, initial encounter  S22.42XA 807.09   3. Closed displaced fracture of acromial end of left clavicle, initial encounter  S42.032A 810.03   4. Falls frequently  R29.6 V15.88   5. Acute UTI (urinary tract infection)  N39.0 599.0   6. Colon distention  K63.89 569.89   7. Paralytic ileus of small intestine and colon  K56.0 560.1   8. Oropharyngeal dysphagia  R13.12 787.22   9. Intra-abdominal fluid  R18.8 789.59     Patient Active Problem List   Diagnosis    Coronary artery disease    Dyslipidemia    Hypothyroidism    GERD (gastroesophageal reflux disease)    Seizure disorder    BPH (benign prostatic hypertrophy)    Hypertension    Primary osteoarthritis of both knees    Syncope and collapse    Precordial pain    Diabetes    Status post total right knee replacement    Postoperative urinary retention    Confusion, postoperative    Acute blood loss anemia, asymptomatic, no transfusion required    Elevated prostate specific antigen (PSA)    Prostate cancer    Anemia    Body mass index (BMI) of 32.0-32.9 in adult    Localization-related focal epilepsy with simple partial seizures    Need for vaccination against Streptococcus pneumoniae    Upper extremity tendon tear, right, initial encounter    Vitamin D deficiency    PSA elevation    Polyp of colon    Overactive bladder    Gross hematuria    History of colon polyps    History of colon cancer    B12 deficiency    Falls    Pneumatosis intestinalis     Past Medical History:   Diagnosis Date    Anemia     Colon cancer     Status post partial colectomy in . No chemotherapy or radiation therapy.    Coronary artery disease     Nonobstructive coronary artery disease.    Diabetes     Dyslipidemia      GERD (gastroesophageal reflux disease)     Hx of.    Hyperlipidemia     Hypertension     Hyponatremia     Hypothyroidism     Left shoulder pain     Low sodium levels 2022    recent issue; saw nephrologist who ruled out kidney issues; took Sodium Chloride po to bring levels up    Memory deficit     FROM ANESTHESIA    Osteoarthritis     Prostate cancer 07/23/2022    Seizure disorder     silent seziure-diagnosed years ago    Skin cancer      Past Surgical History:   Procedure Laterality Date    APPENDECTOMY      CARDIAC CATHETERIZATION  2012    30 to 40% LAD    CARPAL TUNNEL RELEASE Bilateral     CHOLECYSTECTOMY      COLECTOMY PARTIAL / TOTAL  1990    Partial colectomy    COLON RESECTION N/A 3/9/2024    Procedure: TOTAL ABDOMINAL COLECTOMY, END ILEOSTOMY;  Surgeon: Harley Godfrey MD;  Location:  [facility];  Service: General;  Laterality: N/A;    COLONOSCOPY      CYBERKNIFE  02/09/2023    Prostate/SV    CYSTOSCOPY TRANSURETHRAL RESECTION OF PROSTATE N/A 09/21/2018    Procedure: CYSTOSCOPY TRANSURETHRAL RESECTION OF PROSTATE GREENLIGHT;  Surgeon: Naseem Clayton MD;  Location:  [facility];  Service: Urology    EXPLORATORY LAPAROTOMY N/A 3/18/2024    Procedure: LAPAROTOMY EXPLORATORY, ABDOMINAL WASH OUT;  Surgeon: Harley Godfrey MD;  Location:  [facility];  Service: General;  Laterality: N/A;    OTHER SURGICAL HISTORY      Contraction surgery on bilateral hands and wrists.    PROSTATE BIOPSY N/A 11/11/2022    Procedure: TRANSRECTAL ULTRASOUND GUIDED BIOPSY OF PROSTATE;  Surgeon: Naseem Noland MD;  Location:  [facility];  Service: Urology;  Laterality: N/A;    SINUS SURGERY      SKIN BIOPSY      TEETH EXTRACTION      TONSILLECTOMY      TOTAL KNEE ARTHROPLASTY Right 04/07/2022    Procedure: TOTAL KNEE ARTHROPLASTY WITH ORTHO ROBOT RIGHT;  Surgeon: Louis Malcolm MD;  Location:  [facility];  Service: Robotics - Ortho;  Laterality: Right;    TRANSRECTAL INCISION PROSTATE WITH GOLD SEED Bilateral  01/06/2023    Procedure: TRANSRECTAL ULTRASOUND GUIDED PROSTATE FIDUCIAL MARKER PLACEMENT;  Surgeon: Naseem Noland MD;  Location: [facility];  Service: Urology;  Laterality: Bilateral;    TURP / TRANSURETHRAL INCISION / DRAINAGE PROSTATE      VASECTOMY        General Information       Row Name 03/23/24 1407          Physical Therapy Time and Intention    Document Type therapy note (daily note)  -LM     Mode of Treatment physical therapy  -LM       Row Name 03/23/24 1407          General Information    Patient Profile Reviewed yes  -LM     Existing Precautions/Restrictions fall;left;shoulder;non-weight bearing  L Clavicle Fx; LUE NWB; LUE Sling; L Rib Fxs; Ostomy; OLIVER Drain x 2; Abd Incision; Abd Binder; Brief with OOB activity  -LM       Row Name 03/23/24 1406          Cognition    Orientation Status (Cognition) oriented to;person;time;disoriented to;place  -LM       Row Name 03/23/24 1406          Safety Issues, Functional Mobility    Safety Issues Affecting Function (Mobility) ability to follow commands;awareness of need for assistance;insight into deficits/self-awareness;judgment;problem-solving;safety precaution awareness;safety precautions follow-through/compliance;sequencing abilities  -LM     Impairments Affecting Function (Mobility) balance;cognition;endurance/activity tolerance;pain;postural/trunk control;strength;range of motion (ROM);sensation/sensory awareness  -LM               User Key  (r) = Recorded By, (t) = Taken By, (c) = Cosigned By      Initials Name Provider Type    LM Nicki Mcfadden PT Physical Therapist                   Mobility       Row Name 03/23/24 1400          Bed Mobility    Rolling Left Hector (Bed Mobility) moderate assist (50% patient effort);1 person assist;verbal cues  -LM     Rolling Right Hector (Bed Mobility) maximum assist (25% patient effort);2 person assist;verbal cues  -LM     Supine-Sit Hector (Bed Mobility) maximum assist (25% patient effort);2  person assist;verbal cues  -LM     Assistive Device (Bed Mobility) bed rails;draw sheet;head of bed elevated  -LM     Comment, (Bed Mobility) Pt rolled bilaterally for dependent pericare and brief placement.  -LM       Row Name 03/23/24 1408          Bed-Chair Transfer    Bed-Chair Simpson (Transfers) maximum assist (25% patient effort);2 person assist;verbal cues  -LM     Assistive Device (Bed-Chair Transfers) other (see comments)  BUE Support  -LM     Comment, (Bed-Chair Transfer) On pt's second stand, pt completed stand pivot transfer over to recliner.  -LM       Row Name 03/23/24 1408          Sit-Stand Transfer    Sit-Stand Simpson (Transfers) moderate assist (50% patient effort);2 person assist  -LM     Assistive Device (Sit-Stand Transfers) other (see comments)  BUE Support  -LM     Comment, (Sit-Stand Transfer) Pt stood from EOB.  Vc's for upright posture.  -LM       Row Name 03/23/24 1408          Gait/Stairs (Locomotion)    Simpson Level (Gait) not tested  -LM       Row Name 03/23/24 1408          Mobility    Extremity Weight-bearing Status left upper extremity   -LM     Left Upper Extremity (Weight-bearing Status) non weight-bearing (NWB)   -LM               User Key  (r) = Recorded By, (t) = Taken By, (c) = Cosigned By      Initials Name Provider Type    LM Nicki Mcfadden, PT Physical Therapist                   Obj/Interventions       Row Name 03/23/24 1413          Hip (Therapeutic Exercise)    Hip (Therapeutic Exercise) isometric exercises  -     Hip Isometrics (Therapeutic Exercise) bilateral;gluteal sets;supine;10 repetitions  -       Row Name 03/23/24 1413          Knee (Therapeutic Exercise)    Knee (Therapeutic Exercise) isometric exercises  -     Knee AAROM (Therapeutic Exercise) bilateral;flexion;extension;supine;10 repetitions  -     Knee Isometrics (Therapeutic Exercise) bilateral;quad sets;supine;10 repetitions  -       Row Name 03/23/24 1413          Ankle  (Therapeutic Exercise)    Ankle AROM (Therapeutic Exercise) bilateral;dorsiflexion;plantarflexion;supine;10 repetitions  -LM       Row Name 03/23/24 1413          Balance    Dynamic Sitting Balance moderate assist  -LM     Position, Sitting Balance unsupported;sitting edge of bed  -LM     Static Standing Balance moderate assist;2-person assist;verbal cues  -LM     Dynamic Standing Balance maximum assist;2-person assist;verbal cues  -LM     Position/Device Used, Standing Balance supported  -LM     Comment, Balance Pt would range from CGA to ModA with static sitting.  The longer pt sat up, the more assist needed.  -LM               User Key  (r) = Recorded By, (t) = Taken By, (c) = Cosigned By      Initials Name Provider Type    LM Nicki Mcfadden, PT Physical Therapist                   Goals/Plan    No documentation.                  Clinical Impression       Row Name 03/23/24 1414          Pain    Pain Location - Side/Orientation Left  -LM     Pain Location - flank;back  -LM     Pain Intervention(s) Repositioned;Ambulation/increased activity  -LM       Row Name 03/23/24 1414          Pain Scale: FACES Pre/Post-Treatment    Pain: FACES Scale, Pretreatment 2-->hurts little bit  -LM     Posttreatment Pain Rating 2-->hurts little bit  -LM       Row Name 03/23/24 1414          Plan of Care Review    Plan of Care Reviewed With patient  -LM     Progress improving  -LM     Outcome Evaluation Pt with good progress today.  Pt able to stand with ModAx2 and complete stand pivot transfer with MaxAx2.  Pt continues to be limited by weakness, cognition, decreased activity tolerance, and pain.  Continue to recommend SNF at d/c.  -LM       Row Name 03/23/24 1414          Vital Signs    Pre Systolic BP Rehab 164  -LM     Pre Treatment Diastolic BP 87  -LM     Post Systolic BP Rehab 166  -LM     Post Treatment Diastolic BP 89  -LM     Pretreatment Heart Rate (beats/min) 76  -LM     Posttreatment Heart Rate (beats/min) 76  -LM     Pre  SpO2 (%) 95  -LM     O2 Delivery Pre Treatment room air  -LM     Post SpO2 (%) 96  -LM     O2 Delivery Post Treatment room air  -LM     Pre Patient Position Supine  -LM     Post Patient Position Sitting  -LM       Row Name 03/23/24 1414          Positioning and Restraints    Pre-Treatment Position in bed  -LM     Post Treatment Position chair  -LM     In Chair reclined;call light within reach;encouraged to call for assist;exit alarm on;waffle cushion;heels elevated;RUE elevated;LUE elevated;on mechanical lift sling;notified nsg  -LM               User Key  (r) = Recorded By, (t) = Taken By, (c) = Cosigned By      Initials Name Provider Type    Nicki Ballard PT Physical Therapist                   Outcome Measures       Row Name 03/23/24 1416          How much help from another person do you currently need...    Turning from your back to your side while in flat bed without using bedrails? 2  -LM     Moving from lying on back to sitting on the side of a flat bed without bedrails? 2  -LM     Moving to and from a bed to a chair (including a wheelchair)? 2  -LM     Standing up from a chair using your arms (e.g., wheelchair, bedside chair)? 2  -LM     Climbing 3-5 steps with a railing? 1  -LM     To walk in hospital room? 1  -LM     AM-PAC 6 Clicks Score (PT) 10  -LM     Highest Level of Mobility Goal 4 --> Transfer to chair/commode  -LM       Row Name 03/23/24 1416          Functional Assessment    Outcome Measure Options AM-PAC 6 Clicks Basic Mobility (PT)  -LM               User Key  (r) = Recorded By, (t) = Taken By, (c) = Cosigned By      Initials Name Provider Type    Nicki Ballard PT Physical Therapist                                 Physical Therapy Education       Title: PT OT SLP Therapies (In Progress)       Topic: Physical Therapy (In Progress)       Point: Mobility training (In Progress)       Learning Progress Summary             Patient Acceptance, E, NR by AE at 3/22/2024 1527    Acceptance, E, NR  by AE at 3/21/2024 1115    Acceptance, E,TB, NR,NL by CH at 3/20/2024 0755    Acceptance, E, NR by CK at 3/6/2024 1121    Acceptance, E, VU,NR by LO at 3/4/2024 1340    Comment: PT POC   Family Acceptance, E, NR by CK at 3/6/2024 1121    Acceptance, E, VU,NR by LO at 3/4/2024 1340    Comment: PT POC                         Point: Home exercise program (In Progress)       Learning Progress Summary             Patient Acceptance, E, NR by AE at 3/22/2024 1527    Acceptance, E, NR by AE at 3/21/2024 1115    Acceptance, E,TB, NR,NL by CH at 3/20/2024 0755    Acceptance, E, NR by CK at 3/6/2024 1121    Acceptance, E, VU,NR by LO at 3/4/2024 1340    Comment: PT POC   Family Acceptance, E, NR by CK at 3/6/2024 1121    Acceptance, E, VU,NR by LO at 3/4/2024 1340    Comment: PT POC                         Point: Body mechanics (In Progress)       Learning Progress Summary             Patient Acceptance, E, NR by AE at 3/22/2024 1527    Acceptance, E, NR by AE at 3/21/2024 1115    Acceptance, E,TB, NR,NL by CH at 3/20/2024 0755    Acceptance, E, NR by CK at 3/6/2024 1121    Acceptance, E, VU,NR by LO at 3/4/2024 1340    Comment: PT POC   Family Acceptance, E, NR by CK at 3/6/2024 1121    Acceptance, E, VU,NR by LO at 3/4/2024 1340    Comment: PT POC                         Point: Precautions (In Progress)       Learning Progress Summary             Patient Acceptance, E, NR by AE at 3/22/2024 1527    Acceptance, E, NR by AE at 3/21/2024 1115    Acceptance, E,TB, NR,NL by CH at 3/20/2024 0755    Acceptance, E, NR by CK at 3/6/2024 1121    Acceptance, E, VU,NR by LO at 3/4/2024 1340    Comment: PT POC   Family Acceptance, E, NR by CK at 3/6/2024 1121    Acceptance, EPETRA,NR by AARON at 3/4/2024 1340    Comment: PT POC                                         User Key       Initials Effective Dates Name Provider Type Discipline     06/16/21 -  Teresa Batista, RN Registered Nurse Nurse     06/16/21 -  Emily Muro, PT  Physical Therapist PT    AE 09/21/21 -  Abe Melara, PT Physical Therapist PT    CK 02/06/24 -  Tiffanie Carmen PT Physical Therapist PT                  PT Recommendation and Plan     Plan of Care Reviewed With: patient  Progress: improving  Outcome Evaluation: Pt with good progress today.  Pt able to stand with ModAx2 and complete stand pivot transfer with MaxAx2.  Pt continues to be limited by weakness, cognition, decreased activity tolerance, and pain.  Continue to recommend SNF at d/c.     Time Calculation:         PT Charges       Row Name 03/23/24 1416             Time Calculation    Start Time 1310  -LM      PT Received On 03/23/24  -LM      PT Goal Re-Cert Due Date 03/31/24  -LM         Timed Charges    04365 - PT Therapeutic Exercise Minutes 8  -LM      44547 - PT Therapeutic Activity Minutes 15  -LM         Total Minutes    Timed Charges Total Minutes 23  -LM       Total Minutes 23  -LM                User Key  (r) = Recorded By, (t) = Taken By, (c) = Cosigned By      Initials Name Provider Type     Nicki Mcfadden, PT Physical Therapist                  Therapy Charges for Today       Code Description Service Date Service Provider Modifiers Qty    44400409848 HC PT THERAPEUTIC ACT EA 15 MIN 3/23/2024 Nicki Mcfadden, PT GP 1    59669730651 HC PT THER PROC EA 15 MIN 3/23/2024 Nicki Mcfadden, PT GP 1            PT G-Codes  Outcome Measure Options: AM-PAC 6 Clicks Basic Mobility (PT)  AM-PAC 6 Clicks Score (PT): 10  AM-PAC 6 Clicks Score (OT): 9  PT Discharge Summary  Anticipated Discharge Disposition (PT): skilled nursing facility    Nicki Mcfadden PT  3/23/2024

## 2024-03-23 NOTE — PLAN OF CARE
Goal Outcome Evaluation:  Plan of Care Reviewed With: patient        Progress: improving  Outcome Evaluation: Pt with good progress today.  Pt able to stand with ModAx2 and complete stand pivot transfer with MaxAx2.  Pt continues to be limited by weakness, cognition, decreased activity tolerance, and pain.  Continue to recommend SNF at d/c.      Anticipated Discharge Disposition (PT): skilled nursing facility

## 2024-03-24 LAB
ANION GAP SERPL CALCULATED.3IONS-SCNC: 7 MMOL/L (ref 5–15)
ANISOCYTOSIS BLD QL: ABNORMAL
BASOPHILS # BLD MANUAL: 0.27 10*3/MM3 (ref 0–0.2)
BASOPHILS NFR BLD MANUAL: 2 % (ref 0–1.5)
BUN SERPL-MCNC: 21 MG/DL (ref 8–23)
BUN/CREAT SERPL: 42.9 (ref 7–25)
BURR CELLS BLD QL SMEAR: ABNORMAL
CALCIUM SPEC-SCNC: 8 MG/DL (ref 8.6–10.5)
CHLORIDE SERPL-SCNC: 103 MMOL/L (ref 98–107)
CO2 SERPL-SCNC: 25 MMOL/L (ref 22–29)
CREAT SERPL-MCNC: 0.49 MG/DL (ref 0.76–1.27)
DEPRECATED RDW RBC AUTO: 58.9 FL (ref 37–54)
EGFRCR SERPLBLD CKD-EPI 2021: 104.4 ML/MIN/1.73
EOSINOPHIL # BLD MANUAL: 0.95 10*3/MM3 (ref 0–0.4)
EOSINOPHIL NFR BLD MANUAL: 7 % (ref 0.3–6.2)
ERYTHROCYTE [DISTWIDTH] IN BLOOD BY AUTOMATED COUNT: 17.5 % (ref 12.3–15.4)
FOLATE SERPL-MCNC: 3.78 NG/ML (ref 4.78–24.2)
GLUCOSE BLDC GLUCOMTR-MCNC: 132 MG/DL (ref 70–130)
GLUCOSE BLDC GLUCOMTR-MCNC: 172 MG/DL (ref 70–130)
GLUCOSE BLDC GLUCOMTR-MCNC: 179 MG/DL (ref 70–130)
GLUCOSE BLDC GLUCOMTR-MCNC: 200 MG/DL (ref 70–130)
GLUCOSE SERPL-MCNC: 152 MG/DL (ref 65–99)
HCT VFR BLD AUTO: 28.7 % (ref 37.5–51)
HGB BLD-MCNC: 9 G/DL (ref 13–17.7)
LYMPHOCYTES # BLD MANUAL: 1.63 10*3/MM3 (ref 0.7–3.1)
LYMPHOCYTES NFR BLD MANUAL: 5 % (ref 5–12)
MAGNESIUM SERPL-MCNC: 1.9 MG/DL (ref 1.6–2.4)
MCH RBC QN AUTO: 30 PG (ref 26.6–33)
MCHC RBC AUTO-ENTMCNC: 31.4 G/DL (ref 31.5–35.7)
MCV RBC AUTO: 95.7 FL (ref 79–97)
METAMYELOCYTES NFR BLD MANUAL: 1 % (ref 0–0)
MONOCYTES # BLD: 0.68 10*3/MM3 (ref 0.1–0.9)
MYELOCYTES NFR BLD MANUAL: 2 % (ref 0–0)
NEUTROPHILS # BLD AUTO: 9.64 10*3/MM3 (ref 1.7–7)
NEUTROPHILS NFR BLD MANUAL: 70 % (ref 42.7–76)
NEUTS BAND NFR BLD MANUAL: 1 % (ref 0–5)
PHOSPHATE SERPL-MCNC: 3 MG/DL (ref 2.5–4.5)
PLAT MORPH BLD: NORMAL
PLATELET # BLD AUTO: 556 10*3/MM3 (ref 140–450)
PMV BLD AUTO: 8.9 FL (ref 6–12)
POLYCHROMASIA BLD QL SMEAR: ABNORMAL
POTASSIUM SERPL-SCNC: 4.1 MMOL/L (ref 3.5–5.2)
RBC # BLD AUTO: 3 10*6/MM3 (ref 4.14–5.8)
SODIUM SERPL-SCNC: 135 MMOL/L (ref 136–145)
VARIANT LYMPHS NFR BLD MANUAL: 12 % (ref 19.6–45.3)
WBC MORPH BLD: NORMAL
WBC NRBC COR # BLD AUTO: 13.58 10*3/MM3 (ref 3.4–10.8)

## 2024-03-24 PROCEDURE — 25010000002 FUROSEMIDE PER 20 MG: Performed by: NURSE PRACTITIONER

## 2024-03-24 PROCEDURE — 99232 SBSQ HOSP IP/OBS MODERATE 35: CPT | Performed by: NURSE PRACTITIONER

## 2024-03-24 PROCEDURE — 25010000002 HYDRALAZINE PER 20 MG: Performed by: SURGERY

## 2024-03-24 PROCEDURE — 85025 COMPLETE CBC W/AUTO DIFF WBC: CPT | Performed by: SURGERY

## 2024-03-24 PROCEDURE — 25010000002 METOCLOPRAMIDE PER 10 MG: Performed by: SURGERY

## 2024-03-24 PROCEDURE — 25010000002 POTASSIUM CHLORIDE PER 2 MEQ OF POTASSIUM

## 2024-03-24 PROCEDURE — 85007 BL SMEAR W/DIFF WBC COUNT: CPT | Performed by: SURGERY

## 2024-03-24 PROCEDURE — 25010000002 PIPERACILLIN SOD-TAZOBACTAM PER 1 G: Performed by: SURGERY

## 2024-03-24 PROCEDURE — 83735 ASSAY OF MAGNESIUM: CPT

## 2024-03-24 PROCEDURE — 25010000002 MAGNESIUM SULFATE PER 500 MG OF MAGNESIUM

## 2024-03-24 PROCEDURE — 80048 BASIC METABOLIC PNL TOTAL CA: CPT | Performed by: NURSE PRACTITIONER

## 2024-03-24 PROCEDURE — 82948 REAGENT STRIP/BLOOD GLUCOSE: CPT

## 2024-03-24 PROCEDURE — 25010000002 MICAFUNGIN SODIUM 100 MG RECONSTITUTED SOLUTION 1 EACH VIAL: Performed by: INTERNAL MEDICINE

## 2024-03-24 PROCEDURE — 84100 ASSAY OF PHOSPHORUS: CPT

## 2024-03-24 PROCEDURE — 25010000002 DAPTOMYCIN PER 1 MG: Performed by: INTERNAL MEDICINE

## 2024-03-24 PROCEDURE — 25010000002 CALCIUM GLUCONATE PER 10 ML

## 2024-03-24 PROCEDURE — 25010000002 NA FERRIC GLUC CPLX PER 12.5 MG: Performed by: NURSE PRACTITIONER

## 2024-03-24 PROCEDURE — 63710000001 INSULIN REGULAR HUMAN PER 5 UNITS: Performed by: SURGERY

## 2024-03-24 RX ORDER — FUROSEMIDE 10 MG/ML
40 INJECTION INTRAMUSCULAR; INTRAVENOUS ONCE
Status: COMPLETED | OUTPATIENT
Start: 2024-03-24 | End: 2024-03-24

## 2024-03-24 RX ORDER — CARVEDILOL 12.5 MG/1
25 TABLET ORAL 2 TIMES DAILY WITH MEALS
Status: DISCONTINUED | OUTPATIENT
Start: 2024-03-24 | End: 2024-03-27

## 2024-03-24 RX ADMIN — Medication 10 ML: at 21:07

## 2024-03-24 RX ADMIN — METOCLOPRAMIDE 10 MG: 5 INJECTION, SOLUTION INTRAMUSCULAR; INTRAVENOUS at 18:32

## 2024-03-24 RX ADMIN — INSULIN HUMAN 2 UNITS: 100 INJECTION, SOLUTION PARENTERAL at 18:09

## 2024-03-24 RX ADMIN — VALPROIC ACID 375 MG: 250 SOLUTION ORAL at 18:09

## 2024-03-24 RX ADMIN — ISOSORBIDE DINITRATE 10 MG: 10 TABLET ORAL at 19:25

## 2024-03-24 RX ADMIN — LEVETIRACETAM 500 MG: 500 TABLET, FILM COATED ORAL at 21:07

## 2024-03-24 RX ADMIN — HYDRALAZINE HYDROCHLORIDE 10 MG: 20 INJECTION INTRAMUSCULAR; INTRAVENOUS at 04:04

## 2024-03-24 RX ADMIN — PIPERACILLIN AND TAZOBACTAM 3.38 G: 3; .375 INJECTION, POWDER, LYOPHILIZED, FOR SOLUTION INTRAVENOUS at 00:35

## 2024-03-24 RX ADMIN — PIPERACILLIN AND TAZOBACTAM 3.38 G: 3; .375 INJECTION, POWDER, LYOPHILIZED, FOR SOLUTION INTRAVENOUS at 09:40

## 2024-03-24 RX ADMIN — HYDRALAZINE HYDROCHLORIDE 10 MG: 20 INJECTION INTRAMUSCULAR; INTRAVENOUS at 10:14

## 2024-03-24 RX ADMIN — Medication 10 ML: at 09:42

## 2024-03-24 RX ADMIN — APIXABAN 5 MG: 5 TABLET, FILM COATED ORAL at 21:07

## 2024-03-24 RX ADMIN — CARVEDILOL 25 MG: 12.5 TABLET, FILM COATED ORAL at 18:09

## 2024-03-24 RX ADMIN — VALPROIC ACID 375 MG: 250 SOLUTION ORAL at 13:53

## 2024-03-24 RX ADMIN — MICAFUNGIN 100 MG: 20 INJECTION, POWDER, LYOPHILIZED, FOR SOLUTION INTRAVENOUS at 18:07

## 2024-03-24 RX ADMIN — ISOSORBIDE DINITRATE 10 MG: 10 TABLET ORAL at 09:41

## 2024-03-24 RX ADMIN — LIDOCAINE 2 PATCH: 4 PATCH TOPICAL at 09:42

## 2024-03-24 RX ADMIN — SODIUM CHLORIDE 125 MG: 9 INJECTION, SOLUTION INTRAVENOUS at 13:00

## 2024-03-24 RX ADMIN — FUROSEMIDE 40 MG: 10 INJECTION, SOLUTION INTRAMUSCULAR; INTRAVENOUS at 09:41

## 2024-03-24 RX ADMIN — DAPTOMYCIN 500 MG: 500 INJECTION, POWDER, LYOPHILIZED, FOR SOLUTION INTRAVENOUS at 11:21

## 2024-03-24 RX ADMIN — LEVOTHYROXINE SODIUM 88 MCG: 88 TABLET ORAL at 05:19

## 2024-03-24 RX ADMIN — METOCLOPRAMIDE 10 MG: 5 INJECTION, SOLUTION INTRAMUSCULAR; INTRAVENOUS at 13:53

## 2024-03-24 RX ADMIN — APIXABAN 5 MG: 5 TABLET, FILM COATED ORAL at 09:41

## 2024-03-24 RX ADMIN — METOCLOPRAMIDE 10 MG: 5 INJECTION, SOLUTION INTRAMUSCULAR; INTRAVENOUS at 11:22

## 2024-03-24 RX ADMIN — INSULIN HUMAN 2 UNITS: 100 INJECTION, SOLUTION PARENTERAL at 13:53

## 2024-03-24 RX ADMIN — LEVETIRACETAM 500 MG: 500 TABLET, FILM COATED ORAL at 09:41

## 2024-03-24 RX ADMIN — VERAPAMIL HYDROCHLORIDE 80 MG: 80 TABLET ORAL at 21:07

## 2024-03-24 RX ADMIN — CARVEDILOL 25 MG: 12.5 TABLET, FILM COATED ORAL at 09:41

## 2024-03-24 RX ADMIN — SMOFLIPID 250 ML: 6; 6; 5; 3 INJECTION, EMULSION INTRAVENOUS at 18:08

## 2024-03-24 RX ADMIN — PIPERACILLIN AND TAZOBACTAM 3.38 G: 3; .375 INJECTION, POWDER, LYOPHILIZED, FOR SOLUTION INTRAVENOUS at 19:25

## 2024-03-24 RX ADMIN — WATER: 1 INJECTION INTRAMUSCULAR; INTRAVENOUS; SUBCUTANEOUS at 18:08

## 2024-03-24 RX ADMIN — VALPROIC ACID 375 MG: 250 SOLUTION ORAL at 00:35

## 2024-03-24 RX ADMIN — LISINOPRIL 40 MG: 40 TABLET ORAL at 09:41

## 2024-03-24 RX ADMIN — ISOSORBIDE DINITRATE 10 MG: 10 TABLET ORAL at 13:54

## 2024-03-24 RX ADMIN — METOCLOPRAMIDE 10 MG: 5 INJECTION, SOLUTION INTRAMUSCULAR; INTRAVENOUS at 00:35

## 2024-03-24 RX ADMIN — VALPROIC ACID 375 MG: 250 SOLUTION ORAL at 05:19

## 2024-03-24 RX ADMIN — VERAPAMIL HYDROCHLORIDE 80 MG: 80 TABLET ORAL at 13:54

## 2024-03-24 RX ADMIN — PANTOPRAZOLE SODIUM 40 MG: 40 INJECTION, POWDER, FOR SOLUTION INTRAVENOUS at 05:20

## 2024-03-24 RX ADMIN — VERAPAMIL HYDROCHLORIDE 80 MG: 80 TABLET ORAL at 05:19

## 2024-03-24 NOTE — PROGRESS NOTES
Lea Barrett Livingston  1945  [id number]        Evaluating Physician: Louis Chow MD    Chief Complaint: abdpain    Reason for Consultation: IA infection    History of present illness:     Patient is a 78 y.o.  Yr old male with history of seizure disorder, colon cancer/prostate cancer with prior radiation, diabetes and dementia per admission notes with admission March 2 after frequent falls noted to have multiple rib fractures and left side clavicular fracture in the emergency room.noted to have significant colon distention with potential for pneumatosis on CT scan and seen by gastroenterology/surgery.taken for surgery on March 9 with diagnosis of toxic dilation of the colon for total abdominal colectomy and end ileostomy; medicine notes indicate he received ceftriaxone/Flagyl and Diflucan.  Leukocytosis persisted.repeat CT scan on March 18 with postsurgical changes, moderate loculated ascitic fluid in the anterior abdomen as described by radiology.taken back to surgery on March 18 for exploratory laparotomy with washout.  Culture subsequently with gram-negative manuel at least.  Empiric antibiotics adjusted to Zosyn/Mycamine    3/24/24 sleepy;   on TPN;  Per nursing, + abdominal pain which is less intense overall, dull at present, sharp at times, worse with palpation, better with pain meds and he will not attach a numerical severity.    no headache photophobia or neck stiffness.  No dyspnea or cough.  He has a sling on the left arm with left arm PICC line.  No rash.  Nursing reports no other new focal pain or distress.       Past Medical History:   Diagnosis Date    Anemia     Colon cancer 1990    Status post partial colectomy in 1990. No chemotherapy or radiation therapy.    Coronary artery disease     Nonobstructive coronary artery disease.    Diabetes     Dyslipidemia     GERD (gastroesophageal reflux disease)     Hx of.    Hyperlipidemia     Hypertension     Hyponatremia     Hypothyroidism     Left  shoulder pain     Low sodium levels 2022    recent issue; saw nephrologist who ruled out kidney issues; took Sodium Chloride po to bring levels up    Memory deficit     FROM ANESTHESIA    Osteoarthritis     Prostate cancer 07/23/2022    Seizure disorder     silent seziure-diagnosed years ago    Skin cancer        Past Surgical History:   Procedure Laterality Date    APPENDECTOMY      CARDIAC CATHETERIZATION  2012    30 to 40% LAD    CARPAL TUNNEL RELEASE Bilateral     CHOLECYSTECTOMY      COLECTOMY PARTIAL / TOTAL  1990    Partial colectomy    COLON RESECTION N/A 3/9/2024    Procedure: TOTAL ABDOMINAL COLECTOMY, END ILEOSTOMY;  Surgeon: Harley Godfrey MD;  Location:  [facility];  Service: General;  Laterality: N/A;    COLONOSCOPY      CYBERKNIFE  02/09/2023    Prostate/SV    CYSTOSCOPY TRANSURETHRAL RESECTION OF PROSTATE N/A 09/21/2018    Procedure: CYSTOSCOPY TRANSURETHRAL RESECTION OF PROSTATE GREENLIGHT;  Surgeon: Naseem Clayton MD;  Location:  [facility];  Service: Urology    EXPLORATORY LAPAROTOMY N/A 3/18/2024    Procedure: LAPAROTOMY EXPLORATORY, ABDOMINAL WASH OUT;  Surgeon: Harley Godfrey MD;  Location:  [facility];  Service: General;  Laterality: N/A;    OTHER SURGICAL HISTORY      Contraction surgery on bilateral hands and wrists.    PROSTATE BIOPSY N/A 11/11/2022    Procedure: TRANSRECTAL ULTRASOUND GUIDED BIOPSY OF PROSTATE;  Surgeon: Naseem Noland MD;  Location:  [facility];  Service: Urology;  Laterality: N/A;    SINUS SURGERY      SKIN BIOPSY      TEETH EXTRACTION      TONSILLECTOMY      TOTAL KNEE ARTHROPLASTY Right 04/07/2022    Procedure: TOTAL KNEE ARTHROPLASTY WITH ORTHO ROBOT RIGHT;  Surgeon: Louis Malcolm MD;  Location: [facility];  Service: Robotics - Ortho;  Laterality: Right;    TRANSRECTAL INCISION PROSTATE WITH GOLD SEED Bilateral 01/06/2023    Procedure: TRANSRECTAL ULTRASOUND GUIDED PROSTATE FIDUCIAL MARKER PLACEMENT;  Surgeon: Naseem Noland MD;  Location:   "[facility];  Service: Urology;  Laterality: Bilateral;    TURP / TRANSURETHRAL INCISION / DRAINAGE PROSTATE      VASECTOMY         Pediatric History   Patient Parents    Not on file     Other Topics Concern    Not on file   Social History Narrative    Caffeine: None       family history includes Arthritis in an other family member; Cancer in his mother and another family member; Esophageal cancer in his brother; Hypertension in his father; No Known Problems in his paternal grandfather, paternal grandmother, and sister; Stroke in his father, maternal grandfather, sister, and another family member.    Allergies   Allergen Reactions    Chlorhexidine Rash    Sulfa Antibiotics Rash     Childhood reaction             Review of Systems    3/24/24 as per nursing also    Constitutional-- No Fever, chills or sweats.  Appetite diminished with fatigue  Heent-- No new vision, hearing or throat complaints.  No epistaxis or oral sores.  Denies odynophagia or dysphagia.  No flashers, floaters or eye pain. No odynophagia or dysphagia. No headache, photophobia or neck stiffness.  CV-- No chest pain, palpitation or syncope  Resp-- No SOB/cough/Hemoptysis  GI- see above.  No hematochezia, melena, or hematemesis. Denies jaundice or chronic liver disease.  -- No dysuria, hematuria, or flank pain.  Denies hesitancy, urgency or flank pain.  Lymph- no swollen lymph nodes in neck/axilla or groin.   Heme- No active bruising or bleeding  MS-- no swelling or pain in the bones or joints of arms/legs.  No new back pain.  Neuro-- generally debilitated, unable to lift his legs off the bed according to nursing.  Wiggles his feet/toes    Full 12 point review of systems reviewed and negative otherwise for acute complaints, except for above    Physical Exam:   Vital Signs   /82   Pulse 81   Temp 97.9 °F (36.6 °C) (Oral)   Resp 19   Ht 170.2 cm (67.01\")   Wt 98.8 kg (217 lb 12 oz)   SpO2 96%   BMI 34.10 kg/m²     GENERAL: sleepy, answers " questions and follows basic commands as above, in no acute distress. Appears chronically debilitated.     HEENT: Normocephalic, atraumatic.   No conjunctival injection. No icterus. Oropharynx clear without evidence of thrush or exudate. No evidence of periodontal disease.    NECK: Supple without nuchal rigidity. No mass.  LYMPH: No cervical, axillary or inguinal lymphadenopathy.  HEART: RRR; No murmur, rubs, gallops.   LUNGS: diminished at bases with some crackles at the bases but otherwiseClear to auscultation bilaterally without wheezing/ rhonchi.  Nonlabored. No dullness.  ABDOMEN: Soft,  tender, nondistended. Positive bowel sounds but diminished. No rebound or guarding. NO mass or HSM.  EXT:  No cyanosis, clubbing;  No cord.has edema  MSK: FROM without joint effusions noted arms/legs.    SKIN: Warm and dry without cutaneous eruptions on Inspection/palpation.    NEURO: follows basic commands    No peripheral stigmata/phenomena of endocarditis    IV without obvious redness or drainage at left arm    Laboratory Data    Results from last 7 days   Lab Units 03/24/24  0603 03/23/24  0348 03/22/24  0557   WBC 10*3/mm3 13.58* 13.04* 16.56*   HEMOGLOBIN g/dL 9.0* 8.8* 8.6*   HEMATOCRIT % 28.7* 27.0* 26.9*   PLATELETS 10*3/mm3 556* 519* 537*     Results from last 7 days   Lab Units 03/24/24  0603   SODIUM mmol/L 135*   POTASSIUM mmol/L 4.1   CHLORIDE mmol/L 103   CO2 mmol/L 25.0   BUN mg/dL 21   CREATININE mg/dL 0.49*   GLUCOSE mg/dL 152*   CALCIUM mg/dL 8.0*     Results from last 7 days   Lab Units 03/21/24  0622   ALK PHOS U/L 101   BILIRUBIN mg/dL 0.2   ALT (SGPT) U/L <5   AST (SGOT) U/L 17         Results from last 7 days   Lab Units 03/19/24  0936   CRP mg/dL 13.84*       Estimated Creatinine Clearance: 136.9 mL/min (A) (by C-G formula based on SCr of 0.49 mg/dL (L)).      Microbiology:      Radiology:  Imaging Results (Last 72 Hours)       Procedure Component Value Units Date/Time    FL Video Swallow With Speech  Single Contrast [[id number]] Collected: 03/21/24 1325     Updated: 03/21/24 1514    Narrative:      FL VIDEO SWALLOW W SPEECH SINGLE-CONTRAST    Date of Exam: 3/21/2024 1:01 PM EDT    Indication: dysphagia      Comparison: None available.    Technique:   The speech pathologist administered food and/or liquid mixed with barium to the patient with cine/video imaging.  Imaging assistance was provided to the speech pathologist and an image was saved.    Fluoroscopic Time: 1 minute and 30 seconds    Number of Images: 12 associated fluoroscopic loops were saved    Findings:  Aspiration was seen during fluoroscopic guided modified barium swallowing series. Please see speech therapy report for full details and recommendations.      Impression:      Impression:  Fluoroscopy provided for a modified barium swallow. Aspiration was seen during swallowing evaluation. Please see speech therapy report for full details and recommendations.      Report dictated by: Lois Gao PA-c      I have personally reviewed this case and agree with the findings above:    Electronically Signed: Ayo Washington MD    3/21/2024 3:11 PM EDT    Workstation ID: [id number]              Impression:     --acute abdominal pain with total abdominal colectomy/ileostomy as above related to toxic dilation of colon As per operative note, postop with persistent leukocytosis and fluid collections on CT scan, taken for exploratory laparotomy and fluid culture positive for gram-negative rods so far, consistent with abscess.  Empiric antimicrobials adjusted to Zosyn/Mycamine/daptomycin    --Left lower lobe pneumonia per medicine team notes, abnormal chest x-ray March 12.no respiratory distress or productive sputum.  Empiric Zosyn ongoing.  If worsening respiratory status you could consider further imaging etc.nasal cannula stable per nursing    --history dementia per admission notes a little specifics regarding baseline not clear and generally poor insight overall.   Description of toxic/metabolic encephalopathy per medicine team notes during this admission.  Prior history seizure and generally debilitated.    --History of multiple falls with left clavicular and multiple rib fractures per admission notes.  Orthopedics has seen.    --Right upper extremity DVT, described as brachial per medicine team notes.  Anticoagulation at discretion of medicine team    --History prostate and colon cancer previously per admission notes.        PLAN:       --IV Zosyn/Mycamine/ daptomycin      **culture with pseudomonas aeruginosa and E Faecium    --Check/reviewed labs cultures and scans    --Partial history per nursing staff    --Discussed with microbiology     --Highly complex of issues with high risk for further serious morbidity and other serious sequela    Copied text in this note has been reviewed and is accurate as of 03/24/24.        Louis Chow MD  3/24/2024

## 2024-03-24 NOTE — PROGRESS NOTES
"General Surgery Post op Follow Up Note    3/9/2024 - total abdominal colectomy with end ileostomy  3/18/2024 - abdominal washout    Subjective:   No new abdominal complaints.  Family present at the bedside.    BP (!) 202/104 (BP Location: Right leg, Patient Position: Lying)   Pulse 75   Temp 97.5 °F (36.4 °C) (Oral)   Resp 19   Ht 170.2 cm (67.01\")   Wt 98.8 kg (217 lb 12 oz)   SpO2 98%   BMI 34.10 kg/m²     General Appearance: Mild distress, a bit forgetful but good spirits  Abdomen: incision is clean, minimal erythema at the inferior and periumbilical portion, OLIVER drains benign x 2.  Stoma viable and functioning.    CBC  Results from last 7 days   Lab Units 03/24/24  0603   WBC 10*3/mm3 13.58*   HEMOGLOBIN g/dL 9.0*   HEMATOCRIT % 28.7*   PLATELETS 10*3/mm3 556*       CMP/BMP  Results from last 7 days   Lab Units 03/24/24  0603 03/23/24  1056 03/21/24  0622   SODIUM mmol/L 135*   < > 133*   POTASSIUM mmol/L 4.1   < > 4.5   CHLORIDE mmol/L 103   < > 98   CO2 mmol/L 25.0   < > 29.0   BUN mg/dL 21   < > 17   CREATININE mg/dL 0.49*   < > 0.52*   CALCIUM mg/dL 8.0*   < > 7.6*   BILIRUBIN mg/dL  --   --  0.2   ALK PHOS U/L  --   --  101   ALT (SGPT) U/L  --   --  <5   AST (SGOT) U/L  --   --  17   GLUCOSE mg/dL 152*   < > 152*    < > = values in this interval not displayed.         ASSESSMENT/PLAN:    Tolerating some p.o., poor appetite.  Continue TPN.  Mobilize.  ID following along regarding antibiotic coverage.    Dr. Godfrey to resume care tomorrow, 3/25/2024.    Wesly Urban MD  3/24/2024  14:19 EDT   "

## 2024-03-24 NOTE — PROGRESS NOTES
[facility]  PROGRESS NOTE    Patient Name: Lea Rivers  : 1945  MRN: [id number]    Date of Admission: 3/2/2024  Primary Care Physician: Mannie Melvin MD    Subjective   Subjective     CC:  Follow-up colectomy    HPI:  Patient seen resting in bed with eyes closed in no apparent distress.  She notes reviewed.  Patient's blood pressure remains uncontrolled, but blood pressure is being checked on patient's lower extremity due to upper extremity DVT on the right and PICC line on the left.  Patient awakens easily to verbal stimulation.  Reports right LE is sore from blood pressure cuff.      Objective   Objective     Vital Signs:   Temp:  [97.5 °F (36.4 °C)-97.9 °F (36.6 °C)] 97.9 °F (36.6 °C)  Heart Rate:  [71-81] 81  Resp:  [19-20] 19  BP: (141-191)/() 170/82     Physical Exam:  Constitutional: No acute distress, awake, alert, chronically ill-appearing  HENT: NCAT, mucous membranes moist  Respiratory: Diminished in bases, respiratory effort normal on room air  Cardiovascular: RRR, no murmurs, cap refill brisk   Gastrointestinal: Positive bowel sounds, OLIVER drain x 2 in place, midline vertical incision staples in place, ostomy in place with brown liquid stool, abdominal binder in place, tenderness to palpation, nondistended  Musculoskeletal: 1+ BLE edema, bilateral heel dressings in place  Psychiatric: Flat affect, cooperative  Neurologic: Oriented x 3 but confused to situation, moves all extremities, slowed speech  Skin: warm, dry, no visible rash       Results Reviewed:  LAB RESULTS:      Lab 24  0603 24  0348 24  0557 24  0439 24  0510 24  1643 24  0936   WBC 13.58* 13.04* 16.56* 15.40* 17.74*  --  22.83*   HEMOGLOBIN 9.0* 8.8* 8.6* 9.1* 8.6*  --  9.0*   HEMATOCRIT 28.7* 27.0* 26.9* 27.7* 27.6*  --  31.4*   PLATELETS 556* 519* 537* 517* 465*  --  413   NEUTROS ABS 9.64* 9.46* 12.32* 11.78* 13.73*  --   18.38*   IMMATURE GRANS (ABS)  --  0.73* 0.92* 0.83* 0.96*  --  1.18*   LYMPHS ABS  --  1.02 1.31 1.09 1.07  --  0.81   MONOS ABS  --  0.97* 1.40* 1.15* 1.77*  --  2.29*   EOS ABS 0.95* 0.74* 0.50* 0.41* 0.12  --  0.01   MCV 95.7 99.6* 93.1 97.9* 93.6  --  101.0*   CRP  --   --   --   --   --   --  13.84*   PROTIME  --   --   --   --   --  15.9*  --          Lab 03/24/24  0603 03/23/24  1056 03/21/24  0622 03/20/24  0510   SODIUM 135* 132* 133* 141   POTASSIUM 4.1 4.4 4.5 4.4   CHLORIDE 103 101 98 106   CO2 25.0 24.0 29.0 29.0   ANION GAP 7.0 7.0 6.0 6.0   BUN 21 18 17 26*   CREATININE 0.49* 0.41* 0.52* 0.65*   EGFR 104.4 110.2 102.5 95.8   GLUCOSE 152* 145* 152* 161*   CALCIUM 8.0* 7.8* 7.6* 7.6*   PHOSPHORUS  --   --   --  2.9         Lab 03/21/24  0622 03/20/24  0510   TOTAL PROTEIN 4.8* 4.7*   ALBUMIN 2.0* 1.9*   GLOBULIN 2.8 2.8   ALT (SGPT) <5 5   AST (SGOT) 17 14   BILIRUBIN 0.2 0.2   ALK PHOS 101 77         Lab 03/19/24  1643   PROTIME 15.9*   INR 1.26*         Lab 03/19/24  0936   TRIGLYCERIDES 167*         Lab 03/23/24  0348   IRON 34*   IRON SATURATION (TSAT) 19*   TIBC 182*   TRANSFERRIN 122*   FERRITIN 200.10   VITAMIN B 12 1,320*         Brief Urine Lab Results  (Last result in the past 365 days)        Color   Clarity   Blood   Leuk Est   Nitrite   Protein   CREAT   Urine HCG        03/11/24 1337 Yellow   Cloudy   Moderate (2+)   Trace   Negative   30 mg/dL (1+)                   Microbiology Results Abnormal       Procedure Component Value - Date/Time    Fungus Culture - Peritoneal Fluid, Perineum [[id number]] Collected: 03/18/24 1349    Lab Status: Preliminary result Specimen: Peritoneal Fluid from Perineum Updated: 03/23/24 1831     Fungus Culture No fungus isolated at less than 1 week    Anaerobic Culture - Peritoneal Fluid, Perineum [[id number]]  (Normal) Collected: 03/18/24 1349    Lab Status: Final result Specimen: Peritoneal Fluid from Perineum Updated: 03/23/24 1026     Anaerobic Culture No  anaerobes isolated at 5 days    AFB Culture - Peritoneal Fluid, Perineum [[id number]] Collected: 03/18/24 1349    Lab Status: Preliminary result Specimen: Peritoneal Fluid from Perineum Updated: 03/19/24 1339     AFB Stain No acid fast bacilli seen on concentrated smear    Urine Culture - Urine, Indwelling Urethral Catheter [[id number]]  (Normal) Collected: 03/11/24 1337    Lab Status: Final result Specimen: Urine from Indwelling Urethral Catheter Updated: 03/12/24 2024     Urine Culture No growth    Blood Culture - Blood, Arm, Right [[id number]]  (Normal) Collected: 03/06/24 1857    Lab Status: Final result Specimen: Blood from Arm, Right Updated: 03/11/24 2045     Blood Culture No growth at 5 days    Blood Culture - Blood, Arm, Right [[id number]]  (Normal) Collected: 03/06/24 1751    Lab Status: Final result Specimen: Blood from Arm, Right Updated: 03/11/24 2000     Blood Culture No growth at 5 days    Blood Culture - Blood, Arm, Right [[id number]]  (Normal) Collected: 03/02/24 1132    Lab Status: Final result Specimen: Blood from Arm, Right Updated: 03/07/24 1201     Blood Culture No growth at 5 days    Narrative:      Less than seven (7) mL's of blood was collected.  Insufficient quantity may yield false negative results.    Blood Culture - Blood, Arm, Right [[id number]]  (Normal) Collected: 03/02/24 1132    Lab Status: Final result Specimen: Blood from Arm, Right Updated: 03/07/24 1201     Blood Culture No growth at 5 days    Narrative:      Less than seven (7) mL's of blood was collected.  Insufficient quantity may yield false negative results.    Urine Culture - Urine, Straight Cath [[id number]]  (Normal) Collected: 03/02/24 0923    Lab Status: Final result Specimen: Urine from Straight Cath Updated: 03/03/24 1601     Urine Culture No growth    COVID PRE-OP / PRE-PROCEDURE SCREENING ORDER (NO ISOLATION) - Swab, Nasopharynx [[id number]]  (Normal) Collected: 03/02/24 1133    Lab Status: Final result Specimen: Swab from  Nasopharynx Updated: 03/02/24 1227    Narrative:      The following orders were created for panel order COVID PRE-OP / PRE-PROCEDURE SCREENING ORDER (NO ISOLATION) - Swab, Nasopharynx.  Procedure                               Abnormality         Status                     ---------                               -----------         ------                     COVID-19, FLU A/B, RSV P...[[id number]]  Normal              Final result                 Please view results for these tests on the individual orders.    COVID-19, FLU A/B, RSV PCR 1 HR TAT - Swab, Nasopharynx [[id number]]  (Normal) Collected: 03/02/24 1133    Lab Status: Final result Specimen: Swab from Nasopharynx Updated: 03/02/24 1227     COVID19 Not Detected     Influenza A PCR Not Detected     Influenza B PCR Not Detected     RSV, PCR Not Detected    Narrative:      Fact sheet for providers: https://www.fda.gov/media/662959/download    Fact sheet for patients: https://www.fda.gov/media/415747/download    Test performed by PCR.            No radiology results from the last 24 hrs    Results for orders placed during the hospital encounter of 07/20/21    Adult Transthoracic Echo Complete W/ Cont if Necessary Per Protocol    Interpretation Summary  · Left ventricular wall thickness is consistent with mild concentric hypertrophy.  · Normal left-ventricular systolic function, estimated EF 65%.  · Left ventricular diastolic function is consistent with (grade I) impaired relaxation.  · Aortic sclerosis without aortic stenosis. Trace aortic insufficiency.  · Trace mitral regurgitation, trace tricuspid regurgitation.      Current medications:  Scheduled Meds:apixaban, 5 mg, Oral, Q12H  carvedilol, 25 mg, Oral, BID With Meals  DAPTOmycin, 6 mg/kg (Adjusted), Intravenous, Q24H  Fat Emul Fish Oil/Plant Based, 250 mL, Intravenous, Q24H (TPN)  ferric gluconate, 125 mg, Intravenous, Daily  furosemide, 40 mg, Intravenous, Once  insulin regular, 2-7 Units, Subcutaneous,  Q6H  isosorbide dinitrate, 10 mg, Oral, TID - Nitrates  levETIRAcetam, 500 mg, Oral, Q12H  levothyroxine, 88 mcg, Oral, Q AM  Lidocaine, 2 patch, Transdermal, Q24H  lisinopril, 40 mg, Oral, Q24H  metoclopramide, 10 mg, Intravenous, Q6H  micafungin (MYCAMINE) IV, 100 mg, Intravenous, Q24H  pantoprazole, 40 mg, Intravenous, Q AM  piperacillin-tazobactam, 3.375 g, Intravenous, Q8H  sodium chloride, 10 mL, Intravenous, Q12H  Valproic Acid, 375 mg, Oral, Q6H  verapamil, 80 mg, Oral, Q8H      Continuous Infusions:Adult Central 2-in-1 TPN, , Last Rate: 65 mL/hr at 03/23/24 1811  Pharmacy to Dose TPN,   Pharmacy to dose vancomycin,       PRN Meds:.  acetaminophen **OR** acetaminophen **OR** acetaminophen    calcium carbonate    Calcium Replacement - Follow Nurse / BPA Driven Protocol    dextrose    dextrose    docusate sodium    fluticasone    glucagon (human recombinant)    hydrALAZINE    ipratropium-albuterol    labetalol    Magnesium Standard Dose Replacement - Follow Nurse / BPA Driven Protocol    [DISCONTINUED] HYDROmorphone **AND** naloxone    ondansetron ODT **OR** ondansetron    oxyCODONE    Pharmacy to Dose TPN    Pharmacy to dose vancomycin    Phosphorus Replacement - Follow Nurse / BPA Driven Protocol    Potassium Replacement - Follow Nurse / BPA Driven Protocol    sodium chloride    sodium chloride    sodium chloride    sodium chloride    Assessment & Plan   Assessment & Plan     Active Hospital Problems    Diagnosis  POA    **Falls [W19.XXXA]  Yes    Pneumatosis intestinalis [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        Brief Hospital Course to date:  Lea Rivers is a 78 y.o. male  with past medical history of hypertension, hyperlipidemia, hypothyroidism, and seizure disorder who was admitted on 3/2/2024 due to fall and nondisplaced first through fourth left rib fractures. He was also found to have left distal clavicle fracture.  Patient was noted to have  increasing abdominal distention on 3/6/2024.  CT of the abdomen/pelvis revealed dilated loops of large bowel with likely pneumatosis intestinalis.  General surgery due to persistent colonic ileus and significant colonic distention.  Underwent total abdominal colectomy with end ileostomy creation on 3/9/2024.  Repeat CT of the abdomen/pelvis revealed postsurgical changes with a moderate loculated ascitic fluid collection within the anterior abdomen.  He underwent exploratory laparotomy  with abdominal washout on 3/18/2024 by Dr. Godfrey.  ID Dr. Chow was consulted and is managing antibiotic regimen.      This patient's problems and plans were partially entered by my partner and updated as appropriate by me 03/24/24.    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 on 3/9 w/  Persistent  Leukocytosis  LLL PNA  -s/p repeat CT 3/18, with fluid collection noted, s/p exploratory laparotomy 3/18 with abdominal washout and ascites noted  -completed course of Fluconazole, CTX/Flagyl 3/17 prior to repeat surgery  -CXR 3/12 with possible LLL pneumonia, completed course of CTX  -ID following, abdominal fluid culture with pseudomonas  -continue IV Zosyn, mycamine, dapto for now   -OLIVER drain x2 remain in place  -WOCN for ostomy care  -AM labs      Dysphagia  Malnutrition  -SLP following   -Nutrition following  -Currently on TPN      Toxic metabolic encephalopathy  -per daughter significant change in mental status and speech since arrival, and team had a long discussion and felt this is probably TME in setting of infection, recent large operation, underlying dementia  -initial CT head 3/2 negative, repeated CT head 3/13 without any acute findings  -EEG negative for seizures, findings consistent with TME  -minimize sedating meds   -slowly improving, confused to situation this AM      Multiple falls with increasing frequency   -CT of head shows age-related changes which  are chronic but no acute process  -neuro consulted, likely d/t neuropathy (confirmed with EMG) and progression of dementia     Multiple rib fractures and also left clavicle fracture  -With no displacement or mildly displaced.  Orthopedic surgery evaluated. No surgical intervention  planned.  Recs nonweightbearing LUE, may come out of sling in 5-7 days to initiate gentle ROM exercises per ortho.  F/u with ortho/Dr. Maldonado in 2 weeks  -Pain control, lidocaine patch  -bruise noted to L shoulder/neck region however X-ray negative     Acute on Chronic Anemia  -S/P 1 unit PRBCs 3/11 and 3/16, monitoring H&H  -no clear source of active bleeding, hemoccult negative  -Iron 34, Iron sat 19  -Started on IV Iron 3/23  -Hgb 9.0 this AM   -transfuse PRN hgb <7     Acute RUE DVT (brachial)  -Provoked, 2/2 PICC   -on lovenox, plan to transition to PO anticoagulation pending speech evaluations     Prostate cancer  Hx BPH s/p greenlight photo vaporization of prostate 2018  -Follows with Dr. Noland  -S/P cyberknife radiation 2/9/23  -Has intermittent hematuria, monitor while on AC     Dementia and increasing memory problem  -Patient was previously living alone and driving, given significant decline will need placement     Hypertension  -cleared by speech, resumed home oral regimen  -continue Verapamil   -BP uncontrolled, but BP is being checking on LE    -Increased Lisinopril to 40 mg daily 3/32  -Coreg added this admission, increased to 25 mg BID on 3/24  -Repeat Lasix 40 mg IV x 1 today   -PRN IV dosing for SBP >180     Seizure disorder  -continue vimpat and keppra     Hyperlipidemia  Hypothyroidism  -resume home regimen     Expected Discharge Location and Transportation: SNF  Expected Discharge   Expected Discharge Date: 3/27/2024; Expected Discharge Time:       DVT prophylaxis:  Medical and mechanical DVT prophylaxis orders are present.    AM-PAC 6 Clicks Score (PT): 10 (03/24/24 0400)    CODE STATUS:   Code Status and Medical  Interventions:   Ordered at: 03/02/24 1456     Medical Intervention Limits:    NO intubation (DNI)     Code Status (Patient has no pulse and is not breathing):    No CPR (Do Not Attempt to Resuscitate)     Medical Interventions (Patient has pulse or is breathing):    Limited Support       Raúl De Oliveira, KATE  03/24/24

## 2024-03-24 NOTE — PLAN OF CARE
Problem: Adult Inpatient Plan of Care  Goal: Plan of Care Review  Outcome: Ongoing, Progressing  Flowsheets (Taken 3/24/2024 0419)  Progress: improving  Goal: Patient-Specific Goal (Individualized)  Outcome: Ongoing, Progressing  Goal: Absence of Hospital-Acquired Illness or Injury  Outcome: Ongoing, Progressing  Intervention: Identify and Manage Fall Risk  Recent Flowsheet Documentation  Taken 3/24/2024 0400 by Garret Johnson RN  Safety Promotion/Fall Prevention:   safety round/check completed   nonskid shoes/slippers when out of bed   clutter free environment maintained   assistive device/personal items within reach  Taken 3/24/2024 0200 by Garret Johnson RN  Safety Promotion/Fall Prevention:   safety round/check completed   nonskid shoes/slippers when out of bed   clutter free environment maintained   assistive device/personal items within reach  Taken 3/24/2024 0000 by Garret Johnson RN  Safety Promotion/Fall Prevention:   safety round/check completed   nonskid shoes/slippers when out of bed   clutter free environment maintained   assistive device/personal items within reach  Taken 3/23/2024 2200 by Garret Johnson RN  Safety Promotion/Fall Prevention:   safety round/check completed   nonskid shoes/slippers when out of bed   clutter free environment maintained   assistive device/personal items within reach  Taken 3/23/2024 2000 by Garret Johnson RN  Safety Promotion/Fall Prevention:   safety round/check completed   nonskid shoes/slippers when out of bed   clutter free environment maintained   assistive device/personal items within reach  Intervention: Prevent Skin Injury  Recent Flowsheet Documentation  Taken 3/23/2024 2000 by Garret Johnson RN  Body Position: supine  Intervention: Prevent Infection  Recent Flowsheet Documentation  Taken 3/24/2024 0400 by Garret Johnson RN  Infection Prevention: environmental surveillance performed  Taken 3/24/2024 0200 by Garret Johnson  RN  Infection Prevention: environmental surveillance performed  Taken 3/24/2024 0000 by Garret Johnson RN  Infection Prevention: environmental surveillance performed  Taken 3/23/2024 2200 by Garret Johnson RN  Infection Prevention: environmental surveillance performed  Taken 3/23/2024 2000 by Garret Johnson RN  Infection Prevention: environmental surveillance performed  Goal: Optimal Comfort and Wellbeing  Outcome: Ongoing, Progressing  Goal: Readiness for Transition of Care  Outcome: Ongoing, Progressing     Problem: Skin Injury Risk Increased  Goal: Skin Health and Integrity  Outcome: Ongoing, Progressing  Intervention: Optimize Skin Protection  Recent Flowsheet Documentation  Taken 3/23/2024 2000 by Garret Johnsno RN  Head of Bed (HOB) Positioning: HOB at 20 degrees     Problem: Fall Injury Risk  Goal: Absence of Fall and Fall-Related Injury  Outcome: Ongoing, Progressing  Intervention: Promote Injury-Free Environment  Recent Flowsheet Documentation  Taken 3/24/2024 0400 by Garret Johnson RN  Safety Promotion/Fall Prevention:   safety round/check completed   nonskid shoes/slippers when out of bed   clutter free environment maintained   assistive device/personal items within reach  Taken 3/24/2024 0200 by Garret Johnson RN  Safety Promotion/Fall Prevention:   safety round/check completed   nonskid shoes/slippers when out of bed   clutter free environment maintained   assistive device/personal items within reach  Taken 3/24/2024 0000 by Garret Johnson RN  Safety Promotion/Fall Prevention:   safety round/check completed   nonskid shoes/slippers when out of bed   clutter free environment maintained   assistive device/personal items within reach  Taken 3/23/2024 2200 by Garret Johnson RN  Safety Promotion/Fall Prevention:   safety round/check completed   nonskid shoes/slippers when out of bed   clutter free environment maintained   assistive device/personal items within  reach  Taken 3/23/2024 2000 by Garret Johnson, RN  Safety Promotion/Fall Prevention:   safety round/check completed   nonskid shoes/slippers when out of bed   clutter free environment maintained   assistive device/personal items within reach     Problem: Adjustment to Illness (Sepsis/Septic Shock)  Goal: Optimal Coping  Outcome: Ongoing, Progressing     Problem: Bleeding (Sepsis/Septic Shock)  Goal: Absence of Bleeding  Outcome: Ongoing, Progressing     Problem: Glycemic Control Impaired (Sepsis/Septic Shock)  Goal: Blood Glucose Level Within Desired Range  Outcome: Ongoing, Progressing     Problem: Infection Progression (Sepsis/Septic Shock)  Goal: Absence of Infection Signs and Symptoms  Outcome: Ongoing, Progressing  Intervention: Initiate Sepsis Management  Recent Flowsheet Documentation  Taken 3/24/2024 0400 by Garret Johnson RN  Infection Prevention: environmental surveillance performed  Taken 3/24/2024 0200 by Garret Johnson RN  Infection Prevention: environmental surveillance performed  Taken 3/24/2024 0000 by Garret Johnson RN  Infection Prevention: environmental surveillance performed  Taken 3/23/2024 2200 by Garret Johnson RN  Infection Prevention: environmental surveillance performed  Taken 3/23/2024 2000 by Garret Johnson RN  Infection Prevention: environmental surveillance performed     Problem: Nutrition Impaired (Sepsis/Septic Shock)  Goal: Optimal Nutrition Intake  Outcome: Ongoing, Progressing   Goal Outcome Evaluation:           Progress: improving

## 2024-03-24 NOTE — PROGRESS NOTES
CPN Review Note    Patient Name: Lea Rivers  Date of Encounter: 24 10:33 EDT  MRN: [id number]  Admission date: 3/2/2024    Reason for visit: CPN review . RD to continue to follow per protocol.     Information Obtained:     Pt resting in bed, much more alert today, reports not much appetite, just wants water, no complaints of abdominal pain    EMR reviewed:  yes  Medication reviewed:  yes   Labs reviewed: yes    24hr I&Os:  Ileostomy - 1100ml    Current diet: Diet: Gastrointestinal, Cardiac; Healthy Heart (2-3 Na+); Fiber-Restricted; No Mixed Consistencies, Feeding Assistance - Nursing; Texture: Mechanical Ground (NDD 2); Fluid Consistency: Honey Thick  Adult Central 2-in-1 TPN  Magic Cup 3x/day  Po intake: 0% of 2 meals    Estimated Needs:  Calories:  ~ 1800 Kcal/day  Protein: ~120 g PRO/day    PN Route: PICC  PN:  15% dextrose, 7.7% AA @ goal rate of 65mL/hr       GIR:  1.7mg/kg/min   Lipid:  Provide 250mL 20% daily    PN@ Goal over 24hr to Supply:  Volume 1560 mL/day     Energy 1776 Kcal/day 99 % Est Need   Protein 120 g/day 100 % Est Need     ---------------------------------------------------------------------------  Formula/Rate verified at bedside: Yes  Infusing Rate at time of visit: 65mL/hr    Average Delivery from Chartin Day: ? Intake data missing  PN Volume 597 mL/day 38% goal     Lipid delivered?  No data      Energy  Kcal/day  % Est Need   Protein  g/day  % Est Need       Intervention:  Care plan reviewed  Menu adjusted  Encourage intake    Maintain current PN    Follow up:   Per protocol      Thea Rivera RD  10:33 EDT  Time: 25min

## 2024-03-24 NOTE — PROGRESS NOTES
Pharmacy Parenteral Nutrition Evaluation    Lea Rivers is a  78 y.o. male receiving TPN.     Indication:     Prolonged Paralytic Ileus     Consulting Physician: Eleanor Yost DO     Total Fluid Rate (if stated): n/a    Labs  Results from last 7 days   Lab Units 03/24/24  0603 03/23/24  1056 03/21/24  0622 03/20/24  0510   SODIUM mmol/L 135* 132* 133* 141   POTASSIUM mmol/L 4.1 4.4 4.5 4.4   CHLORIDE mmol/L 103 101 98 106   CO2 mmol/L 25.0 24.0 29.0 29.0   BUN mg/dL 21 18 17 26*   CREATININE mg/dL 0.49* 0.41* 0.52* 0.65*   CALCIUM mg/dL 8.0* 7.8* 7.6* 7.6*   BILIRUBIN mg/dL  --   --  0.2 0.2   ALK PHOS U/L  --   --  101 77   ALT (SGPT) U/L  --   --  <5 5   AST (SGOT) U/L  --   --  17 14   GLUCOSE mg/dL 152* 145* 152* 161*       Results from last 7 days   Lab Units 03/24/24  0603 03/20/24  0510 03/19/24  1132   MAGNESIUM mg/dL 1.9  --   --    PHOSPHORUS mg/dL 3.0 2.9  --    PREALBUMIN mg/dL  --   --  6.5*       Results from last 7 days   Lab Units 03/24/24  0603 03/23/24  0348 03/22/24  0557   WBC 10*3/mm3 13.58* 13.04* 16.56*   HEMOGLOBIN g/dL 9.0* 8.8* 8.6*   HEMATOCRIT % 28.7* 27.0* 26.9*   PLATELETS 10*3/mm3 556* 519* 537*       Triglycerides   Date Value Ref Range Status   03/19/2024 167 (H) 0 - 150 mg/dL Final     Comment:     Falsely depressed results may occur on samples drawn from patients receiving N-Acetylcysteine (NAC) or Metamizole.       estimated creatinine clearance is 136.9 mL/min (A) (by C-G formula based on SCr of 0.49 mg/dL (L)).    Intake & Output (last 3 days)         03/05 0701 03/06 0700 03/06 0701 03/07 0700 03/07 0701 03/08 0700 03/08 0701 03/09 0700    P.O. 1080 240      I.V. (mL/kg)  1026.3 (11.5)      IV Piggyback  400      Total Intake(mL/kg) 1080 (12.1) 1666.3 (18.7)      Urine (mL/kg/hr) 150 (0.1) 400 (0.2)      Stool 0 0      Total Output 150 400      Net +930 +1266.3              Urine Unmeasured Occurrence 4 x 2 x 1 x     Stool Unmeasured Occurrence 5 x 4 x 3 x 1  x            Dietitian Recommendations  Diet Orders (active) (From admission, onward)       Start     Ordered    03/08/24 1800  Adult Cyclic Central 2-in-1 TPN  Cyclic TPN - See Admin Instructions        Note to Pharmacy: Lea ARAUJO Fifield  5H  S572-1  MRN: [id number]  CSN: [id number]    03/08/24 1422    03/08/24 1800  Fat Emul Fish Oil/Plant Based (SMOFLIPID) 20 % emulsion 250 mL  Every 24 Hours Scheduled (TPN)         03/08/24 1422 03/08/24 1355  NPO Diet NPO Type: Strict NPO  Diet Effective Now         03/08/24 1358    03/05/24 1656  DIET MESSAGE Pt would like a grilled cheese sandwich, please. Thanks!  Once        Comments: Pt would like a grilled cheese sandwich, please. Thanks!    03/05/24 1656                    Current TPN Regimen Recommendation:   Dextrose 15% / Amino Acid 7.7% at a goal rate of 65 ml/hr.      20% Lipid Emulsion 250mL every 24 hours.    Na 45 mEq/L  K 50 mEq/L  Ca 5 mEq/L  Mg 8 mEq/L  PO4 15 mmol/L  Cl:Ace - 1:1    Assessment/Plan:  TPN for prolonged paralytic ileus.   Macronutrients per dietary recommendation and electrolyte per pharmacy recommendation are listed above (same as yesterday).  Electrolyte replacement per protocol, will continue with standard electrolytes.   SSI q6h ordered.  Pharmacy will continue to follow and adjust as indicated     Thanks  Maria Victoria Grimaldo, PharmD  Pharmacy Resident  3/24/2024  13:55 EDT

## 2024-03-25 LAB
ALBUMIN SERPL-MCNC: 2.4 G/DL (ref 3.5–5.2)
ALBUMIN/GLOB SERPL: 0.9 G/DL
ALP SERPL-CCNC: 161 U/L (ref 39–117)
ALT SERPL W P-5'-P-CCNC: 7 U/L (ref 1–41)
ANION GAP SERPL CALCULATED.3IONS-SCNC: 7 MMOL/L (ref 5–15)
AST SERPL-CCNC: 18 U/L (ref 1–40)
BASOPHILS # BLD MANUAL: 0 10*3/MM3 (ref 0–0.2)
BASOPHILS NFR BLD MANUAL: 0 % (ref 0–1.5)
BILIRUB SERPL-MCNC: 0.2 MG/DL (ref 0–1.2)
BUN SERPL-MCNC: 27 MG/DL (ref 8–23)
BUN/CREAT SERPL: 43.5 (ref 7–25)
CA-I SERPL ISE-MCNC: 1.27 MMOL/L (ref 1.12–1.32)
CALCIUM SPEC-SCNC: 7.9 MG/DL (ref 8.6–10.5)
CHLORIDE SERPL-SCNC: 105 MMOL/L (ref 98–107)
CO2 SERPL-SCNC: 24 MMOL/L (ref 22–29)
CREAT SERPL-MCNC: 0.62 MG/DL (ref 0.76–1.27)
CRP SERPL-MCNC: 1.94 MG/DL (ref 0–0.5)
DEPRECATED RDW RBC AUTO: 62 FL (ref 37–54)
EGFRCR SERPLBLD CKD-EPI 2021: 97.2 ML/MIN/1.73
EOSINOPHIL # BLD MANUAL: 0.78 10*3/MM3 (ref 0–0.4)
EOSINOPHIL NFR BLD MANUAL: 6 % (ref 0.3–6.2)
ERYTHROCYTE [DISTWIDTH] IN BLOOD BY AUTOMATED COUNT: 17.7 % (ref 12.3–15.4)
GLOBULIN UR ELPH-MCNC: 2.8 GM/DL
GLUCOSE BLDC GLUCOMTR-MCNC: 117 MG/DL (ref 70–130)
GLUCOSE BLDC GLUCOMTR-MCNC: 137 MG/DL (ref 70–130)
GLUCOSE BLDC GLUCOMTR-MCNC: 149 MG/DL (ref 70–130)
GLUCOSE BLDC GLUCOMTR-MCNC: 180 MG/DL (ref 70–130)
GLUCOSE SERPL-MCNC: 129 MG/DL (ref 65–99)
HCT VFR BLD AUTO: 27 % (ref 37.5–51)
HGB BLD-MCNC: 8.4 G/DL (ref 13–17.7)
INR PPP: 1.33 (ref 0.89–1.12)
LYMPHOCYTES # BLD MANUAL: 0.91 10*3/MM3 (ref 0.7–3.1)
LYMPHOCYTES NFR BLD MANUAL: 6 % (ref 5–12)
MCH RBC QN AUTO: 30 PG (ref 26.6–33)
MCHC RBC AUTO-ENTMCNC: 31.1 G/DL (ref 31.5–35.7)
MCV RBC AUTO: 96.4 FL (ref 79–97)
MONOCYTES # BLD: 0.78 10*3/MM3 (ref 0.1–0.9)
NEUTROPHILS # BLD AUTO: 10.58 10*3/MM3 (ref 1.7–7)
NEUTROPHILS NFR BLD MANUAL: 78 % (ref 42.7–76)
NEUTS BAND NFR BLD MANUAL: 3 % (ref 0–5)
NRBC SPEC MANUAL: 0 /100 WBC (ref 0–0.2)
OVALOCYTES BLD QL SMEAR: ABNORMAL
PLATELET # BLD AUTO: 560 10*3/MM3 (ref 140–450)
PMV BLD AUTO: 9 FL (ref 6–12)
POTASSIUM SERPL-SCNC: 4.5 MMOL/L (ref 3.5–5.2)
PREALB SERPL-MCNC: 18.9 MG/DL (ref 20–40)
PROT SERPL-MCNC: 5.2 G/DL (ref 6–8.5)
PROTHROMBIN TIME: 16.7 SECONDS (ref 12.2–14.5)
RBC # BLD AUTO: 2.8 10*6/MM3 (ref 4.14–5.8)
SMALL PLATELETS BLD QL SMEAR: ABNORMAL
SODIUM SERPL-SCNC: 136 MMOL/L (ref 136–145)
TRIGL SERPL-MCNC: 101 MG/DL (ref 0–150)
VANCOMYCIN SERPL-MCNC: <4 MCG/ML (ref 5–40)
VARIANT LYMPHS NFR BLD MANUAL: 1 % (ref 0–5)
VARIANT LYMPHS NFR BLD MANUAL: 6 % (ref 19.6–45.3)
WBC MORPH BLD: NORMAL
WBC NRBC COR # BLD AUTO: 13.06 10*3/MM3 (ref 3.4–10.8)

## 2024-03-25 PROCEDURE — 80053 COMPREHEN METABOLIC PANEL: CPT | Performed by: INTERNAL MEDICINE

## 2024-03-25 PROCEDURE — 80202 ASSAY OF VANCOMYCIN: CPT

## 2024-03-25 PROCEDURE — 82330 ASSAY OF CALCIUM: CPT | Performed by: SURGERY

## 2024-03-25 PROCEDURE — 85610 PROTHROMBIN TIME: CPT | Performed by: SURGERY

## 2024-03-25 PROCEDURE — 86140 C-REACTIVE PROTEIN: CPT | Performed by: SURGERY

## 2024-03-25 PROCEDURE — 85025 COMPLETE CBC W/AUTO DIFF WBC: CPT | Performed by: SURGERY

## 2024-03-25 PROCEDURE — 97530 THERAPEUTIC ACTIVITIES: CPT

## 2024-03-25 PROCEDURE — 25010000002 PIPERACILLIN SOD-TAZOBACTAM PER 1 G: Performed by: SURGERY

## 2024-03-25 PROCEDURE — 85007 BL SMEAR W/DIFF WBC COUNT: CPT | Performed by: SURGERY

## 2024-03-25 PROCEDURE — 25010000002 NA FERRIC GLUC CPLX PER 12.5 MG: Performed by: NURSE PRACTITIONER

## 2024-03-25 PROCEDURE — 84134 ASSAY OF PREALBUMIN: CPT | Performed by: SURGERY

## 2024-03-25 PROCEDURE — 84478 ASSAY OF TRIGLYCERIDES: CPT | Performed by: SURGERY

## 2024-03-25 PROCEDURE — 82948 REAGENT STRIP/BLOOD GLUCOSE: CPT

## 2024-03-25 PROCEDURE — 25010000002 PIPERACILLIN SOD-TAZOBACTAM PER 1 G: Performed by: INTERNAL MEDICINE

## 2024-03-25 PROCEDURE — 25010000002 MICAFUNGIN SODIUM 100 MG RECONSTITUTED SOLUTION 1 EACH VIAL: Performed by: INTERNAL MEDICINE

## 2024-03-25 PROCEDURE — 25010000002 METOCLOPRAMIDE PER 10 MG: Performed by: SURGERY

## 2024-03-25 PROCEDURE — 25010000002 DAPTOMYCIN PER 1 MG: Performed by: INTERNAL MEDICINE

## 2024-03-25 PROCEDURE — 63710000001 INSULIN REGULAR HUMAN PER 5 UNITS: Performed by: SURGERY

## 2024-03-25 PROCEDURE — 99232 SBSQ HOSP IP/OBS MODERATE 35: CPT | Performed by: STUDENT IN AN ORGANIZED HEALTH CARE EDUCATION/TRAINING PROGRAM

## 2024-03-25 RX ORDER — LOPERAMIDE HYDROCHLORIDE 2 MG/1
2 CAPSULE ORAL 2 TIMES DAILY
Status: DISCONTINUED | OUTPATIENT
Start: 2024-03-25 | End: 2024-03-27

## 2024-03-25 RX ADMIN — LEVETIRACETAM 500 MG: 500 TABLET, FILM COATED ORAL at 21:27

## 2024-03-25 RX ADMIN — VERAPAMIL HYDROCHLORIDE 80 MG: 80 TABLET ORAL at 21:27

## 2024-03-25 RX ADMIN — CARVEDILOL 25 MG: 12.5 TABLET, FILM COATED ORAL at 18:16

## 2024-03-25 RX ADMIN — PIPERACILLIN AND TAZOBACTAM 3.38 G: 3; .375 INJECTION, POWDER, LYOPHILIZED, FOR SOLUTION INTRAVENOUS at 15:35

## 2024-03-25 RX ADMIN — VERAPAMIL HYDROCHLORIDE 80 MG: 80 TABLET ORAL at 05:30

## 2024-03-25 RX ADMIN — APIXABAN 5 MG: 5 TABLET, FILM COATED ORAL at 08:00

## 2024-03-25 RX ADMIN — DAPTOMYCIN 500 MG: 500 INJECTION, POWDER, LYOPHILIZED, FOR SOLUTION INTRAVENOUS at 09:16

## 2024-03-25 RX ADMIN — MICAFUNGIN 100 MG: 20 INJECTION, POWDER, LYOPHILIZED, FOR SOLUTION INTRAVENOUS at 15:35

## 2024-03-25 RX ADMIN — METOCLOPRAMIDE 10 MG: 5 INJECTION, SOLUTION INTRAMUSCULAR; INTRAVENOUS at 07:51

## 2024-03-25 RX ADMIN — INSULIN HUMAN 3 UNITS: 100 INJECTION, SOLUTION PARENTERAL at 00:10

## 2024-03-25 RX ADMIN — VERAPAMIL HYDROCHLORIDE 80 MG: 80 TABLET ORAL at 14:10

## 2024-03-25 RX ADMIN — LOPERAMIDE HYDROCHLORIDE 2 MG: 2 CAPSULE ORAL at 21:27

## 2024-03-25 RX ADMIN — VALPROIC ACID 375 MG: 250 SOLUTION ORAL at 00:10

## 2024-03-25 RX ADMIN — INSULIN HUMAN 2 UNITS: 100 INJECTION, SOLUTION PARENTERAL at 05:47

## 2024-03-25 RX ADMIN — Medication 10 ML: at 08:00

## 2024-03-25 RX ADMIN — SODIUM CHLORIDE 125 MG: 9 INJECTION, SOLUTION INTRAVENOUS at 08:03

## 2024-03-25 RX ADMIN — PIPERACILLIN AND TAZOBACTAM 3.38 G: 3; .375 INJECTION, POWDER, LYOPHILIZED, FOR SOLUTION INTRAVENOUS at 07:51

## 2024-03-25 RX ADMIN — LEVETIRACETAM 500 MG: 500 TABLET, FILM COATED ORAL at 08:00

## 2024-03-25 RX ADMIN — PANTOPRAZOLE SODIUM 40 MG: 40 INJECTION, POWDER, FOR SOLUTION INTRAVENOUS at 05:33

## 2024-03-25 RX ADMIN — ISOSORBIDE DINITRATE 10 MG: 10 TABLET ORAL at 12:20

## 2024-03-25 RX ADMIN — LISINOPRIL 40 MG: 40 TABLET ORAL at 08:00

## 2024-03-25 RX ADMIN — METOCLOPRAMIDE 10 MG: 5 INJECTION, SOLUTION INTRAMUSCULAR; INTRAVENOUS at 00:11

## 2024-03-25 RX ADMIN — LEVOTHYROXINE SODIUM 88 MCG: 88 TABLET ORAL at 05:30

## 2024-03-25 RX ADMIN — LIDOCAINE 2 PATCH: 4 PATCH TOPICAL at 08:00

## 2024-03-25 RX ADMIN — PIPERACILLIN AND TAZOBACTAM 3.38 G: 3; .375 INJECTION, POWDER, LYOPHILIZED, FOR SOLUTION INTRAVENOUS at 00:10

## 2024-03-25 RX ADMIN — VALPROIC ACID 375 MG: 250 SOLUTION ORAL at 18:16

## 2024-03-25 RX ADMIN — CARVEDILOL 25 MG: 12.5 TABLET, FILM COATED ORAL at 07:58

## 2024-03-25 RX ADMIN — ISOSORBIDE DINITRATE 10 MG: 10 TABLET ORAL at 18:16

## 2024-03-25 RX ADMIN — VALPROIC ACID 375 MG: 250 SOLUTION ORAL at 05:33

## 2024-03-25 RX ADMIN — ISOSORBIDE DINITRATE 10 MG: 10 TABLET ORAL at 07:59

## 2024-03-25 RX ADMIN — LOPERAMIDE HYDROCHLORIDE 2 MG: 2 CAPSULE ORAL at 09:13

## 2024-03-25 RX ADMIN — APIXABAN 5 MG: 5 TABLET, FILM COATED ORAL at 21:27

## 2024-03-25 RX ADMIN — VALPROIC ACID 375 MG: 250 SOLUTION ORAL at 12:20

## 2024-03-25 NOTE — PLAN OF CARE
Goal Outcome Evaluation:              Problem: Adult Inpatient Plan of Care  Goal: Plan of Care Review  Outcome: Ongoing, Progressing  Goal: Patient-Specific Goal (Individualized)  Outcome: Ongoing, Progressing  Goal: Absence of Hospital-Acquired Illness or Injury  Outcome: Ongoing, Progressing  Intervention: Identify and Manage Fall Risk  Recent Flowsheet Documentation  Taken 3/25/2024 1000 by Chyna Russ RN  Safety Promotion/Fall Prevention:   activity supervised   assistive device/personal items within reach   clutter free environment maintained   fall prevention program maintained   nonskid shoes/slippers when out of bed   room organization consistent   safety round/check completed  Taken 3/25/2024 0800 by Chyna Russ RN  Safety Promotion/Fall Prevention:   activity supervised   assistive device/personal items within reach   clutter free environment maintained   fall prevention program maintained   nonskid shoes/slippers when out of bed   room organization consistent   safety round/check completed  Intervention: Prevent Skin Injury  Recent Flowsheet Documentation  Taken 3/25/2024 1000 by Chyna Russ RN  Body Position:   turned   left  Skin Protection:   adhesive use limited   incontinence pads utilized   tubing/devices free from skin contact  Taken 3/25/2024 0800 by Chyna Russ RN  Body Position:   turned   supine  Skin Protection:   adhesive use limited   incontinence pads utilized   tubing/devices free from skin contact  Intervention: Prevent and Manage VTE (Venous Thromboembolism) Risk  Recent Flowsheet Documentation  Taken 3/25/2024 1000 by Chyna Russ RN  Activity Management: activity encouraged  Taken 3/25/2024 0800 by Chyna Russ RN  Activity Management: activity encouraged  Intervention: Prevent Infection  Recent Flowsheet Documentation  Taken 3/25/2024 1000 by Chyna Russ RN  Infection Prevention:   environmental surveillance performed   hand hygiene promoted    personal protective equipment utilized   rest/sleep promoted   single patient room provided  Taken 3/25/2024 0800 by Chyna Russ RN  Infection Prevention:   environmental surveillance performed   hand hygiene promoted   personal protective equipment utilized   rest/sleep promoted   single patient room provided  Goal: Optimal Comfort and Wellbeing  Outcome: Ongoing, Progressing  Goal: Readiness for Transition of Care  Outcome: Ongoing, Progressing     Problem: Skin Injury Risk Increased  Goal: Skin Health and Integrity  Outcome: Ongoing, Progressing  Intervention: Optimize Skin Protection  Recent Flowsheet Documentation  Taken 3/25/2024 1000 by Chyna Russ RN  Pressure Reduction Techniques:   frequent weight shift encouraged   heels elevated off bed   weight shift assistance provided  Head of Bed (HOB) Positioning: HOB elevated  Pressure Reduction Devices:   pressure-redistributing mattress utilized   positioning supports utilized   heel offloading device utilized  Skin Protection:   adhesive use limited   incontinence pads utilized   tubing/devices free from skin contact  Taken 3/25/2024 0800 by Chyna Russ RN  Pressure Reduction Techniques:   frequent weight shift encouraged   heels elevated off bed   weight shift assistance provided  Head of Bed (HOB) Positioning: HOB elevated  Pressure Reduction Devices:   pressure-redistributing mattress utilized   positioning supports utilized   heel offloading device utilized  Skin Protection:   adhesive use limited   incontinence pads utilized   tubing/devices free from skin contact     Problem: Fall Injury Risk  Goal: Absence of Fall and Fall-Related Injury  Outcome: Ongoing, Progressing  Intervention: Identify and Manage Contributors  Recent Flowsheet Documentation  Taken 3/25/2024 0800 by Chyna Russ RN  Medication Review/Management: medications reviewed  Intervention: Promote Injury-Free Environment  Recent Flowsheet Documentation  Taken 3/25/2024 1000  by Jeb, Chyna D, RN  Safety Promotion/Fall Prevention:   activity supervised   assistive device/personal items within reach   clutter free environment maintained   fall prevention program maintained   nonskid shoes/slippers when out of bed   room organization consistent   safety round/check completed  Taken 3/25/2024 0800 by Chyna Russ RN  Safety Promotion/Fall Prevention:   activity supervised   assistive device/personal items within reach   clutter free environment maintained   fall prevention program maintained   nonskid shoes/slippers when out of bed   room organization consistent   safety round/check completed     Problem: Adjustment to Illness (Sepsis/Septic Shock)  Goal: Optimal Coping  Outcome: Ongoing, Progressing  Intervention: Optimize Psychosocial Adjustment to Illness  Recent Flowsheet Documentation  Taken 3/25/2024 0800 by Chyna Russ RN  Supportive Measures: active listening utilized     Problem: Bleeding (Sepsis/Septic Shock)  Goal: Absence of Bleeding  Outcome: Ongoing, Progressing     Problem: Glycemic Control Impaired (Sepsis/Septic Shock)  Goal: Blood Glucose Level Within Desired Range  Outcome: Ongoing, Progressing  Intervention: Optimize Glycemic Control  Recent Flowsheet Documentation  Taken 3/25/2024 0800 by Chyna Russ RN  Glycemic Management: blood glucose monitored     Problem: Infection Progression (Sepsis/Septic Shock)  Goal: Absence of Infection Signs and Symptoms  Outcome: Ongoing, Progressing  Intervention: Initiate Sepsis Management  Recent Flowsheet Documentation  Taken 3/25/2024 1000 by Chyna Russ RN  Infection Prevention:   environmental surveillance performed   hand hygiene promoted   personal protective equipment utilized   rest/sleep promoted   single patient room provided  Taken 3/25/2024 0800 by Chyna Russ, RN  Infection Prevention:   environmental surveillance performed   hand hygiene promoted   personal protective equipment utilized    rest/sleep promoted   single patient room provided  Intervention: Promote Recovery  Recent Flowsheet Documentation  Taken 3/25/2024 1000 by Chyna Russ, RN  Activity Management: activity encouraged  Taken 3/25/2024 0800 by Chyna Russ, RN  Activity Management: activity encouraged  Sleep/Rest Enhancement: awakenings minimized     Problem: Nutrition Impaired (Sepsis/Septic Shock)  Goal: Optimal Nutrition Intake  Outcome: Ongoing, Progressing

## 2024-03-25 NOTE — PROGRESS NOTES
"Patient Name:  Lea Rivers  YOB: 1945  [id number]    Surgery Progress Note    Date of visit: 3/25/2024    Subjective     Unable to obtain full history due to patient's dementia. Alert  No abdominal pain. No nausea/vomiting. No fevers.  Having ostomy output. Appetite somewhat improved.       Objective       /86 (BP Location: Left leg, Patient Position: Lying)   Pulse 70   Temp 97.4 °F (36.3 °C) (Oral)   Resp 18   Ht 170.2 cm (67.01\")   Wt 98.8 kg (217 lb 12 oz)   SpO2 97%   BMI 34.10 kg/m²     Intake/Output Summary (Last 24 hours) at 3/25/2024 0802  Last data filed at 3/25/2024 0556  Gross per 24 hour   Intake 2299 ml   Output 3100 ml   Net -801 ml   Ostomy 3050 cc  OLIVER #1 30cc  OLIVER #2 20cc    CV:  Rhythm regular and rate regular  L:  Clear to auscultation bilaterally  Abd:  Bowel sounds positive, soft, nontender, incision c/d/I, OLIVER drains serous, ostomy viable and functioning  Ext:  No cyanosis, clubbing, edema    Recent labs and imaging that are back at this time have been reviewed.   WBC 13.1  Hgb 8.4       Assessment & Plan     Patient postoperative day 16 from total abdominal colectomy with end ileostomy, postoperative day 7 abdominal washout. Start Imodium and DC Reglan for high ostomy output. DC lower OLIVER drain. Pain control.  Diet per speech and swallow recommendations. Wean TPN once OK from Nutritionist standpoint. Abx per ID, OR cultures with rare pseudomonas and Enterococcus. OK to continue anticoagulation for UE DVT.                Harley Godfrey MD  3/25/2024  08:02 EDT      "

## 2024-03-25 NOTE — PLAN OF CARE
Goal Outcome Evaluation:  Plan of Care Reviewed With: patient        Progress: no change  Outcome Evaluation: Pt continues to present with decreased functional mobility, pain with mobility, and decreased command following. Pt completed bed mobility and SPT to chair with max A. Continue to progress per pt tolerance.      Anticipated Discharge Disposition (PT): skilled nursing facility

## 2024-03-25 NOTE — CASE MANAGEMENT/SOCIAL WORK
Continued Stay Note  [facility]     Patient Name: Lea Rivers  MRN: [id number]  Today's Date: 3/25/2024    Admit Date: 3/2/2024    Plan: SNF to transition to LTC   Discharge Plan       Row Name 03/25/24 1516       Plan    Plan SNF to transition to LTC    Plan Comments Discussed patient in MDR.  [facility] no longer has a sitter, but still has TPN infusing.  Attempted to updated [employer] at [phone].  No answer and it did not say that it was a secure voicemail.  CM will continue to follow.    Final Discharge Disposition Code 03 - skilled nursing facility (SNF)                   Discharge Codes    No documentation.                 Expected Discharge Date and Time       Expected Discharge Date Expected Discharge Time    Mar 29, 2024               Rosa Nava RN

## 2024-03-25 NOTE — NURSING NOTE
WOC follow-up for ostomy care and education.     Patient s/p total abdominal colectomy with end ileostomy.     Patient is not appropriate for education.  Ostomy appliance intact.  Stoma viable.  Having high output from ostomy -over 3 L in the last 24 hours.  Surgeon has started scheduled Imodium.     No family at bedside.     Appliance changed to a 1 piece flat high output appliance with drainable end.  Barrier ring utilized.    Please record and measure output.  Watch fluid status.     Will continue to follow.     Pola Arnold RN, BSN, CCRN, CWOCN  Wound, Ostomy and Continence (WOC) Department  [facility]

## 2024-03-25 NOTE — PLAN OF CARE
Problem: Adult Inpatient Plan of Care  Goal: Plan of Care Review  Outcome: Ongoing, Progressing  Flowsheets (Taken 3/25/2024 0639)  Progress: improving  Plan of Care Reviewed With: patient  Goal: Patient-Specific Goal (Individualized)  Outcome: Ongoing, Progressing  Goal: Absence of Hospital-Acquired Illness or Injury  Outcome: Ongoing, Progressing  Intervention: Identify and Manage Fall Risk  Recent Flowsheet Documentation  Taken 3/25/2024 0600 by Garret Johnson RN  Safety Promotion/Fall Prevention:   safety round/check completed   nonskid shoes/slippers when out of bed   clutter free environment maintained   assistive device/personal items within reach  Taken 3/25/2024 0400 by Garret Johnson RN  Safety Promotion/Fall Prevention:   safety round/check completed   nonskid shoes/slippers when out of bed   clutter free environment maintained   assistive device/personal items within reach  Taken 3/25/2024 0200 by Garret Johnson RN  Safety Promotion/Fall Prevention:   safety round/check completed   nonskid shoes/slippers when out of bed   clutter free environment maintained   assistive device/personal items within reach  Taken 3/25/2024 0000 by Garret Johnson RN  Safety Promotion/Fall Prevention:   safety round/check completed   nonskid shoes/slippers when out of bed   clutter free environment maintained   assistive device/personal items within reach  Taken 3/24/2024 2200 by Garret Johnson RN  Safety Promotion/Fall Prevention:   safety round/check completed   nonskid shoes/slippers when out of bed   clutter free environment maintained   assistive device/personal items within reach  Taken 3/24/2024 2000 by Garret Johnson RN  Safety Promotion/Fall Prevention:   safety round/check completed   nonskid shoes/slippers when out of bed   clutter free environment maintained   assistive device/personal items within reach  Intervention: Prevent Skin Injury  Recent Flowsheet Documentation  Taken  3/24/2024 2000 by Garret Johnson RN  Body Position: supine  Intervention: Prevent Infection  Recent Flowsheet Documentation  Taken 3/25/2024 0600 by Garret Jonhson RN  Infection Prevention: environmental surveillance performed  Taken 3/25/2024 0400 by Garret Johnson RN  Infection Prevention: environmental surveillance performed  Taken 3/25/2024 0200 by Garret Johnson RN  Infection Prevention: environmental surveillance performed  Taken 3/25/2024 0000 by Garret Johnson RN  Infection Prevention: environmental surveillance performed  Taken 3/24/2024 2200 by Garret Johnson RN  Infection Prevention: environmental surveillance performed  Taken 3/24/2024 2000 by Garret Johnson RN  Infection Prevention: environmental surveillance performed  Goal: Optimal Comfort and Wellbeing  Outcome: Ongoing, Progressing  Goal: Readiness for Transition of Care  Outcome: Ongoing, Progressing     Problem: Skin Injury Risk Increased  Goal: Skin Health and Integrity  Outcome: Ongoing, Progressing  Intervention: Optimize Skin Protection  Recent Flowsheet Documentation  Taken 3/24/2024 2000 by Garret Johnson RN  Head of Bed (HOB) Positioning: HOB at 20-30 degrees     Problem: Fall Injury Risk  Goal: Absence of Fall and Fall-Related Injury  Outcome: Ongoing, Progressing  Intervention: Promote Injury-Free Environment  Recent Flowsheet Documentation  Taken 3/25/2024 0600 by Garret Johnson RN  Safety Promotion/Fall Prevention:   safety round/check completed   nonskid shoes/slippers when out of bed   clutter free environment maintained   assistive device/personal items within reach  Taken 3/25/2024 0400 by Garret Johnson RN  Safety Promotion/Fall Prevention:   safety round/check completed   nonskid shoes/slippers when out of bed   clutter free environment maintained   assistive device/personal items within reach  Taken 3/25/2024 0200 by Garret Johnson RN  Safety Promotion/Fall Prevention:    safety round/check completed   nonskid shoes/slippers when out of bed   clutter free environment maintained   assistive device/personal items within reach  Taken 3/25/2024 0000 by Garret Johnson, RN  Safety Promotion/Fall Prevention:   safety round/check completed   nonskid shoes/slippers when out of bed   clutter free environment maintained   assistive device/personal items within reach  Taken 3/24/2024 2200 by Garret Johnson, RN  Safety Promotion/Fall Prevention:   safety round/check completed   nonskid shoes/slippers when out of bed   clutter free environment maintained   assistive device/personal items within reach  Taken 3/24/2024 2000 by Garret Johnson, RN  Safety Promotion/Fall Prevention:   safety round/check completed   nonskid shoes/slippers when out of bed   clutter free environment maintained   assistive device/personal items within reach   Goal Outcome Evaluation:  Plan of Care Reviewed With: patient        Progress: improving

## 2024-03-25 NOTE — PROGRESS NOTES
[facility] Medicine Services  PROGRESS NOTE    Patient Name: Lea Rivers  : 1945  MRN: [id number]    Date of Admission: 3/2/2024  Primary Care Physician: Mannie Melvin MD    Subjective   Subjective     CC:  Follow-up colectomy    HPI:  No new issues overnight. Drowsy. Mumbles in response to questions      Objective   Objective     Vital Signs:   Temp:  [97.4 °F (36.3 °C)-98.1 °F (36.7 °C)] 97.9 °F (36.6 °C)  Heart Rate:  [62-81] 62  Resp:  [18-20] 20  BP: (149-177)/(72-92) 176/92     Physical Exam:  Constitutional: No acute distress, awake, alert, chronically ill-appearing  HENT: NCAT, mucous membranes moist  Respiratory: Diminished in bases, respiratory effort normal on room air  Cardiovascular: RRR, no murmurs, cap refill brisk   Gastrointestinal: Positive bowel sounds, lower OLIVER drain dc'd, midline vertical incision staples in place, ostomy in place with brown liquid stool and a lot of output, abdominal binder in place, tenderness to palpation, nondistended  Musculoskeletal: trace BLE edema, bilateral heel dressings in place  Psychiatric: Flat affect, cooperative  Neurologic: Oriented x 3 but confused to situation, moves all extremities, slowed speech  Skin: warm, dry, no visible rash       Results Reviewed:  LAB RESULTS:      Lab 24  0612 24  0603 24  0348 24  0557 24  0439 24  0510 24  1643 24  0936   WBC 13.06* 13.58* 13.04* 16.56* 15.40* 17.74*  --  22.83*   HEMOGLOBIN 8.4* 9.0* 8.8* 8.6* 9.1* 8.6*  --  9.0*   HEMATOCRIT 27.0* 28.7* 27.0* 26.9* 27.7* 27.6*  --  31.4*   PLATELETS 560* 556* 519* 537* 517* 465*  --  413   NEUTROS ABS 10.58* 9.64* 9.46* 12.32* 11.78* 13.73*  --  18.38*   IMMATURE GRANS (ABS)  --   --  0.73* 0.92* 0.83* 0.96*  --  1.18*   LYMPHS ABS  --   --  1.02 1.31 1.09 1.07  --  0.81   MONOS ABS  --   --  0.97* 1.40* 1.15* 1.77*  --  2.29*   EOS ABS 0.78* 0.95* 0.74* 0.50* 0.41* 0.12  --  0.01    MCV 96.4 95.7 99.6* 93.1 97.9* 93.6  --  101.0*   CRP  --   --   --   --   --   --   --  13.84*   PROTIME  --   --   --   --   --   --  15.9*  --          Lab 03/24/24  0603 03/23/24  1056 03/21/24  0622 03/20/24  0510   SODIUM 135* 132* 133* 141   POTASSIUM 4.1 4.4 4.5 4.4   CHLORIDE 103 101 98 106   CO2 25.0 24.0 29.0 29.0   ANION GAP 7.0 7.0 6.0 6.0   BUN 21 18 17 26*   CREATININE 0.49* 0.41* 0.52* 0.65*   EGFR 104.4 110.2 102.5 95.8   GLUCOSE 152* 145* 152* 161*   CALCIUM 8.0* 7.8* 7.6* 7.6*   MAGNESIUM 1.9  --   --   --    PHOSPHORUS 3.0  --   --  2.9         Lab 03/21/24  0622 03/20/24  0510   TOTAL PROTEIN 4.8* 4.7*   ALBUMIN 2.0* 1.9*   GLOBULIN 2.8 2.8   ALT (SGPT) <5 5   AST (SGOT) 17 14   BILIRUBIN 0.2 0.2   ALK PHOS 101 77         Lab 03/19/24  1643   PROTIME 15.9*   INR 1.26*         Lab 03/19/24  0936   TRIGLYCERIDES 167*         Lab 03/23/24  1827 03/23/24  0348   IRON  --  34*   IRON SATURATION (TSAT)  --  19*   TIBC  --  182*   TRANSFERRIN  --  122*   FERRITIN  --  200.10   FOLATE 3.78*  --    VITAMIN B 12  --  1,320*         Brief Urine Lab Results  (Last result in the past 365 days)        Color   Clarity   Blood   Leuk Est   Nitrite   Protein   CREAT   Urine HCG        03/11/24 1337 Yellow   Cloudy   Moderate (2+)   Trace   Negative   30 mg/dL (1+)                   Microbiology Results Abnormal       Procedure Component Value - Date/Time    Fungus Culture - Peritoneal Fluid, Perineum [[id number]] Collected: 03/18/24 1349    Lab Status: Preliminary result Specimen: Peritoneal Fluid from Perineum Updated: 03/23/24 1831     Fungus Culture No fungus isolated at less than 1 week    Anaerobic Culture - Peritoneal Fluid, Perineum [[id number]]  (Normal) Collected: 03/18/24 1340    Lab Status: Final result Specimen: Peritoneal Fluid from Perineum Updated: 03/23/24 1026     Anaerobic Culture No anaerobes isolated at 5 days    AFB Culture - Peritoneal Fluid, Perineum [[id number]] Collected: 03/18/24 4271     Lab Status: Preliminary result Specimen: Peritoneal Fluid from Perineum Updated: 03/19/24 1339     AFB Stain No acid fast bacilli seen on concentrated smear    Urine Culture - Urine, Indwelling Urethral Catheter [[id number]]  (Normal) Collected: 03/11/24 1337    Lab Status: Final result Specimen: Urine from Indwelling Urethral Catheter Updated: 03/12/24 2024     Urine Culture No growth    Blood Culture - Blood, Arm, Right [[id number]]  (Normal) Collected: 03/06/24 1857    Lab Status: Final result Specimen: Blood from Arm, Right Updated: 03/11/24 2045     Blood Culture No growth at 5 days    Blood Culture - Blood, Arm, Right [[id number]]  (Normal) Collected: 03/06/24 1751    Lab Status: Final result Specimen: Blood from Arm, Right Updated: 03/11/24 2000     Blood Culture No growth at 5 days    Blood Culture - Blood, Arm, Right [[id number]]  (Normal) Collected: 03/02/24 1132    Lab Status: Final result Specimen: Blood from Arm, Right Updated: 03/07/24 1201     Blood Culture No growth at 5 days    Narrative:      Less than seven (7) mL's of blood was collected.  Insufficient quantity may yield false negative results.    Blood Culture - Blood, Arm, Right [[id number]]  (Normal) Collected: 03/02/24 1132    Lab Status: Final result Specimen: Blood from Arm, Right Updated: 03/07/24 1201     Blood Culture No growth at 5 days    Narrative:      Less than seven (7) mL's of blood was collected.  Insufficient quantity may yield false negative results.    Urine Culture - Urine, Straight Cath [[id number]]  (Normal) Collected: 03/02/24 0923    Lab Status: Final result Specimen: Urine from Straight Cath Updated: 03/03/24 1601     Urine Culture No growth    COVID PRE-OP / PRE-PROCEDURE SCREENING ORDER (NO ISOLATION) - Swab, Nasopharynx [[id number]]  (Normal) Collected: 03/02/24 1133    Lab Status: Final result Specimen: Swab from Nasopharynx Updated: 03/02/24 1227    Narrative:      The following orders were created for panel order COVID  PRE-OP / PRE-PROCEDURE SCREENING ORDER (NO ISOLATION) - Swab, Nasopharynx.  Procedure                               Abnormality         Status                     ---------                               -----------         ------                     COVID-19, FLU A/B, RSV P...[[id number]]  Normal              Final result                 Please view results for these tests on the individual orders.    COVID-19, FLU A/B, RSV PCR 1 HR TAT - Swab, Nasopharynx [[id number]]  (Normal) Collected: 03/02/24 1133    Lab Status: Final result Specimen: Swab from Nasopharynx Updated: 03/02/24 1227     COVID19 Not Detected     Influenza A PCR Not Detected     Influenza B PCR Not Detected     RSV, PCR Not Detected    Narrative:      Fact sheet for providers: https://www.fda.gov/media/240201/download    Fact sheet for patients: https://www.fda.gov/media/441881/download    Test performed by PCR.            No radiology results from the last 24 hrs    Results for orders placed during the hospital encounter of 07/20/21    Adult Transthoracic Echo Complete W/ Cont if Necessary Per Protocol    Interpretation Summary  · Left ventricular wall thickness is consistent with mild concentric hypertrophy.  · Normal left-ventricular systolic function, estimated EF 65%.  · Left ventricular diastolic function is consistent with (grade I) impaired relaxation.  · Aortic sclerosis without aortic stenosis. Trace aortic insufficiency.  · Trace mitral regurgitation, trace tricuspid regurgitation.      Current medications:  Scheduled Meds:apixaban, 5 mg, Oral, Q12H  carvedilol, 25 mg, Oral, BID With Meals  DAPTOmycin, 6 mg/kg (Adjusted), Intravenous, Q24H  insulin regular, 2-7 Units, Subcutaneous, Q6H  isosorbide dinitrate, 10 mg, Oral, TID - Nitrates  levETIRAcetam, 500 mg, Oral, Q12H  levothyroxine, 88 mcg, Oral, Q AM  Lidocaine, 2 patch, Transdermal, Q24H  lisinopril, 40 mg, Oral, Q24H  loperamide, 2 mg, Oral, BID  micafungin (MYCAMINE) IV, 100 mg,  Intravenous, Q24H  pantoprazole, 40 mg, Intravenous, Q AM  piperacillin-tazobactam, 3.375 g, Intravenous, Q8H  sodium chloride, 10 mL, Intravenous, Q12H  Valproic Acid, 375 mg, Oral, Q6H  verapamil, 80 mg, Oral, Q8H      Continuous Infusions:Adult Central 2-in-1 TPN, , Last Rate: 32.5 mL/hr at 03/25/24 1300  Pharmacy to Dose TPN,       PRN Meds:.  acetaminophen **OR** acetaminophen **OR** acetaminophen    calcium carbonate    Calcium Replacement - Follow Nurse / BPA Driven Protocol    dextrose    dextrose    docusate sodium    fluticasone    glucagon (human recombinant)    hydrALAZINE    ipratropium-albuterol    labetalol    Magnesium Standard Dose Replacement - Follow Nurse / BPA Driven Protocol    [DISCONTINUED] HYDROmorphone **AND** naloxone    ondansetron ODT **OR** ondansetron    oxyCODONE    Pharmacy to Dose TPN    Phosphorus Replacement - Follow Nurse / BPA Driven Protocol    Potassium Replacement - Follow Nurse / BPA Driven Protocol    sodium chloride    sodium chloride    sodium chloride    sodium chloride    Assessment & Plan   Assessment & Plan     Active Hospital Problems    Diagnosis  POA    **Falls [W19.XXXA]  Yes    Pneumatosis intestinalis [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        Brief Hospital Course to date:  Lea Rivers is a 78 y.o. male  with past medical history of hypertension, hyperlipidemia, hypothyroidism, and seizure disorder who was admitted on 3/2/2024 due to fall and nondisplaced first through fourth left rib fractures. He was also found to have left distal clavicle fracture.  Patient was noted to have increasing abdominal distention on 3/6/2024.  CT of the abdomen/pelvis revealed dilated loops of large bowel with likely pneumatosis intestinalis.  General surgery was consulted due to persistent colonic ileus and significant colonic distention.  Underwent total abdominal colectomy with end ileostomy creation on 3/9/2024.  Repeat CT  of the abdomen/pelvis revealed postsurgical changes with a moderate loculated ascitic fluid collection within the anterior abdomen.  He underwent exploratory laparotomy  with abdominal washout on 3/18/2024 by Dr. Godfrey.  ID Dr. Chow was consulted and is managing antibiotic regimen.      This patient's problems and plans were partially entered by my partner and updated as appropriate by me 03/25/24.    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 on 3/9 w/  Persistent  Leukocytosis  LLL PNA  -s/p repeat CT 3/18, with fluid collection noted, s/p exploratory laparotomy 3/18 with abdominal washout and ascites noted  -completed course of Fluconazole, CTX/Flagyl 3/17 prior to repeat surgery  -CXR 3/12 with possible LLL pneumonia, completed course of CTX  -ID following, abdominal fluid culture with pseudomonas  -continue IV Zosyn, mycamine, dapto for now   -lower OLIVER drain removed today  -WOCN for ostomy care. Imodium added for high ostomy output  -AM labs      Dysphagia  Malnutrition  -SLP following   -Nutrition following  -Currently on TPN. Weaning per dietary, ok'd by surgery  -may need tube feeds if PO intake inadequate     Toxic metabolic encephalopathy  -per daughter significant change in mental status and speech since arrival, and team had a long discussion and felt this is probably TME in setting of infection, recent large operation, underlying dementia  -initial CT head 3/2 negative, repeated CT head 3/13 without any acute findings  -EEG negative for seizures, findings consistent with TME  -minimize sedating meds   -slowly improving     Multiple falls with increasing frequency   -CT of head shows age-related changes which are chronic but no acute process  -neuro consulted, likely d/t neuropathy (confirmed with EMG) and progression of dementia     Multiple rib fractures and also left clavicle fracture  -With no displacement or mildly displaced.   Orthopedic surgery evaluated. No surgical intervention  planned.  Recs nonweightbearing LUE, may come out of sling in 5-7 days to initiate gentle ROM exercises per ortho.  F/u with ortho/Dr. Maldonado in 2 weeks  -Pain control, lidocaine patch  -bruise noted to L shoulder/neck region however X-ray negative     Acute on Chronic Anemia  -S/P 1 unit PRBCs 3/11 and 3/16, monitoring H&H  -no clear source of active bleeding, hemoccult negative  -Iron 34, Iron sat 19  -Started on IV Iron 3/23  -transfuse PRN hgb <7     Acute RUE DVT (brachial)  -Provoked, 2/2 PICC   -eliquis     Prostate cancer  Hx BPH s/p greenlight photo vaporization of prostate 2018  -Follows with Dr. Noland  -S/P cyberknife radiation 2/9/23  -Has intermittent hematuria, monitor while on AC     Dementia and increasing memory problem  -Patient was previously living alone and driving, given significant decline will need placement     Hypertension  -cleared by speech, resumed home oral regimen  -continue Verapamil   -BP uncontrolled, but BP is being checking on LE    -Increased Lisinopril to 40 mg daily 3/32  -Coreg added this admission, increased to 25 mg BID on 3/24  -PRN IV dosing for SBP >180     Seizure disorder  -continue vimpat and keppra     Hyperlipidemia  Hypothyroidism  -resume home regimen     Expected Discharge Location and Transportation: SNF  Expected Discharge   Expected Discharge Date: 3/29/2024; Expected Discharge Time:       DVT prophylaxis:  Medical and mechanical DVT prophylaxis orders are present.    AM-PAC 6 Clicks Score (PT): 13 (03/25/24 0800)    CODE STATUS:   Code Status and Medical Interventions:   Ordered at: 03/02/24 7316     Medical Intervention Limits:    NO intubation (DNI)     Code Status (Patient has no pulse and is not breathing):    No CPR (Do Not Attempt to Resuscitate)     Medical Interventions (Patient has pulse or is breathing):    Limited Support       Gayla Thompson MD  03/25/24

## 2024-03-25 NOTE — PROGRESS NOTES
Clinical Nutrition   Nutrition Support Assessment  Reason for Visit: Follow-up protocol, PN/PO    Patient Name: Lea Rivers  YOB: 1945  MRN: [id number]  Date of Encounter: 03/25/24 09:23 EDT  Admission date: 3/2/2024    Comments:    Pt tolerating PO diet however continues with severely limited acceptance. Taking bites of Magic Cup with meds but otherwise refusing PO intake. Ileostomy with high output - 3.05L in past 24hrs.     Wean PN  D/c lipids  Decrease rate to 32.5mL/hr and allow to run out  Continue to encourage PO intake on current diet as tolerated  Strict meal documentation to evaluate need to start EN if aligns with [facility]    Nutrition Assessment   Admission Diagnosis:  Falls [W19.XXXA]  Pneumatosis intestinalis [K63.89]      Problem List:    Falls    Pneumatosis intestinalis        PMH:   He  has a past medical history of Anemia, Colon cancer (1990), Coronary artery disease, Diabetes, Dyslipidemia, GERD (gastroesophageal reflux disease), Hyperlipidemia, Hypertension, Hyponatremia, Hypothyroidism, Left shoulder pain, Low sodium levels (2022), Memory deficit, Osteoarthritis, Prostate cancer (07/23/2022), Seizure disorder, and Skin cancer.    PSH:  He  has a past surgical history that includes Colectomy partial / total (1990); Cholecystectomy; Appendectomy; Other surgical history; Sinus surgery; Tonsillectomy; Vasectomy; Carpal tunnel release (Bilateral); TURP / transurethral incision / drainage prostate; Colonoscopy; Transurethral resection of prostate (N/A, 09/21/2018); Skin biopsy; Teeth Extraction; Total knee arthroplasty (Right, 04/07/2022); Cardiac catheterization (2012); Prostate biopsy (N/A, 11/11/2022); Transrectal Incision Prostate (Bilateral, 01/06/2023); Cyberknife (02/09/2023); Colectomy (N/A, 3/9/2024); and Exploratory Laparotomy (N/A, 3/18/2024).      Applicable Nutrition Concerns:   Skin:  Oral:  GI:    Applicable Interval History:    3/9-total colectomy  "w/end ileostomy  3/10) PICC placed, PN started  3/14) FEES: SLP Diet Recommendation: puree, no mixed consistencies, honey thick liquids   3/18) CT abd/pelvis  Return to OR   3/21) MBS - SLP rec: mechanical ground, HTL      Reported/Observed/Food/Nutrition Related History:     3/25  Pt tolerating PO diet however continues with severely limited acceptance. Taking bites of Magic Cup with meds but otherwise refusing PO intake. Discussed concerns for continued PN use with Hospitalist, agrees to wean. Ileostomy with high output - 3.05L in past 24hrs; started on Imodium.     3/22  Pt declined breakfast this AM - RN able to have eat applesauce. Unable to provide meal preferences - pt did state enjoys chicken. General Surgery present at time of visit, discussed possible threshold for transitioning to EN in unable to improve PO intake. Discussed eating in bed vs up to chair with RN, state will get him up to chair if able. Ileostomy output.      3/21  Plan for MBS today vs FEES - passed for Pureed/HTL per SLP. Noted hyponatremia this morning - monitoring.      3/20  Pt with Ileostomy output today. No N/V. SLP eval today - results pending. PN infusing with correct Rx. Per Surgery continue PN until adequate intake.       3/19  S/P abdominal washout yesterday.  NGT removed today. Remains NPO with PN for nutrition.   Patient sleeping at time of visit.      3/18  Pt was tolerating PO intake, with ostomy output however underwent stat CT abd/pelvis to evaluate fever source - per Surgery, \"plan for emergent re-exploration to assess for bowel leak or infected fluid collections\". Ostomy output - 700mL in past 24hrs     3/14  Spoke w/sx-okay to place keofeed it pt too lethargic for FEES today/unable to advance po diet. Will continue current TPN for today.     3/12  Pt out of room for scan per pt's son at bedside. Spoke w/nsg-TPN at 65ml/hr. Discussed phos replacement IV.     3/9  Per RN, pt pulled PICC last night-pt OOR during visit but " "pt's son at bedside. Able to obtain nutrition hx. Pt eating well PTA, usually a picky eater and eats the same things all the time-preferences noted below. Pt usually eats at [employer] for breakfast, makes something quick at home for lunch and has dinner provided by dtr. Son states pt's wt has been stable, ~196-200lbs. NKFA. Plan is for total colectomy w/end ileostomy today.     3/8  Pt unavailable during RD visit, having PICC placed. Also noted w/dementia-attempted to call family w/o success. Discussed w/nsg, pt to likely have bowel sx tomorrow w/colostomy creation. Rectal tube placed today 2/2 colonic decompression. Pt appears to have had 7lb/3.6% non significant wt loss in the past month. NKFA.     Labs    Labs Reviewed: Yes   No new labs available    Medications    Medications Reviewed: Yes    Intake/Ouptut 24 hrs (0701 - 0700)   I&O's Reviewed: Yes   Ileostomy - 3050mL out    Anthropometrics     Height: Height: 170.2 cm (67.01\")  Last Filed Weight: Weight: 98.8 kg (217 lb 12 oz) (03/24/24 0600)  Method: Weight Method: Bed scale (after zeroed when in chair)  BMI: BMI (Calculated): 34.1    UBW: 203lbs per EMR  Weight change:    Weight       Weight (kg) Weight (lbs) Weight Method Visit Report   3/23/2023 87.091 kg  192 lb   --    4/10/2023 87.091 kg  192 lb   --    4/28/2023 94.62 kg  208 lb 9.6 oz   --    5/6/2023 91.627 kg  202 lb      5/16/2023 90.719 kg  200 lb   --    6/20/2023 90.719 kg  200 lb   --    8/14/2023 93.441 kg  206 lb   --    9/14/2023 92.398 kg  203 lb 11.2 oz   --    12/4/2023 92.08 kg  203 lb   --    1/5/2024 78.926 kg  174 lb  Stated     2/15/2024 92.08 kg  203 lb   --    2/27/2024 89.086 kg  196 lb 6.4 oz      3/2/2024 88.905 kg  196 lb  Stated        Stated         Nutrition Focused Physical Exam     Date: 3/8    Unable to perform exam due to: Pt unable to participate at time of visit    Needs Assessment   Date: 3/8; reassessed 3/22    Height used:Height: 170.2 cm (67.01\")  Weights used: " 97.7kg CBW    Estimated Calorie needs: ~ 1800 Kcal/day  Method:  MSJ (1650) x 1.1 =1815  Method:  17-20 Kcals/KG = 8783-0801    Estimated Protein needs: ~ 120 g PRO/day  Method: 1.2-1.5g/Kg CBW =117-147    Estimated Fluid needs: ~ ml/day   Per clinical status    Current Nutrition Prescription     PN Route: PICC  PN:   15% dextrose, 7.7% AA @ goal rate of 65mL/hr.         GIR:  1.82mg/kg/min  Lipid: Provide 250mL 20% SMOF daily                   PN@ Goal over 24hr to Supply:  Volume 1560 mL/day       Energy 1776 Kcal/day 99 % Est Need   Protein 120 g/day 100 % Est Need      ---------------------------------------------------------------------------  Formula/Rate verified at bedside: Yes  Infusing Rate at time of visit: 65mL/hr     Average Delivery from [facility] day  PN Volume  1631 mL/day       Lipid delivered?        Y         Energy   Kcal/day   % Est Need   Protein   g/day   % Est Need     PO: Diet: Gastrointestinal, Cardiac; Healthy Heart (2-3 Na+); Fiber-Restricted; No Mixed Consistencies, Feeding Assistance - Nursing; Texture: Mechanical Ground (NDD 2); Fluid Consistency: Honey Thick  Adult Central 2-in-1 TPN  Oral Nutrition Supplement: Magic Cup 3x daily  Intake:  0% x6 meals documented        Nutrition Diagnosis   Date: 3/8 Updated: 3/18; 3/22; 3/25  Problem Altered GI function   Etiology Colonic distention, ileus, pending bowel sx   Signs/Symptoms Need for TPN   Status: Resolved    Date: 3/22  Updated: 3/25  Problem Predicted suboptimal energy intake   Etiology Dementia with s/p GI surgery x2   Signs/Symptoms Meal refusal per RN   Status: ongoing      Goal:   General: Nutrition to support treatment  PO: Tolerate PO, Increase intake  EN/PN: PN to PO    Nutrition Intervention      Follow treatment progress, Care plan reviewed, Encourage intake       Monitoring/Evaluation:   Per protocol, I&O, PO intake, Pertinent labs, Weight, GI status, Symptoms, POC/GOC, Swallow function      Adelita Loza,  MS,RD,LD  Time Spent: 30min

## 2024-03-25 NOTE — PROGRESS NOTES
Lea Barrett [place]  1945  [id number]        Evaluating Physician: Louis Chow MD    Chief Complaint: abdpain    Reason for Consultation: IA infection    History of present illness:     Patient is a 78 y.o.  Yr old male with history of seizure disorder, colon cancer/prostate cancer with prior radiation, diabetes and dementia per admission notes with admission March 2 after frequent falls noted to have multiple rib fractures and left side clavicular fracture in the emergency room.noted to have significant colon distention with potential for pneumatosis on CT scan and seen by gastroenterology/surgery.taken for surgery on March 9 with diagnosis of toxic dilation of the colon for total abdominal colectomy and end ileostomy; medicine notes indicate he received ceftriaxone/Flagyl and Diflucan.  Leukocytosis persisted.repeat CT scan on March 18 with postsurgical changes, moderate loculated ascitic fluid in the anterior abdomen as described by radiology.taken back to surgery on March 18 for exploratory laparotomy with washout.  Culture subsequently with gram-negative manuel at least.  Empiric antibiotics adjusted to Zosyn/Mycamine    3/25/24 sleepy;   on TPN;  Per nursing, high output at ostomy, surgery following, + abdominal pain which is less intense overall, dull at present, sharp at times, worse with palpation, better with pain meds and he will not attach a numerical severity.    no headache photophobia or neck stiffness.  No dyspnea or cough.  He has a sling on the left arm with left arm PICC line.  No rash.  Nursing reports no other new focal pain or distress.       Past Medical History:   Diagnosis Date    Anemia     Colon cancer 1990    Status post partial colectomy in 1990. No chemotherapy or radiation therapy.    Coronary artery disease     Nonobstructive coronary artery disease.    Diabetes     Dyslipidemia     GERD (gastroesophageal reflux disease)     Hx of.    Hyperlipidemia     Hypertension      Hyponatremia     Hypothyroidism     Left shoulder pain     Low sodium levels 2022    recent issue; saw nephrologist who ruled out kidney issues; took Sodium Chloride po to bring levels up    Memory deficit     FROM ANESTHESIA    Osteoarthritis     Prostate cancer 07/23/2022    Seizure disorder     silent seziure-diagnosed years ago    Skin cancer        Past Surgical History:   Procedure Laterality Date    APPENDECTOMY      CARDIAC CATHETERIZATION  2012    30 to 40% LAD    CARPAL TUNNEL RELEASE Bilateral     CHOLECYSTECTOMY      COLECTOMY PARTIAL / TOTAL  1990    Partial colectomy    COLON RESECTION N/A 3/9/2024    Procedure: TOTAL ABDOMINAL COLECTOMY, END ILEOSTOMY;  Surgeon: Harley Godfrey MD;  Location:  [facility];  Service: General;  Laterality: N/A;    COLONOSCOPY      CYBERKNIFE  02/09/2023    Prostate/SV    CYSTOSCOPY TRANSURETHRAL RESECTION OF PROSTATE N/A 09/21/2018    Procedure: CYSTOSCOPY TRANSURETHRAL RESECTION OF PROSTATE GREENLIGHT;  Surgeon: Naseem Clayton MD;  Location:  [facility];  Service: Urology    EXPLORATORY LAPAROTOMY N/A 3/18/2024    Procedure: LAPAROTOMY EXPLORATORY, ABDOMINAL WASH OUT;  Surgeon: Harley Godfrey MD;  Location:  [facility];  Service: General;  Laterality: N/A;    OTHER SURGICAL HISTORY      Contraction surgery on bilateral hands and wrists.    PROSTATE BIOPSY N/A 11/11/2022    Procedure: TRANSRECTAL ULTRASOUND GUIDED BIOPSY OF PROSTATE;  Surgeon: Naseem Noland MD;  Location:  [facility];  Service: Urology;  Laterality: N/A;    SINUS SURGERY      SKIN BIOPSY      TEETH EXTRACTION      TONSILLECTOMY      TOTAL KNEE ARTHROPLASTY Right 04/07/2022    Procedure: TOTAL KNEE ARTHROPLASTY WITH ORTHO ROBOT RIGHT;  Surgeon: Louis Malcolm MD;  Location:  [facility];  Service: Robotics - Ortho;  Laterality: Right;    TRANSRECTAL INCISION PROSTATE WITH GOLD SEED Bilateral 01/06/2023    Procedure: TRANSRECTAL ULTRASOUND GUIDED PROSTATE FIDUCIAL MARKER PLACEMENT;   Surgeon: Naseem Noland MD;  Location: [facility];  Service: Urology;  Laterality: Bilateral;    TURP / TRANSURETHRAL INCISION / DRAINAGE PROSTATE      VASECTOMY         Pediatric History   Patient Parents    Not on file     Other Topics Concern    Not on file   Social History Narrative    Caffeine: None       family history includes Arthritis in an other family member; Cancer in his mother and another family member; Esophageal cancer in his brother; Hypertension in his father; No Known Problems in his paternal grandfather, paternal grandmother, and sister; Stroke in his father, maternal grandfather, sister, and another family member.    Allergies   Allergen Reactions    Chlorhexidine Rash    Sulfa Antibiotics Rash     Childhood reaction             Review of Systems    3/25/24 as per nursing also    Constitutional-- No Fever, chills or sweats.  Appetite diminished with fatigue  Heent-- No new vision, hearing or throat complaints.  No epistaxis or oral sores.  Denies odynophagia or dysphagia.  No flashers, floaters or eye pain. No odynophagia or dysphagia. No headache, photophobia or neck stiffness.  CV-- No chest pain, palpitation or syncope  Resp-- No SOB/cough/Hemoptysis  GI- see above.  No hematochezia, melena, or hematemesis. Denies jaundice or chronic liver disease.  -- No dysuria, hematuria, or flank pain.  Denies hesitancy, urgency or flank pain.  Lymph- no swollen lymph nodes in neck/axilla or groin.   Heme- No active bruising or bleeding  MS-- no swelling or pain in the bones or joints of arms/legs.  No new back pain.  Neuro-- generally debilitated, unable to lift his legs off the bed according to nursing.  Wiggles his feet/toes    Full 12 point review of systems reviewed and negative otherwise for acute complaints, except for above    Physical Exam:   Vital Signs   /86 (BP Location: Left leg, Patient Position: Lying)   Pulse 70   Temp 97.4 °F (36.3 °C) (Oral)   Resp 18   Ht 170.2 cm  "(67.01\")   Wt 98.8 kg (217 lb 12 oz)   SpO2 97%   BMI 34.10 kg/m²     GENERAL: sleepy, answers questions and follows basic commands as above, in no acute distress. Appears chronically debilitated.     HEENT: Normocephalic, atraumatic.   No conjunctival injection. No icterus. Oropharynx clear without evidence of thrush or exudate. No evidence of periodontal disease.    NECK: Supple without nuchal rigidity. No mass.  LYMPH: No cervical, axillary or inguinal lymphadenopathy.  HEART: RRR; No murmur, rubs, gallops.   LUNGS: diminished at bases with some crackles at the bases but otherwiseClear to auscultation bilaterally without wheezing/ rhonchi.  Nonlabored. No dullness.  ABDOMEN: Soft,  tender, nondistended. Positive bowel sounds but diminished. No rebound or guarding. NO mass or HSM.  EXT:  No cyanosis, clubbing;  No cord.has edema  MSK: FROM without joint effusions noted arms/legs.    SKIN: Warm and dry without cutaneous eruptions on Inspection/palpation.    NEURO: follows basic commands    No peripheral stigmata/phenomena of endocarditis    IV without obvious redness or drainage at left arm    Laboratory Data    Results from last 7 days   Lab Units 03/25/24  0612 03/24/24  0603 03/23/24  0348   WBC 10*3/mm3 13.06* 13.58* 13.04*   HEMOGLOBIN g/dL 8.4* 9.0* 8.8*   HEMATOCRIT % 27.0* 28.7* 27.0*   PLATELETS 10*3/mm3 560* 556* 519*     Results from last 7 days   Lab Units 03/24/24  0603   SODIUM mmol/L 135*   POTASSIUM mmol/L 4.1   CHLORIDE mmol/L 103   CO2 mmol/L 25.0   BUN mg/dL 21   CREATININE mg/dL 0.49*   GLUCOSE mg/dL 152*   CALCIUM mg/dL 8.0*     Results from last 7 days   Lab Units 03/21/24  0622   ALK PHOS U/L 101   BILIRUBIN mg/dL 0.2   ALT (SGPT) U/L <5   AST (SGOT) U/L 17         Results from last 7 days   Lab Units 03/19/24  0936   CRP mg/dL 13.84*       Estimated Creatinine Clearance: 136.9 mL/min (A) (by C-G formula based on SCr of 0.49 mg/dL (L)).      Microbiology:      Radiology:  Imaging Results " (Last 72 Hours)       ** No results found for the last 72 hours. **              Impression:     --acute abdominal pain with total abdominal colectomy/ileostomy as above related to toxic dilation of colon As per operative note, postop with persistent leukocytosis and fluid collections on CT scan, taken for exploratory laparotomy and fluid culture positive for gram-negative rods so far, consistent with abscess.  Empiric antimicrobials adjusted to Zosyn/Mycamine/daptomycin    --Left lower lobe pneumonia per medicine team notes, abnormal chest x-ray March 12.no respiratory distress or productive sputum.  Empiric Zosyn ongoing.  If worsening respiratory status you could consider further imaging etc.nasal cannula stable per nursing    --history dementia per admission notes a little specifics regarding baseline not clear and generally poor insight overall.  Description of toxic/metabolic encephalopathy per medicine team notes during this admission.  Prior history seizure and generally debilitated.    --History of multiple falls with left clavicular and multiple rib fractures per admission notes.  Orthopedics has seen.    --Right upper extremity DVT, described as brachial per medicine team notes.  Anticoagulation at discretion of medicine team    --History prostate and colon cancer previously per admission notes.        PLAN:       --IV Zosyn/Mycamine/ daptomycin      **culture with pseudomonas aeruginosa and E Faecium    --Check/reviewed labs cultures and scans    --Partial history per nursing staff    --Discussed with microbiology     --Highly complex of issues with high risk for further serious morbidity and other serious sequela    Copied text in this note has been reviewed and is accurate as of 03/25/24.        Louis Chow MD  3/25/2024

## 2024-03-25 NOTE — THERAPY TREATMENT NOTE
Patient Name: Lea Rivers  : 1945    MRN: [id number]                              Today's Date: 3/25/2024       Admit Date: 3/2/2024    Visit Dx:     ICD-10-CM ICD-9-CM   1. Fall, initial encounter  W19.XXXA E888.9   2. Closed fracture of multiple ribs of left side, initial encounter  S22.42XA 807.09   3. Closed displaced fracture of acromial end of left clavicle, initial encounter  S42.032A 810.03   4. Falls frequently  R29.6 V15.88   5. Acute UTI (urinary tract infection)  N39.0 599.0   6. Colon distention  K63.89 569.89   7. Paralytic ileus of small intestine and colon  K56.0 560.1   8. Oropharyngeal dysphagia  R13.12 787.22   9. Intra-abdominal fluid  R18.8 789.59     Patient Active Problem List   Diagnosis    Coronary artery disease    Dyslipidemia    Hypothyroidism    GERD (gastroesophageal reflux disease)    Seizure disorder    BPH (benign prostatic hypertrophy)    Hypertension    Primary osteoarthritis of both knees    Syncope and collapse    Precordial pain    Diabetes    Status post total right knee replacement    Postoperative urinary retention    Confusion, postoperative    Acute blood loss anemia, asymptomatic, no transfusion required    Elevated prostate specific antigen (PSA)    Prostate cancer    Anemia    Body mass index (BMI) of 32.0-32.9 in adult    Localization-related focal epilepsy with simple partial seizures    Need for vaccination against Streptococcus pneumoniae    Upper extremity tendon tear, right, initial encounter    Vitamin D deficiency    PSA elevation    Polyp of colon    Overactive bladder    Gross hematuria    History of colon polyps    History of colon cancer    B12 deficiency    Falls    Pneumatosis intestinalis     Past Medical History:   Diagnosis Date    Anemia     Colon cancer     Status post partial colectomy in . No chemotherapy or radiation therapy.    Coronary artery disease     Nonobstructive coronary artery disease.    Diabetes     Dyslipidemia      GERD (gastroesophageal reflux disease)     Hx of.    Hyperlipidemia     Hypertension     Hyponatremia     Hypothyroidism     Left shoulder pain     Low sodium levels 2022    recent issue; saw nephrologist who ruled out kidney issues; took Sodium Chloride po to bring levels up    Memory deficit     FROM ANESTHESIA    Osteoarthritis     Prostate cancer 07/23/2022    Seizure disorder     silent seziure-diagnosed years ago    Skin cancer      Past Surgical History:   Procedure Laterality Date    APPENDECTOMY      CARDIAC CATHETERIZATION  2012    30 to 40% LAD    CARPAL TUNNEL RELEASE Bilateral     CHOLECYSTECTOMY      COLECTOMY PARTIAL / TOTAL  1990    Partial colectomy    COLON RESECTION N/A 3/9/2024    Procedure: TOTAL ABDOMINAL COLECTOMY, END ILEOSTOMY;  Surgeon: Harley Godfrey MD;  Location:  [facility];  Service: General;  Laterality: N/A;    COLONOSCOPY      CYBERKNIFE  02/09/2023    Prostate/SV    CYSTOSCOPY TRANSURETHRAL RESECTION OF PROSTATE N/A 09/21/2018    Procedure: CYSTOSCOPY TRANSURETHRAL RESECTION OF PROSTATE GREENLIGHT;  Surgeon: Naseem Clayton MD;  Location:  [facility];  Service: Urology    EXPLORATORY LAPAROTOMY N/A 3/18/2024    Procedure: LAPAROTOMY EXPLORATORY, ABDOMINAL WASH OUT;  Surgeon: Harley Godfrey MD;  Location:  [facility];  Service: General;  Laterality: N/A;    OTHER SURGICAL HISTORY      Contraction surgery on bilateral hands and wrists.    PROSTATE BIOPSY N/A 11/11/2022    Procedure: TRANSRECTAL ULTRASOUND GUIDED BIOPSY OF PROSTATE;  Surgeon: Naseem Noland MD;  Location:  [facility];  Service: Urology;  Laterality: N/A;    SINUS SURGERY      SKIN BIOPSY      TEETH EXTRACTION      TONSILLECTOMY      TOTAL KNEE ARTHROPLASTY Right 04/07/2022    Procedure: TOTAL KNEE ARTHROPLASTY WITH ORTHO ROBOT RIGHT;  Surgeon: Louis Malcolm MD;  Location:  [facility];  Service: Robotics - Ortho;  Laterality: Right;    TRANSRECTAL INCISION PROSTATE WITH GOLD SEED Bilateral  01/06/2023    Procedure: TRANSRECTAL ULTRASOUND GUIDED PROSTATE FIDUCIAL MARKER PLACEMENT;  Surgeon: Naseem Noland MD;  Location: [facility];  Service: Urology;  Laterality: Bilateral;    TURP / TRANSURETHRAL INCISION / DRAINAGE PROSTATE      VASECTOMY        General Information       Row Name 03/25/24 1517          Physical Therapy Time and Intention    Document Type therapy note (daily note)  -AE     Mode of Treatment physical therapy  -AE       Row Name 03/25/24 1517          General Information    Patient Profile Reviewed yes  -AE     Existing Precautions/Restrictions fall;left;shoulder;non-weight bearing  Clavicle Fx; LUE NWB; LUE Sling; L Rib Fxs; Ostomy; OLIVER Drain x 2; Abd Incision; Abd Binder; Brief with OOB activity, Native  -AE     Barriers to Rehab medically complex;previous functional deficit;cognitive status  -AE       Row Name 03/25/24 1517          Cognition    Orientation Status (Cognition) oriented to;person;disoriented to;place;time  -AE       Row Name 03/25/24 1517          Safety Issues, Functional Mobility    Safety Issues Affecting Function (Mobility) awareness of need for assistance;ability to follow commands;insight into deficits/self-awareness;safety precaution awareness;safety precautions follow-through/compliance;sequencing abilities;problem-solving  -AE     Impairments Affecting Function (Mobility) balance;cognition;endurance/activity tolerance;pain;postural/trunk control;strength;range of motion (ROM);sensation/sensory awareness  -AE     Cognitive Impairments, Mobility Safety/Performance awareness, need for assistance;safety precaution awareness;safety precaution follow-through;sequencing abilities;insight into deficits/self-awareness;problem-solving/reasoning  -AE               User Key  (r) = Recorded By, (t) = Taken By, (c) = Cosigned By      Initials Name Provider Type    AE Abe Melara PT Physical Therapist                   Mobility       Row Name 03/25/24 2273          Bed  Mobility    Bed Mobility rolling left;rolling right;supine-sit  -AE     Rolling Left [place] (Bed Mobility) maximum assist (25% patient effort);2 person assist;verbal cues  -AE     Rolling Right [place] (Bed Mobility) maximum assist (25% patient effort);2 person assist;verbal cues  -AE     Supine-Sit [place] (Bed Mobility) maximum assist (25% patient effort);2 person assist;verbal cues  -AE     Assistive Device (Bed Mobility) bed rails;draw sheet;head of bed elevated  -AE     Comment, (Bed Mobility) VCs for hand placement and sequencing. Pt required increased cues and time to complete bed mobility, required increased cues to improve posterior lean while sitting EOB.  -AE       Row Name 03/25/24 1518          Transfers    Comment, (Transfers) VCs for hand placement and sequencing. Gave RW to improve stability, pt did not WB through LUE in standing but used RW to improve support and balance in standing.  -AE       Row Name 03/25/24 1518          Bed-Chair Transfer    Bed-Chair [place] (Transfers) maximum assist (25% patient effort);1 person assist;verbal cues  -AE     Comment, (Bed-Chair Transfer) STS x2, on second STS patient completed SPT to chair.  -AE       Row Name 03/25/24 1518          Sit-Stand Transfer    Sit-Stand [place] (Transfers) moderate assist (50% patient effort);maximum assist (25% patient effort);1 person assist;verbal cues  -AE     Comment, (Sit-Stand Transfer) VCs and time to complete full upright standing posture.  -AE       Row Name 03/25/24 1518          Mobility    Extremity Weight-bearing Status left upper extremity  -AE     Left Upper Extremity (Weight-bearing Status) non weight-bearing (NWB)  -AE               User Key  (r) = Recorded By, (t) = Taken By, (c) = Cosigned By      Initials Name Provider Type    Abe Valdivia PT Physical Therapist                   Obj/Interventions       Row Name 03/25/24 1521          Balance    Balance Assessment sitting static  balance;sitting dynamic balance;sit to stand dynamic balance;standing static balance;standing dynamic balance  -AE     Static Sitting Balance minimal assist  -AE     Dynamic Sitting Balance moderate assist  -AE     Position, Sitting Balance unsupported;sitting edge of bed  -AE     Sit to Stand Dynamic Balance moderate assist;maximum assist;1-person assist;verbal cues  -AE     Static Standing Balance moderate assist;1-person assist  -AE     Dynamic Standing Balance maximum assist;1-person assist  -AE     Position/Device Used, Standing Balance supported  -AE               User Key  (r) = Recorded By, (t) = Taken By, (c) = Cosigned By      Initials Name Provider Type    AE Abe Melara, PT Physical Therapist                   Goals/Plan    No documentation.                  Clinical Impression       Row Name 03/25/24 1526          Pain Scale: FACES Pre/Post-Treatment    Pain: FACES Scale, Pretreatment 2-->hurts little bit  -AE     Posttreatment Pain Rating 2-->hurts little bit  -AE     Pain Location generalized  -AE     Pain Location - abdomen  -AE     Pre/Posttreatment Pain Comment tolerated  -AE       Row Name 03/25/24 1526          Plan of Care Review    Plan of Care Reviewed With patient  -AE     Progress no change  -AE     Outcome Evaluation Pt continues to present with decreased functional mobility, pain with mobility, and decreased command following. Pt completed bed mobility and SPT to chair with max A. Continue to progress per pt tolerance.  -AE       Row Name 03/25/24 1526          Vital Signs    Pre Systolic BP Rehab 186  -AE     Pre Treatment Diastolic BP 92  -AE     Pretreatment Heart Rate (beats/min) 71  -AE     Posttreatment Heart Rate (beats/min) 73  -AE     Pre SpO2 (%) 96  -AE     O2 Delivery Pre Treatment room air  -AE     O2 Delivery Intra Treatment room air  -AE     Post SpO2 (%) 95  -AE     O2 Delivery Post Treatment room air  -AE     Pre Patient Position Supine  -AE     Intra Patient Position  Standing  -AE     Post Patient Position Sitting  -AE       Row Name 03/25/24 1526          Positioning and Restraints    Pre-Treatment Position in bed  -AE     Post Treatment Position chair  -AE     In Chair notified nsg;reclined;call light within reach;encouraged to call for assist;exit alarm on;with family/caregiver;waffle cushion;legs elevated  -AE               User Key  (r) = Recorded By, (t) = Taken By, (c) = Cosigned By      Initials Name Provider Type    Abe Valdivia, PT Physical Therapist                   Outcome Measures       Row Name 03/25/24 1530 03/25/24 0800       How much help from another person do you currently need...    Turning from your back to your side while in flat bed without using bedrails? 2  -AE 3  -JL    Moving from lying on back to sitting on the side of a flat bed without bedrails? 2  -AE 2  -JL    Moving to and from a bed to a chair (including a wheelchair)? 2  -AE 2  -JL    Standing up from a chair using your arms (e.g., wheelchair, bedside chair)? 2  -AE 2  -JL    Climbing 3-5 steps with a railing? 1  -AE 2  -JL    To walk in hospital room? 1  -AE 2  -JL    AM-PAC 6 Clicks Score (PT) 10  -AE 13  -JL    Highest Level of Mobility Goal 4 --> Transfer to chair/commode  -AE 4 --> Transfer to chair/commode  -JL      Row Name 03/25/24 0600          How much help from another person do you currently need...    Turning from your back to your side while in flat bed without using bedrails? 3  -AS     Moving from lying on back to sitting on the side of a flat bed without bedrails? 2  -AS     Moving to and from a bed to a chair (including a wheelchair)? 2  -AS     Standing up from a chair using your arms (e.g., wheelchair, bedside chair)? 2  -AS     Climbing 3-5 steps with a railing? 2  -AS     To walk in hospital room? 2  -AS     AM-PAC 6 Clicks Score (PT) 13  -AS     Highest Level of Mobility Goal 4 --> Transfer to chair/commode  -AS       Row Name 03/25/24 1530          Functional  Assessment    Outcome Measure Options AM-PAC 6 Clicks Basic Mobility (PT)  -AE               User Key  (r) = Recorded By, (t) = Taken By, (c) = Cosigned By      Initials Name Provider Type    Chyna Callaway, RN Registered Nurse    Abe Valdivia, PT Physical Therapist    AS Garret Johnson, RN Registered Nurse                                 Physical Therapy Education       Title: PT OT SLP Therapies (In Progress)       Topic: Physical Therapy (In Progress)       Point: Mobility training (In Progress)       Learning Progress Summary             Patient Acceptance, E, NR by AE at 3/25/2024 1425    Acceptance, E, NR by AE at 3/22/2024 1527    Acceptance, E, NR by AE at 3/21/2024 1115    Acceptance, E,TB, NR,NL by CH at 3/20/2024 0755    Acceptance, E, NR by CK at 3/6/2024 1121    Acceptance, E, VU,NR by LO at 3/4/2024 1340    Comment: PT POC   Family Acceptance, E, NR by CK at 3/6/2024 1121    Acceptance, E, VU,NR by LO at 3/4/2024 1340    Comment: PT POC                         Point: Home exercise program (In Progress)       Learning Progress Summary             Patient Acceptance, E, NR by AE at 3/25/2024 1425    Acceptance, E, NR by AE at 3/22/2024 1527    Acceptance, E, NR by AE at 3/21/2024 1115    Acceptance, E,TB, NR,NL by CH at 3/20/2024 0755    Acceptance, E, NR by CK at 3/6/2024 1121    Acceptance, E, VU,NR by LO at 3/4/2024 1340    Comment: PT POC   Family Acceptance, E, NR by CK at 3/6/2024 1121    Acceptance, E, VU,NR by LO at 3/4/2024 1340    Comment: PT POC                         Point: Body mechanics (In Progress)       Learning Progress Summary             Patient Acceptance, E, NR by AE at 3/25/2024 1425    Acceptance, E, NR by AE at 3/22/2024 1527    Acceptance, E, NR by AE at 3/21/2024 1115    Acceptance, E,TB, NR,NL by CH at 3/20/2024 0755    Acceptance, E, NR by CK at 3/6/2024 1121    Acceptance, E, VU,NR by AARON at 3/4/2024 1340    Comment: PT POC   Family Acceptance, E, NR by CK at  3/6/2024 1121    Acceptance, E, VU,NR by LO at 3/4/2024 1340    Comment: PT POC                         Point: Precautions (In Progress)       Learning Progress Summary             Patient Acceptance, E, NR by AE at 3/25/2024 1425    Acceptance, E, NR by AE at 3/22/2024 1527    Acceptance, E, NR by AE at 3/21/2024 1115    Acceptance, E,TB, NR,NL by  at 3/20/2024 0755    Acceptance, E, NR by CK at 3/6/2024 1121    Acceptance, E, VU,NR by LO at 3/4/2024 1340    Comment: PT POC   Family Acceptance, E, NR by CK at 3/6/2024 1121    Acceptance, E, VU,NR by LO at 3/4/2024 1340    Comment: PT POC                                         User Key       Initials Effective Dates Name Provider Type Discipline     06/16/21 -  Teresa Batista RN Registered Nurse Nurse     06/16/21 -  Emily Muro, PT Physical Therapist PT     09/21/21 -  Abe Melara, PT Physical Therapist PT     02/06/24 -  Tiffanie Carmen, PT Physical Therapist PT                  PT Recommendation and Plan     Plan of Care Reviewed With: patient  Progress: no change  Outcome Evaluation: Pt continues to present with decreased functional mobility, pain with mobility, and decreased command following. Pt completed bed mobility and SPT to chair with max A. Continue to progress per pt tolerance.     Time Calculation:         PT Charges       Row Name 03/25/24 1531             Time Calculation    Start Time 1425  -AE      PT Received On 03/25/24  -AE      PT Goal Re-Cert Due Date 03/31/24  -AE         Timed Charges    13343 - PT Therapeutic Activity Minutes 41  -AE         Total Minutes    Timed Charges Total Minutes 41  -AE       Total Minutes 41  -AE                User Key  (r) = Recorded By, (t) = Taken By, (c) = Cosigned By      Initials Name Provider Type    AE Abe Melara, PT Physical Therapist                  Therapy Charges for Today       Code Description Service Date Service Provider Modifiers Qty    63367262798 HC PT THERAPEUTIC  ACT EA 15 MIN 3/25/2024 Abe Melara, PT GP 3            PT G-Codes  Outcome Measure Options: AM-PAC 6 Clicks Basic Mobility (PT)  AM-PAC 6 Clicks Score (PT): 10  AM-PAC 6 Clicks Score (OT): 10  PT Discharge Summary  Anticipated Discharge Disposition (PT): skilled nursing facility    Abe Melara PT  3/25/2024

## 2024-03-26 LAB
ANION GAP SERPL CALCULATED.3IONS-SCNC: 8 MMOL/L (ref 5–15)
BASOPHILS # BLD AUTO: 0.11 10*3/MM3 (ref 0–0.2)
BASOPHILS NFR BLD AUTO: 0.9 % (ref 0–1.5)
BUN SERPL-MCNC: 24 MG/DL (ref 8–23)
BUN/CREAT SERPL: 44.4 (ref 7–25)
CALCIUM SPEC-SCNC: 8.3 MG/DL (ref 8.6–10.5)
CHLORIDE SERPL-SCNC: 103 MMOL/L (ref 98–107)
CO2 SERPL-SCNC: 21 MMOL/L (ref 22–29)
CREAT SERPL-MCNC: 0.54 MG/DL (ref 0.76–1.27)
DEPRECATED RDW RBC AUTO: 62.6 FL (ref 37–54)
EGFRCR SERPLBLD CKD-EPI 2021: 101.4 ML/MIN/1.73
EOSINOPHIL # BLD AUTO: 0.72 10*3/MM3 (ref 0–0.4)
EOSINOPHIL NFR BLD AUTO: 5.8 % (ref 0.3–6.2)
ERYTHROCYTE [DISTWIDTH] IN BLOOD BY AUTOMATED COUNT: 18.2 % (ref 12.3–15.4)
GLUCOSE BLDC GLUCOMTR-MCNC: 101 MG/DL (ref 70–130)
GLUCOSE BLDC GLUCOMTR-MCNC: 104 MG/DL (ref 70–130)
GLUCOSE BLDC GLUCOMTR-MCNC: 92 MG/DL (ref 70–130)
GLUCOSE SERPL-MCNC: 97 MG/DL (ref 65–99)
HCT VFR BLD AUTO: 29.4 % (ref 37.5–51)
HGB BLD-MCNC: 9.1 G/DL (ref 13–17.7)
IMM GRANULOCYTES # BLD AUTO: 0.66 10*3/MM3 (ref 0–0.05)
IMM GRANULOCYTES NFR BLD AUTO: 5.4 % (ref 0–0.5)
LYMPHOCYTES # BLD AUTO: 1.31 10*3/MM3 (ref 0.7–3.1)
LYMPHOCYTES NFR BLD AUTO: 10.6 % (ref 19.6–45.3)
MCH RBC QN AUTO: 30.2 PG (ref 26.6–33)
MCHC RBC AUTO-ENTMCNC: 31 G/DL (ref 31.5–35.7)
MCV RBC AUTO: 97.7 FL (ref 79–97)
MONOCYTES # BLD AUTO: 1.21 10*3/MM3 (ref 0.1–0.9)
MONOCYTES NFR BLD AUTO: 9.8 % (ref 5–12)
NEUTROPHILS NFR BLD AUTO: 67.5 % (ref 42.7–76)
NEUTROPHILS NFR BLD AUTO: 8.32 10*3/MM3 (ref 1.7–7)
NRBC BLD AUTO-RTO: 0 /100 WBC (ref 0–0.2)
PLATELET # BLD AUTO: 517 10*3/MM3 (ref 140–450)
PMV BLD AUTO: 8.6 FL (ref 6–12)
POTASSIUM SERPL-SCNC: 4.6 MMOL/L (ref 3.5–5.2)
RBC # BLD AUTO: 3.01 10*6/MM3 (ref 4.14–5.8)
SODIUM SERPL-SCNC: 132 MMOL/L (ref 136–145)
WBC NRBC COR # BLD AUTO: 12.33 10*3/MM3 (ref 3.4–10.8)

## 2024-03-26 PROCEDURE — 97530 THERAPEUTIC ACTIVITIES: CPT

## 2024-03-26 PROCEDURE — 85025 COMPLETE CBC W/AUTO DIFF WBC: CPT | Performed by: SURGERY

## 2024-03-26 PROCEDURE — 25010000002 MICAFUNGIN SODIUM 100 MG RECONSTITUTED SOLUTION 1 EACH VIAL: Performed by: INTERNAL MEDICINE

## 2024-03-26 PROCEDURE — 92526 ORAL FUNCTION THERAPY: CPT | Performed by: SPEECH-LANGUAGE PATHOLOGIST

## 2024-03-26 PROCEDURE — 25010000002 DAPTOMYCIN PER 1 MG: Performed by: INTERNAL MEDICINE

## 2024-03-26 PROCEDURE — 82948 REAGENT STRIP/BLOOD GLUCOSE: CPT

## 2024-03-26 PROCEDURE — 80048 BASIC METABOLIC PNL TOTAL CA: CPT | Performed by: STUDENT IN AN ORGANIZED HEALTH CARE EDUCATION/TRAINING PROGRAM

## 2024-03-26 PROCEDURE — 25010000002 HYDRALAZINE PER 20 MG: Performed by: SURGERY

## 2024-03-26 PROCEDURE — 99232 SBSQ HOSP IP/OBS MODERATE 35: CPT | Performed by: STUDENT IN AN ORGANIZED HEALTH CARE EDUCATION/TRAINING PROGRAM

## 2024-03-26 PROCEDURE — 25010000002 PIPERACILLIN SOD-TAZOBACTAM PER 1 G: Performed by: INTERNAL MEDICINE

## 2024-03-26 RX ADMIN — APIXABAN 5 MG: 5 TABLET, FILM COATED ORAL at 21:07

## 2024-03-26 RX ADMIN — LEVETIRACETAM 500 MG: 500 TABLET, FILM COATED ORAL at 08:20

## 2024-03-26 RX ADMIN — CARVEDILOL 25 MG: 12.5 TABLET, FILM COATED ORAL at 08:20

## 2024-03-26 RX ADMIN — VERAPAMIL HYDROCHLORIDE 80 MG: 80 TABLET ORAL at 13:08

## 2024-03-26 RX ADMIN — VALPROIC ACID 375 MG: 250 SOLUTION ORAL at 13:07

## 2024-03-26 RX ADMIN — LOPERAMIDE HYDROCHLORIDE 2 MG: 2 CAPSULE ORAL at 08:19

## 2024-03-26 RX ADMIN — VALPROIC ACID 375 MG: 250 SOLUTION ORAL at 06:16

## 2024-03-26 RX ADMIN — LOPERAMIDE HYDROCHLORIDE 2 MG: 2 CAPSULE ORAL at 21:07

## 2024-03-26 RX ADMIN — ISOSORBIDE DINITRATE 10 MG: 10 TABLET ORAL at 08:20

## 2024-03-26 RX ADMIN — VERAPAMIL HYDROCHLORIDE 80 MG: 80 TABLET ORAL at 21:14

## 2024-03-26 RX ADMIN — PIPERACILLIN AND TAZOBACTAM 3.38 G: 3; .375 INJECTION, POWDER, LYOPHILIZED, FOR SOLUTION INTRAVENOUS at 17:16

## 2024-03-26 RX ADMIN — VALPROIC ACID 375 MG: 250 SOLUTION ORAL at 17:15

## 2024-03-26 RX ADMIN — LEVOTHYROXINE SODIUM 88 MCG: 88 TABLET ORAL at 06:11

## 2024-03-26 RX ADMIN — ISOSORBIDE DINITRATE 10 MG: 10 TABLET ORAL at 13:08

## 2024-03-26 RX ADMIN — PIPERACILLIN AND TAZOBACTAM 3.38 G: 3; .375 INJECTION, POWDER, LYOPHILIZED, FOR SOLUTION INTRAVENOUS at 08:16

## 2024-03-26 RX ADMIN — Medication 10 ML: at 08:19

## 2024-03-26 RX ADMIN — MICAFUNGIN 100 MG: 20 INJECTION, POWDER, LYOPHILIZED, FOR SOLUTION INTRAVENOUS at 18:42

## 2024-03-26 RX ADMIN — LEVETIRACETAM 500 MG: 500 TABLET, FILM COATED ORAL at 21:07

## 2024-03-26 RX ADMIN — PIPERACILLIN AND TAZOBACTAM 3.38 G: 3; .375 INJECTION, POWDER, LYOPHILIZED, FOR SOLUTION INTRAVENOUS at 00:07

## 2024-03-26 RX ADMIN — ISOSORBIDE DINITRATE 10 MG: 10 TABLET ORAL at 17:16

## 2024-03-26 RX ADMIN — LISINOPRIL 40 MG: 40 TABLET ORAL at 08:20

## 2024-03-26 RX ADMIN — DAPTOMYCIN 500 MG: 500 INJECTION, POWDER, LYOPHILIZED, FOR SOLUTION INTRAVENOUS at 08:14

## 2024-03-26 RX ADMIN — APIXABAN 5 MG: 5 TABLET, FILM COATED ORAL at 08:20

## 2024-03-26 RX ADMIN — CARVEDILOL 25 MG: 12.5 TABLET, FILM COATED ORAL at 17:15

## 2024-03-26 RX ADMIN — HYDRALAZINE HYDROCHLORIDE 10 MG: 20 INJECTION INTRAMUSCULAR; INTRAVENOUS at 20:11

## 2024-03-26 RX ADMIN — PANTOPRAZOLE SODIUM 40 MG: 40 INJECTION, POWDER, FOR SOLUTION INTRAVENOUS at 06:11

## 2024-03-26 RX ADMIN — VALPROIC ACID 375 MG: 250 SOLUTION ORAL at 00:09

## 2024-03-26 RX ADMIN — VERAPAMIL HYDROCHLORIDE 80 MG: 80 TABLET ORAL at 06:11

## 2024-03-26 NOTE — PROGRESS NOTES
"Patient Name:  Lea Rivers  YOB: 1945  [id number]    Surgery Progress Note    Date of visit: 3/26/2024    Subjective   Unable to obtain full history due to patient's dementia. No abdominal pain. No nausea/vomiting. No fevers.  Having ostomy output. Appetite poor.         Objective       /98 (BP Location: Left leg, Patient Position: Lying)   Pulse 67   Temp 97.4 °F (36.3 °C) (Oral)   Resp 18   Ht 170.2 cm (67.01\")   Wt 98.8 kg (217 lb 12 oz)   SpO2 97%   BMI 34.10 kg/m²     Intake/Output Summary (Last 24 hours) at 3/26/2024 0730  Last data filed at 3/26/2024 0303  Gross per 24 hour   Intake 50 ml   Output 180 ml   Net -130 ml   OLIVER 30 cc    CV:  Rhythm regular and rate regular  L:  Clear to auscultation bilaterally  Abd:  Bowel sounds positive, soft, approp tender, incision c/d/I, ostomy viable/functioning, OLIVER serous  Ext:  No cyanosis, clubbing, edema    Recent labs and imaging that are back at this time have been reviewed.   WBC 12.3  Hgb 9.1       Assessment & Plan     Patient postoperative day 17 from total abdominal colectomy with end ileostomy, postoperative day 8 abdominal washout. Continue Imodium, ask staff to improve documentation of ostomy output. Pain control.  Diet per speech and swallow recommendations, continue to monitor intake with TPN off. Abx per ID, OR cultures with rare pseudomonas and Enterococcus. OK to continue anticoagulation for UE DVT.           Harley Godfrey MD  3/26/2024  07:30 EDT      "

## 2024-03-26 NOTE — PLAN OF CARE
Goal Outcome Evaluation:  Plan of Care Reviewed With: patient        Progress: no change  Outcome Evaluation: Pt. continues to be limited by decreased activity tolerance, generalized weakness, and balance. Completes T/Fs with Max A x 2 and donned L UE Sling with Max A. Will continue to progress as able. Recommend SNF at discharge.

## 2024-03-26 NOTE — PROGRESS NOTES
Lea Barrett [place]  1945  [id number]        Evaluating Physician: Louis Chow MD    Chief Complaint: abdpain    Reason for Consultation: IA infection    History of present illness:     Patient is a 78 y.o.  Yr old male with history of seizure disorder, colon cancer/prostate cancer with prior radiation, diabetes and dementia per admission notes with admission March 2 after frequent falls noted to have multiple rib fractures and left side clavicular fracture in the emergency room.noted to have significant colon distention with potential for pneumatosis on CT scan and seen by gastroenterology/surgery.taken for surgery on March 9 with diagnosis of toxic dilation of the colon for total abdominal colectomy and end ileostomy; medicine notes indicate he received ceftriaxone/Flagyl and Diflucan.  Leukocytosis persisted.repeat CT scan on March 18 with postsurgical changes, moderate loculated ascitic fluid in the anterior abdomen as described by radiology.taken back to surgery on March 18 for exploratory laparotomy with washout.  Culture subsequently with gram-negative manuel at least.  Empiric antibiotics adjusted to Zosyn/Mycamine    3/26/24 sleepy;    Per nursing, ongoing output at ostomy and nursing to quantify per surgery note, surgery following, + abdominal pain which is less intense overall, dull at present, sharp at times, worse with palpation, better with pain meds and he will not attach a numerical severity.    no headache photophobia or neck stiffness.  No dyspnea or cough.  He has a sling on the left arm with left arm PICC line.  No rash.  Nursing reports no other new focal pain or distress.       Past Medical History:   Diagnosis Date    Anemia     Colon cancer 1990    Status post partial colectomy in 1990. No chemotherapy or radiation therapy.    Coronary artery disease     Nonobstructive coronary artery disease.    Diabetes     Dyslipidemia     GERD (gastroesophageal reflux disease)     Hx of.     Hyperlipidemia     Hypertension     Hyponatremia     Hypothyroidism     Left shoulder pain     Low sodium levels 2022    recent issue; saw nephrologist who ruled out kidney issues; took Sodium Chloride po to bring levels up    Memory deficit     FROM ANESTHESIA    Osteoarthritis     Prostate cancer 07/23/2022    Seizure disorder     silent seziure-diagnosed years ago    Skin cancer        Past Surgical History:   Procedure Laterality Date    APPENDECTOMY      CARDIAC CATHETERIZATION  2012    30 to 40% LAD    CARPAL TUNNEL RELEASE Bilateral     CHOLECYSTECTOMY      COLECTOMY PARTIAL / TOTAL  1990    Partial colectomy    COLON RESECTION N/A 3/9/2024    Procedure: TOTAL ABDOMINAL COLECTOMY, END ILEOSTOMY;  Surgeon: Harley Godfrey MD;  Location:  [facility];  Service: General;  Laterality: N/A;    COLONOSCOPY      CYBERKNIFE  02/09/2023    Prostate/SV    CYSTOSCOPY TRANSURETHRAL RESECTION OF PROSTATE N/A 09/21/2018    Procedure: CYSTOSCOPY TRANSURETHRAL RESECTION OF PROSTATE GREENLIGHT;  Surgeon: Naseem Clayton MD;  Location:  [facility];  Service: Urology    EXPLORATORY LAPAROTOMY N/A 3/18/2024    Procedure: LAPAROTOMY EXPLORATORY, ABDOMINAL WASH OUT;  Surgeon: Harley Godfrey MD;  Location:  [facility];  Service: General;  Laterality: N/A;    OTHER SURGICAL HISTORY      Contraction surgery on bilateral hands and wrists.    PROSTATE BIOPSY N/A 11/11/2022    Procedure: TRANSRECTAL ULTRASOUND GUIDED BIOPSY OF PROSTATE;  Surgeon: Naseem Noland MD;  Location:  [facility];  Service: Urology;  Laterality: N/A;    SINUS SURGERY      SKIN BIOPSY      TEETH EXTRACTION      TONSILLECTOMY      TOTAL KNEE ARTHROPLASTY Right 04/07/2022    Procedure: TOTAL KNEE ARTHROPLASTY WITH ORTHO ROBOT RIGHT;  Surgeon: Louis Malcolm MD;  Location:  [facility];  Service: Robotics - Ortho;  Laterality: Right;    TRANSRECTAL INCISION PROSTATE WITH GOLD SEED Bilateral 01/06/2023    Procedure: TRANSRECTAL ULTRASOUND GUIDED PROSTATE  "FIDUCIAL MARKER PLACEMENT;  Surgeon: Naseem Noland MD;  Location: [facility];  Service: Urology;  Laterality: Bilateral;    TURP / TRANSURETHRAL INCISION / DRAINAGE PROSTATE      VASECTOMY            family history includes Arthritis in an other family member; Cancer in his mother and another family member; Esophageal cancer in his brother; Hypertension in his father; No Known Problems in his paternal grandfather, paternal grandmother, and sister; Stroke in his father, maternal grandfather, sister, and another family member.    Allergies   Allergen Reactions    Chlorhexidine Rash    Sulfa Antibiotics Rash     Childhood reaction             Review of Systems    3/26/24 as per nursing also    Constitutional-- No Fever, chills or sweats.  Appetite diminished with fatigue  Heent-- No new vision, hearing or throat complaints.  No epistaxis or oral sores.  Denies odynophagia or dysphagia.  No flashers, floaters or eye pain. No odynophagia or dysphagia. No headache, photophobia or neck stiffness.  CV-- No chest pain, palpitation or syncope  Resp-- No SOB/cough/Hemoptysis  GI- see above.  No hematochezia, melena, or hematemesis. Denies jaundice or chronic liver disease.  -- No dysuria, hematuria, or flank pain.  Denies hesitancy, urgency or flank pain.  Lymph- no swollen lymph nodes in neck/axilla or groin.   Heme- No active bruising or bleeding  MS-- no swelling or pain in the bones or joints of arms/legs.  No new back pain.  Neuro-- generally debilitated, unable to lift his legs off the bed according to nursing.  Wiggles his feet/toes    Full 12 point review of systems reviewed and negative otherwise for acute complaints, except for above    Physical Exam:   Vital Signs   BP (!) 172/109 (BP Location: Left leg, Patient Position: Lying)   Pulse 67   Temp 97.6 °F (36.4 °C) (Oral)   Resp 18   Ht 170.2 cm (67.01\")   Wt 98.8 kg (217 lb 12 oz)   SpO2 97%   BMI 34.10 kg/m²     GENERAL: sleepy, answers questions and " follows basic commands as above, in no acute distress. Appears chronically debilitated.     HEENT: Normocephalic, atraumatic.   No conjunctival injection. No icterus. Oropharynx clear without evidence of thrush or exudate. No evidence of periodontal disease.    NECK: Supple without nuchal rigidity. No mass.  HEART: RRR; No murmur, rubs, gallops.   LUNGS: diminished at bases with some crackles at the bases, but otherwise Clear to auscultation bilaterally without wheezing/ rhonchi.  Nonlabored. No dullness.  ABDOMEN: Soft,  tender, nondistended. Positive bowel sounds but diminished. No rebound or guarding. NO mass or HSM.  EXT:  No cyanosis, clubbing;  No cord.has edema  MSK: FROM without joint effusions noted arms/legs.    SKIN: Warm and dry without cutaneous eruptions on Inspection/palpation.    NEURO: follows basic commands    No peripheral stigmata/phenomena of endocarditis    IV without obvious redness or drainage at left arm    Laboratory Data    Results from last 7 days   Lab Units 03/26/24  0632 03/25/24  0612 03/24/24  0603   WBC 10*3/mm3 12.33* 13.06* 13.58*   HEMOGLOBIN g/dL 9.1* 8.4* 9.0*   HEMATOCRIT % 29.4* 27.0* 28.7*   PLATELETS 10*3/mm3 517* 560* 556*     Results from last 7 days   Lab Units 03/26/24  0632   SODIUM mmol/L 132*   POTASSIUM mmol/L 4.6   CHLORIDE mmol/L 103   CO2 mmol/L 21.0*   BUN mg/dL 24*   CREATININE mg/dL 0.54*   GLUCOSE mg/dL 97   CALCIUM mg/dL 8.3*     Results from last 7 days   Lab Units 03/25/24  1418   ALK PHOS U/L 161*   BILIRUBIN mg/dL 0.2   ALT (SGPT) U/L 7   AST (SGOT) U/L 18         Results from last 7 days   Lab Units 03/25/24  1418   CRP mg/dL 1.94*       Estimated Creatinine Clearance: 124.3 mL/min (A) (by C-G formula based on SCr of 0.54 mg/dL (L)).      Microbiology:      Radiology:  Imaging Results (Last 72 Hours)       ** No results found for the last 72 hours. **              Impression:     --acute abdominal pain with total abdominal colectomy/ileostomy as above  related to toxic dilation of colon As per operative note, postop with persistent leukocytosis and fluid collections on CT scan, taken for exploratory laparotomy and fluid culture positive for gram-negative rods so far, consistent with abscess.  Empiric antimicrobials adjusted to Zosyn/Mycamine/daptomycin    --Left lower lobe pneumonia per medicine team notes, abnormal chest x-ray March 12.no respiratory distress or productive sputum.  Empiric Zosyn ongoing.  If worsening respiratory status you could consider further imaging etc.nasal cannula stable per nursing    --history dementia per admission notes a little specifics regarding baseline not clear and generally poor insight overall.  Description of toxic/metabolic encephalopathy per medicine team notes during this admission.  Prior history seizure and generally debilitated.    --History of multiple falls with left clavicular and multiple rib fractures per admission notes.  Orthopedics has seen.    --Right upper extremity DVT, described as brachial per medicine team notes.  Anticoagulation at discretion of medicine team    --History prostate and colon cancer previously per admission notes.        PLAN:       --IV Zosyn/Mycamine/ daptomycin ; duration to epend on clinical course; potentially 10-14 days if steadily better    **culture with pseudomonas aeruginosa and E Faecium    --Check/reviewed labs cultures and scans    --Partial history per nursing staff    --Discussed with microbiology     --Highly complex of issues with high risk for further serious morbidity and other serious sequela    Copied text in this note has been reviewed and is accurate as of 03/26/24.        Louis Chow MD  3/26/2024

## 2024-03-26 NOTE — PROGRESS NOTES
[facility] Medicine Services  PROGRESS NOTE    Patient Name: Lea Rivers  : 1945  MRN: [id number]    Date of Admission: 3/2/2024  Primary Care Physician: Mannie Melvin MD    Subjective   Subjective     CC:  Follow-up colectomy    HPI:  Still pretty drowsy this am. Awakens when mentioned daughter visiting yesterday and smiled. Not eating much, denies appetite. Denies n/v      Objective   Objective     Vital Signs:   Temp:  [97.4 °F (36.3 °C)-98.2 °F (36.8 °C)] 97.6 °F (36.4 °C)  Heart Rate:  [63-74] 65  Resp:  [18-20] 18  BP: (156-181)/() 172/109     Physical Exam:  Constitutional: No acute distress, awake, alert, chronically ill-appearing  HENT: NCAT, mucous membranes moist  Respiratory: Diminished in bases, respiratory effort normal on room air  Cardiovascular: RRR, no murmurs, cap refill brisk   Gastrointestinal: Positive bowel sounds, lower OLIVER drain dc'd, midline vertical incision staples in place, ostomy in place with brown liquid stool and a lot of output, abdominal binder in place, tenderness to palpation, nondistended  Musculoskeletal: trace BLE edema, bilateral heel dressings in place  Psychiatric: Flat affect, cooperative  Neurologic: Oriented x 3 but confused to situation, moves all extremities, slowed speech  Skin: warm, dry, no visible rash       Results Reviewed:  LAB RESULTS:      Lab 24  0632 24  1418 24  0612 24  0603 24  0348 24  0557 24  0439 24  0510 24  0510 24  1643   WBC 12.33*  --  13.06* 13.58* 13.04* 16.56* 15.40*   < > 17.74*  --    HEMOGLOBIN 9.1*  --  8.4* 9.0* 8.8* 8.6* 9.1*   < > 8.6*  --    HEMATOCRIT 29.4*  --  27.0* 28.7* 27.0* 26.9* 27.7*   < > 27.6*  --    PLATELETS 517*  --  560* 556* 519* 537* 517*   < > 465*  --    NEUTROS ABS 8.32*  --  10.58* 9.64* 9.46* 12.32* 11.78*   < > 13.73*  --    IMMATURE GRANS (ABS) 0.66*  --   --   --  0.73* 0.92* 0.83*  --  0.96*   --    LYMPHS ABS 1.31  --   --   --  1.02 1.31 1.09  --  1.07  --    MONOS ABS 1.21*  --   --   --  0.97* 1.40* 1.15*  --  1.77*  --    EOS ABS 0.72*  --  0.78* 0.95* 0.74* 0.50* 0.41*   < > 0.12  --    MCV 97.7*  --  96.4 95.7 99.6* 93.1 97.9*   < > 93.6  --    CRP  --  1.94*  --   --   --   --   --   --   --   --    PROTIME  --  16.7*  --   --   --   --   --   --   --  15.9*    < > = values in this interval not displayed.         Lab 03/26/24  0632 03/25/24  1418 03/24/24  0603 03/23/24  1056 03/21/24  0622 03/20/24  0510   SODIUM 132* 136 135* 132* 133* 141   POTASSIUM 4.6 4.5 4.1 4.4 4.5 4.4   CHLORIDE 103 105 103 101 98 106   CO2 21.0* 24.0 25.0 24.0 29.0 29.0   ANION GAP 8.0 7.0 7.0 7.0 6.0 6.0   BUN 24* 27* 21 18 17 26*   CREATININE 0.54* 0.62* 0.49* 0.41* 0.52* 0.65*   EGFR 101.4 97.2 104.4 110.2 102.5 95.8   GLUCOSE 97 129* 152* 145* 152* 161*   CALCIUM 8.3* 7.9* 8.0* 7.8* 7.6* 7.6*   IONIZED CALCIUM  --  1.27  --   --   --   --    MAGNESIUM  --   --  1.9  --   --   --    PHOSPHORUS  --   --  3.0  --   --  2.9         Lab 03/25/24  1418 03/21/24  0622 03/20/24  0510   TOTAL PROTEIN 5.2* 4.8* 4.7*   ALBUMIN 2.4* 2.0* 1.9*   GLOBULIN 2.8 2.8 2.8   ALT (SGPT) 7 <5 5   AST (SGOT) 18 17 14   BILIRUBIN 0.2 0.2 0.2   ALK PHOS 161* 101 77         Lab 03/25/24  1418 03/19/24  1643   PROTIME 16.7* 15.9*   INR 1.33* 1.26*         Lab 03/25/24  1418   TRIGLYCERIDES 101         Lab 03/23/24  1827 03/23/24  0348   IRON  --  34*   IRON SATURATION (TSAT)  --  19*   TIBC  --  182*   TRANSFERRIN  --  122*   FERRITIN  --  200.10   FOLATE 3.78*  --    VITAMIN B 12  --  1,320*         Brief Urine Lab Results  (Last result in the past 365 days)        Color   Clarity   Blood   Leuk Est   Nitrite   Protein   CREAT   Urine HCG        03/11/24 1337 Yellow   Cloudy   Moderate (2+)   Trace   Negative   30 mg/dL (1+)                   Microbiology Results Abnormal       Procedure Component Value - Date/Time    Fungus Culture -  Peritoneal Fluid, Perineum [[id number]] Collected: 03/18/24 1349    Lab Status: Preliminary result Specimen: Peritoneal Fluid from Perineum Updated: 03/25/24 1831     Fungus Culture No fungus isolated at 1 week    Anaerobic Culture - Peritoneal Fluid, Perineum [[id number]]  (Normal) Collected: 03/18/24 1349    Lab Status: Final result Specimen: Peritoneal Fluid from Perineum Updated: 03/23/24 1026     Anaerobic Culture No anaerobes isolated at 5 days    AFB Culture - Peritoneal Fluid, Perineum [[id number]] Collected: 03/18/24 1349    Lab Status: Preliminary result Specimen: Peritoneal Fluid from Perineum Updated: 03/19/24 1339     AFB Stain No acid fast bacilli seen on concentrated smear    Urine Culture - Urine, Indwelling Urethral Catheter [[id number]]  (Normal) Collected: 03/11/24 1337    Lab Status: Final result Specimen: Urine from Indwelling Urethral Catheter Updated: 03/12/24 2024     Urine Culture No growth    Blood Culture - Blood, Arm, Right [[id number]]  (Normal) Collected: 03/06/24 1857    Lab Status: Final result Specimen: Blood from Arm, Right Updated: 03/11/24 2045     Blood Culture No growth at 5 days    Blood Culture - Blood, Arm, Right [[id number]]  (Normal) Collected: 03/06/24 1751    Lab Status: Final result Specimen: Blood from Arm, Right Updated: 03/11/24 2000     Blood Culture No growth at 5 days    Blood Culture - Blood, Arm, Right [[id number]]  (Normal) Collected: 03/02/24 1132    Lab Status: Final result Specimen: Blood from Arm, Right Updated: 03/07/24 1201     Blood Culture No growth at 5 days    Narrative:      Less than seven (7) mL's of blood was collected.  Insufficient quantity may yield false negative results.    Blood Culture - Blood, Arm, Right [[id number]]  (Normal) Collected: 03/02/24 1132    Lab Status: Final result Specimen: Blood from Arm, Right Updated: 03/07/24 1201     Blood Culture No growth at 5 days    Narrative:      Less than seven (7) mL's of blood was collected.   Insufficient quantity may yield false negative results.    Urine Culture - Urine, Straight Cath [[id number]]  (Normal) Collected: 03/02/24 0923    Lab Status: Final result Specimen: Urine from Straight Cath Updated: 03/03/24 1601     Urine Culture No growth    COVID PRE-OP / PRE-PROCEDURE SCREENING ORDER (NO ISOLATION) - Swab, Nasopharynx [[id number]]  (Normal) Collected: 03/02/24 1133    Lab Status: Final result Specimen: Swab from Nasopharynx Updated: 03/02/24 1227    Narrative:      The following orders were created for panel order COVID PRE-OP / PRE-PROCEDURE SCREENING ORDER (NO ISOLATION) - Swab, Nasopharynx.  Procedure                               Abnormality         Status                     ---------                               -----------         ------                     COVID-19, FLU A/B, RSV P...[[id number]]  Normal              Final result                 Please view results for these tests on the individual orders.    COVID-19, FLU A/B, RSV PCR 1 HR TAT - Swab, Nasopharynx [[id number]]  (Normal) Collected: 03/02/24 1133    Lab Status: Final result Specimen: Swab from Nasopharynx Updated: 03/02/24 1227     COVID19 Not Detected     Influenza A PCR Not Detected     Influenza B PCR Not Detected     RSV, PCR Not Detected    Narrative:      Fact sheet for providers: https://www.fda.gov/media/179257/download    Fact sheet for patients: https://www.fda.gov/media/251987/download    Test performed by PCR.            No radiology results from the last 24 hrs    Results for orders placed during the hospital encounter of 07/20/21    Adult Transthoracic Echo Complete W/ Cont if Necessary Per Protocol    Interpretation Summary  · Left ventricular wall thickness is consistent with mild concentric hypertrophy.  · Normal left-ventricular systolic function, estimated EF 65%.  · Left ventricular diastolic function is consistent with (grade I) impaired relaxation.  · Aortic sclerosis without aortic stenosis. Trace aortic  insufficiency.  · Trace mitral regurgitation, trace tricuspid regurgitation.      Current medications:  Scheduled Meds:apixaban, 5 mg, Oral, Q12H  carvedilol, 25 mg, Oral, BID With Meals  DAPTOmycin, 6 mg/kg (Adjusted), Intravenous, Q24H  insulin regular, 2-7 Units, Subcutaneous, Q6H  isosorbide dinitrate, 10 mg, Oral, TID - Nitrates  levETIRAcetam, 500 mg, Oral, Q12H  levothyroxine, 88 mcg, Oral, Q AM  Lidocaine, 2 patch, Transdermal, Q24H  lisinopril, 40 mg, Oral, Q24H  loperamide, 2 mg, Oral, BID  micafungin (MYCAMINE) IV, 100 mg, Intravenous, Q24H  pantoprazole, 40 mg, Intravenous, Q AM  piperacillin-tazobactam, 3.375 g, Intravenous, Q8H  sodium chloride, 10 mL, Intravenous, Q12H  Valproic Acid, 375 mg, Oral, Q6H  verapamil, 80 mg, Oral, Q8H      Continuous Infusions:     PRN Meds:.  acetaminophen **OR** acetaminophen **OR** acetaminophen    calcium carbonate    Calcium Replacement - Follow Nurse / BPA Driven Protocol    dextrose    dextrose    docusate sodium    fluticasone    glucagon (human recombinant)    hydrALAZINE    ipratropium-albuterol    labetalol    Magnesium Standard Dose Replacement - Follow Nurse / BPA Driven Protocol    [DISCONTINUED] HYDROmorphone **AND** naloxone    ondansetron ODT **OR** ondansetron    oxyCODONE    Phosphorus Replacement - Follow Nurse / BPA Driven Protocol    Potassium Replacement - Follow Nurse / BPA Driven Protocol    sodium chloride    sodium chloride    sodium chloride    sodium chloride    Assessment & Plan   Assessment & Plan     Active Hospital Problems    Diagnosis  POA    **Falls [W19.XXXA]  Yes    Pneumatosis intestinalis [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        Brief Hospital Course to date:  Lea Rivers is a 78 y.o. male  with past medical history of hypertension, hyperlipidemia, hypothyroidism, and seizure disorder who was admitted on 3/2/2024 due to fall and nondisplaced first through fourth left rib  fractures. He was also found to have left distal clavicle fracture.  Patient was noted to have increasing abdominal distention on 3/6/2024.  CT of the abdomen/pelvis revealed dilated loops of large bowel with likely pneumatosis intestinalis.  General surgery was consulted due to persistent colonic ileus and significant colonic distention.  Underwent total abdominal colectomy with end ileostomy creation on 3/9/2024.  Repeat CT of the abdomen/pelvis revealed postsurgical changes with a moderate loculated ascitic fluid collection within the anterior abdomen.  He underwent exploratory laparotomy  with abdominal washout on 3/18/2024 by Dr. Godfrey.  ID Dr. Chow was consulted and is managing antibiotic regimen.      This patient's problems and plans were partially entered by my partner and updated as appropriate by me 03/26/24.    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 on 3/9 w/  Persistent  Leukocytosis  LLL PNA  -s/p repeat CT 3/18, with fluid collection noted, s/p exploratory laparotomy 3/18 with abdominal washout and ascites noted  -completed course of Fluconazole, CTX/Flagyl 3/17 prior to repeat surgery  -CXR 3/12 with possible LLL pneumonia, completed course of CTX  -ID following, abdominal fluid culture with pseudomonas  -continue IV Zosyn, mycamine, dapto for now   -lower OLIVER drain removed today  -WOCN for ostomy care. Imodium added for high ostomy output  -AM labs      Dysphagia  Malnutrition  -SLP following   -Nutrition following  -weaned off TPN  -may need tube feeds if PO intake inadequate--discussed the high possibility of this w daughter. PO intake remains poor     Toxic metabolic encephalopathy  -per daughter significant change in mental status and speech since arrival, and team had a long discussion and felt this is probably TME in setting of infection, recent large operation, underlying dementia  -initial CT head 3/2 negative, repeated CT  head 3/13 without any acute findings  -EEG negative for seizures, findings consistent with TME  -minimize sedating meds   -slowly improving. D/w daughter     Multiple falls with increasing frequency   -CT of head shows age-related changes which are chronic but no acute process  -neuro consulted, likely d/t neuropathy (confirmed with EMG) and progression of dementia     Multiple rib fractures and also left clavicle fracture  -With no displacement or mildly displaced.  Orthopedic surgery evaluated. No surgical intervention  planned.  Recs nonweightbearing LUE, may come out of sling in 5-7 days to initiate gentle ROM exercises per ortho.  F/u with ortho/Dr. Maldonado in 2 weeks  -Pain control, lidocaine patch  -bruise noted to L shoulder/neck region however X-ray negative     Acute on Chronic Anemia  -S/P 1 unit PRBCs 3/11 and 3/16, monitoring H&H  -no clear source of active bleeding, hemoccult negative  -s/p IV iron  -transfuse PRN hgb <7     Acute RUE DVT (brachial)  -Provoked, 2/2 PICC   -eliquis     Prostate cancer  Hx BPH s/p greenlight photo vaporization of prostate 2018  -Follows with Dr. Noland  -S/P cyberknife radiation 2/9/23  -Has intermittent hematuria, monitor while on AC     Dementia and increasing memory problem  -Patient was previously living alone and driving, given significant decline will need placement  --complicates all aspects of care, including recovery     Hypertension  -cleared by speech, resumed home oral regimen  -continue Verapamil   -BP uncontrolled, but BP is being checking on LE    -Increased Lisinopril to 40 mg daily 3/32  -Coreg added this admission, increased to 25 mg BID on 3/24  -PRN IV dosing for SBP >180     Seizure disorder  -continue vimpat and keppra     Hyperlipidemia  Hypothyroidism  -resume home regimen     Expected Discharge Location and Transportation: SNF  Expected Discharge   Expected Discharge Date: 3/29/2024; Expected Discharge Time:       DVT prophylaxis:  Medical and  mechanical DVT prophylaxis orders are present.    AM-PAC 6 Clicks Score (PT): 10 (03/26/24 0812)    CODE STATUS:   Code Status and Medical Interventions:   Ordered at: 03/02/24 1456     Medical Intervention Limits:    NO intubation (DNI)     Code Status (Patient has no pulse and is not breathing):    No CPR (Do Not Attempt to Resuscitate)     Medical Interventions (Patient has pulse or is breathing):    Limited Support       Gayla Thompson MD  03/26/24

## 2024-03-26 NOTE — PLAN OF CARE
Problem: Adult Inpatient Plan of Care  Goal: Plan of Care Review  Outcome: Ongoing, Not Progressing  Goal: Patient-Specific Goal (Individualized)  Outcome: Ongoing, Not Progressing  Goal: Absence of Hospital-Acquired Illness or Injury  Outcome: Ongoing, Not Progressing  Intervention: Identify and Manage Fall Risk  Recent Flowsheet Documentation  Taken 3/26/2024 0812 by Thea Velez RN  Safety Promotion/Fall Prevention: activity supervised  Intervention: Prevent Skin Injury  Recent Flowsheet Documentation  Taken 3/26/2024 0812 by Thea Velez RN  Body Position:   turned   left   legs elevated   tilted  Intervention: Prevent and Manage VTE (Venous Thromboembolism) Risk  Recent Flowsheet Documentation  Taken 3/26/2024 0812 by Thea Velez RN  Activity Management: activity encouraged  Goal: Optimal Comfort and Wellbeing  Outcome: Ongoing, Not Progressing  Goal: Readiness for Transition of Care  Outcome: Ongoing, Not Progressing     Problem: Skin Injury Risk Increased  Goal: Skin Health and Integrity  Outcome: Ongoing, Not Progressing  Intervention: Optimize Skin Protection  Recent Flowsheet Documentation  Taken 3/26/2024 0812 by Thea Velez RN  Head of Bed (HOB) Positioning: HOB lowered     Problem: Fall Injury Risk  Goal: Absence of Fall and Fall-Related Injury  Outcome: Ongoing, Not Progressing  Intervention: Promote Injury-Free Environment  Recent Flowsheet Documentation  Taken 3/26/2024 0812 by Thea Velez RN  Safety Promotion/Fall Prevention: activity supervised     Problem: Adjustment to Illness (Sepsis/Septic Shock)  Goal: Optimal Coping  Outcome: Ongoing, Not Progressing     Problem: Bleeding (Sepsis/Septic Shock)  Goal: Absence of Bleeding  Outcome: Ongoing, Not Progressing     Problem: Glycemic Control Impaired (Sepsis/Septic Shock)  Goal: Blood Glucose Level Within Desired Range  Outcome: Ongoing, Not Progressing     Problem: Infection Progression (Sepsis/Septic Shock)  Goal:  Absence of Infection Signs and Symptoms  Outcome: Ongoing, Not Progressing  Intervention: Promote Recovery  Recent Flowsheet Documentation  Taken 3/26/2024 0812 by Thea Velez, RN  Activity Management: activity encouraged     Problem: Nutrition Impaired (Sepsis/Septic Shock)  Goal: Optimal Nutrition Intake  Outcome: Ongoing, Not Progressing   Goal Outcome Evaluation:

## 2024-03-26 NOTE — THERAPY TREATMENT NOTE
Acute Care - Speech Language Pathology   Swallow Treatment Note [facility]     Patient Name: Lea Rivers  : 1945  MRN: [id number]  Today's Date: 3/26/2024               Admit Date: 3/2/2024    Visit Dx:     ICD-10-CM ICD-9-CM   1. Fall, initial encounter  W19.XXXA E888.9   2. Closed fracture of multiple ribs of left side, initial encounter  S22.42XA 807.09   3. Closed displaced fracture of acromial end of left clavicle, initial encounter  S42.032A 810.03   4. Falls frequently  R29.6 V15.88   5. Acute UTI (urinary tract infection)  N39.0 599.0   6. Colon distention  K63.89 569.89   7. Paralytic ileus of small intestine and colon  K56.0 560.1   8. Oropharyngeal dysphagia  R13.12 787.22   9. Intra-abdominal fluid  R18.8 789.59     Patient Active Problem List   Diagnosis    Coronary artery disease    Dyslipidemia    Hypothyroidism    GERD (gastroesophageal reflux disease)    Seizure disorder    BPH (benign prostatic hypertrophy)    Hypertension    Primary osteoarthritis of both knees    Syncope and collapse    Precordial pain    Diabetes    Status post total right knee replacement    Postoperative urinary retention    Confusion, postoperative    Acute blood loss anemia, asymptomatic, no transfusion required    Elevated prostate specific antigen (PSA)    Prostate cancer    Anemia    Body mass index (BMI) of 32.0-32.9 in adult    Localization-related focal epilepsy with simple partial seizures    Need for vaccination against Streptococcus pneumoniae    Upper extremity tendon tear, right, initial encounter    Vitamin D deficiency    PSA elevation    Polyp of colon    Overactive bladder    Gross hematuria    History of colon polyps    History of colon cancer    B12 deficiency    Falls    Pneumatosis intestinalis     Past Medical History:   Diagnosis Date    Anemia     Colon cancer     Status post partial colectomy in . No chemotherapy or radiation therapy.    Coronary artery disease      Nonobstructive coronary artery disease.    Diabetes     Dyslipidemia     GERD (gastroesophageal reflux disease)     Hx of.    Hyperlipidemia     Hypertension     Hyponatremia     Hypothyroidism     Left shoulder pain     Low sodium levels 2022    recent issue; saw nephrologist who ruled out kidney issues; took Sodium Chloride po to bring levels up    Memory deficit     FROM ANESTHESIA    Osteoarthritis     Prostate cancer 07/23/2022    Seizure disorder     silent seziure-diagnosed years ago    Skin cancer      Past Surgical History:   Procedure Laterality Date    APPENDECTOMY      CARDIAC CATHETERIZATION  2012    30 to 40% LAD    CARPAL TUNNEL RELEASE Bilateral     CHOLECYSTECTOMY      COLECTOMY PARTIAL / TOTAL  1990    Partial colectomy    COLON RESECTION N/A 3/9/2024    Procedure: TOTAL ABDOMINAL COLECTOMY, END ILEOSTOMY;  Surgeon: Harley Godfrey MD;  Location:  [facility];  Service: General;  Laterality: N/A;    COLONOSCOPY      CYBERKNIFE  02/09/2023    Prostate/SV    CYSTOSCOPY TRANSURETHRAL RESECTION OF PROSTATE N/A 09/21/2018    Procedure: CYSTOSCOPY TRANSURETHRAL RESECTION OF PROSTATE GREENLIGHT;  Surgeon: Naseem Clayton MD;  Location:  [facility];  Service: Urology    EXPLORATORY LAPAROTOMY N/A 3/18/2024    Procedure: LAPAROTOMY EXPLORATORY, ABDOMINAL WASH OUT;  Surgeon: Harley Godfrey MD;  Location:  [facility];  Service: General;  Laterality: N/A;    OTHER SURGICAL HISTORY      Contraction surgery on bilateral hands and wrists.    PROSTATE BIOPSY N/A 11/11/2022    Procedure: TRANSRECTAL ULTRASOUND GUIDED BIOPSY OF PROSTATE;  Surgeon: Naseem Noland MD;  Location:  [facility];  Service: Urology;  Laterality: N/A;    SINUS SURGERY      SKIN BIOPSY      TEETH EXTRACTION      TONSILLECTOMY      TOTAL KNEE ARTHROPLASTY Right 04/07/2022    Procedure: TOTAL KNEE ARTHROPLASTY WITH ORTHO ROBOT RIGHT;  Surgeon: Louis Malcolm MD;  Location:  [facility];  Service: Robotics - Ortho;  Laterality:  Right;    TRANSRECTAL INCISION PROSTATE WITH GOLD SEED Bilateral 01/06/2023    Procedure: TRANSRECTAL ULTRASOUND GUIDED PROSTATE FIDUCIAL MARKER PLACEMENT;  Surgeon: Naseem Noland MD;  [facility];  Service: Urology;  Laterality: Bilateral;    TURP / TRANSURETHRAL INCISION / DRAINAGE PROSTATE      VASECTOMY         SLP Recommendation and Plan     SLP Diet Recommendation: mechanical ground textures, no mixed consistencies, honey thick liquids (03/26/24 1100)  Recommended Precautions and Strategies: upright posture during/after eating, small bites of food and sips of liquid, multiple swallows per bite of food, multiple swallows per sip of liquid, check mouth frequently for oral residue/pocketing, general aspiration precautions, 1:1 supervision, assist with feeding, fatigue precautions (03/26/24 1100)  SLP Rec. for Method of Medication Administration: meds crushed, with puree, as tolerated, meds via alternate route (03/26/24 1100)     Monitor for Signs of Aspiration: yes, notify SLP if any concerns (03/26/24 1100)        Anticipated Discharge Disposition (SLP): skilled nursing facility (03/26/24 1100)     Therapy Frequency (Swallow): 5 days per week (03/26/24 1100)  Predicted Duration Therapy Intervention (Days): until discharge (03/26/24 1100)  Oral Care Recommendations: Oral Care BID/PRN, Suction toothbrush (03/26/24 1100)        Daily Summary of Progress (SLP): progress toward functional goals as expected (03/26/24 1100)               Treatment Assessment (SLP): continued, toleration of diet, no clinical signs of, suspected, aspiration (03/26/24 1100)  Treatment Assessment Comments (SLP): Pt continues w/ difficulty participating in exercises. Limited po presentations trialed across this evaluation 2/2 pt cooperation. No glaring overt s/s of aspiration across this tx session. Will continue current diet as tolerated, poor po intake per chart review (03/26/24 1100)  Plan for Continued Treatment (SLP):  continue treatment per plan of care (03/26/24 1100)         Plan of Care Reviewed With: patient  Progress: no change      SWALLOW EVALUATION (Last 72 Hours)       SLP Adult Swallow Evaluation       Row Name 03/26/24 1100       Rehab Evaluation    Document Type therapy note (daily note)  -    Subjective Information no complaints  -CJ    Patient Observations cooperative  -CJ    Patient/Family/Caregiver Comments/Observations no family present  -CJ    Patient Effort adequate  -CJ    Symptoms Noted During/After Treatment none  -CJ       Pain    Additional Documentation Pain Scale: FACES Pre/Post-Treatment (Group)  -       Pain Scale: FACES Pre/Post-Treatment    Pain: FACES Scale, Pretreatment 0-->no hurt  -CJ    Posttreatment Pain Rating 0-->no hurt  -CJ       SLP Treatment Clinical Impressions    Treatment Assessment (SLP) continued;toleration of diet;no clinical signs of;suspected;aspiration  -    Treatment Assessment Comments (SLP) Pt continues w/ difficulty participating in exercises. Limited po presentations trialed across this evaluation 2/2 pt cooperation. No glaring overt s/s of aspiration across this tx session. Will continue current diet as tolerated, poor po intake per chart review  -    Daily Summary of Progress (SLP) progress toward functional goals as expected  -    Barriers to Overall Progress (SLP) Cognitive status  -    Plan for Continued Treatment (SLP) continue treatment per plan of care  -    Care Plan Review care plan/treatment goals reviewed  -       Recommendations    Therapy Frequency (Swallow) 5 days per week  -    Predicted Duration Therapy Intervention (Days) until discharge  -    SLP Diet Recommendation mechanical ground textures;no mixed consistencies;honey thick liquids  -    Recommended Precautions and Strategies upright posture during/after eating;small bites of food and sips of liquid;multiple swallows per bite of food;multiple swallows per sip of liquid;check mouth  frequently for oral residue/pocketing;general aspiration precautions;1:1 supervision;assist with feeding;fatigue precautions  -CJ    Oral Care Recommendations Oral Care BID/PRN;Suction toothbrush  -CJ    SLP Rec. for Method of Medication Administration meds crushed;with puree;as tolerated;meds via alternate route  -CJ    Monitor for Signs of Aspiration yes;notify SLP if any concerns  -CJ    Anticipated Discharge Disposition (SLP) skilled nursing facility  -CJ              User Key  (r) = Recorded By, (t) = Taken By, (c) = Cosigned By      Initials Name Effective Dates    Jaylyn Holland, MS CCC-SLP 07/11/23 -                     EDUCATION  The patient has been educated in the following areas:   Dysphagia (Swallowing Impairment) Oral Care/Hydration.        SLP GOALS       Row Name 03/26/24 1100             (LTG) Patient will demonstrate functional swallow for    Diet Texture (Demonstrate functional swallow) soft to chew (chopped) textures  -CJ      Liquid viscosity (Demonstrate functional swallow) nectar/ mildly thick liquids  -CJ      [place] (Demonstrate functional swallow) with minimal cues (75-90% accuracy)  -CJ      Time Frame (Demonstrate functional swallow) by discharge  -CJ      Progress/Outcomes (Demonstrate functional swallow) continuing progress toward goal  -CJ         (STG) Patient will tolerate trials of    Consistencies Trialed (Tolerate trials) mechanical ground textures;honey/ moderately thick liquids  -CJ      Desired Outcome (Tolerate trials) without signs/symptoms of aspiration;without signs of distress;with adequate oral prep/transit/clearance  -CJ      [place] (Tolerate trials) with minimal cues (75-90% accuracy)  -CJ      Time Frame (Tolerate trials) 1 week  -CJ      Progress/Outcomes (Tolerate trials) continuing progress toward goal  -CJ      Comment (Tolerate trials) no overt s/s of aspiration  -CJ         (STG) Patient will tolerate therapeutic trials of    Consistencies  Trialed (Tolerate therapeutic trials) soft to chew (chopped) textures  -CJ      Desired Outcome (Tolerate therapeutic trials) with adequate oral prep/transit/clearance  -CJ      [place]kins (Tolerate therapeutic trials) with minimal cues (75-90% accuracy)  -CJ      Time Frame (Tolerate therapeutic trials) 1 week  -CJ      Progress/Outcomes (Tolerate therapeutic trials) goal ongoing  -CJ      Comment (Tolerate therapeutic trials) pt declined  -CJ         (STG) Lingual Strengthening Goal 1 (SLP)    Activity (Lingual Strengthening Goal 1, SLP) increase lingual tone/sensation/control/coordination/movement;increase tongue back strength  -CJ      Increase Lingual Tone/Sensation/Control/Coordination/Movement swallow trials;lingual resistance exercises  -CJ      Increase Tongue Back Strength lingual resistance exercises  -CJ      [place]kins/Accuracy (Lingual Strengthening Goal 1, SLP) with moderate cues (50-74% accuracy)  -CJ      Time Frame (Lingual Strengthening Goal 1, SLP) 1 week  -CJ      Progress/Outcomes (Lingual Strengthening Goal 1, SLP) progress slower than expected  -CJ      Comment (Lingual Strengthening Goal 1, SLP) attempted x5  -CJ         (STG) Pharyngeal Strengthening Exercise Goal 1 (SLP)    Activity (Pharyngeal Strengthening Goal 1, SLP) increase timing;increase superior movement of the hyolaryngeal complex;increase anterior movement of the hyolaryngeal complex;increase squeeze/positive pressure generation  -CJ      Increase Timing prepping - 3 second prep or suck swallow or 3-step swallow  -CJ      Increase Superior Movement of the Hyolaryngeal Complex falsetto  -CJ      Increase Anterior Movement of the Hyolaryngeal Complex chin tuck against resistance (CTAR)  -CJ      Increase Squeeze/Positive Pressure Generation hard effortful swallow  -CJ      Perkins/Accuracy (Pharyngeal Strengthening Goal 1, SLP) with maximum cues (25-49% accuracy)  -CJ      Time Frame (Pharyngeal Strengthening Goal 1, SLP)  1 week  -      Progress/Outcomes (Pharyngeal Strengthening Goal 1, SLP) progress slower than expected  -      Comment (Pharyngeal Strengthening Goal 1, SLP) attempted w/ max cues  -                User Key  (r) = Recorded By, (t) = Taken By, (c) = Cosigned By      Initials Name Provider Type    Jaylyn Holland MS CCC-SLP Speech and Language Pathologist                       Time Calculation:    Time Calculation- SLP       Row Name 03/26/24 1141             Time Calculation- SLP    SLP Start Time 1100  -      SLP Received On 03/26/24  -         Untimed Charges    30615-CU Treatment Swallow Minutes 24  -CJ         Total Minutes    Untimed Charges Total Minutes 24  -CJ       Total Minutes 24  -CJ                User Key  (r) = Recorded By, (t) = Taken By, (c) = Cosigned By      Initials Name Provider Type    Jaylyn Holland MS CCC-SLP Speech and Language Pathologist                    Therapy Charges for Today       Code Description Service Date Service Provider Modifiers Qty    [id number]  ST TREATMENT SWALLOW 2 3/26/2024 Jaylyn Peterson MS CCC-SLP GN 1                 Jaylyn Peterson MS CCC-NATALIO  3/26/2024

## 2024-03-26 NOTE — PLAN OF CARE
Goal Outcome Evaluation:  Plan of Care Reviewed With: patient        Progress: no change         Anticipated Discharge Disposition (SLP): skilled nursing facility             Treatment Assessment (SLP): continued, toleration of diet, no clinical signs of, suspected, aspiration (03/26/24 1100)  Treatment Assessment Comments (SLP): Pt continues w/ difficulty participating in exercises. Limited po presentations trialed across this evaluation 2/2 pt cooperation. No glaring overt s/s of aspiration across this tx session. Will continue current diet as tolerated, poor po intake per chart review (03/26/24 1100)  Plan for Continued Treatment (SLP): continue treatment per plan of care (03/26/24 1100)

## 2024-03-26 NOTE — THERAPY TREATMENT NOTE
Patient Name: Lea Rivers  : 1945    MRN: [id number]                              Today's Date: 3/26/2024       Admit Date: 3/2/2024    Visit Dx:     ICD-10-CM ICD-9-CM   1. Fall, initial encounter  W19.XXXA E888.9   2. Closed fracture of multiple ribs of left side, initial encounter  S22.42XA 807.09   3. Closed displaced fracture of acromial end of left clavicle, initial encounter  S42.032A 810.03   4. Falls frequently  R29.6 V15.88   5. Acute UTI (urinary tract infection)  N39.0 599.0   6. Colon distention  K63.89 569.89   7. Paralytic ileus of small intestine and colon  K56.0 560.1   8. Oropharyngeal dysphagia  R13.12 787.22   9. Intra-abdominal fluid  R18.8 789.59     Patient Active Problem List   Diagnosis    Coronary artery disease    Dyslipidemia    Hypothyroidism    GERD (gastroesophageal reflux disease)    Seizure disorder    BPH (benign prostatic hypertrophy)    Hypertension    Primary osteoarthritis of both knees    Syncope and collapse    Precordial pain    Diabetes    Status post total right knee replacement    Postoperative urinary retention    Confusion, postoperative    Acute blood loss anemia, asymptomatic, no transfusion required    Elevated prostate specific antigen (PSA)    Prostate cancer    Anemia    Body mass index (BMI) of 32.0-32.9 in adult    Localization-related focal epilepsy with simple partial seizures    Need for vaccination against Streptococcus pneumoniae    Upper extremity tendon tear, right, initial encounter    Vitamin D deficiency    PSA elevation    Polyp of colon    Overactive bladder    Gross hematuria    History of colon polyps    History of colon cancer    B12 deficiency    Falls    Pneumatosis intestinalis     Past Medical History:   Diagnosis Date    Anemia     Colon cancer     Status post partial colectomy in . No chemotherapy or radiation therapy.    Coronary artery disease     Nonobstructive coronary artery disease.    Diabetes     Dyslipidemia      GERD (gastroesophageal reflux disease)     Hx of.    Hyperlipidemia     Hypertension     Hyponatremia     Hypothyroidism     Left shoulder pain     Low sodium levels 2022    recent issue; saw nephrologist who ruled out kidney issues; took Sodium Chloride po to bring levels up    Memory deficit     FROM ANESTHESIA    Osteoarthritis     Prostate cancer 07/23/2022    Seizure disorder     silent seziure-diagnosed years ago    Skin cancer      Past Surgical History:   Procedure Laterality Date    APPENDECTOMY      CARDIAC CATHETERIZATION  2012    30 to 40% LAD    CARPAL TUNNEL RELEASE Bilateral     CHOLECYSTECTOMY      COLECTOMY PARTIAL / TOTAL  1990    Partial colectomy    COLON RESECTION N/A 3/9/2024    Procedure: TOTAL ABDOMINAL COLECTOMY, END ILEOSTOMY;  Surgeon: Harley Godfrey MD;  Location:  [facility];  Service: General;  Laterality: N/A;    COLONOSCOPY      CYBERKNIFE  02/09/2023    Prostate/SV    CYSTOSCOPY TRANSURETHRAL RESECTION OF PROSTATE N/A 09/21/2018    Procedure: CYSTOSCOPY TRANSURETHRAL RESECTION OF PROSTATE GREENLIGHT;  Surgeon: Naseem Clayton MD;  Location:  [facility];  Service: Urology    EXPLORATORY LAPAROTOMY N/A 3/18/2024    Procedure: LAPAROTOMY EXPLORATORY, ABDOMINAL WASH OUT;  Surgeon: Harley Godfrey MD;  Location:  [facility];  Service: General;  Laterality: N/A;    OTHER SURGICAL HISTORY      Contraction surgery on bilateral hands and wrists.    PROSTATE BIOPSY N/A 11/11/2022    Procedure: TRANSRECTAL ULTRASOUND GUIDED BIOPSY OF PROSTATE;  Surgeon: Naseem Noland MD;  Location:  [facility];  Service: Urology;  Laterality: N/A;    SINUS SURGERY      SKIN BIOPSY      TEETH EXTRACTION      TONSILLECTOMY      TOTAL KNEE ARTHROPLASTY Right 04/07/2022    Procedure: TOTAL KNEE ARTHROPLASTY WITH ORTHO ROBOT RIGHT;  Surgeon: Louis Malcolm MD;  Location:  [facility];  Service: Robotics - Ortho;  Laterality: Right;    TRANSRECTAL INCISION PROSTATE WITH GOLD SEED Bilateral  01/06/2023    Procedure: TRANSRECTAL ULTRASOUND GUIDED PROSTATE FIDUCIAL MARKER PLACEMENT;  Surgeon: Naseem Noland MD;  Location: [facility];  Service: Urology;  Laterality: Bilateral;    TURP / TRANSURETHRAL INCISION / DRAINAGE PROSTATE      VASECTOMY        General Information       Row Name 03/26/24 1526          OT Time and Intention    Document Type therapy note (daily note)  -     Mode of Treatment occupational therapy  -       Row Name 03/26/24 1526          General Information    Patient Profile Reviewed yes  -LC     Prior Level of Function --  See IE  -     Existing Precautions/Restrictions fall;left;shoulder;non-weight bearing;other (see comments)  Clavicle Fx; LUE NWB; LUE Sling; L Rib Fxs; Ostomy; OLIVER Drain x 2; Abd Incision; Abd Binder; Brief with OOB activity, [place]  -     Barriers to Rehab medically complex;previous functional deficit;cognitive status  -       Row Name 03/26/24 1526          Cognition    Orientation Status (Cognition) oriented to;person  -       Row Name 03/26/24 1526          Safety Issues, Functional Mobility    Safety Issues Affecting Function (Mobility) awareness of need for assistance;insight into deficits/self-awareness;judgment;positioning of assistive device;problem-solving;safety precaution awareness;safety precautions follow-through/compliance;sequencing abilities  -     Impairments Affecting Function (Mobility) balance;cognition;endurance/activity tolerance;pain;postural/trunk control;strength;range of motion (ROM);sensation/sensory awareness  -     Cognitive Impairments, Mobility Safety/Performance awareness, need for assistance;insight into deficits/self-awareness;problem-solving/reasoning;safety precaution awareness;safety precaution follow-through;sequencing abilities  -               User Key  (r) = Recorded By, (t) = Taken By, (c) = Cosigned By      Initials Name Provider Type     Judith Lorenz OT Occupational Therapist                      Mobility/ADL's       Row Name 03/26/24 1528          Bed Mobility    Bed Mobility supine-sit  -     Supine-Sit [place] (Bed Mobility) 2 person assist;verbal cues;maximum assist (25% patient effort)  -     Bed Mobility, Safety Issues decreased use of arms for pushing/pulling  -     Assistive Device (Bed Mobility) bed rails;draw sheet;head of bed elevated  -     Comment, (Bed Mobility) VC's to sequence and adhere to L UE precautions. Significant posterior lean sitting EOB, improved with time. Sling donned to L UE prior to stand.  -       Row Name 03/26/24 1528          Transfers    Transfers sit-stand transfer;bed-chair transfer  -     Comment, (Transfers) VC's for R hand placement and sequencing. Cues needed for upright posture,  -       Row Name 03/26/24 1528          Bed-Chair Transfer    Bed-Chair [place] (Transfers) maximum assist (25% patient effort);2 person assist;verbal cues  -     Assistive Device (Bed-Chair Transfers) other (see comments)  R UE support  -[facility] Name 03/26/24 152          Sit-Stand Transfer    Sit-Stand [place] (Transfers) maximum assist (25% patient effort);2 person assist;verbal cues  -     Assistive Device (Sit-Stand Transfers) other (see comments)  R UE support  -[facility] Name 03/26/24 1528          Activities of Daily Living    BADL Assessment/Intervention upper body dressing  -[facility] Name 03/26/24 1528          Mobility    Extremity Weight-bearing Status left upper extremity  -     Left Upper Extremity (Weight-bearing Status) non weight-bearing (NWB)  -       Row Name 03/26/24 [facility]          Upper Body Dressing Assessment/Training    [place] Level (Upper Body Dressing) don;doff;other (see comments);maximum assist (25% patient effort);verbal cues  SLing  -     Comment, (Upper Body Dressing) Reviewed donning sling and proper positioning.  -               User Key  (r) = Recorded By, (t) = Taken By, (c) = Cosigned By       Initials Name Provider Type     Judith Lorenz OT Occupational Therapist                   Obj/Interventions       Row Name 03/26/24 1532          Elbow/Forearm (Therapeutic Exercise)    Elbow/Forearm (Therapeutic Exercise) AROM (active range of motion)  -     Elbow/Forearm AROM (Therapeutic Exercise) left;extension;supination;pronation;flexion;10 repetitions  -       Row Name 03/26/24 1532          Wrist (Therapeutic Exercise)    Wrist (Therapeutic Exercise) AAROM (active assistive range of motion)  -     Wrist AAROM (Therapeutic Exercise) left;flexion;extension;10 repetitions  -       Row Name 03/26/24 1532          Hand (Therapeutic Exercise)    Hand (Therapeutic Exercise) AROM (active range of motion)  -     Hand AROM/AAROM (Therapeutic Exercise) left;finger flexion;finger extension;10 repetitions  -       Row Name 03/26/24 1532          Motor Skills    Therapeutic Exercise elbow/forearm;wrist;hand  -       Row Name 03/26/24 1532          Balance    Balance Assessment sitting static balance;sitting dynamic balance;standing static balance;standing dynamic balance  -     Static Sitting Balance minimal assist;verbal cues;2-person assist  -     Dynamic Sitting Balance moderate assist;verbal cues;2-person assist  -     Position, Sitting Balance sitting edge of bed;supported;unsupported  -     Static Standing Balance 2-person assist;verbal cues;maximum assist  -     Dynamic Standing Balance maximum assist;2-person assist;verbal cues  -     Position/Device Used, Standing Balance supported  -     Balance Interventions sitting;standing;sit to stand;supported;weight shifting activity  -     Comment, Balance VC's needed for upright posture  -               User Key  (r) = Recorded By, (t) = Taken By, (c) = Cosigned By      Initials Name Provider Type     Judith Lorenz OT Occupational Therapist                   Goals/Plan    No documentation.                  Clinical Impression        Row Name 03/26/24 1535          Pain Assessment    Pretreatment Pain Rating 0/10 - no pain  -LC     Posttreatment Pain Rating 0/10 - no pain  -LC     Additional Documentation Pain Scale: Word Pre/Post-Treatment (Group)  -       Row Name 03/26/24 1535          Plan of Care Review    Plan of Care Reviewed With patient  -LC     Progress no change  -LC     Outcome Evaluation Pt. continues to be limited by decreased activity tolerance, generalized weakness, and balance. Completes T/Fs with Max A x 2 and donned L UE Sling with Max A. Will continue to progress as able. Recommend SNF at discharge.  -       Row Name 03/26/24 1535          Positioning and Restraints    Pre-Treatment Position in bed  -     Post Treatment Position chair  -LC     In Chair notified nsg;reclined;call light within reach;encouraged to call for assist;exit alarm on;waffle cushion;on mechanical lift sling;legs elevated  -               User Key  (r) = Recorded By, (t) = Taken By, (c) = Cosigned By      Initials Name Provider Type     Judith Lorenz, OT Occupational Therapist                   Outcome Measures       Row Name 03/26/24 1537          How much help from another is currently needed...    Putting on and taking off regular lower body clothing? 1  -LC     Bathing (including washing, rinsing, and drying) 2  -LC     Toileting (which includes using toilet bed pan or urinal) 1  -LC     Putting on and taking off regular upper body clothing 2  -LC     Taking care of personal grooming (such as brushing teeth) 2  -LC     Eating meals 2  -LC     AM-PAC 6 Clicks Score (OT) 10  -LC       Row Name 03/26/24 0812          How much help from another person do you currently need...    Turning from your back to your side while in flat bed without using bedrails? 2  -MB     Moving from lying on back to sitting on the side of a flat bed without bedrails? 2  -MB     Moving to and from a bed to a chair (including a wheelchair)? 2  -MB     Standing up  from a chair using your arms (e.g., wheelchair, bedside chair)? 2  -MB     Climbing 3-5 steps with a railing? 1  -MB     To walk in hospital room? 1  -MB     AM-PAC 6 Clicks Score (PT) 10  -MB     Highest Level of Mobility Goal 4 --> Transfer to chair/commode  -MB       Row Name 03/26/24 1537          Functional Assessment    Outcome Measure Options AM-PAC 6 Clicks Daily Activity (OT)  -               User Key  (r) = Recorded By, (t) = Taken By, (c) = Cosigned By      Initials Name Provider Type     Judith Lorenz, SADAF Occupational Therapist    Thea Blackman, RN Registered Nurse                    Occupational Therapy Education       Title: PT OT SLP Therapies (In Progress)       Topic: Occupational Therapy (In Progress)       Point: ADL training (In Progress)       Description:   Instruct learner(s) on proper safety adaptation and remediation techniques during self care or transfers.   Instruct in proper use of assistive devices.                  Learning Progress Summary             Patient Acceptance, E, NR by  at 3/26/2024 1418    Acceptance, E, NR by AC at 3/23/2024 1428    Acceptance, E,D, NR by  at 3/21/2024 1404    Acceptance, E,TB, NR,NL by  at 3/20/2024 0755    Acceptance, E, NR by  at 3/17/2024 1526    Acceptance, E, NR by AC at 3/6/2024 1116    Acceptance, E, VU by  at 3/4/2024 1519                         Point: Home exercise program (In Progress)       Description:   Instruct learner(s) on appropriate technique for monitoring, assisting and/or progressing therapeutic exercises/activities.                  Learning Progress Summary             Patient Acceptance, E, NR by  at 3/26/2024 1418    Acceptance, E,TB, NR,NL by  at 3/20/2024 0755    Acceptance, E, NR by  at 3/17/2024 1526                         Point: Precautions (In Progress)       Description:   Instruct learner(s) on prescribed precautions during self-care and functional transfers.                  Learning Progress  Summary             Patient Acceptance, E, NR by  at 3/26/2024 1418    Acceptance, E,D, NR by  at 3/21/2024 1404    Acceptance, E,TB, NR,NL by  at 3/20/2024 0755    Acceptance, E, NR by  at 3/17/2024 1526    Acceptance, E, VU by  at 3/4/2024 1519                         Point: Body mechanics (In Progress)       Description:   Instruct learner(s) on proper positioning and spine alignment during self-care, functional mobility activities and/or exercises.                  Learning Progress Summary             Patient Acceptance, E, NR by  at 3/26/2024 1418    Acceptance, E,D, NR by  at 3/21/2024 1404    Acceptance, E,TB, NR,NL by  at 3/20/2024 0755    Acceptance, E, NR by  at 3/17/2024 1526                                         User Key       Initials Effective Dates Name Provider Type Discipline     02/03/23 -  Pauly Davey, OT Occupational Therapist OT     06/16/21 -  Teresa Batista RN Registered Nurse Nurse     06/16/21 -  Judith Lorenz, OT Occupational Therapist OT     06/16/21 -  Justina Joyce, OT Occupational Therapist OT     10/14/22 -  Sandra Stahl, OT Occupational Therapist OT     02/05/24 -  Cha Landry, OT Occupational Therapist OT                  OT Recommendation and Plan     Plan of Care Review  Plan of Care Reviewed With: patient  Progress: no change  Outcome Evaluation: Pt. continues to be limited by decreased activity tolerance, generalized weakness, and balance. Completes T/Fs with Max A x 2 and donned L UE Sling with Max A. Will continue to progress as able. Recommend SNF at discharge.     Time Calculation:         Time Calculation- OT       Row Name 03/26/24 1539             Time Calculation- OT    OT Start Time 1418  -      OT Received On 03/26/24  -      OT Goal Re-Cert Due Date 03/31/24  -         Timed Charges    05609 - OT Therapeutic Exercise Minutes 5  -LC      92565 - OT Therapeutic Activity Minutes 11  -         Total Minutes    Timed  Charges Total Minutes 16  -LC       Total Minutes 16  -LC                User Key  (r) = Recorded By, (t) = Taken By, (c) = Cosigned By      Initials Name Provider Type    Judith Hayes OT Occupational Therapist                  Therapy Charges for Today       Code Description Service Date Service Provider Modifiers Qty    [id number]  OT THERAPEUTIC ACT EA 15 MIN 3/26/2024 Judith Lorenz OT GO 1                 Judith Lorenz OT  3/26/2024

## 2024-03-26 NOTE — PLAN OF CARE
Goal Outcome Evaluation:  Plan of Care Reviewed With: patient        Progress: no change  Outcome Evaluation: Pt continues to be limited by decreased functional independence, generalized weakness, and decreased command following. Pt completed STS x2 and SPT to chair with max A x2. Continue to progress per pt tolerance.      Anticipated Discharge Disposition (PT): skilled nursing facility

## 2024-03-27 LAB
ANION GAP SERPL CALCULATED.3IONS-SCNC: 9 MMOL/L (ref 5–15)
BASOPHILS # BLD AUTO: 0.09 10*3/MM3 (ref 0–0.2)
BASOPHILS NFR BLD AUTO: 0.9 % (ref 0–1.5)
BUN SERPL-MCNC: 15 MG/DL (ref 8–23)
BUN/CREAT SERPL: 28.3 (ref 7–25)
CALCIUM SPEC-SCNC: 7.9 MG/DL (ref 8.6–10.5)
CHLORIDE SERPL-SCNC: 101 MMOL/L (ref 98–107)
CO2 SERPL-SCNC: 22 MMOL/L (ref 22–29)
CREAT SERPL-MCNC: 0.53 MG/DL (ref 0.76–1.27)
DEPRECATED RDW RBC AUTO: 59.9 FL (ref 37–54)
EGFRCR SERPLBLD CKD-EPI 2021: 101.9 ML/MIN/1.73
EOSINOPHIL # BLD AUTO: 0.41 10*3/MM3 (ref 0–0.4)
EOSINOPHIL NFR BLD AUTO: 3.9 % (ref 0.3–6.2)
ERYTHROCYTE [DISTWIDTH] IN BLOOD BY AUTOMATED COUNT: 18 % (ref 12.3–15.4)
GLUCOSE BLDC GLUCOMTR-MCNC: 112 MG/DL (ref 70–130)
GLUCOSE BLDC GLUCOMTR-MCNC: 120 MG/DL (ref 70–130)
GLUCOSE BLDC GLUCOMTR-MCNC: 94 MG/DL (ref 70–130)
GLUCOSE SERPL-MCNC: 101 MG/DL (ref 65–99)
HCT VFR BLD AUTO: 30 % (ref 37.5–51)
HGB BLD-MCNC: 9.6 G/DL (ref 13–17.7)
IMM GRANULOCYTES # BLD AUTO: 0.22 10*3/MM3 (ref 0–0.05)
IMM GRANULOCYTES NFR BLD AUTO: 2.1 % (ref 0–0.5)
LYMPHOCYTES # BLD AUTO: 0.99 10*3/MM3 (ref 0.7–3.1)
LYMPHOCYTES NFR BLD AUTO: 9.5 % (ref 19.6–45.3)
MCH RBC QN AUTO: 30.3 PG (ref 26.6–33)
MCHC RBC AUTO-ENTMCNC: 32 G/DL (ref 31.5–35.7)
MCV RBC AUTO: 94.6 FL (ref 79–97)
MONOCYTES # BLD AUTO: 1.08 10*3/MM3 (ref 0.1–0.9)
MONOCYTES NFR BLD AUTO: 10.3 % (ref 5–12)
NEUTROPHILS NFR BLD AUTO: 7.67 10*3/MM3 (ref 1.7–7)
NEUTROPHILS NFR BLD AUTO: 73.3 % (ref 42.7–76)
NRBC BLD AUTO-RTO: 0 /100 WBC (ref 0–0.2)
PLATELET # BLD AUTO: 488 10*3/MM3 (ref 140–450)
PMV BLD AUTO: 8.6 FL (ref 6–12)
POTASSIUM SERPL-SCNC: 4 MMOL/L (ref 3.5–5.2)
RBC # BLD AUTO: 3.17 10*6/MM3 (ref 4.14–5.8)
SODIUM SERPL-SCNC: 132 MMOL/L (ref 136–145)
WBC NRBC COR # BLD AUTO: 10.46 10*3/MM3 (ref 3.4–10.8)

## 2024-03-27 PROCEDURE — 80048 BASIC METABOLIC PNL TOTAL CA: CPT | Performed by: STUDENT IN AN ORGANIZED HEALTH CARE EDUCATION/TRAINING PROGRAM

## 2024-03-27 PROCEDURE — 82948 REAGENT STRIP/BLOOD GLUCOSE: CPT

## 2024-03-27 PROCEDURE — 25010000002 DAPTOMYCIN PER 1 MG: Performed by: INTERNAL MEDICINE

## 2024-03-27 PROCEDURE — 97530 THERAPEUTIC ACTIVITIES: CPT

## 2024-03-27 PROCEDURE — 25010000002 MICAFUNGIN SODIUM 100 MG RECONSTITUTED SOLUTION 1 EACH VIAL: Performed by: INTERNAL MEDICINE

## 2024-03-27 PROCEDURE — 25010000002 PIPERACILLIN SOD-TAZOBACTAM PER 1 G: Performed by: INTERNAL MEDICINE

## 2024-03-27 PROCEDURE — 99232 SBSQ HOSP IP/OBS MODERATE 35: CPT | Performed by: NURSE PRACTITIONER

## 2024-03-27 PROCEDURE — 25010000002 LABETALOL 5 MG/ML SOLUTION: Performed by: SURGERY

## 2024-03-27 PROCEDURE — 85025 COMPLETE CBC W/AUTO DIFF WBC: CPT | Performed by: SURGERY

## 2024-03-27 RX ORDER — VERAPAMIL HYDROCHLORIDE 120 MG/1
120 TABLET, FILM COATED ORAL EVERY 8 HOURS SCHEDULED
Status: DISCONTINUED | OUTPATIENT
Start: 2024-03-27 | End: 2024-03-28

## 2024-03-27 RX ORDER — LABETALOL 200 MG/1
200 TABLET, FILM COATED ORAL EVERY 8 HOURS SCHEDULED
Status: DISCONTINUED | OUTPATIENT
Start: 2024-03-27 | End: 2024-03-28

## 2024-03-27 RX ADMIN — LABETALOL HYDROCHLORIDE 200 MG: 200 TABLET, FILM COATED ORAL at 11:48

## 2024-03-27 RX ADMIN — VERAPAMIL HYDROCHLORIDE 80 MG: 80 TABLET ORAL at 05:27

## 2024-03-27 RX ADMIN — PIPERACILLIN AND TAZOBACTAM 3.38 G: 3; .375 INJECTION, POWDER, LYOPHILIZED, FOR SOLUTION INTRAVENOUS at 00:07

## 2024-03-27 RX ADMIN — LISINOPRIL 40 MG: 40 TABLET ORAL at 08:19

## 2024-03-27 RX ADMIN — APIXABAN 5 MG: 5 TABLET, FILM COATED ORAL at 08:20

## 2024-03-27 RX ADMIN — ISOSORBIDE DINITRATE 10 MG: 10 TABLET ORAL at 11:49

## 2024-03-27 RX ADMIN — VALPROIC ACID 375 MG: 250 SOLUTION ORAL at 05:27

## 2024-03-27 RX ADMIN — LEVETIRACETAM 500 MG: 500 TABLET, FILM COATED ORAL at 08:19

## 2024-03-27 RX ADMIN — Medication 10 MG: at 04:01

## 2024-03-27 RX ADMIN — VERAPAMIL HYDROCHLORIDE 120 MG: 120 TABLET ORAL at 11:48

## 2024-03-27 RX ADMIN — PIPERACILLIN AND TAZOBACTAM 3.38 G: 3; .375 INJECTION, POWDER, LYOPHILIZED, FOR SOLUTION INTRAVENOUS at 17:06

## 2024-03-27 RX ADMIN — APIXABAN 5 MG: 5 TABLET, FILM COATED ORAL at 21:46

## 2024-03-27 RX ADMIN — PIPERACILLIN AND TAZOBACTAM 3.38 G: 3; .375 INJECTION, POWDER, LYOPHILIZED, FOR SOLUTION INTRAVENOUS at 08:21

## 2024-03-27 RX ADMIN — DAPTOMYCIN 500 MG: 500 INJECTION, POWDER, LYOPHILIZED, FOR SOLUTION INTRAVENOUS at 08:34

## 2024-03-27 RX ADMIN — LEVOTHYROXINE SODIUM 88 MCG: 88 TABLET ORAL at 05:27

## 2024-03-27 RX ADMIN — VALPROIC ACID 375 MG: 250 SOLUTION ORAL at 17:07

## 2024-03-27 RX ADMIN — CARVEDILOL 25 MG: 12.5 TABLET, FILM COATED ORAL at 08:20

## 2024-03-27 RX ADMIN — MICAFUNGIN 100 MG: 20 INJECTION, POWDER, LYOPHILIZED, FOR SOLUTION INTRAVENOUS at 16:58

## 2024-03-27 RX ADMIN — ISOSORBIDE DINITRATE 10 MG: 10 TABLET ORAL at 08:20

## 2024-03-27 RX ADMIN — PANTOPRAZOLE SODIUM 40 MG: 40 INJECTION, POWDER, FOR SOLUTION INTRAVENOUS at 05:27

## 2024-03-27 RX ADMIN — VERAPAMIL HYDROCHLORIDE 120 MG: 120 TABLET ORAL at 21:46

## 2024-03-27 RX ADMIN — VALPROIC ACID 375 MG: 250 SOLUTION ORAL at 00:07

## 2024-03-27 RX ADMIN — LABETALOL HYDROCHLORIDE 200 MG: 200 TABLET, FILM COATED ORAL at 21:47

## 2024-03-27 RX ADMIN — ISOSORBIDE DINITRATE 10 MG: 10 TABLET ORAL at 17:07

## 2024-03-27 RX ADMIN — LEVETIRACETAM 500 MG: 500 TABLET, FILM COATED ORAL at 21:46

## 2024-03-27 RX ADMIN — Medication 10 ML: at 21:47

## 2024-03-27 RX ADMIN — VALPROIC ACID 375 MG: 250 SOLUTION ORAL at 11:48

## 2024-03-27 NOTE — PLAN OF CARE
Problem: Adult Inpatient Plan of Care  Goal: Plan of Care Review  Outcome: Ongoing, Progressing  Goal: Patient-Specific Goal (Individualized)  Outcome: Ongoing, Progressing  Goal: Absence of Hospital-Acquired Illness or Injury  Outcome: Ongoing, Progressing  Intervention: Identify and Manage Fall Risk  Recent Flowsheet Documentation  Taken 3/27/2024 0819 by Thea Velez RN  Safety Promotion/Fall Prevention: activity supervised  Intervention: Prevent Skin Injury  Recent Flowsheet Documentation  Taken 3/27/2024 0819 by Thea Velez RN  Body Position: supine  Skin Protection: adhesive use limited  Intervention: Prevent and Manage VTE (Venous Thromboembolism) Risk  Recent Flowsheet Documentation  Taken 3/27/2024 0819 by Thea Velez RN  Activity Management: activity encouraged  Goal: Optimal Comfort and Wellbeing  Outcome: Ongoing, Progressing  Goal: Readiness for Transition of Care  Outcome: Ongoing, Progressing     Problem: Skin Injury Risk Increased  Goal: Skin Health and Integrity  Outcome: Ongoing, Progressing  Intervention: Optimize Skin Protection  Recent Flowsheet Documentation  Taken 3/27/2024 0819 by Thea Velez RN  Pressure Reduction Techniques: frequent weight shift encouraged  Head of Bed (HOB) Positioning: HOB at 30 degrees  Pressure Reduction Devices: foam padding utilized  Skin Protection: adhesive use limited     Problem: Fall Injury Risk  Goal: Absence of Fall and Fall-Related Injury  Outcome: Ongoing, Progressing  Intervention: Identify and Manage Contributors  Recent Flowsheet Documentation  Taken 3/27/2024 0819 by Thea Velez RN  Medication Review/Management: medications reviewed  Intervention: Promote Injury-Free Environment  Recent Flowsheet Documentation  Taken 3/27/2024 0819 by Thea Velez RN  Safety Promotion/Fall Prevention: activity supervised     Problem: Adjustment to Illness (Sepsis/Septic Shock)  Goal: Optimal Coping  Outcome: Ongoing, Progressing      Problem: Bleeding (Sepsis/Septic Shock)  Goal: Absence of Bleeding  Outcome: Ongoing, Progressing     Problem: Glycemic Control Impaired (Sepsis/Septic Shock)  Goal: Blood Glucose Level Within Desired Range  Outcome: Ongoing, Progressing     Problem: Infection Progression (Sepsis/Septic Shock)  Goal: Absence of Infection Signs and Symptoms  Outcome: Ongoing, Progressing  Intervention: Promote Recovery  Recent Flowsheet Documentation  Taken 3/27/2024 0819 by Thea Velez, RN  Activity Management: activity encouraged     Problem: Nutrition Impaired (Sepsis/Septic Shock)  Goal: Optimal Nutrition Intake  Outcome: Ongoing, Progressing   Goal Outcome Evaluation:

## 2024-03-27 NOTE — NURSING NOTE
WOC follow-up for ostomy care and education.     Patient s/p total abdominal colectomy with end ileostomy.     Patient  arouses to touch only. Is not appropriate for education.  Ostomy appliance intact.  Stoma viable.  MD has Dc'd imodium      No family at bedside.    1 piece flat high output appliance with drainable end intact.  Barrier ring utilized.     Please record and measure output.  Watch fluid status.     Will continue to follow.      Pola Arnold RN, BSN, CCRN, CWOCN  Wound, Ostomy and Continence (WOC) Department  [facility]

## 2024-03-27 NOTE — PLAN OF CARE
Goal Outcome Evaluation:  Plan of Care Reviewed With: patient        Progress: no change  Outcome Evaluation: Pt. continues to be limited by decreased activity tolerance, generalized weakness, and balance. Limited command following requiring increased cues to sequence tasks. Completed T/Fs with Max A x 2. Re-fit sling to L UE to improve comfort and positioning. Recommend SNF at discharge.

## 2024-03-27 NOTE — PROGRESS NOTES
[facility] Medicine Services  PROGRESS NOTE    Patient Name: Lea Rivers  : 1945  MRN: [id number]    Date of Admission: 3/2/2024  Primary Care Physician: Mannie Melvin MD    Subjective   Subjective     CC:  Follow-up colectomy    HPI:  No new needs from staff. No family in room and patient will open his eyes when I speak, but quickly closes them again. Shakes head yes or no to a few questions.       Objective   Objective     Vital Signs:   Temp:  [97 °F (36.1 °C)-97.8 °F (36.6 °C)] 97.2 °F (36.2 °C)  Heart Rate:  [61-70] 61  Resp:  [16-18] 16  BP: (177-209)/() 194/100     Physical Exam:  Constitutional: No acute distress, awake, alert, chronically ill-appearing  HENT: NCAT, mucous membranes moist  Respiratory: Diminished in bases, respiratory effort normal on room air  Cardiovascular: RRR, no murmurs, cap refill brisk   Gastrointestinal: Positive bowel sounds, lower OLIVER drain dc'd, midline vertical incision staples in place, ostomy in place with brown liquid stool and a lot of output, abdominal binder in place, tenderness to palpation, nondistended  Musculoskeletal: trace BLE edema, bilateral heel dressings in place  Psychiatric: Flat affect, cooperative  Neurologic: Alert, moves all extremities, non verbal on exam   Skin: warm, dry, no visible rash       Results Reviewed:  LAB RESULTS:      Lab 24  0707 24  0632 24  1418 24  0612 24  0603 24  0348 24  0557 24  0439   WBC 10.46 12.33*  --  13.06* 13.58* 13.04* 16.56* 15.40*   HEMOGLOBIN 9.6* 9.1*  --  8.4* 9.0* 8.8* 8.6* 9.1*   HEMATOCRIT 30.0* 29.4*  --  27.0* 28.7* 27.0* 26.9* 27.7*   PLATELETS 488* 517*  --  560* 556* 519* 537* 517*   NEUTROS ABS 7.67* 8.32*  --  10.58* 9.64* 9.46* 12.32* 11.78*   IMMATURE GRANS (ABS) 0.22* 0.66*  --   --   --  0.73* 0.92* 0.83*   LYMPHS ABS 0.99 1.31  --   --   --  1.02 1.31 1.09   MONOS ABS 1.08* 1.21*  --   --   --  0.97*  1.40* 1.15*   EOS ABS 0.41* 0.72*  --  0.78* 0.95* 0.74* 0.50* 0.41*   MCV 94.6 97.7*  --  96.4 95.7 99.6* 93.1 97.9*   CRP  --   --  1.94*  --   --   --   --   --    PROTIME  --   --  16.7*  --   --   --   --   --          Lab 03/27/24  0707 03/26/24  0632 03/25/24  1418 03/24/24  0603 03/23/24  1056   SODIUM 132* 132* 136 135* 132*   POTASSIUM 4.0 4.6 4.5 4.1 4.4   CHLORIDE 101 103 105 103 101   CO2 22.0 21.0* 24.0 25.0 24.0   ANION GAP 9.0 8.0 7.0 7.0 7.0   BUN 15 24* 27* 21 18   CREATININE 0.53* 0.54* 0.62* 0.49* 0.41*   EGFR 101.9 101.4 97.2 104.4 110.2   GLUCOSE 101* 97 129* 152* 145*   CALCIUM 7.9* 8.3* 7.9* 8.0* 7.8*   IONIZED CALCIUM  --   --  1.27  --   --    MAGNESIUM  --   --   --  1.9  --    PHOSPHORUS  --   --   --  3.0  --          Lab 03/25/24  1418 03/21/24  0622   TOTAL PROTEIN 5.2* 4.8*   ALBUMIN 2.4* 2.0*   GLOBULIN 2.8 2.8   ALT (SGPT) 7 <5   AST (SGOT) 18 17   BILIRUBIN 0.2 0.2   ALK PHOS 161* 101         Lab 03/25/24  1418   PROTIME 16.7*   INR 1.33*         Lab 03/25/24  1418   TRIGLYCERIDES 101         Lab 03/23/24  1827 03/23/24  0348   IRON  --  34*   IRON SATURATION (TSAT)  --  19*   TIBC  --  182*   TRANSFERRIN  --  122*   FERRITIN  --  200.10   FOLATE 3.78*  --    VITAMIN B 12  --  1,320*         Brief Urine Lab Results  (Last result in the past 365 days)        Color   Clarity   Blood   Leuk Est   Nitrite   Protein   CREAT   Urine HCG        03/11/24 1337 Yellow   Cloudy   Moderate (2+)   Trace   Negative   30 mg/dL (1+)                   Microbiology Results Abnormal       Procedure Component Value - Date/Time    Fungus Culture - Peritoneal Fluid, Perineum [[id number]] Collected: 03/18/24 1349    Lab Status: Preliminary result Specimen: Peritoneal Fluid from Perineum Updated: 03/25/24 1831     Fungus Culture No fungus isolated at 1 week    Anaerobic Culture - Peritoneal Fluid, Perineum [[id number]]  (Normal) Collected: 03/18/24 1349    Lab Status: Final result Specimen: Peritoneal Fluid  from Perineum Updated: 03/23/24 1026     Anaerobic Culture No anaerobes isolated at 5 days    AFB Culture - Peritoneal Fluid, Perineum [[id number]] Collected: 03/18/24 1349    Lab Status: Preliminary result Specimen: Peritoneal Fluid from Perineum Updated: 03/19/24 1339     AFB Stain No acid fast bacilli seen on concentrated smear    Urine Culture - Urine, Indwelling Urethral Catheter [[id number]]  (Normal) Collected: 03/11/24 1337    Lab Status: Final result Specimen: Urine from Indwelling Urethral Catheter Updated: 03/12/24 2024     Urine Culture No growth    Blood Culture - Blood, Arm, Right [[id number]]  (Normal) Collected: 03/06/24 1857    Lab Status: Final result Specimen: Blood from Arm, Right Updated: 03/11/24 2045     Blood Culture No growth at 5 days    Blood Culture - Blood, Arm, Right [[id number]]  (Normal) Collected: 03/06/24 1751    Lab Status: Final result Specimen: Blood from Arm, Right Updated: 03/11/24 2000     Blood Culture No growth at 5 days    Blood Culture - Blood, Arm, Right [[id number]]  (Normal) Collected: 03/02/24 1132    Lab Status: Final result Specimen: Blood from Arm, Right Updated: 03/07/24 1201     Blood Culture No growth at 5 days    Narrative:      Less than seven (7) mL's of blood was collected.  Insufficient quantity may yield false negative results.    Blood Culture - Blood, Arm, Right [[id number]]  (Normal) Collected: 03/02/24 1132    Lab Status: Final result Specimen: Blood from Arm, Right Updated: 03/07/24 1201     Blood Culture No growth at 5 days    Narrative:      Less than seven (7) mL's of blood was collected.  Insufficient quantity may yield false negative results.    Urine Culture - Urine, Straight Cath [[id number]]  (Normal) Collected: 03/02/24 0923    Lab Status: Final result Specimen: Urine from Straight Cath Updated: 03/03/24 1601     Urine Culture No growth    COVID PRE-OP / PRE-PROCEDURE SCREENING ORDER (NO ISOLATION) - Swab, Nasopharynx [[id number]]  (Normal) Collected:  03/02/24 1133    Lab Status: Final result Specimen: Swab from Nasopharynx Updated: 03/02/24 1227    Narrative:      The following orders were created for panel order COVID PRE-OP / PRE-PROCEDURE SCREENING ORDER (NO ISOLATION) - Swab, Nasopharynx.  Procedure                               Abnormality         Status                     ---------                               -----------         ------                     COVID-19, FLU A/B, RSV P...[[id number]]  Normal              Final result                 Please view results for these tests on the individual orders.    COVID-19, FLU A/B, RSV PCR 1 HR TAT - Swab, Nasopharynx [[id number]]  (Normal) Collected: 03/02/24 1133    Lab Status: Final result Specimen: Swab from Nasopharynx Updated: 03/02/24 1227     COVID19 Not Detected     Influenza A PCR Not Detected     Influenza B PCR Not Detected     RSV, PCR Not Detected    Narrative:      Fact sheet for providers: https://www.fda.gov/media/655955/download    Fact sheet for patients: https://www.fda.gov/media/905290/download    Test performed by PCR.            No radiology results from the last 24 hrs    Results for orders placed during the hospital encounter of 07/20/21    Adult Transthoracic Echo Complete W/ Cont if Necessary Per Protocol    Interpretation Summary  · Left ventricular wall thickness is consistent with mild concentric hypertrophy.  · Normal left-ventricular systolic function, estimated EF 65%.  · Left ventricular diastolic function is consistent with (grade I) impaired relaxation.  · Aortic sclerosis without aortic stenosis. Trace aortic insufficiency.  · Trace mitral regurgitation, trace tricuspid regurgitation.      Current medications:  Scheduled Meds:apixaban, 5 mg, Oral, Q12H  DAPTOmycin, 6 mg/kg (Adjusted), Intravenous, Q24H  insulin regular, 2-7 Units, Subcutaneous, Q6H  isosorbide dinitrate, 10 mg, Oral, TID - Nitrates  labetalol, 200 mg, Oral, Q8H  levETIRAcetam, 500 mg, Oral,  Q12H  levothyroxine, 88 mcg, Oral, Q AM  Lidocaine, 2 patch, Transdermal, Q24H  lisinopril, 40 mg, Oral, Q24H  micafungin (MYCAMINE) IV, 100 mg, Intravenous, Q24H  pantoprazole, 40 mg, Intravenous, Q AM  piperacillin-tazobactam, 3.375 g, Intravenous, Q8H  sodium chloride, 10 mL, Intravenous, Q12H  Valproic Acid, 375 mg, Oral, Q6H  verapamil, 120 mg, Oral, Q8H      Continuous Infusions:     PRN Meds:.  acetaminophen **OR** acetaminophen **OR** acetaminophen    calcium carbonate    Calcium Replacement - Follow Nurse / BPA Driven Protocol    dextrose    dextrose    docusate sodium    fluticasone    glucagon (human recombinant)    hydrALAZINE    ipratropium-albuterol    labetalol    Magnesium Standard Dose Replacement - Follow Nurse / BPA Driven Protocol    [DISCONTINUED] HYDROmorphone **AND** naloxone    ondansetron ODT **OR** ondansetron    Phosphorus Replacement - Follow Nurse / BPA Driven Protocol    Potassium Replacement - Follow Nurse / BPA Driven Protocol    sodium chloride    sodium chloride    sodium chloride    sodium chloride    Assessment & Plan   Assessment & Plan     Active Hospital Problems    Diagnosis  POA    **Falls [W19.XXXA]  Yes    Pneumatosis intestinalis [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        Brief Hospital Course to date:  Lea Rivers is a 78 y.o. male  with past medical history of hypertension, hyperlipidemia, hypothyroidism, and seizure disorder who was admitted on 3/2/2024 due to fall and nondisplaced first through fourth left rib fractures. He was also found to have left distal clavicle fracture.  Patient was noted to have increasing abdominal distention on 3/6/2024.  CT of the abdomen/pelvis revealed dilated loops of large bowel with likely pneumatosis intestinalis.  General surgery was consulted due to persistent colonic ileus and significant colonic distention.  Underwent total abdominal colectomy with end ileostomy creation on  3/9/2024.  Repeat CT of the abdomen/pelvis revealed postsurgical changes with a moderate loculated ascitic fluid collection within the anterior abdomen.  He underwent exploratory laparotomy  with abdominal washout on 3/18/2024 by Dr. Godfrey.  ID Dr. Chow was consulted and is managing antibiotic regimen.      This patient's problems and plans were partially entered by my partner and updated as appropriate by me 03/27/24.    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 on 3/9 w/  Persistent  Leukocytosis  LLL PNA  -s/p repeat CT 3/18, with fluid collection noted, s/p exploratory laparotomy 3/18 with abdominal washout and ascites noted  -completed course of Fluconazole, CTX/Flagyl 3/17 prior to repeat surgery  -CXR 3/12 with possible LLL pneumonia, completed course of CTX  -ID following, abdominal fluid culture with pseudomonas  -continue IV Zosyn, mycamine, dapto for now   -lower OLIVER drain removed   -WOCN for ostomy care. Imodium added for high ostomy output has now been DC'd as output has significantly decreased, cont to monitor closely   -AM labs with improved WBC     Dysphagia  Malnutrition  -SLP following   -Nutrition following  -Currently on TPN. Weaning per dietary, ok'd by surgery  -may need tube feeds if PO intake inadequate  --diet per SLP recs      Toxic metabolic encephalopathy  -per daughter significant change in mental status and speech since arrival, and team had a long discussion and felt this is probably TME in setting of infection, recent large operation, underlying dementia  -initial CT head 3/2 negative, repeated CT head 3/13 without any acute findings  -EEG negative for seizures, findings consistent with TME  -minimize sedating meds   -slowly improving     Multiple falls with increasing frequency   -CT of head shows age-related changes which are chronic but no acute process  -neuro consulted, likely d/t neuropathy (confirmed with EMG)  and progression of dementia     Multiple rib fractures and also left clavicle fracture  -With no displacement or mildly displaced.  Orthopedic surgery evaluated. No surgical intervention  planned.  Recs nonweightbearing LUE, may come out of sling in 5-7 days to initiate gentle ROM exercises per ortho.  F/u with ortho/Dr. Maldonado in 2 weeks  -Pain control, lidocaine patch  -bruise noted to L shoulder/neck region however X-ray negative     Acute on Chronic Anemia  -S/P 1 unit PRBCs 3/11 and 3/16, monitoring H&H  -no clear source of active bleeding, hemoccult negative  -Iron 34, Iron sat 19  -Started on IV Iron 3/23  -transfuse PRN hgb <7     Acute RUE DVT (brachial)  -Provoked, 2/2 PICC   -eliquis     Prostate cancer  Hx BPH s/p greenlight photo vaporization of prostate 2018  -Follows with Dr. Noland  -S/P cyberknife radiation 2/9/23  -Has intermittent hematuria, monitor while on AC     Dementia and increasing memory problem  -Patient was previously living alone and driving, given significant decline will need placement     Hypertension  -cleared by speech, resumed home oral regimen  -continue Verapamil   -BP uncontrolled, but BP is being checking on LE    -Increased Lisinopril to 40 mg daily 3/32  -Coreg added this admission, increased to 25 mg BID on 3/24; changed to Lopressor 200 mg TID in addition to increasing CCB today   -PRN IV dosing for SBP >180     Seizure disorder  -continue vimpat and keppra     Hyperlipidemia  Hypothyroidism  -resume home regimen     Expected Discharge Location and Transportation: SNF  Expected Discharge   Expected Discharge Date: 3/29/2024; Expected Discharge Time:       DVT prophylaxis:  Medical and mechanical DVT prophylaxis orders are present.    AM-PAC 6 Clicks Score (PT): 9 (03/27/24 7878)    CODE STATUS:   Code Status and Medical Interventions:   Ordered at: 03/02/24 3698     Medical Intervention Limits:    NO intubation (DNI)     Code Status (Patient has no pulse and is not  breathing):    No CPR (Do Not Attempt to Resuscitate)     Medical Interventions (Patient has pulse or is breathing):    Limited Support       Sheridan Roman, APRN  03/27/24

## 2024-03-27 NOTE — THERAPY TREATMENT NOTE
Patient Name: Lea Rivers  : 1945    MRN: [id number]                              Today's Date: 3/27/2024       Admit Date: 3/2/2024    Visit Dx:     ICD-10-CM ICD-9-CM   1. Fall, initial encounter  W19.XXXA E888.9   2. Closed fracture of multiple ribs of left side, initial encounter  S22.42XA 807.09   3. Closed displaced fracture of acromial end of left clavicle, initial encounter  S42.032A 810.03   4. Falls frequently  R29.6 V15.88   5. Acute UTI (urinary tract infection)  N39.0 599.0   6. Colon distention  K63.89 569.89   7. Paralytic ileus of small intestine and colon  K56.0 560.1   8. Oropharyngeal dysphagia  R13.12 787.22   9. Intra-abdominal fluid  R18.8 789.59     Patient Active Problem List   Diagnosis    Coronary artery disease    Dyslipidemia    Hypothyroidism    GERD (gastroesophageal reflux disease)    Seizure disorder    BPH (benign prostatic hypertrophy)    Hypertension    Primary osteoarthritis of both knees    Syncope and collapse    Precordial pain    Diabetes    Status post total right knee replacement    Postoperative urinary retention    Confusion, postoperative    Acute blood loss anemia, asymptomatic, no transfusion required    Elevated prostate specific antigen (PSA)    Prostate cancer    Anemia    Body mass index (BMI) of 32.0-32.9 in adult    Localization-related focal epilepsy with simple partial seizures    Need for vaccination against Streptococcus pneumoniae    Upper extremity tendon tear, right, initial encounter    Vitamin D deficiency    PSA elevation    Polyp of colon    Overactive bladder    Gross hematuria    History of colon polyps    History of colon cancer    B12 deficiency    Falls    Pneumatosis intestinalis     Past Medical History:   Diagnosis Date    Anemia     Colon cancer     Status post partial colectomy in . No chemotherapy or radiation therapy.    Coronary artery disease     Nonobstructive coronary artery disease.    Diabetes     Dyslipidemia      GERD (gastroesophageal reflux disease)     Hx of.    Hyperlipidemia     Hypertension     Hyponatremia     Hypothyroidism     Left shoulder pain     Low sodium levels 2022    recent issue; saw nephrologist who ruled out kidney issues; took Sodium Chloride po to bring levels up    Memory deficit     FROM ANESTHESIA    Osteoarthritis     Prostate cancer 07/23/2022    Seizure disorder     silent seziure-diagnosed years ago    Skin cancer      Past Surgical History:   Procedure Laterality Date    APPENDECTOMY      CARDIAC CATHETERIZATION  2012    30 to 40% LAD    CARPAL TUNNEL RELEASE Bilateral     CHOLECYSTECTOMY      COLECTOMY PARTIAL / TOTAL  1990    Partial colectomy    COLON RESECTION N/A 3/9/2024    Procedure: TOTAL ABDOMINAL COLECTOMY, END ILEOSTOMY;  Surgeon: Harley Godfrey MD;  Location:  [facility];  Service: General;  Laterality: N/A;    COLONOSCOPY      CYBERKNIFE  02/09/2023    Prostate/SV    CYSTOSCOPY TRANSURETHRAL RESECTION OF PROSTATE N/A 09/21/2018    Procedure: CYSTOSCOPY TRANSURETHRAL RESECTION OF PROSTATE GREENLIGHT;  Surgeon: Naseem Clayton MD;  Location:  [facility];  Service: Urology    EXPLORATORY LAPAROTOMY N/A 3/18/2024    Procedure: LAPAROTOMY EXPLORATORY, ABDOMINAL WASH OUT;  Surgeon: Harley Godfrey MD;  Location:  [facility];  Service: General;  Laterality: N/A;    OTHER SURGICAL HISTORY      Contraction surgery on bilateral hands and wrists.    PROSTATE BIOPSY N/A 11/11/2022    Procedure: TRANSRECTAL ULTRASOUND GUIDED BIOPSY OF PROSTATE;  Surgeon: Naseem Noland MD;  Location:  [facility];  Service: Urology;  Laterality: N/A;    SINUS SURGERY      SKIN BIOPSY      TEETH EXTRACTION      TONSILLECTOMY      TOTAL KNEE ARTHROPLASTY Right 04/07/2022    Procedure: TOTAL KNEE ARTHROPLASTY WITH ORTHO ROBOT RIGHT;  Surgeon: Louis Malcolm MD;  Location:  [facility];  Service: Robotics - Ortho;  Laterality: Right;    TRANSRECTAL INCISION PROSTATE WITH GOLD SEED Bilateral  01/06/2023    Procedure: TRANSRECTAL ULTRASOUND GUIDED PROSTATE FIDUCIAL MARKER PLACEMENT;  Surgeon: Naseem Noland MD;  Location: [facility];  Service: Urology;  Laterality: Bilateral;    TURP / TRANSURETHRAL INCISION / DRAINAGE PROSTATE      VASECTOMY        General Information       Row Name 03/27/24 1548          Physical Therapy Time and Intention    Document Type therapy note (daily note)  -AE     Mode of Treatment physical therapy  -AE       Row Name 03/27/24 1548          General Information    Patient Profile Reviewed yes  -AE     Existing Precautions/Restrictions fall;left;shoulder;non-weight bearing;other (see comments)  Clavicle Fx; LUE NWB; LUE Sling; L Rib Fxs; Ostomy; OLIVER Drain x 2; Abd Incision; Abd Binder; Brief with OOB activity, [place]  -AE     Barriers to Rehab medically complex;previous functional deficit;cognitive status;hearing deficit  -AE       Row Name 03/27/24 1548          Cognition    Orientation Status (Cognition) oriented to;person;disoriented to;place;time  -AE       Row Name 03/27/24 1548          Safety Issues, Functional Mobility    Safety Issues Affecting Function (Mobility) awareness of need for assistance;insight into deficits/self-awareness;safety precaution awareness;ability to follow commands;judgment;safety precautions follow-through/compliance;sequencing abilities;problem-solving  -AE     Impairments Affecting Function (Mobility) balance;cognition;endurance/activity tolerance;pain;postural/trunk control;strength;range of motion (ROM);sensation/sensory awareness  -AE               User Key  (r) = Recorded By, (t) = Taken By, (c) = Cosigned By      Initials Name Provider Type    AE Abe Melara PT Physical Therapist                   Mobility       Row Name 03/27/24 1549          Bed Mobility    Bed Mobility supine-sit  -AE     Supine-Sit [place] (Bed Mobility) maximum assist (25% patient effort);2 person assist;verbal cues  -AE     Assistive Device (Bed Mobility)  bed rails;draw sheet;head of bed elevated  -AE     Comment, (Bed Mobility) VCs for hand placement and sequencing with extra cues needed to maintain NWB of LUE while rolling.  -AE       Row Name 03/27/24 1549          Transfers    Comment, (Transfers) VCs for hand placement and sequencing. Pt required increased cues to improve upright posture and sequence BLE.  -AE       Row Name 03/27/24 1549          Bed-Chair Transfer    Bed-Chair [place] (Transfers) maximum assist (25% patient effort);2 person assist;verbal cues  -AE     Assistive Device (Bed-Chair Transfers) other (see comments)  RUE support  -AE       Row Name 03/27/24 1549          Sit-Stand Transfer    Sit-Stand [place] (Transfers) maximum assist (25% patient effort);2 person assist;verbal cues  -AE     Comment, (Sit-Stand Transfer) STS x4 this session, pt fatigues quickly and requires seated rest break  -AE       Row Name 03/27/24 1549          Gait/Stairs (Locomotion)    Comment, (Gait/Stairs) Pt only able to take shuffled side steps to chair before fatiguing and requiring seated rest break.  -AE       Row Name 03/27/24 1549          Mobility    Extremity Weight-bearing Status left upper extremity  -AE     Left Upper Extremity (Weight-bearing Status) non weight-bearing (NWB)  -AE               User Key  (r) = Recorded By, (t) = Taken By, (c) = Cosigned By      Initials Name Provider Type    AE Abe Melara PT Physical Therapist                   Obj/Interventions       Row Name 03/27/24 1554          Balance    Balance Assessment sitting static balance;sitting dynamic balance;sit to stand dynamic balance;standing static balance;standing dynamic balance  -AE     Static Sitting Balance minimal assist;2-person assist;verbal cues  -AE     Dynamic Sitting Balance 2-person assist;moderate assist  -AE     Sit to Stand Dynamic Balance maximum assist;2-person assist  -AE     Static Standing Balance maximum assist;2-person assist  -AE     Dynamic  Standing Balance maximum assist;2-person assist  -AE     Position/Device Used, Standing Balance supported  -AE               User Key  (r) = Recorded By, (t) = Taken By, (c) = Cosigned By      Initials Name Provider Type    AE Abe Melara PT Physical Therapist                   Goals/Plan    No documentation.                  Clinical Impression       Row Name 03/27/24 1610          Pain    Pretreatment Pain Rating 0/10 - no pain  -AE     Posttreatment Pain Rating 0/10 - no pain  -AE       Row Name 03/27/24 1610          Plan of Care Review    Plan of Care Reviewed With patient  -AE     Progress no change  -AE     Outcome Evaluation Pt continues to present with decreased functional strength and decreased balance. Pt completed a couple steps to chair with max A x2 and RUE support. Continue to progress per pt tolerance.  -AE       Row Name 03/27/24 1610          Vital Signs    Pre Systolic BP Rehab 194  -AE     Pre Treatment Diastolic   -AE     Pretreatment Heart Rate (beats/min) 65  -AE     Posttreatment Heart Rate (beats/min) 65  -AE     Pre SpO2 (%) 97  -AE     O2 Delivery Pre Treatment room air  -AE     O2 Delivery Intra Treatment room air  -AE     Post SpO2 (%) 96  -AE     O2 Delivery Post Treatment room air  -AE     Pre Patient Position Supine  -AE     Intra Patient Position Standing  -AE     Post Patient Position Sitting  -AE       Row Name 03/27/24 1610          Positioning and Restraints    Pre-Treatment Position in bed  -AE     Post Treatment Position chair  -AE     In Chair notified nsg;reclined;call light within reach;encouraged to call for assist;exit alarm on;waffle cushion;on mechanical lift sling;legs elevated;with brace  -AE               User Key  (r) = Recorded By, (t) = Taken By, (c) = Cosigned By      Initials Name Provider Type    AE Abe Melara PT Physical Therapist                   Outcome Measures       Row Name 03/27/24 1612 03/27/24 0819       How much help from another  person do you currently need...    Turning from your back to your side while in flat bed without using bedrails? 2  -AE 2  -MB    Moving from lying on back to sitting on the side of a flat bed without bedrails? 2  -AE 1  -MB    Moving to and from a bed to a chair (including a wheelchair)? 2  -AE 2  -MB    Standing up from a chair using your arms (e.g., wheelchair, bedside chair)? 2  -AE 2  -MB    Climbing 3-5 steps with a railing? 1  -AE 1  -MB    To walk in hospital room? 1  -AE 1  -MB    AM-PAC 6 Clicks Score (PT) 10  -AE 9  -MB    Highest Level of Mobility Goal 4 --> Transfer to chair/commode  -AE 3 --> Sit at edge of bed  -MB      Row Name 03/27/24 1612 03/27/24 1527       Functional Assessment    Outcome Measure Options AM-PAC 6 Clicks Basic Mobility (PT)  -AE AM-PAC 6 Clicks Daily Activity (OT)  -              User Key  (r) = Recorded By, (t) = Taken By, (c) = Cosigned By      Initials Name Provider Type    LC Judith Lorenz, OT Occupational Therapist    Abe Valdivia, PT Physical Therapist    Thea Blackman, RN Registered Nurse                                 Physical Therapy Education       Title: PT OT SLP Therapies (In Progress)       Topic: Physical Therapy (In Progress)       Point: Mobility training (In Progress)       Learning Progress Summary             Patient Acceptance, E, NR by AE at 3/27/2024 1410    Acceptance, E, NR by AE at 3/26/2024 1425    Acceptance, E, NR by AE at 3/25/2024 1425    Acceptance, E, NR by AE at 3/22/2024 1527    Acceptance, E, NR by AE at 3/21/2024 1115    Acceptance, E,TB, NR,NL by CH at 3/20/2024 0755    Acceptance, E, NR by CK at 3/6/2024 1121    Acceptance, E, VU,NR by LO at 3/4/2024 1340    Comment: PT POC   Family Acceptance, E, NR by CK at 3/6/2024 1121    Acceptance, E, VU,NR by LO at 3/4/2024 1340    Comment: PT POC                         Point: Home exercise program (In Progress)       Learning Progress Summary             Patient Acceptance, E, NR by  AE at 3/27/2024 1410    Acceptance, E, NR by AE at 3/26/2024 1425    Acceptance, E, NR by AE at 3/25/2024 1425    Acceptance, E, NR by AE at 3/22/2024 1527    Acceptance, E, NR by AE at 3/21/2024 1115    Acceptance, E,TB, NR,NL by CH at 3/20/2024 0755    Acceptance, E, NR by CK at 3/6/2024 1121    Acceptance, E, VU,NR by LO at 3/4/2024 1340    Comment: PT POC   Family Acceptance, E, NR by CK at 3/6/2024 1121    Acceptance, E, VU,NR by LO at 3/4/2024 1340    Comment: PT POC                         Point: Body mechanics (In Progress)       Learning Progress Summary             Patient Acceptance, E, NR by AE at 3/27/2024 1410    Acceptance, E, NR by AE at 3/26/2024 1425    Acceptance, E, NR by AE at 3/25/2024 1425    Acceptance, E, NR by AE at 3/22/2024 1527    Acceptance, E, NR by AE at 3/21/2024 1115    Acceptance, E,TB, NR,NL by CH at 3/20/2024 0755    Acceptance, E, NR by CK at 3/6/2024 1121    Acceptance, E, VU,NR by LO at 3/4/2024 1340    Comment: PT POC   Family Acceptance, E, NR by CK at 3/6/2024 1121    Acceptance, E, VU,NR by LO at 3/4/2024 1340    Comment: PT POC                         Point: Precautions (In Progress)       Learning Progress Summary             Patient Acceptance, E, NR by AE at 3/27/2024 1410    Acceptance, E, NR by AE at 3/26/2024 1425    Acceptance, E, NR by AE at 3/25/2024 1425    Acceptance, E, NR by AE at 3/22/2024 1527    Acceptance, E, NR by AE at 3/21/2024 1115    Acceptance, E,TB, NR,NL by CH at 3/20/2024 0755    Acceptance, E, NR by CK at 3/6/2024 1121    Acceptance, E, VU,NR by LO at 3/4/2024 1340    Comment: PT POC   Family Acceptance, E, NR by CK at 3/6/2024 1121    Acceptance, E, VU,NR by LO at 3/4/2024 1340    Comment: PT POC                                         User Key       Initials Effective Dates Name Provider Type Discipline     06/16/21 -  Teresa Batista, RN Registered Nurse Nurse    LO 06/16/21 -  Emily Muro, BRISSA Physical Therapist PT    AE 09/21/21 -   Abe Melara, PT Physical Therapist PT    CK 02/06/24 -  Tiffanie Carmen PT Physical Therapist PT                  PT Recommendation and Plan     Plan of Care Reviewed With: patient  Progress: no change  Outcome Evaluation: Pt continues to present with decreased functional strength and decreased balance. Pt completed a couple steps to chair with max A x2 and RUE support. Continue to progress per pt tolerance.     Time Calculation:         PT Charges       Row Name 03/27/24 1613             Time Calculation    Start Time 1410  -AE      PT Received On 03/27/24  -AE      PT Goal Re-Cert Due Date 03/31/24  -AE         Timed Charges    35182 - PT Therapeutic Activity Minutes 15  -AE         Total Minutes    Timed Charges Total Minutes 15  -AE       Total Minutes 15  -AE                User Key  (r) = Recorded By, (t) = Taken By, (c) = Cosigned By      Initials Name Provider Type    AE Abe Melara, BRISSA Physical Therapist                  Therapy Charges for Today       Code Description Service Date Service Provider Modifiers Qty    49031560064 HC PT THERAPEUTIC ACT EA 15 MIN 3/26/2024 Abe Melara, PT GP 1    40235093866 HC PT THERAPEUTIC ACT EA 15 MIN 3/27/2024 Abe Melara, PT GP 1            PT G-Codes  Outcome Measure Options: AM-PAC 6 Clicks Basic Mobility (PT)  AM-PAC 6 Clicks Score (PT): 10  AM-PAC 6 Clicks Score (OT): 10  PT Discharge Summary  Anticipated Discharge Disposition (PT): skilled nursing facility    Abe Melara PT  3/27/2024

## 2024-03-27 NOTE — CASE MANAGEMENT/SOCIAL WORK
Continued Stay Note  Psychiatric     Patient Name: Lea Rivers  MRN: [id number]  Today's Date: 3/27/2024    Admit Date: 3/2/2024    Plan: SNF to LTC   Discharge Plan       Row Name 03/27/24 1338       Plan    Plan SNF to LTC    Patient/Family in Agreement with Plan yes    Plan Comments Attempted bedside visit with Mr. Rivers, but he would not rouse. CM noted tele and observed respirations. Per MDR, Mr. Rivers is not medically ready for discharge. [facility] would like to meet with Mr. Rivers when he is closer to being medically ready for discharge.  will continue to follow plan of care and assist with discharge planning needs as indicated.    Final Discharge Disposition Code 03 - skilled nursing facility (SNF)                   Discharge Codes    No documentation.                 Expected Discharge Date and Time       Expected Discharge Date Expected Discharge Time    Mar 29, 2024               Valerie Keith RN

## 2024-03-27 NOTE — THERAPY TREATMENT NOTE
Patient Name: Lea Rivers  : 1945    MRN: [id number]                              Today's Date: 3/27/2024       Admit Date: 3/2/2024    Visit Dx:     ICD-10-CM ICD-9-CM   1. Fall, initial encounter  W19.XXXA E888.9   2. Closed fracture of multiple ribs of left side, initial encounter  S22.42XA 807.09   3. Closed displaced fracture of acromial end of left clavicle, initial encounter  S42.032A 810.03   4. Falls frequently  R29.6 V15.88   5. Acute UTI (urinary tract infection)  N39.0 599.0   6. Colon distention  K63.89 569.89   7. Paralytic ileus of small intestine and colon  K56.0 560.1   8. Oropharyngeal dysphagia  R13.12 787.22   9. Intra-abdominal fluid  R18.8 789.59     Patient Active Problem List   Diagnosis    Coronary artery disease    Dyslipidemia    Hypothyroidism    GERD (gastroesophageal reflux disease)    Seizure disorder    BPH (benign prostatic hypertrophy)    Hypertension    Primary osteoarthritis of both knees    Syncope and collapse    Precordial pain    Diabetes    Status post total right knee replacement    Postoperative urinary retention    Confusion, postoperative    Acute blood loss anemia, asymptomatic, no transfusion required    Elevated prostate specific antigen (PSA)    Prostate cancer    Anemia    Body mass index (BMI) of 32.0-32.9 in adult    Localization-related focal epilepsy with simple partial seizures    Need for vaccination against Streptococcus pneumoniae    Upper extremity tendon tear, right, initial encounter    Vitamin D deficiency    PSA elevation    Polyp of colon    Overactive bladder    Gross hematuria    History of colon polyps    History of colon cancer    B12 deficiency    Falls    Pneumatosis intestinalis     Past Medical History:   Diagnosis Date    Anemia     Colon cancer     Status post partial colectomy in . No chemotherapy or radiation therapy.    Coronary artery disease     Nonobstructive coronary artery disease.    Diabetes     Dyslipidemia      GERD (gastroesophageal reflux disease)     Hx of.    Hyperlipidemia     Hypertension     Hyponatremia     Hypothyroidism     Left shoulder pain     Low sodium levels 2022    recent issue; saw nephrologist who ruled out kidney issues; took Sodium Chloride po to bring levels up    Memory deficit     FROM ANESTHESIA    Osteoarthritis     Prostate cancer 07/23/2022    Seizure disorder     silent seziure-diagnosed years ago    Skin cancer      Past Surgical History:   Procedure Laterality Date    APPENDECTOMY      CARDIAC CATHETERIZATION  2012    30 to 40% LAD    CARPAL TUNNEL RELEASE Bilateral     CHOLECYSTECTOMY      COLECTOMY PARTIAL / TOTAL  1990    Partial colectomy    COLON RESECTION N/A 3/9/2024    Procedure: TOTAL ABDOMINAL COLECTOMY, END ILEOSTOMY;  Surgeon: Harley Godfrey MD;  Location:  [facility];  Service: General;  Laterality: N/A;    COLONOSCOPY      CYBERKNIFE  02/09/2023    Prostate/SV    CYSTOSCOPY TRANSURETHRAL RESECTION OF PROSTATE N/A 09/21/2018    Procedure: CYSTOSCOPY TRANSURETHRAL RESECTION OF PROSTATE GREENLIGHT;  Surgeon: Naseem Clayton MD;  Location:  [facility];  Service: Urology    EXPLORATORY LAPAROTOMY N/A 3/18/2024    Procedure: LAPAROTOMY EXPLORATORY, ABDOMINAL WASH OUT;  Surgeon: Harley Godfrey MD;  Location:  [facility];  Service: General;  Laterality: N/A;    OTHER SURGICAL HISTORY      Contraction surgery on bilateral hands and wrists.    PROSTATE BIOPSY N/A 11/11/2022    Procedure: TRANSRECTAL ULTRASOUND GUIDED BIOPSY OF PROSTATE;  Surgeon: Naseem Noland MD;  Location:  [facility];  Service: Urology;  Laterality: N/A;    SINUS SURGERY      SKIN BIOPSY      TEETH EXTRACTION      TONSILLECTOMY      TOTAL KNEE ARTHROPLASTY Right 04/07/2022    Procedure: TOTAL KNEE ARTHROPLASTY WITH ORTHO ROBOT RIGHT;  Surgeon: Louis Malcolm MD;  Location:  [facility];  Service: Robotics - Ortho;  Laterality: Right;    TRANSRECTAL INCISION PROSTATE WITH GOLD SEED Bilateral  01/06/2023    Procedure: TRANSRECTAL ULTRASOUND GUIDED PROSTATE FIDUCIAL MARKER PLACEMENT;  Surgeon: Naseem Noland MD;  Location: [facility];  Service: Urology;  Laterality: Bilateral;    TURP / TRANSURETHRAL INCISION / DRAINAGE PROSTATE      VASECTOMY        General Information       Row Name 03/27/24 1516          OT Time and Intention    Document Type therapy note (daily note)  -     Mode of Treatment occupational therapy;co-treatment  -       Row Name 03/27/24 1516          General Information    Patient Profile Reviewed yes  -LC     Prior Level of Function --  See IE  -     Existing Precautions/Restrictions fall;left;shoulder;non-weight bearing;other (see comments)  Clavicle Fx; LUE NWB; LUE Sling; L Rib Fxs; Ostomy; OLIVER Drain x 2; Abd Incision; Abd Binder; Brief with OOB activity, [place]  -     Barriers to Rehab medically complex;previous functional deficit;cognitive status;hearing deficit  -       Row Name 03/27/24 1516          Cognition    Orientation Status (Cognition) oriented to;person  -       Row Name 03/27/24 1516          Safety Issues, Functional Mobility    Safety Issues Affecting Function (Mobility) ability to follow commands;awareness of need for assistance;insight into deficits/self-awareness;problem-solving;safety precaution awareness;safety precautions follow-through/compliance;sequencing abilities  -     Impairments Affecting Function (Mobility) balance;cognition;endurance/activity tolerance;pain;postural/trunk control;strength;range of motion (ROM);sensation/sensory awareness  -     Cognitive Impairments, Mobility Safety/Performance awareness, need for assistance;insight into deficits/self-awareness;problem-solving/reasoning;safety precaution awareness;safety precaution follow-through;sequencing abilities  -               User Key  (r) = Recorded By, (t) = Taken By, (c) = Cosigned By      Initials Name Provider Type    Judith Hayes OT Occupational Therapist                      Mobility/ADL's       Row Name 03/27/24 1517          Bed Mobility    Bed Mobility scooting/bridging;supine-sit  -     Scooting/Bridging English (Bed Mobility) maximum assist (25% patient effort);2 person assist;verbal cues  -     Supine-Sit English (Bed Mobility) maximum assist (25% patient effort);2 person assist;verbal cues  -       Row Name 03/27/24 151          Transfers    Transfers sit-stand transfer;bed-chair transfer  -     Comment, (Transfers) VC 's for R hand placement and sequencing. Completed STS x 2 from EOB. Consistent cues needed for upright posture.  -       Row Name 03/27/24 [facility]7          Bed-Chair Transfer    Bed-Chair English (Transfers) maximum assist (25% patient effort);2 person assist;verbal cues  -     Assistive Device (Bed-Chair Transfers) --  RUE support  -       Row Name 03/27/24 [facility]          Sit-Stand Transfer    Sit-Stand English (Transfers) maximum assist (25% patient effort);2 person assist;verbal cues  -     Assistive Device (Sit-Stand Transfers) other (see comments)  RUE support  -       Row Name 03/27/24 [facility]          Activities of Daily Living    BADL Assessment/Intervention bathing;upper body dressing;lower body dressing  -       Row Name 03/27/24 [facility]          Mobility    Extremity Weight-bearing Status left upper extremity  -     Left Upper Extremity (Weight-bearing Status) non weight-bearing (NWB)  -       Row Name 03/27/24 [facility]          Lower Body Dressing Assessment/Training    English Level (Lower Body Dressing) don;socks;dependent (less than 25% patient effort)  -     Position (Lower Body Dressing) supine  -       Row Name 03/27/24 [facility]          Upper Body Dressing Assessment/Training    English Level (Upper Body Dressing) don;doff;other (see comments);maximum assist (25% patient effort);verbal cues  Sling  -     Comment, (Upper Body Dressing) Re-fit sling to improve positioning and comfort.  -        Row Name 03/27/24 1517          Bathing Assessment/Intervention    [place] Level (Bathing) lower body;perineal area  -     Position (Bathing) supported standing  -     Comment, (Bathing) Pt. required clean up.  -               User Key  (r) = Recorded By, (t) = Taken By, (c) = Cosigned By      Initials Name Provider Type     Judith Lorenz OT Occupational Therapist                   Obj/Interventions       Row Name 03/27/24 1522          Elbow/Forearm (Therapeutic Exercise)    Elbow/Forearm AAROM (Therapeutic Exercise) left;flexion;extension;10 repetitions  -[facility] Name 03/27/24 1522          Wrist (Therapeutic Exercise)    Wrist (Therapeutic Exercise) AAROM (active assistive range of motion)  -     Wrist AAROM (Therapeutic Exercise) left;flexion;extension;10 second hold  -[facility] Name 03/27/24 1522          Hand (Therapeutic Exercise)    Hand (Therapeutic Exercise) AROM (active range of motion)  -     Hand AROM/AAROM (Therapeutic Exercise) left;AROM (active range of motion);finger flexion;finger extension;10 repetitions  -[facility] Name 03/27/24 1522          Motor Skills    Therapeutic Exercise elbow/forearm;wrist;hand  -[facility] Name 03/27/24 1522          Balance    Balance Assessment sitting static balance;sitting dynamic balance;standing static balance;standing dynamic balance  -     Static Sitting Balance minimal assist;2-person assist;verbal cues  -     Dynamic Sitting Balance minimal assist;2-person assist;verbal cues  -     Position, Sitting Balance sitting edge of bed;unsupported  -     Static Standing Balance maximum assist;2-person assist;verbal cues  -     Dynamic Standing Balance maximum assist;2-person assist;verbal cues  -     Position/Device Used, Standing Balance supported;other (see comments)  R UE support  -     Balance Interventions sitting;standing;sit to stand;supported;weight shifting activity  -     Comment, Balance B LE blocked to  prevent buckling  -               User Key  (r) = Recorded By, (t) = Taken By, (c) = Cosigned By      Initials Name Provider Type    Judith Hayes OT Occupational Therapist                   Goals/Plan    No documentation.                  Clinical Impression       Row Name 03/27/24 1524          Pain Assessment    Pretreatment Pain Rating 0/10 - no pain  -     Posttreatment Pain Rating 0/10 - no pain  -     Additional Documentation Pain Scale: Word Pre/Post-Treatment (Group)  -       Row Name 03/27/24 1524          Plan of Care Review    Plan of Care Reviewed With patient  -     Progress no change  -     Outcome Evaluation Pt. continues to be limited by decreased activity tolerance, generalized weakness, and balance. Limited command following requiring increased cues to sequence tasks. Completed T/Fs with Max A x 2. Re-fit sling to L UE to improve comfort and positioning. Recommend SNF at discharge.  -       Row Name 03/27/24 1524          Positioning and Restraints    Pre-Treatment Position in bed  -     Post Treatment Position chair  -     In Chair notified nsg;reclined;call light within reach;encouraged to call for assist;exit alarm on;waffle cushion;on mechanical lift sling;with brace;legs elevated  -               User Key  (r) = Recorded By, (t) = Taken By, (c) = Cosigned By      Initials Name Provider Type    Judith Hayes OT Occupational Therapist                   Outcome Measures       Row Name 03/27/24 1527          How much help from another is currently needed...    Putting on and taking off regular lower body clothing? 1  -LC     Bathing (including washing, rinsing, and drying) 2  -LC     Toileting (which includes using toilet bed pan or urinal) 1  -LC     Putting on and taking off regular upper body clothing 2  -LC     Taking care of personal grooming (such as brushing teeth) 2  -LC     Eating meals 2  -LC     AM-PAC 6 Clicks Score (OT) 10  -       Row Name 03/27/24  0819          How much help from another person do you currently need...    Turning from your back to your side while in flat bed without using bedrails? 2  -MB     Moving from lying on back to sitting on the side of a flat bed without bedrails? 1  -MB     Moving to and from a bed to a chair (including a wheelchair)? 2  -MB     Standing up from a chair using your arms (e.g., wheelchair, bedside chair)? 2  -MB     Climbing 3-5 steps with a railing? 1  -MB     To walk in hospital room? 1  -MB     AM-PAC 6 Clicks Score (PT) 9  -MB     Highest Level of Mobility Goal 3 --> Sit at edge of bed  -MB       Row Name 03/27/24 1527          Functional Assessment    Outcome Measure Options AM-PAC 6 Clicks Daily Activity (OT)  -               User Key  (r) = Recorded By, (t) = Taken By, (c) = Cosigned By      Initials Name Provider Type     Judith Lorenz OT Occupational Therapist    Thea Blackman, RN Registered Nurse                    Occupational Therapy Education       Title: PT OT SLP Therapies (In Progress)       Topic: Occupational Therapy (In Progress)       Point: ADL training (In Progress)       Description:   Instruct learner(s) on proper safety adaptation and remediation techniques during self care or transfers.   Instruct in proper use of assistive devices.                  Learning Progress Summary             Patient Acceptance, E, NR by  at 3/27/2024 1424    Acceptance, E, NR by LC at 3/26/2024 1418    Acceptance, E, NR by AC at 3/23/2024 1428    Acceptance, E,D, NR by PO at 3/21/2024 1404    Acceptance, E,TB, NR,NL by  at 3/20/2024 0755    Acceptance, E, NR by MC at 3/17/2024 1526    Acceptance, E, NR by AC at 3/6/2024 1116    Acceptance, E, VU by LAINE at 3/4/2024 1519                         Point: Home exercise program (In Progress)       Description:   Instruct learner(s) on appropriate technique for monitoring, assisting and/or progressing therapeutic exercises/activities.                   Learning Progress Summary             Patient Acceptance, E, NR by  at 3/27/2024 1424    Acceptance, E, NR by  at 3/26/2024 1418    Acceptance, E,TB, NR,NL by  at 3/20/2024 0755    Acceptance, E, NR by  at 3/17/2024 1526                         Point: Precautions (In Progress)       Description:   Instruct learner(s) on prescribed precautions during self-care and functional transfers.                  Learning Progress Summary             Patient Acceptance, E, NR by  at 3/27/2024 1424    Acceptance, E, NR by  at 3/26/2024 1418    Acceptance, E,D, NR by  at 3/21/2024 1404    Acceptance, E,TB, NR,NL by  at 3/20/2024 0755    Acceptance, E, NR by  at 3/17/2024 1526    Acceptance, E, VU by  at 3/4/2024 1519                         Point: Body mechanics (In Progress)       Description:   Instruct learner(s) on proper positioning and spine alignment during self-care, functional mobility activities and/or exercises.                  Learning Progress Summary             Patient Acceptance, E, NR by  at 3/27/2024 1424    Acceptance, E, NR by  at 3/26/2024 1418    Acceptance, E,D, NR by  at 3/21/2024 1404    Acceptance, E,TB, NR,NL by  at 3/20/2024 0755    Acceptance, E, NR by  at 3/17/2024 1526                                         User Key       Initials Effective Dates Name Provider Type Discipline     02/03/23 -  Pauly Davey, OT Occupational Therapist OT     06/16/21 -  Teresa Batista, RN Registered Nurse Nurse     06/16/21 -  Judith Loernz, OT Occupational Therapist OT     06/16/21 -  Justina Joyce OT Occupational Therapist OT     10/14/22 -  Sandra Stahl, OT Occupational Therapist OT     02/05/24 -  Cha Landry OT Occupational Therapist OT                  OT Recommendation and Plan     Plan of Care Review  Plan of Care Reviewed With: patient  Progress: no change  Outcome Evaluation: Pt. continues to be limited by decreased activity tolerance, generalized  weakness, and balance. Limited command following requiring increased cues to sequence tasks. Completed T/Fs with Max A x 2. Re-fit sling to L UE to improve comfort and positioning. Recommend SNF at discharge.     Time Calculation:         Time Calculation- OT       Row Name 03/27/24 1528             Time Calculation- OT    OT Start Time 1424  -LC      OT Received On 03/27/24  -      OT Goal Re-Cert Due Date 03/31/24  -         Timed Charges    15837 - OT Therapeutic Activity Minutes 10  -LC      70287 - OT Self Care/Mgmt Minutes 8  -         Total Minutes    Timed Charges Total Minutes 18  -       Total Minutes 18  -                User Key  (r) = Recorded By, (t) = Taken By, (c) = Cosigned By      Initials Name Provider Type     Judith Lorenz OT Occupational Therapist                  Therapy Charges for Today       Code Description Service Date Service Provider Modifiers Qty    77444075104 HC OT THERAPEUTIC ACT EA 15 MIN 3/26/2024 Judith Lorenz OT GO 1    74823318384 HC OT THERAPEUTIC ACT EA 15 MIN 3/27/2024 Judith Lorenz OT GO 1                 Judith Lorenz OT  3/27/2024

## 2024-03-27 NOTE — PROGRESS NOTES
Lea Arnold Snyder  1945  [id number]        Evaluating Physician: Louis Chow MD    Chief Complaint: abdpain    Reason for Consultation: IA infection    History of present illness:     Patient is a 78 y.o.  Yr old male with history of seizure disorder, colon cancer/prostate cancer with prior radiation, diabetes and dementia per admission notes with admission March 2 after frequent falls noted to have multiple rib fractures and left side clavicular fracture in the emergency room.noted to have significant colon distention with potential for pneumatosis on CT scan and seen by gastroenterology/surgery.taken for surgery on March 9 with diagnosis of toxic dilation of the colon for total abdominal colectomy and end ileostomy; medicine notes indicate he received ceftriaxone/Flagyl and Diflucan.  Leukocytosis persisted.repeat CT scan on March 18 with postsurgical changes, moderate loculated ascitic fluid in the anterior abdomen as described by radiology.taken back to surgery on March 18 for exploratory laparotomy with washout.  Culture subsequently with gram-negative manuel at least.  Empiric antibiotics adjusted to Zosyn/Mycamine    3/27/24 sleepy;   + abdominal pain which is less intense overall, dull at present,  worse with palpation, better with pain meds and he will not attach a numerical severity.    no headache photophobia or neck stiffness.  No dyspnea or cough.  He has a sling on the left arm with left arm PICC line.  No rash.  Nursing reports no other new focal pain or distress.       Past Medical History:   Diagnosis Date    Anemia     Colon cancer 1990    Status post partial colectomy in 1990. No chemotherapy or radiation therapy.    Coronary artery disease     Nonobstructive coronary artery disease.    Diabetes     Dyslipidemia     GERD (gastroesophageal reflux disease)     Hx of.    Hyperlipidemia     Hypertension     Hyponatremia     Hypothyroidism     Left shoulder pain     Low sodium levels 2022     recent issue; saw nephrologist who ruled out kidney issues; took Sodium Chloride po to bring levels up    Memory deficit     FROM ANESTHESIA    Osteoarthritis     Prostate cancer 07/23/2022    Seizure disorder     silent seziure-diagnosed years ago    Skin cancer        Past Surgical History:   Procedure Laterality Date    APPENDECTOMY      CARDIAC CATHETERIZATION  2012    30 to 40% LAD    CARPAL TUNNEL RELEASE Bilateral     CHOLECYSTECTOMY      COLECTOMY PARTIAL / TOTAL  1990    Partial colectomy    COLON RESECTION N/A 3/9/2024    Procedure: TOTAL ABDOMINAL COLECTOMY, END ILEOSTOMY;  Surgeon: Harley Godfrey MD;  Location:  [facility];  Service: General;  Laterality: N/A;    COLONOSCOPY      CYBERKNIFE  02/09/2023    Prostate/SV    CYSTOSCOPY TRANSURETHRAL RESECTION OF PROSTATE N/A 09/21/2018    Procedure: CYSTOSCOPY TRANSURETHRAL RESECTION OF PROSTATE GREENLIGHT;  Surgeon: Naseem Clayton MD;  Location:  [facility];  Service: Urology    EXPLORATORY LAPAROTOMY N/A 3/18/2024    Procedure: LAPAROTOMY EXPLORATORY, ABDOMINAL WASH OUT;  Surgeon: Harley Godfrey MD;  Location:  [facility];  Service: General;  Laterality: N/A;    OTHER SURGICAL HISTORY      Contraction surgery on bilateral hands and wrists.    PROSTATE BIOPSY N/A 11/11/2022    Procedure: TRANSRECTAL ULTRASOUND GUIDED BIOPSY OF PROSTATE;  Surgeon: Naseem Noland MD;  Location:  [facility];  Service: Urology;  Laterality: N/A;    SINUS SURGERY      SKIN BIOPSY      TEETH EXTRACTION      TONSILLECTOMY      TOTAL KNEE ARTHROPLASTY Right 04/07/2022    Procedure: TOTAL KNEE ARTHROPLASTY WITH ORTHO ROBOT RIGHT;  Surgeon: Louis Malcolm MD;  Location:  [facility];  Service: Robotics - Ortho;  Laterality: Right;    TRANSRECTAL INCISION PROSTATE WITH GOLD SEED Bilateral 01/06/2023    Procedure: TRANSRECTAL ULTRASOUND GUIDED PROSTATE FIDUCIAL MARKER PLACEMENT;  Surgeon: Naseem Noland MD;  Location:  [facility];  Service: Urology;  Laterality:  "Bilateral;    TURP / TRANSURETHRAL INCISION / DRAINAGE PROSTATE      VASECTOMY            family history includes Arthritis in an other family member; Cancer in his mother and another family member; Esophageal cancer in his brother; Hypertension in his father; No Known Problems in his paternal grandfather, paternal grandmother, and sister; Stroke in his father, maternal grandfather, sister, and another family member.    Allergies   Allergen Reactions    Chlorhexidine Rash    Sulfa Antibiotics Rash     Childhood reaction             Review of Systems    3/27/24 as per nursing also    Constitutional-- No Fever, chills or sweats.  Appetite diminished with fatigue  Heent-- No new vision, hearing or throat complaints.  No epistaxis or oral sores.  Denies odynophagia or dysphagia.  No flashers, floaters or eye pain. No odynophagia or dysphagia. No headache, photophobia or neck stiffness.  CV-- No chest pain, palpitation or syncope  Resp-- No SOB/cough/Hemoptysis  GI- see above.  No hematochezia, melena, or hematemesis. Denies jaundice or chronic liver disease.  -- No dysuria, hematuria, or flank pain.  Denies hesitancy, urgency or flank pain.  Lymph- no swollen lymph nodes in neck/axilla or groin.   Heme- No active bruising or bleeding  MS-- no swelling or pain in the bones or joints of arms/legs.  No new back pain.  Neuro-- generally debilitated, unable to lift his legs off the bed according to nursing.  Wiggles his feet/toes    Full 12 point review of systems reviewed and negative otherwise for acute complaints, except for above    Physical Exam:   Vital Signs   BP (!) 204/102   Pulse 62   Temp 97 °F (36.1 °C) (Oral)   Resp 16   Ht 170.2 cm (67.01\")   Wt 98.8 kg (217 lb 12 oz)   SpO2 95%   BMI 34.10 kg/m²     GENERAL: sleepy, answers questions and follows basic commands as above, in no acute distress. Appears chronically debilitated.     HEENT: Normocephalic, atraumatic.   No conjunctival injection. No icterus. " Oropharynx clear without evidence of thrush or exudate. No evidence of periodontal disease.    NECK: Supple without nuchal rigidity. No mass.  HEART: RRR; No murmur, rubs, gallops.   LUNGS: diminished at bases with some crackles at the bases, but otherwise Clear to auscultation bilaterally without wheezing/ rhonchi.  Nonlabored. No dullness.  ABDOMEN: Soft,  tender, nondistended. Positive bowel sounds but diminished. No rebound or guarding. NO mass or HSM.  EXT:  No cyanosis, clubbing;  No cord.has edema  MSK: FROM without joint effusions noted arms/legs.    SKIN: Warm and dry without cutaneous eruptions on Inspection/palpation.    NEURO: follows basic commands    No peripheral stigmata/phenomena of endocarditis    IV without obvious redness or drainage at left arm    Laboratory Data    Results from last 7 days   Lab Units 03/27/24  0707 03/26/24  0632 03/25/24  0612   WBC 10*3/mm3 10.46 12.33* 13.06*   HEMOGLOBIN g/dL 9.6* 9.1* 8.4*   HEMATOCRIT % 30.0* 29.4* 27.0*   PLATELETS 10*3/mm3 488* 517* 560*     Results from last 7 days   Lab Units 03/27/24  0707   SODIUM mmol/L 132*   POTASSIUM mmol/L 4.0   CHLORIDE mmol/L 101   CO2 mmol/L 22.0   BUN mg/dL 15   CREATININE mg/dL 0.53*   GLUCOSE mg/dL 101*   CALCIUM mg/dL 7.9*     Results from last 7 days   Lab Units 03/25/24  1418   ALK PHOS U/L 161*   BILIRUBIN mg/dL 0.2   ALT (SGPT) U/L 7   AST (SGOT) U/L 18         Results from last 7 days   Lab Units 03/25/24  1418   CRP mg/dL 1.94*       Estimated Creatinine Clearance: 126.6 mL/min (A) (by C-G formula based on SCr of 0.53 mg/dL (L)).      Microbiology:      Radiology:  Imaging Results (Last 72 Hours)       ** No results found for the last 72 hours. **              Impression:     --acute abdominal pain with total abdominal colectomy/ileostomy as above related to toxic dilation of colon As per operative note, postop with persistent leukocytosis and fluid collections on CT scan, taken for exploratory laparotomy and  fluid culture positive for gram-negative rods so far, consistent with abscess.  Empiric antimicrobials adjusted to Zosyn/Mycamine/daptomycin    --Left lower lobe pneumonia per medicine team notes, abnormal chest x-ray March 12.no respiratory distress or productive sputum.  Empiric Zosyn ongoing.  If worsening respiratory status you could consider further imaging etc.nasal cannula stable per nursing    --history dementia per admission notes a little specifics regarding baseline not clear and generally poor insight overall.  Description of toxic/metabolic encephalopathy per medicine team notes during this admission.  Prior history seizure and generally debilitated.    --History of multiple falls with left clavicular and multiple rib fractures per admission notes.  Orthopedics has seen.    --Right upper extremity DVT, described as brachial per medicine team notes.  Anticoagulation at discretion of medicine team    --History prostate and colon cancer previously per admission notes.        PLAN:       --IV Zosyn  (started 3/18)/Mycamine/ daptomycin (started 3/23); duration to depend on clinical course; potentially 10-14 days if steadily better    **culture with pseudomonas aeruginosa and E Faecium    --Check/reviewed labs cultures and scans    --Partial history per nursing staff    --Discussed with microbiology     --Highly complex of issues with high risk for further serious morbidity and other serious sequela    Copied text in this note has been reviewed and is accurate as of 03/27/24.        Louis Chow MD  3/27/2024

## 2024-03-27 NOTE — PROGRESS NOTES
"Patient Name:  Lea Rivers  YOB: 1945  [id number]    Surgery Progress Note    Date of visit: 3/27/2024    Subjective   Unable to obtain full history due to patient's dementia. No abdominal pain. No nausea/vomiting. No fevers.  Having ostomy output, although now low. Appetite poor.          Objective       BP (!) 183/82 (BP Location: Left leg, Patient Position: Lying)   Pulse 70   Temp 97 °F (36.1 °C) (Oral)   Resp 16   Ht 170.2 cm (67.01\")   Wt 98.8 kg (217 lb 12 oz)   SpO2 95%   BMI 34.10 kg/m²     Intake/Output Summary (Last 24 hours) at 3/27/2024 0722  Last data filed at 3/26/2024 2242  Gross per 24 hour   Intake 10 ml   Output 200 ml   Net -190 ml   Ostomy 200 cc    CV:  Rhythm regular and rate regular  L:  Clear to auscultation bilaterally  Abd:  Bowel sounds positive, soft, nontender, incision c/d/I, OLIVER serous, ostomy viable and functioning  Ext:  No cyanosis, clubbing, edema    Recent labs and imaging that are back at this time have been reviewed.          Assessment & Plan     Patient postoperative day 18 from total abdominal colectomy with end ileostomy, postoperative day 9 abdominal washout. Stop Imodium due to low ostomy output. Pain control.  Diet per speech and swallow recommendations, continue to monitor intake with TPN off. Abx per ID, OR cultures with rare pseudomonas and Enterococcus. OK to continue anticoagulation for UE DVT.            Harley Godfrey MD  3/27/2024  07:22 EDT     "

## 2024-03-27 NOTE — PLAN OF CARE
Goal Outcome Evaluation:  Plan of Care Reviewed With: patient      Progress: no change         Problem: Adult Inpatient Plan of Care  Goal: Absence of Hospital-Acquired Illness or Injury  Intervention: Identify and Manage Fall Risk  Recent Flowsheet Documentation  Taken 3/26/2024 2000 by Jerrica Ghosh RN  Safety Promotion/Fall Prevention: activity supervised  Intervention: Prevent Skin Injury  Recent Flowsheet Documentation  Taken 3/26/2024 2000 by Jerrica Ghosh RN  Body Position:   supine, legs elevated   supine   weight shifting  Intervention: Prevent and Manage VTE (Venous Thromboembolism) Risk  Recent Flowsheet Documentation  Taken 3/26/2024 2000 by Jerrica Ghosh RN  Activity Management: activity encouraged     Problem: Adult Inpatient Plan of Care  Goal: Absence of Hospital-Acquired Illness or Injury  Intervention: Prevent Skin Injury  Recent Flowsheet Documentation  Taken 3/26/2024 2000 by Jerrica Ghosh RN  Body Position:   supine, legs elevated   supine   weight shifting     Problem: Adult Inpatient Plan of Care  Goal: Absence of Hospital-Acquired Illness or Injury  Intervention: Prevent and Manage VTE (Venous Thromboembolism) Risk  Recent Flowsheet Documentation  Taken 3/26/2024 2000 by Jerrica Ghosh RN  Activity Management: activity encouraged

## 2024-03-27 NOTE — PLAN OF CARE
Goal Outcome Evaluation:  Plan of Care Reviewed With: patient        Progress: no change  Outcome Evaluation: Pt continues to present with decreased functional strength and decreased balance. Pt completed a couple steps to chair with max A x2 and RUE support. Continue to progress per pt tolerance.      Anticipated Discharge Disposition (PT): skilled nursing facility

## 2024-03-28 LAB
BASOPHILS # BLD AUTO: 0.07 10*3/MM3 (ref 0–0.2)
BASOPHILS NFR BLD AUTO: 0.8 % (ref 0–1.5)
CK SERPL-CCNC: 14 U/L (ref 20–200)
DEPRECATED RDW RBC AUTO: 65.5 FL (ref 37–54)
EOSINOPHIL # BLD AUTO: 0.12 10*3/MM3 (ref 0–0.4)
EOSINOPHIL NFR BLD AUTO: 1.3 % (ref 0.3–6.2)
ERYTHROCYTE [DISTWIDTH] IN BLOOD BY AUTOMATED COUNT: 18.6 % (ref 12.3–15.4)
GLUCOSE BLDC GLUCOMTR-MCNC: 106 MG/DL (ref 70–130)
GLUCOSE BLDC GLUCOMTR-MCNC: 114 MG/DL (ref 70–130)
GLUCOSE BLDC GLUCOMTR-MCNC: 141 MG/DL (ref 70–130)
GLUCOSE BLDC GLUCOMTR-MCNC: 87 MG/DL (ref 70–130)
HCT VFR BLD AUTO: 29.1 % (ref 37.5–51)
HCT VFR BLD AUTO: 29.6 % (ref 37.5–51)
HGB BLD-MCNC: 9 G/DL (ref 13–17.7)
HGB BLD-MCNC: 9.3 G/DL (ref 13–17.7)
IMM GRANULOCYTES # BLD AUTO: 0.12 10*3/MM3 (ref 0–0.05)
IMM GRANULOCYTES NFR BLD AUTO: 1.3 % (ref 0–0.5)
LYMPHOCYTES # BLD AUTO: 1.1 10*3/MM3 (ref 0.7–3.1)
LYMPHOCYTES NFR BLD AUTO: 12.3 % (ref 19.6–45.3)
MCH RBC QN AUTO: 29.9 PG (ref 26.6–33)
MCHC RBC AUTO-ENTMCNC: 30.4 G/DL (ref 31.5–35.7)
MCV RBC AUTO: 98.3 FL (ref 79–97)
MONOCYTES # BLD AUTO: 1.04 10*3/MM3 (ref 0.1–0.9)
MONOCYTES NFR BLD AUTO: 11.6 % (ref 5–12)
NEUTROPHILS NFR BLD AUTO: 6.5 10*3/MM3 (ref 1.7–7)
NEUTROPHILS NFR BLD AUTO: 72.7 % (ref 42.7–76)
NRBC BLD AUTO-RTO: 0 /100 WBC (ref 0–0.2)
PLATELET # BLD AUTO: 431 10*3/MM3 (ref 140–450)
PMV BLD AUTO: 8.6 FL (ref 6–12)
RBC # BLD AUTO: 3.01 10*6/MM3 (ref 4.14–5.8)
WBC NRBC COR # BLD AUTO: 8.95 10*3/MM3 (ref 3.4–10.8)

## 2024-03-28 PROCEDURE — 85025 COMPLETE CBC W/AUTO DIFF WBC: CPT | Performed by: SURGERY

## 2024-03-28 PROCEDURE — 92526 ORAL FUNCTION THERAPY: CPT

## 2024-03-28 PROCEDURE — 82550 ASSAY OF CK (CPK): CPT

## 2024-03-28 PROCEDURE — 25010000002 PIPERACILLIN SOD-TAZOBACTAM PER 1 G: Performed by: INTERNAL MEDICINE

## 2024-03-28 PROCEDURE — 25010000002 DAPTOMYCIN PER 1 MG: Performed by: INTERNAL MEDICINE

## 2024-03-28 PROCEDURE — 99232 SBSQ HOSP IP/OBS MODERATE 35: CPT | Performed by: NURSE PRACTITIONER

## 2024-03-28 PROCEDURE — 25010000002 MICAFUNGIN SODIUM 100 MG RECONSTITUTED SOLUTION 1 EACH VIAL: Performed by: INTERNAL MEDICINE

## 2024-03-28 PROCEDURE — 85014 HEMATOCRIT: CPT | Performed by: NURSE PRACTITIONER

## 2024-03-28 PROCEDURE — 97110 THERAPEUTIC EXERCISES: CPT

## 2024-03-28 PROCEDURE — 25010000002 FUROSEMIDE PER 20 MG: Performed by: NURSE PRACTITIONER

## 2024-03-28 PROCEDURE — 82948 REAGENT STRIP/BLOOD GLUCOSE: CPT

## 2024-03-28 PROCEDURE — 85018 HEMOGLOBIN: CPT | Performed by: NURSE PRACTITIONER

## 2024-03-28 PROCEDURE — 97530 THERAPEUTIC ACTIVITIES: CPT

## 2024-03-28 RX ORDER — MIRTAZAPINE 15 MG/1
15 TABLET, FILM COATED ORAL NIGHTLY
Status: DISCONTINUED | OUTPATIENT
Start: 2024-03-28 | End: 2024-03-31 | Stop reason: HOSPADM

## 2024-03-28 RX ORDER — MEGESTROL ACETATE 40 MG/ML
400 SUSPENSION ORAL
Status: DISCONTINUED | OUTPATIENT
Start: 2024-03-28 | End: 2024-03-31 | Stop reason: HOSPADM

## 2024-03-28 RX ORDER — LABETALOL 200 MG/1
200 TABLET, FILM COATED ORAL ONCE
Qty: 1 TABLET | Refills: 0 | Status: COMPLETED | OUTPATIENT
Start: 2024-03-28 | End: 2024-03-28

## 2024-03-28 RX ORDER — LABETALOL 300 MG/1
300 TABLET, FILM COATED ORAL EVERY 8 HOURS SCHEDULED
Status: DISCONTINUED | OUTPATIENT
Start: 2024-03-28 | End: 2024-03-30

## 2024-03-28 RX ORDER — FUROSEMIDE 10 MG/ML
20 INJECTION INTRAMUSCULAR; INTRAVENOUS ONCE
Status: COMPLETED | OUTPATIENT
Start: 2024-03-28 | End: 2024-03-28

## 2024-03-28 RX ADMIN — VALPROIC ACID 375 MG: 250 SOLUTION ORAL at 00:49

## 2024-03-28 RX ADMIN — Medication 10 ML: at 22:02

## 2024-03-28 RX ADMIN — PIPERACILLIN AND TAZOBACTAM 3.38 G: 3; .375 INJECTION, POWDER, LYOPHILIZED, FOR SOLUTION INTRAVENOUS at 00:49

## 2024-03-28 RX ADMIN — LEVOTHYROXINE SODIUM 88 MCG: 88 TABLET ORAL at 05:50

## 2024-03-28 RX ADMIN — VALPROIC ACID 375 MG: 250 SOLUTION ORAL at 05:48

## 2024-03-28 RX ADMIN — DAPTOMYCIN 500 MG: 500 INJECTION, POWDER, LYOPHILIZED, FOR SOLUTION INTRAVENOUS at 12:31

## 2024-03-28 RX ADMIN — VALPROIC ACID 375 MG: 250 SOLUTION ORAL at 17:56

## 2024-03-28 RX ADMIN — VALPROIC ACID 375 MG: 250 SOLUTION ORAL at 12:32

## 2024-03-28 RX ADMIN — LIDOCAINE 2 PATCH: 4 PATCH TOPICAL at 08:17

## 2024-03-28 RX ADMIN — LEVETIRACETAM 500 MG: 500 TABLET, FILM COATED ORAL at 08:17

## 2024-03-28 RX ADMIN — VERAPAMIL HYDROCHLORIDE 120 MG: 120 TABLET ORAL at 05:52

## 2024-03-28 RX ADMIN — LISINOPRIL 40 MG: 40 TABLET ORAL at 08:17

## 2024-03-28 RX ADMIN — Medication 10 ML: at 08:20

## 2024-03-28 RX ADMIN — LEVETIRACETAM 500 MG: 500 TABLET, FILM COATED ORAL at 22:02

## 2024-03-28 RX ADMIN — PANTOPRAZOLE SODIUM 40 MG: 40 INJECTION, POWDER, FOR SOLUTION INTRAVENOUS at 05:50

## 2024-03-28 RX ADMIN — FUROSEMIDE 20 MG: 10 INJECTION, SOLUTION INTRAMUSCULAR; INTRAVENOUS at 15:42

## 2024-03-28 RX ADMIN — ISOSORBIDE DINITRATE 10 MG: 10 TABLET ORAL at 12:32

## 2024-03-28 RX ADMIN — LABETALOL HYDROCHLORIDE 300 MG: 300 TABLET, FILM COATED ORAL at 22:02

## 2024-03-28 RX ADMIN — LABETALOL HYDROCHLORIDE 200 MG: 200 TABLET, FILM COATED ORAL at 05:52

## 2024-03-28 RX ADMIN — PIPERACILLIN AND TAZOBACTAM 3.38 G: 3; .375 INJECTION, POWDER, LYOPHILIZED, FOR SOLUTION INTRAVENOUS at 08:16

## 2024-03-28 RX ADMIN — ISOSORBIDE DINITRATE 10 MG: 10 TABLET ORAL at 17:55

## 2024-03-28 RX ADMIN — APIXABAN 5 MG: 5 TABLET, FILM COATED ORAL at 22:01

## 2024-03-28 RX ADMIN — APIXABAN 5 MG: 5 TABLET, FILM COATED ORAL at 08:17

## 2024-03-28 RX ADMIN — ISOSORBIDE DINITRATE 10 MG: 10 TABLET ORAL at 08:17

## 2024-03-28 RX ADMIN — LABETALOL HYDROCHLORIDE 300 MG: 300 TABLET, FILM COATED ORAL at 16:50

## 2024-03-28 RX ADMIN — MICAFUNGIN 100 MG: 20 INJECTION, POWDER, LYOPHILIZED, FOR SOLUTION INTRAVENOUS at 15:43

## 2024-03-28 RX ADMIN — MEGESTROL ACETATE 400 MG: 40 SUSPENSION ORAL at 17:56

## 2024-03-28 RX ADMIN — PIPERACILLIN AND TAZOBACTAM 3.38 G: 3; .375 INJECTION, POWDER, LYOPHILIZED, FOR SOLUTION INTRAVENOUS at 17:54

## 2024-03-28 RX ADMIN — MIRTAZAPINE 15 MG: 15 TABLET, FILM COATED ORAL at 22:02

## 2024-03-28 RX ADMIN — LABETALOL HYDROCHLORIDE 200 MG: 200 TABLET, FILM COATED ORAL at 08:17

## 2024-03-28 NOTE — THERAPY TREATMENT NOTE
Acute Care - Speech Language Pathology   Swallow Treatment Note [facility]     Patient Name: Lea Rivers  : 1945  MRN: [id number]  Today's Date: 3/28/2024               Admit Date: 3/2/2024    Visit Dx:     ICD-10-CM ICD-9-CM   1. Fall, initial encounter  W19.XXXA E888.9   2. Closed fracture of multiple ribs of left side, initial encounter  S22.42XA 807.09   3. Closed displaced fracture of acromial end of left clavicle, initial encounter  S42.032A 810.03   4. Falls frequently  R29.6 V15.88   5. Acute UTI (urinary tract infection)  N39.0 599.0   6. Colon distention  K63.89 569.89   7. Paralytic ileus of small intestine and colon  K56.0 560.1   8. Oropharyngeal dysphagia  R13.12 787.22   9. Intra-abdominal fluid  R18.8 789.59     Patient Active Problem List   Diagnosis    Coronary artery disease    Dyslipidemia    Hypothyroidism    GERD (gastroesophageal reflux disease)    Seizure disorder    BPH (benign prostatic hypertrophy)    Hypertension    Primary osteoarthritis of both knees    Syncope and collapse    Precordial pain    Diabetes    Status post total right knee replacement    Postoperative urinary retention    Confusion, postoperative    Acute blood loss anemia, asymptomatic, no transfusion required    Elevated prostate specific antigen (PSA)    Prostate cancer    Anemia    Body mass index (BMI) of 32.0-32.9 in adult    Localization-related focal epilepsy with simple partial seizures    Need for vaccination against Streptococcus pneumoniae    Upper extremity tendon tear, right, initial encounter    Vitamin D deficiency    PSA elevation    Polyp of colon    Overactive bladder    Gross hematuria    History of colon polyps    History of colon cancer    B12 deficiency    Falls    Pneumatosis intestinalis     Past Medical History:   Diagnosis Date    Anemia     Colon cancer     Status post partial colectomy in . No chemotherapy or radiation therapy.    Coronary artery disease      Nonobstructive coronary artery disease.    Diabetes     Dyslipidemia     GERD (gastroesophageal reflux disease)     Hx of.    Hyperlipidemia     Hypertension     Hyponatremia     Hypothyroidism     Left shoulder pain     Low sodium levels 2022    recent issue; saw nephrologist who ruled out kidney issues; took Sodium Chloride po to bring levels up    Memory deficit     FROM ANESTHESIA    Osteoarthritis     Prostate cancer 07/23/2022    Seizure disorder     silent seziure-diagnosed years ago    Skin cancer      Past Surgical History:   Procedure Laterality Date    APPENDECTOMY      CARDIAC CATHETERIZATION  2012    30 to 40% LAD    CARPAL TUNNEL RELEASE Bilateral     CHOLECYSTECTOMY      COLECTOMY PARTIAL / TOTAL  1990    Partial colectomy    COLON RESECTION N/A 3/9/2024    Procedure: TOTAL ABDOMINAL COLECTOMY, END ILEOSTOMY;  Surgeon: Harley Godfrey MD;  Location:  [facility];  Service: General;  Laterality: N/A;    COLONOSCOPY      CYBERKNIFE  02/09/2023    Prostate/SV    CYSTOSCOPY TRANSURETHRAL RESECTION OF PROSTATE N/A 09/21/2018    Procedure: CYSTOSCOPY TRANSURETHRAL RESECTION OF PROSTATE GREENLIGHT;  Surgeon: Naseem Clayton MD;  Location:  [facility];  Service: Urology    EXPLORATORY LAPAROTOMY N/A 3/18/2024    Procedure: LAPAROTOMY EXPLORATORY, ABDOMINAL WASH OUT;  Surgeon: Harley Godfrey MD;  Location:  [facility];  Service: General;  Laterality: N/A;    OTHER SURGICAL HISTORY      Contraction surgery on bilateral hands and wrists.    PROSTATE BIOPSY N/A 11/11/2022    Procedure: TRANSRECTAL ULTRASOUND GUIDED BIOPSY OF PROSTATE;  Surgeon: Naseem Noland MD;  Location:  [facility];  Service: Urology;  Laterality: N/A;    SINUS SURGERY      SKIN BIOPSY      TEETH EXTRACTION      TONSILLECTOMY      TOTAL KNEE ARTHROPLASTY Right 04/07/2022    Procedure: TOTAL KNEE ARTHROPLASTY WITH ORTHO ROBOT RIGHT;  Surgeon: Louis Malcolm MD;  Location:  [facility];  Service: Robotics - Ortho;  Laterality:  Right;    TRANSRECTAL INCISION PROSTATE WITH GOLD SEED Bilateral 01/06/2023    Procedure: TRANSRECTAL ULTRASOUND GUIDED PROSTATE FIDUCIAL MARKER PLACEMENT;  Surgeon: Naseem Noland MD;  [facility];  Service: Urology;  Laterality: Bilateral;    TURP / TRANSURETHRAL INCISION / DRAINAGE PROSTATE      VASECTOMY         SLP Recommendation and Plan     SLP Diet Recommendation: mechanical ground textures, no mixed consistencies, honey thick liquids (03/28/24 1325)  Recommended Precautions and Strategies: upright posture during/after eating, small bites of food and sips of liquid, multiple swallows per bite of food, multiple swallows per sip of liquid, check mouth frequently for oral residue/pocketing, general aspiration precautions, 1:1 supervision, assist with feeding, fatigue precautions (03/28/24 1325)  SLP Rec. for Method of Medication Administration: meds crushed, with puree, as tolerated, meds via alternate route (03/28/24 1325)     Monitor for Signs of Aspiration: yes, notify SLP if any concerns (03/28/24 1325)  Recommended Diagnostics: reassess via VFSS (MBS) (03/28/24 1325)     Anticipated Discharge Disposition (SLP): skilled nursing facility (03/28/24 1325)        Predicted Duration Therapy Intervention (Days): until discharge (03/28/24 1325)  Oral Care Recommendations: Oral Care BID/PRN, Suction toothbrush (03/28/24 1325)        Daily Summary of Progress (SLP): progress toward functional goals as expected (03/28/24 1325)               Treatment Assessment (SLP): toleration of diet, suspected, continued, pharyngeal dysphagia (03/28/24 1325)  Treatment Assessment Comments (SLP): Pt lethargic, able to remain awake/alert w/ intermittent cues. No overt s/s of aspiration w/ HTL trials, cough w/ thin liquid trials. Cont w/ difficulty completing exercises. Plan for [facility] 3/29 (03/28/24 1325)  Plan for Continued Treatment (SLP): continue treatment per plan of care (03/28/24 1325)                SWALLOW  EVALUATION (Last 72 Hours)       SLP Adult Swallow Evaluation       Row Name 03/28/24 1325 03/26/24 1100                Rehab Evaluation    Document Type therapy note (daily note)  - therapy note (daily note)  -       Subjective Information no complaints  - no complaints  -       Patient Observations cooperative;lethargic  - cooperative  -       Patient/Family/Caregiver Comments/Observations Family present  - no family present  -       Patient Effort adequate  - adequate  -       Symptoms Noted During/After Treatment none  - none  -          Pain    Additional Documentation Pain Scale: FACES Pre/Post-Treatment (Group)  - Pain Scale: FACES Pre/Post-Treatment (Group)  -          Pain Scale: FACES Pre/Post-Treatment    Pain: FACES Scale, Pretreatment 0-->no hurt  - 0-->no hurt  -       Posttreatment Pain Rating 0-->no hurt  - 0-->no hurt  -          SLP Treatment Clinical Impressions    Treatment Assessment (SLP) toleration of diet;suspected;continued;pharyngeal dysphagia  - continued;toleration of diet;no clinical signs of;suspected;aspiration  -       Treatment Assessment Comments (SLP) Pt lethargic, able to remain awake/alert w/ intermittent cues. No overt s/s of aspiration w/ HTL trials, cough w/ thin liquid trials. Cont w/ difficulty completing exercises. [place] [facility] [place]  - Pt continues w/ difficulty participating in exercises. Limited po presentations trialed across this evaluation 2/2 pt cooperation. No glaring overt s/s of aspiration across this tx session. Will continue current diet as tolerated, poor po intake per chart review  -       Daily Summary of Progress (SLP) progress toward functional goals as expected  - progress toward functional goals as expected  -       Barriers to Overall Progress (SLP) -- Cognitive status  -       Plan for Continued Treatment (SLP) continue treatment per plan of care  - continue treatment per plan of care  -       Care  Plan Review care plan/treatment goals reviewed  - care plan/treatment goals reviewed  -       Care Plan Review, Other Participant(s) family  - --          Recommendations    Therapy Frequency (Swallow) -- 5 days per week  -       Predicted Duration Therapy Intervention (Days) until discharge  - until discharge  -       SLP Diet Recommendation mechanical ground textures;no mixed consistencies;honey thick liquids  - mechanical ground textures;no mixed consistencies;honey thick liquids  -       Recommended Diagnostics reassess via VFSS (MBS)  - --       Recommended Precautions and Strategies upright posture during/after eating;small bites of food and sips of liquid;multiple swallows per bite of food;multiple swallows per sip of liquid;check mouth frequently for oral residue/pocketing;general aspiration precautions;1:1 supervision;assist with feeding;fatigue precautions  - upright posture during/after eating;small bites of food and sips of liquid;multiple swallows per bite of food;multiple swallows per sip of liquid;check mouth frequently for oral residue/pocketing;general aspiration precautions;1:1 supervision;assist with feeding;fatigue precautions  -       Oral Care Recommendations Oral Care BID/PRN;Suction toothbrush  - Oral Care BID/PRN;Suction toothbrush  -       SLP Rec. for Method of Medication Administration meds crushed;with puree;as tolerated;meds via alternate route  - meds crushed;with puree;as tolerated;meds via alternate route  -       Monitor for Signs of Aspiration yes;notify SLP if any concerns  - yes;notify SLP if any concerns  -       Anticipated Discharge Disposition (SLP) skilled nursing facility  - skilled nursing facility  -                 User Key  (r) = Recorded By, (t) = Taken By, (c) = Cosigned By      Initials Name Effective Dates     Jaylyn Peterson, MS CCC-SLP 07/11/23 -      Shi Guadalupe, MS CCC-SLP 05/12/23 -                      EDUCATION  The patient has been educated in the following areas:   Dysphagia (Swallowing Impairment) Modified Diet Instruction.        SLP GOALS       Row Name 03/28/24 1325 03/26/24 1100          (LTG) Patient will demonstrate functional swallow for    Diet Texture (Demonstrate functional swallow) soft to chew (chopped) textures  - soft to chew (chopped) textures  -     Liquid viscosity (Demonstrate functional swallow) nectar/ mildly thick liquids  - nectar/ mildly thick liquids  -     [place] (Demonstrate functional swallow) with minimal cues (75-90% accuracy)  - with minimal cues (75-90% accuracy)  -CJ     Time Frame (Demonstrate functional swallow) by discharge  - by discharge  -CJ     Progress/Outcomes (Demonstrate functional swallow) goal ongoing  - continuing progress toward goal  -CJ        (STG) Patient will tolerate trials of    Consistencies Trialed (Tolerate trials) mechanical ground textures;honey/ moderately thick liquids  - mechanical ground textures;honey/ moderately thick liquids  -     Desired Outcome (Tolerate trials) without signs/symptoms of aspiration;without signs of distress;with adequate oral prep/transit/clearance  - without signs/symptoms of aspiration;without signs of distress;with adequate oral prep/transit/clearance  -     [place] (Tolerate trials) with minimal cues (75-90% accuracy)  - with minimal cues (75-90% accuracy)  -CJ     Time Frame (Tolerate trials) 1 week  - 1 week  -     Progress/Outcomes (Tolerate trials) continuing progress toward goal  - continuing progress toward goal  -CJ     Comment (Tolerate trials) No overt s/s of aspiration w/ HTL trials  - no overt s/s of aspiration  -CJ        (STG) Patient will tolerate therapeutic trials of    Consistencies Trialed (Tolerate therapeutic trials) soft to chew (chopped) textures  - soft to chew (chopped) textures  -     Desired Outcome (Tolerate therapeutic trials) with adequate  oral prep/transit/clearance  - with adequate oral prep/transit/clearance  -     [place] (Tolerate therapeutic trials) with minimal cues (75-90% accuracy)  - with minimal cues (75-90% accuracy)  -CJ     Time Frame (Tolerate therapeutic trials) 1 week  - 1 week  -CJ     Progress/Outcomes (Tolerate therapeutic trials) goal ongoing  - goal ongoing  -CJ     Comment (Tolerate therapeutic trials) Did not attempt 2' lethargy  - pt declined  -CJ        (STG) Lingual Strengthening Goal 1 (SLP)    Activity (Lingual Strengthening Goal 1, SLP) increase lingual tone/sensation/control/coordination/movement;increase tongue back strength  - increase lingual tone/sensation/control/coordination/movement;increase tongue back strength  -     Increase Lingual Tone/Sensation/Control/Coordination/Movement swallow trials;lingual resistance exercises  - swallow trials;lingual resistance exercises  -     Increase Tongue Back Strength lingual resistance exercises  - lingual resistance exercises  -     [place]/Accuracy (Lingual Strengthening Goal 1, SLP) with moderate cues (50-74% accuracy)  - with moderate cues (50-74% accuracy)  -CJ     Time Frame (Lingual Strengthening Goal 1, SLP) 1 week  - 1 week  -CJ     Progress/Outcomes (Lingual Strengthening Goal 1, SLP) progress slower than expected  - progress slower than expected  -CJ     Comment (Lingual Strengthening Goal 1, SLP) -- attempted x5  -CJ        (STG) Pharyngeal Strengthening Exercise Goal 1 (SLP)    Activity (Pharyngeal Strengthening Goal 1, SLP) increase timing;increase superior movement of the hyolaryngeal complex;increase anterior movement of the hyolaryngeal complex;increase squeeze/positive pressure generation  - increase timing;increase superior movement of the hyolaryngeal complex;increase anterior movement of the hyolaryngeal complex;increase squeeze/positive pressure generation  -     Increase Timing prepping - 3 second prep or suck  swallow or 3-step swallow  - prepping - 3 second prep or suck swallow or 3-step swallow  -     Increase Superior Movement of the Hyolaryngeal Complex falsetto  -MH falsetto  -CJ     Increase Anterior Movement of the Hyolaryngeal Complex chin tuck against resistance (CTAR)  -MH chin tuck against resistance (CTAR)  -CJ     Increase Squeeze/Positive Pressure Generation hard effortful swallow  -MH hard effortful swallow  -CJ     [place]/Accuracy (Pharyngeal Strengthening Goal 1, SLP) with maximum cues (25-49% accuracy)  -MH with maximum cues (25-49% accuracy)  -CJ     Time Frame (Pharyngeal Strengthening Goal 1, SLP) 1 week  -MH 1 week  -CJ     Progress/Outcomes (Pharyngeal Strengthening Goal 1, SLP) progress slower than expected  -MH progress slower than expected  -CJ     Comment (Pharyngeal Strengthening Goal 1, SLP) Pt attempted CTAR w/ max cues, u/a to complete additional exercises  -MH attempted w/ max cues  -CJ               User Key  (r) = Recorded By, (t) = Taken By, (c) = Cosigned By      Initials Name Provider Type    Jaylyn Holland MS CCC-SLP Speech and Language Pathologist    Shi Francis MS CCC-SLP Speech and Language Pathologist                       Time Calculation:    Time Calculation- SLP       Row Name 03/28/24 1526             Time Calculation- SLP    SLP Start Time 1325  -      SLP Received On 03/28/24  -         Untimed Charges    58185-[id number] Treatment Swallow Minutes 40  -MH         Total Minutes    Untimed Charges Total Minutes 40  -MH       Total Minutes 40  -                User Key  (r) = Recorded By, (t) = Taken By, (c) = Cosigned By      Initials Name Provider Type     Shi Guadalupe MS CCC-SLP Speech and Language Pathologist                    Therapy Charges for Today       Code Description Service Date Service Provider Modifiers Qty    [id number] HC ST TREATMENT SWALLOW 3 3/28/2024 Shi Guadalupe MS CCC-SLP GN 1                 Shi Guadalupe MS  CCC-SLP  3/28/2024

## 2024-03-28 NOTE — PLAN OF CARE
Goal Outcome Evaluation:        Problem: Adult Inpatient Plan of Care  Goal: Plan of Care Review  Outcome: Ongoing, Progressing  Goal: Patient-Specific Goal (Individualized)  Outcome: Ongoing, Progressing  Goal: Absence of Hospital-Acquired Illness or Injury  Outcome: Ongoing, Progressing  Intervention: Identify and Manage Fall Risk  Recent Flowsheet Documentation  Taken 3/28/2024 0438 by Teresa Parmar RN  Safety Promotion/Fall Prevention:   activity supervised   assistive device/personal items within reach   clutter free environment maintained   nonskid shoes/slippers when out of bed   room organization consistent   safety round/check completed  Taken 3/28/2024 0200 by Teresa Parmar RN  Safety Promotion/Fall Prevention:   activity supervised   assistive device/personal items within reach   clutter free environment maintained   nonskid shoes/slippers when out of bed   room organization consistent   safety round/check completed  Taken 3/28/2024 0016 by Teresa Parmar RN  Safety Promotion/Fall Prevention:   activity supervised   assistive device/personal items within reach   clutter free environment maintained   nonskid shoes/slippers when out of bed   room organization consistent   safety round/check completed  Taken 3/27/2024 2215 by Teresa Parmar RN  Safety Promotion/Fall Prevention:   activity supervised   assistive device/personal items within reach   clutter free environment maintained   nonskid shoes/slippers when out of bed   room organization consistent   safety round/check completed  Taken 3/27/2024 2045 by Teresa Parmar, RN  Safety Promotion/Fall Prevention:   assistive device/personal items within reach   clutter free environment maintained   nonskid shoes/slippers when out of bed   room organization consistent   safety round/check completed  Intervention: Prevent Skin Injury  Recent Flowsheet Documentation  Taken 3/28/2024 0438 by Teresa Parmar RN  Body Position:   weight  shifting   neutral body alignment  Skin Protection:   adhesive use limited   incontinence pads utilized   transparent dressing maintained   tubing/devices free from skin contact  Taken 3/28/2024 0200 by Teresa Parmar RN  Body Position: weight shifting  Skin Protection:   adhesive use limited   incontinence pads utilized   transparent dressing maintained   tubing/devices free from skin contact  Taken 3/28/2024 0016 by Teresa Parmar RN  Body Position:   weight shifting   tilted  Skin Protection:   adhesive use limited   incontinence pads utilized   transparent dressing maintained   tubing/devices free from skin contact  Taken 3/27/2024 2215 by Teresa Parmar RN  Body Position: weight shifting  Skin Protection:   adhesive use limited   incontinence pads utilized   transparent dressing maintained   tubing/devices free from skin contact  Taken 3/27/2024 2045 by Teresa Parmar RN  Body Position:   weight shifting   neutral body alignment  Skin Protection:   adhesive use limited   incontinence pads utilized   transparent dressing maintained   tubing/devices free from skin contact  Intervention: Prevent and Manage VTE (Venous Thromboembolism) Risk  Recent Flowsheet Documentation  Taken 3/28/2024 0438 by Teresa Parmar RN  Activity Management: activity encouraged  Taken 3/28/2024 0200 by Teresa Parmar RN  Activity Management: activity encouraged  Taken 3/28/2024 0016 by Teresa Parmar RN  Activity Management: activity encouraged  Taken 3/27/2024 2215 by Teresa Parmar RN  Activity Management: activity encouraged  Taken 3/27/2024 2045 by Teresa Parmar RN  Activity Management: activity encouraged  VTE Prevention/Management: (PO Eliquis)   sequential compression devices off   other (see comments)  Intervention: Prevent Infection  Recent Flowsheet Documentation  Taken 3/27/2024 2045 by Teresa Parmar RN  Infection Prevention:   cohorting utilized   environmental surveillance  performed   equipment surfaces disinfected   hand hygiene promoted   personal protective equipment utilized   single patient room provided   visitors restricted/screened  Goal: Optimal Comfort and Wellbeing  Outcome: Ongoing, Progressing  Intervention: Provide Person-Centered Care  Recent Flowsheet Documentation  Taken 3/27/2024 2045 by Teresa Parmar RN  Trust Relationship/Rapport:   care explained   choices provided   thoughts/feelings acknowledged  Goal: Readiness for Transition of Care  Outcome: Ongoing, Progressing     Problem: Skin Injury Risk Increased  Goal: Skin Health and Integrity  Outcome: Ongoing, Progressing  Intervention: Optimize Skin Protection  Recent Flowsheet Documentation  Taken 3/28/2024 0438 by Teresa Parmar RN  Pressure Reduction Techniques:   frequent weight shift encouraged   heels elevated off bed   positioned off wounds   pressure points protected   weight shift assistance provided  Head of Bed (HOB) Positioning: HOB lowered  Pressure Reduction Devices:   specialty bed utilized   pressure-redistributing mattress utilized   positioning supports utilized   heel offloading device utilized  Skin Protection:   adhesive use limited   incontinence pads utilized   transparent dressing maintained   tubing/devices free from skin contact  Taken 3/28/2024 0200 by Teresa Parmar RN  Pressure Reduction Techniques:   frequent weight shift encouraged   heels elevated off bed   pressure points protected  Head of Bed (HOB) Positioning: HOB elevated  Pressure Reduction Devices:   specialty bed utilized   pressure-redistributing mattress utilized   positioning supports utilized   heel offloading device utilized  Skin Protection:   adhesive use limited   incontinence pads utilized   transparent dressing maintained   tubing/devices free from skin contact  Taken 3/28/2024 0016 by Teresa Parmar RN  Pressure Reduction Techniques:   frequent weight shift encouraged   heels elevated off bed    positioned off wounds   pressure points protected   weight shift assistance provided  Head of Bed ([facility]) Positioning: HOB elevated  Pressure Reduction Devices:   specialty bed utilized   pressure-redistributing mattress utilized   positioning supports utilized   heel offloading device utilized  Skin Protection:   adhesive use limited   incontinence pads utilized   transparent dressing maintained   tubing/devices free from skin contact  Taken 3/27/2024 2215 by Teresa Parmar RN  Pressure Reduction Techniques:   frequent weight shift encouraged   heels elevated off bed   positioned off wounds   pressure points protected   weight shift assistance provided  Head of Bed ([facility]) Positioning: [facility] elevated  Pressure Reduction Devices:   specialty bed utilized   pressure-redistributing mattress utilized   positioning supports utilized   heel offloading device utilized  Skin Protection:   adhesive use limited   incontinence pads utilized   transparent dressing maintained   tubing/devices free from skin contact  Taken 3/27/2024 2045 by Teresa Parmar RN  Pressure Reduction Techniques:   frequent weight shift encouraged   heels elevated off bed   positioned off wounds   pressure points protected   weight shift assistance provided  Head of Bed ([facility]) Positioning: HOB at 30 degrees  Pressure Reduction Devices:   pressure-redistributing mattress utilized   specialty bed utilized   positioning supports utilized   heel offloading device utilized  Skin Protection:   adhesive use limited   incontinence pads utilized   transparent dressing maintained   tubing/devices free from skin contact     Problem: Fall Injury Risk  Goal: Absence of Fall and Fall-Related Injury  Outcome: Ongoing, Progressing  Intervention: Identify and Manage Contributors  Recent Flowsheet Documentation  Taken 3/28/2024 0438 by Teresa Parmar RN  Medication Review/Management: medications reviewed  Self-Care Promotion:   independence encouraged   BADL  personal objects within reach   BADL personal routines maintained   safe use of adaptive equipment encouraged  Taken 3/28/2024 0200 by Teresa Parmar RN  Medication Review/Management: medications reviewed  Self-Care Promotion:   independence encouraged   BADL personal objects within reach   BADL personal routines maintained   safe use of adaptive equipment encouraged  Taken 3/28/2024 0016 by Teresa Parmar RN  Medication Review/Management: medications reviewed  Self-Care Promotion:   independence encouraged   BADL personal objects within reach   BADL personal routines maintained   safe use of adaptive equipment encouraged  Taken 3/27/2024 2215 by Teresa Parmar RN  Medication Review/Management: medications reviewed  Self-Care Promotion:   independence encouraged   BADL personal objects within reach   BADL personal routines maintained   safe use of adaptive equipment encouraged  Taken 3/27/2024 2045 by Teresa Parmar RN  Medication Review/Management: medications reviewed  Self-Care Promotion:   independence encouraged   BADL personal objects within reach   BADL personal routines maintained   safe use of adaptive equipment encouraged  Intervention: Promote Injury-Free Environment  Recent Flowsheet Documentation  Taken 3/28/2024 0438 by Teresa Parmar RN  Safety Promotion/Fall Prevention:   activity supervised   assistive device/personal items within reach   clutter free environment maintained   nonskid shoes/slippers when out of bed   room organization consistent   safety round/check completed  Taken 3/28/2024 0200 by Teresa Parmar RN  Safety Promotion/Fall Prevention:   activity supervised   assistive device/personal items within reach   clutter free environment maintained   nonskid shoes/slippers when out of bed   room organization consistent   safety round/check completed  Taken 3/28/2024 0016 by Teresa Parmar RN  Safety Promotion/Fall Prevention:   activity supervised    assistive device/personal items within reach   clutter free environment maintained   nonskid shoes/slippers when out of bed   room organization consistent   safety round/check completed  Taken 3/27/2024 2215 by Teresa Parmar RN  Safety Promotion/Fall Prevention:   activity supervised   assistive device/personal items within reach   clutter free environment maintained   nonskid shoes/slippers when out of bed   room organization consistent   safety round/check completed  Taken 3/27/2024 2045 by Teresa Parmar RN  Safety Promotion/Fall Prevention:   assistive device/personal items within reach   clutter free environment maintained   nonskid shoes/slippers when out of bed   room organization consistent   safety round/check completed     Problem: Adjustment to Illness (Sepsis/Septic Shock)  Goal: Optimal Coping  Outcome: Ongoing, Progressing  Intervention: Optimize Psychosocial Adjustment to Illness  Recent Flowsheet Documentation  Taken 3/27/2024 2045 by Teresa Parmar RN  Supportive Measures: active listening utilized  Family/Support System Care:   support provided   self-care encouraged   involvement promoted     Problem: Bleeding (Sepsis/Septic Shock)  Goal: Absence of Bleeding  Outcome: Ongoing, Progressing  Intervention: Monitor and Manage Bleeding  Recent Flowsheet Documentation  Taken 3/27/2024 2045 by Teresa Parmar RN  Bleeding Precautions: monitored for signs of bleeding  Bleeding Management: dressing monitored     Problem: Glycemic Control Impaired (Sepsis/Septic Shock)  Goal: Blood Glucose Level Within Desired Range  Outcome: Ongoing, Progressing     Problem: Infection Progression (Sepsis/Septic Shock)  Goal: Absence of Infection Signs and Symptoms  Outcome: Ongoing, Progressing  Intervention: Initiate Sepsis Management  Recent Flowsheet Documentation  Taken 3/27/2024 2045 by Teresa Parmar RN  Infection Prevention:   cohorting utilized   environmental surveillance performed    equipment surfaces disinfected   hand hygiene promoted   personal protective equipment utilized   single patient room provided   visitors restricted/screened  Intervention: Promote Recovery  Recent Flowsheet Documentation  Taken 3/28/2024 0438 by Teresa Parmar, RN  Activity Management: activity encouraged  Taken 3/28/2024 0200 by Teresa Parmar, RN  Activity Management: activity encouraged  Taken 3/28/2024 0016 by Teresa Parmar, RN  Activity Management: activity encouraged  Taken 3/27/2024 2215 by Teresa Parmar, RN  Activity Management: activity encouraged  Taken 3/27/2024 2045 by Teresa Parmar, RN  Activity Management: activity encouraged     Problem: Nutrition Impaired (Sepsis/Septic Shock)  Goal: Optimal Nutrition Intake  Outcome: Ongoing, Progressing

## 2024-03-28 NOTE — PROGRESS NOTES
Lea Arnold [place]  1945  [id number]        Evaluating Physician: Louis Chow MD    Chief Complaint: abdpain    Reason for Consultation: IA infection    History of present illness:     Patient is a 78 y.o.  Yr old male with history of seizure disorder, colon cancer/prostate cancer with prior radiation, diabetes and dementia per admission notes with admission March 2 after frequent falls noted to have multiple rib fractures and left side clavicular fracture in the emergency room.noted to have significant colon distention with potential for pneumatosis on CT scan and seen by gastroenterology/surgery.taken for surgery on March 9 with diagnosis of toxic dilation of the colon for total abdominal colectomy and end ileostomy; medicine notes indicate he received ceftriaxone/Flagyl and Diflucan.  Leukocytosis persisted.repeat CT scan on March 18 with postsurgical changes, moderate loculated ascitic fluid in the anterior abdomen as described by radiology.taken back to surgery on March 18 for exploratory laparotomy with washout.  Culture subsequently with gram-negative manuel at least.  Empiric antibiotics adjusted to Zosyn/Mycamine    3/28/24 sleepy;   + abdominal pain which is less intense overall, dull at present,  worse with palpation, better with pain meds and he will not attach a numerical severity.    no headache photophobia or neck stiffness.  No dyspnea or cough.  He has a sling on the left arm with left arm PICC line.  No rash.  Nursing reports no other new focal pain or distress.       Past Medical History:   Diagnosis Date    Anemia     Colon cancer 1990    Status post partial colectomy in 1990. No chemotherapy or radiation therapy.    Coronary artery disease     Nonobstructive coronary artery disease.    Diabetes     Dyslipidemia     GERD (gastroesophageal reflux disease)     Hx of.    Hyperlipidemia     Hypertension     Hyponatremia     Hypothyroidism     Left shoulder pain     Low sodium levels 2022     recent issue; saw nephrologist who ruled out kidney issues; took Sodium Chloride po to bring levels up    Memory deficit     FROM ANESTHESIA    Osteoarthritis     Prostate cancer 07/23/2022    Seizure disorder     silent seziure-diagnosed years ago    Skin cancer        Past Surgical History:   Procedure Laterality Date    APPENDECTOMY      CARDIAC CATHETERIZATION  2012    30 to 40% LAD    CARPAL TUNNEL RELEASE Bilateral     CHOLECYSTECTOMY      COLECTOMY PARTIAL / TOTAL  1990    Partial colectomy    COLON RESECTION N/A 3/9/2024    Procedure: TOTAL ABDOMINAL COLECTOMY, END ILEOSTOMY;  Surgeon: Harley Godfrey MD;  Location:  [facility];  Service: General;  Laterality: N/A;    COLONOSCOPY      CYBERKNIFE  02/09/2023    Prostate/SV    CYSTOSCOPY TRANSURETHRAL RESECTION OF PROSTATE N/A 09/21/2018    Procedure: CYSTOSCOPY TRANSURETHRAL RESECTION OF PROSTATE GREENLIGHT;  Surgeon: Naseem Clayton MD;  Location:  [facility];  Service: Urology    EXPLORATORY LAPAROTOMY N/A 3/18/2024    Procedure: LAPAROTOMY EXPLORATORY, ABDOMINAL WASH OUT;  Surgeon: Harley Godfrey MD;  Location:  [facility];  Service: General;  Laterality: N/A;    OTHER SURGICAL HISTORY      Contraction surgery on bilateral hands and wrists.    PROSTATE BIOPSY N/A 11/11/2022    Procedure: TRANSRECTAL ULTRASOUND GUIDED BIOPSY OF PROSTATE;  Surgeon: Naseem Noland MD;  Location:  [facility];  Service: Urology;  Laterality: N/A;    SINUS SURGERY      SKIN BIOPSY      TEETH EXTRACTION      TONSILLECTOMY      TOTAL KNEE ARTHROPLASTY Right 04/07/2022    Procedure: TOTAL KNEE ARTHROPLASTY WITH ORTHO ROBOT RIGHT;  Surgeon: Louis Malcolm MD;  Location:  [facility];  Service: Robotics - Ortho;  Laterality: Right;    TRANSRECTAL INCISION PROSTATE WITH GOLD SEED Bilateral 01/06/2023    Procedure: TRANSRECTAL ULTRASOUND GUIDED PROSTATE FIDUCIAL MARKER PLACEMENT;  Surgeon: Naseem Noland MD;  Location:  [facility];  Service: Urology;  Laterality:  "Bilateral;    TURP / TRANSURETHRAL INCISION / DRAINAGE PROSTATE      VASECTOMY            family history includes Arthritis in an other family member; Cancer in his mother and another family member; Esophageal cancer in his brother; Hypertension in his father; No Known Problems in his paternal grandfather, paternal grandmother, and sister; Stroke in his father, maternal grandfather, sister, and another family member.    Allergies   Allergen Reactions    Chlorhexidine Rash    Sulfa Antibiotics Rash     Childhood reaction             Review of Systems    3/28/24 as per nursing also    Constitutional-- No Fever, chills or sweats.  Appetite diminished with fatigue  Heent-- No new vision, hearing or throat complaints.  No epistaxis or oral sores.  Denies odynophagia or dysphagia.  No flashers, floaters or eye pain. No odynophagia or dysphagia. No headache, photophobia or neck stiffness.  CV-- No chest pain, palpitation or syncope  Resp-- No SOB/cough/Hemoptysis  GI- see above.  No hematochezia, melena, or hematemesis. Denies jaundice or chronic liver disease.  -- No dysuria, hematuria, or flank pain.  Denies hesitancy, urgency or flank pain.  Lymph- no swollen lymph nodes in neck/axilla or groin.   Heme- No active bruising or bleeding  MS-- no swelling or pain in the bones or joints of arms/legs.  No new back pain.  Neuro-- generally debilitated, unable to lift his legs off the bed according to nursing.  Wiggles his feet/toes    Full 12 point review of systems reviewed and negative otherwise for acute complaints, except for above    Physical Exam:   Vital Signs   /87 (BP Location: Left leg, Patient Position: Lying)   Pulse 58   Temp 98.1 °F (36.7 °C) (Axillary)   Resp 16   Ht 170.2 cm (67.01\")   Wt 98.8 kg (217 lb 12 oz)   SpO2 95%   BMI 34.10 kg/m²     GENERAL: sleepy, answers questions and follows basic commands as above, in no acute distress. Appears chronically debilitated.     HEENT: Normocephalic, " atraumatic.   No conjunctival injection. No icterus. Oropharynx clear without evidence of thrush or exudate. No evidence of periodontal disease.    NECK: Supple without nuchal rigidity. No mass.  HEART: RRR; No murmur, rubs, gallops.   LUNGS: diminished at bases with some crackles at the bases, but otherwise Clear to auscultation bilaterally without wheezing/ rhonchi.  Nonlabored. No dullness.  ABDOMEN: Soft,  tender, nondistended. Positive bowel sounds but diminished. No rebound or guarding. NO mass or HSM.  EXT:  No cyanosis, clubbing;  No cord.has edema  MSK: FROM without joint effusions noted arms/legs.    SKIN: Warm and dry without cutaneous eruptions on Inspection/palpation.    NEURO: follows basic commands    No peripheral stigmata/phenomena of endocarditis    IV without obvious redness or drainage at left arm    Laboratory Data    Results from last 7 days   Lab Units 03/27/24  0707 03/26/24  0632 03/25/24  0612   WBC 10*3/mm3 10.46 12.33* 13.06*   HEMOGLOBIN g/dL 9.6* 9.1* 8.4*   HEMATOCRIT % 30.0* 29.4* 27.0*   PLATELETS 10*3/mm3 488* 517* 560*     Results from last 7 days   Lab Units 03/27/24  0707   SODIUM mmol/L 132*   POTASSIUM mmol/L 4.0   CHLORIDE mmol/L 101   CO2 mmol/L 22.0   BUN mg/dL 15   CREATININE mg/dL 0.53*   GLUCOSE mg/dL 101*   CALCIUM mg/dL 7.9*     Results from last 7 days   Lab Units 03/25/24  1418   ALK PHOS U/L 161*   BILIRUBIN mg/dL 0.2   ALT (SGPT) U/L 7   AST (SGOT) U/L 18         Results from last 7 days   Lab Units 03/25/24  1418   CRP mg/dL 1.94*       Estimated Creatinine Clearance: 126.6 mL/min (A) (by C-G formula based on SCr of 0.53 mg/dL (L)).      Microbiology:      Radiology:  Imaging Results (Last 72 Hours)       ** No results found for the last 72 hours. **              Impression:     --acute abdominal pain with total abdominal colectomy/ileostomy as above related to toxic dilation of colon As per operative note, postop with persistent leukocytosis and fluid collections  on CT scan, taken for exploratory laparotomy and fluid culture positive for gram-negative rods so far, consistent with abscess.  Empiric antimicrobials adjusted to Zosyn/Mycamine/daptomycin    --Left lower lobe pneumonia per medicine team notes, abnormal chest x-ray March 12.no respiratory distress or productive sputum.  Empiric Zosyn ongoing.  If worsening respiratory status you could consider further imaging etc.nasal cannula stable per nursing    --history dementia per admission notes a little specifics regarding baseline not clear and generally poor insight overall.  Description of toxic/metabolic encephalopathy per medicine team notes during this admission.  Prior history seizure and generally debilitated.    --History of multiple falls with left clavicular and multiple rib fractures per admission notes.  Orthopedics has seen.    --Right upper extremity DVT, described as brachial per medicine team notes.  Anticoagulation at discretion of medicine team    --History prostate and colon cancer previously per admission notes.        PLAN:       --IV Zosyn  (started 3/18)/Mycamine/ daptomycin (started 3/23); duration to depend on clinical course; potentially 10-14 days if steadily better    **culture with pseudomonas aeruginosa and E Faecium    --Check/reviewed labs cultures and scans    --Partial history per nursing staff    --Discussed with microbiology     --Highly complex of issues with high risk for further serious morbidity and other serious sequela    Copied text in this note has been reviewed and is accurate as of 03/28/24.        Louis Chow MD  3/28/2024

## 2024-03-28 NOTE — PROGRESS NOTES
"Patient Name:  Lea Rivers  YOB: 1945  [id number]    Surgery Progress Note    Date of visit: 3/28/2024    Subjective     Unable to obtain full history due to patient's dementia. No abdominal pain. No nausea/vomiting. No fevers.  Having ostomy output, now improved off Imodium. Appetite poor             Objective       BP (!) 182/87 (BP Location: Left leg, Patient Position: Lying)   Pulse 61   Temp 98 °F (36.7 °C) (Axillary)   Resp 18   Ht 170.2 cm (67.01\")   Wt 98.8 kg (217 lb 12 oz)   SpO2 96%   BMI 34.10 kg/m²     Intake/Output Summary (Last 24 hours) at 3/28/2024 0707  Last data filed at 3/28/2024 0559  Gross per 24 hour   Intake --   Output 780 ml   Net -780 ml   OLIVER 30 cc    CV:  Rhythm regular and rate regular  L:  Clear to auscultation bilaterally  Abd:  Bowel sounds positive, soft, nontender, incision clean dry and intact, ostomy viable and functioning, OLIVER serous  Ext:  No cyanosis, clubbing, edema    Recent labs and imaging that are back at this time have been reviewed.          Assessment & Plan     Patient postoperative day 19 from total abdominal colectomy with end ileostomy, postoperative day 10 abdominal washout. Pain control.  Diet per speech and swallow recommendations, continue to monitor intake with TPN off. Abx per ID, OR cultures with rare pseudomonas and Enterococcus. OK to continue anticoagulation for UE DVT.          Harley Godfrey MD  3/28/2024  07:07 EDT      "

## 2024-03-28 NOTE — PROGRESS NOTES
"    [facility] Medicine Services  PROGRESS NOTE    Patient Name: Lea Rivers  : 1945  MRN: [id number]    Date of Admission: 3/2/2024  Primary Care Physician: Mannie Melvin MD    Subjective   Subjective     CC:  Follow-up colectomy    HPI:  Patient resting in bed. NAD. Answers a few simple orientation questions, otherwise resting quietly while daughter and I talk. Dtr in room states patient eats when someone is helping him and is trying to eat more than previously, patient is a \"very picky eater\". Had a set back after this surgery per dtr. States that patient nor she would want a feeding tube and she is not comfortable with a comfort diet at this time.       Objective   Objective     Vital Signs:   Temp:  [98 °F (36.7 °C)-98.4 °F (36.9 °C)] 98.1 °F (36.7 °C)  Heart Rate:  [58-63] 58  Resp:  [16-18] 16  BP: (165-187)/(82-91) 178/87     Physical Exam:  Constitutional: No acute distress, awake, alert, chronically ill-appearing  HENT: NCAT, mucous membranes moist  Respiratory: Diminished in bases, respiratory effort normal on room air  Cardiovascular: RRR, no murmurs, cap refill brisk   Gastrointestinal: Positive bowel sounds, midline vertical incision staples in place, ostomy in place with brown liquid stool output, abdominal binder in place, tenderness to palpation, nondistended  Musculoskeletal: trace BLE edema, bilateral heel dressings in place  Psychiatric: Flat affect, cooperative  Neurologic: Alert, moves all extremities, speech clear/ slow   Skin: warm, dry, no visible rash       Results Reviewed:  LAB RESULTS:      Lab 24  0550 24  0707 24  0632 24  1418 24  0612 24  0603 24  0348 24  0557   WBC 8.95 10.46 12.33*  --  13.06* 13.58* 13.04* 16.56*   HEMOGLOBIN 9.0* 9.6* 9.1*  --  8.4* 9.0* 8.8* 8.6*   HEMATOCRIT 29.6* 30.0* 29.4*  --  27.0* 28.7* 27.0* 26.9*   PLATELETS 431 488* 517*  --  560* 556* 519* 537*   NEUTROS " ABS 6.50 7.67* 8.32*  --  10.58* 9.64* 9.46* 12.32*   IMMATURE GRANS (ABS) 0.12* 0.22* 0.66*  --   --   --  0.73* 0.92*   LYMPHS ABS 1.10 0.99 1.31  --   --   --  1.02 1.31   MONOS ABS 1.04* 1.08* 1.21*  --   --   --  0.97* 1.40*   EOS ABS 0.12 0.41* 0.72*  --  0.78* 0.95* 0.74* 0.50*   MCV 98.3* 94.6 97.7*  --  96.4 95.7 99.6* 93.1   CRP  --   --   --  1.94*  --   --   --   --    PROTIME  --   --   --  16.7*  --   --   --   --          Lab 03/27/24  0707 03/26/24  0632 03/25/24  1418 03/24/24  0603 03/23/24  1056   SODIUM 132* 132* 136 135* 132*   POTASSIUM 4.0 4.6 4.5 4.1 4.4   CHLORIDE 101 103 105 103 101   CO2 22.0 21.0* 24.0 25.0 24.0   ANION GAP 9.0 8.0 7.0 7.0 7.0   BUN 15 24* 27* 21 18   CREATININE 0.53* 0.54* 0.62* 0.49* 0.41*   EGFR 101.9 101.4 97.2 104.4 110.2   GLUCOSE 101* 97 129* 152* 145*   CALCIUM 7.9* 8.3* 7.9* 8.0* 7.8*   IONIZED CALCIUM  --   --  1.27  --   --    MAGNESIUM  --   --   --  1.9  --    PHOSPHORUS  --   --   --  3.0  --          Lab 03/25/24  1418   TOTAL PROTEIN 5.2*   ALBUMIN 2.4*   GLOBULIN 2.8   ALT (SGPT) 7   AST (SGOT) 18   BILIRUBIN 0.2   ALK PHOS 161*         Lab 03/25/24  1418   PROTIME 16.7*   INR 1.33*         Lab 03/25/24  1418   TRIGLYCERIDES 101         Lab 03/23/24  1827 03/23/24  0348   IRON  --  34*   IRON SATURATION (TSAT)  --  19*   TIBC  --  182*   TRANSFERRIN  --  122*   FERRITIN  --  200.10   FOLATE 3.78*  --    VITAMIN B 12  --  1,320*         Brief Urine Lab Results  (Last result in the past 365 days)        Color   Clarity   Blood   Leuk Est   Nitrite   Protein   CREAT   Urine HCG        03/11/24 1337 Yellow   Cloudy   Moderate (2+)   Trace   Negative   30 mg/dL (1+)                   Microbiology Results Abnormal       Procedure Component Value - Date/Time    Fungus Culture - Peritoneal Fluid, Perineum [[id number]] Collected: 03/18/24 1349    Lab Status: Preliminary result Specimen: Peritoneal Fluid from Perineum Updated: 03/25/24 1831     Fungus Culture No  fungus isolated at 1 week    Anaerobic Culture - Peritoneal Fluid, Perineum [[id number]]  (Normal) Collected: 03/18/24 1349    Lab Status: Final result Specimen: Peritoneal Fluid from Perineum Updated: 03/23/24 1026     Anaerobic Culture No anaerobes isolated at 5 days    AFB Culture - Peritoneal Fluid, Perineum [[id number]] Collected: 03/18/24 1349    Lab Status: Preliminary result Specimen: Peritoneal Fluid from Perineum Updated: 03/19/24 1339     AFB Stain No acid fast bacilli seen on concentrated smear    Urine Culture - Urine, Indwelling Urethral Catheter [[id number]]  (Normal) Collected: 03/11/24 1337    Lab Status: Final result Specimen: Urine from Indwelling Urethral Catheter Updated: 03/12/24 2024     Urine Culture No growth    Blood Culture - Blood, Arm, Right [[id number]]  (Normal) Collected: 03/06/24 1857    Lab Status: Final result Specimen: Blood from Arm, Right Updated: 03/11/24 2045     Blood Culture No growth at 5 days    Blood Culture - Blood, Arm, Right [[id number]]  (Normal) Collected: 03/06/24 1751    Lab Status: Final result Specimen: Blood from Arm, Right Updated: 03/11/24 2000     Blood Culture No growth at 5 days    Blood Culture - Blood, Arm, Right [[id number]]  (Normal) Collected: 03/02/24 1132    Lab Status: Final result Specimen: Blood from Arm, Right Updated: 03/07/24 1201     Blood Culture No growth at 5 days    Narrative:      Less than seven (7) mL's of blood was collected.  Insufficient quantity may yield false negative results.    Blood Culture - Blood, Arm, Right [[id number]]  (Normal) Collected: 03/02/24 1132    Lab Status: Final result Specimen: Blood from Arm, Right Updated: 03/07/24 1201     Blood Culture No growth at 5 days    Narrative:      Less than seven (7) mL's of blood was collected.  Insufficient quantity may yield false negative results.    Urine Culture - Urine, Straight Cath [[id number]]  (Normal) Collected: 03/02/24 0923    Lab Status: Final result Specimen: Urine from  Straight Cath Updated: 03/03/24 1601     Urine Culture No growth    COVID PRE-OP / PRE-PROCEDURE SCREENING ORDER (NO ISOLATION) - Swab, Nasopharynx [[id number]]  (Normal) Collected: 03/02/24 1133    Lab Status: Final result Specimen: Swab from Nasopharynx Updated: 03/02/24 1227    Narrative:      The following orders were created for panel order COVID PRE-OP / PRE-PROCEDURE SCREENING ORDER (NO ISOLATION) - Swab, Nasopharynx.  Procedure                               Abnormality         Status                     ---------                               -----------         ------                     COVID-19, FLU A/B, RSV P...[[id number]]  Normal              Final result                 Please view results for these tests on the individual orders.    COVID-19, FLU A/B, RSV PCR 1 HR TAT - Swab, Nasopharynx [[id number]]  (Normal) Collected: 03/02/24 1133    Lab Status: Final result Specimen: Swab from Nasopharynx Updated: 03/02/24 1227     COVID19 Not Detected     Influenza A PCR Not Detected     Influenza B PCR Not Detected     RSV, PCR Not Detected    Narrative:      Fact sheet for providers: https://www.fda.gov/media/784918/download    Fact sheet for patients: https://www.fda.gov/media/966508/download    Test performed by PCR.            No radiology results from the last 24 hrs    Results for orders placed during the hospital encounter of 07/20/21    Adult Transthoracic Echo Complete W/ Cont if Necessary Per Protocol    Interpretation Summary  · Left ventricular wall thickness is consistent with mild concentric hypertrophy.  · Normal left-ventricular systolic function, estimated EF 65%.  · Left ventricular diastolic function is consistent with (grade I) impaired relaxation.  · Aortic sclerosis without aortic stenosis. Trace aortic insufficiency.  · Trace mitral regurgitation, trace tricuspid regurgitation.      Current medications:  Scheduled Meds:apixaban, 5 mg, Oral, Q12H  DAPTOmycin, 6 mg/kg (Adjusted), Intravenous,  Q24H  furosemide, 20 mg, Intravenous, Once  insulin regular, 2-7 Units, Subcutaneous, Q6H  isosorbide dinitrate, 10 mg, Oral, TID - Nitrates  labetalol, 300 mg, Oral, Q8H  levETIRAcetam, 500 mg, Oral, Q12H  levothyroxine, 88 mcg, Oral, Q AM  Lidocaine, 2 patch, Transdermal, Q24H  lisinopril, 40 mg, Oral, Q24H  micafungin (MYCAMINE) IV, 100 mg, Intravenous, Q24H  mirtazapine, 15 mg, Oral, Nightly  pantoprazole, 40 mg, Intravenous, Q AM  piperacillin-tazobactam, 3.375 g, Intravenous, Q8H  sodium chloride, 10 mL, Intravenous, Q12H  Valproic Acid, 375 mg, Oral, Q6H      Continuous Infusions:     PRN Meds:.  acetaminophen **OR** acetaminophen **OR** acetaminophen    calcium carbonate    Calcium Replacement - Follow Nurse / BPA Driven Protocol    dextrose    dextrose    docusate sodium    fluticasone    glucagon (human recombinant)    hydrALAZINE    ipratropium-albuterol    labetalol    Magnesium Standard Dose Replacement - Follow Nurse / BPA Driven Protocol    [DISCONTINUED] HYDROmorphone **AND** naloxone    ondansetron ODT **OR** ondansetron    Phosphorus Replacement - Follow Nurse / BPA Driven Protocol    Potassium Replacement - Follow Nurse / BPA Driven Protocol    sodium chloride    sodium chloride    sodium chloride    sodium chloride    Assessment & Plan   Assessment & Plan     Active Hospital Problems    Diagnosis  POA    **Falls [W19.XXXA]  Yes    Pneumatosis intestinalis [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        Brief Hospital Course to date:  Lea Rivers is a 78 y.o. male  with past medical history of hypertension, hyperlipidemia, hypothyroidism, and seizure disorder who was admitted on 3/2/2024 due to fall and nondisplaced first through fourth left rib fractures. He was also found to have left distal clavicle fracture.  Patient was noted to have increasing abdominal distention on 3/6/2024.  CT of the abdomen/pelvis revealed dilated loops of large bowel  with likely pneumatosis intestinalis.  General surgery was consulted due to persistent colonic ileus and significant colonic distention.  Underwent total abdominal colectomy with end ileostomy creation on 3/9/2024.  Repeat CT of the abdomen/pelvis revealed postsurgical changes with a moderate loculated ascitic fluid collection within the anterior abdomen.  He underwent exploratory laparotomy  with abdominal washout on 3/18/2024 by Dr. Godfrey.  ID Dr. Chow was consulted and is managing antibiotic regimen.      This patient's problems and plans were partially entered by my partner and updated as appropriate by me 03/28/24.    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 on 3/9 w/  Persistent  Leukocytosis  LLL PNA  -s/p repeat CT 3/18, with fluid collection noted, s/p exploratory laparotomy 3/18 with abdominal washout and ascites noted  -completed course of Fluconazole, CTX/Flagyl 3/17 prior to repeat surgery  -CXR 3/12 with possible LLL pneumonia, completed course of CTX  -ID following, abdominal fluid culture with pseudomonas  -continue IV Zosyn, mycamine, dapto for now   -lower OLIVER drain removed   -WOCN for ostomy care. Imodium added for high ostomy output has now been DC'd as output has significantly decreased, cont to monitor closely   -improved WBC  --Dr Godfrey recommending to keep staples in place for a minimum of another week (from 3/28/24).      Dysphagia  Malnutrition  -SLP following   -Nutrition following  -TPN weaned   -may need tube feeds if PO intake inadequate  --diet per SLP recs   --adding Remeron today for appetite, monitor. I discussed with dtr that this could be a progression of dementia as well. Dtr is not comfortable with comfort diet or feeding tube at this time, will need to continue to evaluate options for patient with family.      Toxic metabolic encephalopathy  -per daughter significant change in mental status and speech since  arrival, and team had a long discussion and felt this is probably TME in setting of infection, recent large operation, underlying dementia  -initial CT head 3/2 negative, repeated CT head 3/13 without any acute findings  -EEG negative for seizures, findings consistent with TME  -minimize sedating meds   -slowly improving     Multiple falls with increasing frequency   -CT of head shows age-related changes which are chronic but no acute process  -neuro consulted, likely d/t neuropathy (confirmed with EMG) and progression of dementia     Multiple rib fractures and also left clavicle fracture  -With no displacement or mildly displaced.  Orthopedic surgery evaluated. No surgical intervention  planned.  Recs nonweightbearing LUE, may come out of sling in 5-7 days to initiate gentle ROM exercises per ortho.  F/u with ortho/Dr. Maldonado in 2 weeks  -Pain control, lidocaine patch  -bruise noted to L shoulder/neck region however X-ray negative     Acute on Chronic Anemia  -S/P 1 unit PRBCs 3/11 and 3/16, monitoring H&H  -no clear source of active bleeding, hemoccult negative  -Iron 34, Iron sat 19  -Started on IV Iron 3/23  -transfuse PRN hgb <7     Acute RUE DVT (brachial)  -Provoked, 2/2 PICC   -eliquis     Prostate cancer  Hx BPH s/p greenlight photo vaporization of prostate 2018  -Follows with Dr. Noland  -S/P cyberknife radiation 2/9/23  -Has intermittent hematuria, monitor while on AC  - swollen scrotum and penis today per RN, will try one time dose of Lasix and monitor, likely due to immobility      Dementia and increasing memory problem  -Patient was previously living alone and driving, given significant decline will need placement     Hypertension  -cleared by speech, resumed home oral regimen  -continue Verapamil   -BP uncontrolled, but BP is being checking on LE    -Increased Lisinopril to 40 mg daily 3/32  -Coreg added this admission, increased to 25 mg BID on 3/24; changed to Lopressor 300 mg TID in addition to  increased CCB   -PRN IV dosing for SBP >180     Seizure disorder  -continue vimpat and keppra     Hyperlipidemia  Hypothyroidism  -resume home regimen     Expected Discharge Location and Transportation: SNF once eating more   Expected Discharge   Expected Discharge Date: 3/29/2024; Expected Discharge Time:       DVT prophylaxis:  Medical and mechanical DVT prophylaxis orders are present.    AM-PAC 6 Clicks Score (PT): 10 (03/28/24 1133)    CODE STATUS:   Code Status and Medical Interventions:   Ordered at: 03/02/24 1456     Medical Intervention Limits:    NO intubation (DNI)     Code Status (Patient has no pulse and is not breathing):    No CPR (Do Not Attempt to Resuscitate)     Medical Interventions (Patient has pulse or is breathing):    Limited Support       Sheridan Roman, APRN  03/28/24

## 2024-03-28 NOTE — PLAN OF CARE
Goal Outcome Evaluation:  Plan of Care Reviewed With: patient, daughter        Progress: no change  Outcome Evaluation: Pt continues to present below baseline function d/t generalized weakness and deficits in postural control and activity tolerance. Pt required maxA x2 for bed mobility and for STS w/ R UE support. Pt would continue to benefit from skilled IP PT. Recommend SNF at d/c.      Anticipated Discharge Disposition (PT): skilled nursing facility

## 2024-03-28 NOTE — THERAPY TREATMENT NOTE
Patient Name: Lea Rivers  : 1945    MRN: [id number]                              Today's Date: 3/28/2024       Admit Date: 3/2/2024    Visit Dx:     ICD-10-CM ICD-9-CM   1. Fall, initial encounter  W19.XXXA E888.9   2. Closed fracture of multiple ribs of left side, initial encounter  S22.42XA 807.09   3. Closed displaced fracture of acromial end of left clavicle, initial encounter  S42.032A 810.03   4. Falls frequently  R29.6 V15.88   5. Acute UTI (urinary tract infection)  N39.0 599.0   6. Colon distention  K63.89 569.89   7. Paralytic ileus of small intestine and colon  K56.0 560.1   8. Oropharyngeal dysphagia  R13.12 787.22   9. Intra-abdominal fluid  R18.8 789.59     Patient Active Problem List   Diagnosis    Coronary artery disease    Dyslipidemia    Hypothyroidism    GERD (gastroesophageal reflux disease)    Seizure disorder    BPH (benign prostatic hypertrophy)    Hypertension    Primary osteoarthritis of both knees    Syncope and collapse    Precordial pain    Diabetes    Status post total right knee replacement    Postoperative urinary retention    Confusion, postoperative    Acute blood loss anemia, asymptomatic, no transfusion required    Elevated prostate specific antigen (PSA)    Prostate cancer    Anemia    Body mass index (BMI) of 32.0-32.9 in adult    Localization-related focal epilepsy with simple partial seizures    Need for vaccination against Streptococcus pneumoniae    Upper extremity tendon tear, right, initial encounter    Vitamin D deficiency    PSA elevation    Polyp of colon    Overactive bladder    Gross hematuria    History of colon polyps    History of colon cancer    B12 deficiency    Falls    Pneumatosis intestinalis     Past Medical History:   Diagnosis Date    Anemia     Colon cancer     Status post partial colectomy in . No chemotherapy or radiation therapy.    Coronary artery disease     Nonobstructive coronary artery disease.    Diabetes     Dyslipidemia      GERD (gastroesophageal reflux disease)     Hx of.    Hyperlipidemia     Hypertension     Hyponatremia     Hypothyroidism     Left shoulder pain     Low sodium levels 2022    recent issue; saw nephrologist who ruled out kidney issues; took Sodium Chloride po to bring levels up    Memory deficit     FROM ANESTHESIA    Osteoarthritis     Prostate cancer 07/23/2022    Seizure disorder     silent seziure-diagnosed years ago    Skin cancer      Past Surgical History:   Procedure Laterality Date    APPENDECTOMY      CARDIAC CATHETERIZATION  2012    30 to 40% LAD    CARPAL TUNNEL RELEASE Bilateral     CHOLECYSTECTOMY      COLECTOMY PARTIAL / TOTAL  1990    Partial colectomy    COLON RESECTION N/A 3/9/2024    Procedure: TOTAL ABDOMINAL COLECTOMY, END ILEOSTOMY;  Surgeon: Harley Godfrey MD;  Location:  [facility];  Service: General;  Laterality: N/A;    COLONOSCOPY      CYBERKNIFE  02/09/2023    Prostate/SV    CYSTOSCOPY TRANSURETHRAL RESECTION OF PROSTATE N/A 09/21/2018    Procedure: CYSTOSCOPY TRANSURETHRAL RESECTION OF PROSTATE GREENLIGHT;  Surgeon: Naseem Clayton MD;  Location:  [facility];  Service: Urology    EXPLORATORY LAPAROTOMY N/A 3/18/2024    Procedure: LAPAROTOMY EXPLORATORY, ABDOMINAL WASH OUT;  Surgeon: Harley Godfrey MD;  Location:  [facility];  Service: General;  Laterality: N/A;    OTHER SURGICAL HISTORY      Contraction surgery on bilateral hands and wrists.    PROSTATE BIOPSY N/A 11/11/2022    Procedure: TRANSRECTAL ULTRASOUND GUIDED BIOPSY OF PROSTATE;  Surgeon: Naseem Nolnad MD;  Location:  [facility];  Service: Urology;  Laterality: N/A;    SINUS SURGERY      SKIN BIOPSY      TEETH EXTRACTION      TONSILLECTOMY      TOTAL KNEE ARTHROPLASTY Right 04/07/2022    Procedure: TOTAL KNEE ARTHROPLASTY WITH ORTHO ROBOT RIGHT;  Surgeon: Louis Malcolm MD;  Location:  [facility];  Service: Robotics - Ortho;  Laterality: Right;    TRANSRECTAL INCISION PROSTATE WITH GOLD SEED Bilateral  01/06/2023    Procedure: TRANSRECTAL ULTRASOUND GUIDED PROSTATE FIDUCIAL MARKER PLACEMENT;  Surgeon: Naseem Noland MD;  Location: [facility];  Service: Urology;  Laterality: Bilateral;    TURP / TRANSURETHRAL INCISION / DRAINAGE PROSTATE      VASECTOMY        General Information       [facility] Name 03/28/24 1119          Physical Therapy Time and Intention    Document Type therapy note (daily note)  -     Mode of Treatment physical therapy  -       Row Name 03/28/24 1119          General Information    Patient Profile Reviewed yes  -     Existing Precautions/Restrictions fall;left;shoulder;non-weight bearing;other (see comments)  Clavicle Fx; LUE NWB; LUE Sling; L Rib Fxs; Ostomy; OLIVER Drain x 2; Abd Incision; Abd Binder; Brief with OOB activity, [place]  -     Barriers to Rehab medically complex;previous functional deficit;cognitive status;hearing deficit  -       Row Name 03/28/24 1119          Cognition    Orientation Status (Cognition) oriented to;person;disoriented to;place;time  -       Row Name 03/28/24 1119          Safety Issues, Functional Mobility    Safety Issues Affecting Function (Mobility) awareness of need for assistance;insight into deficits/self-awareness;safety precaution awareness;safety precautions follow-through/compliance;ability to follow commands;sequencing abilities;judgment;problem-solving  -     Impairments Affecting Function (Mobility) balance;cognition;endurance/activity tolerance;pain;postural/trunk control;strength;range of motion (ROM);sensation/sensory awareness  -               User Key  (r) = Recorded By, (t) = Taken By, (c) = Cosigned By      Initials Name Provider Type     Brittney Melton PT Physical Therapist                   Mobility       Row Name 03/28/24 1120          Bed Mobility    Bed Mobility rolling left;rolling right;supine-sit;sit-supine  -     Rolling Left [place] (Bed Mobility) maximum assist (25% patient effort);2 person assist;verbal cues   -     Rolling Right [place] (Bed Mobility) maximum assist (25% patient effort);2 person assist;verbal cues  -     Supine-Sit [place] (Bed Mobility) maximum assist (25% patient effort);2 person assist;verbal cues  -     Sit-Supine [place] (Bed Mobility) maximum assist (25% patient effort);2 person assist;verbal cues  -     Assistive Device (Bed Mobility) bed rails;draw sheet;head of bed elevated  -     Comment, (Bed Mobility) VCs for hand placement and sequencing. Cues to maintain L UE NWB. Required sinificant assist at BLEs and trunk. Poor trunk control noted in sitting  -       Row Name 03/28/24 1120          Transfers    Comment, (Transfers) VCs for hand placement and sequencing  -       Row Name 03/28/24 1120          Bed-Chair Transfer    Comment, (Bed-Chair Transfer) RN deferred C this date  -       Row Name 03/28/24 1120          Sit-Stand Transfer    Sit-Stand [place] (Transfers) maximum assist (25% patient effort);2 person assist;verbal cues  -     Assistive Device (Sit-Stand Transfers) other (see comments)  R UE support  -     Comment, (Sit-Stand Transfer) STS x2 from EOB this date. Cues for upright posture and forward gaze in standing. Pt noted to be bleeding in standing so had pt sit and return to supine. RN notified and aware  -       Row Name 03/28/24 1120          Mobility    Extremity Weight-bearing Status left upper extremity  -     Left Upper Extremity (Weight-bearing Status) non weight-bearing (NWB)   -               User Key  (r) = Recorded By, (t) = Taken By, (c) = Cosigned By      Initials Name Provider Type     Brittney Melton, PT Physical Therapist                   Obj/Interventions       Row Name 03/28/24 1125          Motor Skills    Therapeutic Exercise hip;knee;ankle  -       Row Name 03/28/24 1125          Knee (Therapeutic Exercise)    Knee (Therapeutic Exercise) AAROM (active assistive range of motion)  -     Knee AAROM (Therapeutic  Exercise) bilateral;flexion;extension;supine;10 repetitions  -       Row Name 03/28/24 1125          Ankle (Therapeutic Exercise)    Ankle (Therapeutic Exercise) AAROM (active assistive range of motion)  -     Ankle AAROM (Therapeutic Exercise) bilateral;dorsiflexion;plantarflexion;supine;10 repetitions  -       Row Name 03/28/24 1125          Balance    Balance Assessment sitting static balance;sitting dynamic balance;sit to stand dynamic balance;standing static balance  -     Static Sitting Balance moderate assist;1-person assist;verbal cues  -     Dynamic Sitting Balance maximum assist;verbal cues  -     Position, Sitting Balance unsupported;sitting edge of bed  -     Sit to Stand Dynamic Balance maximum assist;2-person assist;verbal cues  -     Static Standing Balance maximum assist;2-person assist;verbal cues  -     Position/Device Used, Standing Balance supported;other (see comments)  R UE support  -     Balance Interventions sitting;standing;sit to stand;supported;static;dynamic  -     Comment, Balance Strong L lean noted in sitting w/ increased forward flexed posture. Pt had difficulty improving despite max cues for forward gaze and upright posture  -               User Key  (r) = Recorded By, (t) = Taken By, (c) = Cosigned By      Initials Name Provider Type     Brittney Melton PT Physical Therapist                   Goals/Plan    No documentation.                  Clinical Impression       [facility] Name 03/28/24 1128          Pain    Pretreatment Pain Rating 0/10 - no pain  -     Posttreatment Pain Rating 0/10 - no pain  -[facility] Name 03/28/24 1128          Plan of Care Review    Plan of Care Reviewed With patient;daughter  -     Progress no change  -     Outcome Evaluation Pt continues to present below baseline function d/t generalized weakness and deficits in postural control and activity tolerance. Pt required maxA x2 for bed mobility and for STS w/ R UE support. Pt  would continue to benefit from skilled IP PT. Recommend SNF at d/c.  -       Row Name 03/28/24 1128          Vital Signs    Pre Systolic BP Rehab 178  -LH     Pre Treatment Diastolic BP 87  -LH     Post Systolic BP Rehab 162  -LH     Post Treatment Diastolic BP 82  -LH     Posttreatment Heart Rate (beats/min) 67  -LH     Pre SpO2 (%) 97  -LH     O2 Delivery Pre Treatment room air  -LH     O2 Delivery Intra Treatment room air  -LH     Post SpO2 (%) 95  -LH     O2 Delivery Post Treatment room air  -LH     Pre Patient Position Supine  -LH     Intra Patient Position Standing  -LH     Post Patient Position Supine  -       Row Name 03/28/24 1128          Positioning and Restraints    Pre-Treatment Position in bed  -LH     Post Treatment Position bed  -LH     In Bed supine;call light within reach;encouraged to call for assist;exit alarm on;with family/caregiver;notified nsg  -               User Key  (r) = Recorded By, (t) = Taken By, (c) = Cosigned By      Initials Name Provider Type     Brittney Melton, PT Physical Therapist                   Outcome Measures       Row Name 03/28/24 1133          How much help from another person do you currently need...    Turning from your back to your side while in flat bed without using bedrails? 2  -LH     Moving from lying on back to sitting on the side of a flat bed without bedrails? 2  -LH     Moving to and from a bed to a chair (including a wheelchair)? 2  -LH     Standing up from a chair using your arms (e.g., wheelchair, bedside chair)? 2  -LH     Climbing 3-5 steps with a railing? 1  -LH     To walk in hospital room? 1  -     AM-PAC 6 Clicks Score (PT) 10  -     Highest Level of Mobility Goal 4 --> Transfer to chair/commode  -       Row Name 03/28/24 1133          Functional Assessment    Outcome Measure Options AM-PAC 6 Clicks Basic Mobility (PT)  -               User Key  (r) = Recorded By, (t) = Taken By, (c) = Cosigned By      Initials Name Provider Type      Brittney Melton, PT Physical Therapist                                 Physical Therapy Education       Title: PT OT SLP Therapies (In Progress)       Topic: Physical Therapy (In Progress)       Point: Mobility training (In Progress)       Learning Progress Summary             Patient Acceptance, E, NR,NL by  at 3/28/2024 1133    Acceptance, E, NR by AE at 3/27/2024 1410    Acceptance, E, NR by AE at 3/26/2024 1425    Acceptance, E, NR by AE at 3/25/2024 1425    Acceptance, E, NR by AE at 3/22/2024 1527    Acceptance, E, NR by AE at 3/21/2024 1115    Acceptance, E,TB, NR,NL by  at 3/20/2024 0755    Acceptance, E, NR by CK at 3/6/2024 1121    Acceptance, E, VU,NR by LO at 3/4/2024 1340    Comment: PT POC   Family Acceptance, E, VU,NR by  at 3/28/2024 1133    Acceptance, E, NR by CK at 3/6/2024 1121    Acceptance, E, VU,NR by LO at 3/4/2024 1340    Comment: PT POC                         Point: Home exercise program (In Progress)       Learning Progress Summary             Patient Acceptance, E, NR,NL by  at 3/28/2024 1133    Acceptance, E, NR by AE at 3/27/2024 1410    Acceptance, E, NR by AE at 3/26/2024 1425    Acceptance, E, NR by AE at 3/25/2024 1425    Acceptance, E, NR by AE at 3/22/2024 1527    Acceptance, E, NR by AE at 3/21/2024 1115    Acceptance, E,TB, NR,NL by  at 3/20/2024 0755    Acceptance, E, NR by CK at 3/6/2024 1121    Acceptance, E, VU,NR by LO at 3/4/2024 1340    Comment: PT POC   Family Acceptance, E, VU,NR by  at 3/28/2024 1133    Acceptance, E, NR by CK at 3/6/2024 1121    Acceptance, E, VU,NR by LO at 3/4/2024 1340    Comment: PT POC                         Point: Body mechanics (In Progress)       Learning Progress Summary             Patient Acceptance, E, NR,NL by  at 3/28/2024 1133    Acceptance, E, NR by AE at 3/27/2024 1410    Acceptance, E, NR by AE at 3/26/2024 1425    Acceptance, E, NR by AE at 3/25/2024 1425    Acceptance, E, NR by AE at 3/22/2024 1527     Acceptance, E, NR by AE at 3/21/2024 1115    Acceptance, E,TB, NR,NL by  at 3/20/2024 0755    Acceptance, E, NR by CK at 3/6/2024 1121    Acceptance, E, VU,NR by LO at 3/4/2024 1340    Comment: PT POC   Family Acceptance, E, VU,NR by  at 3/28/2024 1133    Acceptance, E, NR by CK at 3/6/2024 1121    Acceptance, E, VU,NR by LO at 3/4/2024 1340    Comment: PT POC                         Point: Precautions (In Progress)       Learning Progress Summary             Patient Acceptance, E, NR,NL by  at 3/28/2024 1133    Acceptance, E, NR by AE at 3/27/2024 1410    Acceptance, E, NR by AE at 3/26/2024 1425    Acceptance, E, NR by AE at 3/25/2024 1425    Acceptance, E, NR by AE at 3/22/2024 1527    Acceptance, E, NR by AE at 3/21/2024 1115    Acceptance, E,TB, NR,NL by  at 3/20/2024 0755    Acceptance, E, NR by CK at 3/6/2024 1121    Acceptance, E, VU,NR by LO at 3/4/2024 1340    Comment: PT POC   Family Acceptance, E, VU,NR by  at 3/28/2024 1133    Acceptance, E, NR by CK at 3/6/2024 1121    Acceptance, E, VU,NR by LO at 3/4/2024 1340    Comment: PT POC                                         User Key       Initials Effective Dates Name Provider Type Discipline     06/16/21 -  Teresa Batista RN Registered Nurse Nurse     06/16/21 -  Emily Muro, PT Physical Therapist PT    AE 09/21/21 -  Abe Melara, PT Physical Therapist PT    CK 02/06/24 -  Tiffanie Carmen, PT Physical Therapist PT     09/21/23 -  Brittney Melton, PT Physical Therapist PT                  PT Recommendation and Plan     Plan of Care Reviewed With: patient, daughter  Progress: no change  Outcome Evaluation: Pt continues to present below baseline function d/t generalized weakness and deficits in postural control and activity tolerance. Pt required maxA x2 for bed mobility and for STS w/ R UE support. Pt would continue to benefit from skilled IP PT. Recommend SNF at d/c.     Time Calculation:         PT Charges       Row Name  03/28/24 1134             Time Calculation    Start Time 1030  -      PT Received On 03/28/24  -      PT Goal Re-Cert Due Date 03/31/24  -         Timed Charges    75773 - PT Therapeutic Exercise Minutes 5  -      90220 - PT Therapeutic Activity Minutes 19  -         Total Minutes    Timed Charges Total Minutes 24  -       Total Minutes 24  -                User Key  (r) = Recorded By, (t) = Taken By, (c) = Cosigned By      Initials Name Provider Type     Brittney Melton, PT Physical Therapist                  Therapy Charges for Today       Code Description Service Date Service Provider Modifiers Qty    79160952300 HC PT THER PROC EA 15 MIN 3/28/2024 Brittney Melton, PT GP 1    01127263973 HC PT THERAPEUTIC ACT EA 15 MIN 3/28/2024 Brittney Melton, PT GP 1    99800930579 HC PT THER SUPP EA 15 MIN 3/28/2024 Brittney Melton, PT GP 2            PT G-Codes  Outcome Measure Options: AM-PAC 6 Clicks Basic Mobility (PT)  AM-PAC 6 Clicks Score (PT): 10  AM-PAC 6 Clicks Score (OT): 10  PT Discharge Summary  Anticipated Discharge Disposition (PT): skilled nursing facility    Brittney Melton PT  3/28/2024

## 2024-03-28 NOTE — PLAN OF CARE
Goal Outcome Evaluation:                     Anticipated Discharge Disposition (SLP): skilled nursing facility             Treatment Assessment (SLP): toleration of diet, suspected, continued, pharyngeal dysphagia (03/28/24 1325)    Plan for Continued Treatment (SLP): continue treatment per plan of care (03/28/24 1325)

## 2024-03-28 NOTE — CASE MANAGEMENT/SOCIAL WORK
Continued Stay Note  [facility]     Patient Name: Lea Rivers  MRN: [id number]  Today's Date: 3/28/2024    Admit Date: 3/2/2024    Plan: SNF   Discharge Plan       Row Name 03/28/24 1410       Plan    Plan SNF    Patient/Family in Agreement with Plan yes    Plan Comments Met with Mr. Rivers and his daughter at the bedside to discuss discharge plan. His plan remains to go to [facility] for SNF to LTC, but he is not medically ready. Per MDR, patient may be ready by early next week.  left voice mail updating [facility] Admissions, [place], with potential discharge for next week.  will continue to follow plan of care and assist with discharge planning needs as indicated.                   Discharge Codes    No documentation.                 Expected Discharge Date and Time       Expected Discharge Date Expected Discharge Time    Mar 29, 2024               Valerie Keith RN

## 2024-03-28 NOTE — PLAN OF CARE
Problem: Adult Inpatient Plan of Care  Goal: Plan of Care Review  Outcome: Ongoing, Progressing  Goal: Patient-Specific Goal (Individualized)  Outcome: Ongoing, Progressing  Goal: Absence of Hospital-Acquired Illness or Injury  Outcome: Ongoing, Progressing  Intervention: Identify and Manage Fall Risk  Recent Flowsheet Documentation  Taken 3/28/2024 1400 by Curtis Caldwell RN  Safety Promotion/Fall Prevention:   activity supervised   assistive device/personal items within reach   clutter free environment maintained   fall prevention program maintained   lighting adjusted   nonskid shoes/slippers when out of bed   room organization consistent   safety round/check completed  Taken 3/28/2024 1200 by Curtis Caldwell RN  Safety Promotion/Fall Prevention:   activity supervised   assistive device/personal items within reach   clutter free environment maintained   fall prevention program maintained   lighting adjusted   nonskid shoes/slippers when out of bed   room organization consistent   safety round/check completed  Taken 3/28/2024 1000 by Curtis Caldwell RN  Safety Promotion/Fall Prevention:   activity supervised   assistive device/personal items within reach   clutter free environment maintained   fall prevention program maintained   lighting adjusted   nonskid shoes/slippers when out of bed   room organization consistent   safety round/check completed  Taken 3/28/2024 0800 by Curtis Caldwell RN  Safety Promotion/Fall Prevention:   activity supervised   assistive device/personal items within reach   clutter free environment maintained   fall prevention program maintained   lighting adjusted   nonskid shoes/slippers when out of bed   room organization consistent   safety round/check completed  Intervention: Prevent Skin Injury  Recent Flowsheet Documentation  Taken 3/28/2024 1400 by Curtis Caldwell RN  Body Position:   turned   right  Skin Protection:   adhesive use limited   incontinence pads  utilized   transparent dressing maintained   tubing/devices free from skin contact  Taken 3/28/2024 1200 by Curtis Caldwell RN  Body Position:   turned   left  Skin Protection:   adhesive use limited   incontinence pads utilized   transparent dressing maintained   tubing/devices free from skin contact  Taken 3/28/2024 1000 by Curtis Caldwell RN  Body Position:   supine, legs elevated   turned  Skin Protection:   adhesive use limited   incontinence pads utilized   transparent dressing maintained   tubing/devices free from skin contact  Taken 3/28/2024 0800 by Curtis Caldwell RN  Body Position:   turned   left  Skin Protection:   adhesive use limited   incontinence pads utilized   transparent dressing maintained   tubing/devices free from skin contact  Intervention: Prevent and Manage VTE (Venous Thromboembolism) Risk  Recent Flowsheet Documentation  Taken 3/28/2024 1400 by Curtis Caldwell RN  Activity Management: activity encouraged  Taken 3/28/2024 1200 by Curtis Caldwell RN  Activity Management: activity encouraged  Taken 3/28/2024 1000 by Curtis Caldwell RN  Activity Management: activity encouraged  Taken 3/28/2024 0800 by Curtis Caldwell RN  Activity Management: activity encouraged  VTE Prevention/Management: (See MAR) other (see comments)  Range of Motion: active ROM (range of motion) encouraged  Intervention: Prevent Infection  Recent Flowsheet Documentation  Taken 3/28/2024 1400 by Curtis Caldwell RN  Infection Prevention:   environmental surveillance performed   hand hygiene promoted   rest/sleep promoted  Taken 3/28/2024 1200 by Curtis Caldwell RN  Infection Prevention:   environmental surveillance performed   hand hygiene promoted   rest/sleep promoted  Taken 3/28/2024 1000 by Curtis Caldwell RN  Infection Prevention:   environmental surveillance performed   hand hygiene promoted   rest/sleep promoted  Taken 3/28/2024 0800 by Curtis Caldwell RN  Infection Prevention:    environmental surveillance performed   hand hygiene promoted   rest/sleep promoted  Goal: Optimal Comfort and Wellbeing  Outcome: Ongoing, Progressing  Goal: Readiness for Transition of Care  Outcome: Ongoing, Progressing     Problem: Skin Injury Risk Increased  Goal: Skin Health and Integrity  Outcome: Ongoing, Progressing  Intervention: Optimize Skin Protection  Recent Flowsheet Documentation  Taken 3/28/2024 1400 by Curtis Caldwell RN  Pressure Reduction Techniques: weight shift assistance provided  Head of Bed (HOB) Positioning: HOB at 30-45 degrees  Pressure Reduction Devices: specialty bed utilized  Skin Protection:   adhesive use limited   incontinence pads utilized   transparent dressing maintained   tubing/devices free from skin contact  Taken 3/28/2024 1200 by Curtis Caldwell RN  Pressure Reduction Techniques: weight shift assistance provided  Head of Bed (HOB) Positioning: HOB at 30-45 degrees  Pressure Reduction Devices: specialty bed utilized  Skin Protection:   adhesive use limited   incontinence pads utilized   transparent dressing maintained   tubing/devices free from skin contact  Taken 3/28/2024 1000 by Curtis Caldwell RN  Pressure Reduction Techniques: weight shift assistance provided  Head of Bed (HOB) Positioning: HOB at 30-45 degrees  Pressure Reduction Devices: specialty bed utilized  Skin Protection:   adhesive use limited   incontinence pads utilized   transparent dressing maintained   tubing/devices free from skin contact  Taken 3/28/2024 0800 by Curtis Caldwell RN  Pressure Reduction Techniques: weight shift assistance provided  Head of Bed (HOB) Positioning: HOB at 30-45 degrees  Pressure Reduction Devices: specialty bed utilized  Skin Protection:   adhesive use limited   incontinence pads utilized   transparent dressing maintained   tubing/devices free from skin contact     Problem: Fall Injury Risk  Goal: Absence of Fall and Fall-Related Injury  Outcome: Ongoing,  Progressing  Intervention: Identify and Manage Contributors  Recent Flowsheet Documentation  Taken 3/28/2024 1400 by Curtis Caldwell, RN  Medication Review/Management: medications reviewed  Taken 3/28/2024 1200 by Curtis Caldwell RN  Medication Review/Management: medications reviewed  Taken 3/28/2024 1000 by Curtis Caldwell RN  Medication Review/Management: medications reviewed  Taken 3/28/2024 0800 by Curtis Caldwell RN  Medication Review/Management: medications reviewed  Intervention: Promote Injury-Free Environment  Recent Flowsheet Documentation  Taken 3/28/2024 1400 by Curtis Caldwell RN  Safety Promotion/Fall Prevention:   activity supervised   assistive device/personal items within reach   clutter free environment maintained   fall prevention program maintained   lighting adjusted   nonskid shoes/slippers when out of bed   room organization consistent   safety round/check completed  Taken 3/28/2024 1200 by Curtis Caldwell RN  Safety Promotion/Fall Prevention:   activity supervised   assistive device/personal items within reach   clutter free environment maintained   fall prevention program maintained   lighting adjusted   nonskid shoes/slippers when out of bed   room organization consistent   safety round/check completed  Taken 3/28/2024 1000 by Curtis Caldwell RN  Safety Promotion/Fall Prevention:   activity supervised   assistive device/personal items within reach   clutter free environment maintained   fall prevention program maintained   lighting adjusted   nonskid shoes/slippers when out of bed   room organization consistent   safety round/check completed  Taken 3/28/2024 0800 by Curtis Caldwell, RN  Safety Promotion/Fall Prevention:   activity supervised   assistive device/personal items within reach   clutter free environment maintained   fall prevention program maintained   lighting adjusted   nonskid shoes/slippers when out of bed   room organization consistent   safety  round/check completed     Problem: Adjustment to Illness (Sepsis/Septic Shock)  Goal: Optimal Coping  Outcome: Ongoing, Progressing     Problem: Bleeding (Sepsis/Septic Shock)  Goal: Absence of Bleeding  Outcome: Ongoing, Progressing     Problem: Glycemic Control Impaired (Sepsis/Septic Shock)  Goal: Blood Glucose Level Within Desired Range  Outcome: Ongoing, Progressing     Problem: Infection Progression (Sepsis/Septic Shock)  Goal: Absence of Infection Signs and Symptoms  Outcome: Ongoing, Progressing  Intervention: Initiate Sepsis Management  Recent Flowsheet Documentation  Taken 3/28/2024 1400 by Curtis Caldwell RN  Infection Prevention:   environmental surveillance performed   hand hygiene promoted   rest/sleep promoted  Taken 3/28/2024 1200 by Curtis Caldwell RN  Infection Prevention:   environmental surveillance performed   hand hygiene promoted   rest/sleep promoted  Taken 3/28/2024 1000 by Curtis Caldwell RN  Infection Prevention:   environmental surveillance performed   hand hygiene promoted   rest/sleep promoted  Taken 3/28/2024 0800 by Curtis Caldwell RN  Infection Prevention:   environmental surveillance performed   hand hygiene promoted   rest/sleep promoted  Intervention: Promote Recovery  Recent Flowsheet Documentation  Taken 3/28/2024 1400 by Curtis Caldwell RN  Activity Management: activity encouraged  Taken 3/28/2024 1200 by Curtis Caldwell RN  Activity Management: activity encouraged  Taken 3/28/2024 1000 by Curtis Caldwell RN  Activity Management: activity encouraged  Taken 3/28/2024 0800 by Curtis Caldwell RN  Activity Management: activity encouraged     Problem: Nutrition Impaired (Sepsis/Septic Shock)  Goal: Optimal Nutrition Intake  Outcome: Ongoing, Progressing   Goal Outcome Evaluation:   Plan of care reviewed with: Patient

## 2024-03-29 ENCOUNTER — APPOINTMENT (OUTPATIENT)
Dept: GENERAL RADIOLOGY | Facility: HOSPITAL | Age: 79
DRG: 329 | End: 2024-03-29
Payer: MEDICARE

## 2024-03-29 LAB
BASOPHILS # BLD AUTO: 0.06 10*3/MM3 (ref 0–0.2)
BASOPHILS NFR BLD AUTO: 0.7 % (ref 0–1.5)
DEPRECATED RDW RBC AUTO: 64.5 FL (ref 37–54)
EOSINOPHIL # BLD AUTO: 0.07 10*3/MM3 (ref 0–0.4)
EOSINOPHIL NFR BLD AUTO: 0.8 % (ref 0.3–6.2)
ERYTHROCYTE [DISTWIDTH] IN BLOOD BY AUTOMATED COUNT: 18.5 % (ref 12.3–15.4)
GLUCOSE BLDC GLUCOMTR-MCNC: 102 MG/DL (ref 70–130)
GLUCOSE BLDC GLUCOMTR-MCNC: 103 MG/DL (ref 70–130)
GLUCOSE BLDC GLUCOMTR-MCNC: 106 MG/DL (ref 70–130)
GLUCOSE BLDC GLUCOMTR-MCNC: 113 MG/DL (ref 70–130)
GLUCOSE BLDC GLUCOMTR-MCNC: 113 MG/DL (ref 70–130)
HCT VFR BLD AUTO: 29.1 % (ref 37.5–51)
HGB BLD-MCNC: 9.2 G/DL (ref 13–17.7)
IMM GRANULOCYTES # BLD AUTO: 0.06 10*3/MM3 (ref 0–0.05)
IMM GRANULOCYTES NFR BLD AUTO: 0.7 % (ref 0–0.5)
LYMPHOCYTES # BLD AUTO: 1.05 10*3/MM3 (ref 0.7–3.1)
LYMPHOCYTES NFR BLD AUTO: 11.9 % (ref 19.6–45.3)
MCH RBC QN AUTO: 30.6 PG (ref 26.6–33)
MCHC RBC AUTO-ENTMCNC: 31.6 G/DL (ref 31.5–35.7)
MCV RBC AUTO: 96.7 FL (ref 79–97)
MONOCYTES # BLD AUTO: 0.89 10*3/MM3 (ref 0.1–0.9)
MONOCYTES NFR BLD AUTO: 10.1 % (ref 5–12)
NEUTROPHILS NFR BLD AUTO: 6.71 10*3/MM3 (ref 1.7–7)
NEUTROPHILS NFR BLD AUTO: 75.8 % (ref 42.7–76)
NRBC BLD AUTO-RTO: 0 /100 WBC (ref 0–0.2)
PLATELET # BLD AUTO: 394 10*3/MM3 (ref 140–450)
PMV BLD AUTO: 8.7 FL (ref 6–12)
RBC # BLD AUTO: 3.01 10*6/MM3 (ref 4.14–5.8)
WBC NRBC COR # BLD AUTO: 8.84 10*3/MM3 (ref 3.4–10.8)

## 2024-03-29 PROCEDURE — 92611 MOTION FLUOROSCOPY/SWALLOW: CPT

## 2024-03-29 PROCEDURE — 82948 REAGENT STRIP/BLOOD GLUCOSE: CPT

## 2024-03-29 PROCEDURE — 25010000002 HYDRALAZINE PER 20 MG: Performed by: SURGERY

## 2024-03-29 PROCEDURE — 25010000002 PIPERACILLIN SOD-TAZOBACTAM PER 1 G: Performed by: INTERNAL MEDICINE

## 2024-03-29 PROCEDURE — 85025 COMPLETE CBC W/AUTO DIFF WBC: CPT | Performed by: SURGERY

## 2024-03-29 PROCEDURE — 99232 SBSQ HOSP IP/OBS MODERATE 35: CPT | Performed by: NURSE PRACTITIONER

## 2024-03-29 PROCEDURE — 25010000002 DAPTOMYCIN PER 1 MG: Performed by: INTERNAL MEDICINE

## 2024-03-29 PROCEDURE — 74230 X-RAY XM SWLNG FUNCJ C+: CPT

## 2024-03-29 PROCEDURE — 25010000002 MICAFUNGIN SODIUM 100 MG RECONSTITUTED SOLUTION 1 EACH VIAL: Performed by: INTERNAL MEDICINE

## 2024-03-29 RX ORDER — AMINO ACIDS/PROTEIN HYDROLYS 11G-40/45
30 LIQUID IN PACKET (ML) ORAL 2 TIMES DAILY
Status: DISCONTINUED | OUTPATIENT
Start: 2024-03-29 | End: 2024-03-31

## 2024-03-29 RX ADMIN — MEGESTROL ACETATE 400 MG: 40 SUSPENSION ORAL at 16:26

## 2024-03-29 RX ADMIN — ISOSORBIDE DINITRATE 10 MG: 10 TABLET ORAL at 17:43

## 2024-03-29 RX ADMIN — BARIUM SULFATE 50 ML: 400 SUSPENSION ORAL at 13:56

## 2024-03-29 RX ADMIN — BARIUM SULFATE 10 ML: 400 SUSPENSION ORAL at 13:56

## 2024-03-29 RX ADMIN — HYDRALAZINE HYDROCHLORIDE 10 MG: 20 INJECTION INTRAMUSCULAR; INTRAVENOUS at 23:23

## 2024-03-29 RX ADMIN — VALPROIC ACID 375 MG: 250 SOLUTION ORAL at 17:43

## 2024-03-29 RX ADMIN — VALPROIC ACID 375 MG: 250 SOLUTION ORAL at 12:24

## 2024-03-29 RX ADMIN — ISOSORBIDE DINITRATE 10 MG: 10 TABLET ORAL at 12:24

## 2024-03-29 RX ADMIN — APIXABAN 5 MG: 5 TABLET, FILM COATED ORAL at 09:07

## 2024-03-29 RX ADMIN — PIPERACILLIN AND TAZOBACTAM 3.38 G: 3; .375 INJECTION, POWDER, LYOPHILIZED, FOR SOLUTION INTRAVENOUS at 16:25

## 2024-03-29 RX ADMIN — LEVETIRACETAM 500 MG: 500 TABLET, FILM COATED ORAL at 20:57

## 2024-03-29 RX ADMIN — HYDRALAZINE HYDROCHLORIDE 10 MG: 20 INJECTION INTRAMUSCULAR; INTRAVENOUS at 12:28

## 2024-03-29 RX ADMIN — HYDRALAZINE HYDROCHLORIDE 10 MG: 20 INJECTION INTRAMUSCULAR; INTRAVENOUS at 01:06

## 2024-03-29 RX ADMIN — LIDOCAINE 2 PATCH: 4 PATCH TOPICAL at 09:08

## 2024-03-29 RX ADMIN — BARIUM SULFATE 100 ML: 0.81 POWDER, FOR SUSPENSION ORAL at 13:57

## 2024-03-29 RX ADMIN — LEVETIRACETAM 500 MG: 500 TABLET, FILM COATED ORAL at 09:07

## 2024-03-29 RX ADMIN — PANTOPRAZOLE SODIUM 40 MG: 40 INJECTION, POWDER, FOR SOLUTION INTRAVENOUS at 06:06

## 2024-03-29 RX ADMIN — PIPERACILLIN AND TAZOBACTAM 3.38 G: 3; .375 INJECTION, POWDER, LYOPHILIZED, FOR SOLUTION INTRAVENOUS at 00:40

## 2024-03-29 RX ADMIN — PIPERACILLIN AND TAZOBACTAM 3.38 G: 3; .375 INJECTION, POWDER, LYOPHILIZED, FOR SOLUTION INTRAVENOUS at 09:07

## 2024-03-29 RX ADMIN — ISOSORBIDE DINITRATE 10 MG: 10 TABLET ORAL at 09:08

## 2024-03-29 RX ADMIN — LABETALOL HYDROCHLORIDE 300 MG: 300 TABLET, FILM COATED ORAL at 16:24

## 2024-03-29 RX ADMIN — BARIUM SULFATE 20 ML: 400 PASTE ORAL at 13:57

## 2024-03-29 RX ADMIN — MICAFUNGIN 100 MG: 20 INJECTION, POWDER, LYOPHILIZED, FOR SOLUTION INTRAVENOUS at 16:25

## 2024-03-29 RX ADMIN — Medication 10 ML: at 20:58

## 2024-03-29 RX ADMIN — MIRTAZAPINE 15 MG: 15 TABLET, FILM COATED ORAL at 20:58

## 2024-03-29 RX ADMIN — APIXABAN 5 MG: 5 TABLET, FILM COATED ORAL at 20:57

## 2024-03-29 RX ADMIN — VALPROIC ACID 375 MG: 250 SOLUTION ORAL at 00:40

## 2024-03-29 RX ADMIN — LEVOTHYROXINE SODIUM 88 MCG: 88 TABLET ORAL at 06:06

## 2024-03-29 RX ADMIN — LABETALOL HYDROCHLORIDE 300 MG: 300 TABLET, FILM COATED ORAL at 20:57

## 2024-03-29 RX ADMIN — VALPROIC ACID 375 MG: 250 SOLUTION ORAL at 06:06

## 2024-03-29 RX ADMIN — LISINOPRIL 40 MG: 40 TABLET ORAL at 09:07

## 2024-03-29 RX ADMIN — LABETALOL HYDROCHLORIDE 300 MG: 300 TABLET, FILM COATED ORAL at 06:06

## 2024-03-29 RX ADMIN — Medication 10 ML: at 09:08

## 2024-03-29 RX ADMIN — DAPTOMYCIN 500 MG: 500 INJECTION, POWDER, LYOPHILIZED, FOR SOLUTION INTRAVENOUS at 09:07

## 2024-03-29 RX ADMIN — VALPROIC ACID 375 MG: 250 SOLUTION ORAL at 23:18

## 2024-03-29 RX ADMIN — PIPERACILLIN AND TAZOBACTAM 3.38 G: 3; .375 INJECTION, POWDER, LYOPHILIZED, FOR SOLUTION INTRAVENOUS at 23:18

## 2024-03-29 NOTE — PLAN OF CARE
Problem: Adult Inpatient Plan of Care  Goal: Absence of Hospital-Acquired Illness or Injury  Outcome: Ongoing, Progressing  Intervention: Identify and Manage Fall Risk  Recent Flowsheet Documentation  Taken 3/29/2024 0400 by Blanca Mendoza RN  Safety Promotion/Fall Prevention:   activity supervised   assistive device/personal items within reach   clutter free environment maintained   fall prevention program maintained   nonskid shoes/slippers when out of bed   safety round/check completed  Taken 3/29/2024 0000 by Blanca Mendoza RN  Safety Promotion/Fall Prevention:   activity supervised   clutter free environment maintained   assistive device/personal items within reach   fall prevention program maintained   nonskid shoes/slippers when out of bed   safety round/check completed  Taken 3/28/2024 2000 by Blanca Mendoza RN  Safety Promotion/Fall Prevention:   activity supervised   assistive device/personal items within reach   clutter free environment maintained   fall prevention program maintained   nonskid shoes/slippers when out of bed   safety round/check completed  Intervention: Prevent Skin Injury  Recent Flowsheet Documentation  Taken 3/29/2024 0400 by Blanca Mendoza RN  Body Position:   turned   left  Skin Protection:   adhesive use limited   incontinence pads utilized   transparent dressing maintained   tubing/devices free from skin contact  Taken 3/29/2024 0000 by Blanca Mendoza RN  Body Position:   turned   right  Skin Protection:   adhesive use limited   incontinence pads utilized   transparent dressing maintained   tubing/devices free from skin contact  Taken 3/28/2024 2000 by Blanca Mendoza RN  Body Position:   turned   left  Skin Protection:   adhesive use limited   incontinence pads utilized   transparent dressing maintained   tubing/devices free from skin contact  Intervention: Prevent and Manage VTE (Venous Thromboembolism) Risk  Recent Flowsheet Documentation  Taken 3/29/2024 0400 by Blanca Mendoza  RN  Activity Management: activity minimized  Taken 3/29/2024 0000 by Blanca Mendoza RN  Activity Management: activity minimized  Taken 3/28/2024 2000 by Blanca Mendoza RN  Activity Management: activity encouraged  Range of Motion: active ROM (range of motion) encouraged  Intervention: Prevent Infection  Recent Flowsheet Documentation  Taken 3/29/2024 0400 by Blanca Mendoza RN  Infection Prevention: rest/sleep promoted  Taken 3/29/2024 0000 by Blanca Mendoza RN  Infection Prevention: rest/sleep promoted  Taken 3/28/2024 2000 by Blanca Mendoza RN  Infection Prevention:   cohorting utilized   environmental surveillance performed   rest/sleep promoted   Goal Outcome Evaluation:  Plan of Care Reviewed With: patient        Progress: no change

## 2024-03-29 NOTE — NURSING NOTE
WOC follow-up for ostomy care and education.     Patient s/p total abdominal colectomy with end ileostomy.     Patient  asleep. arouses to touch only. Is not appropriate for education.  Ostomy appliance intact.  Stoma viable.  stool output thickening.      No family at bedside.    Stoma measures 30 mm. Periwound skin pink, intact. Appliance changed to two piece isadora, new image, flat, drainable with barrier ring. '    Once DC plan is established can plan to meet with family for education if needed.     Will continue to follow.      Pola Arnold RN, BSN, CCRN, CWOCN  Wound, Ostomy and Continence (WOC) Department  [facility]

## 2024-03-29 NOTE — CONSULTS
Clinical Nutrition   Nutrition Support Assessment  Reason for Visit: Follow-up protocol, Nurse practioner/physician assistant consult, EN/PO    Patient Name: Lea Rivers  YOB: 1945  MRN: [id number]  Date of Encounter: 03/29/24 09:13 EDT  Admission date: 3/2/2024    Comments:    Pt continues with poor PO intake. SLP continues to follow. Added thickened Boost yesterday - ? Limited acceptance. Initially family did not wish for further nutrition support, however now agreeable.     Once keofeed is placed and verified for use:  Initiate Peptamen 1.5 at 15ml/hr and advance by 20mL q8hrs as tolerated to goal rate of 55mL/hr. Flush with 10mL/hr. Provide Prosource 2x daily.   Infused at goal over 22 hrs this regimen provides: 1210mL TGV, 1935kcal (105% est needs), 112g protein (90% est needs), 0g fiber, 932mL TF FW (+ 220mL flush = 1152mL total).     Once tolerance of EN established, will adjust to nocturnal regimen (see intervention section)  Monitor electrolytes and replace per protocol  Adjust flushes per clinical status  Continue to encourage PO intake at meals      Nutrition Assessment   Admission Diagnosis:  Falls [W19.XXXA]  Pneumatosis intestinalis [K63.89]      Problem List:    Falls    Pneumatosis intestinalis        PMH:   He  has a past medical history of Anemia, Colon cancer (1990), Coronary artery disease, Diabetes, Dyslipidemia, GERD (gastroesophageal reflux disease), Hyperlipidemia, Hypertension, Hyponatremia, Hypothyroidism, Left shoulder pain, Low sodium levels (2022), Memory deficit, Osteoarthritis, Prostate cancer (07/23/2022), Seizure disorder, and Skin cancer.    PSH:  He  has a past surgical history that includes Colectomy partial / total (1990); Cholecystectomy; Appendectomy; Other surgical history; Sinus surgery; Tonsillectomy; Vasectomy; Carpal tunnel release (Bilateral); TURP / transurethral incision / drainage prostate; Colonoscopy; Transurethral resection  of prostate (N/A, 09/21/2018); Skin biopsy; Teeth Extraction; Total knee arthroplasty (Right, 04/07/2022); Cardiac catheterization (2012); Prostate biopsy (N/A, 11/11/2022); Transrectal Incision Prostate (Bilateral, 01/06/2023); Cyberknife (02/09/2023); Colectomy (N/A, 3/9/2024); and Exploratory Laparotomy (N/A, 3/18/2024).      Applicable Nutrition Concerns:   Skin:  Oral:  GI:    Applicable Interval History:    3/9-total colectomy w/end ileostomy  3/10) PICC placed, PN started  3/14) FEES: SLP Diet Recommendation: puree, no mixed consistencies, honey thick liquids   3/18) CT abd/pelvis  Return to OR   3/21) MBS - SLP rec: mechanical ground, HTL      Reported/Observed/Food/Nutrition Related History:     3/29  Pt continues with poor PO intake. SLP continues to follow. Added thickened Boost yesterday - ? Limited acceptance. Initially family did not wish for further nutrition support, however now agreeable. Ileostomy with good output considering limited PO intake.     3/25  Pt tolerating PO diet however continues with severely limited acceptance. Taking bites of Magic Cup with meds but otherwise refusing PO intake. Discussed concerns for continued PN use with Hospitalist, agrees to wean. Ileostomy with high output - 3.05L in past 24hrs; started on Imodium.     3/22  Pt declined breakfast this AM - RN able to have eat applesauce. Unable to provide meal preferences - pt did state enjoys chicken. General Surgery present at time of visit, discussed possible threshold for transitioning to EN in unable to improve PO intake. Discussed eating in bed vs up to chair with RN, state will get him up to chair if able. Ileostomy output.      3/21  Plan for MBS today vs FEES - passed for Pureed/HTL per SLP. Noted hyponatremia this morning - monitoring.      3/20  Pt with Ileostomy output today. No N/V. SLP eval today - results pending. PN infusing with correct Rx. Per Surgery continue PN until adequate intake.       3/19  S/P  "abdominal washout yesterday.  NGT removed today. Remains NPO with PN for nutrition.   Patient sleeping at time of visit.      3/18  Pt was tolerating PO intake, with ostomy output however underwent stat CT abd/pelvis to evaluate fever source - per Surgery, \"plan for emergent re-exploration to assess for bowel leak or infected fluid collections\". Ostomy output - 700mL in past 24hrs     3/14  Spoke w/sx-okay to place keofeed it pt too lethargic for FEES today/unable to advance po diet. Will continue current TPN for today.     3/12  Pt out of room for scan per pt's son at bedside. Spoke w/nsg-TPN at 65ml/hr. Discussed phos replacement IV.     3/9  Per RN, pt pulled PICC last night-pt OOR during visit but pt's son at bedside. Able to obtain nutrition hx. Pt eating well PTA, usually a picky eater and eats the same things all the time-preferences noted below. Pt usually eats at [employer] for breakfast, makes something quick at home for lunch and has dinner provided by dtr. Son states pt's wt has been stable, ~196-200lbs. NKFA. Plan is for total colectomy w/end ileostomy today.     3/8  Pt unavailable during RD visit, having PICC placed. Also noted w/dementia-attempted to call family w/o success. Discussed w/nsg, pt to likely have bowel sx tomorrow w/colostomy creation. Rectal tube placed today 2/2 colonic decompression. Pt appears to have had 7lb/3.6% non significant wt loss in the past month. NKFA.     Labs    Labs Reviewed: Yes   No new labs available    Medications    Medications Reviewed: Yes    Intake/Ouptut 24 hrs (0701 - 0700)   I&O's Reviewed: Yes   Ileostomy - 400mL out    Anthropometrics     Height: Height: 170.2 cm (67.01\")  Last Filed Weight: Weight: 98.8 kg (217 lb 12 oz) (03/24/24 0600)  Method: Weight Method: Bed scale (after zeroed when in chair)  BMI: BMI (Calculated): 34.1    UBW: 203lbs per EMR  Weight change:    Weight       Weight (kg) Weight (lbs) Weight Method Visit Report   3/23/2023 87.091 kg  192 " "lb   --    4/10/2023 87.091 kg  192 lb   --    4/28/2023 94.62 kg  208 lb 9.6 oz   --    5/6/2023 91.627 kg  202 lb      5/16/2023 90.719 kg  200 lb   --    6/20/2023 90.719 kg  200 lb   --    8/14/2023 93.441 kg  206 lb   --    9/14/2023 92.398 kg  203 lb 11.2 oz   --    12/4/2023 92.08 kg  203 lb   --    1/5/2024 78.926 kg  174 lb  Stated     2/15/2024 92.08 kg  203 lb   --    2/27/2024 89.086 kg  196 lb 6.4 oz      3/2/2024 88.905 kg  196 lb  Stated        Stated         Nutrition Focused Physical Exam     Date: 3/8    Unable to perform exam due to: Pt unable to participate at time of visit    Needs Assessment   Date: 3/8; reassessed 3/22; 3/29    Height used:Height: 170.2 cm (67.01\")  Weights used: 98.*kg CBW    Estimated Calorie needs: ~ 1850Kcal/day  Method:  MSJ (1663) x 1.1 =1829  Method:  17-20 Kcals/KG = 9908-4138    Estimated Protein needs: ~ 125 g PRO/day  Method: 1.2-1.5g/Kg CBW =119-148    Estimated Fluid needs: ~ ml/day   Per clinical status    Current Nutrition Prescription     PO: Diet: Gastrointestinal, Cardiac; Healthy Heart (2-3 Na+); Fiber-Restricted; No Mixed Consistencies, Feeding Assistance - Nursing; Texture: Mechanical Ground (NDD 2); Fluid Consistency: Honey Thick  Oral Nutrition Supplement: Magic Cup 2x daily; Boost 2x daily  Intake:  2.27% x last 11 meals documented        Nutrition Diagnosis   Date: 3/8 Updated: 3/18; 3/22; 3/25  Problem Altered GI function   Etiology Colonic distention, ileus, pending bowel sx   Signs/Symptoms Need for TPN   Status: Resolved    Date: 3/22  Updated: 3/25  Problem Predicted suboptimal energy intake   Etiology Dementia with s/p GI surgery x2   Signs/Symptoms Meal refusal per RN   Status: ongoing    Date:  3/29 Updated:  Problem Inadequate oral intake   Etiology dementia   Signs/Symptoms <25% at meals   Status: new      Goal:   General: Nutrition to support treatment  PO: Tolerate PO, Increase intake  EN/PN: Initiate EN, Establish EN tolerance    Nutrition " Intervention      Follow treatment progress, Care plan reviewed, Encourage intake, Nutrition support order placed       Goal EN regimen:  Nocturnal Feeds (6p to 6a):  Peptamen 1.5 @ 75mL/hr + flush of 10mL/hr. Provide Prosource 2x daily  Infused at goal over 12 hrs this regimen provides: 900mL TGV, 1470kcal (79% est needs), 91g protein (73% est needs), 0g fiber, 693mL TF FW (+ 120mL flush = 813mL total).       Monitoring/Evaluation:   Per protocol, I&O, PO intake, Pertinent labs, EN delivery/tolerance, Weight, GI status, Symptoms, POC/GOC, Swallow function      Adelita Loza, MS,RD,LD  Time Spent: 45min

## 2024-03-29 NOTE — PROGRESS NOTES
[facility] Medicine Services  PROGRESS NOTE    Patient Name: Lea Rivers  : 1945  MRN: [id number]    Date of Admission: 3/2/2024  Primary Care Physician: Mannie Melvin MD    Subjective   Subjective     CC:  Follow-up colectomy    HPI:  Patient resting in bed with eyes closed, opens to voice. No complaints. No family in room. Cont to have minimal appetite. Family agreeable to feeding tube.       Objective   Objective     Vital Signs:   Temp:  [97.7 °F (36.5 °C)-99.1 °F (37.3 °C)] 98.3 °F (36.8 °C)  Heart Rate:  [65-71] 70  Resp:  [18] 18  BP: (193-226)/() 218/107     Physical Exam:  Constitutional: No acute distress, awake, alert, chronically ill-appearing  HENT: NCAT, mucous membranes moist  Respiratory: Diminished in bases, respiratory effort normal on room air  Cardiovascular: RRR, no murmurs, cap refill brisk   Gastrointestinal: Positive bowel sounds, midline vertical incision staples in place, ostomy in place with brown liquid stool output, abdominal binder in place, minimal tenderness to palpation, nondistended  Musculoskeletal: trace BLE edema, bilateral heel dressings in place  Psychiatric: Flat affect, cooperative  Neurologic: Alert, moves all extremities, speech clear/ slow   Skin: warm, dry, no visible rash       Results Reviewed:  LAB RESULTS:      Lab 24  0623 24  1646 24  0550 24  0707 24  0632 24  1418 24  0612 24  0603 24  0348   WBC 8.84  --  8.95 10.46 12.33*  --  13.06*   < > 13.04*   HEMOGLOBIN 9.2* 9.3* 9.0* 9.6* 9.1*  --  8.4*   < > 8.8*   HEMATOCRIT 29.1* 29.1* 29.6* 30.0* 29.4*  --  27.0*   < > 27.0*   PLATELETS 394  --  431 488* 517*  --  560*   < > 519*   NEUTROS ABS 6.71  --  6.50 7.67* 8.32*  --  10.58*   < > 9.46*   IMMATURE GRANS (ABS) 0.06*  --  0.12* 0.22* 0.66*  --   --   --  0.73*   LYMPHS ABS 1.05  --  1.10 0.99 1.31  --   --   --  1.02   MONOS ABS 0.89  --  1.04* 1.08*  1.21*  --   --   --  0.97*   EOS ABS 0.07  --  0.12 0.41* 0.72*  --  0.78*   < > 0.74*   MCV 96.7  --  98.3* 94.6 97.7*  --  96.4   < > 99.6*   CRP  --   --   --   --   --  1.94*  --   --   --    PROTIME  --   --   --   --   --  16.7*  --   --   --     < > = values in this interval not displayed.         Lab 03/27/24  0707 03/26/24  0632 03/25/24  1418 03/24/24  0603 03/23/24  1056   SODIUM 132* 132* 136 135* 132*   POTASSIUM 4.0 4.6 4.5 4.1 4.4   CHLORIDE 101 103 105 103 101   CO2 22.0 21.0* 24.0 25.0 24.0   ANION GAP 9.0 8.0 7.0 7.0 7.0   BUN 15 24* 27* 21 18   CREATININE 0.53* 0.54* 0.62* 0.49* 0.41*   EGFR 101.9 101.4 97.2 104.4 110.2   GLUCOSE 101* 97 129* 152* 145*   CALCIUM 7.9* 8.3* 7.9* 8.0* 7.8*   IONIZED CALCIUM  --   --  1.27  --   --    MAGNESIUM  --   --   --  1.9  --    PHOSPHORUS  --   --   --  3.0  --          Lab 03/25/24  1418   TOTAL PROTEIN 5.2*   ALBUMIN 2.4*   GLOBULIN 2.8   ALT (SGPT) 7   AST (SGOT) 18   BILIRUBIN 0.2   ALK PHOS 161*         Lab 03/25/24  1418   PROTIME 16.7*   INR 1.33*         Lab 03/25/24  1418   TRIGLYCERIDES 101         Lab 03/23/24  1827 03/23/24  0348   IRON  --  34*   IRON SATURATION (TSAT)  --  19*   TIBC  --  182*   TRANSFERRIN  --  122*   FERRITIN  --  200.10   FOLATE 3.78*  --    VITAMIN B 12  --  1,320*         Brief Urine Lab Results  (Last result in the past 365 days)        Color   Clarity   Blood   Leuk Est   Nitrite   Protein   CREAT   Urine HCG        03/11/24 1337 Yellow   Cloudy   Moderate (2+)   Trace   Negative   30 mg/dL (1+)                   Microbiology Results Abnormal       Procedure Component Value - Date/Time    Fungus Culture - Peritoneal Fluid, Perineum [[id number]] Collected: 03/18/24 1349    Lab Status: Preliminary result Specimen: Peritoneal Fluid from Perineum Updated: 03/25/24 1831     Fungus Culture No fungus isolated at 1 week    Anaerobic Culture - Peritoneal Fluid, Perineum [[id number]]  (Normal) Collected: 03/18/24 1349    Lab Status:  Final result Specimen: Peritoneal Fluid from Perineum Updated: 03/23/24 1026     Anaerobic Culture No anaerobes isolated at 5 days    AFB Culture - Peritoneal Fluid, Perineum [[id number]] Collected: 03/18/24 1349    Lab Status: Preliminary result Specimen: Peritoneal Fluid from Perineum Updated: 03/19/24 1339     AFB Stain No acid fast bacilli seen on concentrated smear    Urine Culture - Urine, Indwelling Urethral Catheter [[id number]]  (Normal) Collected: 03/11/24 1337    Lab Status: Final result Specimen: Urine from Indwelling Urethral Catheter Updated: 03/12/24 2024     Urine Culture No growth    Blood Culture - Blood, Arm, Right [[id number]]  (Normal) Collected: 03/06/24 1857    Lab Status: Final result Specimen: Blood from Arm, Right Updated: 03/11/24 2045     Blood Culture No growth at 5 days    Blood Culture - Blood, Arm, Right [[id number]]  (Normal) Collected: 03/06/24 1751    Lab Status: Final result Specimen: Blood from Arm, Right Updated: 03/11/24 2000     Blood Culture No growth at 5 days    Blood Culture - Blood, Arm, Right [[id number]]  (Normal) Collected: 03/02/24 1132    Lab Status: Final result Specimen: Blood from Arm, Right Updated: 03/07/24 1201     Blood Culture No growth at 5 days    Narrative:      Less than seven (7) mL's of blood was collected.  Insufficient quantity may yield false negative results.    Blood Culture - Blood, Arm, Right [[id number]]  (Normal) Collected: 03/02/24 1132    Lab Status: Final result Specimen: Blood from Arm, Right Updated: 03/07/24 1201     Blood Culture No growth at 5 days    Narrative:      Less than seven (7) mL's of blood was collected.  Insufficient quantity may yield false negative results.    Urine Culture - Urine, Straight Cath [[id number]]  (Normal) Collected: 03/02/24 0923    Lab Status: Final result Specimen: Urine from Straight Cath Updated: 03/03/24 1601     Urine Culture No growth    COVID PRE-OP / PRE-PROCEDURE SCREENING ORDER (NO ISOLATION) - Swab,  Nasopharynx [[id number]]  (Normal) Collected: 03/02/24 1133    Lab Status: Final result Specimen: Swab from Nasopharynx Updated: 03/02/24 1227    Narrative:      The following orders were created for panel order COVID PRE-OP / PRE-PROCEDURE SCREENING ORDER (NO ISOLATION) - Swab, Nasopharynx.  Procedure                               Abnormality         Status                     ---------                               -----------         ------                     COVID-19, FLU A/B, RSV P...[[id number]]  Normal              Final result                 Please view results for these tests on the individual orders.    COVID-19, FLU A/B, RSV PCR 1 HR TAT - Swab, Nasopharynx [[id number]]  (Normal) Collected: 03/02/24 1133    Lab Status: Final result Specimen: Swab from Nasopharynx Updated: 03/02/24 1227     COVID19 Not Detected     Influenza A PCR Not Detected     Influenza B PCR Not Detected     RSV, PCR Not Detected    Narrative:      Fact sheet for providers: https://www.fda.gov/media/103283/download    Fact sheet for patients: https://www.fda.gov/media/028923/download    Test performed by PCR.            No radiology results from the last 24 hrs    Results for orders placed during the hospital encounter of 07/20/21    Adult Transthoracic Echo Complete W/ Cont if Necessary Per Protocol    Interpretation Summary  · Left ventricular wall thickness is consistent with mild concentric hypertrophy.  · Normal left-ventricular systolic function, estimated EF 65%.  · Left ventricular diastolic function is consistent with (grade I) impaired relaxation.  · Aortic sclerosis without aortic stenosis. Trace aortic insufficiency.  · Trace mitral regurgitation, trace tricuspid regurgitation.      Current medications:  Scheduled Meds:apixaban, 5 mg, Oral, Q12H  DAPTOmycin, 6 mg/kg (Adjusted), Intravenous, Q24H  insulin regular, 2-7 Units, Subcutaneous, Q6H  isosorbide dinitrate, 10 mg, Oral, TID - Nitrates  labetalol, 300 mg, Oral,  Q8H  levETIRAcetam, 500 mg, Oral, Q12H  levothyroxine, 88 mcg, Oral, Q AM  Lidocaine, 2 patch, Transdermal, Q24H  lisinopril, 40 mg, Oral, Q24H  megestrol, 400 mg, Oral, Q24H  micafungin (MYCAMINE) IV, 100 mg, Intravenous, Q24H  mirtazapine, 15 mg, Oral, Nightly  pantoprazole, 40 mg, Intravenous, Q AM  piperacillin-tazobactam, 3.375 g, Intravenous, Q8H  ProSource No Carb, 30 mL, Nasogastric, BID  sodium chloride, 10 mL, Intravenous, Q12H  Valproic Acid, 375 mg, Oral, Q6H      Continuous Infusions:     PRN Meds:.  acetaminophen **OR** acetaminophen **OR** acetaminophen    calcium carbonate    Calcium Replacement - Follow Nurse / BPA Driven Protocol    dextrose    dextrose    docusate sodium    fluticasone    glucagon (human recombinant)    hydrALAZINE    ipratropium-albuterol    labetalol    Magnesium Standard Dose Replacement - Follow Nurse / BPA Driven Protocol    [DISCONTINUED] HYDROmorphone **AND** naloxone    ondansetron ODT **OR** ondansetron    Phosphorus Replacement - Follow Nurse / BPA Driven Protocol    Potassium Replacement - Follow Nurse / BPA Driven Protocol    sodium chloride    sodium chloride    sodium chloride    sodium chloride    Assessment & Plan   Assessment & Plan     Active Hospital Problems    Diagnosis  POA    **Falls [W19.XXXA]  Yes    Pneumatosis intestinalis [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        Brief Hospital Course to date:  Lea Rivers is a 78 y.o. male  with past medical history of hypertension, hyperlipidemia, hypothyroidism, and seizure disorder who was admitted on 3/2/2024 due to fall and nondisplaced first through fourth left rib fractures. He was also found to have left distal clavicle fracture.  Patient was noted to have increasing abdominal distention on 3/6/2024.  CT of the abdomen/pelvis revealed dilated loops of large bowel with likely pneumatosis intestinalis.  General surgery was consulted due to persistent colonic  ileus and significant colonic distention.  Underwent total abdominal colectomy with end ileostomy creation on 3/9/2024.  Repeat CT of the abdomen/pelvis revealed postsurgical changes with a moderate loculated ascitic fluid collection within the anterior abdomen.  He underwent exploratory laparotomy  with abdominal washout on 3/18/2024 by Dr. Godfrey.  ID Dr. Chow was consulted and is managing antibiotic regimen.      This patient's problems and plans were partially entered by my partner and updated as appropriate by me 03/29/24.    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 on 3/9 w/  Persistent  Leukocytosis  LLL PNA  -s/p repeat CT 3/18, with fluid collection noted, s/p exploratory laparotomy 3/18 with abdominal washout and ascites noted  -completed course of Fluconazole, CTX/Flagyl 3/17 prior to repeat surgery  -CXR 3/12 with possible LLL pneumonia, completed course of CTX  -ID following, abdominal fluid culture with pseudomonas  -continue IV Zosyn, mycamine, dapto for now   -lower OLIVER drain removed   -WOCN for ostomy care. Imodium added for high ostomy output has now been DC'd as output has significantly decreased, cont to monitor closely   -improved WBC  --Dr Godfrey recommending to keep staples in place for a minimum of another week (from 3/28/24).      Dysphagia  Malnutrition  -SLP following   -Nutrition following  -TPN weaned   -may need tube feeds if PO intake inadequate  --diet per SLP recs   --adding Remeron for appetite, monitor. I discussed with dtr that this could be a progression of dementia as well. Dtr is not comfortable with comfort diet but is now agreeable to feeding tube.   -will attempt to place keofeed today (hopeful patient does not pull out). SLP eval with MBS today as well      Toxic metabolic encephalopathy  -per daughter significant change in mental status and speech since arrival, and team had a long discussion and felt this  is probably TME in setting of infection, recent large operation, underlying dementia  -initial CT head 3/2 negative, repeated CT head 3/13 without any acute findings  -EEG negative for seizures, findings consistent with TME  -minimize sedating meds   -slowly improving     Multiple falls with increasing frequency   -CT of head shows age-related changes which are chronic but no acute process  -neuro consulted, likely d/t neuropathy (confirmed with EMG) and progression of dementia     Multiple rib fractures and also left clavicle fracture  -With no displacement or mildly displaced.  Orthopedic surgery evaluated. No surgical intervention  planned.  Recs nonweightbearing LUE, may come out of sling in 5-7 days to initiate gentle ROM exercises per ortho.  F/u with ortho/Dr. Maldonado in 2 weeks  -Pain control, lidocaine patch  -bruise noted to L shoulder/neck region however X-ray negative     Acute on Chronic Anemia  -S/P 1 unit PRBCs 3/11 and 3/16, monitoring H&H  -no clear source of active bleeding, hemoccult negative  -Iron 34, Iron sat 19  -Started on IV Iron 3/23  -transfuse PRN hgb <7     Acute RUE DVT (brachial)  -Provoked, 2/2 PICC   -eliquis     Prostate cancer  Hx BPH s/p greenlight photo vaporization of prostate 2018  -Follows with Dr. Noland  -S/P cyberknife radiation 2/9/23  -Has intermittent hematuria, monitor while on AC  - swollen scrotum and penis per RN, improvement s/p one time dose of Lasix, likely due to immobility      Dementia and increasing memory problem  -Patient was previously living alone and driving, given significant decline will need placement     Hypertension  -cleared by speech, resumed home oral regimen  -continue Verapamil   -BP uncontrolled, but BP is being checking on LE    -Increased Lisinopril to 40 mg daily 3/32  -Coreg added this admission, increased to 25 mg BID on 3/24; changed to Lopressor 300 mg TID in addition to increased CCB   -PRN IV dosing for SBP >180     Seizure  disorder  -continue vimpat and keppra     Hyperlipidemia  Hypothyroidism  -resume home regimen     Expected Discharge Location and Transportation: SNF once eating more   Expected Discharge   Expected Discharge Date: 3/29/2024; Expected Discharge Time:       DVT prophylaxis:  Medical and mechanical DVT prophylaxis orders are present.    AM-PAC 6 Clicks Score (PT): 10 (03/29/24 0800)    CODE STATUS:   Code Status and Medical Interventions:   Ordered at: 03/02/24 1456     Medical Intervention Limits:    NO intubation (DNI)     Code Status (Patient has no pulse and is not breathing):    No CPR (Do Not Attempt to Resuscitate)     Medical Interventions (Patient has pulse or is breathing):    Limited Support       Sheridan Roman, APRN  03/29/24

## 2024-03-29 NOTE — PROGRESS NOTES
"Patient Name:  Lea Rivers  YOB: 1945  [id number]    Surgery Progress Note    Date of visit: 3/29/2024    Subjective     Unable to obtain full history due to patient's dementia. No abdominal pain. No nausea/vomiting. No fevers.  Having ostomy output, now improved off Imodium. Appetite poor        Objective       BP (!) 226/100   Pulse 68   Temp 98.3 °F (36.8 °C) (Axillary)   Resp 18   Ht 170.2 cm (67.01\")   Wt 98.8 kg (217 lb 12 oz)   SpO2 97%   BMI 34.10 kg/m²     Intake/Output Summary (Last 24 hours) at 3/29/2024 0929  Last data filed at 3/29/2024 0539  Gross per 24 hour   Intake --   Output 400 ml   Net -400 ml       CV:  Rhythm regular and rate regular  L:  Clear to auscultation bilaterally  Abd:  Bowel sounds positive, soft, nontender, OLIVER drain serous, ostomy viable and functioning  Ext:  No cyanosis, clubbing, edema    Recent labs and imaging that are back at this time have been reviewed.   WBC 8.8  Hemoglobin 9.2       Assessment & Plan     Patient postoperative day 20 from total abdominal colectomy with end ileostomy, postoperative day 11 abdominal washout. Pain control.  Diet per speech and swallow recommendations, continue to monitor intake with TPN off. Abx per ID, OR cultures with rare pseudomonas and Enterococcus. OK to continue anticoagulation for UE DVT.           Harley Godfrey MD  3/29/2024  09:57 EDT      "

## 2024-03-29 NOTE — PROGRESS NOTES
Lea Barrett [place]  1945  [id number]        Evaluating Physician: Louis Chow MD    Chief Complaint: abdpain    Reason for Consultation: IA infection    History of present illness:     Patient is a 78 y.o.  Yr old male with history of seizure disorder, colon cancer/prostate cancer with prior radiation, diabetes and dementia per admission notes with admission March 2 after frequent falls noted to have multiple rib fractures and left side clavicular fracture in the emergency room.noted to have significant colon distention with potential for pneumatosis on CT scan and seen by gastroenterology/surgery.taken for surgery on March 9 with diagnosis of toxic dilation of the colon for total abdominal colectomy and end ileostomy; medicine notes indicate he received ceftriaxone/Flagyl and Diflucan.  Leukocytosis persisted.repeat CT scan on March 18 with postsurgical changes, moderate loculated ascitic fluid in the anterior abdomen as described by radiology.taken back to surgery on March 18 for exploratory laparotomy with washout.  Culture subsequently with gram-negative manuel at least.  Empiric antibiotics adjusted to Zosyn/Mycamine    3/29/24 sleepy;   per nursing, no new distress; + abdominal pain which is less intense overall, dull at present,  worse with palpation, better with pain meds and he will not attach a numerical severity.    no headache photophobia or neck stiffness.  No dyspnea or cough.  He has a sling on the left arm with left arm PICC line.  No rash.  Nursing reports no other new focal pain or distress.       Past Medical History:   Diagnosis Date    Anemia     Colon cancer 1990    Status post partial colectomy in 1990. No chemotherapy or radiation therapy.    Coronary artery disease     Nonobstructive coronary artery disease.    Diabetes     Dyslipidemia     GERD (gastroesophageal reflux disease)     Hx of.    Hyperlipidemia     Hypertension     Hyponatremia     Hypothyroidism     Left shoulder  pain     Low sodium levels 2022    recent issue; saw nephrologist who ruled out kidney issues; took Sodium Chloride po to bring levels up    Memory deficit     FROM ANESTHESIA    Osteoarthritis     Prostate cancer 07/23/2022    Seizure disorder     silent seziure-diagnosed years ago    Skin cancer        Past Surgical History:   Procedure Laterality Date    APPENDECTOMY      CARDIAC CATHETERIZATION  2012    30 to 40% LAD    CARPAL TUNNEL RELEASE Bilateral     CHOLECYSTECTOMY      COLECTOMY PARTIAL / TOTAL  1990    Partial colectomy    COLON RESECTION N/A 3/9/2024    Procedure: TOTAL ABDOMINAL COLECTOMY, END ILEOSTOMY;  Surgeon: Harley Godfrey MD;  Location:  [facility];  Service: General;  Laterality: N/A;    COLONOSCOPY      CYBERKNIFE  02/09/2023    Prostate/SV    CYSTOSCOPY TRANSURETHRAL RESECTION OF PROSTATE N/A 09/21/2018    Procedure: CYSTOSCOPY TRANSURETHRAL RESECTION OF PROSTATE GREENLIGHT;  Surgeon: Naseem Clayton MD;  Location:  [facility];  Service: Urology    EXPLORATORY LAPAROTOMY N/A 3/18/2024    Procedure: LAPAROTOMY EXPLORATORY, ABDOMINAL WASH OUT;  Surgeon: Harley Godfrey MD;  Location:  [facility];  Service: General;  Laterality: N/A;    OTHER SURGICAL HISTORY      Contraction surgery on bilateral hands and wrists.    PROSTATE BIOPSY N/A 11/11/2022    Procedure: TRANSRECTAL ULTRASOUND GUIDED BIOPSY OF PROSTATE;  Surgeon: Naseem Noland MD;  Location:  [facility];  Service: Urology;  Laterality: N/A;    SINUS SURGERY      SKIN BIOPSY      TEETH EXTRACTION      TONSILLECTOMY      TOTAL KNEE ARTHROPLASTY Right 04/07/2022    Procedure: TOTAL KNEE ARTHROPLASTY WITH ORTHO ROBOT RIGHT;  Surgeon: Louis Malcolm MD;  Location:  [facility];  Service: Robotics - Ortho;  Laterality: Right;    TRANSRECTAL INCISION PROSTATE WITH GOLD SEED Bilateral 01/06/2023    Procedure: TRANSRECTAL ULTRASOUND GUIDED PROSTATE FIDUCIAL MARKER PLACEMENT;  Surgeon: Naseem Noland MD;  Location:  [facility];   "Service: Urology;  Laterality: Bilateral;    TURP / TRANSURETHRAL INCISION / DRAINAGE PROSTATE      VASECTOMY            family history includes Arthritis in an other family member; Cancer in his mother and another family member; Esophageal cancer in his brother; Hypertension in his father; No Known Problems in his paternal grandfather, paternal grandmother, and sister; Stroke in his father, maternal grandfather, sister, and another family member.    Allergies   Allergen Reactions    Chlorhexidine Rash    Sulfa Antibiotics Rash     Childhood reaction             Review of Systems    3/29/24 as per nursing also    Constitutional-- No Fever, chills or sweats.  Appetite diminished with fatigue  Heent-- No new vision, hearing or throat complaints.  No epistaxis or oral sores.  Denies odynophagia or dysphagia.  No flashers, floaters or eye pain. No odynophagia or dysphagia. No headache, photophobia or neck stiffness.  CV-- No chest pain, palpitation or syncope  Resp-- No SOB/cough/Hemoptysis  GI- see above.  No hematochezia, melena, or hematemesis. Denies jaundice or chronic liver disease.  -- No dysuria, hematuria, or flank pain.  Denies hesitancy, urgency or flank pain.  Lymph- no swollen lymph nodes in neck/axilla or groin.   Heme- No active bruising or bleeding  MS-- no swelling or pain in the bones or joints of arms/legs.  No new back pain.  Neuro-- generally debilitated, unable to lift his legs off the bed according to nursing.  Wiggles his feet/toes    Full 12 point review of systems reviewed and negative otherwise for acute complaints, except for above    Physical Exam:   Vital Signs   BP (!) 195/88 (BP Location: Left leg, Patient Position: Lying)   Pulse 71   Temp 98.7 °F (37.1 °C) (Axillary)   Resp 18   Ht 170.2 cm (67.01\")   Wt 98.8 kg (217 lb 12 oz)   SpO2 96%   BMI 34.10 kg/m²     GENERAL: sleepy, answers questions and follows basic commands as above, in no acute distress. Appears chronically " debilitated.     HEENT: Normocephalic, atraumatic.   No conjunctival injection. No icterus. Oropharynx clear without evidence of thrush or exudate. No evidence of periodontal disease.    NECK: Supple without nuchal rigidity. No mass.  HEART: RRR; No murmur, rubs, gallops.   LUNGS: diminished at bases with some crackles at the bases, but otherwise Clear to auscultation bilaterally without wheezing/ rhonchi.  Nonlabored. No dullness.  ABDOMEN: Soft,  tender, nondistended. Positive bowel sounds but diminished. No rebound or guarding. NO mass or HSM.  EXT:  +edema  MSK: FROM without joint effusions noted arms/legs.    SKIN: Warm and dry without cutaneous eruptions on Inspection/palpation.    NEURO: sleepy    No peripheral stigmata/phenomena of endocarditis    IV without obvious redness or drainage at left arm    Laboratory Data    Results from last 7 days   Lab Units 03/28/24  1646 03/28/24  0550 03/27/24  0707 03/26/24  0632   WBC 10*3/mm3  --  8.95 10.46 12.33*   HEMOGLOBIN g/dL 9.3* 9.0* 9.6* 9.1*   HEMATOCRIT % 29.1* 29.6* 30.0* 29.4*   PLATELETS 10*3/mm3  --  431 488* 517*     Results from last 7 days   Lab Units 03/27/24  0707   SODIUM mmol/L 132*   POTASSIUM mmol/L 4.0   CHLORIDE mmol/L 101   CO2 mmol/L 22.0   BUN mg/dL 15   CREATININE mg/dL 0.53*   GLUCOSE mg/dL 101*   CALCIUM mg/dL 7.9*     Results from last 7 days   Lab Units 03/25/24  1418   ALK PHOS U/L 161*   BILIRUBIN mg/dL 0.2   ALT (SGPT) U/L 7   AST (SGOT) U/L 18         Results from last 7 days   Lab Units 03/25/24  1418   CRP mg/dL 1.94*       Estimated Creatinine Clearance: 126.6 mL/min (A) (by C-G formula based on SCr of 0.53 mg/dL (L)).      Microbiology:      Radiology:  Imaging Results (Last 72 Hours)       ** No results found for the last 72 hours. **              Impression:     --acute abdominal pain with total abdominal colectomy/ileostomy as above related to toxic dilation of colon As per operative note, postop with persistent leukocytosis  and fluid collections on CT scan, taken for exploratory laparotomy and fluid culture positive for gram-negative rods so far, consistent with abscess.  Empiric antimicrobials adjusted to Zosyn/Mycamine/daptomycin    --Left lower lobe pneumonia per medicine team notes, abnormal chest x-ray March 12.no respiratory distress or productive sputum.  Empiric Zosyn ongoing.  If worsening respiratory status you could consider further imaging etc.nasal cannula stable per nursing    --history dementia per admission notes a little specifics regarding baseline not clear and generally poor insight overall.  Description of toxic/metabolic encephalopathy per medicine team notes during this admission.  Prior history seizure and generally debilitated.    --History of multiple falls with left clavicular and multiple rib fractures per admission notes.  Orthopedics has seen.    --Right upper extremity DVT, described as brachial per medicine team notes.  Anticoagulation at discretion of medicine team    --History prostate and colon cancer previously per admission notes.        PLAN:       --IV Zosyn  (started 3/18)/Mycamine/ daptomycin (started 3/23); duration to depend on clinical course; potentially  14 days total if steadily better    **culture with pseudomonas aeruginosa and E Faecium    --Check/reviewed labs cultures and scans    --Partial history per nursing staff    --Discussed with microbiology     --Highly complex of issues with high risk for further serious morbidity and other serious sequela    Copied text in this note has been reviewed and is accurate as of 03/29/24.        Louis Chow MD  3/29/2024

## 2024-03-29 NOTE — PLAN OF CARE
Problem: Adult Inpatient Plan of Care  Goal: Plan of Care Review  Outcome: Ongoing, Not Progressing  Goal: Patient-Specific Goal (Individualized)  Outcome: Ongoing, Not Progressing  Goal: Absence of Hospital-Acquired Illness or Injury  Outcome: Ongoing, Not Progressing  Intervention: Identify and Manage Fall Risk  Recent Flowsheet Documentation  Taken 3/29/2024 1600 by Curtis Caldwell RN  Safety Promotion/Fall Prevention:   activity supervised   assistive device/personal items within reach   clutter free environment maintained   fall prevention program maintained   lighting adjusted   nonskid shoes/slippers when out of bed   room organization consistent   safety round/check completed  Taken 3/29/2024 1400 by Curtis Caldwell RN  Safety Promotion/Fall Prevention: patient off unit  Taken 3/29/2024 1200 by Curtis Caldwell RN  Safety Promotion/Fall Prevention:   activity supervised   assistive device/personal items within reach   clutter free environment maintained   fall prevention program maintained   lighting adjusted   nonskid shoes/slippers when out of bed   room organization consistent   safety round/check completed  Taken 3/29/2024 1000 by Curtis Caldwell RN  Safety Promotion/Fall Prevention:   activity supervised   assistive device/personal items within reach   clutter free environment maintained   fall prevention program maintained   lighting adjusted   nonskid shoes/slippers when out of bed   room organization consistent   safety round/check completed  Taken 3/29/2024 0800 by Curtis Caldwell, RN  Safety Promotion/Fall Prevention:   activity supervised   assistive device/personal items within reach   clutter free environment maintained   fall prevention program maintained   lighting adjusted   nonskid shoes/slippers when out of bed   room organization consistent   safety round/check completed  Intervention: Prevent Skin Injury  Recent Flowsheet Documentation  Taken 3/29/2024 1600 by Paulette  RAKESH Acevedo  Body Position:   turned   left  Skin Protection:   adhesive use limited   incontinence pads utilized   transparent dressing maintained   tubing/devices free from skin contact  Taken 3/29/2024 1200 by Curtis Caldwell RN  Body Position:   turned   supine, legs elevated  Skin Protection:   adhesive use limited   incontinence pads utilized   transparent dressing maintained   tubing/devices free from skin contact  Taken 3/29/2024 1000 by Curtis Caldwell RN  Body Position:   turned   left  Skin Protection:   adhesive use limited   incontinence pads utilized   transparent dressing maintained   tubing/devices free from skin contact  Taken 3/29/2024 0800 by Curtis Caldwell RN  Body Position:   turned   right  Skin Protection:   adhesive use limited   incontinence pads utilized   transparent dressing maintained   tubing/devices free from skin contact  Intervention: Prevent and Manage VTE (Venous Thromboembolism) Risk  Recent Flowsheet Documentation  Taken 3/29/2024 1600 by Curtis Caldwell RN  Activity Management: activity encouraged  Taken 3/29/2024 1200 by Curtis Caldwell RN  Activity Management: activity encouraged  Taken 3/29/2024 1000 by Curtis Caldwell RN  Activity Management: activity encouraged  Taken 3/29/2024 0800 by Curtis Caldwell RN  Activity Management: activity encouraged  Range of Motion: active ROM (range of motion) encouraged  Intervention: Prevent Infection  Recent Flowsheet Documentation  Taken 3/29/2024 1600 by Curtis Caldwell RN  Infection Prevention:   environmental surveillance performed   hand hygiene promoted   rest/sleep promoted  Taken 3/29/2024 1200 by Curtis Caldwell RN  Infection Prevention:   environmental surveillance performed   hand hygiene promoted   rest/sleep promoted  Taken 3/29/2024 1000 by Curtis Caldwell RN  Infection Prevention:   environmental surveillance performed   hand hygiene promoted   rest/sleep promoted  Taken 3/29/2024 0800 by  Curtis Caldwell RN  Infection Prevention:   environmental surveillance performed   hand hygiene promoted   rest/sleep promoted  Goal: Optimal Comfort and Wellbeing  Outcome: Ongoing, Not Progressing  Goal: Readiness for Transition of Care  Outcome: Ongoing, Not Progressing     Problem: Skin Injury Risk Increased  Goal: Skin Health and Integrity  Outcome: Ongoing, Not Progressing  Intervention: Optimize Skin Protection  Recent Flowsheet Documentation  Taken 3/29/2024 1600 by Curtis Caldwell RN  Pressure Reduction Techniques: weight shift assistance provided  Head of Bed (HOB) Positioning: HOB at 30 degrees  Pressure Reduction Devices: specialty bed utilized  Skin Protection:   adhesive use limited   incontinence pads utilized   transparent dressing maintained   tubing/devices free from skin contact  Taken 3/29/2024 1200 by Curtis Caldwell RN  Pressure Reduction Techniques: weight shift assistance provided  Head of Bed (HOB) Positioning: HOB at 30 degrees  Pressure Reduction Devices: specialty bed utilized  Skin Protection:   adhesive use limited   incontinence pads utilized   transparent dressing maintained   tubing/devices free from skin contact  Taken 3/29/2024 1000 by Curtis Caldwell RN  Pressure Reduction Techniques: weight shift assistance provided  Head of Bed (HOB) Positioning: HOB at 30 degrees  Pressure Reduction Devices: specialty bed utilized  Skin Protection:   adhesive use limited   incontinence pads utilized   transparent dressing maintained   tubing/devices free from skin contact  Taken 3/29/2024 0800 by Curtis Caldwell RN  Pressure Reduction Techniques: frequent weight shift encouraged  Head of Bed (HOB) Positioning: HOB at 30 degrees  Pressure Reduction Devices: specialty bed utilized  Skin Protection:   adhesive use limited   incontinence pads utilized   transparent dressing maintained   tubing/devices free from skin contact     Problem: Fall Injury Risk  Goal: Absence of Fall and  Fall-Related Injury  Outcome: Ongoing, Not Progressing  Intervention: Identify and Manage Contributors  Recent Flowsheet Documentation  Taken 3/29/2024 1600 by Curtis Caldwell RN  Medication Review/Management: medications reviewed  Taken 3/29/2024 1200 by Curtis Caldwell RN  Medication Review/Management: medications reviewed  Taken 3/29/2024 1000 by Curtis Caldwell RN  Medication Review/Management: medications reviewed  Taken 3/29/2024 0800 by Curtis Caldwell RN  Medication Review/Management: medications reviewed  Intervention: Promote Injury-Free Environment  Recent Flowsheet Documentation  Taken 3/29/2024 1600 by Curtis Caldwell RN  Safety Promotion/Fall Prevention:   activity supervised   assistive device/personal items within reach   clutter free environment maintained   fall prevention program maintained   lighting adjusted   nonskid shoes/slippers when out of bed   room organization consistent   safety round/check completed  Taken 3/29/2024 1400 by Curtis Caldwell RN  Safety Promotion/Fall Prevention: patient off unit  Taken 3/29/2024 1200 by Curtis Caldwell RN  Safety Promotion/Fall Prevention:   activity supervised   assistive device/personal items within reach   clutter free environment maintained   fall prevention program maintained   lighting adjusted   nonskid shoes/slippers when out of bed   room organization consistent   safety round/check completed  Taken 3/29/2024 1000 by Curtis Caldwell RN  Safety Promotion/Fall Prevention:   activity supervised   assistive device/personal items within reach   clutter free environment maintained   fall prevention program maintained   lighting adjusted   nonskid shoes/slippers when out of bed   room organization consistent   safety round/check completed  Taken 3/29/2024 0800 by Curtis Caldwell RN  Safety Promotion/Fall Prevention:   activity supervised   assistive device/personal items within reach   clutter free environment maintained    fall prevention program maintained   lighting adjusted   nonskid shoes/slippers when out of bed   room organization consistent   safety round/check completed     Problem: Adjustment to Illness (Sepsis/Septic Shock)  Goal: Optimal Coping  Outcome: Ongoing, Not Progressing     Problem: Bleeding (Sepsis/Septic Shock)  Goal: Absence of Bleeding  Outcome: Ongoing, Not Progressing     Problem: Glycemic Control Impaired (Sepsis/Septic Shock)  Goal: Blood Glucose Level Within Desired Range  Outcome: Ongoing, Not Progressing     Problem: Infection Progression (Sepsis/Septic Shock)  Goal: Absence of Infection Signs and Symptoms  Outcome: Ongoing, Not Progressing  Intervention: Initiate Sepsis Management  Recent Flowsheet Documentation  Taken 3/29/2024 1600 by Curtis Caldwell RN  Infection Prevention:   environmental surveillance performed   hand hygiene promoted   rest/sleep promoted  Taken 3/29/2024 1200 by Curtis Caldwell RN  Infection Prevention:   environmental surveillance performed   hand hygiene promoted   rest/sleep promoted  Taken 3/29/2024 1000 by Curtis Caldwell RN  Infection Prevention:   environmental surveillance performed   hand hygiene promoted   rest/sleep promoted  Taken 3/29/2024 0800 by Curtis Caldwell RN  Infection Prevention:   environmental surveillance performed   hand hygiene promoted   rest/sleep promoted  Intervention: Promote Recovery  Recent Flowsheet Documentation  Taken 3/29/2024 1600 by Curtis Caldwell RN  Activity Management: activity encouraged  Taken 3/29/2024 1200 by Curtis Caldwell RN  Activity Management: activity encouraged  Taken 3/29/2024 1000 by Curtis Caldwell RN  Activity Management: activity encouraged  Taken 3/29/2024 0800 by Curtis Caldwell, RN  Activity Management: activity encouraged     Problem: Nutrition Impaired (Sepsis/Septic Shock)  Goal: Optimal Nutrition Intake  Outcome: Ongoing, Not Progressing   Goal Outcome Evaluation:   Plan of care reviewed  with: Patient & daughter

## 2024-03-29 NOTE — PLAN OF CARE
Goal Outcome Evaluation:  Plan of Care Reviewed With: patient         MBS completed. Soft/chopped solids, nectar-thick liquids, thin water between meals. Aspiration precautions. Notify SLP if any concerns.          Anticipated Discharge Disposition (SLP): skilled nursing facility          SLP Swallowing Diagnosis: moderate, oral dysphagia, pharyngeal dysphagia (03/29/24 9886)

## 2024-03-29 NOTE — CASE MANAGEMENT/SOCIAL WORK
Continued Stay Note  [facility]     Patient Name: Lea Rivers  MRN: [id number]  Today's Date: 3/29/2024    Admit Date: 3/2/2024    Plan: TBD   Discharge Plan       Row Name 03/29/24 1512       Plan    Plan TBD    Patient/Family in Agreement with Plan yes    Plan Comments Met with Mr. Rivers's daughter, Luis Enrique, at the bedside to discuss discharge plan. Mr. Rivers was getting swallow test and not in his room. Per MDR, anticipating that patient will need kio feed, but family does not believe that Mr. Rivers will allow that. If he does not allow for a kio feed, daughter reported family will not try to force it. Daughter reports that they would still like for patient to go to [facility] for SNF to LTC if possible.  spoke with Rosario at [facility], and she would like to meet with patient and family next week. Her cell phone number is [phone].  will continue to follow plan of care and assist with discharge planning needs as indicated.    Final Discharge Disposition Code 30 - still a patient                   Discharge Codes    No documentation.                 Expected Discharge Date and Time       Expected Discharge Date Expected Discharge Time    Mar 29, 2024               Valerie Keith RN

## 2024-03-29 NOTE — MBS/VFSS/FEES
Acute Care - Speech Language Pathology   Swallow Re-Evaluation [facility]  Modified Barium Swallow Study (MBS)     Patient Name: Lea Rivers  : 1945  MRN: [id number]  Today's Date: 3/29/2024               Admit Date: 3/2/2024    Visit Dx:     ICD-10-CM ICD-9-CM   1. Fall, initial encounter  W19.XXXA E888.9   2. Closed fracture of multiple ribs of left side, initial encounter  S22.42XA 807.09   3. Closed displaced fracture of acromial end of left clavicle, initial encounter  S42.032A 810.03   4. Falls frequently  R29.6 V15.88   5. Acute UTI (urinary tract infection)  N39.0 599.0   6. Colon distention  K63.89 569.89   7. Paralytic ileus of small intestine and colon  K56.0 560.1   8. Oropharyngeal dysphagia  R13.12 787.22   9. Intra-abdominal fluid  R18.8 789.59     Patient Active Problem List   Diagnosis    Coronary artery disease    Dyslipidemia    Hypothyroidism    GERD (gastroesophageal reflux disease)    Seizure disorder    BPH (benign prostatic hypertrophy)    Hypertension    Primary osteoarthritis of both knees    Syncope and collapse    Precordial pain    Diabetes    Status post total right knee replacement    Postoperative urinary retention    Confusion, postoperative    Acute blood loss anemia, asymptomatic, no transfusion required    Elevated prostate specific antigen (PSA)    Prostate cancer    Anemia    Body mass index (BMI) of 32.0-32.9 in adult    Localization-related focal epilepsy with simple partial seizures    Need for vaccination against Streptococcus pneumoniae    Upper extremity tendon tear, right, initial encounter    Vitamin D deficiency    PSA elevation    Polyp of colon    Overactive bladder    Gross hematuria    History of colon polyps    History of colon cancer    B12 deficiency    Falls    Pneumatosis intestinalis     Past Medical History:   Diagnosis Date    Anemia     Colon cancer     Status post partial colectomy in . No chemotherapy or radiation therapy.     Coronary artery disease     Nonobstructive coronary artery disease.    Diabetes     Dyslipidemia     GERD (gastroesophageal reflux disease)     Hx of.    Hyperlipidemia     Hypertension     Hyponatremia     Hypothyroidism     Left shoulder pain     Low sodium levels 2022    recent issue; saw nephrologist who ruled out kidney issues; took Sodium Chloride po to bring levels up    Memory deficit     FROM ANESTHESIA    Osteoarthritis     Prostate cancer 07/23/2022    Seizure disorder     silent seziure-diagnosed years ago    Skin cancer      Past Surgical History:   Procedure Laterality Date    APPENDECTOMY      CARDIAC CATHETERIZATION  2012    30 to 40% LAD    CARPAL TUNNEL RELEASE Bilateral     CHOLECYSTECTOMY      COLECTOMY PARTIAL / TOTAL  1990    Partial colectomy    COLON RESECTION N/A 3/9/2024    Procedure: TOTAL ABDOMINAL COLECTOMY, END ILEOSTOMY;  Surgeon: Harley Godfrey MD;  Location:  [facility];  Service: General;  Laterality: N/A;    COLONOSCOPY      CYBERKNIFE  02/09/2023    Prostate/SV    CYSTOSCOPY TRANSURETHRAL RESECTION OF PROSTATE N/A 09/21/2018    Procedure: CYSTOSCOPY TRANSURETHRAL RESECTION OF PROSTATE GREENLIGHT;  Surgeon: Naseem Clayton MD;  Location:  [facility];  Service: Urology    EXPLORATORY LAPAROTOMY N/A 3/18/2024    Procedure: LAPAROTOMY EXPLORATORY, ABDOMINAL WASH OUT;  Surgeon: Harley Godfrey MD;  Location:  [facility];  Service: General;  Laterality: N/A;    OTHER SURGICAL HISTORY      Contraction surgery on bilateral hands and wrists.    PROSTATE BIOPSY N/A 11/11/2022    Procedure: TRANSRECTAL ULTRASOUND GUIDED BIOPSY OF PROSTATE;  Surgeon: Naseem Noland MD;  Location:  [facility];  Service: Urology;  Laterality: N/A;    SINUS SURGERY      SKIN BIOPSY      TEETH EXTRACTION      TONSILLECTOMY      TOTAL KNEE ARTHROPLASTY Right 04/07/2022    Procedure: TOTAL KNEE ARTHROPLASTY WITH ORTHO ROBOT RIGHT;  Surgeon: Louis Malcolm MD;  Location:  [facility];  Service:  Robotics - Ortho;  Laterality: Right;    TRANSRECTAL INCISION PROSTATE WITH GOLD SEED Bilateral 01/06/2023    Procedure: TRANSRECTAL ULTRASOUND GUIDED PROSTATE FIDUCIAL MARKER PLACEMENT;  Surgeon: Naseem Noland MD;  [facility];  Service: Urology;  Laterality: Bilateral;    TURP / TRANSURETHRAL INCISION / DRAINAGE PROSTATE      VASECTOMY         SLP Recommendation and Plan  SLP Swallowing Diagnosis: moderate, oral dysphagia, pharyngeal dysphagia (03/29/24 1345)  SLP Diet Recommendation: soft to chew textures, chopped, nectar thick liquids, water between meals after oral care, with supervision (03/29/24 1345)  Recommended Precautions and Strategies: upright posture during/after eating, general aspiration precautions, assist with feeding (03/29/24 1345)  SLP Rec. for Method of Medication Administration: meds whole, meds crushed, with puree, as tolerated (03/29/24 1345)     Monitor for Signs of Aspiration: yes, notify SLP if any concerns (03/29/24 1345)     Swallow Criteria for Skilled Therapeutic Interventions Met: demonstrates skilled criteria (03/29/24 1345)  Anticipated Discharge Disposition (SLP): skilled nursing facility (03/29/24 1345)  Rehab Potential/Prognosis, Swallowing: re-evaluate goals as necessary (03/29/24 1345)  Therapy Frequency (Swallow): 5 days per week (03/29/24 1345)  Predicted Duration Therapy Intervention (Days): until discharge (03/29/24 1345)  Oral Care Recommendations: Oral Care BID/PRN, Toothbrush (03/29/24 1345)                                        Plan of Care Reviewed With: patient      SWALLOW EVALUATION (Last 72 Hours)       SLP Adult Swallow Evaluation       Row Name 03/29/24 134 03/28/24 1325                Rehab Evaluation    Document Type re-evaluation  -SM therapy note (daily note)  -       Subjective Information no complaints  - no complaints  -       Patient Observations alert;cooperative  -SM cooperative;lethargic  -       Patient/Family/Caregiver  Comments/Observations -- Family present  -       Patient Effort adequate  -SM adequate  -       Symptoms Noted During/After Treatment -- none  -          General Information    Current Method of Nutrition soft to chew textures;ground;honey-thick liquids  -SM --       Plans/Goals Discussed with patient  -SM --       Barriers to Rehab cognitive status  -SM --          Pain    Additional Documentation -- Pain Scale: FACES Pre/Post-Treatment (Group)  -          Pain Scale: FACES Pre/Post-Treatment    Pain: FACES Scale, Pretreatment 0-->no hurt  -SM 0-->no hurt  -       Posttreatment Pain Rating 0-->no hurt  -SM 0-->no hurt  -          MBS/VFSS    Utensils Used spoon;cup;straw  -SM --       Consistencies Trialed thin liquids;nectar/syrup-thick liquids;honey-thick liquids;pureed;regular textures  -SM --          MBS/VFSS Interpretation    Oral Prep Phase impaired oral phase of swallowing  -SM --       Oral Transit Phase impaired  -SM --       Oral Residue impaired  -SM --          Oral Preparatory Phase    Oral Preparatory Phase prolonged manipulation  -SM --          Oral Transit Phase    Impaired Oral Transit Phase delayed initiation of bolus transit;increased A-P transit time;premature spillage of liquids into pharynx  -SM --          Oral Residue    Impaired Oral Residue diffuse residue throughout oral cavity  -SM --       Response to Oral Residue cleared residue;with spontaneous subsequent swallow;with liquid wash  -SM --          Initiation of Pharyngeal Swallow    Initiation of Pharyngeal Swallow bolus in pyriform sinuses  -SM --       Pharyngeal Phase impaired pharyngeal phase of swallowing  -SM --       Aspiration Before the Swallow thin liquids;secondary to reduced back of tongue control;secondary to delayed swallow initiation or mistiming;other (see comments)  with initial tsp trial only  -SM --       Penetration During the Swallow thin liquids;secondary to delayed swallow initiation or  mistiming;secondary to reduced laryngeal elevation;secondary to reduced vestibular closure;other (see comments)  high-mid depth  -SM --       Aspiration After the Swallow thin liquids;nectar-thick liquids;honey-thick liquids;pudding/puree;secondary to residue;in valleculae;in pyriform sinuses;other (see comments)  penetration via sweeping in pharyngeal residue after the swallow (with subsequent swallow). Eventual aspiration after the swallow just below the folds.  -SM --       Response to Aspiration Yes  -SM --       Responded to aspiration with inconsistent;delayed;throat clear;cough  -SM --       No spontaneous response to aspiration and effective subglottic clearance with cue (see comments)  -SM --       Successful Compensatory Maneuver Competency patient unable to adequately demonstrate/teach back compensations  -SM --       Pharyngeal Phase, Comment Silent aspiration with initial tsp thin liquid. As progressed, aspiration after the swallow with all, just below the VFs. Well-delayed but pt did eventually and spontaneously clear with coguh/throat clearing. With further occurrences penetration, SLP could not cue pt with cough/throat clearing. Discussed resilts with APRN. Recommendations to reflect discussion.  -SM --          SLP Evaluation Clinical Impression    SLP Swallowing Diagnosis moderate;oral dysphagia;pharyngeal dysphagia  -SM --       Functional Impact risk of aspiration/pneumonia;risk of malnutrition;risk of dehydration  -SM --       Rehab Potential/Prognosis, Swallowing re-evaluate goals as necessary  -SM --       Swallow Criteria for Skilled Therapeutic Interventions Met demonstrates skilled criteria  -SM --          SLP Treatment Clinical Impressions    Treatment Assessment (SLP) -- toleration of diet;suspected;continued;pharyngeal dysphagia  -       Treatment Assessment Comments (SLP) -- Pt lethargic, able to remain awake/alert w/ intermittent cues. No overt s/s of aspiration w/ HTL trials, cough  w/ thin liquid trials. Cont w/ difficulty completing exercises. Plan for [facility] 3/29  -       Daily Summary of Progress (SLP) -- progress toward functional goals as expected  -       Plan for Continued Treatment (SLP) -- continue treatment per plan of care  -       Care Plan Review -- care plan/treatment goals reviewed  -       Care Plan Review, Other Participant(s) -- family  -          Recommendations    Therapy Frequency (Swallow) 5 days per week  - --       Predicted Duration Therapy Intervention (Days) until discharge  - until discharge  -       SLP Diet Recommendation soft to chew textures;chopped;nectar thick liquids;water between meals after oral care, with supervision  - mechanical ground textures;no mixed consistencies;honey thick liquids  -       Recommended Diagnostics -- reassess via VFSS ([facility])  -       Recommended Precautions and Strategies upright posture during/after eating;general aspiration precautions;assist with feeding  - upright posture during/after eating;small bites of food and sips of liquid;multiple swallows per bite of food;multiple swallows per sip of liquid;check mouth frequently for oral residue/pocketing;general aspiration precautions;1:1 supervision;assist with feeding;fatigue precautions  -       Oral Care Recommendations Oral Care BID/PRN;Toothbrush  - Oral Care BID/PRN;Suction toothbrush  -       SLP Rec. for Method of Medication Administration meds whole;meds crushed;with puree;as tolerated  - meds crushed;with puree;as tolerated;meds via alternate route  -       Monitor for Signs of Aspiration yes;notify SLP if any concerns  - yes;notify SLP if any concerns  -       Anticipated Discharge Disposition (SLP) skilled nursing facility  - skilled nursing facility  -                 User Key  (r) = Recorded By, (t) = Taken By, (c) = Cosigned By      Initials Name Effective Dates     Sagrario De La Torre MS CCC-SLP 02/03/23 -      Collins  Shi VIVAS MS CCC-SLP 05/12/23 -                     EDUCATION  The patient has been educated in the following areas:   Dysphagia (Swallowing Impairment) Oral Care/Hydration Modified Diet Instruction.        SLP GOALS       Row Name 03/29/24 1345 03/28/24 1325          (LTG) Patient will demonstrate functional swallow for    Diet Texture (Demonstrate functional swallow) soft to chew (chopped) textures  -SM soft to chew (chopped) textures  -     Liquid viscosity (Demonstrate functional swallow) thin liquids  -SM nectar/ mildly thick liquids  -     [place] (Demonstrate functional swallow) with 1:1 assist/ supervision  - with minimal cues (75-90% accuracy)  -     Time Frame (Demonstrate functional swallow) by discharge  - by discharge  -     Progress/Outcomes (Demonstrate functional swallow) goal revised this date  - goal ongoing  -        (STG) Patient will tolerate trials of    Consistencies Trialed (Tolerate trials) soft to chew (chopped) textures;nectar/ mildly thick liquids;thin liquids  thin water between meals  - mechanical ground textures;honey/ moderately thick liquids  -     Desired Outcome (Tolerate trials) without signs/symptoms of aspiration;with adequate oral prep/transit/clearance  - without signs/symptoms of aspiration;without signs of distress;with adequate oral prep/transit/clearance  -     [place] (Tolerate trials) with 1:1 assist/ supervision  - with minimal cues (75-90% accuracy)  -     Time Frame (Tolerate trials) 1 week  - 1 week  -     Progress/Outcomes (Tolerate trials) goal revised this date  - continuing progress toward goal  -     Comment (Tolerate trials) -- No overt s/s of aspiration w/ HTL trials  -        (STG) Patient will tolerate therapeutic trials of    Consistencies Trialed (Tolerate therapeutic trials) regular textures;thin liquids  -SM soft to chew (chopped) textures  -     Desired Outcome (Tolerate therapeutic trials) with  adequate oral prep/transit/clearance;without signs/symptoms of aspiration  - with adequate oral prep/transit/clearance  -     [place] (Tolerate therapeutic trials) with minimal cues (75-90% accuracy)  - with minimal cues (75-90% accuracy)  -     Time Frame (Tolerate therapeutic trials) 1 week  - 1 week  -     Progress/Outcomes (Tolerate therapeutic trials) goal revised this date  - goal ongoing  -     Comment (Tolerate therapeutic trials) -- Did not attempt 2' lethargy  -        ([facility]) Lingual Strengthening Goal 1 (SLP)    Activity (Lingual Strengthening Goal 1, SLP) -- increase lingual tone/sensation/control/coordination/movement;increase tongue back strength  -     Increase Lingual Tone/Sensation/Control/Coordination/Movement -- swallow trials;lingual resistance exercises  -     Increase Tongue Back Strength -- lingual resistance exercises  -     [place]/Accuracy (Lingual Strengthening Goal 1, SLP) -- with moderate cues (50-74% accuracy)  -     Time Frame (Lingual Strengthening Goal 1, SLP) -- 1 week  -     Progress/Outcomes (Lingual Strengthening Goal 1, SLP) -- progress slower than expected  -        ([facility]) Pharyngeal Strengthening Exercise Goal 1 (SLP)    Activity (Pharyngeal Strengthening Goal 1, SLP) -- increase timing;increase superior movement of the hyolaryngeal complex;increase anterior movement of the hyolaryngeal complex;increase squeeze/positive pressure generation  -     Increase Timing -- prepping - 3 second prep or suck swallow or 3-step swallow  -     Increase Superior Movement of the Hyolaryngeal Complex -- falsetto  -     Increase Anterior Movement of the Hyolaryngeal Complex -- chin tuck against resistance (CTAR)  -     Increase Squeeze/Positive Pressure Generation -- hard effortful swallow  -     [place]/Accuracy (Pharyngeal Strengthening Goal 1, SLP) -- with maximum cues (25-49% accuracy)  -     Time Frame (Pharyngeal Strengthening Goal 1,  SLP) -- 1 week  -     Progress/Outcomes (Pharyngeal Strengthening Goal 1, SLP) -- progress slower than expected  -     Comment (Pharyngeal Strengthening Goal 1, SLP) -- Pt attempted CTAR w/ max cues, u/a to complete additional exercises  -               User Key  (r) = Recorded By, (t) = Taken By, (c) = Cosigned By      Initials Name Provider Type    Sagrario Castaneda, MS CCC-SLP Speech and Language Pathologist     Shi Guadalupe, MS CCC-SLP Speech and Language Pathologist                       Time Calculation:    Time Calculation- SLP       Row Name 03/29/24 1456             Time Calculation- SLP    SLP Start Time 1345  -SM      SLP Received On 03/29/24  -         Untimed Charges    70657-OP Motion Fluoro Eval Swallow Minutes 70  -SM         Total Minutes    Untimed Charges Total Minutes 70  -SM       Total Minutes 70  -SM                User Key  (r) = Recorded By, (t) = Taken By, (c) = Cosigned By      Initials Name Provider Type    Sagrario Castaneda, MS CCC-SLP Speech and Language Pathologist                    Therapy Charges for Today       Code Description Service Date Service Provider Modifiers Qty    [id number]  ST MOTION FLUORO EVAL SWALLOW 5 3/29/2024 Sagrario De La Torre, MS CCC-SLP GN 1                 Sagrario De La Torre MS CCC-SLP  3/29/2024

## 2024-03-30 LAB
ALBUMIN SERPL-MCNC: 2.7 G/DL (ref 3.5–5.2)
ALBUMIN/GLOB SERPL: 0.9 G/DL
ALP SERPL-CCNC: 106 U/L (ref 39–117)
ALT SERPL W P-5'-P-CCNC: 6 U/L (ref 1–41)
ANION GAP SERPL CALCULATED.3IONS-SCNC: 7 MMOL/L (ref 5–15)
ANISOCYTOSIS BLD QL: NORMAL
AST SERPL-CCNC: 12 U/L (ref 1–40)
BASOPHILS # BLD AUTO: 0.05 10*3/MM3 (ref 0–0.2)
BASOPHILS NFR BLD AUTO: 0.6 % (ref 0–1.5)
BILIRUB SERPL-MCNC: 0.3 MG/DL (ref 0–1.2)
BUN SERPL-MCNC: 11 MG/DL (ref 8–23)
BUN/CREAT SERPL: 16.2 (ref 7–25)
CALCIUM SPEC-SCNC: 8.2 MG/DL (ref 8.6–10.5)
CHLORIDE SERPL-SCNC: 105 MMOL/L (ref 98–107)
CHOLEST SERPL-MCNC: 120 MG/DL (ref 0–200)
CO2 SERPL-SCNC: 25 MMOL/L (ref 22–29)
CREAT SERPL-MCNC: 0.68 MG/DL (ref 0.76–1.27)
DEPRECATED RDW RBC AUTO: 66.1 FL (ref 37–54)
EGFRCR SERPLBLD CKD-EPI 2021: 94.6 ML/MIN/1.73
EOSINOPHIL # BLD AUTO: 0.05 10*3/MM3 (ref 0–0.4)
EOSINOPHIL NFR BLD AUTO: 0.6 % (ref 0.3–6.2)
ERYTHROCYTE [DISTWIDTH] IN BLOOD BY AUTOMATED COUNT: 18.7 % (ref 12.3–15.4)
GLOBULIN UR ELPH-MCNC: 3.1 GM/DL
GLUCOSE BLDC GLUCOMTR-MCNC: 102 MG/DL (ref 70–130)
GLUCOSE BLDC GLUCOMTR-MCNC: 106 MG/DL (ref 70–130)
GLUCOSE BLDC GLUCOMTR-MCNC: 98 MG/DL (ref 70–130)
GLUCOSE SERPL-MCNC: 100 MG/DL (ref 65–99)
HCT VFR BLD AUTO: 29.8 % (ref 37.5–51)
HGB BLD-MCNC: 9.5 G/DL (ref 13–17.7)
IMM GRANULOCYTES # BLD AUTO: 0.07 10*3/MM3 (ref 0–0.05)
IMM GRANULOCYTES NFR BLD AUTO: 0.8 % (ref 0–0.5)
LYMPHOCYTES # BLD AUTO: 1.01 10*3/MM3 (ref 0.7–3.1)
LYMPHOCYTES NFR BLD AUTO: 11.8 % (ref 19.6–45.3)
MAGNESIUM SERPL-MCNC: 2 MG/DL (ref 1.6–2.4)
MCH RBC QN AUTO: 31 PG (ref 26.6–33)
MCHC RBC AUTO-ENTMCNC: 31.9 G/DL (ref 31.5–35.7)
MCV RBC AUTO: 97.4 FL (ref 79–97)
MONOCYTES # BLD AUTO: 0.77 10*3/MM3 (ref 0.1–0.9)
MONOCYTES NFR BLD AUTO: 9 % (ref 5–12)
NEUTROPHILS NFR BLD AUTO: 6.63 10*3/MM3 (ref 1.7–7)
NEUTROPHILS NFR BLD AUTO: 77.2 % (ref 42.7–76)
NRBC BLD AUTO-RTO: 0 /100 WBC (ref 0–0.2)
PHOSPHATE SERPL-MCNC: 3.5 MG/DL (ref 2.5–4.5)
PLAT MORPH BLD: NORMAL
PLATELET # BLD AUTO: 359 10*3/MM3 (ref 140–450)
PMV BLD AUTO: 8.5 FL (ref 6–12)
POTASSIUM SERPL-SCNC: 3.9 MMOL/L (ref 3.5–5.2)
PROT SERPL-MCNC: 5.8 G/DL (ref 6–8.5)
RBC # BLD AUTO: 3.06 10*6/MM3 (ref 4.14–5.8)
SODIUM SERPL-SCNC: 137 MMOL/L (ref 136–145)
TRIGL SERPL-MCNC: 179 MG/DL (ref 0–150)
WBC MORPH BLD: NORMAL
WBC NRBC COR # BLD AUTO: 8.58 10*3/MM3 (ref 3.4–10.8)

## 2024-03-30 PROCEDURE — 84478 ASSAY OF TRIGLYCERIDES: CPT

## 2024-03-30 PROCEDURE — 25010000002 DAPTOMYCIN PER 1 MG: Performed by: INTERNAL MEDICINE

## 2024-03-30 PROCEDURE — 82948 REAGENT STRIP/BLOOD GLUCOSE: CPT

## 2024-03-30 PROCEDURE — 25010000002 MICAFUNGIN SODIUM 100 MG RECONSTITUTED SOLUTION 1 EACH VIAL: Performed by: INTERNAL MEDICINE

## 2024-03-30 PROCEDURE — 83735 ASSAY OF MAGNESIUM: CPT

## 2024-03-30 PROCEDURE — 99232 SBSQ HOSP IP/OBS MODERATE 35: CPT | Performed by: INTERNAL MEDICINE

## 2024-03-30 PROCEDURE — 85007 BL SMEAR W/DIFF WBC COUNT: CPT | Performed by: SURGERY

## 2024-03-30 PROCEDURE — 25010000002 PIPERACILLIN SOD-TAZOBACTAM PER 1 G: Performed by: INTERNAL MEDICINE

## 2024-03-30 PROCEDURE — 85025 COMPLETE CBC W/AUTO DIFF WBC: CPT | Performed by: SURGERY

## 2024-03-30 PROCEDURE — 80053 COMPREHEN METABOLIC PANEL: CPT

## 2024-03-30 PROCEDURE — 84100 ASSAY OF PHOSPHORUS: CPT

## 2024-03-30 PROCEDURE — 82465 ASSAY BLD/SERUM CHOLESTEROL: CPT

## 2024-03-30 RX ORDER — LOSARTAN POTASSIUM 50 MG/1
100 TABLET ORAL
Status: DISCONTINUED | OUTPATIENT
Start: 2024-03-31 | End: 2024-03-31 | Stop reason: HOSPADM

## 2024-03-30 RX ORDER — LABETALOL 200 MG/1
400 TABLET, FILM COATED ORAL EVERY 8 HOURS SCHEDULED
Status: DISCONTINUED | OUTPATIENT
Start: 2024-03-30 | End: 2024-03-31 | Stop reason: HOSPADM

## 2024-03-30 RX ADMIN — LEVETIRACETAM 500 MG: 500 TABLET, FILM COATED ORAL at 09:39

## 2024-03-30 RX ADMIN — LABETALOL HYDROCHLORIDE 400 MG: 200 TABLET, FILM COATED ORAL at 13:51

## 2024-03-30 RX ADMIN — LISINOPRIL 40 MG: 40 TABLET ORAL at 09:38

## 2024-03-30 RX ADMIN — ISOSORBIDE DINITRATE 10 MG: 10 TABLET ORAL at 18:08

## 2024-03-30 RX ADMIN — DAPTOMYCIN 500 MG: 500 INJECTION, POWDER, LYOPHILIZED, FOR SOLUTION INTRAVENOUS at 10:47

## 2024-03-30 RX ADMIN — LEVOTHYROXINE SODIUM 88 MCG: 88 TABLET ORAL at 06:17

## 2024-03-30 RX ADMIN — APIXABAN 5 MG: 5 TABLET, FILM COATED ORAL at 20:49

## 2024-03-30 RX ADMIN — MIRTAZAPINE 15 MG: 15 TABLET, FILM COATED ORAL at 20:49

## 2024-03-30 RX ADMIN — PANTOPRAZOLE SODIUM 40 MG: 40 INJECTION, POWDER, FOR SOLUTION INTRAVENOUS at 06:17

## 2024-03-30 RX ADMIN — VALPROIC ACID 375 MG: 250 SOLUTION ORAL at 13:51

## 2024-03-30 RX ADMIN — Medication 10 ML: at 09:39

## 2024-03-30 RX ADMIN — LIDOCAINE 2 PATCH: 4 PATCH TOPICAL at 09:39

## 2024-03-30 RX ADMIN — MEGESTROL ACETATE 400 MG: 40 SUSPENSION ORAL at 18:09

## 2024-03-30 RX ADMIN — LEVETIRACETAM 500 MG: 500 TABLET, FILM COATED ORAL at 20:49

## 2024-03-30 RX ADMIN — ISOSORBIDE DINITRATE 10 MG: 10 TABLET ORAL at 09:39

## 2024-03-30 RX ADMIN — LABETALOL HYDROCHLORIDE 300 MG: 300 TABLET, FILM COATED ORAL at 06:17

## 2024-03-30 RX ADMIN — VALPROIC ACID 375 MG: 250 SOLUTION ORAL at 06:16

## 2024-03-30 RX ADMIN — ISOSORBIDE DINITRATE 10 MG: 10 TABLET ORAL at 13:50

## 2024-03-30 RX ADMIN — Medication 10 ML: at 20:50

## 2024-03-30 RX ADMIN — APIXABAN 5 MG: 5 TABLET, FILM COATED ORAL at 09:39

## 2024-03-30 RX ADMIN — PIPERACILLIN AND TAZOBACTAM 3.38 G: 3; .375 INJECTION, POWDER, LYOPHILIZED, FOR SOLUTION INTRAVENOUS at 09:38

## 2024-03-30 RX ADMIN — LABETALOL HYDROCHLORIDE 400 MG: 200 TABLET, FILM COATED ORAL at 20:50

## 2024-03-30 RX ADMIN — PIPERACILLIN AND TAZOBACTAM 3.38 G: 3; .375 INJECTION, POWDER, LYOPHILIZED, FOR SOLUTION INTRAVENOUS at 18:09

## 2024-03-30 RX ADMIN — MICAFUNGIN 100 MG: 20 INJECTION, POWDER, LYOPHILIZED, FOR SOLUTION INTRAVENOUS at 18:09

## 2024-03-30 RX ADMIN — VALPROIC ACID 375 MG: 250 SOLUTION ORAL at 18:08

## 2024-03-30 NOTE — PROGRESS NOTES
[facility] Medicine Services  PROGRESS NOTE    Patient Name: Lea Rivers  : 1945  MRN: [id number]    Date of Admission: 3/2/2024  Primary Care Physician: Mannie Melvin MD    Subjective   Subjective     CC:  Follow-up colectomy    HPI:  Patient seen and examined this morning.  Minimally conversant.  Cannot contribute to HPI.    Objective   Objective     Vital Signs:   Temp:  [97.4 °F (36.3 °C)-99.7 °F (37.6 °C)] 97.8 °F (36.6 °C)  Heart Rate:  [67-71] 71  Resp:  [18] 18  BP: (172-201)/(80-95) 179/93     Physical Exam:  General: Comfortable, not in distress, minimally conversant and lethargic  Head: Atraumatic and normocephalic  Eyes: No Icterus. No pallor  Ears:  Ears appear intact with no abnormalities noted  Throat: No oral lesions, no thrush  Neck: Supple, trachea midline  Lungs: Clear to auscultation bilaterally, equal air entry, no wheezing or crackles  Heart:  Normal S1 and S2, no murmur, no gallop, No JVD, no lower extremity swelling  Abdomen:  Soft, no tenderness, no organomegaly, normal bowel sounds, no organomegaly.  Surgical wound clean  Extremities: pulses equal bilaterally  Skin: No bleeding, bruising or rash, normal skin turgor and elasticity  Neurologic: Cranial nerves appear intact with no evidence of facial asymmetry, normal motor and sensory functions in all 4 extremities  Psych: Alert and oriented x 1      Results Reviewed:  LAB RESULTS:      Lab 24  0631 24  0623 24  1646 24  0550 24  0707 24  0632 24  1418   WBC 8.58 8.84  --  8.95 10.46 12.33*  --    HEMOGLOBIN 9.5* 9.2* 9.3* 9.0* 9.6* 9.1*  --    HEMATOCRIT 29.8* 29.1* 29.1* 29.6* 30.0* 29.4*  --    PLATELETS 359 394  --  431 488* 517*  --    NEUTROS ABS 6.63 6.71  --  6.50 7.67* 8.32*  --    IMMATURE GRANS (ABS) 0.07* 0.06*  --  0.12* 0.22* 0.66*  --    LYMPHS ABS 1.01 1.05  --  1.10 0.99 1.31  --    MONOS ABS 0.77 0.89  --  1.04* 1.08* 1.21*  --     EOS ABS 0.05 0.07  --  0.12 0.41* 0.72*  --    MCV 97.4* 96.7  --  98.3* 94.6 97.7*  --    CRP  --   --   --   --   --   --  1.94*   PROTIME  --   --   --   --   --   --  16.7*         Lab 03/30/24  0631 03/27/24  0707 03/26/24  0632 03/25/24  1418 03/24/24  0603   SODIUM 137 132* 132* 136 135*   POTASSIUM 3.9 4.0 4.6 4.5 4.1   CHLORIDE 105 101 103 105 103   CO2 25.0 22.0 21.0* 24.0 25.0   ANION GAP 7.0 9.0 8.0 7.0 7.0   BUN 11 15 24* 27* 21   CREATININE 0.68* 0.53* 0.54* 0.62* 0.49*   EGFR 94.6 101.9 101.4 97.2 104.4   GLUCOSE 100* 101* 97 129* 152*   CALCIUM 8.2* 7.9* 8.3* 7.9* 8.0*   IONIZED CALCIUM  --   --   --  1.27  --    MAGNESIUM 2.0  --   --   --  1.9   PHOSPHORUS 3.5  --   --   --  3.0         Lab 03/30/24  0631 03/25/24  1418   TOTAL PROTEIN 5.8* 5.2*   ALBUMIN 2.7* 2.4*   GLOBULIN 3.1 2.8   ALT (SGPT) 6 7   AST (SGOT) 12 18   BILIRUBIN 0.3 0.2   ALK PHOS 106 161*         Lab 03/25/24  1418   PROTIME 16.7*   INR 1.33*         Lab 03/30/24  0631 03/25/24  1418   CHOLESTEROL 120  --    TRIGLYCERIDES 179* 101         Lab 03/23/24  1827   FOLATE 3.78*         Brief Urine Lab Results  (Last result in the past 365 days)        Color   Clarity   Blood   Leuk Est   Nitrite   Protein   CREAT   Urine HCG        03/11/24 1337 Yellow   Cloudy   Moderate (2+)   Trace   Negative   30 mg/dL (1+)                   Microbiology Results Abnormal       Procedure Component Value - Date/Time    Fungus Culture - Peritoneal Fluid, Perineum [[id number]] Collected: 03/18/24 1349    Lab Status: Preliminary result Specimen: Peritoneal Fluid from Perineum Updated: 03/25/24 1831     Fungus Culture No fungus isolated at 1 week    Anaerobic Culture - Peritoneal Fluid, Perineum [[id number]]  (Normal) Collected: 03/18/24 1349    Lab Status: Final result Specimen: Peritoneal Fluid from Perineum Updated: 03/23/24 1026     Anaerobic Culture No anaerobes isolated at 5 days    AFB Culture - Peritoneal Fluid, Perineum [[id number]] Collected:  03/18/24 1349    Lab Status: Preliminary result Specimen: Peritoneal Fluid from Perineum Updated: 03/19/24 1339     AFB Stain No acid fast bacilli seen on concentrated smear    Urine Culture - Urine, Indwelling Urethral Catheter [[id number]]  (Normal) Collected: 03/11/24 1337    Lab Status: Final result Specimen: Urine from Indwelling Urethral Catheter Updated: 03/12/24 2024     Urine Culture No growth    Blood Culture - Blood, Arm, Right [[id number]]  (Normal) Collected: 03/06/24 1857    Lab Status: Final result Specimen: Blood from Arm, Right Updated: 03/11/24 2045     Blood Culture No growth at 5 days    Blood Culture - Blood, Arm, Right [[id number]]  (Normal) Collected: 03/06/24 1751    Lab Status: Final result Specimen: Blood from Arm, Right Updated: 03/11/24 2000     Blood Culture No growth at 5 days    Blood Culture - Blood, Arm, Right [[id number]]  (Normal) Collected: 03/02/24 1132    Lab Status: Final result Specimen: Blood from Arm, Right Updated: 03/07/24 1201     Blood Culture No growth at 5 days    Narrative:      Less than seven (7) mL's of blood was collected.  Insufficient quantity may yield false negative results.    Blood Culture - Blood, Arm, Right [[id number]]  (Normal) Collected: 03/02/24 1132    Lab Status: Final result Specimen: Blood from Arm, Right Updated: 03/07/24 1201     Blood Culture No growth at 5 days    Narrative:      Less than seven (7) mL's of blood was collected.  Insufficient quantity may yield false negative results.    Urine Culture - Urine, Straight Cath [[id number]]  (Normal) Collected: 03/02/24 0923    Lab Status: Final result Specimen: Urine from Straight Cath Updated: 03/03/24 1601     Urine Culture No growth    COVID PRE-OP / PRE-PROCEDURE SCREENING ORDER (NO ISOLATION) - Swab, Nasopharynx [[id number]]  (Normal) Collected: 03/02/24 1133    Lab Status: Final result Specimen: Swab from Nasopharynx Updated: 03/02/24 1227    Narrative:      The following orders were created for  panel order COVID PRE-OP / PRE-PROCEDURE SCREENING ORDER (NO ISOLATION) - Swab, Nasopharynx.  Procedure                               Abnormality         Status                     ---------                               -----------         ------                     COVID-19, FLU A/B, RSV P...[[id number]]  Normal              Final result                 Please view results for these tests on the individual orders.    COVID-19, FLU A/B, RSV PCR 1 HR TAT - Swab, Nasopharynx [[id number]]  (Normal) Collected: 03/02/24 1133    Lab Status: Final result Specimen: Swab from Nasopharynx Updated: 03/02/24 1227     COVID19 Not Detected     Influenza A PCR Not Detected     Influenza B PCR Not Detected     RSV, PCR Not Detected    Narrative:      Fact sheet for providers: https://www.fda.gov/media/683310/download    Fact sheet for patients: https://www.fda.gov/media/858216/download    Test performed by PCR.            No radiology results from the last 24 hrs    Results for orders placed during the hospital encounter of 07/20/21    Adult Transthoracic Echo Complete W/ Cont if Necessary Per Protocol    Interpretation Summary  · Left ventricular wall thickness is consistent with mild concentric hypertrophy.  · Normal left-ventricular systolic function, estimated EF 65%.  · Left ventricular diastolic function is consistent with (grade I) impaired relaxation.  · Aortic sclerosis without aortic stenosis. Trace aortic insufficiency.  · Trace mitral regurgitation, trace tricuspid regurgitation.      Current medications:  Scheduled Meds:apixaban, 5 mg, Oral, Q12H  DAPTOmycin, 6 mg/kg (Adjusted), Intravenous, Q24H  insulin regular, 2-7 Units, Subcutaneous, Q6H  isosorbide dinitrate, 10 mg, Oral, TID - Nitrates  labetalol, 400 mg, Oral, Q8H  levETIRAcetam, 500 mg, Oral, Q12H  levothyroxine, 88 mcg, Oral, Q AM  Lidocaine, 2 patch, Transdermal, Q24H  losartan, 100 mg, Oral, Q24H  megestrol, 400 mg, Oral, Q24H  micafungin (MYCAMINE) IV, 100  mg, Intravenous, Q24H  mirtazapine, 15 mg, Oral, Nightly  pantoprazole, 40 mg, Intravenous, Q AM  piperacillin-tazobactam, 3.375 g, Intravenous, Q8H  ProSource No Carb, 30 mL, Nasogastric, BID  sodium chloride, 10 mL, Intravenous, Q12H  Valproic Acid, 375 mg, Oral, Q6H      Continuous Infusions:     PRN Meds:.  acetaminophen **OR** acetaminophen **OR** acetaminophen    calcium carbonate    Calcium Replacement - Follow Nurse / BPA Driven Protocol    dextrose    dextrose    docusate sodium    fluticasone    glucagon (human recombinant)    hydrALAZINE    ipratropium-albuterol    labetalol    Magnesium Standard Dose Replacement - Follow Nurse / BPA Driven Protocol    [DISCONTINUED] HYDROmorphone **AND** naloxone    ondansetron ODT **OR** ondansetron    Phosphorus Replacement - Follow Nurse / BPA Driven Protocol    Potassium Replacement - Follow Nurse / BPA Driven Protocol    sodium chloride    sodium chloride    sodium chloride    sodium chloride    Assessment & Plan   Assessment & Plan     Active Hospital Problems    Diagnosis  POA    **Falls [W19.XXXA]  Yes    Pneumatosis intestinalis [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        Brief Hospital Course to date:  Lea Rivers is a 78 y.o. male  with past medical history of hypertension, hyperlipidemia, hypothyroidism, and seizure disorder who was admitted on 3/2/2024 due to fall and nondisplaced first through fourth left rib fractures. He was also found to have left distal clavicle fracture.  Patient was noted to have increasing abdominal distention on 3/6/2024.  CT of the abdomen/pelvis revealed dilated loops of large bowel with likely pneumatosis intestinalis.  General surgery was consulted due to persistent colonic ileus and significant colonic distention.  Underwent total abdominal colectomy with end ileostomy creation on 3/9/2024.  Repeat CT of the abdomen/pelvis revealed postsurgical changes with a moderate  loculated ascitic fluid collection within the anterior abdomen.  He underwent exploratory laparotomy  with abdominal washout on 3/18/2024 by Dr. Godfrey.  ID Dr. Chow was consulted and is managing antibiotic regimen.      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 Godfrey  Currently on broad-spectrum antibiotic therapy with Zosyn, micafungin and daptomycin  ID and general surgery following.  Appreciate the help  Initially required TPN.  Currently TPN discontinued   WOCN for ostomy care     Dysphagia  Severe energy malnutrition  Speech evaluated.  Recommended soft to chew and nectar thick liquid  Patient with poor oral intake  Continue Remeron and Megace  Family considering feeding tube placement given his very poor oral intake     Toxic metabolic encephalopathy  Per daughter significant change in mental status and speech since arrival, and team had a long discussion and felt this is probably TME in setting of infection, recent large operation, underlying dementia  Initial CT head 3/2 negative, repeated CT head 3/13 without any acute findings  EEG negative for seizures, findings consistent with TME     Multiple falls with increasing frequency   Multiple rib fractures and also left clavicle fracture  CT of head shows age-related changes which are chronic but no acute process  Neuro consulted, likely d/t neuropathy (confirmed with EMG) and progression of dementia  Rib fractures with no displacement or mildly displaced.  Orthopedic surgery evaluated. No surgical intervention  planned.  Recs nonweightbearing LUE, may come out of sling in 5-7 days to initiate gentle ROM exercises per ortho.  F/u with ortho/Dr. Maldonado in 2 weeks  Pain control, lidocaine patch     Acute on Chronic Anemia  S/P 1 unit PRBCs 3/11 and 3/16, monitoring H&H  No clear source of active bleeding, hemoccult  negative  Iron 34, Iron sat 19  Started on IV Iron 3/23  Monitor CBC.  Transfuse for H&H less than 7 and 21     Acute RUE DVT (brachial)  Provoked, 2/2 PICC   Continue eliquis     Prostate cancer  Hx BPH s/p greenlight photo vaporization of prostate 2018  Follows with Dr. Noland  S/P cyberknife radiation 2/9/23  Has intermittent hematuria, monitor while on AC     Dementia and increasing memory problem  Patient was previously living alone and driving, given significant decline will need placement  Normal B12 and TSH       Essential hypertension  Still not well-controlled.  Increase labetalol to 400 mg 3 times daily  Switch lisinopril to losartan    Seizure disorder  Continue vimpat and keppra     Hyperlipidemia  Hypothyroidism  Continue home regimen     Expected Discharge Location and Transportation: SNF once eating more   Expected Discharge   Expected Discharge Date: 3/29/2024; Expected Discharge Time:       DVT prophylaxis:  Medical and mechanical DVT prophylaxis orders are present.    AM-PAC 6 Clicks Score (PT): 10 (03/30/24 0800)    CODE STATUS:   Code Status and Medical Interventions:   Ordered at: 03/02/24 1456     Medical Intervention Limits:    NO intubation (DNI)     Code Status (Patient has no pulse and is not breathing):    No CPR (Do Not Attempt to Resuscitate)     Medical Interventions (Patient has pulse or is breathing):    Limited Support       Claudia Zepeda MD  03/30/24

## 2024-03-30 NOTE — PROGRESS NOTES
Lea Barrett [place]  1945  [id number]        Evaluating Physician: Louis Chow MD    Chief Complaint: abdpain    Reason for Consultation: IA infection    History of present illness:     Patient is a 78 y.o.  Yr old male with history of seizure disorder, colon cancer/prostate cancer with prior radiation, diabetes and dementia per admission notes with admission March 2 after frequent falls noted to have multiple rib fractures and left side clavicular fracture in the emergency room.noted to have significant colon distention with potential for pneumatosis on CT scan and seen by gastroenterology/surgery.taken for surgery on March 9 with diagnosis of toxic dilation of the colon for total abdominal colectomy and end ileostomy; medicine notes indicate he received ceftriaxone/Flagyl and Diflucan.  Leukocytosis persisted.repeat CT scan on March 18 with postsurgical changes, moderate loculated ascitic fluid in the anterior abdomen as described by radiology.taken back to surgery on March 18 for exploratory laparotomy with washout.  Culture subsequently with gram-negative manuel at least.  Empiric antibiotics adjusted to Zosyn/Mycamine    3/30/24 sleepy;   per nursing, no new distress; + abdominal pain which is less intense overall, dull at present,  worse with palpation, better with pain meds and he will not attach a numerical severity.    no headache photophobia or neck stiffness.  No dyspnea or cough.  He has a sling on the left arm with left arm PICC line.  No rash.  Nursing reports no other new focal pain or distress.       Past Medical History:   Diagnosis Date    Anemia     Colon cancer 1990    Status post partial colectomy in 1990. No chemotherapy or radiation therapy.    Coronary artery disease     Nonobstructive coronary artery disease.    Diabetes     Dyslipidemia     GERD (gastroesophageal reflux disease)     Hx of.    Hyperlipidemia     Hypertension     Hyponatremia     Hypothyroidism     Left shoulder  pain     Low sodium levels 2022    recent issue; saw nephrologist who ruled out kidney issues; took Sodium Chloride po to bring levels up    Memory deficit     FROM ANESTHESIA    Osteoarthritis     Prostate cancer 07/23/2022    Seizure disorder     silent seziure-diagnosed years ago    Skin cancer        Past Surgical History:   Procedure Laterality Date    APPENDECTOMY      CARDIAC CATHETERIZATION  2012    30 to 40% LAD    CARPAL TUNNEL RELEASE Bilateral     CHOLECYSTECTOMY      COLECTOMY PARTIAL / TOTAL  1990    Partial colectomy    COLON RESECTION N/A 3/9/2024    Procedure: TOTAL ABDOMINAL COLECTOMY, END ILEOSTOMY;  Surgeon: Harley Godfrey MD;  Location:  [facility];  Service: General;  Laterality: N/A;    COLONOSCOPY      CYBERKNIFE  02/09/2023    Prostate/SV    CYSTOSCOPY TRANSURETHRAL RESECTION OF PROSTATE N/A 09/21/2018    Procedure: CYSTOSCOPY TRANSURETHRAL RESECTION OF PROSTATE GREENLIGHT;  Surgeon: Naseem Clayton MD;  Location:  [facility];  Service: Urology    EXPLORATORY LAPAROTOMY N/A 3/18/2024    Procedure: LAPAROTOMY EXPLORATORY, ABDOMINAL WASH OUT;  Surgeon: Harley Godfrey MD;  Location:  [facility];  Service: General;  Laterality: N/A;    OTHER SURGICAL HISTORY      Contraction surgery on bilateral hands and wrists.    PROSTATE BIOPSY N/A 11/11/2022    Procedure: TRANSRECTAL ULTRASOUND GUIDED BIOPSY OF PROSTATE;  Surgeon: Naseem Noland MD;  Location:  [facility];  Service: Urology;  Laterality: N/A;    SINUS SURGERY      SKIN BIOPSY      TEETH EXTRACTION      TONSILLECTOMY      TOTAL KNEE ARTHROPLASTY Right 04/07/2022    Procedure: TOTAL KNEE ARTHROPLASTY WITH ORTHO ROBOT RIGHT;  Surgeon: Louis Malcolm MD;  Location:  [facility];  Service: Robotics - Ortho;  Laterality: Right;    TRANSRECTAL INCISION PROSTATE WITH GOLD SEED Bilateral 01/06/2023    Procedure: TRANSRECTAL ULTRASOUND GUIDED PROSTATE FIDUCIAL MARKER PLACEMENT;  Surgeon: Naseem Noland MD;  Location:  [facility];   "Service: Urology;  Laterality: Bilateral;    TURP / TRANSURETHRAL INCISION / DRAINAGE PROSTATE      VASECTOMY            family history includes Arthritis in an other family member; Cancer in his mother and another family member; Esophageal cancer in his brother; Hypertension in his father; No Known Problems in his paternal grandfather, paternal grandmother, and sister; Stroke in his father, maternal grandfather, sister, and another family member.    Allergies   Allergen Reactions    Chlorhexidine Rash    Sulfa Antibiotics Rash     Childhood reaction             Review of Systems    3/30/24 as per nursing also    Constitutional-- No Fever, chills or sweats.  Appetite diminished with fatigue  Heent-- No new vision, hearing or throat complaints.  No epistaxis or oral sores.  Denies odynophagia or dysphagia.  No flashers, floaters or eye pain. No odynophagia or dysphagia. No headache, photophobia or neck stiffness.  CV-- No chest pain, palpitation or syncope  Resp-- No SOB/cough/Hemoptysis  GI- see above.  No hematochezia, melena, or hematemesis. Denies jaundice or chronic liver disease.  -- No dysuria, hematuria, or flank pain.  Denies hesitancy, urgency or flank pain.  Lymph- no swollen lymph nodes in neck/axilla or groin.   Heme- No active bruising or bleeding  MS-- no swelling or pain in the bones or joints of arms/legs.  No new back pain.  Neuro-- generally debilitated, unable to lift his legs off the bed according to nursing.  Wiggles his feet/toes    Full 12 point review of systems reviewed and negative otherwise for acute complaints, except for above    Physical Exam:   Vital Signs   /80 (BP Location: Left leg, Patient Position: Lying)   Pulse 69   Temp 98.1 °F (36.7 °C) (Oral)   Resp 18   Ht 170.2 cm (67.01\")   Wt 98.8 kg (217 lb 12 oz)   SpO2 96%   BMI 34.10 kg/m²     GENERAL: sleepy, answers questions and follows basic commands as above, in no acute distress. Appears chronically debilitated.   "   HEENT: Normocephalic, atraumatic.   No conjunctival injection. No icterus. Oropharynx clear without evidence of thrush or exudate. No evidence of periodontal disease.    NECK: Supple without nuchal rigidity. No mass.  HEART: RRR; No murmur, rubs, gallops.   LUNGS: diminished at bases with some crackles at the bases, but otherwise Clear to auscultation bilaterally without wheezing/ rhonchi.  Nonlabored. No dullness.  ABDOMEN: Soft,  tender, nondistended. Positive bowel sounds but diminished. No rebound or guarding. NO mass or HSM.  EXT:  +edema  MSK: FROM without joint effusions noted arms/legs.    SKIN: Warm and dry without cutaneous eruptions on Inspection/palpation.    NEURO: sleepy    No peripheral stigmata/phenomena of endocarditis    IV without obvious redness or drainage at left arm    Laboratory Data    Results from last 7 days   Lab Units 03/30/24  0631 03/29/24  0623 03/28/24  1646 03/28/24  0550   WBC 10*3/mm3 8.58 8.84  --  8.95   HEMOGLOBIN g/dL 9.5* 9.2* 9.3* 9.0*   HEMATOCRIT % 29.8* 29.1* 29.1* 29.6*   PLATELETS 10*3/mm3 359 394  --  431     Results from last 7 days   Lab Units 03/30/24  0631   SODIUM mmol/L 137   POTASSIUM mmol/L 3.9   CHLORIDE mmol/L 105   CO2 mmol/L 25.0   BUN mg/dL 11   CREATININE mg/dL 0.68*   GLUCOSE mg/dL 100*   CALCIUM mg/dL 8.2*     Results from last 7 days   Lab Units 03/30/24  0631   ALK PHOS U/L 106   BILIRUBIN mg/dL 0.3   ALT (SGPT) U/L 6   AST (SGOT) U/L 12         Results from last 7 days   Lab Units 03/25/24  1418   CRP mg/dL 1.94*       Estimated Creatinine Clearance: 98.7 mL/min (A) (by C-G formula based on SCr of 0.68 mg/dL (L)).      Microbiology:      Radiology:  Imaging Results (Last 72 Hours)       Procedure Component Value Units Date/Time    FL Video Swallow With Speech Single Contrast [[id number]] Resulted: 03/29/24 1350     Updated: 03/29/24 1401              Impression:     --acute abdominal pain with total abdominal colectomy/ileostomy as above related to  toxic dilation of colon As per operative note, postop with persistent leukocytosis and fluid collections on CT scan, taken for exploratory laparotomy and fluid culture positive for gram-negative rods so far, consistent with abscess.  Empiric antimicrobials adjusted to Zosyn/Mycamine/daptomycin    --Left lower lobe pneumonia per medicine team notes, abnormal chest x-ray March 12.no respiratory distress or productive sputum.  Empiric Zosyn ongoing.  If worsening respiratory status you could consider further imaging etc.nasal cannula stable per nursing    --history dementia per admission notes a little specifics regarding baseline not clear and generally poor insight overall.  Description of toxic/metabolic encephalopathy per medicine team notes during this admission.  Prior history seizure and generally debilitated.    --History of multiple falls with left clavicular and multiple rib fractures per admission notes.  Orthopedics has seen.    --Right upper extremity DVT, described as brachial per medicine team notes.  Anticoagulation at discretion of medicine team    --History prostate and colon cancer previously per admission notes.        PLAN:       --IV Zosyn  (started 3/18)/Mycamine/ daptomycin (started 3/23); duration to depend on clinical course; potentially  14 days total if steadily better    **culture with pseudomonas aeruginosa and E Faecium    --Check/reviewed labs cultures and scans    --Partial history per nursing staff    --Discussed with microbiology     --Highly complex of issues with high risk for further serious morbidity and other serious sequela    Copied text in this note has been reviewed and is accurate as of 03/30/24.        Louis Chow MD  3/30/2024

## 2024-03-30 NOTE — PLAN OF CARE
Problem: Adult Inpatient Plan of Care  Goal: Plan of Care Review  Outcome: Ongoing, Not Progressing  Goal: Patient-Specific Goal (Individualized)  Outcome: Ongoing, Not Progressing  Goal: Absence of Hospital-Acquired Illness or Injury  Outcome: Ongoing, Not Progressing  Intervention: Identify and Manage Fall Risk  Recent Flowsheet Documentation  Taken 3/30/2024 1800 by Curtis Caldwell RN  Safety Promotion/Fall Prevention:   activity supervised   assistive device/personal items within reach   clutter free environment maintained   fall prevention program maintained   lighting adjusted   nonskid shoes/slippers when out of bed   safety round/check completed   room organization consistent  Taken 3/30/2024 1600 by Curtis Caldwell RN  Safety Promotion/Fall Prevention:   activity supervised   assistive device/personal items within reach   clutter free environment maintained   fall prevention program maintained   lighting adjusted   nonskid shoes/slippers when out of bed   room organization consistent   safety round/check completed  Taken 3/30/2024 1400 by Curtis Caldwell RN  Safety Promotion/Fall Prevention:   activity supervised   assistive device/personal items within reach   clutter free environment maintained   fall prevention program maintained   lighting adjusted   nonskid shoes/slippers when out of bed   room organization consistent   safety round/check completed  Taken 3/30/2024 1200 by Curtis Caldwell RN  Safety Promotion/Fall Prevention:   assistive device/personal items within reach   activity supervised   clutter free environment maintained   fall prevention program maintained   lighting adjusted   nonskid shoes/slippers when out of bed   room organization consistent   safety round/check completed  Taken 3/30/2024 1000 by Curtis Caldwell RN  Safety Promotion/Fall Prevention:   activity supervised   assistive device/personal items within reach   clutter free environment maintained   fall  prevention program maintained   lighting adjusted   nonskid shoes/slippers when out of bed   room organization consistent   safety round/check completed  Taken 3/30/2024 0800 by Curtis Caldewll RN  Safety Promotion/Fall Prevention:   activity supervised   assistive device/personal items within reach   clutter free environment maintained   fall prevention program maintained   lighting adjusted   nonskid shoes/slippers when out of bed   room organization consistent   safety round/check completed  Intervention: Prevent Skin Injury  Recent Flowsheet Documentation  Taken 3/30/2024 1800 by Curtis Caldwell RN  Body Position:   weight shifting   neutral body alignment  Skin Protection:   adhesive use limited   incontinence pads utilized   transparent dressing maintained   tubing/devices free from skin contact  Taken 3/30/2024 1600 by Curtis Caldwell RN  Body Position: supine, legs elevated  Skin Protection:   adhesive use limited   incontinence pads utilized   transparent dressing maintained   tubing/devices free from skin contact  Taken 3/30/2024 1400 by Curtis Caldwell RN  Body Position: supine, legs elevated  Skin Protection:   adhesive use limited   incontinence pads utilized   transparent dressing maintained   tubing/devices free from skin contact  Taken 3/30/2024 1200 by Curtis Caldwell RN  Body Position:   turned   right  Skin Protection:   adhesive use limited   incontinence pads utilized   transparent dressing maintained   tubing/devices free from skin contact  Taken 3/30/2024 1000 by Curtis Caldwell RN  Body Position:   left   turned  Skin Protection:   adhesive use limited   incontinence pads utilized   transparent dressing maintained   tubing/devices free from skin contact  Taken 3/30/2024 0800 by Curtis Caldwell RN  Body Position:   weight shifting   supine, legs elevated  Skin Protection:   adhesive use limited   incontinence pads utilized   transparent dressing maintained   tubing/devices  free from skin contact  Intervention: Prevent and Manage VTE (Venous Thromboembolism) Risk  Recent Flowsheet Documentation  Taken 3/30/2024 1800 by Curtis Caldwell RN  Activity Management: activity minimized  Taken 3/30/2024 1600 by Curtis Caldwell RN  Activity Management: activity minimized  Taken 3/30/2024 1400 by Curtis Caldwell RN  Activity Management: activity minimized  Taken 3/30/2024 1200 by Curtis Caldwell RN  Activity Management: activity minimized  Taken 3/30/2024 1000 by Curtis Caldwell RN  Activity Management: activity minimized  Taken 3/30/2024 0800 by Curtis Caldwell RN  Activity Management: activity minimized  Intervention: Prevent Infection  Recent Flowsheet Documentation  Taken 3/30/2024 1800 by Curtis Caldwell RN  Infection Prevention:   environmental surveillance performed   hand hygiene promoted   rest/sleep promoted  Taken 3/30/2024 1600 by Curtis Caldwell RN  Infection Prevention:   environmental surveillance performed   hand hygiene promoted   rest/sleep promoted  Taken 3/30/2024 1400 by Curtis Caldwell RN  Infection Prevention:   environmental surveillance performed   hand hygiene promoted   rest/sleep promoted  Taken 3/30/2024 1200 by Curtis Caldwell RN  Infection Prevention:   environmental surveillance performed   hand hygiene promoted   rest/sleep promoted  Taken 3/30/2024 1000 by Curtis Caldwell RN  Infection Prevention:   environmental surveillance performed   hand hygiene promoted   rest/sleep promoted  Taken 3/30/2024 0800 by Curtis Caldwell RN  Infection Prevention:   environmental surveillance performed   hand hygiene promoted   rest/sleep promoted  Goal: Optimal Comfort and Wellbeing  Outcome: Ongoing, Not Progressing  Goal: Readiness for Transition of Care  Outcome: Ongoing, Not Progressing     Problem: Skin Injury Risk Increased  Goal: Skin Health and Integrity  Outcome: Ongoing, Not Progressing  Intervention: Optimize Skin Protection  Recent  Flowsheet Documentation  Taken 3/30/2024 1800 by Curtis Caldwell RN  Pressure Reduction Techniques: weight shift assistance provided  Head of Bed ([facility]) Positioning: [facility] at 30 degrees  Pressure Reduction Devices: specialty bed utilized  Skin Protection:   adhesive use limited   incontinence pads utilized   transparent dressing maintained   tubing/devices free from skin contact  Taken 3/30/2024 1600 by Curtis Caldwell RN  Pressure Reduction Techniques: weight shift assistance provided  Head of Bed ([facility]) Positioning: [facility] at 30 degrees  Pressure Reduction Devices: specialty bed utilized  Skin Protection:   adhesive use limited   incontinence pads utilized   transparent dressing maintained   tubing/devices free from skin contact  Taken 3/30/2024 1400 by Curtis Caldwell RN  Pressure Reduction Techniques: weight shift assistance provided  Head of Bed ([facility]) Positioning: [facility] at 30 degrees  Pressure Reduction Devices: specialty bed utilized  Skin Protection:   adhesive use limited   incontinence pads utilized   transparent dressing maintained   tubing/devices free from skin contact  Taken 3/30/2024 1200 by Curtis Caldwell RN  Pressure Reduction Techniques: weight shift assistance provided  Head of Bed ([facility]) Positioning: [facility] at 30 degrees  Pressure Reduction Devices: specialty bed utilized  Skin Protection:   adhesive use limited   incontinence pads utilized   transparent dressing maintained   tubing/devices free from skin contact  Taken 3/30/2024 1000 by Curtis Caldwell RN  Pressure Reduction Techniques: weight shift assistance provided  Head of Bed ([facility]) Positioning: [facility] at 30 degrees  Pressure Reduction Devices: specialty bed utilized  Skin Protection:   adhesive use limited   incontinence pads utilized   transparent dressing maintained   tubing/devices free from skin contact  Taken 3/30/2024 0800 by Curtis Caldwell RN  Pressure Reduction Techniques: weight shift assistance provided  Head of Bed ([facility])  Positioning: HOB at 30 degrees  Pressure Reduction Devices: specialty bed utilized  Skin Protection:   adhesive use limited   incontinence pads utilized   transparent dressing maintained   tubing/devices free from skin contact     Problem: Fall Injury Risk  Goal: Absence of Fall and Fall-Related Injury  Outcome: Ongoing, Not Progressing  Intervention: Identify and Manage Contributors  Recent Flowsheet Documentation  Taken 3/30/2024 1800 by Curtis Caldwell RN  Medication Review/Management: medications reviewed  Taken 3/30/2024 1600 by Curtis Caldwell RN  Medication Review/Management: medications reviewed  Taken 3/30/2024 1400 by Curtis Caldwell RN  Medication Review/Management: medications reviewed  Taken 3/30/2024 1200 by Curtis Caldwell RN  Medication Review/Management: medications reviewed  Taken 3/30/2024 1000 by Curtis Caldwell RN  Medication Review/Management: medications reviewed  Taken 3/30/2024 0800 by Curtis Caldwell RN  Medication Review/Management: medications reviewed  Intervention: Promote Injury-Free Environment  Recent Flowsheet Documentation  Taken 3/30/2024 1800 by Curtis Caldwell RN  Safety Promotion/Fall Prevention:   activity supervised   assistive device/personal items within reach   clutter free environment maintained   fall prevention program maintained   lighting adjusted   nonskid shoes/slippers when out of bed   safety round/check completed   room organization consistent  Taken 3/30/2024 1600 by Curtis Caldwell RN  Safety Promotion/Fall Prevention:   activity supervised   assistive device/personal items within reach   clutter free environment maintained   fall prevention program maintained   lighting adjusted   nonskid shoes/slippers when out of bed   room organization consistent   safety round/check completed  Taken 3/30/2024 1400 by Curtis Caldwell RN  Safety Promotion/Fall Prevention:   activity supervised   assistive device/personal items within reach    clutter free environment maintained   fall prevention program maintained   lighting adjusted   nonskid shoes/slippers when out of bed   room organization consistent   safety round/check completed  Taken 3/30/2024 1200 by Curtis Caldwell RN  Safety Promotion/Fall Prevention:   assistive device/personal items within reach   activity supervised   clutter free environment maintained   fall prevention program maintained   lighting adjusted   nonskid shoes/slippers when out of bed   room organization consistent   safety round/check completed  Taken 3/30/2024 1000 by Curtis Caldwell RN  Safety Promotion/Fall Prevention:   activity supervised   assistive device/personal items within reach   clutter free environment maintained   fall prevention program maintained   lighting adjusted   nonskid shoes/slippers when out of bed   room organization consistent   safety round/check completed  Taken 3/30/2024 0800 by Curtis Caldwell RN  Safety Promotion/Fall Prevention:   activity supervised   assistive device/personal items within reach   clutter free environment maintained   fall prevention program maintained   lighting adjusted   nonskid shoes/slippers when out of bed   room organization consistent   safety round/check completed     Problem: Adjustment to Illness (Sepsis/Septic Shock)  Goal: Optimal Coping  Outcome: Ongoing, Not Progressing     Problem: Bleeding (Sepsis/Septic Shock)  Goal: Absence of Bleeding  Outcome: Ongoing, Not Progressing     Problem: Glycemic Control Impaired (Sepsis/Septic Shock)  Goal: Blood Glucose Level Within Desired Range  Outcome: Ongoing, Not Progressing     Problem: Infection Progression (Sepsis/Septic Shock)  Goal: Absence of Infection Signs and Symptoms  Outcome: Ongoing, Not Progressing  Intervention: Initiate Sepsis Management  Recent Flowsheet Documentation  Taken 3/30/2024 1800 by Curtis Caldwell, RN  Infection Prevention:   environmental surveillance performed   hand hygiene  promoted   rest/sleep promoted  Taken 3/30/2024 1600 by Curtis Caldwell RN  Infection Prevention:   environmental surveillance performed   hand hygiene promoted   rest/sleep promoted  Taken 3/30/2024 1400 by Curtis Caldwell RN  Infection Prevention:   environmental surveillance performed   hand hygiene promoted   rest/sleep promoted  Taken 3/30/2024 1200 by Curtis Caldwell RN  Infection Prevention:   environmental surveillance performed   hand hygiene promoted   rest/sleep promoted  Taken 3/30/2024 1000 by Curtis Caldwell RN  Infection Prevention:   environmental surveillance performed   hand hygiene promoted   rest/sleep promoted  Taken 3/30/2024 0800 by Curtis Caldwell RN  Infection Prevention:   environmental surveillance performed   hand hygiene promoted   rest/sleep promoted  Intervention: Promote Recovery  Recent Flowsheet Documentation  Taken 3/30/2024 1800 by Curtis Caldwell RN  Activity Management: activity minimized  Taken 3/30/2024 1600 by Curtis Caldwell RN  Activity Management: activity minimized  Taken 3/30/2024 1400 by Curtis Caldwell RN  Activity Management: activity minimized  Taken 3/30/2024 1200 by Curtis Caldwell RN  Activity Management: activity minimized  Taken 3/30/2024 1000 by Curtis Caldwell RN  Activity Management: activity minimized  Taken 3/30/2024 0800 by Curtis Caldwell RN  Activity Management: activity minimized     Problem: Nutrition Impaired (Sepsis/Septic Shock)  Goal: Optimal Nutrition Intake  Outcome: Ongoing, Not Progressing   Goal Outcome Evaluation:   Plan of care reviewed with: Patient & daughter

## 2024-03-30 NOTE — PROGRESS NOTES
"Patient Name:  Lea Rivers  YOB: 1945  [id number]    Surgery Progress Note    Date of visit: 3/30/2024    Subjective   Unable to obtain full history due to patient's dementia. No abdominal pain. No nausea/vomiting. No fevers.  Having ostomy output.  Appetite poor          Objective       /80 (BP Location: Left leg, Patient Position: Lying)   Pulse 69   Temp 98.1 °F (36.7 °C) (Oral)   Resp 18   Ht 170.2 cm (67.01\")   Wt 98.8 kg (217 lb 12 oz)   SpO2 96%   BMI 34.10 kg/m²     Intake/Output Summary (Last 24 hours) at 3/30/2024 0834  Last data filed at 3/30/2024 0328  Gross per 24 hour   Intake --   Output 225 ml   Net -225 ml       CV:  Rhythm regular and rate regular  L:  Clear to auscultation bilaterally  Abd:  Bowel sounds positive, soft, nontender, ostomy viable and functioning, incision clean dry and intact  Ext:  No cyanosis, clubbing, edema    Recent labs and imaging that are back at this time have been reviewed.   Creatinine 0.68  WBC 8.6  Hemoglobin 9.5       Assessment & Plan     Patient postoperative day 21 from total abdominal colectomy with end ileostomy, postoperative day 12 abdominal washout. Pain control.  Diet per speech and swallow recommendations. Abx per ID.  DC planning per primary team.    Harley Godfrey MD  3/30/2024  08:34 EDT      "

## 2024-03-30 NOTE — PLAN OF CARE
Problem: Skin Injury Risk Increased  Goal: Skin Health and Integrity  Intervention: Optimize Skin Protection  Recent Flowsheet Documentation  Taken 3/30/2024 0000 by Blanca Mendoza RN  Pressure Reduction Techniques:   frequent weight shift encouraged   positioned off wounds   heels elevated off bed  Head of Bed (HOB) Positioning: HOB at 30-45 degrees  Pressure Reduction Devices:   specialty bed utilized   pressure-redistributing mattress utilized   positioning supports utilized  Skin Protection:   adhesive use limited   incontinence pads utilized   transparent dressing maintained   tubing/devices free from skin contact  Taken 3/29/2024 2000 by Blanca Mendoza RN  Pressure Reduction Techniques:   frequent weight shift encouraged   heels elevated off bed   positioned off wounds  Head of Bed (HOB) Positioning: HOB at 30-45 degrees  Pressure Reduction Devices:   specialty bed utilized   positioning supports utilized   pressure-redistributing mattress utilized  Skin Protection:   adhesive use limited   incontinence pads utilized   transparent dressing maintained   tubing/devices free from skin contact   Goal Outcome Evaluation:  Plan of Care Reviewed With: patient        Progress: no change

## 2024-03-30 NOTE — PROGRESS NOTES
Nutrition Services    Patient Name:  Lea Rivers  YOB: 1945  MRN: [id number]  Admit Date:  3/2/2024    Nutrition follow up note:  EN ordered yesterday with plan to place keofeed.  No tube noted at time of visit. Patient daughter at bedside, reports RN attempted to place tube last night, however patient shock his head no when they attempted.  Plan to attempt again today when patient is more wake. Reports if patient declines, then she will honor his wishes.  He did not have any of his breakfast meal this morning. Overall, on going poor intake/appetite.  Daughter is aware of patient poor appetite /intake.  Will keep EN orders in the event that a tube is placed at some point it. RN to alert RD if patient declines and RD to D/C EN orders.     Electronically signed by:  Daniella Ramos RD  03/30/24 12:37 EDT

## 2024-03-31 VITALS
HEIGHT: 67 IN | OXYGEN SATURATION: 95 % | WEIGHT: 217.75 LBS | DIASTOLIC BLOOD PRESSURE: 69 MMHG | SYSTOLIC BLOOD PRESSURE: 145 MMHG | RESPIRATION RATE: 18 BRPM | HEART RATE: 73 BPM | BODY MASS INDEX: 34.18 KG/M2 | TEMPERATURE: 99.7 F

## 2024-03-31 PROBLEM — K59.31: Status: ACTIVE | Noted: 2024-03-31

## 2024-03-31 LAB
ALBUMIN SERPL-MCNC: 2.6 G/DL (ref 3.5–5.2)
ALBUMIN/GLOB SERPL: 0.8 G/DL
ALP SERPL-CCNC: 104 U/L (ref 39–117)
ALT SERPL W P-5'-P-CCNC: <5 U/L (ref 1–41)
ANION GAP SERPL CALCULATED.3IONS-SCNC: 10 MMOL/L (ref 5–15)
AST SERPL-CCNC: 11 U/L (ref 1–40)
BASOPHILS # BLD AUTO: 0.08 10*3/MM3 (ref 0–0.2)
BASOPHILS NFR BLD AUTO: 1 % (ref 0–1.5)
BILIRUB SERPL-MCNC: 0.4 MG/DL (ref 0–1.2)
BUN SERPL-MCNC: 13 MG/DL (ref 8–23)
BUN/CREAT SERPL: 18.1 (ref 7–25)
CALCIUM SPEC-SCNC: 8.5 MG/DL (ref 8.6–10.5)
CHLORIDE SERPL-SCNC: 103 MMOL/L (ref 98–107)
CO2 SERPL-SCNC: 22 MMOL/L (ref 22–29)
CREAT SERPL-MCNC: 0.72 MG/DL (ref 0.76–1.27)
DEPRECATED RDW RBC AUTO: 65.2 FL (ref 37–54)
EGFRCR SERPLBLD CKD-EPI 2021: 92.9 ML/MIN/1.73
EOSINOPHIL # BLD AUTO: 0.04 10*3/MM3 (ref 0–0.4)
EOSINOPHIL NFR BLD AUTO: 0.5 % (ref 0.3–6.2)
ERYTHROCYTE [DISTWIDTH] IN BLOOD BY AUTOMATED COUNT: 18.6 % (ref 12.3–15.4)
GLOBULIN UR ELPH-MCNC: 3.4 GM/DL
GLUCOSE BLDC GLUCOMTR-MCNC: 102 MG/DL (ref 70–130)
GLUCOSE BLDC GLUCOMTR-MCNC: 118 MG/DL (ref 70–130)
GLUCOSE BLDC GLUCOMTR-MCNC: 93 MG/DL (ref 70–130)
GLUCOSE BLDC GLUCOMTR-MCNC: 93 MG/DL (ref 70–130)
GLUCOSE SERPL-MCNC: 90 MG/DL (ref 65–99)
HCT VFR BLD AUTO: 29.4 % (ref 37.5–51)
HGB BLD-MCNC: 9.2 G/DL (ref 13–17.7)
IMM GRANULOCYTES # BLD AUTO: 0.06 10*3/MM3 (ref 0–0.05)
IMM GRANULOCYTES NFR BLD AUTO: 0.7 % (ref 0–0.5)
LYMPHOCYTES # BLD AUTO: 0.87 10*3/MM3 (ref 0.7–3.1)
LYMPHOCYTES NFR BLD AUTO: 10.7 % (ref 19.6–45.3)
MCH RBC QN AUTO: 30.3 PG (ref 26.6–33)
MCHC RBC AUTO-ENTMCNC: 31.3 G/DL (ref 31.5–35.7)
MCV RBC AUTO: 96.7 FL (ref 79–97)
MONOCYTES # BLD AUTO: 0.67 10*3/MM3 (ref 0.1–0.9)
MONOCYTES NFR BLD AUTO: 8.2 % (ref 5–12)
NEUTROPHILS NFR BLD AUTO: 6.42 10*3/MM3 (ref 1.7–7)
NEUTROPHILS NFR BLD AUTO: 78.9 % (ref 42.7–76)
NRBC BLD AUTO-RTO: 0 /100 WBC (ref 0–0.2)
PLATELET # BLD AUTO: 323 10*3/MM3 (ref 140–450)
PMV BLD AUTO: 8.6 FL (ref 6–12)
POTASSIUM SERPL-SCNC: 3.6 MMOL/L (ref 3.5–5.2)
PROT SERPL-MCNC: 6 G/DL (ref 6–8.5)
RBC # BLD AUTO: 3.04 10*6/MM3 (ref 4.14–5.8)
SODIUM SERPL-SCNC: 135 MMOL/L (ref 136–145)
WBC NRBC COR # BLD AUTO: 8.14 10*3/MM3 (ref 3.4–10.8)

## 2024-03-31 PROCEDURE — 25010000002 DAPTOMYCIN PER 1 MG: Performed by: INTERNAL MEDICINE

## 2024-03-31 PROCEDURE — 25010000002 PIPERACILLIN SOD-TAZOBACTAM PER 1 G: Performed by: INTERNAL MEDICINE

## 2024-03-31 PROCEDURE — 85025 COMPLETE CBC W/AUTO DIFF WBC: CPT | Performed by: SURGERY

## 2024-03-31 PROCEDURE — 82948 REAGENT STRIP/BLOOD GLUCOSE: CPT

## 2024-03-31 PROCEDURE — 99238 HOSP IP/OBS DSCHRG MGMT 30/<: CPT | Performed by: NURSE PRACTITIONER

## 2024-03-31 PROCEDURE — 25010000002 HYDRALAZINE PER 20 MG: Performed by: SURGERY

## 2024-03-31 PROCEDURE — 80053 COMPREHEN METABOLIC PANEL: CPT | Performed by: INTERNAL MEDICINE

## 2024-03-31 RX ORDER — MEGESTROL ACETATE 40 MG/ML
400 SUSPENSION ORAL
Status: CANCELLED | OUTPATIENT
Start: 2024-03-31

## 2024-03-31 RX ORDER — LABETALOL 200 MG/1
400 TABLET, FILM COATED ORAL EVERY 8 HOURS SCHEDULED
Status: CANCELLED | OUTPATIENT
Start: 2024-03-31

## 2024-03-31 RX ORDER — ONDANSETRON 2 MG/ML
4 INJECTION INTRAMUSCULAR; INTRAVENOUS EVERY 6 HOURS PRN
Status: CANCELLED | OUTPATIENT
Start: 2024-03-31

## 2024-03-31 RX ORDER — LABETALOL HYDROCHLORIDE 5 MG/ML
10 INJECTION, SOLUTION INTRAVENOUS EVERY 4 HOURS PRN
Status: CANCELLED | OUTPATIENT
Start: 2024-03-31

## 2024-03-31 RX ORDER — CALCIUM CARBONATE 500 MG/1
1 TABLET, CHEWABLE ORAL 3 TIMES DAILY PRN
Status: CANCELLED | OUTPATIENT
Start: 2024-03-31

## 2024-03-31 RX ORDER — ISOSORBIDE DINITRATE 10 MG/1
10 TABLET ORAL
Status: CANCELLED | OUTPATIENT
Start: 2024-03-31

## 2024-03-31 RX ORDER — SODIUM CHLORIDE 0.9 % (FLUSH) 0.9 %
20 SYRINGE (ML) INJECTION AS NEEDED
Status: CANCELLED | OUTPATIENT
Start: 2024-03-31

## 2024-03-31 RX ORDER — LIDOCAINE 4 G/G
2 PATCH TOPICAL
Status: CANCELLED | OUTPATIENT
Start: 2024-04-01

## 2024-03-31 RX ORDER — NALOXONE HCL 0.4 MG/ML
0.1 VIAL (ML) INJECTION
Status: CANCELLED | OUTPATIENT
Start: 2024-03-31

## 2024-03-31 RX ORDER — IPRATROPIUM BROMIDE AND ALBUTEROL SULFATE 2.5; .5 MG/3ML; MG/3ML
3 SOLUTION RESPIRATORY (INHALATION) EVERY 6 HOURS PRN
Status: CANCELLED | OUTPATIENT
Start: 2024-03-31

## 2024-03-31 RX ORDER — NIFEDIPINE 30 MG/1
30 TABLET, EXTENDED RELEASE ORAL
Status: DISCONTINUED | OUTPATIENT
Start: 2024-03-31 | End: 2024-03-31 | Stop reason: HOSPADM

## 2024-03-31 RX ORDER — IBUPROFEN 600 MG/1
1 TABLET ORAL
Status: CANCELLED | OUTPATIENT
Start: 2024-03-31

## 2024-03-31 RX ORDER — LEVETIRACETAM 500 MG/1
500 TABLET ORAL EVERY 12 HOURS SCHEDULED
Status: CANCELLED | OUTPATIENT
Start: 2024-03-31

## 2024-03-31 RX ORDER — NICOTINE POLACRILEX 4 MG
15 LOZENGE BUCCAL
Status: CANCELLED | OUTPATIENT
Start: 2024-03-31

## 2024-03-31 RX ORDER — PANTOPRAZOLE SODIUM 40 MG/10ML
40 INJECTION, POWDER, LYOPHILIZED, FOR SOLUTION INTRAVENOUS
Status: CANCELLED | OUTPATIENT
Start: 2024-04-01

## 2024-03-31 RX ORDER — NIFEDIPINE 30 MG/1
30 TABLET, EXTENDED RELEASE ORAL
Status: CANCELLED | OUTPATIENT
Start: 2024-04-01

## 2024-03-31 RX ORDER — ACETAMINOPHEN 650 MG/1
650 SUPPOSITORY RECTAL EVERY 4 HOURS PRN
Status: CANCELLED | OUTPATIENT
Start: 2024-03-31

## 2024-03-31 RX ORDER — SODIUM CHLORIDE 0.9 % (FLUSH) 0.9 %
10 SYRINGE (ML) INJECTION AS NEEDED
Status: CANCELLED | OUTPATIENT
Start: 2024-03-31

## 2024-03-31 RX ORDER — ONDANSETRON 4 MG/1
4 TABLET, ORALLY DISINTEGRATING ORAL EVERY 6 HOURS PRN
Status: CANCELLED | OUTPATIENT
Start: 2024-03-31

## 2024-03-31 RX ORDER — LEVOTHYROXINE SODIUM 88 UG/1
88 TABLET ORAL
Status: CANCELLED | OUTPATIENT
Start: 2024-04-01

## 2024-03-31 RX ORDER — MIRTAZAPINE 15 MG/1
15 TABLET, FILM COATED ORAL NIGHTLY
Status: CANCELLED | OUTPATIENT
Start: 2024-03-31

## 2024-03-31 RX ORDER — ACETAMINOPHEN 325 MG/1
650 TABLET ORAL EVERY 4 HOURS PRN
Status: CANCELLED | OUTPATIENT
Start: 2024-03-31

## 2024-03-31 RX ORDER — POTASSIUM CHLORIDE 29.8 MG/ML
20 INJECTION INTRAVENOUS
Status: DISPENSED | OUTPATIENT
Start: 2024-03-31 | End: 2024-03-31

## 2024-03-31 RX ORDER — SODIUM CHLORIDE 0.9 % (FLUSH) 0.9 %
10 SYRINGE (ML) INJECTION EVERY 12 HOURS SCHEDULED
Status: CANCELLED | OUTPATIENT
Start: 2024-03-31

## 2024-03-31 RX ORDER — ACETAMINOPHEN 160 MG/5ML
650 SOLUTION ORAL EVERY 4 HOURS PRN
Status: CANCELLED | OUTPATIENT
Start: 2024-03-31

## 2024-03-31 RX ORDER — DEXTROSE MONOHYDRATE 25 G/50ML
25 INJECTION, SOLUTION INTRAVENOUS
Status: CANCELLED | OUTPATIENT
Start: 2024-03-31

## 2024-03-31 RX ORDER — DOCUSATE SODIUM 100 MG/1
100 CAPSULE, LIQUID FILLED ORAL 2 TIMES DAILY PRN
Status: CANCELLED | OUTPATIENT
Start: 2024-03-31

## 2024-03-31 RX ORDER — SODIUM CHLORIDE 9 MG/ML
40 INJECTION, SOLUTION INTRAVENOUS AS NEEDED
Status: CANCELLED | OUTPATIENT
Start: 2024-03-31

## 2024-03-31 RX ORDER — HYDRALAZINE HYDROCHLORIDE 20 MG/ML
10 INJECTION INTRAMUSCULAR; INTRAVENOUS EVERY 4 HOURS PRN
Status: CANCELLED | OUTPATIENT
Start: 2024-03-31

## 2024-03-31 RX ORDER — FLUTICASONE PROPIONATE 50 MCG
2 SPRAY, SUSPENSION (ML) NASAL DAILY PRN
Status: CANCELLED | OUTPATIENT
Start: 2024-03-31

## 2024-03-31 RX ADMIN — APIXABAN 5 MG: 5 TABLET, FILM COATED ORAL at 10:17

## 2024-03-31 RX ADMIN — PIPERACILLIN AND TAZOBACTAM 3.38 G: 3; .375 INJECTION, POWDER, LYOPHILIZED, FOR SOLUTION INTRAVENOUS at 00:31

## 2024-03-31 RX ADMIN — HYDRALAZINE HYDROCHLORIDE 10 MG: 20 INJECTION INTRAMUSCULAR; INTRAVENOUS at 03:35

## 2024-03-31 RX ADMIN — Medication 10 ML: at 10:18

## 2024-03-31 RX ADMIN — NIFEDIPINE 30 MG: 30 TABLET, EXTENDED RELEASE ORAL at 10:18

## 2024-03-31 RX ADMIN — LABETALOL HYDROCHLORIDE 400 MG: 200 TABLET, FILM COATED ORAL at 05:10

## 2024-03-31 RX ADMIN — LEVETIRACETAM 500 MG: 500 TABLET, FILM COATED ORAL at 10:18

## 2024-03-31 RX ADMIN — PANTOPRAZOLE SODIUM 40 MG: 40 INJECTION, POWDER, FOR SOLUTION INTRAVENOUS at 05:10

## 2024-03-31 RX ADMIN — PIPERACILLIN AND TAZOBACTAM 3.38 G: 3; .375 INJECTION, POWDER, LYOPHILIZED, FOR SOLUTION INTRAVENOUS at 10:17

## 2024-03-31 RX ADMIN — VALPROIC ACID 375 MG: 250 SOLUTION ORAL at 00:31

## 2024-03-31 RX ADMIN — VALPROIC ACID 375 MG: 250 SOLUTION ORAL at 14:26

## 2024-03-31 RX ADMIN — ISOSORBIDE DINITRATE 10 MG: 10 TABLET ORAL at 14:26

## 2024-03-31 RX ADMIN — LEVOTHYROXINE SODIUM 88 MCG: 88 TABLET ORAL at 05:11

## 2024-03-31 RX ADMIN — VALPROIC ACID 375 MG: 250 SOLUTION ORAL at 05:11

## 2024-03-31 RX ADMIN — ISOSORBIDE DINITRATE 10 MG: 10 TABLET ORAL at 10:18

## 2024-03-31 RX ADMIN — ACETAMINOPHEN 650 MG: 650 SUPPOSITORY RECTAL at 03:35

## 2024-03-31 RX ADMIN — LABETALOL HYDROCHLORIDE 400 MG: 200 TABLET, FILM COATED ORAL at 14:26

## 2024-03-31 RX ADMIN — LIDOCAINE 2 PATCH: 4 PATCH TOPICAL at 10:18

## 2024-03-31 RX ADMIN — DAPTOMYCIN 500 MG: 500 INJECTION, POWDER, LYOPHILIZED, FOR SOLUTION INTRAVENOUS at 10:17

## 2024-03-31 RX ADMIN — LOSARTAN POTASSIUM 100 MG: 50 TABLET, FILM COATED ORAL at 10:17

## 2024-03-31 NOTE — DISCHARGE SUMMARY
[facility]  DISCHARGE TO INPATIENT HOSPICE    Patient Name: Lea Rivers  : 1945  MRN: [id number]    Date of Admission: 3/2/2024  Date of Discharge:  24    Primary Care Physician: Mannie Melvin MD    Consults       Date and Time Order Name Status Description    3/20/2024  9:17 AM Inpatient Infectious Diseases Consult Completed     3/7/2024  6:39 AM Inpatient Gastroenterology Consult Completed     3/6/2024 10:43 PM Inpatient General Surgery Consult Completed     3/2/2024  4:01 PM Inpatient Neurology Consult General Completed           Hospital Course     Presenting Problem:   Falls [W19.XXXA]  Pneumatosis intestinalis [K63.89]    Active Hospital Problems    Diagnosis  POA    **Falls [W19.XXXA]  Yes    Pneumatosis intestinalis [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          Hospital Course:  Lea Rivers is a 78 y.o. male with past medical history of hypertension, hyperlipidemia, hypothyroidism, and seizure disorder who was admitted on 3/2/2024 due to fall and nondisplaced first through fourth left rib fractures. He was also found to have left distal clavicle fracture.  Patient was noted to have increasing abdominal distention on 3/6/2024.  CT of the abdomen/pelvis revealed dilated loops of large bowel with likely pneumatosis intestinalis.  General surgery was consulted due to persistent colonic ileus and significant colonic distention.  Underwent total abdominal colectomy with end ileostomy creation on 3/9/2024.  Repeat CT of the abdomen/pelvis revealed postsurgical changes with a moderate loculated ascitic fluid collection within the anterior abdomen.  He underwent exploratory laparotomy  with abdominal washout on 3/18/2024 by Dr. Godfrey.  ID Dr. Chow was consulted and assisted with managing antibiotic regimen. He initially required TPN. He was cleared for dysphagia diet but patient was  unable to maintain adequate caloric intake. The patient remains somnolent and is difficult to arouse. After goals of care discussion, the patient's family declined offer for artifical nutrition and was agreeable to hospice consultation. Hospice was consulted on 3/31. Family has decided to pursue inpatient hospice. He will be admitted to hospice service today.        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 Godfrey  Currently on broad-spectrum antibiotic therapy with Zosyn, micafungin and daptomycin  ID and general surgery following.  Appreciate the help  Initially required TPN.  Currently TPN discontinued   WOCN for ostomy care  Low grade fever 100.5 on AM of 3/31, s/p Tylenol suppository   Hospice consulted 3/31     Dysphagia  Severe energy malnutrition  Speech evaluated.  Recommended soft to chew and nectar thick liquid  Patient with poor oral intake  Continue Remeron and Megace  Nutrition following   Partner spoke with family on 3/30, and they are not interested in artificial nutrition as patient has previously refused  Hospice consulted, plan to admit to inpatient hospice today      Toxic metabolic encephalopathy  Per daughter significant change in mental status and speech since arrival, and team had a long discussion and felt this is probably TME in setting of infection, recent large operation, underlying dementia  Initial CT head 3/2 negative, repeated CT head 3/13 without any acute findings  EEG negative for seizures, findings consistent with TME     Multiple falls with increasing frequency   Multiple rib fractures and also left clavicle fracture  CT of head shows age-related changes which are chronic but no acute process  Neuro consulted, likely d/t neuropathy (confirmed with EMG) and progression of dementia  Rib fractures with no displacement or mildly displaced.  Orthopedic  surgery evaluated. No surgical intervention  planned.  Recs nonweightbearing LUE, may come out of sling in 5-7 days to initiate gentle ROM exercises per ortho.  F/u with ortho/Dr. Maldonado in 2 weeks  Pain control, lidocaine patch     Acute on Chronic Anemia  S/P 1 unit PRBCs 3/11 and 3/16, monitoring H&H  No clear source of active bleeding, hemoccult negative  Iron 34, Iron sat 19  Started on IV Iron 3/23  -Hgb 9.2 this AM   Monitor CBC.  Transfuse for H&H less than 7 and 21     Acute RUE DVT (brachial)  Provoked, 2/2 PICC   Continue eliquis     Prostate cancer  Hx BPH s/p greenlight photo vaporization of prostate 2018  Follows with Dr. Noland  S/P cyberknife radiation 2/9/23  Has intermittent hematuria, monitor while on AC     Dementia and increasing memory problem  Patient was previously living alone and driving, given significant decline will need placement  Normal B12 and TSH     Essential hypertension  -labetalol increased to 400 mg 3 times daily on 3/31  -Started Losartan 100 mg daily 3/31, d/c'd Lisinopril   -BP remains uncontrolled   -Add Nifedipine XL 30 mg daily      Seizure disorder  Continue vimpat and keppra     Hyperlipidemia  Hypothyroidism  Continue home regimen    Day of Discharge     HPI:   Patient seen resting in bed with eyes closed in no apparent distress.  Did not arouse to verbal stimulation. Nursing reports low grade fever of 100.5 this AM. BP remains uncontrolled. Plan to admit to inpatient hospice.     Vital Signs:   Temp:  [98.6 °F (37 °C)-100.1 °F (37.8 °C)] 99.7 °F (37.6 °C)  Heart Rate:  [69-76] 73  Resp:  [18-20] 18  BP: (145-193)/(69-92) 145/69     Physical Exam:  Constitutional: Resting with eyes closed, appears comfortable, lethargic, chronically ill-appearing  HENT: NCAT, mucous membranes moist  Respiratory: Diminished in bases, respiratory effort normal   Cardiovascular: RRR, no murmurs, cap refill brisk   Gastrointestinal: Positive bowel sounds, abdominal binder in place,  ostomy in place, midline vertical incision MANI with staples in place  Musculoskeletal: Trace BLE edema   Psychiatric: Appears to be sleeping  Neurologic: Resting with eyes closed, lethargic  Skin: warm, dry, no visible rash     Discharge Details     Discharge Disposition:  Transfer care to inpatient Hospice at [facility]    Time Spent on Discharge:  20 minutes    Raúl De Oliveira, KATE  03/31/24

## 2024-03-31 NOTE — DISCHARGE PLACEMENT REQUEST
"Hawk Quijano (78 y.o. Male)       Date of Birth   1945    Social Security Number       Address   [address] [place] [address]    Home Phone       MRN   [id number]       Latter day   Catholic    Marital Status                               Admission Date   3/31/24    Admission Type   Elective    Admitting Provider   Moe Mckee MD    Attending Provider   Magaly Lopez DO    Department, Room/Bed   [address], S[address]/1       Discharge Date       Discharge Disposition       Discharge Destination                                 Attending Provider: Magaly Lopez DO    Allergies: Chlorhexidine, Sulfa Antibiotics    Isolation: None   Infection: None   Code Status: No CPR    Ht: 170.2 cm (67.01\")   Wt: 98.8 kg (217 lb 12 oz)    Admission Cmt: None   Principal Problem: Megacolon, 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]                   Active Insurance as of 3/31/2024       Primary Coverage       Payor Plan Insurance Group Employer/Plan Group    [employer]        Payor Plan Address Payor Plan Phone Number Payor Plan Fax Number Effective Dates    [address] [place] [address] [phone]  3/31/2024 - None Entered    [facility] [id number]         Subscriber Name Subscriber Birth Date Member ID       HAWK QUIJANO 1945                      Emergency Contacts        (Rel.) Home Phone Work Phone Mobile Phone    taylor loyola (Son) [phone] -- [phone]    Luis Enrique Guadalupe (Daughter) [phone] -- [phone]    Celia Dave (Daughter) -- -- --    NirFred (Son) -- -- --              Emergency Contact Information       Name Relation Home Work Mobile    taylor loyola Son [phone]    Luis Enrique Guadalupe Daughter [phone]    Celia Dave Daughter       Fred Loyola Son             Insurance Information                  [employer] Phone: [phone]    Subscriber: " Lea Rivers Subscriber#:     Group#: -- Precert#: --          History & Physical    No notes of this type exist for this encounter.

## 2024-03-31 NOTE — LETTER
[facility]  [address] [facility] [address]  [phone]        March 31, 2024      Patient: Lea Rivers  YOB: 1945  Date of Visit: 3/31/2024      For [facility] inpatient admission      Referring: Raúl LOVE  Attending: aLndy LOVE  Certifying: Dr. Moe Reinoso, RN

## 2024-03-31 NOTE — PLAN OF CARE
Problem: Adult Inpatient Plan of Care  Goal: Absence of Hospital-Acquired Illness or Injury  Intervention: Prevent Skin Injury  Recent Flowsheet Documentation  Taken 3/31/2024 0200 by Blanca Mendoza RN  Body Position: weight shifting  Skin Protection:   adhesive use limited   incontinence pads utilized   transparent dressing maintained   tubing/devices free from skin contact  Taken 3/31/2024 0000 by Blanac Mendoza RN  Body Position: weight shifting  Skin Protection:   adhesive use limited   incontinence pads utilized   transparent dressing maintained   tubing/devices free from skin contact  Taken 3/30/2024 2000 by Blanca Mendoza RN  Body Position: weight shifting  Skin Protection:   adhesive use limited   incontinence pads utilized   transparent dressing maintained   tubing/devices free from skin contact   Goal Outcome Evaluation:  Plan of Care Reviewed With: patient        Progress: no change

## 2024-03-31 NOTE — PROGRESS NOTES
"Patient Name:  Lea Rivers  YOB: 1945  [id number]    Surgery Progress Note    Date of visit: 3/31/2024    Subjective   Unable to obtain full history due to patient's dementia. No abdominal pain. No nausea/vomiting.  Tmax 100.1.  Having ostomy output.  Appetite poor          Objective       BP (!) 193/86 (BP Location: Left leg, Patient Position: Lying)   Pulse 69   Temp 100.1 °F (37.8 °C) (Axillary)   Resp 18   Ht 170.2 cm (67.01\")   Wt 98.8 kg (217 lb 12 oz)   SpO2 95%   BMI 34.10 kg/m²     Intake/Output Summary (Last 24 hours) at 3/31/2024 0933  Last data filed at 3/31/2024 0314  Gross per 24 hour   Intake 50 ml   Output 425 ml   Net -375 ml       CV:  Rhythm regular and rate regular  L:  Clear to auscultation bilaterally  Abd:  Bowel sounds positive, soft nontender, nondistended, incision clean dry and intact, ostomy viable and functioning  Ext:  No cyanosis, clubbing, edema    Recent labs and imaging that are back at this time have been reviewed.   Creatinine 0.72  WBC 8.1  Hemoglobin 9.2       Assessment & Plan     Patient postoperative day 22 from total abdominal colectomy with end ileostomy, postoperative day 13 abdominal washout. Pain control.  Diet per speech and swallow recommendations. Abx per ID.  Low-grade fever noted.  White blood cell count continues to be normal and patient without abdominal symptoms.        Harley Godfrey MD  3/31/2024  09:33 EDT     "

## 2024-03-31 NOTE — PROGRESS NOTES
Lea Barrett [place]  1945  [id number]        Evaluating Physician: Louis Chow MD    Chief Complaint: abdpain    Reason for Consultation: IA infection    History of present illness:     Patient is a 78 y.o.  Yr old male with history of seizure disorder, colon cancer/prostate cancer with prior radiation, diabetes and dementia per admission notes with admission March 2 after frequent falls noted to have multiple rib fractures and left side clavicular fracture in the emergency room.noted to have significant colon distention with potential for pneumatosis on CT scan and seen by gastroenterology/surgery.taken for surgery on March 9 with diagnosis of toxic dilation of the colon for total abdominal colectomy and end ileostomy; medicine notes indicate he received ceftriaxone/Flagyl and Diflucan.  Leukocytosis persisted.repeat CT scan on March 18 with postsurgical changes, moderate loculated ascitic fluid in the anterior abdomen as described by radiology.taken back to surgery on March 18 for exploratory laparotomy with washout.  Culture subsequently with gram-negative manuel at least.  Empiric antibiotics adjusted to Zosyn/Mycamine    3/31/24 sleepy;   per nursing, LG temp near 100 bu totherwise no new focal pain/change or no new distress; + abdominal pain which is less intense overall, dull at present,  worse with palpation, better with pain meds and he will not attach a numerical severity.    no headache photophobia or neck stiffness.  No dyspnea or cough.  He has a sling on the left arm with left arm PICC line.  No rash.  Nursing reports no other new focal pain or distress.       Past Medical History:   Diagnosis Date    Anemia     Colon cancer 1990    Status post partial colectomy in 1990. No chemotherapy or radiation therapy.    Coronary artery disease     Nonobstructive coronary artery disease.    Diabetes     Dyslipidemia     GERD (gastroesophageal reflux disease)     Hx of.    Hyperlipidemia      Hypertension     Hyponatremia     Hypothyroidism     Left shoulder pain     Low sodium levels 2022    recent issue; saw nephrologist who ruled out kidney issues; took Sodium Chloride po to bring levels up    Memory deficit     FROM ANESTHESIA    Osteoarthritis     Prostate cancer 07/23/2022    Seizure disorder     silent seziure-diagnosed years ago    Skin cancer        Past Surgical History:   Procedure Laterality Date    APPENDECTOMY      CARDIAC CATHETERIZATION  2012    30 to 40% LAD    CARPAL TUNNEL RELEASE Bilateral     CHOLECYSTECTOMY      COLECTOMY PARTIAL / TOTAL  1990    Partial colectomy    COLON RESECTION N/A 3/9/2024    Procedure: TOTAL ABDOMINAL COLECTOMY, END ILEOSTOMY;  Surgeon: Harley Godfrey MD;  Location:  [facility];  Service: General;  Laterality: N/A;    COLONOSCOPY      CYBERKNIFE  02/09/2023    Prostate/SV    CYSTOSCOPY TRANSURETHRAL RESECTION OF PROSTATE N/A 09/21/2018    Procedure: CYSTOSCOPY TRANSURETHRAL RESECTION OF PROSTATE GREENLIGHT;  Surgeon: Naseem Clayton MD;  Location:  [facility];  Service: Urology    EXPLORATORY LAPAROTOMY N/A 3/18/2024    Procedure: LAPAROTOMY EXPLORATORY, ABDOMINAL WASH OUT;  Surgeon: Harley Godfrey MD;  Location:  [facility];  Service: General;  Laterality: N/A;    OTHER SURGICAL HISTORY      Contraction surgery on bilateral hands and wrists.    PROSTATE BIOPSY N/A 11/11/2022    Procedure: TRANSRECTAL ULTRASOUND GUIDED BIOPSY OF PROSTATE;  Surgeon: Naseem Noland MD;  Location:  [facility];  Service: Urology;  Laterality: N/A;    SINUS SURGERY      SKIN BIOPSY      TEETH EXTRACTION      TONSILLECTOMY      TOTAL KNEE ARTHROPLASTY Right 04/07/2022    Procedure: TOTAL KNEE ARTHROPLASTY WITH ORTHO ROBOT RIGHT;  Surgeon: Louis Malcolm MD;  Location:  [facility];  Service: Robotics - Ortho;  Laterality: Right;    TRANSRECTAL INCISION PROSTATE WITH GOLD SEED Bilateral 01/06/2023    Procedure: TRANSRECTAL ULTRASOUND GUIDED PROSTATE FIDUCIAL MARKER  "PLACEMENT;  Surgeon: Naseem Noland MD;  [facility];  Service: Urology;  Laterality: Bilateral;    TURP / TRANSURETHRAL INCISION / DRAINAGE PROSTATE      VASECTOMY            family history includes Arthritis in an other family member; Cancer in his mother and another family member; Esophageal cancer in his brother; Hypertension in his father; No Known Problems in his paternal grandfather, paternal grandmother, and sister; Stroke in his father, maternal grandfather, sister, and another family member.    Allergies   Allergen Reactions    Chlorhexidine Rash    Sulfa Antibiotics Rash     Childhood reaction             Review of Systems    3/31/24 as per nursing also    Constitutional-- No  chills or sweats.  Appetite diminished with fatigue  Heent-- No new vision, hearing or throat complaints.  No epistaxis or oral sores.  Denies odynophagia or dysphagia.  No flashers, floaters or eye pain. No odynophagia or dysphagia. No headache, photophobia or neck stiffness.  CV-- No chest pain, palpitation or syncope  Resp-- No SOB/cough/Hemoptysis  GI- see above.  No hematochezia, melena, or hematemesis. Denies jaundice or chronic liver disease.  -- No dysuria, hematuria, or flank pain.  Denies hesitancy, urgency or flank pain.  Lymph- no swollen lymph nodes in neck/axilla or groin.   Heme- No active bruising or bleeding  MS-- no swelling or pain in the bones or joints of arms/legs.  No new back pain.  Neuro-- generally debilitated, unable to lift his legs off the bed according to nursing.  Wiggles his feet/toes    Full 12 point review of systems reviewed and negative otherwise for acute complaints, except for above    Physical Exam:   Vital Signs   BP (!) 193/86 (BP Location: Left leg, Patient Position: Lying)   Pulse 69   Temp 100.1 °F (37.8 °C) (Axillary)   Resp 18   Ht 170.2 cm (67.01\")   Wt 98.8 kg (217 lb 12 oz)   SpO2 95%   BMI 34.10 kg/m²     GENERAL: sleepy, answers questions and follows basic " commands as above, in no acute distress. Appears chronically debilitated.     HEENT: Normocephalic, atraumatic.   No conjunctival injection. No icterus. Oropharynx clear without evidence of thrush or exudate. No evidence of periodontal disease.    NECK: Supple without nuchal rigidity. No mass.  HEART: RRR; No murmur, rubs, gallops.   LUNGS: diminished at bases with some crackles at the bases, but otherwise Clear to auscultation bilaterally without wheezing/ rhonchi.  Nonlabored. No dullness.  ABDOMEN: Soft,  tender, nondistended. Positive bowel sounds but diminished. No rebound or guarding. NO mass or HSM.  EXT:  +edema  MSK: FROM without joint effusions noted arms/legs.    SKIN: Warm and dry without cutaneous eruptions on Inspection/palpation.    NEURO: sleepy    No peripheral stigmata/phenomena of endocarditis    IV without obvious redness or drainage at left arm    Laboratory Data    Results from last 7 days   Lab Units 03/31/24  0500 03/30/24  0631 03/29/24  0623   WBC 10*3/mm3 8.14 8.58 8.84   HEMOGLOBIN g/dL 9.2* 9.5* 9.2*   HEMATOCRIT % 29.4* 29.8* 29.1*   PLATELETS 10*3/mm3 323 359 394     Results from last 7 days   Lab Units 03/30/24  0631   SODIUM mmol/L 137   POTASSIUM mmol/L 3.9   CHLORIDE mmol/L 105   CO2 mmol/L 25.0   BUN mg/dL 11   CREATININE mg/dL 0.68*   GLUCOSE mg/dL 100*   CALCIUM mg/dL 8.2*     Results from last 7 days   Lab Units 03/30/24  0631   ALK PHOS U/L 106   BILIRUBIN mg/dL 0.3   ALT (SGPT) U/L 6   AST (SGOT) U/L 12         Results from last 7 days   Lab Units 03/25/24  1418   CRP mg/dL 1.94*       Estimated Creatinine Clearance: 98.7 mL/min (A) (by C-G formula based on SCr of 0.68 mg/dL (L)).      Microbiology:      Radiology:  Imaging Results (Last 72 Hours)       Procedure Component Value Units Date/Time    FL Video Swallow With Speech Single Contrast [[id number]] Collected: 03/29/24 1414     Updated: 03/30/24 1814    Narrative:      FL VIDEO SWALLOW W SPEECH SINGLE-CONTRAST    Date of  Exam: 3/29/2024 1:50 PM EDT    Indication: dysphagia      Comparison: None available.    Technique:   The speech pathologist administered food and/or liquid mixed with barium to the patient with cine/video imaging.  Imaging assistance was provided to the speech pathologist and an image was saved.    Fluoroscopic Time: 3 minutes and 12 seconds    Number of Images: 7 associated fluoroscopic loops were saved    Findings:  Aspiration was seen during fluoroscopic guided modified barium swallowing series. Please see speech therapy report for full details and recommendations.      Impression:      Impression:  Fluoroscopy provided for a modified barium swallow. Aspiration was seen during swallowing evaluation. Please see speech therapy report for full details and recommendations.      Report dictated by: Lois Gao PA-c      I have personally reviewed this case and agree with the findings above:    Electronically Signed: Bigg Montoya MD    3/30/2024 6:11 PM EDT    Workstation ID: [id number]              Impression:     --acute abdominal pain with total abdominal colectomy/ileostomy as above related to toxic dilation of colon As per operative note, postop with persistent leukocytosis and fluid collections on CT scan, taken for exploratory laparotomy and fluid culture positive for gram-negative rods so far, consistent with abscess.  Empiric antimicrobials adjusted to Zosyn/Mycamine/daptomycin    --Left lower lobe pneumonia per medicine team notes, abnormal chest x-ray March 12. no new respiratory distress or productive sputum.  Empiric Zosyn ongoing.  If worsening respiratory status you could consider further imaging etc.nasal cannula stable per nursing    --history dementia per admission notes a little specifics regarding baseline not clear and generally poor insight overall.  Description of toxic/metabolic encephalopathy per medicine team notes during this admission.  Prior history seizure and generally  debilitated.    --History of multiple falls with left clavicular and multiple rib fractures per admission notes.  Orthopedics has seen.    --Right upper extremity DVT, described as brachial per medicine team notes.  Anticoagulation at discretion of medicine team    --History prostate and colon cancer previously per admission notes.        PLAN:       --IV Zosyn  (started 3/18)/Mycamine/ daptomycin (started 3/23); duration to depend on clinical course; potentially  14 days total if steadily better    **culture with pseudomonas aeruginosa and E Faecium    --LG temp near 100 3/31 but without new symptom or exam change;  chest nonfocal and on room air;  no new urinary change;  abd improved and no progressive pain;  denies diarrhea; no rash and IV sites no new suppurative change;  if progressive temp or new symptoms he may need further radiographic/microbiologic workup    --Check/reviewed labs cultures and scans    --Partial history per nursing staff    --Discussed with microbiology     --Highly complex of issues with high risk for further serious morbidity and other serious sequela    Copied text in this note has been reviewed and is accurate as of 03/31/24.        Louis Chow MD  3/31/2024

## 2024-03-31 NOTE — PROGRESS NOTES
[facility] Medicine Services  PROGRESS NOTE    Patient Name: Lea Rivers  : 1945  MRN: [id number]    Date of Admission: 3/2/2024  Primary Care Physician: Mannie Melvin MD    Subjective   Subjective     CC:  Follow up Colectomy    HPI:  Patient seen resting in bed with eyes closed in no apparent distress.  Did not arouse to verbal stimulation.  Nursing reports low grade fever of 100.5 this AM. BP remains uncontrolled.       Objective   Objective     Vital Signs:   Temp:  [97.4 °F (36.3 °C)-100.1 °F (37.8 °C)] 100.1 °F (37.8 °C)  Heart Rate:  [69-71] 69  Resp:  [18-20] 18  BP: (148-193)/(76-93) 193/86     Physical Exam:  Constitutional: Resting with eyes closed, appears comfortable, lethargic, chronically ill-appearing  HENT: NCAT, mucous membranes moist  Respiratory: Diminished in bases, respiratory effort normal   Cardiovascular: RRR, no murmurs, cap refill brisk   Gastrointestinal: Positive bowel sounds, abdominal binder in place, ostomy in place, midline vertical incision MANI with staples in place  Musculoskeletal: Trace BLE edema   Psychiatric: Appears to be sleeping  Neurologic: Resting with eyes closed, lethargic  Skin: warm, dry, no visible rash     Results Reviewed:  LAB RESULTS:      Lab 24  0500 24  0631 24  0623 24  1646 24  0550 24  0707 24  0632 24  1418   WBC 8.14 8.58 8.84  --  8.95 10.46   < >  --    HEMOGLOBIN 9.2* 9.5* 9.2* 9.3* 9.0* 9.6*   < >  --    HEMATOCRIT 29.4* 29.8* 29.1* 29.1* 29.6* 30.0*   < >  --    PLATELETS 323 359 394  --  431 488*   < >  --    NEUTROS ABS 6.42 6.63 6.71  --  6.50 7.67*   < >  --    IMMATURE GRANS (ABS) 0.06* 0.07* 0.06*  --  0.12* 0.22*   < >  --    LYMPHS ABS 0.87 1.01 1.05  --  1.10 0.99   < >  --    MONOS ABS 0.67 0.77 0.89  --  1.04* 1.08*   < >  --    EOS ABS 0.04 0.05 0.07  --  0.12 0.41*   < >  --    MCV 96.7 97.4* 96.7  --  98.3* 94.6   < >  --    CRP  --   --    --   --   --   --   --  1.94*   PROTIME  --   --   --   --   --   --   --  16.7*    < > = values in this interval not displayed.         Lab 03/31/24  0530 03/30/24  0631 03/27/24  0707 03/26/24  0632 03/25/24  1418   SODIUM 135* 137 132* 132* 136   POTASSIUM 3.6 3.9 4.0 4.6 4.5   CHLORIDE 103 105 101 103 105   CO2 22.0 25.0 22.0 21.0* 24.0   ANION GAP 10.0 7.0 9.0 8.0 7.0   BUN 13 11 15 24* 27*   CREATININE 0.72* 0.68* 0.53* 0.54* 0.62*   EGFR 92.9 94.6 101.9 101.4 97.2   GLUCOSE 90 100* 101* 97 129*   CALCIUM 8.5* 8.2* 7.9* 8.3* 7.9*   IONIZED CALCIUM  --   --   --   --  1.27   MAGNESIUM  --  2.0  --   --   --    PHOSPHORUS  --  3.5  --   --   --          Lab 03/31/24  0530 03/30/24  0631 03/25/24  1418   TOTAL PROTEIN 6.0 5.8* 5.2*   ALBUMIN 2.6* 2.7* 2.4*   GLOBULIN 3.4 3.1 2.8   ALT (SGPT) <5 6 7   AST (SGOT) 11 12 18   BILIRUBIN 0.4 0.3 0.2   ALK PHOS 104 106 161*         Lab 03/25/24  1418   PROTIME 16.7*   INR 1.33*         Lab 03/30/24  0631 03/25/24  1418   CHOLESTEROL 120  --    TRIGLYCERIDES 179* 101             Brief Urine Lab Results  (Last result in the past 365 days)        Color   Clarity   Blood   Leuk Est   Nitrite   Protein   CREAT   Urine HCG        03/11/24 1337 Yellow   Cloudy   Moderate (2+)   Trace   Negative   30 mg/dL (1+)                   Microbiology Results Abnormal       Procedure Component Value - Date/Time    Fungus Culture - Peritoneal Fluid, Perineum [[id number]] Collected: 03/18/24 1349    Lab Status: Preliminary result Specimen: Peritoneal Fluid from Perineum Updated: 03/25/24 1831     Fungus Culture No fungus isolated at 1 week    Anaerobic Culture - Peritoneal Fluid, Perineum [[id number]]  (Normal) Collected: 03/18/24 1349    Lab Status: Final result Specimen: Peritoneal Fluid from Perineum Updated: 03/23/24 1026     Anaerobic Culture No anaerobes isolated at 5 days    AFB Culture - Peritoneal Fluid, Perineum [[id number]] Collected: 03/18/24 1349    Lab Status: Preliminary  result Specimen: Peritoneal Fluid from Perineum Updated: 03/19/24 1339     AFB Stain No acid fast bacilli seen on concentrated smear    Urine Culture - Urine, Indwelling Urethral Catheter [[id number]]  (Normal) Collected: 03/11/24 1337    Lab Status: Final result Specimen: Urine from Indwelling Urethral Catheter Updated: 03/12/24 2024     Urine Culture No growth    Blood Culture - Blood, Arm, Right [[id number]]  (Normal) Collected: 03/06/24 1857    Lab Status: Final result Specimen: Blood from Arm, Right Updated: 03/11/24 2045     Blood Culture No growth at 5 days    Blood Culture - Blood, Arm, Right [[id number]]  (Normal) Collected: 03/06/24 1751    Lab Status: Final result Specimen: Blood from Arm, Right Updated: 03/11/24 2000     Blood Culture No growth at 5 days    Blood Culture - Blood, Arm, Right [[id number]]  (Normal) Collected: 03/02/24 1132    Lab Status: Final result Specimen: Blood from Arm, Right Updated: 03/07/24 1201     Blood Culture No growth at 5 days    Narrative:      Less than seven (7) mL's of blood was collected.  Insufficient quantity may yield false negative results.    Blood Culture - Blood, Arm, Right [[id number]]  (Normal) Collected: 03/02/24 1132    Lab Status: Final result Specimen: Blood from Arm, Right Updated: 03/07/24 1201     Blood Culture No growth at 5 days    Narrative:      Less than seven (7) mL's of blood was collected.  Insufficient quantity may yield false negative results.    Urine Culture - Urine, Straight Cath [[id number]]  (Normal) Collected: 03/02/24 0923    Lab Status: Final result Specimen: Urine from Straight Cath Updated: 03/03/24 1601     Urine Culture No growth    COVID PRE-OP / PRE-PROCEDURE SCREENING ORDER (NO ISOLATION) - Swab, Nasopharynx [[id number]]  (Normal) Collected: 03/02/24 1133    Lab Status: Final result Specimen: Swab from Nasopharynx Updated: 03/02/24 1227    Narrative:      The following orders were created for panel order COVID PRE-OP / PRE-PROCEDURE  SCREENING ORDER (NO ISOLATION) - Swab, Nasopharynx.  Procedure                               Abnormality         Status                     ---------                               -----------         ------                     COVID-19, FLU A/B, RSV P...[[id number]]  Normal              Final result                 Please view results for these tests on the individual orders.    COVID-19, FLU A/B, RSV PCR 1 HR TAT - Swab, Nasopharynx [[id number]]  (Normal) Collected: 03/02/24 1133    Lab Status: Final result Specimen: Swab from Nasopharynx Updated: 03/02/24 1227     COVID19 Not Detected     Influenza A PCR Not Detected     Influenza B PCR Not Detected     RSV, PCR Not Detected    Narrative:      Fact sheet for providers: https://www.fda.gov/media/337128/download    Fact sheet for patients: https://www.fda.gov/media/060053/download    Test performed by PCR.            FL Video Swallow With Speech Single Contrast    Result Date: 3/30/2024  FL VIDEO SWALLOW W SPEECH SINGLE-CONTRAST Date of Exam: 3/29/2024 1:50 PM EDT Indication: dysphagia Comparison: None available. Technique:   The speech pathologist administered food and/or liquid mixed with barium to the patient with cine/video imaging.  Imaging assistance was provided to the speech pathologist and an image was saved. Fluoroscopic Time: 3 minutes and 12 seconds Number of Images: 7 associated fluoroscopic loops were saved Findings: Aspiration was seen during fluoroscopic guided modified barium swallowing series. Please see speech therapy report for full details and recommendations.     Impression: Impression: Fluoroscopy provided for a modified barium swallow. Aspiration was seen during swallowing evaluation. Please see speech therapy report for full details and recommendations. Report dictated by: Lois Gao PA-c  I have personally reviewed this case and agree with the findings above: Electronically Signed: Bigg Montoya MD  3/30/2024 6:11 PM EDT  Workstation  ID: [id number]     Results for orders placed during the hospital encounter of 07/20/21    Adult Transthoracic Echo Complete W/ Cont if Necessary Per Protocol    Interpretation Summary  · Left ventricular wall thickness is consistent with mild concentric hypertrophy.  · Normal left-ventricular systolic function, estimated EF 65%.  · Left ventricular diastolic function is consistent with (grade I) impaired relaxation.  · Aortic sclerosis without aortic stenosis. Trace aortic insufficiency.  · Trace mitral regurgitation, trace tricuspid regurgitation.      Current medications:  Scheduled Meds:apixaban, 5 mg, Oral, Q12H  DAPTOmycin, 6 mg/kg (Adjusted), Intravenous, Q24H  insulin regular, 2-7 Units, Subcutaneous, Q6H  isosorbide dinitrate, 10 mg, Oral, TID - Nitrates  labetalol, 400 mg, Oral, Q8H  levETIRAcetam, 500 mg, Oral, Q12H  levothyroxine, 88 mcg, Oral, Q AM  Lidocaine, 2 patch, Transdermal, Q24H  losartan, 100 mg, Oral, Q24H  megestrol, 400 mg, Oral, Q24H  micafungin (MYCAMINE) IV, 100 mg, Intravenous, Q24H  mirtazapine, 15 mg, Oral, Nightly  NIFEdipine XL, 30 mg, Oral, Q24H  pantoprazole, 40 mg, Intravenous, Q AM  piperacillin-tazobactam, 3.375 g, Intravenous, Q8H  potassium chloride, 20 mEq, Intravenous, Q1H  sodium chloride, 10 mL, Intravenous, Q12H  Valproic Acid, 375 mg, Oral, Q6H      Continuous Infusions:   PRN Meds:.  acetaminophen **OR** acetaminophen **OR** acetaminophen    calcium carbonate    Calcium Replacement - Follow Nurse / BPA Driven Protocol    dextrose    dextrose    docusate sodium    fluticasone    glucagon (human recombinant)    hydrALAZINE    ipratropium-albuterol    labetalol    Magnesium Standard Dose Replacement - Follow Nurse / BPA Driven Protocol    [DISCONTINUED] HYDROmorphone **AND** naloxone    ondansetron ODT **OR** ondansetron    Phosphorus Replacement - Follow Nurse / BPA Driven Protocol    Potassium Replacement - Follow Nurse / BPA Driven Protocol    sodium chloride    sodium  chloride    sodium chloride    sodium chloride    Assessment & Plan   Assessment & Plan     Active Hospital Problems    Diagnosis  POA    **Falls [W19.XXXA]  Yes    Pneumatosis intestinalis [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        Brief Hospital Course to date:  Lea Rivers is a 78 y.o. male with past medical history of hypertension, hyperlipidemia, hypothyroidism, and seizure disorder who was admitted on 3/2/2024 due to fall and nondisplaced first through fourth left rib fractures. He was also found to have left distal clavicle fracture.  Patient was noted to have increasing abdominal distention on 3/6/2024.  CT of the abdomen/pelvis revealed dilated loops of large bowel with likely pneumatosis intestinalis.  General surgery was consulted due to persistent colonic ileus and significant colonic distention.  Underwent total abdominal colectomy with end ileostomy creation on 3/9/2024.  Repeat CT of the abdomen/pelvis revealed postsurgical changes with a moderate loculated ascitic fluid collection within the anterior abdomen.  He underwent exploratory laparotomy  with abdominal washout on 3/18/2024 by Dr. Godfrey.  ID Dr. Chow was consulted and is managing antibiotic regimen.      This patient's problems and plans were partially entered by my partner and updated as appropriate by me 03/31/24.    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 Godfrey  Currently on broad-spectrum antibiotic therapy with Zosyn, micafungin and daptomycin  ID and general surgery following.  Appreciate the help  Initially required TPN.  Currently TPN discontinued   WOCN for ostomy care  Low grade fever 100.5 on AM of 3/31, s/p Tylenol suppository   Hospice consulted 3/31  AM labs      Dysphagia  Severe energy malnutrition  Speech evaluated.  Recommended soft  to chew and nectar thick liquid  Patient with poor oral intake  Continue Remeron and Megace  Nutrition following   Partner spoke with family on 3/30, and they are not interested in artificial nutrition as patient has previously refused  Hospice consulted      Toxic metabolic encephalopathy  Per daughter significant change in mental status and speech since arrival, and team had a long discussion and felt this is probably TME in setting of infection, recent large operation, underlying dementia  Initial CT head 3/2 negative, repeated CT head 3/13 without any acute findings  EEG negative for seizures, findings consistent with TME     Multiple falls with increasing frequency   Multiple rib fractures and also left clavicle fracture  CT of head shows age-related changes which are chronic but no acute process  Neuro consulted, likely d/t neuropathy (confirmed with EMG) and progression of dementia  Rib fractures with no displacement or mildly displaced.  Orthopedic surgery evaluated. No surgical intervention  planned.  Recs nonweightbearing LUE, may come out of sling in 5-7 days to initiate gentle ROM exercises per ortho.  F/u with ortho/Dr. Maldonado in 2 weeks  Pain control, lidocaine patch     Acute on Chronic Anemia  S/P 1 unit PRBCs 3/11 and 3/16, monitoring H&H  No clear source of active bleeding, hemoccult negative  Iron 34, Iron sat 19  Started on IV Iron 3/23  -Hgb 9.2 this AM   Monitor CBC.  Transfuse for H&H less than 7 and 21     Acute RUE DVT (brachial)  Provoked, 2/2 PICC   Continue eliquis     Prostate cancer  Hx BPH s/p greenlight photo vaporization of prostate 2018  Follows with Dr. Noland  S/P cyberknife radiation 2/9/23  Has intermittent hematuria, monitor while on AC     Dementia and increasing memory problem  Patient was previously living alone and driving, given significant decline will need placement  Normal B12 and TSH     Essential hypertension  -labetalol increased to 400 mg 3 times daily on  3/31  -Started Losartan 100 mg daily 3/31, d/c'd Lisinopril   -BP remains uncontrolled   -Add Nifedipine XL 30 mg daily     Seizure disorder  Continue vimpat and keppra     Hyperlipidemia  Hypothyroidism  Continue home regimen     Expected Discharge Location and Transportation: TBD, hospice follows   Expected Discharge   Expected Discharge Date: 4/2/2024; Expected Discharge Time:       DVT prophylaxis:  Medical and mechanical DVT prophylaxis orders are present.         AM-PAC 6 Clicks Score (PT): 10 (03/30/24 2000)    CODE STATUS:   Code Status and Medical Interventions:   Ordered at: 03/02/24 1456     Medical Intervention Limits:    NO intubation (DNI)     Code Status (Patient has no pulse and is not breathing):    No CPR (Do Not Attempt to Resuscitate)     Medical Interventions (Patient has pulse or is breathing):    Limited Support       Raúl De Oliveira, APRN  03/31/24

## 2024-03-31 NOTE — PROGRESS NOTES
Continued Stay Note  [facility]     Patient Name: Lea Rivers  MRN: [id number]  Today's Date: 3/31/2024    Admit Date: 3/31/2024    Plan: Inpatient hospice   Discharge Plan       Row Name 03/31/24 1718       Plan    Plan Inpatient hospice    Plan Comments Hospice RN to bedside. Present for meeting are several family members including son Moises/NII, daughter Luis Enrique, and son Fred. Discussed inpatient hospice, GOC/POC. They are electing full comfort measures including stopping cardiac monitoring, discontinuing IV antibiotics/antifungals, no further labs or scans. Patient is a DNR/DNI. They relay that they want to follow the patient's wishes and full comfort is what he expressed he wanted. Patient approved for inpatient hospice admission by Dr. Moe Mckee for skilled nursing assessment, medication titration, and injectable medication administration for seizure and pain management. Raúl LOVE aware of discharge/readmit to hospice. Coordinated care with nurseCurtis. Patient will transfer to  when a bed is available. Please call ext [phone] with any needs.    Final Discharge Disposition Code 51 - hospice medical facility                   Discharge Codes    No documentation.                       Marcy Reinoso RN

## 2024-03-31 NOTE — PROGRESS NOTES
Nutrition Services    Patient Name:  Lea Rivers  YOB: 1945  MRN: [id number]  Admit Date:  3/2/2024      Nutrition follow up note (3/31)  Tube not placed yesterday.  Per RN, patient daughter reported she had the discussion with father regarding EN and she knows he dos not want it. RD will D/C EN orders. Will continue to follow PO progress, GOC, intake per protocol.       Nutrition follow up note: (3/30)  EN ordered yesterday with plan to place keofeed.  No tube noted at time of visit. Patient daughter at bedside, reports RN attempted to place tube last night, however patient shock his head no when they attempted.  Plan to attempt again today when patient is more wake. Reports if patient declines, then she will honor his wishes.  He did not have any of his breakfast meal this morning. Overall, on going poor intake/appetite.  Daughter is aware of patient poor appetite /intake.  Will keep EN orders in the event that a tube is placed at some point it. RN to alert RD if patient declines and RD to D/C EN orders.     Electronically signed by:  Daniella Ramos RD  03/31/24 09:01 EDT

## 2024-04-01 NOTE — NURSING NOTE
WOC consulted for ileostomy.    Patient has now transition to inpatient hospice/comfort measures.    Ostomy appliance changed today.  Patient aroused to voice, but quickly went back to sleep.    Scant amount of dark liquid stool noted in appliance.    Appliance changed to 2 piece Alfa, flat, drainable (5B par stock) with barrier ring.    WOC will sign off.  RN staff to assist with appliance changes and care for ostomy.  If additional barrier rings are needed please reach back out to WOC.    Sign off.    Pola Arnold RN, BSN, CCRN, CWOCN  Wound, Ostomy and Continence (WOC) Department  [facility]

## 2024-04-01 NOTE — PLAN OF CARE
Goal Outcome Evaluation:  Plan of Care Reviewed With: family, patient        Progress: declining  Outcome Evaluation: pt mostliy sleeping this shift, arouses to voice and stimulation, comfort care continued

## 2024-04-01 NOTE — PROGRESS NOTES
Continued Stay Note  [facility]     Patient Name: Lea Rivers  MRN: [id number]  Today's Date: 4/1/2024    Admit Date: 3/31/2024    Plan: Inpatient hospice   Discharge Plan       Row Name 04/01/24 1044       Plan    Plan Inpatient hospice    Plan Comments Patient is a 78 year old male admitted with toxic megacolon. PPS 20%. No family at bedside. Patient with increased respiratory rate and appears anxious and disoriented. Hospice nurse attempted to reassure patient. Bedside RN to administer medication for respirations. Patient continues to require inpatient hospice services for skilled nursing assessment, medication titration, and injectable medication administration for palliation of symptoms. Discharge is dependent on survival of this hospitalization and becoming appropriate for a lower level of care.                   Discharge Codes    No documentation.                       Mitzi Wood

## 2024-04-01 NOTE — H&P
Hospice History and Physical     Patient Name:  Lea Rivers   : 1945   Sex: male    Patient Care Team:  Mannie Melvin MD as PCP - General (Family Medicine)  Jerman Luu MD as Consulting Physician (Radiation Oncology)  Naseem Noland MD as Referring Physician (Urology)  Maryanne Baker MD as Consulting Physician (Cardiology)  Benja Kauffman MD as Consulting Physician (Gastroenterology)    Code Status: Comfort measures     Subjective     78 y.o.male presented to ED on 3/2/2024 with c/o chest and shoulder pain following a fall at home.  At baseline patient lives at  home alone, able to perform his ADL's and live independently and is able to drive, with family checking in on him daily.      PMH significant for hypertension, hyperlipidemia, hypothyroidism, seizure disorder, history of colon cancer s/p sigmoid colectomy, history of prostate cancer (Dx 2022) s/p radiation therapy (received 3500 cGy in 5 fractions to prostate and seminal vesicles via Stereotactic Radiation Therapy 2023), diabetes mellitus, dementia. Frequent falls at home.     In ED workup revealed CT showed dilated loops of large bowel with air-fluid levels and locules of intramural gas consistent with concerning pneumatosis intestinalis. KUB showed moderately diffuse gaseous distention of the colon to the level of rectum.     Hospital Course:   3/6/2024- Noted increasing abdominal distention.  CT abdomen showed Dilated loops of large bowel with likely pneumatosis intestinalis. No bowel wall thickening. Inflammation surrounding the distal descending and sigmoid colon. Findings are concerning for possible developing necrotizing enterocolitis given the   presence of pneumatosis intestinalis and inflammation of the region of the sigmoid colon with air-fluid levels and dilated large bowel.    3/9/2024- diagnosis of toxic dilation of the colon for total abdominal colectomy and end ileostomy     3/12/24-  Continued pain in shoulder.  XRAY shows mildly displaced and comminuted left distal clavicle fracture and mildly displaced fracture of the left lateral third rib.     3/18/24- Worsening leukocytosis.  CT scan showed postsurgical changes, moderate loculated ascitic fluid in the anterior abdomen as described by radiology.  Exploratory laparotomy with washout.      3/21/24- Noted progressive worsening of symptoms overnight.  Pt no longer oriented to self, as is his baseline    3/31/24- Pt initially required TPN.  He was cleared for dysphagia diet. However he was unable to maintain calorie intake.  Family declined artificial nutrition and elected to pursue comfort focused POC. Inpatient hospice team met with multiple family members including son Moises (POA) at bedside.  Inpatient team reviewed inpatient hospice service, medication management, and goals of care.  Family elected comfort focused POC.      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  Prognosis poor.      Review of Systems:  Review of Systems   Unable to perform ROS: Acuity of condition       History:  Past Medical History:   Diagnosis Date    Anemia     Colon cancer 1990    Status post partial colectomy in 1990. No chemotherapy or radiation therapy.    Coronary artery disease     Nonobstructive coronary artery disease.    Diabetes     Dyslipidemia     GERD (gastroesophageal reflux disease)     Hx of.    Hyperlipidemia     Hypertension     Hyponatremia     Hypothyroidism     Left shoulder pain     Low sodium levels 2022    recent issue; saw nephrologist who ruled out kidney issues; took Sodium Chloride po to bring levels up    Memory deficit     FROM ANESTHESIA    Osteoarthritis     Prostate cancer 07/23/2022    Seizure disorder     silent  seziure-diagnosed years ago    Skin cancer      Past Surgical History:   Procedure Laterality Date    APPENDECTOMY      CARDIAC CATHETERIZATION  2012    30 to 40% LAD    CARPAL TUNNEL RELEASE Bilateral     CHOLECYSTECTOMY      COLECTOMY PARTIAL / TOTAL  1990    Partial colectomy    COLON RESECTION N/A 3/9/2024    Procedure: TOTAL ABDOMINAL COLECTOMY, END ILEOSTOMY;  Surgeon: Harley Godfrey MD;  Location:  [facility];  Service: General;  Laterality: N/A;    COLONOSCOPY      CYBERKNIFE  02/09/2023    Prostate/SV    CYSTOSCOPY TRANSURETHRAL RESECTION OF PROSTATE N/A 09/21/2018    Procedure: CYSTOSCOPY TRANSURETHRAL RESECTION OF PROSTATE GREENLIGHT;  Surgeon: Naseem Clayton MD;  Location:  [facility];  Service: Urology    EXPLORATORY LAPAROTOMY N/A 3/18/2024    Procedure: LAPAROTOMY EXPLORATORY, ABDOMINAL WASH OUT;  Surgeon: Harley Godfrey MD;  Location:  [facility];  Service: General;  Laterality: N/A;    OTHER SURGICAL HISTORY      Contraction surgery on bilateral hands and wrists.    PROSTATE BIOPSY N/A 11/11/2022    Procedure: TRANSRECTAL ULTRASOUND GUIDED BIOPSY OF PROSTATE;  Surgeon: Naseem Noland MD;  Location:  [facility];  Service: Urology;  Laterality: N/A;    SINUS SURGERY      SKIN BIOPSY      TEETH EXTRACTION      TONSILLECTOMY      TOTAL KNEE ARTHROPLASTY Right 04/07/2022    Procedure: TOTAL KNEE ARTHROPLASTY WITH ORTHO ROBOT RIGHT;  Surgeon: Louis Malcolm MD;  Location:  [facility];  Service: Robotics - Ortho;  Laterality: Right;    TRANSRECTAL INCISION PROSTATE WITH GOLD SEED Bilateral 01/06/2023    Procedure: TRANSRECTAL ULTRASOUND GUIDED PROSTATE FIDUCIAL MARKER PLACEMENT;  Surgeon: Naseem Noland MD;  Location:  [facility];  Service: Urology;  Laterality: Bilateral;    TURP / TRANSURETHRAL INCISION / DRAINAGE PROSTATE      VASECTOMY       Current Facility-Administered Medications   Medication Dose Route Frequency Provider Last Rate Last Admin    furosemide (LASIX) injection 10  mg  10 mg Intravenous Q8H PRN Moe Mckee MD        glycopyrrolate (ROBINUL) injection 0.4 mg  0.4 mg Intravenous Q4H PRN Moe Mckee MD        haloperidol lactate (HALDOL) injection 0.5 mg  0.5 mg Intramuscular Q2H PRN Moe Mckee MD        HYDROmorphone (DILAUDID) injection 0.2 mg  0.2 mg Intravenous Q2H PRN Moe Mckee MD   0.2 mg at 04/01/24 0956    ketorolac (TORADOL) injection 15 mg  15 mg Intravenous Q6H PRN Moe Mckee MD        levETIRAcetam (KEPPRA) injection 500 mg  500 mg Intravenous Q12H Moe Mckee MD   500 mg at 04/01/24 0957    Lidocaine 4 % 2 patch  2 patch Transdermal Q24H Claudia Zepeda MD        midazolam (VERSED) injection 0.5 mg  0.5 mg Intravenous Q2H PRN Moe Mckee MD        midazolam (VERSED) injection 5 mg  5 mg Intravenous Q10 Min PRN Moe Mckee MD        palliative care oral rinse   Mouth/Throat Q6H Moe Mckee MD        palliative care oral rinse   Mouth/Throat PRN Moe Mckee MD        pantoprazole (PROTONIX) injection 40 mg  40 mg Intravenous Q AM Claudia Zepeda MD   40 mg at 04/01/24 0604    scopolamine patch 1 mg/72 hr  1 patch Transdermal Q72H PRN Moe Mckee MD        sodium chloride 0.9 % flush 10 mL  10 mL Intravenous PRN Claudia Zepeda MD              furosemide    glycopyrrolate    haloperidol lactate    HYDROmorphone    ketorolac    midazolam    midazolam    palliative care oral rinse    Scopolamine    sodium chloride  Allergies   Allergen Reactions    Chlorhexidine Rash    Sulfa Antibiotics Rash     Childhood reaction      Family History   Problem Relation Age of Onset    Cancer Mother         brain    Hypertension Father     Stroke Father     No Known Problems Sister     Stroke Sister     Esophageal cancer Brother     Stroke Maternal Grandfather     No Known Problems Paternal Grandmother     No Known Problems Paternal Grandfather     Stroke Other     Arthritis Other     Cancer Other      Social History     Socioeconomic History    Marital status:     Tobacco Use    Smoking status: Never     Passive exposure: Past    Smokeless tobacco: Never   Vaping Use    Vaping status: Never Used   Substance and Sexual Activity    Alcohol use: No    Drug use: No    Sexual activity: Not Currently       Objective     Vital Signs:  Heart Rate:  [73] 73  BP: (145)/(69) 145/69    PPS: Palliative Performance Scale score as of 4/1/2024, 11:53 EDT is 10% based on the following measures:   Ambulation: Totally bed bound  Activity and Evidence of Disease: Unable to do any work, extensive evidence of disease  Self-Care: Total care  Intake:  Mouth care only  LOC: Drowsy or coma     Physical Exam:  Physical Exam  Vitals and nursing note reviewed. Exam conducted with a chaperone present.   Constitutional:       General: He is not in acute distress.     Appearance: He is ill-appearing.   HENT:      Head: Normocephalic.      Comments: Facial grimace noted during exam      Mouth/Throat:      Mouth: Mucous membranes are moist.      Pharynx: Oropharyngeal exudate present.   Cardiovascular:      Rate and Rhythm: Normal rate.   Pulmonary:      Effort: Pulmonary effort is normal.      Breath sounds: Rales present.   Abdominal:      General: Bowel sounds are normal. There is distension.      Tenderness: There is abdominal tenderness.   Musculoskeletal:         General: Swelling present.   Skin:     General: Skin is warm.      Coloration: Skin is pale.         Results Reviewed:  LAB RESULTS:      Lab 03/31/24  0500 03/30/24  0631 03/29/24  0623 03/28/24  1646 03/28/24  0550 03/27/24  0707 03/26/24  0632 03/25/24  1418   WBC 8.14 8.58 8.84  --  8.95 10.46   < >  --    HEMOGLOBIN 9.2* 9.5* 9.2* 9.3* 9.0* 9.6*   < >  --    HEMATOCRIT 29.4* 29.8* 29.1* 29.1* 29.6* 30.0*   < >  --    PLATELETS 323 359 394  --  431 488*   < >  --    NEUTROS ABS 6.42 6.63 6.71  --  6.50 7.67*   < >  --    IMMATURE GRANS (ABS) 0.06* 0.07* 0.06*  --  0.12* 0.22*   < >  --    LYMPHS ABS 0.87 1.01 1.05  --  1.10 0.99    < >  --    MONOS ABS 0.67 0.77 0.89  --  1.04* 1.08*   < >  --    EOS ABS 0.04 0.05 0.07  --  0.12 0.41*   < >  --    MCV 96.7 97.4* 96.7  --  98.3* 94.6   < >  --    CRP  --   --   --   --   --   --   --  1.94*   PROTIME  --   --   --   --   --   --   --  16.7*    < > = values in this interval not displayed.         Lab 03/31/24  0530 03/30/24  0631 03/27/24  0707 03/26/24  0632 03/25/24  1418   SODIUM 135* 137 132* 132* 136   POTASSIUM 3.6 3.9 4.0 4.6 4.5   CHLORIDE 103 105 101 103 105   CO2 22.0 25.0 22.0 21.0* 24.0   ANION GAP 10.0 7.0 9.0 8.0 7.0   BUN 13 11 15 24* 27*   CREATININE 0.72* 0.68* 0.53* 0.54* 0.62*   EGFR 92.9 94.6 101.9 101.4 97.2   GLUCOSE 90 100* 101* 97 129*   CALCIUM 8.5* 8.2* 7.9* 8.3* 7.9*   IONIZED CALCIUM  --   --   --   --  1.27   MAGNESIUM  --  2.0  --   --   --    PHOSPHORUS  --  3.5  --   --   --          Lab 03/31/24  0530 03/30/24  0631 03/25/24  1418   TOTAL PROTEIN 6.0 5.8* 5.2*   ALBUMIN 2.6* 2.7* 2.4*   GLOBULIN 3.4 3.1 2.8   ALT (SGPT) <5 6 7   AST (SGOT) 11 12 18   BILIRUBIN 0.4 0.3 0.2   ALK PHOS 104 106 161*         Lab 03/25/24  1418   PROTIME 16.7*   INR 1.33*         Lab 03/30/24  0631 03/25/24  1418   CHOLESTEROL 120  --    TRIGLYCERIDES 179* 101             Brief Urine Lab Results  (Last result in the past 365 days)        Color   Clarity   Blood   Leuk Est   Nitrite   Protein   CREAT   Urine HCG        03/11/24 1337 Yellow   Cloudy   Moderate (2+)   Trace   Negative   30 mg/dL (1+)                   Microbiology Results Abnormal       None              Megacolon, toxic      Assessment & Plan     Assessment/Plan:   -Admit to inpatient hospice service 03/31/2024 with Megacolon, 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     -Coordination of care with nursing staff and [facility] IDT.     -Pain: Dilaudid 0.2 mg q 2 hrs PRN pain or dyspnea.     -Dyspnea:  Dilaudid as above, oxygen via NC PRN     -Nausea/Vomiting: Zofran 4 mg scheduled q 6 hr PRN       -Anxiety/Agitation: Versed 0.5 mg q 2 hrs PRN; haldol  0.5 mg q 2 hrs PRN      -Seizure: Keppra 500 mg BID; versed 5 mg q 10 min PRN for BT seizures    -Bowel/bladder: bisacodyl supp q day PRN     -Nutrition: Comfort diet as tolerated     -ADLs: total care     -Terminal fever: tylenol supp 650 mg q 6 hr PRN. tordal 15 mg IV q 6 hr PRN     -Terminal secretions:  May start PRN robinul/scop patch/lasix if needed     -Patient comfort: palliative oral rinse PRN, liquifilm PRN      -Goals of Care: achieve comfortable death, educate and support family through this process.           Total Visit Time: 50 min   Face to Face Time: 30 min  Total time spent includes time reviewing chart, face-to-face time, counseling patient/family/caregiver, ordering medications/tests/procedures, communicating with other health care professionals, documenting clinical information in the electronic health record, and coordination of care with facility staff and [facility] IDT.      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  Medications and diagnosis determined to be related to terminal prognosis. See physician's CTI for information on eligibility determination.      Justification for care:  Patient meets criteria for acute in-patient care with required nursing assessment and interventions for symptoms with IV medications.      MEDINA RUTLEDGE, MSN, APRN  [facility]  Hospice and Palliative Care Nurse Practitioner  04/01/24  11:52 EDT

## 2024-04-02 NOTE — PROGRESS NOTES
Hospice Progress Note    Patient Name: Lea Rivers   : 1945  Gender: male    Code Status: comfort measures    Date of Admission: 3/31/2024    Subjective:  Lea Rivers is a 78 y.o. 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     Son, daughter in law and grandchildren at bedside.  Pt awake, states yes to pain when asked but unable to quantify or rate pain. Multiple PRN's required overnight to manage pain.     - Scheduled:  Keppra 500 mg BID   Palliative Oral Rinse q 6 hrs   Pantoprazole 40 mg daily     - PRNs:  Dilaudid 0.2 mg x 4      - Intake/Output  Intake & Output (last 3 days)          07 07 07 07 0701   0700    P.O.   0     Total Intake   0     Urine  250 725     Stool  650 150     Total Output  900 875     Net  -900 -875             Urine Unmeasured Occurrence  1 x      Stool Unmeasured Occurrence  1 x 2 x                ROS:  Review of Systems   Unable to perform ROS: Acuity of condition       Reviewed current scheduled and prn medications for route, type, dose and frequency.       furosemide    glycopyrrolate    haloperidol lactate    HYDROmorphone    ketorolac    midazolam    midazolam    palliative care oral rinse    Scopolamine    sodium chloride    Objective:   There were no vitals taken for this visit.     PPS: Palliative Performance Scale score as of 2024, 10:57 EDT is 20% based on the following measures:   Ambulation: Totally bed bound  Activity and Evidence of Disease: Unable to do any work, extensive evidence of disease  Self-Care: Total care  Intake:Minimal sips  LOC: Full, drowsy or confusion     Physical Exam:  Physical Exam  Constitutional:       General: He is not in acute distress.  HENT:      Head: Normocephalic.      Mouth/Throat:      Mouth: Mucous membranes are moist.      Pharynx: No oropharyngeal exudate.   Cardiovascular:       Rate and Rhythm: Normal rate.   Pulmonary:      Effort: Pulmonary effort is normal. No respiratory distress.      Breath sounds: Rales present.   Abdominal:      General: Bowel sounds are normal. There is distension.      Tenderness: There is abdominal tenderness. There is no guarding.   Musculoskeletal:         General: No swelling.   Skin:     General: Skin is warm.      Coloration: Skin is pale.             Megacolon, toxic      Assessment/Plan:   Lea Rivers is a 78 y.o. 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     Symptoms:  Pain   Anxiety   Nausea   Terminal restlessness     Discharge Disposition: EOLC     -Add dilaudid 0.2 mg q 6 hrs scheduled   -Continue dilaudid 0.2 mg q 1 hr PRN for BT pain or dyspnea.     Total Visit Time: 20 min   Face to Face Time: 10 min     Justification for care:  Patient meets criteria for acute in-patient care due to need for frequent skilled nursing assessments to determine patient comfort and medication effectiveness at end of life.  Frequent adjustments to medications and interventions for symptom management, including injectable medications.      Landy Guadalupe, MSN, APRN  [facility]  Hospice and Palliative Care Nurse Practitioner  04/02/24  10:56 EDT

## 2024-04-02 NOTE — PLAN OF CARE
Goal Outcome Evaluation:  Plan of Care Reviewed With: family        Progress: declining  Outcome Evaluation: pt resting well this shift, no signs of discomfort, comfort care continued.

## 2024-04-02 NOTE — PROGRESS NOTES
Nutrition Services    Patient Name:  Lea Rivers  YOB: 1945  MRN: [id number]  Admit Date:  3/31/2024    RD notes pt now in [facility] care, CMO. Will sign off, please consult RD for specific needs or in the event that GOC should change, thank you.     Electronically signed by:  Adelita Pope RD  04/02/24 07:58 EDT

## 2024-04-02 NOTE — PROGRESS NOTES
Continued Stay Note  [facility]     Patient Name: Lea Rivers  MRN: [id number]  Today's Date: 4/2/2024    Admit Date: 3/31/2024    Plan: Inpatient hospice   Discharge Plan       Row Name 04/02/24 1200       Plan    Plan Inpatient hospice    Plan Comments Patient is a 78 year old male admitted with toxic megacolon. PPS 10%. No family at bedside. Patient opens eyes to verbal stimuli. Face and musculature relaxed. Breathing even and unlabored. Patient continues to require inpatient hospice services for skilled nursing assessment, medication titration, and injectable medication administration for palliation of symptoms. Discharge is dependent on survival of this hospitalization and becoming appropriate for a lower level of care.                   Discharge Codes    No documentation.                 Expected Discharge Date and Time       Expected Discharge Date Expected Discharge Time    Mar 31, 2024               Mitzi Wood

## 2024-04-03 NOTE — PLAN OF CARE
Goal Outcome Evaluation:      Comfort measures on going. Patient shows no sign of discomfort. Pain managed by scheduled dilaudid. Patient opens eyes to voice. Incision is red, clean, and dry. Colostomy drained. Minimal urine output from patel. Family periodically at bedside. Care on going. Continue plan of care.

## 2024-04-03 NOTE — PROGRESS NOTES
Continued Stay Note   [facility]     Patient Name: Lea Rivers  MRN: [id number]  Today's Date: 4/3/2024    Admit Date: 3/31/2024    Plan: Inpatient Hospice   Discharge Plan       Row Name 04/03/24 1801       Plan    Plan Inpatient Hospice    Plan Comments Patient is 77yo male admitted with toxic megacolon. PPS 20%. No family at bedside for RN visit. Patient awake and able to answer questions during assessment. Patient denied pain. Body posture relaxed. Patient denies dyspnea and respirations noted even and unlabored. Daughter Luis Enrique called with update. Patient continues to require inpatient hospice services for skilled nursing assessment, medication titration, and injectable medication administration for palliation of symptoms. Discharge is dependent on survival of this hospitalization and becoming appropriate for a lower level of care.                   Discharge Codes    No documentation.                 Expected Discharge Date and Time       Expected Discharge Date Expected Discharge Time    Mar 31, 2024               Kalee Dove RN

## 2024-04-03 NOTE — PROGRESS NOTES
Hospice Progress Note    Patient Name: Lea Rivers   : 1945  Gender: male    Code Status: comfort measures    Date of Admission: 3/31/2024    Subjective:  Lea Rivers is a 78 y.o. 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     No family present.  Pt minimally responsive to assessment today.  No PO intake overnight.      - Scheduled:  Dilaudid 0.2 mg q 6 hrs   Keppra 500 mg BID   Palliative Oral Rinse q 6 hrs   Pantoprazole 40 mg daily     - PRNs:  None required x 24 hours.       - Intake/Output  Intake & Output (last 3 days)          07 07 07 07 07 07 07    P.O.   0     Total Intake   0     Urine  250 725     Stool  650 150     Total Output  900 875     Net  -900 -875             Urine Unmeasured Occurrence  1 x      Stool Unmeasured Occurrence  1 x 2 x                ROS:  Review of Systems   Unable to perform ROS: Acuity of condition       Reviewed current scheduled and prn medications for route, type, dose and frequency.       furosemide    glycopyrrolate    haloperidol lactate    HYDROmorphone    ketorolac    midazolam    midazolam    palliative care oral rinse    Scopolamine    sodium chloride    Objective:   There were no vitals taken for this visit.     PPS: Palliative Performance Scale score as of 4/3/2024, 11:59 EDT is 20% based on the following measures:   Ambulation: Totally bed bound  Activity and Evidence of Disease: Unable to do any work, extensive evidence of disease  Self-Care: Total care  Intake:Minimal sips  LOC: Full, drowsy or confusion     Physical Exam:  Physical Exam  Constitutional:       General: He is not in acute distress.  HENT:      Head: Normocephalic.      Mouth/Throat:      Mouth: Mucous membranes are moist.      Pharynx: No oropharyngeal exudate.   Cardiovascular:      Rate and Rhythm: Normal rate.   Pulmonary:       Effort: Pulmonary effort is normal. No respiratory distress.      Breath sounds: Rales present.   Abdominal:      General: Bowel sounds are normal. There is distension.      Tenderness: There is abdominal tenderness. There is no guarding.   Musculoskeletal:         General: No swelling.   Skin:     General: Skin is warm.      Coloration: Skin is pale.             Megacolon, toxic      Assessment/Plan:   Lea Rivers is a 78 y.o. 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     Symptoms:  Pain   Anxiety   Nausea   Terminal restlessness     Discharge Disposition: E[facility]C     -No medication changes required today.   -Pt continues to require frequent skilled nursing assessments to ensure comfort and medication efficacy.     Total Visit Time: 20 min   Face to Face Time: 10 min     Justification for care:  Patient meets criteria for acute in-patient care due to need for frequent skilled nursing assessments to determine patient comfort and medication effectiveness at end of life.  Frequent adjustments to medications and interventions for symptom management, including injectable medications.      Landy Guadalupe, MSN, APRN  [facility]  Hospice and Palliative Care Nurse Practitioner  04/03/24  11:59 EDT

## 2024-04-03 NOTE — PLAN OF CARE
Goal Outcome Evaluation:  Plan of Care Reviewed With: family        Progress: declining  Outcome Evaluation: pt rested well, no signs of discomfort, comfort care continued

## 2024-04-04 NOTE — PROGRESS NOTES
Continued Stay Note  [facility]     Patient Name: Lea Rivers  MRN: [id number]  Today's Date: 4/4/2024    Admit Date: 3/31/2024    Plan: Inpatient hospice   Discharge Plan       Row Name 04/04/24 1127       Plan    Plan Inpatient hospice    Plan Comments Patient is a 78 year old male admitted with toxic megacolon. PPS 10%. Patient opens eyes spontaneously, but does not interact. Face and musculature relaxed. Tachypnic. Bedside RN to administer Dilaudid. Patient continues to require inpatient hospice services for skilled nursing assessment, medication titration, and injectable medication administration for palliation of symptoms. Discharge is dependent on survival of this hospitalization and becoming appropriate for a lower level of care.                    Discharge Codes    No documentation.                 Expected Discharge Date and Time       Expected Discharge Date Expected Discharge Time    Mar 31, 2024               Mitzi Wood

## 2024-04-04 NOTE — PLAN OF CARE
Goal Outcome Evaluation:      Comfort measures on going. PRN and scheduled Toradol for diaphoresis and flushing. PRN and scheduled dilaudid for tachypnea. Patient opens eyes to voice and pain. Incisions are red, clean, and open to air. Minimal output from patel and colostomy. Family periodically at bedside. Care on going. Continued care of pain.

## 2024-04-04 NOTE — PROGRESS NOTES
Hospice Progress Note    Patient Name: Lea Rivers   : 1945  Gender: male    Code Status: comfort measures    Date of Admission: 3/31/2024    Subjective:  Lea Rivers is a 78 y.o. 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     No family present.  Pt minimally responsive to assessment today.  Increased work of breathing noted today.  Appears flushed, warm to touch.    - Scheduled:  Dilaudid 0.2 mg q 6 hrs   Keppra 500 mg BID   Palliative Oral Rinse q 6 hrs   Pantoprazole 40 mg daily     - PRNs:  Dilaudid 0.2 mg x 3   Versed 0.5 mg x 1       - Intake/Output  Intake & Output (last 3 days)          07 07 07 07 07 07 0701   0700    P.O.   0     Total Intake   0     Urine  250 725     Stool  650 150     Total Output  900 875     Net  -900 -875             Urine Unmeasured Occurrence  1 x      Stool Unmeasured Occurrence  1 x 2 x                ROS:  Review of Systems   Unable to perform ROS: Acuity of condition       Reviewed current scheduled and prn medications for route, type, dose and frequency.       furosemide    glycopyrrolate    haloperidol lactate    HYDROmorphone    ketorolac    midazolam    midazolam    palliative care oral rinse    Scopolamine    sodium chloride    Objective:   There were no vitals taken for this visit.     PPS: Palliative Performance Scale score as of 2024, 13:05 EDT is 10% based on the following measures:   Ambulation: Totally bed bound  Activity and Evidence of Disease: Unable to do any work, extensive evidence of disease  Self-Care: Total care  Intake:  Mouth care only  LOC: Drowsy or coma     Physical Exam:  Physical Exam  Constitutional:       General: He is not in acute distress.  HENT:      Head: Normocephalic.      Mouth/Throat:      Mouth: Mucous membranes are moist.      Pharynx: No oropharyngeal exudate.   Cardiovascular:       Rate and Rhythm: Normal rate.   Pulmonary:      Effort: Tachypnea present. No respiratory distress.      Breath sounds: Rales present.   Abdominal:      General: Bowel sounds are normal. There is distension.      Tenderness: There is abdominal tenderness. There is no guarding.   Musculoskeletal:         General: No swelling.   Skin:     General: Skin is warm.      Coloration: Skin is pale.             Megacolon, toxic      Assessment/Plan:   Lea Rivers is a 78 y.o. 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     Symptoms:  Pain   Anxiety   Nausea   Terminal restlessness     Discharge Disposition: EOLC     -Increase dilaudid to 0.5 mg q 6 hrs scheduled.   --Increase dilaudid to 0.5 mg q 1 hr PRN for BT pain or dyspnea.   -Add toradol 15 mg q 8 hrs scheduled for pain/fever.     Total Visit Time: 20 min   Face to Face Time: 10 min     Justification for care:  Patient meets criteria for acute in-patient care due to need for frequent skilled nursing assessments to determine patient comfort and medication effectiveness at end of life.  Frequent adjustments to medications and interventions for symptom management, including injectable medications.      Landy Guadalupe, MSN, APRN  [facility]  Hospice and Palliative Care Nurse Practitioner  04/04/24  13:04 EDT

## 2024-04-05 NOTE — PROGRESS NOTES
Hospice Progress Note    Patient Name: Lea Rivers   : 1945  Gender: male    Code Status: comfort measures    Date of Admission: 3/31/2024    Subjective:  Lea Rivers is a 78 y.o. 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     No family present.  Noted periods of apnea.  No s/s of pain or distress during my assessment.  Still using PRN's to manage pain and dyspnea at night.     - Scheduled:  Dilaudid 0.5 mg q 6 hrs   Keppra 500 mg BID   Palliative Oral Rinse q 6 hrs   Pantoprazole 40 mg daily   Toradol 15 mg q 8 hrs     - PRNs:  Dilaudid 0.5 mg x 2   Versed 0.5 mg x 1       - Intake/Output  Intake & Output (last 3 days)          07 07 0701   0700  0701   07 0701   0700    P.O.   0     Total Intake   0     Urine  250 725     Stool  650 150     Total Output  900 875     Net  -900 -875             Urine Unmeasured Occurrence  1 x      Stool Unmeasured Occurrence  1 x 2 x                ROS:  Review of Systems   Unable to perform ROS: Acuity of condition       Reviewed current scheduled and prn medications for route, type, dose and frequency.       furosemide    glycopyrrolate    haloperidol lactate    HYDROmorphone    ketorolac    midazolam    midazolam    palliative care oral rinse    Scopolamine    sodium chloride    Objective:   There were no vitals taken for this visit.     PPS: Palliative Performance Scale score as of 2024, 14:04 EDT is 10% based on the following measures:   Ambulation: Totally bed bound  Activity and Evidence of Disease: Unable to do any work, extensive evidence of disease  Self-Care: Total care  Intake:  Mouth care only  LOC: Drowsy or coma     Physical Exam:  Physical Exam  Constitutional:       General: He is not in acute distress.  HENT:      Head: Normocephalic.      Mouth/Throat:      Mouth: Mucous membranes are moist.      Pharynx: No  oropharyngeal exudate.   Cardiovascular:      Rate and Rhythm: Normal rate.   Pulmonary:      Effort: Tachypnea present. No respiratory distress.      Breath sounds: Rales present.   Abdominal:      General: Bowel sounds are normal. There is distension.      Tenderness: There is abdominal tenderness. There is no guarding.   Musculoskeletal:         General: No swelling.   Skin:     General: Skin is warm.      Coloration: Skin is pale.             Megacolon, toxic      Assessment/Plan:   Lea Rivers is a 78 y.o. 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     Symptoms:  Pain   Anxiety   Nausea   Terminal restlessness     Discharge Disposition: EOLC     -No medication adjustments required today.   -Pt continues to require frequent skilled nursing assessments to ensure comfort and medication efficacy at end of life.     Total Visit Time: 20 min   Face to Face Time: 10 min     Justification for care:  Patient meets criteria for acute in-patient care due to need for frequent skilled nursing assessments to determine patient comfort and medication effectiveness at end of life.  Frequent adjustments to medications and interventions for symptom management, including injectable medications.      Landy Guadalupe, MSN, APRN  [facility]  Hospice and Palliative Care Nurse Practitioner  04/05/24  14:04 EDT

## 2024-04-05 NOTE — PROGRESS NOTES
Continued Stay Note  [facility]     Patient Name: Lea Rivers  MRN: [id number]  Today's Date: 4/5/2024    Admit Date: 3/31/2024    Plan: Inpatient hospice   Discharge Plan       Row Name 04/05/24 1009       Plan    Plan Inpatient hospice    Plan Comments Patient is a 78 year old male admitted with toxic megacolon. PPS 10%. No family at bedside. Patient with periods of apnea. Extremities cool. Face and musculature relaxed. Patient continues to require inpatient hospice services for skilled nursing assessment, medication titration, and injectable medication administration for palliation of symptoms. Discharge is dependent on survival of this hospitalization and becoming appropriate for a lower level of care.                   Discharge Codes    No documentation.                 Expected Discharge Date and Time       Expected Discharge Date Expected Discharge Time    Mar 31, 2024               Mitzi Wood

## 2024-04-05 NOTE — PLAN OF CARE
Goal Outcome Evaluation:  Plan of Care Reviewed With: patient        Progress: declining  Outcome Evaluation: Pt rested well this shift. PRN dilaudid given x1 for pain. Odell in place. Comfort measures continued.

## 2024-04-06 NOTE — PLAN OF CARE
Problem: Skin Injury Risk Increased  Goal: Skin Health and Integrity  Intervention: Optimize Skin Protection  Recent Flowsheet Documentation  Taken 4/6/2024 0400 by Kalani Porter RN  Pressure Reduction Techniques:   frequent weight shift encouraged   weight shift assistance provided  Pressure Reduction Devices: positioning supports utilized  Skin Protection:   adhesive use limited   tubing/devices free from skin contact  Taken 4/6/2024 0200 by Kalani Porter RN  Pressure Reduction Techniques: frequent weight shift encouraged  Pressure Reduction Devices: positioning supports utilized  Skin Protection: adhesive use limited  Taken 4/6/2024 0000 by Kalani Porter RN  Pressure Reduction Techniques:   frequent weight shift encouraged   weight shift assistance provided  Pressure Reduction Devices:   positioning supports utilized   heel offloading device utilized  Skin Protection:   adhesive use limited   tubing/devices free from skin contact  Taken 4/5/2024 2200 by Kalani Porter RN  Pressure Reduction Techniques: frequent weight shift encouraged  Pressure Reduction Devices: positioning supports utilized  Skin Protection:   adhesive use limited   tubing/devices free from skin contact  Taken 4/5/2024 2031 by Kalani Porter RN  Pressure Reduction Techniques: frequent weight shift encouraged  Pressure Reduction Devices: positioning supports utilized  Skin Protection:   adhesive use limited   tubing/devices free from skin contact   Goal Outcome Evaluation:

## 2024-04-06 NOTE — PLAN OF CARE
Goal Outcome Evaluation:  Plan of Care Reviewed With: family        Progress: declining  Outcome Evaluation: Comfort care provided. PRN dilaudid given x1 for pain. Odell draining. Illeostomy with minimal output. Breathing regular and shallow. Intermittently more alert and able to communicate. Family updated at bedside. No further needs identified at this time.

## 2024-04-07 NOTE — PLAN OF CARE
Goal Outcome Evaluation:           Progress: no change  Outcome Evaluation: Comfort measures ongoing. Signs of pain and tachypnea managed with scheduled and prn hydromorphone. Oral care performed. 125 mL UOP via patel. 20 mL via ostomy. Midline abdominal incision approximated with staples and open to air, periwound skin is dry, peeling, pink.  Abdominal binder removed for comfort. Patient opens eyes during care, smiles, but speech is garbled and mostly incomprehensible. Family at bedside, attentive to patient. Care ongoing, continue plan of care.

## 2024-04-07 NOTE — PROGRESS NOTES
Hospice Progress Note    Patient Name: Lea Rivers   : 1945  Gender: male    Code Status: comfort measures    Date of Admission: 3/31/2024    Subjective:  Lea Rivers is a 78 y.o. 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     No family present.  Pt appears comfortable on assessment.  No new s/s of pain or distress noted.     - Scheduled:  Dilaudid 0.5 mg q 6 hrs   Keppra 500 mg BID   Palliative Oral Rinse q 6 hrs   Pantoprazole 40 mg daily   Toradol 15 mg q 8 hrs     - PRNs:  None x 24 hours.       - Intake/Output  Intake & Output (last 3 days)          07 07 07 07 07 07 0700    P.O.   0     Total Intake   0     Urine  250 725     Stool  650 150     Total Output  900 875     Net  -900 -875             Urine Unmeasured Occurrence  1 x      Stool Unmeasured Occurrence  1 x 2 x                ROS:  Review of Systems   Unable to perform ROS: Acuity of condition       Reviewed current scheduled and prn medications for route, type, dose and frequency.       furosemide    glycopyrrolate    haloperidol lactate    HYDROmorphone    midazolam    midazolam    palliative care oral rinse    Scopolamine    sodium chloride    Objective:   There were no vitals taken for this visit.     PPS: Palliative Performance Scale score as of 2024, 16:39 EDT is 10% based on the following measures:   Ambulation: Totally bed bound  Activity and Evidence of Disease: Unable to do any work, extensive evidence of disease  Self-Care: Total care  Intake:  Mouth care only  LOC: Drowsy or coma     Physical Exam:  Physical Exam  Constitutional:       General: He is not in acute distress.  HENT:      Head: Normocephalic.      Mouth/Throat:      Mouth: Mucous membranes are moist.      Pharynx: No oropharyngeal exudate.   Cardiovascular:      Rate and Rhythm: Normal rate.   Pulmonary:       Effort: Tachypnea present. No respiratory distress.      Breath sounds: Rales present.   Abdominal:      General: Bowel sounds are normal. There is distension.      Tenderness: There is abdominal tenderness. There is no guarding.   Musculoskeletal:         General: No swelling.   Skin:     General: Skin is warm.      Coloration: Skin is pale.             Megacolon, toxic      Assessment/Plan:   Lea Rivers is a 78 y.o. 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     Symptoms:  Pain   Anxiety   Nausea   Terminal restlessness     Discharge Disposition: EOLC     -No medication adjustments required today.   -Pt continues to require frequent skilled nursing assessments to ensure comfort and medication efficacy at end of life.     Total Visit Time: 20 min   Face to Face Time: 10 min     Justification for care:  Patient meets criteria for acute in-patient care due to need for frequent skilled nursing assessments to determine patient comfort and medication effectiveness at end of life.  Frequent adjustments to medications and interventions for symptom management, including injectable medications.      Landy Guadalupe, MSN, APRN  [facility] Care Nav[facility]  Hospice and Palliative Care Nurse Practitioner  04/07/24  16:39 EDT

## 2024-04-07 NOTE — PROGRESS NOTES
Hospice Progress Note    Patient Name: Lea Rivers   : 1945  Gender: male    Code Status: comfort measures    Date of Admission: 3/31/2024    Subjective:  Lea Rivers is a 78 y.o. 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     No family present.  Pt appears comfortable on assessment.  No new s/s of pain or distress noted.  Abdominal binder removed.  Minimal PRN's required overnight to manage symptoms.     - Scheduled:  Dilaudid 0.5 mg q 6 hrs   Keppra 500 mg BID   Palliative Oral Rinse q 6 hrs   Pantoprazole 40 mg daily   Toradol 15 mg q 8 hrs     - PRNs:  Dilaudid 0.5 mg x 1       - Intake/Output  Intake & Output (last 3 days)          07 07 0701   0700  0701   07 0701   0700    P.O.   0     Total Intake   0     Urine  250 725     Stool  650 150     Total Output  900 875     Net  -900 -875             Urine Unmeasured Occurrence  1 x      Stool Unmeasured Occurrence  1 x 2 x                ROS:  Review of Systems   Unable to perform ROS: Acuity of condition       Reviewed current scheduled and prn medications for route, type, dose and frequency.       furosemide    glycopyrrolate    haloperidol lactate    HYDROmorphone    midazolam    midazolam    palliative care oral rinse    Scopolamine    sodium chloride    Objective:   There were no vitals taken for this visit.     PPS: Palliative Performance Scale score as of 2024, 16:41 EDT is 10% based on the following measures:   Ambulation: Totally bed bound  Activity and Evidence of Disease: Unable to do any work, extensive evidence of disease  Self-Care: Total care  Intake:  Mouth care only  LOC: Drowsy or coma     Physical Exam:  Physical Exam  Constitutional:       General: He is not in acute distress.  HENT:      Head: Normocephalic.      Mouth/Throat:      Mouth: Mucous membranes are moist.      Pharynx: No  oropharyngeal exudate.   Cardiovascular:      Rate and Rhythm: Normal rate.   Pulmonary:      Effort: Tachypnea present. No respiratory distress.      Breath sounds: Rales present.   Abdominal:      General: Bowel sounds are normal. There is distension.      Tenderness: There is abdominal tenderness. There is no guarding.   Musculoskeletal:         General: No swelling.   Skin:     General: Skin is warm.      Coloration: Skin is pale.             Megacolon, toxic      Assessment/Plan:   Lea Rivers is a 78 y.o. 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     Symptoms:  Pain   Anxiety   Nausea   Terminal restlessness     Discharge Disposition: EOLC     -No medication adjustments required today.   -Pt continues to require frequent skilled nursing assessments to ensure comfort and medication efficacy at end of life.     Total Visit Time: 20 min   Face to Face Time: 10 min     Justification for care:  Patient meets criteria for acute in-patient care due to need for frequent skilled nursing assessments to determine patient comfort and medication effectiveness at end of life.  Frequent adjustments to medications and interventions for symptom management, including injectable medications.      Landy Guadalupe, MSN, APRN  [facility]  Hospice and Palliative Care Nurse Practitioner  04/07/24  16:41 EDT

## 2024-04-07 NOTE — PLAN OF CARE
Goal Outcome Evaluation:  Plan of Care Reviewed With: patient        Progress: no change  Outcome Evaluation: No PRNs necessary so far this shift, current scheduled meds adequate. Pt responsive to voice, garbled speech noted, pt able to shake head slightly to indicate if in pain, nonverbal indicators absent as well. Tolerating turns. Odell and colostomy patent. Midline abd inc c/d/i, abd binder in place. No family at bedside. Pt resting well in between care.

## 2024-04-07 NOTE — PROGRESS NOTES
Continued Stay Note  [facility]     Patient Name: Lea Rivers  MRN: [id number]  Today's Date: 4/7/2024    Admit Date: 3/31/2024    Plan: Inpatient hospice   Discharge Plan       Row Name 04/07/24 0945       Plan    Plan Inpatient hospice    Plan Comments 10% PPS. Patient resting with eyes closed. Awakens to voice and touch during assessment. He appears comfortable with no nonverbal indicators of pain or discomfort. Face, jaw, and body relaxed and breathing even and unlabored. He has required 1 prn of Dilaudid in the past 24 hours. Called joann Dudley with an update. He continues to require inpatient hospice for skilled nursing assessment, medication titration, and injectable medication administration for palliation of pain and IV seizure management.                    Discharge Codes    No documentation.                 Expected Discharge Date and Time       Expected Discharge Date Expected Discharge Time    Mar 31, 2024               Marcy Reinoso RN

## 2024-04-08 NOTE — PLAN OF CARE
Goal Outcome Evaluation:  Plan of Care Reviewed With: family        Progress: no change  Outcome Evaluation: pt rested well, no signs of discomfort, comfort care continued

## 2024-04-08 NOTE — PLAN OF CARE
Goal Outcome Evaluation:      Patients pain managed by scheduled Dilaudid and Toradol, PRN Dilaudid and Midazolam. Patient opens eyes to voice. 50 mL UOP via patel. Family visited. Calm. Care on going. Continue plan of care.

## 2024-04-08 NOTE — PROGRESS NOTES
Palliative Care Progress Note    Date of Admission: 3/31/2024    Subjective:  no family at bedside, decline noted with pain last night and 3 doses hm given   Current Code Status       Date Active Code Status Order ID Comments User Context       3/31/2024 1627 No CPR (Do Not Attempt to Resuscitate) [id number]  Marcy Reinoso, RAKESH Inpatient        Question Answer    Code Status (Patient has no pulse and is not breathing) No CPR (Do Not Attempt to Resuscitate)    Medical Interventions (Patient has pulse or is breathing) Comfort Measures    Level Of Support Discussed With Health Care Surrogate                  No current facility-administered medications on file prior to encounter.     Current Outpatient Medications on File Prior to Encounter   Medication Sig Dispense Refill    D-Mannose 500 MG capsule Take 1 capsule by mouth Daily. (Patient taking differently: Take 1 capsule by mouth Daily. OTC) 90 capsule 3    divalproex (DEPAKOTE) 500 MG 24 hr tablet Take 1 tablet by mouth 2 (two) times a day. 1 tablet 500mg QAM, 2 tablets 1000mg QPM      fluticasone (FLONASE) 50 MCG/ACT nasal spray 2 sprays into the nostril(s) as directed by provider As Needed for Rhinitis. Administer 2 sprays in each nostril for each dose.      isosorbide mononitrate (IMDUR) 30 MG 24 hr tablet Take 1 tablet by mouth 2 (Two) Times a Day.      levETIRAcetam (KEPPRA) 500 MG tablet Take 1 tablet by mouth 2 (Two) Times a Day.      levothyroxine (SYNTHROID, LEVOTHROID) 88 MCG tablet Take 1 tablet by mouth Every Morning.      lisinopril (PRINIVIL,ZESTRIL) 20 MG tablet Take 1 tablet by mouth 2 (Two) Times a Day.      metFORMIN ER (GLUCOPHAGE-XR) 500 MG 24 hr tablet Take 2 tablets by mouth Daily With Breakfast.      omeprazole (priLOSEC) 20 MG capsule Take 1 capsule by mouth Daily.      simvastatin (ZOCOR) 20 MG tablet Take 1 tablet by mouth Every Night.      sodium chloride 1 g tablet Take 2 tablets by mouth Daily. OTC      tamsulosin (FLOMAX) 0.4 MG capsule  "24 hr capsule Take 1 capsule by mouth Daily. 90 capsule 3    verapamil SR (CALAN-SR) 240 MG CR tablet Take 1 tablet by mouth Daily.      vitamin B-12 (CYANOCOBALAMIN) 1000 MCG tablet Take 1 tablet by mouth Daily. 90 tablet 3          furosemide    glycopyrrolate    haloperidol lactate    HYDROmorphone    midazolam    midazolam    palliative care oral rinse    Scopolamine    sodium chloride    Objective: There were no vitals taken for this visit.     Intake/Output Summary (Last 24 hours) at 4/8/2024 0924  Last data filed at 4/8/2024 0540  Gross per 24 hour   Intake --   Output 500 ml   Net -500 ml     Physical Exam:    In and out response, staring, closing eyes. Lungs diminished bs, heart RRR 80-90. Ext warm to touch, no edema             Invalid input(s): \"LABALBU\", \"PROT\"    Impression: Plan: Delirium/Confusion: Continue assessment. Pain/Dyspnea: increase Dilaudid to q 4 hr. . Full discussion with RN and hospice RN. Severe endstage Megacolon . [facility]. Patient needs symptom control above and [facility] Hospice Care  Time: 20 minutes      Moe Mckee MD  04/08/24  09:24 EDT     "

## 2024-04-08 NOTE — PROGRESS NOTES
Continued Stay Note  [facility]     Patient Name: Lea Rivers  MRN: [id number]  Today's Date: 4/8/2024    Admit Date: 3/31/2024    Plan: Inpatient Hospice   Discharge Plan       Row Name 04/08/24 1004       Plan    Plan Inpatient Hospice    Plan Comments 10% PPS. Patient in bed resting during assessment. Patient aroused to name and shaking head yes and no to assessment questions. Patient noted with relaxed body posture and nonverbal indicators of comfort. Patient has required 3 PRN doses of injectable Dilaudid in the last 24 hours. Daughter Luis Enrique called with an update. Patient continues to require inpatient hospice services for skilled nursing assessment, medication titration, and injectable medication administration for palliation of symptoms. Discharge is dependent on survival of this hospitalization and becoming appropriate for a lower level of care.                   Discharge Codes    No documentation.                 Expected Discharge Date and Time       Expected Discharge Date Expected Discharge Time    Mar 31, 2024               Kalee Dove RN

## 2024-04-09 NOTE — PROGRESS NOTES
Palliative Care Progress Note    Date of Admission: 3/31/2024    Subjective:  diaphoresis and agitation yesterday . Quiet this am . Minimal response opens eyes only.   Current Code Status       Date Active Code Status Order ID Comments User Context       3/31/2024 1627 No CPR (Do Not Attempt to Resuscitate) [id number]  Marcy Reinoso, RAKESH Inpatient        Question Answer    Code Status (Patient has no pulse and is not breathing) No CPR (Do Not Attempt to Resuscitate)    Medical Interventions (Patient has pulse or is breathing) Comfort Measures    Level Of Support Discussed With Health Care Surrogate                  No current facility-administered medications on file prior to encounter.     Current Outpatient Medications on File Prior to Encounter   Medication Sig Dispense Refill    D-Mannose 500 MG capsule Take 1 capsule by mouth Daily. (Patient taking differently: Take 1 capsule by mouth Daily. OTC) 90 capsule 3    divalproex (DEPAKOTE) 500 MG 24 hr tablet Take 1 tablet by mouth 2 (two) times a day. 1 tablet 500mg QAM, 2 tablets 1000mg QPM      fluticasone (FLONASE) 50 MCG/ACT nasal spray 2 sprays into the nostril(s) as directed by provider As Needed for Rhinitis. Administer 2 sprays in each nostril for each dose.      isosorbide mononitrate (IMDUR) 30 MG 24 hr tablet Take 1 tablet by mouth 2 (Two) Times a Day.      levETIRAcetam (KEPPRA) 500 MG tablet Take 1 tablet by mouth 2 (Two) Times a Day.      levothyroxine (SYNTHROID, LEVOTHROID) 88 MCG tablet Take 1 tablet by mouth Every Morning.      lisinopril (PRINIVIL,ZESTRIL) 20 MG tablet Take 1 tablet by mouth 2 (Two) Times a Day.      metFORMIN ER (GLUCOPHAGE-XR) 500 MG 24 hr tablet Take 2 tablets by mouth Daily With Breakfast.      omeprazole (priLOSEC) 20 MG capsule Take 1 capsule by mouth Daily.      simvastatin (ZOCOR) 20 MG tablet Take 1 tablet by mouth Every Night.      sodium chloride 1 g tablet Take 2 tablets by mouth Daily. OTC      tamsulosin (FLOMAX) 0.4 MG  "capsule 24 hr capsule Take 1 capsule by mouth Daily. 90 capsule 3    verapamil SR (CALAN-SR) 240 MG CR tablet Take 1 tablet by mouth Daily.      vitamin B-12 (CYANOCOBALAMIN) 1000 MCG tablet Take 1 tablet by mouth Daily. 90 tablet 3          furosemide    glycopyrrolate    haloperidol lactate    HYDROmorphone    midazolam    midazolam    palliative care oral rinse    Scopolamine    sodium chloride    Objective: There were no vitals taken for this visit.     Intake/Output Summary (Last 24 hours) at 4/9/2024 0911  Last data filed at 4/9/2024 0612  Gross per 24 hour   Intake --   Output 200 ml   Net -200 ml     Physical Exam:    Minimal response, one word maybe, Lungs clear heart RRR 80 abd sutures in noninfected , ext no edema no mottling            Invalid input(s): \"LABALBU\", \"PROT\"    Impression: Plan: Delerium: needed haldol and versed for episode diaphoresis . No Changes but encourage Assessment now and prn meds.   Stares and declining today. Needs GIP. Discussed with RN hospoice and acute care. Reviewed meds  Time: 25 min      Moe Mckee MD  04/09/24  09:11 EDT     "

## 2024-04-09 NOTE — PLAN OF CARE
Goal Outcome Evaluation:  Plan of Care Reviewed With: patient, family        Progress: declining  Outcome Evaluation: pt rested well, new patterns of apnea, comfort care continued

## 2024-04-10 NOTE — PLAN OF CARE
Goal Outcome Evaluation:  Plan of Care Reviewed With: patient        Progress: declining  Outcome Evaluation: 78 year old white male admitted to Hospice care on 3/31/2024, he is on day 10 of this hospitaization.  Patient has a PICC in place and patent to to saline lock.  He has patel catheter in place and patent with an adequate amount fo UOP noted.  He has an ostomy in place with dark brown watery stool.  Removed most of the staples except for staples under is ostomy dressing.  Have administered scheduled medications as indicated.  No SOA noted has rested comfortable this shift.

## 2024-04-10 NOTE — PLAN OF CARE
Goal Outcome Evaluation:                 Pt VSS, remaining asleep throughout shift. Apneic moments assessed. RA; deep breathing. IV access via double lumen PICC; scheduled and PRNs administered. F/C, UOP appropriate. Ostomy OP appropriate. Continuity of POC, reviewing when needed.

## 2024-04-10 NOTE — PROGRESS NOTES
Continued Stay Note  [facility]     Patient Name: Lea Rivers  MRN: [id number]  Today's Date: 4/10/2024    Admit Date: 3/31/2024    Plan: Inpatient Hospice   Discharge Plan       Row Name 04/10/24 0941       Plan    Plan Inpatient Hospice    Plan Comments 10% PPS. Bedside assessment completed. No family at bedside. Eyes closed with relaxed jaw and body posture. Breathing even and unlabored. Patient has not required any PRN doses of medication in the past 24 hours to palliate pain/dyspnea. Continues with scheduled injectable medications. Daughter Luis Enrique called with update post assessment. Care coordinated with [facility] staff. Patient continues to require inpatient hospice services for skilled nursing assessment, medication titration, and injectable medication administration for palliation of symptoms. Discharge is dependent on survival of this hospitalization and becoming appropriate for a lower level of care.                   Discharge Codes    No documentation.                 Expected Discharge Date and Time       Expected Discharge Date Expected Discharge Time    Mar 31, 2024               Kalee Dove RN

## 2024-04-10 NOTE — PROGRESS NOTES
Hospice Progress Note    Patient Name: Lea Rivers   : 1945  Gender: male    Code Status: comfort measures    Date of Admission: 3/31/2024    Subjective: I saw and examined Mr. Rivers this morning, no family was at bedside. Per hospice RN and bedside RN, patient has been restful overnight.  Family typically comes to visit in the evenings.  No symptom management concerns     - PRNs: None      - Intake/Output  Intake & O  utput (last 3 days)          07 07 07 07 0701  04/10 0700 04/10 07 07    P.O.        Total Intake        Urine 325 100 600     Stool 195 100 50     Total Output 520 200 650     Net -520 -200 -650             Stool Unmeasured Occurrence 1 x 2 x               Reviewed current scheduled and prn medications for route, type, dose and frequency.       furosemide    glycopyrrolate    haloperidol lactate    HYDROmorphone    midazolam    midazolam    palliative care oral rinse    Scopolamine    sodium chloride    Objective:   There were no vitals taken for this visit.     PPS: 10%    Physical Exam:  General: Chronically ill-appearing, frail elderly male resting in bed in no apparent distress with a stuffed monkey  HEENT: Temporal wasting  Pulmonary: Equal chest rise and fall bilaterally, no apnea appreciated during my visit  Cardio: Regular rate  Abdomen: No bowel sounds appreciated  Skin: No mottling  Neuro: Alert and oriented x 0, does not arouse to voice or light touch            Megacolon, toxic      Assessment/Plan:   Mr. Cortney Rivers is a 78-year-old male admitted to inpatient hospice with toxic megacolon.  PPS 10%.    Symptoms:  1.  Pain: Controlled at this time with no as needed use; however requiring Dilaudid 0.5 mg every 4 hours scheduled.  IV PRNs available  2.  Agitation: Controlled at this time, IV benzo available, Haldol also available  3.  Opiate-induced constipation: Well-controlled, last bowel movement   4.  Secretions:  Well-controlled, as needed available    Discharge Disposition: Anticipate inpatient death, prognosis Hours to days with ongoing clinical decline    Justification for care:  Patient meets criteria for acute in-patient care with required nursing assessment and interventions for symptoms with IV medications.      Adelita Connolly MD   04/10/24  13:53 EDT

## 2024-04-11 NOTE — PROGRESS NOTES
Continued Stay Note  [facility]     Patient Name: Lea Rivers  MRN: [id number]  Today's Date: 4/11/2024    Admit Date: 3/31/2024    Plan: Inpatient hospice   Discharge Plan       Row Name 04/11/24 1700       Plan    Plan Inpatient hospice    Plan Comments Patient is a 78 year old male admitted with megacolon. PPS 10%. Family at bedside. Face and musculature relaxed. Noted with periods of apnea. Patient continues to require inpatient hospice services for skilled nursing assessment, medication titration, and injectable medication administration for palliation of symptoms. Discharge is dependent on survival of this hospitalization and becoming appropriate for a lower level of care.                   Discharge Codes    No documentation.                 Expected Discharge Date and Time       Expected Discharge Date Expected Discharge Time    Mar 31, 2024               Mitzi Wood

## 2024-04-11 NOTE — PLAN OF CARE
Goal Outcome Evaluation:  Plan of Care Reviewed With: patient        Progress: declining  Outcome Evaluation: 78 year old white male admitted to Hospice care on 3/31/2024, he is on day 11 of this hospitaization.  Patient has a PICC in place and patent to to saline lock.  He has patel catheter in place and patent with an adequate amount of UOP noted.  He has an ostomy in place with dark brown watery stool.  Have administered scheduled medications as indicated.  Patient has had periods of apnea, pt. family concerned.   Family has remained attentive at BS.  Will continue to monitor patient throughout the rest of this shift.

## 2024-04-11 NOTE — PLAN OF CARE
Goal Outcome Evaluation:  Plan of Care Reviewed With: family           Outcome Evaluation: Pt was given robinal x 2 this shift and prn dilaudid x 2. Decrease in symptoms noted. Minimal urine output. Family at bedside. Continuing with comfort care.

## 2024-04-12 NOTE — SIGNIFICANT NOTE
Exam confirms with auscultation zero audible heart tones and zero audible respirations. Mr. Lea Rivers Was pronounced dead at 0545.  MD notified by patient's RN.     Porsche Dickson RN  Clinical House Supervisor  4/12/2024 0648

## 2024-04-12 NOTE — PLAN OF CARE
Goal Outcome Evaluation:  Plan of Care Reviewed With: patient        Progress: declining  Outcome Evaluation: 78 year old white male admitted to Hospice care on 3/31/2024, he is on day 12 of this hospitaization.  Patient has a PICC in place and patent to to saline lock.  He has patel catheter in place and patent with an adequate amount of UOP noted.  He has an ostomy in place with dark brown watery stool.  Have administered scheduled medications as indicated.  Patient has had periods of apnea, pt. family concerned.   Family has remained attentive at BS.  Will continue to monitor patient throughout the rest of this shift.

## 2024-04-12 NOTE — NURSING NOTE
Patient  at 0545.  Large amount of stomach contains and bile expelled prior to patient expiring.

## 2024-04-22 NOTE — DISCHARGE SUMMARY
Date of Admission: 03/31/2024   Date and Time of Death: 4/12/2024 at 5:45 AM    Hospital Course:  Lea Rivers is a 78 y.o. male admitted to inpatient hospice with diagnosis of Megacolon, 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]  for symptom management. Medications were available throughout admission for symptom management and comfort. Patient continued to decline after admission as expected ultimately to their death on 4/12/2024 at 5:45 AM. Patient was pronounced dead by Clinical House Supervisor. Spiritual and psychosocial support were available to patient and family throughout admission. Patient's remains were released per [facility] protocol.       Landy Guadalupe, MSN, APRN, ACHPN  [facility]  Hospice and Palliative Care Nurse Practitioner  04/22/24  09:33 EDT

## 2024-04-29 LAB
FUNGUS WND CULT: NORMAL
MYCOBACTERIUM SPEC CULT: NORMAL
NIGHT BLUE STAIN TISS: NORMAL

## 2025-02-12 NOTE — MBS/VFSS/FEES
Acute Care - Speech Language Pathology   Swallow Re-Evaluation [facility]  Modified Barium Swallow Study (MBS)       Patient Name: Lea Rivers  : 1945  MRN: [id number]  Today's Date: 3/21/2024               Admit Date: 3/2/2024    Visit Dx:     ICD-10-CM ICD-9-CM   1. Fall, initial encounter  W19.XXXA E888.9   2. Closed fracture of multiple ribs of left side, initial encounter  S22.42XA 807.09   3. Closed displaced fracture of acromial end of left clavicle, initial encounter  S42.032A 810.03   4. Falls frequently  R29.6 V15.88   5. Acute UTI (urinary tract infection)  N39.0 599.0   6. Colon distention  K63.89 569.89   7. Paralytic ileus of small intestine and colon  K56.0 560.1   8. Oropharyngeal dysphagia  R13.12 787.22   9. Intra-abdominal fluid  R18.8 789.59     Patient Active Problem List   Diagnosis    Coronary artery disease    Dyslipidemia    Hypothyroidism    GERD (gastroesophageal reflux disease)    Seizure disorder    BPH (benign prostatic hypertrophy)    Hypertension    Primary osteoarthritis of both knees    Syncope and collapse    Precordial pain    Diabetes    Status post total right knee replacement    Postoperative urinary retention    Confusion, postoperative    Acute blood loss anemia, asymptomatic, no transfusion required    Elevated prostate specific antigen (PSA)    Prostate cancer    Anemia    Body mass index (BMI) of 32.0-32.9 in adult    Localization-related focal epilepsy with simple partial seizures    Need for vaccination against Streptococcus pneumoniae    Upper extremity tendon tear, right, initial encounter    Vitamin D deficiency    PSA elevation    Polyp of colon    Overactive bladder    Gross hematuria    History of colon polyps    History of colon cancer    B12 deficiency    Falls    Pneumatosis intestinalis     Past Medical History:   Diagnosis Date    Anemia     Colon cancer     Status post partial colectomy in . No chemotherapy or radiation therapy.     This note was copied from a baby's chart.  Lactation Consultant Note  Lactation Consultation  Reason for Consult: Initial assessment  Consultant Name: Haleigh    Maternal Information  Has mother  before?: Yes  How long did the mother previously breastfeed?: 15months  Infant to breast within first 2 hours of birth?: Yes  Exclusive Pump and Bottle Feed: No    Maternal Assessment       Infant Assessment  Infant Behavior: Deep sleep    Feeding Assessment  Nutrition Source: Breastmilk  Feeding Method: Nursing at the breast    LATCH TOOL       Breast Pump       Other OB Lactation Tools       Patient Follow-up  Inpatient Lactation Follow-up Needed :  (as needed)    Other OB Lactation Documentation       Recommendations/Summary  I did not observe a feed this visit. Mom just attempted to latch. However, states that baby is sleepy and is not interested.  Mom reports a couple good feeds since delivery. Mom denies latch issues at this time. Mom reports that feeds are going well. Mom is able to latch baby independently. We reviewed some breastfeeding education. Mom has no further questions at this time. Outpatient lactation discussed. Mom will follow up as needed.   Coronary artery disease     Nonobstructive coronary artery disease.    Diabetes     Dyslipidemia     GERD (gastroesophageal reflux disease)     Hx of.    Hyperlipidemia     Hypertension     Hyponatremia     Hypothyroidism     Left shoulder pain     Low sodium levels 2022    recent issue; saw nephrologist who ruled out kidney issues; took Sodium Chloride po to bring levels up    Memory deficit     FROM ANESTHESIA    Osteoarthritis     Prostate cancer 07/23/2022    Seizure disorder     silent seziure-diagnosed years ago    Skin cancer      Past Surgical History:   Procedure Laterality Date    APPENDECTOMY      CARDIAC CATHETERIZATION  2012    30 to 40% LAD    CARPAL TUNNEL RELEASE Bilateral     CHOLECYSTECTOMY      COLECTOMY PARTIAL / TOTAL  1990    Partial colectomy    COLON RESECTION N/A 3/9/2024    Procedure: TOTAL ABDOMINAL COLECTOMY, END ILEOSTOMY;  Surgeon: Harley Godfrey MD;  Location:  [facility];  Service: General;  Laterality: N/A;    COLONOSCOPY      CYBERKNIFE  02/09/2023    Prostate/SV    CYSTOSCOPY TRANSURETHRAL RESECTION OF PROSTATE N/A 09/21/2018    Procedure: CYSTOSCOPY TRANSURETHRAL RESECTION OF PROSTATE GREENLIGHT;  Surgeon: Naseem Clayton MD;  Location:  [facility];  Service: Urology    EXPLORATORY LAPAROTOMY N/A 3/18/2024    Procedure: LAPAROTOMY EXPLORATORY, ABDOMINAL WASH OUT;  Surgeon: Harley Godfrey MD;  Location:  [facility];  Service: General;  Laterality: N/A;    OTHER SURGICAL HISTORY      Contraction surgery on bilateral hands and wrists.    PROSTATE BIOPSY N/A 11/11/2022    Procedure: TRANSRECTAL ULTRASOUND GUIDED BIOPSY OF PROSTATE;  Surgeon: Naseem Noland MD;  Location:  [facility];  Service: Urology;  Laterality: N/A;    SINUS SURGERY      SKIN BIOPSY      TEETH EXTRACTION      TONSILLECTOMY      TOTAL KNEE ARTHROPLASTY Right 04/07/2022    Procedure: TOTAL KNEE ARTHROPLASTY WITH ORTHO ROBOT RIGHT;  Surgeon: Louis Malcolm MD;  Location:  [facility];  Service:  Robotics - Ortho;  Laterality: Right;    TRANSRECTAL INCISION PROSTATE WITH GOLD SEED Bilateral 01/06/2023    Procedure: TRANSRECTAL ULTRASOUND GUIDED PROSTATE FIDUCIAL MARKER PLACEMENT;  Surgeon: Naseem Noland MD;  Location[facility];  Service: Urology;  Laterality: Bilateral;    TURP / TRANSURETHRAL INCISION / DRAINAGE PROSTATE      VASECTOMY         SLP Recommendation and Plan     SLP Evaluation Clinical Impression   SLP Swallowing Diagnosis moderate;oral dysphagia;pharyngeal dysphagia   Functional Impact risk of aspiration/pneumonia   Rehab Potential/Prognosis, Swallowing good, to achieve stated therapy goals   Swallow Criteria for Skilled Therapeutic Interventions Met demonstrates skilled criteria   Recommendations   Therapy Frequency (Swallow) 5 days per week   Predicted Duration Therapy Intervention (Days) until discharge   SLP Diet Recommendation mechanical ground textures;no mixed consistencies;honey thick liquids   Recommended Precautions and Strategies upright posture during/after eating;small bites of food and sips of liquid;multiple swallows per bite of food;multiple swallows per sip of liquid;check mouth frequently for oral residue/pocketing;general aspiration precautions;1:1 supervision;assist with feeding;fatigue precautions   Oral Care Recommendations Oral Care BID/PRN;Suction toothbrush   SLP Rec. for Method of Medication Administration meds crushed;with puree;as tolerated;meds via alternate route   Monitor for Signs of Aspiration yes;notify SLP if any concerns   Anticipated Discharge Disposition (SLP) skilled nursing facility             Plan of Care Reviewed With: patient  Progress: no change         03/21/24 1255   Rehab Evaluation   Document Type evaluation   Subjective Information no complaints   Patient Observations alert;cooperative   Patient/Family/Caregiver Comments/Observations no family present   Patient Effort good   Symptoms Noted During/After Treatment none   General  Information   Patient Profile Reviewed yes   Pertinent History Of Current Problem see initial eval; referred for MBS   Current Method of Nutrition NPO;parenteral feedings   Precautions/Limitations, Vision WFL;for purposes of eval   Precautions/Limitations, Hearing WFL;for purposes of eval   Prior Level of Function-Communication cognitive-linguistic impairment   Prior Level of Function-Swallowing no diet consistency restrictions   Plans/Goals Discussed with patient;agreed upon   Barriers to Rehab cognitive status;medically complex   Patient's Goals for Discharge patient did not state   Pain   Additional Documentation Pain Scale: FACES Pre/Post-Treatment (Group)   Pain Scale: FACES Pre/Post-Treatment   Pain: FACES Scale, Pretreatment 0-->no hurt   Posttreatment Pain Rating 0-->no hurt   MBS/VFSS   Utensils Used spoon;cup;straw   Consistencies Trialed regular textures;thin liquids;nectar/syrup-thick liquids;honey-thick liquids;pudding thick   MBS/VFSS Interpretation   Oral Prep Phase impaired oral phase of swallowing   Oral Transit Phase impaired   Oral Residue impaired   VFSS Summary Moderate oropharyngeal dysphagia. Unfortunately pt began to fatigue w/ increased residue across this evaluation as pt didn't initially aspirate thin or nectar. However, deep penetration w/ thin and nectar thick liquids eventually w/ vestibular residue continuing to deepen and pt unable to clear. This resulted in silent aspiration of thin/nectar thick liquids after the swallow. Cued cough is weak and ineffective to clear aspirated material. No penetration or aspiration w/ pudding, honey thick liquids or soft solids. Will initiate modified po diet and f/u for attempted dysphagia tx   Oral Preparatory Phase   Oral Preparatory Phase prolonged manipulation   Prolonged Manipulation regular textures;secondary to reduced lingual strength;secondary to impaired cognitive status   Oral Transit Phase   Impaired Oral Transit Phase delayed initiation  of bolus transit;increased A-P transit time;premature spillage of liquids into pharynx   Delayed Initiation of bolus transit all consistencies tested;discoordination of lingual movement;secondary to impaired cognitive status   Increased A-P Transit Time pudding/puree;regular textures;secondary to reduced lingual control;secondary to impaired cognitive status   Premature Spillage of Liquids into Pharynx thin liquids;nectar-thick liquids;secondary to reduced lingual control   Oral Residue   Impaired Oral Residue diffuse residue throughout oral cavity   Diffuse Residue throughout Oral Cavity regular textures;secondary to reduced lingual strength;secondary to reduced lingual range of motion   Response to Oral Residue cleared residue;with spontaneous subsequent swallow;with liquid wash   Initiation of Pharyngeal Swallow   Initiation of Pharyngeal Swallow bolus in pyriform sinuses   Pharyngeal Phase impaired pharyngeal phase of swallowing   Penetration During the Swallow thin liquids;nectar-thick liquids;secondary to delayed swallow initiation or mistiming;secondary to reduced laryngeal elevation;secondary to reduced vestibular closure   Aspiration After the Swallow thin liquids;nectar-thick liquids;secondary to residue;in valleculae;in pyriform sinuses;in laryngeal vestibule   Depth of Penetration deep   Response to Penetration No   No spontaneous response to penetration and non-effective laryngeal clearance with cue (see comments)   Response to Aspiration No   No spontaneous response to aspiration and non-effective subglottic clearance with cue (see comments)   Rosenbek's Scale thin:;nectar:;8--->level 8   Pharyngeal Residue all consistencies tested;diffuse within pharynx;secondary to reduced posterior pharyngeal wall stripping;secondary to reduced laryngeal elevation;secondary to reduced hyolaryngeal excursion   Response to Residue partial residue clearance   Attempted Compensatory Maneuvers bolus size;bolus  presentation style;additional subsequent swallow;multiple swallows;alternate liquids/solids;throat clear after swallow   Response to Attempted Compensatory Maneuvers did not prevent aspiration   Successful Compensatory Maneuver Competency patient unable to adequately demonstrate/teach back compensations   SLP Evaluation Clinical Impression   SLP Swallowing Diagnosis moderate;oral dysphagia;pharyngeal dysphagia   Functional Impact risk of aspiration/pneumonia   Rehab Potential/Prognosis, Swallowing good, to achieve stated therapy goals   Swallow Criteria for Skilled Therapeutic Interventions Met demonstrates skilled criteria   Recommendations   Therapy Frequency (Swallow) 5 days per week   Predicted Duration Therapy Intervention (Days) until discharge   SLP Diet Recommendation mechanical ground textures;no mixed consistencies;honey thick liquids   Recommended Precautions and Strategies upright posture during/after eating;small bites of food and sips of liquid;multiple swallows per bite of food;multiple swallows per sip of liquid;check mouth frequently for oral residue/pocketing;general aspiration precautions;1:1 supervision;assist with feeding;fatigue precautions   Oral Care Recommendations Oral Care BID/PRN;Suction toothbrush   SLP Rec. for Method of Medication Administration meds crushed;with puree;as tolerated;meds via alternate route   Monitor for Signs of Aspiration yes;notify SLP if any concerns   Anticipated Discharge Disposition (SLP) skilled nursing facility   (LTG) Patient will demonstrate functional swallow for   Diet Texture (Demonstrate functional swallow) soft to chew (chopped) textures   Liquid viscosity (Demonstrate functional swallow) nectar/ mildly thick liquids   [place] (Demonstrate functional swallow) with minimal cues (75-90% accuracy)   Time Frame (Demonstrate functional swallow) by discharge   Progress/Outcomes (Demonstrate functional swallow) goal ongoing   (STG) Patient will tolerate  trials of   Consistencies Trialed (Tolerate trials) mechanical ground textures;honey/ moderately thick liquids   Desired Outcome (Tolerate trials) without signs/symptoms of aspiration;without signs of distress;with adequate oral prep/transit/clearance   [place] (Tolerate trials) with minimal cues (75-90% accuracy)   Time Frame (Tolerate trials) 1 week   Progress/Outcomes (Tolerate trials) goal revised this date   (STG) Patient will tolerate therapeutic trials of   Consistencies Trialed (Tolerate therapeutic trials) soft to chew (chopped) textures   Desired Outcome (Tolerate therapeutic trials) with adequate oral prep/transit/clearance   [place] (Tolerate therapeutic trials) with minimal cues (75-90% accuracy)   Time Frame (Tolerate therapeutic trials) 1 week   Progress/Outcomes (Tolerate therapeutic trials) goal revised this date   (STG) Lingual Strengthening Goal 1 (SLP)   Activity (Lingual Strengthening Goal 1, SLP) increase lingual tone/sensation/control/coordination/movement;increase tongue back strength   Increase Lingual Tone/Sensation/Control/Coordination/Movement swallow trials;lingual resistance exercises   Increase Tongue Back Strength lingual resistance exercises   [place]/Accuracy (Lingual Strengthening Goal 1, SLP) with moderate cues (50-74% accuracy)   Time Frame (Lingual Strengthening Goal 1, SLP) 1 week   Progress/Outcomes (Lingual Strengthening Goal 1, SLP) goal ongoing   (STG) Pharyngeal Strengthening Exercise Goal 1 (SLP)   Activity (Pharyngeal Strengthening Goal 1, SLP) increase timing;increase superior movement of the hyolaryngeal complex;increase anterior movement of the hyolaryngeal complex;increase squeeze/positive pressure generation   Increase Timing prepping - 3 second prep or suck swallow or 3-step swallow   Increase Superior Movement of the Hyolaryngeal Complex falsetto   Increase Anterior Movement of the Hyolaryngeal Complex chin tuck against resistance (CTAR)   Increase  Squeeze/Positive Pressure Generation hard effortful swallow   [place]/Accuracy (Pharyngeal Strengthening Goal 1, SLP) with maximum cues (25-49% accuracy)   Time Frame (Pharyngeal Strengthening Goal 1, SLP) 1 week   Progress/Outcomes (Pharyngeal Strengthening Goal 1, SLP) goal ongoing           EDUCATION  The patient has been educated in the following areas:   Dysphagia (Swallowing Impairment) Oral Care/Hydration Modified Diet Instruction.                Time Calculation:                  Jaylyn Peterson MS CCC-SLP  3/27/2024

## (undated) DEVICE — LEX GENERAL ABDOMINAL SPLIT: Brand: MEDLINE INDUSTRIES, INC.

## (undated) DEVICE — BLANKT WARM UPPR/BDY ARM/OUT 57X196CM

## (undated) DEVICE — PUMP PAIN AUTOFUSER AUTO SELCT NOBOLUS 1TO14ML/HR 550ML DISP

## (undated) DEVICE — PAD ARMBRD SURG CONVOL 7.5X20X2IN

## (undated) DEVICE — PATIENT RETURN ELECTRODE, SINGLE-USE, CONTACT QUALITY MONITORING, ADULT, WITH 9FT CORD, FOR PATIENTS WEIGING OVER 33LBS. (15KG): Brand: MEGADYNE

## (undated) DEVICE — STRYKER PERFORMANCE SERIES SAGITTAL BLADE: Brand: STRYKER PERFORMANCE SERIES

## (undated) DEVICE — GLV SURG SENSICARE W/ALOE PF LF 9 STRL

## (undated) DEVICE — HARMONIC FOCUS SHEARS 9CM LENGTH + ADAPTIVE TISSUE TECHNOLOGY FOR USE WITH BLUE HAND PIECE ONLY: Brand: HARMONIC FOCUS

## (undated) DEVICE — SYS CLS SKIN PREMIERPRO EXOFINFUSION 22CM

## (undated) DEVICE — MEDI-VAC YANKAUER SUCTION HANDLE: Brand: CARDINAL HEALTH

## (undated) DEVICE — SUT MNCRYL PLS ANTIB UD 4/0 PS2 18IN

## (undated) DEVICE — SPNG LAP PREWSH SFTPK 18X18IN STRL PK/5

## (undated) DEVICE — GLV SURG BIOGEL M LTX PF 8

## (undated) DEVICE — TOTAL TRAY, 16FR 10ML SIL FOLEY, URN: Brand: MEDLINE

## (undated) DEVICE — ANTIBACTERIAL UNDYED BRAIDED (POLYGLACTIN 910), SYNTHETIC ABSORBABLE SUTURE: Brand: COATED VICRYL

## (undated) DEVICE — DEFOGGER!" ANTI FOG KIT: Brand: DEROYAL

## (undated) DEVICE — TRY EPID SFTY 18G 3.5IN 1T7680

## (undated) DEVICE — TOWEL,OR,DSP,ST,BLUE,STD,4/PK,20PK/CS: Brand: MEDLINE

## (undated) DEVICE — SUT SILK 3/0 TIES 18IN A184H

## (undated) DEVICE — GOWN,NON-REINFORCED,SIRUS,SET IN SLV,XXL: Brand: MEDLINE

## (undated) DEVICE — GLV SURG PREMIERPRO MIC LTX PF SZ8.5 BRN

## (undated) DEVICE — UNDERCAST PADDING: Brand: DEROYAL

## (undated) DEVICE — SUT MONOCRYL PLS ANTIB UND 3/0  PS1 27IN

## (undated) DEVICE — TBG PENCL TELESCP MEGADYNE SMOKE EVAC 10FT

## (undated) DEVICE — BNDG ELAS W/CLIP 6IN 10YD LF STRL

## (undated) DEVICE — SUT PDS LP 1 TP1 96IN VIO PDP880GA

## (undated) DEVICE — STERILE ULTRASOUND GEL, SAFEWRAP: Brand: ECOVUE

## (undated) DEVICE — ENSEAL X1 TISSUE SEALER, CURVED JAW, 25 CM SHAFT LENGTH: Brand: ENSEAL

## (undated) DEVICE — GLV SURG BIOGEL LTX PF 7

## (undated) DEVICE — SILICONE CLAMP COVERS, STERILE, BLUE, 0.29" (7.40MM) X 5", 2/PKG: Brand: KEY SURGICAL SILICONE CLAMP COVERS

## (undated) DEVICE — GLV SURG SENSICARE MICRO PF LF 7 STRL

## (undated) DEVICE — SPK TRANSFR TRANSOFIX DBL STRL

## (undated) DEVICE — CONTAINER,SPECIMEN,OR STERILE,4OZ: Brand: MEDLINE

## (undated) DEVICE — Device

## (undated) DEVICE — NEEDLE, QUINCKE 22GX7": Brand: MEDLINE

## (undated) DEVICE — SOL LR 1000ML

## (undated) DEVICE — NDL SPINE 22G 7IN BLK

## (undated) DEVICE — CVR TRANSD NEOGUARD 2X30CM LF STRL

## (undated) DEVICE — PK KN TOTL 10

## (undated) DEVICE — RESERVOIR,SUCTION,100CC,SILICONE: Brand: MEDLINE

## (undated) DEVICE — 450 ML BOTTLE OF 0.05% CHLORHEXIDINE GLUCONATE IN 99.95% STERILE WATER FOR IRRIGATION, USP AND APPLICATOR.: Brand: IRRISEPT ANTIMICROBIAL WOUND LAVAGE

## (undated) DEVICE — CEMENT MIXING SYSTEM WITH MIS FEMORAL BREAKAWAY NOZZLE: Brand: REVOLUTION

## (undated) DEVICE — SUT SILK 2/0 TIES 18IN A185H

## (undated) DEVICE — UNDERGLV SURG BIOGEL INDICATOR LF PF 7.5

## (undated) DEVICE — SHEET,DRAPE,40X58,STERILE: Brand: MEDLINE

## (undated) DEVICE — PK CYSTO-TUR BASIC 10

## (undated) DEVICE — 3M™ IOBAN™ 2 ANTIMICROBIAL INCISE DRAPE 6650EZ: Brand: IOBAN™ 2

## (undated) DEVICE — STRAP POSTN KN/BDY FM 5X72IN DISP

## (undated) DEVICE — APPL DURAPREP IODOPHOR APL 26ML

## (undated) DEVICE — MAX-CORE® DISPOSABLE CORE BIOPSY INSTRUMENT, 18G X 25CM: Brand: MAX-CORE

## (undated) DEVICE — JACKSON-PRATT 100CC BULB RESERVOIR: Brand: CARDINAL HEALTH

## (undated) DEVICE — SUT SILK 3/0 SH CR8 18IN C013D

## (undated) DEVICE — TRAP FLD MINIVAC MEGADYNE 100ML

## (undated) DEVICE — SYR LL TP 10ML STRL

## (undated) DEVICE — NDL HYPO ECLPS SFTY 18G 1 1/2IN

## (undated) DEVICE — DRSNG WND BORDR/ADHS NONADHR/GZ LF 4X10IN STRL

## (undated) DEVICE — SUT SILK 2/0 SH 30IN K833H

## (undated) DEVICE — DRSNG PAD ABD 8X10IN STRL

## (undated) DEVICE — SUT PROLN 2/0 PC3 8833H

## (undated) DEVICE — SUT SILK 3/0 SH CR8 30IN C017D

## (undated) DEVICE — SUT SILK 2/0 30IN A305H

## (undated) DEVICE — POOLE SUCTION INSTRUMENT WITH REMOVABLE SHEATH: Brand: POOLE

## (undated) DEVICE — GLV SURG BIOGEL LTX PF 8

## (undated) DEVICE — HLDR CATH FOL STATLOCK SWVL TRICOT

## (undated) DEVICE — SAFESECURE,SECUREMENT,FOLEY CATH,STERILE: Brand: MEDLINE

## (undated) DEVICE — GUIDE NDL BIOP BIPLANE 17G STRL 1P/U

## (undated) DEVICE — JP PERF DRN SIL FLT 10MM FULL: Brand: CARDINAL HEALTH

## (undated) DEVICE — ADHS SKIN PREMIERPRO EXOFIN TOPICAL HI/VISC .5ML